# Patient Record
Sex: MALE | Race: WHITE | Employment: OTHER | URBAN - METROPOLITAN AREA
[De-identification: names, ages, dates, MRNs, and addresses within clinical notes are randomized per-mention and may not be internally consistent; named-entity substitution may affect disease eponyms.]

---

## 2017-01-04 ENCOUNTER — OFFICE VISIT (OUTPATIENT)
Dept: INTERNAL MEDICINE CLINIC | Age: 62
End: 2017-01-04

## 2017-01-04 VITALS
DIASTOLIC BLOOD PRESSURE: 84 MMHG | BODY MASS INDEX: 33.32 KG/M2 | WEIGHT: 246 LBS | SYSTOLIC BLOOD PRESSURE: 132 MMHG | HEIGHT: 72 IN | HEART RATE: 102 BPM | OXYGEN SATURATION: 98 %

## 2017-01-04 DIAGNOSIS — D64.9 ANEMIA, UNSPECIFIED TYPE: Primary | ICD-10-CM

## 2017-01-04 DIAGNOSIS — I10 ESSENTIAL HYPERTENSION: ICD-10-CM

## 2017-01-04 DIAGNOSIS — H53.9 VISION CHANGES: ICD-10-CM

## 2017-01-04 DIAGNOSIS — I25.119 CORONARY ARTERY DISEASE INVOLVING NATIVE HEART WITH ANGINA PECTORIS, UNSPECIFIED VESSEL OR LESION TYPE (HCC): ICD-10-CM

## 2017-01-04 DIAGNOSIS — J40 BRONCHITIS: ICD-10-CM

## 2017-01-04 LAB
BASOPHILS ABSOLUTE: 0.1 K/UL (ref 0–0.2)
BASOPHILS RELATIVE PERCENT: 0.9 %
EOSINOPHILS ABSOLUTE: 0.2 K/UL (ref 0–0.6)
EOSINOPHILS RELATIVE PERCENT: 3.6 %
HCT VFR BLD CALC: 30.4 % (ref 40.5–52.5)
HEMOGLOBIN: 9.7 G/DL (ref 13.5–17.5)
LYMPHOCYTES ABSOLUTE: 1.8 K/UL (ref 1–5.1)
LYMPHOCYTES RELATIVE PERCENT: 28.3 %
MCH RBC QN AUTO: 26.8 PG (ref 26–34)
MCHC RBC AUTO-ENTMCNC: 31.9 G/DL (ref 31–36)
MCV RBC AUTO: 84 FL (ref 80–100)
MONOCYTES ABSOLUTE: 0.4 K/UL (ref 0–1.3)
MONOCYTES RELATIVE PERCENT: 5.6 %
NEUTROPHILS ABSOLUTE: 4 K/UL (ref 1.7–7.7)
NEUTROPHILS RELATIVE PERCENT: 61.6 %
PDW BLD-RTO: 15.9 % (ref 12.4–15.4)
PLATELET # BLD: 176 K/UL (ref 135–450)
PMV BLD AUTO: 9.8 FL (ref 5–10.5)
RBC # BLD: 3.62 M/UL (ref 4.2–5.9)
WBC # BLD: 6.5 K/UL (ref 4–11)

## 2017-01-04 PROCEDURE — 99214 OFFICE O/P EST MOD 30 MIN: CPT | Performed by: INTERNAL MEDICINE

## 2017-01-04 RX ORDER — CEFUROXIME AXETIL 250 MG/1
250 TABLET ORAL 2 TIMES DAILY
Qty: 14 TABLET | Refills: 0 | Status: SHIPPED | OUTPATIENT
Start: 2017-01-04 | End: 2017-01-11

## 2017-01-04 ASSESSMENT — ENCOUNTER SYMPTOMS
RESPIRATORY NEGATIVE: 1
EYES NEGATIVE: 1

## 2017-01-09 ENCOUNTER — PAT TELEPHONE (OUTPATIENT)
Dept: PREADMISSION TESTING | Age: 62
End: 2017-01-09

## 2017-01-09 VITALS — BODY MASS INDEX: 33.32 KG/M2 | WEIGHT: 246 LBS | HEIGHT: 72 IN

## 2017-01-09 ASSESSMENT — PAIN - FUNCTIONAL ASSESSMENT: PAIN_FUNCTIONAL_ASSESSMENT: 0-10

## 2017-01-09 ASSESSMENT — PAIN SCALES - GENERAL: PAINLEVEL_OUTOF10: 5

## 2017-01-09 ASSESSMENT — PAIN DESCRIPTION - LOCATION: LOCATION: ABDOMEN

## 2017-01-10 ENCOUNTER — HOSPITAL ENCOUNTER (OUTPATIENT)
Dept: ENDOSCOPY | Age: 62
Discharge: HOME | End: 2017-01-10
Attending: INTERNAL MEDICINE | Admitting: INTERNAL MEDICINE

## 2017-01-10 VITALS
TEMPERATURE: 96.9 F | OXYGEN SATURATION: 97 % | WEIGHT: 242.51 LBS | BODY MASS INDEX: 32.85 KG/M2 | RESPIRATION RATE: 18 BRPM | HEIGHT: 72 IN | HEART RATE: 76 BPM | SYSTOLIC BLOOD PRESSURE: 164 MMHG | DIASTOLIC BLOOD PRESSURE: 86 MMHG

## 2017-01-10 LAB
ANION GAP SERPL CALCULATED.3IONS-SCNC: 14 MMOL/L (ref 3–16)
BUN BLDV-MCNC: 7 MG/DL (ref 7–20)
CALCIUM SERPL-MCNC: 8.4 MG/DL (ref 8.3–10.6)
CHLORIDE BLD-SCNC: 100 MMOL/L (ref 99–110)
CO2: 26 MMOL/L (ref 21–32)
CREAT SERPL-MCNC: 0.8 MG/DL (ref 0.8–1.3)
GFR AFRICAN AMERICAN: >60
GFR NON-AFRICAN AMERICAN: >60
GLUCOSE BLD-MCNC: 145 MG/DL (ref 70–99)
GLUCOSE BLD-MCNC: 187 MG/DL (ref 70–99)
GLUCOSE BLD-MCNC: 205 MG/DL (ref 70–99)
PERFORMED ON: ABNORMAL
PERFORMED ON: ABNORMAL
POTASSIUM SERPL-SCNC: 3.7 MMOL/L (ref 3.5–5.1)
SODIUM BLD-SCNC: 140 MMOL/L (ref 136–145)

## 2017-01-10 RX ORDER — SODIUM CHLORIDE 0.9 % (FLUSH) 0.9 %
10 SYRINGE (ML) INJECTION PRN
Status: DISCONTINUED | OUTPATIENT
Start: 2017-01-10 | End: 2017-01-11 | Stop reason: HOSPADM

## 2017-01-10 RX ORDER — SODIUM CHLORIDE 0.9 % (FLUSH) 0.9 %
10 SYRINGE (ML) INJECTION EVERY 12 HOURS SCHEDULED
Status: DISCONTINUED | OUTPATIENT
Start: 2017-01-10 | End: 2017-01-11 | Stop reason: HOSPADM

## 2017-01-10 RX ORDER — SODIUM CHLORIDE 9 MG/ML
INJECTION, SOLUTION INTRAVENOUS CONTINUOUS
Status: DISCONTINUED | OUTPATIENT
Start: 2017-01-10 | End: 2017-01-11 | Stop reason: HOSPADM

## 2017-01-10 RX ORDER — ONDANSETRON 2 MG/ML
4 INJECTION INTRAMUSCULAR; INTRAVENOUS
Status: ACTIVE | OUTPATIENT
Start: 2017-01-10 | End: 2017-01-10

## 2017-01-10 RX ADMIN — SODIUM CHLORIDE: 9 INJECTION, SOLUTION INTRAVENOUS at 07:28

## 2017-01-10 ASSESSMENT — PAIN SCALES - GENERAL
PAINLEVEL_OUTOF10: 0

## 2017-01-10 ASSESSMENT — ENCOUNTER SYMPTOMS: SHORTNESS OF BREATH: 1

## 2017-01-10 ASSESSMENT — PAIN - FUNCTIONAL ASSESSMENT: PAIN_FUNCTIONAL_ASSESSMENT: 0-10

## 2017-02-01 ENCOUNTER — CARE COORDINATION (OUTPATIENT)
Dept: INTERNAL MEDICINE CLINIC | Age: 62
End: 2017-02-01

## 2017-02-02 ENCOUNTER — PAT TELEPHONE (OUTPATIENT)
Dept: PREADMISSION TESTING | Age: 62
End: 2017-02-02

## 2017-02-02 VITALS — WEIGHT: 248 LBS | HEIGHT: 72 IN | BODY MASS INDEX: 33.59 KG/M2

## 2017-02-03 RX ORDER — SODIUM CHLORIDE 0.9 % (FLUSH) 0.9 %
10 SYRINGE (ML) INJECTION PRN
Status: CANCELLED | OUTPATIENT
Start: 2017-02-03

## 2017-02-03 RX ORDER — SODIUM CHLORIDE 0.9 % (FLUSH) 0.9 %
10 SYRINGE (ML) INJECTION EVERY 12 HOURS SCHEDULED
Status: CANCELLED | OUTPATIENT
Start: 2017-02-03

## 2017-02-03 RX ORDER — SODIUM CHLORIDE 9 MG/ML
INJECTION, SOLUTION INTRAVENOUS CONTINUOUS
Status: CANCELLED | OUTPATIENT
Start: 2017-02-03

## 2017-02-06 ENCOUNTER — TELEPHONE (OUTPATIENT)
Dept: INTERNAL MEDICINE CLINIC | Age: 62
End: 2017-02-06

## 2017-02-07 ENCOUNTER — TELEPHONE (OUTPATIENT)
Dept: INTERNAL MEDICINE CLINIC | Age: 62
End: 2017-02-07

## 2017-02-07 ENCOUNTER — TELEPHONE (OUTPATIENT)
Dept: CARDIOLOGY CLINIC | Age: 62
End: 2017-02-07

## 2017-02-07 ENCOUNTER — HOSPITAL ENCOUNTER (OUTPATIENT)
Dept: ENDOSCOPY | Age: 62
Discharge: HOME | End: 2017-02-07
Attending: INTERNAL MEDICINE | Admitting: INTERNAL MEDICINE

## 2017-02-08 ENCOUNTER — TELEPHONE (OUTPATIENT)
Dept: INTERNAL MEDICINE CLINIC | Age: 62
End: 2017-02-08

## 2017-02-09 ENCOUNTER — CARE COORDINATION (OUTPATIENT)
Dept: CASE MANAGEMENT | Age: 62
End: 2017-02-09

## 2017-02-09 ENCOUNTER — TELEPHONE (OUTPATIENT)
Dept: CARDIOLOGY CLINIC | Age: 62
End: 2017-02-09

## 2017-02-09 RX ORDER — RANOLAZINE 500 MG/1
500 TABLET, EXTENDED RELEASE ORAL 2 TIMES DAILY
Qty: 56 TABLET | Refills: 0 | COMMUNITY
Start: 2017-02-09 | End: 2017-03-02 | Stop reason: SDUPTHER

## 2017-02-09 RX ORDER — FLUTICASONE FUROATE AND VILANTEROL 200; 25 UG/1; UG/1
1 POWDER RESPIRATORY (INHALATION) DAILY
Qty: 3 EACH | Refills: 3 | Status: SHIPPED | OUTPATIENT
Start: 2017-02-09 | End: 2017-03-12 | Stop reason: ALTCHOICE

## 2017-02-09 RX ORDER — FLUTICASONE FUROATE AND VILANTEROL 200; 25 UG/1; UG/1
1 POWDER RESPIRATORY (INHALATION) DAILY
COMMUNITY
End: 2017-02-09 | Stop reason: SDUPTHER

## 2017-02-13 ENCOUNTER — CARE COORDINATION (OUTPATIENT)
Dept: CASE MANAGEMENT | Age: 62
End: 2017-02-13

## 2017-02-15 ENCOUNTER — CARE COORDINATION (OUTPATIENT)
Dept: CASE MANAGEMENT | Age: 62
End: 2017-02-15

## 2017-02-16 ENCOUNTER — OFFICE VISIT (OUTPATIENT)
Dept: INTERNAL MEDICINE CLINIC | Age: 62
End: 2017-02-16

## 2017-02-16 VITALS
BODY MASS INDEX: 34.81 KG/M2 | WEIGHT: 257 LBS | DIASTOLIC BLOOD PRESSURE: 72 MMHG | SYSTOLIC BLOOD PRESSURE: 130 MMHG | HEART RATE: 86 BPM | HEIGHT: 72 IN | OXYGEN SATURATION: 98 %

## 2017-02-16 DIAGNOSIS — J44.9 CHRONIC OBSTRUCTIVE PULMONARY DISEASE, UNSPECIFIED COPD TYPE (HCC): ICD-10-CM

## 2017-02-16 DIAGNOSIS — D64.9 ANEMIA, UNSPECIFIED TYPE: ICD-10-CM

## 2017-02-16 DIAGNOSIS — E11.40 CONTROLLED TYPE 2 DIABETES MELLITUS WITH DIABETIC NEUROPATHY, WITHOUT LONG-TERM CURRENT USE OF INSULIN (HCC): ICD-10-CM

## 2017-02-16 DIAGNOSIS — F41.9 ANXIETY: ICD-10-CM

## 2017-02-16 DIAGNOSIS — I25.709 CORONARY ARTERY DISEASE INVOLVING CORONARY BYPASS GRAFT OF NATIVE HEART WITH ANGINA PECTORIS (HCC): Primary | ICD-10-CM

## 2017-02-16 PROCEDURE — 99214 OFFICE O/P EST MOD 30 MIN: CPT | Performed by: INTERNAL MEDICINE

## 2017-02-16 RX ORDER — ALPRAZOLAM 1 MG/1
1 TABLET ORAL 4 TIMES DAILY PRN
Qty: 120 TABLET | Refills: 0 | Status: SHIPPED | OUTPATIENT
Start: 2017-02-16 | End: 2017-03-27 | Stop reason: SDUPTHER

## 2017-02-16 RX ORDER — ALPRAZOLAM 1 MG/1
1 TABLET ORAL 4 TIMES DAILY PRN
COMMUNITY
End: 2017-02-16 | Stop reason: SDUPTHER

## 2017-02-16 ASSESSMENT — ENCOUNTER SYMPTOMS
RESPIRATORY NEGATIVE: 1
EYES NEGATIVE: 1
ALLERGIC/IMMUNOLOGIC NEGATIVE: 1

## 2017-02-20 ENCOUNTER — CARE COORDINATION (OUTPATIENT)
Dept: CASE MANAGEMENT | Age: 62
End: 2017-02-20

## 2017-02-22 ENCOUNTER — CARE COORDINATION (OUTPATIENT)
Dept: CASE MANAGEMENT | Age: 62
End: 2017-02-22

## 2017-02-24 ENCOUNTER — EPISODE CHANGES (OUTPATIENT)
Dept: CASE MANAGEMENT | Age: 62
End: 2017-02-24

## 2017-03-02 ENCOUNTER — TELEPHONE (OUTPATIENT)
Dept: CARDIOLOGY CLINIC | Age: 62
End: 2017-03-02

## 2017-03-02 RX ORDER — RANOLAZINE 500 MG/1
500 TABLET, EXTENDED RELEASE ORAL 2 TIMES DAILY
Qty: 56 TABLET | Refills: 0 | COMMUNITY
Start: 2017-03-02 | End: 2017-06-21

## 2017-03-03 ENCOUNTER — CARE COORDINATION (OUTPATIENT)
Dept: INTERNAL MEDICINE CLINIC | Age: 62
End: 2017-03-03

## 2017-03-16 ENCOUNTER — CARE COORDINATION (OUTPATIENT)
Dept: CASE MANAGEMENT | Age: 62
End: 2017-03-16

## 2017-03-18 PROBLEM — R07.89 ANTERIOR CHEST WALL PAIN: Status: ACTIVE | Noted: 2017-03-18

## 2017-03-24 ENCOUNTER — CARE COORDINATION (OUTPATIENT)
Dept: CASE MANAGEMENT | Age: 62
End: 2017-03-24

## 2017-03-25 ENCOUNTER — CARE COORDINATION (OUTPATIENT)
Dept: CASE MANAGEMENT | Age: 62
End: 2017-03-25

## 2017-03-27 ENCOUNTER — CARE COORDINATION (OUTPATIENT)
Dept: CASE MANAGEMENT | Age: 62
End: 2017-03-27

## 2017-03-27 DIAGNOSIS — F41.9 ANXIETY: ICD-10-CM

## 2017-03-27 RX ORDER — ALPRAZOLAM 1 MG/1
TABLET ORAL
Qty: 120 TABLET | Refills: 0 | Status: SHIPPED | OUTPATIENT
Start: 2017-03-27 | End: 2017-04-25 | Stop reason: SDUPTHER

## 2017-03-28 ENCOUNTER — TELEPHONE (OUTPATIENT)
Dept: INTERNAL MEDICINE CLINIC | Age: 62
End: 2017-03-28

## 2017-03-31 ENCOUNTER — OFFICE VISIT (OUTPATIENT)
Dept: INTERNAL MEDICINE CLINIC | Age: 62
End: 2017-03-31

## 2017-03-31 VITALS
DIASTOLIC BLOOD PRESSURE: 76 MMHG | SYSTOLIC BLOOD PRESSURE: 124 MMHG | HEART RATE: 71 BPM | HEIGHT: 72 IN | OXYGEN SATURATION: 98 % | WEIGHT: 256 LBS | BODY MASS INDEX: 34.67 KG/M2

## 2017-03-31 DIAGNOSIS — E11.40 CONTROLLED TYPE 2 DIABETES MELLITUS WITH DIABETIC NEUROPATHY, WITHOUT LONG-TERM CURRENT USE OF INSULIN (HCC): ICD-10-CM

## 2017-03-31 DIAGNOSIS — D50.0 IRON DEFICIENCY ANEMIA DUE TO CHRONIC BLOOD LOSS: ICD-10-CM

## 2017-03-31 DIAGNOSIS — J98.01 BRONCHOSPASM: ICD-10-CM

## 2017-03-31 DIAGNOSIS — I50.9 CONGESTIVE HEART FAILURE, UNSPECIFIED CONGESTIVE HEART FAILURE CHRONICITY, UNSPECIFIED CONGESTIVE HEART FAILURE TYPE: ICD-10-CM

## 2017-03-31 DIAGNOSIS — F41.9 ANXIETY: ICD-10-CM

## 2017-03-31 DIAGNOSIS — R07.9 CHEST PAIN, UNSPECIFIED TYPE: Primary | ICD-10-CM

## 2017-03-31 PROCEDURE — 99214 OFFICE O/P EST MOD 30 MIN: CPT | Performed by: INTERNAL MEDICINE

## 2017-03-31 RX ORDER — FLUTICASONE FUROATE AND VILANTEROL 200; 25 UG/1; UG/1
200 POWDER RESPIRATORY (INHALATION) 3 TIMES DAILY
Qty: 1 EACH | Refills: 0 | COMMUNITY
Start: 2017-03-31 | End: 2017-04-28 | Stop reason: SDUPTHER

## 2017-03-31 ASSESSMENT — ENCOUNTER SYMPTOMS
SHORTNESS OF BREATH: 0
EYES NEGATIVE: 1
WHEEZING: 1

## 2017-04-06 ENCOUNTER — SURG/PROC ORDERS (OUTPATIENT)
Dept: ANESTHESIOLOGY | Age: 62
End: 2017-04-06

## 2017-04-06 RX ORDER — SODIUM CHLORIDE 9 MG/ML
INJECTION, SOLUTION INTRAVENOUS CONTINUOUS
Status: CANCELLED | OUTPATIENT
Start: 2017-04-06

## 2017-04-06 RX ORDER — SODIUM CHLORIDE 0.9 % (FLUSH) 0.9 %
10 SYRINGE (ML) INJECTION PRN
Status: CANCELLED | OUTPATIENT
Start: 2017-04-06

## 2017-04-06 RX ORDER — SODIUM CHLORIDE 0.9 % (FLUSH) 0.9 %
10 SYRINGE (ML) INJECTION EVERY 12 HOURS SCHEDULED
Status: CANCELLED | OUTPATIENT
Start: 2017-04-06

## 2017-04-10 DIAGNOSIS — I25.709 CORONARY ARTERY DISEASE INVOLVING CORONARY BYPASS GRAFT OF NATIVE HEART WITH ANGINA PECTORIS (HCC): ICD-10-CM

## 2017-04-10 DIAGNOSIS — E11.40 CONTROLLED TYPE 2 DIABETES MELLITUS WITH DIABETIC NEUROPATHY, WITHOUT LONG-TERM CURRENT USE OF INSULIN (HCC): ICD-10-CM

## 2017-04-10 LAB
CHOLESTEROL, TOTAL: 148 MG/DL (ref 0–199)
HDLC SERPL-MCNC: 51 MG/DL (ref 40–60)
LDL CHOLESTEROL CALCULATED: 77 MG/DL
TRIGL SERPL-MCNC: 102 MG/DL (ref 0–150)
VLDLC SERPL CALC-MCNC: 20 MG/DL

## 2017-04-11 LAB
ESTIMATED AVERAGE GLUCOSE: 151.3 MG/DL
HBA1C MFR BLD: 6.9 %

## 2017-04-13 ENCOUNTER — HOSPITAL ENCOUNTER (OUTPATIENT)
Dept: OTHER | Age: 62
Discharge: HOME | End: 2017-04-13
Attending: INTERNAL MEDICINE | Admitting: INTERNAL MEDICINE

## 2017-04-13 ENCOUNTER — OFFICE VISIT (OUTPATIENT)
Dept: CARDIOLOGY CLINIC | Age: 62
End: 2017-04-13

## 2017-04-13 VITALS
DIASTOLIC BLOOD PRESSURE: 70 MMHG | HEIGHT: 72 IN | HEART RATE: 76 BPM | WEIGHT: 255 LBS | OXYGEN SATURATION: 94 % | SYSTOLIC BLOOD PRESSURE: 118 MMHG | BODY MASS INDEX: 34.54 KG/M2

## 2017-04-13 DIAGNOSIS — I25.110 CORONARY ARTERY DISEASE INVOLVING NATIVE CORONARY ARTERY OF NATIVE HEART WITH UNSTABLE ANGINA PECTORIS (HCC): ICD-10-CM

## 2017-04-13 DIAGNOSIS — R06.02 SOB (SHORTNESS OF BREATH): ICD-10-CM

## 2017-04-13 DIAGNOSIS — E78.2 MIXED HYPERLIPIDEMIA: Chronic | ICD-10-CM

## 2017-04-13 DIAGNOSIS — I25.5 ISCHEMIC CARDIOMYOPATHY: Chronic | ICD-10-CM

## 2017-04-13 DIAGNOSIS — I25.810 CORONARY ARTERY DISEASE INVOLVING CORONARY BYPASS GRAFT OF NATIVE HEART WITHOUT ANGINA PECTORIS: ICD-10-CM

## 2017-04-13 DIAGNOSIS — Z95.1 S/P CABG (CORONARY ARTERY BYPASS GRAFT): Primary | ICD-10-CM

## 2017-04-13 LAB
ANION GAP SERPL CALCULATED.3IONS-SCNC: 13 MMOL/L (ref 3–16)
BASOPHILS ABSOLUTE: 0.1 K/UL (ref 0–0.2)
BASOPHILS RELATIVE PERCENT: 1.5 %
BUN BLDV-MCNC: 14 MG/DL (ref 7–20)
CALCIUM SERPL-MCNC: 8.7 MG/DL (ref 8.3–10.6)
CHLORIDE BLD-SCNC: 103 MMOL/L (ref 99–110)
CO2: 27 MMOL/L (ref 21–32)
CREAT SERPL-MCNC: 1 MG/DL (ref 0.8–1.3)
EOSINOPHILS ABSOLUTE: 0.2 K/UL (ref 0–0.6)
EOSINOPHILS RELATIVE PERCENT: 2.8 %
GFR AFRICAN AMERICAN: >60
GFR NON-AFRICAN AMERICAN: >60
GLUCOSE BLD-MCNC: 130 MG/DL (ref 70–99)
HCT VFR BLD CALC: 31.5 % (ref 40.5–52.5)
HEMOGLOBIN: 9.9 G/DL (ref 13.5–17.5)
LYMPHOCYTES ABSOLUTE: 2.2 K/UL (ref 1–5.1)
LYMPHOCYTES RELATIVE PERCENT: 33.8 %
MCH RBC QN AUTO: 25.6 PG (ref 26–34)
MCHC RBC AUTO-ENTMCNC: 31.4 G/DL (ref 31–36)
MCV RBC AUTO: 81.3 FL (ref 80–100)
MONOCYTES ABSOLUTE: 0.6 K/UL (ref 0–1.3)
MONOCYTES RELATIVE PERCENT: 9.5 %
NEUTROPHILS ABSOLUTE: 3.5 K/UL (ref 1.7–7.7)
NEUTROPHILS RELATIVE PERCENT: 52.4 %
PDW BLD-RTO: 22.7 % (ref 12.4–15.4)
PLATELET # BLD: 280 K/UL (ref 135–450)
PMV BLD AUTO: 9 FL (ref 5–10.5)
POTASSIUM SERPL-SCNC: 3.9 MMOL/L (ref 3.5–5.1)
PRO-BNP: 661 PG/ML (ref 0–124)
RBC # BLD: 3.87 M/UL (ref 4.2–5.9)
SODIUM BLD-SCNC: 143 MMOL/L (ref 136–145)
WBC # BLD: 6.6 K/UL (ref 4–11)

## 2017-04-13 PROCEDURE — 93000 ELECTROCARDIOGRAM COMPLETE: CPT | Performed by: INTERNAL MEDICINE

## 2017-04-13 PROCEDURE — 99214 OFFICE O/P EST MOD 30 MIN: CPT | Performed by: INTERNAL MEDICINE

## 2017-04-26 ENCOUNTER — OFFICE VISIT (OUTPATIENT)
Dept: CARDIOLOGY CLINIC | Age: 62
End: 2017-04-26

## 2017-04-26 ENCOUNTER — PROCEDURE VISIT (OUTPATIENT)
Dept: CARDIOLOGY CLINIC | Age: 62
End: 2017-04-26

## 2017-04-26 VITALS
OXYGEN SATURATION: 93 % | HEART RATE: 78 BPM | WEIGHT: 240.8 LBS | BODY MASS INDEX: 32.61 KG/M2 | SYSTOLIC BLOOD PRESSURE: 122 MMHG | DIASTOLIC BLOOD PRESSURE: 84 MMHG | HEIGHT: 72 IN

## 2017-04-26 DIAGNOSIS — I25.10 CORONARY ARTERY DISEASE INVOLVING NATIVE CORONARY ARTERY OF NATIVE HEART WITHOUT ANGINA PECTORIS: ICD-10-CM

## 2017-04-26 DIAGNOSIS — I25.5 ISCHEMIC CARDIOMYOPATHY: Chronic | ICD-10-CM

## 2017-04-26 DIAGNOSIS — Z95.810 ICD (IMPLANTABLE CARDIOVERTER-DEFIBRILLATOR), DUAL, IN SITU: Primary | ICD-10-CM

## 2017-04-26 DIAGNOSIS — E78.2 MIXED HYPERLIPIDEMIA: ICD-10-CM

## 2017-04-26 DIAGNOSIS — I50.20 SYSTOLIC CONGESTIVE HEART FAILURE, UNSPECIFIED CONGESTIVE HEART FAILURE CHRONICITY: ICD-10-CM

## 2017-04-26 DIAGNOSIS — I42.9 CARDIOMYOPATHY (HCC): Primary | ICD-10-CM

## 2017-04-26 PROCEDURE — 93290 INTERROG DEV EVAL ICPMS IP: CPT | Performed by: INTERNAL MEDICINE

## 2017-04-26 PROCEDURE — 99214 OFFICE O/P EST MOD 30 MIN: CPT | Performed by: INTERNAL MEDICINE

## 2017-04-26 PROCEDURE — 93283 PRGRMG EVAL IMPLANTABLE DFB: CPT | Performed by: INTERNAL MEDICINE

## 2017-04-28 ENCOUNTER — OFFICE VISIT (OUTPATIENT)
Dept: INTERNAL MEDICINE CLINIC | Age: 62
End: 2017-04-28

## 2017-04-28 VITALS
DIASTOLIC BLOOD PRESSURE: 82 MMHG | HEART RATE: 94 BPM | OXYGEN SATURATION: 96 % | WEIGHT: 250 LBS | BODY MASS INDEX: 33.86 KG/M2 | HEIGHT: 72 IN | SYSTOLIC BLOOD PRESSURE: 138 MMHG

## 2017-04-28 DIAGNOSIS — E11.40 CONTROLLED TYPE 2 DIABETES MELLITUS WITH DIABETIC NEUROPATHY, WITHOUT LONG-TERM CURRENT USE OF INSULIN (HCC): ICD-10-CM

## 2017-04-28 DIAGNOSIS — J44.9 CHRONIC OBSTRUCTIVE PULMONARY DISEASE, UNSPECIFIED COPD TYPE (HCC): Primary | ICD-10-CM

## 2017-04-28 DIAGNOSIS — E78.2 MIXED HYPERLIPIDEMIA: Chronic | ICD-10-CM

## 2017-04-28 DIAGNOSIS — D64.9 ANEMIA, UNSPECIFIED TYPE: ICD-10-CM

## 2017-04-28 DIAGNOSIS — I25.110 CORONARY ARTERY DISEASE INVOLVING NATIVE CORONARY ARTERY OF NATIVE HEART WITH UNSTABLE ANGINA PECTORIS (HCC): ICD-10-CM

## 2017-04-28 LAB
BASOPHILS ABSOLUTE: 0.1 K/UL (ref 0–0.2)
BASOPHILS RELATIVE PERCENT: 0.7 %
EOSINOPHILS ABSOLUTE: 0.3 K/UL (ref 0–0.6)
EOSINOPHILS RELATIVE PERCENT: 3.3 %
HCT VFR BLD CALC: 32.7 % (ref 40.5–52.5)
HEMOGLOBIN: 10.2 G/DL (ref 13.5–17.5)
LYMPHOCYTES ABSOLUTE: 2.3 K/UL (ref 1–5.1)
LYMPHOCYTES RELATIVE PERCENT: 28 %
MCH RBC QN AUTO: 25.9 PG (ref 26–34)
MCHC RBC AUTO-ENTMCNC: 31.1 G/DL (ref 31–36)
MCV RBC AUTO: 83.5 FL (ref 80–100)
MONOCYTES ABSOLUTE: 0.6 K/UL (ref 0–1.3)
MONOCYTES RELATIVE PERCENT: 6.8 %
NEUTROPHILS ABSOLUTE: 5 K/UL (ref 1.7–7.7)
NEUTROPHILS RELATIVE PERCENT: 61.2 %
PDW BLD-RTO: 23.3 % (ref 12.4–15.4)
PLATELET # BLD: 184 K/UL (ref 135–450)
PMV BLD AUTO: 9.8 FL (ref 5–10.5)
RBC # BLD: 3.92 M/UL (ref 4.2–5.9)
WBC # BLD: 8.1 K/UL (ref 4–11)

## 2017-04-28 PROCEDURE — G8510 SCR DEP NEG, NO PLAN REQD: HCPCS | Performed by: INTERNAL MEDICINE

## 2017-04-28 PROCEDURE — 99214 OFFICE O/P EST MOD 30 MIN: CPT | Performed by: INTERNAL MEDICINE

## 2017-04-28 RX ORDER — ALBUTEROL SULFATE 90 UG/1
2 AEROSOL, METERED RESPIRATORY (INHALATION) EVERY 6 HOURS PRN
Qty: 1 INHALER | Refills: 3 | Status: SHIPPED | OUTPATIENT
Start: 2017-04-28 | End: 2017-07-05

## 2017-04-28 RX ORDER — FLUTICASONE FUROATE AND VILANTEROL 200; 25 UG/1; UG/1
200 POWDER RESPIRATORY (INHALATION) DAILY
Qty: 2 EACH | Refills: 0 | COMMUNITY
Start: 2017-04-28 | End: 2018-02-05

## 2017-04-28 ASSESSMENT — PATIENT HEALTH QUESTIONNAIRE - PHQ9
SUM OF ALL RESPONSES TO PHQ9 QUESTIONS 1 & 2: 2
1. LITTLE INTEREST OR PLEASURE IN DOING THINGS: 1
2. FEELING DOWN, DEPRESSED OR HOPELESS: 1
SUM OF ALL RESPONSES TO PHQ QUESTIONS 1-9: 2

## 2017-04-28 ASSESSMENT — ENCOUNTER SYMPTOMS
SHORTNESS OF BREATH: 1
EYES NEGATIVE: 1

## 2017-06-02 ENCOUNTER — TELEPHONE (OUTPATIENT)
Dept: INTERNAL MEDICINE CLINIC | Age: 62
End: 2017-06-02

## 2017-06-05 ENCOUNTER — CARE COORDINATION (OUTPATIENT)
Dept: CARE COORDINATION | Age: 62
End: 2017-06-05

## 2017-06-19 ENCOUNTER — CARE COORDINATION (OUTPATIENT)
Dept: INTERNAL MEDICINE CLINIC | Age: 62
End: 2017-06-19

## 2017-06-21 ENCOUNTER — OFFICE VISIT (OUTPATIENT)
Dept: INTERNAL MEDICINE CLINIC | Age: 62
End: 2017-06-21

## 2017-06-21 VITALS
WEIGHT: 238.4 LBS | SYSTOLIC BLOOD PRESSURE: 155 MMHG | BODY MASS INDEX: 32.29 KG/M2 | DIASTOLIC BLOOD PRESSURE: 90 MMHG | HEART RATE: 111 BPM | OXYGEN SATURATION: 95 % | HEIGHT: 72 IN

## 2017-06-21 DIAGNOSIS — M79.601 CHRONIC PAIN OF RIGHT UPPER EXTREMITY: Primary | ICD-10-CM

## 2017-06-21 DIAGNOSIS — I25.709 CORONARY ARTERY DISEASE INVOLVING CORONARY BYPASS GRAFT OF NATIVE HEART WITH ANGINA PECTORIS (HCC): ICD-10-CM

## 2017-06-21 DIAGNOSIS — E11.40 CONTROLLED TYPE 2 DIABETES MELLITUS WITH DIABETIC NEUROPATHY, WITHOUT LONG-TERM CURRENT USE OF INSULIN (HCC): ICD-10-CM

## 2017-06-21 DIAGNOSIS — G89.29 CHRONIC PAIN OF RIGHT UPPER EXTREMITY: Primary | ICD-10-CM

## 2017-06-21 DIAGNOSIS — F41.9 ANXIETY: ICD-10-CM

## 2017-06-21 PROCEDURE — 99214 OFFICE O/P EST MOD 30 MIN: CPT | Performed by: INTERNAL MEDICINE

## 2017-06-21 RX ORDER — ALPRAZOLAM 1 MG/1
TABLET ORAL
Qty: 120 TABLET | Refills: 1 | Status: SHIPPED | OUTPATIENT
Start: 2017-06-21 | End: 2017-07-05 | Stop reason: ALTCHOICE

## 2017-06-21 RX ORDER — METHYLPREDNISOLONE 4 MG/1
TABLET ORAL
Qty: 1 KIT | Refills: 0 | Status: SHIPPED | OUTPATIENT
Start: 2017-06-21 | End: 2017-07-05

## 2017-06-21 ASSESSMENT — ENCOUNTER SYMPTOMS
RESPIRATORY NEGATIVE: 1
EYES NEGATIVE: 1

## 2017-06-29 ENCOUNTER — CARE COORDINATION (OUTPATIENT)
Dept: INTERNAL MEDICINE CLINIC | Age: 62
End: 2017-06-29

## 2017-07-05 ENCOUNTER — TELEPHONE (OUTPATIENT)
Dept: INTERNAL MEDICINE CLINIC | Age: 62
End: 2017-07-05

## 2017-07-07 ENCOUNTER — OFFICE VISIT (OUTPATIENT)
Dept: INTERNAL MEDICINE CLINIC | Age: 62
End: 2017-07-07

## 2017-07-07 ENCOUNTER — CARE COORDINATION (OUTPATIENT)
Dept: CARE COORDINATION | Age: 62
End: 2017-07-07

## 2017-07-07 VITALS
SYSTOLIC BLOOD PRESSURE: 140 MMHG | HEIGHT: 72 IN | DIASTOLIC BLOOD PRESSURE: 100 MMHG | HEART RATE: 60 BPM | BODY MASS INDEX: 34.97 KG/M2 | WEIGHT: 258.2 LBS | OXYGEN SATURATION: 88 %

## 2017-07-07 DIAGNOSIS — R06.02 SHORTNESS OF BREATH: Primary | ICD-10-CM

## 2017-07-07 DIAGNOSIS — I25.709 CORONARY ARTERY DISEASE INVOLVING CORONARY BYPASS GRAFT OF NATIVE HEART WITH ANGINA PECTORIS (HCC): ICD-10-CM

## 2017-07-07 DIAGNOSIS — R41.0 CONFUSION: ICD-10-CM

## 2017-07-07 DIAGNOSIS — E11.65 TYPE 2 DIABETES MELLITUS WITH HYPERGLYCEMIA, WITHOUT LONG-TERM CURRENT USE OF INSULIN (HCC): ICD-10-CM

## 2017-07-07 DIAGNOSIS — I50.20 SYSTOLIC CONGESTIVE HEART FAILURE, UNSPECIFIED CONGESTIVE HEART FAILURE CHRONICITY: ICD-10-CM

## 2017-07-07 DIAGNOSIS — J44.9 CHRONIC OBSTRUCTIVE PULMONARY DISEASE, UNSPECIFIED COPD TYPE (HCC): ICD-10-CM

## 2017-07-07 PROCEDURE — 99214 OFFICE O/P EST MOD 30 MIN: CPT | Performed by: INTERNAL MEDICINE

## 2017-07-07 ASSESSMENT — ENCOUNTER SYMPTOMS
EYES NEGATIVE: 1
COUGH: 1

## 2017-07-08 PROBLEM — R41.0 CONFUSION: Status: ACTIVE | Noted: 2017-07-08

## 2017-07-08 PROBLEM — R07.9 RECURRENT CHEST PAIN: Chronic | Status: ACTIVE | Noted: 2017-07-08

## 2017-07-08 PROBLEM — J20.9 BRONCHITIS WITH BRONCHOSPASM: Status: ACTIVE | Noted: 2017-07-08

## 2017-07-09 ENCOUNTER — CARE COORDINATION (OUTPATIENT)
Dept: CASE MANAGEMENT | Age: 62
End: 2017-07-09

## 2017-07-10 ENCOUNTER — TELEPHONE (OUTPATIENT)
Dept: INTERNAL MEDICINE CLINIC | Age: 62
End: 2017-07-10

## 2017-07-12 ENCOUNTER — OFFICE VISIT (OUTPATIENT)
Dept: INTERNAL MEDICINE CLINIC | Age: 62
End: 2017-07-12

## 2017-07-12 ENCOUNTER — CARE COORDINATION (OUTPATIENT)
Dept: CASE MANAGEMENT | Age: 62
End: 2017-07-12

## 2017-07-12 VITALS
WEIGHT: 253.2 LBS | HEIGHT: 72 IN | BODY MASS INDEX: 34.29 KG/M2 | DIASTOLIC BLOOD PRESSURE: 100 MMHG | SYSTOLIC BLOOD PRESSURE: 170 MMHG

## 2017-07-12 DIAGNOSIS — F41.9 ANXIETY: ICD-10-CM

## 2017-07-12 DIAGNOSIS — I50.20 SYSTOLIC CONGESTIVE HEART FAILURE, UNSPECIFIED CONGESTIVE HEART FAILURE CHRONICITY: ICD-10-CM

## 2017-07-12 DIAGNOSIS — I10 ESSENTIAL HYPERTENSION: ICD-10-CM

## 2017-07-12 DIAGNOSIS — E11.40 CONTROLLED TYPE 2 DIABETES MELLITUS WITH DIABETIC NEUROPATHY, WITHOUT LONG-TERM CURRENT USE OF INSULIN (HCC): ICD-10-CM

## 2017-07-12 DIAGNOSIS — J44.9 CHRONIC OBSTRUCTIVE PULMONARY DISEASE, UNSPECIFIED COPD TYPE (HCC): Primary | ICD-10-CM

## 2017-07-12 PROBLEM — J20.9 BRONCHITIS WITH BRONCHOSPASM: Status: RESOLVED | Noted: 2017-07-08 | Resolved: 2017-07-12

## 2017-07-12 PROCEDURE — 99214 OFFICE O/P EST MOD 30 MIN: CPT | Performed by: INTERNAL MEDICINE

## 2017-07-12 ASSESSMENT — ENCOUNTER SYMPTOMS
RESPIRATORY NEGATIVE: 1
EYES NEGATIVE: 1

## 2017-07-13 ENCOUNTER — HOSPITAL ENCOUNTER (OUTPATIENT)
Dept: PULMONOLOGY | Age: 62
Discharge: HOME | End: 2017-07-13
Attending: INTERNAL MEDICINE | Admitting: INTERNAL MEDICINE

## 2017-07-13 DIAGNOSIS — J44.9 CHRONIC OBSTRUCTIVE PULMONARY DISEASE, UNSPECIFIED COPD TYPE (HCC): ICD-10-CM

## 2017-07-13 DIAGNOSIS — J44.9 CHRONIC OBSTRUCTIVE PULMONARY DISEASE (HCC): ICD-10-CM

## 2017-07-18 ENCOUNTER — CARE COORDINATION (OUTPATIENT)
Dept: CASE MANAGEMENT | Age: 62
End: 2017-07-18

## 2017-07-25 ENCOUNTER — CARE COORDINATION (OUTPATIENT)
Dept: CARE COORDINATION | Age: 62
End: 2017-07-25

## 2017-07-31 RX ORDER — AMLODIPINE BESYLATE 5 MG/1
TABLET ORAL
Qty: 90 TABLET | Refills: 0 | Status: SHIPPED | OUTPATIENT
Start: 2017-07-31 | End: 2017-11-07 | Stop reason: SDUPTHER

## 2017-07-31 RX ORDER — CLOPIDOGREL BISULFATE 75 MG/1
TABLET ORAL
Qty: 90 TABLET | Refills: 0 | Status: SHIPPED | OUTPATIENT
Start: 2017-07-31 | End: 2017-11-07 | Stop reason: SDUPTHER

## 2017-08-02 RX ORDER — LISINOPRIL 5 MG/1
TABLET ORAL
Qty: 90 TABLET | Refills: 0 | Status: SHIPPED | OUTPATIENT
Start: 2017-08-02 | End: 2017-11-07 | Stop reason: SDUPTHER

## 2017-09-01 ENCOUNTER — OFFICE VISIT (OUTPATIENT)
Dept: CARDIOLOGY CLINIC | Age: 62
End: 2017-09-01

## 2017-09-01 VITALS
SYSTOLIC BLOOD PRESSURE: 128 MMHG | DIASTOLIC BLOOD PRESSURE: 78 MMHG | BODY MASS INDEX: 35.35 KG/M2 | HEART RATE: 110 BPM | HEIGHT: 72 IN | OXYGEN SATURATION: 94 % | WEIGHT: 261 LBS

## 2017-09-01 DIAGNOSIS — D64.9 ANEMIA, UNSPECIFIED TYPE: ICD-10-CM

## 2017-09-01 DIAGNOSIS — I25.10 CORONARY ARTERY DISEASE INVOLVING NATIVE CORONARY ARTERY OF NATIVE HEART WITHOUT ANGINA PECTORIS: Primary | ICD-10-CM

## 2017-09-01 DIAGNOSIS — I25.5 ISCHEMIC CARDIOMYOPATHY: Chronic | ICD-10-CM

## 2017-09-01 DIAGNOSIS — G89.29 CHRONIC CHEST PAIN: ICD-10-CM

## 2017-09-01 DIAGNOSIS — E78.2 MIXED HYPERLIPIDEMIA: ICD-10-CM

## 2017-09-01 DIAGNOSIS — R07.9 CHRONIC CHEST PAIN: ICD-10-CM

## 2017-09-01 DIAGNOSIS — Z95.810 ICD (IMPLANTABLE CARDIOVERTER-DEFIBRILLATOR) IN PLACE: ICD-10-CM

## 2017-09-01 DIAGNOSIS — R07.9 RECURRENT CHEST PAIN: Chronic | ICD-10-CM

## 2017-09-01 PROCEDURE — 99214 OFFICE O/P EST MOD 30 MIN: CPT | Performed by: INTERNAL MEDICINE

## 2017-09-01 PROCEDURE — 93000 ELECTROCARDIOGRAM COMPLETE: CPT | Performed by: INTERNAL MEDICINE

## 2017-11-07 RX ORDER — LISINOPRIL 5 MG/1
TABLET ORAL
Qty: 90 TABLET | Refills: 0 | Status: ON HOLD | OUTPATIENT
Start: 2017-11-07 | End: 2017-12-27 | Stop reason: HOSPADM

## 2017-11-07 RX ORDER — AMLODIPINE BESYLATE 5 MG/1
TABLET ORAL
Qty: 90 TABLET | Refills: 0 | Status: SHIPPED | OUTPATIENT
Start: 2017-11-07 | End: 2018-07-12 | Stop reason: SDUPTHER

## 2017-11-07 RX ORDER — CLOPIDOGREL BISULFATE 75 MG/1
TABLET ORAL
Qty: 90 TABLET | Refills: 0 | Status: SHIPPED | OUTPATIENT
Start: 2017-11-07 | End: 2019-01-09 | Stop reason: SDUPTHER

## 2017-12-15 RX ORDER — LANCETS
EACH MISCELLANEOUS 4 TIMES DAILY
Qty: 100 EACH | Refills: 3 | Status: CANCELLED | OUTPATIENT
Start: 2017-12-15

## 2017-12-15 RX ORDER — BLOOD GLUCOSE CONTROL HIGH,LOW
EACH MISCELLANEOUS
Qty: 1 EACH | Refills: 0 | Status: CANCELLED | OUTPATIENT
Start: 2017-12-15

## 2017-12-23 ENCOUNTER — TELEPHONE (OUTPATIENT)
Dept: INTERNAL MEDICINE CLINIC | Age: 62
End: 2017-12-23

## 2017-12-23 PROBLEM — I50.43 CHF (CONGESTIVE HEART FAILURE), NYHA CLASS I, ACUTE ON CHRONIC, COMBINED (HCC): Status: ACTIVE | Noted: 2017-12-23

## 2017-12-28 ENCOUNTER — CARE COORDINATION (OUTPATIENT)
Dept: CASE MANAGEMENT | Age: 62
End: 2017-12-28

## 2018-01-04 ENCOUNTER — OFFICE VISIT (OUTPATIENT)
Dept: INTERNAL MEDICINE CLINIC | Age: 63
End: 2018-01-04

## 2018-01-04 VITALS
HEART RATE: 87 BPM | HEIGHT: 72 IN | SYSTOLIC BLOOD PRESSURE: 155 MMHG | OXYGEN SATURATION: 96 % | WEIGHT: 249.6 LBS | DIASTOLIC BLOOD PRESSURE: 98 MMHG | BODY MASS INDEX: 33.81 KG/M2

## 2018-01-04 DIAGNOSIS — F41.9 ANXIETY: ICD-10-CM

## 2018-01-04 DIAGNOSIS — J44.1 COPD EXACERBATION (HCC): Primary | ICD-10-CM

## 2018-01-04 DIAGNOSIS — E11.40 CONTROLLED TYPE 2 DIABETES MELLITUS WITH DIABETIC NEUROPATHY, WITHOUT LONG-TERM CURRENT USE OF INSULIN (HCC): ICD-10-CM

## 2018-01-04 DIAGNOSIS — I50.20 SYSTOLIC CONGESTIVE HEART FAILURE, UNSPECIFIED CONGESTIVE HEART FAILURE CHRONICITY: ICD-10-CM

## 2018-01-04 DIAGNOSIS — N28.9 RENAL INSUFFICIENCY: ICD-10-CM

## 2018-01-04 PROBLEM — R07.9 RECURRENT CHEST PAIN: Chronic | Status: RESOLVED | Noted: 2017-07-08 | Resolved: 2018-01-04

## 2018-01-04 PROBLEM — R41.0 CONFUSION: Status: RESOLVED | Noted: 2017-07-08 | Resolved: 2018-01-04

## 2018-01-04 PROBLEM — R07.89 ANTERIOR CHEST WALL PAIN: Status: RESOLVED | Noted: 2017-03-18 | Resolved: 2018-01-04

## 2018-01-04 LAB
ANION GAP SERPL CALCULATED.3IONS-SCNC: 17 MMOL/L (ref 3–16)
BUN BLDV-MCNC: 20 MG/DL (ref 7–20)
CALCIUM SERPL-MCNC: 9.1 MG/DL (ref 8.3–10.6)
CHLORIDE BLD-SCNC: 103 MMOL/L (ref 99–110)
CO2: 22 MMOL/L (ref 21–32)
CREAT SERPL-MCNC: 1.3 MG/DL (ref 0.8–1.3)
GFR AFRICAN AMERICAN: >60
GFR NON-AFRICAN AMERICAN: 56
GLUCOSE BLD-MCNC: 156 MG/DL (ref 70–99)
POTASSIUM SERPL-SCNC: 4.2 MMOL/L (ref 3.5–5.1)
SODIUM BLD-SCNC: 142 MMOL/L (ref 136–145)

## 2018-01-04 PROCEDURE — G8427 DOCREV CUR MEDS BY ELIG CLIN: HCPCS | Performed by: INTERNAL MEDICINE

## 2018-01-04 PROCEDURE — 4004F PT TOBACCO SCREEN RCVD TLK: CPT | Performed by: INTERNAL MEDICINE

## 2018-01-04 PROCEDURE — G8417 CALC BMI ABV UP PARAM F/U: HCPCS | Performed by: INTERNAL MEDICINE

## 2018-01-04 PROCEDURE — G8484 FLU IMMUNIZE NO ADMIN: HCPCS | Performed by: INTERNAL MEDICINE

## 2018-01-04 PROCEDURE — G8926 SPIRO NO PERF OR DOC: HCPCS | Performed by: INTERNAL MEDICINE

## 2018-01-04 PROCEDURE — 1111F DSCHRG MED/CURRENT MED MERGE: CPT | Performed by: INTERNAL MEDICINE

## 2018-01-04 PROCEDURE — 3017F COLORECTAL CA SCREEN DOC REV: CPT | Performed by: INTERNAL MEDICINE

## 2018-01-04 PROCEDURE — 3023F SPIROM DOC REV: CPT | Performed by: INTERNAL MEDICINE

## 2018-01-04 PROCEDURE — 3046F HEMOGLOBIN A1C LEVEL >9.0%: CPT | Performed by: INTERNAL MEDICINE

## 2018-01-04 PROCEDURE — G8598 ASA/ANTIPLAT THER USED: HCPCS | Performed by: INTERNAL MEDICINE

## 2018-01-04 PROCEDURE — 99214 OFFICE O/P EST MOD 30 MIN: CPT | Performed by: INTERNAL MEDICINE

## 2018-01-04 RX ORDER — ALPRAZOLAM 1 MG/1
TABLET ORAL
Qty: 120 TABLET | Refills: 0 | Status: SHIPPED | OUTPATIENT
Start: 2018-01-04 | End: 2018-02-01 | Stop reason: SDUPTHER

## 2018-01-04 ASSESSMENT — ENCOUNTER SYMPTOMS
EYES NEGATIVE: 1
RESPIRATORY NEGATIVE: 1

## 2018-01-05 ENCOUNTER — CARE COORDINATION (OUTPATIENT)
Dept: CASE MANAGEMENT | Age: 63
End: 2018-01-05

## 2018-01-05 NOTE — CARE COORDINATION
[facility] Transitions Follow Up Call    2018    Patient: Kirby Henley  Patient : 1955   MRN: [id number]  Reason for Admission: There are no discharge diagnoses documented for the most recent discharge. Discharge Date: 17 RARS: Risk Score: 26.75       Spoke with: Marlyn Bell (patient)    Care Transitions Subsequent and Final Call    Subsequent and Final Calls  Do you have any ongoing symptoms?:  No  Have your medications changed?:  No  Do you have any questions related to your medications?:  No  Do you currently have any active services?:  No  Do you have any needs or concerns that I can assist you with?:  No  Identified Barriers:  None  Care Transitions Interventions  Other Interventions: Follow Up: Spoke with Ruy. He states he is doing\"pretty good\". He saw his PCP yesterday. Ruy states kis kidney function is still abnormal based upon his lab work. He is waiting to hear from . Olman Hussein states his breathing is fine as long as he stays out of the cold. Olman Hussein states he is staying inside. He is agreeable to f/u per CTC.   Future Appointments  Date Time Provider Fred Gregory   1/10/2018 1:20 PM LYNN Lacey   2018 11:00 AM MD LIZZIE Valle RN

## 2018-01-08 DIAGNOSIS — I25.10 CORONARY ARTERY DISEASE INVOLVING NATIVE HEART WITHOUT ANGINA PECTORIS, UNSPECIFIED VESSEL OR LESION TYPE: ICD-10-CM

## 2018-01-08 RX ORDER — ATORVASTATIN CALCIUM 80 MG/1
TABLET, FILM COATED ORAL
Qty: 90 TABLET | Refills: 0 | Status: SHIPPED | OUTPATIENT
Start: 2018-01-08 | End: 2018-02-15 | Stop reason: SDUPTHER

## 2018-01-09 PROBLEM — R07.9 CHEST PAIN: Status: ACTIVE | Noted: 2018-01-09

## 2018-01-10 PROBLEM — Z72.0 TOBACCO ABUSE: Status: ACTIVE | Noted: 2018-01-10

## 2018-01-11 PROBLEM — I50.23 ACUTE ON CHRONIC SYSTOLIC CONGESTIVE HEART FAILURE (HCC): Status: ACTIVE | Noted: 2017-12-23

## 2018-01-11 PROBLEM — R07.2 PRECORDIAL PAIN: Status: ACTIVE | Noted: 2018-01-09

## 2018-01-12 ENCOUNTER — CARE COORDINATION (OUTPATIENT)
Dept: CASE MANAGEMENT | Age: 63
End: 2018-01-12

## 2018-01-13 NOTE — CARE COORDINATION
[facility] Transitions Initial Follow Up Call    Call within 2 business days of discharge: Yes    Patient: Sarah Hahn Patient : 1955   MRN: [id number]   Reason for Admission: chest pain  Discharge Date: 18 RARS: [facility] Risk Score: 26.75     Spoke with: [address]: [facility]    Non-face-to-face services provided:  Obtained and reviewed discharge summary and/or continuity of care documents  Education of patient/family/caregiver/guardian to support self-management-   Assessment and support for treatment adherence and medication management-        Care Transitions 24 Hour Call    Schedule Follow Up Appointment with PCP:  Declined  Do you have any ongoing symptoms?:  Yes  Patient-reported symptoms:  Chest Pain (Comment: \"mild\" chest pain )  Interventions for patient-reported symptoms:  Other (Comment: continue taking meds, schedule appt, s/s require med attn)  Do you have a copy of your discharge instructions?:  Yes  Do you have all of your prescriptions and are they filled?:  Yes  Have you been contacted by a [facility] Pharmacist?:  No  Have you scheduled your follow up appointment?:  No (Comment: Pt declined assistance and states he will call Monday and schedule )  Were you discharged with any Home Care or Post Acute Services:  No  Do you feel like you have everything you need to keep you well at home?:  Yes  Care Transitions Interventions  No Identified Needs       Summary  CTC spoke to the Pt who denies SOB but reports \"mild\" chest pain. Pt declined to review meds and reports he plans to take medications with him to appointment and will review with his doctor. Pt declined assistance with scheduling appt and has agreed to call Monday to schedule. Pt denies any needs or concerns at this time and is agreeable with additional calls.      Follow Up  Future Appointments  Date Time Provider Fred Gregory   1/15/2018 1:20 PM LYNN Fitzgerald [facility]   2018 11:00

## 2018-01-21 DIAGNOSIS — R07.9 CHEST PAIN AT REST: ICD-10-CM

## 2018-01-21 DIAGNOSIS — F41.9 ANXIETY: ICD-10-CM

## 2018-01-22 ENCOUNTER — OFFICE VISIT (OUTPATIENT)
Dept: INTERNAL MEDICINE CLINIC | Age: 63
End: 2018-01-22

## 2018-01-22 VITALS
WEIGHT: 264 LBS | HEART RATE: 81 BPM | SYSTOLIC BLOOD PRESSURE: 114 MMHG | HEIGHT: 72 IN | DIASTOLIC BLOOD PRESSURE: 78 MMHG | OXYGEN SATURATION: 94 % | BODY MASS INDEX: 35.76 KG/M2

## 2018-01-22 DIAGNOSIS — E78.5 HYPERLIPIDEMIA LDL GOAL <70: Chronic | ICD-10-CM

## 2018-01-22 DIAGNOSIS — M54.50 CHRONIC BILATERAL LOW BACK PAIN WITHOUT SCIATICA: ICD-10-CM

## 2018-01-22 DIAGNOSIS — G89.29 CHRONIC BILATERAL LOW BACK PAIN WITHOUT SCIATICA: ICD-10-CM

## 2018-01-22 DIAGNOSIS — E11.40 CONTROLLED TYPE 2 DIABETES MELLITUS WITH DIABETIC NEUROPATHY, WITHOUT LONG-TERM CURRENT USE OF INSULIN (HCC): Primary | ICD-10-CM

## 2018-01-22 DIAGNOSIS — G89.29 CHRONIC CHEST PAIN: ICD-10-CM

## 2018-01-22 DIAGNOSIS — I10 ESSENTIAL HYPERTENSION: ICD-10-CM

## 2018-01-22 DIAGNOSIS — R93.89 ABNORMAL CT SCAN: ICD-10-CM

## 2018-01-22 DIAGNOSIS — I25.5 ISCHEMIC CARDIOMYOPATHY: Chronic | ICD-10-CM

## 2018-01-22 DIAGNOSIS — I50.20 SYSTOLIC CONGESTIVE HEART FAILURE, UNSPECIFIED CONGESTIVE HEART FAILURE CHRONICITY: ICD-10-CM

## 2018-01-22 DIAGNOSIS — R07.9 CHRONIC CHEST PAIN: ICD-10-CM

## 2018-01-22 LAB
A/G RATIO: 1.5 (ref 1.1–2.2)
ALBUMIN SERPL-MCNC: 3.8 G/DL (ref 3.4–5)
ALP BLD-CCNC: 69 U/L (ref 40–129)
ALT SERPL-CCNC: 22 U/L (ref 10–40)
ANION GAP SERPL CALCULATED.3IONS-SCNC: 13 MMOL/L (ref 3–16)
AST SERPL-CCNC: 10 U/L (ref 15–37)
BILIRUB SERPL-MCNC: 0.3 MG/DL (ref 0–1)
BUN BLDV-MCNC: 8 MG/DL (ref 7–20)
CALCIUM SERPL-MCNC: 8.9 MG/DL (ref 8.3–10.6)
CHLORIDE BLD-SCNC: 103 MMOL/L (ref 99–110)
CO2: 28 MMOL/L (ref 21–32)
CREAT SERPL-MCNC: 1.2 MG/DL (ref 0.8–1.3)
GFR AFRICAN AMERICAN: >60
GFR NON-AFRICAN AMERICAN: >60
GLOBULIN: 2.6 G/DL
GLUCOSE BLD-MCNC: 133 MG/DL (ref 70–99)
GLUCOSE BLD-MCNC: 140 MG/DL
POTASSIUM SERPL-SCNC: 4.2 MMOL/L (ref 3.5–5.1)
SODIUM BLD-SCNC: 144 MMOL/L (ref 136–145)
TOTAL PROTEIN: 6.4 G/DL (ref 6.4–8.2)

## 2018-01-22 PROCEDURE — G8427 DOCREV CUR MEDS BY ELIG CLIN: HCPCS | Performed by: INTERNAL MEDICINE

## 2018-01-22 PROCEDURE — 4004F PT TOBACCO SCREEN RCVD TLK: CPT | Performed by: INTERNAL MEDICINE

## 2018-01-22 PROCEDURE — 99214 OFFICE O/P EST MOD 30 MIN: CPT | Performed by: INTERNAL MEDICINE

## 2018-01-22 PROCEDURE — 82962 GLUCOSE BLOOD TEST: CPT | Performed by: INTERNAL MEDICINE

## 2018-01-22 PROCEDURE — 1111F DSCHRG MED/CURRENT MED MERGE: CPT | Performed by: INTERNAL MEDICINE

## 2018-01-22 PROCEDURE — 3046F HEMOGLOBIN A1C LEVEL >9.0%: CPT | Performed by: INTERNAL MEDICINE

## 2018-01-22 PROCEDURE — G8417 CALC BMI ABV UP PARAM F/U: HCPCS | Performed by: INTERNAL MEDICINE

## 2018-01-22 PROCEDURE — G8484 FLU IMMUNIZE NO ADMIN: HCPCS | Performed by: INTERNAL MEDICINE

## 2018-01-22 PROCEDURE — G8598 ASA/ANTIPLAT THER USED: HCPCS | Performed by: INTERNAL MEDICINE

## 2018-01-22 PROCEDURE — 3017F COLORECTAL CA SCREEN DOC REV: CPT | Performed by: INTERNAL MEDICINE

## 2018-01-22 RX ORDER — FLUTICASONE FUROATE AND VILANTEROL 200; 25 UG/1; UG/1
POWDER RESPIRATORY (INHALATION)
Qty: 2 EACH | Refills: 0 | COMMUNITY
Start: 2018-01-22 | End: 2018-02-05

## 2018-01-22 RX ORDER — SERTRALINE HYDROCHLORIDE 100 MG/1
TABLET, FILM COATED ORAL
Qty: 90 TABLET | Refills: 3 | Status: SHIPPED | OUTPATIENT
Start: 2018-01-22 | End: 2018-11-26 | Stop reason: ALTCHOICE

## 2018-01-22 ASSESSMENT — ENCOUNTER SYMPTOMS
EYES NEGATIVE: 1
RESPIRATORY NEGATIVE: 1
GASTROINTESTINAL NEGATIVE: 1
ALLERGIC/IMMUNOLOGIC NEGATIVE: 1

## 2018-01-23 ENCOUNTER — TELEPHONE (OUTPATIENT)
Dept: INTERNAL MEDICINE CLINIC | Age: 63
End: 2018-01-23

## 2018-01-23 LAB
ESTIMATED AVERAGE GLUCOSE: 214.5 MG/DL
HBA1C MFR BLD: 9.1 %

## 2018-01-26 ENCOUNTER — CARE COORDINATION (OUTPATIENT)
Dept: CASE MANAGEMENT | Age: 63
End: 2018-01-26

## 2018-01-26 NOTE — CARE COORDINATION
[facility] Transitions Follow Up Call    2018    Patient: Cal Groves  Patient : 1955   MRN: [id number]  Reason for Admission: There are no discharge diagnoses documented for the most recent discharge. Discharge Date: 18 RARS: Risk Score: 26.75       Spoke with: no one    Care Transitions Subsequent and Final Call    Subsequent and Final Calls  Care Transitions Interventions  Other Interventions: Follow Up: Unable to reach patient. Left message. Informed Ruy this would be the final CTC call. Encouraged him to call his PCP with any future issues or concerns.   Future Appointments  Date Time Provider Fred Gregory   2018 11:00 AM Carlos Camacho MD [facility]   2/15/2018 1:20 PM [facility] [place] [facility] Moses Salazar RN

## 2018-02-05 ENCOUNTER — PROCEDURE VISIT (OUTPATIENT)
Dept: CARDIOLOGY CLINIC | Age: 63
End: 2018-02-05

## 2018-02-05 ENCOUNTER — OFFICE VISIT (OUTPATIENT)
Dept: CARDIOLOGY CLINIC | Age: 63
End: 2018-02-05

## 2018-02-05 VITALS
DIASTOLIC BLOOD PRESSURE: 68 MMHG | HEIGHT: 72 IN | BODY MASS INDEX: 36.03 KG/M2 | HEART RATE: 84 BPM | WEIGHT: 266 LBS | SYSTOLIC BLOOD PRESSURE: 124 MMHG | OXYGEN SATURATION: 96 %

## 2018-02-05 DIAGNOSIS — F17.200 SMOKING ADDICTION: ICD-10-CM

## 2018-02-05 DIAGNOSIS — I25.810 CORONARY ARTERY DISEASE INVOLVING CORONARY BYPASS GRAFT OF NATIVE HEART WITHOUT ANGINA PECTORIS: Primary | ICD-10-CM

## 2018-02-05 DIAGNOSIS — I25.5 ISCHEMIC CARDIOMYOPATHY: Chronic | ICD-10-CM

## 2018-02-05 DIAGNOSIS — R06.02 SOB (SHORTNESS OF BREATH): ICD-10-CM

## 2018-02-05 DIAGNOSIS — Z95.810 ICD (IMPLANTABLE CARDIOVERTER-DEFIBRILLATOR), DUAL, IN SITU: ICD-10-CM

## 2018-02-05 DIAGNOSIS — I50.20 SYSTOLIC CONGESTIVE HEART FAILURE, UNSPECIFIED CONGESTIVE HEART FAILURE CHRONICITY: ICD-10-CM

## 2018-02-05 DIAGNOSIS — E78.5 HYPERLIPIDEMIA LDL GOAL <70: Chronic | ICD-10-CM

## 2018-02-05 PROCEDURE — 99214 OFFICE O/P EST MOD 30 MIN: CPT | Performed by: INTERNAL MEDICINE

## 2018-02-05 PROCEDURE — 93283 PRGRMG EVAL IMPLANTABLE DFB: CPT | Performed by: INTERNAL MEDICINE

## 2018-02-05 PROCEDURE — G8427 DOCREV CUR MEDS BY ELIG CLIN: HCPCS | Performed by: INTERNAL MEDICINE

## 2018-02-05 PROCEDURE — 3017F COLORECTAL CA SCREEN DOC REV: CPT | Performed by: INTERNAL MEDICINE

## 2018-02-05 PROCEDURE — G8484 FLU IMMUNIZE NO ADMIN: HCPCS | Performed by: INTERNAL MEDICINE

## 2018-02-05 PROCEDURE — 4004F PT TOBACCO SCREEN RCVD TLK: CPT | Performed by: INTERNAL MEDICINE

## 2018-02-05 PROCEDURE — 1111F DSCHRG MED/CURRENT MED MERGE: CPT | Performed by: INTERNAL MEDICINE

## 2018-02-05 PROCEDURE — G8598 ASA/ANTIPLAT THER USED: HCPCS | Performed by: INTERNAL MEDICINE

## 2018-02-05 PROCEDURE — G8417 CALC BMI ABV UP PARAM F/U: HCPCS | Performed by: INTERNAL MEDICINE

## 2018-02-05 RX ORDER — FUROSEMIDE 40 MG/1
60 TABLET ORAL DAILY
Qty: 45 TABLET | Refills: 5 | Status: SHIPPED | OUTPATIENT
Start: 2018-02-05 | End: 2018-10-01 | Stop reason: SDUPTHER

## 2018-02-05 NOTE — PROGRESS NOTES
tablet TAKE 1 TABLET BY MOUTH FOUR TIMES DAILY AS NEEDED FOR SLEEP OR ANXIETY 120 tablet 0    gabapentin (NEURONTIN) 600 MG tablet TAKE 1 TABLET BY MOUTH FIVE TIMES DAILY 150 tablet 0    sertraline (ZOLOFT) 100 MG tablet TAKE 1 TABLET BY MOUTH DAILY 90 tablet 3    valsartan (DIOVAN) 160 MG tablet Take 1 tablet by mouth daily 30 tablet 3    metoprolol succinate (TOPROL XL) 100 MG extended release tablet Take 1 tablet by mouth daily 30 tablet 1    atorvastatin (LIPITOR) 80 MG tablet TAKE 1 TABLET BY MOUTH DAILY 90 tablet 0    isosorbide mononitrate (IMDUR) 60 MG extended release tablet Take 1 tablet by mouth daily 30 tablet 0    glipiZIDE (GLUCOTROL) 10 MG tablet Take 1 tablet by mouth 2 times daily (before meals) 60 tablet 0    metFORMIN (GLUCOPHAGE) 1000 MG tablet TAKE 1 TABLET BY MOUTH TWICE DAILY WITH MEALS 180 tablet 0    clopidogrel (PLAVIX) 75 MG tablet TAKE 1 TABLET BY MOUTH DAILY 90 tablet 0    amLODIPine (NORVASC) 5 MG tablet TAKE 1 TABLET BY MOUTH DAILY 90 tablet 0    albuterol sulfate HFA (PROAIR HFA) 108 (90 Base) MCG/ACT inhaler 1-2 puffs every 4 hours while awake for 7-10 days then PRN wheezing  Dispense with SPACER and Instruct on use. May sub Ventolin or Proventil as needed per [employer]. 1 Inhaler 3    acetaminophen (TYLENOL) 325 MG tablet Take 2 tablets by mouth every 6 hours as needed for Pain or Fever 120 tablet 3    ferrous sulfate 324 (65 FE) MG EC tablet Take 1 tablet by mouth 2 times daily (with meals) (Patient taking differently: Take 324 mg by mouth daily (with breakfast) ) 30 tablet 3    nitroGLYCERIN (NITROSTAT) 0.4 MG SL tablet Place 1 tablet under the tongue every 5 minutes as needed for Chest pain 25 tablet 1     No current facility-administered medications for this visit. Review of Systems:  Review of systems is as detailed above and otherwise negative.      Physical Exam:   /68   Pulse 84   Ht 6' (1.829 m)   Wt 266 lb (120.7 kg) Comment: did not wish to

## 2018-02-05 NOTE — PATIENT INSTRUCTIONS
pharmacist about nicotine replacement therapy, which replaces the nicotine in your body. You still get nicotine but you do not use tobacco. Nicotine replacement products help you slowly reduce the amount of nicotine you need. These products come in several forms, many of them available over-the-counter:  ¨ Nicotine patches  ¨ Nicotine gum and lozenges  ¨ Nicotine inhaler  · Ask your doctor about bupropion (Wellbutrin) or varenicline (Chantix), which are prescription medicines. They do not contain nicotine. They help you by reducing withdrawal symptoms, such as stress and anxiety. · Some people find hypnosis, acupuncture, and massage helpful for ending the smoking habit. · Eat a healthy diet and get regular exercise. Having healthy habits will help your body move past its craving for nicotine. · Be prepared to keep trying. Most people are not successful the first few times they try to quit. Do not get mad at yourself if you smoke again. Make a list of things you learned and think about when you want to try again, such as next week, next month, or next year. Where can you learn more? Go to https://[employer].LetMeGo. org and sign in to your [employer] account. Enter J537 in the [employer] Mail box to learn more about \"Stopping Smoking: Care Instructions. \"     If you do not have an account, please click on the \"Sign Up Now\" link. Current as of: March 20, 2017  Content Version: 11.5  © 1565-2670 Healthwise, [employer]. Care instructions adapted under license by [facility] ([facility]). If you have questions about a medical condition or this instruction, always ask your healthcare professional. [address] any warranty or liability for your use of this information.

## 2018-02-05 NOTE — LETTER
daily (with meals) (Patient taking differently: Take 324 mg by mouth daily (with breakfast) ) 30 tablet 3    nitroGLYCERIN (NITROSTAT) 0.4 MG SL tablet Place 1 tablet under the tongue every 5 minutes as needed for Chest pain 25 tablet 1     No current facility-administered medications for this visit. Review of Systems:  Review of systems is as detailed above and otherwise negative. Physical Exam:   /68   Pulse 84   Ht 6' (1.829 m)   Wt 266 lb (120.7 kg) Comment: did not wish to remove shoes  SpO2 96%   BMI 36.08 kg/m²    Wt Readings from Last 3 Encounters:   02/05/18 266 lb (120.7 kg)   01/22/18 264 lb (119.7 kg)   01/11/18 268 lb 1.3 oz (121.6 kg)     Constitutional: He is oriented to person, place, and time. He appears well-developed and well-nourished. In no acute distress. Head: Normocephalic and atraumatic. Pupils equal and round. Neck: Neck supple. No JVP or carotid bruit appreciated. No mass and no thyromegaly present. No lymphadenopathy present. Cardiovascular: Normal rate. Normal heart sounds. Exam reveals no gallop and no friction rub. No murmur heard. bilateral rhonchi heard. Pulmonary/Chest: Effort normal and breath sounds normal. No respiratory distress. He has no wheezes, rhonchi or rales. Abdominal: Soft, non-tender. Bowel sounds are normal. He exhibits no organomegaly, mass or bruit. Extremities: No edema, cyanosis, or clubbing. Pulses are 2+ radial/dorsalis pedis/posterior tibial/carotid bilaterally. Neurological: No gross cranial nerve deficit. Coordination normal.   Skin: Skin is warm and dry. There is no rash or diaphoresis. Psychiatric: He has a normal mood and affect.  His speech is normal and behavior is normal.     Lab Review:   FLP:    Lab Results   Component Value Date    TRIG 102 04/10/2017    HDL 51 04/10/2017    LDLCALC 77 04/10/2017    LDLDIRECT 105 09/17/2015    LABVLDL 20 04/10/2017     BUN/Creatinine:    Lab Results   Component Value Date

## 2018-02-15 DIAGNOSIS — I25.10 CORONARY ARTERY DISEASE INVOLVING NATIVE HEART WITHOUT ANGINA PECTORIS, UNSPECIFIED VESSEL OR LESION TYPE: ICD-10-CM

## 2018-02-15 RX ORDER — ATORVASTATIN CALCIUM 80 MG/1
TABLET, FILM COATED ORAL
Qty: 90 TABLET | Refills: 1 | Status: ON HOLD | OUTPATIENT
Start: 2018-02-15 | End: 2020-07-16 | Stop reason: SDUPTHER

## 2018-02-16 PROBLEM — R07.9 CHEST PAIN: Status: ACTIVE | Noted: 2018-02-16

## 2018-02-18 ENCOUNTER — CARE COORDINATION (OUTPATIENT)
Dept: CASE MANAGEMENT | Age: 63
End: 2018-02-18

## 2018-02-18 DIAGNOSIS — R07.9 CHEST PAIN, UNSPECIFIED TYPE: Primary | ICD-10-CM

## 2018-02-18 PROCEDURE — 1111F DSCHRG MED/CURRENT MED MERGE: CPT | Performed by: INTERNAL MEDICINE

## 2018-02-18 NOTE — CARE COORDINATION
[facility] Transitions Initial Follow Up Call    Call within 2 business days of discharge: Yes    Patient: Jen Hampton Patient : 1955   MRN: <[id number]>  Reason for Admission: There are no discharge diagnoses documented for the most recent discharge. Discharge Date: 18 RARS: [facility] Risk Score: 26.75     Spoke with: [address] [place] [address]: [place]    Non-face-to-face services provided:  Obtained and reviewed discharge summary and/or continuity of care documents    Care Transitions 24 Hour Call    Do you have any ongoing symptoms?:  Yes  Patient-reported symptoms:  Shortness of Breath, Chest Pain  Interventions for patient-reported symptoms:  Notified PCP/Physician, Other  Do you have a copy of your discharge instructions?:  Yes  Do you have all of your prescriptions and are they filled?:  No  Have you been contacted by a [address]?:  No  Have you scheduled your follow up appointment?:  Yes  How are you going to get to your appointment?:  Car - drive self  Were you discharged with any Home Care or Post Acute Services:  No  Do you feel like you have everything you need to keep you well at home?:  Yes  Care Transitions Interventions       Spoke with Ruy for initial transitions call from discharge on  for CP and COPD exacerbation. Pt stated he is still feeling \"a little rough\" and has slight sharp CP in center of chest that does not radiate and SOB julius. with exertion. Denies palpitations, fever, chills, dizziness, cough, sweating. Stated he used inhaler this a.m. Advised pt to go to ED if symptoms worsen. Pt verbalized understanding. Stated his wife is at home to assist as needed. Reviewed medications. Pt has not picked up new medications for Advair and prednisone but stated he received call from pharmacy that they are ready for pickup and will do so today. Pt asked if he really needed second inhaler. Advised him  medication from pharmacy and to use as directed.  Pt commented that

## 2018-02-20 ENCOUNTER — CARE COORDINATION (OUTPATIENT)
Dept: CASE MANAGEMENT | Age: 63
End: 2018-02-20

## 2018-02-21 PROBLEM — J44.1 COPD WITH ACUTE EXACERBATION (HCC): Status: ACTIVE | Noted: 2018-02-21

## 2018-02-22 ENCOUNTER — CARE COORDINATION (OUTPATIENT)
Dept: CASE MANAGEMENT | Age: 63
End: 2018-02-22

## 2018-02-26 RX ORDER — ISOSORBIDE MONONITRATE 60 MG/1
60 TABLET, EXTENDED RELEASE ORAL DAILY
Qty: 30 TABLET | Refills: 3 | Status: SHIPPED | OUTPATIENT
Start: 2018-02-26 | End: 2018-05-07 | Stop reason: SDUPTHER

## 2018-03-01 ENCOUNTER — CARE COORDINATION (OUTPATIENT)
Dept: CASE MANAGEMENT | Age: 63
End: 2018-03-01

## 2018-03-06 ENCOUNTER — HOSPITAL ENCOUNTER (OUTPATIENT)
Dept: CARDIAC REHAB | Age: 63
Discharge: HOME | End: 2018-03-31
Attending: INTERNAL MEDICINE | Admitting: INTERNAL MEDICINE

## 2018-03-06 PROBLEM — R06.02 SHORTNESS OF BREATH: Status: ACTIVE | Noted: 2018-03-06

## 2018-03-07 PROBLEM — J98.4 RESTRICTIVE LUNG DISEASE: Status: ACTIVE | Noted: 2018-03-07

## 2018-03-07 PROBLEM — J96.01 ACUTE RESPIRATORY FAILURE WITH HYPOXIA (HCC): Status: ACTIVE | Noted: 2018-03-07

## 2018-03-07 PROBLEM — J96.12 CHRONIC RESPIRATORY FAILURE WITH HYPERCAPNIA (HCC): Status: ACTIVE | Noted: 2018-03-07

## 2018-03-09 DIAGNOSIS — J44.9 CHRONIC OBSTRUCTIVE PULMONARY DISEASE, UNSPECIFIED COPD TYPE (HCC): Primary | ICD-10-CM

## 2018-03-10 ENCOUNTER — CARE COORDINATION (OUTPATIENT)
Dept: CASE MANAGEMENT | Age: 63
End: 2018-03-10

## 2018-03-10 DIAGNOSIS — J44.1 COPD EXACERBATION (HCC): Primary | ICD-10-CM

## 2018-03-10 PROCEDURE — 1111F DSCHRG MED/CURRENT MED MERGE: CPT | Performed by: INTERNAL MEDICINE

## 2018-03-12 ENCOUNTER — OFFICE VISIT (OUTPATIENT)
Dept: INTERNAL MEDICINE CLINIC | Age: 63
End: 2018-03-12

## 2018-03-12 VITALS
DIASTOLIC BLOOD PRESSURE: 78 MMHG | TEMPERATURE: 97.4 F | SYSTOLIC BLOOD PRESSURE: 124 MMHG | HEIGHT: 72 IN | WEIGHT: 272.8 LBS | BODY MASS INDEX: 36.95 KG/M2 | HEART RATE: 85 BPM | OXYGEN SATURATION: 96 %

## 2018-03-12 DIAGNOSIS — R07.9 CHRONIC CHEST PAIN: ICD-10-CM

## 2018-03-12 DIAGNOSIS — I10 ESSENTIAL HYPERTENSION: ICD-10-CM

## 2018-03-12 DIAGNOSIS — G89.29 CHRONIC CHEST PAIN: ICD-10-CM

## 2018-03-12 DIAGNOSIS — J44.1 COPD EXACERBATION (HCC): Primary | ICD-10-CM

## 2018-03-12 DIAGNOSIS — E11.40 CONTROLLED TYPE 2 DIABETES MELLITUS WITH DIABETIC NEUROPATHY, WITHOUT LONG-TERM CURRENT USE OF INSULIN (HCC): ICD-10-CM

## 2018-03-12 DIAGNOSIS — D64.9 ANEMIA, UNSPECIFIED TYPE: ICD-10-CM

## 2018-03-12 PROBLEM — J96.12 CHRONIC RESPIRATORY FAILURE WITH HYPERCAPNIA (HCC): Status: RESOLVED | Noted: 2018-03-07 | Resolved: 2018-03-12

## 2018-03-12 LAB — GLUCOSE BLD-MCNC: 184 MG/DL

## 2018-03-12 PROCEDURE — 3017F COLORECTAL CA SCREEN DOC REV: CPT | Performed by: INTERNAL MEDICINE

## 2018-03-12 PROCEDURE — G8926 SPIRO NO PERF OR DOC: HCPCS | Performed by: INTERNAL MEDICINE

## 2018-03-12 PROCEDURE — 82962 GLUCOSE BLOOD TEST: CPT | Performed by: INTERNAL MEDICINE

## 2018-03-12 PROCEDURE — G8427 DOCREV CUR MEDS BY ELIG CLIN: HCPCS | Performed by: INTERNAL MEDICINE

## 2018-03-12 PROCEDURE — 4004F PT TOBACCO SCREEN RCVD TLK: CPT | Performed by: INTERNAL MEDICINE

## 2018-03-12 PROCEDURE — G8598 ASA/ANTIPLAT THER USED: HCPCS | Performed by: INTERNAL MEDICINE

## 2018-03-12 PROCEDURE — 99214 OFFICE O/P EST MOD 30 MIN: CPT | Performed by: INTERNAL MEDICINE

## 2018-03-12 PROCEDURE — 3045F PR MOST RECENT HEMOGLOBIN A1C LEVEL 7.0-9.0%: CPT | Performed by: INTERNAL MEDICINE

## 2018-03-12 PROCEDURE — G8417 CALC BMI ABV UP PARAM F/U: HCPCS | Performed by: INTERNAL MEDICINE

## 2018-03-12 PROCEDURE — G8484 FLU IMMUNIZE NO ADMIN: HCPCS | Performed by: INTERNAL MEDICINE

## 2018-03-12 PROCEDURE — 1111F DSCHRG MED/CURRENT MED MERGE: CPT | Performed by: INTERNAL MEDICINE

## 2018-03-12 PROCEDURE — 3023F SPIROM DOC REV: CPT | Performed by: INTERNAL MEDICINE

## 2018-03-12 ASSESSMENT — ENCOUNTER SYMPTOMS
SHORTNESS OF BREATH: 1
EYES NEGATIVE: 1

## 2018-03-14 ENCOUNTER — CARE COORDINATION (OUTPATIENT)
Dept: CASE MANAGEMENT | Age: 63
End: 2018-03-14

## 2018-03-19 ENCOUNTER — CARE COORDINATION (OUTPATIENT)
Dept: CASE MANAGEMENT | Age: 63
End: 2018-03-19

## 2018-03-20 ENCOUNTER — TELEPHONE (OUTPATIENT)
Dept: SLEEP MEDICINE | Age: 63
End: 2018-03-20

## 2018-03-20 NOTE — CARE COORDINATION
[facility] Transitions Follow Up Call    3/20/2018    Patient: Yeny Mercer  Patient : 1955   MRN: [id number]  Reason for Admission: There are no discharge diagnoses documented for the most recent discharge. Discharge Date: 3/19/18 RARS: Risk Score: 26.75       Spoke with: no one    Care Transitions Subsequent and Final Call    Subsequent and Final Calls  Care Transitions Interventions  Other Interventions: Follow Up: 2nd CTC attempt. Unable to reach patient. Left message. Contact info provided. Requested return call to CTC.   Future Appointments  Date Time Provider Fred Gregory   3/21/2018 1:45 PM Louis Connelly MD [facility]   2018 1:40 PM Mattie Patterson [facility] [id number] Anna Plummer RN

## 2018-03-22 PROBLEM — I50.33 ACUTE ON CHRONIC DIASTOLIC CONGESTIVE HEART FAILURE (HCC): Status: ACTIVE | Noted: 2018-03-22

## 2018-03-24 ENCOUNTER — CARE COORDINATION (OUTPATIENT)
Dept: CASE MANAGEMENT | Age: 63
End: 2018-03-24

## 2018-03-29 ENCOUNTER — CARE COORDINATION (OUTPATIENT)
Dept: CASE MANAGEMENT | Age: 63
End: 2018-03-29

## 2018-04-01 ENCOUNTER — HOSPITAL ENCOUNTER (OUTPATIENT)
Dept: OTHER | Age: 63
Discharge: HOME | End: 2018-04-30
Attending: INTERNAL MEDICINE | Admitting: INTERNAL MEDICINE

## 2018-04-02 ENCOUNTER — CARE COORDINATION (OUTPATIENT)
Dept: CASE MANAGEMENT | Age: 63
End: 2018-04-02

## 2018-04-09 ENCOUNTER — CARE COORDINATION (OUTPATIENT)
Dept: CASE MANAGEMENT | Age: 63
End: 2018-04-09

## 2018-04-13 ENCOUNTER — OFFICE VISIT (OUTPATIENT)
Dept: INTERNAL MEDICINE CLINIC | Age: 63
End: 2018-04-13

## 2018-04-13 VITALS
SYSTOLIC BLOOD PRESSURE: 136 MMHG | OXYGEN SATURATION: 90 % | WEIGHT: 268 LBS | DIASTOLIC BLOOD PRESSURE: 80 MMHG | HEIGHT: 72 IN | BODY MASS INDEX: 36.3 KG/M2 | HEART RATE: 113 BPM

## 2018-04-13 DIAGNOSIS — J44.1 COPD EXACERBATION (HCC): Primary | ICD-10-CM

## 2018-04-13 DIAGNOSIS — E11.40 CONTROLLED TYPE 2 DIABETES MELLITUS WITH DIABETIC NEUROPATHY, WITHOUT LONG-TERM CURRENT USE OF INSULIN (HCC): ICD-10-CM

## 2018-04-13 DIAGNOSIS — I25.10 CAD IN NATIVE ARTERY: ICD-10-CM

## 2018-04-13 DIAGNOSIS — I50.33 ACUTE ON CHRONIC DIASTOLIC CONGESTIVE HEART FAILURE (HCC): ICD-10-CM

## 2018-04-13 DIAGNOSIS — I10 ESSENTIAL HYPERTENSION: ICD-10-CM

## 2018-04-13 PROBLEM — R07.9 CHEST PAIN: Status: RESOLVED | Noted: 2018-02-16 | Resolved: 2018-04-13

## 2018-04-13 LAB
ANION GAP SERPL CALCULATED.3IONS-SCNC: 15 MMOL/L (ref 3–16)
BUN BLDV-MCNC: 19 MG/DL (ref 7–20)
CALCIUM SERPL-MCNC: 8.8 MG/DL (ref 8.3–10.6)
CHLORIDE BLD-SCNC: 101 MMOL/L (ref 99–110)
CO2: 24 MMOL/L (ref 21–32)
CREAT SERPL-MCNC: 1.2 MG/DL (ref 0.8–1.3)
GFR AFRICAN AMERICAN: >60
GFR NON-AFRICAN AMERICAN: >60
GLUCOSE BLD-MCNC: 246 MG/DL (ref 70–99)
POTASSIUM SERPL-SCNC: 4 MMOL/L (ref 3.5–5.1)
SODIUM BLD-SCNC: 140 MMOL/L (ref 136–145)

## 2018-04-13 PROCEDURE — 3023F SPIROM DOC REV: CPT | Performed by: INTERNAL MEDICINE

## 2018-04-13 PROCEDURE — 2022F DILAT RTA XM EVC RTNOPTHY: CPT | Performed by: INTERNAL MEDICINE

## 2018-04-13 PROCEDURE — 99214 OFFICE O/P EST MOD 30 MIN: CPT | Performed by: INTERNAL MEDICINE

## 2018-04-13 PROCEDURE — 3017F COLORECTAL CA SCREEN DOC REV: CPT | Performed by: INTERNAL MEDICINE

## 2018-04-13 PROCEDURE — G8926 SPIRO NO PERF OR DOC: HCPCS | Performed by: INTERNAL MEDICINE

## 2018-04-13 PROCEDURE — 1111F DSCHRG MED/CURRENT MED MERGE: CPT | Performed by: INTERNAL MEDICINE

## 2018-04-13 PROCEDURE — G8598 ASA/ANTIPLAT THER USED: HCPCS | Performed by: INTERNAL MEDICINE

## 2018-04-13 PROCEDURE — G8417 CALC BMI ABV UP PARAM F/U: HCPCS | Performed by: INTERNAL MEDICINE

## 2018-04-13 PROCEDURE — 3045F PR MOST RECENT HEMOGLOBIN A1C LEVEL 7.0-9.0%: CPT | Performed by: INTERNAL MEDICINE

## 2018-04-13 PROCEDURE — 4004F PT TOBACCO SCREEN RCVD TLK: CPT | Performed by: INTERNAL MEDICINE

## 2018-04-13 PROCEDURE — G8427 DOCREV CUR MEDS BY ELIG CLIN: HCPCS | Performed by: INTERNAL MEDICINE

## 2018-04-13 RX ORDER — FLUTICASONE FUROATE AND VILANTEROL 200; 25 UG/1; UG/1
POWDER RESPIRATORY (INHALATION)
Qty: 2 EACH | Refills: 0 | COMMUNITY
Start: 2018-04-13 | End: 2018-08-28 | Stop reason: CLARIF

## 2018-04-13 RX ORDER — ALBUTEROL SULFATE 90 UG/1
2 AEROSOL, METERED RESPIRATORY (INHALATION) EVERY 6 HOURS PRN
COMMUNITY
End: 2018-04-30 | Stop reason: ALTCHOICE

## 2018-04-13 ASSESSMENT — ENCOUNTER SYMPTOMS
RESPIRATORY NEGATIVE: 1
GASTROINTESTINAL NEGATIVE: 1
EYES NEGATIVE: 1

## 2018-04-16 ENCOUNTER — TELEPHONE (OUTPATIENT)
Dept: INTERNAL MEDICINE CLINIC | Age: 63
End: 2018-04-16

## 2018-04-20 ENCOUNTER — TELEPHONE (OUTPATIENT)
Dept: INTERNAL MEDICINE CLINIC | Age: 63
End: 2018-04-20

## 2018-04-25 ENCOUNTER — TELEPHONE (OUTPATIENT)
Dept: PULMONOLOGY | Age: 63
End: 2018-04-25

## 2018-04-25 ENCOUNTER — TELEPHONE (OUTPATIENT)
Dept: INTERNAL MEDICINE CLINIC | Age: 63
End: 2018-04-25

## 2018-04-25 ENCOUNTER — CARE COORDINATION (OUTPATIENT)
Dept: CASE MANAGEMENT | Age: 63
End: 2018-04-25

## 2018-04-25 DIAGNOSIS — G62.9 NEUROPATHY: ICD-10-CM

## 2018-04-25 RX ORDER — GABAPENTIN 600 MG/1
TABLET ORAL
Qty: 150 TABLET | Refills: 0 | Status: SHIPPED | OUTPATIENT
Start: 2018-04-25 | End: 2018-05-24 | Stop reason: SDUPTHER

## 2018-04-26 ENCOUNTER — CARE COORDINATION (OUTPATIENT)
Dept: CASE MANAGEMENT | Age: 63
End: 2018-04-26

## 2018-04-27 RX ORDER — BLOOD-GLUCOSE CONTROL, NORMAL
EACH MISCELLANEOUS
Qty: 1 EACH | Refills: 1 | Status: SHIPPED | OUTPATIENT
Start: 2018-04-27 | End: 2018-05-04 | Stop reason: SDUPTHER

## 2018-04-27 RX ORDER — ISOPROPYL ALCOHOL 0.75 G/1
SWAB TOPICAL
Qty: 100 EACH | Refills: 5 | Status: SHIPPED | OUTPATIENT
Start: 2018-04-27 | End: 2018-04-30 | Stop reason: SDUPTHER

## 2018-04-30 ENCOUNTER — TELEPHONE (OUTPATIENT)
Dept: INTERNAL MEDICINE CLINIC | Age: 63
End: 2018-04-30

## 2018-04-30 ENCOUNTER — OFFICE VISIT (OUTPATIENT)
Dept: INTERNAL MEDICINE CLINIC | Age: 63
End: 2018-04-30

## 2018-04-30 ENCOUNTER — CARE COORDINATION (OUTPATIENT)
Dept: CARE COORDINATION | Age: 63
End: 2018-04-30

## 2018-04-30 VITALS
HEIGHT: 72 IN | WEIGHT: 276.2 LBS | BODY MASS INDEX: 37.41 KG/M2 | TEMPERATURE: 97.5 F | DIASTOLIC BLOOD PRESSURE: 96 MMHG | SYSTOLIC BLOOD PRESSURE: 140 MMHG | HEART RATE: 96 BPM | OXYGEN SATURATION: 96 % | RESPIRATION RATE: 16 BRPM

## 2018-04-30 DIAGNOSIS — I50.20 SYSTOLIC CONGESTIVE HEART FAILURE, UNSPECIFIED CONGESTIVE HEART FAILURE CHRONICITY: ICD-10-CM

## 2018-04-30 DIAGNOSIS — I25.10 CAD IN NATIVE ARTERY: ICD-10-CM

## 2018-04-30 DIAGNOSIS — I10 ESSENTIAL HYPERTENSION: ICD-10-CM

## 2018-04-30 DIAGNOSIS — J44.1 COPD EXACERBATION (HCC): ICD-10-CM

## 2018-04-30 DIAGNOSIS — R47.01 APHASIA: Primary | ICD-10-CM

## 2018-04-30 DIAGNOSIS — E11.40 CONTROLLED TYPE 2 DIABETES MELLITUS WITH DIABETIC NEUROPATHY, WITHOUT LONG-TERM CURRENT USE OF INSULIN (HCC): ICD-10-CM

## 2018-04-30 PROBLEM — I63.9 ISCHEMIC EMBOLIC STROKE (HCC): Status: ACTIVE | Noted: 2018-04-30

## 2018-04-30 PROCEDURE — G8926 SPIRO NO PERF OR DOC: HCPCS | Performed by: INTERNAL MEDICINE

## 2018-04-30 PROCEDURE — 99214 OFFICE O/P EST MOD 30 MIN: CPT | Performed by: INTERNAL MEDICINE

## 2018-04-30 PROCEDURE — 3046F HEMOGLOBIN A1C LEVEL >9.0%: CPT | Performed by: INTERNAL MEDICINE

## 2018-04-30 PROCEDURE — 3023F SPIROM DOC REV: CPT | Performed by: INTERNAL MEDICINE

## 2018-04-30 PROCEDURE — G8598 ASA/ANTIPLAT THER USED: HCPCS | Performed by: INTERNAL MEDICINE

## 2018-04-30 PROCEDURE — 2022F DILAT RTA XM EVC RTNOPTHY: CPT | Performed by: INTERNAL MEDICINE

## 2018-04-30 PROCEDURE — 1111F DSCHRG MED/CURRENT MED MERGE: CPT | Performed by: INTERNAL MEDICINE

## 2018-04-30 PROCEDURE — G8427 DOCREV CUR MEDS BY ELIG CLIN: HCPCS | Performed by: INTERNAL MEDICINE

## 2018-04-30 PROCEDURE — G8417 CALC BMI ABV UP PARAM F/U: HCPCS | Performed by: INTERNAL MEDICINE

## 2018-04-30 PROCEDURE — 4004F PT TOBACCO SCREEN RCVD TLK: CPT | Performed by: INTERNAL MEDICINE

## 2018-04-30 PROCEDURE — 3017F COLORECTAL CA SCREEN DOC REV: CPT | Performed by: INTERNAL MEDICINE

## 2018-04-30 RX ORDER — ISOPROPYL ALCOHOL 0.75 G/1
SWAB TOPICAL
Qty: 100 EACH | Refills: 5 | Status: SHIPPED | OUTPATIENT
Start: 2018-04-30 | End: 2018-08-28 | Stop reason: CLARIF

## 2018-04-30 ASSESSMENT — ENCOUNTER SYMPTOMS
GASTROINTESTINAL NEGATIVE: 1
EYES NEGATIVE: 1
ALLERGIC/IMMUNOLOGIC NEGATIVE: 1
RESPIRATORY NEGATIVE: 1

## 2018-04-30 ASSESSMENT — PATIENT HEALTH QUESTIONNAIRE - PHQ9
1. LITTLE INTEREST OR PLEASURE IN DOING THINGS: 1
SUM OF ALL RESPONSES TO PHQ QUESTIONS 1-9: 1
SUM OF ALL RESPONSES TO PHQ9 QUESTIONS 1 & 2: 1
2. FEELING DOWN, DEPRESSED OR HOPELESS: 0

## 2018-05-03 ENCOUNTER — TELEPHONE (OUTPATIENT)
Dept: INTERNAL MEDICINE CLINIC | Age: 63
End: 2018-05-03

## 2018-05-03 ENCOUNTER — CARE COORDINATION (OUTPATIENT)
Dept: INTERNAL MEDICINE CLINIC | Age: 63
End: 2018-05-03

## 2018-05-04 ENCOUNTER — CARE COORDINATION (OUTPATIENT)
Dept: CASE MANAGEMENT | Age: 63
End: 2018-05-04

## 2018-05-04 DIAGNOSIS — E11.40 CONTROLLED TYPE 2 DIABETES MELLITUS WITH DIABETIC NEUROPATHY, WITHOUT LONG-TERM CURRENT USE OF INSULIN (HCC): Primary | ICD-10-CM

## 2018-05-04 DIAGNOSIS — E11.40 CONTROLLED TYPE 2 DIABETES MELLITUS WITH DIABETIC NEUROPATHY, WITHOUT LONG-TERM CURRENT USE OF INSULIN (HCC): ICD-10-CM

## 2018-05-04 RX ORDER — BLOOD-GLUCOSE CONTROL, NORMAL
EACH MISCELLANEOUS
Qty: 1 EACH | Refills: 1 | Status: SHIPPED | OUTPATIENT
Start: 2018-05-04 | End: 2018-08-28 | Stop reason: CLARIF

## 2018-05-04 RX ORDER — LANCETS 30 GAUGE
EACH MISCELLANEOUS
Qty: 120 EACH | Refills: 5 | Status: SHIPPED | OUTPATIENT
Start: 2018-05-04 | End: 2020-02-10

## 2018-05-04 RX ORDER — BLOOD-GLUCOSE METER
KIT MISCELLANEOUS
Qty: 1 KIT | Refills: 0 | Status: SHIPPED | OUTPATIENT
Start: 2018-05-04 | End: 2018-08-28 | Stop reason: CLARIF

## 2018-05-07 ENCOUNTER — TELEPHONE (OUTPATIENT)
Dept: INTERNAL MEDICINE CLINIC | Age: 63
End: 2018-05-07

## 2018-05-07 RX ORDER — ISOSORBIDE MONONITRATE 60 MG/1
60 TABLET, EXTENDED RELEASE ORAL DAILY
Qty: 90 TABLET | Refills: 3 | Status: SHIPPED | OUTPATIENT
Start: 2018-05-07 | End: 2018-10-01 | Stop reason: SDUPTHER

## 2018-05-08 ENCOUNTER — CARE COORDINATION (OUTPATIENT)
Dept: CASE MANAGEMENT | Age: 63
End: 2018-05-08

## 2018-05-08 ENCOUNTER — TELEPHONE (OUTPATIENT)
Dept: CARDIOLOGY CLINIC | Age: 63
End: 2018-05-08

## 2018-05-08 ENCOUNTER — TELEPHONE (OUTPATIENT)
Dept: INTERNAL MEDICINE CLINIC | Age: 63
End: 2018-05-08

## 2018-05-08 DIAGNOSIS — I50.33 ACUTE ON CHRONIC DIASTOLIC CONGESTIVE HEART FAILURE (HCC): Primary | ICD-10-CM

## 2018-05-08 PROCEDURE — 1111F DSCHRG MED/CURRENT MED MERGE: CPT

## 2018-05-11 ENCOUNTER — OFFICE VISIT (OUTPATIENT)
Dept: INTERNAL MEDICINE CLINIC | Age: 63
End: 2018-05-11

## 2018-05-11 ENCOUNTER — CARE COORDINATION (OUTPATIENT)
Dept: INTERNAL MEDICINE CLINIC | Age: 63
End: 2018-05-11

## 2018-05-11 VITALS
BODY MASS INDEX: 36.24 KG/M2 | DIASTOLIC BLOOD PRESSURE: 70 MMHG | HEART RATE: 107 BPM | WEIGHT: 267.6 LBS | OXYGEN SATURATION: 96 % | HEIGHT: 72 IN | SYSTOLIC BLOOD PRESSURE: 105 MMHG

## 2018-05-11 DIAGNOSIS — I25.10 CORONARY ARTERY DISEASE INVOLVING NATIVE CORONARY ARTERY OF NATIVE HEART WITHOUT ANGINA PECTORIS: ICD-10-CM

## 2018-05-11 DIAGNOSIS — E11.40 CONTROLLED TYPE 2 DIABETES MELLITUS WITH DIABETIC NEUROPATHY, WITHOUT LONG-TERM CURRENT USE OF INSULIN (HCC): ICD-10-CM

## 2018-05-11 DIAGNOSIS — I63.9 CEREBROVASCULAR ACCIDENT (CVA), UNSPECIFIED MECHANISM (HCC): Primary | ICD-10-CM

## 2018-05-11 DIAGNOSIS — J44.1 COPD EXACERBATION (HCC): ICD-10-CM

## 2018-05-11 DIAGNOSIS — E78.5 HYPERLIPIDEMIA LDL GOAL <70: Chronic | ICD-10-CM

## 2018-05-11 DIAGNOSIS — I73.9 CLAUDICATION (HCC): ICD-10-CM

## 2018-05-11 DIAGNOSIS — I10 ESSENTIAL HYPERTENSION: ICD-10-CM

## 2018-05-11 DIAGNOSIS — I50.20 SYSTOLIC CONGESTIVE HEART FAILURE, UNSPECIFIED CONGESTIVE HEART FAILURE CHRONICITY: ICD-10-CM

## 2018-05-11 LAB
ANION GAP SERPL CALCULATED.3IONS-SCNC: 18 MMOL/L (ref 3–16)
BUN BLDV-MCNC: 11 MG/DL (ref 7–20)
CALCIUM SERPL-MCNC: 9.1 MG/DL (ref 8.3–10.6)
CHLORIDE BLD-SCNC: 101 MMOL/L (ref 99–110)
CO2: 22 MMOL/L (ref 21–32)
CREAT SERPL-MCNC: 1.3 MG/DL (ref 0.8–1.3)
GFR AFRICAN AMERICAN: >60
GFR NON-AFRICAN AMERICAN: 56
GLUCOSE BLD-MCNC: 180 MG/DL (ref 70–99)
POTASSIUM SERPL-SCNC: 4 MMOL/L (ref 3.5–5.1)
SODIUM BLD-SCNC: 141 MMOL/L (ref 136–145)

## 2018-05-11 PROCEDURE — G8417 CALC BMI ABV UP PARAM F/U: HCPCS | Performed by: INTERNAL MEDICINE

## 2018-05-11 PROCEDURE — G8427 DOCREV CUR MEDS BY ELIG CLIN: HCPCS | Performed by: INTERNAL MEDICINE

## 2018-05-11 PROCEDURE — 4004F PT TOBACCO SCREEN RCVD TLK: CPT | Performed by: INTERNAL MEDICINE

## 2018-05-11 PROCEDURE — 3023F SPIROM DOC REV: CPT | Performed by: INTERNAL MEDICINE

## 2018-05-11 PROCEDURE — 2022F DILAT RTA XM EVC RTNOPTHY: CPT | Performed by: INTERNAL MEDICINE

## 2018-05-11 PROCEDURE — 1111F DSCHRG MED/CURRENT MED MERGE: CPT | Performed by: INTERNAL MEDICINE

## 2018-05-11 PROCEDURE — G8926 SPIRO NO PERF OR DOC: HCPCS | Performed by: INTERNAL MEDICINE

## 2018-05-11 PROCEDURE — G8598 ASA/ANTIPLAT THER USED: HCPCS | Performed by: INTERNAL MEDICINE

## 2018-05-11 PROCEDURE — 3046F HEMOGLOBIN A1C LEVEL >9.0%: CPT | Performed by: INTERNAL MEDICINE

## 2018-05-11 PROCEDURE — 99214 OFFICE O/P EST MOD 30 MIN: CPT | Performed by: INTERNAL MEDICINE

## 2018-05-11 PROCEDURE — 3017F COLORECTAL CA SCREEN DOC REV: CPT | Performed by: INTERNAL MEDICINE

## 2018-05-11 ASSESSMENT — ENCOUNTER SYMPTOMS
GASTROINTESTINAL NEGATIVE: 1
ALLERGIC/IMMUNOLOGIC NEGATIVE: 1
RESPIRATORY NEGATIVE: 1
EYES NEGATIVE: 1

## 2018-05-17 ENCOUNTER — CARE COORDINATION (OUTPATIENT)
Dept: CASE MANAGEMENT | Age: 63
End: 2018-05-17

## 2018-05-21 PROBLEM — J96.00 ACUTE RESPIRATORY FAILURE (HCC): Status: ACTIVE | Noted: 2018-05-21

## 2018-05-23 ENCOUNTER — CARE COORDINATION (OUTPATIENT)
Dept: CASE MANAGEMENT | Age: 63
End: 2018-05-23

## 2018-05-24 DIAGNOSIS — G62.9 NEUROPATHY: ICD-10-CM

## 2018-05-24 RX ORDER — GABAPENTIN 600 MG/1
TABLET ORAL
Qty: 150 TABLET | Refills: 0 | Status: SHIPPED | OUTPATIENT
Start: 2018-05-24 | End: 2018-06-22 | Stop reason: SDUPTHER

## 2018-05-25 ENCOUNTER — CARE COORDINATION (OUTPATIENT)
Dept: CASE MANAGEMENT | Age: 63
End: 2018-05-25

## 2018-06-19 ENCOUNTER — NURSE ONLY (OUTPATIENT)
Dept: CARDIOLOGY CLINIC | Age: 63
End: 2018-06-19

## 2018-06-19 DIAGNOSIS — I25.5 ISCHEMIC CARDIOMYOPATHY: Chronic | ICD-10-CM

## 2018-06-19 DIAGNOSIS — I50.20 SYSTOLIC CONGESTIVE HEART FAILURE, UNSPECIFIED CONGESTIVE HEART FAILURE CHRONICITY: ICD-10-CM

## 2018-06-19 DIAGNOSIS — Z95.810 ICD (IMPLANTABLE CARDIOVERTER-DEFIBRILLATOR), DUAL, IN SITU: ICD-10-CM

## 2018-06-19 DIAGNOSIS — I50.23 ACUTE ON CHRONIC SYSTOLIC CONGESTIVE HEART FAILURE (HCC): ICD-10-CM

## 2018-06-19 PROCEDURE — 93297 REM INTERROG DEV EVAL ICPMS: CPT | Performed by: INTERNAL MEDICINE

## 2018-06-19 PROCEDURE — 93296 REM INTERROG EVL PM/IDS: CPT | Performed by: INTERNAL MEDICINE

## 2018-06-19 PROCEDURE — 93295 DEV INTERROG REMOTE 1/2/MLT: CPT | Performed by: INTERNAL MEDICINE

## 2018-06-22 DIAGNOSIS — G62.9 NEUROPATHY: ICD-10-CM

## 2018-06-22 RX ORDER — GABAPENTIN 600 MG/1
TABLET ORAL
Qty: 150 TABLET | Refills: 0 | Status: SHIPPED | OUTPATIENT
Start: 2018-06-22 | End: 2018-07-20 | Stop reason: SDUPTHER

## 2018-06-27 ENCOUNTER — TELEPHONE (OUTPATIENT)
Dept: INTERNAL MEDICINE CLINIC | Age: 63
End: 2018-06-27

## 2018-07-06 ENCOUNTER — CARE COORDINATION (OUTPATIENT)
Dept: INTERNAL MEDICINE CLINIC | Age: 63
End: 2018-07-06

## 2018-07-12 DIAGNOSIS — I25.10 CORONARY ARTERY DISEASE INVOLVING NATIVE HEART WITHOUT ANGINA PECTORIS, UNSPECIFIED VESSEL OR LESION TYPE: ICD-10-CM

## 2018-07-12 RX ORDER — AMLODIPINE BESYLATE 5 MG/1
TABLET ORAL
Qty: 90 TABLET | Refills: 3 | Status: ON HOLD | OUTPATIENT
Start: 2018-07-12 | End: 2019-06-25 | Stop reason: HOSPADM

## 2018-07-12 RX ORDER — ATORVASTATIN CALCIUM 80 MG/1
TABLET, FILM COATED ORAL
Qty: 90 TABLET | Refills: 3 | Status: ON HOLD | OUTPATIENT
Start: 2018-07-12 | End: 2018-08-20 | Stop reason: SDUPTHER

## 2018-07-25 ENCOUNTER — OFFICE VISIT (OUTPATIENT)
Dept: INTERNAL MEDICINE CLINIC | Age: 63
End: 2018-07-25

## 2018-07-25 ENCOUNTER — TELEPHONE (OUTPATIENT)
Dept: ORTHOPEDIC SURGERY | Age: 63
End: 2018-07-25

## 2018-07-25 VITALS
BODY MASS INDEX: 36.7 KG/M2 | RESPIRATION RATE: 12 BRPM | OXYGEN SATURATION: 96 % | HEART RATE: 113 BPM | HEIGHT: 72 IN | DIASTOLIC BLOOD PRESSURE: 100 MMHG | SYSTOLIC BLOOD PRESSURE: 152 MMHG | WEIGHT: 271 LBS

## 2018-07-25 DIAGNOSIS — Z79.4 TYPE 2 DIABETES MELLITUS WITHOUT COMPLICATION, WITH LONG-TERM CURRENT USE OF INSULIN (HCC): ICD-10-CM

## 2018-07-25 DIAGNOSIS — M79.671 FOOT PAIN, RIGHT: ICD-10-CM

## 2018-07-25 DIAGNOSIS — E11.9 TYPE 2 DIABETES MELLITUS WITHOUT COMPLICATION, WITH LONG-TERM CURRENT USE OF INSULIN (HCC): ICD-10-CM

## 2018-07-25 DIAGNOSIS — I50.20 SYSTOLIC CONGESTIVE HEART FAILURE, UNSPECIFIED CONGESTIVE HEART FAILURE CHRONICITY: ICD-10-CM

## 2018-07-25 DIAGNOSIS — R05.9 COUGH: Primary | ICD-10-CM

## 2018-07-25 PROBLEM — R06.02 SHORTNESS OF BREATH: Status: RESOLVED | Noted: 2018-03-06 | Resolved: 2018-07-25

## 2018-07-25 PROBLEM — I50.23 ACUTE ON CHRONIC SYSTOLIC CONGESTIVE HEART FAILURE (HCC): Status: RESOLVED | Noted: 2017-12-23 | Resolved: 2018-07-25

## 2018-07-25 PROBLEM — J96.00 ACUTE RESPIRATORY FAILURE (HCC): Status: RESOLVED | Noted: 2018-05-21 | Resolved: 2018-07-25

## 2018-07-25 PROBLEM — J96.01 ACUTE RESPIRATORY FAILURE WITH HYPOXIA (HCC): Status: RESOLVED | Noted: 2018-03-07 | Resolved: 2018-07-25

## 2018-07-25 PROCEDURE — 3017F COLORECTAL CA SCREEN DOC REV: CPT | Performed by: INTERNAL MEDICINE

## 2018-07-25 PROCEDURE — 3046F HEMOGLOBIN A1C LEVEL >9.0%: CPT | Performed by: INTERNAL MEDICINE

## 2018-07-25 PROCEDURE — 4004F PT TOBACCO SCREEN RCVD TLK: CPT | Performed by: INTERNAL MEDICINE

## 2018-07-25 PROCEDURE — 2022F DILAT RTA XM EVC RTNOPTHY: CPT | Performed by: INTERNAL MEDICINE

## 2018-07-25 PROCEDURE — G8417 CALC BMI ABV UP PARAM F/U: HCPCS | Performed by: INTERNAL MEDICINE

## 2018-07-25 PROCEDURE — G8427 DOCREV CUR MEDS BY ELIG CLIN: HCPCS | Performed by: INTERNAL MEDICINE

## 2018-07-25 PROCEDURE — G8598 ASA/ANTIPLAT THER USED: HCPCS | Performed by: INTERNAL MEDICINE

## 2018-07-25 PROCEDURE — 99214 OFFICE O/P EST MOD 30 MIN: CPT | Performed by: INTERNAL MEDICINE

## 2018-07-25 ASSESSMENT — ENCOUNTER SYMPTOMS
EYES NEGATIVE: 1
ABDOMINAL DISTENTION: 0
SHORTNESS OF BREATH: 0
GASTROINTESTINAL NEGATIVE: 1
VOMITING: 0
SORE THROAT: 0
COUGH: 0
EYE REDNESS: 0
SINUS PAIN: 0
CHEST TIGHTNESS: 0
DIARRHEA: 0
ABDOMINAL PAIN: 0
RESPIRATORY NEGATIVE: 1
BACK PAIN: 0
WHEEZING: 0

## 2018-07-25 NOTE — PROGRESS NOTES
Subjective:      Patient ID: Margarette San is a 58 y.o. male. HPI    Wife passed out yesterday    Trying to catch her  He hurt right shoulder    And she fell  On  Right foot   Also   Has a pain on right chest area    Also  Has a rattly cough   Sputum yellow     Seems a little Congested. Denies fever or chills. He says he is mildly short of breath but denies sputum. Lots of foot edema  . Wife in hospital for pneumonia. Patient says he does not want to go to the hospital as he needs to take care of wife. Review of Systems   Constitutional: Negative. Negative for chills, fatigue and fever. HENT: Negative. Negative for congestion, ear pain, sinus pain and sore throat. Eyes: Negative. Negative for redness and visual disturbance. Respiratory: Negative. Negative for cough, chest tightness, shortness of breath and wheezing. Cardiovascular: Negative. Negative for chest pain and palpitations. Gastrointestinal: Negative. Negative for abdominal distention, abdominal pain, diarrhea and vomiting. Genitourinary: Negative. Negative for dysuria, frequency and hematuria. Musculoskeletal: Negative. Negative for arthralgias, back pain and myalgias. Right foot pain   Skin: Negative for rash. Neurological: Negative. Negative for dizziness, syncope and numbness. Hematological: Does not bruise/bleed easily. Psychiatric/Behavioral: Negative. Negative for dysphoric mood and suicidal ideas. The patient is not nervous/anxious. Objective:   Physical Exam   Constitutional: He is oriented to person, place, and time. He appears well-developed and well-nourished. HENT:   Head: Normocephalic and atraumatic. Mouth/Throat: No oropharyngeal exudate. Eyes: Conjunctivae and EOM are normal. Pupils are equal, round, and reactive to light. No scleral icterus. Neck: Normal range of motion. Neck supple. No thyromegaly present.    Cardiovascular: Normal rate, regular rhythm and normal heart sounds. No murmur heard. Pulmonary/Chest: Effort normal. No respiratory distress. He has wheezes. He has rales. Abdominal: Soft. He exhibits no distension. Musculoskeletal: Normal range of motion. He exhibits edema. Tender over metatarsals of to 3 and 4 toes of right foot with second toe somewhat misshapen. There is right sided chest wall tenderness. Neurological: He is alert and oriented to person, place, and time. Skin: No rash noted. Psychiatric: He has a normal mood and affect. His behavior is normal. Judgment and thought content normal.   Vitals reviewed. Assessment:      Ruy was seen today for cough. Diagnoses and all orders for this visit:    Cough     Patient with a history of both COPD and CHF patient appears somewhat short of breath. We'll send to Hospital for further evaluation and treatment. Systolic congestive heart failure, unspecified congestive heart failure chronicity (HCC)    Appears volume overloaded with edema of both feet and ankles and crackles at bases  Foot pain, right    Wife fell on foot appears severely contused swollen and misshapen possibly fractured  Type 2 diabetes mellitus without complication, with long-term current use of insulin ([address]    Long term insulin    Chest wall pain    appears musculoskeletal  Plan:    spent over 25 minutes evaluating and discussing with patient, greater than 50% of the time was counseling and coordinating care also reviewed all family history, social history, medicines, allergies and past medical history before making decision.

## 2018-07-26 ENCOUNTER — TELEPHONE (OUTPATIENT)
Dept: INTERNAL MEDICINE CLINIC | Age: 63
End: 2018-07-26

## 2018-07-26 NOTE — TELEPHONE ENCOUNTER
Called and discussed the results of both foot x-ray and chest x-ray chest was likely a pulled muscle/contusion. Would get better with time and/or Tylenol. He has already been given a referral to podiatry in the emergency room for the metatarsal fracture. He is aware of all this.

## 2018-08-15 ENCOUNTER — CARE COORDINATION (OUTPATIENT)
Dept: INTERNAL MEDICINE CLINIC | Age: 63
End: 2018-08-15

## 2018-08-20 PROBLEM — I63.9 ACUTE CVA (CEREBROVASCULAR ACCIDENT) (HCC): Status: ACTIVE | Noted: 2018-08-20

## 2018-08-21 PROBLEM — R47.01 EXPRESSIVE APHASIA: Status: RESOLVED | Noted: 2018-08-20 | Resolved: 2018-08-21

## 2018-08-21 PROBLEM — R47.01 EXPRESSIVE APHASIA: Status: ACTIVE | Noted: 2018-08-20

## 2018-08-22 ENCOUNTER — TELEPHONE (OUTPATIENT)
Dept: INTERNAL MEDICINE CLINIC | Age: 63
End: 2018-08-22

## 2018-08-22 ENCOUNTER — CARE COORDINATION (OUTPATIENT)
Dept: CASE MANAGEMENT | Age: 63
End: 2018-08-22

## 2018-08-22 NOTE — TELEPHONE ENCOUNTER
Patient would like to talk directly to Dr. Nahum Johnson regarding his medication list  Patient says he takes so much medication and changes were made in the hospital he is confused as to what he should be taking    I made a follow up for patient on 8/28    Patient would like to discuss his medications before he comes in for follow up    Please Advise

## 2018-08-28 ENCOUNTER — CARE COORDINATION (OUTPATIENT)
Dept: INTERNAL MEDICINE CLINIC | Age: 63
End: 2018-08-28

## 2018-08-28 ENCOUNTER — OFFICE VISIT (OUTPATIENT)
Dept: INTERNAL MEDICINE CLINIC | Age: 63
End: 2018-08-28

## 2018-08-28 VITALS
DIASTOLIC BLOOD PRESSURE: 80 MMHG | WEIGHT: 268.8 LBS | RESPIRATION RATE: 12 BRPM | SYSTOLIC BLOOD PRESSURE: 134 MMHG | HEIGHT: 72 IN | OXYGEN SATURATION: 93 % | BODY MASS INDEX: 36.41 KG/M2 | HEART RATE: 91 BPM

## 2018-08-28 DIAGNOSIS — J44.1 COPD EXACERBATION (HCC): ICD-10-CM

## 2018-08-28 DIAGNOSIS — Z79.4 TYPE 2 DIABETES MELLITUS WITHOUT COMPLICATION, WITH LONG-TERM CURRENT USE OF INSULIN (HCC): ICD-10-CM

## 2018-08-28 DIAGNOSIS — E11.9 TYPE 2 DIABETES MELLITUS WITHOUT COMPLICATION, WITH LONG-TERM CURRENT USE OF INSULIN (HCC): ICD-10-CM

## 2018-08-28 DIAGNOSIS — I50.9 CONGESTIVE HEART FAILURE, UNSPECIFIED HF CHRONICITY, UNSPECIFIED HEART FAILURE TYPE (HCC): ICD-10-CM

## 2018-08-28 DIAGNOSIS — F41.9 ANXIETY: ICD-10-CM

## 2018-08-28 DIAGNOSIS — J18.9 PNEUMONIA OF LEFT LUNG DUE TO INFECTIOUS ORGANISM, UNSPECIFIED PART OF LUNG: Primary | ICD-10-CM

## 2018-08-28 PROCEDURE — 3046F HEMOGLOBIN A1C LEVEL >9.0%: CPT | Performed by: INTERNAL MEDICINE

## 2018-08-28 PROCEDURE — 1111F DSCHRG MED/CURRENT MED MERGE: CPT | Performed by: INTERNAL MEDICINE

## 2018-08-28 PROCEDURE — G8417 CALC BMI ABV UP PARAM F/U: HCPCS | Performed by: INTERNAL MEDICINE

## 2018-08-28 PROCEDURE — 99214 OFFICE O/P EST MOD 30 MIN: CPT | Performed by: INTERNAL MEDICINE

## 2018-08-28 PROCEDURE — G8926 SPIRO NO PERF OR DOC: HCPCS | Performed by: INTERNAL MEDICINE

## 2018-08-28 PROCEDURE — G8598 ASA/ANTIPLAT THER USED: HCPCS | Performed by: INTERNAL MEDICINE

## 2018-08-28 PROCEDURE — G8427 DOCREV CUR MEDS BY ELIG CLIN: HCPCS | Performed by: INTERNAL MEDICINE

## 2018-08-28 PROCEDURE — 3017F COLORECTAL CA SCREEN DOC REV: CPT | Performed by: INTERNAL MEDICINE

## 2018-08-28 PROCEDURE — 4004F PT TOBACCO SCREEN RCVD TLK: CPT | Performed by: INTERNAL MEDICINE

## 2018-08-28 PROCEDURE — 3023F SPIROM DOC REV: CPT | Performed by: INTERNAL MEDICINE

## 2018-08-28 PROCEDURE — 2022F DILAT RTA XM EVC RTNOPTHY: CPT | Performed by: INTERNAL MEDICINE

## 2018-08-28 RX ORDER — BUDESONIDE AND FORMOTEROL FUMARATE DIHYDRATE 160; 4.5 UG/1; UG/1
2 AEROSOL RESPIRATORY (INHALATION) 2 TIMES DAILY
COMMUNITY
End: 2018-09-27 | Stop reason: CLARIF

## 2018-08-28 RX ORDER — ALBUTEROL SULFATE 90 UG/1
2 AEROSOL, METERED RESPIRATORY (INHALATION) EVERY 6 HOURS PRN
COMMUNITY
End: 2019-06-21

## 2018-08-28 RX ORDER — ASPIRIN 325 MG
325 TABLET ORAL DAILY
COMMUNITY
End: 2020-06-26 | Stop reason: ALTCHOICE

## 2018-08-28 RX ORDER — ALPRAZOLAM 1 MG/1
1 TABLET ORAL 3 TIMES DAILY PRN
COMMUNITY
End: 2018-09-27 | Stop reason: CLARIF

## 2018-08-28 ASSESSMENT — ENCOUNTER SYMPTOMS
NAUSEA: 0
VOMITING: 0
SHORTNESS OF BREATH: 0
HEARTBURN: 0
CONSTIPATION: 0
BLOOD IN STOOL: 0
ABDOMINAL PAIN: 0
WHEEZING: 1
BACK PAIN: 0
DIARRHEA: 0
COUGH: 1
BLURRED VISION: 0
SORE THROAT: 0
EYE PAIN: 0

## 2018-08-28 NOTE — PROGRESS NOTES
budesonide-formoterol (SYMBICORT) 160-4.5 MCG/ACT AERO Inhale 2 puffs into the lungs 2 times daily      insulin glargine (LANTUS) 100 UNIT/ML injection pen Inject 28 Units into the skin nightly 5 pen 2    insulin lispro (HUMALOG KWIKPEN) 100 UNIT/ML pen Inject 5 Units into the skin 3 times daily (before meals) 5 pen 3    gabapentin (NEURONTIN) 600 MG tablet TAKE 1 TABLET BY MOUTH FIVE TIMES DAILY 150 tablet 1    nitroGLYCERIN (NITROSTAT) 0.4 MG SL tablet Place 0.4 mg under the tongue daily      amLODIPine (NORVASC) 5 MG tablet TAKE 1 TABLET BY MOUTH DAILY 90 tablet 3    isosorbide mononitrate (IMDUR) 60 MG extended release tablet Take 1 tablet by mouth daily 90 tablet 3    glucose monitoring kit (FREESTYLE) monitoring kit Accu-chek rebecca smartview meter  Dx : E11.9  As needed 1 kit 0    glucose blood VI test strips (FREESTYLE LITE) strip Accu-check smartview test strips   Dx: e11.9  As needed 100 each 0    Lancets MISC accu check fastclick lancets  Dx: J92.3  As needed 120 each 5    Blood Glucose Calibration (ACCU-CHEK SMARTVIEW CONTROL) LIQD As directed 1 each 1    ALCOHOL SWABSTICK PADS 1 packet by Does not apply route 3 times daily 100 each 3    Alcohol Swabs (B-D SINGLE USE SWABS REGULAR) PADS Use tid 100 each 5    Insulin Pen Needle (CAREFINE PEN NEEDLES) 32G X 4 MM MISC 1 each by Does not apply route daily 100 Package 0    FREESTYLE LANCETS MISC 1 each by Does not apply route daily 100 each 0    Blood Glucose Monitoring Suppl (FREESTYLE FREEDOM LITE) w/Device KIT 1 kit by Does not apply route daily 1 kit 0    folic acid (FOLVITE) 1 MG tablet Take 1 mg by mouth daily      atorvastatin (LIPITOR) 80 MG tablet TAKE 1 TABLET BY MOUTH DAILY 90 tablet 1    furosemide (LASIX) 40 MG tablet Take 1.5 tablets by mouth daily 45 tablet 5    sertraline (ZOLOFT) 100 MG tablet TAKE 1 TABLET BY MOUTH DAILY 90 tablet 3    valsartan (DIOVAN) 160 MG tablet Take 1 tablet by mouth daily 30 tablet 3    metoprolol without complication, with long-term current use of insulin (HCC)   discusssed   - insulin glargine (LANTUS) 100 UNIT/ML injection pen; Inject 28 Units into the skin nightly  Dispense: 5 pen; Refill: 2  - insulin lispro (HUMALOG KWIKPEN) 100 UNIT/ML pen; Inject 5 Units into the skin 3 times daily (before meals)  Dispense: 5 pen; Refill: 3    Refilled medicines. Discussed dosages spent over 30 minutes reviewing issues and medicines. 3. Congestive heart failure, unspecified HF chronicity, unspecified heart failure type (HCC)  Stable no sign of CHF    4. COPD exacerbation ([facility] [address]  Discussed/counseled about smoking- told of multiple health consequences if continues and discussed  Options for stopping including  Programs, nicotine replacement ,zyban and chantix    Improving    5. Anxiety  Will compromise   1 mg  Tid , Xanax. Says he cannot do without it. Discussed long-term effects. He agrees to 3 times a day    Spent over 35 minutes with patient, greater than 50% counseling. There was a great deal of confusion from the hospital about his medicines. However, been considerable time sorting this out and believe it is accurate and correct.     Farzad Gonzalez MD

## 2018-08-29 ENCOUNTER — CARE COORDINATION (OUTPATIENT)
Dept: CASE MANAGEMENT | Age: 63
End: 2018-08-29

## 2018-08-29 NOTE — CARE COORDINATION
Ambulatory Care Coordination Note  8/28/18  CM Risk Score: 15  [place] Mortality Risk Score:      ACC: Loi Mukherjee RN    Summary Note: Met with pt after OV. Pt sgates his BS have been running higher but did not go into details. States Dr Kendall Felder increased Lantus to 28U QD. Asked pt if I can call him next week to f/u on BS's and pt states he is in agreement. Pt has not engaged with CC in the past.  Does not return my calls. Will attempt to work with him but if he remains disengaged--will close case. Care Coordination Interventions    Referral from Primary Care Provider:  No  Suggested Interventions and Community Resources  Occupational Therapy: In Process (Comment: [facility])  Physical Therapy: In Process (Comment: [address])  Other Therapy Services: In Process (Comment: Speech through [address])         Goals Addressed     None          Prior to Admission medications    Medication Sig Start Date End Date Taking? Authorizing Provider   albuterol sulfate  (90 Base) MCG/ACT inhaler Inhale 2 puffs into the lungs every 6 hours as needed for Wheezing    Historical Provider, MD   aspirin 81 MG tablet Take 81 mg by mouth daily    Historical Provider, MD   ALPRAZolam (XANAX) 1 MG tablet Take 1 mg by mouth 3 times daily as needed for Sleep. Ke Counter     Historical Provider, MD   budesonide-formoterol (SYMBICORT) 160-4.5 MCG/ACT AERO Inhale 2 puffs into the lungs 2 times daily    Historical Provider, MD   insulin glargine (LANTUS) 100 UNIT/ML injection pen Inject 28 Units into the skin nightly 8/28/18   Majo Toth MD   insulin lispro (HUMALOG KWIKPEN) 100 UNIT/ML pen Inject 5 Units into the skin 3 times daily (before meals) 8/28/18   Majo Toth MD   gabapentin (NEURONTIN) 600 MG tablet TAKE 1 TABLET BY MOUTH FIVE TIMES DAILY 8/20/18 9/19/18  Majo Toth MD   nitroGLYCERIN (NITROSTAT) 0.4 MG SL tablet Place 0.4 mg under the tongue daily    Historical Provider, MD   amLODIPine (NORVASC) 5 MG tablet

## 2018-09-10 ENCOUNTER — CARE COORDINATION (OUTPATIENT)
Dept: INTERNAL MEDICINE CLINIC | Age: 63
End: 2018-09-10

## 2018-09-11 NOTE — CARE COORDINATION
(IMDUR) 60 MG extended release tablet Take 1 tablet by mouth daily 5/7/18   Shawanda Roberson MD   Lancets MISC accu check fastclick lancets  Dx: D01.0  As needed 5/4/18   Shawanda Roberson MD   Insulin Pen Needle (CAREFINE PEN NEEDLES) 32G X 4 MM MISC 1 each by Does not apply route daily 4/25/18   Shawanda Roberson MD   FREESTYLE LANCETS MISC 1 each by Does not apply route daily 4/24/18   Chantelle Suazo MD   folic acid (FOLVITE) 1 MG tablet Take 1 mg by mouth daily    Historical Provider, MD   atorvastatin (LIPITOR) 80 MG tablet TAKE 1 TABLET BY MOUTH DAILY 2/15/18   Shawanda Roberson MD   furosemide (LASIX) 40 MG tablet Take 1.5 tablets by mouth daily 2/5/18   Rhiannon Martinez MD   sertraline (ZOLOFT) 100 MG tablet TAKE 1 TABLET BY MOUTH DAILY 1/22/18   Shawanda Roberson MD   valsartan (DIOVAN) 160 MG tablet Take 1 tablet by mouth daily 1/12/18   TAYLA Pringle CNP   metoprolol succinate (TOPROL XL) 100 MG extended release tablet Take 1 tablet by mouth daily 1/12/18   TAYLA Pringle CNP   clopidogrel (PLAVIX) 75 MG tablet TAKE 1 TABLET BY MOUTH DAILY 11/7/17   Bishnu Crowell MD       Future Appointments  Date Time Provider Fred Gregory   10/2/2018 8:00 PM SCHEDULE, [facility] PHONE TRANSMISSION [facility]   10/9/2018 1:20 PM Shawanda Roberson MD [facility]   10/29/2018 9:30 AM Rhiannon Martinez MD [facility]

## 2018-09-13 DIAGNOSIS — F41.9 ANXIETY: ICD-10-CM

## 2018-09-14 RX ORDER — ALPRAZOLAM 1 MG/1
TABLET ORAL
Qty: 120 TABLET | Refills: 0 | Status: SHIPPED | OUTPATIENT
Start: 2018-09-14 | End: 2018-09-17 | Stop reason: CLARIF

## 2018-09-17 RX ORDER — LOSARTAN POTASSIUM 50 MG/1
50 TABLET ORAL DAILY
Qty: 30 TABLET | Refills: 3 | Status: ON HOLD | OUTPATIENT
Start: 2018-09-17 | End: 2019-12-15

## 2018-09-19 ENCOUNTER — TELEPHONE (OUTPATIENT)
Dept: OTHER | Facility: CLINIC | Age: 63
End: 2018-09-19

## 2018-09-19 ENCOUNTER — TELEPHONE (OUTPATIENT)
Dept: INTERNAL MEDICINE CLINIC | Age: 63
End: 2018-09-19

## 2018-09-19 DIAGNOSIS — G62.9 NEUROPATHY: ICD-10-CM

## 2018-09-19 RX ORDER — GABAPENTIN 600 MG/1
TABLET ORAL
Qty: 150 TABLET | Refills: 0 | Status: SHIPPED | OUTPATIENT
Start: 2018-09-19 | End: 2018-10-01 | Stop reason: SDUPTHER

## 2018-09-20 PROBLEM — J18.9 PNEUMONIA: Status: ACTIVE | Noted: 2018-09-20

## 2018-09-20 NOTE — TELEPHONE ENCOUNTER
Writer contacted ED provider to inform of 30 day readmission risk. ED provider informed writer of likely readmission.

## 2018-09-21 ENCOUNTER — OFFICE VISIT (OUTPATIENT)
Dept: INTERNAL MEDICINE CLINIC | Age: 63
End: 2018-09-21

## 2018-09-21 ENCOUNTER — CARE COORDINATION (OUTPATIENT)
Dept: CASE MANAGEMENT | Age: 63
End: 2018-09-21

## 2018-09-21 ENCOUNTER — TELEPHONE (OUTPATIENT)
Dept: PULMONOLOGY | Age: 63
End: 2018-09-21

## 2018-09-21 VITALS
WEIGHT: 268.4 LBS | SYSTOLIC BLOOD PRESSURE: 142 MMHG | BODY MASS INDEX: 36.35 KG/M2 | DIASTOLIC BLOOD PRESSURE: 90 MMHG | HEART RATE: 112 BPM | RESPIRATION RATE: 12 BRPM | OXYGEN SATURATION: 94 % | HEIGHT: 72 IN

## 2018-09-21 DIAGNOSIS — F41.9 ANXIETY: ICD-10-CM

## 2018-09-21 DIAGNOSIS — Z79.4 TYPE 2 DIABETES MELLITUS WITHOUT COMPLICATION, WITH LONG-TERM CURRENT USE OF INSULIN (HCC): ICD-10-CM

## 2018-09-21 DIAGNOSIS — I50.9 CONGESTIVE HEART FAILURE, UNSPECIFIED HF CHRONICITY, UNSPECIFIED HEART FAILURE TYPE (HCC): ICD-10-CM

## 2018-09-21 DIAGNOSIS — E11.9 TYPE 2 DIABETES MELLITUS WITHOUT COMPLICATION, WITH LONG-TERM CURRENT USE OF INSULIN (HCC): ICD-10-CM

## 2018-09-21 DIAGNOSIS — J18.9 PNEUMONIA DUE TO INFECTIOUS ORGANISM, UNSPECIFIED LATERALITY, UNSPECIFIED PART OF LUNG: Primary | ICD-10-CM

## 2018-09-21 PROBLEM — J96.01 ACUTE RESPIRATORY FAILURE WITH HYPOXIA (HCC): Status: RESOLVED | Noted: 2018-03-07 | Resolved: 2018-09-21

## 2018-09-21 PROCEDURE — 1111F DSCHRG MED/CURRENT MED MERGE: CPT | Performed by: INTERNAL MEDICINE

## 2018-09-21 PROCEDURE — G8598 ASA/ANTIPLAT THER USED: HCPCS | Performed by: INTERNAL MEDICINE

## 2018-09-21 PROCEDURE — 3046F HEMOGLOBIN A1C LEVEL >9.0%: CPT | Performed by: INTERNAL MEDICINE

## 2018-09-21 PROCEDURE — 99214 OFFICE O/P EST MOD 30 MIN: CPT | Performed by: INTERNAL MEDICINE

## 2018-09-21 PROCEDURE — 3017F COLORECTAL CA SCREEN DOC REV: CPT | Performed by: INTERNAL MEDICINE

## 2018-09-21 PROCEDURE — 4004F PT TOBACCO SCREEN RCVD TLK: CPT | Performed by: INTERNAL MEDICINE

## 2018-09-21 PROCEDURE — 2022F DILAT RTA XM EVC RTNOPTHY: CPT | Performed by: INTERNAL MEDICINE

## 2018-09-21 PROCEDURE — G8427 DOCREV CUR MEDS BY ELIG CLIN: HCPCS | Performed by: INTERNAL MEDICINE

## 2018-09-21 PROCEDURE — G8417 CALC BMI ABV UP PARAM F/U: HCPCS | Performed by: INTERNAL MEDICINE

## 2018-09-21 RX ORDER — BUDESONIDE AND FORMOTEROL FUMARATE DIHYDRATE 160; 4.5 UG/1; UG/1
2 AEROSOL RESPIRATORY (INHALATION) 2 TIMES DAILY
Qty: 2 INHALER | Refills: 0 | COMMUNITY
Start: 2018-09-21 | End: 2018-11-26 | Stop reason: ALTCHOICE

## 2018-09-21 RX ORDER — DOXYCYCLINE HYCLATE 100 MG
100 TABLET ORAL 2 TIMES DAILY
Qty: 14 TABLET | Refills: 0 | Status: SHIPPED | OUTPATIENT
Start: 2018-09-21 | End: 2018-09-28

## 2018-09-21 RX ORDER — PREDNISONE 20 MG/1
TABLET ORAL
Qty: 18 TABLET | Refills: 0 | Status: SHIPPED | OUTPATIENT
Start: 2018-09-21 | End: 2018-09-27 | Stop reason: CLARIF

## 2018-09-21 RX ORDER — LEVOFLOXACIN 500 MG/1
500 TABLET, FILM COATED ORAL DAILY
Qty: 7 TABLET | Refills: 0 | Status: SHIPPED | OUTPATIENT
Start: 2018-09-21 | End: 2018-09-28

## 2018-09-21 ASSESSMENT — ENCOUNTER SYMPTOMS
SORE THROAT: 0
CONSTIPATION: 0
ABDOMINAL PAIN: 0
BACK PAIN: 0
NAUSEA: 0
COUGH: 1
SHORTNESS OF BREATH: 1
EYE PAIN: 0
HEARTBURN: 0
WHEEZING: 0
BLOOD IN STOOL: 0
DIARRHEA: 0
VOMITING: 0
BLURRED VISION: 0

## 2018-09-21 NOTE — TELEPHONE ENCOUNTER
Star from Dr. Ang [place] office calling in - Dr. Juno De La O would like to speak with Dr. Daniel Alan.

## 2018-09-21 NOTE — PROGRESS NOTES
tablet 0    gabapentin (NEURONTIN) 600 MG tablet TAKE 1 TABLET BY MOUTH FIVE TIMES DAILY 150 tablet 0    albuterol sulfate  (90 Base) MCG/ACT inhaler Inhale 2 puffs into the lungs every 6 hours as needed for Wheezing      aspirin 81 MG tablet Take 81 mg by mouth daily      ALPRAZolam (XANAX) 1 MG tablet Take 1 mg by mouth 3 times daily as needed for Sleep or Anxiety. [place] budesonide-formoterol (SYMBICORT) 160-4.5 MCG/ACT AERO Inhale 2 puffs into the lungs 2 times daily      insulin glargine (LANTUS) 100 UNIT/ML injection pen Inject 28 Units into the skin nightly 5 pen 2    insulin lispro (HUMALOG KWIKPEN) 100 UNIT/ML pen Inject 5 Units into the skin 3 times daily (before meals) 5 pen 3    amLODIPine (NORVASC) 5 MG tablet TAKE 1 TABLET BY MOUTH DAILY 90 tablet 3    isosorbide mononitrate (IMDUR) 60 MG extended release tablet Take 1 tablet by mouth daily 90 tablet 3    Lancets MISC accu check fastclick lancets  Dx: R75.2  As needed 120 each 5    Insulin Pen Needle (CAREFINE PEN NEEDLES) 32G X 4 MM MISC 1 each by Does not apply route daily 100 Package 0    FREESTYLE LANCETS MISC 1 each by Does not apply route daily 100 each 0    atorvastatin (LIPITOR) 80 MG tablet TAKE 1 TABLET BY MOUTH DAILY 90 tablet 1    furosemide (LASIX) 40 MG tablet Take 1.5 tablets by mouth daily 45 tablet 5    sertraline (ZOLOFT) 100 MG tablet TAKE 1 TABLET BY MOUTH DAILY 90 tablet 3    metoprolol succinate (TOPROL XL) 100 MG extended release tablet Take 1 tablet by mouth daily 30 tablet 1    clopidogrel (PLAVIX) 75 MG tablet TAKE 1 TABLET BY MOUTH DAILY 90 tablet 0    losartan (COZAAR) 50 MG tablet Take 1 tablet by mouth daily 30 tablet 3    nitroGLYCERIN (NITROSTAT) 0.4 MG SL tablet PLACE 1 TABLET UNDER THE TONGUE EVERY 5 MINUTES AS NEEDED FOR CHEST PAIN 25 tablet 1     No current facility-administered medications for this visit.         Past Medical History:   Diagnosis Date    Anxiety     Arthritis     Asthma  CAD (coronary artery disease)     Calcium kidney stone     [facility]     Cerebral artery occlusion with cerebral infarction ([facility]     CHF (congestive heart failure) [facility]     COPD (chronic obstructive pulmonary disease) (HCC)     mild    Depression     DM2 (diabetes mellitus, type 2) (HCC)     Fibromyalgia     GERD (gastroesophageal reflux disease)     Hyperlipidemia     Hypertension     Liver disease     Pacemaker 2012    Medtronic model # [id number]    Pneumonia     Seizures ([facility]     TIA (transient ischemic attack) 2007    [facility]        Past Surgical History:   Procedure Laterality Date    CARDIAC SURGERY      CHOLECYSTECTOMY      COLONOSCOPY  01/10/2017    COLONOSCOPY  01/10/2017    CORONARY ANGIOPLASTY WITH STENT PLACEMENT  2012    CORONARY ARTERY BYPASS GRAFT  2010, 11/2015    ENDOSCOPY, COLON, DIAGNOSTIC      PACEMAKER PLACEMENT      UPPER GASTROINTESTINAL ENDOSCOPY  02/07/2017       Social History     Social History    Marital status:      Spouse name: N/A    Number of children: 1    Years of education: N/A     Occupational History    disabled      Social History Main Topics    Smoking status: Current Every Day Smoker     Packs/day: 1.00     Years: 44.00     Types: Cigarettes    Smokeless tobacco: Never Used      Comment: discussed    Alcohol use No    Drug use: No    Sexual activity: Yes     Partners: Female     Other Topics Concern    Not on file     Social History Narrative    No narrative on file       Review of Systems   Constitutional: Negative for chills, fever, malaise/fatigue and weight loss. HENT: Negative for hearing loss and sore throat. Eyes: Negative for blurred vision and pain. Respiratory: Positive for cough and shortness of breath. Negative for wheezing. Cardiovascular: Negative for chest pain, palpitations and leg swelling.    Gastrointestinal: Negative for abdominal pain, blood in stool, mellitus without complication, with long-term current use of insulin ([address]  Will need to switch to  relion      We'll watch blood sugar with prednisone and we will convert to Novolin 70/30 next week. 3. Congestive heart failure, unspecified HF chronicity, unspecified heart failure type ([address]  Same  no signs or symptoms and breathing is good. 4. Anxiety  On xanax . He will run out is apparently someone else in family took his medicine. Review at office visit next week.         Josee Mora MD

## 2018-09-24 ENCOUNTER — TELEPHONE (OUTPATIENT)
Dept: PULMONOLOGY | Age: 63
End: 2018-09-24

## 2018-09-24 NOTE — TELEPHONE ENCOUNTER
----- Message from Svetlana Loomis MD sent at 9/21/2018  7:17 PM EDT -----  Please give patient an appointment to see me 10/4 at 2pm for HFU.

## 2018-09-27 ENCOUNTER — CARE COORDINATION (OUTPATIENT)
Dept: CASE MANAGEMENT | Age: 63
End: 2018-09-27

## 2018-10-02 ENCOUNTER — HOSPITAL ENCOUNTER (EMERGENCY)
Age: 63
Discharge: HOME | End: 2018-10-02
Attending: EMERGENCY MEDICINE
Payer: MEDICARE

## 2018-10-02 ENCOUNTER — NURSE ONLY (OUTPATIENT)
Dept: CARDIOLOGY CLINIC | Age: 63
End: 2018-10-02
Payer: MEDICARE

## 2018-10-02 VITALS
RESPIRATION RATE: 18 BRPM | SYSTOLIC BLOOD PRESSURE: 160 MMHG | TEMPERATURE: 98.1 F | OXYGEN SATURATION: 99 % | HEART RATE: 84 BPM | DIASTOLIC BLOOD PRESSURE: 91 MMHG

## 2018-10-02 DIAGNOSIS — I25.5 ISCHEMIC CARDIOMYOPATHY: Chronic | ICD-10-CM

## 2018-10-02 DIAGNOSIS — M54.31 SCIATICA OF RIGHT SIDE: Primary | ICD-10-CM

## 2018-10-02 DIAGNOSIS — Z95.810 ICD (IMPLANTABLE CARDIOVERTER-DEFIBRILLATOR), DUAL, IN SITU: ICD-10-CM

## 2018-10-02 PROCEDURE — 96372 THER/PROPH/DIAG INJ SC/IM: CPT

## 2018-10-02 PROCEDURE — 93296 REM INTERROG EVL PM/IDS: CPT | Performed by: INTERNAL MEDICINE

## 2018-10-02 PROCEDURE — 93295 DEV INTERROG REMOTE 1/2/MLT: CPT | Performed by: INTERNAL MEDICINE

## 2018-10-02 PROCEDURE — 6370000000 HC RX 637 (ALT 250 FOR IP): Performed by: EMERGENCY MEDICINE

## 2018-10-02 PROCEDURE — 99283 EMERGENCY DEPT VISIT LOW MDM: CPT

## 2018-10-02 PROCEDURE — 6360000002 HC RX W HCPCS: Performed by: EMERGENCY MEDICINE

## 2018-10-02 RX ORDER — CYCLOBENZAPRINE HCL 10 MG
10 TABLET ORAL 3 TIMES DAILY PRN
Qty: 15 TABLET | Refills: 0 | Status: SHIPPED | OUTPATIENT
Start: 2018-10-02 | End: 2018-10-12

## 2018-10-02 RX ORDER — OXYCODONE HYDROCHLORIDE AND ACETAMINOPHEN 5; 325 MG/1; MG/1
1 TABLET ORAL EVERY 6 HOURS PRN
Qty: 9 TABLET | Refills: 0 | Status: SHIPPED | OUTPATIENT
Start: 2018-10-02 | End: 2018-10-07

## 2018-10-02 RX ORDER — OXYCODONE HYDROCHLORIDE AND ACETAMINOPHEN 5; 325 MG/1; MG/1
1 TABLET ORAL ONCE
Status: COMPLETED | OUTPATIENT
Start: 2018-10-02 | End: 2018-10-02

## 2018-10-02 RX ORDER — NAPROXEN 500 MG/1
500 TABLET ORAL 2 TIMES DAILY
Qty: 20 TABLET | Refills: 0 | Status: SHIPPED | OUTPATIENT
Start: 2018-10-02 | End: 2018-11-26 | Stop reason: ALTCHOICE

## 2018-10-02 RX ORDER — KETOROLAC TROMETHAMINE 30 MG/ML
60 INJECTION, SOLUTION INTRAMUSCULAR; INTRAVENOUS ONCE
Status: COMPLETED | OUTPATIENT
Start: 2018-10-02 | End: 2018-10-02

## 2018-10-02 RX ADMIN — KETOROLAC TROMETHAMINE 60 MG: 30 INJECTION, SOLUTION INTRAMUSCULAR at 04:33

## 2018-10-02 RX ADMIN — OXYCODONE HYDROCHLORIDE AND ACETAMINOPHEN 1 TABLET: 5; 325 TABLET ORAL at 05:05

## 2018-10-02 ASSESSMENT — PAIN DESCRIPTION - LOCATION
LOCATION: LEG
LOCATION: LEG

## 2018-10-02 ASSESSMENT — PAIN DESCRIPTION - PAIN TYPE
TYPE: ACUTE PAIN
TYPE: ACUTE PAIN

## 2018-10-02 ASSESSMENT — PAIN SCALES - GENERAL
PAINLEVEL_OUTOF10: 10
PAINLEVEL_OUTOF10: 8
PAINLEVEL_OUTOF10: 10
PAINLEVEL_OUTOF10: 10

## 2018-10-02 ASSESSMENT — PAIN DESCRIPTION - ORIENTATION
ORIENTATION: RIGHT;UPPER
ORIENTATION: RIGHT

## 2018-10-02 NOTE — ED PROVIDER NOTES
Take 1 tablet by mouth 3 times daily as needed for Muscle spasms    NAPROXEN (NAPROSYN) 500 MG TABLET    Take 1 tablet by mouth 2 times daily for 20 doses       DISCONTINUED MEDICATIONS:  Discontinued Medications    No medications on file              (Please note that portions of this note were completed with a voice recognition program.  Efforts were made to edit the dictations but occasionally words are mis-transcribed.)    Simran Burns MD (electronically signed)      Simran Burns MD  10/02/18 [address], [place]  10/02/18 5231

## 2018-10-04 ENCOUNTER — APPOINTMENT (OUTPATIENT)
Dept: GENERAL RADIOLOGY | Age: 63
End: 2018-10-04
Payer: MEDICARE

## 2018-10-04 ENCOUNTER — TELEPHONE (OUTPATIENT)
Dept: OTHER | Facility: CLINIC | Age: 63
End: 2018-10-04

## 2018-10-04 ENCOUNTER — HOSPITAL ENCOUNTER (EMERGENCY)
Age: 63
Discharge: HOME | End: 2018-10-04
Attending: EMERGENCY MEDICINE
Payer: MEDICARE

## 2018-10-04 VITALS
DIASTOLIC BLOOD PRESSURE: 80 MMHG | SYSTOLIC BLOOD PRESSURE: 148 MMHG | RESPIRATION RATE: 17 BRPM | HEART RATE: 88 BPM | OXYGEN SATURATION: 96 % | TEMPERATURE: 98 F

## 2018-10-04 DIAGNOSIS — M79.604 RIGHT LEG PAIN: Primary | ICD-10-CM

## 2018-10-04 LAB
ANION GAP SERPL CALCULATED.3IONS-SCNC: 11 MMOL/L (ref 3–16)
BUN BLDV-MCNC: 18 MG/DL (ref 7–20)
CALCIUM SERPL-MCNC: 8.4 MG/DL (ref 8.3–10.6)
CHLORIDE BLD-SCNC: 106 MMOL/L (ref 99–110)
CO2: 25 MMOL/L (ref 21–32)
CREAT SERPL-MCNC: 1.1 MG/DL (ref 0.8–1.3)
D DIMER: <200 NG/ML DDU (ref 0–229)
GFR AFRICAN AMERICAN: >60
GFR NON-AFRICAN AMERICAN: >60
GLUCOSE BLD-MCNC: 260 MG/DL (ref 70–99)
POTASSIUM REFLEX MAGNESIUM: 4.4 MMOL/L (ref 3.5–5.1)
REASON FOR REJECTION: NORMAL
REJECTED TEST: NORMAL
SODIUM BLD-SCNC: 142 MMOL/L (ref 136–145)

## 2018-10-04 PROCEDURE — 6370000000 HC RX 637 (ALT 250 FOR IP): Performed by: PHYSICIAN ASSISTANT

## 2018-10-04 PROCEDURE — 6360000002 HC RX W HCPCS: Performed by: PHYSICIAN ASSISTANT

## 2018-10-04 PROCEDURE — 80048 BASIC METABOLIC PNL TOTAL CA: CPT

## 2018-10-04 PROCEDURE — 85379 FIBRIN DEGRADATION QUANT: CPT

## 2018-10-04 PROCEDURE — 73502 X-RAY EXAM HIP UNI 2-3 VIEWS: CPT

## 2018-10-04 PROCEDURE — 99283 EMERGENCY DEPT VISIT LOW MDM: CPT

## 2018-10-04 PROCEDURE — 96372 THER/PROPH/DIAG INJ SC/IM: CPT

## 2018-10-04 PROCEDURE — 36415 COLL VENOUS BLD VENIPUNCTURE: CPT

## 2018-10-04 RX ORDER — OXYCODONE HYDROCHLORIDE AND ACETAMINOPHEN 5; 325 MG/1; MG/1
1 TABLET ORAL ONCE
Status: COMPLETED | OUTPATIENT
Start: 2018-10-04 | End: 2018-10-04

## 2018-10-04 RX ORDER — KETOROLAC TROMETHAMINE 30 MG/ML
60 INJECTION, SOLUTION INTRAMUSCULAR; INTRAVENOUS ONCE
Status: COMPLETED | OUTPATIENT
Start: 2018-10-04 | End: 2018-10-04

## 2018-10-04 RX ORDER — KETOROLAC TROMETHAMINE 30 MG/ML
60 INJECTION, SOLUTION INTRAMUSCULAR; INTRAVENOUS ONCE
Status: DISCONTINUED | OUTPATIENT
Start: 2018-10-04 | End: 2018-10-04

## 2018-10-04 RX ORDER — METHOCARBAMOL 750 MG/1
1500 TABLET, FILM COATED ORAL ONCE
Status: COMPLETED | OUTPATIENT
Start: 2018-10-04 | End: 2018-10-04

## 2018-10-04 RX ORDER — PREDNISONE 20 MG/1
40 TABLET ORAL DAILY
Qty: 30 TABLET | Refills: 0 | Status: SHIPPED | OUTPATIENT
Start: 2018-10-04 | End: 2018-10-09

## 2018-10-04 RX ORDER — ORPHENADRINE CITRATE 30 MG/ML
60 INJECTION INTRAMUSCULAR; INTRAVENOUS ONCE
Status: DISCONTINUED | OUTPATIENT
Start: 2018-10-04 | End: 2018-10-05 | Stop reason: HOSPADM

## 2018-10-04 RX ORDER — PREDNISONE 20 MG/1
60 TABLET ORAL ONCE
Status: COMPLETED | OUTPATIENT
Start: 2018-10-04 | End: 2018-10-04

## 2018-10-04 RX ADMIN — METHOCARBAMOL 1500 MG: 750 TABLET, FILM COATED ORAL at 21:54

## 2018-10-04 RX ADMIN — OXYCODONE HYDROCHLORIDE AND ACETAMINOPHEN 1 TABLET: 5; 325 TABLET ORAL at 23:07

## 2018-10-04 RX ADMIN — PREDNISONE 60 MG: 20 TABLET ORAL at 21:07

## 2018-10-04 RX ADMIN — KETOROLAC TROMETHAMINE 60 MG: 30 INJECTION, SOLUTION INTRAMUSCULAR at 22:50

## 2018-10-04 RX ADMIN — OXYCODONE HYDROCHLORIDE AND ACETAMINOPHEN 1 TABLET: 5; 325 TABLET ORAL at 21:06

## 2018-10-04 ASSESSMENT — PAIN DESCRIPTION - LOCATION: LOCATION: LEG

## 2018-10-04 ASSESSMENT — PAIN DESCRIPTION - ORIENTATION: ORIENTATION: RIGHT

## 2018-10-04 ASSESSMENT — PAIN DESCRIPTION - PAIN TYPE: TYPE: ACUTE PAIN

## 2018-10-04 ASSESSMENT — PAIN SCALES - GENERAL
PAINLEVEL_OUTOF10: 10
PAINLEVEL_OUTOF10: 10
PAINLEVEL_OUTOF10: 2
PAINLEVEL_OUTOF10: 10

## 2018-10-05 ASSESSMENT — ENCOUNTER SYMPTOMS
ABDOMINAL PAIN: 0
NAUSEA: 0
SHORTNESS OF BREATH: 0
CHEST TIGHTNESS: 0
VOMITING: 0
COLOR CHANGE: 0

## 2018-10-05 NOTE — ED TRIAGE NOTES
Pt states that his leg started hurting on Saturday. Pt states that he was just watching TV when the pain started in his back and radiated to his right leg. Pt states that the pain is now only in his right leg. Pt states that he was seen on Tuesday night but feels worse tonight so he came in for an evaluation. Pt is A and O x 4 and answering questions approprietly. pt family is at bedside and attentive to pt needs.

## 2018-10-05 NOTE — ED PROVIDER NOTES
I independently performed a history and physical on [address] All diagnostic, treatment, and disposition decisions were made by myself in conjunction with the advanced practice provider. Briefly, this is a 58 y.o. male here for right leg pain. The patient states that he has had problems with this 2 days ago, but states that feels the pain getting worse. Patient states she cannot bear weight on the right leg. .    On exam, patient has minor pain to the right leg, and a workup was done which shows no signs of any acute abnormalities. The patient is now requesting pain medication, but I don't think a medication from the emergency department have advised him to contact his primary care doctor for follow-up    For further details of [address] emergency department encounter, please see documentation by advanced practice provider  [facility].         Chris Lacey MD  10/05/18 4263

## 2018-10-05 NOTE — ED PROVIDER NOTES
[facility]  eMERGENCY dEPARTMENT eNCOUnter        Pt Name: Chuyita Boland  MRN: [id number]  [place] 1955  Date of evaluation: 10/4/2018  Provider: Viridiana Stiles PA-C  PCP: Dena William MD  ED Attending: Dr. Jenn Palmer       Chief Complaint   Patient presents with    Leg Pain     right leg - states seen in ED for same 2 days ago, but now worse       HISTORY OF PRESENT ILLNESS   (Location/Symptom, Timing/Onset, Context/Setting, Quality, Duration, Modifying Factors, Severity)  Note limiting factors. Chuyita Boland is a 58 y.o. male this emergency Department by private vehicle complaining of right leg pain. Patient states he's had pain to the anterior and medial aspect of his right upper leg into his knee and down the back of his leg to his ankle. Patient states pain worsens with movement. He was seen and evaluated 2 days ago for pain and diagnosed with sciatica. Patient was discharged home with Percocet, muscle relaxers and anti-inflammatories. Patient has been taking medication as prescribed with no significant improving. Patient states pain has worsened which prompted evaluation in the ED this evening. Patient denies any fevers, chills, nausea, vomiting. Patient denies having pain like this previously. Denies any traumatic injury to back or back pain currently. Pain did initially begin in his back. Denies any leg numbness/tingling, urinary/bowel incontinence, urinary retention, saddle anesthesia. Is having trouble walking secondary to pain and leg. Has been using walker to ambulate. Patient states he can walk if he keeps his leg straight. Pain and walking problematic when he bends his knee. Pain rated as a 10/10. Nursing Notes were all reviewed and agreed with or any disagreements were addressed  in the HPI.     REVIEW OF SYSTEMS    (2-9 systems for level 4, 10 or more for level 5)     Review of Systems tablet by mouth 3 times daily as needed for Anxiety for up to 14 days. ., Disp-42 tablet, R-0Normal      naproxen (NAPROSYN) 500 MG tablet Take 1 tablet by mouth 2 times daily for 20 doses, Disp-20 tablet, R-0Print      cyclobenzaprine (FLEXERIL) 10 MG tablet Take 1 tablet by mouth 3 times daily as needed for Muscle spasms, Disp-15 tablet, R-0Print      oxyCODONE-acetaminophen (PERCOCET) 5-325 MG per tablet Take 1 tablet by mouth every 6 hours as needed for Pain for up to 5 days. Intended supply: 5 days. Take lowest dose possible to manage pain., Disp-9 tablet, R-0Print      furosemide (LASIX) 40 MG tablet Take 1.5 tablets by mouth daily, Disp-45 tablet, R-3Normal      gabapentin (NEURONTIN) 600 MG tablet TAKE 1 TABLET BY MOUTH FIVE TIMES DAILY. , Disp-150 tablet, R-1Normal      isosorbide mononitrate (IMDUR) 60 MG extended release tablet Take 1 tablet by mouth daily, Disp-90 tablet, R-1Normal      budesonide-formoterol (SYMBICORT) 160-4.5 MCG/ACT AERO Inhale 2 puffs into the lungs 2 times daily Lot# [id number]  Qty:2, Disp-2 Inhaler, R-0Sample      losartan (COZAAR) 50 MG tablet Take 1 tablet by mouth daily, Disp-30 tablet, R-3Normal      nitroGLYCERIN (NITROSTAT) 0.4 MG SL tablet PLACE 1 TABLET UNDER THE TONGUE EVERY 5 MINUTES AS NEEDED FOR CHEST PAIN, Disp-25 tablet, R-1Normal      albuterol sulfate  (90 Base) MCG/ACT inhaler Inhale 2 puffs into the lungs every 6 hours as needed for WheezingHistorical Med      aspirin 81 MG tablet Take 81 mg by mouth dailyHistorical Med      insulin glargine (LANTUS) 100 UNIT/ML injection pen Inject 28 Units into the skin nightly, Disp-5 pen, R-2Normal      insulin lispro (HUMALOG KWIKPEN) 100 UNIT/ML pen Inject 5 Units into the skin 3 times daily (before meals), Disp-5 pen, R-3Normal      amLODIPine (NORVASC) 5 MG tablet TAKE 1 TABLET BY MOUTH DAILY, Disp-90 tablet, R-3Normal      !!  Lancets MISC Disp-120 each, R-5, Printaccu check fastclick lancets Dx: J48.7 As needed such as CT, Ultrasound and MRI are read by the radiologist. Plain radiographic images are visualized and preliminarily interpreted by the  ED Provider with the below findings:        Interpretation per the Radiologist below, if available at the time of this note:    XR HIP RIGHT (2-3 VIEWS)   Final Result   Mild right hip joint space narrowing. No other finding. No evidence of an   acute injury. VL Extremity Venous Right    (Results Pending)     Xr Hip Right (2-3 Views)    Result Date: 10/4/2018  EXAMINATION: 2 XRAY VIEWS OF THE RIGHT HIP 10/4/2018 8:30 pm COMPARISON: None. HISTORY: ORDERING SYSTEM PROVIDED HISTORY: pain TECHNOLOGIST PROVIDED HISTORY: Reason for exam:->pain Ordering Physician Provided Reason for Exam: Leg Pain (right leg radiates from groin down to right ankle - states seen in ED for same 2 days ago, but now worse) Acuity: Acute Type of Exam: Initial FINDINGS: There is mild bilateral hip joint space narrowing. No other bone, joint or soft tissue abnormality. No evidence of an acute fracture or dislocation. There are 3 surgical clips in the medial upper right thigh. Mild right hip joint space narrowing. No other finding. No evidence of an acute injury.          PROCEDURES   Unless otherwise noted below, none     Procedures    CRITICAL CARE TIME   N/A    CONSULTS:  None      EMERGENCY DEPARTMENT COURSE and DIFFERENTIAL DIAGNOSIS/MDM:   Vitals:    Vitals:    10/04/18 1904 10/04/18 2100 10/04/18 2330   BP: (!) 142/81 (!) 149/85 (!) 148/80   Pulse: 119 96 88   Resp: 14 16 17   Temp: 97.2 °F (36.2 °C) 97.8 °F (36.6 °C) 98 °F (36.7 °C)   TempSrc: Temporal Oral Oral   SpO2: 94%  96%       Patient was given the following medications:  Medications   orphenadrine (NORFLEX) injection 60 mg (60 mg Intramuscular Not Given 10/5/18 0000)   oxyCODONE-acetaminophen (PERCOCET) 5-325 MG per tablet 1 tablet (1 tablet Oral Given 10/4/18 2106)   predniSONE (DELTASONE) tablet 60 mg (60 mg Oral Given

## 2018-10-06 DIAGNOSIS — F41.9 ANXIETY: ICD-10-CM

## 2018-10-08 RX ORDER — ALPRAZOLAM 1 MG/1
TABLET ORAL
Qty: 120 TABLET | Refills: 0 | OUTPATIENT
Start: 2018-10-08 | End: 2018-11-07

## 2018-10-11 ENCOUNTER — TELEPHONE (OUTPATIENT)
Dept: CARDIOLOGY CLINIC | Age: 63
End: 2018-10-11

## 2018-10-15 ENCOUNTER — CARE COORDINATION (OUTPATIENT)
Dept: CASE MANAGEMENT | Age: 63
End: 2018-10-15

## 2018-11-26 ENCOUNTER — APPOINTMENT (OUTPATIENT)
Dept: CT IMAGING | Age: 63
DRG: 392 | End: 2018-11-26
Payer: MEDICARE

## 2018-11-26 ENCOUNTER — HOSPITAL ENCOUNTER (INPATIENT)
Age: 63
LOS: 2 days | Discharge: NURSING, REHAB OR LONG-TERM CARE | DRG: 392 | End: 2018-11-28
Attending: EMERGENCY MEDICINE | Admitting: INTERNAL MEDICINE
Payer: MEDICARE

## 2018-11-26 ENCOUNTER — APPOINTMENT (OUTPATIENT)
Dept: GENERAL RADIOLOGY | Age: 63
DRG: 392 | End: 2018-11-26
Payer: MEDICARE

## 2018-11-26 DIAGNOSIS — R94.31 ABNORMAL FINDING ON EKG: ICD-10-CM

## 2018-11-26 DIAGNOSIS — R77.8 ELEVATED TROPONIN: Primary | ICD-10-CM

## 2018-11-26 DIAGNOSIS — M54.50 CHRONIC BILATERAL LOW BACK PAIN WITHOUT SCIATICA: ICD-10-CM

## 2018-11-26 DIAGNOSIS — R11.2 NAUSEA VOMITING AND DIARRHEA: ICD-10-CM

## 2018-11-26 DIAGNOSIS — R10.33 PERIUMBILICAL ABDOMINAL PAIN: ICD-10-CM

## 2018-11-26 DIAGNOSIS — G89.29 CHRONIC BILATERAL LOW BACK PAIN WITHOUT SCIATICA: ICD-10-CM

## 2018-11-26 DIAGNOSIS — R19.7 NAUSEA VOMITING AND DIARRHEA: ICD-10-CM

## 2018-11-26 LAB
A/G RATIO: 0.9 (ref 1.1–2.2)
ALBUMIN SERPL-MCNC: 3.5 G/DL (ref 3.4–5)
ALP BLD-CCNC: 129 U/L (ref 40–129)
ALT SERPL-CCNC: 9 U/L (ref 10–40)
ANION GAP SERPL CALCULATED.3IONS-SCNC: 18 MMOL/L (ref 3–16)
AST SERPL-CCNC: 11 U/L (ref 15–37)
BASOPHILS ABSOLUTE: 0 K/UL (ref 0–0.2)
BASOPHILS RELATIVE PERCENT: 0.4 %
BILIRUB SERPL-MCNC: 0.5 MG/DL (ref 0–1)
BILIRUBIN URINE: ABNORMAL
BLOOD, URINE: NEGATIVE
BUN BLDV-MCNC: 10 MG/DL (ref 7–20)
CALCIUM SERPL-MCNC: 9.1 MG/DL (ref 8.3–10.6)
CHLORIDE BLD-SCNC: 103 MMOL/L (ref 99–110)
CLARITY: CLEAR
CO2: 18 MMOL/L (ref 21–32)
COLOR: YELLOW
CREAT SERPL-MCNC: 0.8 MG/DL (ref 0.8–1.3)
EOSINOPHILS ABSOLUTE: 0 K/UL (ref 0–0.6)
EOSINOPHILS RELATIVE PERCENT: 0.5 %
EPITHELIAL CELLS, UA: 1 /HPF (ref 0–5)
GFR AFRICAN AMERICAN: >60
GFR NON-AFRICAN AMERICAN: >60
GLOBULIN: 3.7 G/DL
GLUCOSE BLD-MCNC: 184 MG/DL (ref 70–99)
GLUCOSE URINE: 100 MG/DL
HCT VFR BLD CALC: 40.4 % (ref 40.5–52.5)
HEMOGLOBIN: 13.6 G/DL (ref 13.5–17.5)
HYALINE CASTS: 7 /LPF (ref 0–8)
KETONES, URINE: 15 MG/DL
LEUKOCYTE ESTERASE, URINE: NEGATIVE
LIPASE: 11 U/L (ref 13–60)
LYMPHOCYTES ABSOLUTE: 1.5 K/UL (ref 1–5.1)
LYMPHOCYTES RELATIVE PERCENT: 16.3 %
MCH RBC QN AUTO: 31 PG (ref 26–34)
MCHC RBC AUTO-ENTMCNC: 33.6 G/DL (ref 31–36)
MCV RBC AUTO: 92.1 FL (ref 80–100)
MICROSCOPIC EXAMINATION: YES
MONOCYTES ABSOLUTE: 0.6 K/UL (ref 0–1.3)
MONOCYTES RELATIVE PERCENT: 6.3 %
NEUTROPHILS ABSOLUTE: 6.9 K/UL (ref 1.7–7.7)
NEUTROPHILS RELATIVE PERCENT: 76.5 %
NITRITE, URINE: NEGATIVE
PDW BLD-RTO: 15.1 % (ref 12.4–15.4)
PH UA: 6
PLATELET # BLD: 257 K/UL (ref 135–450)
PMV BLD AUTO: 10 FL (ref 5–10.5)
POTASSIUM SERPL-SCNC: 3.6 MMOL/L (ref 3.5–5.1)
PROTEIN UA: 30 MG/DL
RBC # BLD: 4.39 M/UL (ref 4.2–5.9)
RBC UA: 1 /HPF (ref 0–4)
SODIUM BLD-SCNC: 139 MMOL/L (ref 136–145)
SPECIFIC GRAVITY UA: >1.03
TOTAL PROTEIN: 7.2 G/DL (ref 6.4–8.2)
TROPONIN: 0.02 NG/ML
URINE REFLEX TO CULTURE: ABNORMAL
URINE TYPE: ABNORMAL
UROBILINOGEN, URINE: 1 E.U./DL
WBC # BLD: 9 K/UL (ref 4–11)
WBC UA: 1 /HPF (ref 0–5)

## 2018-11-26 PROCEDURE — 6360000004 HC RX CONTRAST MEDICATION: Performed by: PHYSICIAN ASSISTANT

## 2018-11-26 PROCEDURE — 96361 HYDRATE IV INFUSION ADD-ON: CPT

## 2018-11-26 PROCEDURE — 99285 EMERGENCY DEPT VISIT HI MDM: CPT

## 2018-11-26 PROCEDURE — 83690 ASSAY OF LIPASE: CPT

## 2018-11-26 PROCEDURE — 93005 ELECTROCARDIOGRAM TRACING: CPT | Performed by: EMERGENCY MEDICINE

## 2018-11-26 PROCEDURE — 81001 URINALYSIS AUTO W/SCOPE: CPT

## 2018-11-26 PROCEDURE — 85025 COMPLETE CBC W/AUTO DIFF WBC: CPT

## 2018-11-26 PROCEDURE — 71046 X-RAY EXAM CHEST 2 VIEWS: CPT

## 2018-11-26 PROCEDURE — 84484 ASSAY OF TROPONIN QUANT: CPT

## 2018-11-26 PROCEDURE — 80053 COMPREHEN METABOLIC PANEL: CPT

## 2018-11-26 PROCEDURE — 2580000003 HC RX 258: Performed by: EMERGENCY MEDICINE

## 2018-11-26 PROCEDURE — 1200000000 HC SEMI PRIVATE

## 2018-11-26 PROCEDURE — 6370000000 HC RX 637 (ALT 250 FOR IP): Performed by: PHYSICIAN ASSISTANT

## 2018-11-26 PROCEDURE — 96375 TX/PRO/DX INJ NEW DRUG ADDON: CPT

## 2018-11-26 PROCEDURE — 74177 CT ABD & PELVIS W/CONTRAST: CPT

## 2018-11-26 PROCEDURE — 6360000002 HC RX W HCPCS: Performed by: PHYSICIAN ASSISTANT

## 2018-11-26 PROCEDURE — 96374 THER/PROPH/DIAG INJ IV PUSH: CPT

## 2018-11-26 RX ORDER — 0.9 % SODIUM CHLORIDE 0.9 %
1000 INTRAVENOUS SOLUTION INTRAVENOUS ONCE
Status: COMPLETED | OUTPATIENT
Start: 2018-11-26 | End: 2018-11-27

## 2018-11-26 RX ORDER — ALPRAZOLAM 1 MG/1
1 TABLET ORAL 4 TIMES DAILY PRN
COMMUNITY
End: 2018-12-26 | Stop reason: CLARIF

## 2018-11-26 RX ORDER — AMLODIPINE BESYLATE 5 MG/1
5 TABLET ORAL DAILY
Status: DISCONTINUED | OUTPATIENT
Start: 2018-11-26 | End: 2018-11-28

## 2018-11-26 RX ORDER — LOSARTAN POTASSIUM 25 MG/1
50 TABLET ORAL DAILY
Status: DISCONTINUED | OUTPATIENT
Start: 2018-11-26 | End: 2018-11-28 | Stop reason: HOSPADM

## 2018-11-26 RX ORDER — OXYCODONE HYDROCHLORIDE AND ACETAMINOPHEN 5; 325 MG/1; MG/1
2 TABLET ORAL ONCE
Status: COMPLETED | OUTPATIENT
Start: 2018-11-26 | End: 2018-11-26

## 2018-11-26 RX ORDER — ASPIRIN 81 MG/1
324 TABLET, CHEWABLE ORAL ONCE
Status: COMPLETED | OUTPATIENT
Start: 2018-11-26 | End: 2018-11-26

## 2018-11-26 RX ORDER — ONDANSETRON 2 MG/ML
4 INJECTION INTRAMUSCULAR; INTRAVENOUS ONCE
Status: COMPLETED | OUTPATIENT
Start: 2018-11-26 | End: 2018-11-26

## 2018-11-26 RX ORDER — MORPHINE SULFATE 2 MG/ML
4 INJECTION, SOLUTION INTRAMUSCULAR; INTRAVENOUS ONCE
Status: COMPLETED | OUTPATIENT
Start: 2018-11-26 | End: 2018-11-26

## 2018-11-26 RX ADMIN — ASPIRIN 81 MG 324 MG: 81 TABLET ORAL at 21:28

## 2018-11-26 RX ADMIN — MORPHINE SULFATE 4 MG: 2 INJECTION, SOLUTION INTRAMUSCULAR; INTRAVENOUS at 21:24

## 2018-11-26 RX ADMIN — IOPAMIDOL 75 ML: 755 INJECTION, SOLUTION INTRAVENOUS at 20:11

## 2018-11-26 RX ADMIN — SODIUM CHLORIDE 1000 ML: 9 INJECTION, SOLUTION INTRAVENOUS at 20:25

## 2018-11-26 RX ADMIN — OXYCODONE HYDROCHLORIDE AND ACETAMINOPHEN 2 TABLET: 5; 325 TABLET ORAL at 18:50

## 2018-11-26 RX ADMIN — LOSARTAN POTASSIUM 50 MG: 25 TABLET ORAL at 19:01

## 2018-11-26 RX ADMIN — ONDANSETRON 4 MG: 2 INJECTION INTRAMUSCULAR; INTRAVENOUS at 18:50

## 2018-11-26 RX ADMIN — AMLODIPINE BESYLATE 5 MG: 5 TABLET ORAL at 19:01

## 2018-11-26 ASSESSMENT — PAIN DESCRIPTION - LOCATION
LOCATION: ABDOMEN;BACK
LOCATION: BACK
LOCATION: ABDOMEN;BACK

## 2018-11-26 ASSESSMENT — ENCOUNTER SYMPTOMS
COUGH: 1
VOMITING: 1
CONSTIPATION: 0
CHEST TIGHTNESS: 0
NAUSEA: 1
ABDOMINAL PAIN: 1
COLOR CHANGE: 0
DIARRHEA: 0
SHORTNESS OF BREATH: 0

## 2018-11-26 ASSESSMENT — PAIN SCALES - GENERAL
PAINLEVEL_OUTOF10: 0
PAINLEVEL_OUTOF10: 8
PAINLEVEL_OUTOF10: 8
PAINLEVEL_OUTOF10: 7
PAINLEVEL_OUTOF10: 8

## 2018-11-26 ASSESSMENT — PAIN DESCRIPTION - DESCRIPTORS: DESCRIPTORS: SHARP

## 2018-11-26 ASSESSMENT — PAIN DESCRIPTION - PAIN TYPE: TYPE: CHRONIC PAIN

## 2018-11-26 ASSESSMENT — HEART SCORE: ECG: 1

## 2018-11-27 PROBLEM — R53.1 GENERALIZED WEAKNESS: Status: ACTIVE | Noted: 2018-11-27

## 2018-11-27 PROBLEM — J44.9 COPD (CHRONIC OBSTRUCTIVE PULMONARY DISEASE) (HCC): Status: ACTIVE | Noted: 2018-11-27

## 2018-11-27 PROBLEM — R19.7 DIARRHEA: Status: ACTIVE | Noted: 2018-11-27

## 2018-11-27 PROBLEM — E11.9 DMII (DIABETES MELLITUS, TYPE 2) (HCC): Status: ACTIVE | Noted: 2018-11-27

## 2018-11-27 LAB
ANION GAP SERPL CALCULATED.3IONS-SCNC: 14 MMOL/L (ref 3–16)
BASOPHILS ABSOLUTE: 0.1 K/UL (ref 0–0.2)
BASOPHILS RELATIVE PERCENT: 1.3 %
BUN BLDV-MCNC: 10 MG/DL (ref 7–20)
CALCIUM SERPL-MCNC: 8.6 MG/DL (ref 8.3–10.6)
CHLORIDE BLD-SCNC: 108 MMOL/L (ref 99–110)
CO2: 22 MMOL/L (ref 21–32)
CREAT SERPL-MCNC: 0.8 MG/DL (ref 0.8–1.3)
EOSINOPHILS ABSOLUTE: 0.1 K/UL (ref 0–0.6)
EOSINOPHILS RELATIVE PERCENT: 1.8 %
GFR AFRICAN AMERICAN: >60
GFR NON-AFRICAN AMERICAN: >60
GLUCOSE BLD-MCNC: 131 MG/DL (ref 70–99)
GLUCOSE BLD-MCNC: 153 MG/DL (ref 70–99)
GLUCOSE BLD-MCNC: 156 MG/DL (ref 70–99)
GLUCOSE BLD-MCNC: 158 MG/DL (ref 70–99)
GLUCOSE BLD-MCNC: 159 MG/DL (ref 70–99)
GLUCOSE BLD-MCNC: 170 MG/DL (ref 70–99)
HCT VFR BLD CALC: 37.6 % (ref 40.5–52.5)
HEMOGLOBIN: 12.6 G/DL (ref 13.5–17.5)
LYMPHOCYTES ABSOLUTE: 1.7 K/UL (ref 1–5.1)
LYMPHOCYTES RELATIVE PERCENT: 22 %
MAGNESIUM: 2 MG/DL (ref 1.8–2.4)
MCH RBC QN AUTO: 31.2 PG (ref 26–34)
MCHC RBC AUTO-ENTMCNC: 33.5 G/DL (ref 31–36)
MCV RBC AUTO: 93.2 FL (ref 80–100)
MONOCYTES ABSOLUTE: 0.6 K/UL (ref 0–1.3)
MONOCYTES RELATIVE PERCENT: 7.2 %
NEUTROPHILS ABSOLUTE: 5.3 K/UL (ref 1.7–7.7)
NEUTROPHILS RELATIVE PERCENT: 67.7 %
PDW BLD-RTO: 15 % (ref 12.4–15.4)
PERFORMED ON: ABNORMAL
PLATELET # BLD: 234 K/UL (ref 135–450)
PMV BLD AUTO: 9.5 FL (ref 5–10.5)
POTASSIUM REFLEX MAGNESIUM: 3.4 MMOL/L (ref 3.5–5.1)
RBC # BLD: 4.03 M/UL (ref 4.2–5.9)
SODIUM BLD-SCNC: 144 MMOL/L (ref 136–145)
TROPONIN: 0.02 NG/ML
TROPONIN: 0.03 NG/ML
WBC # BLD: 7.8 K/UL (ref 4–11)

## 2018-11-27 PROCEDURE — 80048 BASIC METABOLIC PNL TOTAL CA: CPT

## 2018-11-27 PROCEDURE — 97166 OT EVAL MOD COMPLEX 45 MIN: CPT

## 2018-11-27 PROCEDURE — 97530 THERAPEUTIC ACTIVITIES: CPT

## 2018-11-27 PROCEDURE — 85025 COMPLETE CBC W/AUTO DIFF WBC: CPT

## 2018-11-27 PROCEDURE — 93010 ELECTROCARDIOGRAM REPORT: CPT | Performed by: INTERNAL MEDICINE

## 2018-11-27 PROCEDURE — 97162 PT EVAL MOD COMPLEX 30 MIN: CPT

## 2018-11-27 PROCEDURE — 6370000000 HC RX 637 (ALT 250 FOR IP): Performed by: INTERNAL MEDICINE

## 2018-11-27 PROCEDURE — G8988 SELF CARE GOAL STATUS: HCPCS

## 2018-11-27 PROCEDURE — 96372 THER/PROPH/DIAG INJ SC/IM: CPT

## 2018-11-27 PROCEDURE — 2580000003 HC RX 258: Performed by: INTERNAL MEDICINE

## 2018-11-27 PROCEDURE — 83735 ASSAY OF MAGNESIUM: CPT

## 2018-11-27 PROCEDURE — 36415 COLL VENOUS BLD VENIPUNCTURE: CPT

## 2018-11-27 PROCEDURE — G8987 SELF CARE CURRENT STATUS: HCPCS

## 2018-11-27 PROCEDURE — 84484 ASSAY OF TROPONIN QUANT: CPT

## 2018-11-27 PROCEDURE — 96365 THER/PROPH/DIAG IV INF INIT: CPT

## 2018-11-27 PROCEDURE — 6370000000 HC RX 637 (ALT 250 FOR IP): Performed by: PHYSICIAN ASSISTANT

## 2018-11-27 PROCEDURE — G8979 MOBILITY GOAL STATUS: HCPCS

## 2018-11-27 PROCEDURE — 96376 TX/PRO/DX INJ SAME DRUG ADON: CPT

## 2018-11-27 PROCEDURE — 2060000000 HC ICU INTERMEDIATE R&B

## 2018-11-27 PROCEDURE — 97110 THERAPEUTIC EXERCISES: CPT

## 2018-11-27 PROCEDURE — G8978 MOBILITY CURRENT STATUS: HCPCS

## 2018-11-27 PROCEDURE — 6360000002 HC RX W HCPCS: Performed by: INTERNAL MEDICINE

## 2018-11-27 RX ORDER — OXYCODONE AND ACETAMINOPHEN 7.5; 325 MG/1; MG/1
1 TABLET ORAL 2 TIMES DAILY PRN
Status: DISCONTINUED | OUTPATIENT
Start: 2018-11-27 | End: 2018-11-27

## 2018-11-27 RX ORDER — SODIUM CHLORIDE 0.9 % (FLUSH) 0.9 %
10 SYRINGE (ML) INJECTION EVERY 12 HOURS SCHEDULED
Status: DISCONTINUED | OUTPATIENT
Start: 2018-11-27 | End: 2018-11-28 | Stop reason: HOSPADM

## 2018-11-27 RX ORDER — NITROGLYCERIN 0.4 MG/1
0.4 TABLET SUBLINGUAL EVERY 5 MIN PRN
Status: DISCONTINUED | OUTPATIENT
Start: 2018-11-27 | End: 2018-11-28 | Stop reason: HOSPADM

## 2018-11-27 RX ORDER — SODIUM CHLORIDE 9 MG/ML
INJECTION, SOLUTION INTRAVENOUS CONTINUOUS
Status: DISCONTINUED | OUTPATIENT
Start: 2018-11-27 | End: 2018-11-28

## 2018-11-27 RX ORDER — FAMOTIDINE 20 MG/1
20 TABLET, FILM COATED ORAL 2 TIMES DAILY
Status: DISCONTINUED | OUTPATIENT
Start: 2018-11-27 | End: 2018-11-28 | Stop reason: HOSPADM

## 2018-11-27 RX ORDER — CLOPIDOGREL BISULFATE 75 MG/1
75 TABLET ORAL DAILY
Status: DISCONTINUED | OUTPATIENT
Start: 2018-11-27 | End: 2018-11-28 | Stop reason: HOSPADM

## 2018-11-27 RX ORDER — NICOTINE POLACRILEX 4 MG
15 LOZENGE BUCCAL PRN
Status: DISCONTINUED | OUTPATIENT
Start: 2018-11-27 | End: 2018-11-28 | Stop reason: HOSPADM

## 2018-11-27 RX ORDER — BISACODYL 10 MG
10 SUPPOSITORY, RECTAL RECTAL DAILY PRN
Status: DISCONTINUED | OUTPATIENT
Start: 2018-11-27 | End: 2018-11-28 | Stop reason: HOSPADM

## 2018-11-27 RX ORDER — ISOSORBIDE MONONITRATE 60 MG/1
60 TABLET, EXTENDED RELEASE ORAL DAILY
Status: DISCONTINUED | OUTPATIENT
Start: 2018-11-27 | End: 2018-11-28 | Stop reason: HOSPADM

## 2018-11-27 RX ORDER — ONDANSETRON 2 MG/ML
4 INJECTION INTRAMUSCULAR; INTRAVENOUS EVERY 4 HOURS PRN
Status: DISCONTINUED | OUTPATIENT
Start: 2018-11-27 | End: 2018-11-28 | Stop reason: HOSPADM

## 2018-11-27 RX ORDER — METOPROLOL SUCCINATE 50 MG/1
100 TABLET, EXTENDED RELEASE ORAL DAILY
Status: DISCONTINUED | OUTPATIENT
Start: 2018-11-27 | End: 2018-11-28 | Stop reason: HOSPADM

## 2018-11-27 RX ORDER — IPRATROPIUM BROMIDE AND ALBUTEROL SULFATE 2.5; .5 MG/3ML; MG/3ML
1 SOLUTION RESPIRATORY (INHALATION) EVERY 4 HOURS PRN
Status: DISCONTINUED | OUTPATIENT
Start: 2018-11-27 | End: 2018-11-28 | Stop reason: HOSPADM

## 2018-11-27 RX ORDER — DOCUSATE SODIUM 100 MG/1
100 CAPSULE, LIQUID FILLED ORAL 2 TIMES DAILY PRN
Status: DISCONTINUED | OUTPATIENT
Start: 2018-11-27 | End: 2018-11-28 | Stop reason: HOSPADM

## 2018-11-27 RX ORDER — DEXTROSE MONOHYDRATE 25 G/50ML
12.5 INJECTION, SOLUTION INTRAVENOUS PRN
Status: DISCONTINUED | OUTPATIENT
Start: 2018-11-27 | End: 2018-11-28 | Stop reason: HOSPADM

## 2018-11-27 RX ORDER — OXYCODONE AND ACETAMINOPHEN 7.5; 325 MG/1; MG/1
1 TABLET ORAL EVERY 8 HOURS PRN
Status: DISCONTINUED | OUTPATIENT
Start: 2018-11-27 | End: 2018-11-28

## 2018-11-27 RX ORDER — DEXTROSE MONOHYDRATE 50 MG/ML
100 INJECTION, SOLUTION INTRAVENOUS PRN
Status: DISCONTINUED | OUTPATIENT
Start: 2018-11-27 | End: 2018-11-28 | Stop reason: HOSPADM

## 2018-11-27 RX ORDER — SODIUM CHLORIDE 0.9 % (FLUSH) 0.9 %
10 SYRINGE (ML) INJECTION PRN
Status: DISCONTINUED | OUTPATIENT
Start: 2018-11-27 | End: 2018-11-28 | Stop reason: HOSPADM

## 2018-11-27 RX ADMIN — FAMOTIDINE 20 MG: 20 TABLET ORAL at 07:54

## 2018-11-27 RX ADMIN — FAMOTIDINE 20 MG: 20 TABLET ORAL at 21:35

## 2018-11-27 RX ADMIN — ISOSORBIDE MONONITRATE 60 MG: 60 TABLET, EXTENDED RELEASE ORAL at 08:19

## 2018-11-27 RX ADMIN — PROMETHAZINE HYDROCHLORIDE 25 MG: 25 INJECTION INTRAMUSCULAR; INTRAVENOUS at 23:35

## 2018-11-27 RX ADMIN — SODIUM CHLORIDE: 9 INJECTION, SOLUTION INTRAVENOUS at 01:53

## 2018-11-27 RX ADMIN — ONDANSETRON 4 MG: 2 INJECTION, SOLUTION INTRAMUSCULAR; INTRAVENOUS at 21:59

## 2018-11-27 RX ADMIN — ENOXAPARIN SODIUM 40 MG: 40 INJECTION SUBCUTANEOUS at 07:58

## 2018-11-27 RX ADMIN — FAMOTIDINE 20 MG: 20 TABLET ORAL at 01:53

## 2018-11-27 RX ADMIN — OXYCODONE HYDROCHLORIDE AND ACETAMINOPHEN 1 TABLET: 7.5; 325 TABLET ORAL at 16:40

## 2018-11-27 RX ADMIN — OXYCODONE HYDROCHLORIDE AND ACETAMINOPHEN 1 TABLET: 7.5; 325 TABLET ORAL at 08:20

## 2018-11-27 RX ADMIN — INSULIN LISPRO 1 UNITS: 100 INJECTION, SOLUTION INTRAVENOUS; SUBCUTANEOUS at 02:03

## 2018-11-27 RX ADMIN — AMLODIPINE BESYLATE 5 MG: 5 TABLET ORAL at 07:54

## 2018-11-27 RX ADMIN — INSULIN LISPRO 2 UNITS: 100 INJECTION, SOLUTION INTRAVENOUS; SUBCUTANEOUS at 08:22

## 2018-11-27 RX ADMIN — SODIUM CHLORIDE: 9 INJECTION, SOLUTION INTRAVENOUS at 15:18

## 2018-11-27 RX ADMIN — LOSARTAN POTASSIUM 50 MG: 25 TABLET ORAL at 07:54

## 2018-11-27 RX ADMIN — Medication 10 ML: at 21:36

## 2018-11-27 RX ADMIN — INSULIN LISPRO 2 UNITS: 100 INJECTION, SOLUTION INTRAVENOUS; SUBCUTANEOUS at 17:27

## 2018-11-27 RX ADMIN — CLOPIDOGREL BISULFATE 75 MG: 75 TABLET ORAL at 08:20

## 2018-11-27 RX ADMIN — METOPROLOL SUCCINATE 100 MG: 50 TABLET, EXTENDED RELEASE ORAL at 08:20

## 2018-11-27 ASSESSMENT — PAIN DESCRIPTION - ORIENTATION
ORIENTATION: LOWER
ORIENTATION: LOWER

## 2018-11-27 ASSESSMENT — PAIN DESCRIPTION - LOCATION
LOCATION: ABDOMEN;BACK
LOCATION: BACK;ABDOMEN
LOCATION: ABDOMEN;BACK
LOCATION: BACK
LOCATION: BACK

## 2018-11-27 ASSESSMENT — PAIN DESCRIPTION - DESCRIPTORS
DESCRIPTORS: SHARP
DESCRIPTORS: ACHING
DESCRIPTORS: SHARP
DESCRIPTORS: SHARP

## 2018-11-27 ASSESSMENT — PAIN SCALES - GENERAL
PAINLEVEL_OUTOF10: 8
PAINLEVEL_OUTOF10: 8
PAINLEVEL_OUTOF10: 10
PAINLEVEL_OUTOF10: 8
PAINLEVEL_OUTOF10: 6
PAINLEVEL_OUTOF10: 8

## 2018-11-27 ASSESSMENT — PAIN DESCRIPTION - PAIN TYPE
TYPE: ACUTE PAIN;CHRONIC PAIN
TYPE: CHRONIC PAIN

## 2018-11-27 ASSESSMENT — PAIN DESCRIPTION - ONSET
ONSET: ON-GOING
ONSET: ON-GOING

## 2018-11-27 ASSESSMENT — PAIN DESCRIPTION - FREQUENCY
FREQUENCY: CONTINUOUS
FREQUENCY: CONTINUOUS

## 2018-11-27 ASSESSMENT — PAIN DESCRIPTION - PROGRESSION: CLINICAL_PROGRESSION: NOT CHANGED

## 2018-11-27 NOTE — ED NOTES
Pt updated on plan of care, aware waiting for room to be cleaned before transfer. Will continue to monitor.       Phyllis Herrera RN  11/26/18 3242

## 2018-11-27 NOTE — ED NOTES
Report called to Torres Sommer RN. No further questions at this time. Patient being transported to floor.       Jose Angel Mccord RN  11/27/18 0286

## 2018-11-27 NOTE — PROGRESS NOTES
Full range of motion without deformity. Skin: Skin color, texture, turgor normal.  No rashes or lesions. Neurologic:  Neurovascularly intact without any focal sensory/motor deficits. Cranial nerves: II-XII intact, grossly non-focal.  Psychiatric: Alert and oriented, thought content appropriate, normal insight  Capillary Refill: Brisk,< 3 seconds   Peripheral Pulses: +2 palpable, equal bilaterally       Labs:   Recent Labs      11/26/18   1831  11/27/18   0700   WBC  9.0  7.8   HGB  13.6  12.6*   HCT  40.4*  37.6*   PLT  257  234     Recent Labs      11/26/18   1831  11/27/18   0700   NA  139  144   K  3.6  3.4*   CL  103  108   CO2  18*  22   BUN  10  10   CREATININE  0.8  0.8   CALCIUM  9.1  8.6     Recent Labs      11/26/18   1831   AST  11*   ALT  9*   BILITOT  0.5   ALKPHOS  129     No results for input(s): INR in the last 72 hours. Recent Labs      11/26/18   1831 11/27/18   0152   TROPONINI  0.02*  0.03*       Urinalysis:    Lab Results   Component Value Date    NITRU Negative 11/26/2018    WBCUA 1 11/26/2018    RBCUA 1 11/26/2018    BLOODU Negative 11/26/2018    SPECGRAV >1.030 11/26/2018    GLUCOSEU 100 11/26/2018       Radiology:  CT ABDOMEN PELVIS W IV CONTRAST Additional Contrast? None   Final Result   Mild compression fracture of the L2 vertebral body of indeterminate age but   new since January 2016. Correlation is recommended. No acute intra-abdominopelvic abnormality. XR CHEST STANDARD (2 VW)   Final Result   No evidence of acute process.                  Assessment/Plan:    Active Hospital Problems    Diagnosis Date Noted    Coronary artery disease involving native coronary artery of native heart without angina pectoris [I25.10]      Priority: Medium    Ischemic cardiomyopathy [I25.5] 11/02/2014     Priority: Medium    Essential hypertension [I10] 06/04/2013     Priority: Medium    S/P CABG (coronary artery bypass graft) [Z95.1] 03/05/2013     Priority: Medium    Hyperlipidemia LDL goal <70 [E78.5] 11/02/2014     Priority: Low    Diarrhea [R19.7] 11/27/2018    Generalized weakness [R53.1] 11/27/2018    DMII (diabetes mellitus, type 2) ([facility] [E11.9] 11/27/2018    COPD (chronic obstructive pulmonary disease) ([facility] [J44.9] 11/27/2018    Nausea and vomiting [R11.2] 11/26/2018    Elevated troponin [R74.8]     Coronary artery disease involving coronary bypass graft of native heart with angina pectoris ([facility] [I25.709]     Morbid obesity due to excess calories ([facility] [E66.01]     Abdominal pain [R10.9] 05/23/2014     Non-Intractable vomiting with nausea - Possibly gastroenteritis? Will provide symptomatic treatment with Zofran as needed, maintenance of fluids and electrolytes. Consult GI. Start oral diet      Diarrhea - check for C diff. IV fluids and monitor and replace electrolytes as indicated     Abdominal pain - periumbilical; no UTI; CT abdomen and pelvis without acute findings. Monitor for now.      Elevated troponin - Pt denies chest pain, and there are not acute ischemic changes on the initial EKG. Will monitor on Telemetry and follow serial cardiac enzymes.      CAD (coronary artery disease) with h/o CABG (coronary artery bypass graft) and ischemic cardiomyopathy - Pt denies chest pain. As above     Generalized weakness - Will ask PT/OT to evaluate and treat patient, and if necessary to provide recommendations for post hospital therapy     COPD (chronic obstructive pulmonary disease) - without acute exacerbation. Will provide Nebulizer treatments as needed and continue home medications. Patient will be monitored closely, and deep breathing and coughing will be encouraged while awake.      Diabetes mellitus II - SSI and CCD     Essential (primary) hypertension - continue home meds and monitor blood pressure     Hyperlipidemia - No current evidence of Rhabdomyolysis or other adverse effects.  Continue statin therapy while in the hospital     Morbid obesity due to excess calories

## 2018-11-27 NOTE — H&P
Hospital Medicine  History and Physical    PCP: Karolyn Ramirez MD  Patient Name: Lynsey Le    Date of Service: Pt seen/examined on 11/26/2018 and Admitted to Inpatient with expected LOS greater than two midnights due to medical therapy    CHIEF COMPLAINT:  Pt c/o Nausea, Vomiting, Diarrhea and Abdominal Pain  HISTORY OF PRESENT ILLNESS: Patient is a 68-year-old man with history of hypertension, hyperlipidemia, diabetes mellitus, coronary artery disease, COPD and morbid obesity. He presents to the emergency room for evaluation following a three day history of worsening symptoms which include nausea, vomiting, diarrhea and abdominal pain. He states his symptoms are severe, and he has been unable to take any of his oral medications because of the nausea and vomiting. He has had intermittent moderately severe burning pain in the center of his abdomen which does not radiate. He has no sick contacts. In the emergency room a CT of his abdomen and pelvis had no findings which would explain his symptoms. He is being admitted for further evaluation and treatment. Associated signs and symptoms do not include fever or chills, melena, hematochezia, hematemesis, sweats, dark urine or jaundice.         Past Medical History:        Diagnosis Date    Anxiety     Arthritis     Asthma     CAD (coronary artery disease)     Calcium kidney stone     Cardiomyopathy ([address]     Cerebral artery occlusion with cerebral infarction ([address]     CHF (congestive heart failure) ([address]     COPD (chronic obstructive pulmonary disease) (HCC)     mild    Depression     DM2 (diabetes mellitus, type 2) (HCC)     Fibromyalgia     GERD (gastroesophageal reflux disease)     Hyperlipidemia     Hypertension     Liver disease     Pacemaker 2012    Medtronic model # [id number]    Pneumonia     Seizures ([address]     TIA (transient ischemic attack) 2007    Ulcerative colitis ([address]        Past Surgical History:        Procedure mucus membranes and septum. Pharynx: Dental Hygiene adequate. Normal buccal mucosa. Normal pharynx. Neck: no adenopathy, no carotid bruit, no JVD, supple, symmetrical, trachea midline and thyroid not enlarged, symmetric, no tenderness/mass/nodules  Lungs: clear to auscultation bilaterally and no use of accessory muscles. Heart: regular rate and rhythm, S1, S2 normal, no murmur, click, rub or gallop and normal apical impulse  Abdomen: soft, + mild periumbilical tenderness without rebound or guarding; bowel sounds normal; no masses,  no organomegaly  Extremities: extremities atraumatic, no cyanosis or edema and Homans sign is negative, no sign of DVT. Capillary Refill: Acceptable < 3 seconds  Peripheral Pulses: +3 easily felt, not easily obliterated with pressures  Skin: Skin color, texture, turgor normal. No rashes or lesions on exposed skin  Neurologic: Neurovascularly intact without any focal sensory/motor deficits. Cranial nerves: II-XII intact, grossly non-focal. Gait was not tested. Psychiatric: Alert and oriented, thought content appropriate, normal insight    CBC:   Recent Labs      11/26/18   1831   WBC  9.0   HGB  13.6   PLT  257     BMP:    Recent Labs      11/26/18   1831   NA  139   K  3.6   CL  103   CO2  18*   BUN  10   CREATININE  0.8   GLUCOSE  184*     Troponin:   Recent Labs      11/26/18   1831   TROPONINI  0.02*     PT/INR:  No results found for: PTINR  U/A:    Lab Results   Component Value Date    LEUKOCYTESUR Negative 11/26/2018    NITRITE neg 05/23/2014    RBCUA 1 11/26/2018    SPECGRAV >1.030 11/26/2018    UROBILINOGEN 1.0 11/26/2018    BILIRUBINUR SMALL 11/26/2018    BILIRUBINUR neg 05/23/2014    BLOODU Negative 11/26/2018    GLUCOSEU 100 11/26/2018    PROTEINU 30 11/26/2018         RAD:   I have independently reviewed and interpreted the imaging studies below and based my recommendations to the patient on those findings.     Xr Chest Standard (2 Vw)    Result Date:

## 2018-11-27 NOTE — ED PROVIDER NOTES
[facility]  eMERGENCY dEPARTMENT eNCOUnter        Pt Name: Tom Muniz  MRN: [id number]  [place] 1955  Date of evaluation: 11/26/2018  Provider: Ray Lopez PA-C  PCP: Taiwo Gardner MD    This patient was seen and evaluated by the attending physician Anabel Arias MD.            [address] [place] [address]       Chief Complaint   Patient presents with    Nausea     N/V FOR 2 DAYS PT WAS AT HOUSE CHECKED BP. BP /104. PT C/O HA     Emesis    Headache    Hypertension       HISTORY OF PRESENT ILLNESS   (Location/Symptom, Timing/Onset, Context/Setting, Quality, Duration, Modifying Factors, Severity)  Note limiting factors. Tom Muniz is a 58 y.o. male presents emergency department by EMS complaining of nausea, vomiting and abdominal pain. Patient states symptoms have been present for the past 3 days and gradually worsening severity. Patient states he has chills but no recorded fever at home. Has a mild cough but no rhinorrhea or nasal congestion. Denies any chest pain, shortness of breath, urinary symptoms. Patient has history of back pain, chronic back pain at baseline, no acute change today. States he has generalized fatigue since onset of symptoms and generalized ill feeling. Blood pressure has been elevated for the past couple days, patient unable to tolerate any of his oral medications given nausea and vomiting. Pain rated 8/10 in the back, again chronic and no acute change today. Patient's past medical history of CVA, COPD, ulcerative colitis, CHF, CAD, hypertension, hyperlipidemia, type 2 diabetes, seizures, GERD. Patient is status post cholecystectomy. Patient is a current every day smoker. Nursing Notes were all reviewed and agreed with or any disagreements were addressed  in the HPI.     REVIEW OF SYSTEMS    (2-9 systems for level 4, 10 or more for level 5)     Review of Systems   Constitutional: Negative for chills every 6 hours as needed for Wheezing    ALPRAZOLAM (XANAX) 1 MG TABLET    Take 1 mg by mouth 4 times daily as needed for Anxiety. .    AMLODIPINE (NORVASC) 5 MG TABLET    TAKE 1 TABLET BY MOUTH DAILY    ASPIRIN 81 MG TABLET    Take 81 mg by mouth daily    ATORVASTATIN (LIPITOR) 80 MG TABLET    TAKE 1 TABLET BY MOUTH DAILY    CLOPIDOGREL (PLAVIX) 75 MG TABLET    TAKE 1 TABLET BY MOUTH DAILY    FREESTYLE LANCETS MISC    1 each by Does not apply route daily    FUROSEMIDE (LASIX) 40 MG TABLET    Take 1 tablet by mouth daily    GABAPENTIN (NEURONTIN) 600 MG TABLET    TAKE 1 TABLET BY MOUTH FIVE TIMES DAILY. INSULIN GLARGINE (LANTUS) 100 UNIT/ML INJECTION PEN    Inject 28 Units into the skin nightly    INSULIN LISPRO (HUMALOG KWIKPEN) 100 UNIT/ML PEN    Inject 5 Units into the skin 3 times daily (before meals)    INSULIN PEN NEEDLE (CAREFINE PEN NEEDLES) 32G X 4 MM MISC    1 each by Does not apply route daily    INSULIN PEN NEEDLE 31G X 5 MM MISC    1 each by Does not apply route daily    ISOSORBIDE MONONITRATE (IMDUR) 60 MG EXTENDED RELEASE TABLET    Take 1 tablet by mouth daily    LANCETS MISC    accu check fastclick lancets  Dx: T42.3  As needed    LOSARTAN (COZAAR) 50 MG TABLET    Take 1 tablet by mouth daily    METOPROLOL SUCCINATE (TOPROL XL) 100 MG EXTENDED RELEASE TABLET    Take 1 tablet by mouth daily    NITROGLYCERIN (NITROSTAT) 0.4 MG SL TABLET    PLACE 1 TABLET UNDER THE TONGUE EVERY 5 MINUTES AS NEEDED FOR CHEST PAIN    OXYCODONE-ACETAMINOPHEN (PERCOCET) 7.5-325 MG PER TABLET    Take 1 tablet by mouth 2 times daily as needed for Pain for up to 15 days. .         ALLERGIES     Dopamine hcl and Tramadol    FAMILYHISTORY       Family History   Problem Relation Age of Onset    Diabetes Father     Coronary Art Dis Father           SOCIAL HISTORY       Social History     Social History    Marital status:      Spouse name: N/A    Number of children: 1    Years of education: N/A     Occupational History Weight:       Height:    6' (1.829 m)       Patient was given thefollowing medications:  Medications   amLODIPine (NORVASC) tablet 5 mg (5 mg Oral Given 11/26/18 1901)   losartan (COZAAR) tablet 50 mg (50 mg Oral Given 11/26/18 1901)   ondansetron (ZOFRAN) injection 4 mg (4 mg Intravenous Given 11/26/18 1850)   oxyCODONE-acetaminophen (PERCOCET) 5-325 MG per tablet 2 tablet (2 tablets Oral Given 11/26/18 1850)   iopamidol (ISOVUE-370) 76 % injection 75 mL (75 mLs Intravenous Given 11/26/18 2011)   0.9 % sodium chloride bolus (0 mLs Intravenous Stopped 11/26/18 2129)   morphine (PF) injection 4 mg (4 mg Intravenous Given 11/26/18 2124)   aspirin chewable tablet 324 mg (324 mg Oral Given 11/26/18 2128)       She presents ED with HPI noted above. Upon arrival blood pressure elevated patient slightly tachycardic. Patient afebrile and nontoxic-appearing. Guarding abdominal pain, basic labs obtained. CT of abdomen and pelvis obtained as well given physical exam.  CT abdomen and pelvis showed no acute findings. Urinalysis showed no evidence of acute infection, CBC showed no leukocytosis or anemia. CMP showed no renal or hepatic impairment. Lipase 11. Patient was given IV fluid and IV Reglan for symptoms. No active emesis saturations stay. Unsure of etiology of abdominal pain, will continue to monitor. Chest x-ray showed no acute process. Given fatigue, nausea, vomiting and upper abdominal pain EKG and troponin obtained. Troponin elevated 0.02, EKG showed Q waves over inferior leads. Nurse. By that there was no lead reversal.  Patient given full dose chewable aspirin. Patient was given oral Percocet for back pain. Patient has chronic back pain, denies any acute worsening or change pain today. CT of adipose tissue L2 compression fracture, consistent with history and previous images.     Given elevated troponin symptoms consultation made hospitalists regarding further inpatient treatment, workup and

## 2018-11-27 NOTE — PROGRESS NOTES
obesity. He presents to the emergency room for evaluation following a three day history of worsening symptoms which include nausea, vomiting, diarrhea and abdominal pain. He states his symptoms are severe, and he has been unable to take any of his oral medications because of the nausea and vomiting. He has had intermittent moderately severe burning pain in the center of his abdomen which does not radiate. He has no sick contacts. In the emergency room a CT of his abdomen and pelvis had no findings which would explain his symptoms. \"  PMH: anxiety, arthritis, asthma, CAD, cerebral infarction, CHF, COPD, depression, DM type II, fibromyalgia, GERD, pacemaker, seizures, ulcerative colitis, back surgery, CABG, coronary angioplasty with stent placement  Response To Previous Treatment: Not applicable  Family / Caregiver Present: No  Referring Practitioner: Rogelio Andrade MD  Referral Date : 11/27/18  Diagnosis: Non-Intractable vomiting with nausea   Follows Commands: Within Functional Limits  Subjective  Subjective: Pt supine in bed upon arrival, c/o nausea and abdominal and back pain. Agreeable to therapy eval/treat with encouragement.   Pain Screening  Patient Currently in Pain: Yes  Pain Assessment  Pain Assessment: 0-10  Pain Level: 8  Pain Location: Abdomen;Back  Pain Orientation: Lower  Pain Descriptors: Aching  Pain Frequency: Continuous  Pain Onset: On-going  Vital Signs  Patient Currently in Pain: Yes       Orientation  Orientation  Overall Orientation Status: Within Functional Limits     Social/Functional History  Social/Functional History  Lives With: Spouse  Type of Home: Apartment  Home Layout: One level  Home Access: Stairs to enter with rails  Entrance Stairs - Number of Steps: 14 (one rail but not stable)  Bathroom Shower/Tub: Tub/Shower unit  Bathroom Toilet: Standard  Bathroom Accessibility: Accessible  Home Equipment: Rolling walker  Receives Help From: Home health (home PT/OT)  ADL Assistance: Needs assistance (needs assist with bathing and dressing)  Homemaking Responsibilities: No (wife does)  Ambulation Assistance: Independent (with RW since back surgery 6 weeks ago)  Transfer Assistance: Independent  Active : No (has not driven since back surgery)  Leisure & Hobbies: sleeps in a regular bed  Additional Comments: pt only been home for 5 days- was at SNF since back surgery 6 weeks ago; pt reports 4 falls in the past 5 days and 3 falls at the nursing home (states he knees buckle suddenly without warning)       Objective          AROM RLE (degrees)  RLE AROM: WFL  AROM LLE (degrees)  LLE AROM : WFL  Strength RLE  Comment: ankle DF 4+/5, knee ext 3+/5  Strength LLE  Comment: ankle DF 4+/5, knee ext 3+/5  Tone RLE  RLE Tone: Normotonic  Tone LLE  LLE Tone: Normotonic  Motor Control  Gross Motor?: WFL  Sensation  Overall Sensation Status: Impaired (pt reports numbness BLEs from the knee down but intact to light touch; reports numbness in fingers torrie hands but intact to light touch)     Bed mobility  Supine to Sit: Stand by assistance (HOB elevated partially, used rail)  Sit to Supine: Stand by assistance (HOB flat, used rail)  Scooting: Stand by assistance     Transfers  Sit to Stand: Contact guard assistance  Stand to sit: Contact guard assistance     Ambulation  Ambulation?: Yes  More Ambulation?: No  Ambulation 1  Surface: level tile  Device: Rolling Walker  Other Apparatus:  (therapist managed IV)  Assistance: Contact guard assistance  Quality of Gait: pt stating throughout \"I'm gonna fall, I\"m gonna fall. \"  no signs of knee buckling or LOB but moderate pressure through walker with BUEs  Distance: 2 steps forward and 2 steps back  Comments: pt appears to be self-limiting  Stairs/Curb  Stairs?: No     Balance  Posture: Fair  Sitting - Static: Good  Sitting - Dynamic: Good  Standing - Static: Fair;+  Standing - Dynamic: Fair        Assessment   Body structures, Functions, Activity limitations: Decreased functional diabetes mellitus, coronary artery disease, COPD and morbid obesity. He presents to the emergency room for evaluation following a three day history of worsening symptoms which include nausea, vomiting, diarrhea and abdominal pain. He states his symptoms are severe, and he has been unable to take any of his oral medications because of the nausea and vomiting. He has had intermittent moderately severe burning pain in the center of his abdomen which does not radiate. He has no sick contacts. In the emergency room a CT of his abdomen and pelvis had no findings which would explain his symptoms. \" PMH: anxiety, arthritis, asthma, CAD, cerebral infarction, CHF, COPD, depression, DM type II, fibromyalgia, GERD, pacemaker, seizures, ulcerative colitis, back surgery, CABG, coronary angioplasty with stent placement  Exam: functional mobility, ROM, strength  Clinical Presentation: evolving  Patient Education: role of acute care PT, POC, d/c rec  Barriers to Learning: anxiety, fear of falling  REQUIRES PT FOLLOW UP: Yes  Activity Tolerance  Activity Tolerance: Patient limited by pain; Patient limited by fatigue;Patient limited by endurance; Other  Activity Tolerance: limited by nausea; limited by fear of falling and anxiety         Plan   Plan  Times per week: 3-5x/wk  Specific instructions for Next Treatment: increase gait  Current Treatment Recommendations: Strengthening, ROM, Balance Training, Functional Mobility Training, Transfer Training, Gait Training, Safety Education & Training, Patient/Caregiver Education & Training  Safety Devices  Type of devices:  All fall risk precautions in place, Bed alarm in place, Call light within reach, Patient at risk for falls, Left in bed, Nurse notified (RN [facility] notified)  Restraints  Initially in place: No    G-Code  PT G-Codes  Functional Assessment Tool Used: AMPAC  Score: 15  Functional Limitation: Mobility: Walking and moving around  Mobility: Walking and Moving Around Current Status

## 2018-11-27 NOTE — ED NOTES
Patient back from CT. C/o abdominal and back pain 8/10, will let MD know.       Kike Osoiro RN  11/26/18 2040

## 2018-11-28 VITALS
TEMPERATURE: 98.3 F | HEIGHT: 72 IN | SYSTOLIC BLOOD PRESSURE: 168 MMHG | HEART RATE: 76 BPM | DIASTOLIC BLOOD PRESSURE: 82 MMHG | WEIGHT: 243.61 LBS | BODY MASS INDEX: 33 KG/M2 | RESPIRATION RATE: 16 BRPM | OXYGEN SATURATION: 94 %

## 2018-11-28 LAB
ANION GAP SERPL CALCULATED.3IONS-SCNC: 13 MMOL/L (ref 3–16)
BASOPHILS ABSOLUTE: 0.1 K/UL (ref 0–0.2)
BASOPHILS RELATIVE PERCENT: 0.8 %
BUN BLDV-MCNC: 8 MG/DL (ref 7–20)
CALCIUM SERPL-MCNC: 8.6 MG/DL (ref 8.3–10.6)
CHLORIDE BLD-SCNC: 110 MMOL/L (ref 99–110)
CO2: 21 MMOL/L (ref 21–32)
CREAT SERPL-MCNC: 0.8 MG/DL (ref 0.8–1.3)
EKG ATRIAL RATE: 106 BPM
EKG DIAGNOSIS: NORMAL
EKG P-R INTERVAL: 130 MS
EKG Q-T INTERVAL: 320 MS
EKG QRS DURATION: 140 MS
EKG QTC CALCULATION (BAZETT): 425 MS
EKG R AXIS: 181 DEGREES
EKG T AXIS: 35 DEGREES
EKG VENTRICULAR RATE: 106 BPM
EOSINOPHILS ABSOLUTE: 0.1 K/UL (ref 0–0.6)
EOSINOPHILS RELATIVE PERCENT: 1.8 %
GFR AFRICAN AMERICAN: >60
GFR NON-AFRICAN AMERICAN: >60
GLUCOSE BLD-MCNC: 121 MG/DL (ref 70–99)
GLUCOSE BLD-MCNC: 127 MG/DL (ref 70–99)
GLUCOSE BLD-MCNC: 134 MG/DL (ref 70–99)
GLUCOSE BLD-MCNC: 147 MG/DL (ref 70–99)
GLUCOSE BLD-MCNC: 150 MG/DL (ref 70–99)
HCT VFR BLD CALC: 35 % (ref 40.5–52.5)
HEMOGLOBIN: 11.7 G/DL (ref 13.5–17.5)
LYMPHOCYTES ABSOLUTE: 2.1 K/UL (ref 1–5.1)
LYMPHOCYTES RELATIVE PERCENT: 25.7 %
MAGNESIUM: 1.7 MG/DL (ref 1.8–2.4)
MCH RBC QN AUTO: 31 PG (ref 26–34)
MCHC RBC AUTO-ENTMCNC: 33.5 G/DL (ref 31–36)
MCV RBC AUTO: 92.4 FL (ref 80–100)
MONOCYTES ABSOLUTE: 0.6 K/UL (ref 0–1.3)
MONOCYTES RELATIVE PERCENT: 8.1 %
NEUTROPHILS ABSOLUTE: 5.1 K/UL (ref 1.7–7.7)
NEUTROPHILS RELATIVE PERCENT: 63.6 %
PDW BLD-RTO: 14.7 % (ref 12.4–15.4)
PERFORMED ON: ABNORMAL
PLATELET # BLD: 204 K/UL (ref 135–450)
PMV BLD AUTO: 9.4 FL (ref 5–10.5)
POTASSIUM REFLEX MAGNESIUM: 3.3 MMOL/L (ref 3.5–5.1)
RBC # BLD: 3.78 M/UL (ref 4.2–5.9)
SODIUM BLD-SCNC: 144 MMOL/L (ref 136–145)
TROPONIN: 0.03 NG/ML
WBC # BLD: 8 K/UL (ref 4–11)

## 2018-11-28 PROCEDURE — 97116 GAIT TRAINING THERAPY: CPT

## 2018-11-28 PROCEDURE — 80048 BASIC METABOLIC PNL TOTAL CA: CPT

## 2018-11-28 PROCEDURE — 36415 COLL VENOUS BLD VENIPUNCTURE: CPT

## 2018-11-28 PROCEDURE — 6360000002 HC RX W HCPCS: Performed by: INTERNAL MEDICINE

## 2018-11-28 PROCEDURE — G8987 SELF CARE CURRENT STATUS: HCPCS

## 2018-11-28 PROCEDURE — 6370000000 HC RX 637 (ALT 250 FOR IP): Performed by: INTERNAL MEDICINE

## 2018-11-28 PROCEDURE — 83735 ASSAY OF MAGNESIUM: CPT

## 2018-11-28 PROCEDURE — 2580000003 HC RX 258: Performed by: INTERNAL MEDICINE

## 2018-11-28 PROCEDURE — G8988 SELF CARE GOAL STATUS: HCPCS

## 2018-11-28 PROCEDURE — 96366 THER/PROPH/DIAG IV INF ADDON: CPT

## 2018-11-28 PROCEDURE — 97110 THERAPEUTIC EXERCISES: CPT

## 2018-11-28 PROCEDURE — 96376 TX/PRO/DX INJ SAME DRUG ADON: CPT

## 2018-11-28 PROCEDURE — 85025 COMPLETE CBC W/AUTO DIFF WBC: CPT

## 2018-11-28 PROCEDURE — 97535 SELF CARE MNGMENT TRAINING: CPT

## 2018-11-28 PROCEDURE — 96367 TX/PROPH/DG ADDL SEQ IV INF: CPT

## 2018-11-28 PROCEDURE — 6370000000 HC RX 637 (ALT 250 FOR IP): Performed by: PHYSICIAN ASSISTANT

## 2018-11-28 PROCEDURE — 97530 THERAPEUTIC ACTIVITIES: CPT

## 2018-11-28 RX ORDER — OXYCODONE AND ACETAMINOPHEN 7.5; 325 MG/1; MG/1
1 TABLET ORAL EVERY 6 HOURS PRN
Qty: 30 TABLET | Refills: 0 | Status: SHIPPED | OUTPATIENT
Start: 2018-11-28 | End: 2018-12-05

## 2018-11-28 RX ORDER — POTASSIUM CHLORIDE 1.5 G/1.77G
40 POWDER, FOR SOLUTION ORAL PRN
Status: DISCONTINUED | OUTPATIENT
Start: 2018-11-28 | End: 2018-11-28 | Stop reason: HOSPADM

## 2018-11-28 RX ORDER — POTASSIUM CHLORIDE 7.45 MG/ML
10 INJECTION INTRAVENOUS PRN
Status: DISCONTINUED | OUTPATIENT
Start: 2018-11-28 | End: 2018-11-28 | Stop reason: HOSPADM

## 2018-11-28 RX ORDER — POTASSIUM CHLORIDE 20 MEQ/1
40 TABLET, EXTENDED RELEASE ORAL PRN
Status: DISCONTINUED | OUTPATIENT
Start: 2018-11-28 | End: 2018-11-28 | Stop reason: HOSPADM

## 2018-11-28 RX ORDER — MAGNESIUM SULFATE 1 G/100ML
1 INJECTION INTRAVENOUS ONCE
Status: COMPLETED | OUTPATIENT
Start: 2018-11-28 | End: 2018-11-28

## 2018-11-28 RX ORDER — AMLODIPINE BESYLATE 10 MG/1
10 TABLET ORAL DAILY
Status: DISCONTINUED | OUTPATIENT
Start: 2018-11-28 | End: 2018-11-28 | Stop reason: HOSPADM

## 2018-11-28 RX ORDER — OXYCODONE AND ACETAMINOPHEN 7.5; 325 MG/1; MG/1
1 TABLET ORAL EVERY 6 HOURS PRN
Status: DISCONTINUED | OUTPATIENT
Start: 2018-11-28 | End: 2018-11-28 | Stop reason: HOSPADM

## 2018-11-28 RX ORDER — MAGNESIUM SULFATE 1 G/100ML
1 INJECTION INTRAVENOUS PRN
Status: DISCONTINUED | OUTPATIENT
Start: 2018-11-28 | End: 2018-11-28 | Stop reason: HOSPADM

## 2018-11-28 RX ADMIN — OXYCODONE HYDROCHLORIDE AND ACETAMINOPHEN 1 TABLET: 7.5; 325 TABLET ORAL at 14:34

## 2018-11-28 RX ADMIN — MAGNESIUM SULFATE HEPTAHYDRATE 1 G: 1 INJECTION, SOLUTION INTRAVENOUS at 12:47

## 2018-11-28 RX ADMIN — FAMOTIDINE 20 MG: 20 TABLET ORAL at 19:30

## 2018-11-28 RX ADMIN — ENOXAPARIN SODIUM 40 MG: 40 INJECTION SUBCUTANEOUS at 08:33

## 2018-11-28 RX ADMIN — ISOSORBIDE MONONITRATE 60 MG: 60 TABLET, EXTENDED RELEASE ORAL at 08:32

## 2018-11-28 RX ADMIN — SODIUM CHLORIDE: 9 INJECTION, SOLUTION INTRAVENOUS at 06:07

## 2018-11-28 RX ADMIN — METOPROLOL SUCCINATE 100 MG: 50 TABLET, EXTENDED RELEASE ORAL at 08:33

## 2018-11-28 RX ADMIN — CLOPIDOGREL BISULFATE 75 MG: 75 TABLET ORAL at 08:33

## 2018-11-28 RX ADMIN — PROMETHAZINE HYDROCHLORIDE 25 MG: 25 INJECTION INTRAMUSCULAR; INTRAVENOUS at 09:55

## 2018-11-28 RX ADMIN — LOSARTAN POTASSIUM 50 MG: 25 TABLET ORAL at 08:36

## 2018-11-28 RX ADMIN — OXYCODONE HYDROCHLORIDE AND ACETAMINOPHEN 1 TABLET: 7.5; 325 TABLET ORAL at 00:51

## 2018-11-28 RX ADMIN — POTASSIUM CHLORIDE 40 MEQ: 20 TABLET, EXTENDED RELEASE ORAL at 08:32

## 2018-11-28 RX ADMIN — FAMOTIDINE 20 MG: 20 TABLET ORAL at 08:33

## 2018-11-28 RX ADMIN — Medication 10 ML: at 08:36

## 2018-11-28 RX ADMIN — OXYCODONE HYDROCHLORIDE AND ACETAMINOPHEN 1 TABLET: 7.5; 325 TABLET ORAL at 20:42

## 2018-11-28 RX ADMIN — OXYCODONE HYDROCHLORIDE AND ACETAMINOPHEN 1 TABLET: 7.5; 325 TABLET ORAL at 08:33

## 2018-11-28 RX ADMIN — AMLODIPINE BESYLATE 10 MG: 10 TABLET ORAL at 08:33

## 2018-11-28 ASSESSMENT — PAIN DESCRIPTION - FREQUENCY: FREQUENCY: CONTINUOUS

## 2018-11-28 ASSESSMENT — PAIN DESCRIPTION - PAIN TYPE
TYPE: ACUTE PAIN;CHRONIC PAIN
TYPE: ACUTE PAIN

## 2018-11-28 ASSESSMENT — PAIN SCALES - GENERAL
PAINLEVEL_OUTOF10: 8
PAINLEVEL_OUTOF10: 7
PAINLEVEL_OUTOF10: 6
PAINLEVEL_OUTOF10: 7
PAINLEVEL_OUTOF10: 8
PAINLEVEL_OUTOF10: 10
PAINLEVEL_OUTOF10: 6

## 2018-11-28 ASSESSMENT — PAIN DESCRIPTION - DESCRIPTORS
DESCRIPTORS: SHARP

## 2018-11-28 ASSESSMENT — PAIN DESCRIPTION - ORIENTATION
ORIENTATION: LOWER

## 2018-11-28 ASSESSMENT — PAIN DESCRIPTION - LOCATION
LOCATION: BACK

## 2018-11-28 NOTE — DISCHARGE SUMMARY
Hospital Medicine Discharge Summary    Patient ID: Rad Talley      Patient's PCP: Gino Arreola MD    Admit Date: 11/26/2018     Discharge Date:   11/28/17    Admitting Physician: Milton Evans MD     Discharge Physician: Jericho Saldana MD     Discharge Diagnoses: Active Hospital Problems    Diagnosis Date Noted    Coronary artery disease involving native coronary artery of native heart without angina pectoris [I25.10]      Priority: Medium    Ischemic cardiomyopathy [I25.5] 11/02/2014     Priority: Medium    Essential hypertension [I10] 06/04/2013     Priority: Medium    S/P CABG (coronary artery bypass graft) [Z95.1] 03/05/2013     Priority: Medium    Hyperlipidemia LDL goal <70 [E78.5] 11/02/2014     Priority: Low    Diarrhea [R19.7] 11/27/2018    Generalized weakness [R53.1] 11/27/2018    DMII (diabetes mellitus, type 2) ([address] [E11.9] 11/27/2018    COPD (chronic obstructive pulmonary disease) ([address] [J44.9] 11/27/2018    Nausea and vomiting [R11.2] 11/26/2018    Elevated troponin [R74.8]     Coronary artery disease involving coronary bypass graft of native heart with angina pectoris ([address] [I25.709]     Morbid obesity due to excess calories ([address] [E66.01]     Abdominal pain [R10.9] 05/23/2014       The patient was seen and examined on day of discharge and this discharge summary is in conjunction with any daily progress note from day of discharge. Hospital Course:     Non-Intractable vomiting with nausea - Possibly gastroenteritis? Will provide symptomatic treatment with Zofran as needed, maintenance of fluids and electrolytes. Consult GI. Start oral diet      Diarrhea - check for C diff. IV fluids and monitor and replace electrolytes as indicated     Abdominal pain - periumbilical; no UTI; CT abdomen and pelvis without acute findings. Monitor for now.      Elevated troponin - Pt denies chest pain, and there are not acute ischemic changes on the initial EKG.  Will monitor on Telemetry and follow serial cardiac enzymes.      CAD (coronary artery disease) with h/o CABG (coronary artery bypass graft) and ischemic cardiomyopathy - Pt denies chest pain. As above     Generalized weakness - Will ask PT/OT to evaluate and treat patient, and if necessary to provide recommendations for post hospital therapy     COPD (chronic obstructive pulmonary disease) - without acute exacerbation. Will provide Nebulizer treatments as needed and continue home medications. Patient will be monitored closely, and deep breathing and coughing will be encouraged while awake.      Diabetes mellitus II - SSI and CCD     Essential (primary) hypertension - continue home meds and monitor blood pressure     Hyperlipidemia - No current evidence of Rhabdomyolysis or other adverse effects. Continue statin therapy while in the hospital     Morbid obesity due to excess calories (Body mass index is 33.58 kg/m². ) - Complicating assessment and treatment. Placing patient at risk for multiple co-morbidities as well as early death and contributing to the patient's presentation.  on weight loss when appropriate.         Physical Exam Performed:     BP (!) 162/76   Pulse 66   Temp 98.5 °F (36.9 °C) (Oral)   Resp 16   Ht 6' (1.829 m)   Wt 243 lb 9.7 oz (110.5 kg) Comment: PATIENT REFUSED STANDING SCALE  SpO2 97%   BMI 33.04 kg/m²       General appearance:  No apparent distress, appears stated age and cooperative. HEENT:  Normal cephalic, atraumatic without obvious deformity. Pupils equal, round, and reactive to light. Extra ocular muscles intact. Conjunctivae/corneas clear. Neck: Supple, with full range of motion. No jugular venous distention. Trachea midline. Respiratory:  Normal respiratory effort. Clear to auscultation, bilaterally without Rales/Wheezes/Rhonchi. Cardiovascular:  Regular rate and rhythm with normal S1/S2 without murmurs, rubs or gallops.   Abdomen: Soft, non-tender, non-distended with normal

## 2018-11-28 NOTE — PROGRESS NOTES
Pt given a warm blanket for abdominal discomfort, attempted repositioning for back pain but pt stated made it feel worse. Pt states now feeling nauseous due to pain.   Message placed to pharmacy for phenergan IV

## 2018-11-28 NOTE — CARE COORDINATION
LSW rec'd call from Madina from pt's insurance who states their medical director has approved for SNF stay at [facility] beginning today. LSW informed ROSEANNA Mark of above.   Jeniffer Carranza [place]     Case Management   [phone]    11/28/2018  3:33 PM

## 2018-11-28 NOTE — DISCHARGE INSTR - COC
Continuity of Care Form    Patient Name: Bria Johns   :  1955  MRN:  [id number]    Admit date:  2018  Discharge date:      Code Status Order: Full Code   Advance Directives:   Advance Care Flowsheet Documentation     Date/Time Healthcare Directive Type of Healthcare Directive Copy in [address] Agent's Name Healthcare Agent's Phone Number    [phone]  --  --  --   copy of Living Will and HCPOA in EPIC  --  --  --    18 [phone]  Yes, patient has an advance directive for healthcare treatment  Durable power of  for health care;Living will  Other (Comment)   pt daughter has copies of advanced directives  but lives in [address] [place] [address]  [phone]          Admitting Physician:  Mukesh Cuenca MD  PCP: Sejal Sanders MD    Discharging Nurse: [facility] Unit/Room#: W1D-8119/3020-50  Discharging Unit Phone Number: [phone]    Emergency Contact:   Extended Emergency Contact Information  Primary Emergency Contact: Ina Vale  Address: [address] [place] [address] Phone: [phone]  Mobile Phone: [phone]  Relation: Spouse  Secondary Emergency Contact: [address] Phone: [phone]  Work Phone: [phone]  Relation: Niece/Nephew    Past Surgical History:  Past Surgical History:   Procedure Laterality Date    [address] COLONOSCOPY  01/10/2017    COLONOSCOPY  01/10/2017    CORONARY ANGIOPLASTY WITH STENT PLACEMENT  2012    CORONARY ARTERY BYPASS GRAFT  , 2015    ENDOSCOPY, COLON, DIAGNOSTIC      PACEMAKER PLACEMENT      UPPER GASTROINTESTINAL ENDOSCOPY  2017       Immunization History:   Immunization History   Administered Date(s) Administered    Influenza Vaccine, unspecified formulation 10/24/2011, 2013, 2015, 2017    Influenza Virus Vaccine 11/05/2013, 11/02/2014, 09/18/2015    Influenza, Evita Litten, 3 yrs and older, IM, PF (Fluzone 3 yrs and older or Afluria 5 yrs and older) 10/17/2016    Pneumococcal Polysaccharide ([id number]) 10/24/2011, 09/18/2015    Tdap (Boostrix, Adacel) 05/04/2015       Active Problems:  Patient Active Problem List   Diagnosis Code    Chronic chest pain R07.9, G89.29    S/P CABG (coronary artery bypass graft) Z95.1    Essential hypertension I10    ICD (implantable cardioverter-defibrillator), dual, in situ Z95.810    Abdominal pain R10.9    Ischemic cardiomyopathy I25.5    Hyperlipidemia LDL goal <70 E78.5    CHF (congestive heart failure) ([facility]) I50.9    SOB (shortness of breath) R06.02    Anxiety F41.9    Chronic back pain M54.9, G89.29    Claudication ([facility]) I73.9    Type 2 diabetes mellitus without complication, with long-term current use of insulin ([facility]) E11.9, Z79.4    Coronary artery disease involving native coronary artery of native heart without angina pectoris I25.10    COPD exacerbation ([facility]) J44.1    Anemia,normocytic normochromic ,mixed folic aci deficiency and iron deficiency D64.9    Guaiac + stool R19.5    Gastrointestinal hemorrhage K92.2    Morbid obesity due to excess calories ([facility]) E66.01    Anxiety F41.9    Coronary artery disease involving coronary bypass graft of native heart with angina pectoris ([facility]) I25.709    Elevated troponin R74.8    Iron deficiency anemia due to chronic blood loss D50.0    LALITA (acute kidney injury) ([facility] N17.9    Renal insufficiency N28.9    Precordial pain R07.2    Tobacco abuse Z72.0    COPD with acute exacerbation ([facility]) J44.1    Restrictive lung disease J98.4    Acute on chronic diastolic congestive heart failure ([facility]) I50.33    Ischemic stroke, left frontal lobe (4/30/18) ([facility]) I63.9    PFO (patent foramen ovale) Q21.1    Pneumonia J18.9    Mucus plugging of bronchi J98.09    Nausea and vomiting R11.2    Diarrhea R19.7  Generalized weakness R53.1    DMII (diabetes mellitus, type 2) ([facility]) E11.9    COPD (chronic obstructive pulmonary disease) ([facility]) J44.9       Isolation/Infection:   Isolation          No Isolation            Nurse Assessment:  Last Vital Signs: BP (!) 162/76   Pulse 66   Temp 98.5 °F (36.9 °C) (Oral)   Resp 16   Ht 6' (1.829 m)   Wt 243 lb 9.7 oz (110.5 kg) Comment: PATIENT REFUSED STANDING SCALE  SpO2 97%   BMI 33.04 kg/m²     Last documented pain score (0-10 scale): Pain Level: 6  Last Weight:   Wt Readings from Last 1 Encounters:   11/28/18 243 lb 9.7 oz (110.5 kg)     Mental Status:  oriented    IV Access:  - None    Nursing Mobility/ADLs:  Walking   Assisted  Transfer  Assisted  Bathing  Assisted  Dressing  Assisted  Toileting  Assisted  Feeding  Independent  Med Admin  Assisted  Med Delivery   none    Wound Care Documentation and Therapy:        Elimination:  Continence:   · Bowel: Yes  · Bladder: Yes  Urinary Catheter: None   Colostomy/Ileostomy/Ileal Conduit: No       Date of Last BM: 11/28/2018    Intake/Output Summary (Last 24 hours) at 11/28/18 1207  Last data filed at 11/28/18 1018   Gross per 24 hour   Intake          1663.75 ml   Output             2250 ml   Net          -586.25 ml     I/O last 3 completed shifts: In: 1423.8 [P.O.:540; I.V.:833.8; IV Piggyback:50]  Out: 1950 [[id number]]    Safety Concerns: At Risk for Falls    Impairments/Disabilities:      None    Nutrition Therapy:  Current Nutrition Therapy:   Carb control and cardiac diet    Routes of Feeding: Oral  Liquids: Thin Liquids  Daily Fluid Restriction: no  Last Modified Barium Swallow with Video (Video Swallowing Test): not done    Treatments at the Time of Hospital Discharge:   Respiratory Treatments: see mar  Oxygen Therapy:  is not on home oxygen therapy.   Ventilator:    - No ventilator support    Rehab Therapies: Physical Therapy and Occupational Therapy  Weight Bearing Status/Restrictions: No weight bearing

## 2018-11-28 NOTE — PROGRESS NOTES
body clothing?: A Little  How much help for taking care of personal grooming?: A Little  How much help for eating meals?: None  AM-PAC Inpatient Daily Activity Raw Score: 16  AM-PAC Inpatient ADL T-Scale Score : 35.96  ADL Inpatient CMS 0-100% Score: 53.32  ADL Inpatient CMS G-Code Modifier : CK                 AM-PAC Score    Pollo Bloom scored a 16/24 on the AM-PAC ADL Inpatient form. Current research shows that an AM-PAC score of 17 or less is typically not associated with a discharge to the patient's home setting. Based on the patients AM-PAC score and their current ADL deficits, it is recommended that the patient have 3-5 sessions per week of Occupational Therapy at d/c to increase the patients independence. AM-PAC Inpatient Daily Activity Raw Score: 16  AM-PAC Inpatient ADL T-Scale Score : 35.96  ADL Inpatient CMS 0-100% Score: 53.32  ADL Inpatient CMS G-Code Modifier : CK    Goals  Short term goals  Time Frame for Short term goals: Prior to d/c: status ongoing   Short term goal 1: Pt will functional mobility and transfers with AD with SBA  Short term goal 2: Pt will complete bathing and dressing with min A  Short term goal 3: Pt will complete toileting with CGA  Short term goal 4: Pt will complete grooming with SBA  Short term goal 5: Pt will tolerate 5+ minutes of standing activity and UE exercises to increase strength and activity tolerance for self-care and mobility. MET continue   Patient Goals   Patient goals : \"to go back to the same place I was at, I wasnt ready to go when they sent me home\"       Therapy Time   Individual Concurrent Group Co-treatment   Time In 1305         Time Out 1345         Minutes 40         Timed Code Treatment Minutes: 40 Minutes     If pt is discharged prior to next OT session, this note will serve as the discharge summary.     Dena Villagomez OTR/LAZ #[id number]

## 2018-11-29 ENCOUNTER — CARE COORDINATION (OUTPATIENT)
Dept: CASE MANAGEMENT | Age: 63
End: 2018-11-29

## 2018-12-11 ENCOUNTER — CARE COORDINATION (OUTPATIENT)
Dept: CASE MANAGEMENT | Age: 63
End: 2018-12-11

## 2018-12-11 DIAGNOSIS — J44.9 CHRONIC OBSTRUCTIVE PULMONARY DISEASE, UNSPECIFIED COPD TYPE (HCC): Primary | ICD-10-CM

## 2018-12-11 PROCEDURE — 1111F DSCHRG MED/CURRENT MED MERGE: CPT | Performed by: INTERNAL MEDICINE

## 2018-12-11 NOTE — CARE COORDINATION
Transition Coordinator  [phone]      Follow Up  Future Appointments  Date Time Provider Fred Gregory   12/14/2018 1:20 PM Saurav Connelly MD [facility]   1/15/2019 4:45 PM SCHEDULE, [facility] PHONE TRANSMISSION [facility]   1/31/2019 11:30 AM Neeta Aguilar MD [facility]       Mata Mckeon RN

## 2018-12-21 ENCOUNTER — CARE COORDINATION (OUTPATIENT)
Dept: CARE COORDINATION | Age: 63
End: 2018-12-21

## 2018-12-21 NOTE — CARE COORDINATION
Recent     Self Monitoring (pt-stated)   On track (12/21/2018)             Self-Monitored Blood Glucose - I will notify my provider of any trends of increasing or decreasing blood sugars over a 1 month period. I will notify my provider if I have any blood sugar readings less than 70 more than 2 times a month. Barriers: lack of motivation  Plan for overcoming my barriers: Continued CC support  Confidence: 3/10  Anticipated Goal Completion Date: 11/30/18  - Per pt's wife pt is testing, she was unsure what his numbers have been 12/21/2018              Prior to Admission medications    Medication Sig Start Date End Date Taking? Authorizing Provider   ALPRAZolam Emelia Burnette) 1 MG tablet Take 1 mg by mouth 4 times daily as needed for Anxiety. Cj Loco Historical Provider, MD   furosemide (LASIX) 40 MG tablet Take 1 tablet by mouth daily 10/11/18  Yes Zulema Calles MD   isosorbide mononitrate (IMDUR) 60 MG extended release tablet Take 1 tablet by mouth daily 10/11/18  Yes Zulema Calles MD   losartan (COZAAR) 50 MG tablet Take 1 tablet by mouth daily 9/17/18  Yes Zulema Calles MD   albuterol sulfate  (90 Base) MCG/ACT inhaler Inhale 2 puffs into the lungs every 6 hours as needed for Wheezing   Yes Historical Provider, MD   aspirin 81 MG tablet Take 81 mg by mouth daily   Yes Historical Provider, MD   insulin glargine (LANTUS) 100 UNIT/ML injection pen Inject 28 Units into the skin nightly  Patient taking differently: Inject 32 Units into the skin nightly  8/28/18  Yes Zulema Calles MD   insulin lispro (HUMALOG KWIKPEN) 100 UNIT/ML pen Inject 5 Units into the skin 3 times daily (before meals)  Patient taking differently: Inject 8 Units into the skin 3 times daily (before meals)  8/28/18  Yes Zulema Calles MD   amLODIPine (NORVASC) 5 MG tablet TAKE 1 TABLET BY MOUTH DAILY 7/12/18  Yes Zulema Calles MD   Lancets [facility] accu check fastclick lancets  Dx: E85.4  As needed 5/4/18

## 2018-12-26 PROBLEM — J44.1 COPD WITH ACUTE EXACERBATION (HCC): Status: RESOLVED | Noted: 2018-02-21 | Resolved: 2018-12-26

## 2018-12-26 PROBLEM — R11.2 NAUSEA AND VOMITING: Status: RESOLVED | Noted: 2018-11-26 | Resolved: 2018-12-26

## 2018-12-26 PROBLEM — J18.9 PNEUMONIA: Status: RESOLVED | Noted: 2018-09-20 | Resolved: 2018-12-26

## 2018-12-26 PROBLEM — R07.2 PRECORDIAL PAIN: Status: RESOLVED | Noted: 2018-01-09 | Resolved: 2018-12-26

## 2018-12-26 PROBLEM — R53.1 GENERALIZED WEAKNESS: Status: RESOLVED | Noted: 2018-11-27 | Resolved: 2018-12-26

## 2018-12-26 PROBLEM — R19.7 DIARRHEA: Status: RESOLVED | Noted: 2018-11-27 | Resolved: 2018-12-26

## 2018-12-26 PROBLEM — N28.9 RENAL INSUFFICIENCY: Status: ACTIVE | Noted: 2018-12-26

## 2019-01-09 RX ORDER — CLOPIDOGREL BISULFATE 75 MG/1
TABLET ORAL
Qty: 90 TABLET | Refills: 0 | Status: SHIPPED | OUTPATIENT
Start: 2019-01-09 | End: 2019-04-07 | Stop reason: SDUPTHER

## 2019-01-15 ENCOUNTER — NURSE ONLY (OUTPATIENT)
Dept: CARDIOLOGY CLINIC | Age: 64
End: 2019-01-15
Payer: MEDICARE

## 2019-01-15 DIAGNOSIS — Z95.810 ICD (IMPLANTABLE CARDIOVERTER-DEFIBRILLATOR), DUAL, IN SITU: ICD-10-CM

## 2019-01-15 DIAGNOSIS — I50.20 SYSTOLIC CONGESTIVE HEART FAILURE (HCC): ICD-10-CM

## 2019-01-15 DIAGNOSIS — I50.22 CHRONIC SYSTOLIC CONGESTIVE HEART FAILURE (HCC): ICD-10-CM

## 2019-01-15 DIAGNOSIS — I25.5 ISCHEMIC CARDIOMYOPATHY: Chronic | ICD-10-CM

## 2019-01-15 PROCEDURE — 93296 REM INTERROG EVL PM/IDS: CPT | Performed by: INTERNAL MEDICINE

## 2019-01-15 PROCEDURE — 93295 DEV INTERROG REMOTE 1/2/MLT: CPT | Performed by: INTERNAL MEDICINE

## 2019-01-15 PROCEDURE — 93297 REM INTERROG DEV EVAL ICPMS: CPT | Performed by: INTERNAL MEDICINE

## 2019-02-13 DIAGNOSIS — G62.9 NEUROPATHY: ICD-10-CM

## 2019-02-14 RX ORDER — GABAPENTIN 600 MG/1
TABLET ORAL
Qty: 150 TABLET | Refills: 0 | Status: SHIPPED | OUTPATIENT
Start: 2019-02-14 | End: 2019-03-12 | Stop reason: SDUPTHER

## 2019-04-09 DIAGNOSIS — G62.9 NEUROPATHY: ICD-10-CM

## 2019-04-09 RX ORDER — GABAPENTIN 600 MG/1
TABLET ORAL
Qty: 150 TABLET | Refills: 0 | Status: SHIPPED | OUTPATIENT
Start: 2019-04-09 | End: 2019-05-05 | Stop reason: SDUPTHER

## 2019-04-23 ENCOUNTER — NURSE ONLY (OUTPATIENT)
Dept: CARDIOLOGY CLINIC | Age: 64
End: 2019-04-23
Payer: MEDICARE

## 2019-04-23 DIAGNOSIS — I50.22 CHRONIC SYSTOLIC CONGESTIVE HEART FAILURE (HCC): ICD-10-CM

## 2019-04-23 DIAGNOSIS — I25.5 ISCHEMIC CARDIOMYOPATHY: Chronic | ICD-10-CM

## 2019-04-23 DIAGNOSIS — Z95.810 ICD (IMPLANTABLE CARDIOVERTER-DEFIBRILLATOR), DUAL, IN SITU: Primary | ICD-10-CM

## 2019-04-23 PROCEDURE — 93296 REM INTERROG EVL PM/IDS: CPT | Performed by: INTERNAL MEDICINE

## 2019-04-23 PROCEDURE — 93295 DEV INTERROG REMOTE 1/2/MLT: CPT | Performed by: INTERNAL MEDICINE

## 2019-04-23 PROCEDURE — 93297 REM INTERROG DEV EVAL ICPMS: CPT | Performed by: INTERNAL MEDICINE

## 2019-04-23 NOTE — LETTER
[address] [facility] [phone]  [place] [facility] [phone]    Pacemaker/Defibrillator Clinic          04/26/19        Patel Perdue  [address] [place] [id number]        Dear Patel Perdue    This letter is to inform you that we received the transmission from your monitor at home that checks your pacemaker and/or defibrillator, or implanted heart monitor. The next date your monitor will automatically transmit will be 7/30/19. Your device and monitor are wireless and most transmit cellularly, but please periodically check your monitor is still plugged in to the electrical outlet. If you still use the telephone land line to send please ensure the connection to the phone clifford is secure. This will help to ensure successful automatic transmissions in the future. Also, the monitor needs to be close to you while sleeping at night. Please be aware that the remote device transmission sites are periodically monitored only during regular business hours during which simultaneous in-office device clinics are being run. If your transmission requires attention, we will contact you as soon as possible. Thank you.             Deejay Martinez

## 2019-04-24 ENCOUNTER — HOSPITAL ENCOUNTER (EMERGENCY)
Age: 64
Discharge: HOME | DRG: 287 | End: 2019-04-24
Attending: EMERGENCY MEDICINE
Payer: MEDICARE

## 2019-04-24 ENCOUNTER — HOSPITAL ENCOUNTER (OUTPATIENT)
Age: 64
Setting detail: OBSERVATION
Discharge: HOME | DRG: 287 | End: 2019-04-26
Attending: INTERNAL MEDICINE | Admitting: INTERNAL MEDICINE
Payer: MEDICARE

## 2019-04-24 ENCOUNTER — APPOINTMENT (OUTPATIENT)
Dept: GENERAL RADIOLOGY | Age: 64
DRG: 287 | End: 2019-04-24
Payer: MEDICARE

## 2019-04-24 VITALS
WEIGHT: 232.59 LBS | DIASTOLIC BLOOD PRESSURE: 81 MMHG | RESPIRATION RATE: 16 BRPM | SYSTOLIC BLOOD PRESSURE: 140 MMHG | HEIGHT: 72 IN | TEMPERATURE: 98.5 F | HEART RATE: 73 BPM | OXYGEN SATURATION: 94 % | BODY MASS INDEX: 31.5 KG/M2

## 2019-04-24 DIAGNOSIS — R07.89 ATYPICAL CHEST PAIN: Primary | ICD-10-CM

## 2019-04-24 DIAGNOSIS — R07.9 CHEST PAIN, UNSPECIFIED TYPE: Primary | ICD-10-CM

## 2019-04-24 LAB
A/G RATIO: 1.2 (ref 1.1–2.2)
A/G RATIO: 1.2 (ref 1.1–2.2)
ALBUMIN SERPL-MCNC: 3.5 G/DL (ref 3.4–5)
ALBUMIN SERPL-MCNC: 3.6 G/DL (ref 3.4–5)
ALP BLD-CCNC: 74 U/L (ref 40–129)
ALP BLD-CCNC: 74 U/L (ref 40–129)
ALT SERPL-CCNC: 11 U/L (ref 10–40)
ALT SERPL-CCNC: 11 U/L (ref 10–40)
ANION GAP SERPL CALCULATED.3IONS-SCNC: 13 MMOL/L (ref 3–16)
ANION GAP SERPL CALCULATED.3IONS-SCNC: 14 MMOL/L (ref 3–16)
APTT: 25.3 SEC (ref 26–36)
AST SERPL-CCNC: 9 U/L (ref 15–37)
AST SERPL-CCNC: 9 U/L (ref 15–37)
BASOPHILS ABSOLUTE: 0.2 K/UL (ref 0–0.2)
BASOPHILS ABSOLUTE: 0.3 K/UL (ref 0–0.2)
BASOPHILS RELATIVE PERCENT: 1.6 %
BASOPHILS RELATIVE PERCENT: 3.7 %
BILIRUB SERPL-MCNC: 0.4 MG/DL (ref 0–1)
BILIRUB SERPL-MCNC: <0.2 MG/DL (ref 0–1)
BUN BLDV-MCNC: 16 MG/DL (ref 7–20)
BUN BLDV-MCNC: 16 MG/DL (ref 7–20)
CALCIUM SERPL-MCNC: 8.5 MG/DL (ref 8.3–10.6)
CALCIUM SERPL-MCNC: 8.9 MG/DL (ref 8.3–10.6)
CHLORIDE BLD-SCNC: 97 MMOL/L (ref 99–110)
CHLORIDE BLD-SCNC: 99 MMOL/L (ref 99–110)
CO2: 22 MMOL/L (ref 21–32)
CO2: 23 MMOL/L (ref 21–32)
CREAT SERPL-MCNC: 1 MG/DL (ref 0.8–1.3)
CREAT SERPL-MCNC: 1.1 MG/DL (ref 0.8–1.3)
D DIMER: 287 NG/ML DDU (ref 0–229)
EKG ATRIAL RATE: 96 BPM
EKG DIAGNOSIS: NORMAL
EKG P AXIS: 60 DEGREES
EKG P-R INTERVAL: 124 MS
EKG Q-T INTERVAL: 438 MS
EKG QRS DURATION: 126 MS
EKG QTC CALCULATION (BAZETT): 553 MS
EKG R AXIS: 76 DEGREES
EKG T AXIS: 80 DEGREES
EKG VENTRICULAR RATE: 96 BPM
EOSINOPHILS ABSOLUTE: 0.2 K/UL (ref 0–0.6)
EOSINOPHILS ABSOLUTE: 0.2 K/UL (ref 0–0.6)
EOSINOPHILS RELATIVE PERCENT: 1.9 %
EOSINOPHILS RELATIVE PERCENT: 2.4 %
GFR AFRICAN AMERICAN: >60
GFR AFRICAN AMERICAN: >60
GFR NON-AFRICAN AMERICAN: >60
GFR NON-AFRICAN AMERICAN: >60
GLOBULIN: 3 G/DL
GLOBULIN: 3 G/DL
GLUCOSE BLD-MCNC: 296 MG/DL (ref 70–99)
GLUCOSE BLD-MCNC: 332 MG/DL (ref 70–99)
HCT VFR BLD CALC: 42.3 % (ref 40.5–52.5)
HCT VFR BLD CALC: 43.9 % (ref 40.5–52.5)
HEMOGLOBIN: 14.1 G/DL (ref 13.5–17.5)
HEMOGLOBIN: 14.7 G/DL (ref 13.5–17.5)
INR BLD: 0.87 (ref 0.86–1.14)
INR BLD: 0.91 (ref 0.86–1.14)
LYMPHOCYTES ABSOLUTE: 2.3 K/UL (ref 1–5.1)
LYMPHOCYTES ABSOLUTE: 3.1 K/UL (ref 1–5.1)
LYMPHOCYTES RELATIVE PERCENT: 21.7 %
LYMPHOCYTES RELATIVE PERCENT: 32.9 %
MCH RBC QN AUTO: 31.9 PG (ref 26–34)
MCH RBC QN AUTO: 32.4 PG (ref 26–34)
MCHC RBC AUTO-ENTMCNC: 33.4 G/DL (ref 31–36)
MCHC RBC AUTO-ENTMCNC: 33.5 G/DL (ref 31–36)
MCV RBC AUTO: 95.4 FL (ref 80–100)
MCV RBC AUTO: 96.9 FL (ref 80–100)
MONOCYTES ABSOLUTE: 0.4 K/UL (ref 0–1.3)
MONOCYTES ABSOLUTE: 0.5 K/UL (ref 0–1.3)
MONOCYTES RELATIVE PERCENT: 4 %
MONOCYTES RELATIVE PERCENT: 5.2 %
NEUTROPHILS ABSOLUTE: 5.3 K/UL (ref 1.7–7.7)
NEUTROPHILS ABSOLUTE: 7.4 K/UL (ref 1.7–7.7)
NEUTROPHILS RELATIVE PERCENT: 55.8 %
NEUTROPHILS RELATIVE PERCENT: 70.8 %
PDW BLD-RTO: 15.3 % (ref 12.4–15.4)
PDW BLD-RTO: 15.6 % (ref 12.4–15.4)
PLATELET # BLD: 205 K/UL (ref 135–450)
PLATELET # BLD: 219 K/UL (ref 135–450)
PMV BLD AUTO: 10.3 FL (ref 5–10.5)
PMV BLD AUTO: 9.6 FL (ref 5–10.5)
POTASSIUM REFLEX MAGNESIUM: 3.8 MMOL/L (ref 3.5–5.1)
POTASSIUM SERPL-SCNC: 4.1 MMOL/L (ref 3.5–5.1)
PRO-BNP: 2153 PG/ML (ref 0–124)
PROTHROMBIN TIME: 10.4 SEC (ref 9.8–13)
PROTHROMBIN TIME: 9.9 SEC (ref 9.8–13)
RBC # BLD: 4.36 M/UL (ref 4.2–5.9)
RBC # BLD: 4.61 M/UL (ref 4.2–5.9)
SODIUM BLD-SCNC: 133 MMOL/L (ref 136–145)
SODIUM BLD-SCNC: 135 MMOL/L (ref 136–145)
TOTAL PROTEIN: 6.5 G/DL (ref 6.4–8.2)
TOTAL PROTEIN: 6.6 G/DL (ref 6.4–8.2)
TROPONIN: 0.01 NG/ML
TROPONIN: 0.02 NG/ML
TROPONIN: <0.01 NG/ML
WBC # BLD: 10.4 K/UL (ref 4–11)
WBC # BLD: 9.5 K/UL (ref 4–11)

## 2019-04-24 PROCEDURE — 84484 ASSAY OF TROPONIN QUANT: CPT

## 2019-04-24 PROCEDURE — 96374 THER/PROPH/DIAG INJ IV PUSH: CPT

## 2019-04-24 PROCEDURE — 85610 PROTHROMBIN TIME: CPT

## 2019-04-24 PROCEDURE — 99285 EMERGENCY DEPT VISIT HI MDM: CPT

## 2019-04-24 PROCEDURE — 96376 TX/PRO/DX INJ SAME DRUG ADON: CPT

## 2019-04-24 PROCEDURE — 85730 THROMBOPLASTIN TIME PARTIAL: CPT

## 2019-04-24 PROCEDURE — 85025 COMPLETE CBC W/AUTO DIFF WBC: CPT

## 2019-04-24 PROCEDURE — 71045 X-RAY EXAM CHEST 1 VIEW: CPT

## 2019-04-24 PROCEDURE — 83880 ASSAY OF NATRIURETIC PEPTIDE: CPT

## 2019-04-24 PROCEDURE — 36415 COLL VENOUS BLD VENIPUNCTURE: CPT

## 2019-04-24 PROCEDURE — 80053 COMPREHEN METABOLIC PANEL: CPT

## 2019-04-24 PROCEDURE — 93010 ELECTROCARDIOGRAM REPORT: CPT | Performed by: INTERNAL MEDICINE

## 2019-04-24 PROCEDURE — 6360000002 HC RX W HCPCS: Performed by: NURSE PRACTITIONER

## 2019-04-24 PROCEDURE — 93005 ELECTROCARDIOGRAM TRACING: CPT | Performed by: EMERGENCY MEDICINE

## 2019-04-24 PROCEDURE — 6360000002 HC RX W HCPCS: Performed by: EMERGENCY MEDICINE

## 2019-04-24 PROCEDURE — 85379 FIBRIN DEGRADATION QUANT: CPT

## 2019-04-24 RX ORDER — IPRATROPIUM BROMIDE AND ALBUTEROL SULFATE 2.5; .5 MG/3ML; MG/3ML
1 SOLUTION RESPIRATORY (INHALATION) ONCE
Status: COMPLETED | OUTPATIENT
Start: 2019-04-24 | End: 2019-04-25

## 2019-04-24 RX ORDER — MORPHINE SULFATE 2 MG/ML
2 INJECTION, SOLUTION INTRAMUSCULAR; INTRAVENOUS ONCE
Status: COMPLETED | OUTPATIENT
Start: 2019-04-24 | End: 2019-04-24

## 2019-04-24 RX ORDER — MORPHINE SULFATE 2 MG/ML
4 INJECTION, SOLUTION INTRAMUSCULAR; INTRAVENOUS ONCE
Status: COMPLETED | OUTPATIENT
Start: 2019-04-24 | End: 2019-04-24

## 2019-04-24 RX ADMIN — MORPHINE SULFATE 2 MG: 2 INJECTION, SOLUTION INTRAMUSCULAR; INTRAVENOUS at 03:51

## 2019-04-24 RX ADMIN — MORPHINE SULFATE 4 MG: 2 INJECTION, SOLUTION INTRAMUSCULAR; INTRAVENOUS at 23:55

## 2019-04-24 RX ADMIN — MORPHINE SULFATE 2 MG: 2 INJECTION, SOLUTION INTRAMUSCULAR; INTRAVENOUS at 05:28

## 2019-04-24 ASSESSMENT — PAIN - FUNCTIONAL ASSESSMENT
PAIN_FUNCTIONAL_ASSESSMENT: PREVENTS OR INTERFERES SOME ACTIVE ACTIVITIES AND ADLS
PAIN_FUNCTIONAL_ASSESSMENT: ACTIVITIES ARE NOT PREVENTED
PAIN_FUNCTIONAL_ASSESSMENT: PREVENTS OR INTERFERES SOME ACTIVE ACTIVITIES AND ADLS

## 2019-04-24 ASSESSMENT — PAIN DESCRIPTION - PROGRESSION
CLINICAL_PROGRESSION: GRADUALLY WORSENING
CLINICAL_PROGRESSION: NOT CHANGED
CLINICAL_PROGRESSION: NOT CHANGED

## 2019-04-24 ASSESSMENT — HEART SCORE: ECG: 0

## 2019-04-24 ASSESSMENT — PAIN DESCRIPTION - PAIN TYPE
TYPE: ACUTE PAIN

## 2019-04-24 ASSESSMENT — PAIN SCALES - GENERAL
PAINLEVEL_OUTOF10: 8
PAINLEVEL_OUTOF10: 8
PAINLEVEL_OUTOF10: 9
PAINLEVEL_OUTOF10: 8
PAINLEVEL_OUTOF10: 8
PAINLEVEL_OUTOF10: 4
PAINLEVEL_OUTOF10: 6

## 2019-04-24 ASSESSMENT — ENCOUNTER SYMPTOMS
SHORTNESS OF BREATH: 1
ABDOMINAL DISTENTION: 0
NAUSEA: 0
VOMITING: 0
COUGH: 1
CONSTIPATION: 0
ABDOMINAL PAIN: 0
DIARRHEA: 0

## 2019-04-24 ASSESSMENT — PAIN DESCRIPTION - DESCRIPTORS
DESCRIPTORS: ACHING
DESCRIPTORS: PRESSURE
DESCRIPTORS: PRESSURE

## 2019-04-24 ASSESSMENT — PAIN DESCRIPTION - FREQUENCY
FREQUENCY: CONTINUOUS

## 2019-04-24 ASSESSMENT — PAIN DESCRIPTION - LOCATION
LOCATION: CHEST

## 2019-04-24 ASSESSMENT — PAIN DESCRIPTION - ONSET
ONSET: ON-GOING
ONSET: SUDDEN
ONSET: ON-GOING

## 2019-04-24 ASSESSMENT — PAIN DESCRIPTION - ORIENTATION: ORIENTATION: LEFT

## 2019-04-24 NOTE — ED NOTES
Pt to ED with c/o chest pain that started approx 11pm last night. States he has had some sob and nausea also. Pt states he took 0.4mg SL Nitro x4 without relief prior to ED.        Mirta Boyle RN  04/24/19 Javier Sloan RN  04/24/19 1753

## 2019-04-24 NOTE — ED PROVIDER NOTES
Seizures ([facility]     TIA (transient ischemic attack) 2007    Ulcerative colitis ([facility]          SURGICAL HISTORY       Past Surgical History:   Procedure Laterality Date    BACK SURGERY      CARDIAC SURGERY      CHOLECYSTECTOMY      COLONOSCOPY  01/10/2017    COLONOSCOPY  01/10/2017    CORONARY ANGIOPLASTY WITH STENT PLACEMENT  2012    CORONARY ARTERY BYPASS GRAFT  2010, 11/2015    ENDOSCOPY, COLON, DIAGNOSTIC      PACEMAKER PLACEMENT      UPPER GASTROINTESTINAL ENDOSCOPY  02/07/2017         CURRENT MEDICATIONS       Previous Medications    ALBUTEROL SULFATE  (90 BASE) MCG/ACT INHALER    Inhale 2 puffs into the lungs every 6 hours as needed for Wheezing    AMLODIPINE (NORVASC) 5 MG TABLET    TAKE 1 TABLET BY MOUTH DAILY    ASPIRIN 81 MG TABLET    Take 81 mg by mouth daily    ATORVASTATIN (LIPITOR) 80 MG TABLET    TAKE 1 TABLET BY MOUTH DAILY    CLOPIDOGREL (PLAVIX) 75 MG TABLET    TAKE 1 TABLET BY MOUTH DAILY    FREESTYLE LANCETS MISC    1 each by Does not apply route daily    FUROSEMIDE (LASIX) 40 MG TABLET    Take 1 tablet by mouth daily    GABAPENTIN (NEURONTIN) 600 MG TABLET    TAKE 1 TABLET BY MOUTH FIVE TIMES DAILY    INSULIN GLARGINE (LANTUS) 100 UNIT/ML INJECTION PEN    Inject 28 Units into the skin nightly    INSULIN LISPRO (HUMALOG KWIKPEN) 100 UNIT/ML PEN    Inject 5 Units into the skin 3 times daily (before meals)    INSULIN PEN NEEDLE (CAREFINE PEN NEEDLES) 32G X 4 MM MISC    1 each by Does not apply route daily    INSULIN PEN NEEDLE 31G X 5 MM MISC    1 each by Does not apply route daily    ISOSORBIDE MONONITRATE (IMDUR) 60 MG EXTENDED RELEASE TABLET    Take 1 tablet by mouth daily    LANCETS MISC    accu check fastclick lancets  Dx: H14.6  As needed    LOSARTAN (COZAAR) 50 MG TABLET    Take 1 tablet by mouth daily    METHYLPREDNISOLONE (MEDROL, KAY,) 4 MG TABLET    Take by mouth.     METOPROLOL SUCCINATE (TOPROL XL) 100 MG EXTENDED RELEASE TABLET    Take 1 tablet by mouth daily @FLOW([id number])@      PHYSICAL EXAM    (up to 7 for level 4, 8 or more for level 5)     ED Triage Vitals   BP Temp Temp src Pulse Resp SpO2 Height Weight   -- -- -- -- -- -- -- --       Physical Exam      General Appearance:  Alert, cooperative, no distress, appears stated age. Head:  Normocephalic, without obviousabnormality, atraumatic. Eyes:  conjunctiva/corneas clear, EOM's intact. Sclera anicteric. ENT: Mucous membranes moist.   Neck: Supple, symmetrical, trachea midline, no adenopathy. No jugular venous distention. Lungs:   Clear to auscultation bilaterally, respirationsunlabored. No rales, rhonchi or wheezes. Chest Wall:  No tenderness. Heart:  Regular rate and rhythm, S1 and S2 normal, no murmur, rub or gallop. Abdomen:   Soft, non-tender, bowel sounds active,   no masses, no organomegaly. Extremities: No edema, cords or calf tenderness. Full range of motion. Pulses: 2+ and symmetric   Skin: Turgor is normal, no rashes or lesions. Neurologic: Alert and oriented X 3. No focal findings.   Motor grossly normal.  Speech clear, no drift, CN III-XII grossly intact,        DIAGNOSTIC RESULTS   LABS:    Labs Reviewed   CBC WITH AUTO DIFFERENTIAL - Abnormal; Notable for the following components:       Result Value    RDW 15.6 (*)     Basophils # 0.3 (*)     All other components within normal limits    Narrative:     Performed at:  [address] [facility] [address]   Phone [phone]   COMPREHENSIVE METABOLIC PANEL W/ REFLEX TO MG FOR LOW K - Abnormal; Notable for the following components:    Sodium 133 (*)     Chloride 97 (*)     Glucose 332 (*)     AST 9 (*)     All other components within normal limits    Narrative:     Performed at:  [address] [facility] [address]   Phone [phone]   BRAIN NATRIURETIC PEPTIDE - Abnormal; Notable for the following components:    Pro-BNP 2,153 (*)     All other components within normal limits    Narrative:     Performed at:  [facility]  [address] [place], [place] [address]   Phone [phone]   APTT - Abnormal; Notable for the following components:    aPTT 25.3 (*)     All other components within normal limits    Narrative:     Performed at:  [facility]  [address] [facility] [place], [place] [address]   Phone [phone]   TROPONIN    Narrative:     Performed at:  [facility]  [address] [facility] [place] [facility] [place] [address]   Phone [phone]   PROTIME-INR    Narrative:     Performed at:  [facility]  [address] [facility] [place] [facility] [place] [address]   Phone [phone]   TROPONIN    Narrative:     Performed at:  [facility]  [address] [facility] [place], [place] [address]   Phone [phone]       All other labs were within normal range or not returned as of this dictation. EKG: All EKG's are interpreted by the Emergency Department Physician who eithersigns or Co-signs this chart in the absence of a cardiologist.    The Ekg interpreted by me in the absence of a cardiologist shows. Normal Sinus rhythm   Rate of   96  Axis is   Right axis deviation  QTc is  normal  Intervals and Durations are unremarkable. Nonspecific ST-T wave changes appreciated. No evidence of acute ischemia. RADIOLOGY:   Non-plain film images such as CT, Ultrasound and MRI are read by the radiologist. Plain radiographic images are visualized by myself. *    Interpretation per the Radiologist below, if available at the time of this note:    XR CHEST PORTABLE   Final Result   No acute disease.                PROCEDURES   Unless otherwise noted below, none     Procedures    *    CRITICAL CARE TIME   N/A      EMERGENCY DEPARTMENT COURSE and DIFFERENTIALDIAGNOSIS/MDM:   Vitals:    Vitals:    04/24/19 2712 04/24/19 0447 04/24/19 0503 04/24/19 0517   BP: (!) 153/87 (!) 145/77 (!) 151/82 (!) 161/89   Pulse: 83 78 73 72   Resp: 21 18 19 15   Temp:       TempSrc:       SpO2: 94% 93% 94% 97%   Weight:       Height:           Patient was given thefollowing medications:  Medications   morphine (PF) injection 2 mg (2 mg Intravenous Given 4/24/19 0351)   morphine (PF) injection 2 mg (2 mg Intravenous Given 4/24/19 0528)           The patient tolerated their visit well. The patient and / or the familywere informed of the results of any tests, a time was given to answer questions. FINAL IMPRESSION      1.  Atypical chest pain          DISPOSITION/PLAN   DISPOSITION Decision To Discharge 04/24/2019 05:49:09 AM      PATIENT REFERRED TO:  Maritza Romero, [address] [place] [address]  [phone]      As needed      DISCHARGE MEDICATIONS:  New Prescriptions    No medications on file       DISCONTINUED MEDICATIONS:  Discontinued Medications    No medications on file              (Please note that portions of this note were completed with a voice recognition program.  Efforts were made to edit the dictations but occasionally words are mis-transcribed.)    Chito Kumar MD (electronically signed)      Chito Kumar MD  04/24/19 0775

## 2019-04-25 ENCOUNTER — APPOINTMENT (OUTPATIENT)
Dept: CT IMAGING | Age: 64
DRG: 287 | End: 2019-04-25
Payer: MEDICARE

## 2019-04-25 PROBLEM — R07.9 CHEST PAIN: Status: ACTIVE | Noted: 2019-04-25

## 2019-04-25 LAB
GLUCOSE BLD-MCNC: 165 MG/DL (ref 70–99)
GLUCOSE BLD-MCNC: 223 MG/DL (ref 70–99)
GLUCOSE BLD-MCNC: 234 MG/DL (ref 70–99)
GLUCOSE BLD-MCNC: 263 MG/DL (ref 70–99)
LV EF: 19 %
LVEF MODALITY: NORMAL
PERFORMED ON: ABNORMAL
PRO-BNP: 1352 PG/ML (ref 0–124)
TROPONIN: 0.02 NG/ML
TROPONIN: <0.01 NG/ML
TSH SERPL DL<=0.05 MIU/L-ACNC: 5.3 UIU/ML (ref 0.27–4.2)

## 2019-04-25 PROCEDURE — 3430000000 HC RX DIAGNOSTIC RADIOPHARMACEUTICAL: Performed by: INTERNAL MEDICINE

## 2019-04-25 PROCEDURE — G0378 HOSPITAL OBSERVATION PER HR: HCPCS

## 2019-04-25 PROCEDURE — 93010 ELECTROCARDIOGRAM REPORT: CPT | Performed by: INTERNAL MEDICINE

## 2019-04-25 PROCEDURE — 93017 CV STRESS TEST TRACING ONLY: CPT

## 2019-04-25 PROCEDURE — 99223 1ST HOSP IP/OBS HIGH 75: CPT | Performed by: INTERNAL MEDICINE

## 2019-04-25 PROCEDURE — 84443 ASSAY THYROID STIM HORMONE: CPT

## 2019-04-25 PROCEDURE — 94664 DEMO&/EVAL PT USE INHALER: CPT

## 2019-04-25 PROCEDURE — 36415 COLL VENOUS BLD VENIPUNCTURE: CPT

## 2019-04-25 PROCEDURE — 78452 HT MUSCLE IMAGE SPECT MULT: CPT

## 2019-04-25 PROCEDURE — 6360000002 HC RX W HCPCS: Performed by: INTERNAL MEDICINE

## 2019-04-25 PROCEDURE — 96376 TX/PRO/DX INJ SAME DRUG ADON: CPT

## 2019-04-25 PROCEDURE — 6370000000 HC RX 637 (ALT 250 FOR IP): Performed by: NURSE PRACTITIONER

## 2019-04-25 PROCEDURE — 2580000003 HC RX 258: Performed by: INTERNAL MEDICINE

## 2019-04-25 PROCEDURE — 6370000000 HC RX 637 (ALT 250 FOR IP): Performed by: HOSPITALIST

## 2019-04-25 PROCEDURE — 6360000002 HC RX W HCPCS: Performed by: NURSE PRACTITIONER

## 2019-04-25 PROCEDURE — A9502 TC99M TETROFOSMIN: HCPCS | Performed by: INTERNAL MEDICINE

## 2019-04-25 PROCEDURE — 96372 THER/PROPH/DIAG INJ SC/IM: CPT

## 2019-04-25 PROCEDURE — 6370000000 HC RX 637 (ALT 250 FOR IP): Performed by: INTERNAL MEDICINE

## 2019-04-25 PROCEDURE — 71260 CT THORAX DX C+: CPT

## 2019-04-25 PROCEDURE — 94640 AIRWAY INHALATION TREATMENT: CPT

## 2019-04-25 PROCEDURE — 84484 ASSAY OF TROPONIN QUANT: CPT

## 2019-04-25 PROCEDURE — 6360000004 HC RX CONTRAST MEDICATION: Performed by: NURSE PRACTITIONER

## 2019-04-25 RX ORDER — MORPHINE SULFATE 2 MG/ML
2 INJECTION, SOLUTION INTRAMUSCULAR; INTRAVENOUS
Status: DISCONTINUED | OUTPATIENT
Start: 2019-04-25 | End: 2019-04-26 | Stop reason: HOSPADM

## 2019-04-25 RX ORDER — ATORVASTATIN CALCIUM 80 MG/1
80 TABLET, FILM COATED ORAL DAILY
Status: DISCONTINUED | OUTPATIENT
Start: 2019-04-25 | End: 2019-04-26 | Stop reason: HOSPADM

## 2019-04-25 RX ORDER — ONDANSETRON 2 MG/ML
4 INJECTION INTRAMUSCULAR; INTRAVENOUS EVERY 6 HOURS PRN
Status: DISCONTINUED | OUTPATIENT
Start: 2019-04-25 | End: 2019-04-26 | Stop reason: HOSPADM

## 2019-04-25 RX ORDER — ATORVASTATIN CALCIUM 40 MG/1
40 TABLET, FILM COATED ORAL NIGHTLY
Status: DISCONTINUED | OUTPATIENT
Start: 2019-04-25 | End: 2019-04-25 | Stop reason: ALTCHOICE

## 2019-04-25 RX ORDER — NITROGLYCERIN 0.4 MG/1
0.4 TABLET SUBLINGUAL EVERY 5 MIN PRN
Status: DISCONTINUED | OUTPATIENT
Start: 2019-04-25 | End: 2019-04-26 | Stop reason: HOSPADM

## 2019-04-25 RX ORDER — MORPHINE SULFATE 2 MG/ML
2 INJECTION, SOLUTION INTRAMUSCULAR; INTRAVENOUS
Status: DISCONTINUED | OUTPATIENT
Start: 2019-04-25 | End: 2019-04-25

## 2019-04-25 RX ORDER — NICOTINE POLACRILEX 4 MG
15 LOZENGE BUCCAL PRN
Status: DISCONTINUED | OUTPATIENT
Start: 2019-04-25 | End: 2019-04-26 | Stop reason: HOSPADM

## 2019-04-25 RX ORDER — MORPHINE SULFATE 2 MG/ML
1 INJECTION, SOLUTION INTRAMUSCULAR; INTRAVENOUS
Status: DISCONTINUED | OUTPATIENT
Start: 2019-04-25 | End: 2019-04-25

## 2019-04-25 RX ORDER — DEXTROSE MONOHYDRATE 25 G/50ML
12.5 INJECTION, SOLUTION INTRAVENOUS PRN
Status: DISCONTINUED | OUTPATIENT
Start: 2019-04-25 | End: 2019-04-26 | Stop reason: HOSPADM

## 2019-04-25 RX ORDER — ALBUTEROL SULFATE 90 UG/1
2 AEROSOL, METERED RESPIRATORY (INHALATION) EVERY 6 HOURS PRN
Status: DISCONTINUED | OUTPATIENT
Start: 2019-04-25 | End: 2019-04-26 | Stop reason: HOSPADM

## 2019-04-25 RX ORDER — AMLODIPINE BESYLATE 5 MG/1
5 TABLET ORAL DAILY
Status: DISCONTINUED | OUTPATIENT
Start: 2019-04-25 | End: 2019-04-26 | Stop reason: HOSPADM

## 2019-04-25 RX ORDER — ASPIRIN 81 MG/1
81 TABLET, CHEWABLE ORAL DAILY
Status: DISCONTINUED | OUTPATIENT
Start: 2019-04-26 | End: 2019-04-26 | Stop reason: HOSPADM

## 2019-04-25 RX ORDER — GABAPENTIN 300 MG/1
600 CAPSULE ORAL
Status: DISCONTINUED | OUTPATIENT
Start: 2019-04-25 | End: 2019-04-26 | Stop reason: HOSPADM

## 2019-04-25 RX ORDER — SODIUM CHLORIDE 0.9 % (FLUSH) 0.9 %
10 SYRINGE (ML) INJECTION EVERY 12 HOURS SCHEDULED
Status: DISCONTINUED | OUTPATIENT
Start: 2019-04-25 | End: 2019-04-26 | Stop reason: HOSPADM

## 2019-04-25 RX ORDER — ACETAMINOPHEN 325 MG/1
650 TABLET ORAL EVERY 4 HOURS PRN
Status: DISCONTINUED | OUTPATIENT
Start: 2019-04-25 | End: 2019-04-26 | Stop reason: HOSPADM

## 2019-04-25 RX ORDER — FUROSEMIDE 40 MG/1
40 TABLET ORAL DAILY
Status: DISCONTINUED | OUTPATIENT
Start: 2019-04-25 | End: 2019-04-26 | Stop reason: HOSPADM

## 2019-04-25 RX ORDER — MORPHINE SULFATE 2 MG/ML
1 INJECTION, SOLUTION INTRAMUSCULAR; INTRAVENOUS
Status: DISCONTINUED | OUTPATIENT
Start: 2019-04-25 | End: 2019-04-26 | Stop reason: HOSPADM

## 2019-04-25 RX ORDER — CLOPIDOGREL BISULFATE 75 MG/1
75 TABLET ORAL DAILY
Status: DISCONTINUED | OUTPATIENT
Start: 2019-04-25 | End: 2019-04-26 | Stop reason: HOSPADM

## 2019-04-25 RX ORDER — MORPHINE SULFATE 2 MG/ML
4 INJECTION, SOLUTION INTRAMUSCULAR; INTRAVENOUS ONCE
Status: COMPLETED | OUTPATIENT
Start: 2019-04-25 | End: 2019-04-25

## 2019-04-25 RX ORDER — SODIUM CHLORIDE 0.9 % (FLUSH) 0.9 %
10 SYRINGE (ML) INJECTION PRN
Status: DISCONTINUED | OUTPATIENT
Start: 2019-04-25 | End: 2019-04-26 | Stop reason: HOSPADM

## 2019-04-25 RX ORDER — INSULIN GLARGINE 100 [IU]/ML
28 INJECTION, SOLUTION SUBCUTANEOUS NIGHTLY
Status: DISCONTINUED | OUTPATIENT
Start: 2019-04-25 | End: 2019-04-26 | Stop reason: HOSPADM

## 2019-04-25 RX ORDER — DEXTROSE MONOHYDRATE 50 MG/ML
100 INJECTION, SOLUTION INTRAVENOUS PRN
Status: DISCONTINUED | OUTPATIENT
Start: 2019-04-25 | End: 2019-04-26 | Stop reason: HOSPADM

## 2019-04-25 RX ADMIN — ENOXAPARIN SODIUM 40 MG: 40 INJECTION SUBCUTANEOUS at 08:58

## 2019-04-25 RX ADMIN — MORPHINE SULFATE 2 MG: 2 INJECTION, SOLUTION INTRAMUSCULAR; INTRAVENOUS at 15:39

## 2019-04-25 RX ADMIN — TETROFOSMIN 30 MILLICURIE: 0.23 INJECTION, POWDER, LYOPHILIZED, FOR SOLUTION INTRAVENOUS at 14:06

## 2019-04-25 RX ADMIN — IPRATROPIUM BROMIDE AND ALBUTEROL SULFATE 1 AMPULE: .5; 3 SOLUTION RESPIRATORY (INHALATION) at 00:07

## 2019-04-25 RX ADMIN — Medication 10 ML: at 09:50

## 2019-04-25 RX ADMIN — MORPHINE SULFATE 2 MG: 2 INJECTION, SOLUTION INTRAMUSCULAR; INTRAVENOUS at 05:25

## 2019-04-25 RX ADMIN — FUROSEMIDE 40 MG: 40 TABLET ORAL at 09:49

## 2019-04-25 RX ADMIN — INSULIN LISPRO 2 UNITS: 100 INJECTION, SOLUTION INTRAVENOUS; SUBCUTANEOUS at 11:51

## 2019-04-25 RX ADMIN — INSULIN LISPRO 1 UNITS: 100 INJECTION, SOLUTION INTRAVENOUS; SUBCUTANEOUS at 21:19

## 2019-04-25 RX ADMIN — GABAPENTIN 600 MG: 300 CAPSULE ORAL at 09:00

## 2019-04-25 RX ADMIN — GABAPENTIN 600 MG: 300 CAPSULE ORAL at 19:30

## 2019-04-25 RX ADMIN — ATORVASTATIN CALCIUM 80 MG: 80 TABLET, FILM COATED ORAL at 09:49

## 2019-04-25 RX ADMIN — INSULIN LISPRO 3 UNITS: 100 INJECTION, SOLUTION INTRAVENOUS; SUBCUTANEOUS at 17:14

## 2019-04-25 RX ADMIN — GABAPENTIN 600 MG: 300 CAPSULE ORAL at 11:52

## 2019-04-25 RX ADMIN — INSULIN GLARGINE 28 UNITS: 100 INJECTION, SOLUTION SUBCUTANEOUS at 21:20

## 2019-04-25 RX ADMIN — REGADENOSON 0.4 MG: 0.08 INJECTION, SOLUTION INTRAVENOUS at 13:58

## 2019-04-25 RX ADMIN — AMLODIPINE BESYLATE 5 MG: 5 TABLET ORAL at 09:49

## 2019-04-25 RX ADMIN — IOPAMIDOL 75 ML: 755 INJECTION, SOLUTION INTRAVENOUS at 00:16

## 2019-04-25 RX ADMIN — MORPHINE SULFATE 4 MG: 2 INJECTION, SOLUTION INTRAMUSCULAR; INTRAVENOUS at 02:37

## 2019-04-25 RX ADMIN — GABAPENTIN 600 MG: 300 CAPSULE ORAL at 15:39

## 2019-04-25 RX ADMIN — CLOPIDOGREL BISULFATE 75 MG: 75 TABLET ORAL at 09:49

## 2019-04-25 RX ADMIN — MORPHINE SULFATE 2 MG: 2 INJECTION, SOLUTION INTRAMUSCULAR; INTRAVENOUS at 21:19

## 2019-04-25 RX ADMIN — MORPHINE SULFATE 2 MG: 2 INJECTION, SOLUTION INTRAMUSCULAR; INTRAVENOUS at 09:56

## 2019-04-25 ASSESSMENT — PAIN DESCRIPTION - ONSET
ONSET: ON-GOING

## 2019-04-25 ASSESSMENT — PAIN DESCRIPTION - DESCRIPTORS
DESCRIPTORS: ACHING

## 2019-04-25 ASSESSMENT — PAIN DESCRIPTION - FREQUENCY
FREQUENCY: CONTINUOUS
FREQUENCY: INTERMITTENT
FREQUENCY: CONTINUOUS

## 2019-04-25 ASSESSMENT — PAIN DESCRIPTION - LOCATION
LOCATION: CHEST

## 2019-04-25 ASSESSMENT — PAIN - FUNCTIONAL ASSESSMENT

## 2019-04-25 ASSESSMENT — PAIN SCALES - GENERAL
PAINLEVEL_OUTOF10: 7
PAINLEVEL_OUTOF10: 6
PAINLEVEL_OUTOF10: 8
PAINLEVEL_OUTOF10: 8
PAINLEVEL_OUTOF10: 6
PAINLEVEL_OUTOF10: 8
PAINLEVEL_OUTOF10: 2
PAINLEVEL_OUTOF10: 9
PAINLEVEL_OUTOF10: 3
PAINLEVEL_OUTOF10: 6
PAINLEVEL_OUTOF10: 8

## 2019-04-25 ASSESSMENT — PAIN DESCRIPTION - PROGRESSION
CLINICAL_PROGRESSION: GRADUALLY IMPROVING
CLINICAL_PROGRESSION: GRADUALLY WORSENING
CLINICAL_PROGRESSION: GRADUALLY IMPROVING
CLINICAL_PROGRESSION: NOT CHANGED

## 2019-04-25 ASSESSMENT — PAIN DESCRIPTION - ORIENTATION
ORIENTATION: LEFT;MID
ORIENTATION: LEFT
ORIENTATION: LEFT;MID
ORIENTATION: LEFT;MID
ORIENTATION: LEFT
ORIENTATION: LEFT

## 2019-04-25 ASSESSMENT — PAIN DESCRIPTION - PAIN TYPE
TYPE: ACUTE PAIN

## 2019-04-25 NOTE — PROGRESS NOTES
Nutrition Assessment (Low Risk)    Type and Reason for Visit: Positive Nutrition Screen(MST3)    Nutrition Recommendations:   Monitor for diet progression, currently NPO. Declined diet education. Nutrition Assessment:  Pt appears nutritionally adequate. Pt at risk for nutrition compromise d/t wt loss (intentional, changed eating habits), CHF and DM. Pt reports he has been trying to lose weight and has cut out pop and changed his eating habits which caused -6.5% wt loss x 5 months. Pt reports he follows a low Na diet at home but did eat lean cuisine last night. Tried to educate pt on low na diet but stated \"I don't use salt so I think i'm good\". Pt declined any education or handouts.      Malnutrition Assessment:  · Malnutrition Status: No malnutrition    Nutrition Risk Level   Risk Level: Low    Nutrition Diagnosis:   · Problem: No nutrition diagnosis at this time    Nutrition Intervention:  Food and/or Delivery: Continue current diet  Nutrition Education/Counseling/Coordination of Care:  Continued Inpatient Monitoring, Education declined      Electronically signed by Bhavin Cuevas RD, BETTE on 4/25/19 at 11:13 AM    Contact Number: [phone]

## 2019-04-25 NOTE — CONSULTS
[facility]  Cardiology Consult Note        CC:     Chest pain          HPI:   This is a 61 y.o. male with history of coronary artery bypass surgery twice, left main stenting twice, who comes in for evaluation of chest pain. He claims that he was sitting watching TV the night before when he started having pain in his chest.  When I asked him if the pain got worse with a deep breath. He said yes. The pain continued until he came to the ER where painkillers help relieve the pain. The patient states that he still has the pain but it is improved with painkillers.       Past Medical History:   Diagnosis Date    Anxiety     Arthritis     Asthma     CAD (coronary artery disease)     Calcium kidney stone     Cardiomyopathy ([address]     Cerebral artery occlusion with cerebral infarction ([address]     CHF (congestive heart failure) ([address]     COPD [facility]chronic obstructive pulmonary [facility]) ([facility]     mild    Depression     DM2 (diabetes mellitus, type 2) ([facility])     Fibromyalgia     GERD (gastroesophageal reflux disease)     Hyperlipidemia     Hypertension     Liver disease     Pacemaker 2012    Medtronic model # [id number]    Pneumonia     Seizures ([address]     TIA (transient ischemic attack) 2007    Ulcerative colitis ([address]       Past Surgical History:   Procedure Laterality Date    BACK SURGERY      CARDIAC SURGERY      CHOLECYSTECTOMY      COLONOSCOPY  01/10/2017    COLONOSCOPY  01/10/2017    CORONARY ANGIOPLASTY WITH STENT PLACEMENT  2012    CORONARY ARTERY BYPASS GRAFT  2010, 11/2015    ENDOSCOPY, COLON, DIAGNOSTIC      PACEMAKER PLACEMENT      UPPER GASTROINTESTINAL ENDOSCOPY  02/07/2017      Family History   Problem Relation Age of Onset    Diabetes Father     Coronary Art Dis Father       Social History     Tobacco Use    Smoking status: Current Every Day Smoker     Packs/day: 1.00     Years: 44.00     Pack years: 44.00     Types: Cigarettes    Smokeless tobacco: Never Used  Tobacco comment: does not wish to stop !!   Substance Use Topics    Alcohol use: No     Alcohol/week: 0.0 oz    Drug use: No     Allergies   Allergen Reactions    Dopamine Hcl Other (See Comments)     Compulsive gambling    Tramadol Other (See Comments)     Dizziness       amLODIPine  5 mg Oral Daily    atorvastatin  80 mg Oral Daily    clopidogrel  75 mg Oral Daily    furosemide  40 mg Oral Daily    gabapentin  600 mg Oral 5x Daily    insulin glargine  28 Units Subcutaneous Nightly    sodium chloride flush  10 mL Intravenous 2 times per day    [START ON 4/26/2019] aspirin  81 mg Oral Daily    enoxaparin  40 mg Subcutaneous Daily    insulin lispro  0-6 Units Subcutaneous TID WC    insulin lispro  0-3 Units Subcutaneous Nightly       Review of Systems -   Constitutional: Negative for weight gain/loss; malaise, fever  Respiratory: Negative for Asthma;  cough and hemoptysis  Cardiovascular: Negative for palpitations,dizziness   Gastrointestinal: Negative for abd.pain; constipation/diarrhea;    Genitourinary: Negative for stones; hematuria; frequency hesitancy  Integumentt: Negative for rash or pruritis  Hematologic/lymphatic: Negative for blood dyscrasia; leukemia/lymphoma  Musculoskeletal: Negative for Connective tissue disease  Neurological:  Negative for Seizure   Behavioral/Psych:Negative for Bipolar disorder, Schizophrenia; Dementia  Endocrine: negative for thyroid, parathyroid disease    No intake or output data in the 24 hours ending 04/25/19 0919    Physical Examination:    BP (!) 154/80   Pulse 80   Temp 97.9 °F (36.6 °C) (Oral)   Resp 14   Ht 6' (1.829 m)   Wt 227 lb 1.2 oz (103 kg)   SpO2 92%   BMI 30.80 kg/m²    HEENT:  Face: Atraumatic, Conjunctiva: Pink; non icteric,  Mucous Memb:  Moist, No thyromegaly or Lymphadenopathy  Respiratory:  Resp Assessment: normal, Resp Auscultation: clear   Cardiovascular: Auscultation: nl S1 & S2, Palpation:  Nl PMI;  No heaves or thrills, JVP: normal  Abdomen: Soft, non-tender, Normal bowel sounds,  No organomegaly  Extremities: No Cyanosis or Clubbing; Edema none  Neurological: Oriented to time, place, and person, Non-anxious  Psychiatric: Normal mood and affect  Skin: Warm and dry,  No rash seen      Current Facility-Administered Medications: albuterol sulfate  (90 Base) MCG/ACT inhaler 2 puff, 2 puff, Inhalation, Q6H PRN  amLODIPine (NORVASC) tablet 5 mg, 5 mg, Oral, Daily  atorvastatin (LIPITOR) tablet 80 mg, 80 mg, Oral, Daily  clopidogrel (PLAVIX) tablet 75 mg, 75 mg, Oral, Daily  furosemide (LASIX) tablet 40 mg, 40 mg, Oral, Daily  gabapentin (NEURONTIN) capsule 600 mg, 600 mg, Oral, 5x Daily  insulin glargine (LANTUS) injection vial 28 Units, 28 Units, Subcutaneous, Nightly  sodium chloride flush 0.9 % injection 10 mL, 10 mL, Intravenous, 2 times per day  sodium chloride flush 0.9 % injection 10 mL, 10 mL, Intravenous, PRN  magnesium hydroxide (MILK OF MAGNESIA) 400 MG/5ML suspension 30 mL, 30 mL, Oral, Daily PRN  ondansetron (ZOFRAN) injection 4 mg, 4 mg, Intravenous, Q6H PRN  [START ON 4/26/2019] aspirin chewable tablet 81 mg, 81 mg, Oral, Daily  nitroGLYCERIN (NITROSTAT) SL tablet 0.4 mg, 0.4 mg, Sublingual, Q5 Min PRN  acetaminophen (TYLENOL) tablet 650 mg, 650 mg, Oral, Q4H PRN  enoxaparin (LOVENOX) injection 40 mg, 40 mg, Subcutaneous, Daily  morphine (PF) injection 1 mg, 1 mg, Intravenous, Q2H PRN **OR** morphine (PF) injection 2 mg, 2 mg, Intravenous, Q2H PRN  glucose (GLUTOSE) 40 % oral gel 15 g, 15 g, Oral, PRN  dextrose 50 % solution 12.5 g, 12.5 g, Intravenous, PRN  glucagon (rDNA) injection 1 mg, 1 mg, Intramuscular, PRN  dextrose 5 % solution, 100 mL/hr, Intravenous, PRN  insulin lispro (HUMALOG) injection vial 0-6 Units, 0-6 Units, Subcutaneous, TID WC  insulin lispro (HUMALOG) injection vial 0-3 Units, 0-3 Units, Subcutaneous, Nightly      Labs:   Recent Labs     04/24/19  0328 04/24/19  2256   WBC 9.5 10.4   HGB 14.1 14.7 HCT 42.3 43.9    219     Recent Labs     04/24/19  0328 04/24/19  2256   * 135*   K 3.8 4.1   CO2 23 22   BUN 16 16   CREATININE 1.1 1.0   GLUCOSE 332* 296*     No results for input(s): BNP in the last 72 hours. Recent Labs     04/24/19  0328 04/24/19  2256   PROTIME 9.9 10.4   INR 0.87 0.91     Recent Labs     04/24/19  0328   APTT 25.3*     Recent Labs     04/24/19  0508 04/24/19  2256 04/25/19  0746   TROPONINI <0.01 0.02* <0.01     Lab Results   Component Value Date    HDL 39 12/26/2018    LDLDIRECT 105 09/17/2015    LDLCALC 74 12/26/2018    TRIG 146 12/26/2018     Recent Labs     04/24/19  0328 04/24/19  2256   AST 9* 9*   ALT 11 11   LABALBU 3.5 3.6         EKG:   Normal sinus rhythm with right bundle branch block and no ischemic ST changes    CT chest  No PE    ECHO:   Left ventricular cavity size is dilated. There is moderate concentric left ventricular hypertrophy. Ejection fraction is visually estimated to be  45%. Mild mitral regurgitation. Pacer / ICD wire is visualized in the right ventricle. Stress Nuclear:   1. Technically a satisfactory study. 2. Normal pharmacological stress portion of the study. 3. No evidence of Ischemia by Myocardial Perfusion Imaging. 4. Gated Study shows normal LV size and Systolic function; EF is 86%. Corornary angiogram:  The left main coronary comes from the left coronary, cusp, giving rise to left anterior descending artery, left circumflex artery. The mid portion of the left main has a stent present which has severe 90% in-stent restenosis present. The left anterior descending artery is occluded. There is a large diagonal coming off the LAD, which is the first diagonal.  The circumflex has severe disease in the proximal portion. We intervened on left main coronary artery, placed one drug-coated Xience Alpine stent 3.5 mm x 12 mm which was post-dilated to 3.8 mm.       ASSESSMENT AND PLAN:      Chest pain occurring at rest in a patient with known coronary disease. No ischemic changes. Troponin is just borderline elevated 0.02. We will do a stress nuclear scan to rule out coronary ischemia.         Mya Stoner M.D  4/25/2019

## 2019-04-25 NOTE — PROGRESS NOTES
Patient was resting with his eyes closed when this nurse came. This nurse woke the patient up. Full body assessment completed. Patietn tolerated due meds with a sip of water. Patient c/o chest pain 8/10. Nitro offered. Patient refused stated \"I take it at home, it does not help. \" Fall prec in place.  Electronically signed by Chantel Brown RN on 4/25/2019 at 9:59 AM

## 2019-04-25 NOTE — ED PROVIDER NOTES
**EVALUATED BY ADVANCED PRACTICE PROVIDER**        [facility]  eMERGENCY dEPARTMENT eNCOUnter      Pt Name: Meme Cohen  LNE:[id number]  [place] 1955  Date of evaluation: 4/24/2019  Provider: TAYLA Harvey CNP      Chief Complaint:    Chief Complaint   Patient presents with    Chest Pain     Patient seen here last night for chest pain. Patient took 9 mg of nitro and baby asa at home. no relief w/ nitro.  + sob. dry cough. Nursing Notes, Past Medical Hx, Past Surgical Hx, Social Hx, Allergies, and Family Hx were all reviewed and agreed with or any disagreements were addressed in the HPI.    HPI:  (Location, Duration, Timing, Severity,Quality, Assoc Sx, Context, Modifying factors)  This is a  61 y.o. male with PMH significant for CHF, CAD, cardiomyopathy, COPD, DM, GERD, HLD, and HTN who presents to the emergency department today via EMS from home complaining of a 24-hour history of left anterior chest pain. Patient states he was seen in the emergency department early this morning and admission was recommended that he refused and wanted to go home. He had a negative cardiac workup at that time. He rates the pain 8/10 and states it is worsened since he left. He denies worsening pain with exertion. He denies nausea or diaphoresis. He states he took nitro and aspirin at home without relief. He has chronic shortness of breath and dry cough due to history of COPD.     PastMedical/Surgical History:      Diagnosis Date    Anxiety     Arthritis     Asthma     CAD (coronary artery disease)     Calcium kidney stone     Cardiomyopathy ([address]     Cerebral artery occlusion with cerebral infarction ([address]     CHF (congestive heart failure) ([address]     COPD (chronic obstructive pulmonary disease) ([facility])     mild    Depression     DM2 (diabetes mellitus, type 2) ([facility])     Fibromyalgia     GERD (gastroesophageal reflux disease)     EXTENDED RELEASE TABLET    Take 1 tablet by mouth daily    METOPROLOL SUCCINATE (TOPROL XL) 100 MG EXTENDED RELEASE TABLET    TAKE 1 TABLET BY MOUTH DAILY    NITROGLYCERIN (NITROSTAT) 0.4 MG SL TABLET    PLACE 1 TABLET UNDER THE TONGUE EVERY 5 MINUTES AS NEEDED FOR CHEST PAIN    OXYCODONE-ACETAMINOPHEN (PERCOCET) 5-325 MG PER TABLET    Take 1 tablet by mouth 2 times daily as needed for Pain for up to 30 days. Review of Systems:  Review of Systems   Constitutional: Negative for chills, diaphoresis, fatigue and fever. HENT: Negative. Eyes: Negative for visual disturbance. Respiratory: Positive for cough and shortness of breath. Cardiovascular: Positive for chest pain. Negative for leg swelling. Gastrointestinal: Negative for abdominal distention, abdominal pain, constipation, diarrhea, nausea and vomiting. Skin: Negative for pallor and rash. Allergic/Immunologic: Negative for immunocompromised state. Neurological: Negative for dizziness, syncope, weakness, light-headedness, numbness and headaches. Hematological: Negative for adenopathy. Psychiatric/Behavioral: Negative for confusion. All other systems reviewed and are negative. Positives and Pertinent negatives as per HPI. Except as noted above in the ROS, problem specific ROS was completed and is negative. Physical Exam:  Physical Exam   Constitutional: He is oriented to person, place, and time. Vital signs are normal. He appears well-developed and well-nourished. Non-toxic appearance. No distress. HENT:   Head: Normocephalic and atraumatic. Eyes: Pupils are equal, round, and reactive to light. Conjunctivae and EOM are normal. No scleral icterus. Neck: Normal range of motion. Neck supple. No JVD present. Cardiovascular: Normal rate and regular rhythm. Exam reveals no gallop and no friction rub. No murmur heard. Pulmonary/Chest: Effort normal. No respiratory distress. He has rhonchi. Abdominal: Soft.  Normal appearance. He exhibits no distension. There is no tenderness. There is no rigidity. Musculoskeletal: Normal range of motion. Neurological: He is alert and oriented to person, place, and time. No cranial nerve deficit. Skin: Skin is warm, dry and intact. Capillary refill takes less than 2 seconds. No rash noted. Psychiatric: He has a normal mood and affect. Nursing note and vitals reviewed.       MEDICAL DECISION MAKING    Vitals:    Vitals:    04/24/19 2247 04/25/19 0008   BP: (!) 152/89    Pulse: 98    Resp: 16 19   Temp: 97 °F (36.1 °C)    TempSrc: Oral    SpO2: 98% 94%   Weight: 228 lb 2.8 oz (103.5 kg)        LABS:  Labs Reviewed   COMPREHENSIVE METABOLIC PANEL - Abnormal; Notable for the following components:       Result Value    Sodium 135 (*)     Glucose 296 (*)     AST 9 (*)     All other components within normal limits    Narrative:     Performed at:  [address] [place] [address]   Phone [phone]   TROPONIN - Abnormal; Notable for the following components:    Troponin 0.02 (*)     All other components within normal limits    Narrative:     Performed at:  [address] [place] [address]   Phone [phone]   D-DIMER, QUANTITATIVE - Abnormal; Notable for the following components:    D-Dimer, Quant 287 (*)     All other components within normal limits    Narrative:     Performed at:  [address] [place] [address]   Phone [phone]   BRAIN NATRIURETIC PEPTIDE - Abnormal; Notable for the following components:    Pro-BNP 1,352 (*)     All other components within normal limits    Narrative:     Performed at:  [address] [place] [address]   Phone [phone]   CBC WITH AUTO DIFFERENTIAL    Narrative:     Performed at:  [facility]  [address] [facility] [address]   Phone [phone]   PROTIME-INR    Narrative:     Performed at:  [facility]  [address] [facility] [address]   Phone [phone] of labs reviewed and werenegative at this time or not returned at the time of this note. RADIOLOGY:   Non-plain film images such as CT, Ultrasound and MRI are read by the radiologist. TAYLA Cazares CNP have directly visualized the radiologic plain film image(s) with the below findings:        Interpretation per the Radiologist below, if available at the time of thisnote:    CT CHEST PULMONARY EMBOLISM W CONTRAST   Final Result   No scan evidence for pulmonary embolus or other acute abnormality. XR CHEST PORTABLE   Final Result   No acute abnormality              Xr Chest Portable    Result Date: 4/24/2019  EXAMINATION: SINGLE XRAY VIEW OF THE CHEST 4/24/2019 3:24 am COMPARISON: 11/26/2018 HISTORY: ORDERING SYSTEM PROVIDED HISTORY: cp TECHNOLOGIST PROVIDED HISTORY: Reason for exam:->cp Ordering Physician Provided Reason for Exam: chest pain Coronary artery disease FINDINGS: The lungs are clear. The heart size is mildly enlarged. Sternal wires and a left chest AICD are again noted. The costophrenic angles are sharp. There is no discernible pneumothorax. No acute disease.         MEDICAL DECISION MAKING / ED COURSE:      PROCEDURES:   Procedures    None    Patient was given:     Medications   morphine (PF) injection 4 mg (4 mg Intravenous Given 4/24/19 3258)   ipratropium-albuterol (DUONEB) nebulizer solution 1 ampule (1 ampule Inhalation Given 4/25/19 0007)   iopamidol (ISOVUE-370) 76 % injection 75 mL (75 mLs Intravenous Given 4/25/19 0016)   morphine (PF) injection 4 mg (4 mg Intravenous Given 4/25/19 0237)       Differential Diagnosis: Acute Coronary Syndrome, Congestive Heart Failure, Thoracic Dissection, Pericarditis, Pericardial TAYLA Jackson - LYNN  04/25/19 0813

## 2019-04-25 NOTE — ED NOTES
Placed call to dr Eliana Mandel in regards to pt pain coming back. Pt states pain now 8/10 will await order.      Ashley Martinez RN  04/25/19 0697

## 2019-04-25 NOTE — ED NOTES
Supervisor called to switch room to bed on 4th floor will call report when room ready.        Amarilys Silveira RN  04/25/19 4962

## 2019-04-25 NOTE — H&P
Hospital Medicine History & Physical      PCP: Maritza Romero MD    Date of Admission: 4/24/2019    Date of Service: Pt seen/examined on 4/25/2019 and  Placed in Observation. Chief Complaint:  Chest pain      History Of Present Illness:       61 y.o. male with h/o CAD presents with chest pain onset at rest yesterday evening. He has ahd pain radiating down left arm, some sob, diaphoresis, no n/v. Worse with deep inspiration, cough. Denies increased KC, hemoptysis, chest pain on exertion. Denies fevers, chills, heartburn, n/v, but notes a 40 lb weight loss since his back surgery 6 months ago attributed to better diet. Past Medical History:          Diagnosis Date    Anxiety     Arthritis     Asthma     CAD (coronary artery disease)     Calcium kidney stone     Cardiomyopathy ([address]     Cerebral artery occlusion with cerebral infarction ([address]     CHF (congestive heart failure) ([address]     [facility] ([facility]     mild    Depression     DM2 (diabetes mellitus, type 2) ([facility])     Fibromyalgia     GERD (gastroesophageal reflux disease)     Hyperlipidemia     Hypertension     Liver disease     Pacemaker 2012    Medtronic model # O024SST    Pneumonia     Seizures ([address]     TIA (transient ischemic attack) 2007    Ulcerative colitis ([address]        Past Surgical History:          Procedure Laterality Date    BACK SURGERY      CARDIAC SURGERY      CHOLECYSTECTOMY      COLONOSCOPY  01/10/2017    COLONOSCOPY  01/10/2017    CORONARY ANGIOPLASTY WITH STENT PLACEMENT  2012    CORONARY ARTERY BYPASS GRAFT  2010, 11/2015    ENDOSCOPY, COLON, DIAGNOSTIC      PACEMAKER PLACEMENT      UPPER GASTROINTESTINAL ENDOSCOPY  02/07/2017       Medications Prior to Admission:      Prior to Admission medications    Medication Sig Start Date End Date Taking?  Authorizing Provider   gabapentin (NEURONTIN) 600 MG tablet TAKE 1 TABLET BY MOUTH FIVE TIMES DAILY 4/9/19 5/9/19 Yes Roberto Meyers MD   clopidogrel (PLAVIX) 75 MG tablet TAKE 1 TABLET BY MOUTH DAILY 4/7/19  Yes Roberto Meyers MD   oxyCODONE-acetaminophen (PERCOCET) 5-325 MG per tablet Take 1 tablet by mouth 2 times daily as needed for Pain for up to 30 days. 4/3/19 5/3/19 Yes Roberto Meyers MD   furosemide (LASIX) 40 MG tablet Take 1 tablet by mouth daily 10/11/18  Yes Roberto Meyers MD   isosorbide mononitrate (IMDUR) 60 MG extended release tablet Take 1 tablet by mouth daily 10/11/18  Yes Roberto Meyers MD   metoprolol succinate (TOPROL XL) 100 MG extended release tablet Take 1 tablet by mouth daily 1/12/18  Yes TAYLA Desai - CNP   methylPREDNISolone (MEDROL, KAY,) 4 MG tablet Take by mouth.  4/19/19   Roberto Meyers MD   Insulin Pen Needle 31G X 5 MM MISC 1 each by Does not apply route daily 10/5/18   Roberto Meyers MD   losartan (COZAAR) 50 MG tablet Take 1 tablet by mouth daily 9/17/18   Roberto Meyers MD   nitroGLYCERIN (NITROSTAT) 0.4 MG SL tablet PLACE 1 TABLET UNDER THE TONGUE EVERY 5 MINUTES AS NEEDED FOR CHEST PAIN 9/2/18   Roberto Meyers MD   albuterol sulfate  (90 Base) MCG/ACT inhaler Inhale 2 puffs into the lungs every 6 hours as needed for Wheezing    Historical Provider, MD   aspirin 81 MG tablet Take 81 mg by mouth daily    Historical Provider, MD   insulin glargine (LANTUS) 100 UNIT/ML injection pen Inject 28 Units into the skin nightly  Patient taking differently: Inject 32 Units into the skin nightly  8/28/18   Roberto Meyers MD   insulin lispro (HUMALOG KWIKPEN) 100 UNIT/ML pen Inject 5 Units into the skin 3 times daily (before meals)  Patient taking differently: Inject 8 Units into the skin 3 times daily (before meals)  8/28/18   Roberto Meyers MD   amLODIPine (NORVASC) 5 MG tablet TAKE 1 TABLET BY MOUTH DAILY 7/12/18   Roberto Meyers MD   Lancets [facility] accu check fastclick lancets  Dx: N98.4  As needed 5/4/18 Maritza Romero MD   Insulin Pen Needle (CAREFINE PEN NEEDLES) 32G X 4 MM MISC 1 each by Does not apply route daily 4/25/18   Maritza Romero MD   FREESTYLE LANCETS MISC 1 each by Does not apply route daily 4/24/18   Aren Patiño MD   atorvastatin (LIPITOR) 80 MG tablet TAKE 1 TABLET BY MOUTH DAILY 2/15/18   Maritza Romero MD       Allergies:  Dopamine hcl and Tramadol    Social History:           TOBACCO:   reports that he has been smoking cigarettes. He has a 44.00 pack-year smoking history. He has never used smokeless tobacco.  ETOH:   reports that he does not drink alcohol. Family History:               Problem Relation Age of Onset    Diabetes Father     Coronary Art Dis Father        REVIEW OF SYSTEMS:   Pertinent positives as noted in the HPI. All other systems reviewed and negative. PHYSICAL EXAM PERFORMED:    BP (!) 158/97   Pulse 83   Temp 97.6 °F (36.4 °C)   Resp 16   Ht 6' (1.829 m)   Wt 227 lb 1.2 oz (103 kg)   SpO2 94%   BMI 30.80 kg/m²     General appearance:  No apparent distress, appears stated age and cooperative. HEENT:  Normal cephalic, atraumatic without obvious deformity. Pupils equal, round, and reactive to light. Extra ocular muscles intact. Conjunctivae/corneas clear. Neck: Supple, with full range of motion. No jugular venous distention. Trachea midline. Respiratory:  Normal respiratory effort. Clear to auscultation, bilaterally without Rales/Wheezes/Rhonchi. Cardiovascular:  Regular rate and rhythm with normal S1/S2 without murmurs, rubs or gallops. Abdomen: Soft, non-tender, non-distended with normal bowel sounds. Musculoskeletal:  No clubbing, cyanosis or edema bilaterally. Full range of motion without deformity. Skin: Skin color, texture, turgor normal.  No rashes or lesions. Neurologic:  Neurovascularly intact without any focal sensory/motor deficits.  Cranial nerves: II-XII intact, grossly non-focal.  Psychiatric:  Alert and oriented, thought content appropriate, normal insight  Capillary Refill: Brisk,< 3 seconds   Peripheral Pulses: +2 palpable, equal bilaterally       Labs:     Recent Labs     04/24/19 0328 04/24/19 2256   WBC 9.5 10.4   HGB 14.1 14.7   HCT 42.3 43.9    219     Recent Labs     04/24/19 0328 04/24/19 2256   * 135*   K 3.8 4.1   CL 97* 99   CO2 23 22   BUN 16 16   CREATININE 1.1 1.0   CALCIUM 8.9 8.5     Recent Labs     04/24/19 0328 04/24/19  2256   AST 9* 9*   ALT 11 11   BILITOT <0.2 0.4   ALKPHOS 74 74     Recent Labs     04/24/19 0328 04/24/19 2256   INR 0.87 0.91     Recent Labs     04/24/19 2256 04/25/19  0746 04/25/19  1115   TROPONINI 0.02* <0.01 0.02*       Urinalysis:      Lab Results   Component Value Date    NITRU Negative 11/26/2018    WBCUA 1 11/26/2018    RBCUA 1 11/26/2018    BLOODU Negative 11/26/2018    SPECGRAV >1.030 11/26/2018    GLUCOSEU 100 11/26/2018       Radiology:          CT CHEST PULMONARY EMBOLISM W CONTRAST   Final Result   No scan evidence for pulmonary embolus or other acute abnormality. XR CHEST PORTABLE   Final Result   No acute abnormality         NM MYOCARDIAL SPECT REST EXERCISE OR RX    (Results Pending)       ASSESSMENT:    Active Hospital Problems    Diagnosis Date Noted    Chest pain [R07.9] 04/25/2019         PLAN:    Chest pain  - stress test  Nicotine abuse  - advised cessation. Patient declines nicotine patch  DM  - corrective ISS    DVT Prophylaxis: lovnox  Diet: DIET LOW SODIUM 2 GM; Carb Control: 4 carb choices (60 gms)/meal  Code Status: Full Code         Yulissa Anne MD    Thank you Robert Brown MD for the opportunity to be involved in this patient's care. If you have any questions or concerns please feel free to contact me at [phone].

## 2019-04-25 NOTE — PROGRESS NOTES
Pt is in room 4129 . Alert and oriented. Follows commands. Resting in bed. Assessment questions completed. Fall precautions in place. Pt is x1 assist . He stated he uses walker at home but doesn't have one here. Pt stated he had surgery (back ) six months ago. Since then his legs have been weak . He states he will be fine getting up if he can get a walker. Oriented to call light. Will continue to monitor.

## 2019-04-25 NOTE — ED NOTES
Called report to 3rd floor pt admitted to room 3130. Pain level 8/10 dr Dena Pearce placing orders for iv morphine.        Onur Rolle RN  04/25/19 0987

## 2019-04-25 NOTE — PROGRESS NOTES
4 Eyes Skin Assessment     The patient is being assess for  Admission    I agree that 2 RN's have performed a thorough Head to Toe Skin Assessment on the patient. ALL assessment sites listed below have been assessed. Areas assessed by both nurses:   [x]   Head, Face, and Ears   [x]   Shoulders, Back, and Chest  [x]   Arms, Elbows, and Hands   [x]   Coccyx, Sacrum, and IschIum  [x]   Legs, Feet, and Heels        Does the Patient have Skin Breakdown?   No         Jhonathan Prevention initiated:  NA   Wound Care Orders initiated:  NA      [facility] nurse consulted for Pressure Injury (Stage 3,4, Unstageable, DTI, NWPT, and Complex wounds), New and Established Ostomies:  NA      Nurse 1 eSignature: Electronically signed by Tamar Del Real RN on 4/25/19 at 6:40 AM    **SHARE this note so that the co-signing nurse is able to place an eSignature**    Nurse 2 eSignature: Electronically signed by Alina Bradley RN on 4/25/19 at 7:01 PM

## 2019-04-25 NOTE — CARE COORDINATION
INITIAL CASE MANAGEMENT ASSESSMENT    Reviewed chart, met with patient to assess possible discharge needs. Explained Case Management role/services. Living Situation: Patient lives with his wife in a 2 family house with 16 steps to enter. ADLs: Mostly independent. Wife assists with some things. DME: walker    PT/OT Recs: not ordered     Active Services: none     Transportation: Doesn't drive/Wife will transport     Medications: [employer]/affordable    PCP: Dr. Ingrid Fay      HD/PD: n/a    PLAN/COMMENTS: Patient plans to return to home with his wife. SW/CM provided contact information for patient or family to call with any questions. SW/CM will follow and assist as needed. Electronically signed by Andriy Harvey RN on 4/25/2019 at 4:12 PM

## 2019-04-26 VITALS
OXYGEN SATURATION: 95 % | TEMPERATURE: 98.3 F | DIASTOLIC BLOOD PRESSURE: 95 MMHG | RESPIRATION RATE: 17 BRPM | BODY MASS INDEX: 30.76 KG/M2 | WEIGHT: 227.07 LBS | SYSTOLIC BLOOD PRESSURE: 157 MMHG | HEART RATE: 96 BPM | HEIGHT: 72 IN

## 2019-04-26 LAB
EKG ATRIAL RATE: 100 BPM
EKG DIAGNOSIS: NORMAL
EKG P AXIS: 66 DEGREES
EKG P-R INTERVAL: 124 MS
EKG Q-T INTERVAL: 400 MS
EKG QRS DURATION: 130 MS
EKG QTC CALCULATION (BAZETT): 516 MS
EKG R AXIS: 84 DEGREES
EKG T AXIS: 46 DEGREES
EKG VENTRICULAR RATE: 100 BPM
GLUCOSE BLD-MCNC: 155 MG/DL (ref 70–99)
HCT VFR BLD CALC: 42.3 % (ref 40.5–52.5)
HEMOGLOBIN: 14.3 G/DL (ref 13.5–17.5)
MCH RBC QN AUTO: 32.7 PG (ref 26–34)
MCHC RBC AUTO-ENTMCNC: 33.8 G/DL (ref 31–36)
MCV RBC AUTO: 96.9 FL (ref 80–100)
PDW BLD-RTO: 15.4 % (ref 12.4–15.4)
PERFORMED ON: ABNORMAL
PLATELET # BLD: 186 K/UL (ref 135–450)
PMV BLD AUTO: 9.8 FL (ref 5–10.5)
RBC # BLD: 4.36 M/UL (ref 4.2–5.9)
WBC # BLD: 7.8 K/UL (ref 4–11)

## 2019-04-26 PROCEDURE — A9502 TC99M TETROFOSMIN: HCPCS | Performed by: INTERNAL MEDICINE

## 2019-04-26 PROCEDURE — 96376 TX/PRO/DX INJ SAME DRUG ADON: CPT

## 2019-04-26 PROCEDURE — 36415 COLL VENOUS BLD VENIPUNCTURE: CPT

## 2019-04-26 PROCEDURE — 96372 THER/PROPH/DIAG INJ SC/IM: CPT

## 2019-04-26 PROCEDURE — 6370000000 HC RX 637 (ALT 250 FOR IP): Performed by: HOSPITALIST

## 2019-04-26 PROCEDURE — 99233 SBSQ HOSP IP/OBS HIGH 50: CPT | Performed by: INTERNAL MEDICINE

## 2019-04-26 PROCEDURE — 2580000003 HC RX 258: Performed by: INTERNAL MEDICINE

## 2019-04-26 PROCEDURE — 6360000002 HC RX W HCPCS: Performed by: INTERNAL MEDICINE

## 2019-04-26 PROCEDURE — 3430000000 HC RX DIAGNOSTIC RADIOPHARMACEUTICAL: Performed by: INTERNAL MEDICINE

## 2019-04-26 PROCEDURE — 85027 COMPLETE CBC AUTOMATED: CPT

## 2019-04-26 PROCEDURE — G0378 HOSPITAL OBSERVATION PER HR: HCPCS

## 2019-04-26 PROCEDURE — 6370000000 HC RX 637 (ALT 250 FOR IP): Performed by: INTERNAL MEDICINE

## 2019-04-26 RX ORDER — PANTOPRAZOLE SODIUM 40 MG/1
40 TABLET, DELAYED RELEASE ORAL
Status: DISCONTINUED | OUTPATIENT
Start: 2019-04-26 | End: 2019-04-26 | Stop reason: HOSPADM

## 2019-04-26 RX ORDER — PANTOPRAZOLE SODIUM 40 MG/1
40 TABLET, DELAYED RELEASE ORAL
Qty: 30 TABLET | Refills: 3 | Status: SHIPPED | OUTPATIENT
Start: 2019-04-26 | End: 2019-10-21

## 2019-04-26 RX ADMIN — MORPHINE SULFATE 2 MG: 2 INJECTION, SOLUTION INTRAMUSCULAR; INTRAVENOUS at 00:28

## 2019-04-26 RX ADMIN — Medication 10 ML: at 09:04

## 2019-04-26 RX ADMIN — MORPHINE SULFATE 2 MG: 2 INJECTION, SOLUTION INTRAMUSCULAR; INTRAVENOUS at 04:34

## 2019-04-26 RX ADMIN — TETROFOSMIN 30 MILLICURIE: 0.23 INJECTION, POWDER, LYOPHILIZED, FOR SOLUTION INTRAVENOUS at 06:51

## 2019-04-26 RX ADMIN — FUROSEMIDE 40 MG: 40 TABLET ORAL at 09:03

## 2019-04-26 RX ADMIN — ATORVASTATIN CALCIUM 80 MG: 80 TABLET, FILM COATED ORAL at 09:02

## 2019-04-26 RX ADMIN — ASPIRIN 81 MG 81 MG: 81 TABLET ORAL at 09:03

## 2019-04-26 RX ADMIN — MORPHINE SULFATE 2 MG: 2 INJECTION, SOLUTION INTRAMUSCULAR; INTRAVENOUS at 11:09

## 2019-04-26 RX ADMIN — INSULIN LISPRO 1 UNITS: 100 INJECTION, SOLUTION INTRAVENOUS; SUBCUTANEOUS at 09:06

## 2019-04-26 RX ADMIN — Medication 10 ML: at 00:24

## 2019-04-26 RX ADMIN — GABAPENTIN 600 MG: 300 CAPSULE ORAL at 11:08

## 2019-04-26 RX ADMIN — CLOPIDOGREL BISULFATE 75 MG: 75 TABLET ORAL at 09:03

## 2019-04-26 RX ADMIN — GABAPENTIN 600 MG: 300 CAPSULE ORAL at 00:23

## 2019-04-26 RX ADMIN — AMLODIPINE BESYLATE 5 MG: 5 TABLET ORAL at 09:02

## 2019-04-26 RX ADMIN — ENOXAPARIN SODIUM 40 MG: 40 INJECTION SUBCUTANEOUS at 09:03

## 2019-04-26 ASSESSMENT — PAIN DESCRIPTION - FREQUENCY
FREQUENCY: INTERMITTENT

## 2019-04-26 ASSESSMENT — PAIN DESCRIPTION - DESCRIPTORS
DESCRIPTORS: ACHING

## 2019-04-26 ASSESSMENT — PAIN DESCRIPTION - LOCATION
LOCATION: CHEST

## 2019-04-26 ASSESSMENT — PAIN - FUNCTIONAL ASSESSMENT
PAIN_FUNCTIONAL_ASSESSMENT: PREVENTS OR INTERFERES SOME ACTIVE ACTIVITIES AND ADLS
PAIN_FUNCTIONAL_ASSESSMENT: PREVENTS OR INTERFERES SOME ACTIVE ACTIVITIES AND ADLS
PAIN_FUNCTIONAL_ASSESSMENT: ACTIVITIES ARE NOT PREVENTED

## 2019-04-26 ASSESSMENT — PAIN DESCRIPTION - ONSET
ONSET: ON-GOING

## 2019-04-26 ASSESSMENT — PAIN DESCRIPTION - ORIENTATION
ORIENTATION: MID
ORIENTATION: LEFT;MID
ORIENTATION: MID

## 2019-04-26 ASSESSMENT — PAIN DESCRIPTION - PAIN TYPE
TYPE: ACUTE PAIN

## 2019-04-26 ASSESSMENT — PAIN DESCRIPTION - PROGRESSION
CLINICAL_PROGRESSION: GRADUALLY IMPROVING
CLINICAL_PROGRESSION: GRADUALLY IMPROVING
CLINICAL_PROGRESSION: NOT CHANGED

## 2019-04-26 ASSESSMENT — PAIN SCALES - GENERAL
PAINLEVEL_OUTOF10: 9
PAINLEVEL_OUTOF10: 9
PAINLEVEL_OUTOF10: 7
PAINLEVEL_OUTOF10: 4
PAINLEVEL_OUTOF10: 3

## 2019-04-26 NOTE — PROGRESS NOTES
[facility]  Inpatient Progress Note    CC:     Chest pain            HPI:   This is a 61 y.o. male with history of coronary artery bypass surgery twice, left main stenting twice, who comes in for evaluation of chest pain. He claims that he was sitting watching TV the night before when he started having pain in his chest.  When I asked him if the pain got worse with a deep breath. He said yes. The pain continued until he came to the ER where painkillers help relieve the pain. The patient states that he still has the pain but it is improved with painkillers. Interval history  Appears quite comfortable, relaxing, but still claims to have mild degree of discomfort        Review of Systems -   Constitutional: Negative for weight gain/loss; malaise, fever  Respiratory: Negative for Asthma;  cough and hemoptysis  Cardiovascular: Negative for palpitations,dizziness   Gastrointestinal: Negative for abd.pain; constipation/diarrhea;    Genitourinary: Negative for stones; hematuria; frequency hesitancy  Integumentt: Negative for rash or pruritis  Hematologic/lymphatic: Negative for blood dyscrasia; leukemia/lymphoma  Musculoskeletal: Negative for Connective tissue disease  Neurological:  Negative for Seizure   Behavioral/Psych:Negative for Bipolar disorder, Schizophrenia; Dementia  Endocrine: negative for thyroid, parathyroid disease      Intake/Output Summary (Last 24 hours) at 4/26/2019 1227  Last data filed at 4/26/2019 0904  Gross per 24 hour   Intake 730 ml   Output --   Net 730 ml         Physical Examination:    BP (!) 157/95   Pulse 96   Temp 98.3 °F (36.8 °C)   Resp 17   Ht 6' (1.829 m)   Wt 227 lb 1.2 oz (103 kg)   SpO2 95%   BMI 30.80 kg/m²   HEENT:  Face: Atraumatic, Conjunctiva: Pink; non icteric,  Mucous Memb:  Moist, No thyromegaly or Lymphadenopathy  Respiratory:  Resp Assessment: normal, Resp Auscultation: clear   Cardiovascular: Auscultation: nl S1 & S2, Palpation:  Nl PMI;  No heaves or thrills, JVP:  normal  Abdomen: Soft, non-tender, Normal bowel sounds,  No organomegaly  Extremities: No Cyanosis or Clubbing; Edema none  Neurological: Oriented to time, place, and person, Non-anxious  Psychiatric: Normal mood and affect  Skin: Warm and dry,  No rash seen    Current Facility-Administered Medications: pantoprazole (PROTONIX) tablet 40 mg, 40 mg, Oral, QAM AC  albuterol sulfate  (90 Base) MCG/ACT inhaler 2 puff, 2 puff, Inhalation, Q6H PRN  amLODIPine (NORVASC) tablet 5 mg, 5 mg, Oral, Daily  atorvastatin (LIPITOR) tablet 80 mg, 80 mg, Oral, Daily  clopidogrel (PLAVIX) tablet 75 mg, 75 mg, Oral, Daily  furosemide (LASIX) tablet 40 mg, 40 mg, Oral, Daily  gabapentin (NEURONTIN) capsule 600 mg, 600 mg, Oral, 5x Daily  insulin glargine (LANTUS) injection vial 28 Units, 28 Units, Subcutaneous, Nightly  sodium chloride flush 0.9 % injection 10 mL, 10 mL, Intravenous, 2 times per day  sodium chloride flush 0.9 % injection 10 mL, 10 mL, Intravenous, PRN  magnesium hydroxide (MILK OF MAGNESIA) 400 MG/5ML suspension 30 mL, 30 mL, Oral, Daily PRN  ondansetron (ZOFRAN) injection 4 mg, 4 mg, Intravenous, Q6H PRN  aspirin chewable tablet 81 mg, 81 mg, Oral, Daily  nitroGLYCERIN (NITROSTAT) SL tablet 0.4 mg, 0.4 mg, Sublingual, Q5 Min PRN  acetaminophen (TYLENOL) tablet 650 mg, 650 mg, Oral, Q4H PRN  enoxaparin (LOVENOX) injection 40 mg, 40 mg, Subcutaneous, Daily  morphine (PF) injection 1 mg, 1 mg, Intravenous, Q2H PRN **OR** morphine (PF) injection 2 mg, 2 mg, Intravenous, Q2H PRN  glucose (GLUTOSE) 40 % oral gel 15 g, 15 g, Oral, PRN  dextrose 50 % solution 12.5 g, 12.5 g, Intravenous, PRN  glucagon (rDNA) injection 1 mg, 1 mg, Intramuscular, PRN  dextrose 5 % solution, 100 mL/hr, Intravenous, PRN  insulin lispro (HUMALOG) injection vial 0-6 Units, 0-6 Units, Subcutaneous, TID WC  insulin lispro (HUMALOG) injection vial 0-3 Units, 0-3 Units, Subcutaneous, Nightly         Labs:   Recent Labs 04/24/19 2256 04/26/19  0738   WBC 10.4 7.8   HGB 14.7 14.3   HCT 43.9 42.3    186     Recent Labs     04/24/19 0328 04/24/19 2256   * 135*   K 3.8 4.1   CO2 23 22   BUN 16 16   CREATININE 1.1 1.0   LABGLOM >60 >60     No results for input(s): BNP in the last 72 hours. Recent Labs     04/24/19 0328 04/24/19 2256   PROTIME 9.9 10.4   INR 0.87 0.91     Recent Labs     04/24/19 2256 04/25/19  0746 04/25/19  1115   TROPONINI 0.02* <0.01 0.02*           EKG:   Normal sinus rhythm with right bundle branch block and no ischemic ST changes     CT chest  No PE     ECHO:   Left ventricular cavity size is dilated.  There is moderate concentric left ventricular hypertrophy.   Ejection fraction is visually estimated to be  45%.   Mild mitral regurgitation.   Pacer / ICD wire is visualized in the right ventricle.      Stress Nuclear:   1. Technically a satisfactory study.    2. Normal pharmacological stress portion of the study.    3. No evidence of Ischemia by Myocardial Perfusion Imaging.    4. Gated Study shows normal LV size and Systolic function; EF is 85%.      Corornary angiogram:  The left main coronary comes from the left coronary, cusp, giving rise to left anterior descending artery, left circumflex artery.    The mid portion of the left main has a stent present which has severe 90% in-stent restenosis present.  The left anterior descending artery is occluded. Maxx Cerise is a large diagonal coming off the LAD, which is the first diagonal.  The circumflex has severe disease in the proximal portion.     We intervened on left main coronary artery, placed one drug-coated Xience Alpine stent 3.5 mm x 12 mm which was post-dilated to 3.8 mm.        ASSESSMENT AND PLAN:        Stress nuclear scan shows no evidence of ischemia  Chest pain is noncardiac  May be discharged home                   Betty Mayen M.D  4/26/2019

## 2019-04-26 NOTE — CARE COORDINATION
4/26 Discharge Plan: Home with wife. No needs are identified.  Electronically signed by Estella Gillis RN on 4/26/2019 at 12:23 PM

## 2019-04-26 NOTE — PROGRESS NOTES
Patient is being d/c home per MD. Instructions provided. Patient denies questions or concerns; agreed to f/u with MD as outpatient. IV removed with no complications. Spouse is here to .  Electronically signed by Rishi Meyers RN on 4/26/2019 at 12:02 PM

## 2019-04-26 NOTE — PROGRESS NOTES
Patient is being transferred to to get a stress-test done at this time.  Electronically signed by Parminder Pal RN on 4/26/2019 at 8:25 AM

## 2019-04-26 NOTE — PROGRESS NOTES
Pt complains of severe chest pain 9/10. He was tearful \"I am such a baby; I don't know what is hurting this bad I hope they find out what is wrong with me\". Pt sat up on the side of bed and was crying asking the nurse not to leave the room. RN administered prescribed pain medication per doc order. Pt stated pain is going away. Will continue to monitor call light within reach.

## 2019-04-26 NOTE — DISCHARGE SUMMARY
apply route daily     * Insulin Pen Needle 31G X 5 MM Misc  1 each by Does not apply route daily     isosorbide mononitrate 60 MG extended release tablet  Commonly known as:  IMDUR  Take 1 tablet by mouth daily     losartan 50 MG tablet  Commonly known as:  COZAAR  Take 1 tablet by mouth daily     methylPREDNISolone 4 MG tablet  Commonly known as:  MEDROL (KAY)  Take by mouth.     metoprolol succinate 100 MG extended release tablet  Commonly known as:  TOPROL XL  Take 1 tablet by mouth daily     nitroGLYCERIN 0.4 MG SL tablet  Commonly known as:  NITROSTAT  PLACE 1 TABLET UNDER THE TONGUE EVERY 5 MINUTES AS NEEDED FOR CHEST PAIN     oxyCODONE-acetaminophen 5-325 MG per tablet  Commonly known as:  PERCOCET  Take 1 tablet by mouth 2 times daily as needed for Pain for up to 30 days. * This list has 4 medication(s) that are the same as other medications prescribed for you. Read the directions carefully, and ask your doctor or other care provider to review them with you.                Where to Get Your Medications      These medications were sent to [address] [place] [address] [facility] [phone]  [address] [place] [id number]    Phone:  [phone]   · pantoprazole 40 MG tablet           Physical Exam:    Vitals:  Vitals:    04/26/19 0127 04/26/19 0447 04/26/19 0856 04/26/19 1014   BP: (!) 152/92 (!) 149/95 (!) 150/90 (!) 157/95   Pulse: 94 84 91 96   Resp: 16 16 19 17   Temp: 98.3 °F (36.8 °C) 98.2 °F (36.8 °C) 98.1 °F (36.7 °C) 98.3 °F (36.8 °C)   TempSrc: Oral Oral Oral    SpO2: 95% 94% 93% 95%   Weight:  227 lb 1.2 oz (103 kg)     Height:         Weight: Weight: 227 lb 1.2 oz (103 kg)     24 hour intake/output:    Intake/Output Summary (Last 24 hours) at 4/26/2019 1129  Last data filed at 4/26/2019 6141  Gross per 24 hour   Intake 730 ml   Output --   Net 730 ml       General appearance - alert, well appearing, and in no distress  Chest - clear to auscultation, no wheezes, rales or rhonchi, symmetric air entry  Heart - normal rate, regular rhythm, normal S1, S2, no murmurs, rubs, clicks or gallops  Abdomen - soft, nontender, nondistended, no masses or organomegaly  Obese: Yes; Protuberant: Yes   Neurological - alert, oriented, normal speech, no focal findings or movement disorder noted  Extremities - peripheral pulses normal, no pedal edema, no clubbing or cyanosis  Skin - normal coloration and turgor, no rashes     Radiology reports as per the Radiologist  Radiology: Xr Chest Portable    Result Date: 4/24/2019  EXAMINATION: SINGLE XRAY VIEW OF THE CHEST 4/24/2019 11:01 pm COMPARISON: 20 hours prior HISTORY: ORDERING SYSTEM PROVIDED HISTORY: chest pain TECHNOLOGIST PROVIDED HISTORY: Reason for exam:->chest pain Ordering Physician Provided Reason for Exam: chest pain, seen here last night for same FINDINGS: Sternotomy wires and pacer are noted. Heart size is stable. There is no new area of consolidation. There is no pneumothorax     No acute abnormality     Xr Chest Portable    Result Date: 4/24/2019  EXAMINATION: SINGLE XRAY VIEW OF THE CHEST 4/24/2019 3:24 am COMPARISON: 11/26/2018 HISTORY: ORDERING SYSTEM PROVIDED HISTORY: cp TECHNOLOGIST PROVIDED HISTORY: Reason for exam:->cp Ordering Physician Provided Reason for Exam: chest pain Coronary artery disease FINDINGS: The lungs are clear. The heart size is mildly enlarged. Sternal wires and a left chest AICD are again noted. The costophrenic angles are sharp. There is no discernible pneumothorax. No acute disease. Ct Chest Pulmonary Embolism W Contrast    Result Date: 4/25/2019  EXAMINATION: CTA OF THE CHEST 4/25/2019 12:04 am TECHNIQUE: CTA of the chest was performed after the administration of intravenous contrast.  Multiplanar reformatted images are provided for review. MIP images are provided for review.  Dose modulation, iterative reconstruction, and/or weight based adjustment of the mA/kV was utilized to reduce the radiation dose to as low as reasonably achievable. COMPARISON: 09/20/2018 and 02/01/2017 HISTORY: ORDERING SYSTEM PROVIDED HISTORY: Left lung pain. Elevated dimer TECHNOLOGIST PROVIDED HISTORY: Ordering Physician Provided Reason for Exam: left lung pain, elevated d dimer, copd, chf Acuity: Acute Type of Exam: Initial FINDINGS: Pulmonary Arteries: Pulmonary arteries are adequately opacified for evaluation. No evidence of intraluminal filling defect to suggest pulmonary embolism. Main pulmonary artery is normal in caliber. Mediastinum: No evidence of mediastinal lymphadenopathy. The heart and pericardium demonstrate no acute abnormality. 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 There is no acute abnormality of the thoracic aorta. Lungs/pleura: There is no pneumothorax or pleural effusion. The left lower lobe pulmonary nodule is again seen. It is grossly unchanged in size though exact measurement is impossible due to respiratory motion artifact. There is no acute infiltrate. Upper Abdomen: Left adrenal gland nodularity is again seen. Left renal cyst is partially imaged. Soft Tissues/Bones: No acute bone or soft tissue abnormality. No scan evidence for pulmonary embolus or other acute abnormality.      Nm Myocardial Spect Rest Exercise Or Rx    Result Date: 4/26/2019  Cardiac Perfusion Imaging  Demographics   Patient Name       Wilmar Encinas   Date of Study      04/25/2019         Gender               Male   Patient Number     [id number]         Date of Birth        1955   Visit Number       [id number]          Age                  61 year(s)   Accession Number   [id number]          Room Number          6445   Corporate ID       [id number]           NM Technician        Lourdes Rand, RT   Nurse              Samuel Gordillo, ADEEL  Interpreting         [facility]. Physician            Robin Stoddard MD   Ordering Physician Cody Ariza MD   The procedure was explained in detail to the patient. Risks,  complications and alternative treatments were reviewed. Written consent  was obtained. Procedure Procedure Type:   Nuclear Stress Test:NM MYOCARDIAL SPECT REST EXERCISE OR RX   Study location: [facility] - Nuclear Medicine   Indications: Chest pain. Hospital Status: Outpatient. Height: 72 inches Weight: 227 pounds  Risk Factors   The patient risk factors include:prior PCI;prior CABG;obesity, physical  activity, Current/Recent(w/in 1 year) tobacco use, treated  hypercholesterolemia, treated hypertension, insulin treated diabetes  mellitus, dyslipidemia, prior heart failure and (pack years: 40). Conclusions   Summary  No reversible ischemia seen  fixed abnormal myocardial perfusion defect involving the inferior wall  Severely depressed LV Systolic function  inferior wall is hypokinetic  Overall findings represent a high risk study. Stress Protocols   Resting ECG  Normal sinus rhythm. Incomplete RBBB. Resting HR:90 bpm  Resting BP:123/90 mmHg   Pre-stress physical exam: Complaint of  chest pressure 6/10. Heart sounds  unremarkable, faint wheezes heard in  lungs posteriorly right >left. O2  saturation 96% on room air. Peak Troponin  T 0.02. Limited mobility; to proceed with  Lexiscan Myoview stress test.  Stress Protocol:Pharmacologic - Lexiscan's  Peak HR:120 bpm                          HR/BP product:[id number]  Peak BP:151/97 mmHg  Predicted HR: 157 bpm  % of predicted HR: 76  Test duration: 1 min and 40 sec  Reason for termination:Completed   ECG Findings  Sinus tachycardia. Incomplete RBBB. No ischemic EKG changes. Arrhythmias  Premature ventricular contractions, premature atrial contractions.    Symptoms  Lexiscan 0.4 mg IV given followed by shortness of breath and brief  increase in chest pressure to 100 BPM    Atrial Rate 100 BPM    P-R Interval 124 ms    QRS Duration 130 ms    Q-T Interval 400 ms    QTc Calculation (Bazett) 516 ms    P Axis 66 degrees    R Axis 84 degrees    T Axis 46 degrees    Diagnosis       Sinus rhythm with Premature atrial complexesPossible Left atrial enlargementRight bundle branch blockConfirmed by SHAHIDA MADDOX, Jerry Negrete ([id number]) on 4/25/2019 2:23:55 PM     *Note: Due to a large number of results and/or encounters for the requested time period, some results have not been displayed. A complete set of results can be found in Results Review.        Diet:  DIET LOW SODIUM 2 GM; Carb Control: 4 carb choices (60 gms)/meal    Activity:  Activity as tolerated (Patient may move about with assist as indicated or with supervision.)    Follow-up:  in the next few days with Sarai Campuzano MD,       Disposition: home    Condition: Stable         Electronically signed by Jerilyn Cheung MD on 4/26/2019 at 11:29 AM    Discharging Hospitalist

## 2019-04-26 NOTE — PLAN OF CARE
Problem: Infection:  Goal: Will remain free from infection  Description  Will remain free from infection  4/26/2019 0157 by Aurelio Arias RN  Outcome: Ongoing    Problem: Safety:  Goal: Free from accidental physical injury  Description  Free from accidental physical injury  4/26/2019 0157 by Aurelio Arias RN  Outcome: Ongoing    Problem: Safety:  Goal: Free from intentional harm  Description  Free from intentional harm  4/26/2019 0157 by Aurelio Arias RN  Outcome: Ongoing     Problem: Pain:  Goal: Patient's pain/discomfort is manageable  Description  Patient's pain/discomfort is manageable    Problem: Pain:  Goal: Control of acute pain  Description  Control of acute pain  Outcome: Ongoing     Problem: Pain:  Goal: Control of chronic pain  Description  Control of chronic pain  Outcome: Ongoing

## 2019-04-26 NOTE — PROGRESS NOTES
Patient is bed, appears to be comfortable. Denies pain at this time. No s/s of distress. Fall prec in place.  Electronically signed by Key Josue RN on 4/26/2019 at 7:54 AM

## 2019-04-26 NOTE — PLAN OF CARE
Problem: Infection:  Goal: Will remain free from infection  Description  Will remain free from infection  4/26/2019 0752 by Cary Nair RN  Outcome: Ongoing  4/26/2019 0157 by Anabella Slelers RN  Outcome: Ongoing     Problem: Safety:  Goal: Free from accidental physical injury  Description  Free from accidental physical injury  4/26/2019 0752 by Cary Nair RN  Outcome: Ongoing  4/26/2019 0157 by Anabella Sellers RN  Outcome: Ongoing  Goal: Free from intentional harm  Description  Free from intentional harm  4/26/2019 0752 by Cary Nair RN  Outcome: Ongoing  4/26/2019 0157 by Anabella Sellers RN  Outcome: Ongoing     Problem: Daily Care:  Goal: Daily care needs are met  Description  Daily care needs are met  4/26/2019 0752 by Cary Nair RN  Outcome: Ongoing  4/26/2019 0157 by Anabella Sellers RN  Outcome: Ongoing     Problem: Pain:  Goal: Patient's pain/discomfort is manageable  Description  Patient's pain/discomfort is manageable  Outcome: Ongoing  Goal: Pain level will decrease  Description  Pain level will decrease  4/26/2019 0752 by Cary Nair RN  Outcome: Ongoing  4/26/2019 0157 by Anabella Sellers RN  Outcome: Ongoing  Goal: Control of acute pain  Description  Control of acute pain  4/26/2019 0752 by Cary Nair RN  Outcome: Ongoing  4/26/2019 0157 by Anabella Sellers RN  Outcome: Ongoing  Goal: Control of chronic pain  Description  Control of chronic pain  4/26/2019 0752 by Cary Nair RN  Outcome: Ongoing  4/26/2019 0157 by Anabella Sellers RN  Outcome: Ongoing     Problem: Skin Integrity:  Goal: Skin integrity will stabilize  Description  Skin integrity will stabilize  Outcome: Ongoing     Problem: Discharge Planning:  Goal: Patients continuum of care needs are met  Description  Patients continuum of care needs are met  Outcome: Ongoing

## 2019-04-26 NOTE — PROGRESS NOTES
Remote/Carelink interrogation shows normal device function. Known atrial lead dislodgement      - programmed VVI  Thoracic impedance indicates a recent increase in fluids. Multiple VT Monitoring episodes recorded. - all appear to be ST       - rates from 136-143 bpm      - on Toprol XL 100mg  NSVT recorded. - longest:  3 seconds. SVT Wavelet episodes show device recognition as SVT and not VT/VF. - no therapies given      - ST with high V rate of 150 bpm    Dr. Liza Bloch to review interrogation. See interrogation/P[employer] report for further details.

## 2019-04-27 ENCOUNTER — APPOINTMENT (OUTPATIENT)
Dept: GENERAL RADIOLOGY | Age: 64
DRG: 287 | End: 2019-04-27
Payer: MEDICARE

## 2019-04-27 ENCOUNTER — TELEPHONE (OUTPATIENT)
Dept: OTHER | Facility: CLINIC | Age: 64
End: 2019-04-27

## 2019-04-27 ENCOUNTER — HOSPITAL ENCOUNTER (INPATIENT)
Age: 64
LOS: 1 days | Discharge: HOME | DRG: 287 | End: 2019-04-30
Attending: EMERGENCY MEDICINE | Admitting: INTERNAL MEDICINE
Payer: MEDICARE

## 2019-04-27 DIAGNOSIS — R06.00 DYSPNEA, UNSPECIFIED TYPE: ICD-10-CM

## 2019-04-27 DIAGNOSIS — R07.9 CHEST PAIN, UNSPECIFIED TYPE: Primary | ICD-10-CM

## 2019-04-27 PROBLEM — I25.10 CAD (CORONARY ARTERY DISEASE): Status: ACTIVE | Noted: 2019-04-27

## 2019-04-27 PROBLEM — I50.22 CHRONIC SYSTOLIC (CONGESTIVE) HEART FAILURE (HCC): Status: ACTIVE | Noted: 2019-04-27

## 2019-04-27 LAB
ANION GAP SERPL CALCULATED.3IONS-SCNC: 17 MMOL/L (ref 3–16)
BASOPHILS ABSOLUTE: 0 K/UL (ref 0–0.2)
BASOPHILS RELATIVE PERCENT: 0.2 %
BUN BLDV-MCNC: 14 MG/DL (ref 7–20)
CALCIUM SERPL-MCNC: 8.6 MG/DL (ref 8.3–10.6)
CHLORIDE BLD-SCNC: 102 MMOL/L (ref 99–110)
CO2: 18 MMOL/L (ref 21–32)
CREAT SERPL-MCNC: 0.7 MG/DL (ref 0.8–1.3)
EOSINOPHILS ABSOLUTE: 0.1 K/UL (ref 0–0.6)
EOSINOPHILS RELATIVE PERCENT: 1.7 %
GFR AFRICAN AMERICAN: >60
GFR NON-AFRICAN AMERICAN: >60
GLUCOSE BLD-MCNC: 246 MG/DL (ref 70–99)
GLUCOSE BLD-MCNC: 267 MG/DL (ref 70–99)
HCT VFR BLD CALC: 43.9 % (ref 40.5–52.5)
HEMOGLOBIN: 15.1 G/DL (ref 13.5–17.5)
LYMPHOCYTES ABSOLUTE: 1.8 K/UL (ref 1–5.1)
LYMPHOCYTES RELATIVE PERCENT: 22.1 %
MCH RBC QN AUTO: 33 PG (ref 26–34)
MCHC RBC AUTO-ENTMCNC: 34.5 G/DL (ref 31–36)
MCV RBC AUTO: 95.7 FL (ref 80–100)
MONOCYTES ABSOLUTE: 0.4 K/UL (ref 0–1.3)
MONOCYTES RELATIVE PERCENT: 5.1 %
NEUTROPHILS ABSOLUTE: 5.8 K/UL (ref 1.7–7.7)
NEUTROPHILS RELATIVE PERCENT: 70.9 %
PDW BLD-RTO: 15.6 % (ref 12.4–15.4)
PERFORMED ON: ABNORMAL
PLATELET # BLD: 210 K/UL (ref 135–450)
PMV BLD AUTO: 9.8 FL (ref 5–10.5)
POTASSIUM REFLEX MAGNESIUM: 4.5 MMOL/L (ref 3.5–5.1)
PRO-BNP: 1383 PG/ML (ref 0–124)
RBC # BLD: 4.58 M/UL (ref 4.2–5.9)
SODIUM BLD-SCNC: 137 MMOL/L (ref 136–145)
TROPONIN: 0.01 NG/ML
WBC # BLD: 8.2 K/UL (ref 4–11)

## 2019-04-27 PROCEDURE — 83880 ASSAY OF NATRIURETIC PEPTIDE: CPT

## 2019-04-27 PROCEDURE — G0378 HOSPITAL OBSERVATION PER HR: HCPCS

## 2019-04-27 PROCEDURE — 94761 N-INVAS EAR/PLS OXIMETRY MLT: CPT

## 2019-04-27 PROCEDURE — 80048 BASIC METABOLIC PNL TOTAL CA: CPT

## 2019-04-27 PROCEDURE — 2700000000 HC OXYGEN THERAPY PER DAY

## 2019-04-27 PROCEDURE — 6360000002 HC RX W HCPCS: Performed by: PHYSICIAN ASSISTANT

## 2019-04-27 PROCEDURE — 84484 ASSAY OF TROPONIN QUANT: CPT

## 2019-04-27 PROCEDURE — 96376 TX/PRO/DX INJ SAME DRUG ADON: CPT

## 2019-04-27 PROCEDURE — 6360000002 HC RX W HCPCS: Performed by: NURSE PRACTITIONER

## 2019-04-27 PROCEDURE — 96375 TX/PRO/DX INJ NEW DRUG ADDON: CPT

## 2019-04-27 PROCEDURE — 99285 EMERGENCY DEPT VISIT HI MDM: CPT

## 2019-04-27 PROCEDURE — 6370000000 HC RX 637 (ALT 250 FOR IP): Performed by: PHYSICIAN ASSISTANT

## 2019-04-27 PROCEDURE — 94640 AIRWAY INHALATION TREATMENT: CPT

## 2019-04-27 PROCEDURE — 6370000000 HC RX 637 (ALT 250 FOR IP): Performed by: NURSE PRACTITIONER

## 2019-04-27 PROCEDURE — 85025 COMPLETE CBC W/AUTO DIFF WBC: CPT

## 2019-04-27 PROCEDURE — 96374 THER/PROPH/DIAG INJ IV PUSH: CPT

## 2019-04-27 PROCEDURE — 6370000000 HC RX 637 (ALT 250 FOR IP): Performed by: INTERNAL MEDICINE

## 2019-04-27 PROCEDURE — 6360000002 HC RX W HCPCS: Performed by: INTERNAL MEDICINE

## 2019-04-27 PROCEDURE — 2580000003 HC RX 258: Performed by: INTERNAL MEDICINE

## 2019-04-27 PROCEDURE — 6360000002 HC RX W HCPCS: Performed by: EMERGENCY MEDICINE

## 2019-04-27 PROCEDURE — 93005 ELECTROCARDIOGRAM TRACING: CPT | Performed by: PHYSICIAN ASSISTANT

## 2019-04-27 PROCEDURE — 71045 X-RAY EXAM CHEST 1 VIEW: CPT

## 2019-04-27 RX ORDER — MORPHINE SULFATE 2 MG/ML
4 INJECTION, SOLUTION INTRAMUSCULAR; INTRAVENOUS ONCE
Status: COMPLETED | OUTPATIENT
Start: 2019-04-27 | End: 2019-04-27

## 2019-04-27 RX ORDER — DEXTROSE MONOHYDRATE 25 G/50ML
12.5 INJECTION, SOLUTION INTRAVENOUS PRN
Status: DISCONTINUED | OUTPATIENT
Start: 2019-04-27 | End: 2019-04-30 | Stop reason: HOSPADM

## 2019-04-27 RX ORDER — ISOSORBIDE MONONITRATE 60 MG/1
60 TABLET, EXTENDED RELEASE ORAL DAILY
Status: DISCONTINUED | OUTPATIENT
Start: 2019-04-28 | End: 2019-04-30

## 2019-04-27 RX ORDER — ALBUTEROL SULFATE 90 UG/1
2 AEROSOL, METERED RESPIRATORY (INHALATION) EVERY 6 HOURS PRN
Status: DISCONTINUED | OUTPATIENT
Start: 2019-04-27 | End: 2019-04-30 | Stop reason: HOSPADM

## 2019-04-27 RX ORDER — IPRATROPIUM BROMIDE AND ALBUTEROL SULFATE 2.5; .5 MG/3ML; MG/3ML
2 SOLUTION RESPIRATORY (INHALATION) ONCE
Status: DISCONTINUED | OUTPATIENT
Start: 2019-04-27 | End: 2019-04-27

## 2019-04-27 RX ORDER — NICOTINE POLACRILEX 4 MG
15 LOZENGE BUCCAL PRN
Status: DISCONTINUED | OUTPATIENT
Start: 2019-04-27 | End: 2019-04-30 | Stop reason: HOSPADM

## 2019-04-27 RX ORDER — GABAPENTIN 300 MG/1
600 CAPSULE ORAL 3 TIMES DAILY
Status: DISCONTINUED | OUTPATIENT
Start: 2019-04-27 | End: 2019-04-29

## 2019-04-27 RX ORDER — NICOTINE 21 MG/24HR
1 PATCH, TRANSDERMAL 24 HOURS TRANSDERMAL DAILY
Status: DISCONTINUED | OUTPATIENT
Start: 2019-04-28 | End: 2019-04-30 | Stop reason: HOSPADM

## 2019-04-27 RX ORDER — AMLODIPINE BESYLATE 5 MG/1
5 TABLET ORAL DAILY
Status: DISCONTINUED | OUTPATIENT
Start: 2019-04-28 | End: 2019-04-30 | Stop reason: HOSPADM

## 2019-04-27 RX ORDER — SODIUM CHLORIDE 0.9 % (FLUSH) 0.9 %
10 SYRINGE (ML) INJECTION PRN
Status: DISCONTINUED | OUTPATIENT
Start: 2019-04-27 | End: 2019-04-30 | Stop reason: HOSPADM

## 2019-04-27 RX ORDER — IPRATROPIUM BROMIDE AND ALBUTEROL SULFATE 2.5; .5 MG/3ML; MG/3ML
1 SOLUTION RESPIRATORY (INHALATION) EVERY 4 HOURS PRN
Status: DISCONTINUED | OUTPATIENT
Start: 2019-04-27 | End: 2019-04-30 | Stop reason: HOSPADM

## 2019-04-27 RX ORDER — ONDANSETRON 2 MG/ML
4 INJECTION INTRAMUSCULAR; INTRAVENOUS ONCE
Status: COMPLETED | OUTPATIENT
Start: 2019-04-27 | End: 2019-04-27

## 2019-04-27 RX ORDER — NICOTINE 21 MG/24HR
1 PATCH, TRANSDERMAL 24 HOURS TRANSDERMAL DAILY
Status: DISCONTINUED | OUTPATIENT
Start: 2019-04-28 | End: 2019-04-27

## 2019-04-27 RX ORDER — IPRATROPIUM BROMIDE AND ALBUTEROL SULFATE 2.5; .5 MG/3ML; MG/3ML
1 SOLUTION RESPIRATORY (INHALATION) ONCE
Status: COMPLETED | OUTPATIENT
Start: 2019-04-27 | End: 2019-04-27

## 2019-04-27 RX ORDER — PANTOPRAZOLE SODIUM 40 MG/1
40 TABLET, DELAYED RELEASE ORAL
Status: DISCONTINUED | OUTPATIENT
Start: 2019-04-28 | End: 2019-04-30 | Stop reason: HOSPADM

## 2019-04-27 RX ORDER — SODIUM CHLORIDE 0.9 % (FLUSH) 0.9 %
10 SYRINGE (ML) INJECTION EVERY 12 HOURS SCHEDULED
Status: DISCONTINUED | OUTPATIENT
Start: 2019-04-27 | End: 2019-04-30 | Stop reason: HOSPADM

## 2019-04-27 RX ORDER — MORPHINE SULFATE 2 MG/ML
2 INJECTION, SOLUTION INTRAMUSCULAR; INTRAVENOUS
Status: DISCONTINUED | OUTPATIENT
Start: 2019-04-27 | End: 2019-04-30

## 2019-04-27 RX ORDER — CLOPIDOGREL BISULFATE 75 MG/1
75 TABLET ORAL DAILY
Status: DISCONTINUED | OUTPATIENT
Start: 2019-04-28 | End: 2019-04-30 | Stop reason: HOSPADM

## 2019-04-27 RX ORDER — ASPIRIN 81 MG/1
162 TABLET, CHEWABLE ORAL DAILY
Status: DISCONTINUED | OUTPATIENT
Start: 2019-04-28 | End: 2019-04-27

## 2019-04-27 RX ORDER — METOPROLOL SUCCINATE 50 MG/1
100 TABLET, EXTENDED RELEASE ORAL DAILY
Status: DISCONTINUED | OUTPATIENT
Start: 2019-04-28 | End: 2019-04-30 | Stop reason: HOSPADM

## 2019-04-27 RX ORDER — FUROSEMIDE 40 MG/1
40 TABLET ORAL DAILY
Status: DISCONTINUED | OUTPATIENT
Start: 2019-04-28 | End: 2019-04-30 | Stop reason: HOSPADM

## 2019-04-27 RX ORDER — INSULIN GLARGINE 100 [IU]/ML
32 INJECTION, SOLUTION SUBCUTANEOUS NIGHTLY
Status: DISCONTINUED | OUTPATIENT
Start: 2019-04-27 | End: 2019-04-28

## 2019-04-27 RX ORDER — ONDANSETRON 2 MG/ML
4 INJECTION INTRAMUSCULAR; INTRAVENOUS EVERY 4 HOURS PRN
Status: DISCONTINUED | OUTPATIENT
Start: 2019-04-27 | End: 2019-04-30 | Stop reason: HOSPADM

## 2019-04-27 RX ORDER — ATORVASTATIN CALCIUM 80 MG/1
80 TABLET, FILM COATED ORAL DAILY
Status: DISCONTINUED | OUTPATIENT
Start: 2019-04-28 | End: 2019-04-30 | Stop reason: HOSPADM

## 2019-04-27 RX ORDER — ASPIRIN 81 MG/1
81 TABLET, CHEWABLE ORAL DAILY
Status: DISCONTINUED | OUTPATIENT
Start: 2019-04-28 | End: 2019-04-30 | Stop reason: HOSPADM

## 2019-04-27 RX ORDER — ASPIRIN 81 MG/1
162 TABLET, CHEWABLE ORAL ONCE
Status: COMPLETED | OUTPATIENT
Start: 2019-04-27 | End: 2019-04-27

## 2019-04-27 RX ORDER — METHYLPREDNISOLONE SODIUM SUCCINATE 125 MG/2ML
125 INJECTION, POWDER, LYOPHILIZED, FOR SOLUTION INTRAMUSCULAR; INTRAVENOUS ONCE
Status: COMPLETED | OUTPATIENT
Start: 2019-04-27 | End: 2019-04-27

## 2019-04-27 RX ORDER — DIPHENHYDRAMINE HCL 25 MG
25 TABLET ORAL NIGHTLY PRN
Status: DISCONTINUED | OUTPATIENT
Start: 2019-04-27 | End: 2019-04-30 | Stop reason: HOSPADM

## 2019-04-27 RX ORDER — DEXTROSE MONOHYDRATE 50 MG/ML
100 INJECTION, SOLUTION INTRAVENOUS PRN
Status: DISCONTINUED | OUTPATIENT
Start: 2019-04-27 | End: 2019-04-30 | Stop reason: HOSPADM

## 2019-04-27 RX ORDER — OXYCODONE HYDROCHLORIDE AND ACETAMINOPHEN 5; 325 MG/1; MG/1
1 TABLET ORAL ONCE
Status: COMPLETED | OUTPATIENT
Start: 2019-04-27 | End: 2019-04-27

## 2019-04-27 RX ORDER — LANOLIN ALCOHOL/MO/W.PET/CERES
3 CREAM (GRAM) TOPICAL NIGHTLY PRN
Status: DISCONTINUED | OUTPATIENT
Start: 2019-04-27 | End: 2019-04-27

## 2019-04-27 RX ORDER — LOSARTAN POTASSIUM 50 MG/1
50 TABLET ORAL DAILY
Status: DISCONTINUED | OUTPATIENT
Start: 2019-04-28 | End: 2019-04-30 | Stop reason: HOSPADM

## 2019-04-27 RX ADMIN — MORPHINE SULFATE 4 MG: 2 INJECTION, SOLUTION INTRAMUSCULAR; INTRAVENOUS at 15:01

## 2019-04-27 RX ADMIN — OXYCODONE HYDROCHLORIDE AND ACETAMINOPHEN 1 TABLET: 5; 325 TABLET ORAL at 17:04

## 2019-04-27 RX ADMIN — ONDANSETRON 4 MG: 2 INJECTION INTRAMUSCULAR; INTRAVENOUS at 13:35

## 2019-04-27 RX ADMIN — Medication 10 ML: at 21:28

## 2019-04-27 RX ADMIN — INSULIN GLARGINE 32 UNITS: 100 INJECTION, SOLUTION SUBCUTANEOUS at 21:26

## 2019-04-27 RX ADMIN — METHYLPREDNISOLONE SODIUM SUCCINATE 125 MG: 125 INJECTION, POWDER, FOR SOLUTION INTRAMUSCULAR; INTRAVENOUS at 15:01

## 2019-04-27 RX ADMIN — INSULIN LISPRO 3 UNITS: 100 INJECTION, SOLUTION INTRAVENOUS; SUBCUTANEOUS at 21:26

## 2019-04-27 RX ADMIN — MORPHINE SULFATE 4 MG: 2 INJECTION, SOLUTION INTRAMUSCULAR; INTRAVENOUS at 13:16

## 2019-04-27 RX ADMIN — ASPIRIN 81 MG 162 MG: 81 TABLET ORAL at 15:01

## 2019-04-27 RX ADMIN — ONDANSETRON 4 MG: 2 INJECTION INTRAMUSCULAR; INTRAVENOUS at 15:01

## 2019-04-27 RX ADMIN — MORPHINE SULFATE 2 MG: 2 INJECTION, SOLUTION INTRAMUSCULAR; INTRAVENOUS at 20:24

## 2019-04-27 RX ADMIN — GABAPENTIN 600 MG: 300 CAPSULE ORAL at 21:26

## 2019-04-27 RX ADMIN — MORPHINE SULFATE 2 MG: 2 INJECTION, SOLUTION INTRAMUSCULAR; INTRAVENOUS at 23:35

## 2019-04-27 RX ADMIN — IPRATROPIUM BROMIDE AND ALBUTEROL SULFATE 1 AMPULE: .5; 3 SOLUTION RESPIRATORY (INHALATION) at 14:40

## 2019-04-27 ASSESSMENT — PAIN DESCRIPTION - DESCRIPTORS
DESCRIPTORS: PRESSURE

## 2019-04-27 ASSESSMENT — PAIN SCALES - GENERAL
PAINLEVEL_OUTOF10: 8
PAINLEVEL_OUTOF10: 8
PAINLEVEL_OUTOF10: 5
PAINLEVEL_OUTOF10: 9
PAINLEVEL_OUTOF10: 9
PAINLEVEL_OUTOF10: 7
PAINLEVEL_OUTOF10: 9
PAINLEVEL_OUTOF10: 7
PAINLEVEL_OUTOF10: 8
PAINLEVEL_OUTOF10: 8

## 2019-04-27 ASSESSMENT — PAIN DESCRIPTION - ORIENTATION
ORIENTATION: LEFT
ORIENTATION: MID;LEFT
ORIENTATION: LEFT

## 2019-04-27 ASSESSMENT — ENCOUNTER SYMPTOMS
DIARRHEA: 0
VOMITING: 0
SHORTNESS OF BREATH: 1
ABDOMINAL PAIN: 0
COUGH: 0
GASTROINTESTINAL NEGATIVE: 1
NAUSEA: 0
CHEST TIGHTNESS: 0

## 2019-04-27 ASSESSMENT — PAIN DESCRIPTION - FREQUENCY
FREQUENCY: CONTINUOUS

## 2019-04-27 ASSESSMENT — PAIN - FUNCTIONAL ASSESSMENT
PAIN_FUNCTIONAL_ASSESSMENT: ACTIVITIES ARE NOT PREVENTED
PAIN_FUNCTIONAL_ASSESSMENT: PREVENTS OR INTERFERES SOME ACTIVE ACTIVITIES AND ADLS

## 2019-04-27 ASSESSMENT — PAIN DESCRIPTION - ONSET
ONSET: ON-GOING

## 2019-04-27 ASSESSMENT — PAIN DESCRIPTION - LOCATION
LOCATION: CHEST

## 2019-04-27 ASSESSMENT — PAIN DESCRIPTION - PROGRESSION
CLINICAL_PROGRESSION: NOT CHANGED
CLINICAL_PROGRESSION: GRADUALLY IMPROVING
CLINICAL_PROGRESSION: GRADUALLY WORSENING
CLINICAL_PROGRESSION: NOT CHANGED
CLINICAL_PROGRESSION: NOT CHANGED

## 2019-04-27 ASSESSMENT — PAIN DESCRIPTION - PAIN TYPE
TYPE: ACUTE PAIN;CHRONIC PAIN
TYPE: CHRONIC PAIN
TYPE: ACUTE PAIN;CHRONIC PAIN

## 2019-04-27 ASSESSMENT — PAIN DESCRIPTION - DIRECTION
RADIATING_TOWARDS: DOWN LEFT ARM
RADIATING_TOWARDS: DOWN LEFT ARM

## 2019-04-27 ASSESSMENT — HEART SCORE: ECG: 0

## 2019-04-27 NOTE — PROGRESS NOTES
Patient refused breathing treatments at this time. Stated that he does not feel short of breath at this time.

## 2019-04-27 NOTE — H&P
Hospital Medicine  History and Physical    PCP: Roberto Meyers MD  Patient Name: Robert Fontana    Date of Service: Pt seen/examined on 04/27/2019 and Placed in Observation. CHIEF COMPLAINT:  Pt c/o chest pain  HISTORY OF PRESENT ILLNESS: Patient is a 77-year-old man with a history of hypertension, hyperlipidemia, diabetes mellitus, coronary artery disease status post CABG and ICD placement who was just discharged from this facility on 4/26/2019 after being evaluated for chest pain with no evidence of ischemia by myocardial perfusion imaging. He was discharged to home and presents to the emergency room today complaining of continued chest pain. The chest pain is not made worse by activity. He reports pressure like pain in the center of his chest which is moderately severe. Cardiology was consulted and recommended that he be admitted for a cardiac cath. Associated signs and symptoms do not include typical chest pain, diaphoresis, edema, orthopnea, paroxysmal nocturnal dyspnea, fever or chills.       Past Medical History:        Diagnosis Date    Anxiety     Arthritis     Asthma     CAD (coronary artery disease)     Calcium kidney stone     Cardiomyopathy ([address]     Cerebral artery occlusion with cerebral infarction ([address]     CHF (congestive heart failure) ([address]     COPD (chronic obstructive pulmonary disease) (HCC)     mild    Depression     DM2 (diabetes mellitus, type 2) (HCC)     Fibromyalgia     GERD (gastroesophageal reflux disease)     Hyperlipidemia     Hypertension     Liver disease     Pacemaker 2012    Medtronic model # [id number]    Pneumonia     Seizures ([address]     TIA (transient ischemic attack) 2007    Ulcerative colitis ([address]        Past Surgical History:        Procedure Laterality Date    BACK SURGERY      CARDIAC SURGERY      CHOLECYSTECTOMY      COLONOSCOPY  01/10/2017    COLONOSCOPY  01/10/2017    CORONARY ANGIOPLASTY WITH STENT PLACEMENT  2012    CORONARY ARTERY BYPASS GRAFT  2010, 11/2015    ENDOSCOPY, COLON, DIAGNOSTIC      PACEMAKER PLACEMENT      UPPER GASTROINTESTINAL ENDOSCOPY  02/07/2017       Allergies:  Dopamine hcl and Tramadol    Medications Prior to Admission:    Prior to Admission medications    Medication Sig Start Date End Date Taking? Authorizing Provider   pantoprazole (PROTONIX) 40 MG tablet Take 1 tablet by mouth every morning (before breakfast) 4/26/19   Pedro Steinberg MD   methylPREDNISolone (MEDROL, KAY,) 4 MG tablet Take by mouth. 4/19/19   Juan M Greenfield MD   gabapentin (NEURONTIN) 600 MG tablet TAKE 1 TABLET BY MOUTH FIVE TIMES DAILY 4/9/19 5/9/19  Juan M Greenfield MD   clopidogrel (PLAVIX) 75 MG tablet TAKE 1 TABLET BY MOUTH DAILY 4/7/19   Juan M Greenfield MD   oxyCODONE-acetaminophen (PERCOCET) 5-325 MG per tablet Take 1 tablet by mouth 2 times daily as needed for Pain for up to 30 days.  4/3/19 5/3/19  Juan M Greenfield MD   furosemide (LASIX) 40 MG tablet Take 1 tablet by mouth daily 10/11/18   Juan M Greenfield MD   isosorbide mononitrate (IMDUR) 60 MG extended release tablet Take 1 tablet by mouth daily 10/11/18   Juan M Greenfield MD   Insulin Pen Needle 31G X 5 MM MISC 1 each by Does not apply route daily 10/5/18   Juan M Greenfield MD   losartan (COZAAR) 50 MG tablet Take 1 tablet by mouth daily 9/17/18   Juan M Greenfield MD   nitroGLYCERIN (NITROSTAT) 0.4 MG SL tablet PLACE 1 TABLET UNDER THE TONGUE EVERY 5 MINUTES AS NEEDED FOR CHEST PAIN 9/2/18   Juan M Greenfield MD   albuterol sulfate  (90 Base) MCG/ACT inhaler Inhale 2 puffs into the lungs every 6 hours as needed for Wheezing    Historical Provider, MD   aspirin 81 MG tablet Take 81 mg by mouth daily    Historical Provider, MD   insulin glargine (LANTUS) 100 UNIT/ML injection pen Inject 28 Units into the skin nightly  Patient taking differently: Inject 32 Units into the skin nightly  8/28/18   Juan M Greenfield MD   insulin lispro (HUMALOG KWIKPEN) 100 UNIT/ML pen Inject 5 Units into the skin 3 times daily (before meals)  Patient taking differently: Inject 8 Units into the skin 3 times daily (before meals)  8/28/18   Eulogio Gary MD   amLODIPine (NORVASC) 5 MG tablet TAKE 1 TABLET BY MOUTH DAILY 7/12/18   Eulogio Gary MD   Lancets MISC accu check fastclick lancets  Dx: F44.7  As needed 5/4/18   Eulogio Gary MD   Insulin Pen Needle (CAREFINE PEN NEEDLES) 32G X 4 MM MISC 1 each by Does not apply route daily 4/25/18   Eulogio Gary MD   FREESTYLE LANCETS MISC 1 each by Does not apply route daily 4/24/18   Shady Pedersen MD   atorvastatin (LIPITOR) 80 MG tablet TAKE 1 TABLET BY MOUTH DAILY 2/15/18   Eulogio Gary MD   metoprolol succinate (TOPROL XL) 100 MG extended release tablet Take 1 tablet by mouth daily 1/12/18   TAYLA Conteh - CNP       Family History:       Problem Relation Age of Onset    Diabetes Father     Coronary Art Dis Father      Social History:   TOBACCO:   reports that he has been smoking cigarettes. He has a 44.00 pack-year smoking history. He has never used smokeless tobacco.  ETOH:   reports that he does not drink alcohol. OCCUPATION:      REVIEW OF SYSTEMS:  A full review of systems was performed and is negative except for that which appears in the HPI    Physical Exam:    Vitals: BP (!) 173/99   Pulse 88   Temp 98 °F (36.7 °C) (Oral)   Resp 18   Ht 6' (1.829 m)   Wt 225 lb 15.5 oz (102.5 kg)   SpO2 96%   BMI 30.65 kg/m²   General appearance: WD/WN 61y.o. year-old AA male who is alert, appears stated age and is cooperative  HEENT: Head: Normocephalic, no lesions, without obvious abnormality. Eye: Normal external eye, conjunctiva, lids cornea, TYE. Ears: Normal external ears. Non-tender. Nose: Normal external nose, mucus membranes and septum. Pharynx: Dental Hygiene adequate. Normal buccal mucosa. Normal pharynx.   Neck: no adenopathy, no carotid bruit, no JVD, supple, symmetrical, trachea midline and thyroid not enlarged, symmetric, no tenderness/mass/nodules  Lungs: clear to auscultation bilaterally and no use of accessory muscles. Heart: regular rate and rhythm, S1, S2 normal, no murmur, click, rub or gallop and normal apical impulse  Abdomen: soft, non-tender; bowel sounds normal; no masses,  no organomegaly  Extremities: extremities atraumatic, no cyanosis or edema and Homans sign is negative, no sign of DVT. Capillary Refill: Acceptable < 3 seconds  Peripheral Pulses: +3 easily felt, not easily obliterated with pressures  Skin: Skin color, texture, turgor normal. No rashes or lesions on exposed skin  Neurologic: Neurovascularly intact without any focal sensory/motor deficits. Cranial nerves: II-XII intact, grossly non-focal. Gait was not tested. Psychiatric: Alert and oriented, thought content appropriate, normal insight    CBC:   Recent Labs     04/24/19  2256 04/26/19  0738 04/27/19  1256   WBC 10.4 7.8 8.2   HGB 14.7 14.3 15.1    186 210     BMP:    Recent Labs     04/24/19  2256 04/27/19  1256   * 137   K 4.1 4.5   CL 99 102   CO2 22 18*   BUN 16 14   CREATININE 1.0 0.7*   GLUCOSE 296* 246*     Troponin:   Recent Labs     04/25/19  0746 04/25/19  1115 04/27/19  1256   TROPONINI <0.01 0.02* 0.01     PT/INR:  No results found for: PTINR  U/A:    Lab Results   Component Value Date    LEUKOCYTESUR Negative 11/26/2018    NITRITE neg 05/23/2014    RBCUA 1 11/26/2018    SPECGRAV >1.030 11/26/2018    UROBILINOGEN 1.0 11/26/2018    BILIRUBINUR SMALL 11/26/2018    BILIRUBINUR neg 05/23/2014    BLOODU Negative 11/26/2018    GLUCOSEU 100 11/26/2018    PROTEINU 30 11/26/2018         RAD:   I have independently reviewed and interpreted the imaging studies below and based my recommendations to the patient on those findings.     Xr Chest Portable    Result Date: 4/27/2019  EXAMINATION: SINGLE XRAY VIEW OF THE CHEST 4/27/2019 12:39 pm COMPARISON: radiation dose to as low as reasonably achievable. COMPARISON: 09/20/2018 and 02/01/2017 HISTORY: ORDERING SYSTEM PROVIDED HISTORY: Left lung pain. Elevated dimer TECHNOLOGIST PROVIDED HISTORY: Ordering Physician Provided Reason for Exam: left lung pain, elevated d dimer, copd, chf Acuity: Acute Type of Exam: Initial FINDINGS: Pulmonary Arteries: Pulmonary arteries are adequately opacified for evaluation. No evidence of intraluminal filling defect to suggest pulmonary embolism. Main pulmonary artery is normal in caliber. Mediastinum: No evidence of mediastinal lymphadenopathy. The heart and pericardium demonstrate no acute abnormality. 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 There is no acute abnormality of the thoracic aorta. Lungs/pleura: There is no pneumothorax or pleural effusion. The left lower lobe pulmonary nodule is again seen. It is grossly unchanged in size though exact measurement is impossible due to respiratory motion artifact. There is no acute infiltrate. Upper Abdomen: Left adrenal gland nodularity is again seen. Left renal cyst is partially imaged. Soft Tissues/Bones: No acute bone or soft tissue abnormality. No scan evidence for pulmonary embolus or other acute abnormality.      Nm Myocardial Spect Rest Exercise Or Rx    Result Date: 4/26/2019  Cardiac Perfusion Imaging  Demographics   Patient Name       Darwin Nash   Date of Study      04/25/2019         Gender               Male   Patient Number     [id number]         Date of Birth        1955   Visit Number       [id number]          Age                  61 year(s)   Accession Number   [id number]          Room Number          6311   Corporate ID       [id number]           NM Technician        Sherif Trevino, RT   Nurse              Linda Deal RN  Interpreting         [facility]. Physician            Galina Guerrero MD   Ordering Physician Sury Espana MD   The procedure was explained in detail to the patient. Risks,  complications and alternative treatments were reviewed. Written consent  was obtained. Procedure Procedure Type:   Nuclear Stress Test:NM MYOCARDIAL SPECT REST EXERCISE OR RX   Study location: [facility] - Nuclear Medicine   Indications: Chest pain. Hospital Status: Outpatient. Height: 72 inches Weight: 227 pounds  Risk Factors   The patient risk factors include:prior PCI;prior CABG;obesity, physical  activity, Current/Recent(w/in 1 year) tobacco use, treated  hypercholesterolemia, treated hypertension, insulin treated diabetes  mellitus, dyslipidemia, prior heart failure and (pack years: 40). Conclusions   Summary  No reversible ischemia seen  fixed abnormal myocardial perfusion defect involving the inferior wall  Severely depressed LV Systolic function  inferior wall is hypokinetic  Overall findings represent a high risk study. Stress Protocols   Resting ECG  Normal sinus rhythm. Incomplete RBBB. Resting HR:90 bpm  Resting BP:123/90 mmHg   Pre-stress physical exam: Complaint of  chest pressure 6/10. Heart sounds  unremarkable, faint wheezes heard in  lungs posteriorly right >left. O2  saturation 96% on room air. Peak Troponin  T 0.02. Limited mobility; to proceed with  Lexiscan Myoview stress test.  Stress Protocol:Pharmacologic - Lexiscan's  Peak HR:120 bpm                          HR/BP product:[id number]  Peak BP:151/97 mmHg  Predicted HR: 157 bpm  % of predicted HR: 76  Test duration: 1 min and 40 sec  Reason for termination:Completed   ECG Findings  Sinus tachycardia. Incomplete RBBB. No ischemic EKG changes. Arrhythmias  Premature ventricular contractions, premature atrial contractions. Symptoms  Lexiscan 0.4 mg IV given followed by shortness of breath and brief  increase in chest pressure to 8/10.  O2 saturation 98% on room air. Chest pressure subsided to 5/10 without treatment. Complications  Procedure complication was none. Stress Interpretation  2-day nuclear protocol  Procedure Medications   - Lexiscan I.V. 0.4 mg.10 sec  Imaging Protocols   - Two Day   - One Day   Rest                   Stress   Isotope:Myoview        Isotope: Myoview  Isotope dose:28.1 mCi  Isotope dose:32.2 mCi  Date:04/26/2019 00:00  Date:04/25/2019 00:00                          Technique:   Gated  Imaging Results    Summed scores     - Summed stress score: 12     - Summed rest score: 12     - Summed difference score:    4     Stress ejection    Ejection fraction:19 %    EDV :193 ml    ESV :157 ml    Stroke volume :36 ml  Medical History   Additional Medical History   Ischemic cardiomyopathy, stroke, anemia, pacemaker / ICD. Signatures   ------------------------------------------------------------------  Electronically signed by Franc Vital MD (Interpreting  physician) on 04/26/2019 at 09:52  ------------------------------------------------------------------        EKG:   Read by ER in the absence of a Cardiologist shows sinus tachycardia, rate of 108, left atrial enlargement, right bundle branch block. Nonspecific ST-T changes. Rhythm strip shows sinus tachycardia with a rate of 108, W[place] interval 130 ms,  ms with unifocal PVC, no other ectopy is interpreted by me. This is compared to an EKG dated 4/24/19, no significant change other than the sinus tachycardia and PVC. Assessment:   Principal Problem:    Chest pain  Active Problems:    S/P CABG (coronary artery bypass graft)    Essential hypertension    ICD (implantable cardioverter-defibrillator), dual, in situ    Hyperlipidemia LDL goal <70    DMII (diabetes mellitus, type 2) ([facility])    COPD (chronic obstructive pulmonary disease) ([facility])    Chronic systolic (congestive) heart failure ([facility])    CAD (coronary artery disease)  Resolved Problems:    * No resolved hospital problems. *      Plan:       Chest pain S/P CABG (coronary artery bypass graft) - no evidence of ischemia by myocardial perfusion imaging on 04/25/2019. Cardiology consulted, plans a Cardiac Cath    CAD (coronary artery disease) - as above. Continue Beta Blocker, Statin, Plavix and Aspirin. Monitor on Telemetry. ICD (implantable cardioverter-defibrillator), dual, in situ    CHF - chronic systolic dysfunction. A 2D Echocardiogram on 08/21/2018 shows an EF of 45%. Continue home Lasix. Monitor strict I&Os and daily weights. COPD (chronic obstructive pulmonary disease) - without acute exacerbation. Will provide Nebulizer treatments as needed and continue home medications. Patient will be monitored closely, and deep breathing and coughing will be encouraged     Diabetes mellitus II - Lantus, SSI and CCD    Essential (primary) hypertension - continue home meds and monitor blood pressure    Hyperlipidemia - No current evidence of Rhabdomyolysis or other adverse effects. Continue statin therapy while in the hospital        DVT Prophylaxis: Lovenox  Diet: Carb control  Code Status: Full  (Advanced care planning has been discussed with patient and/or responsible family member and is reflected in the code status. Further orders associated with this have been entered if appropriate)    Disposition: Anticipate that patient will remain in the hospital for 1 to 2 days depending on further evaluation and clinical course.      Kimmy Blount MD

## 2019-04-27 NOTE — ED PROVIDER NOTES
[facility]  eMERGENCY dEPARTMENT eNCOUnter        Pt Name: Jen Milian  MRN: [id number]  [place] 1955  Date of evaluation: 4/27/2019  Provider: TAYLA Nieves - CNP  PCP: Philippe Cornelius MD    This patient was seen and evaluated by the attending physician . CHIEF COMPLAINT       Chief Complaint   Patient presents with    Shortness of Breath     started last night    Chest Pain     x 5 days, left sided pressure pain  9/10,  pt was inpatient in hospital until yesterday pt having pain while inpatient states released to see md whitt       HISTORY OF PRESENT ILLNESS   (Location/Symptom, Timing/Onset, Context/Setting, Quality, Duration, Modifying Factors, Severity)  Note limiting factors. Jen Milian is a 61 y.o. male that presents the ED today with complaints of chest pain and associated shortness of breath for one week. Patient states that he has been seen by his cardiology for the exact same symptoms this past week. He states that I have a \"weak in my heart somewhere. \"  He states that he typically sees Dr. Alvarado Hernandez with cardiology and has a follow-up appointment with him in 3 weeks. Patient denies any associated abdominal pain, nausea or vomiting currently. Denies any diarrhea. Patient denies any change of the chest pain or shortness of breath with activity. States that he prior to arrival took 281 mg aspirin as well as 3 sublingual nitroglycerin prior to arrival without any relief. Denies any fevers or chills. States that he came to the ED for further evaluation and treatment. Nursing Notes were all reviewed and agreed with or any disagreements were addressed  in the HPI. REVIEW OF SYSTEMS    (2-9 systems for level 4, 10 or more for level 5)     Review of Systems   Constitutional: Negative for chills, fatigue and fever. HENT: Negative. Respiratory: Positive for shortness of breath.  Negative for cough and chest tightness. Cardiovascular: Positive for chest pain. Negative for palpitations and leg swelling. Gastrointestinal: Negative. Negative for abdominal pain, diarrhea, nausea and vomiting. Genitourinary: Negative. Musculoskeletal: Negative. Skin: Negative. Neurological: Negative. Psychiatric/Behavioral: Negative. All other systems reviewed and are negative. Positives and Pertinent negatives as per HPI. Except as noted abovein the ROS, all other systems were reviewed and negative.        PAST MEDICAL HISTORY     Past Medical History:   Diagnosis Date    Anxiety     Arthritis     Asthma     CAD (coronary artery disease)     Calcium kidney stone     Cardiomyopathy ([address]     Cerebral artery occlusion with cerebral infarction ([address]     CHF (congestive heart failure) ([address]     COPD [facility] obstructive pulmonary disease[facility])     mild    Depression     DM2 (diabetes mellitus, type 2) (HCC)     Fibromyalgia     GERD (gastroesophageal reflux disease)     Hyperlipidemia     Hypertension     Liver disease     Pacemaker 2012    Medtronic model # [id number]    Pneumonia     Seizures ([address]     TIA (transient ischemic attack) 2007    Ulcerative colitis ([address]          SURGICAL HISTORY     Past Surgical History:   Procedure Laterality Date    BACK SURGERY      CARDIAC SURGERY      CHOLECYSTECTOMY      COLONOSCOPY  01/10/2017    COLONOSCOPY  01/10/2017    CORONARY ANGIOPLASTY WITH STENT PLACEMENT  2012    CORONARY ARTERY BYPASS GRAFT  2010, 11/2015    ENDOSCOPY, COLON, DIAGNOSTIC      PACEMAKER PLACEMENT      UPPER GASTROINTESTINAL ENDOSCOPY  02/07/2017         CURRENTMEDICATIONS       Previous Medications    ALBUTEROL SULFATE  (90 BASE) MCG/ACT INHALER    Inhale 2 puffs into the lungs every 6 hours as needed for Wheezing    AMLODIPINE (NORVASC) 5 MG TABLET    TAKE 1 TABLET BY MOUTH DAILY    ASPIRIN 81 MG TABLET    Take 81 mg by mouth daily    ATORVASTATIN (LIPITOR) 80 MG TABLET    TAKE 1 TABLET BY MOUTH DAILY    CLOPIDOGREL (PLAVIX) 75 MG TABLET    TAKE 1 TABLET BY MOUTH DAILY    FREESTYLE LANCETS MISC    1 each by Does not apply route daily    FUROSEMIDE (LASIX) 40 MG TABLET    Take 1 tablet by mouth daily    GABAPENTIN (NEURONTIN) 600 MG TABLET    TAKE 1 TABLET BY MOUTH FIVE TIMES DAILY    INSULIN GLARGINE (LANTUS) 100 UNIT/ML INJECTION PEN    Inject 28 Units into the skin nightly    INSULIN LISPRO (HUMALOG KWIKPEN) 100 UNIT/ML PEN    Inject 5 Units into the skin 3 times daily (before meals)    INSULIN PEN NEEDLE (CAREFINE PEN NEEDLES) 32G X 4 MM MISC    1 each by Does not apply route daily    INSULIN PEN NEEDLE 31G X 5 MM MISC    1 each by Does not apply route daily    ISOSORBIDE MONONITRATE (IMDUR) 60 MG EXTENDED RELEASE TABLET    Take 1 tablet by mouth daily    LANCETS MISC    accu check fastclick lancets  Dx: G86.1  As needed    LOSARTAN (COZAAR) 50 MG TABLET    Take 1 tablet by mouth daily    METHYLPREDNISOLONE (MEDROL, KAY,) 4 MG TABLET    Take by mouth. METOPROLOL SUCCINATE (TOPROL XL) 100 MG EXTENDED RELEASE TABLET    Take 1 tablet by mouth daily    NITROGLYCERIN (NITROSTAT) 0.4 MG SL TABLET    PLACE 1 TABLET UNDER THE TONGUE EVERY 5 MINUTES AS NEEDED FOR CHEST PAIN    OXYCODONE-ACETAMINOPHEN (PERCOCET) 5-325 MG PER TABLET    Take 1 tablet by mouth 2 times daily as needed for Pain for up to 30 days.     PANTOPRAZOLE (PROTONIX) 40 MG TABLET    Take 1 tablet by mouth every morning (before breakfast)         ALLERGIES     Dopamine hcl and Tramadol    FAMILYHISTORY       Family History   Problem Relation Age of Onset    Diabetes Father     Coronary Art Dis Father           SOCIAL HISTORY       Social History     Socioeconomic History    Marital status:      Spouse name: None    Number of children: 1    Years of education: None    Highest education level: None   Occupational History    Occupation: disabled   Social Needs    Financial resource appear ill. No distress. He is not intubated. Despite taking 3 reported sublingual nitroglycerin tablets prior to arrival his blood pressure remains 164/103. He is not tachycardic, afebrile and satting 99% on room air. No clinical signs of respiratory distressed. HENT:   Head: Normocephalic and atraumatic. Eyes: Conjunctivae and lids are normal. Right eye exhibits no discharge. Left eye exhibits no discharge. Neck: Normal range of motion. Neck supple. No JVD present. No tracheal deviation present. Cardiovascular: Normal rate, regular rhythm, normal heart sounds, intact distal pulses and normal pulses. PMI is not displaced. Exam reveals no gallop, no friction rub and no decreased pulses. No murmur heard. Pulmonary/Chest: Effort normal. No accessory muscle usage or stridor. No apnea, no tachypnea and no bradypnea. He is not intubated. No respiratory distress. He has no decreased breath sounds. He has wheezes. He has no rhonchi. He has no rales. He exhibits no mass, no tenderness, no bony tenderness, no laceration, no crepitus, no edema, no deformity, no swelling and no retraction. Patient does have expiratory wheezes throughout all lung fields. Abdominal: Soft. Normal appearance and bowel sounds are normal. He exhibits no distension, no abdominal bruit, no ascites, no pulsatile midline mass and no mass. There is no tenderness. There is no rigidity, no rebound, no guarding, no CVA tenderness, no tenderness at McBurney's point and negative Clifton's sign. No hernia. Musculoskeletal: Normal range of motion. He exhibits no edema, tenderness or deformity. Neurological: He is alert and oriented to person, place, and time. He is not disoriented. No cranial nerve deficit or sensory deficit. GCS eye subscore is 4. GCS verbal subscore is 5. GCS motor subscore is 6. Skin: Skin is warm, dry and intact. Capillary refill takes 2 to 3 seconds. No rash noted. He is not diaphoretic. No erythema. No pallor. Psychiatric: He has a normal mood and affect. His speech is normal and behavior is normal. Judgment and thought content normal. Cognition and memory are normal.   Nursing note and vitals reviewed. DIAGNOSTIC RESULTS   LABS:    Labs Reviewed   CBC WITH AUTO DIFFERENTIAL - Abnormal; Notable for the following components:       Result Value    RDW 15.6 (*)     All other components within normal limits    Narrative:     Performed at:  [address] [place]   Phone [phone]   BASIC METABOLIC PANEL W/ REFLEX TO MG FOR LOW K - Abnormal; Notable for the following components:    CO2 18 (*)     Anion Gap 17 (*)     Glucose 246 (*)     CREATININE 0.7 (*)     All other components within normal limits    Narrative:     Performed at:  [address] [employer]   Phone [phone]   BRAIN NATRIURETIC PEPTIDE - Abnormal; Notable for the following components:    Pro-BNP 1,383 (*)     All other components within normal limits    Narrative:     Performed at:  [address]   Phone [phone]   TROPONIN    Narrative:     Performed at:  [address]   Phone [phone]       All other labs were within normal range or not returned as of this dictation. EKG: All EKG's are interpreted by the Emergency Department Physician who either signs orCo-signs this chart in the absence of a cardiologist.  Please see their note for interpretation of EKG.       RADIOLOGY:   Non-plain film images such as CT, Ultrasound and MRI are read by the radiologist. Plain radiographic images are visualized andpreliminarily interpreted by the  ED Provider with the below findings:        Interpretation [facility] Radiologist below, if available at the time of this note:    XR CHEST PORTABLE   Final Result   No acute process. Xr Chest Portable    Result Date: 4/27/2019  EXAMINATION: SINGLE XRAY VIEW OF THE CHEST 4/27/2019 12:39 pm COMPARISON: 04/24/2019 HISTORY: ORDERING SYSTEM PROVIDED HISTORY: Chest pain, sob TECHNOLOGIST PROVIDED HISTORY: Reason for exam:->Chest pain, sob Ordering Physician Provided Reason for Exam: chest pain and SOB Acuity: Acute Type of Exam: Initial FINDINGS: Status post median sternotomy. Transvenous pacer remains in place. The lungs are without acute focal process. There is no effusion or pneumothorax. The cardiomediastinal silhouette is stable. The osseous structures are stable. No acute process.          PROCEDURES   Unless otherwise noted below, none     Procedures    CRITICAL CARE TIME   N/A    CONSULTS:  IP CONSULT TO CARDIOLOGY  IP CONSULT TO CARDIOLOGY      EMERGENCY DEPARTMENT COURSE and DIFFERENTIALDIAGNOSIS/MDM:   Vitals:    Vitals:    04/27/19 1346 04/27/19 1402 04/27/19 1416 04/27/19 1442   BP: (!) 142/83 (!) 150/87 (!) 156/84    Pulse: 90 86 83    Resp:       Temp:       TempSrc:       SpO2: 99% 98% 98% 100%   Weight:       Height:           Patient was given thefollowing medications:  Medications   ipratropium-albuterol (DUONEB) nebulizer solution 2 ampule (0 ampules Inhalation Held 4/27/19 1502)   oxyCODONE-acetaminophen (PERCOCET) 5-325 MG per tablet 1 tablet (has no administration in time range)   morphine (PF) injection 4 mg (4 mg Intravenous Given 4/27/19 1316)   ondansetron (ZOFRAN) injection 4 mg (4 mg Intravenous Given 4/27/19 1335)   ipratropium-albuterol (DUONEB) nebulizer solution 1 ampule (1 ampule Inhalation Given 4/27/19 1440)   morphine (PF) injection 4 mg (4 mg Intravenous Given 4/27/19 1501)   ondansetron (ZOFRAN) injection 4 mg (4 mg Intravenous Given 4/27/19 1501)   methylPREDNISolone sodium (SOLU-MEDROL) injection 125 mg (125 mg Intravenous Given 4/27/19 1501)   aspirin chewable tablet 162 mg (162 mg Oral Given 4/27/19 4103)       MDM: Patient was seen and evaluated per myself conjunction with ED attending Dr. Jan Pina. See HPI and above for full presentation and physical exam.  Patient is a 80-year-old male that presents the ED today with complaints of chest pain and shortness of breath that has not resolved since his discharge a few days ago from the hospital.  On physical exam he is resting comfortably in a stretcher, nontoxic man, hemodynamically stable in no acute distress. Physical exam is otherwise as above. Given physical exam and HPI I will order blood work, chest x-ray and EKG. Differential diagnoses include ACS, angina, unspecified chest pain, likely derangement, pneumonia, anemia. Blood work shows no leukocytosis or left shift. No anemia. No electively derangement, no chino. Troponin is actually negative at less than 0.01. EKG shows no ST changes. BNP is roughly the same value as it was when he was discharged couple days ago at 1383. And chest x-ray is negative. Patient's heart score is 4. Due to patient's history as well as this ongoing chest pain even after being discharged from the hospital with a significant heart score and cardiac history, but recent negative nuclear stress test I did consult cardiology. I heard back from Ian Sue who believes at this point the next step would be to bring the patient into the hospital for cardiac catheterization; cardiology will follow the patient as consult. I therefore consult the hospitalist who agreed to admit patient and write orders for admission. FINAL IMPRESSION      1. Chest pain, unspecified type    2.  Dyspnea, unspecified type          DISPOSITION/PLAN   DISPOSITION        PATIENT REFERREDTO:  Izabella Keita 3  [phone]            DISCHARGE MEDICATIONS:  New Prescriptions    No medications on file       DISCONTINUED MEDICATIONS:  Discontinued Medications    No medications

## 2019-04-27 NOTE — ED NOTES
Bed: B-10  Expected date:   Expected time:   Means of arrival:   Comments:  [address] [facility] Marcial, RN  04/27/19 0720

## 2019-04-27 NOTE — ED TRIAGE NOTES
Pt to bed 10 from pivot pt states sob and chest pain. Pt was dc from hospital yesterday and to follow up with md whitt pt states had a stress test while in hospital that was normal and was to follow up pt states had chest pain when dc from hospital 7/10 pt states going home and chest pain increased to 10/10 Left chest and radiates up arm on occasion.

## 2019-04-27 NOTE — TELEPHONE ENCOUNTER
Writer contacted TAYLA Shaw CNP, ED provider to inform of 30 day readmission risk. ED provider informed writer admit or discharge has not been determined. Continue to follow-up.

## 2019-04-28 LAB
ANION GAP SERPL CALCULATED.3IONS-SCNC: 14 MMOL/L (ref 3–16)
BASOPHILS ABSOLUTE: 0.1 K/UL (ref 0–0.2)
BASOPHILS RELATIVE PERCENT: 1.2 %
BUN BLDV-MCNC: 15 MG/DL (ref 7–20)
CALCIUM SERPL-MCNC: 8.8 MG/DL (ref 8.3–10.6)
CHLORIDE BLD-SCNC: 101 MMOL/L (ref 99–110)
CO2: 21 MMOL/L (ref 21–32)
CREAT SERPL-MCNC: 0.8 MG/DL (ref 0.8–1.3)
EKG ATRIAL RATE: 108 BPM
EKG DIAGNOSIS: NORMAL
EKG P AXIS: 74 DEGREES
EKG P-R INTERVAL: 130 MS
EKG Q-T INTERVAL: 386 MS
EKG QRS DURATION: 132 MS
EKG QTC CALCULATION (BAZETT): 517 MS
EKG R AXIS: 81 DEGREES
EKG T AXIS: 85 DEGREES
EKG VENTRICULAR RATE: 108 BPM
EOSINOPHILS ABSOLUTE: 0 K/UL (ref 0–0.6)
EOSINOPHILS RELATIVE PERCENT: 0 %
GFR AFRICAN AMERICAN: >60
GFR NON-AFRICAN AMERICAN: >60
GLUCOSE BLD-MCNC: 157 MG/DL (ref 70–99)
GLUCOSE BLD-MCNC: 164 MG/DL (ref 70–99)
GLUCOSE BLD-MCNC: 184 MG/DL (ref 70–99)
GLUCOSE BLD-MCNC: 214 MG/DL (ref 70–99)
GLUCOSE BLD-MCNC: 228 MG/DL (ref 70–99)
HCT VFR BLD CALC: 43.4 % (ref 40.5–52.5)
HEMOGLOBIN: 14.4 G/DL (ref 13.5–17.5)
LYMPHOCYTES ABSOLUTE: 1.2 K/UL (ref 1–5.1)
LYMPHOCYTES RELATIVE PERCENT: 11.6 %
MCH RBC QN AUTO: 31.9 PG (ref 26–34)
MCHC RBC AUTO-ENTMCNC: 33.2 G/DL (ref 31–36)
MCV RBC AUTO: 96.2 FL (ref 80–100)
MONOCYTES ABSOLUTE: 0.1 K/UL (ref 0–1.3)
MONOCYTES RELATIVE PERCENT: 1 %
NEUTROPHILS ABSOLUTE: 9.1 K/UL (ref 1.7–7.7)
NEUTROPHILS RELATIVE PERCENT: 86.2 %
PDW BLD-RTO: 15.6 % (ref 12.4–15.4)
PERFORMED ON: ABNORMAL
PLATELET # BLD: 213 K/UL (ref 135–450)
PLATELET SLIDE REVIEW: ADEQUATE
PMV BLD AUTO: 11 FL (ref 5–10.5)
POTASSIUM REFLEX MAGNESIUM: 4.7 MMOL/L (ref 3.5–5.1)
RBC # BLD: 4.51 M/UL (ref 4.2–5.9)
SODIUM BLD-SCNC: 136 MMOL/L (ref 136–145)
TROPONIN: <0.01 NG/ML
WBC # BLD: 10.6 K/UL (ref 4–11)

## 2019-04-28 PROCEDURE — 6360000002 HC RX W HCPCS: Performed by: INTERNAL MEDICINE

## 2019-04-28 PROCEDURE — 93010 ELECTROCARDIOGRAM REPORT: CPT | Performed by: INTERNAL MEDICINE

## 2019-04-28 PROCEDURE — 99222 1ST HOSP IP/OBS MODERATE 55: CPT | Performed by: INTERNAL MEDICINE

## 2019-04-28 PROCEDURE — 6370000000 HC RX 637 (ALT 250 FOR IP): Performed by: INTERNAL MEDICINE

## 2019-04-28 PROCEDURE — 36415 COLL VENOUS BLD VENIPUNCTURE: CPT

## 2019-04-28 PROCEDURE — 6370000000 HC RX 637 (ALT 250 FOR IP): Performed by: PHYSICIAN ASSISTANT

## 2019-04-28 PROCEDURE — 96376 TX/PRO/DX INJ SAME DRUG ADON: CPT

## 2019-04-28 PROCEDURE — 2580000003 HC RX 258: Performed by: INTERNAL MEDICINE

## 2019-04-28 PROCEDURE — 94760 N-INVAS EAR/PLS OXIMETRY 1: CPT

## 2019-04-28 PROCEDURE — 80048 BASIC METABOLIC PNL TOTAL CA: CPT

## 2019-04-28 PROCEDURE — G0378 HOSPITAL OBSERVATION PER HR: HCPCS

## 2019-04-28 PROCEDURE — 96372 THER/PROPH/DIAG INJ SC/IM: CPT

## 2019-04-28 PROCEDURE — 84484 ASSAY OF TROPONIN QUANT: CPT

## 2019-04-28 PROCEDURE — 85025 COMPLETE CBC W/AUTO DIFF WBC: CPT

## 2019-04-28 RX ORDER — HYDRALAZINE HYDROCHLORIDE 20 MG/ML
10 INJECTION INTRAMUSCULAR; INTRAVENOUS EVERY 6 HOURS PRN
Status: DISCONTINUED | OUTPATIENT
Start: 2019-04-28 | End: 2019-04-30 | Stop reason: HOSPADM

## 2019-04-28 RX ADMIN — MORPHINE SULFATE 2 MG: 2 INJECTION, SOLUTION INTRAMUSCULAR; INTRAVENOUS at 09:36

## 2019-04-28 RX ADMIN — ASPIRIN 81 MG 81 MG: 81 TABLET ORAL at 08:02

## 2019-04-28 RX ADMIN — MORPHINE SULFATE 2 MG: 2 INJECTION, SOLUTION INTRAMUSCULAR; INTRAVENOUS at 18:58

## 2019-04-28 RX ADMIN — MORPHINE SULFATE 2 MG: 2 INJECTION, SOLUTION INTRAMUSCULAR; INTRAVENOUS at 22:11

## 2019-04-28 RX ADMIN — FUROSEMIDE 40 MG: 40 TABLET ORAL at 08:02

## 2019-04-28 RX ADMIN — CLOPIDOGREL BISULFATE 75 MG: 75 TABLET ORAL at 08:02

## 2019-04-28 RX ADMIN — PANTOPRAZOLE SODIUM 40 MG: 40 TABLET, DELAYED RELEASE ORAL at 06:20

## 2019-04-28 RX ADMIN — INSULIN LISPRO 2 UNITS: 100 INJECTION, SOLUTION INTRAVENOUS; SUBCUTANEOUS at 16:07

## 2019-04-28 RX ADMIN — Medication 10 ML: at 22:12

## 2019-04-28 RX ADMIN — INSULIN LISPRO 2 UNITS: 100 INJECTION, SOLUTION INTRAVENOUS; SUBCUTANEOUS at 08:03

## 2019-04-28 RX ADMIN — MORPHINE SULFATE 2 MG: 2 INJECTION, SOLUTION INTRAMUSCULAR; INTRAVENOUS at 16:07

## 2019-04-28 RX ADMIN — AMLODIPINE BESYLATE 5 MG: 5 TABLET ORAL at 08:02

## 2019-04-28 RX ADMIN — ISOSORBIDE MONONITRATE 60 MG: 60 TABLET, EXTENDED RELEASE ORAL at 08:02

## 2019-04-28 RX ADMIN — MORPHINE SULFATE 2 MG: 2 INJECTION, SOLUTION INTRAMUSCULAR; INTRAVENOUS at 06:20

## 2019-04-28 RX ADMIN — GABAPENTIN 600 MG: 300 CAPSULE ORAL at 21:21

## 2019-04-28 RX ADMIN — METOPROLOL SUCCINATE 100 MG: 50 TABLET, EXTENDED RELEASE ORAL at 08:02

## 2019-04-28 RX ADMIN — INSULIN LISPRO 1 UNITS: 100 INJECTION, SOLUTION INTRAVENOUS; SUBCUTANEOUS at 21:21

## 2019-04-28 RX ADMIN — GABAPENTIN 600 MG: 300 CAPSULE ORAL at 13:28

## 2019-04-28 RX ADMIN — GABAPENTIN 600 MG: 300 CAPSULE ORAL at 08:02

## 2019-04-28 RX ADMIN — ATORVASTATIN CALCIUM 80 MG: 80 TABLET, FILM COATED ORAL at 08:02

## 2019-04-28 RX ADMIN — MORPHINE SULFATE 2 MG: 2 INJECTION, SOLUTION INTRAMUSCULAR; INTRAVENOUS at 12:36

## 2019-04-28 RX ADMIN — LOSARTAN POTASSIUM 50 MG: 50 TABLET ORAL at 08:02

## 2019-04-28 RX ADMIN — ENOXAPARIN SODIUM 40 MG: 40 INJECTION SUBCUTANEOUS at 08:02

## 2019-04-28 RX ADMIN — Medication 10 ML: at 08:06

## 2019-04-28 RX ADMIN — MORPHINE SULFATE 2 MG: 2 INJECTION, SOLUTION INTRAMUSCULAR; INTRAVENOUS at 03:10

## 2019-04-28 RX ADMIN — INSULIN LISPRO 4 UNITS: 100 INJECTION, SOLUTION INTRAVENOUS; SUBCUTANEOUS at 12:30

## 2019-04-28 ASSESSMENT — PAIN DESCRIPTION - FREQUENCY
FREQUENCY: CONTINUOUS

## 2019-04-28 ASSESSMENT — PAIN DESCRIPTION - PAIN TYPE
TYPE: ACUTE PAIN

## 2019-04-28 ASSESSMENT — PAIN SCALES - GENERAL
PAINLEVEL_OUTOF10: 5
PAINLEVEL_OUTOF10: 0
PAINLEVEL_OUTOF10: 0
PAINLEVEL_OUTOF10: 7
PAINLEVEL_OUTOF10: 7
PAINLEVEL_OUTOF10: 0
PAINLEVEL_OUTOF10: 6
PAINLEVEL_OUTOF10: 7
PAINLEVEL_OUTOF10: 8
PAINLEVEL_OUTOF10: 7
PAINLEVEL_OUTOF10: 6
PAINLEVEL_OUTOF10: 0
PAINLEVEL_OUTOF10: 5
PAINLEVEL_OUTOF10: 6
PAINLEVEL_OUTOF10: 7
PAINLEVEL_OUTOF10: 8
PAINLEVEL_OUTOF10: 7
PAINLEVEL_OUTOF10: 7
PAINLEVEL_OUTOF10: 5

## 2019-04-28 ASSESSMENT — PAIN DESCRIPTION - ONSET
ONSET: ON-GOING

## 2019-04-28 ASSESSMENT — PAIN DESCRIPTION - ORIENTATION
ORIENTATION: LEFT

## 2019-04-28 ASSESSMENT — PAIN - FUNCTIONAL ASSESSMENT
PAIN_FUNCTIONAL_ASSESSMENT: ACTIVITIES ARE NOT PREVENTED

## 2019-04-28 ASSESSMENT — PAIN DESCRIPTION - PROGRESSION
CLINICAL_PROGRESSION: NOT CHANGED
CLINICAL_PROGRESSION: GRADUALLY IMPROVING
CLINICAL_PROGRESSION: NOT CHANGED

## 2019-04-28 ASSESSMENT — PAIN DESCRIPTION - DIRECTION
RADIATING_TOWARDS: DOWN LEFT ARM
RADIATING_TOWARDS: DOWN LEFT ARM
RADIATING_TOWARDS: DOWN L ARM
RADIATING_TOWARDS: DOWN LEFT ARM
RADIATING_TOWARDS: DOWN L ARM
RADIATING_TOWARDS: DOWN LEFT ARM

## 2019-04-28 ASSESSMENT — PAIN DESCRIPTION - DESCRIPTORS
DESCRIPTORS: PRESSURE
DESCRIPTORS: CONSTANT;PRESSURE
DESCRIPTORS: PRESSURE
DESCRIPTORS: PRESSURE;CONSTANT
DESCRIPTORS: PRESSURE
DESCRIPTORS: CONSTANT;PRESSURE
DESCRIPTORS: PRESSURE;CONSTANT
DESCRIPTORS: PRESSURE;CONSTANT
DESCRIPTORS: PRESSURE
DESCRIPTORS: PRESSURE
DESCRIPTORS: PRESSURE;CONSTANT
DESCRIPTORS: PRESSURE
DESCRIPTORS: PRESSURE
DESCRIPTORS: PRESSURE;CONSTANT

## 2019-04-28 ASSESSMENT — PAIN DESCRIPTION - LOCATION
LOCATION: CHEST

## 2019-04-28 ASSESSMENT — PAIN SCALES - WONG BAKER: WONGBAKER_NUMERICALRESPONSE: 0

## 2019-04-28 NOTE — PROGRESS NOTES
Pt A&O. Pt await cardiology to see him but only wants Dr Deondre Martinez to do any procedure that may be recommended. Pt BP high this AM, AM meds completed. Pt continues with chest pain/pressure radiating to L arm. Morphine q3h keeps pain tolerable. Pt NSR on tele. Will monitor.   Electronically signed by Cherie Pittman RN on 4/28/2019 at 8:17 AM

## 2019-04-28 NOTE — PROGRESS NOTES
Grossly intact. No focal deficits noted. LABS:    Lab Results   Component Value Date    WBC 10.6 04/28/2019    HGB 14.4 04/28/2019    HCT 43.4 04/28/2019    MCV 96.2 04/28/2019     04/28/2019    LYMPHOPCT 11.6 04/28/2019    RBC 4.51 04/28/2019    MCH 31.9 04/28/2019    MCHC 33.2 04/28/2019    RDW 15.6 (H) 04/28/2019       Lab Results   Component Value Date    CREATININE 0.8 04/28/2019    BUN 15 04/28/2019     04/28/2019    K 4.7 04/28/2019     04/28/2019    CO2 21 04/28/2019       Lab Results   Component Value Date    MG 2.00 12/26/2018       Lab Results   Component Value Date    ALT 11 04/24/2019    AST 9 (L) 04/24/2019    ALKPHOS 74 04/24/2019    BILITOT 0.4 04/24/2019        No flowsheet data found. Imaging:  XR CHEST PORTABLE   Final Result   No acute process. Assessment & Plan:        Chest Pain s/p CABG  No evidence of ischemia on 4/25/19 NM stress testing. Trop <0.01 x 3  ASA, Statin, BB, plavix, losartan  Cardiology consulted. Appreciate recs. CAD  Continue medical management and risk factor modification    Dual chamber AICD in situ    Chronic systolic HF, not in exacerbation  Echo 8/21/18 with EF of 45%  Continue current medical therapies  Monit intake and output   Monitor daily weights    COPD, not currently in exacerbation  Continue current medical regimen  Nebulizer treatments  Supportive therapies  Supplemental oxygen as needed (titrate to SpO2 88-92%)   Monitor for s/s of exacerbation    DM II  Humalog SSI  Monitor FSBG  Titrate insulin PRN    HTN  Continue current medical regimen  Monitor BP  Titrate medications as needed. HLD  Statin  Monitor for S/S of Rhabdomyolysis     Body mass index is 30.2 kg/m². The patient and / or the family were informed of the results of any tests, a time was given to answer questions, a plan was proposed and they agreed with plan.     DVT prophylaxis: [x] Lovenox  [] SQ Heparin  [] SCDs because of  [] warfarin/oral direct thrombin inhibitor [] Encourage ambulation    GI prophylaxis: [x] PPI/[id number]  [] not indicated    Probiotic if on abx: [] Yes [] No [x] Not Indicated    Diet: DIET CARB CONTROL;    Consults:  IP CONSULT TO CARDIOLOGY  IP CONSULT TO CARDIOLOGY    Disposition:  [x] Home  [] Home with home health [] Rehab [] Psych [] SNF  [] LTAC  [] Long term nursing home or group home [] Transfer to ICU  [] Transfer to PCU [] Other:    Code Status: Full Code    ELOS: 1 - 2 days, pending clinical course    TAYLA Duaret - NP  04/28/19

## 2019-04-28 NOTE — PROGRESS NOTES
Pt's chest pain is unchanged. He is requesting PRN Morphine about q 3 hrs. No c/o SOB. VS remain stable. Denies needs at this time. Call light in reach. Will monitor.

## 2019-04-28 NOTE — CONSULTS
[address]                                  CONSULTATION    PATIENT NAME: Patric Weir                    :        1955  MED REC NO:   [id number]                          ROOM:       3104  ACCOUNT NO:   [de-identified]                           ADMIT DATE: 2019  PROVIDER:     Edmund Jones MD    CONSULT DATE:  2019    REFERRING PHYSICIAN:  Dr. Rigoberto Taylor PROVIDER:  Dr. Pan Gamma:  Chest pain. HISTORY OF PRESENT ILLNESS:  The patient is a 30-year-old  male  with extensive history of coronary artery disease status post two bypass  surgeries, status post intracoronary stent placement in the left main,  the last one being in 2017. The patient was recently discharged  after being admitted for chest pain and after a negative stress test.   However, he continues to complain of a chest pressure and left arm pain  off and on, almost continuously for the last week. He says it is worse  when he walks. EKG shows sinus tachycardia with PAC and PVCs, right  bundle branch block, nonspecific T-wave changes. H and H is 14.4 and  43.4. Troponin is mildly elevated at 0.02. BUN 15, creatinine 0.8. Blood sugars are high at 214. The patient also complains of shortness  of breath with exertion. He states the pain is similar to his previous  chest pains. ALLERGIES:  DOPAMINE and TRAMADOL. MEDICATIONS:  He is on hydralazine p.r.n., Toprol  mg, Cozaar 50  mg, Imdur 60 mg, Lasix 40, Plavix 75 mg, Lipitor 80 mg, aspirin 81 mg,  Norvasc 5 mg, insulin, gabapentin, Flomax.     PAST MEDICAL HISTORY:  Includes extensive history of coronary artery  disease status post CABG on two occasions, last stent in the left main  trunk for in-stent restenosis in , hypertension status post AICD,  hyperlipidemia, diabetes, COPD, chronic systolic heart failure, anemia,  ischemic cardiomyopathy, PFO. FAMILY HISTORY:  Further had coronary artery disease and diabetes. SOCIAL HISTORY:  Continues to smoke. Does not drink alcohol. REVIEW OF SYSTEMS:  Uses a walker to walk. GI:  No melena or hematochezia. GENITOURINARY:  No frequency or dysuria. RESPIRATORY SYSTEM:  Has shortness of breath with exertion. NEUROLOGIC:  No dizziness or syncope. CARDIOVASCULAR:  Chest pain. All other systems were reviewed and were negative. PHYSICAL EXAMINATION:  GENERAL:  He is alert, oriented. VITAL SIGNS:  Blood pressure is 136/73. At admission, it was 164/103. Afebrile. Pulse is 86 per minute. Sats are 93%. HEENT:  Pupils are equal in size, reactive to light. Extraocular  muscles intact. NECK:  Supple. There is no jugular venous distention. No carotid  bruits. LUNGS:  He has bilateral rhonchi. CARDIOVASCULAR:  S1, S2 normal.  No gallop or rub. ABDOMEN:  Soft, nontender. No organomegaly. EXTREMITIES:  No pedal edema. Pulses are equal bilaterally. NEUROLOGIC:  Cranial nerves intact. No focal deficits. IMPRESSION:  1. Chest pain, possibly anginal.  2.  Coronary artery disease status post coronary artery bypass grafting  status post intracoronary stent placement in the left main trunk in  2017. 3.  Uncontrolled diabetes. 4.  Uncontrolled hypertension. 5.  Status post AICD. RECOMMENDATIONS:  1. We will review the films from 2017.  2.  Add Ranexa 500 mg p.o. b.i.d.  3.  We will discuss with Dr. Paris Koch regarding cardiac catheterization. We will keep him n.p.o. from midnight.         Elizabeth Moran MD    D: 04/28/2019 14:49:16       T: 04/28/2019 18:45:01     [id number]  Job#: [id number]     Doc#: [id number]    CC:  MD Bhargav Reis

## 2019-04-28 NOTE — PROGRESS NOTES
Pharmacy Medication Reconciliation Note     List of medications patient is currently taking is complete. Allergies:  Dopamine hcl; Tramadol; and Melatonin    Source of information:   1. EMR  2. patient    Notes regarding home medications:   1. Reports he has not been using any insulin at home for months - has been controlling his glucose with diet. Wants to d/w his PCP about stopping insulin and restarting metformin.      Denies taking any other OTC or herbal medications    Christopher Jon PharmD, BCPS  4/28/2019  2:59 PM

## 2019-04-28 NOTE — PLAN OF CARE
Problem: Pain:  Goal: Pain level will decrease  Description  Pain level will decrease  4/28/2019 1001 by Lali Younger RN  Outcome: Ongoing  Note:   Pt assessed for pain. Pt in pain and assessed with 0-10 pain rating scale. Pt given prescribed analgesic for pain. (See eMar) Pt satisfied with pain relief thus far. Will reassess and continue to monitor. Problem: Pain:  Goal: Control of acute pain  Description  Control of acute pain  4/28/2019 1001 by Lali Younger RN  Outcome: Ongoing  Note:   Pt assessed for pain. Pt in pain and assessed with 0-10 pain rating scale. Pt given prescribed analgesic for pain. (See eMar) Pt satisfied with pain relief thus far. Will reassess and continue to monitor. Problem: Falls - Risk of:  Goal: Will remain free from falls  Description  Will remain free from falls  4/28/2019 1001 by Lali Younger RN  Outcome: Ongoing  Note:   Fall risk assessment completed. Fall precautions in place. Call light within reach. Pt educated on calling for assistance before getting up. Walkway free of clutter. Will continue to monitor. Problem: Cardiac:  Goal: Ability to maintain vital signs within normal range will improve  Description  Ability to maintain vital signs within normal range will improve  Outcome: Ongoing  Note:   Vitals stable. Medications administered as directed. Vitals monitored per floor protocol. Telemetry remains in place. Problem: Cardiac:  Goal: Cardiovascular alteration will improve  Description  Cardiovascular alteration will improve  Outcome: Ongoing  Note:   Cardiology consulted. Vitals stable. Medications administered as directed. Vitals monitored per floor protocol.

## 2019-04-28 NOTE — CONSULTS
Inpatient consult to Cardiology  Consult performed by: Colbert Bloch, MD  Consult ordered by: TAYLA Pennington - LYNN      Dictated.

## 2019-04-28 NOTE — PROGRESS NOTES
4 Eyes Admission Assessment     I agree as the admission nurse that 2 RN's have performed a thorough Head to Toe Skin Assessment on the patient. ALL assessment sites listed below have been assessed on admission. Areas assessed by both nurses:   [x]   Head, Face, and Ears   [x]   Shoulders, Back, and Chest  [x]   Arms, Elbows, and Hands   [x]   Coccyx, Sacrum, and Ischum  [x]   Legs, Feet, and Heels        Does the Patient have Skin Breakdown?   No         Jhonathan Prevention initiated:  NA   Wound Care Orders initiated:  NA      WO nurse consulted for Pressure Injury (Stage 3,4, Unstageable, DTI, NWPT, and Complex wounds):  NA      Nurse 1 eSignature: Electronically signed by Jonna Urbina RN on 4/27/19 at 10:00 PM    **SHARE this note so that the co-signing nurse is able to place an eSignature**    Nurse 2 eSignature: Electronically signed by Callie Dubois RN on 4/27/19 at 10:01 PM

## 2019-04-28 NOTE — PROGRESS NOTES
Pt doing well. Remains NSR on tele. Morphine administered q3h for chest pain, pt says pain tolerable and pain well controlled with morphine. Denies SOB at rest, but does have some with exertion. Pt remains on room air. Will monitor.  Electronically signed by Wellington Bustillos RN on 4/28/2019 at 3:38 PM

## 2019-04-28 NOTE — PROGRESS NOTES
Pt admitted from ER to room 3104. Oriented to room and call light. Pt is a high fall risk. Bed alarm in place and all safety precautions initiated. Pt is very upset about his care in the ER. He stated they made him wait 4 hours for pain meds and was told we would give it to him. Pt is c/o chest pain in the left side radiating down his left arm 9/10 on pain scale. Will give PRN Morphine. VSS. Pt has dinner with him. Denies further needs. Call light in reach. Will monitor.

## 2019-04-29 ENCOUNTER — APPOINTMENT (OUTPATIENT)
Dept: CARDIAC CATH/INVASIVE PROCEDURES | Age: 64
DRG: 287 | End: 2019-04-29
Payer: MEDICARE

## 2019-04-29 LAB
ANION GAP SERPL CALCULATED.3IONS-SCNC: 12 MMOL/L (ref 3–16)
BASOPHILS ABSOLUTE: 0.1 K/UL (ref 0–0.2)
BASOPHILS RELATIVE PERCENT: 0.9 %
BUN BLDV-MCNC: 23 MG/DL (ref 7–20)
CALCIUM SERPL-MCNC: 8.7 MG/DL (ref 8.3–10.6)
CHLORIDE BLD-SCNC: 105 MMOL/L (ref 99–110)
CO2: 24 MMOL/L (ref 21–32)
CREAT SERPL-MCNC: 1.3 MG/DL (ref 0.8–1.3)
EKG ATRIAL RATE: 65 BPM
EKG DIAGNOSIS: NORMAL
EKG P AXIS: 38 DEGREES
EKG P-R INTERVAL: 140 MS
EKG Q-T INTERVAL: 442 MS
EKG QRS DURATION: 144 MS
EKG QTC CALCULATION (BAZETT): 459 MS
EKG R AXIS: 49 DEGREES
EKG T AXIS: 132 DEGREES
EKG VENTRICULAR RATE: 65 BPM
EOSINOPHILS ABSOLUTE: 0.1 K/UL (ref 0–0.6)
EOSINOPHILS RELATIVE PERCENT: 1.5 %
GFR AFRICAN AMERICAN: >60
GFR NON-AFRICAN AMERICAN: 56
GLUCOSE BLD-MCNC: 145 MG/DL (ref 70–99)
GLUCOSE BLD-MCNC: 148 MG/DL (ref 70–99)
GLUCOSE BLD-MCNC: 159 MG/DL (ref 70–99)
GLUCOSE BLD-MCNC: 172 MG/DL (ref 70–99)
HCT VFR BLD CALC: 41.2 % (ref 40.5–52.5)
HEMOGLOBIN: 13.6 G/DL (ref 13.5–17.5)
LEFT VENTRICULAR EJECTION FRACTION HIGH VALUE: 30 %
LEFT VENTRICULAR EJECTION FRACTION MODE: NORMAL
LYMPHOCYTES ABSOLUTE: 4 K/UL (ref 1–5.1)
LYMPHOCYTES RELATIVE PERCENT: 43.2 %
MCH RBC QN AUTO: 31.9 PG (ref 26–34)
MCHC RBC AUTO-ENTMCNC: 33 G/DL (ref 31–36)
MCV RBC AUTO: 96.7 FL (ref 80–100)
MONOCYTES ABSOLUTE: 0.7 K/UL (ref 0–1.3)
MONOCYTES RELATIVE PERCENT: 7.3 %
NEUTROPHILS ABSOLUTE: 4.4 K/UL (ref 1.7–7.7)
NEUTROPHILS RELATIVE PERCENT: 47.1 %
PDW BLD-RTO: 15.6 % (ref 12.4–15.4)
PERFORMED ON: ABNORMAL
PLATELET # BLD: 206 K/UL (ref 135–450)
PMV BLD AUTO: 10.4 FL (ref 5–10.5)
POTASSIUM REFLEX MAGNESIUM: 4.6 MMOL/L (ref 3.5–5.1)
RBC # BLD: 4.26 M/UL (ref 4.2–5.9)
SODIUM BLD-SCNC: 141 MMOL/L (ref 136–145)
WBC # BLD: 9.3 K/UL (ref 4–11)

## 2019-04-29 PROCEDURE — 6360000002 HC RX W HCPCS: Performed by: INTERNAL MEDICINE

## 2019-04-29 PROCEDURE — 6370000000 HC RX 637 (ALT 250 FOR IP): Performed by: INTERNAL MEDICINE

## 2019-04-29 PROCEDURE — 6370000000 HC RX 637 (ALT 250 FOR IP): Performed by: PHYSICIAN ASSISTANT

## 2019-04-29 PROCEDURE — 99223 1ST HOSP IP/OBS HIGH 75: CPT | Performed by: INTERNAL MEDICINE

## 2019-04-29 PROCEDURE — 2580000003 HC RX 258

## 2019-04-29 PROCEDURE — 2500000003 HC RX 250 WO HCPCS

## 2019-04-29 PROCEDURE — 4A023N7 MEASUREMENT OF CARDIAC SAMPLING AND PRESSURE, LEFT HEART, PERCUTANEOUS APPROACH: ICD-10-PCS | Performed by: INTERNAL MEDICINE

## 2019-04-29 PROCEDURE — 93459 L HRT ART/GRFT ANGIO: CPT

## 2019-04-29 PROCEDURE — 93005 ELECTROCARDIOGRAM TRACING: CPT | Performed by: INTERNAL MEDICINE

## 2019-04-29 PROCEDURE — 6360000004 HC RX CONTRAST MEDICATION: Performed by: INTERNAL MEDICINE

## 2019-04-29 PROCEDURE — 2580000003 HC RX 258: Performed by: NURSE PRACTITIONER

## 2019-04-29 PROCEDURE — 6360000002 HC RX W HCPCS

## 2019-04-29 PROCEDURE — B2111ZZ FLUOROSCOPY OF MULTIPLE CORONARY ARTERIES USING LOW OSMOLAR CONTRAST: ICD-10-PCS | Performed by: INTERNAL MEDICINE

## 2019-04-29 PROCEDURE — C1769 GUIDE WIRE: HCPCS

## 2019-04-29 PROCEDURE — 1200000000 HC SEMI PRIVATE

## 2019-04-29 PROCEDURE — 94760 N-INVAS EAR/PLS OXIMETRY 1: CPT

## 2019-04-29 PROCEDURE — 80048 BASIC METABOLIC PNL TOTAL CA: CPT

## 2019-04-29 PROCEDURE — 99152 MOD SED SAME PHYS/QHP 5/>YRS: CPT

## 2019-04-29 PROCEDURE — 6370000000 HC RX 637 (ALT 250 FOR IP): Performed by: NURSE PRACTITIONER

## 2019-04-29 PROCEDURE — C1725 CATH, TRANSLUMIN NON-LASER: HCPCS

## 2019-04-29 PROCEDURE — 93010 ELECTROCARDIOGRAM REPORT: CPT | Performed by: INTERNAL MEDICINE

## 2019-04-29 PROCEDURE — 2580000003 HC RX 258: Performed by: INTERNAL MEDICINE

## 2019-04-29 PROCEDURE — 2709999900 HC NON-CHARGEABLE SUPPLY

## 2019-04-29 PROCEDURE — 99153 MOD SED SAME PHYS/QHP EA: CPT

## 2019-04-29 PROCEDURE — G0378 HOSPITAL OBSERVATION PER HR: HCPCS

## 2019-04-29 PROCEDURE — 93459 L HRT ART/GRFT ANGIO: CPT | Performed by: INTERNAL MEDICINE

## 2019-04-29 PROCEDURE — 36415 COLL VENOUS BLD VENIPUNCTURE: CPT

## 2019-04-29 PROCEDURE — C1894 INTRO/SHEATH, NON-LASER: HCPCS

## 2019-04-29 PROCEDURE — B2151ZZ FLUOROSCOPY OF LEFT HEART USING LOW OSMOLAR CONTRAST: ICD-10-PCS | Performed by: INTERNAL MEDICINE

## 2019-04-29 PROCEDURE — 85025 COMPLETE CBC W/AUTO DIFF WBC: CPT

## 2019-04-29 RX ORDER — SODIUM CHLORIDE 0.9 % (FLUSH) 0.9 %
10 SYRINGE (ML) INJECTION PRN
Status: DISCONTINUED | OUTPATIENT
Start: 2019-04-29 | End: 2019-04-29

## 2019-04-29 RX ORDER — METHOCARBAMOL 500 MG/1
500 TABLET, FILM COATED ORAL 3 TIMES DAILY
Status: DISCONTINUED | OUTPATIENT
Start: 2019-04-29 | End: 2019-04-30 | Stop reason: HOSPADM

## 2019-04-29 RX ORDER — SODIUM CHLORIDE 0.9 % (FLUSH) 0.9 %
10 SYRINGE (ML) INJECTION EVERY 12 HOURS SCHEDULED
Status: DISCONTINUED | OUTPATIENT
Start: 2019-04-29 | End: 2019-04-29

## 2019-04-29 RX ORDER — GABAPENTIN 300 MG/1
600 CAPSULE ORAL 3 TIMES DAILY
Status: DISCONTINUED | OUTPATIENT
Start: 2019-04-29 | End: 2019-04-30 | Stop reason: HOSPADM

## 2019-04-29 RX ORDER — ACETAMINOPHEN 500 MG
1000 TABLET ORAL EVERY 8 HOURS SCHEDULED
Status: DISCONTINUED | OUTPATIENT
Start: 2019-04-29 | End: 2019-04-30 | Stop reason: HOSPADM

## 2019-04-29 RX ORDER — SODIUM CHLORIDE 0.9 % (FLUSH) 0.9 %
10 SYRINGE (ML) INJECTION PRN
Status: DISCONTINUED | OUTPATIENT
Start: 2019-04-29 | End: 2019-04-29 | Stop reason: SDUPTHER

## 2019-04-29 RX ORDER — ACETAMINOPHEN 325 MG/1
650 TABLET ORAL EVERY 4 HOURS PRN
Status: DISCONTINUED | OUTPATIENT
Start: 2019-04-29 | End: 2019-04-29

## 2019-04-29 RX ORDER — GABAPENTIN 300 MG/1
600 CAPSULE ORAL
Status: DISCONTINUED | OUTPATIENT
Start: 2019-04-29 | End: 2019-04-29

## 2019-04-29 RX ORDER — RANOLAZINE 500 MG/1
500 TABLET, EXTENDED RELEASE ORAL 2 TIMES DAILY
Status: DISCONTINUED | OUTPATIENT
Start: 2019-04-29 | End: 2019-04-30 | Stop reason: HOSPADM

## 2019-04-29 RX ORDER — SODIUM CHLORIDE 0.9 % (FLUSH) 0.9 %
10 SYRINGE (ML) INJECTION EVERY 12 HOURS SCHEDULED
Status: DISCONTINUED | OUTPATIENT
Start: 2019-04-29 | End: 2019-04-29 | Stop reason: SDUPTHER

## 2019-04-29 RX ORDER — SODIUM CHLORIDE 9 MG/ML
INJECTION, SOLUTION INTRAVENOUS CONTINUOUS
Status: DISCONTINUED | OUTPATIENT
Start: 2019-04-29 | End: 2019-04-29

## 2019-04-29 RX ORDER — ONDANSETRON 2 MG/ML
4 INJECTION INTRAMUSCULAR; INTRAVENOUS EVERY 6 HOURS PRN
Status: DISCONTINUED | OUTPATIENT
Start: 2019-04-29 | End: 2019-04-29 | Stop reason: SDUPTHER

## 2019-04-29 RX ORDER — SODIUM CHLORIDE 9 MG/ML
INJECTION, SOLUTION INTRAVENOUS CONTINUOUS
Status: DISCONTINUED | OUTPATIENT
Start: 2019-04-29 | End: 2019-04-30

## 2019-04-29 RX ADMIN — INSULIN LISPRO 2 UNITS: 100 INJECTION, SOLUTION INTRAVENOUS; SUBCUTANEOUS at 15:49

## 2019-04-29 RX ADMIN — Medication 10 ML: at 08:41

## 2019-04-29 RX ADMIN — IOPAMIDOL 123 ML: 755 INJECTION, SOLUTION INTRAVENOUS at 11:20

## 2019-04-29 RX ADMIN — METHOCARBAMOL TABLETS 500 MG: 500 TABLET, COATED ORAL at 16:46

## 2019-04-29 RX ADMIN — MORPHINE SULFATE 2 MG: 2 INJECTION, SOLUTION INTRAMUSCULAR; INTRAVENOUS at 21:15

## 2019-04-29 RX ADMIN — ACETAMINOPHEN 1000 MG: 500 TABLET ORAL at 16:46

## 2019-04-29 RX ADMIN — MORPHINE SULFATE 2 MG: 2 INJECTION, SOLUTION INTRAMUSCULAR; INTRAVENOUS at 01:38

## 2019-04-29 RX ADMIN — Medication 10 ML: at 21:17

## 2019-04-29 RX ADMIN — ISOSORBIDE MONONITRATE 60 MG: 60 TABLET, EXTENDED RELEASE ORAL at 08:45

## 2019-04-29 RX ADMIN — FUROSEMIDE 40 MG: 40 TABLET ORAL at 08:45

## 2019-04-29 RX ADMIN — GABAPENTIN 600 MG: 300 CAPSULE ORAL at 14:47

## 2019-04-29 RX ADMIN — AMLODIPINE BESYLATE 5 MG: 5 TABLET ORAL at 08:46

## 2019-04-29 RX ADMIN — MORPHINE SULFATE 2 MG: 2 INJECTION, SOLUTION INTRAMUSCULAR; INTRAVENOUS at 04:59

## 2019-04-29 RX ADMIN — INSULIN LISPRO 5 UNITS: 100 INJECTION, SOLUTION INTRAVENOUS; SUBCUTANEOUS at 16:46

## 2019-04-29 RX ADMIN — LOSARTAN POTASSIUM 50 MG: 50 TABLET ORAL at 08:46

## 2019-04-29 RX ADMIN — MORPHINE SULFATE 2 MG: 2 INJECTION, SOLUTION INTRAMUSCULAR; INTRAVENOUS at 08:40

## 2019-04-29 RX ADMIN — RANOLAZINE 500 MG: 500 TABLET, FILM COATED, EXTENDED RELEASE ORAL at 21:15

## 2019-04-29 RX ADMIN — ATORVASTATIN CALCIUM 80 MG: 80 TABLET, FILM COATED ORAL at 08:45

## 2019-04-29 RX ADMIN — ASPIRIN 81 MG 81 MG: 81 TABLET ORAL at 08:46

## 2019-04-29 RX ADMIN — MORPHINE SULFATE 2 MG: 2 INJECTION, SOLUTION INTRAMUSCULAR; INTRAVENOUS at 14:47

## 2019-04-29 RX ADMIN — METOPROLOL SUCCINATE 100 MG: 50 TABLET, EXTENDED RELEASE ORAL at 08:46

## 2019-04-29 RX ADMIN — PANTOPRAZOLE SODIUM 40 MG: 40 TABLET, DELAYED RELEASE ORAL at 05:03

## 2019-04-29 RX ADMIN — GABAPENTIN 600 MG: 300 CAPSULE ORAL at 21:15

## 2019-04-29 RX ADMIN — MORPHINE SULFATE 2 MG: 2 INJECTION, SOLUTION INTRAMUSCULAR; INTRAVENOUS at 18:14

## 2019-04-29 RX ADMIN — METHOCARBAMOL TABLETS 500 MG: 500 TABLET, COATED ORAL at 21:15

## 2019-04-29 RX ADMIN — SODIUM CHLORIDE: 9 INJECTION, SOLUTION INTRAVENOUS at 16:46

## 2019-04-29 RX ADMIN — GABAPENTIN 600 MG: 300 CAPSULE ORAL at 05:48

## 2019-04-29 RX ADMIN — CLOPIDOGREL BISULFATE 75 MG: 75 TABLET ORAL at 08:46

## 2019-04-29 RX ADMIN — ACETAMINOPHEN 1000 MG: 500 TABLET ORAL at 22:32

## 2019-04-29 ASSESSMENT — PAIN DESCRIPTION - DIRECTION
RADIATING_TOWARDS: LEFT ARM
RADIATING_TOWARDS: DOWN LEFT ARM
RADIATING_TOWARDS: DOWN LEFT ARM
RADIATING_TOWARDS: DOWN LEFT LEG
RADIATING_TOWARDS: DOWN LEFT ARM

## 2019-04-29 ASSESSMENT — PAIN DESCRIPTION - ONSET
ONSET: ON-GOING

## 2019-04-29 ASSESSMENT — PAIN DESCRIPTION - DESCRIPTORS
DESCRIPTORS: PRESSURE
DESCRIPTORS: CONSTANT;PRESSURE
DESCRIPTORS: PRESSURE
DESCRIPTORS: PRESSURE
DESCRIPTORS: CONSTANT;PRESSURE
DESCRIPTORS: PRESSURE
DESCRIPTORS: PRESSURE
DESCRIPTORS: PRESSURE;CONSTANT
DESCRIPTORS: PRESSURE
DESCRIPTORS: PRESSURE

## 2019-04-29 ASSESSMENT — PAIN - FUNCTIONAL ASSESSMENT
PAIN_FUNCTIONAL_ASSESSMENT: ACTIVITIES ARE NOT PREVENTED
PAIN_FUNCTIONAL_ASSESSMENT: PREVENTS OR INTERFERES SOME ACTIVE ACTIVITIES AND ADLS
PAIN_FUNCTIONAL_ASSESSMENT: ACTIVITIES ARE NOT PREVENTED

## 2019-04-29 ASSESSMENT — PAIN SCALES - GENERAL
PAINLEVEL_OUTOF10: 3
PAINLEVEL_OUTOF10: 7
PAINLEVEL_OUTOF10: 3
PAINLEVEL_OUTOF10: 7
PAINLEVEL_OUTOF10: 9
PAINLEVEL_OUTOF10: 7
PAINLEVEL_OUTOF10: 9
PAINLEVEL_OUTOF10: 9
PAINLEVEL_OUTOF10: 7
PAINLEVEL_OUTOF10: 0
PAINLEVEL_OUTOF10: 7
PAINLEVEL_OUTOF10: 7
PAINLEVEL_OUTOF10: 6
PAINLEVEL_OUTOF10: 0
PAINLEVEL_OUTOF10: 7
PAINLEVEL_OUTOF10: 7
PAINLEVEL_OUTOF10: 10
PAINLEVEL_OUTOF10: 7
PAINLEVEL_OUTOF10: 7

## 2019-04-29 ASSESSMENT — PAIN DESCRIPTION - PROGRESSION
CLINICAL_PROGRESSION: NOT CHANGED

## 2019-04-29 ASSESSMENT — PAIN DESCRIPTION - FREQUENCY
FREQUENCY: CONTINUOUS

## 2019-04-29 ASSESSMENT — PAIN DESCRIPTION - ORIENTATION
ORIENTATION: LEFT
ORIENTATION: ANTERIOR
ORIENTATION: LEFT
ORIENTATION: LEFT

## 2019-04-29 ASSESSMENT — PAIN DESCRIPTION - LOCATION
LOCATION: CHEST
LOCATION: CHEST;ARM
LOCATION: CHEST

## 2019-04-29 ASSESSMENT — PAIN DESCRIPTION - PAIN TYPE
TYPE: ACUTE PAIN

## 2019-04-29 ASSESSMENT — PAIN SCALES - WONG BAKER: WONGBAKER_NUMERICALRESPONSE: 10

## 2019-04-29 NOTE — BRIEF OP NOTE
Brief Postoperative Note    Patel Perdue  YOB: 1955  [id number]    Pre-operative Diagnosis: [place]    Post-operative Diagnosis: Same    Procedure: LHC,LV Aortogram, Bypass angio    Anesthesia: Moderate Sedation    Surgeons/Assistants: Manolo Martinez    Estimated Blood Loss: less than 50     Complications: None    Specimens: Was Not Obtained    Findings: Please see dictated report. Will Ct medical therapy for now.     Electronically signed by Machelle Grover MD on 4/29/2019 at 11:40 AM

## 2019-04-29 NOTE — PROGRESS NOTES
[facility]   Daily Progress Note      Admit Date:  4/27/2019    CC: \" Chest pain\"  The patient is a 28-year-old  male  with extensive history of coronary artery disease status post two bypass  surgeries, status post intracoronary stent placement in the left main,  the last one being in 03/2017. The patient was recently discharged  after being admitted for chest pain and after a negative stress test.   However, he continues to complain of a chest pressure and left arm pain  off and on, almost continuously for the last week. He says it is worse  when he walks. EKG shows sinus tachycardia with PAC and PVCs, right  bundle branch block, nonspecific T-wave changes. H and H is 14.4 and  43.4. Troponin is mildly elevated at 0.02. BUN 15, creatinine 0.8. Blood sugars are high at 214. The patient also complains of shortness  of breath with exertion. He states the pain is similar to his previous  chest pains. Subjective:  Pt with no acute overnight events. Continues to c/o  chest pain,, and dyspnea. Objective:   BP (!) 163/90   Pulse 71   Temp 97.6 °F (36.4 °C) (Oral)   Resp 16   Ht 6' (1.829 m)   Wt 225 lb 5 oz (102.2 kg)   SpO2 97%   BMI 30.56 kg/m²       Intake/Output Summary (Last 24 hours) at 4/29/2019 0943  Last data filed at 4/29/2019 0513  Gross per 24 hour   Intake 930 ml   Output 3500 ml   Net -2570 ml     Wt Readings from Last 3 Encounters:   04/29/19 225 lb 5 oz (102.2 kg)   04/26/19 227 lb 1.2 oz (103 kg)   04/24/19 232 lb 9.4 oz (105.5 kg)     Telemetry:NSRT    Physical Exam:  General:  NAD, Awake, alert and oriented X4  Skin:  Warm and dry  Neck:  Supple, no JVP appreciated, no bruit  Chest:  Clear to auscultation, no wheezes/rhonchi/rales  Cardiovascular:  Regular rate.  S1S2  Abdomen:  Soft, nontender, +bowel sounds  Extremities:  No LE edema    Cardiac Diagnosis:  diabetes, hypertension, hyperlipidemia, coronary artery disease, history of prior MI, CHF, peripheral vascular disease, status post CABG and status post coronary artery stenting    Medications:    gabapentin  600 mg Oral 5x Daily    pantoprazole  40 mg Oral QAM AC    metoprolol succinate  100 mg Oral Daily    losartan  50 mg Oral Daily    isosorbide mononitrate  60 mg Oral Daily    furosemide  40 mg Oral Daily    clopidogrel  75 mg Oral Daily    atorvastatin  80 mg Oral Daily    aspirin  81 mg Oral Daily    amLODIPine  5 mg Oral Daily    sodium chloride flush  10 mL Intravenous 2 times per day    enoxaparin  40 mg Subcutaneous Daily    insulin lispro  0-12 Units Subcutaneous TID WC    insulin lispro  0-6 Units Subcutaneous Nightly    nicotine  1 patch Transdermal Daily      dextrose       hydrALAZINE, albuterol sulfate HFA, sodium chloride flush, magnesium hydroxide, ondansetron, glucose, dextrose, glucagon (rDNA), dextrose, morphine, ipratropium-albuterol, diphenhydrAMINE    Lab Data:  CBC:   Recent Labs     04/27/19  1256 04/28/19  0505 04/29/19  0455   WBC 8.2 10.6 9.3   HGB 15.1 14.4 13.6    213 206     BMP:    Recent Labs     04/27/19  1256 04/28/19  0504 04/29/19  0455    136 141   K 4.5 4.7 4.6   CO2 18* 21 24   BUN 14 15 23*   CREATININE 0.7* 0.8 1.3     LIVR: No results for input(s): AST, ALT in the last 72 hours. INR:  No results for input(s): INR in the last 72 hours. APTT: No results for input(s): APTT in the last 72 hours. BNP:  No results for input(s): BNP in the last 72 hours.     Imaging:  No reversible ischemia seen    fixed abnormal myocardial perfusion defect involving the inferior wall    Severely depressed LV Systolic function    inferior wall is hypokinetic    Overall findings represent a high risk study.             Assessment/Plan:  1)Recurrent chest pain    - Pt is back in the hospital with recurrent chest pain  - Troponins are negative  -Recent nuclear stress test showed no ischemia but had LV dysfunction  - will proceed with cardiac cath as he has extensive cardiac history(CABG, LMT PCI)    2) Cardiomyopathy  - No obvious CHF    I discussed the risks and benefits of cardiac catheterization with the patient. I also discussed the possible therapies and alternatives including medical management, angioplasty and stenting or coronary bypass surgery. The patient is amenable to undergoing the procedure and voices understanding of the risks. We will have the procedure scheduled.                Electronically signed by Marixa Hinds MD on 4/29/2019 at 9:43 AM

## 2019-04-29 NOTE — PLAN OF CARE
Problem: Pain:  Goal: Pain level will decrease  Description  Pain level will decrease  4/28/2019 2347 by Angeles Wilkes RN  Outcome: Ongoing     Problem: Pain:  Goal: Control of acute pain  Description  Control of acute pain  4/28/2019 2347 by Angeles Wilkes RN  Outcome: Ongoing   Pain/discomfort being managed with PRN analgesics per MD orders. Pt able to express presence and absence of pain and rate pain appropriately using numerical scale. Problem: Falls - Risk of:  Goal: Will remain free from falls  Description  Will remain free from falls  4/28/2019 2347 by Angeles Wilkes RN  Outcome: Ongoing     Problem: Falls - Risk of:  Goal: Absence of physical injury  Description  Absence of physical injury  4/28/2019 2347 by Angeles Wilkes RN  Outcome: Ongoing   Fall risk assessment completed every shift. All precautions in place. Pt has call light within reach at all times. Room clear of clutter. Pt aware to call for assistance when getting up. Problem: Cardiac:  Goal: Ability to maintain vital signs within normal range will improve  Description  Ability to maintain vital signs within normal range will improve  4/28/2019 2347 by Angeles Wilkes RN  Outcome: Ongoing   VS stable. Will continue to monitor. SR on tele.   Problem: Cardiac:  Goal: Cardiovascular alteration will improve  Description  Cardiovascular alteration will improve  4/28/2019 2347 by Angeles Wilkes RN  Outcome: Ongoing

## 2019-04-29 NOTE — PRE SEDATION
Brief Pre-Op Note/Sedation Assessment      Meme Cohen  1955  [id number]  [id number]  9:39 AM    Planned Procedure: Cardiac Catheterization Procedure    Post Procedure Plan: Return to same level of care    Consent: I have discussed with the patient and/or the patient representative the indication, alternatives, and the possible risks and/or complications of the planned procedure and the anesthesia methods. The patient and/or patient representative appear to understand and agree to proceed. Chief Complaint: Chest Pain/Pressure      Indications for Cath Procedure: Worsening Angina  Anginal Classification within 2 weeks:  CCS III - Symptoms with everyday living activities, i.e., moderate limitation  NYHA Heart Failure Class within 2 weeks: No symptoms    Anti- Anginal Meds within 2 weeks:   Yes: Beta Blockers, Ca Channel Blockers, Long Acting Nitrates (Any), Ranolazine, Aspirin and Statin (Any)    Stress or Imaging Studies Performed:  Stress Test with SPECT Result: Positive:  inferior Risk/Extent of Ischemia:  High    Vital Signs:  BP (!) 163/90   Pulse 71   Temp 97.6 °F (36.4 °C) (Oral)   Resp 16   Ht 6' (1.829 m)   Wt 225 lb 5 oz (102.2 kg)   SpO2 97%   BMI 30.56 kg/m²     Allergies:   Allergies   Allergen Reactions    Dopamine Hcl Other (See Comments)     Compulsive gambling    Tramadol Other (See Comments)     Dizziness     Melatonin Other (See Comments)     Restless leg syndrome         Past Medical History:  Past Medical History:   Diagnosis Date    Anxiety     Arthritis     Asthma     CAD (coronary artery disease)     Calcium kidney stone     Cardiomyopathy ([address]     Cerebral artery occlusion with cerebral infarction ([address]     CHF (congestive heart failure) ([address]     COPD [facility]chronic obstructive [facility] disease[facility]     mild    Depression     DM2 (diabetes mellitus, type 2) ([facility])     Fibromyalgia     GERD (gastroesophageal reflux disease)     Hyperlipidemia     Hypertension     Liver disease     Pacemaker 2012    Medtronic model # [id number]    Pneumonia     Seizures [facility]     TIA (transient ischemic attack) 2007    Ulcerative colitis ([facility]          Surgical History:  Past Surgical History:   Procedure Laterality Date    BACK SURGERY      CARDIAC SURGERY      CHOLECYSTECTOMY      COLONOSCOPY  01/10/2017    COLONOSCOPY  01/10/2017    CORONARY ANGIOPLASTY WITH STENT PLACEMENT  2012    CORONARY ARTERY BYPASS GRAFT  2010, 11/2015    ENDOSCOPY, COLON, DIAGNOSTIC      PACEMAKER PLACEMENT      UPPER GASTROINTESTINAL ENDOSCOPY  02/07/2017         Medications:  Current Facility-Administered Medications   Medication Dose Route Frequency Provider Last Rate Last Dose    gabapentin (NEURONTIN) capsule 600 mg  600 mg Oral 5x Daily Keila Sabillon MD   600 mg at 04/29/19 0548    hydrALAZINE (APRESOLINE) injection 10 mg  10 mg Intravenous Q6H PRN Anne TAYLA Ley NP        pantoprazole (PROTONIX) tablet 40 mg  40 mg Oral QAM AC Shirley Stack MD   40 mg at 04/29/19 0503    metoprolol succinate (TOPROL XL) extended release tablet 100 mg  100 mg Oral Daily Shirley Stack MD   100 mg at 04/29/19 0846    losartan (COZAAR) tablet 50 mg  50 mg Oral Daily Shirley Stack MD   50 mg at 04/29/19 0846    isosorbide mononitrate (IMDUR) extended release tablet 60 mg  60 mg Oral Daily Shirley Stack MD   60 mg at 04/29/19 0845    furosemide (LASIX) tablet 40 mg  40 mg Oral Daily Shirley Stack MD   40 mg at 04/29/19 0845    clopidogrel (PLAVIX) tablet 75 mg  75 mg Oral Daily Shirley Stack MD   75 mg at 04/29/19 0846    atorvastatin (LIPITOR) tablet 80 mg  80 mg Oral Daily Shirley Stack MD   80 mg at 04/29/19 0845    aspirin chewable tablet 81 mg  81 mg Oral Daily Shirley Stack MD   81 mg at 04/29/19 0846    amLODIPine (NORVASC) tablet 5 mg  5 mg Oral Daily Shirley Stack MD   5 mg at 04/29/19 0846    albuterol sulfate  (90 Base) MCG/ACT inhaler 2 puff  2 puff Inhalation Q6H PRN Kelsey Leblanc MD        sodium chloride flush 0.9 % injection 10 mL  10 mL Intravenous 2 times per day Kelsey Leblanc MD   10 mL at 04/29/19 0841    sodium chloride flush 0.9 % injection 10 mL  10 mL Intravenous PRN Kelsey Leblanc MD        magnesium hydroxide (MILK OF MAGNESIA) 400 MG/5ML suspension 30 mL  30 mL Oral Daily PRN Kelsey Leblanc MD        ondansetron [facility]) injection 4 mg  4 mg Intravenous Q4H PRN Kelsey Leblanc MD        enoxaparin (LOVENOX) injection 40 mg  40 mg Subcutaneous Daily Kelsey Leblanc MD   40 mg at 04/28/19 0802    insulin lispro (HUMALOG) injection vial 0-12 Units  0-12 Units Subcutaneous TID WC Kelsey Leblanc MD   2 Units at 04/28/19 1607    insulin lispro (HUMALOG) injection vial 0-6 Units  0-6 Units Subcutaneous Nightly Kelsey Leblanc MD   1 Units at 04/28/19 2121    glucose (GLUTOSE) 40 % oral gel 15 g  15 g Oral PRN Kelsey Leblanc MD        dextrose 50 % solution 12.5 g  12.5 g Intravenous PRN Kelsey Leblanc MD        glucagon (rDNA) injection 1 mg  1 mg Intramuscular PRN Kelsey Leblanc MD        dextrose 5 % solution  100 mL/hr Intravenous PRN Kelsey Leblanc MD        morphine (PF) injection 2 mg  2 mg Intravenous Q3H PRN Kelsey Leblanc MD   2 mg at 04/29/19 0840    ipratropium-albuterol (DUONEB) nebulizer solution 1 ampule  1 ampule Inhalation Q4H PRN Kelsey Leblanc MD        diphenhydrAMINE (BENADRYL) tablet 25 mg  25 mg Oral Nightly PRN Jyothi De Guzman PA-C        nicotine (NICODERM CQ) 21 MG/24HR 1 patch  1 patch Transdermal Daily Jyothi De Guzman PA-C   1 patch at 04/29/19 0859           Pre-Sedation:    Pre-Sedation Documentation and Exam:  I have personally completed a history, physical exam & review of systems for this patient (see notes).     Prior History of Anesthesia Complications:   none    Modified Mallampati:  I (soft palate, uvula, fauces, tonsillar pillars visible)    ASA Classification:  Class 3 - A patient with severe systemic disease that limits activity but is not incapacitating and Class 2 -- A normal healthy patient with mild systemic disease    Eris Scale: Activity:  2 - Able to move 4 extremities voluntarily on command  Respiration:  2 - Able to breathe deeply and cough freely  Circulation:  2 - BP+/- 20mmHg of normal  Consciousness:  2 - Fully awake  Oxygen Saturation (color):  2 - Able to maintain oxygen saturation >92% on room air    Sedation/Anesthesia Plan:  Guard the patient's safety and welfare. Minimize physical discomfort and pain. Minimize negative psychological responses to treatment by providing sedation and analgesia and maximize the potential amnesia. Patient to meet pre-procedure discharge plan.     Medication Planned:  midazolam intravenously and fentanyl intravenously    Patient is an appropriate candidate for plan of sedation: yes      Electronically signed by Nikole Rizzo MD on 4/29/2019 at 9:39 AM

## 2019-04-29 NOTE — PROCEDURES
[address] [place], [place] [address]                            CARDIAC CATHETERIZATION    PATIENT NAME: Corie Douglas                    :        1955  MED REC NO:   [id number]                          ROOM:       3104  ACCOUNT NO:   [de-identified]                           ADMIT DATE: 2019  PROVIDER:     Olesya Bangura MD    DATE OF PROCEDURE:  2019    PROCEDURE NOTE:  The patient was explained benefits and risks of the  procedure. Informed consent was obtained. Right groin was prepared in  usual sterile fashion. A 6-Burkinan sheath was placed in the right  femoral artery. Coronary angiography was carried out using 5-Burkinan JL4  and 5-Burkinan JR4 diagnostic catheters. Multiple views of left and right  coronary arteries were obtained in the Lithuanian/COFFEY projection with cranial  and caudal angulation. The saphenous vein angiography was performed  with the help of 5-Burkinan JR4 catheters except the saphenous vein graft  to the LAD was cannulated only with the help of AR2 catheter. Multiple  views of these venous grafts were obtained in Lithuanian/COFFEY projection with  cranial and caudal angulations. Angled pigtail catheter was used for a  left ventriculogram.  Pressures in the left ventricular cavity was  obtained before and after the left ventriculogram.  Aortogram was also  obtained to accurately assess the bypass graft. Femoral angiography was  subsequently performed but the patient was not found suitable for  Angio-Seal and all the catheters were removed and hemostasis will be  achieved. HEMODYNAMIC DATA:  Please see the computerized printout. There was no  gradient across the aortic valve. The LVEDP was within normal limits. CORONARY ANGIOGRAPHY:  1. Left main trunk: It arises from the left sinus of Valsalva. It is  a moderate to large-sized artery.   It divides into left anterior  descending artery and left circumflex artery. Left main trunk has  evidence of stent which is widely patent with no evidence of in-stent  restenosis. 2.  Left anterior descending artery: It is a moderate-sized artery. It  is 100% occluded in the mid segment after a moderate to large-sized  diagonal branch. There is a 40% stenosis of the ostium of the diagonal  branch but it does not appear hemodynamically significant. Left  anterior descending artery is occluded with evidence of a competitive  flow in the mid segment. 3.  Left circumflex artery: It arises from the left main trunk. It  gives rise to a moderate-sized obtuse marginal branch. There is a  diffuse 70% to 80% stenosis in the proximal portion of the circumflex  artery. The obtuse marginal branch has evidence of a competitive flow. The circumflex artery in the distal segment appears to be relatively  small. 4.  Right coronary artery: It arises from the right sinus or Valsalva. It is a moderate to large-sized artery. There is about an 80% stenosis  in the mid segment of the right coronary artery. It appears to be  giving rise to large marginal branches. Right coronary artery appears  to be occluded after the origin of those acute marginal branches. 5.  Saphenous vein graft to circumflex as well as to a part of the  obtuse marginal branches is widely patent and there appeared to be  another jump graft from the previous grafts supplying the circumflex  artery which is also widely patent. 6.  Saphenous vein graft to the LAD is widely patent with evidence of  disease in the distal LAD beyond the anastomosis. 7.  Saphenous vein graft to the right coronary artery: It is supplying  the PDA branch. It is patent with evidence of 60% stenosis in the  proximal portion of the saphenous vein graft. The anastomosis appears  widely patent. LEFT VENTRICULOGRAM:  Reveals a left ventricular systolic dysfunction. The inferior wall appears to be severely hypokinetic. Overall ejection  fraction appears to be about 30% to 35%. AORTOGRAM:  Reveals a normal-caliber aortic root with a visualization of  the saphenous vein graft both to the circumflex as well as to the LAD. OVERALL IMPRESSION:  1. Patent stented left main trunk. 2.  A 100% occlusion of the mid LAD after the large diagonal branch. A  40% ostial stenosis of the diagonal branch. 3.  A 70% to 80% stenosis of the proximal circumflex artery with  occluded obtuse marginal branches with a competitive flow. 4.  Patent saphenous vein graft with a jump graft to obtuse marginal  branches as well as possible diagonal branch. 5.  Saphenous vein graft to the distal LAD is widely patent with no  evidence of faint anastomotic stenosis. Mild disease of the LAD beyond  the anastomosis. 6.  Saphenous vein graft to right coronary artery with 60% proximal  stenosis in the saphenous vein graft. 7.  A 100% mid-RCA stenosis after the marginal branches with evidence of  70% to 80% stenosis in the proximal portion of the RCA. 8.  Left ventricular systolic dysfunction with estimated EF of about 30%  to 35% with normal left heart hemodynamics. 9.  Normal aortic root with visualization of the saphenous vein graft to  the LAD as well as to the circumflex artery. In view of the above findings, we will first treat the patient  medically. We will review angiogram and decide whether the patient can  benefit from any further interventions or continue with medical therapy  or not. MEDICATION LOG:  The patient received 100 mcg of fentanyl and 4 mg of  Versed during the procedure.         Xochitl Haywood MD    D: 04/29/2019 11:46:21       T: 04/29/2019 13:05:32     [email or web address]  Job#: [id number]     Doc#: [id number]    CC:  MD Dr. Melanie Saldaña

## 2019-04-29 NOTE — PROGRESS NOTES
Patient's right groin site remains without bleeding/swelling/pain. The area is soft to touch. The call light remains in patient's reach.

## 2019-04-29 NOTE — PROGRESS NOTES
Patient put his call light asking to have his glucose level checked again. This is patient's third food tray since being back from the cath lab.

## 2019-04-29 NOTE — PROGRESS NOTES
Patient said his pain level is never below a 7/10. He is lying in bed,watching t.v. The call light is in his reach.

## 2019-04-29 NOTE — PROGRESS NOTES
Hospital Medicine Progress Note      Admit Date: 4/27/2019         Overnight Events: cont chest pain    CC: F/U for chest pain    HPI:   This is a 60 y/o male w/ an extensive cardiac history including CAD to native and grafted vessels who presented to the ED w/ c/o chest pain. He was recently admitted on the 26th and after having a negative stress test was discharged. He presented to the ED again with chest pain. This time cardiology was consulted and cardiac catheterization was performed. Patient has multivessel disease however after discussion with Dr. Amy Argueta he states that he thinks his chest pain is not likely related to coronary disease. Interval History/Subjective:   Continues to complain of 10 out of 10 chest pain, denies fevers, chills, shortness of breath, wheezing, palpitations, nausea, vomiting or abdominal pain. No dysuria. No other symptoms noted at this time. Review of Systems:     Comprehensive ROS negative except as mentioned above.       Past Medical History:        Diagnosis Date    Anxiety     Arthritis     Asthma     CAD (coronary artery disease)     Calcium kidney stone     Cardiomyopathy ([address]     Cerebral artery occlusion with cerebral infarction ([address]     CHF (congestive heart failure) ([address]     COPD (chronic obstructive pulmonary disease) (HCC)     mild    Depression     DM2 (diabetes mellitus, type 2) (HCC)     Fibromyalgia     GERD (gastroesophageal reflux disease)     Hyperlipidemia     Hypertension     Liver disease     Pacemaker 2012    Medtronic model # [id number]    Pneumonia     Seizures ([address]     TIA (transient ischemic attack) 2007    Ulcerative colitis ([address]        Past Surgical History:        Procedure Laterality Date    BACK SURGERY      CARDIAC SURGERY      CHOLECYSTECTOMY      COLONOSCOPY  01/10/2017    COLONOSCOPY  01/10/2017    CORONARY ANGIOPLASTY WITH STENT PLACEMENT  2012    CORONARY ARTERY BYPASS GRAFT  2010, 11/2015    ENDOSCOPY, COLON, DIAGNOSTIC      PACEMAKER PLACEMENT      UPPER GASTROINTESTINAL ENDOSCOPY  02/07/2017       Allergies:  Dopamine hcl; Tramadol; and Melatonin    Past medical and surgical history reviewed. Any changes have been noted. Scheduled and prn Medications:    Scheduled Meds:   gabapentin  600 mg Oral 5x Daily    ranolazine  500 mg Oral BID    pantoprazole  40 mg Oral QAM AC    metoprolol succinate  100 mg Oral Daily    losartan  50 mg Oral Daily    isosorbide mononitrate  60 mg Oral Daily    furosemide  40 mg Oral Daily    clopidogrel  75 mg Oral Daily    atorvastatin  80 mg Oral Daily    aspirin  81 mg Oral Daily    amLODIPine  5 mg Oral Daily    sodium chloride flush  10 mL Intravenous 2 times per day    enoxaparin  40 mg Subcutaneous Daily    insulin lispro  0-12 Units Subcutaneous TID WC    insulin lispro  0-6 Units Subcutaneous Nightly    nicotine  1 patch Transdermal Daily     Continuous Infusions:   dextrose       PRN Meds:.acetaminophen, hydrALAZINE, albuterol sulfate HFA, sodium chloride flush, magnesium hydroxide, ondansetron, glucose, dextrose, glucagon (rDNA), dextrose, morphine, ipratropium-albuterol, diphenhydrAMINE    PHYSICAL EXAM:  /79   Pulse 82   Temp 97.2 °F (36.2 °C) (Oral)   Resp 18   Ht 6' (1.829 m)   Wt 225 lb 5 oz (102.2 kg)   SpO2 94%   BMI 30.56 kg/m²       Intake/Output Summary (Last 24 hours) at 4/29/2019 1548  Last data filed at 4/29/2019 1502  Gross per 24 hour   Intake 630 ml   Output 3550 ml   Net -2920 ml       General: Alert and oriented. Resting in bed in no apparent distress. obese  HEENT: Normocephalic. Atraumatic. Pupils equal and reactive. EOM intact. Oral mucosa pink/moist/intact. Neck: Supple. Symmetrical. Trachea midline. Lungs: Clear to auscultation bilaterally. Respirations even and unlabored. Chest: Exam unremarkable. Cardiac: S1/S2 noted. Regular Rhythm and rate. Abdomen/GI: Soft. Non-tender. Non-distended. BS+.  Extremities: PP+. Atraumatic. No redness/cyanosis/edema noted. Brisk cap refill. Right groin site wnl  Skin: Dry and intact. No lesions noted. Neuro: Grossly intact. No focal deficits noted. LABS:    Lab Results   Component Value Date    WBC 9.3 04/29/2019    HGB 13.6 04/29/2019    HCT 41.2 04/29/2019    MCV 96.7 04/29/2019     04/29/2019    LYMPHOPCT 43.2 04/29/2019    RBC 4.26 04/29/2019    MCH 31.9 04/29/2019    MCHC 33.0 04/29/2019    RDW 15.6 (H) 04/29/2019       Lab Results   Component Value Date    CREATININE 1.3 04/29/2019    BUN 23 (H) 04/29/2019     04/29/2019    K 4.6 04/29/2019     04/29/2019    CO2 24 04/29/2019       Lab Results   Component Value Date    MG 2.00 12/26/2018       Lab Results   Component Value Date    ALT 11 04/24/2019    AST 9 (L) 04/24/2019    ALKPHOS 74 04/24/2019    BILITOT 0.4 04/24/2019        No flowsheet data found. Imaging:  XR CHEST PORTABLE   Final Result   No acute process.              Assessment & Plan:      Chest Pain s/p CABG with on going CAD  - EKG nonacute  - cath today showing multi vessel disease with patent stents- fluid given [id number] d/t mild elevation in Cr and would like to avoid DAVID  - d/w Dr. See Martinez, not likely the cause of chest pain  - will monitor overnight and start on muscle relaxer's to see if this helps  - treating lesions medically at this time, Ranexa and imdur   - has ICD  - plavix, asa, statin     Chronic systolic HF, not in exacerbation  - Echo 8/21/18 with EF of 45%  - strict I/o's, daily weights     COPD, not in exacerbation  - cont home regimen, prn albuterol inhaler     DM II with Hyperglycemia with neurological manifestations  - bs overall stable here  - got lantus the first night he was here but was not using this at home  - lantus stopped, meal time coverage added with 5 units with each meal  - monitor accu checks closely  - pt upset about starting meal time coverage, this was dc and carb control diet was placed     Essential HTN  - BP stable  - cont losartan, norvasc     HLD, unspecified  - cont statin    Obese: Body mass index is 30.56 kg/m². The patient and / or the family were informed of the results of any tests, a time was given to answer questions, a plan was proposed and they agreed with plan.     DVT prophylaxis: [x] Lovenox  [] SQ Heparin  [] SCDs because of  [] warfarin/oral direct thrombin inhibitor [] Encourage ambulation    GI prophylaxis: [] [id number]  [x] not indicated    Probiotic if on abx: [] Yes [] No [x] Not Indicated    Diet: DIET CARDIAC;    Consults:  IP CONSULT TO CARDIOLOGY  IP CONSULT TO CARDIOLOGY  IP CONSULT TO CARDIAC REHAB    Disposition:  [] Home  [] Home with home health [] Rehab [] Psych [] SNF  [] LTAC  [] Long term nursing home or group home [] Transfer to ICU  [] Transfer to PCU [] Other:    Code Status: Full Code    ELOS: tbd      TAYLA Arevalo - LYNN  04/29/19

## 2019-04-29 NOTE — PROGRESS NOTES
In to assess pt. A&Ox4. VSS, BP elevated. Rates pain in left chest down left arm 9/10, treated with prn pain med, see emar. C/O SOB, states he still smokes a 1/2 pack/day, has dry \"smokers cough\". Lung fields clear, diminished throughout. Defibrillator to left chest, NSR on monitor. IV site WDL. Call light within reach, will continue to monitor.   Electronically signed by Teodoro Huitron RN on 4/29/2019 at 11:52 AM

## 2019-04-29 NOTE — PROGRESS NOTES
Report called to [address] [place] [address] & [employer] on 4N. Pt to be transferred to room 4119 from [facility] lab.   Electronically signed by Teodoro Huitron RN on 4/29/2019 at 12:25 PM

## 2019-04-30 VITALS
BODY MASS INDEX: 30.55 KG/M2 | DIASTOLIC BLOOD PRESSURE: 64 MMHG | RESPIRATION RATE: 18 BRPM | OXYGEN SATURATION: 93 % | SYSTOLIC BLOOD PRESSURE: 167 MMHG | HEART RATE: 75 BPM | WEIGHT: 225.53 LBS | TEMPERATURE: 98.4 F | HEIGHT: 72 IN

## 2019-04-30 LAB
ALBUMIN SERPL-MCNC: 3.4 G/DL (ref 3.4–5)
ANION GAP SERPL CALCULATED.3IONS-SCNC: 12 MMOL/L (ref 3–16)
BUN BLDV-MCNC: 20 MG/DL (ref 7–20)
CALCIUM SERPL-MCNC: 8.1 MG/DL (ref 8.3–10.6)
CHLORIDE BLD-SCNC: 107 MMOL/L (ref 99–110)
CO2: 22 MMOL/L (ref 21–32)
CREAT SERPL-MCNC: 1 MG/DL (ref 0.8–1.3)
GFR AFRICAN AMERICAN: >60
GFR NON-AFRICAN AMERICAN: >60
GLUCOSE BLD-MCNC: 150 MG/DL (ref 70–99)
GLUCOSE BLD-MCNC: 155 MG/DL (ref 70–99)
GLUCOSE BLD-MCNC: 179 MG/DL (ref 70–99)
PERFORMED ON: ABNORMAL
PERFORMED ON: ABNORMAL
PHOSPHORUS: 4.8 MG/DL (ref 2.5–4.9)
POTASSIUM SERPL-SCNC: 4.2 MMOL/L (ref 3.5–5.1)
SODIUM BLD-SCNC: 141 MMOL/L (ref 136–145)
TROPONIN: <0.01 NG/ML

## 2019-04-30 PROCEDURE — 99233 SBSQ HOSP IP/OBS HIGH 50: CPT | Performed by: INTERNAL MEDICINE

## 2019-04-30 PROCEDURE — 84484 ASSAY OF TROPONIN QUANT: CPT

## 2019-04-30 PROCEDURE — 6360000002 HC RX W HCPCS: Performed by: INTERNAL MEDICINE

## 2019-04-30 PROCEDURE — 80069 RENAL FUNCTION PANEL: CPT

## 2019-04-30 PROCEDURE — 2580000003 HC RX 258: Performed by: INTERNAL MEDICINE

## 2019-04-30 PROCEDURE — 6370000000 HC RX 637 (ALT 250 FOR IP): Performed by: INTERNAL MEDICINE

## 2019-04-30 PROCEDURE — 36415 COLL VENOUS BLD VENIPUNCTURE: CPT

## 2019-04-30 PROCEDURE — 6370000000 HC RX 637 (ALT 250 FOR IP): Performed by: NURSE PRACTITIONER

## 2019-04-30 RX ORDER — OXYCODONE HYDROCHLORIDE AND ACETAMINOPHEN 5; 325 MG/1; MG/1
1 TABLET ORAL ONCE
Status: COMPLETED | OUTPATIENT
Start: 2019-04-30 | End: 2019-04-30

## 2019-04-30 RX ORDER — RANOLAZINE 500 MG/1
500 TABLET, EXTENDED RELEASE ORAL 2 TIMES DAILY
Qty: 60 TABLET | Refills: 3 | Status: ON HOLD | OUTPATIENT
Start: 2019-04-30 | End: 2019-12-15

## 2019-04-30 RX ORDER — ISOSORBIDE MONONITRATE 30 MG/1
90 TABLET, EXTENDED RELEASE ORAL 2 TIMES DAILY
Qty: 180 TABLET | Refills: 0 | Status: SHIPPED | OUTPATIENT
Start: 2019-04-30 | End: 2019-10-21

## 2019-04-30 RX ORDER — ISOSORBIDE MONONITRATE 30 MG/1
90 TABLET, EXTENDED RELEASE ORAL 2 TIMES DAILY
Qty: 30 TABLET | Refills: 3 | OUTPATIENT
Start: 2019-04-30 | End: 2019-04-30

## 2019-04-30 RX ORDER — METHOCARBAMOL 500 MG/1
500 TABLET, FILM COATED ORAL 3 TIMES DAILY
Qty: 30 TABLET | Refills: 0 | Status: SHIPPED | OUTPATIENT
Start: 2019-04-30 | End: 2019-05-10

## 2019-04-30 RX ORDER — NITROGLYCERIN 0.4 MG/1
0.4 TABLET SUBLINGUAL ONCE
Status: COMPLETED | OUTPATIENT
Start: 2019-04-30 | End: 2019-04-30

## 2019-04-30 RX ADMIN — ISOSORBIDE MONONITRATE 60 MG: 60 TABLET, EXTENDED RELEASE ORAL at 08:12

## 2019-04-30 RX ADMIN — ACETAMINOPHEN 1000 MG: 500 TABLET ORAL at 06:17

## 2019-04-30 RX ADMIN — OXYCODONE HYDROCHLORIDE AND ACETAMINOPHEN 1 TABLET: 5; 325 TABLET ORAL at 11:32

## 2019-04-30 RX ADMIN — METHOCARBAMOL TABLETS 500 MG: 500 TABLET, COATED ORAL at 08:11

## 2019-04-30 RX ADMIN — INSULIN LISPRO 2 UNITS: 100 INJECTION, SOLUTION INTRAVENOUS; SUBCUTANEOUS at 11:45

## 2019-04-30 RX ADMIN — METOPROLOL SUCCINATE 100 MG: 50 TABLET, EXTENDED RELEASE ORAL at 08:12

## 2019-04-30 RX ADMIN — ATORVASTATIN CALCIUM 80 MG: 80 TABLET, FILM COATED ORAL at 08:12

## 2019-04-30 RX ADMIN — PANTOPRAZOLE SODIUM 40 MG: 40 TABLET, DELAYED RELEASE ORAL at 06:17

## 2019-04-30 RX ADMIN — ASPIRIN 81 MG 81 MG: 81 TABLET ORAL at 08:12

## 2019-04-30 RX ADMIN — CLOPIDOGREL BISULFATE 75 MG: 75 TABLET ORAL at 08:11

## 2019-04-30 RX ADMIN — AMLODIPINE BESYLATE 5 MG: 5 TABLET ORAL at 08:12

## 2019-04-30 RX ADMIN — MORPHINE SULFATE 2 MG: 2 INJECTION, SOLUTION INTRAMUSCULAR; INTRAVENOUS at 03:13

## 2019-04-30 RX ADMIN — Medication 10 ML: at 08:12

## 2019-04-30 RX ADMIN — MORPHINE SULFATE 2 MG: 2 INJECTION, SOLUTION INTRAMUSCULAR; INTRAVENOUS at 00:13

## 2019-04-30 RX ADMIN — RANOLAZINE 500 MG: 500 TABLET, FILM COATED, EXTENDED RELEASE ORAL at 08:12

## 2019-04-30 RX ADMIN — LOSARTAN POTASSIUM 50 MG: 50 TABLET ORAL at 08:35

## 2019-04-30 RX ADMIN — NITROGLYCERIN 0.4 MG: 0.4 TABLET, ORALLY DISINTEGRATING SUBLINGUAL at 11:33

## 2019-04-30 RX ADMIN — GABAPENTIN 600 MG: 300 CAPSULE ORAL at 08:11

## 2019-04-30 RX ADMIN — MORPHINE SULFATE 2 MG: 2 INJECTION, SOLUTION INTRAMUSCULAR; INTRAVENOUS at 06:17

## 2019-04-30 RX ADMIN — FUROSEMIDE 40 MG: 40 TABLET ORAL at 08:11

## 2019-04-30 ASSESSMENT — PAIN DESCRIPTION - ONSET
ONSET: ON-GOING

## 2019-04-30 ASSESSMENT — PAIN DESCRIPTION - LOCATION
LOCATION: CHEST

## 2019-04-30 ASSESSMENT — PAIN DESCRIPTION - DESCRIPTORS
DESCRIPTORS: PRESSURE

## 2019-04-30 ASSESSMENT — PAIN SCALES - GENERAL
PAINLEVEL_OUTOF10: 7
PAINLEVEL_OUTOF10: 10
PAINLEVEL_OUTOF10: 6

## 2019-04-30 ASSESSMENT — PAIN DESCRIPTION - ORIENTATION
ORIENTATION: LEFT

## 2019-04-30 ASSESSMENT — PAIN DESCRIPTION - PROGRESSION
CLINICAL_PROGRESSION: NOT CHANGED

## 2019-04-30 ASSESSMENT — PAIN DESCRIPTION - FREQUENCY
FREQUENCY: CONTINUOUS

## 2019-04-30 ASSESSMENT — PAIN - FUNCTIONAL ASSESSMENT
PAIN_FUNCTIONAL_ASSESSMENT: PREVENTS OR INTERFERES SOME ACTIVE ACTIVITIES AND ADLS

## 2019-04-30 ASSESSMENT — PAIN SCALES - WONG BAKER: WONGBAKER_NUMERICALRESPONSE: 10

## 2019-04-30 ASSESSMENT — PAIN DESCRIPTION - PAIN TYPE
TYPE: ACUTE PAIN

## 2019-04-30 NOTE — PROGRESS NOTES
Pt A&O x4. VSS. Pt been having on going left side chest pain, describes pain as pressure-pt get prn morphine and its not due yet. Pt okay with that. Pt upset with not being able to have a general diet. RN found that pt to have a possible procedure today, on call cardiology called and reviewed orders, pt suppose to be NPO until dr. Vivek Willis come see him. Plan of care reviewed with the pt. Pt verbalized understandings.  Electronically signed by Pilar Oliva RN on 4/30/2019 at 10:44 AM

## 2019-04-30 NOTE — PROGRESS NOTES
All verbal and written discharge instructions given to the pt and family at discharge regarding new meds and follow up appointments. Pt verbalized understandings. Wife present at discharge.  Electronically signed by Mikayla Meyers RN on 4/30/2019 at 1:31 PM

## 2019-04-30 NOTE — DISCHARGE SUMMARY
Refills: 0              Details   isosorbide mononitrate (IMDUR) 30 MG extended release tablet Take 3 tablets by mouth 2 times daily  Qty: 180 tablet, Refills: 0              Details   pantoprazole (PROTONIX) 40 MG tablet Take 1 tablet by mouth every morning (before breakfast)  Qty: 30 tablet, Refills: 3      gabapentin (NEURONTIN) 600 MG tablet TAKE 1 TABLET BY MOUTH FIVE TIMES DAILY  Qty: 150 tablet, Refills: 0    Associated Diagnoses: Neuropathy      clopidogrel (PLAVIX) 75 MG tablet TAKE 1 TABLET BY MOUTH DAILY  Qty: 90 tablet, Refills: 3      oxyCODONE-acetaminophen (PERCOCET) 5-325 MG per tablet Take 1 tablet by mouth 2 times daily as needed for Pain for up to 30 days. Qty: 60 tablet, Refills: 0    Comments: Needs an office visit before  More refills !   Associated Diagnoses: Chronic right-sided low back pain with right-sided sciatica      furosemide (LASIX) 40 MG tablet Take 1 tablet by mouth daily  Qty: 90 tablet, Refills: 1      Insulin Pen Needle 31G X 5 MM MISC 1 each by Does not apply route daily  Qty: 100 each, Refills: 3    Associated Diagnoses: Type 2 diabetes mellitus without complication, with long-term current use of insulin ([facility])      losartan (COZAAR) 50 MG tablet Take 1 tablet by mouth daily  Qty: 30 tablet, Refills: 3      nitroGLYCERIN (NITROSTAT) 0.4 MG SL tablet PLACE 1 TABLET UNDER THE TONGUE EVERY 5 MINUTES AS NEEDED FOR CHEST PAIN  Qty: 25 tablet, Refills: 1      albuterol sulfate  (90 Base) MCG/ACT inhaler Inhale 2 puffs into the lungs every 6 hours as needed for Wheezing      aspirin 81 MG tablet Take 81 mg by mouth daily      insulin glargine (LANTUS) 100 UNIT/ML injection pen Inject 28 Units into the skin nightly  Qty: 5 pen, Refills: 2    Associated Diagnoses: Type 2 diabetes mellitus without complication, with long-term current use of insulin ([facility])      insulin lispro (HUMALOG KWIKPEN) 100 UNIT/ML pen Inject 5 Units into the skin 3 times daily (before meals)  Qty: 5 pen, recommendations. He can also follow up with GI as an outpatient to rule out other sources. He was started on robaxin and ranexa at discharge. I personally called patient relations to have them discuss further. Exam:     BP (!) 167/64   Pulse 75   Temp 98.4 °F (36.9 °C)   Resp 16   Ht 6' (1.829 m)   Wt 225 lb 8.5 oz (102.3 kg)   SpO2 93%   BMI 30.59 kg/m²     General: Resting in bed in no apparent distress. Angry demeanor  HEENT: Normocephalic. Atraumatic. Pupils equal and reactive. EOM intact. Oral mucosa pink/moist/intact. Neck: Supple. Symmetrical. Trachea midline. Lungs: Respirations even and unlabored. Chest: Exam unremarkable. Abdomen/GI: Soft. Non-tender. Non-distended. BS+. Extremities: Atraumatic. No redness/cyanosis/edema noted. Skin: Dry and intact. No lesions noted. Neuro: Grossly intact. No focal deficits noted. Refused to let me examine him with my stethoscope. Consults:     IP CONSULT TO CARDIOLOGY  IP CONSULT TO CARDIOLOGY  IP CONSULT TO CARDIAC REHAB    Significant Diagnostic Studies:   Angiogram      Labs: For convenience and continuity at follow-up the following most recent labs are provided:    CBC:    Lab Results   Component Value Date    WBC 9.3 04/29/2019    HGB 13.6 04/29/2019    HCT 41.2 04/29/2019     04/29/2019       Renal:    Lab Results   Component Value Date     04/30/2019    K 4.2 04/30/2019    K 4.6 04/29/2019     04/30/2019    CO2 22 04/30/2019    BUN 20 04/30/2019    CREATININE 1.0 04/30/2019    CALCIUM 8.1 04/30/2019    PHOS 4.8 04/30/2019       Future Appointments   Date Time Provider [facility]   6/4/2019  2:00 PM Kelsea Stokes MD [facility]   7/30/2019  9:30 AM SCHEDULE, [facility] PHONE TRANSMISSION [facility]       Time Spent on discharge is more than 20 minutes in the examination, evaluation, counseling and review of medications and discharge plan.      RX monitoring reviewed    Signed:    TAYLA Eckert CNP   4/30/2019      Thank you Isabelle Drummond MD for the opportunity to be involved in this patient's care. If you have any questions or concerns please feel free to contact me at [phone].

## 2019-04-30 NOTE — PLAN OF CARE
Problem: Pain:  Goal: Pain level will decrease  Description  Pain level will decrease  Outcome: Ongoing  Goal: Control of acute pain  Description  Control of acute pain  Outcome: Ongoing     Problem: Falls - Risk of:  Goal: Will remain free from falls  Description  Will remain free from falls  Outcome: Ongoing  Goal: Absence of physical injury  Description  Absence of physical injury  Outcome: Ongoing     Problem: Cardiac:  Goal: Ability to maintain vital signs within normal range will improve  Description  Ability to maintain vital signs within normal range will improve  Outcome: Ongoing  Goal: Cardiovascular alteration will improve  Description  Cardiovascular alteration will improve  Outcome: Ongoing

## 2019-04-30 NOTE — PLAN OF CARE
Problem: Pain:  Goal: Pain level will decrease  Description  Pain level will decrease  4/30/2019 1058 by Stephanie Benjamin RN  Outcome: Ongoing  4/30/2019 0639 by Scarlett Mendez RN  Outcome: Ongoing  Goal: Control of acute pain  Description  Control of acute pain  4/30/2019 1058 by Stephanie Benjamin RN  Outcome: Ongoing  Note:   Pt able to express presence and absence of pain using numerical pain scale. Pt ongoing chest pain scheduled  and prn analgesics as ordered by MD. Pain reassess after each interventions. Will continue to monitor as needed. 4/30/2019 7704 by Scarlett Mendez RN  Outcome: Ongoing     Problem: Falls - Risk of:  Goal: Will remain free from falls  Description  Will remain free from falls  4/30/2019 1058 by Stephanie Benjamin RN  Outcome: Ongoing  Note:   Pt free from falls this shift. Fall precautions in place at all times. Pt refuses bed alarm. Uses Call light always within reach. Pt able and agreeable to contact for safety appropriately. 4/30/2019 7758 by Scarlett Mendez RN  Outcome: Ongoing  Goal: Absence of physical injury  Description  Absence of physical injury  4/30/2019 0639 by Scarlett Mendez RN  Outcome: Ongoing     Problem: Cardiac:  Goal: Ability to maintain vital signs within normal range will improve  Description  Ability to maintain vital signs within normal range will improve  4/30/2019 1058 by Stephanie Benjamin RN  Outcome: Ongoing  Note:   Pt VSS. Monitoring labs closely.    4/30/2019 1270 by Scarlett Mendez RN  Outcome: Ongoing  Goal: Cardiovascular alteration will improve  Description  Cardiovascular alteration will improve  4/30/2019 1058 by Stephanie Benjamin RN  Outcome: Ongoing  4/30/2019 0639 by Scarlett Mendez RN  Outcome: Ongoing

## 2019-04-30 NOTE — PROGRESS NOTES
Dr. Nicole Clarke in the room talking with the pt. Pt informed that pt has a discharge order .  Electronically signed by Ирина Rodriguez RN on 4/30/2019 at 11:09 AM

## 2019-04-30 NOTE — PROGRESS NOTES
LE edema    Cardiac Diagnosis:  diabetes, hypertension, hyperlipidemia, coronary artery disease, history of prior MI, CHF, peripheral vascular disease, status post CABG and status post coronary artery stenting    Medications:    ranolazine  500 mg Oral BID    methocarbamol  500 mg Oral TID    gabapentin  600 mg Oral TID    acetaminophen  1,000 mg Oral 3 times per day    pantoprazole  40 mg Oral QAM AC    metoprolol succinate  100 mg Oral Daily    losartan  50 mg Oral Daily    isosorbide mononitrate  60 mg Oral Daily    furosemide  40 mg Oral Daily    clopidogrel  75 mg Oral Daily    atorvastatin  80 mg Oral Daily    aspirin  81 mg Oral Daily    amLODIPine  5 mg Oral Daily    sodium chloride flush  10 mL Intravenous 2 times per day    enoxaparin  40 mg Subcutaneous Daily    insulin lispro  0-12 Units Subcutaneous TID WC    insulin lispro  0-6 Units Subcutaneous Nightly    nicotine  1 patch Transdermal Daily      dextrose       hydrALAZINE, albuterol sulfate HFA, sodium chloride flush, magnesium hydroxide, ondansetron, glucose, dextrose, glucagon (rDNA), dextrose, ipratropium-albuterol, diphenhydrAMINE    Lab Data:  CBC:   Recent Labs     04/28/19  0505 04/29/19  0455   WBC 10.6 9.3   HGB 14.4 13.6    206     BMP:    Recent Labs     04/28/19  0504 04/29/19  0455 04/30/19  0724    141 141   K 4.7 4.6 4.2   CO2 21 24 22   BUN 15 23* 20   CREATININE 0.8 1.3 1.0     LIVR: No results for input(s): AST, ALT in the last 72 hours. INR:  No results for input(s): INR in the last 72 hours. APTT: No results for input(s): APTT in the last 72 hours. BNP:  No results for input(s): BNP in the last 72 hours.     Imaging:  No reversible ischemia seen    fixed abnormal myocardial perfusion defect involving the inferior wall    Severely depressed LV Systolic function    inferior wall is hypokinetic    Overall findings represent a high risk study.             Assessment/Plan:  1)Recurrent chest pain    - Pt is back in the hospital with recurrent chest pain  - Troponins are negative  -Recent nuclear stress test showed no ischemia but had LV dysfunction  -Cardiac catheterization from 4/29/2019 showed patent left main stent, patent saphenous vein bike as bypass graft to circumflex right coronary artery and LAD. He does have moderate to severe disease in his native RCA and circumflex artery. Decision has been made to treat him medically since his chest pain appears out of proportion to his coronary artery disease. Ifeoma Sargent He was started on Ranexa and I will increase Imdur to 90 mg by mouth twice a day  I will repeat his troponin since he is complaining of significant chest pressure this morning.   If his troponin comes back negative, I will J with medical therapy and it will be okay to send patient home from cardiac standpoint    2) Cardiomyopathy  - No obvious CHF    HTN  - BP is stable              Electronically signed by Marixa Hinds MD on 4/30/2019 at 1:04 PM

## 2019-04-30 NOTE — CARE COORDINATION
INITIAL CASE MANAGEMENT ASSESSMENT    Reviewed chart, met with patient to assess possible discharge needs. Explained Case Management role/services. Living Situation: Patient lives in a house with his wife with no steps to enter and 15 steps to upstairs. ADLs: Independent     DME: Walker    PT/OT Recs: not ordered     Active Services: None     Transportation: Drives occas/wife will transport to home     Medications: [employer]/affordable    PCP: Dr. Ruben Escobar      HD/PD: N/A    PLAN/COMMENTS: Patient plans to return to home with his wife. Denies needs. SW/CM provided contact information for patient or family to call with any questions. SW/CM will follow and assist as needed. Electronically signed by Allison Arcos RN on 4/30/2019 at 11:45 AM

## 2019-05-01 ENCOUNTER — CARE COORDINATION (OUTPATIENT)
Dept: CASE MANAGEMENT | Age: 64
End: 2019-05-01

## 2019-05-02 ENCOUNTER — CARE COORDINATION (OUTPATIENT)
Dept: CASE MANAGEMENT | Age: 64
End: 2019-05-02

## 2019-05-02 NOTE — CARE COORDINATION
[facility] Transitions Follow Up Call    2019    Patient: Robert Fontana  Patient : 1955   MRN: [id number]  Reason for Admission: chest pain  Discharge Date: 19 RARS: Readmission Risk Score: 32         Spoke with: wife, Bri Hearing with wife, Keila Serrato, briefly. She stated patient was sleeping. She stated patient, Jena Evan'  chest pain and shortness of breath is about the same. She stated did not have list of his medications for review. Left my name and number for patient to return my call. Told wife I would call tomorrow, if patient does not return call. Patient's wife was agreeable to that.     Follow Up  Future Appointments   Date Time Provider Fred Gregory   2019  1:00 PM Roberto Meyers MD [facility]   2019  2:00 PM Luke Doll MD [facility]   2019  9:30 AM SCHEDULE, [facility] PHONE TRANSMISSION [facility]       Ronda Ozuna RN

## 2019-05-03 ENCOUNTER — CARE COORDINATION (OUTPATIENT)
Dept: CASE MANAGEMENT | Age: 64
End: 2019-05-03

## 2019-05-05 DIAGNOSIS — G62.9 NEUROPATHY: ICD-10-CM

## 2019-05-06 RX ORDER — GABAPENTIN 600 MG/1
TABLET ORAL
Qty: 150 TABLET | Refills: 0 | Status: SHIPPED | OUTPATIENT
Start: 2019-05-06 | End: 2019-05-31 | Stop reason: SDUPTHER

## 2019-05-20 ENCOUNTER — TELEPHONE (OUTPATIENT)
Dept: OTHER | Facility: CLINIC | Age: 64
End: 2019-05-20

## 2019-05-20 ENCOUNTER — HOSPITAL ENCOUNTER (EMERGENCY)
Age: 64
Discharge: HOME | End: 2019-05-20
Attending: EMERGENCY MEDICINE
Payer: MEDICARE

## 2019-05-20 ENCOUNTER — APPOINTMENT (OUTPATIENT)
Dept: CT IMAGING | Age: 64
End: 2019-05-20
Payer: MEDICARE

## 2019-05-20 VITALS
TEMPERATURE: 97.9 F | OXYGEN SATURATION: 97 % | HEART RATE: 98 BPM | WEIGHT: 225.31 LBS | SYSTOLIC BLOOD PRESSURE: 178 MMHG | DIASTOLIC BLOOD PRESSURE: 99 MMHG | RESPIRATION RATE: 17 BRPM | HEIGHT: 72 IN | BODY MASS INDEX: 30.52 KG/M2

## 2019-05-20 DIAGNOSIS — M54.41 CHRONIC RIGHT-SIDED LOW BACK PAIN WITH RIGHT-SIDED SCIATICA: ICD-10-CM

## 2019-05-20 DIAGNOSIS — M54.42 ACUTE BILATERAL LOW BACK PAIN WITH LEFT-SIDED SCIATICA: ICD-10-CM

## 2019-05-20 DIAGNOSIS — G89.29 CHRONIC RIGHT-SIDED LOW BACK PAIN WITH RIGHT-SIDED SCIATICA: ICD-10-CM

## 2019-05-20 DIAGNOSIS — M54.50 ACUTE EXACERBATION OF CHRONIC LOW BACK PAIN: Primary | ICD-10-CM

## 2019-05-20 DIAGNOSIS — G89.29 ACUTE EXACERBATION OF CHRONIC LOW BACK PAIN: Primary | ICD-10-CM

## 2019-05-20 PROCEDURE — 6370000000 HC RX 637 (ALT 250 FOR IP): Performed by: EMERGENCY MEDICINE

## 2019-05-20 PROCEDURE — 72131 CT LUMBAR SPINE W/O DYE: CPT

## 2019-05-20 PROCEDURE — 6360000002 HC RX W HCPCS: Performed by: EMERGENCY MEDICINE

## 2019-05-20 PROCEDURE — 96372 THER/PROPH/DIAG INJ SC/IM: CPT

## 2019-05-20 PROCEDURE — 99284 EMERGENCY DEPT VISIT MOD MDM: CPT

## 2019-05-20 RX ORDER — OXYCODONE HYDROCHLORIDE AND ACETAMINOPHEN 5; 325 MG/1; MG/1
2 TABLET ORAL ONCE
Status: COMPLETED | OUTPATIENT
Start: 2019-05-20 | End: 2019-05-20

## 2019-05-20 RX ORDER — OXYCODONE HYDROCHLORIDE AND ACETAMINOPHEN 5; 325 MG/1; MG/1
1 TABLET ORAL EVERY 12 HOURS PRN
Qty: 15 TABLET | Refills: 0 | Status: ON HOLD | OUTPATIENT
Start: 2019-05-20 | End: 2019-05-24 | Stop reason: HOSPADM

## 2019-05-20 RX ORDER — KETOROLAC TROMETHAMINE 30 MG/ML
30 INJECTION, SOLUTION INTRAMUSCULAR; INTRAVENOUS ONCE
Status: COMPLETED | OUTPATIENT
Start: 2019-05-20 | End: 2019-05-20

## 2019-05-20 RX ORDER — METHOCARBAMOL 500 MG/1
500 TABLET, FILM COATED ORAL 4 TIMES DAILY
Qty: 20 TABLET | Refills: 0 | Status: SHIPPED | OUTPATIENT
Start: 2019-05-20 | End: 2019-05-25

## 2019-05-20 RX ORDER — ORPHENADRINE CITRATE 30 MG/ML
60 INJECTION INTRAMUSCULAR; INTRAVENOUS ONCE
Status: COMPLETED | OUTPATIENT
Start: 2019-05-20 | End: 2019-05-20

## 2019-05-20 RX ADMIN — KETOROLAC TROMETHAMINE 30 MG: 30 INJECTION, SOLUTION INTRAMUSCULAR at 15:45

## 2019-05-20 RX ADMIN — OXYCODONE HYDROCHLORIDE AND ACETAMINOPHEN 2 TABLET: 5; 325 TABLET ORAL at 15:45

## 2019-05-20 RX ADMIN — ORPHENADRINE CITRATE 60 MG: 30 INJECTION INTRAMUSCULAR; INTRAVENOUS at 15:45

## 2019-05-20 ASSESSMENT — PAIN SCALES - GENERAL
PAINLEVEL_OUTOF10: 3
PAINLEVEL_OUTOF10: 9
PAINLEVEL_OUTOF10: 4
PAINLEVEL_OUTOF10: 9

## 2019-05-20 ASSESSMENT — PAIN - FUNCTIONAL ASSESSMENT: PAIN_FUNCTIONAL_ASSESSMENT: 0-10

## 2019-05-20 ASSESSMENT — PAIN DESCRIPTION - DESCRIPTORS: DESCRIPTORS: SHOOTING;BURNING

## 2019-05-20 ASSESSMENT — PAIN DESCRIPTION - LOCATION: LOCATION: BACK

## 2019-05-20 ASSESSMENT — PAIN DESCRIPTION - PAIN TYPE: TYPE: ACUTE PAIN

## 2019-05-20 NOTE — TELEPHONE ENCOUNTER
Writer contacted Dr. Jesse Horn, ED provider to inform of 30 day readmission risk. ED provider informed writer of intention to discharge and follow up recommended. Scheduled an ED f/u appointment with PCP Dr. Juan M Greenfield for Wed. 5/22 at [facility]. Information added to AVS for discharge.

## 2019-05-20 NOTE — ED PROVIDER NOTES
CHIEF COMPLAINT  Back Pain      HISTORY OF PRESENT ILLNESS  Aric Mina is a 61 y.o. male with history of chronic back pain status post laminectomy who presents to the ED complaining of low back pain worse on the left than the right. Patient states back pain has been gradually getting worse over the last week. Patient states he has been having difficulty standing for short periods of time secondary to back pain. States he feels much better laying down. Denies any falls or trauma. Patient states the pain is an aching sensation that gets sharp occasionally. States the pain radiates down the back of his left leg and into the buttock on his right side. Denies any recent instrumentation. No fevers or chills. No nausea or vomiting. Denies any bowel or bladder incontinence. Patient does take Percocet daily for pain control but states this has not relieved his pain over the past few days. No other complaints, modifying factors or associated symptoms. Nursing notes reviewed.    Past Medical History:   Diagnosis Date    Anxiety     Arthritis     Asthma     CAD (coronary artery disease)     Calcium kidney stone     Cardiomyopathy ([address]     Cerebral artery occlusion with cerebral infarction ([address]     CHF (congestive heart failure) ([address]     COPD (chronic obstructive pulmonary disease) (HCC)     mild    Depression     DM2 (diabetes mellitus, type 2) (HCC)     Fibromyalgia     GERD (gastroesophageal reflux disease)     Hyperlipidemia     Hypertension     Liver disease     Pacemaker 2012    Medtronic model # [id number]    Pneumonia     Seizures ([address]     TIA (transient ischemic attack) 2007    Ulcerative colitis ([address]      Past Surgical History:   Procedure Laterality Date    BACK SURGERY      CARDIAC SURGERY      CHOLECYSTECTOMY      COLONOSCOPY  01/10/2017    COLONOSCOPY  01/10/2017    CORONARY ANGIOPLASTY WITH STENT PLACEMENT  2012    CORONARY ARTERY BYPASS GRAFT 2010, 11/2015    ENDOSCOPY, COLON, DIAGNOSTIC      PACEMAKER PLACEMENT      UPPER GASTROINTESTINAL ENDOSCOPY  02/07/2017     Family History   Problem Relation Age of Onset    Diabetes Father     Coronary Art Dis Father     Cancer Mother         Lung with mets     Social History     Socioeconomic History    Marital status:      Spouse name: Not on file    Number of children: 1    Years of education: Not on file    Highest education level: Not on file   Occupational History    Occupation: disabled   Social Needs    Financial resource strain: Not on file    Food insecurity:     Worry: Not on file     Inability: Not on file   Remote needs:     Medical: Not on file     Non-medical: Not on file   Tobacco Use    Smoking status: Current Every Day Smoker     Packs/day: 1.00     Years: 44.00     Pack years: 44.00     Types: Cigarettes    Smokeless tobacco: Never Used   Substance and Sexual Activity    Alcohol use: No     Alcohol/week: 0.0 oz    Drug use: No    Sexual activity: Yes     Partners: Female   Lifestyle    Physical activity:     Days per week: Not on file     Minutes per session: Not on file    Stress: Not on file   Relationships    Social connections:     Talks on phone: Not on file     Gets together: Not on file     Attends Mosque service: Not on file     Active member of club or organization: Not on file     Attends meetings of clubs or organizations: Not on file     Relationship status: Not on file    Intimate partner violence:     Fear of current or ex partner: Not on file     Emotionally abused: Not on file     Physically abused: Not on file     Forced sexual activity: Not on file   Other Topics Concern    Not on file   Social History Narrative    Not on file     No current facility-administered medications for this encounter.       Current Outpatient Medications   Medication Sig Dispense Refill    oxyCODONE-acetaminophen (PERCOCET) 5-325 MG per tablet Take 1 tablet by mouth every 12 hours as needed for Pain (for break through pain) for up to 5 days. Intended supply: 3 days. Take lowest dose possible to manage pain 15 tablet 0    gabapentin (NEURONTIN) 600 MG tablet TAKE 1 TABLET BY MOUTH FIVE TIMES DAILY 150 tablet 0    ranolazine (RANEXA) 500 MG extended release tablet Take 1 tablet by mouth 2 times daily 60 tablet 3    isosorbide mononitrate (IMDUR) 30 MG extended release tablet Take 3 tablets by mouth 2 times daily 180 tablet 0    oxyCODONE-acetaminophen (PERCOCET) 5-325 MG per tablet Take 1 tablet by mouth 2 times daily as needed for Pain for up to 30 days.  60 tablet 0    pantoprazole (PROTONIX) 40 MG tablet Take 1 tablet by mouth every morning (before breakfast) 30 tablet 3    clopidogrel (PLAVIX) 75 MG tablet TAKE 1 TABLET BY MOUTH DAILY 90 tablet 3    furosemide (LASIX) 40 MG tablet Take 1 tablet by mouth daily 90 tablet 1    Insulin Pen Needle 31G X 5 MM MISC 1 each by Does not apply route daily 100 each 3    losartan (COZAAR) 50 MG tablet Take 1 tablet by mouth daily 30 tablet 3    nitroGLYCERIN (NITROSTAT) 0.4 MG SL tablet PLACE 1 TABLET UNDER THE TONGUE EVERY 5 MINUTES AS NEEDED FOR CHEST PAIN 25 tablet 1    albuterol sulfate  (90 Base) MCG/ACT inhaler Inhale 2 puffs into the lungs every 6 hours as needed for Wheezing      aspirin 81 MG tablet Take 81 mg by mouth daily      insulin glargine (LANTUS) 100 UNIT/ML injection pen Inject 28 Units into the skin nightly (Patient taking differently: Inject 32 Units into the skin nightly ) 5 pen 2    insulin lispro (HUMALOG KWIKPEN) 100 UNIT/ML pen Inject 5 Units into the skin 3 times daily (before meals) (Patient taking differently: Inject 8 Units into the skin 3 times daily (before meals) ) 5 pen 3    amLODIPine (NORVASC) 5 MG tablet TAKE 1 TABLET BY MOUTH DAILY 90 tablet 3    Lancets MISC accu check fastclick lancets  Dx: Q28.5  As needed 120 each 5    FREESTYLE LANCETS MISC 1 each by Does not apply route daily 100 each 0    atorvastatin (LIPITOR) 80 MG tablet TAKE 1 TABLET BY MOUTH DAILY 90 tablet 1    metoprolol succinate (TOPROL XL) 100 MG extended release tablet Take 1 tablet by mouth daily 30 tablet 1     Allergies   Allergen Reactions    Dopamine Hcl Other (See Comments)     Compulsive gambling    Tramadol Other (See Comments)     Dizziness     Melatonin Other (See Comments)     Restless leg syndrome           REVIEW OF SYSTEMS  10 systems reviewed, pertinent positives per HPI otherwise noted to be negative    PHYSICAL EXAM  BP (!) 190/104   Pulse 99   Temp 97.9 °F (36.6 °C) (Oral)   Resp 20   Ht 6' (1.829 m)   Wt 225 lb 5 oz (102.2 kg)   SpO2 98%   BMI 30.56 kg/m²      CONSTITUTIONAL: AOx4, NAD, cooperative with exam, afebrile   HEAD: normocephalic, atraumatic   EYES: PERRL, EOMI, anicteric sclera   ENT: Moist mucous membranes, uvula midline   NECK: Supple, symmetric, trachea midline   BACK: Symmetric, no deformity, well-healed midline scar in the lumbar region, tenderness over the SI joint bilaterally worse on the left than the right, no overlying skin changes,    LUNGS: Bilateral breath sounds, CTAB, no rales/ronchi/wheezes   CARDIOVASCULAR: RRR, normal S1/S2, no m/r/g, 2+ pulses throughout   ABDOMEN: Soft, non-tender, non-distended, +BS   NEUROLOGIC:  MAEx4, Intact sensation lower leg, including nl sensation to light touch inner thigh, distal legs, foot, perineal/perianal sensation  Cremasteric reflex normal  5/5 strength adduction thigh, flex/extension knee, foot dorsiflexion/extension, toe extension/curling toes. 2+ patellar reflexes torrie,   MUSCULOSKELETAL: No deformity or edema of lower extremities bilaterally, straight leg raise on the left causes pain in her left lower back, no pain on the right on right straight leg raise.      SKIN: No rash, pallor or wounds     I estimate there is LOW risk for ABDOMINAL AORTIC ANEURYSM, KIDNEY STONE, PYELONEPHRITIS, CAUDA EQUINA SYNDROME, EPIDURAL MASS LESION, OR CORD COMPRESSION, thus I consider the discharge disposition reasonable. We discussed returning to the Emergency Department immediately if new or worsening symptoms occur. RADIOLOGY  X-RAYS:  I have reviewed radiologic plain film image(s). ALL OTHER NON-PLAIN FILM IMAGES SUCH AS CT, ULTRASOUND AND MRI HAVE BEEN READ BY THE RADIOLOGIST. CT LUMBAR SPINE WO CONTRAST   Final Result   Mild compression fracture L2 vertebral body superior endplate. This may be   recent but age not definitive. Comparison to more recent studies is   recommended. Prior right laminectomy, presumably from recent surgery given the posterior   soft tissue infiltration and air. Because of the foci of air, the   possibility of infection along the surgical tract and laminectomy bed however   is certainly in the differential.  This needs correlation to the presumed   recent surgery and prior more recent studies if available. In addition at this level there broad-based disc protrusion or herniation   with overall moderate canal compromise suspected, posterior margins of the   thecal sac poorly defined. Recommend contrast-enhanced MRI to evaluate both   of the aforementioned findings. RECOMMENDATIONS:   Contrast enhanced lumbar MRI. Prior studies for comparison if available. EKG INTERPRETATION      PROCEDURES    ED COURSE/MDM  Vertebral fracture, epidural abscess, cauda equina, herniated disc muscle spasm, muscle strain    77-year-old male with history of chronic back pain presents with worsening of his chronic back pain. Patient neurologically intact on exam.  No signs of cauda equina such as bowel or bladder incontinence, perineal numbness, saddle anesthesia, no red flags such as IV drug use, immunocompromised state, alcohol abuse, renal failure, fever, spinal procedure, urinary retention, or ongoing infection.     With change in patient's pain, we will obtain imaging of his lumbar spine as he has not had any recently. Also we'll treat with oral pain medication, Norflex and Toradol. Patient signs and symptoms suggestive of sciatica as opposed to herniated disc or cauda equina. 4:51 PM  Reassessed. States he feels much better after the pain medication. Patient's CT lumbar spine does show hardware from previous surgery. There is an L2 compression fracture which on review of patient's previous CT scans is old. There is recommendation of MRI of the lumbar spine per radiology although it does not appear that the radiologist compared his CT today to any of his prior studies that are on patient's chart. Update patient on results. We'll ambulate patient and reassess. 5:54 PM  Patient ambulated with minimal support. Normally walks with a walker. Plan for discharge with outpatient follow-up. Patient provided short course of muscle relaxer and Percocet for breakthrough pain as he was only taking it twice a day currently. Care coordination attempting to schedule patient for a follow-up appointment with his PCP. QUESTIONS answered prior to discharge. I estimate there is LOW risk for ABDOMINAL AORTIC ANEURYSM, KIDNEY STONE, PYELONEPHRITIS, CAUDA EQUINA SYNDROME, EPIDURAL MASS LESION, OR CORD COMPRESSION, thus I consider the discharge disposition reasonable. We discussed returning to the Emergency Department immediately if new or worsening symptoms occur. Discussed the symptoms which are most concerning (e.g., saddle anesthesia, urinary or bowel incontinence or retention, changing or worsening pain) that necessitate immediate return. mmediately if new or worsening symptoms occur. Patient was given scripts for the following medications. I counseled patient how to take these medications. New Prescriptions    OXYCODONE-ACETAMINOPHEN (PERCOCET) 5-325 MG PER TABLET    Take 1 tablet by mouth every 12 hours as needed for Pain (for break through pain) for up to 5 days. Intended supply: 3 days. Take lowest dose possible to manage pain       CLINICAL IMPRESSION  1. Acute exacerbation of chronic low back pain    2. Acute bilateral low back pain with left-sided sciatica    3. Chronic right-sided low back pain with right-sided sciatica      Blood pressure (!) 190/104, pulse 99, temperature 97.9 °F (36.6 °C), temperature source Oral, resp. rate 20, height 6' (1.829 m), weight 225 lb 5 oz (102.2 kg), SpO2 98 %. DISPOSITION  Patient was discharged to home in good condition. Yony Caro MD  [place] [id number] [place] [address]  [phone]    Call today  For a follow up appointment. Disclaimer: All medical record entries made by [address] [place] [address].       (Please note that this note was completed with a voice recognition program. Every attempt was made to edit the dictations, but inevitably there remain words that are mis-transcribed.)            Freddie Greer MD  05/20/19 [address], [place]  05/20/19 7861

## 2019-05-20 NOTE — ED NOTES
Bed: [facility]  Expected date:   Expected time:   Means of arrival: [facility]  Comments:  53M  Back pain       Ester Merlos RN  05/20/19 3309

## 2019-05-22 ENCOUNTER — APPOINTMENT (OUTPATIENT)
Dept: CT IMAGING | Age: 64
DRG: 543 | End: 2019-05-22
Attending: INTERNAL MEDICINE
Payer: MEDICARE

## 2019-05-22 ENCOUNTER — HOSPITAL ENCOUNTER (EMERGENCY)
Age: 64
Discharge: ANOTHER HOSPITAL | End: 2019-05-22
Payer: MEDICARE

## 2019-05-22 ENCOUNTER — TELEPHONE (OUTPATIENT)
Dept: OTHER | Facility: CLINIC | Age: 64
End: 2019-05-22

## 2019-05-22 ENCOUNTER — HOSPITAL ENCOUNTER (INPATIENT)
Age: 64
LOS: 2 days | Discharge: NURSING, REHAB OR LONG-TERM CARE | DRG: 543 | End: 2019-05-24
Attending: INTERNAL MEDICINE | Admitting: INTERNAL MEDICINE
Payer: MEDICARE

## 2019-05-22 VITALS
BODY MASS INDEX: 29.93 KG/M2 | RESPIRATION RATE: 20 BRPM | HEIGHT: 72 IN | OXYGEN SATURATION: 98 % | SYSTOLIC BLOOD PRESSURE: 182 MMHG | HEART RATE: 86 BPM | DIASTOLIC BLOOD PRESSURE: 105 MMHG | WEIGHT: 221 LBS | TEMPERATURE: 97.5 F

## 2019-05-22 DIAGNOSIS — M54.10 BACK PAIN WITH RADICULOPATHY: ICD-10-CM

## 2019-05-22 DIAGNOSIS — M54.59 INTRACTABLE LOW BACK PAIN: Primary | ICD-10-CM

## 2019-05-22 DIAGNOSIS — S32.020S COMPRESSION FRACTURE OF L2, SEQUELA: Primary | ICD-10-CM

## 2019-05-22 LAB
A/G RATIO: 1.1 (ref 1.1–2.2)
ALBUMIN SERPL-MCNC: 3.9 G/DL (ref 3.4–5)
ALP BLD-CCNC: 72 U/L (ref 40–129)
ALT SERPL-CCNC: 11 U/L (ref 10–40)
ANION GAP SERPL CALCULATED.3IONS-SCNC: 15 MMOL/L (ref 3–16)
AST SERPL-CCNC: 9 U/L (ref 15–37)
BASOPHILS ABSOLUTE: 0.1 K/UL (ref 0–0.2)
BASOPHILS RELATIVE PERCENT: 0.9 %
BILIRUB SERPL-MCNC: 0.5 MG/DL (ref 0–1)
BUN BLDV-MCNC: 10 MG/DL (ref 7–20)
C-REACTIVE PROTEIN: 13.2 MG/L (ref 0–5.1)
CALCIUM SERPL-MCNC: 9.5 MG/DL (ref 8.3–10.6)
CHLORIDE BLD-SCNC: 102 MMOL/L (ref 99–110)
CO2: 21 MMOL/L (ref 21–32)
CREAT SERPL-MCNC: 0.9 MG/DL (ref 0.8–1.3)
EOSINOPHILS ABSOLUTE: 0.1 K/UL (ref 0–0.6)
EOSINOPHILS RELATIVE PERCENT: 1.1 %
GFR AFRICAN AMERICAN: >60
GFR NON-AFRICAN AMERICAN: >60
GLOBULIN: 3.4 G/DL
GLUCOSE BLD-MCNC: 218 MG/DL (ref 70–99)
GLUCOSE BLD-MCNC: 253 MG/DL (ref 70–99)
HCT VFR BLD CALC: 44.3 % (ref 40.5–52.5)
HEMOGLOBIN: 15.1 G/DL (ref 13.5–17.5)
LYMPHOCYTES ABSOLUTE: 1.6 K/UL (ref 1–5.1)
LYMPHOCYTES RELATIVE PERCENT: 20.6 %
MCH RBC QN AUTO: 33.1 PG (ref 26–34)
MCHC RBC AUTO-ENTMCNC: 34.1 G/DL (ref 31–36)
MCV RBC AUTO: 97 FL (ref 80–100)
MONOCYTES ABSOLUTE: 0.6 K/UL (ref 0–1.3)
MONOCYTES RELATIVE PERCENT: 7.1 %
NEUTROPHILS ABSOLUTE: 5.6 K/UL (ref 1.7–7.7)
NEUTROPHILS RELATIVE PERCENT: 70.3 %
PDW BLD-RTO: 14.3 % (ref 12.4–15.4)
PERFORMED ON: ABNORMAL
PLATELET # BLD: 226 K/UL (ref 135–450)
PMV BLD AUTO: 9.9 FL (ref 5–10.5)
POTASSIUM SERPL-SCNC: 3.6 MMOL/L (ref 3.5–5.1)
RBC # BLD: 4.57 M/UL (ref 4.2–5.9)
SEDIMENTATION RATE, ERYTHROCYTE: 25 MM/HR (ref 0–20)
SODIUM BLD-SCNC: 138 MMOL/L (ref 136–145)
TOTAL PROTEIN: 7.3 G/DL (ref 6.4–8.2)
WBC # BLD: 8 K/UL (ref 4–11)

## 2019-05-22 PROCEDURE — 94760 N-INVAS EAR/PLS OXIMETRY 1: CPT

## 2019-05-22 PROCEDURE — 85652 RBC SED RATE AUTOMATED: CPT

## 2019-05-22 PROCEDURE — 96372 THER/PROPH/DIAG INJ SC/IM: CPT

## 2019-05-22 PROCEDURE — 6370000000 HC RX 637 (ALT 250 FOR IP): Performed by: PHYSICIAN ASSISTANT

## 2019-05-22 PROCEDURE — 99283 EMERGENCY DEPT VISIT LOW MDM: CPT

## 2019-05-22 PROCEDURE — 94799 UNLISTED PULMONARY SVC/PX: CPT

## 2019-05-22 PROCEDURE — 72132 CT LUMBAR SPINE W/DYE: CPT

## 2019-05-22 PROCEDURE — 94664 DEMO&/EVAL PT USE INHALER: CPT

## 2019-05-22 PROCEDURE — 6370000000 HC RX 637 (ALT 250 FOR IP): Performed by: INTERNAL MEDICINE

## 2019-05-22 PROCEDURE — 86140 C-REACTIVE PROTEIN: CPT

## 2019-05-22 PROCEDURE — 85025 COMPLETE CBC W/AUTO DIFF WBC: CPT

## 2019-05-22 PROCEDURE — 94150 VITAL CAPACITY TEST: CPT

## 2019-05-22 PROCEDURE — 6370000000 HC RX 637 (ALT 250 FOR IP): Performed by: NURSE PRACTITIONER

## 2019-05-22 PROCEDURE — 6360000002 HC RX W HCPCS: Performed by: INTERNAL MEDICINE

## 2019-05-22 PROCEDURE — 6360000002 HC RX W HCPCS: Performed by: PHYSICIAN ASSISTANT

## 2019-05-22 PROCEDURE — 80053 COMPREHEN METABOLIC PANEL: CPT

## 2019-05-22 PROCEDURE — 96374 THER/PROPH/DIAG INJ IV PUSH: CPT

## 2019-05-22 PROCEDURE — 6360000004 HC RX CONTRAST MEDICATION: Performed by: INTERNAL MEDICINE

## 2019-05-22 PROCEDURE — 1200000000 HC SEMI PRIVATE

## 2019-05-22 RX ORDER — GABAPENTIN 600 MG/1
600 TABLET ORAL
Status: DISCONTINUED | OUTPATIENT
Start: 2019-05-22 | End: 2019-05-24 | Stop reason: HOSPADM

## 2019-05-22 RX ORDER — DIAZEPAM 5 MG/1
5 TABLET ORAL EVERY 6 HOURS PRN
Status: DISCONTINUED | OUTPATIENT
Start: 2019-05-22 | End: 2019-05-24 | Stop reason: HOSPADM

## 2019-05-22 RX ORDER — NICOTINE POLACRILEX 4 MG
15 LOZENGE BUCCAL PRN
Status: DISCONTINUED | OUTPATIENT
Start: 2019-05-22 | End: 2019-05-24 | Stop reason: HOSPADM

## 2019-05-22 RX ORDER — PANTOPRAZOLE SODIUM 40 MG/1
40 TABLET, DELAYED RELEASE ORAL
Status: DISCONTINUED | OUTPATIENT
Start: 2019-05-23 | End: 2019-05-24 | Stop reason: HOSPADM

## 2019-05-22 RX ORDER — CLOPIDOGREL BISULFATE 75 MG/1
75 TABLET ORAL DAILY
Status: DISCONTINUED | OUTPATIENT
Start: 2019-05-23 | End: 2019-05-22

## 2019-05-22 RX ORDER — ATORVASTATIN CALCIUM 80 MG/1
80 TABLET, FILM COATED ORAL DAILY
Status: DISCONTINUED | OUTPATIENT
Start: 2019-05-23 | End: 2019-05-24 | Stop reason: HOSPADM

## 2019-05-22 RX ORDER — ONDANSETRON 2 MG/ML
4 INJECTION INTRAMUSCULAR; INTRAVENOUS EVERY 6 HOURS PRN
Status: DISCONTINUED | OUTPATIENT
Start: 2019-05-22 | End: 2019-05-24 | Stop reason: HOSPADM

## 2019-05-22 RX ORDER — DEXTROSE MONOHYDRATE 50 MG/ML
100 INJECTION, SOLUTION INTRAVENOUS PRN
Status: DISCONTINUED | OUTPATIENT
Start: 2019-05-22 | End: 2019-05-24 | Stop reason: HOSPADM

## 2019-05-22 RX ORDER — GABAPENTIN 300 MG/1
600 CAPSULE ORAL ONCE
Status: COMPLETED | OUTPATIENT
Start: 2019-05-22 | End: 2019-05-22

## 2019-05-22 RX ORDER — RANOLAZINE 500 MG/1
500 TABLET, EXTENDED RELEASE ORAL 2 TIMES DAILY
Status: DISCONTINUED | OUTPATIENT
Start: 2019-05-22 | End: 2019-05-24 | Stop reason: HOSPADM

## 2019-05-22 RX ORDER — SODIUM CHLORIDE 0.9 % (FLUSH) 0.9 %
10 SYRINGE (ML) INJECTION PRN
Status: DISCONTINUED | OUTPATIENT
Start: 2019-05-22 | End: 2019-05-24 | Stop reason: HOSPADM

## 2019-05-22 RX ORDER — ACETAMINOPHEN 325 MG/1
650 TABLET ORAL EVERY 4 HOURS PRN
Status: DISCONTINUED | OUTPATIENT
Start: 2019-05-22 | End: 2019-05-24 | Stop reason: HOSPADM

## 2019-05-22 RX ORDER — NITROGLYCERIN 0.4 MG/1
0.4 TABLET SUBLINGUAL EVERY 5 MIN PRN
Status: DISCONTINUED | OUTPATIENT
Start: 2019-05-22 | End: 2019-05-24 | Stop reason: HOSPADM

## 2019-05-22 RX ORDER — NICOTINE 21 MG/24HR
1 PATCH, TRANSDERMAL 24 HOURS TRANSDERMAL DAILY
Status: DISCONTINUED | OUTPATIENT
Start: 2019-05-22 | End: 2019-05-24 | Stop reason: HOSPADM

## 2019-05-22 RX ORDER — OXYCODONE AND ACETAMINOPHEN 10; 325 MG/1; MG/1
1 TABLET ORAL EVERY 6 HOURS PRN
Status: DISCONTINUED | OUTPATIENT
Start: 2019-05-22 | End: 2019-05-24 | Stop reason: HOSPADM

## 2019-05-22 RX ORDER — METOPROLOL SUCCINATE 100 MG/1
100 TABLET, EXTENDED RELEASE ORAL DAILY
Status: DISCONTINUED | OUTPATIENT
Start: 2019-05-23 | End: 2019-05-24 | Stop reason: HOSPADM

## 2019-05-22 RX ORDER — AMLODIPINE BESYLATE 5 MG/1
5 TABLET ORAL DAILY
Status: DISCONTINUED | OUTPATIENT
Start: 2019-05-23 | End: 2019-05-24 | Stop reason: HOSPADM

## 2019-05-22 RX ORDER — ALBUTEROL SULFATE 2.5 MG/3ML
2.5 SOLUTION RESPIRATORY (INHALATION) EVERY 4 HOURS PRN
Status: DISCONTINUED | OUTPATIENT
Start: 2019-05-22 | End: 2019-05-24 | Stop reason: HOSPADM

## 2019-05-22 RX ORDER — ORPHENADRINE CITRATE 30 MG/ML
60 INJECTION INTRAMUSCULAR; INTRAVENOUS ONCE
Status: COMPLETED | OUTPATIENT
Start: 2019-05-22 | End: 2019-05-22

## 2019-05-22 RX ORDER — FUROSEMIDE 40 MG/1
40 TABLET ORAL DAILY
Status: DISCONTINUED | OUTPATIENT
Start: 2019-05-23 | End: 2019-05-24 | Stop reason: HOSPADM

## 2019-05-22 RX ORDER — KETOROLAC TROMETHAMINE 30 MG/ML
30 INJECTION, SOLUTION INTRAMUSCULAR; INTRAVENOUS ONCE
Status: COMPLETED | OUTPATIENT
Start: 2019-05-22 | End: 2019-05-22

## 2019-05-22 RX ORDER — ALBUTEROL SULFATE 2.5 MG/3ML
2.5 SOLUTION RESPIRATORY (INHALATION) EVERY 6 HOURS PRN
Status: DISCONTINUED | OUTPATIENT
Start: 2019-05-22 | End: 2019-05-22

## 2019-05-22 RX ORDER — ASPIRIN 81 MG/1
81 TABLET, CHEWABLE ORAL DAILY
Status: DISCONTINUED | OUTPATIENT
Start: 2019-05-23 | End: 2019-05-22

## 2019-05-22 RX ORDER — METHOCARBAMOL 750 MG/1
750 TABLET, FILM COATED ORAL 4 TIMES DAILY
Status: DISCONTINUED | OUTPATIENT
Start: 2019-05-22 | End: 2019-05-24 | Stop reason: HOSPADM

## 2019-05-22 RX ORDER — LOSARTAN POTASSIUM 25 MG/1
50 TABLET ORAL DAILY
Status: DISCONTINUED | OUTPATIENT
Start: 2019-05-23 | End: 2019-05-24 | Stop reason: HOSPADM

## 2019-05-22 RX ORDER — OXYCODONE HYDROCHLORIDE AND ACETAMINOPHEN 5; 325 MG/1; MG/1
1 TABLET ORAL ONCE
Status: COMPLETED | OUTPATIENT
Start: 2019-05-22 | End: 2019-05-22

## 2019-05-22 RX ORDER — DEXTROSE MONOHYDRATE 25 G/50ML
12.5 INJECTION, SOLUTION INTRAVENOUS PRN
Status: DISCONTINUED | OUTPATIENT
Start: 2019-05-22 | End: 2019-05-24 | Stop reason: HOSPADM

## 2019-05-22 RX ORDER — SODIUM CHLORIDE 0.9 % (FLUSH) 0.9 %
10 SYRINGE (ML) INJECTION EVERY 12 HOURS SCHEDULED
Status: DISCONTINUED | OUTPATIENT
Start: 2019-05-22 | End: 2019-05-24 | Stop reason: HOSPADM

## 2019-05-22 RX ORDER — METHOCARBAMOL 500 MG/1
500 TABLET, FILM COATED ORAL 4 TIMES DAILY
Status: DISCONTINUED | OUTPATIENT
Start: 2019-05-22 | End: 2019-05-22

## 2019-05-22 RX ADMIN — HYDROMORPHONE HYDROCHLORIDE 0.5 MG: 1 INJECTION, SOLUTION INTRAMUSCULAR; INTRAVENOUS; SUBCUTANEOUS at 16:23

## 2019-05-22 RX ADMIN — ISOSORBIDE MONONITRATE 90 MG: 60 TABLET, EXTENDED RELEASE ORAL at 21:05

## 2019-05-22 RX ADMIN — OXYCODONE HYDROCHLORIDE AND ACETAMINOPHEN 1 TABLET: 5; 325 TABLET ORAL at 11:10

## 2019-05-22 RX ADMIN — RANOLAZINE 500 MG: 500 TABLET, FILM COATED, EXTENDED RELEASE ORAL at 21:05

## 2019-05-22 RX ADMIN — GABAPENTIN 600 MG: 300 CAPSULE ORAL at 14:15

## 2019-05-22 RX ADMIN — GABAPENTIN 600 MG: 600 TABLET, FILM COATED ORAL at 18:54

## 2019-05-22 RX ADMIN — OXYCODONE AND ACETAMINOPHEN 1 TABLET: 10; 325 TABLET ORAL at 18:54

## 2019-05-22 RX ADMIN — ORPHENADRINE CITRATE 60 MG: 30 INJECTION INTRAMUSCULAR; INTRAVENOUS at 11:10

## 2019-05-22 RX ADMIN — INSULIN LISPRO 1 UNITS: 100 INJECTION, SOLUTION INTRAVENOUS; SUBCUTANEOUS at 21:07

## 2019-05-22 RX ADMIN — IOPAMIDOL 80 ML: 755 INJECTION, SOLUTION INTRAVENOUS at 22:29

## 2019-05-22 RX ADMIN — KETOROLAC TROMETHAMINE 30 MG: 30 INJECTION, SOLUTION INTRAMUSCULAR at 11:10

## 2019-05-22 RX ADMIN — ENOXAPARIN SODIUM 40 MG: 40 INJECTION SUBCUTANEOUS at 18:54

## 2019-05-22 RX ADMIN — HYDROMORPHONE HYDROCHLORIDE 1 MG: 1 INJECTION, SOLUTION INTRAMUSCULAR; INTRAVENOUS; SUBCUTANEOUS at 14:15

## 2019-05-22 RX ADMIN — METHOCARBAMOL TABLETS 750 MG: 750 TABLET, COATED ORAL at 21:05

## 2019-05-22 RX ADMIN — GABAPENTIN 600 MG: 600 TABLET, FILM COATED ORAL at 22:57

## 2019-05-22 ASSESSMENT — PAIN SCALES - GENERAL
PAINLEVEL_OUTOF10: 7
PAINLEVEL_OUTOF10: 9
PAINLEVEL_OUTOF10: 3
PAINLEVEL_OUTOF10: 9
PAINLEVEL_OUTOF10: 3
PAINLEVEL_OUTOF10: 4
PAINLEVEL_OUTOF10: 9
PAINLEVEL_OUTOF10: 3
PAINLEVEL_OUTOF10: 5

## 2019-05-22 ASSESSMENT — PAIN DESCRIPTION - PAIN TYPE
TYPE: CHRONIC PAIN

## 2019-05-22 ASSESSMENT — PAIN DESCRIPTION - ORIENTATION
ORIENTATION: LOWER
ORIENTATION: MID

## 2019-05-22 ASSESSMENT — ENCOUNTER SYMPTOMS
EYE REDNESS: 0
APNEA: 0
EYE DISCHARGE: 0
FACIAL SWELLING: 0
ABDOMINAL PAIN: 0
NAUSEA: 0
CHOKING: 0
SHORTNESS OF BREATH: 0
VOMITING: 0
BACK PAIN: 1

## 2019-05-22 ASSESSMENT — PAIN DESCRIPTION - LOCATION
LOCATION: BACK

## 2019-05-22 ASSESSMENT — PAIN DESCRIPTION - PROGRESSION
CLINICAL_PROGRESSION: GRADUALLY IMPROVING
CLINICAL_PROGRESSION: NOT CHANGED
CLINICAL_PROGRESSION: NOT CHANGED

## 2019-05-22 ASSESSMENT — PAIN DESCRIPTION - DESCRIPTORS
DESCRIPTORS: SHARP
DESCRIPTORS: SHOOTING
DESCRIPTORS: SHOOTING;ACHING;BURNING
DESCRIPTORS: SHOOTING

## 2019-05-22 ASSESSMENT — PAIN DESCRIPTION - ONSET
ONSET: ON-GOING

## 2019-05-22 ASSESSMENT — PAIN DESCRIPTION - FREQUENCY
FREQUENCY: CONTINUOUS

## 2019-05-22 ASSESSMENT — PAIN - FUNCTIONAL ASSESSMENT
PAIN_FUNCTIONAL_ASSESSMENT: PREVENTS OR INTERFERES SOME ACTIVE ACTIVITIES AND ADLS
PAIN_FUNCTIONAL_ASSESSMENT: PREVENTS OR INTERFERES WITH MANY ACTIVE NOT PASSIVE ACTIVITIES

## 2019-05-22 NOTE — CONSULTS
Diagnosis Date    Anxiety     Arthritis     Asthma     CAD (coronary artery disease)     Calcium kidney stone     [facility]     Cerebral artery occlusion with cerebral infarction ([facility]     CHF (congestive heart failure) ([facility]     COPD [facility]ch[facility]     mild    Depression     DM2 (diabetes mellitus, type 2) (HCC)     Fibromyalgia     GERD (gastroesophageal reflux disease)     Hyperlipidemia     Hypertension     Liver disease     Pacemaker 2012    Medtronic model # [id number]    Pneumonia     Seizures ([facility]     TIA (transient ischemic attack) 2007    Ulcerative colitis ([facility]         Past Surgical History:   Procedure Laterality Date    BACK SURGERY      CARDIAC SURGERY      CHOLECYSTECTOMY      COLONOSCOPY  01/10/2017    COLONOSCOPY  01/10/2017    CORONARY ANGIOPLASTY WITH STENT PLACEMENT  2012    CORONARY ARTERY BYPASS GRAFT  2010, 11/2015    ENDOSCOPY, COLON, DIAGNOSTIC      PACEMAKER PLACEMENT      UPPER GASTROINTESTINAL ENDOSCOPY  02/07/2017       Social History     Occupational History    Occupation: disabled   Tobacco Use    Smoking status: Current Every Day Smoker     Packs/day: 1.00     Years: 44.00     Pack years: 44.00     Types: Cigarettes    Smokeless tobacco: Never Used    Tobacco comment: urged to stop   Substance and Sexual Activity    Alcohol use: No     Alcohol/week: 0.0 oz    Drug use: No    Sexual activity: Yes     Partners: Female        Family History   Problem Relation Age of Onset    Diabetes Father     Coronary Art Dis Father     Cancer Mother         Lung with mets        No outpatient medications have been marked as taking for the 5/22/19 encounter [facility] Encounter).         Current Facility-Administered Medications   Medication Dose Route Frequency Provider Last Rate Last Dose    albuterol (PROVENTIL) nebulizer solution 2.5 mg  2.5 mg Nebulization Q6H PRN MD Felicia Constantino ON MG/24HR 1 patch  1 patch Transdermal Daily Sai Monae MD        acetaminophen (TYLENOL) tablet 650 mg  650 mg Oral Q4H PRN Sai Monae MD        glucose (GLUTOSE) 40 % oral gel 15 g  15 g Oral PRN Sai Monae MD        dextrose 50 % solution 12.5 g  12.5 g Intravenous PRN Sai Monae MD        glucagon (rDNA) injection 1 mg  1 mg Intramuscular PRN Sai Monae MD        dextrose 5 % solution  100 mL/hr Intravenous PRN Sai Monae MD        insulin lispro (HUMALOG) injection pen 0-6 Units  0-6 Units Subcutaneous TID WC Sai Monae MD        insulin lispro (HUMALOG) injection pen 0-3 Units  0-3 Units Subcutaneous Nightly Sai Monae MD            Objective:  BP (!) 159/93   Pulse 94   Temp 97.9 °F (36.6 °C) (Oral)   Resp 18   Ht 6' (1.829 m)   Wt 221 lb (100.2 kg)   SpO2 94%   BMI 29.97 kg/m²     Physical Exam:   Patient seen and examined  GCS:  4 - Opens eyes on own  5 - Alert and oriented  6 - Follows simple motor commands  General: Well developed. Alert and cooperative in no acute distress. HENT: atraumatic, neck supple  Eyes: Optic discs: Not tested  Pulmonary: unlabored respiratory effort  Cardiovascular:  Warm well perfused. No peripheral edema  Gastrointestinal: abdomen soft, NT, ND    Neurological:  Mental Status: Awake, alert, oriented x 4, speech clear and appropriate  Attention: Intact  Language: No aphasia or dysarthria noted  Sensation: Intact to all extremities to light touch  Coordination: Intact  DTRs:    Right  Left    brachioradialis  2 2   Patella  2 2   ankle clonus  Neg Neg   toes (babinski)  Down Down     Musculoskeletal:   Gait: Not tested   Assist devices: None   Tone: Normal  Motor strength:    Right  Left    Right  Left    Deltoid  5 5  Hip Flex  5 4+   Biceps  5 5  Knee Extensors  5 4+   Triceps  5 5  Knee Flexors  5 4+   Wrist Ext  5 5  Ankle Dorsiflex. 5 4+   Wrist Flex  5 5  Ankle Plantarflex.   5 4+   Handgrip  5 5  Ext Tito Longus Hyperlipidemia LDL goal <70 11/02/2014     Priority: Low    ICD (implantable cardioverter-defibrillator), dual, in situ 10/12/2013     Priority: Low    Compression fracture of L2, sequela 05/22/2019    Chronic systolic (congestive) heart failure ([facility] 04/27/2019    CAD (coronary artery disease) 04/27/2019    Chest pain 04/25/2019    Renal insufficiency 12/26/2018    DMII (diabetes mellitus, type 2) ([address] 11/27/2018    COPD (chronic obstructive pulmonary disease) ([address] 11/27/2018    PFO (patent foramen ovale)     Ischemic stroke, left frontal lobe (4/30/18) ([address] 04/30/2018    Acute on chronic diastolic congestive heart failure ([facility] 03/22/2018    Restrictive lung disease 03/07/2018    Tobacco abuse 01/10/2018    Renal insufficiency 01/04/2018    Iron deficiency anemia due to chronic blood loss 03/31/2017    Coronary artery disease involving coronary bypass graft of native heart with angina pectoris ([address]     Anxiety 02/01/2016    Morbid obesity due to excess calories (HCC)     Gastrointestinal hemorrhage     Chronic back pain 06/23/2015    Anxiety 03/19/2015    Abdominal pain 05/23/2014    CHF (congestive heart failure) ([facility] 12/01/2013       Assessment:  Patient is a 61 y.o. male w/acute on chronic lumbar back pain who underwent laminectomy/discectomy in 10/2018 by Dr. Fleming Los:  1. No emergent neurosurgical intervention indicated  2. Neurologic exams frequency:  - Floor: Q4H  3. For change in exam MUST contact neurosurgery team along with critical care or primary team  4. Lumbar Back Pain:  - CT Lumbar revealed mild L2 compression fracture, possible infection in previous surgical bed, and herniated disc L3/4  - Cannot get MRI 2/2 Pacemaker  - CT Lumbar w contrast to better evaluate  possibility of infection along the surgical tract and laminectomy bed  - Possible CT Myelogram, but will be delayed 2/2 ASA & Plavix use  5.  Muscle spasms: Increased Robaxin to 750 4x's/day and PRN Valium  6. Pain control: Managed by medical team  7. PT/OT consulted, appreciate recs  8. Advance diet / activity per primary team  9. Thank you for consult. Will follow inpatient. Please call with any questions or decline in neurological status    DISPO: Remain inpatient from neurosurgery standpoint. Dispo timing to be determined by primary team once patient is medically stable for discharge. Patient was discussed with Dr. Friddie Leventhal who agrees with above assessment and plan.      Electronically signed by: ANDREW Redmond, 5/22/2019 6:38 PM  [phone]

## 2019-05-22 NOTE — PROGRESS NOTES
RESPIRATORY THERAPY ASSESSMENT    Name:  Gema Garsia Dr Record Number:  [id number]  Age: 61 y.o. Gender: male  : 1955  Today's Date:  2019  Room:  55/5525-01    Assessment     Is the patient being admitted for a COPD or Asthma exacerbation? No   (If yes the patient will be seen every 4 hours for the first 24 hours and then reassessed)    Patient Admission Diagnosis      Allergies  Allergies   Allergen Reactions    Dopamine Hcl Other (See Comments)     Compulsive gambling    Tramadol Other (See Comments)     Dizziness     Melatonin Other (See Comments)     Restless leg syndrome         Minimum Predicted Vital Capacity:     1210          Actual Vital Capacity:      1500              Pulmonary History:COPD  Home Oxygen Therapy:  room air  Home Respiratory Therapy:Albuterol   Current Respiratory Therapy:  Albuterol PRN Q6          Respiratory Severity Index(RSI)   Patients with orders for inhalation medications, oxygen, or any therapeutic treatment modality will be placed on Respiratory Protocol. They will be assessed with the first treatment and at least every 72 hours thereafter. The following severity scale will be used to determine frequency of treatment intervention.     Smoking History: Pulmonary Disease or Smoking History, Greater than 15 pack year = 2    Social History  Social History     Tobacco Use    Smoking status: Current Every Day Smoker     Packs/day: 1.00     Years: 44.00     Pack years: 44.00     Types: Cigarettes    Smokeless tobacco: Never Used    Tobacco comment: urged to stop   Substance Use Topics    Alcohol use: No     Alcohol/week: 0.0 oz    Drug use: No       Recent Surgical History: None = 0  Past Surgical History  Past Surgical History:   Procedure Laterality Date    BACK SURGERY      CARDIAC SURGERY      CHOLECYSTECTOMY      COLONOSCOPY  01/10/2017    COLONOSCOPY  01/10/2017    CORONARY ANGIOPLASTY WITH STENT PLACEMENT      CORONARY ARTERY BYPASS GRAFT  2010, 11/2015    ENDOSCOPY, COLON, DIAGNOSTIC      PACEMAKER PLACEMENT      UPPER GASTROINTESTINAL ENDOSCOPY  02/07/2017       Level of Consciousness: Alert, Oriented, and Cooperative = 0    Level of Activity: Walking unassisted = 0    Respiratory Pattern: Regular Pattern; RR 8-20 = 0    Breath Sounds: Clear = 0    Sputum   ,  ,    Cough: Strong, spontaneous, non-productive = 0    Vital Signs   BP (!) 159/93   Pulse 94   Temp 97.9 °F (36.6 °C) (Oral)   Resp 18   Ht 6' (1.829 m)   Wt 221 lb (100.2 kg)   SpO2 94%   BMI 29.97 kg/m²   SPO2 (COPD values may differ): Greater than or equal to 92% on room air = 0    Peak Flow (asthma only): not applicable = 0    RSI: 5-6 = Q4hr PRN (every four hours as needed) for dyspnea        Plan       Goals: medication delivery    Patient/caregiver was educated on the proper method of use for Respiratory Care Devices:  Yes      Level of patient/caregiver understanding able to:   ? Verbalize understanding   ? Demonstrate understanding       ? Teach back        ? Needs reinforcement       ? No available caregiver               ? Other:     Response to education:  Very Good     Is patient being placed on Home Treatment Regimen? Yes     Does the patient have everything they need prior to discharge? No     Comments:     Plan of Care: HHN Albuterol Q4 PRN    Electronically signed by Esteban Hollingsworth RCP on 5/22/2019 at 6:16 PM    Respiratory Protocol Guidelines     1. Assessment and treatment by Respiratory Therapy will be initiated for medication and therapeutic interventions upon initiation of aerosolized medication. 2. Physician will be contacted for respiratory rate (RR) greater than 35 breaths per minute. Therapy will be held for heart rate (HR) greater than 140 beats per minute, pending direction from physician. 3. Bronchodilators will be administered via Metered Dose Inhaler (MDI) with spacer when the following criteria are met:  a.  Alert and cooperative history for at least 24 hours, contact physician for possible discontinuation.

## 2019-05-22 NOTE — ED PROVIDER NOTES
**EVALUATED BY ADVANCED PRACTICE PROVIDER**        [address]      Pt Name: Meme Cohen  EWL:[id number]  [place] 1955  Date of evaluation: 5/22/2019  Provider: Baudilio Stephens PA-C      Chief Complaint:    Chief Complaint   Patient presents with    Back Pain     mid back region that radiates down left leg. seen in ED for same 2 days ago - pain continues       Nursing Notes, Past Medical Hx, Past Surgical Hx, Social Hx, Allergies, and Family Hx were all reviewed and agreed with or any disagreements were addressed in the HPI.    HPI:  (Location, Duration, Timing, Severity,Quality, Assoc Sx, Context, Modifying factors)  This is a  61 y.o. male who was told to come here for regarding his back pain by his primary care physician who called ahead of time. Patient was recently seen here few days ago for the same. Had a CT at that time. Cannot get an MRI due to his rise in his back. Denies any recent falls or injury. Pain that now radiate down his left leg. No loss of bowel or urinary control. Denies abdominal pain, no chest pain. No extremity weakness. This unable to bear weight or take the pain. Percocet at home is not working.       PastMedical/Surgical History:      Diagnosis Date    Anxiety     Arthritis     Asthma     CAD (coronary artery disease)     Calcium kidney stone     Cardiomyopathy ([address]     Cerebral artery occlusion with cerebral infarction ([address]     CHF (congestive heart failure) ([address]     COPD (chronic obstructive pulmonary disease) (HCC)     mild    Depression     DM2 (diabetes mellitus, type 2) (HCC)     Fibromyalgia     GERD (gastroesophageal reflux disease)     Hyperlipidemia     Hypertension     Liver disease     Pacemaker 2012    Medtronic model # [id number]    Pneumonia     Seizures ([address]     TIA (transient ischemic attack) 2007    Ulcerative colitis ([address]          Procedure Laterality R-3Normal      nitroGLYCERIN (NITROSTAT) 0.4 MG SL tablet PLACE 1 TABLET UNDER THE TONGUE EVERY 5 MINUTES AS NEEDED FOR CHEST PAIN, Disp-25 tablet, R-1Normal      albuterol sulfate  (90 Base) MCG/ACT inhaler Inhale 2 puffs into the lungs every 6 hours as needed for WheezingHistorical Med      insulin glargine (LANTUS) 100 UNIT/ML injection pen Inject 28 Units into the skin nightly, Disp-5 pen, R-2Normal      insulin lispro (HUMALOG KWIKPEN) 100 UNIT/ML pen Inject 5 Units into the skin 3 times daily (before meals), Disp-5 pen, R-3Normal      !! Lancets MISC Disp-120 each, R-5, Printaccu check fastclick lancets Dx: U54.6 As needed      !! FREESTYLE LANCETS MISC DAILY Starting Tue 4/24/2018, Disp-100 each, R-0, Print      metoprolol succinate (TOPROL XL) 100 MG extended release tablet Take 1 tablet by mouth daily, Disp-30 tablet, R-1Print       !! - Potential duplicate medications found. Please discuss with provider. Review of Systems:  Review of Systems   Constitutional: Negative for chills and fever. HENT: Negative for congestion, facial swelling and sneezing. Eyes: Negative for discharge and redness. Respiratory: Negative for apnea, choking and shortness of breath. Cardiovascular: Negative for chest pain. Gastrointestinal: Negative for abdominal pain, nausea and vomiting. Genitourinary: Negative for dysuria. Musculoskeletal: Positive for back pain. Negative for neck pain and neck stiffness. Neurological: Negative for dizziness, tremors, seizures and headaches. All other systems reviewed and are negative. Positives and Pertinent negatives as per HPI. Except as noted above in the ROS, problem specific ROS was completed and is negative. Physical Exam:  Physical Exam   Constitutional: He is oriented to person, place, and time. He appears well-developed and well-nourished. HENT:   Head: Normocephalic and atraumatic.    Nose: Nose normal.   Eyes: Right eye exhibits no discharge. Left eye exhibits no discharge. Neck: Normal range of motion. Neck supple. Cardiovascular: Normal rate, regular rhythm and normal heart sounds. Exam reveals no gallop and no friction rub. No murmur heard. Pulmonary/Chest: Effort normal and breath sounds normal. No respiratory distress. He has no wheezes. He has no rales. He exhibits no tenderness. Abdominal: Soft. Bowel sounds are normal. He exhibits no distension and no mass. There is no tenderness. There is no guarding. Musculoskeletal:        Cervical back: He exhibits normal range of motion, no tenderness and no bony tenderness. Thoracic back: He exhibits normal range of motion, no tenderness and no bony tenderness. Lumbar back: He exhibits decreased range of motion, tenderness and bony tenderness. Back:    Neurological: He is alert and oriented to person, place, and time. Skin: Skin is warm and dry. He is not diaphoretic. Psychiatric: He has a normal mood and affect. His behavior is normal.   Nursing note and vitals reviewed.       MEDICAL DECISION MAKING    Vitals:    Vitals:    05/22/19 1330 05/22/19 1445 05/22/19 1502 05/22/19 1517   BP:  (!) 197/113 (!) 183/106 (!) 182/105   Pulse: 98 98 89 86   Resp: 17 16 14 20   Temp:    97.5 °F (36.4 °C)   TempSrc:    Oral   SpO2:    98%   Weight:       Height:           LABS:  Labs Reviewed   COMPREHENSIVE METABOLIC PANEL - Abnormal; Notable for the following components:       Result Value    Glucose 253 (*)     AST 9 (*)     All other components within normal limits    Narrative:     Performed at:  [facility]  [address] [place] [address]   Phone [phone]   SEDIMENTATION RATE - Abnormal; Notable for the following components:    Sed Rate 25 (*)     All other components within normal limits    Narrative:     Performed at:  [facility]  [address] [place] [address]   Phone [phone] surrounding fat infiltration or hematoma. No significant retropulsion is seen. This is new from the prior study however this is 4 years earlier. No more recent studies for comparison. The remainder the vertebral bodies are intact. No bone destruction. No malalignment. Prior partial laminectomy is seen to the right, L3-4. At this level, there is soft tissue infiltration obscuring the posterior adjacent musculature as well as the dural sac. At least 3 separate foci of air are noted within the laminectomy space which appears to be extradural.  The differentiation of the soft tissue infiltration from the thecal sac is poorly defined. There is moderate canal compromise with central broad-based disc herniation. The bony margins are fairly well defined, no obvious bone destruction. There is some fluid posterior deep subcutaneous fat midline and to the right. DEGENERATIVE CHANGES: There is mild degenerative change primarily involving facet joints L3-4 L4-5 and L5-S1. SOFT TISSUES: No additional area of soft tissue infiltration is seen. Moderate-size left renal cyst partially included, posterior to the left kidney grossly unchanged. Mild compression fracture L2 vertebral body superior endplate. This may be recent but age not definitive. Comparison to more recent studies is recommended. Prior right laminectomy, presumably from recent surgery given the posterior soft tissue infiltration and air. Because of the foci of air, the possibility of infection along the surgical tract and laminectomy bed however is certainly in the differential.  This needs correlation to the presumed recent surgery and prior more recent studies if available. In addition at this level there broad-based disc protrusion or herniation with overall moderate canal compromise suspected, posterior margins of the thecal sac poorly defined. Recommend contrast-enhanced MRI to evaluate both of the aforementioned findings.  RECOMMENDATIONS: [facility] intake. In she was okay with this patient being accepted there and admitted. Transportation was arranged by intake for this patient. No MRI was performed here due to patient recent surgery. He had to wait to get his MRI. He was treated medically here for his pain. Given several doses of Dilaudid. This did help for several hours at a time. Patient was okay with the transfer plan. He'll be transferred in stable condition. The patient tolerated their visit well. I evaluated the patient. The physician was available for consultation as needed. The patient and / or the family were informed of the results of anytests, a time was given to answer questions, a plan was proposed and they agreed with plan. CLINICAL IMPRESSION:  1. Intractable low back pain        DISPOSITION Decision To Transfer 05/22/2019 02:59:51 PM      PATIENT REFERRED TO:  No follow-up provider specified.     DISCHARGE MEDICATIONS:  Discharge Medication List as of 5/22/2019  4:48 PM          DISCONTINUED MEDICATIONS:  Discharge Medication List as of 5/22/2019  4:48 PM                 (Please note the MDM and HPI sections of this note were completed with a voice recognition program.  Efforts weremade to edit the dictations but occasionally words are mis-transcribed.)    Electronically signed, Adolph Valdez PA-C,          Adolph Valdez PA-C  05/30/19 020

## 2019-05-22 NOTE — PROGRESS NOTES
Patient arrived into room 5525 via transport from [facility]. VSS on arrival. Pt A/Ox4. Admission completed. Patient oriented to room and call light. Fall precautions in place. Will continue to monitor.

## 2019-05-22 NOTE — TELEPHONE ENCOUNTER
Writer contacted Dr. Angelina Dodd (PA),  ED provider to inform of 30 day readmission risk. ED provider informed writer of readmission. (0) independent

## 2019-05-23 PROBLEM — M54.10 BACK PAIN WITH RADICULOPATHY: Status: ACTIVE | Noted: 2019-05-23

## 2019-05-23 LAB
ANION GAP SERPL CALCULATED.3IONS-SCNC: 13 MMOL/L (ref 3–16)
BUN BLDV-MCNC: 19 MG/DL (ref 7–20)
CALCIUM SERPL-MCNC: 9 MG/DL (ref 8.3–10.6)
CHLORIDE BLD-SCNC: 100 MMOL/L (ref 99–110)
CO2: 22 MMOL/L (ref 21–32)
COLLAGEN ADENOSINE-5'-DIPHOSPHATE (ADP) TIME: 60 SEC (ref 56–110)
COLLAGEN EPINEPHRINE TIME: 158 SEC (ref 86–194)
CREAT SERPL-MCNC: 1.2 MG/DL (ref 0.8–1.3)
GFR AFRICAN AMERICAN: >60
GFR NON-AFRICAN AMERICAN: >60
GLUCOSE BLD-MCNC: 166 MG/DL (ref 70–99)
GLUCOSE BLD-MCNC: 168 MG/DL (ref 70–99)
GLUCOSE BLD-MCNC: 218 MG/DL (ref 70–99)
GLUCOSE BLD-MCNC: 228 MG/DL (ref 70–99)
GLUCOSE BLD-MCNC: 240 MG/DL (ref 70–99)
HCT VFR BLD CALC: 40 % (ref 40.5–52.5)
HEMOGLOBIN: 13.3 G/DL (ref 13.5–17.5)
MCH RBC QN AUTO: 32.3 PG (ref 26–34)
MCHC RBC AUTO-ENTMCNC: 33.4 G/DL (ref 31–36)
MCV RBC AUTO: 96.9 FL (ref 80–100)
PDW BLD-RTO: 14.4 % (ref 12.4–15.4)
PERFORMED ON: ABNORMAL
PLATELET # BLD: 211 K/UL (ref 135–450)
PLATELET FUNCTION INTERPRETATION: NORMAL
PMV BLD AUTO: 9.6 FL (ref 5–10.5)
POTASSIUM REFLEX MAGNESIUM: 3.7 MMOL/L (ref 3.5–5.1)
RBC # BLD: 4.13 M/UL (ref 4.2–5.9)
SODIUM BLD-SCNC: 135 MMOL/L (ref 136–145)
WBC # BLD: 6.4 K/UL (ref 4–11)

## 2019-05-23 PROCEDURE — 2580000003 HC RX 258: Performed by: INTERNAL MEDICINE

## 2019-05-23 PROCEDURE — 85027 COMPLETE CBC AUTOMATED: CPT

## 2019-05-23 PROCEDURE — 97530 THERAPEUTIC ACTIVITIES: CPT

## 2019-05-23 PROCEDURE — 36415 COLL VENOUS BLD VENIPUNCTURE: CPT

## 2019-05-23 PROCEDURE — 1200000000 HC SEMI PRIVATE

## 2019-05-23 PROCEDURE — 85576 BLOOD PLATELET AGGREGATION: CPT

## 2019-05-23 PROCEDURE — 6370000000 HC RX 637 (ALT 250 FOR IP): Performed by: NURSE PRACTITIONER

## 2019-05-23 PROCEDURE — 6360000002 HC RX W HCPCS: Performed by: INTERNAL MEDICINE

## 2019-05-23 PROCEDURE — 97162 PT EVAL MOD COMPLEX 30 MIN: CPT

## 2019-05-23 PROCEDURE — 6370000000 HC RX 637 (ALT 250 FOR IP): Performed by: INTERNAL MEDICINE

## 2019-05-23 PROCEDURE — 97166 OT EVAL MOD COMPLEX 45 MIN: CPT

## 2019-05-23 PROCEDURE — 80048 BASIC METABOLIC PNL TOTAL CA: CPT

## 2019-05-23 PROCEDURE — 97116 GAIT TRAINING THERAPY: CPT

## 2019-05-23 RX ADMIN — Medication 10 ML: at 08:26

## 2019-05-23 RX ADMIN — OXYCODONE AND ACETAMINOPHEN 1 TABLET: 10; 325 TABLET ORAL at 06:56

## 2019-05-23 RX ADMIN — ATORVASTATIN CALCIUM 80 MG: 80 TABLET, FILM COATED ORAL at 08:18

## 2019-05-23 RX ADMIN — METHOCARBAMOL TABLETS 750 MG: 750 TABLET, COATED ORAL at 08:18

## 2019-05-23 RX ADMIN — OXYCODONE AND ACETAMINOPHEN 1 TABLET: 10; 325 TABLET ORAL at 01:11

## 2019-05-23 RX ADMIN — GABAPENTIN 600 MG: 600 TABLET, FILM COATED ORAL at 06:56

## 2019-05-23 RX ADMIN — METHOCARBAMOL TABLETS 750 MG: 750 TABLET, COATED ORAL at 13:15

## 2019-05-23 RX ADMIN — FUROSEMIDE 40 MG: 40 TABLET ORAL at 08:16

## 2019-05-23 RX ADMIN — ISOSORBIDE MONONITRATE 90 MG: 60 TABLET, EXTENDED RELEASE ORAL at 08:17

## 2019-05-23 RX ADMIN — INSULIN LISPRO 3 UNITS: 100 INJECTION, SOLUTION INTRAVENOUS; SUBCUTANEOUS at 17:24

## 2019-05-23 RX ADMIN — METHOCARBAMOL TABLETS 750 MG: 750 TABLET, COATED ORAL at 17:25

## 2019-05-23 RX ADMIN — INSULIN LISPRO 2 UNITS: 100 INJECTION, SOLUTION INTRAVENOUS; SUBCUTANEOUS at 08:12

## 2019-05-23 RX ADMIN — RANOLAZINE 500 MG: 500 TABLET, FILM COATED, EXTENDED RELEASE ORAL at 08:26

## 2019-05-23 RX ADMIN — PANTOPRAZOLE SODIUM 40 MG: 40 TABLET, DELAYED RELEASE ORAL at 06:56

## 2019-05-23 RX ADMIN — LOSARTAN POTASSIUM 50 MG: 25 TABLET ORAL at 08:18

## 2019-05-23 RX ADMIN — OXYCODONE AND ACETAMINOPHEN 1 TABLET: 10; 325 TABLET ORAL at 19:23

## 2019-05-23 RX ADMIN — INSULIN LISPRO 3 UNITS: 100 INJECTION, SOLUTION INTRAVENOUS; SUBCUTANEOUS at 11:11

## 2019-05-23 RX ADMIN — ENOXAPARIN SODIUM 40 MG: 40 INJECTION SUBCUTANEOUS at 08:23

## 2019-05-23 RX ADMIN — METOPROLOL SUCCINATE 100 MG: 100 TABLET, EXTENDED RELEASE ORAL at 08:18

## 2019-05-23 RX ADMIN — GABAPENTIN 600 MG: 600 TABLET, FILM COATED ORAL at 15:54

## 2019-05-23 RX ADMIN — GABAPENTIN 600 MG: 600 TABLET, FILM COATED ORAL at 19:23

## 2019-05-23 RX ADMIN — AMLODIPINE BESYLATE 5 MG: 5 TABLET ORAL at 08:17

## 2019-05-23 RX ADMIN — GABAPENTIN 600 MG: 600 TABLET, FILM COATED ORAL at 11:07

## 2019-05-23 RX ADMIN — METHOCARBAMOL TABLETS 750 MG: 750 TABLET, COATED ORAL at 21:02

## 2019-05-23 RX ADMIN — OXYCODONE AND ACETAMINOPHEN 1 TABLET: 10; 325 TABLET ORAL at 13:15

## 2019-05-23 RX ADMIN — ISOSORBIDE MONONITRATE 90 MG: 60 TABLET, EXTENDED RELEASE ORAL at 21:02

## 2019-05-23 ASSESSMENT — PAIN DESCRIPTION - FREQUENCY
FREQUENCY: CONTINUOUS

## 2019-05-23 ASSESSMENT — PAIN SCALES - GENERAL
PAINLEVEL_OUTOF10: 8
PAINLEVEL_OUTOF10: 8
PAINLEVEL_OUTOF10: 9
PAINLEVEL_OUTOF10: 9
PAINLEVEL_OUTOF10: 4
PAINLEVEL_OUTOF10: 0
PAINLEVEL_OUTOF10: 0
PAINLEVEL_OUTOF10: 6

## 2019-05-23 ASSESSMENT — PAIN DESCRIPTION - DESCRIPTORS
DESCRIPTORS: DISCOMFORT;SPASM
DESCRIPTORS: SPASM;TINGLING
DESCRIPTORS: SPASM

## 2019-05-23 ASSESSMENT — PAIN DESCRIPTION - LOCATION
LOCATION: BACK

## 2019-05-23 ASSESSMENT — PAIN DESCRIPTION - PAIN TYPE
TYPE: ACUTE PAIN

## 2019-05-23 ASSESSMENT — PAIN - FUNCTIONAL ASSESSMENT
PAIN_FUNCTIONAL_ASSESSMENT: PREVENTS OR INTERFERES SOME ACTIVE ACTIVITIES AND ADLS

## 2019-05-23 ASSESSMENT — PAIN DESCRIPTION - ONSET
ONSET: ON-GOING

## 2019-05-23 ASSESSMENT — PAIN DESCRIPTION - ORIENTATION
ORIENTATION: LOWER

## 2019-05-23 ASSESSMENT — PAIN DESCRIPTION - PROGRESSION
CLINICAL_PROGRESSION: NOT CHANGED
CLINICAL_PROGRESSION: GRADUALLY WORSENING
CLINICAL_PROGRESSION: NOT CHANGED

## 2019-05-23 ASSESSMENT — PAIN DESCRIPTION - DIRECTION: RADIATING_TOWARDS: LEFT LEG

## 2019-05-23 NOTE — PROGRESS NOTES
Physical Therapy    Facility/Department: [facility] 5T ORTHO/NEURO  Initial Assessment/Treatment    NAME: Otilia Silva  : 1955  MRN: [id number]    Date of Service: 2019    Discharge Recommendations:    Otilia Silva scored a 17/24 on the AM-PAC short mobility form. Current research shows that an AM-PAC score of 17 or less is typically not associated with a discharge to the patient's home setting. Based on the patients AM-PAC score and their current functional mobility deficits, it is recommended that the patient have 3-5 sessions per week of Physical Therapy at d/c to increase the patients independence. PT Equipment Recommendations  Equipment Needed: No  Other: defer to next level of care     Assessment   Body structures, Functions, Activity limitations: Decreased functional mobility ; Decreased endurance;Decreased ROM; Decreased balance;Decreased strength; Increased Pain  Assessment: 61 y.o. male w/ PMH of Laminectomy/Discectomy in 10/2018 by Dr. Friddie Leventhal, CHF, CAD, HTN, HLD, Pacemaker, DM II, COPD who presented on 2019 to [facility] ED c/o back pain and weakness in LLE. Pt currently requiring CGA for all functional mobility and use of RW for amb. Pt is highly limited by pain with poor stance and ambulation tolerance requiring sitting rest breaks often. Pt reports having 18 stairs at home to get to 2nd floor apt in 2 family home. Due to pt's poor stance/ambulation tolerance 2/2 to pain, pt would be unable to negotiate that number of stairs safely at this time. Pt is well below his baseline of independent without use of AD and will require further skilled PT to return to OF. Anticipate continued IP therapy at d/c. Will continue to follow.    Treatment Diagnosis: Decreased functional mobility and increased pain   Prognosis: Good;Fair  Decision Making: Medium Complexity  Patient Education: role of PT, use of call light, d/c planning; pt verb understanding  Barriers to Learning: none  REQUIRES PT FOLLOW UP: Yes  Activity Tolerance  Activity Tolerance: Patient limited by pain  Activity Tolerance: pt reporting 8/10 with stance and ambulation        Patient Diagnosis(es): There were no encounter diagnoses. has a past medical history of Anxiety, Arthritis, Asthma, CAD (coronary artery disease), Calcium kidney stone, Cardiomyopathy ([address] Cerebral artery occlusion with cerebral infarction ([address] CHF (congestive heart failure) ([address] COPD (chronic obstructive pulmonary disease) ([address] Depression, DM2 (diabetes mellitus, type 2) ([address] Fibromyalgia, GERD (gastroesophageal reflux disease), Hyperlipidemia, Hypertension, Liver disease, Pacemaker, Pneumonia, Seizures ([address] TIA (transient ischemic attack), and Ulcerative colitis ([address] has a past surgical history that includes Cholecystectomy; Coronary artery bypass graft (2010, 11/2015); Coronary angioplasty with stent (2012); Cardiac surgery; Endoscopy, colon, diagnostic; pacemaker placement; Colonoscopy (01/10/2017); Colonoscopy (01/10/2017); Upper gastrointestinal endoscopy (02/07/2017); and back surgery. Restrictions  Position Activity Restriction  Other position/activity restrictions: up as tolerated  Vision/Hearing  Vision: Within Functional Limits  Hearing: Within functional limits     Subjective  General  Chart Reviewed: Yes  Additional Pertinent Hx: 61 y.o. male w/ PMH of Laminectomy/Discectomy in 10/2018 by Dr. Adan Powell, CHF, CAD, HTN, HLD, Pacemaker, DM II, COPD who presented on 5/22/2019 to [facility] ED c/o back pain and weakness in LLE. Family / Caregiver Present: No  Referring Practitioner: Rohit White MD  Diagnosis: L2 Fracture  Follows Commands: Within Functional Limits  Subjective  Subjective: Pt found supine in bed upon arrival, reporting 6/10, agreeable to therapy.    Pain Screening  Patient Currently in Pain: Yes          Orientation  Orientation  Overall Orientation Status: Within Functional Limits  Social/Functional History  Social/Functional History  Lives With: Spouse  Type of Home: House(2 family home- live upstairs)  Home Layout: Two level, Bed/Bath upstairs, Performs ADL's on one level, Laundry in basement(17 GEO with rail on R (steps split senior care))  Home Access: Level entry  Bathroom Shower/Tub: Tub/Shower unit  Bathroom Toilet: Standard(Sink for leverage)  Home Equipment: Rolling walker  ADL Assistance: (shower 1x/wk with A from wife, sponge bathes other days independently; gets dressed independently and goes to bathroom independently)  Homemaking Assistance: Independent(Shared with wife; was helping up until 2 weeks ago)  Homemaking Responsibilities: Yes(Shares with wife)  Ambulation Assistance: (was walking without walker up until 2 weeks ago)  Transfer Assistance: Independent  Active : Yes  Occupation: On disability  Leisure & Hobbies: Read   Cognition        Objective          AROM RLE (degrees)  RLE AROM: WFL  AROM LLE (degrees)  LLE AROM : WFL  Strength RLE  Comment: Globally 4+/5  Strength LLE  Comment: Globally 4/5 with reported leg pain      Sensation  Overall Sensation Status: Impaired(L LE decreased sensation from past surgery, WFL R LE and B UE)  Bed mobility  Supine to Sit: Stand by assistance(via log roll, HOB raised, use of rail, increased effort )  Scooting: Stand by assistance(to EOB )  Transfers  Sit to Stand: Contact guard assistance(from EOB, from toilet, from recliner )  Stand to sit: Contact guard assistance(to toilet, to recliner x2 trials)  Ambulation  Ambulation?: Yes  Ambulation 1  Surface: level tile  Device: Rolling Walker  Assistance: Minimal assistance;Contact guard assistance  Quality of Gait: slow effortful and at times painful gait with several pauses to allow pain to pass.    Distance: 10'+30'+50'  Comments: Amb distance limited by pain   Stairs/Curb  Stairs?: No     Balance  Posture: Fair  Sitting - Dynamic: Good  Standing - Static: +;Fair  Standing - Dynamic: Fair;+(with RW and CGA/Min A)      PT evaluation and treatment initiated. Treatment included gait and transfer training as well as patient education. Plan   Plan  Times per week: 2-5  Times per day: Daily  Current Treatment Recommendations: ROM, Strengthening, Transfer Training, Balance Training, Stair training, Endurance Training, Gait Training, Functional Mobility Training, Safety Education & Training  Safety Devices  Type of devices: Nurse notified, Call light within reach, Left in chair, Chair alarm in place, Gait belt      AM-PAC Score  AM-PAC Inpatient Mobility Raw Score : 17  AM-PAC Inpatient T-Scale Score : 42.13  Mobility Inpatient CMS 0-100% Score: 50.57  Mobility Inpatient CMS G-Code Modifier : CK          Goals  Short term goals  Time Frame for Short term goals: d/c  Short term goal 1: sup<>sit supervision via log roll  Short term goal 2: sit<>stand supervision with RW  Short term goal 3: amb 100' with RW and SBA  Short term goal 4: ascend/descend 18 stairs with HR and CGA (IF d/c home)  Patient Goals   Patient goals : none stated       Therapy Time   Individual Concurrent Group Co-treatment   Time In 0845         Time Out 0930         Minutes 45           Timed Code Treatment Minutes:  30    Total Treatment Minutes:  45    If the patient is discharged before the next treatment session, this note will serve as the discharge summary.      Jesse Guevara, PT, DPT [id number]

## 2019-05-23 NOTE — H&P
ANGIOPLASTY WITH STENT PLACEMENT  2012    CORONARY ARTERY BYPASS GRAFT  2010, 11/2015    ENDOSCOPY, COLON, DIAGNOSTIC      PACEMAKER PLACEMENT      UPPER GASTROINTESTINAL ENDOSCOPY  02/07/2017       Medications Prior to Admission:      Prior to Admission medications    Medication Sig Start Date End Date Taking? Authorizing Provider   oxyCODONE-acetaminophen (PERCOCET) 5-325 MG per tablet Take 1 tablet by mouth every 12 hours as needed for Pain (for break through pain) for up to 5 days. Intended supply: 3 days.  Take lowest dose possible to manage pain 5/20/19 5/25/19  Jason Kaye MD   methocarbamol (ROBAXIN) 500 MG tablet Take 1 tablet by mouth 4 times daily for 5 days 5/20/19 5/25/19  Jason Kaye MD   gabapentin (NEURONTIN) 600 MG tablet TAKE 1 TABLET BY MOUTH FIVE TIMES DAILY 5/6/19 6/5/19  Reji Palumbo MD   ranolazine (RANEXA) 500 MG extended release tablet Take 1 tablet by mouth 2 times daily 4/30/19   TAYLA Sarmiento CNP   isosorbide mononitrate (IMDUR) 30 MG extended release tablet Take 3 tablets by mouth 2 times daily 4/30/19 5/30/19  TAYLA Sarmiento CNP   pantoprazole (PROTONIX) 40 MG tablet Take 1 tablet by mouth every morning (before breakfast) 4/26/19   Yasir Mcdowell MD   clopidogrel (PLAVIX) 75 MG tablet TAKE 1 TABLET BY MOUTH DAILY 4/7/19   Reji Palumbo MD   furosemide (LASIX) 40 MG tablet Take 1 tablet by mouth daily 10/11/18   Reji Palumbo MD   Insulin Pen Needle 31G X 5 MM MISC 1 each by Does not apply route daily 10/5/18   Reji Palumbo MD   losartan (COZAAR) 50 MG tablet Take 1 tablet by mouth daily 9/17/18   Reji Palumbo MD   nitroGLYCERIN (NITROSTAT) 0.4 MG SL tablet PLACE 1 TABLET UNDER THE TONGUE EVERY 5 MINUTES AS NEEDED FOR CHEST PAIN 9/2/18   Reji Palumbo MD   albuterol sulfate  (90 Base) MCG/ACT inhaler Inhale 2 puffs into the lungs every 6 hours as needed for Wheezing    Historical Provider, MD   aspirin 81 MG tablet Take 81 mg by mouth daily    Historical Provider, MD   insulin glargine (LANTUS) 100 UNIT/ML injection pen Inject 28 Units into the skin nightly  Patient taking differently: Inject 20 Units into the skin nightly  8/28/18   Lee Schilling MD   insulin lispro (HUMALOG KWIKPEN) 100 UNIT/ML pen Inject 5 Units into the skin 3 times daily (before meals)  Patient taking differently: Inject 8 Units into the skin 3 times daily (before meals)  8/28/18   Lee Schilling MD   amLODIPine (NORVASC) 5 MG tablet TAKE 1 TABLET BY MOUTH DAILY 7/12/18   Lee Schilling MD   Lancets MISC accu check fastclick lancets  Dx: O48.5  As needed 5/4/18   Lee Schilling MD   FREESTYLE LANCETS MISC 1 each by Does not apply route daily 4/24/18   Dior Baum MD   atorvastatin (LIPITOR) 80 MG tablet TAKE 1 TABLET BY MOUTH DAILY 2/15/18   Lee Schilling MD   metoprolol succinate (TOPROL XL) 100 MG extended release tablet Take 1 tablet by mouth daily 1/12/18   TAYLA Aggarwal CNP       Allergies:  Dopamine hcl; Tramadol; and Melatonin    Social History:      The patient currently lives at home    TOBACCO:   reports that he has been smoking cigarettes. He has a 44.00 pack-year smoking history. He has never used smokeless tobacco.  ETOH:   reports that he does not drink alcohol. Family History:       Positive as follows:        Problem Relation Age of Onset    Diabetes Father     Coronary Art Dis Father     Cancer Mother         Lung with mets       REVIEW OF SYSTEMS:   Pertinent positives as noted in the HPI. All other systems reviewed and negative. PHYSICAL EXAM PERFORMED:    BP (!) 147/9   Pulse 75   Temp 97.9 °F (36.6 °C) (Oral)   Resp 16   Ht 6' (1.829 m)   Wt 221 lb (100.2 kg)   SpO2 95%   BMI 29.97 kg/m²     General appearance:  No apparent distress, appears stated age and cooperative. HEENT:  Normal cephalic, atraumatic without obvious deformity.  Pupils equal, round, and reactive to light. Extra ocular muscles intact. Conjunctivae/corneas clear. Neck: Supple, with full range of motion. No jugular venous distention. Trachea midline. Respiratory:  Normal respiratory effort. Clear to auscultation, bilaterally without Rales/Wheezes/Rhonchi. Cardiovascular:  Regular rate and rhythm with normal S1/S2 without murmurs, rubs or gallops. Abdomen: Soft, non-tender, non-distended with normal bowel sounds. Musculoskeletal:  Lower lumbar incision site and tenderness in lower back noted  Skin: Skin color, texture, turgor normal.  No rashes or lesions. Neurologic: Cranial nerves: II-XII intact, grossly non-focal. Left leg slightly weaker than right  Psychiatric:  Alert and oriented, thought content appropriate, normal insight  Capillary Refill: Brisk,< 3 seconds   Peripheral Pulses: +2 palpable, equal bilaterally       Labs:     Recent Labs     05/22/19  1053   WBC 8.0   HGB 15.1   HCT 44.3        Recent Labs     05/22/19  1053      K 3.6      CO2 21   BUN 10   CREATININE 0.9   CALCIUM 9.5     Recent Labs     05/22/19  1053   AST 9*   ALT 11   BILITOT 0.5   ALKPHOS 72     No results for input(s): INR in the last 72 hours. No results for input(s): Anirudh Gal in the last 72 hours.     Urinalysis:      Lab Results   Component Value Date    NITRU Negative 11/26/2018    WBCUA 1 11/26/2018    RBCUA 1 11/26/2018    BLOODU Negative 11/26/2018    SPECGRAV >1.030 11/26/2018    GLUCOSEU 100 11/26/2018       Radiology:   Reviewed    CT LUMBAR SPINE W CONTRAST    (Results Pending)       ASSESSMENT:    Active Hospital Problems    Diagnosis Date Noted    Compression fracture of L2, sequela [S32.020S] 05/22/2019         PLAN:  Low back pain with radiculopathy:  Possible sec to L2 compression fracture  NSGY consulted  Recommending CT spine with contrast or possible CT myelogram  Percocet prn, robaxin and gabapentin    CAD/Chronic systolic heart failure:  ASA and plavix held per NSGY for possible CT myelogram  Cont ranexa, losartan, lasix and imdur     HTN:  Cont amlodipine    HLD:  Cont lipitor    DM II:  Not on any meds  Monitor BG  LDSSI      DVT Prophylaxis: Lovenox  Diet: DIET CARB CONTROL; Carb Control: 4 carb choices (60 gms)/meal  Code Status: Full Code    PT/OT Eval Status: Ordered    Dispo - Anticipate dc in 2 days       Damon Irby MD    Thank you Wilton MD Sandra for the opportunity to be involved in this patient's care. If you have any questions or concerns please feel free to contact me at [phone].

## 2019-05-23 NOTE — PLAN OF CARE
Problem: Falls - Risk of:  Goal: Will remain free from falls  Outcome: Ongoing  Pt remains free from injury during this shift. Pt is up with assist x 1 person assist with a walker. Encourage pt to call for all assistance. Call light is in reach, bed alarm is activated for safety, bed locked and in lowest position. Will continue to monitor and follow POC. Problem: Pain:  Goal: Pain level will decrease  Outcome: Ongoing  Medicated pt per orders, please see e-Mar. Encourage pt to call if pain increases. Will continue to monitor.

## 2019-05-23 NOTE — PLAN OF CARE
Problem: Falls - Risk of:  Goal: Will remain free from falls  Description  Will remain free from falls  Pt is free from falls. Fall precautions are in place: bed is locked and in lowest position, side rails are up x 2, bed alarm is on, nonskid socks are on, bedside table and call light are within reach. Will continue to monitor. 5/23/2019 1238 by Sami Gilliland RN  Outcome: Ongoing    Problem: Pain:  Goal: Pain level will decrease  Description  Pain level will decrease  Pt c/o back discomfort, medicating this shift with Percocet  and PRN Robaxin given per orders and pt request.  On reassessment pt satisfied with pain control. Will continue to monitor.   5/23/2019 1238 by Sami Gilliland RN  Outcome: Ongoing

## 2019-05-23 NOTE — PROGRESS NOTES
Occupational Therapy   Occupational Therapy Initial Assessment and Treatment  Date: 2019   Patient Name: Bertrand Treadwell  MRN: [id number]     : 1955    Date of Service: 2019    Discharge Recommendations:  Bertrand Treadwell scored a 20/24 on the AM-PAC ADL Inpatient form. Current research shows that an AM-PAC score of 18 or greater is typically associated with a discharge to the patient's home setting. Based on the patients AM-PAC score and their current ADL deficits, it is recommended that the patient have 2-3 sessions per week of Occupational Therapy at d/ to increase the patients independence. See assessment       OT Equipment Recommendations  Equipment Needed: No  Other: defer to d/c facility    Assessment   Performance deficits / Impairments: Decreased functional mobility ; Decreased ADL status; Decreased sensation;Decreased balance;Decreased strength  Assessment: Pt admitted with back pain and L LE weakness. Pt has had a functional decline within past 2 weeks. Prior to functional decline pt lived with wife and was independent without use of RW and was independent in all functional mobility, transfers, and ADLs. Since functional decline 2 weeks ago pt has been using RW with limited mobility, requiring increased A with ADLs, and having significant difficulty with stairs at home. Pt has 17 GEO to enter 2 family home. Currently pt is functioning significantly below baseline requiring A for mobility and transfers with use of RW. Pt has significantly decreased activity tolerance only tolerating ambulation for short distance d/t 9/10 pain in back and leg with movement. Safety concerns for return home with 17 GEO d/t decreased activity tolerance and increased pain. Pt has been to a SNF in the past but was unable to recall which one; pt receptive to SNF at d/. Pt would benefit from continued inpt OT at d/ to improve functional status. Continue per POC.     Treatment Diagnosis: impaired functional mobility and ADLs  Prognosis: Fair  Decision Making: Medium Complexity  Patient Education: Role of OT, importance of activity during admission, d/c planning. Therapist demonstrated use of sock aid and reacher to pt. Pt verbalized understanding. REQUIRES OT FOLLOW UP: Yes  Activity Tolerance  Activity Tolerance: Patient limited by pain  Activity Tolerance: Pt rated pain 9/10 with mobility and required rest break in [place]. Pt visibly in pain with all movements and throughout session. Safety Devices  Safety Devices in place: Yes  Type of devices: Call light within reach; Left in chair;Chair alarm in place;Nurse notified           Patient Diagnosis(es): There were no encounter diagnoses. has a past medical history of Anxiety, Arthritis, Asthma, CAD (coronary artery disease), Calcium kidney stone, Cardiomyopathy ([address] Cerebral artery occlusion with cerebral infarction ([address] CHF (congestive heart failure) ([address] COPD (chronic obstructive pulmonary disease) ([address] Depression, DM2 (diabetes mellitus, type 2) ([address] Fibromyalgia, GERD (gastroesophageal reflux disease), Hyperlipidemia, Hypertension, Liver disease, Pacemaker, Pneumonia, Seizures ([address] TIA (transient ischemic attack), and Ulcerative colitis ([address] has a past surgical history that includes Cholecystectomy; Coronary artery bypass graft (2010, 11/2015); Coronary angioplasty with stent (2012); Cardiac surgery; Endoscopy, colon, diagnostic; pacemaker placement; Colonoscopy (01/10/2017); Colonoscopy (01/10/2017); Upper gastrointestinal endoscopy (02/07/2017); and back surgery. Treatment Diagnosis: impaired functional mobility and ADLs      Restrictions  Position Activity Restriction  Other position/activity restrictions: up as tolerated    Subjective   General  Chart Reviewed:  Yes  Additional Pertinent Hx: 61 y.o M with back pain and weakness in legs for 2 weeks    Hospital Course: CT Lumbar spine wo contrast= Mild compression fracture L2 vertebral body superior endplate, possible infection in previous surgical bed, and herniated disc L3/4; CT lumbar spine w contrast ordered; neuro consulted= no emergent neurosurgical intervention indicated   PMH: CAD, HTN, HLD, DM II, asthma, arthritis, CHF, lumbar laminectomy 10/2018, pacemaker, depression, Cerebral artery occlusion with cerebral infarction, TIA, seizures  Family / Caregiver Present: No  Referring Practitioner: Dr. Stephanie Schultz  Diagnosis: Compression fracture of L2, sequela  Subjective  Subjective: Pt supine in bed upon entry and agreeable to evaluation. \"I was doing so much better then 2 weeks ago it got bad again. \" \"I don't want to rely on pain meds for the rest of my life. \"  Pain Assessment  Pain Assessment: (6 in back while in bed, 9 in back and L LE with movement; RN aware)     Social/Functional History  Social/Functional History  Lives With: Spouse  Type of Home: House(2 family home- live upstairs)  Home Layout: Two level, Bed/Bath upstairs, Performs ADL's on one level, Laundry in basement(17 GEO with rail on R (steps split correction))  Home Access: Level entry  Bathroom Shower/Tub: Tub/Shower unit  Bathroom Toilet: Standard(Sink for leverage)  Home Equipment: Rolling walker  ADL Assistance: (shower 1x/wk with A from wife, sponge bathes other days independently; gets dressed independently and goes to bathroom independently)  Homemaking Assistance: Independent(Shared with wife; was helping up until 2 weeks ago)  Homemaking Responsibilities: Yes(Shares with wife)  Ambulation Assistance: (was walking without walker up until 2 weeks ago)  Transfer Assistance: Independent  Active : Yes  Occupation: On disability  Leisure & Hobbies: Read        Objective   Vision: Within Functional Limits  Hearing: Within functional limits    Orientation  Overall Orientation Status: Within Functional Limits     Balance  Sitting Balance: Stand by assistance  Standing Balance: Contact guard assistance  Standing Balance  Time: 2 min, 3 min  Activity: functional mobility to/from bathroom and in [place]  Functional Mobility  Functional - Mobility Device: Rolling Walker(with gait belt)  Activity: To/from bathroom; Other(in [place])  Assist Level: (CGA in room and bathroom progressing to min A in [place] with occasional shudders d/t pain)  Functional Mobility Comments: Pt followed by chair in hallway and requested to sit after ~5 steps out of room. Pt rested and then requested to walk back to room. Pt ambulating with CGA with occasional slight LOB d/t shuddering from pain in leg and back requiring min A to recover. Toilet Transfers  Toilet - Technique: Ambulating(with RW)  Equipment Used: Standard toilet(use of grab bar)  Toilet Transfer: Contact guard assistance  Toilet Transfers Comments: Increased time and effort with slight LOB but pt requested no help from therapist. Pt holding on to grab bar and back of toilet to restabilize and then completed transfer with CGA. ADL  LE Dressing: (Able to reach both feet for sock management, seated EOB; increased pain, time, and effort with bringing foot to knee for sock management; demonstrated use of sock aid for pt; per pt's report he was able to don hospital pants independently)  Additional Comments: Introduced and demonstrated use of sock aid and reacher for functional tasks and LB dressing to potentially decrease pain and effort with task.       Tone RUE  RUE Tone: Normotonic  Tone LUE  LUE Tone: Normotonic  Coordination  Movements Are Fluid And Coordinated: Yes     Bed mobility  Rolling to Right: Stand by assistance  Supine to Sit: Stand by assistance(via log roll, HOB raised, use of rail, increased effort )  Scooting: Stand by assistance(To EOB)     Transfers  Sit to stand: Contact guard assistance(from bed, from toilet, from chair)  Stand to sit: Contact guard assistance(to toilet, to chair X2)     Cognition  Overall Cognitive Status: [facility]  Cognition Comment: Pt stated he has memory deficits from past surgery and difficulties with speech. No deficits were noted. Pt able to recall past surgeries, living situation, and past SNF placement. Speech was easy to understand. Sensation  Overall Sensation Status: Impaired(L LE with decreased sensation (knee and below) from past surgery; sensation WFL for R LE; impaired sensation in fingertips 2* DM per pt report)        LUE AROM (degrees)  LUE AROM : WFL  Left Hand AROM (degrees)  Left Hand AROM: WFL  RUE AROM (degrees)  RUE AROM : WFL  Right Hand AROM (degrees)  Right Hand AROM: WFL        Hand Dominance  Hand Dominance: Right             Plan   Plan  Times per week: 2-5x  Times per day: Daily  Current Treatment Recommendations: Strengthening, Safety Education & Training, Patient/Caregiver Education & Training, Balance Training, Self-Care / ADL, Functional Mobility Training    AM-PAC Score        AM-[facility] Inpatient Daily Activity Raw Score: 20  AM-PAC Inpatient ADL T-Scale Score : 42.03  ADL Inpatient CMS 0-100% Score: 38.32  ADL Inpatient CMS G-Code Modifier : CJ    Goals  Short term goals  Time Frame for Short term goals: by d/c  Short term goal 1: functional mobility for item retrieval with SBA and LRAD  Short term goal 2: increase standing tolerance to 5 minutes to complete ADL task  Short term goal 3: toilet transfer with SBA  Short term goal 4: LB dressing with or without use of AE with SBA  Patient Goals   Patient goals : To have less pain without pain meds       Therapy Time   Individual Concurrent Group Co-treatment   Time In 0845         Time Out 0930         Minutes 45           Time coded treatment minutes: 30    Total Treatment time: [address] [facility]    If pt is discharged prior to next treatment, this note to serve as discharge summary. Merritt Curtis OTR/L #[id number]  Therapist was present, directed the patient's care, made skilled judgement, and was responsible for assessment and treatment of the patient.

## 2019-05-23 NOTE — PROGRESS NOTES
[address] [facility]   [id number]   1955   5/23/2019    Interval History:  Hospital Day #1        Subjective: Patient is sitting up in chair. C/o of back pain and radicular pain to buttocks on right and radicular pain to left middle calf. Objective:  BP (!) 150/64   Pulse 90   Temp 97.5 °F (36.4 °C) (Oral)   Resp 16   Ht 6' (1.829 m)   Wt 222 lb 12.8 oz (101.1 kg)   SpO2 96%   BMI 30.22 kg/m²     Labs:  Recent Labs     05/22/19  1053 05/23/19  0603    135*    100   CO2 21 22   BUN 10 19   CREATININE 0.9 1.2   GLUCOSE 253* 240*     Recent Labs     05/22/19  1053 05/23/19  0603   WBC 8.0 6.4   RBC 4.57 4.13*       Neurologic Exam:  Mental Status: Awake, alert, oriented x 4, speech clear and appropriate  Language: No aphasia or dysarthria noted  Sensation: Intact to all extremities to light touch  Coordination: Intact    Musculoskeletal:   Gait: Not tested   Tone: normal  Motor strength:    Right  Left      Right  Left    Deltoid  5 5   Hip Flex  5 4+   Biceps  5 5   Knee Extensors  5 4+   Triceps  5 5   Knee Flexors  5 4+   Wrist Ext  5 5   Ankle Dorsiflex. 5 4+   Wrist Flex  5 5   Ankle Plantarflex. 5 4+   Handgrip  5 5   Ext Tito Longus  5 4+   Thumb Ext  5 5            Radiological Findings:  Ct Lumbar Spine Wo Contrast  Result Date: 5/20/2019  1. Mild compression fracture L2 vertebral body superior endplate. This may be recent but age not definitive. Comparison to more recent studies is recommended. 2. Prior right laminectomy, presumably from recent surgery given the posterior soft tissue infiltration and air. Because of the foci of air, the possibility of infection along the surgical tract and laminectomy bed however is certainly in the differential.  This needs correlation to the presumed recent surgery and prior more recent studies if available. 3.  In addition at this level there broad-based disc protrusion or herniation with overall moderate canal compromise suspected, posterior margins of the thecal sac poorly defined. Recommend contrast-enhanced MRI to evaluate both of the aforementioned findings. Assessment:  Patient is a 61 y.o. male w/acute on chronic lumbar back pain who underwent laminectomy/discectomy in 10/2018 by Dr. Codie Whittingtoners:  1. No emergent neurosurgical intervention indicated  2. Neurologic exams frequency: Floor: Q4H  3. Lumbar Back Pain:  - CT Lumbar revealed mild L2 compression fracture, possible infection in previous surgical bed, and herniated disc L3/4  - Cannot get MRI 2/2 Pacemaker  - CT Lumbar w contrast to better evaluate  possibility of infection along the surgical tract and laminectomy bed  -will need CT Myelogram, will be done on Thursday at 0900 at [facility]  5. Muscle spasms: Increased Robaxin to 750 4x's/day and PRN Valium  6. Pain control: Managed by medical team  7. PT/OT consulted, appreciate recs  8. If pain is controlled on oral pain meds patient can be discharged and come back      DISPO Dispo timing to be determined by primary team once patient is medically stable for discharge.      TAYLA Duncan-CNP  Neurosurgery Nurse Practitioner  [phone]    Patient was seen and examined with Dr. Michelle Deleon who agrees with above assessment and plan. Electronically signed by:  Kaylie Dickinson, 5/23/2019 8:04 AM

## 2019-05-23 NOTE — PROGRESS NOTES
Hospitalist Progress Note      PCP: Eulogio Gary MD    Date of Admission: 5/22/2019    Chief Complaint: Low back and left leg pain    Hospital Course: Admitted for acute low back pain and left side radiculopathy. CT spine showing fluid collection, disc herniation and moderate canal stenosis, left greater than right. NSGY following    Subjective: Pt states that pain is tolerable with pain meds. Still has left side leg weakness. Medications:  Reviewed    Infusion Medications    dextrose       Scheduled Medications    insulin lispro  0-18 Units Subcutaneous TID WC    insulin lispro  0-9 Units Subcutaneous Nightly    amLODIPine  5 mg Oral Daily    atorvastatin  80 mg Oral Daily    furosemide  40 mg Oral Daily    gabapentin  600 mg Oral 5x Daily    isosorbide mononitrate  90 mg Oral BID    losartan  50 mg Oral Daily    metoprolol succinate  100 mg Oral Daily    pantoprazole  40 mg Oral QAM AC    ranolazine  500 mg Oral BID    sodium chloride flush  10 mL Intravenous 2 times per day    enoxaparin  40 mg Subcutaneous Daily    nicotine  1 patch Transdermal Daily    methocarbamol  750 mg Oral 4x Daily     PRN Meds: nitroGLYCERIN, oxyCODONE-acetaminophen, sodium chloride flush, magnesium hydroxide, ondansetron, acetaminophen, glucose, dextrose, glucagon (rDNA), dextrose, diazepam, albuterol      Intake/Output Summary (Last 24 hours) at 5/23/2019 1356  Last data filed at 5/22/2019 2200  Gross per 24 hour   Intake 480 ml   Output --   Net 480 ml       Physical Exam Performed:    /71   Pulse 81   Temp 97.7 °F (36.5 °C) (Oral)   Resp 18   Ht 6' (1.829 m)   Wt 222 lb 12.8 oz (101.1 kg)   SpO2 92%   BMI 30.22 kg/m²     General appearance:  No apparent distress, appears stated age and cooperative. HEENT:  Normal cephalic, atraumatic without obvious deformity. Pupils equal, round, and reactive to light. Extra ocular muscles intact. Conjunctivae/corneas clear.   Neck: Supple, with full range of motion. No jugular venous distention. Trachea midline. Respiratory:  Normal respiratory effort. Clear to auscultation, bilaterally without Rales/Wheezes/Rhonchi. Cardiovascular:  Regular rate and rhythm with normal S1/S2 without murmurs, rubs or gallops. Abdomen: Soft, non-tender, non-distended with normal bowel sounds. Musculoskeletal:  Lower lumbar incision site and tenderness in lower back noted  Skin: Skin color, texture, turgor normal.  No rashes or lesions. Neurologic: Cranial nerves: II-XII intact, grossly non-focal. Left leg slightly weaker than right  Psychiatric:  Alert and oriented, thought content appropriate, normal insight  Capillary Refill: Brisk,< 3 seconds   Peripheral Pulses: +2 palpable, equal bilaterally       Labs:   Recent Labs     05/22/19  1053 05/23/19  0603   WBC 8.0 6.4   HGB 15.1 13.3*   HCT 44.3 40.0*    211     Recent Labs     05/22/19  1053 05/23/19  0603    135*   K 3.6 3.7    100   CO2 21 22   BUN 10 19   CREATININE 0.9 1.2   CALCIUM 9.5 9.0     Recent Labs     05/22/19  1053   AST 9*   ALT 11   BILITOT 0.5   ALKPHOS 72     No results for input(s): INR in the last 72 hours. No results for input(s): Chelita Irwin in the last 72 hours. Urinalysis:      Lab Results   Component Value Date    NITRU Negative 11/26/2018    WBCUA 1 11/26/2018    RBCUA 1 11/26/2018    BLOODU Negative 11/26/2018    SPECGRAV >1.030 11/26/2018    GLUCOSEU 100 11/26/2018       Radiology:  CT LUMBAR SPINE W CONTRAST   Final Result   1. Posterior neural arch fluid collection with gas at the L3-4 level with peripheral rim enhancement, postoperative fluid collection or abscess. 2. L3-L4 broad disc herniation with superior and inferior migration with moderate central canal stenosis and right subarticular impingement. 3.  L4-L5 central broad asymmetric bulging disc left greater than right with left subarticular impingement              Assessment/Plan:  Acute low back pain with radiculopathy:  CT w/ contrast showed L3-L4 level fluid collection and disc herniation with moderate central canal stenosis left greater than right  NSGY following, planning for CT myelogram  Percocet prn, robaxin and gabapentin     CAD/Chronic systolic heart failure:  ASA and plavix held per NSGY for possible CT myelogram  Cont ranexa, losartan, lasix and imdur      HTN:  Cont amlodipine     HLD:  Cont lipitor     DM II:  Not on any meds  Monitor BG  SSI increased        DVT Prophylaxis: Lovenox  Diet: DIET CARB CONTROL; Carb Control: 4 carb choices (60 gms)/meal  Code Status: Full Code     PT/OT Eval Status: Ordered     Dispo - Anticipate dc in 1-2 days    Bee Goodrich MD

## 2019-05-23 NOTE — CARE COORDINATION
2019  [facility] ([facility])  Clinical Case Management Department    Consult received to assist with discharge plans. Pt expects that he will need a SNF upon discharge. He would like to go to [facility] upon discharge. Referral sent through [facility] and will follow up with them tomorrow. Labs sent to check on INR level and hopefully patient can go tomorrow for his myelogram. Will need precert for SNF.      Patient: Francisca Valencia  MRN: [id number] / : 1955  ACCT: [de-identified]     Emergency Contacts  Healthcare Agent Appointed: Healthcare power of   [facility] [address] Agent's Phone Number: [phone]    Admission Documentation  Attending Provider: Jeannette Escobar MD  Admit date/time: 2019  5:27 PM  Status: Inpatient [101]  Diagnosis: Compression fracture of L2, sequela     Readmission within last 30 days:  no     Living Situation  Discharge Planning  Living Arrangements: Spouse/Significant Other  Support Systems: Spouse/Significant Other, Children, Family Members  Potential Assistance Needed: N/A  Type of Home Care Services: None  Patient expects to be discharged to[de-identified] Home    Service Assessment       Values / Beliefs  Do you have any ethnic, cultural, sacramental, or spiritual Denominational needs you would like us to be aware of while you are in the hospital?: No    Advance Directives (For Healthcare)  Pre-existing DNR Comfort Care/DNR Arrest/DNI Order: No  Healthcare Directive: Yes, patient has an advance directive for healthcare treatment  Type of Healthcare Directive: Durable power of  for health care  Copy in Chart: Yes, copy in chart  Chart Copy Status [de-identified] Active  Information on Healthcare Directives Requested: No  Patient Requests Assistance: No  Advance Directives: Pt. not interested at this time  [facility] Agent's Name: [address] [place] [address] Agent's Phone Number: [phone]  If you are unable to speak for yourself, does your Healthcare Agent or Legal Spokesperson know your healthcare wishes?: Yes                        Destination  skilled nursing facility    [address] [place] [address]   [facility]    Home Health/Skilled Nursing  Services at Discharge: SNF Physical Therapy, Occupational Therapy and Nursing daily  Home care at home?  No Provider: [facility] Provider Phone: Karma     Therapy Consults  PT evaluation needed?: Yes (Comment)  OT Evalulation Needed?: Yes (Comment)  SLP evaluation needed?: No    Home Medical Care  NA  Pharmacy:[facility]  Potential Assistance Purchasing Medications:  No  Does patient want to participate in local refill/meds to beds program?: Yes    Goals of Care  Patient expects to be discharged to[de-identified] Home  Patient plans for SNF: [facility]         Mode of transport from hospital: medical transport    Factors facilitating achievement of predicted outcomes: Family support and [place]    Barriers to discharge: will need raciel Thompson RN  [employer]  Case Management Department  Ph: [phone] Fax: [phone]

## 2019-05-24 ENCOUNTER — APPOINTMENT (OUTPATIENT)
Dept: GENERAL RADIOLOGY | Age: 64
DRG: 543 | End: 2019-05-24
Attending: INTERNAL MEDICINE
Payer: MEDICARE

## 2019-05-24 ENCOUNTER — APPOINTMENT (OUTPATIENT)
Dept: CT IMAGING | Age: 64
DRG: 543 | End: 2019-05-24
Attending: INTERNAL MEDICINE
Payer: MEDICARE

## 2019-05-24 VITALS
WEIGHT: 222.8 LBS | OXYGEN SATURATION: 93 % | HEART RATE: 76 BPM | TEMPERATURE: 97.6 F | BODY MASS INDEX: 30.18 KG/M2 | SYSTOLIC BLOOD PRESSURE: 122 MMHG | DIASTOLIC BLOOD PRESSURE: 54 MMHG | RESPIRATION RATE: 16 BRPM | HEIGHT: 72 IN

## 2019-05-24 LAB
ANION GAP SERPL CALCULATED.3IONS-SCNC: 13 MMOL/L (ref 3–16)
BUN BLDV-MCNC: 17 MG/DL (ref 7–20)
CALCIUM SERPL-MCNC: 8.9 MG/DL (ref 8.3–10.6)
CHLORIDE BLD-SCNC: 102 MMOL/L (ref 99–110)
CO2: 23 MMOL/L (ref 21–32)
CREAT SERPL-MCNC: 1.2 MG/DL (ref 0.8–1.3)
GFR AFRICAN AMERICAN: >60
GFR NON-AFRICAN AMERICAN: >60
GLUCOSE BLD-MCNC: 154 MG/DL (ref 70–99)
GLUCOSE BLD-MCNC: 164 MG/DL (ref 70–99)
GLUCOSE BLD-MCNC: 168 MG/DL (ref 70–99)
GLUCOSE BLD-MCNC: 177 MG/DL (ref 70–99)
HCT VFR BLD CALC: 40.4 % (ref 40.5–52.5)
HEMOGLOBIN: 13.5 G/DL (ref 13.5–17.5)
INR BLD: 0.9 (ref 0.86–1.14)
MCH RBC QN AUTO: 32.2 PG (ref 26–34)
MCHC RBC AUTO-ENTMCNC: 33.5 G/DL (ref 31–36)
MCV RBC AUTO: 96.2 FL (ref 80–100)
PDW BLD-RTO: 14.5 % (ref 12.4–15.4)
PERFORMED ON: ABNORMAL
PLATELET # BLD: 191 K/UL (ref 135–450)
PMV BLD AUTO: 9.8 FL (ref 5–10.5)
POTASSIUM SERPL-SCNC: 4.3 MMOL/L (ref 3.5–5.1)
PROTHROMBIN TIME: 10.3 SEC (ref 9.8–13)
RBC # BLD: 4.19 M/UL (ref 4.2–5.9)
SODIUM BLD-SCNC: 138 MMOL/L (ref 136–145)
WBC # BLD: 7.1 K/UL (ref 4–11)

## 2019-05-24 PROCEDURE — 6370000000 HC RX 637 (ALT 250 FOR IP): Performed by: INTERNAL MEDICINE

## 2019-05-24 PROCEDURE — 2500000003 HC RX 250 WO HCPCS: Performed by: RADIOLOGY

## 2019-05-24 PROCEDURE — 6370000000 HC RX 637 (ALT 250 FOR IP): Performed by: NURSE PRACTITIONER

## 2019-05-24 PROCEDURE — 85610 PROTHROMBIN TIME: CPT

## 2019-05-24 PROCEDURE — 36415 COLL VENOUS BLD VENIPUNCTURE: CPT

## 2019-05-24 PROCEDURE — 80048 BASIC METABOLIC PNL TOTAL CA: CPT

## 2019-05-24 PROCEDURE — 72132 CT LUMBAR SPINE W/DYE: CPT

## 2019-05-24 PROCEDURE — 2580000003 HC RX 258: Performed by: INTERNAL MEDICINE

## 2019-05-24 PROCEDURE — 6360000004 HC RX CONTRAST MEDICATION: Performed by: RADIOLOGY

## 2019-05-24 PROCEDURE — 72265 MYELOGRAPHY L-S SPINE: CPT

## 2019-05-24 PROCEDURE — 6360000002 HC RX W HCPCS: Performed by: RADIOLOGY

## 2019-05-24 PROCEDURE — 85027 COMPLETE CBC AUTOMATED: CPT

## 2019-05-24 RX ORDER — OXYCODONE AND ACETAMINOPHEN 10; 325 MG/1; MG/1
1 TABLET ORAL EVERY 6 HOURS PRN
Qty: 12 TABLET | Refills: 0 | Status: SHIPPED | OUTPATIENT
Start: 2019-05-24 | End: 2019-05-30 | Stop reason: SDUPTHER

## 2019-05-24 RX ORDER — LIDOCAINE HYDROCHLORIDE 20 MG/ML
INJECTION, SOLUTION EPIDURAL; INFILTRATION; INTRACAUDAL; PERINEURAL
Status: COMPLETED | OUTPATIENT
Start: 2019-05-24 | End: 2019-05-24

## 2019-05-24 RX ORDER — MIDAZOLAM HYDROCHLORIDE 1 MG/ML
INJECTION INTRAMUSCULAR; INTRAVENOUS
Status: COMPLETED | OUTPATIENT
Start: 2019-05-24 | End: 2019-05-24

## 2019-05-24 RX ORDER — FENTANYL CITRATE 50 UG/ML
INJECTION, SOLUTION INTRAMUSCULAR; INTRAVENOUS
Status: COMPLETED | OUTPATIENT
Start: 2019-05-24 | End: 2019-05-24

## 2019-05-24 RX ADMIN — Medication 10 ML: at 07:27

## 2019-05-24 RX ADMIN — LIDOCAINE HYDROCHLORIDE 5 ML: 20 INJECTION, SOLUTION EPIDURAL; INFILTRATION; INTRACAUDAL; PERINEURAL at 12:53

## 2019-05-24 RX ADMIN — GABAPENTIN 600 MG: 600 TABLET, FILM COATED ORAL at 16:58

## 2019-05-24 RX ADMIN — PANTOPRAZOLE SODIUM 40 MG: 40 TABLET, DELAYED RELEASE ORAL at 07:27

## 2019-05-24 RX ADMIN — METHOCARBAMOL TABLETS 750 MG: 750 TABLET, COATED ORAL at 07:27

## 2019-05-24 RX ADMIN — METHOCARBAMOL TABLETS 750 MG: 750 TABLET, COATED ORAL at 13:50

## 2019-05-24 RX ADMIN — OXYCODONE AND ACETAMINOPHEN 1 TABLET: 10; 325 TABLET ORAL at 19:22

## 2019-05-24 RX ADMIN — METHOCARBAMOL TABLETS 750 MG: 750 TABLET, COATED ORAL at 16:58

## 2019-05-24 RX ADMIN — FENTANYL CITRATE 25 MCG: 50 INJECTION INTRAMUSCULAR; INTRAVENOUS at 12:59

## 2019-05-24 RX ADMIN — IOHEXOL 12 ML: 240 INJECTION, SOLUTION INTRATHECAL; INTRAVASCULAR; INTRAVENOUS; ORAL at 13:08

## 2019-05-24 RX ADMIN — ISOSORBIDE MONONITRATE 90 MG: 60 TABLET, EXTENDED RELEASE ORAL at 07:26

## 2019-05-24 RX ADMIN — RANOLAZINE 500 MG: 500 TABLET, FILM COATED, EXTENDED RELEASE ORAL at 07:27

## 2019-05-24 RX ADMIN — LIDOCAINE HYDROCHLORIDE 5 ML: 20 INJECTION, SOLUTION EPIDURAL; INFILTRATION; INTRACAUDAL; PERINEURAL at 13:07

## 2019-05-24 RX ADMIN — GABAPENTIN 600 MG: 600 TABLET, FILM COATED ORAL at 01:11

## 2019-05-24 RX ADMIN — ATORVASTATIN CALCIUM 80 MG: 80 TABLET, FILM COATED ORAL at 07:27

## 2019-05-24 RX ADMIN — GABAPENTIN 600 MG: 600 TABLET, FILM COATED ORAL at 13:50

## 2019-05-24 RX ADMIN — GABAPENTIN 600 MG: 600 TABLET, FILM COATED ORAL at 07:26

## 2019-05-24 RX ADMIN — AMLODIPINE BESYLATE 5 MG: 5 TABLET ORAL at 07:26

## 2019-05-24 RX ADMIN — OXYCODONE AND ACETAMINOPHEN 1 TABLET: 10; 325 TABLET ORAL at 07:26

## 2019-05-24 RX ADMIN — METOPROLOL SUCCINATE 100 MG: 100 TABLET, EXTENDED RELEASE ORAL at 07:26

## 2019-05-24 RX ADMIN — OXYCODONE AND ACETAMINOPHEN 1 TABLET: 10; 325 TABLET ORAL at 01:11

## 2019-05-24 RX ADMIN — FUROSEMIDE 40 MG: 40 TABLET ORAL at 07:26

## 2019-05-24 RX ADMIN — GABAPENTIN 600 MG: 600 TABLET, FILM COATED ORAL at 19:22

## 2019-05-24 RX ADMIN — MIDAZOLAM HYDROCHLORIDE 0.5 MG: 2 INJECTION, SOLUTION INTRAMUSCULAR; INTRAVENOUS at 12:59

## 2019-05-24 RX ADMIN — INSULIN LISPRO 3 UNITS: 100 INJECTION, SOLUTION INTRAVENOUS; SUBCUTANEOUS at 16:57

## 2019-05-24 RX ADMIN — LOSARTAN POTASSIUM 50 MG: 25 TABLET ORAL at 07:26

## 2019-05-24 RX ADMIN — OXYCODONE AND ACETAMINOPHEN 1 TABLET: 10; 325 TABLET ORAL at 13:50

## 2019-05-24 ASSESSMENT — PAIN SCALES - GENERAL
PAINLEVEL_OUTOF10: 6
PAINLEVEL_OUTOF10: 8
PAINLEVEL_OUTOF10: 7
PAINLEVEL_OUTOF10: 5
PAINLEVEL_OUTOF10: 8

## 2019-05-24 ASSESSMENT — PAIN DESCRIPTION - DIRECTION
RADIATING_TOWARDS: LEFT LEG
RADIATING_TOWARDS: L LEG

## 2019-05-24 ASSESSMENT — PAIN DESCRIPTION - ORIENTATION
ORIENTATION: LOWER
ORIENTATION: LOWER

## 2019-05-24 ASSESSMENT — PAIN DESCRIPTION - PROGRESSION
CLINICAL_PROGRESSION: NOT CHANGED
CLINICAL_PROGRESSION: NOT CHANGED

## 2019-05-24 ASSESSMENT — PAIN DESCRIPTION - LOCATION
LOCATION: BACK
LOCATION: BACK

## 2019-05-24 ASSESSMENT — PAIN - FUNCTIONAL ASSESSMENT
PAIN_FUNCTIONAL_ASSESSMENT: PREVENTS OR INTERFERES SOME ACTIVE ACTIVITIES AND ADLS
PAIN_FUNCTIONAL_ASSESSMENT: PREVENTS OR INTERFERES SOME ACTIVE ACTIVITIES AND ADLS

## 2019-05-24 ASSESSMENT — PAIN DESCRIPTION - ONSET
ONSET: ON-GOING
ONSET: ON-GOING

## 2019-05-24 ASSESSMENT — PAIN DESCRIPTION - FREQUENCY
FREQUENCY: CONTINUOUS
FREQUENCY: CONTINUOUS

## 2019-05-24 ASSESSMENT — PAIN DESCRIPTION - PAIN TYPE
TYPE: ACUTE PAIN
TYPE: ACUTE PAIN

## 2019-05-24 ASSESSMENT — PAIN DESCRIPTION - DESCRIPTORS
DESCRIPTORS: ACHING
DESCRIPTORS: ACHING

## 2019-05-24 NOTE — PROGRESS NOTES
Patient to CT for CT myelogram of the lumbar region. Procedure explained by neurosx and consent signed.

## 2019-05-24 NOTE — CONSULTS
MG/24HR 1 patch  1 patch Transdermal Daily Daylin Mata MD   1 patch at 05/23/19 0903    acetaminophen (TYLENOL) tablet 650 mg  650 mg Oral Q4H PRN Daylin Mata MD        glucose (GLUTOSE) 40 % oral gel 15 g  15 g Oral PRN Daylin Mata MD        dextrose 50 % solution 12.5 g  12.5 g Intravenous PRN Daylin Mata MD        glucagon (rDNA) injection 1 mg  1 mg Intramuscular PRN Daylin Mata MD        dextrose 5 % solution  100 mL/hr Intravenous PRN Daylin Mata MD        diazepam (VALIUM) tablet 5 mg  5 mg Oral Q6H PRN TAYLA Levine CNP        methocarbamol (ROBAXIN) tablet 750 mg  750 mg Oral 4x Daily TAYLA Levine CNP   750 mg at 05/23/19 2102    albuterol (PROVENTIL) nebulizer solution 2.5 mg  2.5 mg Nebulization Q4H PRN Daylin Mata MD            Objective:  BP (!) 144/79   Pulse 71   Temp 97.7 °F (36.5 °C) (Oral)   Resp 16   Ht 6' (1.829 m)   Wt 222 lb 12.8 oz (101.1 kg)   SpO2 93%   BMI 30.22 kg/m²     Physical Exam:   Patient seen and examined  GCS:  4 - Opens eyes on own  5 - Alert and oriented  6 - Follows simple motor commands  General: Well developed. Alert and cooperative in no acute distress. HENT: atraumatic, neck supple  Eyes: Optic discs: Not tested  Pulmonary: unlabored respiratory effort  Cardiovascular:  Warm well perfused.  No peripheral edema  Gastrointestinal: abdomen soft, NT, ND    Neurological:  Mental Status: Awake, alert, oriented x 4, speech clear and appropriate  Attention: Intact  Language: No aphasia or dysarthria noted  Sensation: Intact to all extremities to light touch  Coordination: Intact  DTRs:    Right  Left    brachioradialis  2 2   Patella  2 2   ankle clonus  Neg Neg   toes (babinski)  Down Down     Musculoskeletal:   Gait: Not tested   Assist devices: None   Tone: Normal  Motor strength:    Right  Left    Right  Left    Deltoid  5 5  Hip Flex  5 4+   Biceps  5 5  Knee Extensors  5 4+   Triceps  5 5  Knee Flexors  5 4+   Wrist Ext  5 5  Ankle Dorsiflex. 5 4+   Wrist Flex  5 5  Ankle Plantarflex. 5 4+   Handgrip  5 5  Ext Tito Longus  5 4+   Thumb Ext  5 5         Radiological Findings:    I personally reviewed the patient's imaging is consistent of a CT of the lumbar spine dated 5/20/2019. This demonstrates a mild superior endplate fracture of L2 that has developed since previous imaging. In addition, the previous L3-4 right-sided laminectomy defect is seen with an associated fluid collection and a single spot of air. At this level, there is remaining or recurrent broad-based disc protrusion with bilateral subarticular stenosis. Labs:  Recent Labs     05/23/19  0603   WBC 6.4   HGB 13.3*   HCT 40.0*          Recent Labs     05/23/19  0603   *   K 3.7      CO2 22   BUN 19   CREATININE 1.2   GLUCOSE 240*   CALCIUM 9.0       No results for input(s): PROTIME, INR, APTT in the last 72 hours.     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 01/23/2016     Priority: High    Guaiac + stool 01/23/2016     Priority: High    Chronic chest pain 03/05/2013     Priority: High    Coronary artery disease involving native coronary artery of native heart without angina pectoris      Priority: Medium    Type 2 diabetes mellitus without complication, with long-term current use of insulin ([facility] [address] 09/16/2015     Priority: Medium    Ischemic cardiomyopathy 11/02/2014     Priority: Medium    Essential hypertension 06/04/2013     Priority: Medium    S/P CABG (coronary artery bypass graft) 03/05/2013     Priority: Medium    COPD exacerbation ([address]      Priority: Low    Hyperlipidemia LDL goal <70 11/02/2014     Priority: Low    ICD (implantable cardioverter-defibrillator), dual, in situ 10/12/2013     Priority: Low    Back pain with radiculopathy 05/23/2019    Compression fracture of L2, sequela 05/22/2019    Chronic systolic (congestive) heart failure ([facility] [address] 04/27/2019    CAD (coronary artery disease) 04/27/2019    Chest pain 04/25/2019    Renal insufficiency 12/26/2018    DMII (diabetes mellitus, type 2) ([facility] [address] 11/27/2018    COPD (chronic obstructive pulmonary disease) ([facility] [address] 11/27/2018    PFO (patent foramen ovale)     Ischemic stroke, left frontal lobe (4/30/18) ([facility] [address] 04/30/2018    Acute on chronic diastolic congestive heart failure ([facility] [address] 03/22/2018    Restrictive lung disease 03/07/2018    Tobacco abuse 01/10/2018    Renal insufficiency 01/04/2018    Iron deficiency anemia due to chronic blood loss 03/31/2017    Coronary artery disease involving coronary bypass graft of native heart with angina pectoris ([facility] [address]     Anxiety 02/01/2016    Morbid obesity due to excess calories (HCC)     Gastrointestinal hemorrhage     Chronic back pain 06/23/2015    Anxiety 03/19/2015    Abdominal pain 05/23/2014    CHF (congestive heart failure) ([facility] [address] 12/01/2013       Assessment:   61 y.o. male w/acute on chronic lumbar back pain who underwent laminectomy/discectomy in 10/2018 at L3-4 on the right and now returns with left-sided radicular symptoms    Plan:  1. No emergent neurosurgical intervention indicated  2. Neurologic exams frequency:  - Floor: Q4H  3. Lumbar Back Pain:  - Cannot get MRI 2/2 Pacemaker  - CT Myelogram scheduled as outpatient for Thursday next week given ASA & Plavix use. However, patient states that he has been off these medications for 2 weeks. We will follow up with radiology regarding the timing of the test.   4. Muscle spasms: Increased Robaxin to 750 4x's/day and PRN Valium  5. Pain control: Well controlled on Percocet 10/325 x 4 daily  6. PT/OT consulted, appreciate recs  7. Recommend social work consultation to assist with transportation as an outpatient    Thank you for the consultation.     Briseida Weir MD, PhD  [address], [address], [address] [phone] (c), [phone] (o)

## 2019-05-24 NOTE — PROGRESS NOTES
Pt expressed concern for not being able to get around at home and not being able to afford medications. Social work consult is placed.

## 2019-05-24 NOTE — DISCHARGE SUMMARY
Patient discharged to [facility]. IV removed. All belongings collected and sent with patient. Patient sent with paperwork and prescriptions. Taken out via stretcher.

## 2019-05-24 NOTE — PLAN OF CARE
Problem: Falls - Risk of:  Goal: Will remain free from falls  5/24/2019 1515 by Babita Durham RN  Outcome: Met This Shift  Note:   Pt has remained free of falls throughout shift. Pt has all fall precautions in place: bed in lowest position with alarm on, nonskid footwear on pt, fall signs posted, and call light within reach. Pt instructed to call out if in need of assistance. Will continue to monitor. Problem: Pain:  Goal: Pain level will decrease  5/24/2019 1515 by Babita Durham RN  Outcome: Ongoing  Note:   Patients pain level remains unchanged. Controlled with percocet prn. Will continue to monitor comfort levels.

## 2019-05-24 NOTE — PROGRESS NOTES
Patient arrived back to room via transport from procedural area. Patient A/Ox4 and stable. Medication with prn percocet per patient request. Will continue to monitor.

## 2019-05-24 NOTE — DISCHARGE SUMMARY
clear.  Neck: Supple, with full range of motion. No jugular venous distention. Trachea midline. Respiratory:  Normal respiratory effort. Clear to auscultation, bilaterally without Rales/Wheezes/Rhonchi. Cardiovascular:  Regular rate and rhythm with normal S1/S2 without murmurs, rubs or gallops. Abdomen: Soft, non-tender, non-distended with normal bowel sounds. Musculoskeletal:  Lower lumbar incision site noted  Skin: Skin color, texture, turgor normal.  No rashes or lesions. Neurologic: Cranial nerves: II-XII intact, grossly non-focal. Left leg slightly weaker than right  Psychiatric:  Alert and oriented, thought content appropriate, normal insight  Capillary Refill: Brisk,< 3 seconds   Peripheral Pulses: +2 palpable, equal bilaterally       Labs: For convenience and continuity at follow-up the following most recent labs are provided:      CBC:    Lab Results   Component Value Date    WBC 7.1 05/24/2019    HGB 13.5 05/24/2019    HCT 40.4 05/24/2019     05/24/2019       Renal:    Lab Results   Component Value Date     05/24/2019    K 4.3 05/24/2019    K 3.7 05/23/2019     05/24/2019    CO2 23 05/24/2019    BUN 17 05/24/2019    CREATININE 1.2 05/24/2019    CALCIUM 8.9 05/24/2019    PHOS 4.8 04/30/2019         Significant Diagnostic Studies    Radiology:   CT LUMBAR SPINE W CONTRAST   Final Result      Satisfactory lumbar puncture      CT lumbar myelogram.      Dose modulation, iterative reconstruction and/or weight based adjustment of the mA/kV were utilized to reduce radiation dose to as low as reasonably achievable. .      Examination performed after the lumbar myelogram.      Axial sagittal coronal images. COMPARISON: CT lumbar spine May 22, 2018. There is mild to moderate compression fracture superior endplate L2 present previously. The cord terminates at its usual position T12-L1. T12-L1 Central canal measures 13 mm without significant central or foraminal spinal stenosis. Moderate facet hypertrophy is noted. L1-2 Central canal measures 12 mm without significant central spinal stenosis. Broad-based central disc protrusion is noted predominantly soft disc which measures 2 mm with moderate impression on the ventral thecal sac. Exiting nerve roots are intact    without significant foraminal spinal stenosis. Mild facet hypertrophy is noted. L2-3 Central canal measures 13 mm without significant central spinal stenosis. No focal disc herniation or protrusion is identified. No significant foraminal spinal stenosis. Moderate facet hypertrophy is noted. L3-4 Central canal measures 7 mm. Right hemilaminectomy is noted at this level. There are multiple low-attenuation bubbles at the laminectomy site of air attenuation present on previous CT examination. Associated soft tissue abnormality is noted within    the soft tissues adjacent to the spinous processes and paravertebral musculature in total measures approximately 17 x 20 mm. No significant mass effect on the thecal sac is noted. Clinical correlation suggested. There is no CSF leak. No contrast    extravasation at this site. There is a large disc extrusion at this level measuring 8 mm in AP dimension, 30 mm in craniocaudal dimension and 21 mm in its transverse dimension extending cephalad posterior to the L3 vertebral body with marked impression    on the thecal sac. . The exiting nerve roots are intact without significant foraminal spinal stenosis. Moderate facet hypertrophy is noted. L4-5 Central canal measures 10 mm without significant central spinal stenosis. Moderately large broad-based central disc protrusion is noted measuring 5 mm in AP dimension with marked impression on the ventral thecal sac. The exiting nerve roots are    intact. Moderate facet hypertrophy is noted. L5-S1 Central canal measures 11 mm without significant central spinal stenosis. No significant foraminal spinal stenosis.  Moderately severe facet hypertrophy is noted. Large cyst is noted projecting from the midportion of the left kidney present on previous CT examination November 26, 2018 partially imaged measures 7 x 6 cm. There is thickening and nodularity involving the left adrenal gland and right adrenal gland    present previously with fatty attenuation within the left adrenal nodule. Findings likely relates to lipid rich adenomas and/or myelolipoma functioning or nonfunctioning unchanged. Streaky nodular opacity left lower lobe partially imaged may relate to atelectasis. The 15 mm nodule noted left lower lobe on previous CT examination April 25, 2019 is not included on the exam.      IMPRESSION:      Streaky nodular opacity left lower lung indeterminate. Follow-up recommended. Right hemilaminectomy L3-4 with small volume of air within the surgical bed just posterior to the thecal sac with associated soft tissue abnormality. Findings present on recent CT. Findings are indeterminate and may relate to sterile or nonsterile fluid    collection. Clinical correlation suggested. Aspiration may be useful if indicated. No significant mass effect on the thecal sac at this level. Large disc extrusion L3-4 with suspected sequestered free fragment posterior to the L3 vertebral body with marked mass effect on the thecal sac at this level. Moderately large central disc protrusion L4-5 with marked mass effect on the ventral thecal sac with mild to moderate central spinal stenosis. FL MYELOGRAM LUMBOSACRAL S&I   Final Result      Satisfactory lumbar puncture      CT lumbar myelogram.      Dose modulation, iterative reconstruction and/or weight based adjustment of the mA/kV were utilized to reduce radiation dose to as low as reasonably achievable. .      Examination performed after the lumbar myelogram.      Axial sagittal coronal images. COMPARISON: CT lumbar spine May 22, 2018.       There is mild to moderate compression fracture superior endplate L2 present previously. The cord terminates at its usual position T12-L1. T12-L1 Central canal measures 13 mm without significant central or foraminal spinal stenosis. Moderate facet hypertrophy is noted. L1-2 Central canal measures 12 mm without significant central spinal stenosis. Broad-based central disc protrusion is noted predominantly soft disc which measures 2 mm with moderate impression on the ventral thecal sac. Exiting nerve roots are intact    without significant foraminal spinal stenosis. Mild facet hypertrophy is noted. L2-3 Central canal measures 13 mm without significant central spinal stenosis. No focal disc herniation or protrusion is identified. No significant foraminal spinal stenosis. Moderate facet hypertrophy is noted. L3-4 Central canal measures 7 mm. Right hemilaminectomy is noted at this level. There are multiple low-attenuation bubbles at the laminectomy site of air attenuation present on previous CT examination. Associated soft tissue abnormality is noted within    the soft tissues adjacent to the spinous processes and paravertebral musculature in total measures approximately 17 x 20 mm. No significant mass effect on the thecal sac is noted. Clinical correlation suggested. There is no CSF leak. No contrast    extravasation at this site. There is a large disc extrusion at this level measuring 8 mm in AP dimension, 30 mm in craniocaudal dimension and 21 mm in its transverse dimension extending cephalad posterior to the L3 vertebral body with marked impression    on the thecal sac. . The exiting nerve roots are intact without significant foraminal spinal stenosis. Moderate facet hypertrophy is noted. L4-5 Central canal measures 10 mm without significant central spinal stenosis. Moderately large broad-based central disc protrusion is noted measuring 5 mm in AP dimension with marked impression on the ventral thecal sac.  The exiting nerve roots are    intact. Moderate facet hypertrophy is noted. L5-S1 Central canal measures 11 mm without significant central spinal stenosis. No significant foraminal spinal stenosis. Moderately severe facet hypertrophy is noted. Large cyst is noted projecting from the midportion of the left kidney present on previous CT examination November 26, 2018 partially imaged measures 7 x 6 cm. There is thickening and nodularity involving the left adrenal gland and right adrenal gland    present previously with fatty attenuation within the left adrenal nodule. Findings likely relates to lipid rich adenomas and/or myelolipoma functioning or nonfunctioning unchanged. Streaky nodular opacity left lower lobe partially imaged may relate to atelectasis. The 15 mm nodule noted left lower lobe on previous CT examination April 25, 2019 is not included on the exam.      IMPRESSION:      Streaky nodular opacity left lower lung indeterminate. Follow-up recommended. Right hemilaminectomy L3-4 with small volume of air within the surgical bed just posterior to the thecal sac with associated soft tissue abnormality. Findings present on recent CT. Findings are indeterminate and may relate to sterile or nonsterile fluid    collection. Clinical correlation suggested. Aspiration may be useful if indicated. No significant mass effect on the thecal sac at this level. Large disc extrusion L3-4 with suspected sequestered free fragment posterior to the L3 vertebral body with marked mass effect on the thecal sac at this level. Moderately large central disc protrusion L4-5 with marked mass effect on the ventral thecal sac with mild to moderate central spinal stenosis. CT LUMBAR SPINE W CONTRAST   Final Result   1. Posterior neural arch fluid collection with gas at the L3-4 level with peripheral rim enhancement, postoperative fluid collection or abscess.    2. L3-L4 broad disc herniation with superior and inferior migration with moderate central canal stenosis and right subarticular impingement. 3. L4-L5 central broad asymmetric bulging disc left greater than right with left subarticular impingement             Consults:     IP CONSULT TO NEUROSURGERY  IP CONSULT TO SOCIAL WORK    Disposition:  SNF    Condition at Discharge: Stable    Discharge Instructions/Follow-up:  F/u with neurosurgery and PCP    Code Status:  Full Code     Activity: activity as tolerated    Diet: diabetic diet      Discharge Medications:     Current Discharge Medication List           Details   oxyCODONE-acetaminophen (PERCOCET)  MG per tablet Take 1 tablet by mouth every 6 hours as needed for Pain for up to 3 days.   Qty: 12 tablet, Refills: 0    Comments: Reduce doses taken as pain becomes manageable  Associated Diagnoses: Compression fracture of L2, sequela; Back pain with radiculopathy              Details   methocarbamol (ROBAXIN) 500 MG tablet Take 1 tablet by mouth 4 times daily for 5 days  Qty: 20 tablet, Refills: 0      gabapentin (NEURONTIN) 600 MG tablet TAKE 1 TABLET BY MOUTH FIVE TIMES DAILY  Qty: 150 tablet, Refills: 0    Associated Diagnoses: Neuropathy      ranolazine (RANEXA) 500 MG extended release tablet Take 1 tablet by mouth 2 times daily  Qty: 60 tablet, Refills: 3      isosorbide mononitrate (IMDUR) 30 MG extended release tablet Take 3 tablets by mouth 2 times daily  Qty: 180 tablet, Refills: 0      pantoprazole (PROTONIX) 40 MG tablet Take 1 tablet by mouth every morning (before breakfast)  Qty: 30 tablet, Refills: 3      clopidogrel (PLAVIX) 75 MG tablet TAKE 1 TABLET BY MOUTH DAILY  Qty: 90 tablet, Refills: 3      furosemide (LASIX) 40 MG tablet Take 1 tablet by mouth daily  Qty: 90 tablet, Refills: 1      Insulin Pen Needle 31G X 5 MM MISC 1 each by Does not apply route daily  Qty: 100 each, Refills: 3    Associated Diagnoses: Type 2 diabetes mellitus without complication, with long-term current use of insulin (HCC) losartan (COZAAR) 50 MG tablet Take 1 tablet by mouth daily  Qty: 30 tablet, Refills: 3      nitroGLYCERIN (NITROSTAT) 0.4 MG SL tablet PLACE 1 TABLET UNDER THE TONGUE EVERY 5 MINUTES AS NEEDED FOR CHEST PAIN  Qty: 25 tablet, Refills: 1      albuterol sulfate  (90 Base) MCG/ACT inhaler Inhale 2 puffs into the lungs every 6 hours as needed for Wheezing      aspirin 81 MG tablet Take 81 mg by mouth daily      insulin glargine (LANTUS) 100 UNIT/ML injection pen Inject 28 Units into the skin nightly  Qty: 5 pen, Refills: 2    Associated Diagnoses: Type 2 diabetes mellitus without complication, with long-term current use of insulin ([facility])      insulin lispro (HUMALOG KWIKPEN) 100 UNIT/ML pen Inject 5 Units into the skin 3 times daily (before meals)  Qty: 5 pen, Refills: 3    Associated Diagnoses: Type 2 diabetes mellitus without complication, with long-term current use of insulin ([facility])      amLODIPine (NORVASC) 5 MG tablet TAKE 1 TABLET BY MOUTH DAILY  Qty: 90 tablet, Refills: 3      !! Lancets MISC accu check fastclick lancets  Dx: O79.2  As needed  Qty: 120 each, Refills: 5    Associated Diagnoses: Controlled type 2 diabetes mellitus with diabetic neuropathy, without long-term current use of insulin ([facility] [address]      ! ! FREESTYLE LANCETS MISC 1 each by Does not apply route daily  Qty: 100 each, Refills: 0      atorvastatin (LIPITOR) 80 MG tablet TAKE 1 TABLET BY MOUTH DAILY  Qty: 90 tablet, Refills: 1    Associated Diagnoses: Coronary artery disease involving native heart without angina pectoris, unspecified vessel or lesion type      metoprolol succinate (TOPROL XL) 100 MG extended release tablet Take 1 tablet by mouth daily  Qty: 30 tablet, Refills: 1       !! - Potential duplicate medications found. Please discuss with provider. Time Spent on discharge is more than 45 minutes in the examination, evaluation, counseling and review of medications and discharge plan.       Signed:    Barb Sanchez MD   5/24/2019      Thank you Mitchell Perez MD for the opportunity to be involved in this patient's care. If you have any questions or concerns please feel free to contact me at [phone].

## 2019-05-24 NOTE — CARE COORDINATION
I sent a referral to [facility] because they are able to accept this patient today and pt was not sure about this but after discussing it more with him and with Dr. Jocelyne Vilchis the patient was agreeable to go. I did follow up with [facility] and they would not have been able to get precert which means the patient would have had to stay here until Tuesday. As stated before pt agreed to go to [facility]. Arranged for transport via  [facility] for 9247. Sherita RN, patient and wife are all aware.      Case Management Discharge Assessment    2019  [facility] Case Management Department    Patient: Jen Milian  MRN: [id number] / : 1955  ACCT: [de-identified]     Emergency Contacts  Healthcare Agent Appointed: Healthcare power of   Healthcare Agent's Name: Silvana scruggs  Healthcare Agent's Phone Number: [phone]    Admission Documentation  Attending Provider: Parker Abraham MD  Admit date/time: 2019  5:27 PM  Status: Inpatient [101]  Diagnosis: Compression fracture of L2, sequela     Readmission within last 30 days:  no     Living Situation  Discharge Planning  Living Arrangements: Spouse/Significant Other  Support Systems: Spouse/Significant Other, Children, Family Members  Potential Assistance Needed: N/A  Type of Home Care Services: None  Patient expects to be discharged to[de-identified] Home    Service Assessment:       Values / Beliefs  Do you have any ethnic, cultural, sacramental, or spiritual Hoahaoism needs you would like us to be aware of while you are in the hospital?: No    Advance Directives (For Healthcare)  Pre-existing DNR Comfort Care/DNR Arrest/DNI Order: No  Healthcare Directive: Yes, patient has an advance directive for healthcare treatment  Type of Healthcare Directive: Durable power of  for health care  Copy in Chart: Yes, copy in chart  Chart Copy Status [de-identified] Active  Information on Healthcare Directives Requested: No  Patient Requests Assistance: No  Advance Directives: Pt. not interested at this time  [facility] Agent's Name: [facility] Agent's Phone Number: [phone]  If you are unable to speak for yourself, does your Healthcare Agent or Legal Spokesperson know your healthcare wishes?: Yes                        Pharmacy: [address] Medications:  No  Does patient want to participate in local refill/meds to beds program?: Yes    Notification completed in HENS/PAS? Yes     IMM  No       Discharge disposition: [address] Phone: [phone] Fax: [phone]  Discharge Event  Discharged with Documented Belongings: Yes  Departure Mode: On stretcher (restraints)  Mobility at Departure: Stretcher  Discharged to: [address]  Time of Discharge: 1900  LOC SNF  Name of  [facility]  Phone of Facility [phone]  Fax of Facility [phone]    Mode of Transport medical transport  Name of Transport [facility]  Number of Transport [phone]  Time of Transport Geovanny Iyer and his family were provided with choice of provider; he and his family are in agreement with the discharge plan.       Care Transitions patient: No    John Paul Cordero RN  [facility]  Case Management Department  Ph: [phone] OQE:[phone]

## 2019-05-24 NOTE — DISCHARGE INSTR - COC
Continuity of Care Form    Patient Name: Yony Ramirez   :  1955  MRN:  [id number]    Admit date:  2019  Discharge date:  2019    Code Status Order: Full Code   Advance Directives:   [facility] Documentation     Date/Time Healthcare Directive Type of Healthcare Directive Copy in [address] Agent's Name Healthcare Agent's Phone Number    19 2527  Yes, patient has an advance directive for healthcare treatment  Durable power of  for health care  Yes, copy in chart  Healthcare power of   Kelsea scruggs  [phone]          Admitting Physician:  Bee Goodrich MD  PCP: Taj Boyd MD    Discharging Nurse: [address] [place] [address] Unit/Room#: 0195/5254-76  Discharging Unit Phone Number: ***    Emergency Contact:   Extended Emergency Contact Information  Primary Emergency Contact: Faith Alex  Address: [address] [place] [address] Phone: [phone]  Relation: Spouse  Secondary Emergency Contact: [address] Phone: [phone]  Work Phone: [phone]  Relation: Niece/Nephew    Past Surgical History:  Past Surgical History:   Procedure Laterality Date    BACK SURGERY      CARDIAC SURGERY      CHOLECYSTECTOMY      COLONOSCOPY  01/10/2017    COLONOSCOPY  01/10/2017    CORONARY ANGIOPLASTY WITH STENT PLACEMENT  2012    CORONARY ARTERY BYPASS GRAFT  , 2015    ENDOSCOPY, COLON, DIAGNOSTIC      PACEMAKER PLACEMENT      UPPER GASTROINTESTINAL ENDOSCOPY  2017       Immunization History:   Immunization History   Administered Date(s) Administered    Influenza Vaccine, unspecified formulation 10/24/2011, 2013, 2015, 2017    Influenza Virus Vaccine 2013, 2014, 2015    Influenza, Quadv, 3 Years and older, IM (Fluzone 3 yrs and older or Afluria 5 yrs and older) 10/17/2018    Influenza, Erinn Gomes, 3 yrs and older, IM, PF (Fluzone 3 yrs and older or Afluria 5 yrs and older) 10/17/2016    Pneumococcal Polysaccharide ([id number]) 10/24/2011, 09/18/2015    Tdap (Boostrix, Adacel) 05/04/2015       Active Problems:  Patient Active Problem List   Diagnosis Code    Chronic chest pain R07.9, G89.29    S/P CABG (coronary artery bypass graft) Z95.1    Essential hypertension I10    ICD (implantable cardioverter-defibrillator), dual, in situ Z95.810    Abdominal pain R10.9    Ischemic cardiomyopathy I25.5    Hyperlipidemia LDL goal <70 E78.5    CHF (congestive heart failure) ([facility]) I50.9    Anxiety F41.9    Chronic back pain M54.9, G89.29    Type 2 diabetes mellitus without complication, with long-term current use of insulin ([facility]) E11.9, Z79.4    Coronary artery disease involving native coronary artery of native heart without angina pectoris I25.10    COPD exacerbation ([facility]) J44.1    Anemia,normocytic normochromic ,mixed folic aci deficiency and iron deficiency D64.9    Guaiac + stool R19.5    Gastrointestinal hemorrhage K92.2    Morbid obesity due to excess calories ([facility]) E66.01    Anxiety F41.9    Coronary artery disease involving coronary bypass graft of native heart with angina pectoris ([facility]) I25.709    Iron deficiency anemia due to chronic blood loss D50.0    Renal insufficiency N28.9    Tobacco abuse Z72.0    Restrictive lung disease J98.4    Acute on chronic diastolic congestive heart failure ([facility]) I50.33    Ischemic stroke, left frontal lobe (4/30/18) ([facility]) I63.9    PFO (patent foramen ovale) Q21.1    DMII (diabetes mellitus, type 2) ([facility]) E11.9    COPD (chronic obstructive pulmonary disease) ([facility]) J44.9    Renal insufficiency N28.9    Chest pain R07.9    Chronic systolic (congestive) heart failure ([facility]) I50.22    CAD (coronary artery disease) I25.10    Compression fracture of L2, sequela S32.020S    Back pain with radiculopathy M54.10 Electronically signed by Julia Jeff RN on 5/24/19 at 3:21 PM    CASE MANAGEMENT/SOCIAL WORK SECTION    Inpatient Status Date: 05/22/2019    Readmission Risk Assessment Score:  Readmission Risk              Risk of Unplanned Readmission:        30           Discharging to Facility/ Agency   · Name: [facility]  · Address:  · Phone:report- [phone]  · OBC:[phone]    ·     / signature: Electronically signed by Lizabeth Washington RN on 5/24/19 at 3:51 PM    PHYSICIAN SECTION    Prognosis: Fair    Condition at Discharge: Stable    Rehab Potential (if transferring to Rehab): Fair    Recommended Labs or Other Treatments After Discharge: F/u with NSGY and PCP    Physician Certification: I certify the above information and transfer of Dixon Herr  is necessary for the continuing treatment of the diagnosis listed and that he requires [place] for less 30 days.      Update Admission H&P: No change in H&P    PHYSICIAN SIGNATURE:  Electronically signed by Rylee Chapman MD on 5/24/19 at 3:28 PM

## 2019-05-24 NOTE — PROGRESS NOTES
[facility]   [id number]   1955   5/24/2019    Interval History:  Hospital Day #2        Subjective: Patient has no new c/o  Objective:  /71   Pulse 73   Temp 98.5 °F (36.9 °C) (Oral)   Resp 16   Ht 6' (1.829 m)   Wt 222 lb 12.8 oz (101.1 kg)   SpO2 90%   BMI 30.22 kg/m²     Labs:  Recent Labs     05/22/19  1053 05/23/19  0603 05/24/19  0548    135* 138    100 102   CO2 21 22 23   BUN 10 19 17   CREATININE 0.9 1.2 1.2   GLUCOSE 253* 240* 164*     Recent Labs     05/22/19  1053 05/23/19  0603 05/24/19  0548   WBC 8.0 6.4 7.1   RBC 4.57 4.13* 4.19*   INR  --   --  0.90       Neurologic Exam:  Mental Status: Awake, alert, oriented x 4, speech clear and appropriate  Language: No aphasia or dysarthria noted  Sensation: Intact to all extremities to light touch  Coordination: Intact      Musculoskeletal:   Gait: Not tested   Tone: normal  Motor strength:    Right  Left    Right  Left    Deltoid  5 5  Hip Flex  5 4+   Biceps  5 5  Knee Extensors  5 4+   Triceps  5 5  Knee Flexors  5 4+   Wrist Ext  5 5  Ankle Dorsiflex. 5 4+   Wrist Flex  5 5  Ankle Plantarflex. 5 4+   Handgrip  5 5  Ext Tito Longus  5 4+   Thumb Ext  5 5           Assessment    61 y.o. male w/acute on chronic lumbar back pain who underwent laminectomy/discectomy in 10/2018 at L3-4 on the right and now returns with left-sided radicular symptoms     Plan:  1. No emergent neurosurgical intervention indicated  2. Neurologic exams frequency: Q4H  3. Lumbar Back Pain:  - Cannot get MRI 2/2 Pacemaker  4. - CT Myelogram today  5. Muscle spasms: Robaxin to 750 4x's/day and PRN Valium  6. Pain control: Well controlled on Percocet 10/325 x 4 daily  7. PT/OT consulted, appreciate recs  8. Patient can be discharged after ct myelogram  9. Follow up with Betty in 2 weeks  10.  Will sign off at this time      DISPO- Dispo timing to be determined by primary team once patient is medically stable for discharge.      Ossineke Albert

## 2019-05-24 NOTE — PROGRESS NOTES
Physical Therapy and Occupation Therapy  Hold  Attempted to work with pt this PM however per RN pt needs to remain supine in bed post CT myelogram of the lumbar region until 1700. Will attempt to work with pt on later date as schedule permits. Discussed with RN.        Amirah Ibarra, PT, DPT [id number]   Nehemias CATALAN

## 2019-05-25 NOTE — PROGRESS NOTES
Neurosurgery Progress Note    I saw and examined Autumn Severino on 05/24/19. No acute events overnight. Neurologically stable with radicular pain well controlled on PO meds. CT myelo performed and demonstrates disc herniations at L3-4 and L4-5 with moderate central stenosis and left foraminal stenosis. A/P: 61 y.o. male w/acute on chronic lumbar back pain who underwent laminectomy/discectomy in 10/2018 at L3-4 on the right and now returns with left-sided radicular symptoms    -Neuro stable  -Pain control per primary team  -Muscle relaxants prn  -Frequent neuro checks  -Mobilize, OOB  -PT/OT  -Discussed management options with patient including 1) continued conservative care or 2) surgery. Given his surgery earlier this year, the fact that he would require a fusion, and the fact that he has not yet failed conservative care options, Mr. Konrad Clemons elected to proceed with plans for SNF placement and outpatient KYLIE to L3-4 and L4-5 on the left. He will follow up with me in 2-4 weeks to see if the injections were effective at addressing his radiculopathy. Otherwise, he may be a candidate for surgery.       Dina Gorman MD, PhD  [address], [address] [place], [place], [address] [phone] (c), [phone] (o)

## 2019-06-04 ENCOUNTER — CARE COORDINATION (OUTPATIENT)
Dept: CASE MANAGEMENT | Age: 64
End: 2019-06-04

## 2019-06-04 DIAGNOSIS — S32.020S COMPRESSION FRACTURE OF L2, SEQUELA: Primary | ICD-10-CM

## 2019-06-04 PROCEDURE — 1111F DSCHRG MED/CURRENT MED MERGE: CPT | Performed by: INTERNAL MEDICINE

## 2019-06-04 NOTE — CARE COORDINATION
[facility] Transitions Follow Up Call    2019    Patient: Bertrand Treadwell  Patient : 1955   MRN: [id number]    Discharge Date: 19 RARS: Readmission Risk Score: 30         Spoke with: Johnny Trotter 5 Transitions Subsequent and Final Call    Subsequent and Final Calls  Are you currently active with any services?:   [facility] Transitions Interventions  Other Interventions:        Received a return call from [facility]. Patient discharged home with [facility]. Cristian Simmons stated patient said his wife had multiple office visits yesterday and requested start of care today. Will refer to [address] [place] [address] Lead for further coordination. Post acute care coordinator signing off.  MEÑO Owusu RN  Care Transition Coordinator  [phone]      Follow Up  Future Appointments   Date Time Provider Fred Gregory   2019  9:30 AM SCHEDULE, [facility] REMOTE TRANSMISSION [facility]       Renita Vizcaino RN

## 2019-06-04 NOTE — CARE COORDINATION
[facility] Transitions Initial Follow Up Call    Call within 2 business days of discharge: Yes    Patient: Lisa Clemons Patient : 1955   MRN: [id number]    Discharge Date: 19 RARS: Readmission Risk Score: 30      Last Discharge [facility]       Complaint Diagnosis Description Type Department Provider    19  Compression fracture of L2, sequela . .. Admission (Discharged) Bg Narvaez MD    19 Back Pain Intractable low back pain ED (TRANSFER) [facility] ED            Spoke with: Bernard Sheehan 1080: [place] Taurus    Non-face-to-face services provided:  Obtained and reviewed discharge summary and/or continuity of care documents    Care Transitions 24 Hour Call    Do you have any ongoing symptoms?:  Yes  Patient-reported symptoms:  Other  Do you have a copy of your discharge instructions?:  Yes  Do you have all of your prescriptions and are they filled?:  Yes  Have you been contacted by a [address]?:  No  Have you scheduled your follow up appointment?:  No  Were you discharged with any Home Care or Post Acute Services:  Yes  Post Acute Services:  Home Health (Comment: agency name unknown)  Do you have support at home?:  Partner/Spouse/SO  Do you feel like you have everything you need to keep you well at home?:  Yes  Are you an active caregiver in your home?:  No  Care Transitions Interventions     Spoke with spouse and caregiver Yessy Santiago. Patient in the background contributing information. Oksana state patient has no complaints of sob, cp, cough, congestion, swelling. Patient states from the background BS are in the higher BS. Marry Wallace states patient is compliant with diet as she cooks his meals. Patient does have back pain. The Percocet does not completely rid patient of pain but provides some relief. Marry Wallace and patient unaware of the [address] agency by name. Marry Wallace declined assistance to schedule PCP f/u appointment. Medications reviewed and updated in Epic.  This

## 2019-06-14 ENCOUNTER — CARE COORDINATION (OUTPATIENT)
Dept: CARE COORDINATION | Age: 64
End: 2019-06-14

## 2019-06-14 NOTE — CARE COORDINATION
Dr Elba Salvador received following message:  Cady Schwartz from [address] is calling to let Dr. Elba Salvador know the patient has been cancelling his PT, OT, and skilled nursing visits all week says he doesn't feel good but will not let them come in to check him out      Please advise   # [phone]    And he responded:  Please let patient know if he feels bad he should be evaluated in the office or the emergency room. He also asked me to call Jeanna--call placed to her. Informed her about MD recommendation. Also informed her that pt typically refuses [place] [address] services and he is noncompliant with care.   [place] [address] agency will continue to call me weekly with pt report and [address] refusal.     Mad[place] DENIS

## 2019-06-19 ENCOUNTER — CARE COORDINATION (OUTPATIENT)
Dept: CARE COORDINATION | Age: 64
End: 2019-06-19

## 2019-06-21 ENCOUNTER — HOSPITAL ENCOUNTER (INPATIENT)
Age: 64
LOS: 3 days | Discharge: NURSING, REHAB OR LONG-TERM CARE | DRG: 552 | End: 2019-06-25
Attending: EMERGENCY MEDICINE | Admitting: FAMILY MEDICINE
Payer: MEDICARE

## 2019-06-21 ENCOUNTER — APPOINTMENT (OUTPATIENT)
Dept: CT IMAGING | Age: 64
DRG: 552 | End: 2019-06-21
Payer: MEDICARE

## 2019-06-21 DIAGNOSIS — S32.020S COMPRESSION FRACTURE OF L2, SEQUELA: ICD-10-CM

## 2019-06-21 DIAGNOSIS — M54.50 CHRONIC MIDLINE LOW BACK PAIN WITHOUT SCIATICA: Primary | ICD-10-CM

## 2019-06-21 DIAGNOSIS — G62.9 NEUROPATHY: ICD-10-CM

## 2019-06-21 DIAGNOSIS — M54.9 INTRACTABLE BACK PAIN: ICD-10-CM

## 2019-06-21 DIAGNOSIS — M54.10 BACK PAIN WITH RADICULOPATHY: ICD-10-CM

## 2019-06-21 DIAGNOSIS — G89.29 CHRONIC MIDLINE LOW BACK PAIN WITHOUT SCIATICA: Primary | ICD-10-CM

## 2019-06-21 LAB
ANION GAP SERPL CALCULATED.3IONS-SCNC: 15 MMOL/L (ref 3–16)
BASOPHILS ABSOLUTE: 0.1 K/UL (ref 0–0.2)
BASOPHILS RELATIVE PERCENT: 1.2 %
BUN BLDV-MCNC: 10 MG/DL (ref 7–20)
CALCIUM SERPL-MCNC: 9.5 MG/DL (ref 8.3–10.6)
CHLORIDE BLD-SCNC: 95 MMOL/L (ref 99–110)
CO2: 20 MMOL/L (ref 21–32)
CREAT SERPL-MCNC: 1 MG/DL (ref 0.8–1.3)
EOSINOPHILS ABSOLUTE: 0.1 K/UL (ref 0–0.6)
EOSINOPHILS RELATIVE PERCENT: 1.2 %
GFR AFRICAN AMERICAN: >60
GFR NON-AFRICAN AMERICAN: >60
GLUCOSE BLD-MCNC: 253 MG/DL (ref 70–99)
HCT VFR BLD CALC: 44.5 % (ref 40.5–52.5)
HEMOGLOBIN: 15.3 G/DL (ref 13.5–17.5)
LYMPHOCYTES ABSOLUTE: 2 K/UL (ref 1–5.1)
LYMPHOCYTES RELATIVE PERCENT: 21.1 %
MCH RBC QN AUTO: 32.8 PG (ref 26–34)
MCHC RBC AUTO-ENTMCNC: 34.5 G/DL (ref 31–36)
MCV RBC AUTO: 95.1 FL (ref 80–100)
MONOCYTES ABSOLUTE: 0.6 K/UL (ref 0–1.3)
MONOCYTES RELATIVE PERCENT: 6.7 %
NEUTROPHILS ABSOLUTE: 6.5 K/UL (ref 1.7–7.7)
NEUTROPHILS RELATIVE PERCENT: 69.8 %
PDW BLD-RTO: 13.1 % (ref 12.4–15.4)
PLATELET # BLD: 223 K/UL (ref 135–450)
PMV BLD AUTO: 9.3 FL (ref 5–10.5)
POTASSIUM SERPL-SCNC: 3.3 MMOL/L (ref 3.5–5.1)
RBC # BLD: 4.68 M/UL (ref 4.2–5.9)
SODIUM BLD-SCNC: 130 MMOL/L (ref 136–145)
WBC # BLD: 9.3 K/UL (ref 4–11)

## 2019-06-21 PROCEDURE — 99285 EMERGENCY DEPT VISIT HI MDM: CPT

## 2019-06-21 PROCEDURE — 96374 THER/PROPH/DIAG INJ IV PUSH: CPT

## 2019-06-21 PROCEDURE — 72131 CT LUMBAR SPINE W/O DYE: CPT

## 2019-06-21 PROCEDURE — 6360000002 HC RX W HCPCS: Performed by: EMERGENCY MEDICINE

## 2019-06-21 PROCEDURE — 80048 BASIC METABOLIC PNL TOTAL CA: CPT

## 2019-06-21 PROCEDURE — 96361 HYDRATE IV INFUSION ADD-ON: CPT

## 2019-06-21 PROCEDURE — 6370000000 HC RX 637 (ALT 250 FOR IP): Performed by: EMERGENCY MEDICINE

## 2019-06-21 PROCEDURE — 85025 COMPLETE CBC W/AUTO DIFF WBC: CPT

## 2019-06-21 PROCEDURE — 2580000003 HC RX 258: Performed by: EMERGENCY MEDICINE

## 2019-06-21 PROCEDURE — G0378 HOSPITAL OBSERVATION PER HR: HCPCS

## 2019-06-21 RX ORDER — OXYMETAZOLINE HYDROCHLORIDE 0.05 G/100ML
2 SPRAY NASAL 3 TIMES DAILY
Status: ON HOLD | COMMUNITY
End: 2019-12-15

## 2019-06-21 RX ORDER — OXYCODONE HYDROCHLORIDE AND ACETAMINOPHEN 5; 325 MG/1; MG/1
2 TABLET ORAL EVERY 4 HOURS PRN
Status: DISCONTINUED | OUTPATIENT
Start: 2019-06-21 | End: 2019-06-25 | Stop reason: HOSPADM

## 2019-06-21 RX ORDER — OXYCODONE HYDROCHLORIDE AND ACETAMINOPHEN 5; 325 MG/1; MG/1
1 TABLET ORAL EVERY 4 HOURS PRN
Status: DISCONTINUED | OUTPATIENT
Start: 2019-06-21 | End: 2019-06-25 | Stop reason: HOSPADM

## 2019-06-21 RX ORDER — ASPIRIN 81 MG/1
81 TABLET, CHEWABLE ORAL DAILY
Status: DISCONTINUED | OUTPATIENT
Start: 2019-06-22 | End: 2019-06-25 | Stop reason: HOSPADM

## 2019-06-21 RX ORDER — GABAPENTIN 300 MG/1
600 CAPSULE ORAL 3 TIMES DAILY
Status: DISCONTINUED | OUTPATIENT
Start: 2019-06-22 | End: 2019-06-23 | Stop reason: DRUGHIGH

## 2019-06-21 RX ORDER — SODIUM CHLORIDE 0.9 % (FLUSH) 0.9 %
10 SYRINGE (ML) INJECTION PRN
Status: DISCONTINUED | OUTPATIENT
Start: 2019-06-21 | End: 2019-06-25 | Stop reason: HOSPADM

## 2019-06-21 RX ORDER — INSULIN GLARGINE 100 [IU]/ML
20 INJECTION, SOLUTION SUBCUTANEOUS NIGHTLY
Status: DISCONTINUED | OUTPATIENT
Start: 2019-06-22 | End: 2019-06-25 | Stop reason: HOSPADM

## 2019-06-21 RX ORDER — ZOLPIDEM TARTRATE 5 MG/1
5 TABLET ORAL NIGHTLY PRN
Status: DISCONTINUED | OUTPATIENT
Start: 2019-06-21 | End: 2019-06-25 | Stop reason: HOSPADM

## 2019-06-21 RX ORDER — DEXTROSE MONOHYDRATE 25 G/50ML
12.5 INJECTION, SOLUTION INTRAVENOUS PRN
Status: DISCONTINUED | OUTPATIENT
Start: 2019-06-21 | End: 2019-06-25 | Stop reason: HOSPADM

## 2019-06-21 RX ORDER — OXYCODONE HYDROCHLORIDE AND ACETAMINOPHEN 5; 325 MG/1; MG/1
1 TABLET ORAL ONCE
Status: COMPLETED | OUTPATIENT
Start: 2019-06-21 | End: 2019-06-21

## 2019-06-21 RX ORDER — METOPROLOL SUCCINATE 50 MG/1
100 TABLET, EXTENDED RELEASE ORAL DAILY
Status: DISCONTINUED | OUTPATIENT
Start: 2019-06-22 | End: 2019-06-25 | Stop reason: HOSPADM

## 2019-06-21 RX ORDER — TRAZODONE HYDROCHLORIDE 50 MG/1
50 TABLET ORAL NIGHTLY
Status: DISCONTINUED | OUTPATIENT
Start: 2019-06-22 | End: 2019-06-25 | Stop reason: HOSPADM

## 2019-06-21 RX ORDER — SODIUM CHLORIDE 0.9 % (FLUSH) 0.9 %
10 SYRINGE (ML) INJECTION EVERY 12 HOURS SCHEDULED
Status: DISCONTINUED | OUTPATIENT
Start: 2019-06-22 | End: 2019-06-25 | Stop reason: HOSPADM

## 2019-06-21 RX ORDER — ATORVASTATIN CALCIUM 80 MG/1
80 TABLET, FILM COATED ORAL DAILY
Status: DISCONTINUED | OUTPATIENT
Start: 2019-06-22 | End: 2019-06-25 | Stop reason: HOSPADM

## 2019-06-21 RX ORDER — ONDANSETRON 2 MG/ML
4 INJECTION INTRAMUSCULAR; INTRAVENOUS EVERY 6 HOURS PRN
Status: DISCONTINUED | OUTPATIENT
Start: 2019-06-21 | End: 2019-06-25 | Stop reason: HOSPADM

## 2019-06-21 RX ORDER — LOSARTAN POTASSIUM 50 MG/1
50 TABLET ORAL DAILY
Status: DISCONTINUED | OUTPATIENT
Start: 2019-06-22 | End: 2019-06-25 | Stop reason: HOSPADM

## 2019-06-21 RX ORDER — CLOPIDOGREL BISULFATE 75 MG/1
75 TABLET ORAL DAILY
Status: DISCONTINUED | OUTPATIENT
Start: 2019-06-22 | End: 2019-06-25 | Stop reason: HOSPADM

## 2019-06-21 RX ORDER — OXYCODONE HYDROCHLORIDE AND ACETAMINOPHEN 5; 325 MG/1; MG/1
1 TABLET ORAL ONCE
Status: DISCONTINUED | OUTPATIENT
Start: 2019-06-22 | End: 2019-06-21

## 2019-06-21 RX ORDER — NICOTINE 21 MG/24HR
1 PATCH, TRANSDERMAL 24 HOURS TRANSDERMAL DAILY
Status: DISCONTINUED | OUTPATIENT
Start: 2019-06-22 | End: 2019-06-25 | Stop reason: HOSPADM

## 2019-06-21 RX ORDER — POLYETHYLENE GLYCOL 3350 17 G/17G
17 POWDER, FOR SOLUTION ORAL DAILY PRN
Status: DISCONTINUED | OUTPATIENT
Start: 2019-06-21 | End: 2019-06-25 | Stop reason: HOSPADM

## 2019-06-21 RX ORDER — NICOTINE POLACRILEX 4 MG
15 LOZENGE BUCCAL PRN
Status: DISCONTINUED | OUTPATIENT
Start: 2019-06-21 | End: 2019-06-25 | Stop reason: HOSPADM

## 2019-06-21 RX ORDER — MORPHINE SULFATE 2 MG/ML
2 INJECTION, SOLUTION INTRAMUSCULAR; INTRAVENOUS ONCE
Status: COMPLETED | OUTPATIENT
Start: 2019-06-21 | End: 2019-06-21

## 2019-06-21 RX ORDER — OXYMETAZOLINE HYDROCHLORIDE 0.05 G/100ML
2 SPRAY NASAL 2 TIMES DAILY PRN
Status: ACTIVE | OUTPATIENT
Start: 2019-06-21 | End: 2019-06-24

## 2019-06-21 RX ORDER — 0.9 % SODIUM CHLORIDE 0.9 %
500 INTRAVENOUS SOLUTION INTRAVENOUS ONCE
Status: COMPLETED | OUTPATIENT
Start: 2019-06-21 | End: 2019-06-21

## 2019-06-21 RX ORDER — 0.9 % SODIUM CHLORIDE 0.9 %
500 INTRAVENOUS SOLUTION INTRAVENOUS ONCE
Status: DISCONTINUED | OUTPATIENT
Start: 2019-06-22 | End: 2019-06-21

## 2019-06-21 RX ORDER — ACETAMINOPHEN 325 MG/1
650 TABLET ORAL EVERY 4 HOURS PRN
Status: DISCONTINUED | OUTPATIENT
Start: 2019-06-21 | End: 2019-06-25 | Stop reason: HOSPADM

## 2019-06-21 RX ORDER — PANTOPRAZOLE SODIUM 40 MG/1
40 TABLET, DELAYED RELEASE ORAL
Status: DISCONTINUED | OUTPATIENT
Start: 2019-06-22 | End: 2019-06-25 | Stop reason: HOSPADM

## 2019-06-21 RX ORDER — RANOLAZINE 500 MG/1
500 TABLET, EXTENDED RELEASE ORAL 2 TIMES DAILY
Status: DISCONTINUED | OUTPATIENT
Start: 2019-06-22 | End: 2019-06-25 | Stop reason: HOSPADM

## 2019-06-21 RX ORDER — MORPHINE SULFATE 2 MG/ML
2 INJECTION, SOLUTION INTRAMUSCULAR; INTRAVENOUS ONCE
Status: DISCONTINUED | OUTPATIENT
Start: 2019-06-22 | End: 2019-06-21

## 2019-06-21 RX ORDER — FUROSEMIDE 40 MG/1
40 TABLET ORAL DAILY
Status: DISCONTINUED | OUTPATIENT
Start: 2019-06-22 | End: 2019-06-25 | Stop reason: HOSPADM

## 2019-06-21 RX ORDER — DEXTROSE MONOHYDRATE 50 MG/ML
100 INJECTION, SOLUTION INTRAVENOUS PRN
Status: DISCONTINUED | OUTPATIENT
Start: 2019-06-21 | End: 2019-06-25 | Stop reason: HOSPADM

## 2019-06-21 RX ORDER — ACETAMINOPHEN 325 MG/1
650 TABLET ORAL EVERY 6 HOURS PRN
Status: ON HOLD | COMMUNITY
End: 2022-04-26 | Stop reason: HOSPADM

## 2019-06-21 RX ORDER — LORAZEPAM 2 MG/ML
1 INJECTION INTRAMUSCULAR NIGHTLY PRN
Status: DISCONTINUED | OUTPATIENT
Start: 2019-06-21 | End: 2019-06-25 | Stop reason: HOSPADM

## 2019-06-21 RX ADMIN — OXYCODONE HYDROCHLORIDE AND ACETAMINOPHEN 1 TABLET: 5; 325 TABLET ORAL at 19:29

## 2019-06-21 RX ADMIN — SODIUM CHLORIDE 500 ML: 9 INJECTION, SOLUTION INTRAVENOUS at 20:33

## 2019-06-21 RX ADMIN — MORPHINE SULFATE 2 MG: 2 INJECTION, SOLUTION INTRAMUSCULAR; INTRAVENOUS at 20:33

## 2019-06-21 ASSESSMENT — PAIN DESCRIPTION - LOCATION
LOCATION: BACK

## 2019-06-21 ASSESSMENT — PAIN DESCRIPTION - PAIN TYPE
TYPE: CHRONIC PAIN;ACUTE PAIN
TYPE: ACUTE PAIN
TYPE: ACUTE PAIN;CHRONIC PAIN
TYPE: ACUTE PAIN

## 2019-06-21 ASSESSMENT — PAIN SCALES - GENERAL
PAINLEVEL_OUTOF10: 10
PAINLEVEL_OUTOF10: 10
PAINLEVEL_OUTOF10: 9
PAINLEVEL_OUTOF10: 6
PAINLEVEL_OUTOF10: 8
PAINLEVEL_OUTOF10: 9

## 2019-06-21 ASSESSMENT — PAIN DESCRIPTION - ONSET
ONSET: PROGRESSIVE
ONSET: ON-GOING
ONSET: ON-GOING
ONSET: PROGRESSIVE

## 2019-06-21 ASSESSMENT — PAIN DESCRIPTION - PROGRESSION
CLINICAL_PROGRESSION: GRADUALLY WORSENING
CLINICAL_PROGRESSION: GRADUALLY WORSENING
CLINICAL_PROGRESSION: GRADUALLY IMPROVING
CLINICAL_PROGRESSION: GRADUALLY WORSENING

## 2019-06-21 ASSESSMENT — PAIN - FUNCTIONAL ASSESSMENT
PAIN_FUNCTIONAL_ASSESSMENT: PREVENTS OR INTERFERES SOME ACTIVE ACTIVITIES AND ADLS

## 2019-06-21 ASSESSMENT — PAIN DESCRIPTION - FREQUENCY
FREQUENCY: CONTINUOUS

## 2019-06-21 ASSESSMENT — PAIN DESCRIPTION - DESCRIPTORS
DESCRIPTORS: BURNING;PATIENT UNABLE TO DESCRIBE
DESCRIPTORS: BURNING;PATIENT UNABLE TO DESCRIBE
DESCRIPTORS: PATIENT UNABLE TO DESCRIBE
DESCRIPTORS: PATIENT UNABLE TO DESCRIBE;BURNING

## 2019-06-21 ASSESSMENT — PAIN DESCRIPTION - ORIENTATION
ORIENTATION: LOWER

## 2019-06-21 ASSESSMENT — PAIN DESCRIPTION - DIRECTION: RADIATING_TOWARDS: RADIATING DOWN LEFT LEG

## 2019-06-21 NOTE — ED PROVIDER NOTES
02/07/2017     Family History   Problem Relation Age of Onset    Diabetes Father     Coronary Art Dis Father     Cancer Mother         Lung with mets     Social History     Socioeconomic History    Marital status:      Spouse name: Not on file    Number of children: 1    Years of education: Not on file    Highest education level: Not on file   Occupational History    Occupation: disabled   Social Needs    Financial resource strain: Not on file    Food insecurity:     Worry: Not on file     Inability: Not on file   [email or web address] needs:     Medical: Not on file     Non-medical: Not on file   Tobacco Use    Smoking status: Current Every Day Smoker     Packs/day: 1.00     Years: 44.00     Pack years: 44.00     Types: Cigarettes    Smokeless tobacco: Never Used    Tobacco comment: urged to stop   Substance and Sexual Activity    Alcohol use: No     Alcohol/week: 0.0 oz    Drug use: No    Sexual activity: Yes     Partners: Female   Lifestyle    Physical activity:     Days per week: Not on file     Minutes per session: Not on file    Stress: Not on file   Relationships    Social connections:     Talks on phone: Not on file     Gets together: Not on file     Attends Denominational service: Not on file     Active member of club or organization: Not on file     Attends meetings of clubs or organizations: Not on file     Relationship status: Not on file    Intimate partner violence:     Fear of current or ex partner: Not on file     Emotionally abused: Not on file     Physically abused: Not on file     Forced sexual activity: Not on file   Other Topics Concern    Not on file   Social History Narrative    Not on file     Current Facility-Administered Medications   Medication Dose Route Frequency Provider Last Rate Last Dose    oxyCODONE-acetaminophen (PERCOCET) 5-325 MG per tablet 1 tablet  1 tablet Oral Once Mili Ruvalcaba MD         Current Outpatient Medications   Medication Sig Dispense Refill  traZODone (DESYREL) 50 MG tablet TAKE 1 TABLET BY MOUTH EVERY NIGHT 90 tablet 0    gabapentin (NEURONTIN) 600 MG tablet TAKE 1 TABLET BY MOUTH FIVE TIMES DAILY 150 tablet 1    oxyCODONE-acetaminophen (PERCOCET)  MG per tablet Take 1 tablet by mouth 2 times daily as needed for Pain for up to 30 days.  60 tablet 0    ranolazine (RANEXA) 500 MG extended release tablet Take 1 tablet by mouth 2 times daily 60 tablet 3    isosorbide mononitrate (IMDUR) 30 MG extended release tablet Take 3 tablets by mouth 2 times daily 180 tablet 0    pantoprazole (PROTONIX) 40 MG tablet Take 1 tablet by mouth every morning (before breakfast) 30 tablet 3    clopidogrel (PLAVIX) 75 MG tablet TAKE 1 TABLET BY MOUTH DAILY 90 tablet 3    furosemide (LASIX) 40 MG tablet Take 1 tablet by mouth daily 90 tablet 1    Insulin Pen Needle 31G X 5 MM MISC 1 each by Does not apply route daily 100 each 3    losartan (COZAAR) 50 MG tablet Take 1 tablet by mouth daily 30 tablet 3    nitroGLYCERIN (NITROSTAT) 0.4 MG SL tablet PLACE 1 TABLET UNDER THE TONGUE EVERY 5 MINUTES AS NEEDED FOR CHEST PAIN 25 tablet 1    albuterol sulfate  (90 Base) MCG/ACT inhaler Inhale 2 puffs into the lungs every 6 hours as needed for Wheezing      aspirin 81 MG tablet Take 81 mg by mouth daily      insulin glargine (LANTUS) 100 UNIT/ML injection pen Inject 28 Units into the skin nightly (Patient taking differently: Inject 20 Units into the skin nightly ) 5 pen 2    insulin lispro (HUMALOG KWIKPEN) 100 UNIT/ML pen Inject 5 Units into the skin 3 times daily (before meals) (Patient taking differently: Inject 8 Units into the skin 3 times daily (before meals) ) 5 pen 3    amLODIPine (NORVASC) 5 MG tablet TAKE 1 TABLET BY MOUTH DAILY 90 tablet 3    Lancets MISC accu check fastclick lancets  Dx: R70.2  As needed 120 each 5    FREESTYLE LANCETS MISC 1 each by Does not apply route daily 100 each 0    atorvastatin (LIPITOR) 80 MG tablet TAKE 1 TABLET BY MOUTH DAILY 90 tablet 1    metoprolol succinate (TOPROL XL) 100 MG extended release tablet Take 1 tablet by mouth daily 30 tablet 1     Allergies   Allergen Reactions    Dopamine Hcl Other (See Comments)     Compulsive gambling    Tramadol Other (See Comments)     Dizziness     Melatonin Other (See Comments)     Restless leg syndrome         [unfilled]    Nursing Notes Reviewed    Physical Exam:  There were no vitals filed for this visit. GENERAL APPEARANCE: Awake and alert. Cooperative. No acute distress. HEAD: Normocephalic. Atraumatic. EYES: EOM's grossly intact. Sclera anicteric. ENT: Mucous membranes are moist. Tolerates saliva. No trismus. NECK: Supple. No meningismus. Trachea midline. HEART: RRR. Radial pulses 2+. LUNGS: Respirations unlabored. CTAB  ABDOMEN: Soft. Non-tender. No guarding or rebound. EXTREMITIES: No acute deformities. SKIN: Warm and dry. Well-healed surgical site to lumbar spine  NEUROLOGICAL: No gross facial drooping. Moves all 4 extremities spontaneously. PSYCHIATRIC: Normal mood. I have reviewed and interpreted all of the currently available lab results from this visit (if applicable):  No results found for this visit on 06/21/19. Radiographs (if obtained):  [] The following radiograph was interpreted by myself in the absence of a radiologist:  [x] Radiologist's Report Reviewed:     CT LUMBAR SPINE [address] (Final result)   Result time 06/21/19 19:27:41   Final result by Todd Almanzar MD (06/21/19 19:27:41)                Impression:    1. No acute findings in the lumbar spine.   2. Persistent ill-defined lesion in the right erector spinae musculature  adjacent to a right L3 hemilaminectomy defect, although previously seen gas  within the lesion is now resolved.  This could represent a postoperative  hematoma, seroma, or abscess.  A postoperative meningocele is considered less  likely given no opacification on the prior myelogram.  Consider further  evaluation MRI or a contrast-enhanced CT. 3. Mild lumbar spine degenerative changes. 4. Areas of mild to moderate spinal canal stenosis and neural foraminal  narrowing, most severe at L3/L4. 5. Bony demineralization. Narrative:    EXAMINATION:  CT OF THE LUMBAR SPINE WITHOUT CONTRAST    6/21/2019 6:57 pm    TECHNIQUE:  CT of the lumbar spine was performed without the administration of  intravenous contrast. Multiplanar reformatted images are provided for review. Dose modulation, iterative reconstruction, and/or weight based adjustment of  the mA/kV was utilized to reduce the radiation dose to as low as reasonably  achievable. COMPARISON:  Lumbar spine CT myelogram 05/24/2019    HISTORY:  ORDERING SYSTEM PROVIDED HISTORY: chronic back pain  TECHNOLOGIST PROVIDED HISTORY:  Reason for exam:->chronic back pain  Ordering Physician Provided Reason for Exam: chronic back pain;  Acuity: Chronic  Relevant Medical/Surgical History: hx hypertension, back surgery    FINDINGS:  BONES/ALIGNMENT:  Changes of right hemilaminectomy at L3.  Diffuse osseous  demineralization. No acute fracture.  Unchanged chronic compression deformity  involving the L2 superior endplate with mild height loss.  Straightening of  lumbar lordosis.  No spondylolisthesis. DEGENERATIVE CHANGES:  Minimal to mild degenerative disc disease.  Mild to  moderate facet arthropathy.  Diffuse disc bulges in conjunction with  ligamentous and facet hypertrophy, resulting in moderate spinal canal  stenosis at L3/L4 with mild involvement at L2/L3 and L4/L5 and minimal  involvement at other levels.  Moderate bilateral neural foraminal narrowing  at L3/L4 with mild involvement at other levels.     SOFT TISSUES:  Persistence of an ill-defined lesion in the right erector  spinae musculature adjacent to the right L3 hemilaminectomy defect,  demonstrating a density greater than that of simple fluid but without  previously seen gas.  Surgically absent gallbladder.  No intrahepatic nor  extrahepatic biliary dilation.  Mild to moderate pancreatic atrophy. Bilateral adrenal adenomas, measuring up to 2.5 cm x 1.5 cm.  Simple  appearing exophytic cyst arising from the posterior left kidney, measuring up  to at least 7.7 cm (likely Bosniak category 1). EKG (if obtained): (All EKG's are interpreted by myself in the absence of a cardiologist)  Initial EKG on my interpretation shows n/a      MDM:  Differential diagnosis: Chronic back pain, fracture, dislocation, ligament injury, tendon injury, cauda equina    Labs show elevated glucose and elevated CO2 so IV fluids started. The patient was given oral medication which did not control his pain so he was advanced to IV morphine. He will be admitted for pain control as he is failing outpatient management with oral agent. At this point I do not believe there is an acute neurosurgical emergency that requires transfer and he was admitted for the same pathology to this hospital in late May. Admit to Dr. Yelitza Genao records reviewed. Labs and imaging reviewed and results discussed with patient. .        Patient was given scripts for the following medications. I counseled patient how to take these medications. New Prescriptions    No medications on file         CRITICAL CARE TIME   Total Critical Care time was 0 minutes, excluding separately reportable procedures. There was a high probability of clinically significant/life threatening deterioration in the patient's condition which required my urgent intervention.       Clinical Impression  Acute exacerbation of chronic back pain  (Please note that portions of this note may have been completed with a voice recognition program. Efforts were made to edit the dictations but occasionally words are mis-transcribed.)    MD Mayo Barr MD  06/21/19 [id number]

## 2019-06-22 PROBLEM — M54.9 INTRACTABLE BACK PAIN: Status: ACTIVE | Noted: 2019-06-22

## 2019-06-22 LAB
ANION GAP SERPL CALCULATED.3IONS-SCNC: 12 MMOL/L (ref 3–16)
BUN BLDV-MCNC: 12 MG/DL (ref 7–20)
CALCIUM SERPL-MCNC: 8.6 MG/DL (ref 8.3–10.6)
CHLORIDE BLD-SCNC: 101 MMOL/L (ref 99–110)
CO2: 21 MMOL/L (ref 21–32)
CREAT SERPL-MCNC: 1.4 MG/DL (ref 0.8–1.3)
GFR AFRICAN AMERICAN: >60
GFR NON-AFRICAN AMERICAN: 51
GLUCOSE BLD-MCNC: 120 MG/DL (ref 70–99)
GLUCOSE BLD-MCNC: 152 MG/DL (ref 70–99)
GLUCOSE BLD-MCNC: 153 MG/DL (ref 70–99)
GLUCOSE BLD-MCNC: 165 MG/DL (ref 70–99)
GLUCOSE BLD-MCNC: 89 MG/DL (ref 70–99)
GLUCOSE BLD-MCNC: 96 MG/DL (ref 70–99)
HCT VFR BLD CALC: 41.2 % (ref 40.5–52.5)
HEMOGLOBIN: 13.8 G/DL (ref 13.5–17.5)
MAGNESIUM: 2.3 MG/DL (ref 1.8–2.4)
MCH RBC QN AUTO: 32.1 PG (ref 26–34)
MCHC RBC AUTO-ENTMCNC: 33.6 G/DL (ref 31–36)
MCV RBC AUTO: 95.7 FL (ref 80–100)
PDW BLD-RTO: 13.2 % (ref 12.4–15.4)
PERFORMED ON: ABNORMAL
PERFORMED ON: NORMAL
PERFORMED ON: NORMAL
PLATELET # BLD: 206 K/UL (ref 135–450)
PMV BLD AUTO: 9.6 FL (ref 5–10.5)
POTASSIUM REFLEX MAGNESIUM: 3.3 MMOL/L (ref 3.5–5.1)
RBC # BLD: 4.3 M/UL (ref 4.2–5.9)
SODIUM BLD-SCNC: 134 MMOL/L (ref 136–145)
WBC # BLD: 8.5 K/UL (ref 4–11)

## 2019-06-22 PROCEDURE — 6370000000 HC RX 637 (ALT 250 FOR IP): Performed by: INTERNAL MEDICINE

## 2019-06-22 PROCEDURE — 85027 COMPLETE CBC AUTOMATED: CPT

## 2019-06-22 PROCEDURE — 2580000003 HC RX 258: Performed by: INTERNAL MEDICINE

## 2019-06-22 PROCEDURE — 6360000002 HC RX W HCPCS: Performed by: INTERNAL MEDICINE

## 2019-06-22 PROCEDURE — G0378 HOSPITAL OBSERVATION PER HR: HCPCS

## 2019-06-22 PROCEDURE — 80048 BASIC METABOLIC PNL TOTAL CA: CPT

## 2019-06-22 PROCEDURE — 94760 N-INVAS EAR/PLS OXIMETRY 1: CPT

## 2019-06-22 PROCEDURE — 83735 ASSAY OF MAGNESIUM: CPT

## 2019-06-22 PROCEDURE — 1200000000 HC SEMI PRIVATE

## 2019-06-22 PROCEDURE — 36415 COLL VENOUS BLD VENIPUNCTURE: CPT

## 2019-06-22 RX ORDER — SODIUM CHLORIDE 9 MG/ML
INJECTION, SOLUTION INTRAVENOUS CONTINUOUS
Status: DISCONTINUED | OUTPATIENT
Start: 2019-06-22 | End: 2019-06-24

## 2019-06-22 RX ADMIN — HYDROMORPHONE HYDROCHLORIDE 1 MG: 1 INJECTION, SOLUTION INTRAMUSCULAR; INTRAVENOUS; SUBCUTANEOUS at 17:42

## 2019-06-22 RX ADMIN — ENOXAPARIN SODIUM 40 MG: 40 INJECTION SUBCUTANEOUS at 08:30

## 2019-06-22 RX ADMIN — INSULIN LISPRO 2 UNITS: 100 INJECTION, SOLUTION INTRAVENOUS; SUBCUTANEOUS at 08:29

## 2019-06-22 RX ADMIN — OXYCODONE HYDROCHLORIDE AND ACETAMINOPHEN 2 TABLET: 5; 325 TABLET ORAL at 02:47

## 2019-06-22 RX ADMIN — HYDROMORPHONE HYDROCHLORIDE 1 MG: 1 INJECTION, SOLUTION INTRAMUSCULAR; INTRAVENOUS; SUBCUTANEOUS at 12:24

## 2019-06-22 RX ADMIN — HYDROMORPHONE HYDROCHLORIDE 1 MG: 1 INJECTION, SOLUTION INTRAMUSCULAR; INTRAVENOUS; SUBCUTANEOUS at 00:04

## 2019-06-22 RX ADMIN — PANTOPRAZOLE SODIUM 40 MG: 40 TABLET, DELAYED RELEASE ORAL at 10:06

## 2019-06-22 RX ADMIN — RANOLAZINE 500 MG: 500 TABLET, FILM COATED, EXTENDED RELEASE ORAL at 08:31

## 2019-06-22 RX ADMIN — FUROSEMIDE 40 MG: 40 TABLET ORAL at 09:34

## 2019-06-22 RX ADMIN — SODIUM CHLORIDE, PRESERVATIVE FREE 10 ML: 5 INJECTION INTRAVENOUS at 21:57

## 2019-06-22 RX ADMIN — INSULIN GLARGINE 20 UNITS: 100 INJECTION, SOLUTION SUBCUTANEOUS at 00:54

## 2019-06-22 RX ADMIN — CLOPIDOGREL BISULFATE 75 MG: 75 TABLET ORAL at 08:31

## 2019-06-22 RX ADMIN — ISOSORBIDE MONONITRATE 90 MG: 60 TABLET, EXTENDED RELEASE ORAL at 21:10

## 2019-06-22 RX ADMIN — METOPROLOL SUCCINATE 100 MG: 50 TABLET, FILM COATED, EXTENDED RELEASE ORAL at 08:31

## 2019-06-22 RX ADMIN — TRAZODONE HYDROCHLORIDE 50 MG: 50 TABLET ORAL at 21:10

## 2019-06-22 RX ADMIN — TRAZODONE HYDROCHLORIDE 50 MG: 50 TABLET ORAL at 00:35

## 2019-06-22 RX ADMIN — GABAPENTIN 600 MG: 300 CAPSULE ORAL at 21:09

## 2019-06-22 RX ADMIN — HYDROMORPHONE HYDROCHLORIDE 1 MG: 1 INJECTION, SOLUTION INTRAMUSCULAR; INTRAVENOUS; SUBCUTANEOUS at 21:56

## 2019-06-22 RX ADMIN — LOSARTAN POTASSIUM 50 MG: 50 TABLET ORAL at 08:31

## 2019-06-22 RX ADMIN — POTASSIUM BICARBONATE 40 MEQ: 782 TABLET, EFFERVESCENT ORAL at 10:06

## 2019-06-22 RX ADMIN — GABAPENTIN 600 MG: 300 CAPSULE ORAL at 16:00

## 2019-06-22 RX ADMIN — HYDROMORPHONE HYDROCHLORIDE 1 MG: 1 INJECTION, SOLUTION INTRAMUSCULAR; INTRAVENOUS; SUBCUTANEOUS at 04:29

## 2019-06-22 RX ADMIN — RANOLAZINE 500 MG: 500 TABLET, FILM COATED, EXTENDED RELEASE ORAL at 00:35

## 2019-06-22 RX ADMIN — ASPIRIN 81 MG 81 MG: 81 TABLET ORAL at 08:31

## 2019-06-22 RX ADMIN — SODIUM CHLORIDE: 9 INJECTION, SOLUTION INTRAVENOUS at 10:06

## 2019-06-22 RX ADMIN — SODIUM CHLORIDE: 9 INJECTION, SOLUTION INTRAVENOUS at 19:55

## 2019-06-22 RX ADMIN — INSULIN LISPRO 1 UNITS: 100 INJECTION, SOLUTION INTRAVENOUS; SUBCUTANEOUS at 00:35

## 2019-06-22 RX ADMIN — INSULIN LISPRO 5 UNITS: 100 INJECTION, SOLUTION INTRAVENOUS; SUBCUTANEOUS at 12:24

## 2019-06-22 RX ADMIN — ISOSORBIDE MONONITRATE 90 MG: 60 TABLET, EXTENDED RELEASE ORAL at 08:31

## 2019-06-22 RX ADMIN — ATORVASTATIN CALCIUM 80 MG: 80 TABLET, FILM COATED ORAL at 08:30

## 2019-06-22 RX ADMIN — INSULIN GLARGINE 20 UNITS: 100 INJECTION, SOLUTION SUBCUTANEOUS at 21:59

## 2019-06-22 RX ADMIN — ZOLPIDEM TARTRATE 5 MG: 5 TABLET ORAL at 00:04

## 2019-06-22 RX ADMIN — HYDROMORPHONE HYDROCHLORIDE 1 MG: 1 INJECTION, SOLUTION INTRAMUSCULAR; INTRAVENOUS; SUBCUTANEOUS at 08:25

## 2019-06-22 RX ADMIN — ISOSORBIDE MONONITRATE 90 MG: 60 TABLET, EXTENDED RELEASE ORAL at 00:35

## 2019-06-22 RX ADMIN — INSULIN LISPRO 5 UNITS: 100 INJECTION, SOLUTION INTRAVENOUS; SUBCUTANEOUS at 17:34

## 2019-06-22 RX ADMIN — RANOLAZINE 500 MG: 500 TABLET, FILM COATED, EXTENDED RELEASE ORAL at 21:09

## 2019-06-22 RX ADMIN — LORAZEPAM 1 MG: 2 INJECTION INTRAMUSCULAR; INTRAVENOUS at 23:22

## 2019-06-22 RX ADMIN — GABAPENTIN 600 MG: 300 CAPSULE ORAL at 08:31

## 2019-06-22 ASSESSMENT — PAIN DESCRIPTION - DESCRIPTORS
DESCRIPTORS: ACHING;BURNING

## 2019-06-22 ASSESSMENT — PAIN DESCRIPTION - LOCATION
LOCATION: BACK

## 2019-06-22 ASSESSMENT — PAIN DESCRIPTION - FREQUENCY
FREQUENCY: CONTINUOUS

## 2019-06-22 ASSESSMENT — PAIN - FUNCTIONAL ASSESSMENT
PAIN_FUNCTIONAL_ASSESSMENT: PREVENTS OR INTERFERES SOME ACTIVE ACTIVITIES AND ADLS
PAIN_FUNCTIONAL_ASSESSMENT: PREVENTS OR INTERFERES WITH MANY ACTIVE NOT PASSIVE ACTIVITIES
PAIN_FUNCTIONAL_ASSESSMENT: PREVENTS OR INTERFERES SOME ACTIVE ACTIVITIES AND ADLS
PAIN_FUNCTIONAL_ASSESSMENT: ACTIVITIES ARE NOT PREVENTED
PAIN_FUNCTIONAL_ASSESSMENT: PREVENTS OR INTERFERES SOME ACTIVE ACTIVITIES AND ADLS

## 2019-06-22 ASSESSMENT — PAIN DESCRIPTION - ONSET
ONSET: ON-GOING

## 2019-06-22 ASSESSMENT — PAIN DESCRIPTION - PAIN TYPE
TYPE: ACUTE PAIN;CHRONIC PAIN
TYPE: CHRONIC PAIN
TYPE: ACUTE PAIN;CHRONIC PAIN
TYPE: CHRONIC PAIN
TYPE: ACUTE PAIN;CHRONIC PAIN
TYPE: CHRONIC PAIN
TYPE: CHRONIC PAIN
TYPE: ACUTE PAIN;CHRONIC PAIN
TYPE: ACUTE PAIN;CHRONIC PAIN
TYPE: CHRONIC PAIN
TYPE: ACUTE PAIN;CHRONIC PAIN
TYPE: CHRONIC PAIN

## 2019-06-22 ASSESSMENT — PAIN SCALES - GENERAL
PAINLEVEL_OUTOF10: 9
PAINLEVEL_OUTOF10: 0
PAINLEVEL_OUTOF10: 8
PAINLEVEL_OUTOF10: 3
PAINLEVEL_OUTOF10: 6
PAINLEVEL_OUTOF10: 8
PAINLEVEL_OUTOF10: 8
PAINLEVEL_OUTOF10: 7
PAINLEVEL_OUTOF10: 6
PAINLEVEL_OUTOF10: 7
PAINLEVEL_OUTOF10: 0
PAINLEVEL_OUTOF10: 7
PAINLEVEL_OUTOF10: 0
PAINLEVEL_OUTOF10: 6

## 2019-06-22 ASSESSMENT — PAIN DESCRIPTION - PROGRESSION

## 2019-06-22 ASSESSMENT — PAIN DESCRIPTION - DIRECTION
RADIATING_TOWARDS: DOWN LEFT LEG

## 2019-06-22 ASSESSMENT — PAIN DESCRIPTION - ORIENTATION
ORIENTATION: LOWER
ORIENTATION: LOWER
ORIENTATION: LEFT;LOWER;MID
ORIENTATION: LOWER
ORIENTATION: LOWER
ORIENTATION: LEFT;LOWER
ORIENTATION: LOWER
ORIENTATION: LEFT;LOWER;MID
ORIENTATION: LEFT;LOWER
ORIENTATION: LEFT;LOWER;MID
ORIENTATION: LEFT;LOWER
ORIENTATION: LOWER

## 2019-06-22 ASSESSMENT — PAIN SCALES - WONG BAKER: WONGBAKER_NUMERICALRESPONSE: 0

## 2019-06-22 NOTE — PROGRESS NOTES
Pt awake, resting quietly in bed, watching tv. Able to make needs known. PRN pain medication given for pain. Bed locked and in the lowest position. Call light within reach. Will continue to monitor.   Electronically signed by Carlita Uirarte RN on 6/22/2019 at 6:23 AM

## 2019-06-22 NOTE — PROGRESS NOTES
Pharmacy Medication Reconciliation Note     List of medications patient is currently taking is complete. Allergies:  Dopamine hcl; Tramadol; and Melatonin    Source of information:   1. Spoke with patient at bedside  2. Outside medication fill hx    Notes regarding home medications:   1. Patient verified all medications on profile  2. Stated he just started the trazodone, wasn't sure what it was for or if it was helping.      Denies taking any other OTC or herbal medications    Laury Mosquera, Pharmacy Intern  6/21/2019  8:33 PM

## 2019-06-22 NOTE — PROGRESS NOTES
Occupational Therapy    Bertrand Treadwell  [phone]  6/22/2019  1110    OT orders received and chart reviewed. Noted order for neurosurgery. Will defer OT eval until seen by neuro.     Electronically signed by MAYI Vera/L 21  on 6/22/2019 at 11:10 AM

## 2019-06-22 NOTE — ED NOTES
Report given to BRIDGET BEE [facility] on 3W. No further questions at this time.       Sandy Manuel RN  06/21/19 9148

## 2019-06-22 NOTE — PROGRESS NOTES
Hospitalist Progress Note      PCP: Roberto Meyers MD    Date of Admission: 6/21/2019        Subjective: still having low back pa[in with some improvement with iv pain medications, no fever, chills, LE numbness or weakness. Medications:  Reviewed    Infusion Medications    sodium chloride      dextrose       Scheduled Medications    potassium bicarb-citric acid  40 mEq Oral Once    sodium chloride flush  10 mL Intravenous 2 times per day    enoxaparin  40 mg Subcutaneous Daily    nicotine  1 patch Transdermal Daily    pantoprazole  40 mg Oral QAM AC    clopidogrel  75 mg Oral Daily    furosemide  40 mg Oral Daily    metoprolol succinate  100 mg Oral Daily    atorvastatin  80 mg Oral Daily    losartan  50 mg Oral Daily    insulin glargine  20 Units Subcutaneous Nightly    insulin lispro  5 Units Subcutaneous TID AC    aspirin  81 mg Oral Daily    isosorbide mononitrate  90 mg Oral BID    ranolazine  500 mg Oral BID    gabapentin  600 mg Oral TID    traZODone  50 mg Oral Nightly    insulin lispro  0-12 Units Subcutaneous TID WC    insulin lispro  0-6 Units Subcutaneous Nightly     PRN Meds: sodium chloride flush, ondansetron, acetaminophen, HYDROmorphone, oxyCODONE-acetaminophen **OR** oxyCODONE-acetaminophen, polyethylene glycol, zolpidem, LORazepam, oxymetazoline, glucose, dextrose, glucagon (rDNA), dextrose      Intake/Output Summary (Last 24 hours) at 6/22/2019 0924  Last data filed at 6/21/2019 2357  Gross per 24 hour   Intake 240 ml   Output --   Net 240 ml       Physical Exam Performed:    BP (!) 141/81   Pulse 79   Temp 97.5 °F (36.4 °C) (Oral)   Resp 14   Ht 6' (1.829 m)   Wt 219 lb 9.3 oz (99.6 kg)   SpO2 96%   BMI 29.78 kg/m²     General appearance: No apparent distress  Neck: Supple  Respiratory:  Normal respiratory effort. Clear to auscultation, bilaterally without Rales/Wheezes/Rhonchi.   Cardiovascular: Regular rate and rhythm with normal S1/S2 without murmurs, rubs or gallops. Abdomen: Soft, non-tender  Musculoskeletal: No clubbing, cyanosis. Low back tenderness   Skin: Skin color, texture, turgor normal.  No rashes or lesions. Neurologic:  No focal weakness   Psychiatric: Alert and oriented  Capillary Refill: Brisk,< 3 seconds   Peripheral Pulses: +2 palpable, equal bilaterally       Labs:   Recent Labs     06/21/19  1930 06/22/19  0609   WBC 9.3 8.5   HGB 15.3 13.8   HCT 44.5 41.2    206     Recent Labs     06/21/19  1929 06/22/19  0609   * 134*   K 3.3* 3.3*   CL 95* 101   CO2 20* 21   BUN 10 12   CREATININE 1.0 1.4*   CALCIUM 9.5 8.6     No results for input(s): AST, ALT, BILIDIR, BILITOT, ALKPHOS in the last 72 hours. No results for input(s): INR in the last 72 hours. No results for input(s): Dareen Serge in the last 72 hours. Urinalysis:      Lab Results   Component Value Date    NITRU Negative 11/26/2018    WBCUA 1 11/26/2018    RBCUA 1 11/26/2018    BLOODU Negative 11/26/2018    SPECGRAV >1.030 11/26/2018    GLUCOSEU 100 11/26/2018       Radiology:  CT LUMBAR SPINE WO CONTRAST   Final Result   1. No acute findings in the lumbar spine. 2. Persistent ill-defined lesion in the right erector spinae musculature   adjacent to a right L3 hemilaminectomy defect, although previously seen gas   within the lesion is now resolved. This could represent a postoperative   hematoma, seroma, or abscess. A postoperative meningocele is considered less   likely given no opacification on the prior myelogram.  Consider further   evaluation MRI or a contrast-enhanced CT. 3. Mild lumbar spine degenerative changes. 4. Areas of mild to moderate spinal canal stenosis and neural foraminal   narrowing, most severe at L3/L4. 5. Bony demineralization.                  Assessment/Plan:    Active Hospital Problems    Diagnosis    Ischemic cardiomyopathy [I25.5]     Priority: Medium    Essential hypertension [I10]     Priority: Medium    ICD (implantable cardioverter-defibrillator), dual, in situ [Z95.810]     Priority: Low    Intractable back pain [M54.9]    Low back pain [M54.5]    CAD (coronary artery disease) [I25.10]    COPD (chronic obstructive pulmonary disease) ([facility] [J44.9]    DMII (diabetes mellitus, type 2) (HCC) [E11.9]    Tobacco abuse [Z72.0]    CHF (congestive heart failure) ([facility] [I50.9]     1. intractable back pain being treated with po and iv pain medications, will consult NS.    2. DM II, continue lantus, humalog and add SSI. Titrate insulin as needed   3. LALITA, iv fluids, repeat BMP in am, if no improvement will consult nephrology.    4. HTN, controlled, continue home BP meds  5. CAD, continue ASA, plavix, imdur and statin   5. Tobacco use disorder, counseled for cessation.        Diet: DIET CARB CONTROL;  Code Status: Full Code      Sindi Salinas MD

## 2019-06-22 NOTE — H&P
PLACEMENT  2012    CORONARY ARTERY BYPASS GRAFT  2010, 11/2015    ENDOSCOPY, COLON, DIAGNOSTIC      PACEMAKER PLACEMENT      UPPER GASTROINTESTINAL ENDOSCOPY  02/07/2017       Medications Prior to Admission:      Prior to Admission medications    Medication Sig Start Date End Date Taking?  Authorizing Provider   acetaminophen (TYLENOL) 325 MG tablet Take 650 mg by mouth every 6 hours as needed for Pain   Yes Historical Provider, MD   oxymetazoline (AFRIN) 0.05 % nasal spray 2 sprays by Nasal route 2 times daily   Yes Historical Provider, MD   traZODone (DESYREL) 50 MG tablet TAKE 1 TABLET BY MOUTH EVERY NIGHT 6/6/19  Yes Kimberlee Bronson MD   gabapentin (NEURONTIN) 600 MG tablet TAKE 1 TABLET BY MOUTH FIVE TIMES DAILY 5/31/19 6/30/19 Yes Kimberlee Bronson MD   ranolazine (RANEXA) 500 MG extended release tablet Take 1 tablet by mouth 2 times daily 4/30/19  Yes TAYLA Smith CNP   isosorbide mononitrate (IMDUR) 30 MG extended release tablet Take 3 tablets by mouth 2 times daily 4/30/19 6/21/19 Yes TAYLA Smith CNP   pantoprazole (PROTONIX) 40 MG tablet Take 1 tablet by mouth every morning (before breakfast) 4/26/19  Yes Ayala Ho MD   clopidogrel (PLAVIX) 75 MG tablet TAKE 1 TABLET BY MOUTH DAILY 4/7/19  Yes Kimberlee Bronson MD   furosemide (LASIX) 40 MG tablet Take 1 tablet by mouth daily 10/11/18  Yes Kimberlee Bronson MD   losartan (COZAAR) 50 MG tablet Take 1 tablet by mouth daily 9/17/18  Yes Kimberlee Bronson MD   nitroGLYCERIN (NITROSTAT) 0.4 MG SL tablet PLACE 1 TABLET UNDER THE TONGUE EVERY 5 MINUTES AS NEEDED FOR CHEST PAIN 9/2/18  Yes Kimberlee Bronson MD   aspirin 81 MG tablet Take 81 mg by mouth daily   Yes Historical Provider, MD   insulin glargine (LANTUS) 100 UNIT/ML injection pen Inject 28 Units into the skin nightly  Patient taking differently: Inject 20 Units into the skin nightly  8/28/18  Yes Kimberlee Bronson MD   insulin lispro ([facility]) 100 UNIT/ML pen Inject 5 Units into the skin 3 times daily (before meals)  Patient taking differently: Inject 8 Units into the skin 3 times daily (before meals)  8/28/18  Yes Flynn Matamoros MD   amLODIPine (NORVASC) 5 MG tablet TAKE 1 TABLET BY MOUTH DAILY 7/12/18  Yes Flynn Matamoros MD   atorvastatin (LIPITOR) 80 MG tablet TAKE 1 TABLET BY MOUTH DAILY 2/15/18  Yes Flynn Matamoros MD   metoprolol succinate (TOPROL XL) 100 MG extended release tablet Take 1 tablet by mouth daily 1/12/18  Yes Dayanara Juares, APRN - CNP   oxyCODONE-acetaminophen (PERCOCET)  MG per tablet Take 1 tablet by mouth 2 times daily as needed for Pain for up to 30 days. 5/30/19 6/29/19  Flynn Matamoros MD   Insulin Pen Needle 31G X 5 MM MISC 1 each by Does not apply route daily 10/5/18   Flynn Matamoros MD   Lancets MISC accu check fastclick lancets  Dx: V85.4  As needed 5/4/18   Flynn Matamoros MD   FREESTYLE LANCETS MISC 1 each by Does not apply route daily 4/24/18   Promise Darnell MD       Allergies:  Dopamine hcl; Tramadol; and Melatonin    Social History:      The patient currently lives at home     TOBACCO:   reports that he has been smoking cigarettes. He has a 44.00 pack-year smoking history. He has never used smokeless tobacco.  ETOH:   reports that he does not drink alcohol. Family History:      Reviewed in detail and negative for DM, CAD, Cancer, CVA. Positive as follows:        Problem Relation Age of Onset    Diabetes Father     Coronary Art Dis Father     Cancer Mother         Lung with mets       REVIEW OF SYSTEMS:   Pertinent positives as noted in the HPI. All other systems reviewed and negative. PHYSICAL EXAM PERFORMED:    BP (!) 151/92   Pulse 66   Temp 99.1 °F (37.3 °C) (Oral)   Resp 16   Wt 224 lb 10.4 oz (101.9 kg)   SpO2 97%   BMI 30.47 kg/m²     General appearance:  No apparent distress, appears stated age and cooperative.   HEENT:  Normal cephalic, atraumatic without obvious deformity. Pupils equal, round, and reactive to light. Extra ocular muscles intact. Conjunctivae/corneas clear. Neck: Supple, with full range of motion. No jugular venous distention. Trachea midline. Respiratory:  Normal respiratory effort. Clear to auscultation, bilaterally without Rales/Wheezes/Rhonchi. Cardiovascular:  Regular rate and rhythm with normal S1/S2 without murmurs, rubs or gallops. Abdomen: Soft, non-tender, non-distended with normal bowel sounds. Musculoskeletal:  No clubbing, cyanosis or edema bilaterally. Full range of motion without deformity. Skin: Skin color, texture, turgor normal.  No rashes or lesions. Neurologic:  Neurovascularly intact without any focal sensory/motor deficits. Cranial nerves: II-XII intact, grossly non-focal.  Psychiatric:  Alert and oriented, thought content appropriate, normal insight  Capillary Refill: Brisk,< 3 seconds   Peripheral Pulses: +2 palpable, equal bilaterally       Labs:     Recent Labs     06/21/19  1930   WBC 9.3   HGB 15.3   HCT 44.5        Recent Labs     06/21/19 1929   *   K 3.3*   CL 95*   CO2 20*   BUN 10   CREATININE 1.0   CALCIUM 9.5     No results for input(s): AST, ALT, BILIDIR, BILITOT, ALKPHOS in the last 72 hours. No results for input(s): INR in the last 72 hours. No results for input(s): Ellender Johnson in the last 72 hours. Urinalysis:      Lab Results   Component Value Date    NITRU Negative 11/26/2018    WBCUA 1 11/26/2018    RBCUA 1 11/26/2018    BLOODU Negative 11/26/2018    SPECGRAV >1.030 11/26/2018    GLUCOSEU 100 11/26/2018       Radiology:     CT LUMBAR SPINE WO CONTRAST   Final Result   1. No acute findings in the lumbar spine. 2. Persistent ill-defined lesion in the right erector spinae musculature   adjacent to a right L3 hemilaminectomy defect, although previously seen gas   within the lesion is now resolved. This could represent a postoperative   hematoma, seroma, or abscess.   A postoperative meningocele is considered less   likely given no opacification on the prior myelogram.  Consider further   evaluation MRI or a contrast-enhanced CT. 3. Mild lumbar spine degenerative changes. 4. Areas of mild to moderate spinal canal stenosis and neural foraminal   narrowing, most severe at L3/L4. 5. Bony demineralization. ASSESSMENT:    Active Hospital Problems    Diagnosis Date Noted    Low back pain [M54.5] 06/21/2019    CAD (coronary artery disease) [I25.10] 04/27/2019    COPD (chronic obstructive pulmonary disease) ([facility] [J44.9] 11/27/2018    DMII (diabetes mellitus, type 2) ([facility] [E11.9] 11/27/2018    Tobacco abuse [Z72.0] 01/10/2018    Ischemic cardiomyopathy [I25.5] 11/02/2014    CHF (congestive heart failure) ([facility] [I50.9] 12/01/2013    ICD (implantable cardioverter-defibrillator), dual, in situ [Z95.810] 10/12/2013    Essential hypertension [I10] 06/04/2013         PLAN:    1. Acute on chronic low back pain with radiculopathy - Admit to med-surg. Pain control with PO percocet and IV dilaudid. PT/OT ordered. Follow up with neurosurgery as outpatient     2. DM II - continue lantus, humalog and add SSI. Titrate insulin as needed     3. HTN, controlled - continue home BP meds    4. CAD - continue ASA, plavix, imdur and statin     5. Tobacco use disorder - advised to quite. On nicotine patch     DVT Prophylaxis: lovenox  Diet: DIET GENERAL;  Code Status: Full Code    PT/OT Eval Status: ordered PT/OT    Dispo - Anticipate discharge home in 1-2 days        Joey Jon MD    Thank you Kimberlee Bronson MD for the opportunity to be involved in this patient's care. If you have any questions or concerns please feel free to contact me at [phone].

## 2019-06-22 NOTE — PROGRESS NOTES
Pt arrived to floor from ER at 2340 via stretcher. Pt oriented to room, call light, policies and procedures, the menu and ordering. Call light within reach. Bed in lowest position, bed alarm on, and wheels locked. Pt verbalized understanding. No complaints, questions or concerns at this time.   Electronically signed by Crystal Gutierrez RN on 6/21/2019 at 11:54 PM

## 2019-06-22 NOTE — PLAN OF CARE
Problem: Falls - Risk of:  Goal: Will remain free from falls  Description  Will remain free from falls  Outcome: Ongoing  Note:   Patient educated on fall prevention. Call light is within reach, bed locked in lowest position, personal items within reach, and bed alarm is on. Will round on patient per unit guidelines. Problem: Falls - Risk of:  Goal: Absence of physical injury  Description  Absence of physical injury  Outcome: Ongoing  Note:   Pt is free of injury. No injury noted. Fall precautions in place. Call light within reach. Will monitor.

## 2019-06-22 NOTE — PROGRESS NOTES
4 Eyes Admission Assessment     I agree as the admission nurse that 2 RN's have performed a thorough Head to Toe Skin Assessment on the patient. ALL assessment sites listed below have been assessed on admission. Areas assessed by both nurses: TraJw. Antonio   [x]   Head, Face, and Ears   [x]   Shoulders, Back, and Chest  [x]   Arms, Elbows, and Hands   [x]   Coccyx, Sacrum, and Ischum  [x]   Legs, Feet, and Heels        Does the Patient have Skin Breakdown?   No         Jhonathan Prevention initiated:  Yes   Wound Care Orders initiated:  NA      [facility] nurse consulted for Pressure Injury (Stage 3,4, Unstageable, DTI, NWPT, and Complex wounds):  NA      Nurse 1 eSignature: Electronically signed by Griselda Tony RN on 6/22/19 at 11:00 AM    **SHARE this note so that the co-signing nurse is able to place an eSignature**    Nurse 2 eSignature: {Esignature:[id number]}

## 2019-06-23 ENCOUNTER — APPOINTMENT (OUTPATIENT)
Dept: GENERAL RADIOLOGY | Age: 64
DRG: 552 | End: 2019-06-23
Payer: MEDICARE

## 2019-06-23 LAB
A/G RATIO: 1.3 (ref 1.1–2.2)
ALBUMIN SERPL-MCNC: 3.4 G/DL (ref 3.4–5)
ALP BLD-CCNC: 62 U/L (ref 40–129)
ALT SERPL-CCNC: 12 U/L (ref 10–40)
ANION GAP SERPL CALCULATED.3IONS-SCNC: 13 MMOL/L (ref 3–16)
AST SERPL-CCNC: 9 U/L (ref 15–37)
BILIRUB SERPL-MCNC: 0.3 MG/DL (ref 0–1)
BUN BLDV-MCNC: 16 MG/DL (ref 7–20)
CALCIUM SERPL-MCNC: 8.4 MG/DL (ref 8.3–10.6)
CHLORIDE BLD-SCNC: 105 MMOL/L (ref 99–110)
CO2: 20 MMOL/L (ref 21–32)
CREAT SERPL-MCNC: 1.3 MG/DL (ref 0.8–1.3)
GFR AFRICAN AMERICAN: >60
GFR NON-AFRICAN AMERICAN: 56
GLOBULIN: 2.6 G/DL
GLUCOSE BLD-MCNC: 100 MG/DL (ref 70–99)
GLUCOSE BLD-MCNC: 101 MG/DL (ref 70–99)
GLUCOSE BLD-MCNC: 126 MG/DL (ref 70–99)
GLUCOSE BLD-MCNC: 129 MG/DL (ref 70–99)
GLUCOSE BLD-MCNC: 98 MG/DL (ref 70–99)
PERFORMED ON: ABNORMAL
PERFORMED ON: NORMAL
POTASSIUM SERPL-SCNC: 3.8 MMOL/L (ref 3.5–5.1)
SODIUM BLD-SCNC: 138 MMOL/L (ref 136–145)
TOTAL PROTEIN: 6 G/DL (ref 6.4–8.2)

## 2019-06-23 PROCEDURE — 6360000002 HC RX W HCPCS: Performed by: INTERNAL MEDICINE

## 2019-06-23 PROCEDURE — 97530 THERAPEUTIC ACTIVITIES: CPT

## 2019-06-23 PROCEDURE — 97162 PT EVAL MOD COMPLEX 30 MIN: CPT

## 2019-06-23 PROCEDURE — 2580000003 HC RX 258: Performed by: INTERNAL MEDICINE

## 2019-06-23 PROCEDURE — 94760 N-INVAS EAR/PLS OXIMETRY 1: CPT

## 2019-06-23 PROCEDURE — 6370000000 HC RX 637 (ALT 250 FOR IP): Performed by: INTERNAL MEDICINE

## 2019-06-23 PROCEDURE — 72100 X-RAY EXAM L-S SPINE 2/3 VWS: CPT

## 2019-06-23 PROCEDURE — 36415 COLL VENOUS BLD VENIPUNCTURE: CPT

## 2019-06-23 PROCEDURE — 97116 GAIT TRAINING THERAPY: CPT

## 2019-06-23 PROCEDURE — 97110 THERAPEUTIC EXERCISES: CPT

## 2019-06-23 PROCEDURE — 1200000000 HC SEMI PRIVATE

## 2019-06-23 PROCEDURE — 80053 COMPREHEN METABOLIC PANEL: CPT

## 2019-06-23 RX ORDER — GABAPENTIN 300 MG/1
600 CAPSULE ORAL 3 TIMES DAILY
Status: DISCONTINUED | OUTPATIENT
Start: 2019-06-23 | End: 2019-06-25 | Stop reason: HOSPADM

## 2019-06-23 RX ORDER — GABAPENTIN 300 MG/1
600 CAPSULE ORAL NIGHTLY
Status: DISCONTINUED | OUTPATIENT
Start: 2019-06-23 | End: 2019-06-25 | Stop reason: HOSPADM

## 2019-06-23 RX ADMIN — INSULIN LISPRO 5 UNITS: 100 INJECTION, SOLUTION INTRAVENOUS; SUBCUTANEOUS at 07:58

## 2019-06-23 RX ADMIN — INSULIN LISPRO 5 UNITS: 100 INJECTION, SOLUTION INTRAVENOUS; SUBCUTANEOUS at 17:45

## 2019-06-23 RX ADMIN — GABAPENTIN 600 MG: 300 CAPSULE ORAL at 17:45

## 2019-06-23 RX ADMIN — ENOXAPARIN SODIUM 40 MG: 40 INJECTION SUBCUTANEOUS at 07:57

## 2019-06-23 RX ADMIN — GABAPENTIN 600 MG: 300 CAPSULE ORAL at 21:34

## 2019-06-23 RX ADMIN — ISOSORBIDE MONONITRATE 90 MG: 60 TABLET, EXTENDED RELEASE ORAL at 07:56

## 2019-06-23 RX ADMIN — HYDROMORPHONE HYDROCHLORIDE 1 MG: 1 INJECTION, SOLUTION INTRAMUSCULAR; INTRAVENOUS; SUBCUTANEOUS at 19:22

## 2019-06-23 RX ADMIN — OXYCODONE HYDROCHLORIDE AND ACETAMINOPHEN 2 TABLET: 5; 325 TABLET ORAL at 07:56

## 2019-06-23 RX ADMIN — ZOLPIDEM TARTRATE 5 MG: 5 TABLET ORAL at 23:26

## 2019-06-23 RX ADMIN — INSULIN LISPRO 5 UNITS: 100 INJECTION, SOLUTION INTRAVENOUS; SUBCUTANEOUS at 12:55

## 2019-06-23 RX ADMIN — HYDROMORPHONE HYDROCHLORIDE 1 MG: 1 INJECTION, SOLUTION INTRAMUSCULAR; INTRAVENOUS; SUBCUTANEOUS at 09:39

## 2019-06-23 RX ADMIN — RANOLAZINE 500 MG: 500 TABLET, FILM COATED, EXTENDED RELEASE ORAL at 07:56

## 2019-06-23 RX ADMIN — PANTOPRAZOLE SODIUM 40 MG: 40 TABLET, DELAYED RELEASE ORAL at 05:02

## 2019-06-23 RX ADMIN — GABAPENTIN 600 MG: 300 CAPSULE ORAL at 12:55

## 2019-06-23 RX ADMIN — GABAPENTIN 600 MG: 300 CAPSULE ORAL at 07:55

## 2019-06-23 RX ADMIN — ISOSORBIDE MONONITRATE 90 MG: 60 TABLET, EXTENDED RELEASE ORAL at 20:23

## 2019-06-23 RX ADMIN — HYDROMORPHONE HYDROCHLORIDE 1 MG: 1 INJECTION, SOLUTION INTRAMUSCULAR; INTRAVENOUS; SUBCUTANEOUS at 15:15

## 2019-06-23 RX ADMIN — METOPROLOL SUCCINATE 100 MG: 50 TABLET, FILM COATED, EXTENDED RELEASE ORAL at 07:56

## 2019-06-23 RX ADMIN — SODIUM CHLORIDE: 9 INJECTION, SOLUTION INTRAVENOUS at 23:32

## 2019-06-23 RX ADMIN — CLOPIDOGREL BISULFATE 75 MG: 75 TABLET ORAL at 07:56

## 2019-06-23 RX ADMIN — OXYCODONE HYDROCHLORIDE AND ACETAMINOPHEN 2 TABLET: 5; 325 TABLET ORAL at 20:23

## 2019-06-23 RX ADMIN — INSULIN GLARGINE 20 UNITS: 100 INJECTION, SOLUTION SUBCUTANEOUS at 21:34

## 2019-06-23 RX ADMIN — SODIUM CHLORIDE: 9 INJECTION, SOLUTION INTRAVENOUS at 05:01

## 2019-06-23 RX ADMIN — HYDROMORPHONE HYDROCHLORIDE 1 MG: 1 INJECTION, SOLUTION INTRAMUSCULAR; INTRAVENOUS; SUBCUTANEOUS at 05:01

## 2019-06-23 RX ADMIN — FUROSEMIDE 40 MG: 40 TABLET ORAL at 07:56

## 2019-06-23 RX ADMIN — OXYCODONE HYDROCHLORIDE AND ACETAMINOPHEN 2 TABLET: 5; 325 TABLET ORAL at 15:21

## 2019-06-23 RX ADMIN — HYDROMORPHONE HYDROCHLORIDE 1 MG: 1 INJECTION, SOLUTION INTRAMUSCULAR; INTRAVENOUS; SUBCUTANEOUS at 23:27

## 2019-06-23 RX ADMIN — ASPIRIN 81 MG 81 MG: 81 TABLET ORAL at 07:56

## 2019-06-23 RX ADMIN — RANOLAZINE 500 MG: 500 TABLET, FILM COATED, EXTENDED RELEASE ORAL at 20:23

## 2019-06-23 RX ADMIN — ATORVASTATIN CALCIUM 80 MG: 80 TABLET, FILM COATED ORAL at 07:57

## 2019-06-23 RX ADMIN — LOSARTAN POTASSIUM 50 MG: 50 TABLET ORAL at 07:57

## 2019-06-23 RX ADMIN — TRAZODONE HYDROCHLORIDE 50 MG: 50 TABLET ORAL at 20:23

## 2019-06-23 ASSESSMENT — PAIN DESCRIPTION - LOCATION
LOCATION: BACK

## 2019-06-23 ASSESSMENT — PAIN DESCRIPTION - PAIN TYPE
TYPE: ACUTE PAIN;CHRONIC PAIN

## 2019-06-23 ASSESSMENT — PAIN DESCRIPTION - ONSET
ONSET: ON-GOING

## 2019-06-23 ASSESSMENT — PAIN SCALES - GENERAL
PAINLEVEL_OUTOF10: 7
PAINLEVEL_OUTOF10: 7
PAINLEVEL_OUTOF10: 2
PAINLEVEL_OUTOF10: 6
PAINLEVEL_OUTOF10: 7
PAINLEVEL_OUTOF10: 7
PAINLEVEL_OUTOF10: 9
PAINLEVEL_OUTOF10: 7
PAINLEVEL_OUTOF10: 7
PAINLEVEL_OUTOF10: 10
PAINLEVEL_OUTOF10: 10
PAINLEVEL_OUTOF10: 8
PAINLEVEL_OUTOF10: 9
PAINLEVEL_OUTOF10: 8

## 2019-06-23 ASSESSMENT — PAIN DESCRIPTION - PROGRESSION

## 2019-06-23 ASSESSMENT — PAIN - FUNCTIONAL ASSESSMENT

## 2019-06-23 ASSESSMENT — PAIN DESCRIPTION - DESCRIPTORS
DESCRIPTORS: STABBING
DESCRIPTORS: SHARP;SHOOTING
DESCRIPTORS: SHARP;SHOOTING;STABBING
DESCRIPTORS: ACHING;BURNING
DESCRIPTORS: SHARP;STABBING
DESCRIPTORS: SHARP;SHOOTING
DESCRIPTORS: SHARP;SHOOTING
DESCRIPTORS: STABBING
DESCRIPTORS: RADIATING;SHARP;STABBING
DESCRIPTORS: ACHING;BURNING
DESCRIPTORS: STABBING
DESCRIPTORS: SHARP;SHOOTING
DESCRIPTORS: STABBING

## 2019-06-23 ASSESSMENT — PAIN DESCRIPTION - DIRECTION
RADIATING_TOWARDS: LEFT LEG

## 2019-06-23 ASSESSMENT — PAIN DESCRIPTION - FREQUENCY
FREQUENCY: CONTINUOUS

## 2019-06-23 ASSESSMENT — PAIN DESCRIPTION - ORIENTATION
ORIENTATION: LEFT;LOWER;MID
ORIENTATION: LOWER;MID;LEFT
ORIENTATION: LEFT;LOWER;MID
ORIENTATION: LOWER;MID;LEFT
ORIENTATION: LOWER;MID;LEFT
ORIENTATION: LEFT;LOWER;MID

## 2019-06-23 ASSESSMENT — PAIN SCALES - WONG BAKER: WONGBAKER_NUMERICALRESPONSE: 0

## 2019-06-23 NOTE — PROGRESS NOTES
Hospitalist Progress Note      PCP: Flynn Matamoros MD    Date of Admission: 6/21/2019        Subjective: still having back pain with some improvement, no weakness or numbness of LE. No fever or chills. Medications:  Reviewed    Infusion Medications    sodium chloride 100 mL/hr at 06/23/19 0501    dextrose       Scheduled Medications    sodium chloride flush  10 mL Intravenous 2 times per day    enoxaparin  40 mg Subcutaneous Daily    nicotine  1 patch Transdermal Daily    pantoprazole  40 mg Oral QAM AC    clopidogrel  75 mg Oral Daily    furosemide  40 mg Oral Daily    metoprolol succinate  100 mg Oral Daily    atorvastatin  80 mg Oral Daily    losartan  50 mg Oral Daily    insulin glargine  20 Units Subcutaneous Nightly    insulin lispro  5 Units Subcutaneous TID AC    aspirin  81 mg Oral Daily    isosorbide mononitrate  90 mg Oral BID    ranolazine  500 mg Oral BID    gabapentin  600 mg Oral TID    traZODone  50 mg Oral Nightly    insulin lispro  0-12 Units Subcutaneous TID WC    insulin lispro  0-6 Units Subcutaneous Nightly     PRN Meds: sodium chloride flush, ondansetron, acetaminophen, HYDROmorphone, oxyCODONE-acetaminophen **OR** oxyCODONE-acetaminophen, polyethylene glycol, zolpidem, LORazepam, oxymetazoline, glucose, dextrose, glucagon (rDNA), dextrose      Intake/Output Summary (Last 24 hours) at 6/23/2019 0847  Last data filed at 6/23/2019 0831  Gross per 24 hour   Intake 3109.53 ml   Output 1000 ml   Net 2109.53 ml       Physical Exam Performed:    BP (!) 151/88   Pulse 73   Temp 98 °F (36.7 °C) (Oral)   Resp 12   Ht 6' (1.829 m)   Wt 231 lb 7.7 oz (105 kg)   SpO2 97%   BMI 31.39 kg/m²     General appearance: No apparent distress  Neck: Supple  Respiratory:  Normal respiratory effort. Clear to auscultation, bilaterally without Rales/Wheezes/Rhonchi. Cardiovascular: Regular rate and rhythm with normal S1/S2 without murmurs, rubs or gallops.   Abdomen: Soft, non-tender  Musculoskeletal: No clubbing, cyanosis   Skin: Skin color, texture, turgor normal.  No rashes or lesions. Neurologic:  Neurovascularly intact without any focal sensory/motor deficits. Cranial nerves: II-XII intact, grossly non-focal.  Psychiatric: Alert and oriented  Capillary Refill: Brisk,< 3 seconds   Peripheral Pulses: +2 palpable, equal bilaterally       Labs:   Recent Labs     06/21/19  1930 06/22/19  0609   WBC 9.3 8.5   HGB 15.3 13.8   HCT 44.5 41.2    206     Recent Labs     06/21/19  1929 06/22/19  0609 06/23/19  0539   * 134* 138   K 3.3* 3.3* 3.8   CL 95* 101 105   CO2 20* 21 20*   BUN 10 12 16   CREATININE 1.0 1.4* 1.3   CALCIUM 9.5 8.6 8.4     Recent Labs     06/23/19  0539   AST 9*   ALT 12   BILITOT 0.3   ALKPHOS 62     No results for input(s): INR in the last 72 hours. No results for input(s): Madbury Specking in the last 72 hours. Urinalysis:      Lab Results   Component Value Date    NITRU Negative 11/26/2018    WBCUA 1 11/26/2018    RBCUA 1 11/26/2018    BLOODU Negative 11/26/2018    SPECGRAV >1.030 11/26/2018    GLUCOSEU 100 11/26/2018       Radiology:  CT LUMBAR SPINE WO CONTRAST   Final Result   1. No acute findings in the lumbar spine. 2. Persistent ill-defined lesion in the right erector spinae musculature   adjacent to a right L3 hemilaminectomy defect, although previously seen gas   within the lesion is now resolved. This could represent a postoperative   hematoma, seroma, or abscess. A postoperative meningocele is considered less   likely given no opacification on the prior myelogram.  Consider further   evaluation MRI or a contrast-enhanced CT. 3. Mild lumbar spine degenerative changes. 4. Areas of mild to moderate spinal canal stenosis and neural foraminal   narrowing, most severe at L3/L4. 5. Bony demineralization.                  Assessment/Plan:    Active Hospital Problems    Diagnosis    Ischemic cardiomyopathy [I25.5]     Priority: Medium  Essential hypertension [I10]     Priority: Medium    ICD (implantable cardioverter-defibrillator), dual, in situ [Z95.810]     Priority: Low    Intractable back pain [M54.9]    Low back pain [M54.5]    CAD (coronary artery disease) [I25.10]    COPD (chronic obstructive pulmonary disease) [facility] [J44.9]    DMII (diabetes mellitus, type 2) ([facility]) [E11.9]    Tobacco abuse [Z72.0]    CHF (congestive heart failure) ([facility]) [I50.9]     1. Intractable back pain being treated with po and iv pain medications, consulted NS.    2. DM II, continue lantus, humalog and add SSI. Titrate insulin as needed   3. LALITA, iv fluids, improving for now. 4. HTN, controlled, continue home BP meds  5. CAD, continue ASA, plavix, imdur and statin   5. Tobacco use disorder, counseled for cessation.          DVT Prophylaxis: lovenox  Diet: DIET CARB CONTROL;  Code Status: Full Code          Skip Marie MD

## 2019-06-23 NOTE — PROGRESS NOTES
Laminectomy defect, although previously seen gas within lesion is now resolved. Areas of Mild to Mod Spinal Canal Stenosis and Neural Foraminal narrowing most severe at L3/L4. PMH as noted including CAD, Pacemaker, CABG, TIA, L3/L4 Laminectomy/Discectomy 10/2018. Exam: See above. Clinical Presentation: See above. Patient Education: Role of PT, POC, Need to call for assist.   Barriers to Learning: Pain. REQUIRES PT FOLLOW UP: Yes  Activity Tolerance  Activity Tolerance: Patient limited by pain; Patient limited by fatigue;Patient limited by endurance       Patient Diagnosis(es): The encounter diagnosis was Chronic midline low back pain without sciatica. has a past medical history of Anxiety, Arthritis, Asthma, CAD (coronary artery disease), Calcium kidney stone, Cardiomyopathy ([address] Cerebral artery occlusion with cerebral infarction ([address] CHF (congestive heart failure) ([address] COPD (chronic obstructive pulmonary disease) ([address] Depression, DM2 (diabetes mellitus, type 2) ([address] Fibromyalgia, GERD (gastroesophageal reflux disease), Hyperlipidemia, Hypertension, Liver disease, Pacemaker, Pneumonia, Seizures ([address] TIA (transient ischemic attack), and Ulcerative colitis ([address] has a past surgical history that includes Cholecystectomy; Coronary artery bypass graft (2010, 11/2015); Coronary angioplasty with stent (2012); Cardiac surgery; Endoscopy, colon, diagnostic; pacemaker placement; Colonoscopy (01/10/2017); Colonoscopy (01/10/2017); Upper gastrointestinal endoscopy (02/07/2017); and back surgery. Restrictions  Restrictions/Precautions  Restrictions/Precautions: Fall Risk   Neurosurg has been consulted - PT reviewed chart and spoke with pt's nurse Sheyla Gentile)  who reports no activity restrictions in chart and pt has been OOB with nursing to/from bathroom. Advised ok to initiate PT Eval today.     Vision/Hearing  Vision: Within Functional Limits  Hearing: Within functional limits 5/22-5/24/19 with d/c to [facility] setting for 10 days then d/c home. Pt reports home approx 20 days prior this admit. Cognition        Objective          AROM RLE (degrees)  RLE AROM: WFL  AROM LLE (degrees)  LLE AROM : WFL  AROM RUE (degrees)  RUE AROM : WFL  AROM LUE (degrees)  LUE AROM : WFL  Strength RLE  Comment: Hip 3+ 4-/5; Knee Ext 4/5, Knee Flex 4-/5; Ankle DF 4/5. Strength LLE  Comment: Hip 3+/5; Knee Ext 4-/5, Knee Flex 3+ 4-/5; Ankle DF 4-/5. Strength RUE  Strength RUE: WFL  Strength LUE  Strength LUE: WFL     Sensation  Overall Sensation Status: Impaired(Pt reports numb/tingling R Hand (fingers 4,5) and L Distal LE. )  Bed mobility  Rolling to Left: Independent  Rolling to Right: Independent  Supine to Sit: Minimal assistance  Transfers  Sit to Stand: Contact guard assistance(With Walker. )  Stand to sit: Contact guard assistance(With Walker. )  Ambulation  Ambulation?: Yes  Ambulation 1  Surface: level tile  Device: Rolling Walker  Assistance: Minimal assistance  Distance: Pt amb 10' bed to chair with Rolling Walker Close CGA. Flexed posture, diminished step length/clearance; antalgic during wgt bear L LE. Pt unable to selvin any further distances due to pain. Plan   Plan  Times per week: 3-5x week while in acute care setting.    Current Treatment Recommendations: Strengthening, Functional Mobility Training, Transfer Training, Gait Training, Stair training, Safety Education & Training, Patient/Caregiver Education & Training  Safety Devices  Type of devices: Call light within reach, Chair alarm in place, Left in chair, Nurse notified    G-Code       OutComes Score                                                  AM-PAC Score  AM-PAC Inpatient Mobility Raw Score : 17 (06/23/19 1125)  AM-PAC Inpatient T-Scale Score : 42.13 (06/23/19 1125)  Mobility Inpatient CMS 0-100% Score: 50.57 (06/23/19 1125)  Mobility Inpatient CMS G-Code Modifier : CK (06/23/19 1125) Goals  Short term goals  Time Frame for Short term goals: Upon d/c acute care setting. Short term goal 1: Bed Mob SBA. Short term goal 2: Transfers with/without assist device SBA. Short term goal 3: Amb with/without assist device 150' SBA. Short term goal 4: Stair Negotiation as selvin/as approp. Patient Goals   Patient goals : Feel better, less back pain and be able to go home.         Therapy Time   Individual Concurrent Group Co-treatment   Time In 0930         Time Out 1020         Minutes [address]

## 2019-06-23 NOTE — CONSULTS
Neurosurgery Consult:    Patient Name: Dixon Herr YOB: 1955   Sex: Male Age: 61 yrs     Medical Record Number: [id number] Acct Number: [de-identified]   Room Number: [id number]/5928-89 Hospital Day: Hospital Day: 3     Requesting physician: April Brito MD    Reason for consultation: back pain    History of present illness: 61 y.o. male w/ PMH of L3-4 laminectomy/discectomy in 10/2018, CHF, CAD, HTN, HLD, pacemaker, DM II, COPD who presented on to [facility] ED with c/o intractable back pain and some LLE pain. He was admitted last month at [facility] with similar complaints. He reports his pain is in the low back across the belt line and extends down the back of the leg to the knee. When it is severe, the pain extends down to the calf but does not involve the foot. The pain is worse with standing and walking and is relieved by sitting and lying down. It also worsens with extension and improves with flexion. He is already scheduled for an KYLIE on 7/11/19 at [facility]. He denies fevers, chills, bowel or bladder dysfunction. Since admission, his pain has been better controlled with opiates.        ROS:   No fevers, chills, nightsweats  No bowel or bladder dysfunction  Denies CP  Denies SOB      VITAL SIGNS   BP (!) 151/88   Pulse 73   Temp 98 °F (36.7 °C) (Oral)   Resp 12   Ht 6' (1.829 m)   Wt 231 lb 7.7 oz (105 kg)   SpO2 97%   BMI 31.39 kg/m²    Height Height: 6' (182.9 cm)   Weight Weight: 231 lb 7.7 oz (105 kg)        Allergies Allergies   Allergen Reactions    Dopamine Hcl Other (See Comments)     Compulsive gambling    Tramadol Other (See Comments)     Dizziness     Melatonin Other (See Comments)     Restless leg syndrome        NPO Status DIET CARB CONTROL;   Isolation No active isolations     MEDICAL HISTORY   Past Medical History       Diagnosis Date    Anxiety     Arthritis     Asthma     CAD (coronary artery disease)     Calcium kidney stone     Cardiomyopathy ([facility] MEDICATIONS   Inpatient Medications     gabapentin, 600 mg, Oral, TID    gabapentin, 600 mg, Oral, Nightly    sodium chloride flush, 10 mL, Intravenous, 2 times per day    enoxaparin, 40 mg, Subcutaneous, Daily    nicotine, 1 patch, Transdermal, Daily    pantoprazole, 40 mg, Oral, QAM AC    clopidogrel, 75 mg, Oral, Daily    furosemide, 40 mg, Oral, Daily    metoprolol succinate, 100 mg, Oral, Daily    atorvastatin, 80 mg, Oral, Daily    losartan, 50 mg, Oral, Daily    insulin glargine, 20 Units, Subcutaneous, Nightly    insulin lispro, 5 Units, Subcutaneous, TID AC    aspirin, 81 mg, Oral, Daily    isosorbide mononitrate, 90 mg, Oral, BID    ranolazine, 500 mg, Oral, BID    traZODone, 50 mg, Oral, Nightly    insulin lispro, 0-12 Units, Subcutaneous, TID WC    insulin lispro, 0-6 Units, Subcutaneous, Nightly   Infusions    sodium chloride 100 mL/hr at 06/23/19 0501    dextrose        PRN     sodium chloride flush 10 mL Intravenous PRN   ondansetron 4 mg Intravenous Q6H PRN   acetaminophen 650 mg Oral Q4H PRN   HYDROmorphone 1 mg Intravenous Q4H PRN   oxyCODONE-acetaminophen 1 tablet Oral Q4H PRN   Or      oxyCODONE-acetaminophen 2 tablet Oral Q4H PRN   polyethylene glycol 17 g Oral Daily PRN   zolpidem 5 mg Oral Nightly PRN   LORazepam 1 mg Intravenous Nightly PRN   oxymetazoline 2 spray Nasal BID PRN   glucose 15 g Oral PRN   dextrose 12.5 g Intravenous PRN   glucagon (rDNA) 1 mg Intramuscular PRN   dextrose 100 mL/hr Intravenous PRN      Antibiotics   Recent Abx Admin      No antibiotic orders with administrations found.                    PHYSICAL EXAMINATION     GENERAL: NAD, alert, appears stated age, and cooperative  HEENT: Normocephalic, PERRL, EOMI, neck supple, trachea midline, lips without lesions  RESP: Easy WOB, symmetric chest rise  EXTREMITIES: Extremities WWP  SKIN: Warm and dry  NEURO: A&Ox4, FC - appendicular   CN II-XII grossly intact: no facial droop, EOMI, tongue midline, voice wnl,  hearing intact   Tone normal throughout   Sensation intact to light touch throughout   No pathologic reflexes   Strength    D B T  HF KE KF DF PF   R 5 5 5 5 5 5 5 5 5  L 5 5 5 5 4+ 4+ 4+ 4+ 4+      IMAGING     I personally reviewed the patient's imaging which consists of a CT dated 6/21/19. This reveals the previous L3 laminectomy defect with overlying seroma. There is diffuse multilevel spondylosis worse at the L3-4 level. ASSESSMENT & PLAN     62 yo man with persistent severe back pain and some left radicular pain    -No acute neurosurgical intervention indicated  -Will work to push up date for [facility] though patient is on ASA/Plavix and this would need to be held for 7 days prior to the procedure  -Pain control with PO meds/muscle relaxants  -PT/OT  -Lumbar flexion - extension X-rays  -Will evaluate in office for elective intervention. CT myelogram performed last month suggestive of multilevel disc disease and possibly some microinstability secondary to facet involvement at L3-4 on the left. Patient to call [phone] to confirm appointment.  -Will follow peripherally while in house. Please call with questions.     Dyann Ganser, MD, PhD  [address], [address] [place], [place], [address] [phone] (c), [phone] (o)

## 2019-06-23 NOTE — PLAN OF CARE
Problem: Falls - Risk of:  Goal: Will remain free from falls  Description  Will remain free from falls  Outcome: Ongoing  Note:   Fall risk assessment completed . Fall precautions in place, bed/ chair alarm on, side rails 2/4 up, call light in reach, educated pt on calling for assistance when needed, room clear of clutter. Pt verbalized understanding. Goal: Absence of physical injury  Description  Absence of physical injury  Outcome: Ongoing  Note:   Pt is free of injury. No injury noted. Fall precautions in place. Call light within reach. Will monitor. Problem: Pain:  Goal: Pain level will decrease  Description  Pain level will decrease  Outcome: Ongoing  Note:   Pain/discomfort being managed with PRN analgesics per MD orders. Pt able to express presence and absence of pain and rate pain appropriately using numerical scale. Goal: Control of acute pain  Description  Control of acute pain  Outcome: Ongoing  Note:   Patient educated on acute pain. Taught patient to use call light to ask for pain medication. PRN pain medication given for acute pain. Will continue to monitor pain per unit protocol. Goal: Control of chronic pain  Description  Control of chronic pain  Outcome: Ongoing  Note:   Patient educated on chronic pain. Taught patient to use call light to ask for pain medication. PRN pain medication given for chronic pain. Will continue to monitor pain per unit protocol.

## 2019-06-24 LAB
GLUCOSE BLD-MCNC: 104 MG/DL (ref 70–99)
GLUCOSE BLD-MCNC: 109 MG/DL (ref 70–99)
GLUCOSE BLD-MCNC: 129 MG/DL (ref 70–99)
GLUCOSE BLD-MCNC: 99 MG/DL (ref 70–99)
PERFORMED ON: ABNORMAL
PERFORMED ON: NORMAL

## 2019-06-24 PROCEDURE — 93005 ELECTROCARDIOGRAM TRACING: CPT | Performed by: NURSE PRACTITIONER

## 2019-06-24 PROCEDURE — 2580000003 HC RX 258: Performed by: INTERNAL MEDICINE

## 2019-06-24 PROCEDURE — 1200000000 HC SEMI PRIVATE

## 2019-06-24 PROCEDURE — 97116 GAIT TRAINING THERAPY: CPT

## 2019-06-24 PROCEDURE — 97110 THERAPEUTIC EXERCISES: CPT

## 2019-06-24 PROCEDURE — 36415 COLL VENOUS BLD VENIPUNCTURE: CPT

## 2019-06-24 PROCEDURE — 97535 SELF CARE MNGMENT TRAINING: CPT

## 2019-06-24 PROCEDURE — 97530 THERAPEUTIC ACTIVITIES: CPT

## 2019-06-24 PROCEDURE — 97166 OT EVAL MOD COMPLEX 45 MIN: CPT

## 2019-06-24 PROCEDURE — 84484 ASSAY OF TROPONIN QUANT: CPT

## 2019-06-24 PROCEDURE — 6370000000 HC RX 637 (ALT 250 FOR IP): Performed by: INTERNAL MEDICINE

## 2019-06-24 PROCEDURE — 6360000002 HC RX W HCPCS: Performed by: FAMILY MEDICINE

## 2019-06-24 PROCEDURE — 6360000002 HC RX W HCPCS: Performed by: INTERNAL MEDICINE

## 2019-06-24 RX ADMIN — INSULIN LISPRO 5 UNITS: 100 INJECTION, SOLUTION INTRAVENOUS; SUBCUTANEOUS at 12:44

## 2019-06-24 RX ADMIN — SODIUM CHLORIDE, PRESERVATIVE FREE 10 ML: 5 INJECTION INTRAVENOUS at 21:12

## 2019-06-24 RX ADMIN — INSULIN LISPRO 5 UNITS: 100 INJECTION, SOLUTION INTRAVENOUS; SUBCUTANEOUS at 17:15

## 2019-06-24 RX ADMIN — INSULIN LISPRO 5 UNITS: 100 INJECTION, SOLUTION INTRAVENOUS; SUBCUTANEOUS at 08:10

## 2019-06-24 RX ADMIN — ENOXAPARIN SODIUM 40 MG: 40 INJECTION SUBCUTANEOUS at 08:09

## 2019-06-24 RX ADMIN — HYDROMORPHONE HYDROCHLORIDE 0.5 MG: 1 INJECTION, SOLUTION INTRAMUSCULAR; INTRAVENOUS; SUBCUTANEOUS at 20:19

## 2019-06-24 RX ADMIN — RANOLAZINE 500 MG: 500 TABLET, FILM COATED, EXTENDED RELEASE ORAL at 21:11

## 2019-06-24 RX ADMIN — OXYCODONE HYDROCHLORIDE AND ACETAMINOPHEN 2 TABLET: 5; 325 TABLET ORAL at 12:45

## 2019-06-24 RX ADMIN — GABAPENTIN 600 MG: 300 CAPSULE ORAL at 12:44

## 2019-06-24 RX ADMIN — HYDROMORPHONE HYDROCHLORIDE 1 MG: 1 INJECTION, SOLUTION INTRAMUSCULAR; INTRAVENOUS; SUBCUTANEOUS at 11:32

## 2019-06-24 RX ADMIN — OXYCODONE HYDROCHLORIDE AND ACETAMINOPHEN 2 TABLET: 5; 325 TABLET ORAL at 02:05

## 2019-06-24 RX ADMIN — GABAPENTIN 600 MG: 300 CAPSULE ORAL at 21:11

## 2019-06-24 RX ADMIN — GABAPENTIN 600 MG: 300 CAPSULE ORAL at 08:07

## 2019-06-24 RX ADMIN — SODIUM CHLORIDE, PRESERVATIVE FREE 10 ML: 5 INJECTION INTRAVENOUS at 08:16

## 2019-06-24 RX ADMIN — HYDROMORPHONE HYDROCHLORIDE 0.5 MG: 1 INJECTION, SOLUTION INTRAMUSCULAR; INTRAVENOUS; SUBCUTANEOUS at 15:18

## 2019-06-24 RX ADMIN — LOSARTAN POTASSIUM 50 MG: 50 TABLET ORAL at 08:08

## 2019-06-24 RX ADMIN — INSULIN GLARGINE 20 UNITS: 100 INJECTION, SOLUTION SUBCUTANEOUS at 21:00

## 2019-06-24 RX ADMIN — LORAZEPAM 1 MG: 2 INJECTION INTRAMUSCULAR; INTRAVENOUS at 21:11

## 2019-06-24 RX ADMIN — OXYCODONE HYDROCHLORIDE AND ACETAMINOPHEN 2 TABLET: 5; 325 TABLET ORAL at 08:08

## 2019-06-24 RX ADMIN — TRAZODONE HYDROCHLORIDE 50 MG: 50 TABLET ORAL at 21:11

## 2019-06-24 RX ADMIN — CLOPIDOGREL BISULFATE 75 MG: 75 TABLET ORAL at 08:08

## 2019-06-24 RX ADMIN — RANOLAZINE 500 MG: 500 TABLET, FILM COATED, EXTENDED RELEASE ORAL at 08:07

## 2019-06-24 RX ADMIN — ASPIRIN 81 MG 81 MG: 81 TABLET ORAL at 08:08

## 2019-06-24 RX ADMIN — ISOSORBIDE MONONITRATE 90 MG: 60 TABLET, EXTENDED RELEASE ORAL at 08:08

## 2019-06-24 RX ADMIN — HYDROMORPHONE HYDROCHLORIDE 1 MG: 1 INJECTION, SOLUTION INTRAMUSCULAR; INTRAVENOUS; SUBCUTANEOUS at 06:16

## 2019-06-24 RX ADMIN — FUROSEMIDE 40 MG: 40 TABLET ORAL at 08:08

## 2019-06-24 RX ADMIN — METOPROLOL SUCCINATE 100 MG: 50 TABLET, FILM COATED, EXTENDED RELEASE ORAL at 08:08

## 2019-06-24 RX ADMIN — GABAPENTIN 600 MG: 300 CAPSULE ORAL at 18:12

## 2019-06-24 RX ADMIN — OXYCODONE HYDROCHLORIDE AND ACETAMINOPHEN 2 TABLET: 5; 325 TABLET ORAL at 21:14

## 2019-06-24 RX ADMIN — ISOSORBIDE MONONITRATE 90 MG: 60 TABLET, EXTENDED RELEASE ORAL at 21:10

## 2019-06-24 RX ADMIN — PANTOPRAZOLE SODIUM 40 MG: 40 TABLET, DELAYED RELEASE ORAL at 06:16

## 2019-06-24 RX ADMIN — OXYCODONE HYDROCHLORIDE AND ACETAMINOPHEN 2 TABLET: 5; 325 TABLET ORAL at 17:14

## 2019-06-24 RX ADMIN — SODIUM CHLORIDE: 9 INJECTION, SOLUTION INTRAVENOUS at 10:06

## 2019-06-24 RX ADMIN — ATORVASTATIN CALCIUM 80 MG: 80 TABLET, FILM COATED ORAL at 08:08

## 2019-06-24 ASSESSMENT — PAIN DESCRIPTION - ONSET
ONSET: ON-GOING
ONSET: GRADUAL
ONSET: ON-GOING

## 2019-06-24 ASSESSMENT — PAIN SCALES - GENERAL
PAINLEVEL_OUTOF10: 8
PAINLEVEL_OUTOF10: 4
PAINLEVEL_OUTOF10: 7
PAINLEVEL_OUTOF10: 7
PAINLEVEL_OUTOF10: 8
PAINLEVEL_OUTOF10: 7
PAINLEVEL_OUTOF10: 4
PAINLEVEL_OUTOF10: 8
PAINLEVEL_OUTOF10: 6
PAINLEVEL_OUTOF10: 7
PAINLEVEL_OUTOF10: 7
PAINLEVEL_OUTOF10: 8
PAINLEVEL_OUTOF10: 5
PAINLEVEL_OUTOF10: 6
PAINLEVEL_OUTOF10: 4
PAINLEVEL_OUTOF10: 6
PAINLEVEL_OUTOF10: 7
PAINLEVEL_OUTOF10: 10

## 2019-06-24 ASSESSMENT — PAIN DESCRIPTION - FREQUENCY
FREQUENCY: CONTINUOUS
FREQUENCY: INTERMITTENT
FREQUENCY: CONTINUOUS

## 2019-06-24 ASSESSMENT — PAIN SCALES - WONG BAKER
WONGBAKER_NUMERICALRESPONSE: 0

## 2019-06-24 ASSESSMENT — PAIN DESCRIPTION - LOCATION
LOCATION: BACK
LOCATION: HEAD
LOCATION: CHEST
LOCATION: BACK
LOCATION: HEAD
LOCATION: BACK

## 2019-06-24 ASSESSMENT — PAIN - FUNCTIONAL ASSESSMENT
PAIN_FUNCTIONAL_ASSESSMENT: PREVENTS OR INTERFERES SOME ACTIVE ACTIVITIES AND ADLS
PAIN_FUNCTIONAL_ASSESSMENT: PREVENTS OR INTERFERES SOME ACTIVE ACTIVITIES AND ADLS
PAIN_FUNCTIONAL_ASSESSMENT: PREVENTS OR INTERFERES WITH MANY ACTIVE NOT PASSIVE ACTIVITIES
PAIN_FUNCTIONAL_ASSESSMENT: PREVENTS OR INTERFERES SOME ACTIVE ACTIVITIES AND ADLS
PAIN_FUNCTIONAL_ASSESSMENT: ACTIVITIES ARE NOT PREVENTED
PAIN_FUNCTIONAL_ASSESSMENT: PREVENTS OR INTERFERES SOME ACTIVE ACTIVITIES AND ADLS

## 2019-06-24 ASSESSMENT — PAIN DESCRIPTION - ORIENTATION
ORIENTATION: LOWER
ORIENTATION: LOWER;MID;LEFT
ORIENTATION: ANTERIOR
ORIENTATION: LOWER
ORIENTATION: LOWER;MID;LEFT
ORIENTATION: ANTERIOR
ORIENTATION: LOWER
ORIENTATION: LOWER
ORIENTATION: LOWER;MID;LEFT
ORIENTATION: LOWER
ORIENTATION: MID;LOWER
ORIENTATION: LOWER;MID;LEFT

## 2019-06-24 ASSESSMENT — PAIN DESCRIPTION - DESCRIPTORS
DESCRIPTORS: ACHING;CONSTANT
DESCRIPTORS: ACHING;CONSTANT
DESCRIPTORS: SHARP;SHOOTING
DESCRIPTORS: ACHING;CONSTANT
DESCRIPTORS: ACHING
DESCRIPTORS: ACHING;CONSTANT
DESCRIPTORS: PRESSURE;ACHING
DESCRIPTORS: SHARP;SHOOTING
DESCRIPTORS: ACHING;CONSTANT
DESCRIPTORS: SHARP;SHOOTING
DESCRIPTORS: SHARP;SHOOTING
DESCRIPTORS: ACHING
DESCRIPTORS: ACHING;CONSTANT

## 2019-06-24 ASSESSMENT — PAIN DESCRIPTION - DIRECTION
RADIATING_TOWARDS: LEFT LEG

## 2019-06-24 ASSESSMENT — PAIN DESCRIPTION - PAIN TYPE
TYPE: ACUTE PAIN;CHRONIC PAIN
TYPE: ACUTE PAIN;CHRONIC PAIN
TYPE: ACUTE PAIN
TYPE: ACUTE PAIN
TYPE: ACUTE PAIN;CHRONIC PAIN
TYPE: ACUTE PAIN
TYPE: ACUTE PAIN;CHRONIC PAIN
TYPE: ACUTE PAIN;CHRONIC PAIN

## 2019-06-24 NOTE — PROGRESS NOTES
to Learning: Pain. REQUIRES PT FOLLOW UP: Yes  Activity Tolerance  Activity Tolerance: Patient limited by pain       Patient Diagnosis(es): The encounter diagnosis was Chronic midline low back pain without sciatica. has a past medical history of Anxiety, Arthritis, Asthma, CAD (coronary artery disease), Calcium kidney stone, Cardiomyopathy ([address] Cerebral artery occlusion with cerebral infarction ([address] CHF (congestive heart failure) ([address] COPD (chronic obstructive pulmonary disease) ([address] Depression, DM2 (diabetes mellitus, type 2) ([address] Fibromyalgia, GERD (gastroesophageal reflux disease), Hyperlipidemia, Hypertension, Liver disease, Pacemaker, Pneumonia, Seizures ([address] TIA (transient ischemic attack), and Ulcerative colitis ([address] has a past surgical history that includes Cholecystectomy; Coronary artery bypass graft (2010, 11/2015); Coronary angioplasty with stent (2012); Cardiac surgery; Endoscopy, colon, diagnostic; pacemaker placement; Colonoscopy (01/10/2017); Colonoscopy (01/10/2017); Upper gastrointestinal endoscopy (02/07/2017); and back surgery. Restrictions  Restrictions/Precautions  Restrictions/Precautions: Fall Risk  Position Activity Restriction  Spinal Precautions: No Bending, No Lifting, No Twisting        Subjective  General  Chart Reviewed: Yes  Patient assessed for rehabilitation services?: Yes  Additional Pertinent Hx: 62 y/o male admit 6/22/19 with Acute on Chronic Back Pain, Difficulty Amb. CT Lumbar Spine - Persistent ill defined Lesion R Erector Spinae Musc adjacent to R L3 Vinh Laminectomy defect, although previously seen gas within lesion is now resolved. Areas of Mild to Mod Spinal Canal Stenosis and Neural Foraminal narrowing most severe at L3/L4. PMH as noted including CAD, Pacemaker, CABG, TIA, L3/L4 Laminectomy/Discectomy 10/2018. Response To Previous Treatment: Patient with no complaints from previous session.   Family / Caregiver Present: No  Referring Stand by assistance  Ambulation  Ambulation?: Yes  Ambulation 1  Surface: level tile  Device: Rolling Walker  Assistance: Stand by assistance  Quality of Gait: Step through pattern, with MIn antalgia at L stance phase of gait. Functionally stable L LE with use of wh walker, although antalgia more notable as gait progresses. Distance: 80'  Comments: Patient reports desire to continue to ambulate. Therapist limits due to plan to trial stairs, and patient presentation of increased antalgia at L LE stance phase of gait. Stairs/Curb  Stairs?: Yes  Stairs  # Steps : 4  Stairs Height: 6\"  Rails: Right ascending(B hand s to R rail)  Assistance: Stand by assistance;Contact guard assistance(SBA ascending, CGA descending, with decreased stance stability at L LE on stairs.)  Balance  Sitting - Static: Good  Sitting - Dynamic: Good  Standing - Static: Good  Standing - Dynamic: Good  Comments: supported stance and gait  Exercises  Quad Sets: 10 B. Good technique. Gluteal Sets: 10  Comments: Adductor sets x 10. Plan   Plan  Times per week: 3-5x week while in acute care setting. Current Treatment Recommendations: Strengthening, Functional Mobility Training, Transfer Training, Gait Training, Stair training, Safety Education & Training, Patient/Caregiver Education & Training  Safety Devices  Type of devices: Call light within reach, Chair alarm in place, Left in chair, Nurse notified    AM-PAC Score  AM-PAC Inpatient Mobility Raw Score : 20 (06/24/19 1308)  AM-PAC Inpatient T-Scale Score : 47.67 (06/24/19 1308)  Mobility Inpatient CMS 0-100% Score: 35.83 (06/24/19 1308)  Mobility Inpatient CMS G-Code Modifier : [address] (06/24/19 1308)       Goals  Short term goals  Time Frame for Short term goals: Upon d/c acute care setting. Short term goal 1: Bed Mob SBA. Short term goal 2: Transfers with/without assist device SBA. 6-24-19 goal met. Short term goal 3: Amb with/without assist device 150' SBA.     Short term goal 4: Stair

## 2019-06-24 NOTE — DISCHARGE INSTR - COC
Continuity of Care Form    Patient Name: Telma Steele   :  1955  MRN:  [id number]    Admit date:  2019  Discharge date:  19      Code Status Order: Full Code   Advance Directives:   [facility] Documentation     Date/Time Healthcare Directive Type of Healthcare Directive Copy in [address] Agent's Name Healthcare Agent's Phone Number    19 [phone]  Yes, patient has an advance directive for healthcare treatment  Durable power of  for health care  Yes, copy in chart  Healthcare power of   Radha Sethi Daughter-in-Law  [phone]          Admitting Physician:  Joey Jon MD  PCP: Kimberlee Bronson MD    Discharging Nurse: Erik Vinson RN  [address] Unit/Room#: Z3V-6507/6050-25  Discharging Unit Phone Number: [phone]    Emergency Contact:   Extended Emergency Contact Information  Primary Emergency Contact: Sivantheo Inmanwalter  Address: [address] [place] [address] Phone: [phone]  Relation: Spouse  Secondary Emergency Contact: [address] Phone: [phone]  Work Phone: [phone]  Relation: Niece/Nephew    Past Surgical History:  Past Surgical History:   Procedure Laterality Date    BACK SURGERY      CARDIAC SURGERY      CHOLECYSTECTOMY      COLONOSCOPY  01/10/2017    COLONOSCOPY  01/10/2017    CORONARY ANGIOPLASTY WITH STENT PLACEMENT      CORONARY ARTERY BYPASS GRAFT  , 2015    ENDOSCOPY, COLON, DIAGNOSTIC      PACEMAKER PLACEMENT      UPPER GASTROINTESTINAL ENDOSCOPY  2017       Immunization History:   Immunization History   Administered Date(s) Administered    Influenza 2013    Influenza Vaccine, unspecified formulation 10/24/2011, 2013, 2015, 2017    Influenza Virus Vaccine 2014, 2015    Influenza, Quadv, 3 Years and older, IM (Fluzone 3 yrs and

## 2019-06-24 NOTE — PROGRESS NOTES
Hospitalist Progress Note      PCP: Vee Ramesh MD    Date of Admission: 6/21/2019        Subjective: Pt S/E. No new complaints. Still has back pain and weakness in bl le. No incontinence or constipation. Medications:  Reviewed    Infusion Medications    sodium chloride 100 mL/hr at 06/23/19 2332    dextrose       Scheduled Medications    gabapentin  600 mg Oral TID    gabapentin  600 mg Oral Nightly    sodium chloride flush  10 mL Intravenous 2 times per day    enoxaparin  40 mg Subcutaneous Daily    nicotine  1 patch Transdermal Daily    pantoprazole  40 mg Oral QAM AC    clopidogrel  75 mg Oral Daily    furosemide  40 mg Oral Daily    metoprolol succinate  100 mg Oral Daily    atorvastatin  80 mg Oral Daily    losartan  50 mg Oral Daily    insulin glargine  20 Units Subcutaneous Nightly    insulin lispro  5 Units Subcutaneous TID AC    aspirin  81 mg Oral Daily    isosorbide mononitrate  90 mg Oral BID    ranolazine  500 mg Oral BID    traZODone  50 mg Oral Nightly    insulin lispro  0-12 Units Subcutaneous TID WC    insulin lispro  0-6 Units Subcutaneous Nightly     PRN Meds: sodium chloride flush, ondansetron, acetaminophen, HYDROmorphone, oxyCODONE-acetaminophen **OR** oxyCODONE-acetaminophen, polyethylene glycol, zolpidem, LORazepam, oxymetazoline, glucose, dextrose, glucagon (rDNA), dextrose      Intake/Output Summary (Last 24 hours) at 6/24/2019 0839  Last data filed at 6/24/2019 0600  Gross per 24 hour   Intake 3720 ml   Output --   Net 3720 ml       Physical Exam Performed:    /74   Pulse 82   Temp 97.7 °F (36.5 °C) (Oral)   Resp 16   Ht 6' (1.829 m)   Wt 218 lb 7.6 oz (99.1 kg)   SpO2 95%   BMI 29.63 kg/m²     General appearance: No apparent distress, appears comfortable  Neck: Supple  Respiratory:  Normal respiratory effort. Clear to auscultation, bilaterally without Rales/Wheezes/Rhonchi.   Cardiovascular: Regular rate and rhythm with normal S1/S2 without murmurs, rubs or gallops. Abdomen: Soft, non-tender  Musculoskeletal: No clubbing, cyanosis   Skin: Skin color, texture, turgor normal.  No rashes or lesions. Neurologic:  Neurovascularly intact without any focal sensory/motor deficits. Cranial nerves: II-XII intact, grossly non-focal. Slight decreased strength in bl le, Lt > Rt  Psychiatric: Alert and oriented x4  Capillary Refill: Brisk,< 3 seconds   Peripheral Pulses: +2 palpable, equal bilaterally       Labs:   Recent Labs     06/21/19  1930 06/22/19  0609   WBC 9.3 8.5   HGB 15.3 13.8   HCT 44.5 41.2    206     Recent Labs     06/21/19  1929 06/22/19  0609 06/23/19  0539   * 134* 138   K 3.3* 3.3* 3.8   CL 95* 101 105   CO2 20* 21 20*   BUN 10 12 16   CREATININE 1.0 1.4* 1.3   CALCIUM 9.5 8.6 8.4     Recent Labs     06/23/19  0539   AST 9*   ALT 12   BILITOT 0.3   ALKPHOS 62     No results for input(s): INR in the last 72 hours. No results for input(s): Victorine [place] in the last 72 hours. Urinalysis:      Lab Results   Component Value Date    NITRU Negative 11/26/2018    WBCUA 1 11/26/2018    RBCUA 1 11/26/2018    BLOODU Negative 11/26/2018    SPECGRAV >1.030 11/26/2018    GLUCOSEU 100 11/26/2018       Radiology:  XR LUMBAR SPINE (2-3 VIEWS)   Final Result   1. Multilevel degenerative disc disease and facet joint arthropathy   2. No acute lumbar spine abnormality   3. Stable superior endplate compression deformity L2 3 stable, anatomic   alignment in flexion extension         CT LUMBAR SPINE WO CONTRAST   Final Result   1. No acute findings in the lumbar spine. 2. Persistent ill-defined lesion in the right erector spinae musculature   adjacent to a right L3 hemilaminectomy defect, although previously seen gas   within the lesion is now resolved. This could represent a postoperative   hematoma, seroma, or abscess.   A postoperative meningocele is considered less   likely given no opacification on the prior myelogram.  Consider further   evaluation MRI or a contrast-enhanced CT. 3. Mild lumbar spine degenerative changes. 4. Areas of mild to moderate spinal canal stenosis and neural foraminal   narrowing, most severe at L3/L4. 5. Bony demineralization. Assessment/Plan:    Active Hospital Problems    Diagnosis    Ischemic cardiomyopathy [I25.5]     Priority: Medium    Essential hypertension [I10]     Priority: Medium    ICD (implantable cardioverter-defibrillator), dual, in situ [Z95.810]     Priority: Low    Intractable back pain [M54.9]    Low back pain [M54.5]    CAD (coronary artery disease) [I25.10]    COPD (chronic obstructive pulmonary disease) ([facility] [J44.9]    DMII (diabetes mellitus, type 2) ([facility] [E11.9]    Tobacco abuse [Z72.0]    CHF (congestive heart failure) ([facility] [I50.9]     1. Intractable back pain being treated with po and iv pain medications, consulted NS. Has an appt 7/11/19 for spinal injections. No acute surgical intervention needed. He will need to hold plavix for 7 days prior to procedure. Will wean dilaudid to .5 mg today and stop tomorrow. Cont po percocet prn   2. DM II, continue lantus, humalog and add SSI. Titrate insulin as needed   3. LALITA, iv fluids, improving for now. 4. HTN, controlled, continue home BP meds  5. CAD, continue ASA, plavix, imdur and statin   5. Tobacco use disorder, counseled for cessation.      DVT Prophylaxis: lovenox  Diet: DIET CARB CONTROL;  Code Status: Full Code  Dispo: will dc tomorrow when off dilaudid  Madina Lopez, DO

## 2019-06-24 NOTE — PLAN OF CARE
Problem: Falls - Risk of:  Goal: Will remain free from falls  Description  Will remain free from falls  6/23/2019 2349 by Yury Hernandez RN  Outcome: Ongoing  Note:   Fall risk assessment completed . Fall precautions in place, bed/ chair alarm on, side rails 2/4 up, call light in reach, educated pt on calling for assistance when needed, room clear of clutter. Pt verbalized understanding. 6/23/2019 0958 by Lucille Garcia RN  Outcome: Ongoing  Goal: Absence of physical injury  Description  Absence of physical injury  6/23/2019 2349 by Yury Hernandez RN  Outcome: Ongoing  Note:   Pt is free of injury. No injury noted. Fall precautions in place. Call light within reach. Will monitor. 6/23/2019 0958 by Lucille Garcia RN  Outcome: Ongoing     Problem: Pain:  Goal: Pain level will decrease  Description  Pain level will decrease  6/23/2019 2349 by Yury Hernandez RN  Outcome: Ongoing  Note:   Pain/discomfort being managed with PRN analgesics per MD orders. Pt able to express presence and absence of pain and rate pain appropriately using numerical scale. 6/23/2019 0958 by Luiclle Garcia RN  Outcome: Ongoing  Goal: Control of acute pain  Description  Control of acute pain  6/23/2019 2349 by Yury Hernandez RN  Outcome: Ongoing  Note:   Patient educated on acute pain. Taught patient to use call light to ask for pain medication. PRN pain medication given for acute pain. Will continue to monitor pain per unit protocol. 6/23/2019 0958 by Lucille Garcia RN  Outcome: Ongoing  Goal: Control of chronic pain  Description  Control of chronic pain  6/23/2019 2349 by Yury Hernandez RN  Outcome: Ongoing  Note:   Patient educated on chronic pain. Taught patient to use call light to ask for pain medication. PRN pain medication given for chronic pain. Will continue to monitor pain per unit protocol.     6/23/2019 0958 by Lucille Garcia RN  Outcome: Ongoing     Problem: Anxiety:  Goal: Level of anxiety will decrease  Description  Level of anxiety will decrease  6/23/2019 2349 by Milton Vila, RN  Outcome: Ongoing  Note:   Patient educated on no-npharmacological ways to control anxiety. Patient educated on anxiety triggers, and ways to cope with triggers. Patient remains anxiety free at present time, will monitor throughout reminder of shift. 6/23/2019 0958 by Salma Soto RN  Outcome: Ongoing  Note:   Emotional support given to pt. To help him deal with pain.

## 2019-06-24 NOTE — PROGRESS NOTES
Pt sitting up in bed this morning, eating breakfast. Pt rates back pain 7/10, given prescribed analgesic (see eMAR). Pt hoping to work with therapy today and get washed up later. Pt has no questions or concerns at this time, calls appropriately. Will monitor.  Electronically signed by Unique Grey RN on 6/24/2019 at 9:06 AM

## 2019-06-24 NOTE — PLAN OF CARE
Problem: Falls - Risk of:  Goal: Will remain free from falls  Description  Will remain free from falls  6/24/2019 0742 by Adair Gaines RN  Outcome: Ongoing  Note:   Fall risk assessment completed. Fall precautions in place. Call light within reach. Pt educated on calling for assistance before getting up. Walkway free of clutter. Will continue to monitor. Electronically signed by Adair Gaines RN on 6/24/2019 at 7:42 AM       Problem: Falls - Risk of:  Goal: Absence of physical injury  Description  Absence of physical injury  6/24/2019 0742 by Adair Gaines RN  Outcome: Ongoing  Note:   Pt is free of injury. No injury noted. Fall precautions in place. Call light within reach. Will monitor. Electronically signed by Adair Gaines RN on 6/24/2019 at 7:42 AM       Problem: Pain:  Goal: Pain level will decrease  Description  Pain level will decrease  6/24/2019 0742 by Adair Gaines RN  Outcome: Ongoing  Note:   Pt assessed for pain. Pt in pain and assessed with 0-10 pain rating scale. Pt given prescribed analgesic for pain. (See eMar) Pt satisfied with pain relief thus far. Will reassess and continue to monitor. Electronically signed by Adair Gaines RN on 6/24/2019 at 7:43 AM       Problem: Anxiety:  Goal: Level of anxiety will decrease  Description  Level of anxiety will decrease  6/24/2019 0742 by Adair Gaines RN  Outcome: Ongoing  Note:   Pt assessed for anxiety. Pt showing no sign of anxious behavior at this time. Will continue to monitor and assess.  Electronically signed by Adair Gaines RN on 6/24/2019 at 7:43 AM

## 2019-06-24 NOTE — PROGRESS NOTES
Occupational Therapy   Occupational Therapy Initial Assessment  Date: 2019   Patient Name: Ethel Stahl  MRN: [id number]     : 1955    Date of Service: 2019    Assessment: Pt is 61 y.o. M who presents with intractable low back pain. Pt recently hospitalized for similar symptoms - reports pain is not well managed at home following recent d/c from SNF. PTA pt recently living at home with wife in second floor apt of 2 story home with 17 GEO. Pt reports independence in self-care tasks \"on a good day\" and functional mobility with RW. Pt reports wife takes care of homemaking responsibilities unless he feels well enough to assist. Currently, pt presents with ROM/strength in [facility] for self-care and transfers. Pt completed sit <> stand transfers with CGA and functional mobility with RW with min A. Pt completed bathing/dressing/toileting tasks with SBA/SPV at sink. Pt reports that he is having a good day in terms of pain and if he was in more pain he would require increased level of assistance. Continue to assess for appropriate discharge disposition - pt has several steps to enter which is barrier to return home. However pt completed ADL tasks with minimal help which he reports wife is home . Also reports frequent falls from knees buckling with no warning. Pt remains high fall risk. Discharge Recommendations:  Continue to assess pending progress, Patient would benefit from continued therapy after discharge       Assessment   Performance deficits / Impairments: Decreased functional mobility ; Decreased ADL status; Decreased balance;Decreased strength;Decreased endurance  Assessment: Pt is 61 y.o. M who presents with intractable low back pain. Pt recently hospitalized for similar symptoms - reports pain is not well managed at home following recent d/c from SNF. PTA pt recently living at home with wife in second floor apt of 2 story home with 17 GEO.  Pt reports independence in self-care tasks \"on a good day\" and functional mobility with RW. Pt reports wife takes care of homemaking responsibilities unless he feels well enough to assist. Currently, pt presents with ROM/strength in [facility] for self-care and transfers. Pt completed sit <> stand transfers with CGA and functional mobility with RW with min A. Pt completed bathing/dressing/toileting tasks with SBA/SPV at sink. Pt reports that he is having a good day in terms of pain and if he was in more pain he would require increased level of assistance. Continue to assess for appropriate discharge disposition - pt has several steps to enter which is barrier to return home. However pt completed ADL tasks with minimal help which he reports wife is home 24/7. Also reports frequent falls from knees buckling with no warning. Pt remains high fall risk. Treatment Diagnosis: impaired func mob, transfers, and ADL status   Prognosis: Fair;Good  Decision Making: Medium Complexity  History: PMH: CAD, CVA, COPD, Fibromyalgia, DM, anxiety, pacemaker  Exam: ADLs, transfers, func mob   Assistance / Modification: min A for mobility, SBA for ADLs  Patient Education: Role of OT, POC, safety   REQUIRES OT FOLLOW UP: Yes  Activity Tolerance  Activity Tolerance: Patient limited by pain  Safety Devices  Safety Devices in place: Yes(ADEEL Solomon) notified)  Type of devices: Left in chair;Gait belt;Call light within reach; Chair alarm in place;Nurse notified           Patient Diagnosis(es): The encounter diagnosis was Chronic midline low back pain without sciatica.      has a past medical history of Anxiety, Arthritis, Asthma, CAD (coronary artery disease), Calcium kidney stone, Cardiomyopathy ([address] Cerebral artery occlusion with cerebral infarction ([address] CHF (congestive heart failure) ([address] COPD (chronic obstructive pulmonary disease) ([address] Depression, DM2 (diabetes mellitus, type 2) ([address] Fibromyalgia, GERD (gastroesophageal reflux disease), Hyperlipidemia, Hypertension, Liver disease, Pacemaker, Pneumonia, Seizures ([facility] TIA (transient ischemic attack), and Ulcerative colitis ([facility] has a past surgical history that includes Cholecystectomy; Coronary artery bypass graft (2010, 11/2015); Coronary angioplasty with stent (2012); Cardiac surgery; Endoscopy, colon, diagnostic; pacemaker placement; Colonoscopy (01/10/2017); Colonoscopy (01/10/2017); Upper gastrointestinal endoscopy (02/07/2017); and back surgery. Treatment Diagnosis: impaired func mob, transfers, and ADL status       Restrictions  Restrictions/Precautions  Restrictions/Precautions: Fall Risk    Subjective   General  Chart Reviewed: Yes  Patient assessed for rehabilitation services?: Yes  Additional Pertinent Hx: Per Ernesto Negron MD 6/21/19: Pt is \"61 y.o. male who presented with complaint of low back pain. Symptom onset was acute on chronic for a time period of 1 day. The severity is described as severe. The course of his symptoms over time is worsening. The symptoms improved with pain med and worsened with none. The patient's symptom is associated with radiculopathy to the left leg. He was recently admitted to this hospital in May 2019 and evaluated by neurosurgery. At the time he had CT myelogram done, but MRI could not be done due to existing pacemaker. There is no neurological deficit. He is able to move both legs and no urinary incontinence \"  Family / Caregiver Present: No  Referring Practitioner: Ernesto Negron MD  Diagnosis: Low back pain   Subjective  Subjective: Pt met bedside, seated in chair at sink in bathroom with RW nearby. Agreeable to therapy evaluation.    Patient Currently in Pain: (Rates pain 5/10 - just had medicine and reports he is [place] a good day\" and \"it is a good time for therapy\")  Pain Assessment  Pain Assessment: 0-10  Pain Level: 5  Patient's Stated Pain Goal: No pain  Pain Type: Acute pain;Chronic pain  Pain Location: Back  Pain Orientation: Lower  Pain Descriptors: Aching;Constant  Pain Frequency: Continuous  Pain Onset: On-going  Clinical Progression: Not changed  Functional Pain Assessment: Prevents or interferes with many active not passive activities  Non-Pharmaceutical Pain Intervention(s): Rest;Repositioned  Response to Pain Intervention: Patient Satisfied  Multiple Pain Sites: No  Vital Signs  Patient Currently in Pain: (Rates pain 5/10 - just had medicine and reports he is [place] a good day\" and \"it is a good time for therapy\")     Social/Functional History  Social/Functional History  Lives With: Spouse  Type of Home: House(Pt/wife reside on 2nd floor of 2 family home.  )  Home Layout: One level, Laundry in basement(Wife takes care of laundry. )  Home Access: Stairs to enter with rails, Level entry(Full flight of steps to access 2nd floor. )  Entrance Stairs - Number of Steps: 17 GEO with rail on R (steps split FCI)  Bathroom Shower/Tub: Tub/Shower unit  Bathroom Toilet: Handicap height  Bathroom Equipment: (Pt reports no DME in bathroom. )  Bathroom Accessibility: Accessible  Home Equipment: Rolling walker, Reacher, Sock aid  Receives Help From: (OT/PT continue to come to the home )  ADL Assistance: Independent(Assist recently due to increase low back pain. )  Homemaking Assistance: (Wife takes care of homemaking needs. )  Ambulation Assistance: Independent(With Rolling Walker.  )  Transfer Assistance: Independent  Active : No  Occupation: On disability  Additional Comments: Recent hospital admit 5/22-5/24/19 with d/c to [facility] SNF setting for 10 days then d/c home. Pt reports home approx 20 days prior this admit.         Objective   Vision: Within Functional Limits  Hearing: Within functional limits      Orientation  Overall Orientation Status: Within Functional Limits     Balance  Sitting Balance: Stand by assistance  Standing Balance: Contact guard assistance  Standing Balance  Time: ~5 minutes - nonconsecutive   Activity: Fun mob, transfers, and ADL tasks Comment: Pt required seated rest breaks throughout ADL activities. Functional Mobility  Functional - Mobility Device: Rolling Walker  Activity: To/from bathroom  Assist Level: Minimal assistance  Functional Mobility Comments: Pt completed functional mobility with RW with CGA/min A. Pt left RW to the side in the bathroom moving from sink to toilet - no LOB. With RW pt required cues for safe RW management - demonstrating some impulsivity and picking up RW at times. Unsteady but no overt LOB. Toilet Transfers  Toilet Transfers Comments: Stood at commode to urinate with SBA/SPV    ADL  Grooming: Supervision(Pt washed face/hair while seated at sink - defered oral care and shaving d/t fatigue. Assist to trim hair of mustache with scissors to prevent injury)  UE Bathing: Supervision(Pt completed UB bathing while seated at sink with bath wipes - preferred over use of wash cloth with soap)  LE Bathing: Stand by assistance;Supervision(Pt stood to complete pericare at sink, SBA/SPV provided )  UE Dressing: (Assist to don gown around IV line )  LE Dressing: Stand by assistance;Supervision(Pt managed hospital pants down and doffed. Threaded clean pair and managed over hips with SBA/SPV )  Toileting: Stand by assistance;Supervision(Pt stood to urinate in commode, managed own clothing with SBA/SPV )  Additional Comments: PTA pt reprots requiring recent assistance from wife d/t pain - vague in the level of assistance provided. Pt completed bathing/dressing/toileting tasks this date with no more than SBA/SPV.  Pt reports \"this is a good day, if I was having a bad day you would have needed to help me more\"      Tone RUE  RUE Tone: Normotonic  Tone LUE  LUE Tone: Normotonic  Coordination  Movements Are Fluid And Coordinated: Yes        Transfers  Sit to stand: Contact guard assistance  Stand to sit: Contact guard assistance  Transfer Comments: CGA for sit <> stand from chair with armrests at sink and sit to recliner chair in stance at sink with SPV   Patient Goals   Patient goals : \"well I dont think I can manage at home on my bad days but I will do whatever I need to do to get better\"       Therapy Time   Individual Concurrent Group Co-treatment   Time In 0930         Time Out 1020         Minutes 50         Timed Code Treatment Minutes: 35 Minutes(15 min eval )     If pt is discharged prior to next OT session, this note will serve as the discharge summary.     Coretta Gustafson

## 2019-06-24 NOTE — CARE COORDINATION
SW met with pt at bedside. Pt reports he does not feel able to dc home today and would like to go to SNF at [place]. Pt reports he has been to [address] [place] [address] in the past and would like to go there again. Referral to [facility] at [place].   Electronically signed by [id number] ROSEANNA Trejo on 6/24/2019 at 1:18 PM

## 2019-06-25 VITALS
SYSTOLIC BLOOD PRESSURE: 123 MMHG | OXYGEN SATURATION: 95 % | HEART RATE: 76 BPM | RESPIRATION RATE: 20 BRPM | DIASTOLIC BLOOD PRESSURE: 69 MMHG | BODY MASS INDEX: 32.2 KG/M2 | WEIGHT: 237.7 LBS | TEMPERATURE: 98.2 F | HEIGHT: 72 IN

## 2019-06-25 LAB
EKG ATRIAL RATE: 73 BPM
EKG DIAGNOSIS: NORMAL
EKG P AXIS: 46 DEGREES
EKG P-R INTERVAL: 128 MS
EKG Q-T INTERVAL: 436 MS
EKG QRS DURATION: 146 MS
EKG QTC CALCULATION (BAZETT): 480 MS
EKG R AXIS: 49 DEGREES
EKG T AXIS: 77 DEGREES
EKG VENTRICULAR RATE: 73 BPM
GLUCOSE BLD-MCNC: 191 MG/DL (ref 70–99)
GLUCOSE BLD-MCNC: 91 MG/DL (ref 70–99)
PERFORMED ON: ABNORMAL
PERFORMED ON: NORMAL
TROPONIN: <0.01 NG/ML

## 2019-06-25 PROCEDURE — 6370000000 HC RX 637 (ALT 250 FOR IP): Performed by: INTERNAL MEDICINE

## 2019-06-25 PROCEDURE — 2580000003 HC RX 258: Performed by: INTERNAL MEDICINE

## 2019-06-25 PROCEDURE — 94760 N-INVAS EAR/PLS OXIMETRY 1: CPT

## 2019-06-25 PROCEDURE — 93010 ELECTROCARDIOGRAM REPORT: CPT | Performed by: INTERNAL MEDICINE

## 2019-06-25 PROCEDURE — 6360000002 HC RX W HCPCS: Performed by: INTERNAL MEDICINE

## 2019-06-25 PROCEDURE — 6360000002 HC RX W HCPCS: Performed by: FAMILY MEDICINE

## 2019-06-25 RX ORDER — INSULIN GLARGINE 100 [IU]/ML
20 INJECTION, SOLUTION SUBCUTANEOUS NIGHTLY
Qty: 1 VIAL | Refills: 3 | Status: ON HOLD | OUTPATIENT
Start: 2019-06-25 | End: 2020-02-06 | Stop reason: SDUPTHER

## 2019-06-25 RX ORDER — OXYCODONE HYDROCHLORIDE AND ACETAMINOPHEN 5; 325 MG/1; MG/1
1 TABLET ORAL EVERY 6 HOURS PRN
Qty: 12 TABLET | Refills: 0 | Status: SHIPPED | OUTPATIENT
Start: 2019-06-25 | End: 2019-10-31 | Stop reason: SDUPTHER

## 2019-06-25 RX ORDER — GABAPENTIN 600 MG/1
600 TABLET ORAL 3 TIMES DAILY
Qty: 150 TABLET | Refills: 1 | Status: SHIPPED | OUTPATIENT
Start: 2019-06-25 | End: 2019-07-15 | Stop reason: SDUPTHER

## 2019-06-25 RX ADMIN — INSULIN LISPRO 5 UNITS: 100 INJECTION, SOLUTION INTRAVENOUS; SUBCUTANEOUS at 12:21

## 2019-06-25 RX ADMIN — CLOPIDOGREL BISULFATE 75 MG: 75 TABLET ORAL at 08:17

## 2019-06-25 RX ADMIN — INSULIN LISPRO 2 UNITS: 100 INJECTION, SOLUTION INTRAVENOUS; SUBCUTANEOUS at 08:17

## 2019-06-25 RX ADMIN — ASPIRIN 81 MG 81 MG: 81 TABLET ORAL at 08:17

## 2019-06-25 RX ADMIN — OXYCODONE HYDROCHLORIDE AND ACETAMINOPHEN 2 TABLET: 5; 325 TABLET ORAL at 12:20

## 2019-06-25 RX ADMIN — OXYCODONE HYDROCHLORIDE AND ACETAMINOPHEN 2 TABLET: 5; 325 TABLET ORAL at 04:11

## 2019-06-25 RX ADMIN — HYDROMORPHONE HYDROCHLORIDE 0.5 MG: 1 INJECTION, SOLUTION INTRAMUSCULAR; INTRAVENOUS; SUBCUTANEOUS at 00:22

## 2019-06-25 RX ADMIN — ZOLPIDEM TARTRATE 5 MG: 5 TABLET ORAL at 01:05

## 2019-06-25 RX ADMIN — ATORVASTATIN CALCIUM 80 MG: 80 TABLET, FILM COATED ORAL at 08:19

## 2019-06-25 RX ADMIN — FUROSEMIDE 40 MG: 40 TABLET ORAL at 08:18

## 2019-06-25 RX ADMIN — GABAPENTIN 600 MG: 300 CAPSULE ORAL at 12:21

## 2019-06-25 RX ADMIN — ISOSORBIDE MONONITRATE 90 MG: 60 TABLET, EXTENDED RELEASE ORAL at 08:17

## 2019-06-25 RX ADMIN — INSULIN LISPRO 5 UNITS: 100 INJECTION, SOLUTION INTRAVENOUS; SUBCUTANEOUS at 08:18

## 2019-06-25 RX ADMIN — OXYCODONE HYDROCHLORIDE AND ACETAMINOPHEN 2 TABLET: 5; 325 TABLET ORAL at 08:18

## 2019-06-25 RX ADMIN — SODIUM CHLORIDE, PRESERVATIVE FREE 10 ML: 5 INJECTION INTRAVENOUS at 08:24

## 2019-06-25 RX ADMIN — METOPROLOL SUCCINATE 100 MG: 50 TABLET, FILM COATED, EXTENDED RELEASE ORAL at 08:19

## 2019-06-25 RX ADMIN — HYDROMORPHONE HYDROCHLORIDE 0.5 MG: 1 INJECTION, SOLUTION INTRAMUSCULAR; INTRAVENOUS; SUBCUTANEOUS at 06:30

## 2019-06-25 RX ADMIN — RANOLAZINE 500 MG: 500 TABLET, FILM COATED, EXTENDED RELEASE ORAL at 08:19

## 2019-06-25 RX ADMIN — ENOXAPARIN SODIUM 40 MG: 40 INJECTION SUBCUTANEOUS at 08:19

## 2019-06-25 RX ADMIN — LOSARTAN POTASSIUM 50 MG: 50 TABLET ORAL at 08:17

## 2019-06-25 RX ADMIN — GABAPENTIN 600 MG: 300 CAPSULE ORAL at 08:19

## 2019-06-25 RX ADMIN — PANTOPRAZOLE SODIUM 40 MG: 40 TABLET, DELAYED RELEASE ORAL at 06:30

## 2019-06-25 ASSESSMENT — PAIN - FUNCTIONAL ASSESSMENT
PAIN_FUNCTIONAL_ASSESSMENT: ACTIVITIES ARE NOT PREVENTED
PAIN_FUNCTIONAL_ASSESSMENT: PREVENTS OR INTERFERES SOME ACTIVE ACTIVITIES AND ADLS
PAIN_FUNCTIONAL_ASSESSMENT: PREVENTS OR INTERFERES SOME ACTIVE ACTIVITIES AND ADLS
PAIN_FUNCTIONAL_ASSESSMENT: ACTIVITIES ARE NOT PREVENTED
PAIN_FUNCTIONAL_ASSESSMENT: ACTIVITIES ARE NOT PREVENTED
PAIN_FUNCTIONAL_ASSESSMENT: PREVENTS OR INTERFERES SOME ACTIVE ACTIVITIES AND ADLS

## 2019-06-25 ASSESSMENT — PAIN SCALES - GENERAL
PAINLEVEL_OUTOF10: 5
PAINLEVEL_OUTOF10: 0
PAINLEVEL_OUTOF10: 7
PAINLEVEL_OUTOF10: 0
PAINLEVEL_OUTOF10: 8
PAINLEVEL_OUTOF10: 5
PAINLEVEL_OUTOF10: 7
PAINLEVEL_OUTOF10: 7
PAINLEVEL_OUTOF10: 0
PAINLEVEL_OUTOF10: 7

## 2019-06-25 ASSESSMENT — PAIN DESCRIPTION - ORIENTATION
ORIENTATION: MID;LOWER
ORIENTATION: MID;LOWER
ORIENTATION: LOWER;MID
ORIENTATION: LOWER;MID
ORIENTATION: MID;LOWER

## 2019-06-25 ASSESSMENT — PAIN DESCRIPTION - ONSET
ONSET: PROGRESSIVE
ONSET: ON-GOING
ONSET: GRADUAL
ONSET: ON-GOING
ONSET: GRADUAL
ONSET: ON-GOING

## 2019-06-25 ASSESSMENT — PAIN DESCRIPTION - PAIN TYPE
TYPE: ACUTE PAIN

## 2019-06-25 ASSESSMENT — PAIN DESCRIPTION - FREQUENCY
FREQUENCY: INTERMITTENT
FREQUENCY: CONTINUOUS
FREQUENCY: CONTINUOUS
FREQUENCY: INTERMITTENT
FREQUENCY: CONTINUOUS
FREQUENCY: CONTINUOUS

## 2019-06-25 ASSESSMENT — PAIN DESCRIPTION - DESCRIPTORS
DESCRIPTORS: ACHING;CONSTANT
DESCRIPTORS: ACHING
DESCRIPTORS: ACHING;CONSTANT
DESCRIPTORS: ACHING
DESCRIPTORS: ACHING;CONSTANT
DESCRIPTORS: ACHING;CONSTANT

## 2019-06-25 ASSESSMENT — PAIN SCALES - WONG BAKER
WONGBAKER_NUMERICALRESPONSE: 0

## 2019-06-25 ASSESSMENT — PAIN DESCRIPTION - PROGRESSION
CLINICAL_PROGRESSION: NOT CHANGED

## 2019-06-25 ASSESSMENT — PAIN DESCRIPTION - LOCATION
LOCATION: HEAD
LOCATION: BACK
LOCATION: BACK
LOCATION: CHEST
LOCATION: BACK
LOCATION: BACK

## 2019-06-25 NOTE — PLAN OF CARE
Problem: Falls - Risk of:  Goal: Will remain free from falls  Description  Will remain free from falls  6/25/2019 0733 by Jonel Abarca RN  Outcome: Ongoing  6/24/2019 2342 by Malini Wilburn RN  Outcome: Met This Shift  Goal: Absence of physical injury  Description  Absence of physical injury  6/25/2019 0733 by Jonel Abarca RN  Outcome: Ongoing  6/24/2019 2342 by Malini Wilburn RN  Outcome: Met This Shift     Problem: Pain:  Goal: Pain level will decrease  Description  Pain level will decrease  6/25/2019 0733 by Jonel Abarca RN  Outcome: Ongoing  6/24/2019 2342 by Malini Wilburn RN  Outcome: Not Met This Shift  Goal: Control of acute pain  Description  Control of acute pain  6/25/2019 0733 by Jonel Abarca RN  Outcome: Ongoing  6/24/2019 2342 by Malini Wilburn RN  Outcome: Not Met This Shift  Goal: Control of chronic pain  Description  Control of chronic pain  6/25/2019 0733 by Jonel Abarca RN  Outcome: Ongoing  6/24/2019 2342 by Malini Wilburn RN  Outcome: Not Met This Shift     Problem: Anxiety:  Goal: Level of anxiety will decrease  Description  Level of anxiety will decrease  6/25/2019 0733 by Jonel Abarca RN  Outcome: Ongoing  6/24/2019 2342 by Malini Wilburn RN  Outcome: Not Met This Shift

## 2019-06-25 NOTE — PROGRESS NOTES
Pt discharged to [facility]. Transportation here with wheelchair. Accompanied by spouse. Transported in personal vehicle. Discharge instructions, Rx, and personal belongings given to pt. Explanation of discharge medications and instructions understood by verbal statement. No questions, comments or concerns at this time.    Electronically signed by Cathleen Keene RN on 6/25/2019 at 12:55 PM

## 2019-06-25 NOTE — PLAN OF CARE
No falls or injury- bed low, nonskid socks, call light in reach  Pain control - pt denies improved pain control. He is requesting increased doses of dilaudid. Pt getting prn pain meds per order. Attempted distraction and relaxation techniques with pt.

## 2019-06-25 NOTE — PROGRESS NOTES
Pt up to chair this morning, c/o pain in lower back, given prescribed analgesic (see eMAR). Pt angry that IV analgesic is discontinued but pt rates pain 5/10 after PO pain medication. Pt is to d/c to [facility] at [address] [place] [address] decides he will transport in personal vehicle with wife, transport cancelled. Pt has no other questions or concerns. Will monitor.  Electronically signed by Rodney Yu RN on 6/25/2019 at 12:41 PM

## 2019-06-25 NOTE — CARE COORDINATION
BONNY spoke with admissions at [facility]- [facility] precert. BONNY called Shayna with [address] [facility], [phone]   Approved- [phone] 3 days initial due on 6/27      Discharge Plan: SNF   Patient discharging to: [facility]   [address] [place], AVS and prescriptions should be faxed to:  [phone]  RN can call report to: [phone]  [facility] - 3pm. Patient aware of potential charges, in agreement. RN aware of time. Thank You.    Electronically signed by BEATA Garza, DEBW on 6/25/2019 at 11:43 AM   Phone: [phone]

## 2019-06-25 NOTE — PROGRESS NOTES
Report called to Barrie Aguilar at [facility]. All questions answered. Callback number provided.  Electronically signed by Brandy Kahn RN on 6/25/2019 at 1:30 PM

## 2019-06-25 NOTE — PROGRESS NOTES
At start of shift, upon entering room, pt stated he needed pain meds. This RN delegated to another RN to get pain meds, as another pt was in urgent need of assistance. The other nurse promptly got his pain meds. This RN returned shortly after to assess the pt, upon entering the room the pt cussed at this RN and another nurse aid. This RN told the pt that he didn't need to cuss. The pt stated he had a headache and he was upset because a dayshift aid had cussed at him. This RN apologized for what happened on dayshift and continued with assessment of the pt. This RN rechecked the pt VS and got prn pain meds for his headache, ativan for anxiety. The pt appeared angry and upset. This RN tried to go through relaxation techniques with the pt. The pt was satisfied with the prn meds. Upon returning to reassess the pt, the pt stated his IV was hurting. This RN went to start a new IV. While sitting next to the pt he appeared very calm, relaxed, in no acute distress. The pt proceeded to calmly say \"I can feel my troponins elevating\". This RN asked the pt if he was having chest pain. The pt then stated that he was. This RN asked if he was having any other symptoms, dizziness, nausea, abdominal pain, dizziness, lightheadedness, chest pain on inspiration. The pt reported he was having every symptom that I asked him about. This RN notified the charge RN who came to the room and paged the hospitalist, we requested stat troponins and EKG, which were ordered and completed. This RN also had the nurse aid get VS at this time, which were stable. The pt then stated he wanted an increased dose of dilaudid and that the doctor should come to his bedside now so that the pt could \"plead the case to increase his dose of dilaudid\". The pt stated, \"If my dose of dilaudid isn't increased, then I won't leave and go to rehab and I will stay here until they increase my dilaudid\".   This RN explained to the pt that it doesn't work this way, but the hospitalist would be notified of his concerns. Pt appears to be comfortable, relaxing, sitting up in the chair. After having EKG completed, this RN notified the charge RN and the hospitalist that the EKG was complete.

## 2019-06-25 NOTE — DISCHARGE SUMMARY
Hospital Medicine Discharge Summary    Patient: Ethel Stahl     Gender: male  : 1955   Age: 61 y.o. MRN: [id number]    Code Status: Full Code     Primary Care Provider: Sunni Marx MD    Admit Date: 2019   Discharge Date:   2019    Admitting Physician: Дмитрий Mariano MD  Discharge Physician: Patricio Rodney,      Discharge Diagnoses: Active Hospital Problems    Diagnosis Date Noted    Ischemic cardiomyopathy [I25.5] 2014     Priority: Medium    Essential hypertension [I10] 2013     Priority: Medium    ICD (implantable cardioverter-defibrillator), dual, in situ [Z95.810] 10/12/2013     Priority: Low    Intractable back pain [M54.9] 2019    Low back pain [M54.5] 2019    CAD (coronary artery disease) [I25.10] 2019    COPD (chronic obstructive pulmonary disease) ([facility] [J44.9] 2018    DMII (diabetes mellitus, type 2) ([facility] [E11.9] 2018    Tobacco abuse [Z72.0] 01/10/2018    CHF (congestive heart failure) ([facility] [I50.9] 2013       Hospital Course: A 62 yo male with a h/o  L3-4 laminectomy/discectomy in 10/2018, admitted with intractable low back pain. He was admitted last month at [facility] with similar complaints. He is already scheduled for an KYLIE on 19 at [address] [place] [address]. Intractable back pain being treated with po and iv pain medications  - consulted NS. Has an appt 19 for spinal injections. No acute surgical intervention needed. He will need to hold plavix for 7 days prior to procedure. weaned dilaudid to .5 mg yesterday and stopped today. Cont po percocet prn. Will dc with this, oarrs reveiwed   2. DM II, continue lantus, humalog and add SSI. Lowered lantus dose to 20 units qhs  3. LALITA, iv fluids, resolved  4. HTN, controlled, continue home BP meds  5.  CAD, continue ASA, plavix, imdur and statin   5. Tobacco use disorder, counseled for cessation.     Disposition:  Skilled Facility    Exam:     BP (!) 169/81   Pulse 82   Temp 98 °F (36.7 °C) (Oral)   Resp 18   Ht 6' (1.829 m)   Wt 237 lb 11.2 oz (107.8 kg)   SpO2 97%   BMI 32.24 kg/m²     General appearance:  No apparent distress, appears stated age and cooperative. HEENT:  Normal cephalic, atraumatic without obvious deformity. Pupils equal, round, and reactive to light. Extra ocular muscles intact. Conjunctivae/corneas clear. Neck: Supple, with full range of motion. No jugular venous distention. Trachea midline. Respiratory:  Normal respiratory effort. Clear to auscultation, bilaterally without Rales/Wheezes/Rhonchi. Cardiovascular:  Regular rate and rhythm with normal S1/S2 without murmurs, rubs or gallops. Abdomen: Soft, non-tender, non-distended with normal bowel sounds. Musculoskeletal:  No clubbing, cyanosis or edema bilaterally. decreased arom at the hips due to pain. Skin: Skin color, texture, turgor normal.  No rashes or lesions. Neurologic:  Neurovascularly intact without any focal sensory/motor deficits. Cranial nerves: II-XII intact, grossly non-focal.  Psychiatric:  Alert and oriented, thought content appropriate, normal insight    Consults:     IP CONSULT TO HOSPITALIST  IP CONSULT TO NEUROSURGERY  IP CONSULT TO SOCIAL WORK    Labs:  For convenience and continuity at follow-up the following most recent labs are provided:    Lab Results   Component Value Date    WBC 8.5 06/22/2019    HGB 13.8 06/22/2019    HCT 41.2 06/22/2019    MCV 95.7 06/22/2019     06/22/2019     06/23/2019    K 3.8 06/23/2019    K 3.3 06/22/2019     06/23/2019    CO2 20 06/23/2019    BUN 16 06/23/2019    CREATININE 1.3 06/23/2019    CALCIUM 8.4 06/23/2019    PHOS 4.8 04/30/2019    BNP <5.0 04/27/2013    ALKPHOS 62 06/23/2019    ALT 12 06/23/2019    AST 9 06/23/2019    BILITOT 0.3 06/23/2019    LABALBU 3.4 06/23/2019    LDLCALC 74 12/26/2018    TRIG 146 12/26/2018     Lab Results   Component Value Date    INR 0.90 05/24/2019    INR 0.91 04/24/2019    INR 0.87 04/24/2019       Radiology:  XR LUMBAR SPINE (2-3 VIEWS)   Final Result   1. Multilevel degenerative disc disease and facet joint arthropathy   2. No acute lumbar spine abnormality   3. Stable superior endplate compression deformity L2 3 stable, anatomic   alignment in flexion extension         CT LUMBAR SPINE WO CONTRAST   Final Result   1. No acute findings in the lumbar spine. 2. Persistent ill-defined lesion in the right erector spinae musculature   adjacent to a right L3 hemilaminectomy defect, although previously seen gas   within the lesion is now resolved. This could represent a postoperative   hematoma, seroma, or abscess. A postoperative meningocele is considered less   likely given no opacification on the prior myelogram.  Consider further   evaluation MRI or a contrast-enhanced CT. 3. Mild lumbar spine degenerative changes. 4. Areas of mild to moderate spinal canal stenosis and neural foraminal   narrowing, most severe at L3/L4. 5. Bony demineralization. Discharge Medications:   Current Discharge Medication List      START taking these medications    Details   oxyCODONE-acetaminophen (PERCOCET) 5-325 MG per tablet Take 1 tablet by mouth every 6 hours as needed for Pain for up to 3 days. Qty: 12 tablet, Refills: 0    Comments: Reduce doses taken as pain becomes manageable  Associated Diagnoses: Compression fracture of L2, sequela; Back pain with radiculopathy; Intractable back pain           Current Discharge Medication List      CONTINUE these medications which have CHANGED    Details   gabapentin (NEURONTIN) 600 MG tablet Take 1 tablet by mouth 3 times daily for 30 days.   Qty: 150 tablet, Refills: 1    Associated Diagnoses: Neuropathy      insulin glargine (LANTUS) 100 UNIT/ML injection vial Inject 20 Units into the skin nightly  Qty: 1 vial, Refills: 3           Current Discharge Medication List      CONTINUE these medications which have NOT CHANGED Details   acetaminophen (TYLENOL) 325 MG tablet Take 650 mg by mouth every 6 hours as needed for Pain      oxymetazoline (AFRIN) 0.05 % nasal spray 2 sprays by Nasal route 2 times daily      traZODone (DESYREL) 50 MG tablet TAKE 1 TABLET BY MOUTH EVERY NIGHT  Qty: 90 tablet, Refills: 0    Comments: **Patient requests 90 days supply**  Associated Diagnoses: Primary insomnia      ranolazine (RANEXA) 500 MG extended release tablet Take 1 tablet by mouth 2 times daily  Qty: 60 tablet, Refills: 3      isosorbide mononitrate (IMDUR) 30 MG extended release tablet Take 3 tablets by mouth 2 times daily  Qty: 180 tablet, Refills: 0      pantoprazole (PROTONIX) 40 MG tablet Take 1 tablet by mouth every morning (before breakfast)  Qty: 30 tablet, Refills: 3      clopidogrel (PLAVIX) 75 MG tablet TAKE 1 TABLET BY MOUTH DAILY  Qty: 90 tablet, Refills: 3      furosemide (LASIX) 40 MG tablet Take 1 tablet by mouth daily  Qty: 90 tablet, Refills: 1      losartan (COZAAR) 50 MG tablet Take 1 tablet by mouth daily  Qty: 30 tablet, Refills: 3      nitroGLYCERIN (NITROSTAT) 0.4 MG SL tablet PLACE 1 TABLET UNDER THE TONGUE EVERY 5 MINUTES AS NEEDED FOR CHEST PAIN  Qty: 25 tablet, Refills: 1      aspirin 81 MG tablet Take 81 mg by mouth daily      insulin lispro (HUMALOG KWIKPEN) 100 UNIT/ML pen Inject 5 Units into the skin 3 times daily (before meals)  Qty: 5 pen, Refills: 3    Associated Diagnoses: Type 2 diabetes mellitus without complication, with long-term current use of insulin (HCC)      atorvastatin (LIPITOR) 80 MG tablet TAKE 1 TABLET BY MOUTH DAILY  Qty: 90 tablet, Refills: 1    Associated Diagnoses: Coronary artery disease involving native heart without angina pectoris, unspecified vessel or lesion type      metoprolol succinate (TOPROL XL) 100 MG extended release tablet Take 1 tablet by mouth daily  Qty: 30 tablet, Refills: 1      Insulin Pen Needle 31G X 5 MM MISC 1 each by Does not apply route daily  Qty: 100 each, Refills: 3    Associated Diagnoses: Type 2 diabetes mellitus without complication, with long-term current use of insulin ([address]      ! ! Lancets MISC accu check fastclick lancets  Dx: R03.4  As needed  Qty: 120 each, Refills: 5    Associated Diagnoses: Controlled type 2 diabetes mellitus with diabetic neuropathy, without long-term current use of insulin ([address]      ! ! FREESTYLE LANCETS MISC 1 each by Does not apply route daily  Qty: 100 each, Refills: 0       !! - Potential duplicate medications found. Please discuss with provider. Current Discharge Medication List      STOP taking these medications       oxyCODONE-acetaminophen (PERCOCET)  MG per tablet Comments:   Reason for Stopping:         albuterol sulfate  (90 Base) MCG/ACT inhaler Comments:   Reason for Stopping:         amLODIPine (NORVASC) 5 MG tablet Comments:   Reason for Stopping: Follow-up appointments:  one week    Provider Follow-up:    pcp    Condition at Discharge:  Stable    The patient was seen and examined on day of discharge and this discharge summary is in conjunction with any daily progress note from day of discharge. Time Spent on discharge is 45 minutes  in the examination, evaluation, counseling and review of medications and discharge plan. Signed:    Maxine Mitchell DO   6/25/2019      Thank you Robert Brown MD for the opportunity to be involved in this patient's care. If you have any questions or concerns please feel free to contact me at [phone].

## 2019-06-25 NOTE — CARE COORDINATION
Pt requesting that transport be cancelled. Reports his wife will transport. SW called [facility].

## 2019-06-28 ENCOUNTER — CARE COORDINATION (OUTPATIENT)
Dept: CASE MANAGEMENT | Age: 64
End: 2019-06-28

## 2019-06-28 NOTE — CARE COORDINATION
[facility] Transitions Initial Follow Up Call    Call within 2 business days of discharge: Yes    Patient: Marta Mendez Patient : 1955   MRN: [id number]  Reason for Admission: Back Pain  Discharge Date: 19 RARS: Readmission Risk Score: 31      Last Discharge [facility]       Complaint Diagnosis Description Type Department Provider    19 Back Pain Chronic midline low back pain without sciatica . .. ED to Hosp-Admission (Discharged) (ADMITTED) [facility] 3W Madina Lopez DO; [employer] [facility] Facility: [facility], AN AFFILIATE OF [facility]    1st attempt to make contact for a follow up call, LVM introducing self, role, and nature of the call. Contact information provided. Will follow up.  GWEN TroncosoN RN  Care Transition Coordinator  [phone]               Follow Up  Future Appointments   Date Time Provider Fred Gregory   7/15/2019  9:00 AM Ajay Barrientos MD [facility]   2019  9:30 AM SCHEDULE, [facility] REMOTE TRANSMISSION [facility]       Jake Villanueva, RN

## 2019-07-01 ENCOUNTER — CARE COORDINATION (OUTPATIENT)
Dept: CASE MANAGEMENT | Age: 64
End: 2019-07-01

## 2019-07-01 DIAGNOSIS — G89.29 CHRONIC LOW BACK PAIN WITH SCIATICA, SCIATICA LATERALITY UNSPECIFIED, UNSPECIFIED BACK PAIN LATERALITY: Primary | ICD-10-CM

## 2019-07-01 DIAGNOSIS — M54.40 CHRONIC LOW BACK PAIN WITH SCIATICA, SCIATICA LATERALITY UNSPECIFIED, UNSPECIFIED BACK PAIN LATERALITY: Primary | ICD-10-CM

## 2019-07-01 PROCEDURE — 1111F DSCHRG MED/CURRENT MED MERGE: CPT | Performed by: INTERNAL MEDICINE

## 2019-07-20 ENCOUNTER — APPOINTMENT (OUTPATIENT)
Dept: GENERAL RADIOLOGY | Age: 64
End: 2019-07-20
Payer: MEDICARE

## 2019-07-20 ENCOUNTER — HOSPITAL ENCOUNTER (OUTPATIENT)
Age: 64
Setting detail: OBSERVATION
Discharge: HOME | End: 2019-07-21
Attending: EMERGENCY MEDICINE | Admitting: INTERNAL MEDICINE
Payer: MEDICARE

## 2019-07-20 ENCOUNTER — PROCEDURE VISIT (OUTPATIENT)
Dept: CARDIOLOGY CLINIC | Age: 64
End: 2019-07-20

## 2019-07-20 ENCOUNTER — APPOINTMENT (OUTPATIENT)
Dept: CT IMAGING | Age: 64
End: 2019-07-20
Payer: MEDICARE

## 2019-07-20 DIAGNOSIS — I25.5 ISCHEMIC CARDIOMYOPATHY: Chronic | ICD-10-CM

## 2019-07-20 DIAGNOSIS — I25.110 CORONARY ARTERY DISEASE INVOLVING NATIVE CORONARY ARTERY OF NATIVE HEART WITH UNSTABLE ANGINA PECTORIS (HCC): ICD-10-CM

## 2019-07-20 DIAGNOSIS — Z95.810 ICD (IMPLANTABLE CARDIOVERTER-DEFIBRILLATOR), DUAL, IN SITU: ICD-10-CM

## 2019-07-20 DIAGNOSIS — I20.8 ANGINA AT REST (HCC): ICD-10-CM

## 2019-07-20 DIAGNOSIS — R07.9 CHEST PAIN, UNSPECIFIED TYPE: Primary | ICD-10-CM

## 2019-07-20 LAB
A/G RATIO: 1.4 (ref 1.1–2.2)
ALBUMIN SERPL-MCNC: 4.1 G/DL (ref 3.4–5)
ALBUMIN SERPL-MCNC: 4.1 G/DL (ref 3.4–5)
ALP BLD-CCNC: 66 U/L (ref 40–129)
ALT SERPL-CCNC: 7 U/L (ref 10–40)
ANION GAP SERPL CALCULATED.3IONS-SCNC: 14 MMOL/L (ref 3–16)
ANION GAP SERPL CALCULATED.3IONS-SCNC: 18 MMOL/L (ref 3–16)
AST SERPL-CCNC: 13 U/L (ref 15–37)
BASOPHILS ABSOLUTE: 0.1 K/UL (ref 0–0.2)
BASOPHILS RELATIVE PERCENT: 1.2 %
BETA-HYDROXYBUTYRATE: 0.83 MMOL/L (ref 0–0.27)
BILIRUB SERPL-MCNC: 0.6 MG/DL (ref 0–1)
BUN BLDV-MCNC: 7 MG/DL (ref 7–20)
BUN BLDV-MCNC: 8 MG/DL (ref 7–20)
CALCIUM SERPL-MCNC: 9 MG/DL (ref 8.3–10.6)
CALCIUM SERPL-MCNC: 9.1 MG/DL (ref 8.3–10.6)
CHLORIDE BLD-SCNC: 97 MMOL/L (ref 99–110)
CHLORIDE BLD-SCNC: 99 MMOL/L (ref 99–110)
CO2: 20 MMOL/L (ref 21–32)
CO2: 21 MMOL/L (ref 21–32)
CREAT SERPL-MCNC: 0.9 MG/DL (ref 0.8–1.3)
CREAT SERPL-MCNC: 0.9 MG/DL (ref 0.8–1.3)
EKG ATRIAL RATE: 93 BPM
EKG ATRIAL RATE: 99 BPM
EKG DIAGNOSIS: NORMAL
EKG DIAGNOSIS: NORMAL
EKG P AXIS: 61 DEGREES
EKG P AXIS: 66 DEGREES
EKG P-R INTERVAL: 132 MS
EKG P-R INTERVAL: 134 MS
EKG Q-T INTERVAL: 418 MS
EKG Q-T INTERVAL: 434 MS
EKG QRS DURATION: 132 MS
EKG QRS DURATION: 142 MS
EKG QTC CALCULATION (BAZETT): 536 MS
EKG QTC CALCULATION (BAZETT): 539 MS
EKG R AXIS: 47 DEGREES
EKG R AXIS: 77 DEGREES
EKG T AXIS: 52 DEGREES
EKG T AXIS: 56 DEGREES
EKG VENTRICULAR RATE: 93 BPM
EKG VENTRICULAR RATE: 99 BPM
EOSINOPHILS ABSOLUTE: 0.1 K/UL (ref 0–0.6)
EOSINOPHILS RELATIVE PERCENT: 1.2 %
ESTIMATED AVERAGE GLUCOSE: 182.9 MG/DL
GFR AFRICAN AMERICAN: >60
GFR AFRICAN AMERICAN: >60
GFR NON-AFRICAN AMERICAN: >60
GFR NON-AFRICAN AMERICAN: >60
GLOBULIN: 3 G/DL
GLUCOSE BLD-MCNC: 126 MG/DL (ref 70–99)
GLUCOSE BLD-MCNC: 141 MG/DL (ref 70–99)
GLUCOSE BLD-MCNC: 145 MG/DL (ref 70–99)
GLUCOSE BLD-MCNC: 168 MG/DL (ref 70–99)
GLUCOSE BLD-MCNC: 170 MG/DL (ref 70–99)
GLUCOSE BLD-MCNC: 175 MG/DL (ref 70–99)
HBA1C MFR BLD: 8 %
HCT VFR BLD CALC: 43.4 % (ref 40.5–52.5)
HEMOGLOBIN: 14.6 G/DL (ref 13.5–17.5)
LACTIC ACID: 1.1 MMOL/L (ref 0.4–2)
LIPASE: 24 U/L (ref 13–60)
LYMPHOCYTES ABSOLUTE: 2.8 K/UL (ref 1–5.1)
LYMPHOCYTES RELATIVE PERCENT: 24.3 %
MCH RBC QN AUTO: 32.3 PG (ref 26–34)
MCHC RBC AUTO-ENTMCNC: 33.6 G/DL (ref 31–36)
MCV RBC AUTO: 96.2 FL (ref 80–100)
MONOCYTES ABSOLUTE: 0.8 K/UL (ref 0–1.3)
MONOCYTES RELATIVE PERCENT: 6.9 %
NEUTROPHILS ABSOLUTE: 7.6 K/UL (ref 1.7–7.7)
NEUTROPHILS RELATIVE PERCENT: 66.4 %
PDW BLD-RTO: 13.3 % (ref 12.4–15.4)
PERFORMED ON: ABNORMAL
PHOSPHORUS: 3.8 MG/DL (ref 2.5–4.9)
PLATELET # BLD: 218 K/UL (ref 135–450)
PMV BLD AUTO: 9.4 FL (ref 5–10.5)
POTASSIUM REFLEX MAGNESIUM: 3.9 MMOL/L (ref 3.5–5.1)
POTASSIUM SERPL-SCNC: 3.3 MMOL/L (ref 3.5–5.1)
RBC # BLD: 4.51 M/UL (ref 4.2–5.9)
SODIUM BLD-SCNC: 134 MMOL/L (ref 136–145)
SODIUM BLD-SCNC: 135 MMOL/L (ref 136–145)
TOTAL PROTEIN: 7.1 G/DL (ref 6.4–8.2)
TROPONIN: <0.01 NG/ML
WBC # BLD: 11.4 K/UL (ref 4–11)

## 2019-07-20 PROCEDURE — 96374 THER/PROPH/DIAG INJ IV PUSH: CPT

## 2019-07-20 PROCEDURE — 85025 COMPLETE CBC W/AUTO DIFF WBC: CPT

## 2019-07-20 PROCEDURE — 71045 X-RAY EXAM CHEST 1 VIEW: CPT

## 2019-07-20 PROCEDURE — 82010 KETONE BODYS QUAN: CPT

## 2019-07-20 PROCEDURE — G0378 HOSPITAL OBSERVATION PER HR: HCPCS

## 2019-07-20 PROCEDURE — 93005 ELECTROCARDIOGRAM TRACING: CPT | Performed by: EMERGENCY MEDICINE

## 2019-07-20 PROCEDURE — 96376 TX/PRO/DX INJ SAME DRUG ADON: CPT

## 2019-07-20 PROCEDURE — 74174 CTA ABD&PLVS W/CONTRAST: CPT

## 2019-07-20 PROCEDURE — 6360000002 HC RX W HCPCS: Performed by: HOSPITALIST

## 2019-07-20 PROCEDURE — 6370000000 HC RX 637 (ALT 250 FOR IP): Performed by: HOSPITALIST

## 2019-07-20 PROCEDURE — 80053 COMPREHEN METABOLIC PANEL: CPT

## 2019-07-20 PROCEDURE — 96375 TX/PRO/DX INJ NEW DRUG ADDON: CPT

## 2019-07-20 PROCEDURE — 93010 ELECTROCARDIOGRAM REPORT: CPT | Performed by: INTERNAL MEDICINE

## 2019-07-20 PROCEDURE — 96372 THER/PROPH/DIAG INJ SC/IM: CPT

## 2019-07-20 PROCEDURE — 99285 EMERGENCY DEPT VISIT HI MDM: CPT

## 2019-07-20 PROCEDURE — 6370000000 HC RX 637 (ALT 250 FOR IP): Performed by: INTERNAL MEDICINE

## 2019-07-20 PROCEDURE — 83036 HEMOGLOBIN GLYCOSYLATED A1C: CPT

## 2019-07-20 PROCEDURE — 6360000002 HC RX W HCPCS: Performed by: EMERGENCY MEDICINE

## 2019-07-20 PROCEDURE — 83605 ASSAY OF LACTIC ACID: CPT

## 2019-07-20 PROCEDURE — 36415 COLL VENOUS BLD VENIPUNCTURE: CPT

## 2019-07-20 PROCEDURE — 2580000003 HC RX 258: Performed by: INTERNAL MEDICINE

## 2019-07-20 PROCEDURE — 83690 ASSAY OF LIPASE: CPT

## 2019-07-20 PROCEDURE — 6360000002 HC RX W HCPCS: Performed by: INTERNAL MEDICINE

## 2019-07-20 PROCEDURE — 84484 ASSAY OF TROPONIN QUANT: CPT

## 2019-07-20 PROCEDURE — 93005 ELECTROCARDIOGRAM TRACING: CPT | Performed by: HOSPITALIST

## 2019-07-20 PROCEDURE — 6370000000 HC RX 637 (ALT 250 FOR IP): Performed by: EMERGENCY MEDICINE

## 2019-07-20 PROCEDURE — 6360000004 HC RX CONTRAST MEDICATION: Performed by: EMERGENCY MEDICINE

## 2019-07-20 RX ORDER — RANOLAZINE 500 MG/1
500 TABLET, EXTENDED RELEASE ORAL 2 TIMES DAILY
Status: DISCONTINUED | OUTPATIENT
Start: 2019-07-20 | End: 2019-07-21 | Stop reason: HOSPADM

## 2019-07-20 RX ORDER — SODIUM CHLORIDE 0.9 % (FLUSH) 0.9 %
10 SYRINGE (ML) INJECTION EVERY 12 HOURS SCHEDULED
Status: DISCONTINUED | OUTPATIENT
Start: 2019-07-20 | End: 2019-07-21 | Stop reason: HOSPADM

## 2019-07-20 RX ORDER — FUROSEMIDE 40 MG/1
40 TABLET ORAL DAILY
Status: DISCONTINUED | OUTPATIENT
Start: 2019-07-20 | End: 2019-07-21 | Stop reason: HOSPADM

## 2019-07-20 RX ORDER — CLOPIDOGREL BISULFATE 75 MG/1
75 TABLET ORAL DAILY
Status: DISCONTINUED | OUTPATIENT
Start: 2019-07-20 | End: 2019-07-21 | Stop reason: HOSPADM

## 2019-07-20 RX ORDER — PANTOPRAZOLE SODIUM 40 MG/1
40 TABLET, DELAYED RELEASE ORAL
Status: DISCONTINUED | OUTPATIENT
Start: 2019-07-20 | End: 2019-07-21 | Stop reason: HOSPADM

## 2019-07-20 RX ORDER — NICOTINE POLACRILEX 4 MG
15 LOZENGE BUCCAL PRN
Status: DISCONTINUED | OUTPATIENT
Start: 2019-07-20 | End: 2019-07-20 | Stop reason: SDUPTHER

## 2019-07-20 RX ORDER — GABAPENTIN 300 MG/1
600 CAPSULE ORAL
Status: DISCONTINUED | OUTPATIENT
Start: 2019-07-20 | End: 2019-07-21 | Stop reason: HOSPADM

## 2019-07-20 RX ORDER — LOSARTAN POTASSIUM 50 MG/1
50 TABLET ORAL DAILY
Status: DISCONTINUED | OUTPATIENT
Start: 2019-07-20 | End: 2019-07-21 | Stop reason: HOSPADM

## 2019-07-20 RX ORDER — ATORVASTATIN CALCIUM 40 MG/1
40 TABLET, FILM COATED ORAL NIGHTLY
Status: DISCONTINUED | OUTPATIENT
Start: 2019-07-20 | End: 2019-07-20 | Stop reason: SDUPTHER

## 2019-07-20 RX ORDER — ASPIRIN 81 MG/1
81 TABLET, CHEWABLE ORAL DAILY
Status: DISCONTINUED | OUTPATIENT
Start: 2019-07-20 | End: 2019-07-21 | Stop reason: HOSPADM

## 2019-07-20 RX ORDER — SODIUM CHLORIDE 0.9 % (FLUSH) 0.9 %
10 SYRINGE (ML) INJECTION PRN
Status: DISCONTINUED | OUTPATIENT
Start: 2019-07-20 | End: 2019-07-21 | Stop reason: HOSPADM

## 2019-07-20 RX ORDER — FENTANYL CITRATE 50 UG/ML
50 INJECTION, SOLUTION INTRAMUSCULAR; INTRAVENOUS
Status: COMPLETED | OUTPATIENT
Start: 2019-07-20 | End: 2019-07-20

## 2019-07-20 RX ORDER — ASPIRIN 81 MG/1
81 TABLET, CHEWABLE ORAL DAILY
Status: DISCONTINUED | OUTPATIENT
Start: 2019-07-21 | End: 2019-07-20 | Stop reason: SDUPTHER

## 2019-07-20 RX ORDER — INSULIN GLARGINE 100 [IU]/ML
20 INJECTION, SOLUTION SUBCUTANEOUS NIGHTLY
Status: DISCONTINUED | OUTPATIENT
Start: 2019-07-20 | End: 2019-07-21 | Stop reason: HOSPADM

## 2019-07-20 RX ORDER — LANCETS 28 GAUGE
1 EACH MISCELLANEOUS DAILY
Status: DISCONTINUED | OUTPATIENT
Start: 2019-07-20 | End: 2019-07-20 | Stop reason: CLARIF

## 2019-07-20 RX ORDER — NITROGLYCERIN 0.4 MG/1
0.4 TABLET SUBLINGUAL EVERY 5 MIN PRN
Status: DISCONTINUED | OUTPATIENT
Start: 2019-07-20 | End: 2019-07-21 | Stop reason: HOSPADM

## 2019-07-20 RX ORDER — DEXTROSE MONOHYDRATE 25 G/50ML
12.5 INJECTION, SOLUTION INTRAVENOUS PRN
Status: DISCONTINUED | OUTPATIENT
Start: 2019-07-20 | End: 2019-07-21 | Stop reason: HOSPADM

## 2019-07-20 RX ORDER — DEXTROSE MONOHYDRATE 50 MG/ML
100 INJECTION, SOLUTION INTRAVENOUS PRN
Status: DISCONTINUED | OUTPATIENT
Start: 2019-07-20 | End: 2019-07-20 | Stop reason: SDUPTHER

## 2019-07-20 RX ORDER — ONDANSETRON 2 MG/ML
4 INJECTION INTRAMUSCULAR; INTRAVENOUS EVERY 6 HOURS PRN
Status: DISCONTINUED | OUTPATIENT
Start: 2019-07-20 | End: 2019-07-21 | Stop reason: HOSPADM

## 2019-07-20 RX ORDER — DEXTROSE MONOHYDRATE 25 G/50ML
12.5 INJECTION, SOLUTION INTRAVENOUS PRN
Status: DISCONTINUED | OUTPATIENT
Start: 2019-07-20 | End: 2019-07-20 | Stop reason: SDUPTHER

## 2019-07-20 RX ORDER — ACETAMINOPHEN 325 MG/1
650 TABLET ORAL EVERY 6 HOURS PRN
Status: DISCONTINUED | OUTPATIENT
Start: 2019-07-20 | End: 2019-07-20 | Stop reason: SDUPTHER

## 2019-07-20 RX ORDER — DEXTROSE MONOHYDRATE 50 MG/ML
100 INJECTION, SOLUTION INTRAVENOUS PRN
Status: DISCONTINUED | OUTPATIENT
Start: 2019-07-20 | End: 2019-07-21 | Stop reason: HOSPADM

## 2019-07-20 RX ORDER — METOPROLOL SUCCINATE 50 MG/1
100 TABLET, EXTENDED RELEASE ORAL DAILY
Status: DISCONTINUED | OUTPATIENT
Start: 2019-07-20 | End: 2019-07-21 | Stop reason: HOSPADM

## 2019-07-20 RX ORDER — ACETAMINOPHEN 325 MG/1
650 TABLET ORAL EVERY 4 HOURS PRN
Status: DISCONTINUED | OUTPATIENT
Start: 2019-07-20 | End: 2019-07-21 | Stop reason: HOSPADM

## 2019-07-20 RX ORDER — ATORVASTATIN CALCIUM 80 MG/1
80 TABLET, FILM COATED ORAL DAILY
Status: DISCONTINUED | OUTPATIENT
Start: 2019-07-20 | End: 2019-07-21 | Stop reason: HOSPADM

## 2019-07-20 RX ORDER — NITROGLYCERIN 0.4 MG/1
0.4 TABLET SUBLINGUAL EVERY 5 MIN PRN
Status: DISCONTINUED | OUTPATIENT
Start: 2019-07-20 | End: 2019-07-20 | Stop reason: SDUPTHER

## 2019-07-20 RX ORDER — NICOTINE 21 MG/24HR
1 PATCH, TRANSDERMAL 24 HOURS TRANSDERMAL DAILY
Status: DISCONTINUED | OUTPATIENT
Start: 2019-07-20 | End: 2019-07-21 | Stop reason: HOSPADM

## 2019-07-20 RX ORDER — ASPIRIN 81 MG/1
324 TABLET, CHEWABLE ORAL ONCE
Status: DISCONTINUED | OUTPATIENT
Start: 2019-07-20 | End: 2019-07-21 | Stop reason: HOSPADM

## 2019-07-20 RX ORDER — NICOTINE POLACRILEX 4 MG
15 LOZENGE BUCCAL PRN
Status: DISCONTINUED | OUTPATIENT
Start: 2019-07-20 | End: 2019-07-21 | Stop reason: HOSPADM

## 2019-07-20 RX ADMIN — RANOLAZINE 500 MG: 500 TABLET, FILM COATED, EXTENDED RELEASE ORAL at 20:16

## 2019-07-20 RX ADMIN — HYDROMORPHONE HYDROCHLORIDE 0.25 MG: 1 INJECTION, SOLUTION INTRAMUSCULAR; INTRAVENOUS; SUBCUTANEOUS at 20:17

## 2019-07-20 RX ADMIN — FUROSEMIDE 40 MG: 40 TABLET ORAL at 09:54

## 2019-07-20 RX ADMIN — RANOLAZINE 500 MG: 500 TABLET, FILM COATED, EXTENDED RELEASE ORAL at 09:49

## 2019-07-20 RX ADMIN — ENOXAPARIN SODIUM 40 MG: 40 INJECTION SUBCUTANEOUS at 09:55

## 2019-07-20 RX ADMIN — HYDROMORPHONE HYDROCHLORIDE 0.25 MG: 1 INJECTION, SOLUTION INTRAMUSCULAR; INTRAVENOUS; SUBCUTANEOUS at 15:34

## 2019-07-20 RX ADMIN — INSULIN LISPRO 1 UNITS: 100 INJECTION, SOLUTION INTRAVENOUS; SUBCUTANEOUS at 21:52

## 2019-07-20 RX ADMIN — ISOSORBIDE MONONITRATE 90 MG: 60 TABLET, EXTENDED RELEASE ORAL at 20:16

## 2019-07-20 RX ADMIN — LOSARTAN POTASSIUM 50 MG: 50 TABLET ORAL at 09:49

## 2019-07-20 RX ADMIN — GABAPENTIN 600 MG: 300 CAPSULE ORAL at 23:51

## 2019-07-20 RX ADMIN — INSULIN LISPRO 1 UNITS: 100 INJECTION, SOLUTION INTRAVENOUS; SUBCUTANEOUS at 12:50

## 2019-07-20 RX ADMIN — SODIUM CHLORIDE, PRESERVATIVE FREE 10 ML: 5 INJECTION INTRAVENOUS at 21:05

## 2019-07-20 RX ADMIN — METOPROLOL SUCCINATE 100 MG: 50 TABLET, EXTENDED RELEASE ORAL at 09:49

## 2019-07-20 RX ADMIN — FENTANYL CITRATE 50 MCG: 0.05 INJECTION, SOLUTION INTRAMUSCULAR; INTRAVENOUS at 06:00

## 2019-07-20 RX ADMIN — IOPAMIDOL 75 ML: 755 INJECTION, SOLUTION INTRAVENOUS at 04:17

## 2019-07-20 RX ADMIN — POTASSIUM BICARBONATE 20 MEQ: 782 TABLET, EFFERVESCENT ORAL at 15:28

## 2019-07-20 RX ADMIN — GABAPENTIN 600 MG: 300 CAPSULE ORAL at 09:49

## 2019-07-20 RX ADMIN — ISOSORBIDE MONONITRATE 90 MG: 60 TABLET, EXTENDED RELEASE ORAL at 09:50

## 2019-07-20 RX ADMIN — PANTOPRAZOLE SODIUM 40 MG: 40 TABLET, DELAYED RELEASE ORAL at 09:50

## 2019-07-20 RX ADMIN — SODIUM CHLORIDE, PRESERVATIVE FREE 10 ML: 5 INJECTION INTRAVENOUS at 09:50

## 2019-07-20 RX ADMIN — CLOPIDOGREL BISULFATE 75 MG: 75 TABLET ORAL at 09:50

## 2019-07-20 RX ADMIN — GABAPENTIN 600 MG: 300 CAPSULE ORAL at 12:51

## 2019-07-20 RX ADMIN — GABAPENTIN 600 MG: 300 CAPSULE ORAL at 15:28

## 2019-07-20 RX ADMIN — ATORVASTATIN CALCIUM 80 MG: 80 TABLET, FILM COATED ORAL at 20:17

## 2019-07-20 RX ADMIN — GABAPENTIN 600 MG: 300 CAPSULE ORAL at 20:17

## 2019-07-20 RX ADMIN — NITROGLYCERIN 0.4 MG: 0.4 TABLET, ORALLY DISINTEGRATING SUBLINGUAL at 02:29

## 2019-07-20 RX ADMIN — INSULIN GLARGINE 20 UNITS: 100 INJECTION, SOLUTION SUBCUTANEOUS at 21:52

## 2019-07-20 RX ADMIN — FENTANYL CITRATE 50 MCG: 0.05 INJECTION, SOLUTION INTRAMUSCULAR; INTRAVENOUS at 03:36

## 2019-07-20 RX ADMIN — ASPIRIN 81 MG 81 MG: 81 TABLET ORAL at 09:49

## 2019-07-20 RX ADMIN — HYDROMORPHONE HYDROCHLORIDE 0.25 MG: 1 INJECTION, SOLUTION INTRAMUSCULAR; INTRAVENOUS; SUBCUTANEOUS at 11:12

## 2019-07-20 ASSESSMENT — PAIN DESCRIPTION - PAIN TYPE
TYPE: ACUTE PAIN

## 2019-07-20 ASSESSMENT — PAIN SCALES - GENERAL
PAINLEVEL_OUTOF10: 9
PAINLEVEL_OUTOF10: 6
PAINLEVEL_OUTOF10: 9
PAINLEVEL_OUTOF10: 6
PAINLEVEL_OUTOF10: 8
PAINLEVEL_OUTOF10: 9
PAINLEVEL_OUTOF10: 8
PAINLEVEL_OUTOF10: 4
PAINLEVEL_OUTOF10: 7
PAINLEVEL_OUTOF10: 7
PAINLEVEL_OUTOF10: 8

## 2019-07-20 ASSESSMENT — PAIN SCALES - WONG BAKER
WONGBAKER_NUMERICALRESPONSE: 0

## 2019-07-20 ASSESSMENT — PAIN DESCRIPTION - ORIENTATION
ORIENTATION: LEFT

## 2019-07-20 ASSESSMENT — PAIN DESCRIPTION - LOCATION
LOCATION: CHEST;ARM
LOCATION: CHEST

## 2019-07-20 ASSESSMENT — HEART SCORE: ECG: 0

## 2019-07-20 NOTE — ED PROVIDER NOTES
daily for 30 days. 7/15/19 8/14/19  Felix Chaudhry MD   oxyCODONE-acetaminophen (PERCOCET)  MG per tablet Take 1 tablet by mouth 2 times daily as needed for Pain for up to 30 days.  7/1/19 7/31/19  Felix Chaudhry MD   insulin glargine (LANTUS) 100 UNIT/ML injection vial Inject 20 Units into the skin nightly 6/25/19   Madina Lopez DO   acetaminophen (TYLENOL) 325 MG tablet Take 650 mg by mouth every 6 hours as needed for Pain    Historical Provider, MD   oxymetazoline (AFRIN) 0.05 % nasal spray 2 sprays by Nasal route 2 times daily    Historical Provider, MD   traZODone (DESYREL) 50 MG tablet TAKE 1 TABLET BY MOUTH EVERY NIGHT 6/6/19   Felix Chaudhry MD   ranolazine (RANEXA) 500 MG extended release tablet Take 1 tablet by mouth 2 times daily 4/30/19   TAYLA King CNP   isosorbide mononitrate (IMDUR) 30 MG extended release tablet Take 3 tablets by mouth 2 times daily 4/30/19 6/21/19  TAYLA King CNP   pantoprazole (PROTONIX) 40 MG tablet Take 1 tablet by mouth every morning (before breakfast) 4/26/19   Chaka Leon MD   clopidogrel (PLAVIX) 75 MG tablet TAKE 1 TABLET BY MOUTH DAILY 4/7/19   Felix Chaudhry MD   furosemide (LASIX) 40 MG tablet Take 1 tablet by mouth daily 10/11/18   Felix Chaudhry MD   Insulin Pen Needle 31G X 5 MM MISC 1 each by Does not apply route daily 10/5/18   Felix Chaudhry MD   losartan (COZAAR) 50 MG tablet Take 1 tablet by mouth daily 9/17/18   Felix Chaudhry MD   nitroGLYCERIN (NITROSTAT) 0.4 MG SL tablet PLACE 1 TABLET UNDER THE TONGUE EVERY 5 MINUTES AS NEEDED FOR CHEST PAIN 9/2/18   Felix Chaudhry MD   aspirin 81 MG tablet Take 81 mg by mouth daily    Historical Provider, MD   insulin lispro (HUMALOG KWIKPEN) 100 UNIT/ML pen Inject 5 Units into the skin 3 times daily (before meals)  Patient taking differently: Inject 8 Units into the skin 3 times daily (before meals)  8/28/18   Felix Chaudhry MD   Lancets Marital status:      Spouse name: Not on file    Number of children: 1    Years of education: Not on file    Highest education level: Not on file   Occupational History    Occupation: disabled   Social Needs    Financial resource strain: Not on file    Food insecurity:     Worry: Not on file     Inability: Not on file   [employer] needs:     Medical: Not on file     Non-medical: Not on file   Tobacco Use    Smoking status: Current Every Day Smoker     Packs/day: 1.00     Years: 44.00     Pack years: 44.00     Types: Cigarettes    Smokeless tobacco: Never Used    Tobacco comment: urged to stop   Substance and Sexual Activity    Alcohol use: No     Alcohol/week: 0.0 standard drinks    Drug use: No    Sexual activity: Yes     Partners: Female   Lifestyle    Physical activity:     Days per week: Not on file     Minutes per session: Not on file    Stress: Not on file   Relationships    Social connections:     Talks on phone: Not on file     Gets together: Not on file     Attends Rastafari service: Not on file     Active member of club or organization: Not on file     Attends meetings of clubs or organizations: Not on file     Relationship status: Not on file    Intimate partner violence:     Fear of current or ex partner: Not on file     Emotionally abused: Not on file     Physically abused: Not on file     Forced sexual activity: Not on file   Other Topics Concern    Not on file   Social History Narrative    Not on file       Nursing notes reviewed. ED Triage Vitals [07/20/19 0148]   Enc Vitals Group      BP (!) 162/115      Pulse 104      Resp 20      Temp 98.3 °F (36.8 °C)      Temp Source Oral      SpO2 95 %      Weight 221 lb 12.5 oz (100.6 kg)      Height       Head Circumference       Peak Flow       Pain Score       Pain Loc       Pain Edu? Excl. in [address]? GENERAL:  Awake, alert. Well developed, well nourished with no apparent distress.    HENT:  Normocephalic, JL4  and 5-Moroccan JR4 diagnostic catheters. Multiple views of left and right  coronary arteries were obtained in the Polish/COFFEY projection with cranial  and caudal angulation. The saphenous vein angiography was performed  with the help of 5-Moroccan JR4 catheters except the saphenous vein graft  to the LAD was cannulated only with the help of AR2 catheter. Multiple  views of these venous grafts were obtained in Polish/COFFEY projection with  cranial and caudal angulations. Angled pigtail catheter was used for a  left ventriculogram.  Pressures in the left ventricular cavity was  obtained before and after the left ventriculogram.  Aortogram was also  obtained to accurately assess the bypass graft. Femoral angiography was  subsequently performed but the patient was not found suitable for  Angio-Seal and all the catheters were removed and hemostasis will be  achieved.     HEMODYNAMIC DATA:  Please see the computerized printout. There was no  gradient across the aortic valve. The LVEDP was within normal limits.     CORONARY ANGIOGRAPHY:  1. Left main trunk: It arises from the left sinus of Valsalva. It is  a moderate to large-sized artery. It divides into left anterior  descending artery and left circumflex artery. Left main trunk has  evidence of stent which is widely patent with no evidence of in-stent  restenosis. 2.  Left anterior descending artery: It is a moderate-sized artery. It  is 100% occluded in the mid segment after a moderate to large-sized  diagonal branch. There is a 40% stenosis of the ostium of the diagonal  branch but it does not appear hemodynamically significant. Left  anterior descending artery is occluded with evidence of a competitive  flow in the mid segment. 3.  Left circumflex artery: It arises from the left main trunk. It  gives rise to a moderate-sized obtuse marginal branch. There is a  diffuse 70% to 80% stenosis in the proximal portion of the circumflex  artery.   The obtuse

## 2019-07-20 NOTE — ED NOTES
Pt reports no improvement in chest pain since nitro has been given, states pain is radiating down left arm and is increasing in chest     Gabe Ahumada, RN  07/20/19 6750

## 2019-07-20 NOTE — H&P
Does not apply route daily 10/5/18   Rudy Gutierrez MD   nitroGLYCERIN (NITROSTAT) 0.4 MG SL tablet PLACE 1 TABLET UNDER THE TONGUE EVERY 5 MINUTES AS NEEDED FOR CHEST PAIN 9/2/18   Rudy Gutierrez MD   insulin lispro (HUMALOG KWIKPEN) 100 UNIT/ML pen Inject 5 Units into the skin 3 times daily (before meals)  Patient taking differently: Inject 8 Units into the skin 3 times daily (before meals)  8/28/18   Rudy Gutierrez MD   Lancets MISC accu check fastclick lancets  Dx: Y72.0  As needed 5/4/18   Rudy Gutierrez MD   FREESTYLE LANCETS MISC 1 each by Does not apply route daily 4/24/18   Bri Carpio MD       Allergies:  Dopamine hcl; Tramadol; and Melatonin    Social History:           TOBACCO:   reports that he has been smoking cigarettes. He has a 44.00 pack-year smoking history. He has never used smokeless tobacco.  ETOH:   reports that he does not drink alcohol. Family History:               Problem Relation Age of Onset    Diabetes Father     Coronary Art Dis Father     Cancer Mother         Lung with mets       REVIEW OF SYSTEMS:   Pertinent positives as noted in the HPI. All other systems reviewed and negative. PHYSICAL EXAM PERFORMED:    BP (!) 143/74   Pulse 83   Temp 98 °F (36.7 °C) (Oral)   Resp 18   Wt 221 lb 12.5 oz (100.6 kg)   SpO2 94%   BMI 30.08 kg/m²     General appearance:  No apparent distress, appears stated age and cooperative. HEENT:  Normal cephalic, atraumatic without obvious deformity. Pupils equal, round, and reactive to light. Extra ocular muscles intact. Conjunctivae/corneas clear. Neck: Supple, with full range of motion. No jugular venous distention. Trachea midline. Respiratory:  Normal respiratory effort. Clear to auscultation, bilaterally without Rales/Wheezes/Rhonchi. Cardiovascular:  Regular rate and rhythm with normal S1/S2 without murmurs, rubs or gallops.   Abdomen: Soft, non-tender, non-distended with normal bowel sounds. Musculoskeletal:  No clubbing, cyanosis or edema bilaterally. Full range of motion without deformity. Skin: Skin color, texture, turgor normal.  No rashes or lesions. Neurologic:  Neurovascularly intact without any focal sensory/motor deficits. Cranial nerves: II-XII intact, grossly non-focal.  Psychiatric:  Alert and oriented, thought content appropriate, normal insight  Capillary Refill: Brisk,< 3 seconds   Peripheral Pulses: +2 palpable, equal bilaterally       Labs:     Recent Labs     07/20/19  0159   WBC 11.4*   HGB 14.6   HCT 43.4        Recent Labs     07/20/19  0159   *   K 3.9   CL 97*   CO2 20*   BUN 7   CREATININE 0.9   CALCIUM 9.0     Recent Labs     07/20/19  0159   AST 13*   ALT 7*   BILITOT 0.6   ALKPHOS 66     No results for input(s): INR in the last 72 hours. Recent Labs     07/20/19  0159 07/20/19  0458 07/20/19  0810   TROPONINI <0.01 <0.01 <0.01       Urinalysis:      Lab Results   Component Value Date    NITRU Negative 11/26/2018    WBCUA 1 11/26/2018    RBCUA 1 11/26/2018    BLOODU Negative 11/26/2018    SPECGRAV >1.030 11/26/2018    GLUCOSEU 100 11/26/2018       Radiology:          CTA CHEST ABDOMEN PELVIS W CONTRAST   Final Result      CTA PELVIS:      Pelvic branches of aorta are patent. IMPRESSION:   No aortic aneurysm or dissection. 2 cm nodule in the left lower lobe. Recommend PET-CT. XR CHEST PORTABLE   Final Result   Chronic mild cardiomegaly. No acute findings in the chest.             ASSESSMENT:    Active Hospital Problems    Diagnosis Date Noted    Chest pain [R07.9] 04/25/2019         PLAN:    1) Chest pain  - EKG unchanged, troponin negative, known CAD, recent angiogram 4.2019.  Cardiology consult  - prn dilaudid 0.25mg IV ordered due to ineffectiveness of other narcotic per patient    2) N/V  - check lipase    3) Elev AG  - check ketones, lactic acid  - repeat renal  - no prior history of DKA in the past, though diagnosed with DM only 4-5 years ago    4) DM  - continue lantus, corrective ISS    5) HTN  - continue home meds    6) Nicotine abuse  - nicotine patch    7) Lung Nodule  - LLL increased in size, CT-PET scan recommended    DVT Prophylaxis: lovenox  Diet: DIET LOW SODIUM 2 GM; No Caffeine  Code Status: Full Code    PT/OT Eval Status:     Opal Tyson MD    Thank you Eduard Bhatia MD for the opportunity to be involved in this patient's care. If you have any questions or concerns please feel free to contact me at [phone].

## 2019-07-21 VITALS
SYSTOLIC BLOOD PRESSURE: 127 MMHG | OXYGEN SATURATION: 94 % | HEART RATE: 81 BPM | WEIGHT: 221.12 LBS | TEMPERATURE: 97.9 F | HEIGHT: 72 IN | RESPIRATION RATE: 12 BRPM | DIASTOLIC BLOOD PRESSURE: 70 MMHG | BODY MASS INDEX: 29.95 KG/M2

## 2019-07-21 LAB
ALBUMIN SERPL-MCNC: 3.8 G/DL (ref 3.4–5)
ANION GAP SERPL CALCULATED.3IONS-SCNC: 13 MMOL/L (ref 3–16)
BASOPHILS ABSOLUTE: 0.1 K/UL (ref 0–0.2)
BASOPHILS RELATIVE PERCENT: 0.7 %
BUN BLDV-MCNC: 15 MG/DL (ref 7–20)
CALCIUM SERPL-MCNC: 8.6 MG/DL (ref 8.3–10.6)
CHLORIDE BLD-SCNC: 100 MMOL/L (ref 99–110)
CO2: 25 MMOL/L (ref 21–32)
CREAT SERPL-MCNC: 1.2 MG/DL (ref 0.8–1.3)
EOSINOPHILS ABSOLUTE: 0.2 K/UL (ref 0–0.6)
EOSINOPHILS RELATIVE PERCENT: 2.3 %
GFR AFRICAN AMERICAN: >60
GFR NON-AFRICAN AMERICAN: >60
GLUCOSE BLD-MCNC: 145 MG/DL (ref 70–99)
GLUCOSE BLD-MCNC: 149 MG/DL (ref 70–99)
GLUCOSE BLD-MCNC: 154 MG/DL (ref 70–99)
HCT VFR BLD CALC: 38.7 % (ref 40.5–52.5)
HEMOGLOBIN: 13.3 G/DL (ref 13.5–17.5)
LYMPHOCYTES ABSOLUTE: 2.5 K/UL (ref 1–5.1)
LYMPHOCYTES RELATIVE PERCENT: 25.2 %
MCH RBC QN AUTO: 33.6 PG (ref 26–34)
MCHC RBC AUTO-ENTMCNC: 34.5 G/DL (ref 31–36)
MCV RBC AUTO: 97.4 FL (ref 80–100)
MONOCYTES ABSOLUTE: 0.7 K/UL (ref 0–1.3)
MONOCYTES RELATIVE PERCENT: 7.2 %
NEUTROPHILS ABSOLUTE: 6.3 K/UL (ref 1.7–7.7)
NEUTROPHILS RELATIVE PERCENT: 64.6 %
PDW BLD-RTO: 14 % (ref 12.4–15.4)
PERFORMED ON: ABNORMAL
PERFORMED ON: ABNORMAL
PHOSPHORUS: 5.2 MG/DL (ref 2.5–4.9)
PLATELET # BLD: 172 K/UL (ref 135–450)
PMV BLD AUTO: 9.1 FL (ref 5–10.5)
POTASSIUM SERPL-SCNC: 3.8 MMOL/L (ref 3.5–5.1)
RBC # BLD: 3.98 M/UL (ref 4.2–5.9)
SODIUM BLD-SCNC: 138 MMOL/L (ref 136–145)
WBC # BLD: 9.8 K/UL (ref 4–11)

## 2019-07-21 PROCEDURE — 2580000003 HC RX 258: Performed by: INTERNAL MEDICINE

## 2019-07-21 PROCEDURE — 80069 RENAL FUNCTION PANEL: CPT

## 2019-07-21 PROCEDURE — 6370000000 HC RX 637 (ALT 250 FOR IP): Performed by: HOSPITALIST

## 2019-07-21 PROCEDURE — 36415 COLL VENOUS BLD VENIPUNCTURE: CPT

## 2019-07-21 PROCEDURE — 6370000000 HC RX 637 (ALT 250 FOR IP): Performed by: INTERNAL MEDICINE

## 2019-07-21 PROCEDURE — 6360000002 HC RX W HCPCS: Performed by: HOSPITALIST

## 2019-07-21 PROCEDURE — 96372 THER/PROPH/DIAG INJ SC/IM: CPT

## 2019-07-21 PROCEDURE — G0378 HOSPITAL OBSERVATION PER HR: HCPCS

## 2019-07-21 PROCEDURE — 6360000002 HC RX W HCPCS: Performed by: INTERNAL MEDICINE

## 2019-07-21 PROCEDURE — 96376 TX/PRO/DX INJ SAME DRUG ADON: CPT

## 2019-07-21 PROCEDURE — 99215 OFFICE O/P EST HI 40 MIN: CPT | Performed by: INTERNAL MEDICINE

## 2019-07-21 PROCEDURE — 85025 COMPLETE CBC W/AUTO DIFF WBC: CPT

## 2019-07-21 RX ADMIN — INSULIN LISPRO 1 UNITS: 100 INJECTION, SOLUTION INTRAVENOUS; SUBCUTANEOUS at 08:36

## 2019-07-21 RX ADMIN — PANTOPRAZOLE SODIUM 40 MG: 40 TABLET, DELAYED RELEASE ORAL at 06:44

## 2019-07-21 RX ADMIN — ENOXAPARIN SODIUM 40 MG: 40 INJECTION SUBCUTANEOUS at 08:35

## 2019-07-21 RX ADMIN — ISOSORBIDE MONONITRATE 90 MG: 60 TABLET, EXTENDED RELEASE ORAL at 08:35

## 2019-07-21 RX ADMIN — ASPIRIN 81 MG 81 MG: 81 TABLET ORAL at 08:36

## 2019-07-21 RX ADMIN — HYDROMORPHONE HYDROCHLORIDE 0.25 MG: 1 INJECTION, SOLUTION INTRAMUSCULAR; INTRAVENOUS; SUBCUTANEOUS at 09:27

## 2019-07-21 RX ADMIN — HYDROMORPHONE HYDROCHLORIDE 0.25 MG: 1 INJECTION, SOLUTION INTRAMUSCULAR; INTRAVENOUS; SUBCUTANEOUS at 05:03

## 2019-07-21 RX ADMIN — LOSARTAN POTASSIUM 50 MG: 50 TABLET ORAL at 08:35

## 2019-07-21 RX ADMIN — GABAPENTIN 600 MG: 300 CAPSULE ORAL at 11:20

## 2019-07-21 RX ADMIN — CLOPIDOGREL BISULFATE 75 MG: 75 TABLET ORAL at 08:35

## 2019-07-21 RX ADMIN — GABAPENTIN 600 MG: 300 CAPSULE ORAL at 06:44

## 2019-07-21 RX ADMIN — HYDROMORPHONE HYDROCHLORIDE 0.25 MG: 1 INJECTION, SOLUTION INTRAMUSCULAR; INTRAVENOUS; SUBCUTANEOUS at 00:28

## 2019-07-21 RX ADMIN — RANOLAZINE 500 MG: 500 TABLET, FILM COATED, EXTENDED RELEASE ORAL at 08:35

## 2019-07-21 RX ADMIN — METOPROLOL SUCCINATE 100 MG: 50 TABLET, EXTENDED RELEASE ORAL at 08:35

## 2019-07-21 RX ADMIN — FUROSEMIDE 40 MG: 40 TABLET ORAL at 08:35

## 2019-07-21 RX ADMIN — SODIUM CHLORIDE, PRESERVATIVE FREE 10 ML: 5 INJECTION INTRAVENOUS at 08:42

## 2019-07-21 ASSESSMENT — PAIN SCALES - GENERAL
PAINLEVEL_OUTOF10: 7
PAINLEVEL_OUTOF10: 4
PAINLEVEL_OUTOF10: 6
PAINLEVEL_OUTOF10: 6

## 2019-07-21 ASSESSMENT — PAIN DESCRIPTION - ORIENTATION
ORIENTATION: LEFT
ORIENTATION: LEFT

## 2019-07-21 ASSESSMENT — PAIN DESCRIPTION - FREQUENCY
FREQUENCY: CONTINUOUS
FREQUENCY: CONTINUOUS

## 2019-07-21 ASSESSMENT — PAIN DESCRIPTION - DESCRIPTORS
DESCRIPTORS: ACHING;CONSTANT
DESCRIPTORS: ACHING;CONSTANT

## 2019-07-21 ASSESSMENT — PAIN DESCRIPTION - PROGRESSION
CLINICAL_PROGRESSION: NOT CHANGED
CLINICAL_PROGRESSION: NOT CHANGED

## 2019-07-21 ASSESSMENT — PAIN DESCRIPTION - ONSET
ONSET: ON-GOING
ONSET: ON-GOING

## 2019-07-21 ASSESSMENT — PAIN DESCRIPTION - PAIN TYPE
TYPE: ACUTE PAIN
TYPE: ACUTE PAIN

## 2019-07-21 ASSESSMENT — PAIN DESCRIPTION - LOCATION
LOCATION: CHEST
LOCATION: CHEST

## 2019-07-21 NOTE — CONSULTS
questions/comments, please do not hesitate to contact us.       Morenita Hart MD, MS, [facility], [facility]  Cardiac Electrophysiology  [address]  [address] [place], [facility]  Jason Bentley [facility] [address]  [phone]

## 2019-07-22 ENCOUNTER — CARE COORDINATION (OUTPATIENT)
Dept: CASE MANAGEMENT | Age: 64
End: 2019-07-22

## 2019-07-22 NOTE — DISCHARGE SUMMARY
Hospital Medicine Discharge Summary    Patient ID: Quinn Dueñas      Patient's PCP: Emre Madison MD    Admit Date: 7/20/2019     Discharge Date: 7/21/2019      Admitting Physician: Andrez Rodriguez MD     Discharge Physician: Becca Feliciano MD     Discharge Diagnoses: Active Hospital Problems    Diagnosis    Chest pain [R07.9]       The patient was seen and examined on day of discharge and this discharge summary is in conjunction with any daily progress note from day of discharge. Hospital Course:     61 y.o. male  With known CAD presents with chest pain onset yesterday around 200, risght sided with L arm pain which persisted throughout the day, worsening despite remaining inactive. Denies sob, lightheadedness, dizziness, palpitations, until around midnight when he felt as if his AICD fired, prompting ED consultation. Patient also had a bout of worsening cough, non productive with nausea and emesis consisting of a clear white fluid. Denies unintentional weight loss, fevers, chills, abdominal pain.        1) Chest pain  - EKG unchanged, troponin negative, known CAD, recent angiogram 4/2019. Cardiology consulted  - no further tests per cardio, rec outpt f/u with Dr Antonette Roberson     2) N/V  - resolved     3) DM - controlled  - continue lantus, corrective ISS     5) HTN - controlled  - continue home meds     6) Nicotine abuse  - nicotine patch     7) Lung Nodule  - LLL increased in size, CT-PET scan recommended  - pt has dark skin patches on scalp, has family h/o metastatic melanoma. Will have PCP f/u ASAP       Physical Exam Performed:     /70   Pulse 81   Temp 97.9 °F (36.6 °C) (Oral)   Resp 12   Ht 6' (1.829 m)   Wt 221 lb 1.9 oz (100.3 kg)   SpO2 94%   BMI 29.99 kg/m²     General appearance:  No apparent distress, appears stated age and cooperative. HEENT:  Normal cephalic, atraumatic without obvious deformity. Pupils equal, round, and reactive to light.   Extra ocular muscles release tablet Take 1 tablet by mouth daily, Disp-30 tablet, R-1Print      Insulin Pen Needle 31G X 5 MM MISC DAILY Starting Fri 10/5/2018, Disp-100 each, R-3, Normal      insulin lispro (HUMALOG KWIKPEN) 100 UNIT/ML pen Inject 5 Units into the skin 3 times daily (before meals), Disp-5 pen, R-3Normal      !! Lancets MISC Disp-120 each, R-5, Printaccu check fastclick lancets Dx: Q89.8 As needed      !! FREESTYLE LANCETS MISC DAILY Starting Tue 4/24/2018, Disp-100 each, R-0, Print       !! - Potential duplicate medications found. Please discuss with provider. Time Spent on discharge is more than 30 minutes in the examination, evaluation, counseling and review of medications and discharge plan. Signed:    Shahnaz Almonte MD   7/21/2019      Thank you Deandra Salazar MD for the opportunity to be involved in this patient's care. If you have any questions or concerns please feel free to contact me at [phone].

## 2019-07-23 ENCOUNTER — CARE COORDINATION (OUTPATIENT)
Dept: CASE MANAGEMENT | Age: 64
End: 2019-07-23

## 2019-07-24 ENCOUNTER — CARE COORDINATION (OUTPATIENT)
Dept: CASE MANAGEMENT | Age: 64
End: 2019-07-24

## 2019-07-24 NOTE — CARE COORDINATION
[facility] Transitions Initial Follow Up Call    Call within 2 business days of discharge: Yes    Patient: Delisa Tierney Patient : 1955   MRN: [id number]  Reason for Admission: Chest Pain  Discharge Date: 19 RARS: Readmission Risk Score: 31      Last Discharge [facility]       Complaint Diagnosis Description Type Department Provider    19 Chest Pain Chest pain, unspecified type . .. ED to Hosp-Admission (Discharged) (ADMITTED) Lamont Wiggins MD; Kiki Major... Spoke with: no one    Facility: [facility]    Follow Up: 3rd & final attempt at [facility] discharge phone call. Unable to reach patient. Informed Ruy this would be the final CTN call. Encourage him to call his PCP with any future issues or concerns.   Future Appointments   Date Time Provider Fred Gregory   2019  9:30 AM SCHEDULE, [facility] REMOTE TRANSMISSION [facility]   2019  1:20 PM Jacob Foster MD [facility]       Julieta Skiff, RN

## 2019-08-30 ENCOUNTER — TELEPHONE (OUTPATIENT)
Dept: CARDIOLOGY CLINIC | Age: 64
End: 2019-08-30

## 2019-09-03 ENCOUNTER — NURSE ONLY (OUTPATIENT)
Dept: CARDIOLOGY CLINIC | Age: 64
End: 2019-09-03

## 2019-09-11 ENCOUNTER — HOSPITAL ENCOUNTER (EMERGENCY)
Age: 64
Discharge: HOME | DRG: 372 | End: 2019-09-11
Attending: EMERGENCY MEDICINE
Payer: MEDICARE

## 2019-09-11 ENCOUNTER — APPOINTMENT (OUTPATIENT)
Dept: CT IMAGING | Age: 64
DRG: 372 | End: 2019-09-11
Payer: MEDICARE

## 2019-09-11 VITALS
DIASTOLIC BLOOD PRESSURE: 82 MMHG | WEIGHT: 220.9 LBS | HEART RATE: 71 BPM | SYSTOLIC BLOOD PRESSURE: 178 MMHG | RESPIRATION RATE: 16 BRPM | OXYGEN SATURATION: 98 % | BODY MASS INDEX: 29.92 KG/M2 | HEIGHT: 72 IN | TEMPERATURE: 97.4 F

## 2019-09-11 LAB
A/G RATIO: 1.3 (ref 1.1–2.2)
ABO/RH: NORMAL
ALBUMIN SERPL-MCNC: 4.3 G/DL (ref 3.4–5)
ALP BLD-CCNC: 69 U/L (ref 40–129)
ALT SERPL-CCNC: 7 U/L (ref 10–40)
ANION GAP SERPL CALCULATED.3IONS-SCNC: 15 MMOL/L (ref 3–16)
ANTIBODY SCREEN: NORMAL
AST SERPL-CCNC: 8 U/L (ref 15–37)
BASOPHILS ABSOLUTE: 0.1 K/UL (ref 0–0.2)
BASOPHILS RELATIVE PERCENT: 1.2 %
BILIRUB SERPL-MCNC: 0.4 MG/DL (ref 0–1)
BUN BLDV-MCNC: 16 MG/DL (ref 7–20)
CALCIUM SERPL-MCNC: 9.2 MG/DL (ref 8.3–10.6)
CHLORIDE BLD-SCNC: 101 MMOL/L (ref 99–110)
CO2: 20 MMOL/L (ref 21–32)
CREAT SERPL-MCNC: 1.2 MG/DL (ref 0.8–1.3)
EOSINOPHILS ABSOLUTE: 0.2 K/UL (ref 0–0.6)
EOSINOPHILS RELATIVE PERCENT: 2.2 %
GFR AFRICAN AMERICAN: >60
GFR NON-AFRICAN AMERICAN: >60
GLOBULIN: 3.2 G/DL
GLUCOSE BLD-MCNC: 249 MG/DL (ref 70–99)
HCT VFR BLD CALC: 42.7 % (ref 40.5–52.5)
HEMOGLOBIN: 14.6 G/DL (ref 13.5–17.5)
INR BLD: 1 (ref 0.86–1.14)
LYMPHOCYTES ABSOLUTE: 1.9 K/UL (ref 1–5.1)
LYMPHOCYTES RELATIVE PERCENT: 22 %
MCH RBC QN AUTO: 32.5 PG (ref 26–34)
MCHC RBC AUTO-ENTMCNC: 34.1 G/DL (ref 31–36)
MCV RBC AUTO: 95.2 FL (ref 80–100)
MONOCYTES ABSOLUTE: 0.6 K/UL (ref 0–1.3)
MONOCYTES RELATIVE PERCENT: 6.6 %
NEUTROPHILS ABSOLUTE: 6 K/UL (ref 1.7–7.7)
NEUTROPHILS RELATIVE PERCENT: 68 %
OCCULT BLOOD DIAGNOSTIC: NORMAL
PDW BLD-RTO: 14 % (ref 12.4–15.4)
PLATELET # BLD: 226 K/UL (ref 135–450)
PMV BLD AUTO: 9.5 FL (ref 5–10.5)
POTASSIUM SERPL-SCNC: 3.9 MMOL/L (ref 3.5–5.1)
PRO-BNP: 737 PG/ML (ref 0–124)
PROTHROMBIN TIME: 11.4 SEC (ref 9.8–13)
RBC # BLD: 4.48 M/UL (ref 4.2–5.9)
SODIUM BLD-SCNC: 136 MMOL/L (ref 136–145)
TOTAL PROTEIN: 7.5 G/DL (ref 6.4–8.2)
WBC # BLD: 8.8 K/UL (ref 4–11)

## 2019-09-11 PROCEDURE — 93005 ELECTROCARDIOGRAM TRACING: CPT | Performed by: EMERGENCY MEDICINE

## 2019-09-11 PROCEDURE — 6370000000 HC RX 637 (ALT 250 FOR IP): Performed by: EMERGENCY MEDICINE

## 2019-09-11 PROCEDURE — 86900 BLOOD TYPING SEROLOGIC ABO: CPT

## 2019-09-11 PROCEDURE — G0328 FECAL BLOOD SCRN IMMUNOASSAY: HCPCS

## 2019-09-11 PROCEDURE — 83880 ASSAY OF NATRIURETIC PEPTIDE: CPT

## 2019-09-11 PROCEDURE — 96361 HYDRATE IV INFUSION ADD-ON: CPT

## 2019-09-11 PROCEDURE — 85610 PROTHROMBIN TIME: CPT

## 2019-09-11 PROCEDURE — 86901 BLOOD TYPING SEROLOGIC RH(D): CPT

## 2019-09-11 PROCEDURE — C9113 INJ PANTOPRAZOLE SODIUM, VIA: HCPCS | Performed by: EMERGENCY MEDICINE

## 2019-09-11 PROCEDURE — 85025 COMPLETE CBC W/AUTO DIFF WBC: CPT

## 2019-09-11 PROCEDURE — 99284 EMERGENCY DEPT VISIT MOD MDM: CPT

## 2019-09-11 PROCEDURE — 86850 RBC ANTIBODY SCREEN: CPT

## 2019-09-11 PROCEDURE — 80053 COMPREHEN METABOLIC PANEL: CPT

## 2019-09-11 PROCEDURE — 6360000002 HC RX W HCPCS: Performed by: EMERGENCY MEDICINE

## 2019-09-11 PROCEDURE — 74177 CT ABD & PELVIS W/CONTRAST: CPT

## 2019-09-11 PROCEDURE — 96375 TX/PRO/DX INJ NEW DRUG ADDON: CPT

## 2019-09-11 PROCEDURE — 2580000003 HC RX 258: Performed by: EMERGENCY MEDICINE

## 2019-09-11 PROCEDURE — 6360000004 HC RX CONTRAST MEDICATION: Performed by: EMERGENCY MEDICINE

## 2019-09-11 PROCEDURE — 96374 THER/PROPH/DIAG INJ IV PUSH: CPT

## 2019-09-11 RX ORDER — HYDROCODONE BITARTRATE AND ACETAMINOPHEN 5; 325 MG/1; MG/1
1 TABLET ORAL ONCE
Status: COMPLETED | OUTPATIENT
Start: 2019-09-11 | End: 2019-09-11

## 2019-09-11 RX ORDER — CIPROFLOXACIN 500 MG/1
500 TABLET, FILM COATED ORAL 2 TIMES DAILY
Qty: 20 TABLET | Refills: 0 | Status: ON HOLD | OUTPATIENT
Start: 2019-09-11 | End: 2019-09-18 | Stop reason: SDUPTHER

## 2019-09-11 RX ORDER — CIPROFLOXACIN 500 MG/1
500 TABLET, FILM COATED ORAL ONCE
Status: COMPLETED | OUTPATIENT
Start: 2019-09-11 | End: 2019-09-11

## 2019-09-11 RX ORDER — METRONIDAZOLE 500 MG/1
500 TABLET ORAL 3 TIMES DAILY
Qty: 30 TABLET | Refills: 0 | Status: ON HOLD | OUTPATIENT
Start: 2019-09-11 | End: 2019-09-18 | Stop reason: SDUPTHER

## 2019-09-11 RX ORDER — METRONIDAZOLE 500 MG/1
500 TABLET ORAL ONCE
Status: COMPLETED | OUTPATIENT
Start: 2019-09-11 | End: 2019-09-11

## 2019-09-11 RX ORDER — MORPHINE SULFATE 4 MG/ML
4 INJECTION, SOLUTION INTRAMUSCULAR; INTRAVENOUS ONCE
Status: COMPLETED | OUTPATIENT
Start: 2019-09-11 | End: 2019-09-11

## 2019-09-11 RX ORDER — 0.9 % SODIUM CHLORIDE 0.9 %
1000 INTRAVENOUS SOLUTION INTRAVENOUS ONCE
Status: COMPLETED | OUTPATIENT
Start: 2019-09-11 | End: 2019-09-11

## 2019-09-11 RX ORDER — KETOROLAC TROMETHAMINE 30 MG/ML
30 INJECTION, SOLUTION INTRAMUSCULAR; INTRAVENOUS ONCE
Status: COMPLETED | OUTPATIENT
Start: 2019-09-11 | End: 2019-09-11

## 2019-09-11 RX ORDER — PANTOPRAZOLE SODIUM 40 MG/10ML
40 INJECTION, POWDER, LYOPHILIZED, FOR SOLUTION INTRAVENOUS ONCE
Status: COMPLETED | OUTPATIENT
Start: 2019-09-11 | End: 2019-09-11

## 2019-09-11 RX ORDER — HYDROCODONE BITARTRATE AND ACETAMINOPHEN 5; 325 MG/1; MG/1
1 TABLET ORAL EVERY 6 HOURS PRN
Qty: 10 TABLET | Refills: 0 | Status: ON HOLD | OUTPATIENT
Start: 2019-09-11 | End: 2019-09-18 | Stop reason: SDUPTHER

## 2019-09-11 RX ORDER — DICYCLOMINE HYDROCHLORIDE 10 MG/1
10 CAPSULE ORAL 2 TIMES DAILY PRN
Qty: 10 CAPSULE | Refills: 0 | Status: SHIPPED | OUTPATIENT
Start: 2019-09-11 | End: 2019-10-21

## 2019-09-11 RX ADMIN — CIPROFLOXACIN HYDROCHLORIDE 500 MG: 500 TABLET, FILM COATED ORAL at 20:31

## 2019-09-11 RX ADMIN — HYDROCODONE BITARTRATE AND ACETAMINOPHEN 1 TABLET: 5; 325 TABLET ORAL at 20:31

## 2019-09-11 RX ADMIN — PANTOPRAZOLE SODIUM 40 MG: 40 INJECTION, POWDER, FOR SOLUTION INTRAVENOUS at 19:40

## 2019-09-11 RX ADMIN — KETOROLAC TROMETHAMINE 30 MG: 30 INJECTION, SOLUTION INTRAMUSCULAR at 19:40

## 2019-09-11 RX ADMIN — MORPHINE SULFATE 4 MG: 4 INJECTION, SOLUTION INTRAMUSCULAR; INTRAVENOUS at 18:14

## 2019-09-11 RX ADMIN — IOPAMIDOL 75 ML: 755 INJECTION, SOLUTION INTRAVENOUS at 19:47

## 2019-09-11 RX ADMIN — METRONIDAZOLE 500 MG: 500 TABLET ORAL at 20:31

## 2019-09-11 RX ADMIN — SODIUM CHLORIDE 1000 ML: 9 INJECTION, SOLUTION INTRAVENOUS at 18:14

## 2019-09-11 ASSESSMENT — PAIN DESCRIPTION - PAIN TYPE
TYPE: ACUTE PAIN
TYPE: ACUTE PAIN

## 2019-09-11 ASSESSMENT — ENCOUNTER SYMPTOMS
COUGH: 0
APNEA: 0
NAUSEA: 0
CHOKING: 0
CHEST TIGHTNESS: 0
WHEEZING: 0
SHORTNESS OF BREATH: 0
CONSTIPATION: 1
PHOTOPHOBIA: 0
DIARRHEA: 0
COLOR CHANGE: 0
VOMITING: 0
ABDOMINAL PAIN: 1
ABDOMINAL DISTENTION: 0
EYE ITCHING: 0
EYE PAIN: 0
BACK PAIN: 0
EYE REDNESS: 0
STRIDOR: 0
EYE DISCHARGE: 0
BLOOD IN STOOL: 1

## 2019-09-11 ASSESSMENT — PAIN SCALES - GENERAL
PAINLEVEL_OUTOF10: 7
PAINLEVEL_OUTOF10: 6
PAINLEVEL_OUTOF10: 6

## 2019-09-11 ASSESSMENT — PAIN DESCRIPTION - ORIENTATION: ORIENTATION: MID

## 2019-09-11 ASSESSMENT — PAIN - FUNCTIONAL ASSESSMENT: PAIN_FUNCTIONAL_ASSESSMENT: ACTIVITIES ARE NOT PREVENTED

## 2019-09-11 ASSESSMENT — PAIN SCALES - WONG BAKER: WONGBAKER_NUMERICALRESPONSE: 6

## 2019-09-11 ASSESSMENT — PAIN DESCRIPTION - LOCATION
LOCATION: RECTUM
LOCATION: RECTUM

## 2019-09-11 ASSESSMENT — PAIN DESCRIPTION - FREQUENCY
FREQUENCY: CONTINUOUS
FREQUENCY: CONTINUOUS

## 2019-09-11 ASSESSMENT — PAIN DESCRIPTION - DESCRIPTORS
DESCRIPTORS: BURNING
DESCRIPTORS: BURNING;CONSTANT

## 2019-09-11 ASSESSMENT — PAIN DESCRIPTION - PROGRESSION: CLINICAL_PROGRESSION: GRADUALLY IMPROVING

## 2019-09-11 NOTE — ED PROVIDER NOTES
normal muscle tone. Coordination normal.   Skin: Skin is warm. No rash noted. He is not diaphoretic. No erythema. No pallor. DIAGNOSTIC RESULTS     EKG: All EKG's are interpreted by the Emergency Department Physician who either signs or Co-signs this chart in the absence of acardiologist.    EKG shows NSR short OK PVCs RBBB EKG similar to old EKGs      RADIOLOGY:   Non-plain film images such as CT, Ultrasoundand MRI are read by the radiologist. Plain radiographic images are visualized and preliminarily interpreted by the emergency physician with the below findings:    CT shows    Impression   Colitis, infectious or inflammatory.      ED BEDSIDE ULTRASOUND:   Performed by ED Physician - none    LABS:  Labs Reviewed   COMPREHENSIVE METABOLIC PANEL - Abnormal; Notable for the following components:       Result Value    CO2 20 (*)     Glucose 249 (*)     ALT 7 (*)     AST 8 (*)     All other components within normal limits    Narrative:     Performed at:  [facility]  [address] [address]   Phone [phone]   BRAIN NATRIURETIC PEPTIDE - Abnormal; Notable for the following components:    Pro- (*)     All other components within normal limits    Narrative:     Performed at:  [facility]  [address] [place], [place] [address]   Phone [phone]   CBC WITH AUTO DIFFERENTIAL    Narrative:     Performed at:  [facility]  [address] [place], [place] [address]   Phone [phone]   PROTIME-INR    Narrative:     Performed at:  [facility]  [address] [place], [place] [address]   Phone [phone]   BLOOD OCCULT STOOL DIAGNOSTIC    Narrative:     ORDER#: [id number]                          ORDERED BY: Mari Artis  SOURCE: Stool Semi-formed                  COLLECTED:  09/11/19 19:38  ANTIBIOTICS AT KAMALA.:                      RECEIVED : 09/11/19 19:47  Performed at:  [facility]  [address] [place] [address]   Phone [phone]   TYPE AND SCREEN    Narrative:     Performed at:  [facility]  [address] [place] [address]   Phone [phone]       All other labs were withinnormal range or not returned as of this dictation. EMERGENCY DEPARTMENT COURSE and DIFFERENTIAL DIAGNOSIS/MDM:     Pain well controlled    Tolerating po    Cipro and flagyl for colitis    GI follow up      I discussed with patient the results of evaluation in the ED, diagnosis, care, and prognosis. The plan is to discharge to home. Patient is in agreement with plan and questions have been answered.      I also discussed with patient the reasons which may require a return visit and the importance of follow-up care. The patient is well-appearing, nontoxic, and improved at the time of discharge. Patient agrees to call to arrange follow-up care as directed. Patient understands to return immediately for worsening/change in symptoms. CRITICAL CARE TIME   Total Critical Caretime was 22 minutes, excluding separately reportable procedures. There was a high probability of clinically significant/life threatening deterioration in the patient's condition which required my urgent intervention. PROCEDURES:  Unlessotherwise noted below, none    FINAL IMPRESSION      1.  Colitis          DISPOSITION/PLAN   DISPOSITION      PATIENT REFERRED TO:  MD LISA Loera [address] [place] [id number] [phone]            DISCHARGE MEDICATIONS:  New Prescriptions    CIPROFLOXACIN (CIPRO) 500 MG TABLET    Take 1 tablet by mouth 2 times daily for 10 days    DICYCLOMINE (BENTYL) 10 MG CAPSULE    Take 1 capsule by mouth 2 times daily as needed (cramps)    METRONIDAZOLE (FLAGYL) 500 MG TABLET    Take 1 tablet by mouth 3 times daily for 10 days          (Please note that

## 2019-09-12 ENCOUNTER — APPOINTMENT (OUTPATIENT)
Dept: CT IMAGING | Age: 64
DRG: 372 | End: 2019-09-12
Payer: MEDICARE

## 2019-09-12 ENCOUNTER — HOSPITAL ENCOUNTER (EMERGENCY)
Age: 64
Discharge: HOME | DRG: 372 | End: 2019-09-12
Attending: EMERGENCY MEDICINE
Payer: MEDICARE

## 2019-09-12 VITALS
WEIGHT: 216.49 LBS | HEIGHT: 72 IN | TEMPERATURE: 97.2 F | RESPIRATION RATE: 16 BRPM | DIASTOLIC BLOOD PRESSURE: 82 MMHG | HEART RATE: 79 BPM | OXYGEN SATURATION: 98 % | BODY MASS INDEX: 29.32 KG/M2 | SYSTOLIC BLOOD PRESSURE: 136 MMHG

## 2019-09-12 LAB
A/G RATIO: 1.4 (ref 1.1–2.2)
ALBUMIN SERPL-MCNC: 4.2 G/DL (ref 3.4–5)
ALP BLD-CCNC: 67 U/L (ref 40–129)
ALT SERPL-CCNC: 7 U/L (ref 10–40)
ANION GAP SERPL CALCULATED.3IONS-SCNC: 14 MMOL/L (ref 3–16)
AST SERPL-CCNC: 9 U/L (ref 15–37)
BASOPHILS ABSOLUTE: 0.1 K/UL (ref 0–0.2)
BASOPHILS RELATIVE PERCENT: 0.8 %
BILIRUB SERPL-MCNC: 0.3 MG/DL (ref 0–1)
BILIRUBIN URINE: ABNORMAL
BLOOD, URINE: NEGATIVE
BUN BLDV-MCNC: 16 MG/DL (ref 7–20)
CALCIUM SERPL-MCNC: 8.8 MG/DL (ref 8.3–10.6)
CHLORIDE BLD-SCNC: 104 MMOL/L (ref 99–110)
CLARITY: CLEAR
CO2: 22 MMOL/L (ref 21–32)
COLOR: ABNORMAL
CREAT SERPL-MCNC: 1.2 MG/DL (ref 0.8–1.3)
EKG ATRIAL RATE: 94 BPM
EKG DIAGNOSIS: NORMAL
EKG P AXIS: 48 DEGREES
EKG P-R INTERVAL: 110 MS
EKG Q-T INTERVAL: 406 MS
EKG QRS DURATION: 130 MS
EKG QTC CALCULATION (BAZETT): 507 MS
EKG R AXIS: 59 DEGREES
EKG T AXIS: 55 DEGREES
EKG VENTRICULAR RATE: 94 BPM
EOSINOPHILS ABSOLUTE: 0.2 K/UL (ref 0–0.6)
EOSINOPHILS RELATIVE PERCENT: 2.8 %
EPITHELIAL CELLS, UA: 4 /HPF (ref 0–5)
GFR AFRICAN AMERICAN: >60
GFR NON-AFRICAN AMERICAN: >60
GLOBULIN: 2.9 G/DL
GLUCOSE BLD-MCNC: 188 MG/DL (ref 70–99)
GLUCOSE URINE: 100 MG/DL
HCT VFR BLD CALC: 43.2 % (ref 40.5–52.5)
HEMOGLOBIN: 14.7 G/DL (ref 13.5–17.5)
HYALINE CASTS: 9 /LPF (ref 0–8)
KETONES, URINE: NEGATIVE MG/DL
LEUKOCYTE ESTERASE, URINE: ABNORMAL
LIPASE: 22 U/L (ref 13–60)
LYMPHOCYTES ABSOLUTE: 1.8 K/UL (ref 1–5.1)
LYMPHOCYTES RELATIVE PERCENT: 22.4 %
MAGNESIUM: 2 MG/DL (ref 1.8–2.4)
MCH RBC QN AUTO: 32.6 PG (ref 26–34)
MCHC RBC AUTO-ENTMCNC: 34 G/DL (ref 31–36)
MCV RBC AUTO: 95.7 FL (ref 80–100)
MICROSCOPIC EXAMINATION: YES
MONOCYTES ABSOLUTE: 0.4 K/UL (ref 0–1.3)
MONOCYTES RELATIVE PERCENT: 5.5 %
NEUTROPHILS ABSOLUTE: 5.4 K/UL (ref 1.7–7.7)
NEUTROPHILS RELATIVE PERCENT: 68.5 %
NITRITE, URINE: NEGATIVE
PDW BLD-RTO: 14.2 % (ref 12.4–15.4)
PH UA: 5.5 (ref 5–8)
PLATELET # BLD: 217 K/UL (ref 135–450)
PMV BLD AUTO: 9.6 FL (ref 5–10.5)
POTASSIUM REFLEX MAGNESIUM: 3.5 MMOL/L (ref 3.5–5.1)
PROTEIN UA: 30 MG/DL
RBC # BLD: 4.51 M/UL (ref 4.2–5.9)
RBC UA: 1 /HPF (ref 0–4)
SODIUM BLD-SCNC: 140 MMOL/L (ref 136–145)
SPECIFIC GRAVITY UA: >1.03 (ref 1–1.03)
TOTAL PROTEIN: 7.1 G/DL (ref 6.4–8.2)
URINE REFLEX TO CULTURE: YES
URINE TYPE: ABNORMAL
UROBILINOGEN, URINE: 0.2 E.U./DL
WBC # BLD: 7.9 K/UL (ref 4–11)
WBC UA: 2 /HPF (ref 0–5)

## 2019-09-12 PROCEDURE — 85025 COMPLETE CBC W/AUTO DIFF WBC: CPT

## 2019-09-12 PROCEDURE — 83735 ASSAY OF MAGNESIUM: CPT

## 2019-09-12 PROCEDURE — 6370000000 HC RX 637 (ALT 250 FOR IP): Performed by: EMERGENCY MEDICINE

## 2019-09-12 PROCEDURE — 96361 HYDRATE IV INFUSION ADD-ON: CPT

## 2019-09-12 PROCEDURE — 99284 EMERGENCY DEPT VISIT MOD MDM: CPT

## 2019-09-12 PROCEDURE — 87086 URINE CULTURE/COLONY COUNT: CPT

## 2019-09-12 PROCEDURE — 96375 TX/PRO/DX INJ NEW DRUG ADDON: CPT

## 2019-09-12 PROCEDURE — 2580000003 HC RX 258: Performed by: EMERGENCY MEDICINE

## 2019-09-12 PROCEDURE — 80053 COMPREHEN METABOLIC PANEL: CPT

## 2019-09-12 PROCEDURE — 83690 ASSAY OF LIPASE: CPT

## 2019-09-12 PROCEDURE — 74176 CT ABD & PELVIS W/O CONTRAST: CPT

## 2019-09-12 PROCEDURE — 6360000002 HC RX W HCPCS: Performed by: EMERGENCY MEDICINE

## 2019-09-12 PROCEDURE — 96374 THER/PROPH/DIAG INJ IV PUSH: CPT

## 2019-09-12 PROCEDURE — 81001 URINALYSIS AUTO W/SCOPE: CPT

## 2019-09-12 PROCEDURE — 93010 ELECTROCARDIOGRAM REPORT: CPT | Performed by: INTERNAL MEDICINE

## 2019-09-12 RX ORDER — METHYLPREDNISOLONE 4 MG/1
4 TABLET ORAL SEE ADMIN INSTRUCTIONS
Qty: 1 KIT | Refills: 0 | Status: ON HOLD | OUTPATIENT
Start: 2019-09-13 | End: 2019-09-14

## 2019-09-12 RX ORDER — PREDNISONE 20 MG/1
60 TABLET ORAL ONCE
Status: COMPLETED | OUTPATIENT
Start: 2019-09-12 | End: 2019-09-12

## 2019-09-12 RX ORDER — 0.9 % SODIUM CHLORIDE 0.9 %
1000 INTRAVENOUS SOLUTION INTRAVENOUS ONCE
Status: COMPLETED | OUTPATIENT
Start: 2019-09-12 | End: 2019-09-12

## 2019-09-12 RX ORDER — ONDANSETRON 2 MG/ML
4 INJECTION INTRAMUSCULAR; INTRAVENOUS ONCE
Status: COMPLETED | OUTPATIENT
Start: 2019-09-12 | End: 2019-09-12

## 2019-09-12 RX ORDER — HYDROCODONE BITARTRATE AND ACETAMINOPHEN 5; 325 MG/1; MG/1
1 TABLET ORAL ONCE
Status: COMPLETED | OUTPATIENT
Start: 2019-09-12 | End: 2019-09-12

## 2019-09-12 RX ADMIN — HYDROCODONE BITARTRATE AND ACETAMINOPHEN 1 TABLET: 5; 325 TABLET ORAL at 19:32

## 2019-09-12 RX ADMIN — SODIUM CHLORIDE 1000 ML: 9 INJECTION, SOLUTION INTRAVENOUS at 16:56

## 2019-09-12 RX ADMIN — PREDNISONE 60 MG: 20 TABLET ORAL at 20:05

## 2019-09-12 RX ADMIN — ONDANSETRON 4 MG: 2 INJECTION INTRAMUSCULAR; INTRAVENOUS at 16:56

## 2019-09-12 RX ADMIN — HYDROMORPHONE HYDROCHLORIDE 1 MG: 1 INJECTION, SOLUTION INTRAMUSCULAR; INTRAVENOUS; SUBCUTANEOUS at 16:56

## 2019-09-12 ASSESSMENT — PAIN SCALES - GENERAL
PAINLEVEL_OUTOF10: 3
PAINLEVEL_OUTOF10: 5
PAINLEVEL_OUTOF10: 9
PAINLEVEL_OUTOF10: 8

## 2019-09-12 ASSESSMENT — PAIN DESCRIPTION - DESCRIPTORS
DESCRIPTORS: SHARP
DESCRIPTORS: CRAMPING

## 2019-09-12 ASSESSMENT — PAIN DESCRIPTION - FREQUENCY
FREQUENCY: CONTINUOUS
FREQUENCY: CONTINUOUS

## 2019-09-12 ASSESSMENT — PAIN DESCRIPTION - PROGRESSION
CLINICAL_PROGRESSION: GRADUALLY IMPROVING
CLINICAL_PROGRESSION: GRADUALLY WORSENING

## 2019-09-12 ASSESSMENT — PAIN DESCRIPTION - ONSET: ONSET: PROGRESSIVE

## 2019-09-12 NOTE — ED PROVIDER NOTES
[facility]  [address] [facility] [place] [address]   Phone [phone]   URINE CULTURE    Narrative:     ORDER#: [id number]                          ORDERED BY: Arturo River  SOURCE: Urine Clean Catch                  COLLECTED:  09/12/19 19:33  ANTIBIOTICS AT KAMALA.:                      RECEIVED :  09/12/19 19:33  Performed at:  [facility]  [address] [place], [place] [address]   Phone [phone]   CBC WITH AUTO DIFFERENTIAL    Narrative:     Performed at:  [facility]  [address] [facility] [place] [address]   Phone [phone]   LIPASE    Narrative:     Performed at:  [facility]  [address] [facility] [place] [address]   Phone [phone]   MAGNESIUM    Narrative:     Performed at:  [facility]  [address] [facility] [place] [address]   Phone [phone]       ? RADIOLOGY/PROCEDURES  I personally reviewed the images for this case. CT ABDOMEN PELVIS WO CONTRAST   Final Result   Interval improvement in previously noted wall thickening of the colon. No   explanation for increasing pain. COURSE & MEDICAL DECISION MAKING  Pertinent Labs & Imaging studies reviewed.  (See chart for details)    Vitals:    09/12/19 1559 09/12/19 1952   BP: (!) 142/89 136/82   Pulse: 91 79   Resp: 16 16   Temp: 97.2 °F (36.2 °C)    TempSrc: Oral    SpO2: 94% 98%   Weight: 216 lb 7.9 oz (98.2 kg)    Height: 6' (1.829 m)        Medications   HYDROmorphone (DILAUDID) injection 1 mg (1 mg Intravenous Given 9/12/19 1656)   ondansetron (ZOFRAN) injection 4 mg (4 mg Intravenous Given 9/12/19 1656)   0.9 % sodium chloride bolus (0 mLs Intravenous Stopped 9/12/19 1900)   HYDROcodone-acetaminophen (NORCO) 5-325 MG per tablet 1 tablet (1 tablet Oral Given 9/12/19 1932)   predniSONE (DELTASONE) tablet 60 mg (60 mg Oral Given 9/12/19 2005) Discharge Medication List as of 9/12/2019  8:02 PM      START taking these medications    Details   methylPREDNISolone (MEDROL, KAY,) 4 MG tablet Take 1 tablet by mouth See Admin Instructions for 6 days By mouth as directed on the package. , Disp-1 kit, R-0Print           Patient remained stable in the ED. His laboratory work and CT scan showed improvement. There was no evidence of colitis on his CT scan. Therefore, patient was instructed to continue his present care. He was placed on steroids for a few days and instructed to follow with his doctor in 3 to 5 days and return if any problems. Patient was not satisfied with his care because he wanted to be admitted. He requested narcotic pain medications on multiple occasions. He ambulate [place] part without difficulty. The patient's blood pressure was found to be elevated according to CMS/Medicare and the Affordable Care Act/[facility] criteria. Elevated blood pressure could occur because of pain or anxiety or other reasons and does not mean that they need to have their blood pressure treated or medications otherwise adjusted. However, this could also be a sign that they will need to have their blood pressure treated or medications changed. The patient was instructed to follow up closely with their personal physician to have their blood pressure rechecked. The patient was instructed to take a list of recent blood pressure readings to their next visit with their personal physician. See discharge instructions for specific medications, discharge information, and treatments. They were verbally instructed to return to emergency if any problems. I reviewed old records     (This chart has been completed using [address]. Although attempts have been made to ensure accuracy, words and/or phrases may not be transcribed as intended.)    Patient requested pain medicines at the time of his exam.    IMPRESSION(S):  1.  Acute colitis ?  Recheck Times: [address]     Stephie 25, DO  09/15/19 6079

## 2019-09-13 ENCOUNTER — HOSPITAL ENCOUNTER (INPATIENT)
Age: 64
LOS: 5 days | Discharge: HOME | DRG: 372 | End: 2019-09-18
Attending: EMERGENCY MEDICINE | Admitting: INTERNAL MEDICINE
Payer: MEDICARE

## 2019-09-13 PROBLEM — K52.9 ACUTE COLITIS: Status: ACTIVE | Noted: 2019-09-13

## 2019-09-13 LAB
A/G RATIO: 1.4 (ref 1.1–2.2)
ALBUMIN SERPL-MCNC: 4.3 G/DL (ref 3.4–5)
ALP BLD-CCNC: 69 U/L (ref 40–129)
ALT SERPL-CCNC: 6 U/L (ref 10–40)
ANION GAP SERPL CALCULATED.3IONS-SCNC: 15 MMOL/L (ref 3–16)
AST SERPL-CCNC: 9 U/L (ref 15–37)
BASOPHILS ABSOLUTE: 0 K/UL (ref 0–0.2)
BASOPHILS RELATIVE PERCENT: 0.3 %
BILIRUB SERPL-MCNC: 0.3 MG/DL (ref 0–1)
BUN BLDV-MCNC: 13 MG/DL (ref 7–20)
CALCIUM SERPL-MCNC: 9.2 MG/DL (ref 8.3–10.6)
CHLORIDE BLD-SCNC: 101 MMOL/L (ref 99–110)
CO2: 21 MMOL/L (ref 21–32)
CREAT SERPL-MCNC: 1 MG/DL (ref 0.8–1.3)
EOSINOPHILS ABSOLUTE: 0.1 K/UL (ref 0–0.6)
EOSINOPHILS RELATIVE PERCENT: 1 %
GFR AFRICAN AMERICAN: >60
GFR NON-AFRICAN AMERICAN: >60
GLOBULIN: 3 G/DL
GLUCOSE BLD-MCNC: 160 MG/DL (ref 70–99)
GLUCOSE BLD-MCNC: 160 MG/DL (ref 70–99)
HCT VFR BLD CALC: 44 % (ref 40.5–52.5)
HEMOGLOBIN: 15 G/DL (ref 13.5–17.5)
LACTIC ACID: 1.5 MMOL/L (ref 0.4–2)
LIPASE: 23 U/L (ref 13–60)
LYMPHOCYTES ABSOLUTE: 2.2 K/UL (ref 1–5.1)
LYMPHOCYTES RELATIVE PERCENT: 21.9 %
MAGNESIUM: 2.1 MG/DL (ref 1.8–2.4)
MCH RBC QN AUTO: 32.6 PG (ref 26–34)
MCHC RBC AUTO-ENTMCNC: 34.2 G/DL (ref 31–36)
MCV RBC AUTO: 95.3 FL (ref 80–100)
MONOCYTES ABSOLUTE: 0.6 K/UL (ref 0–1.3)
MONOCYTES RELATIVE PERCENT: 6.2 %
NEUTROPHILS ABSOLUTE: 7.3 K/UL (ref 1.7–7.7)
NEUTROPHILS RELATIVE PERCENT: 70.6 %
PDW BLD-RTO: 14.2 % (ref 12.4–15.4)
PERFORMED ON: ABNORMAL
PLATELET # BLD: 236 K/UL (ref 135–450)
PMV BLD AUTO: 9.2 FL (ref 5–10.5)
POTASSIUM REFLEX MAGNESIUM: 3.5 MMOL/L (ref 3.5–5.1)
RBC # BLD: 4.61 M/UL (ref 4.2–5.9)
SODIUM BLD-SCNC: 137 MMOL/L (ref 136–145)
TOTAL PROTEIN: 7.3 G/DL (ref 6.4–8.2)
WBC # BLD: 10.3 K/UL (ref 4–11)

## 2019-09-13 PROCEDURE — 94760 N-INVAS EAR/PLS OXIMETRY 1: CPT

## 2019-09-13 PROCEDURE — 2500000003 HC RX 250 WO HCPCS: Performed by: EMERGENCY MEDICINE

## 2019-09-13 PROCEDURE — 83735 ASSAY OF MAGNESIUM: CPT

## 2019-09-13 PROCEDURE — 96365 THER/PROPH/DIAG IV INF INIT: CPT

## 2019-09-13 PROCEDURE — 6360000002 HC RX W HCPCS: Performed by: INTERNAL MEDICINE

## 2019-09-13 PROCEDURE — 2580000003 HC RX 258: Performed by: INTERNAL MEDICINE

## 2019-09-13 PROCEDURE — 6370000000 HC RX 637 (ALT 250 FOR IP): Performed by: INTERNAL MEDICINE

## 2019-09-13 PROCEDURE — 80053 COMPREHEN METABOLIC PANEL: CPT

## 2019-09-13 PROCEDURE — 85025 COMPLETE CBC W/AUTO DIFF WBC: CPT

## 2019-09-13 PROCEDURE — 83605 ASSAY OF LACTIC ACID: CPT

## 2019-09-13 PROCEDURE — 99284 EMERGENCY DEPT VISIT MOD MDM: CPT

## 2019-09-13 PROCEDURE — 96375 TX/PRO/DX INJ NEW DRUG ADDON: CPT

## 2019-09-13 PROCEDURE — 96367 TX/PROPH/DG ADDL SEQ IV INF: CPT

## 2019-09-13 PROCEDURE — 1200000000 HC SEMI PRIVATE

## 2019-09-13 PROCEDURE — 6360000002 HC RX W HCPCS: Performed by: EMERGENCY MEDICINE

## 2019-09-13 PROCEDURE — 83690 ASSAY OF LIPASE: CPT

## 2019-09-13 RX ORDER — ASPIRIN 81 MG/1
81 TABLET, CHEWABLE ORAL DAILY
Status: DISCONTINUED | OUTPATIENT
Start: 2019-09-14 | End: 2019-09-18 | Stop reason: HOSPADM

## 2019-09-13 RX ORDER — ONDANSETRON 2 MG/ML
4 INJECTION INTRAMUSCULAR; INTRAVENOUS EVERY 6 HOURS PRN
Status: DISCONTINUED | OUTPATIENT
Start: 2019-09-13 | End: 2019-09-18 | Stop reason: HOSPADM

## 2019-09-13 RX ORDER — NICOTINE 21 MG/24HR
1 PATCH, TRANSDERMAL 24 HOURS TRANSDERMAL DAILY
Status: DISCONTINUED | OUTPATIENT
Start: 2019-09-13 | End: 2019-09-18 | Stop reason: HOSPADM

## 2019-09-13 RX ORDER — ACETAMINOPHEN 325 MG/1
650 TABLET ORAL EVERY 4 HOURS PRN
Status: DISCONTINUED | OUTPATIENT
Start: 2019-09-13 | End: 2019-09-18 | Stop reason: HOSPADM

## 2019-09-13 RX ORDER — SODIUM CHLORIDE 9 MG/ML
INJECTION, SOLUTION INTRAVENOUS CONTINUOUS
Status: DISCONTINUED | OUTPATIENT
Start: 2019-09-13 | End: 2019-09-17

## 2019-09-13 RX ORDER — MAGNESIUM SULFATE 1 G/100ML
1 INJECTION INTRAVENOUS PRN
Status: DISCONTINUED | OUTPATIENT
Start: 2019-09-13 | End: 2019-09-18 | Stop reason: HOSPADM

## 2019-09-13 RX ORDER — OXYCODONE AND ACETAMINOPHEN 10; 325 MG/1; MG/1
1 TABLET ORAL EVERY 8 HOURS PRN
Status: DISCONTINUED | OUTPATIENT
Start: 2019-09-13 | End: 2019-09-14

## 2019-09-13 RX ORDER — DEXTROSE MONOHYDRATE 50 MG/ML
100 INJECTION, SOLUTION INTRAVENOUS PRN
Status: DISCONTINUED | OUTPATIENT
Start: 2019-09-13 | End: 2019-09-18 | Stop reason: HOSPADM

## 2019-09-13 RX ORDER — INSULIN GLARGINE 100 [IU]/ML
20 INJECTION, SOLUTION SUBCUTANEOUS NIGHTLY
Status: DISCONTINUED | OUTPATIENT
Start: 2019-09-13 | End: 2019-09-16

## 2019-09-13 RX ORDER — ONDANSETRON 2 MG/ML
4 INJECTION INTRAMUSCULAR; INTRAVENOUS ONCE
Status: COMPLETED | OUTPATIENT
Start: 2019-09-13 | End: 2019-09-13

## 2019-09-13 RX ORDER — MORPHINE SULFATE 4 MG/ML
4 INJECTION, SOLUTION INTRAMUSCULAR; INTRAVENOUS ONCE
Status: COMPLETED | OUTPATIENT
Start: 2019-09-13 | End: 2019-09-13

## 2019-09-13 RX ORDER — RANOLAZINE 500 MG/1
500 TABLET, EXTENDED RELEASE ORAL 2 TIMES DAILY
Status: DISCONTINUED | OUTPATIENT
Start: 2019-09-13 | End: 2019-09-18 | Stop reason: HOSPADM

## 2019-09-13 RX ORDER — LOSARTAN POTASSIUM 50 MG/1
50 TABLET ORAL DAILY
Status: DISCONTINUED | OUTPATIENT
Start: 2019-09-13 | End: 2019-09-18 | Stop reason: HOSPADM

## 2019-09-13 RX ORDER — FUROSEMIDE 40 MG/1
40 TABLET ORAL DAILY
Status: DISCONTINUED | OUTPATIENT
Start: 2019-09-15 | End: 2019-09-18 | Stop reason: HOSPADM

## 2019-09-13 RX ORDER — DEXTROSE MONOHYDRATE 25 G/50ML
12.5 INJECTION, SOLUTION INTRAVENOUS PRN
Status: DISCONTINUED | OUTPATIENT
Start: 2019-09-13 | End: 2019-09-18 | Stop reason: HOSPADM

## 2019-09-13 RX ORDER — ATORVASTATIN CALCIUM 80 MG/1
80 TABLET, FILM COATED ORAL NIGHTLY
Status: DISCONTINUED | OUTPATIENT
Start: 2019-09-13 | End: 2019-09-18 | Stop reason: HOSPADM

## 2019-09-13 RX ORDER — SODIUM CHLORIDE 0.9 % (FLUSH) 0.9 %
10 SYRINGE (ML) INJECTION PRN
Status: DISCONTINUED | OUTPATIENT
Start: 2019-09-13 | End: 2019-09-18 | Stop reason: HOSPADM

## 2019-09-13 RX ORDER — METOPROLOL SUCCINATE 50 MG/1
100 TABLET, EXTENDED RELEASE ORAL DAILY
Status: DISCONTINUED | OUTPATIENT
Start: 2019-09-13 | End: 2019-09-18 | Stop reason: HOSPADM

## 2019-09-13 RX ORDER — SODIUM CHLORIDE 0.9 % (FLUSH) 0.9 %
10 SYRINGE (ML) INJECTION EVERY 12 HOURS SCHEDULED
Status: DISCONTINUED | OUTPATIENT
Start: 2019-09-13 | End: 2019-09-18 | Stop reason: HOSPADM

## 2019-09-13 RX ORDER — GABAPENTIN 300 MG/1
600 CAPSULE ORAL 3 TIMES DAILY
Status: DISCONTINUED | OUTPATIENT
Start: 2019-09-13 | End: 2019-09-18 | Stop reason: HOSPADM

## 2019-09-13 RX ORDER — NITROGLYCERIN 0.4 MG/1
0.4 TABLET SUBLINGUAL EVERY 5 MIN PRN
Status: DISCONTINUED | OUTPATIENT
Start: 2019-09-13 | End: 2019-09-18 | Stop reason: HOSPADM

## 2019-09-13 RX ORDER — NICOTINE POLACRILEX 4 MG
15 LOZENGE BUCCAL PRN
Status: DISCONTINUED | OUTPATIENT
Start: 2019-09-13 | End: 2019-09-18 | Stop reason: HOSPADM

## 2019-09-13 RX ORDER — POTASSIUM CHLORIDE 20 MEQ/1
40 TABLET, EXTENDED RELEASE ORAL PRN
Status: DISCONTINUED | OUTPATIENT
Start: 2019-09-13 | End: 2019-09-18 | Stop reason: HOSPADM

## 2019-09-13 RX ORDER — POTASSIUM CHLORIDE 7.45 MG/ML
10 INJECTION INTRAVENOUS PRN
Status: DISCONTINUED | OUTPATIENT
Start: 2019-09-13 | End: 2019-09-18 | Stop reason: HOSPADM

## 2019-09-13 RX ORDER — PANTOPRAZOLE SODIUM 40 MG/1
40 TABLET, DELAYED RELEASE ORAL
Status: DISCONTINUED | OUTPATIENT
Start: 2019-09-14 | End: 2019-09-18 | Stop reason: HOSPADM

## 2019-09-13 RX ORDER — CIPROFLOXACIN 2 MG/ML
400 INJECTION, SOLUTION INTRAVENOUS ONCE
Status: COMPLETED | OUTPATIENT
Start: 2019-09-13 | End: 2019-09-13

## 2019-09-13 RX ORDER — CLOPIDOGREL BISULFATE 75 MG/1
75 TABLET ORAL DAILY
Status: DISCONTINUED | OUTPATIENT
Start: 2019-09-14 | End: 2019-09-18 | Stop reason: HOSPADM

## 2019-09-13 RX ORDER — POLYETHYLENE GLYCOL 3350 17 G/17G
17 POWDER, FOR SOLUTION ORAL DAILY PRN
Status: DISCONTINUED | OUTPATIENT
Start: 2019-09-13 | End: 2019-09-18 | Stop reason: HOSPADM

## 2019-09-13 RX ORDER — TRAZODONE HYDROCHLORIDE 50 MG/1
50 TABLET ORAL NIGHTLY
Status: DISCONTINUED | OUTPATIENT
Start: 2019-09-13 | End: 2019-09-18 | Stop reason: HOSPADM

## 2019-09-13 RX ORDER — CIPROFLOXACIN 2 MG/ML
400 INJECTION, SOLUTION INTRAVENOUS EVERY 12 HOURS
Status: DISCONTINUED | OUTPATIENT
Start: 2019-09-14 | End: 2019-09-16

## 2019-09-13 RX ADMIN — INSULIN GLARGINE 20 UNITS: 100 INJECTION, SOLUTION SUBCUTANEOUS at 21:34

## 2019-09-13 RX ADMIN — GABAPENTIN 600 MG: 300 CAPSULE ORAL at 21:34

## 2019-09-13 RX ADMIN — OXYCODONE AND ACETAMINOPHEN 1 TABLET: 10; 325 TABLET ORAL at 18:15

## 2019-09-13 RX ADMIN — METOPROLOL SUCCINATE 100 MG: 50 TABLET, EXTENDED RELEASE ORAL at 22:34

## 2019-09-13 RX ADMIN — ONDANSETRON 4 MG: 2 INJECTION INTRAMUSCULAR; INTRAVENOUS at 16:07

## 2019-09-13 RX ADMIN — LOSARTAN POTASSIUM 50 MG: 50 TABLET, FILM COATED ORAL at 22:34

## 2019-09-13 RX ADMIN — TRAZODONE HYDROCHLORIDE 50 MG: 50 TABLET ORAL at 21:34

## 2019-09-13 RX ADMIN — Medication 10 ML: at 21:35

## 2019-09-13 RX ADMIN — ATORVASTATIN CALCIUM 80 MG: 80 TABLET, FILM COATED ORAL at 21:34

## 2019-09-13 RX ADMIN — SODIUM CHLORIDE: 9 INJECTION, SOLUTION INTRAVENOUS at 21:33

## 2019-09-13 RX ADMIN — RANOLAZINE 500 MG: 500 TABLET, FILM COATED, EXTENDED RELEASE ORAL at 21:34

## 2019-09-13 RX ADMIN — MORPHINE SULFATE 4 MG: 4 INJECTION, SOLUTION INTRAMUSCULAR; INTRAVENOUS at 16:07

## 2019-09-13 RX ADMIN — METRONIDAZOLE 500 MG: 500 INJECTION, SOLUTION INTRAVENOUS at 18:15

## 2019-09-13 RX ADMIN — CIPROFLOXACIN 400 MG: 2 INJECTION, SOLUTION INTRAVENOUS at 16:55

## 2019-09-13 RX ADMIN — ENOXAPARIN SODIUM 40 MG: 40 INJECTION SUBCUTANEOUS at 21:34

## 2019-09-13 ASSESSMENT — PAIN DESCRIPTION - LOCATION: LOCATION: ABDOMEN

## 2019-09-13 ASSESSMENT — PAIN DESCRIPTION - PAIN TYPE: TYPE: ACUTE PAIN

## 2019-09-13 ASSESSMENT — PAIN DESCRIPTION - DESCRIPTORS: DESCRIPTORS: ACHING;THROBBING

## 2019-09-13 ASSESSMENT — PAIN SCALES - GENERAL
PAINLEVEL_OUTOF10: 10
PAINLEVEL_OUTOF10: 8
PAINLEVEL_OUTOF10: 9

## 2019-09-13 ASSESSMENT — PAIN DESCRIPTION - ORIENTATION: ORIENTATION: LOWER;MID

## 2019-09-13 ASSESSMENT — PAIN DESCRIPTION - ONSET: ONSET: ON-GOING

## 2019-09-13 ASSESSMENT — PAIN - FUNCTIONAL ASSESSMENT: PAIN_FUNCTIONAL_ASSESSMENT: PREVENTS OR INTERFERES SOME ACTIVE ACTIVITIES AND ADLS

## 2019-09-13 ASSESSMENT — PAIN DESCRIPTION - PROGRESSION: CLINICAL_PROGRESSION: NOT CHANGED

## 2019-09-13 ASSESSMENT — PAIN DESCRIPTION - FREQUENCY: FREQUENCY: CONTINUOUS

## 2019-09-13 NOTE — ED PROVIDER NOTES
Procedure Laterality Date    BACK SURGERY      CARDIAC SURGERY      CHOLECYSTECTOMY      COLONOSCOPY  01/10/2017    COLONOSCOPY  01/10/2017    CORONARY ANGIOPLASTY WITH STENT PLACEMENT  2012    CORONARY ARTERY BYPASS GRAFT  2010, 11/2015    ENDOSCOPY, COLON, DIAGNOSTIC      PACEMAKER PLACEMENT      UPPER GASTROINTESTINAL ENDOSCOPY  02/07/2017     Family History   Problem Relation Age of Onset    Diabetes Father     Coronary Art Dis Father     Cancer Mother         Lung with mets     Social History     Socioeconomic History    Marital status:      Spouse name: Not on file    Number of children: 1    Years of education: Not on file    Highest education level: Not on file   Occupational History    Occupation: disabled   Social Needs    Financial resource strain: Not on file    Food insecurity:     Worry: Not on file     Inability: Not on file   [employer] needs:     Medical: Not on file     Non-medical: Not on file   Tobacco Use    Smoking status: Current Every Day Smoker     Packs/day: 1.00     Years: 44.00     Pack years: 44.00     Types: Cigarettes    Smokeless tobacco: Never Used    Tobacco comment: urged to stop   Substance and Sexual Activity    Alcohol use: No     Alcohol/week: 0.0 standard drinks    Drug use: No    Sexual activity: Yes     Partners: Female   Lifestyle    Physical activity:     Days per week: Not on file     Minutes per session: Not on file    Stress: Not on file   Relationships    Social connections:     Talks on phone: Not on file     Gets together: Not on file     Attends Hinduism service: Not on file     Active member of club or organization: Not on file     Attends meetings of clubs or organizations: Not on file     Relationship status: Not on file    Intimate partner violence:     Fear of current or ex partner: Not on file     Emotionally abused: Not on file     Physically abused: Not on file     Forced sexual activity: Not on file   Other Topics Concern    Not on file   Social History Narrative    Not on file     Current Facility-Administered Medications   Medication Dose Route Frequency Provider Last Rate Last Dose    ciprofloxacin (CIPRO) IVPB 400 mg  400 mg Intravenous Once Corinna Ogden  mL/hr at 09/13/19 1655 400 mg at 09/13/19 1655    metronidazole (FLAGYL) 500 mg in NaCl 100 mL IVPB premix  500 mg Intravenous Once Corinna Ogden MD         Current Outpatient Medications   Medication Sig Dispense Refill    methylPREDNISolone (MEDROL, KAY,) 4 MG tablet Take 1 tablet by mouth See Admin Instructions for 6 days By mouth as directed on the package. 1 kit 0    ciprofloxacin (CIPRO) 500 MG tablet Take 1 tablet by mouth 2 times daily for 10 days 20 tablet 0    metroNIDAZOLE (FLAGYL) 500 MG tablet Take 1 tablet by mouth 3 times daily for 10 days 30 tablet 0    dicyclomine (BENTYL) 10 MG capsule Take 1 capsule by mouth 2 times daily as needed (cramps) 10 capsule 0    HYDROcodone-acetaminophen (NORCO) 5-325 MG per tablet Take 1 tablet by mouth every 6 hours as needed for Pain for up to 7 days. Sedation precautions please 10 tablet 0    oxyCODONE-acetaminophen (PERCOCET)  MG per tablet Take 1 tablet by mouth 2 times daily as needed for Pain for up to 30 days.  60 tablet 0    gabapentin (NEURONTIN) 600 MG tablet TAKE 1 TABLET BY MOUTH FIVE TIMES DAILY 150 tablet 0    insulin glargine (LANTUS) 100 UNIT/ML injection vial Inject 20 Units into the skin nightly 1 vial 3    acetaminophen (TYLENOL) 325 MG tablet Take 650 mg by mouth every 6 hours as needed for Pain      oxymetazoline (AFRIN) 0.05 % nasal spray 2 sprays by Nasal route 2 times daily      traZODone (DESYREL) 50 MG tablet TAKE 1 TABLET BY MOUTH EVERY NIGHT 90 tablet 0    ranolazine (RANEXA) 500 MG extended release tablet Take 1 tablet by mouth 2 times daily 60 tablet 3    isosorbide mononitrate (IMDUR) 30 MG extended release tablet Take 3 tablets by mouth 2 times daily 180 tablet 0    pantoprazole (PROTONIX) 40 MG tablet Take 1 tablet by mouth every morning (before breakfast) 30 tablet 3    clopidogrel (PLAVIX) 75 MG tablet TAKE 1 TABLET BY MOUTH DAILY 90 tablet 3    furosemide (LASIX) 40 MG tablet Take 1 tablet by mouth daily 90 tablet 1    Insulin Pen Needle 31G X 5 MM MISC 1 each by Does not apply route daily 100 each 3    losartan (COZAAR) 50 MG tablet Take 1 tablet by mouth daily 30 tablet 3    nitroGLYCERIN (NITROSTAT) 0.4 MG SL tablet PLACE 1 TABLET UNDER THE TONGUE EVERY 5 MINUTES AS NEEDED FOR CHEST PAIN 25 tablet 1    aspirin 81 MG tablet Take 81 mg by mouth daily      insulin lispro (HUMALOG KWIKPEN) 100 UNIT/ML pen Inject 5 Units into the skin 3 times daily (before meals) (Patient taking differently: Inject 8 Units into the skin 3 times daily (before meals) ) 5 pen 3    Lancets MISC accu check fastclick lancets  Dx: K74.6  As needed 120 each 5    FREESTYLE LANCETS MISC 1 each by Does not apply route daily 100 each 0    atorvastatin (LIPITOR) 80 MG tablet TAKE 1 TABLET BY MOUTH DAILY 90 tablet 1    metoprolol succinate (TOPROL XL) 100 MG extended release tablet Take 1 tablet by mouth daily 30 tablet 1     Allergies   Allergen Reactions    Dopamine Hcl Other (See Comments)     Compulsive gambling    Melatonin Other (See Comments)     Restless leg syndrome           REVIEW OF SYSTEMS  10 systems reviewed, pertinent positives per HPI otherwise noted to be negative    PHYSICAL EXAM  BP (!) 196/96   Pulse 95   Temp 98.5 °F (36.9 °C) (Oral)   Resp 16   Ht 6' (1.829 m)   Wt 217 lb 9.5 oz (98.7 kg)   SpO2 99%   BMI 29.51 kg/m²      CONSTITUTIONAL: AOx4, anxious, appears upset, cooperative with exam, afebrile   HEAD: normocephalic, atraumatic   EYES: PERRL, EOMI, anicteric sclera   ENT: Moist mucous membranes, uvula midline   NECK: Supple, symmetric, trachea midline   LUNGS: Bilateral breath sounds, CTAB, no rales/ronchi/wheezes   CARDIOVASCULAR: RRR, normal Failure of outpatient treatment    3. Colitis, acute        Blood pressure (!) 196/96, pulse 95, temperature 98.5 °F (36.9 °C), temperature source Oral, resp. rate 16, height 6' (1.829 m), weight 217 lb 9.5 oz (98.7 kg), SpO2 99 %. DISPOSITION  Admitted        Disclaimer: All medical record entries made by Ibe[employer] dictation.       (Please note that this note was completed with a voice recognition program. Every attempt was made to edit the dictations, but inevitably there remain words that are mis-transcribed.)            Herberth Galvez MD  09/13/19 6092

## 2019-09-13 NOTE — PROGRESS NOTES
Pharmacy Medication Reconciliation Note     List of medications patient is currently taking is in progress - recommend following up with patient to review    Allergies:  Dopamine hcl and Melatonin    Source of information:   1. Patient unable to give info d/t current state  2. EMR    Notes regarding home medications:   1. Reviewed EMR for the most part it is up to date, patient is non-compliant with BP meds it appears based on refill history.    2. Called multiple pharmacies to confirm he has not been receiving his BP meds    Denies taking any other OTC or herbal medications    Mariya Killian, Pharmacy student  9/13/2019  4:07 PM

## 2019-09-13 NOTE — H&P
Allergies:  Dopamine hcl and Melatonin    Social History:      The patient currently lives at home    TOBACCO:   reports that he has been smoking cigarettes. He has a 44.00 pack-year smoking history. He has never used smokeless tobacco.  ETOH:   reports that he does not drink alcohol. Family History:      Reviewed in detail and negative for DM, CAD, Cancer, CVA. Positive as follows:        Problem Relation Age of Onset    Diabetes Father     Coronary Art Dis Father     Cancer Mother         Lung with mets       REVIEW OF SYSTEMS:   Pertinent positives as noted in the HPI. Abdominal pain. Nausea, vomiting. No blood in vomit. All other systems reviewed and negative. PHYSICAL EXAM PERFORMED:    BP (!) 196/96   Pulse 95   Temp 98.5 °F (36.9 °C) (Oral)   Resp 16   Ht 6' (1.829 m)   Wt 217 lb 9.5 oz (98.7 kg)   SpO2 99%   BMI 29.51 kg/m²     General appearance:  No apparent distress, appears stated age and cooperative. HEENT:  Normal cephalic, atraumatic without obvious deformity. Pupils equal, round, and reactive to light. Extra ocular muscles intact. Conjunctivae/corneas clear. Neck: Supple, with full range of motion. No jugular venous distention. Trachea midline. Respiratory:  Normal respiratory effort. Clear to auscultation, bilaterally without Rales/Wheezes/Rhonchi. Cardiovascular:  Regular rate and rhythm with normal S1/S2 without murmurs, rubs or gallops. Abdomen: Soft, non-tender, non-distended with normal bowel sounds. Musculoskeletal:  No clubbing, cyanosis or edema bilaterally. Full range of motion without deformity. Skin: Skin color, texture, turgor normal.  No rashes or lesions. Neurologic:  Neurovascularly intact without any focal sensory/motor deficits.  Cranial nerves: II-XII intact, grossly non-focal.  Psychiatric:  Alert and oriented, thought content appropriate, normal insight  Capillary Refill: Brisk,< 3 seconds   Peripheral Pulses: +2 palpable, equal bilaterally

## 2019-09-14 LAB
A/G RATIO: 1.5 (ref 1.1–2.2)
ALBUMIN SERPL-MCNC: 3.7 G/DL (ref 3.4–5)
ALP BLD-CCNC: 55 U/L (ref 40–129)
ALT SERPL-CCNC: 6 U/L (ref 10–40)
ANION GAP SERPL CALCULATED.3IONS-SCNC: 15 MMOL/L (ref 3–16)
AST SERPL-CCNC: 8 U/L (ref 15–37)
BASOPHILS ABSOLUTE: 0.1 K/UL (ref 0–0.2)
BASOPHILS RELATIVE PERCENT: 1.5 %
BILIRUB SERPL-MCNC: 0.4 MG/DL (ref 0–1)
BUN BLDV-MCNC: 9 MG/DL (ref 7–20)
CALCIUM SERPL-MCNC: 8.5 MG/DL (ref 8.3–10.6)
CHLORIDE BLD-SCNC: 105 MMOL/L (ref 99–110)
CO2: 20 MMOL/L (ref 21–32)
CREAT SERPL-MCNC: 0.9 MG/DL (ref 0.8–1.3)
EOSINOPHILS ABSOLUTE: 0.2 K/UL (ref 0–0.6)
EOSINOPHILS RELATIVE PERCENT: 3.3 %
ESTIMATED AVERAGE GLUCOSE: 168.6 MG/DL
GFR AFRICAN AMERICAN: >60
GFR NON-AFRICAN AMERICAN: >60
GLOBULIN: 2.4 G/DL
GLUCOSE BLD-MCNC: 121 MG/DL (ref 70–99)
GLUCOSE BLD-MCNC: 129 MG/DL (ref 70–99)
GLUCOSE BLD-MCNC: 133 MG/DL (ref 70–99)
GLUCOSE BLD-MCNC: 148 MG/DL (ref 70–99)
GLUCOSE BLD-MCNC: 296 MG/DL (ref 70–99)
HBA1C MFR BLD: 7.5 %
HCT VFR BLD CALC: 39.7 % (ref 40.5–52.5)
HEMOGLOBIN: 13.6 G/DL (ref 13.5–17.5)
LYMPHOCYTES ABSOLUTE: 2.5 K/UL (ref 1–5.1)
LYMPHOCYTES RELATIVE PERCENT: 38.8 %
MAGNESIUM: 2 MG/DL (ref 1.8–2.4)
MCH RBC QN AUTO: 32.6 PG (ref 26–34)
MCHC RBC AUTO-ENTMCNC: 34.2 G/DL (ref 31–36)
MCV RBC AUTO: 95.3 FL (ref 80–100)
MONOCYTES ABSOLUTE: 0.5 K/UL (ref 0–1.3)
MONOCYTES RELATIVE PERCENT: 7.8 %
NEUTROPHILS ABSOLUTE: 3.2 K/UL (ref 1.7–7.7)
NEUTROPHILS RELATIVE PERCENT: 48.6 %
PDW BLD-RTO: 14 % (ref 12.4–15.4)
PERFORMED ON: ABNORMAL
PLATELET # BLD: 187 K/UL (ref 135–450)
PMV BLD AUTO: 9.4 FL (ref 5–10.5)
POTASSIUM REFLEX MAGNESIUM: 3 MMOL/L (ref 3.5–5.1)
RBC # BLD: 4.17 M/UL (ref 4.2–5.9)
SODIUM BLD-SCNC: 140 MMOL/L (ref 136–145)
TOTAL PROTEIN: 6.1 G/DL (ref 6.4–8.2)
URINE CULTURE, ROUTINE: NORMAL
WBC # BLD: 6.5 K/UL (ref 4–11)

## 2019-09-14 PROCEDURE — 97165 OT EVAL LOW COMPLEX 30 MIN: CPT

## 2019-09-14 PROCEDURE — 83036 HEMOGLOBIN GLYCOSYLATED A1C: CPT

## 2019-09-14 PROCEDURE — 6370000000 HC RX 637 (ALT 250 FOR IP): Performed by: NURSE PRACTITIONER

## 2019-09-14 PROCEDURE — 97116 GAIT TRAINING THERAPY: CPT

## 2019-09-14 PROCEDURE — 97530 THERAPEUTIC ACTIVITIES: CPT

## 2019-09-14 PROCEDURE — 36415 COLL VENOUS BLD VENIPUNCTURE: CPT

## 2019-09-14 PROCEDURE — 2580000003 HC RX 258: Performed by: INTERNAL MEDICINE

## 2019-09-14 PROCEDURE — 83735 ASSAY OF MAGNESIUM: CPT

## 2019-09-14 PROCEDURE — 85025 COMPLETE CBC W/AUTO DIFF WBC: CPT

## 2019-09-14 PROCEDURE — 6360000002 HC RX W HCPCS: Performed by: INTERNAL MEDICINE

## 2019-09-14 PROCEDURE — 2500000003 HC RX 250 WO HCPCS: Performed by: INTERNAL MEDICINE

## 2019-09-14 PROCEDURE — 1200000000 HC SEMI PRIVATE

## 2019-09-14 PROCEDURE — 80053 COMPREHEN METABOLIC PANEL: CPT

## 2019-09-14 PROCEDURE — 97161 PT EVAL LOW COMPLEX 20 MIN: CPT

## 2019-09-14 PROCEDURE — 6370000000 HC RX 637 (ALT 250 FOR IP): Performed by: INTERNAL MEDICINE

## 2019-09-14 RX ORDER — POTASSIUM CHLORIDE 20 MEQ/1
20 TABLET, EXTENDED RELEASE ORAL 2 TIMES DAILY WITH MEALS
Status: DISCONTINUED | OUTPATIENT
Start: 2019-09-14 | End: 2019-09-18 | Stop reason: HOSPADM

## 2019-09-14 RX ORDER — DICYCLOMINE HYDROCHLORIDE 10 MG/1
20 CAPSULE ORAL 4 TIMES DAILY PRN
Status: DISCONTINUED | OUTPATIENT
Start: 2019-09-14 | End: 2019-09-18 | Stop reason: HOSPADM

## 2019-09-14 RX ORDER — LACTOBACILLUS RHAMNOSUS GG 10B CELL
1 CAPSULE ORAL 2 TIMES DAILY WITH MEALS
Status: DISCONTINUED | OUTPATIENT
Start: 2019-09-14 | End: 2019-09-18 | Stop reason: HOSPADM

## 2019-09-14 RX ORDER — OXYMETAZOLINE HYDROCHLORIDE 0.05 G/100ML
2 SPRAY NASAL 2 TIMES DAILY
Status: DISPENSED | OUTPATIENT
Start: 2019-09-14 | End: 2019-09-17

## 2019-09-14 RX ORDER — OXYCODONE AND ACETAMINOPHEN 10; 325 MG/1; MG/1
1 TABLET ORAL EVERY 6 HOURS PRN
Status: DISCONTINUED | OUTPATIENT
Start: 2019-09-14 | End: 2019-09-18 | Stop reason: HOSPADM

## 2019-09-14 RX ORDER — POTASSIUM CHLORIDE 7.45 MG/ML
10 INJECTION INTRAVENOUS
Status: DISCONTINUED | OUTPATIENT
Start: 2019-09-14 | End: 2019-09-14

## 2019-09-14 RX ADMIN — CIPROFLOXACIN 400 MG: 2 INJECTION, SOLUTION INTRAVENOUS at 17:43

## 2019-09-14 RX ADMIN — METOPROLOL SUCCINATE 100 MG: 50 TABLET, EXTENDED RELEASE ORAL at 08:28

## 2019-09-14 RX ADMIN — ASPIRIN 81 MG 81 MG: 81 TABLET ORAL at 08:28

## 2019-09-14 RX ADMIN — RANOLAZINE 500 MG: 500 TABLET, FILM COATED, EXTENDED RELEASE ORAL at 08:28

## 2019-09-14 RX ADMIN — CIPROFLOXACIN 400 MG: 2 INJECTION, SOLUTION INTRAVENOUS at 04:29

## 2019-09-14 RX ADMIN — INSULIN LISPRO 3 UNITS: 100 INJECTION, SOLUTION INTRAVENOUS; SUBCUTANEOUS at 20:33

## 2019-09-14 RX ADMIN — OXYCODONE HYDROCHLORIDE AND ACETAMINOPHEN 1 TABLET: 10; 325 TABLET ORAL at 14:23

## 2019-09-14 RX ADMIN — GABAPENTIN 600 MG: 300 CAPSULE ORAL at 20:03

## 2019-09-14 RX ADMIN — ATORVASTATIN CALCIUM 80 MG: 80 TABLET, FILM COATED ORAL at 20:03

## 2019-09-14 RX ADMIN — LOSARTAN POTASSIUM 50 MG: 50 TABLET, FILM COATED ORAL at 08:28

## 2019-09-14 RX ADMIN — SODIUM CHLORIDE: 9 INJECTION, SOLUTION INTRAVENOUS at 23:03

## 2019-09-14 RX ADMIN — Medication 1 CAPSULE: at 11:16

## 2019-09-14 RX ADMIN — OXYCODONE HYDROCHLORIDE AND ACETAMINOPHEN 1 TABLET: 10; 325 TABLET ORAL at 20:32

## 2019-09-14 RX ADMIN — POTASSIUM CHLORIDE 20 MEQ: 20 TABLET, EXTENDED RELEASE ORAL at 11:17

## 2019-09-14 RX ADMIN — METRONIDAZOLE 500 MG: 500 INJECTION, SOLUTION INTRAVENOUS at 20:03

## 2019-09-14 RX ADMIN — CLOPIDOGREL BISULFATE 75 MG: 75 TABLET ORAL at 08:28

## 2019-09-14 RX ADMIN — INSULIN LISPRO 2 UNITS: 100 INJECTION, SOLUTION INTRAVENOUS; SUBCUTANEOUS at 13:52

## 2019-09-14 RX ADMIN — METRONIDAZOLE 500 MG: 500 INJECTION, SOLUTION INTRAVENOUS at 11:17

## 2019-09-14 RX ADMIN — ONDANSETRON 4 MG: 2 INJECTION INTRAMUSCULAR; INTRAVENOUS at 08:28

## 2019-09-14 RX ADMIN — INSULIN GLARGINE 20 UNITS: 100 INJECTION, SOLUTION SUBCUTANEOUS at 20:33

## 2019-09-14 RX ADMIN — OXYCODONE HYDROCHLORIDE AND ACETAMINOPHEN 1 TABLET: 10; 325 TABLET ORAL at 01:00

## 2019-09-14 RX ADMIN — POTASSIUM CHLORIDE 20 MEQ: 20 TABLET, EXTENDED RELEASE ORAL at 17:43

## 2019-09-14 RX ADMIN — OXYMETAZOLINE HCL 2 SPRAY: 0.05 SPRAY NASAL at 13:52

## 2019-09-14 RX ADMIN — GABAPENTIN 600 MG: 300 CAPSULE ORAL at 08:28

## 2019-09-14 RX ADMIN — SODIUM CHLORIDE: 9 INJECTION, SOLUTION INTRAVENOUS at 14:24

## 2019-09-14 RX ADMIN — OXYMETAZOLINE HCL 2 SPRAY: 0.05 SPRAY NASAL at 20:32

## 2019-09-14 RX ADMIN — OXYCODONE HYDROCHLORIDE AND ACETAMINOPHEN 1 TABLET: 10; 325 TABLET ORAL at 08:28

## 2019-09-14 RX ADMIN — METRONIDAZOLE 500 MG: 500 INJECTION, SOLUTION INTRAVENOUS at 02:00

## 2019-09-14 RX ADMIN — Medication 1 CAPSULE: at 17:43

## 2019-09-14 RX ADMIN — ENOXAPARIN SODIUM 40 MG: 40 INJECTION SUBCUTANEOUS at 20:03

## 2019-09-14 RX ADMIN — TRAZODONE HYDROCHLORIDE 50 MG: 50 TABLET ORAL at 20:04

## 2019-09-14 RX ADMIN — SODIUM CHLORIDE: 9 INJECTION, SOLUTION INTRAVENOUS at 04:29

## 2019-09-14 RX ADMIN — GABAPENTIN 600 MG: 300 CAPSULE ORAL at 14:24

## 2019-09-14 RX ADMIN — PANTOPRAZOLE SODIUM 40 MG: 40 TABLET, DELAYED RELEASE ORAL at 06:19

## 2019-09-14 RX ADMIN — RANOLAZINE 500 MG: 500 TABLET, FILM COATED, EXTENDED RELEASE ORAL at 20:04

## 2019-09-14 ASSESSMENT — PAIN DESCRIPTION - ORIENTATION
ORIENTATION: RIGHT

## 2019-09-14 ASSESSMENT — PAIN DESCRIPTION - ONSET
ONSET: ON-GOING

## 2019-09-14 ASSESSMENT — PAIN SCALES - GENERAL
PAINLEVEL_OUTOF10: 4
PAINLEVEL_OUTOF10: 4
PAINLEVEL_OUTOF10: 6
PAINLEVEL_OUTOF10: 7
PAINLEVEL_OUTOF10: 7
PAINLEVEL_OUTOF10: 10
PAINLEVEL_OUTOF10: 8
PAINLEVEL_OUTOF10: 6
PAINLEVEL_OUTOF10: 4

## 2019-09-14 ASSESSMENT — PAIN - FUNCTIONAL ASSESSMENT
PAIN_FUNCTIONAL_ASSESSMENT: ACTIVITIES ARE NOT PREVENTED

## 2019-09-14 ASSESSMENT — PAIN DESCRIPTION - LOCATION
LOCATION: ABDOMEN

## 2019-09-14 ASSESSMENT — PAIN DESCRIPTION - DESCRIPTORS
DESCRIPTORS: STABBING
DESCRIPTORS: SHARP

## 2019-09-14 ASSESSMENT — PAIN DESCRIPTION - PAIN TYPE
TYPE: ACUTE PAIN

## 2019-09-14 ASSESSMENT — PAIN DESCRIPTION - FREQUENCY
FREQUENCY: CONTINUOUS

## 2019-09-14 ASSESSMENT — PAIN DESCRIPTION - PROGRESSION
CLINICAL_PROGRESSION: NOT CHANGED
CLINICAL_PROGRESSION: RAPIDLY IMPROVING
CLINICAL_PROGRESSION: NOT CHANGED

## 2019-09-14 NOTE — CONSULTS
small transverse colon polyps, measuring 2-5mm in size. These were completely removed with biopsy polypectomy. There was no residual polyp or significant bleeding at the polypectomy sites. 2) Normal terminal ileum. COLONOSCOPY 12/2013:  IMPRESSION:  Colitis limited to the splenic flexure (approximately 10 cm length). Random biopsies taken. The rest of the colon and terminal ileum appeared unremarkable. FINAL DIAGNOSIS:    A. Terminal ileum, biopsy:    - Fragments of benign terminal ileal mucosa without significant    pathologic alteration. B. Colon, right, random, biopsies:    - Fragments of benign colonic mucosa, negative for chronic colitis,    dysplasia, or carcinoma.    - The collagen table is within normal limits and intraepithelial    lymphocytes are not increased, negative for features of collagenous    colitis or lymphocytic colitis (microscopic colitis). C. Colon, left, random, biopsies:    - Fragments of benign colonic mucosa, negative for chronic colitis,    dysplasia, or carcinoma.    - The collagen table is within normal limits and intraepithelial    lymphocytes are not increased, negative for features of collagenous    colitis or lymphocytic colitis (microscopic colitis). D. Colon, sigmoid, biopsy:    - Fragments of inflamed and ulcerated colonic mucosa with features of    chronic colitis (see comment).     COMMENT:     The sigmoid biopsy sample (part D) shows minimally active  chronic colitis with surface ulceration and no features of dysplasia or  malignancy.  These findings are not specific and may indicate focal  inflammatory bowel disease (particularly Crohn's), though distinct  features such as granulomas are not identified.  Alternatively, the  findings may represent a local effect such as diverticular disease, or  other process.  Correlation with clinical findings and history is  recommended.    SHEMI/SHEMI 12/23/13    PAST MEDICAL, SURGICAL, FAMILY, and SOCIAL HISTORY Past Medical History:   Diagnosis Date    Anxiety     Arthritis     Asthma     CAD (coronary artery disease)     Calcium kidney stone     [facility]     Cerebral artery occlusion with cerebral infarction ([facility] [facility]     CHF (congestive heart failure) ([facility] [facility]     COPD (chronic obstructive pulmonary disease) (HCC)     mild    Depression     DM2 (diabetes mellitus, type 2) (HCC)     Fibromyalgia     GERD (gastroesophageal reflux disease)     Hyperlipidemia     Hypertension     Liver disease     Pacemaker 2012    Medtronic model # [id number]    Pneumonia     Seizures ([facility] [facility]     TIA (transient ischemic attack) 2007    Ulcerative colitis ([facility] [facility]      Past Surgical History:   Procedure Laterality Date    BACK SURGERY      CARDIAC SURGERY      CHOLECYSTECTOMY      COLONOSCOPY  01/10/2017    COLONOSCOPY  01/10/2017    CORONARY ANGIOPLASTY WITH STENT PLACEMENT  2012    CORONARY ARTERY BYPASS GRAFT  2010, 11/2015    ENDOSCOPY, COLON, DIAGNOSTIC      PACEMAKER PLACEMENT      UPPER GASTROINTESTINAL ENDOSCOPY  02/07/2017     Family History   Problem Relation Age of Onset    Diabetes Father     Coronary Art Dis Father     Cancer Mother         Lung with mets     Social History     Socioeconomic History    Marital status:      Spouse name: None    Number of children: 1    Years of education: None    Highest education level: None   Occupational History    Occupation: disabled   Social Needs    Financial resource strain: None    Food insecurity:     Worry: None     Inability: None    Transportation needs:     Medical: None     Non-medical: None   Tobacco Use    Smoking status: Current Every Day Smoker     Packs/day: 1.00     Years: 44.00     Pack years: 44.00     Types: Cigarettes    Smokeless tobacco: Never Used    Tobacco comment: urged to stop   Substance and Sexual Activity    Alcohol use: No     Alcohol/week: 0.0 standard drinks    Drug use: No    Sexual gambling    Melatonin Other (See Comments)     Restless leg syndrome          REVIEW OF SYSTEMS   A full 12 pt ROS is negative other than noted in HPI    PHYSICAL EXAM     Vitals:    09/13/19 2036 09/14/19 0357 09/14/19 0505 09/14/19 0815   BP: (!) 190/88 (!) 155/80  (!) 199/91   Pulse: 74 63  74   Resp: 18 18  20   Temp: 98.1 °F (36.7 °C) 97.8 °F (36.6 °C)  98.1 °F (36.7 °C)   TempSrc: Oral Oral  Oral   SpO2: 100% 95%  97%   Weight:   218 lb 0.6 oz (98.9 kg)    Height:            Physical Exam:  Gen: Resting in chair, NAD   CV: RRR no MRG   Pul: CTAB.  No wheezing  Abd: Good bowel sounds throughout, soft, NT/ND, no masses, no HSM   Ext: No edema, 2+ LE pulses   Neuro: No gross deficits, moves all 4 extremities   Skin: No jaundice, spider angiomas, moseley erythema    LABS AND IMAGING     Recent Results (from the past 24 hour(s))   POCT Glucose    Collection Time: 09/13/19  8:39 PM   Result Value Ref Range    POC Glucose 160 (H) 70 - 99 mg/dl    Performed on ACCU-CHEK    Comprehensive Metabolic Panel w/ Reflex to MG    Collection Time: 09/14/19  6:41 AM   Result Value Ref Range    Sodium 140 136 - 145 mmol/L    Potassium reflex Magnesium 3.0 (L) 3.5 - 5.1 mmol/L    Chloride 105 99 - 110 mmol/L    CO2 20 (L) 21 - 32 mmol/L    Anion Gap 15 3 - 16    Glucose 129 (H) 70 - 99 mg/dL    BUN 9 7 - 20 mg/dL    CREATININE 0.9 0.8 - 1.3 mg/dL    GFR Non-African American >60 >60    GFR African American >60 >60    Calcium 8.5 8.3 - 10.6 mg/dL    Total Protein 6.1 (L) 6.4 - 8.2 g/dL    Alb 3.7 3.4 - 5.0 g/dL    Albumin/Globulin Ratio 1.5 1.1 - 2.2    Total Bilirubin 0.4 0.0 - 1.0 mg/dL    Alkaline Phosphatase 55 40 - 129 U/L    ALT 6 (L) 10 - 40 U/L    AST 8 (L) 15 - 37 U/L    Globulin 2.4 g/dL   CBC auto differential    Collection Time: 09/14/19  6:41 AM   Result Value Ref Range    WBC 6.5 4.0 - 11.0 K/uL    RBC 4.17 (L) 4.20 - 5.90 M/uL    Hemoglobin 13.6 13.5 - 17.5 g/dL    Hematocrit 39.7 (L) 40.5 - 52.5 %    MCV 95.3 80.0 - 100.0 fL    MCH 32.6 26.0 - 34.0 pg    MCHC 34.2 31.0 - 36.0 g/dL    RDW 14.0 12.4 - 15.4 %    Platelets 448 407 - 596 K/uL    MPV 9.4 5.0 - 10.5 fL    Neutrophils % 48.6 %    Lymphocytes % 38.8 %    Monocytes % 7.8 %    Eosinophils % 3.3 %    Basophils % 1.5 %    Neutrophils Absolute 3.2 1.7 - 7.7 K/uL    Lymphocytes Absolute 2.5 1.0 - 5.1 K/uL    Monocytes Absolute 0.5 0.0 - 1.3 K/uL    Eosinophils Absolute 0.2 0.0 - 0.6 K/uL    Basophils Absolute 0.1 0.0 - 0.2 K/uL   Hemoglobin A1C    Collection Time: 09/14/19  6:41 AM   Result Value Ref Range    Hemoglobin A1C 7.5 See comment %    eAG 168.6 mg/dL   Magnesium    Collection Time: 09/14/19  6:41 AM   Result Value Ref Range    Magnesium 2.00 1.80 - 2.40 mg/dL   POCT Glucose    Collection Time: 09/14/19  7:52 AM   Result Value Ref Range    POC Glucose 133 (H) 70 - 99 mg/dl    Performed on ACCU-CHEK    POCT Glucose    Collection Time: 09/14/19 11:43 AM   Result Value Ref Range    POC Glucose 148 (H) 70 - 99 mg/dl    Performed on ACCU-CHEK    POCT Glucose    Collection Time: 09/14/19  4:27 PM   Result Value Ref Range    POC Glucose 121 (H) 70 - 99 mg/dl    Performed on ACCU-CHEK      Other Labs      Imaging      ASSESSMENT AND RECOMMENDATIONS   Kristine Wise is a 61 y.o. male who has a past medical history of Anxiety, Arthritis, Asthma, CAD (coronary artery disease), Calcium kidney stone, Cardiomyopathy ([facility] [address] Cerebral artery occlusion with cerebral infarction ([facility] [address] CHF (congestive heart failure) ([address] COPD (chronic obstructive pulmonary disease) ([facility] [address] Depression, DM2 (diabetes mellitus, type 2) ([facility] [address] Fibromyalgia, GERD (gastroesophageal reflux disease), Hyperlipidemia, Hypertension, Liver disease, Pacemaker, Pneumonia, Seizures ([address] TIA (transient ischemic attack), and [facility]cerative colitis ([facility] . We have been consulted regarding    IMPRESSION:    1. Abdominal pain with resolving nausea/vomiting/diarrhea: Suspect related to viral gastroenteritis.  Symptoms

## 2019-09-14 NOTE — PROGRESS NOTES
[facility] Medicine Progress Note      Admit Date: 9/13/2019         Overnight Events: No    CC: F/U for Colitis    HPI: The patient is a 61year old male, with a past medical history significant for anxiety/depression, CAD, cardiomyopathy, CVA, CHF, COPD, DM II, Fibromyalgia, GERD, HLD, HTN, liver disease, seizures and ulcerative colitis, who presented to [facility] with persistent mid to lower, left sided abdominal pain. Symptoms began 3 days prior to admission. Pain was noted to be cramping in nature, with radiation to his back. Initially, the patient was diagnosed with colitis in the ER, was prescribed oral abx and was discharged to home. His pain persisted. The patient returned to the ER and a CT scan of the abdomen showed slight improvement of colitis at that time. He was again discharged home. On the date of admission, the patient began to experience nausea and vomiting, making it impossible to keep down his PO abx. The patient again returned to the ER and was admitted for further treatment of colitis. IV cipro and flagyl were initiated. Interval History/Subjective: No new complaints. ABD pain persists, though is a little bit improved since the time of admission. He states that he ONLY wants PO pain meds and does not want the dose or frequency increased. After discussing, he would like to try a full liquid diet.      Review of Systems:     _____________________________________________________________________  General ROS:  [x] N/A [] Other:  _____________________________________________________________________  HEENT ROS:  [x] N/A [] Other:  _____________________________________________________________________  Respiratory ROS:  [x] N/A [] Other:  _____________________________________________________________________  Cardiovascular ROS:  [x] N/A [] Other:  _____________________________________________________________________  Gastrointestinal ROS:  [] N/A [x] Other: ABD pain to suprapubic

## 2019-09-14 NOTE — PROGRESS NOTES
Physical Therapy    Facility/Department: [address] ORTHOPEDICS  Initial Assessment/Discharge Summary    NAME: Anabel Dumas  : 1955  MRN: [id number]    Date of Service: 2019    Discharge Recommendations:  Home with assist PRN   PT Equipment Recommendations  Equipment Needed: No  Other: Pt has Rolling Walker. Anabel Dumas scored a 23/24 on the AM-PAC short mobility form. At this time, no further PT is recommended upon discharge due to level of functional mobility and adequate assist/support upon d/c. Recommend patient returns to prior setting with prior services. Assessment   Assessment: 62 y/o male admit 19 with Acute Colitis, Abd Pain, N&V.  PMH as noted including CAD, CABG, Cardiomopathy, CHF, COPD, Pacemaker, CVA/TIA, Back Surg, Seizures. PTA pt living with wife 2nd floor of 2 family home with steps to access. PTA pt independent with daily care and functional mobility with Rolling Walker. Pt reports adequate assist/support upon d/c home; no further PT indicated at this time. Prognosis: Good  Decision Making: Low Complexity  History: 62 y/o male admit 19 with Acute Colitis, Abd Pain, N&V.  PMH as noted including CAD, CABG, Cardiomopathy, CHF, COPD, Pacemaker, CVA/TIA, Back Surg, Seizures. Exam: See above. Clinical Presentation: See above. Patient Education: Role of PT, POC, Need to call for assist.   Barriers to Learning: None. REQUIRES PT FOLLOW UP: No  Activity Tolerance  Activity Tolerance: Patient Tolerated treatment well       Patient Diagnosis(es): The primary encounter diagnosis was Non-intractable vomiting with nausea, unspecified vomiting type. Diagnoses of Failure of outpatient treatment and Colitis, acute were also pertinent to this visit.      has a past medical history of Anxiety, Arthritis, Asthma, CAD (coronary artery disease), Calcium kidney stone, Cardiomyopathy ([facility] [address] Cerebral artery occlusion with cerebral infarction [facility]), CHF (congestive

## 2019-09-15 LAB
ANION GAP SERPL CALCULATED.3IONS-SCNC: 13 MMOL/L (ref 3–16)
BASOPHILS ABSOLUTE: 0.1 K/UL (ref 0–0.2)
BASOPHILS RELATIVE PERCENT: 1.3 %
BUN BLDV-MCNC: 6 MG/DL (ref 7–20)
CALCIUM SERPL-MCNC: 8.4 MG/DL (ref 8.3–10.6)
CHLORIDE BLD-SCNC: 106 MMOL/L (ref 99–110)
CO2: 19 MMOL/L (ref 21–32)
CREAT SERPL-MCNC: 0.9 MG/DL (ref 0.8–1.3)
EOSINOPHILS ABSOLUTE: 0.2 K/UL (ref 0–0.6)
EOSINOPHILS RELATIVE PERCENT: 2.7 %
GFR AFRICAN AMERICAN: >60
GFR NON-AFRICAN AMERICAN: >60
GLUCOSE BLD-MCNC: 139 MG/DL (ref 70–99)
GLUCOSE BLD-MCNC: 147 MG/DL (ref 70–99)
GLUCOSE BLD-MCNC: 148 MG/DL (ref 70–99)
GLUCOSE BLD-MCNC: 166 MG/DL (ref 70–99)
GLUCOSE BLD-MCNC: 205 MG/DL (ref 70–99)
HCT VFR BLD CALC: 41.9 % (ref 40.5–52.5)
HEMOGLOBIN: 14.2 G/DL (ref 13.5–17.5)
LYMPHOCYTES ABSOLUTE: 3.1 K/UL (ref 1–5.1)
LYMPHOCYTES RELATIVE PERCENT: 38.6 %
MCH RBC QN AUTO: 32.7 PG (ref 26–34)
MCHC RBC AUTO-ENTMCNC: 33.9 G/DL (ref 31–36)
MCV RBC AUTO: 96.6 FL (ref 80–100)
MONOCYTES ABSOLUTE: 0.6 K/UL (ref 0–1.3)
MONOCYTES RELATIVE PERCENT: 7.3 %
NEUTROPHILS ABSOLUTE: 4 K/UL (ref 1.7–7.7)
NEUTROPHILS RELATIVE PERCENT: 50.1 %
PDW BLD-RTO: 14.3 % (ref 12.4–15.4)
PERFORMED ON: ABNORMAL
PLATELET # BLD: 214 K/UL (ref 135–450)
PMV BLD AUTO: 9.9 FL (ref 5–10.5)
POTASSIUM REFLEX MAGNESIUM: 3.9 MMOL/L (ref 3.5–5.1)
RBC # BLD: 4.33 M/UL (ref 4.2–5.9)
SODIUM BLD-SCNC: 138 MMOL/L (ref 136–145)
WBC # BLD: 8 K/UL (ref 4–11)

## 2019-09-15 PROCEDURE — 6360000002 HC RX W HCPCS: Performed by: INTERNAL MEDICINE

## 2019-09-15 PROCEDURE — 94760 N-INVAS EAR/PLS OXIMETRY 1: CPT

## 2019-09-15 PROCEDURE — 6370000000 HC RX 637 (ALT 250 FOR IP): Performed by: NURSE PRACTITIONER

## 2019-09-15 PROCEDURE — 6370000000 HC RX 637 (ALT 250 FOR IP): Performed by: INTERNAL MEDICINE

## 2019-09-15 PROCEDURE — 85025 COMPLETE CBC W/AUTO DIFF WBC: CPT

## 2019-09-15 PROCEDURE — 80048 BASIC METABOLIC PNL TOTAL CA: CPT

## 2019-09-15 PROCEDURE — 2500000003 HC RX 250 WO HCPCS: Performed by: INTERNAL MEDICINE

## 2019-09-15 PROCEDURE — 1200000000 HC SEMI PRIVATE

## 2019-09-15 PROCEDURE — 36415 COLL VENOUS BLD VENIPUNCTURE: CPT

## 2019-09-15 RX ADMIN — INSULIN LISPRO 4 UNITS: 100 INJECTION, SOLUTION INTRAVENOUS; SUBCUTANEOUS at 17:39

## 2019-09-15 RX ADMIN — FUROSEMIDE 40 MG: 40 TABLET ORAL at 09:30

## 2019-09-15 RX ADMIN — METRONIDAZOLE 500 MG: 500 INJECTION, SOLUTION INTRAVENOUS at 02:00

## 2019-09-15 RX ADMIN — CIPROFLOXACIN 400 MG: 2 INJECTION, SOLUTION INTRAVENOUS at 17:45

## 2019-09-15 RX ADMIN — TRAZODONE HYDROCHLORIDE 50 MG: 50 TABLET ORAL at 20:48

## 2019-09-15 RX ADMIN — CIPROFLOXACIN 400 MG: 2 INJECTION, SOLUTION INTRAVENOUS at 05:00

## 2019-09-15 RX ADMIN — PANTOPRAZOLE SODIUM 40 MG: 40 TABLET, DELAYED RELEASE ORAL at 07:00

## 2019-09-15 RX ADMIN — OXYMETAZOLINE HCL 2 SPRAY: 0.05 SPRAY NASAL at 07:00

## 2019-09-15 RX ADMIN — GABAPENTIN 600 MG: 300 CAPSULE ORAL at 09:31

## 2019-09-15 RX ADMIN — INSULIN LISPRO 1 UNITS: 100 INJECTION, SOLUTION INTRAVENOUS; SUBCUTANEOUS at 20:53

## 2019-09-15 RX ADMIN — RANOLAZINE 500 MG: 500 TABLET, FILM COATED, EXTENDED RELEASE ORAL at 14:33

## 2019-09-15 RX ADMIN — METOPROLOL SUCCINATE 100 MG: 50 TABLET, EXTENDED RELEASE ORAL at 08:00

## 2019-09-15 RX ADMIN — INSULIN GLARGINE 20 UNITS: 100 INJECTION, SOLUTION SUBCUTANEOUS at 20:53

## 2019-09-15 RX ADMIN — ENOXAPARIN SODIUM 40 MG: 40 INJECTION SUBCUTANEOUS at 20:49

## 2019-09-15 RX ADMIN — Medication 1 CAPSULE: at 18:18

## 2019-09-15 RX ADMIN — OXYMETAZOLINE HCL 2 SPRAY: 0.05 SPRAY NASAL at 21:42

## 2019-09-15 RX ADMIN — ASPIRIN 81 MG 81 MG: 81 TABLET ORAL at 08:00

## 2019-09-15 RX ADMIN — OXYCODONE HYDROCHLORIDE AND ACETAMINOPHEN 1 TABLET: 10; 325 TABLET ORAL at 02:35

## 2019-09-15 RX ADMIN — RANOLAZINE 500 MG: 500 TABLET, FILM COATED, EXTENDED RELEASE ORAL at 20:49

## 2019-09-15 RX ADMIN — ATORVASTATIN CALCIUM 80 MG: 80 TABLET, FILM COATED ORAL at 20:48

## 2019-09-15 RX ADMIN — METRONIDAZOLE 500 MG: 500 INJECTION, SOLUTION INTRAVENOUS at 18:59

## 2019-09-15 RX ADMIN — CLOPIDOGREL BISULFATE 75 MG: 75 TABLET ORAL at 08:00

## 2019-09-15 RX ADMIN — GABAPENTIN 600 MG: 300 CAPSULE ORAL at 20:49

## 2019-09-15 RX ADMIN — LOSARTAN POTASSIUM 50 MG: 50 TABLET, FILM COATED ORAL at 09:00

## 2019-09-15 RX ADMIN — OXYCODONE HYDROCHLORIDE AND ACETAMINOPHEN 1 TABLET: 10; 325 TABLET ORAL at 14:52

## 2019-09-15 RX ADMIN — OXYCODONE HYDROCHLORIDE AND ACETAMINOPHEN 1 TABLET: 10; 325 TABLET ORAL at 20:48

## 2019-09-15 RX ADMIN — POTASSIUM CHLORIDE 20 MEQ: 20 TABLET, EXTENDED RELEASE ORAL at 18:18

## 2019-09-15 RX ADMIN — GABAPENTIN 600 MG: 300 CAPSULE ORAL at 14:52

## 2019-09-15 ASSESSMENT — PAIN DESCRIPTION - ORIENTATION
ORIENTATION: RIGHT

## 2019-09-15 ASSESSMENT — PAIN - FUNCTIONAL ASSESSMENT
PAIN_FUNCTIONAL_ASSESSMENT: ACTIVITIES ARE NOT PREVENTED

## 2019-09-15 ASSESSMENT — PAIN DESCRIPTION - LOCATION
LOCATION: ABDOMEN

## 2019-09-15 ASSESSMENT — PAIN DESCRIPTION - ONSET
ONSET: ON-GOING

## 2019-09-15 ASSESSMENT — PAIN DESCRIPTION - PROGRESSION
CLINICAL_PROGRESSION: NOT CHANGED
CLINICAL_PROGRESSION: GRADUALLY IMPROVING

## 2019-09-15 ASSESSMENT — PAIN DESCRIPTION - DESCRIPTORS
DESCRIPTORS: ACHING;CONSTANT
DESCRIPTORS: SHARP
DESCRIPTORS: ACHING
DESCRIPTORS: ACHING

## 2019-09-15 ASSESSMENT — PAIN DESCRIPTION - FREQUENCY
FREQUENCY: INTERMITTENT
FREQUENCY: CONTINUOUS

## 2019-09-15 ASSESSMENT — PAIN SCALES - GENERAL
PAINLEVEL_OUTOF10: 6
PAINLEVEL_OUTOF10: 8
PAINLEVEL_OUTOF10: 6
PAINLEVEL_OUTOF10: 4
PAINLEVEL_OUTOF10: 3

## 2019-09-15 ASSESSMENT — PAIN DESCRIPTION - PAIN TYPE
TYPE: ACUTE PAIN

## 2019-09-15 NOTE — PROGRESS NOTES
GASTROENTEROLOGY INPATIENT CONSULTATION      IDENTIFYING DATA/REASON FOR CONSULTATION   PATIENT:  Anabel Dumas  MRN:  [id number]  ADMIT DATE: 9/13/2019  TIME OF EVALUATION: 9/15/2019 10:29 AM  HOSPITAL STAY:   LOS: 2 days     REASON FOR CONSULTATION:  Abdominal pain and diarrhea    HISTORY OF PRESENT ILLNESS   Anabel Dumas is a 61 y.o. male who has a past history notable for CAD, HTN, HLD, DM-II, depression, anxiety, fibromyalgia, COPD, and TIA who presented to the hospital 9/13/2019 with abdominal pain. Patient seen and examined. One non-bloody, non-melenic liquid stool this am. Continues to have mild cramping pain, 5/10, significantly improved. He is hungry. Afebrile. ENDOSCOPIC HISTORY:    COLONOSCOPY 1/20/17:  1) Five small transverse colon polyps, measuring 2-5mm in size. These were completely removed with biopsy polypectomy. There was no residual polyp or significant bleeding at the polypectomy sites. 2) Normal terminal ileum. COLONOSCOPY 12/2013:  IMPRESSION:  Colitis limited to the splenic flexure (approximately 10 cm length). Random biopsies taken. The rest of the colon and terminal ileum appeared unremarkable. FINAL DIAGNOSIS:    A. Terminal ileum, biopsy:    - Fragments of benign terminal ileal mucosa without significant    pathologic alteration. B. Colon, right, random, biopsies:    - Fragments of benign colonic mucosa, negative for chronic colitis,    dysplasia, or carcinoma.    - The collagen table is within normal limits and intraepithelial    lymphocytes are not increased, negative for features of collagenous    colitis or lymphocytic colitis (microscopic colitis).     C. Colon, left, random, biopsies:    - Fragments of benign colonic mucosa, negative for chronic colitis,    dysplasia, or carcinoma.    - The collagen table is within normal limits and intraepithelial    lymphocytes are not increased, negative for features of collagenous    colitis or lymphocytic colitis (microscopic colitis). D. Colon, sigmoid, biopsy:    - Fragments of inflamed and ulcerated colonic mucosa with features of    chronic colitis (see comment).     COMMENT:     The sigmoid biopsy sample (part D) shows minimally active  chronic colitis with surface ulceration and no features of dysplasia or  malignancy.  These findings are not specific and may indicate focal  inflammatory bowel disease (particularly Crohn's), though distinct  features such as granulomas are not identified.  Alternatively, the  findings may represent a local effect such as diverticular disease, or  other process.  Correlation with clinical findings and history is  recommended.    SHEMI/SHEMI 12/23/13    PAST MEDICAL, SURGICAL, FAMILY, and SOCIAL HISTORY     Past Medical History:   Diagnosis Date    Anxiety     Arthritis     Asthma     CAD (coronary artery disease)     Calcium kidney stone     Cardiomyopathy ([address]     Cerebral artery occlusion with cerebral infarction ([address]     CHF (congestive heart failure) ([address]     COPD (chronic obstructive pulmonary disease) ([address]     mild    Depression     DM2 (diabetes mellitus, type 2) ([address]     Fibromyalgia     GERD (gastroesophageal reflux disease)     Hyperlipidemia     Hypertension     Liver disease     Pacemaker 2012    Medtronic model # [id number]    Pneumonia     Seizures ([address]     TIA (transient ischemic attack) 2007    Ulcerative colitis ([address]      Past Surgical History:   Procedure Laterality Date    BACK SURGERY      CARDIAC SURGERY      CHOLECYSTECTOMY      COLONOSCOPY  01/10/2017    COLONOSCOPY  01/10/2017    CORONARY ANGIOPLASTY WITH STENT PLACEMENT  2012    CORONARY ARTERY BYPASS GRAFT  2010, 11/2015    ENDOSCOPY, COLON, DIAGNOSTIC      PACEMAKER PLACEMENT      UPPER GASTROINTESTINAL ENDOSCOPY  02/07/2017     Family History   Problem Relation Age of Onset    Diabetes Father     Coronary Art Dis Father     Cancer Mother         Lung with 99 mg/dl    Performed on ACCU-CHEK    CBC Auto Differential    Collection Time: 09/15/19  6:42 AM   Result Value Ref Range    WBC 8.0 4.0 - 11.0 K/uL    RBC 4.33 4.20 - 5.90 M/uL    Hemoglobin 14.2 13.5 - 17.5 g/dL    Hematocrit 41.9 40.5 - 52.5 %    MCV 96.6 80.0 - 100.0 fL    MCH 32.7 26.0 - 34.0 pg    MCHC 33.9 31.0 - 36.0 g/dL    RDW 14.3 12.4 - 15.4 %    Platelets 839 610 - 948 K/uL    MPV 9.9 5.0 - 10.5 fL    Neutrophils % 50.1 %    Lymphocytes % 38.6 %    Monocytes % 7.3 %    Eosinophils % 2.7 %    Basophils % 1.3 %    Neutrophils Absolute 4.0 1.7 - 7.7 K/uL    Lymphocytes Absolute 3.1 1.0 - 5.1 K/uL    Monocytes Absolute 0.6 0.0 - 1.3 K/uL    Eosinophils Absolute 0.2 0.0 - 0.6 K/uL    Basophils Absolute 0.1 0.0 - 0.2 K/uL   Basic Metabolic Panel w/ Reflex to MG    Collection Time: 09/15/19  6:42 AM   Result Value Ref Range    Sodium 138 136 - 145 mmol/L    Potassium reflex Magnesium 3.9 3.5 - 5.1 mmol/L    Chloride 106 99 - 110 mmol/L    CO2 19 (L) 21 - 32 mmol/L    Anion Gap 13 3 - 16    Glucose 148 (H) 70 - 99 mg/dL    BUN 6 (L) 7 - 20 mg/dL    CREATININE 0.9 0.8 - 1.3 mg/dL    GFR Non-African American >60 >60    GFR African American >60 >60    Calcium 8.4 8.3 - 10.6 mg/dL   POCT Glucose    Collection Time: 09/15/19  7:31 AM   Result Value Ref Range    POC Glucose 139 (H) 70 - 99 mg/dl    Performed on ACCU-CHEK      Other Labs      Imaging      ASSESSMENT AND RECOMMENDATIONS   Zane Morris is a 61 y.o. male who has a past medical history of Anxiety, Arthritis, Asthma, CAD (coronary artery disease), Calcium kidney stone, Cardiomyopathy ([facility] [address] Cerebral artery occlusion with cerebral infarction ([address] CHF (congestive heart failure) ([facility] COPD (chronic obstructive pulmonary disease) ([facility] Depression, DM2 (diabetes mellitus, type 2) ([facility] Fibromyalgia, GERD (gastroesophageal reflux disease), Hyperlipidemia, Hypertension, Liver disease, Pacemaker, Pneumonia, [facility] ([facility] TIA (transient ischemic attack),

## 2019-09-15 NOTE — PLAN OF CARE
Problem: Pain:  Goal: Pain level will decrease  Description  Pain level will decrease  Outcome: Ongoing  Note:   Pain/discomfort being managed with PRN analgesics per MD orders. Pt able to express presence and absence of pain and rate pain appropriately using numerical scale. Goal: Control of acute pain  Description  Control of acute pain  Outcome: Ongoing  Note:   Patient educated on acute pain. Taught patient to use call light to ask for pain medication. PRN pain medication given for acute pain. Will continue to monitor pain per unit protocol. Problem: Falls - Risk of:  Goal: Will remain free from falls  Description  Will remain free from falls  Outcome: Ongoing  Note:   Fall risk assessment completed . Fall precautions in place, side rails 2/4 up, call light in reach, educated pt on calling for assistance when needed, room clear of clutter. Pt verbalized understanding. Goal: Absence of physical injury  Description  Absence of physical injury  Outcome: Ongoing  Note:   Pt is free of injury. No injury noted. Fall precautions in place. Call light within reach. Will monitor. Problem: Activity:  Goal: Risk for activity intolerance will decrease  Description  Risk for activity intolerance will decrease  Outcome: Ongoing  Note:   Ambulating well from bed to chair and to bathroom. Problem: Fluid Volume:  Goal: Maintenance of adequate hydration will improve  Description  Maintenance of adequate hydration will improve  Outcome: Ongoing  Note:   Drinking well. Iv fluids infusing as ordered.      Problem: Physical Regulation:  Goal: Complications related to the disease process, condition or treatment will be avoided or minimized  Description  Complications related to the disease process, condition or treatment will be avoided or minimized  Outcome: Ongoing  Note:   No complications noted at this time  Goal: Ability to maintain clinical measurements within normal limits will

## 2019-09-16 LAB
ANION GAP SERPL CALCULATED.3IONS-SCNC: 13 MMOL/L (ref 3–16)
BASOPHILS ABSOLUTE: 0.1 K/UL (ref 0–0.2)
BASOPHILS RELATIVE PERCENT: 1.1 %
BUN BLDV-MCNC: 6 MG/DL (ref 7–20)
CALCIUM SERPL-MCNC: 8.2 MG/DL (ref 8.3–10.6)
CHLORIDE BLD-SCNC: 109 MMOL/L (ref 99–110)
CO2: 19 MMOL/L (ref 21–32)
CREAT SERPL-MCNC: 1 MG/DL (ref 0.8–1.3)
EOSINOPHILS ABSOLUTE: 0.2 K/UL (ref 0–0.6)
EOSINOPHILS RELATIVE PERCENT: 2.8 %
GFR AFRICAN AMERICAN: >60
GFR NON-AFRICAN AMERICAN: >60
GI BACTERIAL PATHOGENS BY PCR: NORMAL
GLUCOSE BLD-MCNC: 117 MG/DL (ref 70–99)
GLUCOSE BLD-MCNC: 199 MG/DL (ref 70–99)
GLUCOSE BLD-MCNC: 206 MG/DL (ref 70–99)
GLUCOSE BLD-MCNC: 210 MG/DL (ref 70–99)
GLUCOSE BLD-MCNC: 225 MG/DL (ref 70–99)
HCT VFR BLD CALC: 38.1 % (ref 40.5–52.5)
HEMOGLOBIN: 12.9 G/DL (ref 13.5–17.5)
LYMPHOCYTES ABSOLUTE: 2.3 K/UL (ref 1–5.1)
LYMPHOCYTES RELATIVE PERCENT: 36 %
MCH RBC QN AUTO: 32.5 PG (ref 26–34)
MCHC RBC AUTO-ENTMCNC: 34 G/DL (ref 31–36)
MCV RBC AUTO: 95.6 FL (ref 80–100)
MONOCYTES ABSOLUTE: 0.5 K/UL (ref 0–1.3)
MONOCYTES RELATIVE PERCENT: 7.7 %
NEUTROPHILS ABSOLUTE: 3.4 K/UL (ref 1.7–7.7)
NEUTROPHILS RELATIVE PERCENT: 52.4 %
PDW BLD-RTO: 14.4 % (ref 12.4–15.4)
PERFORMED ON: ABNORMAL
PLATELET # BLD: 174 K/UL (ref 135–450)
PMV BLD AUTO: 9.6 FL (ref 5–10.5)
POTASSIUM REFLEX MAGNESIUM: 3.7 MMOL/L (ref 3.5–5.1)
RBC # BLD: 3.98 M/UL (ref 4.2–5.9)
SODIUM BLD-SCNC: 141 MMOL/L (ref 136–145)
WBC # BLD: 6.4 K/UL (ref 4–11)

## 2019-09-16 PROCEDURE — 36415 COLL VENOUS BLD VENIPUNCTURE: CPT

## 2019-09-16 PROCEDURE — 1200000000 HC SEMI PRIVATE

## 2019-09-16 PROCEDURE — 85025 COMPLETE CBC W/AUTO DIFF WBC: CPT

## 2019-09-16 PROCEDURE — 94760 N-INVAS EAR/PLS OXIMETRY 1: CPT

## 2019-09-16 PROCEDURE — 2580000003 HC RX 258: Performed by: INTERNAL MEDICINE

## 2019-09-16 PROCEDURE — 2500000003 HC RX 250 WO HCPCS: Performed by: INTERNAL MEDICINE

## 2019-09-16 PROCEDURE — 6370000000 HC RX 637 (ALT 250 FOR IP): Performed by: NURSE PRACTITIONER

## 2019-09-16 PROCEDURE — 6370000000 HC RX 637 (ALT 250 FOR IP): Performed by: INTERNAL MEDICINE

## 2019-09-16 PROCEDURE — 87505 NFCT AGENT DETECTION GI: CPT

## 2019-09-16 PROCEDURE — 6360000002 HC RX W HCPCS: Performed by: INTERNAL MEDICINE

## 2019-09-16 PROCEDURE — 80048 BASIC METABOLIC PNL TOTAL CA: CPT

## 2019-09-16 PROCEDURE — 87046 STOOL CULTR AEROBIC BACT EA: CPT

## 2019-09-16 RX ORDER — METRONIDAZOLE 500 MG/1
500 TABLET ORAL EVERY 8 HOURS SCHEDULED
Status: DISCONTINUED | OUTPATIENT
Start: 2019-09-16 | End: 2019-09-18 | Stop reason: HOSPADM

## 2019-09-16 RX ORDER — CIPROFLOXACIN 500 MG/1
500 TABLET, FILM COATED ORAL 2 TIMES DAILY
Status: DISCONTINUED | OUTPATIENT
Start: 2019-09-16 | End: 2019-09-18 | Stop reason: HOSPADM

## 2019-09-16 RX ORDER — INSULIN GLARGINE 100 [IU]/ML
22 INJECTION, SOLUTION SUBCUTANEOUS NIGHTLY
Status: DISCONTINUED | OUTPATIENT
Start: 2019-09-16 | End: 2019-09-18 | Stop reason: HOSPADM

## 2019-09-16 RX ADMIN — FUROSEMIDE 40 MG: 40 TABLET ORAL at 08:11

## 2019-09-16 RX ADMIN — Medication 1 CAPSULE: at 16:19

## 2019-09-16 RX ADMIN — TRAZODONE HYDROCHLORIDE 50 MG: 50 TABLET ORAL at 20:53

## 2019-09-16 RX ADMIN — SODIUM CHLORIDE: 9 INJECTION, SOLUTION INTRAVENOUS at 00:42

## 2019-09-16 RX ADMIN — SODIUM CHLORIDE: 9 INJECTION, SOLUTION INTRAVENOUS at 15:11

## 2019-09-16 RX ADMIN — INSULIN GLARGINE 22 UNITS: 100 INJECTION, SOLUTION SUBCUTANEOUS at 22:04

## 2019-09-16 RX ADMIN — ATORVASTATIN CALCIUM 80 MG: 80 TABLET, FILM COATED ORAL at 20:53

## 2019-09-16 RX ADMIN — RANOLAZINE 500 MG: 500 TABLET, FILM COATED, EXTENDED RELEASE ORAL at 08:11

## 2019-09-16 RX ADMIN — OXYCODONE HYDROCHLORIDE AND ACETAMINOPHEN 1 TABLET: 10; 325 TABLET ORAL at 02:42

## 2019-09-16 RX ADMIN — METRONIDAZOLE 500 MG: 500 INJECTION, SOLUTION INTRAVENOUS at 01:18

## 2019-09-16 RX ADMIN — CLOPIDOGREL BISULFATE 75 MG: 75 TABLET ORAL at 08:11

## 2019-09-16 RX ADMIN — CIPROFLOXACIN 400 MG: 2 INJECTION, SOLUTION INTRAVENOUS at 05:22

## 2019-09-16 RX ADMIN — OXYCODONE HYDROCHLORIDE AND ACETAMINOPHEN 1 TABLET: 10; 325 TABLET ORAL at 20:53

## 2019-09-16 RX ADMIN — INSULIN LISPRO 4 UNITS: 100 INJECTION, SOLUTION INTRAVENOUS; SUBCUTANEOUS at 16:32

## 2019-09-16 RX ADMIN — POTASSIUM CHLORIDE 20 MEQ: 20 TABLET, EXTENDED RELEASE ORAL at 08:10

## 2019-09-16 RX ADMIN — METOPROLOL SUCCINATE 100 MG: 50 TABLET, EXTENDED RELEASE ORAL at 08:09

## 2019-09-16 RX ADMIN — INSULIN LISPRO 1 UNITS: 100 INJECTION, SOLUTION INTRAVENOUS; SUBCUTANEOUS at 22:04

## 2019-09-16 RX ADMIN — ENOXAPARIN SODIUM 40 MG: 40 INJECTION SUBCUTANEOUS at 20:53

## 2019-09-16 RX ADMIN — OXYMETAZOLINE HCL 2 SPRAY: 0.05 SPRAY NASAL at 20:57

## 2019-09-16 RX ADMIN — METRONIDAZOLE 500 MG: 500 TABLET ORAL at 22:09

## 2019-09-16 RX ADMIN — INSULIN LISPRO 4 UNITS: 100 INJECTION, SOLUTION INTRAVENOUS; SUBCUTANEOUS at 08:12

## 2019-09-16 RX ADMIN — METRONIDAZOLE 500 MG: 500 INJECTION, SOLUTION INTRAVENOUS at 08:09

## 2019-09-16 RX ADMIN — GABAPENTIN 600 MG: 300 CAPSULE ORAL at 14:28

## 2019-09-16 RX ADMIN — OXYCODONE HYDROCHLORIDE AND ACETAMINOPHEN 1 TABLET: 10; 325 TABLET ORAL at 15:10

## 2019-09-16 RX ADMIN — ASPIRIN 81 MG 81 MG: 81 TABLET ORAL at 08:11

## 2019-09-16 RX ADMIN — CIPROFLOXACIN 500 MG: 500 TABLET, FILM COATED ORAL at 16:33

## 2019-09-16 RX ADMIN — METRONIDAZOLE 500 MG: 500 TABLET ORAL at 14:27

## 2019-09-16 RX ADMIN — Medication 1 CAPSULE: at 08:11

## 2019-09-16 RX ADMIN — POTASSIUM CHLORIDE 20 MEQ: 20 TABLET, EXTENDED RELEASE ORAL at 16:19

## 2019-09-16 RX ADMIN — OXYCODONE HYDROCHLORIDE AND ACETAMINOPHEN 1 TABLET: 10; 325 TABLET ORAL at 09:08

## 2019-09-16 RX ADMIN — GABAPENTIN 600 MG: 300 CAPSULE ORAL at 08:10

## 2019-09-16 RX ADMIN — RANOLAZINE 500 MG: 500 TABLET, FILM COATED, EXTENDED RELEASE ORAL at 20:53

## 2019-09-16 RX ADMIN — POLYETHYLENE GLYCOL 3350 17 G: 17 POWDER, FOR SOLUTION ORAL at 22:02

## 2019-09-16 RX ADMIN — GABAPENTIN 600 MG: 300 CAPSULE ORAL at 20:53

## 2019-09-16 RX ADMIN — LOSARTAN POTASSIUM 50 MG: 50 TABLET, FILM COATED ORAL at 08:10

## 2019-09-16 RX ADMIN — PANTOPRAZOLE SODIUM 40 MG: 40 TABLET, DELAYED RELEASE ORAL at 05:22

## 2019-09-16 ASSESSMENT — PAIN DESCRIPTION - FREQUENCY
FREQUENCY: INTERMITTENT
FREQUENCY: CONTINUOUS
FREQUENCY: INTERMITTENT
FREQUENCY: INTERMITTENT

## 2019-09-16 ASSESSMENT — PAIN DESCRIPTION - PAIN TYPE
TYPE: ACUTE PAIN

## 2019-09-16 ASSESSMENT — PAIN - FUNCTIONAL ASSESSMENT
PAIN_FUNCTIONAL_ASSESSMENT: ACTIVITIES ARE NOT PREVENTED

## 2019-09-16 ASSESSMENT — PAIN SCALES - GENERAL
PAINLEVEL_OUTOF10: 2
PAINLEVEL_OUTOF10: 7
PAINLEVEL_OUTOF10: 8
PAINLEVEL_OUTOF10: 5
PAINLEVEL_OUTOF10: 7
PAINLEVEL_OUTOF10: 4
PAINLEVEL_OUTOF10: 6
PAINLEVEL_OUTOF10: 0

## 2019-09-16 ASSESSMENT — PAIN DESCRIPTION - LOCATION
LOCATION: ABDOMEN

## 2019-09-16 ASSESSMENT — PAIN DESCRIPTION - ONSET
ONSET: ON-GOING

## 2019-09-16 ASSESSMENT — PAIN DESCRIPTION - ORIENTATION
ORIENTATION: RIGHT

## 2019-09-16 ASSESSMENT — PAIN DESCRIPTION - PROGRESSION
CLINICAL_PROGRESSION: GRADUALLY IMPROVING
CLINICAL_PROGRESSION: NOT CHANGED
CLINICAL_PROGRESSION: GRADUALLY WORSENING
CLINICAL_PROGRESSION: GRADUALLY WORSENING
CLINICAL_PROGRESSION: NOT CHANGED

## 2019-09-16 ASSESSMENT — PAIN DESCRIPTION - DESCRIPTORS
DESCRIPTORS: ACHING
DESCRIPTORS: ACHING
DESCRIPTORS: ACHING;CONSTANT
DESCRIPTORS: ACHING
DESCRIPTORS: ACHING;CONSTANT
DESCRIPTORS: ACHING
DESCRIPTORS: ACHING

## 2019-09-16 ASSESSMENT — PAIN SCALES - WONG BAKER: WONGBAKER_NUMERICALRESPONSE: 0

## 2019-09-16 NOTE — PLAN OF CARE
Problem: Pain:  Goal: Pain level will decrease  Description  Pain level will decrease  9/16/2019 0716 by Sinai Diallo RN  Outcome: Ongoing  Note:   Pain /discomfort being managed with PRN analgesics per MD orders. Patient able to express presence and absence of pain and rate pain appropriately using numerical scale. 9/16/2019 0027 by Flynn Cleaning RN  Outcome: Ongoing  Note:   Pain/discomfort being managed with PRN analgesics per MD orders. Pt able to express presence and absence of pain and rate pain appropriately using numerical scale. Goal: Control of acute pain  Description  Control of acute pain  Outcome: Ongoing     Problem: Falls - Risk of:  Goal: Will remain free from falls  Description  Will remain free from falls  9/16/2019 0716 by Sinai Diallo RN  Outcome: Ongoing  Note:   Fall risk assessment completed . Fall precautions in place, bed alarm on, side rails 2/4 up, call light in reach, educated pt on calling for assistance when needed, room clear of clutter. Pt verbalized understanding. 9/16/2019 0027 by Flynn Cleaning RN  Outcome: Ongoing  Note:   Fall risk assessment completed every shift. All precautions in place. Pt has call light within reach at all times. Room clear of clutter. Pt aware to call for assistance when getting up. Goal: Absence of physical injury  Description  Absence of physical injury  9/16/2019 0716 by Sinai Diallo RN  Outcome: Ongoing  9/16/2019 0027 by Flynn Cleaning RN  Outcome: Ongoing  Note:   Bed in lowest position, wheels locked, bed/chair exit alarm in place, call light within reach, and non skid footwear on. Walkway free of clutter. Pt alert and oriented and able to make needs known. Pt educated to use call light when needing to get up, and pt utilizes call light to make needs known. Will continue to monitor. Problem:  Activity:  Goal: Risk for activity intolerance will decrease  Description  Risk for activity intolerance will decrease  9/16/2019 9656 by Klarissa Loepz RN  Outcome: Ongoing  9/16/2019 0027 by Jerilyn Emerson RN  Outcome: Ongoing     Problem: Fluid Volume:  Goal: Maintenance of adequate hydration will improve  Description  Maintenance of adequate hydration will improve  9/16/2019 0716 by Klarissa Lopez RN  Outcome: Ongoing  Note:   Educated to drink fluids within fluid restriction guidelines and to adequately hydrate, medications administered as ordered, abnormal lab values assessed and reported to physician if critical or pertinent to patient status, skin turgor, mucus membranes, and respiratory status assess this shift and exceptions are noted. Patient and Family was included in plan of care. Will continue to monitor and reassess. 9/16/2019 0027 by Jerilyn Emerson RN  Outcome: Ongoing     Problem: Physical Regulation:  Goal: Complications related to the disease process, condition or treatment will be avoided or minimized  Description  Complications related to the disease process, condition or treatment will be avoided or minimized  Outcome: Ongoing  Goal: Ability to maintain clinical measurements within normal limits will improve  Description  Ability to maintain clinical measurements within normal limits will improve  Outcome: Ongoing     Problem: Sensory:  Goal: Pain level will decrease  Description  Pain level will decrease  9/16/2019 0716 by Klarissa Lopez RN  Outcome: Ongoing  Note:   Pain /discomfort being managed with PRN analgesics per MD orders. Patient able to express presence and absence of pain and rate pain appropriately using numerical scale. 9/16/2019 0027 by Jerilyn Emerson RN  Outcome: Ongoing  Note:   Pain/discomfort being managed with PRN analgesics per MD orders. Pt able to express presence and absence of pain and rate pain appropriately using numerical scale.     Goal: Ability to identify factors that increase the pain will improve  Description  Ability to identify factors that increase the pain will improve  Outcome:

## 2019-09-16 NOTE — DISCHARGE INSTR - COC
Continuity of Care Form    Patient Name: Sohan Galindo   :  1955  MRN:  [id number]    Admit date:  2019  Discharge date:  ***    Code Status Order: Full Code   Advance Directives:   Advance Care Flowsheet Documentation     Date/Time Healthcare Directive Type of Healthcare Directive Copy in [address] Agent's Name Healthcare Agent's Phone Number    19 [phone]  No, patient does not have an advance directive for healthcare treatment -- -- -- -- --          Admitting Physician:  Mendoza Camejo MD  PCP: Diandra Stauffer MD    Discharging Nurse: [facility] Unit/Room#: B6I-2764/3112-01  Discharging Unit Phone Number: ***    Emergency Contact:   Extended Emergency Contact Information  Primary Emergency Contact: Antonio Greer  Address: [address] [place] [address] Phone: [phone]  Relation: Spouse  [facility] Phone: [phone]  Work Phone: [phone]  Relation: Child    Past Surgical History:  Past Surgical History:   Procedure Laterality Date    BACK SURGERY      CARDIAC SURGERY      CHOLECYSTECTOMY      COLONOSCOPY  01/10/2017    COLONOSCOPY  01/10/2017    CORONARY ANGIOPLASTY WITH STENT PLACEMENT  2012    CORONARY ARTERY BYPASS GRAFT  , 2015    ENDOSCOPY, COLON, DIAGNOSTIC      PACEMAKER PLACEMENT      UPPER GASTROINTESTINAL ENDOSCOPY  2017       Immunization History:   Immunization History   Administered Date(s) Administered    Influenza 2013    Influenza Vaccine, unspecified formulation 10/24/2011, 2013, 2015, 2017    Influenza Virus Vaccine 2014, 2015    Influenza, Quadv, IM, (6 mo and older Fluzone, Flulaval, Fluarix and 3 yrs and older Afluria) 10/17/2018    Influenza, Quadv, IM, PF (6 mo and older Fluzone, Flulaval, Fluarix, and 3 yrs and older Afluria) 10/17/2016    Pneumococcal Therapies: {THERAPEUTIC INTERVENTION:2249699512}  Weight Bearing Status/Restrictions: {[facility] Weight Bearin}  Other Medical Equipment (for information only, NOT a DME order):  {EQUIPMENT:298267718}  Other Treatments: ***    Patient's personal belongings (please select all that are sent with patient):  {[facility] DME Belongings:640569285}    RN SIGNATURE:  {Esignature:243679755}    CASE MANAGEMENT/SOCIAL WORK SECTION    Inpatient Status Date: ***    Readmission Risk Assessment Score:  Readmission Risk              Risk of Unplanned Readmission:        50           Discharging to Facility/ Agency   · Name:   · Address:  · Phone:  · Fax:    Dialysis Facility (if applicable)   · Name:  · Address:  · Dialysis Schedule:  · Phone:  · Fax:    / signature: {Esignature:049533359}    PHYSICIAN SECTION    Prognosis: {Prognosis:1202769123}    Condition at Discharge: [address] [place] [address] Patient Condition:801824531}    Rehab Potential (if transferring to Rehab): {Prognosis:7601908510}    Recommended Labs or Other Treatments After Discharge: ***    Physician Certification: I certify the above information and transfer of Jorge Kimanisangeeta  is necessary for the continuing treatment of the diagnosis listed and that he requires {Admit to Appropriate Level of Care:11082} for {GREATER/LESS:641032172} 30 days.      Update Admission H&P: {P DME Changes in ACQOR:372088827}    PHYSICIAN SIGNATURE:  {Esignature:177345801}

## 2019-09-16 NOTE — ED PROVIDER NOTES
[address]   Phone [phone]   POCT GLUCOSE - Abnormal; Notable for the following components:    POC Glucose 160 (*)     All other components within normal limits    Narrative:     Performed at:  [address] [address]   Phone [phone]   POCT GLUCOSE - Abnormal; Notable for the following components:    POC Glucose 133 (*)     All other components within normal limits    Narrative:     Performed at:  [facility]  [address] [facility] [address] [address]   Phone [phone]   POCT GLUCOSE - Abnormal; Notable for the following components:    POC Glucose 148 (*)     All other components within normal limits    Narrative:     Performed at:  [facility]  [address] [facility] [address] [address]   Phone [phone]   POCT GLUCOSE - Abnormal; Notable for the following components:    POC Glucose 121 (*)     All other components within normal limits    Narrative:     Performed at:  [facility]  [address] [facility] [address] [address]   Phone [phone]   POCT GLUCOSE - Abnormal; Notable for the following components:    POC Glucose 296 (*)     All other components within normal limits    Narrative:     Performed at:  [facility]  [address] [facility] [address] [address]   Phone [phone]   POCT GLUCOSE - Abnormal; Notable for the following components:    POC Glucose 139 (*)     All other components within normal limits    Narrative:     Performed at:  [address] [address]   Phone [phone]   POCT GLUCOSE - Abnormal; Notable for the following components:    POC Glucose 147 (*)     All other components within normal limits    Narrative:     Performed at:  [facility]  [address] [place] [address] GLUCOSE   POCT GLUCOSE       ? RADIOLOGY/PROCEDURES  I personally reviewed the images for this case. No orders to display     [address] [facility]  Pertinent Labs, EKG, & Imaging studies reviewed.  (See chart for details)    Vitals:    09/15/19 0830 09/15/19 0925 09/15/19 1633 09/15/19 2041   BP: (!) 177/89  (!) 183/78 (!) 160/80   Pulse: 60  67 71   Resp: 20 18 16 16   Temp: 98.1 °F (36.7 °C)  98 °F (36.7 °C) 98.2 °F (36.8 °C)   TempSrc: Oral  Oral Oral   SpO2: 97% 91% 96% 97%   Weight:       Height:           Medications   aspirin chewable tablet 81 mg (81 mg Oral Given 9/15/19 0800)   atorvastatin (LIPITOR) tablet 80 mg (80 mg Oral Given 9/15/19 2048)   clopidogrel (PLAVIX) tablet 75 mg (75 mg Oral Given 9/15/19 0800)   furosemide (LASIX) tablet 40 mg (40 mg Oral Given 9/15/19 0930)   gabapentin (NEURONTIN) capsule 600 mg (600 mg Oral Given 9/15/19 2049)   insulin glargine (LANTUS) injection vial 20 Units (20 Units Subcutaneous Given 9/15/19 2053)   losartan (COZAAR) tablet 50 mg (50 mg Oral Given 9/15/19 0900)   metoprolol succinate (TOPROL XL) extended release tablet 100 mg (100 mg Oral Given 9/15/19 0800)   nitroGLYCERIN (NITROSTAT) SL tablet 0.4 mg (has no administration in time range)   pantoprazole (PROTONIX) tablet 40 mg (40 mg Oral Given 9/15/19 0700)   ranolazine (RANEXA) extended release tablet 500 mg (500 mg Oral Given 9/15/19 2049)   traZODone (DESYREL) tablet 50 mg (50 mg Oral Given 9/15/19 2048)   sodium chloride flush 0.9 % injection 10 mL (10 mLs Intravenous Not Given 9/15/19 2042)   sodium chloride flush 0.9 % injection 10 mL (has no administration in time range)   ondansetron (ZOFRAN) injection 4 mg (4 mg Intravenous Given 9/14/19 0828)   enoxaparin (LOVENOX) injection 40 mg (40 mg Subcutaneous Given 9/15/19 2049)   glucose (GLUTOSE) 40 % oral gel 15 g (has no administration in time range)   dextrose 50 % IV solution (has no administration in time range)   glucagon (rDNA) injection

## 2019-09-17 LAB
GLUCOSE BLD-MCNC: 146 MG/DL (ref 70–99)
GLUCOSE BLD-MCNC: 176 MG/DL (ref 70–99)
GLUCOSE BLD-MCNC: 243 MG/DL (ref 70–99)
GLUCOSE BLD-MCNC: 246 MG/DL (ref 70–99)
PERFORMED ON: ABNORMAL

## 2019-09-17 PROCEDURE — 6360000002 HC RX W HCPCS: Performed by: INTERNAL MEDICINE

## 2019-09-17 PROCEDURE — 1200000000 HC SEMI PRIVATE

## 2019-09-17 PROCEDURE — 6370000000 HC RX 637 (ALT 250 FOR IP): Performed by: NURSE PRACTITIONER

## 2019-09-17 PROCEDURE — 94760 N-INVAS EAR/PLS OXIMETRY 1: CPT

## 2019-09-17 PROCEDURE — 2580000003 HC RX 258: Performed by: INTERNAL MEDICINE

## 2019-09-17 PROCEDURE — 6370000000 HC RX 637 (ALT 250 FOR IP): Performed by: INTERNAL MEDICINE

## 2019-09-17 RX ORDER — KETOROLAC TROMETHAMINE 15 MG/ML
15 INJECTION, SOLUTION INTRAMUSCULAR; INTRAVENOUS ONCE
Status: COMPLETED | OUTPATIENT
Start: 2019-09-17 | End: 2019-09-17

## 2019-09-17 RX ORDER — HYDRALAZINE HYDROCHLORIDE 25 MG/1
25 TABLET, FILM COATED ORAL EVERY 8 HOURS SCHEDULED
Status: DISCONTINUED | OUTPATIENT
Start: 2019-09-17 | End: 2019-09-18 | Stop reason: HOSPADM

## 2019-09-17 RX ADMIN — PANTOPRAZOLE SODIUM 40 MG: 40 TABLET, DELAYED RELEASE ORAL at 05:05

## 2019-09-17 RX ADMIN — Medication 1 CAPSULE: at 08:17

## 2019-09-17 RX ADMIN — ATORVASTATIN CALCIUM 80 MG: 80 TABLET, FILM COATED ORAL at 21:44

## 2019-09-17 RX ADMIN — OXYCODONE HYDROCHLORIDE AND ACETAMINOPHEN 1 TABLET: 10; 325 TABLET ORAL at 16:26

## 2019-09-17 RX ADMIN — Medication 10 ML: at 08:27

## 2019-09-17 RX ADMIN — ASPIRIN 81 MG 81 MG: 81 TABLET ORAL at 08:18

## 2019-09-17 RX ADMIN — INSULIN LISPRO 2 UNITS: 100 INJECTION, SOLUTION INTRAVENOUS; SUBCUTANEOUS at 21:44

## 2019-09-17 RX ADMIN — TRAZODONE HYDROCHLORIDE 50 MG: 50 TABLET ORAL at 21:44

## 2019-09-17 RX ADMIN — CLOPIDOGREL BISULFATE 75 MG: 75 TABLET ORAL at 08:18

## 2019-09-17 RX ADMIN — HYDRALAZINE HYDROCHLORIDE 25 MG: 25 TABLET, FILM COATED ORAL at 21:44

## 2019-09-17 RX ADMIN — ENOXAPARIN SODIUM 40 MG: 40 INJECTION SUBCUTANEOUS at 21:42

## 2019-09-17 RX ADMIN — GABAPENTIN 600 MG: 300 CAPSULE ORAL at 08:18

## 2019-09-17 RX ADMIN — RANOLAZINE 500 MG: 500 TABLET, FILM COATED, EXTENDED RELEASE ORAL at 21:44

## 2019-09-17 RX ADMIN — POTASSIUM CHLORIDE 20 MEQ: 20 TABLET, EXTENDED RELEASE ORAL at 16:26

## 2019-09-17 RX ADMIN — METOPROLOL SUCCINATE 100 MG: 50 TABLET, EXTENDED RELEASE ORAL at 08:18

## 2019-09-17 RX ADMIN — HYDRALAZINE HYDROCHLORIDE 25 MG: 25 TABLET, FILM COATED ORAL at 08:18

## 2019-09-17 RX ADMIN — RANOLAZINE 500 MG: 500 TABLET, FILM COATED, EXTENDED RELEASE ORAL at 08:17

## 2019-09-17 RX ADMIN — OXYCODONE HYDROCHLORIDE AND ACETAMINOPHEN 1 TABLET: 10; 325 TABLET ORAL at 04:09

## 2019-09-17 RX ADMIN — GABAPENTIN 600 MG: 300 CAPSULE ORAL at 14:36

## 2019-09-17 RX ADMIN — INSULIN GLARGINE 22 UNITS: 100 INJECTION, SOLUTION SUBCUTANEOUS at 21:44

## 2019-09-17 RX ADMIN — INSULIN LISPRO 2 UNITS: 100 INJECTION, SOLUTION INTRAVENOUS; SUBCUTANEOUS at 08:17

## 2019-09-17 RX ADMIN — Medication 10 ML: at 21:45

## 2019-09-17 RX ADMIN — METRONIDAZOLE 500 MG: 500 TABLET ORAL at 14:36

## 2019-09-17 RX ADMIN — HYDRALAZINE HYDROCHLORIDE 25 MG: 25 TABLET, FILM COATED ORAL at 14:36

## 2019-09-17 RX ADMIN — METRONIDAZOLE 500 MG: 500 TABLET ORAL at 21:44

## 2019-09-17 RX ADMIN — DICYCLOMINE HYDROCHLORIDE 20 MG: 10 CAPSULE ORAL at 15:19

## 2019-09-17 RX ADMIN — INSULIN LISPRO 4 UNITS: 100 INJECTION, SOLUTION INTRAVENOUS; SUBCUTANEOUS at 16:25

## 2019-09-17 RX ADMIN — POTASSIUM CHLORIDE 20 MEQ: 20 TABLET, EXTENDED RELEASE ORAL at 08:17

## 2019-09-17 RX ADMIN — METRONIDAZOLE 500 MG: 500 TABLET ORAL at 05:05

## 2019-09-17 RX ADMIN — DICYCLOMINE HYDROCHLORIDE 20 MG: 10 CAPSULE ORAL at 07:49

## 2019-09-17 RX ADMIN — GABAPENTIN 600 MG: 300 CAPSULE ORAL at 21:44

## 2019-09-17 RX ADMIN — INSULIN LISPRO 2 UNITS: 100 INJECTION, SOLUTION INTRAVENOUS; SUBCUTANEOUS at 11:39

## 2019-09-17 RX ADMIN — LOSARTAN POTASSIUM 50 MG: 50 TABLET, FILM COATED ORAL at 08:17

## 2019-09-17 RX ADMIN — CIPROFLOXACIN 500 MG: 500 TABLET, FILM COATED ORAL at 21:44

## 2019-09-17 RX ADMIN — OXYCODONE HYDROCHLORIDE AND ACETAMINOPHEN 1 TABLET: 10; 325 TABLET ORAL at 10:12

## 2019-09-17 RX ADMIN — KETOROLAC TROMETHAMINE 15 MG: 15 INJECTION, SOLUTION INTRAMUSCULAR; INTRAVENOUS at 12:00

## 2019-09-17 RX ADMIN — OXYCODONE HYDROCHLORIDE AND ACETAMINOPHEN 1 TABLET: 10; 325 TABLET ORAL at 22:45

## 2019-09-17 RX ADMIN — FUROSEMIDE 40 MG: 40 TABLET ORAL at 08:17

## 2019-09-17 RX ADMIN — Medication 1 CAPSULE: at 16:26

## 2019-09-17 RX ADMIN — CIPROFLOXACIN 500 MG: 500 TABLET, FILM COATED ORAL at 08:18

## 2019-09-17 ASSESSMENT — PAIN SCALES - GENERAL
PAINLEVEL_OUTOF10: 0
PAINLEVEL_OUTOF10: 7
PAINLEVEL_OUTOF10: 7
PAINLEVEL_OUTOF10: 6
PAINLEVEL_OUTOF10: 4
PAINLEVEL_OUTOF10: 4
PAINLEVEL_OUTOF10: 0
PAINLEVEL_OUTOF10: 8
PAINLEVEL_OUTOF10: 9
PAINLEVEL_OUTOF10: 5
PAINLEVEL_OUTOF10: 3
PAINLEVEL_OUTOF10: 6

## 2019-09-17 ASSESSMENT — PAIN DESCRIPTION - ORIENTATION
ORIENTATION: RIGHT;MID;LOWER
ORIENTATION: RIGHT;MID;LOWER
ORIENTATION: RIGHT
ORIENTATION: RIGHT;MID;LOWER
ORIENTATION: RIGHT
ORIENTATION: RIGHT;MID;LOWER

## 2019-09-17 ASSESSMENT — PAIN DESCRIPTION - PROGRESSION
CLINICAL_PROGRESSION: GRADUALLY IMPROVING
CLINICAL_PROGRESSION: GRADUALLY WORSENING
CLINICAL_PROGRESSION: GRADUALLY IMPROVING
CLINICAL_PROGRESSION: GRADUALLY WORSENING
CLINICAL_PROGRESSION: GRADUALLY WORSENING

## 2019-09-17 ASSESSMENT — PAIN DESCRIPTION - LOCATION
LOCATION: ABDOMEN

## 2019-09-17 ASSESSMENT — PAIN DESCRIPTION - FREQUENCY
FREQUENCY: CONTINUOUS

## 2019-09-17 ASSESSMENT — PAIN DESCRIPTION - PAIN TYPE
TYPE: ACUTE PAIN

## 2019-09-17 ASSESSMENT — PAIN DESCRIPTION - DESCRIPTORS
DESCRIPTORS: ACHING

## 2019-09-17 ASSESSMENT — PAIN DESCRIPTION - ONSET
ONSET: ON-GOING

## 2019-09-17 ASSESSMENT — PAIN SCALES - WONG BAKER
WONGBAKER_NUMERICALRESPONSE: 0
WONGBAKER_NUMERICALRESPONSE: 0

## 2019-09-17 NOTE — PLAN OF CARE
Problem: Pain:  Goal: Pain level will decrease  Description  Pain level will decrease  9/17/2019 1005 by Blake Garg RN  Outcome: Ongoing  Note:   Patient pain managed with pharmacologic and non-pharmacologic interventions during this shift. Will continue to monitor and assess pain. Problem: Pain:  Goal: Control of acute pain  Description  Control of acute pain  Outcome: Ongoing  Note:   Patient pain managed with pharmacologic and non-pharmacologic interventions during this shift. Will continue to monitor and assess pain. Problem: Falls - Risk of:  Goal: Will remain free from falls  Description  Will remain free from falls  9/17/2019 1005 by Blake Garg RN  Outcome: Ongoing  Note:   Patient remains free from falls during this shift. Fall precautions remain in place. Problem: Falls - Risk of:  Goal: Absence of physical injury  Description  Absence of physical injury  Outcome: Ongoing  Note:   Patient remains free from physical injury during this shift. Will continue to monitor and assess. Problem: Activity:  Goal: Risk for activity intolerance will decrease  Description  Risk for activity intolerance will decrease  9/17/2019 1005 by Blake Garg RN  Outcome: Ongoing  Note:   Patient tolerating activity without complication during this shift. Patient is UAL and moves about the room freely with a steady gate. Problem: Fluid Volume:  Goal: Maintenance of adequate hydration will improve  Description  Maintenance of adequate hydration will improve  9/17/2019 1005 by Blake Garg RN  Outcome: Ongoing  Note:   Patient eating and drinking this morning. Will continue to monitor intake and output.       Problem: Physical Regulation:  Goal: Complications related to the disease process, condition or treatment will be avoided or minimized  Description  Complications related to the disease process, condition or treatment will be avoided or minimized  9/17/2019 1005 by Blake Garg RN  Outcome:

## 2019-09-17 NOTE — PLAN OF CARE
Problem: Pain:  Goal: Pain level will decrease  Description  Pain level will decrease  Outcome: Ongoing  Note:   Pain/discomfort being managed with PRN analgesics per MD orders. Pt able to express presence and absence of pain and rate pain appropriately using numerical scale. Problem: Falls - Risk of:  Goal: Will remain free from falls  Description  Will remain free from falls  Outcome: Ongoing  Note:   Fall risk assessment completed every shift. All precautions in place. Pt has call light within reach at all times. Room clear of clutter. Pt aware to call for assistance when getting up. Problem: Activity:  Goal: Risk for activity intolerance will decrease  Description  Risk for activity intolerance will decrease  Outcome: Ongoing     Problem: Fluid Volume:  Goal: Maintenance of adequate hydration will improve  Description  Maintenance of adequate hydration will improve  Outcome: Ongoing     Problem: Physical Regulation:  Goal: Complications related to the disease process, condition or treatment will be avoided or minimized  Description  Complications related to the disease process, condition or treatment will be avoided or minimized  Outcome: Ongoing     Problem: Physical Regulation:  Goal: Ability to maintain clinical measurements within normal limits will improve  Description  Ability to maintain clinical measurements within normal limits will improve  Outcome: Ongoing     Problem: Sensory:  Goal: Pain level will decrease  Description  Pain level will decrease  Outcome: Ongoing  Note:   Pain/discomfort being managed with PRN analgesics per MD orders. Pt able to express presence and absence of pain and rate pain appropriately using numerical scale.

## 2019-09-17 NOTE — PROGRESS NOTES
Pt alert and oriented, VSS and assessment complete. Night medications given with no issues. PRN pain medications given for pain. See eMAR. He has no further needs at this time. Bed locked and in lowest position. Call light within reach. Will continue to monitor and reassess.

## 2019-09-17 NOTE — PROGRESS NOTES
mg Oral 3 times per day    insulin glargine  22 Units Subcutaneous Nightly    ciprofloxacin  500 mg Oral BID    metroNIDAZOLE  500 mg Oral 3 times per day    influenza virus vaccine  0.5 mL Intramuscular Once    lactobacillus  1 capsule Oral BID WC    potassium chloride  20 mEq Oral BID WC    aspirin  81 mg Oral Daily    atorvastatin  80 mg Oral Nightly    clopidogrel  75 mg Oral Daily    furosemide  40 mg Oral Daily    gabapentin  600 mg Oral TID    losartan  50 mg Oral Daily    metoprolol succinate  100 mg Oral Daily    pantoprazole  40 mg Oral QAM AC    ranolazine  500 mg Oral BID    traZODone  50 mg Oral Nightly    sodium chloride flush  10 mL Intravenous 2 times per day    enoxaparin  40 mg Subcutaneous Nightly    nicotine  1 patch Transdermal Daily    insulin lispro  0-12 Units Subcutaneous TID WC    insulin lispro  0-6 Units Subcutaneous Nightly     Continuous Infusions:   dextrose       PRN Meds:.oxyCODONE-acetaminophen, dicyclomine, nitroGLYCERIN, sodium chloride flush, ondansetron, glucose, dextrose, glucagon (rDNA), dextrose, potassium chloride **OR** potassium alternative oral replacement **OR** potassium chloride, magnesium sulfate, polyethylene glycol, acetaminophen    PHYSICAL EXAM:  BP (!) 175/88   Pulse 69   Temp 97.8 °F (36.6 °C) (Oral)   Resp 16   Ht 6' (1.829 m)   Wt 215 lb 9.8 oz (97.8 kg)   SpO2 97%   BMI 29.24 kg/m²       Intake/Output Summary (Last 24 hours) at 9/17/2019 1549  Last data filed at 9/17/2019 1417  Gross per 24 hour   Intake 840 ml   Output 1400 ml   Net -560 ml       General: Alert and oriented. Sitting up at bedside in NAD. Pleasant and cooperative. HEENT: Normocephalic. Atraumatic. Pupils equal and reactive. EOM intact. Oral mucosa pink/moist/intact. Neck: Supple. Symmetrical. Trachea midline. Lungs: Clear to auscultation bilaterally. Respirations even and unlabored. Chest: Exam unremarkable. Cardiac: S1/S2 noted.  Regular Rhythm and

## 2019-09-17 NOTE — PROGRESS NOTES
Patient resting in bed at this time. Patient denies physical and/or emotional needs. Call light, telephone, and bed side table remain within reach. Fall precautions in place. Will continue to monitor and assess for further needs and/or change in patient condition.

## 2019-09-18 VITALS
WEIGHT: 227.07 LBS | TEMPERATURE: 97.8 F | OXYGEN SATURATION: 98 % | RESPIRATION RATE: 16 BRPM | HEIGHT: 72 IN | DIASTOLIC BLOOD PRESSURE: 84 MMHG | SYSTOLIC BLOOD PRESSURE: 175 MMHG | BODY MASS INDEX: 30.76 KG/M2 | HEART RATE: 72 BPM

## 2019-09-18 LAB
GLUCOSE BLD-MCNC: 154 MG/DL (ref 70–99)
GLUCOSE BLD-MCNC: 172 MG/DL (ref 70–99)
PERFORMED ON: ABNORMAL
PERFORMED ON: ABNORMAL

## 2019-09-18 PROCEDURE — 6370000000 HC RX 637 (ALT 250 FOR IP): Performed by: NURSE PRACTITIONER

## 2019-09-18 PROCEDURE — 6370000000 HC RX 637 (ALT 250 FOR IP): Performed by: INTERNAL MEDICINE

## 2019-09-18 PROCEDURE — 2580000003 HC RX 258: Performed by: INTERNAL MEDICINE

## 2019-09-18 PROCEDURE — 94760 N-INVAS EAR/PLS OXIMETRY 1: CPT

## 2019-09-18 RX ORDER — LACTOBACILLUS RHAMNOSUS GG 10B CELL
1 CAPSULE ORAL 2 TIMES DAILY WITH MEALS
Qty: 4 CAPSULE | Refills: 0 | Status: SHIPPED | OUTPATIENT
Start: 2019-09-18 | End: 2019-09-20

## 2019-09-18 RX ORDER — HYDROCODONE BITARTRATE AND ACETAMINOPHEN 5; 325 MG/1; MG/1
1 TABLET ORAL EVERY 6 HOURS PRN
Qty: 12 TABLET | Refills: 0 | Status: SHIPPED | OUTPATIENT
Start: 2019-09-18 | End: 2019-09-21

## 2019-09-18 RX ORDER — CIPROFLOXACIN 500 MG/1
500 TABLET, FILM COATED ORAL 2 TIMES DAILY
Qty: 4 TABLET | Refills: 0 | Status: SHIPPED | OUTPATIENT
Start: 2019-09-18 | End: 2019-09-20

## 2019-09-18 RX ORDER — METRONIDAZOLE 500 MG/1
500 TABLET ORAL 3 TIMES DAILY
Qty: 6 TABLET | Refills: 0 | Status: SHIPPED | OUTPATIENT
Start: 2019-09-18 | End: 2019-09-20

## 2019-09-18 RX ORDER — NICOTINE 21 MG/24HR
1 PATCH, TRANSDERMAL 24 HOURS TRANSDERMAL DAILY
Qty: 30 PATCH | Refills: 3 | Status: SHIPPED | OUTPATIENT
Start: 2019-09-19 | End: 2019-10-21

## 2019-09-18 RX ORDER — HYDRALAZINE HYDROCHLORIDE 25 MG/1
25 TABLET, FILM COATED ORAL EVERY 8 HOURS SCHEDULED
Qty: 90 TABLET | Refills: 3 | Status: ON HOLD | OUTPATIENT
Start: 2019-09-18 | End: 2020-07-16 | Stop reason: SDUPTHER

## 2019-09-18 RX ADMIN — HYDRALAZINE HYDROCHLORIDE 25 MG: 25 TABLET, FILM COATED ORAL at 05:18

## 2019-09-18 RX ADMIN — RANOLAZINE 500 MG: 500 TABLET, FILM COATED, EXTENDED RELEASE ORAL at 08:42

## 2019-09-18 RX ADMIN — ASPIRIN 81 MG 81 MG: 81 TABLET ORAL at 08:42

## 2019-09-18 RX ADMIN — METRONIDAZOLE 500 MG: 500 TABLET ORAL at 13:48

## 2019-09-18 RX ADMIN — GABAPENTIN 600 MG: 300 CAPSULE ORAL at 08:42

## 2019-09-18 RX ADMIN — GABAPENTIN 600 MG: 300 CAPSULE ORAL at 13:48

## 2019-09-18 RX ADMIN — FUROSEMIDE 40 MG: 40 TABLET ORAL at 08:42

## 2019-09-18 RX ADMIN — POTASSIUM CHLORIDE 20 MEQ: 20 TABLET, EXTENDED RELEASE ORAL at 08:42

## 2019-09-18 RX ADMIN — HYDRALAZINE HYDROCHLORIDE 25 MG: 25 TABLET, FILM COATED ORAL at 13:48

## 2019-09-18 RX ADMIN — INSULIN LISPRO 2 UNITS: 100 INJECTION, SOLUTION INTRAVENOUS; SUBCUTANEOUS at 08:41

## 2019-09-18 RX ADMIN — Medication 10 ML: at 08:48

## 2019-09-18 RX ADMIN — METRONIDAZOLE 500 MG: 500 TABLET ORAL at 05:18

## 2019-09-18 RX ADMIN — METOPROLOL SUCCINATE 100 MG: 50 TABLET, EXTENDED RELEASE ORAL at 08:42

## 2019-09-18 RX ADMIN — CIPROFLOXACIN 500 MG: 500 TABLET, FILM COATED ORAL at 08:42

## 2019-09-18 RX ADMIN — CLOPIDOGREL BISULFATE 75 MG: 75 TABLET ORAL at 08:42

## 2019-09-18 RX ADMIN — OXYCODONE HYDROCHLORIDE AND ACETAMINOPHEN 1 TABLET: 10; 325 TABLET ORAL at 05:17

## 2019-09-18 RX ADMIN — PANTOPRAZOLE SODIUM 40 MG: 40 TABLET, DELAYED RELEASE ORAL at 05:17

## 2019-09-18 RX ADMIN — LOSARTAN POTASSIUM 50 MG: 50 TABLET, FILM COATED ORAL at 08:42

## 2019-09-18 RX ADMIN — INSULIN LISPRO 2 UNITS: 100 INJECTION, SOLUTION INTRAVENOUS; SUBCUTANEOUS at 12:27

## 2019-09-18 RX ADMIN — OXYCODONE HYDROCHLORIDE AND ACETAMINOPHEN 1 TABLET: 10; 325 TABLET ORAL at 11:58

## 2019-09-18 RX ADMIN — Medication 1 CAPSULE: at 08:43

## 2019-09-18 ASSESSMENT — PAIN SCALES - WONG BAKER: WONGBAKER_NUMERICALRESPONSE: 0

## 2019-09-18 ASSESSMENT — PAIN SCALES - GENERAL
PAINLEVEL_OUTOF10: 3
PAINLEVEL_OUTOF10: 0
PAINLEVEL_OUTOF10: 0
PAINLEVEL_OUTOF10: 6
PAINLEVEL_OUTOF10: 6

## 2019-09-18 ASSESSMENT — PAIN DESCRIPTION - DESCRIPTORS
DESCRIPTORS: ACHING

## 2019-09-18 ASSESSMENT — PAIN DESCRIPTION - PAIN TYPE
TYPE: ACUTE PAIN

## 2019-09-18 ASSESSMENT — PAIN DESCRIPTION - PROGRESSION
CLINICAL_PROGRESSION: GRADUALLY WORSENING
CLINICAL_PROGRESSION: GRADUALLY IMPROVING
CLINICAL_PROGRESSION: GRADUALLY WORSENING

## 2019-09-18 ASSESSMENT — PAIN DESCRIPTION - ONSET
ONSET: ON-GOING

## 2019-09-18 ASSESSMENT — PAIN DESCRIPTION - LOCATION
LOCATION: ABDOMEN

## 2019-09-18 ASSESSMENT — PAIN DESCRIPTION - ORIENTATION
ORIENTATION: RIGHT

## 2019-09-18 ASSESSMENT — PAIN DESCRIPTION - FREQUENCY
FREQUENCY: CONTINUOUS

## 2019-09-18 NOTE — PROGRESS NOTES
Data- discharge order received, pt verbalized agreement to discharge, disposition to previous residence, no needs for HHC/DME. Action- discharge instructions prepared and given to patient, pt verbalized understanding. Medication information packet given r/t NEW and/or CHANGED prescriptions emphasizing name/purpose/side effects, pt verbalized understanding. Discharge instruction summary: Diet- low fiber, Activity- no restrictions, Primary Care Physician as follows: Tish Wiggins [phone] f/u appointment to be scheduled by patient, immunizations reviewed, prescription medications filled at [facility] and with 1 paper prescription provided. CHF Education reviewed. Pt/ Family has had a total of 60 minutes CHF education this admission encounter. Response- Pt belongings gathered, IV removed. Disposition is home (no HHC/DME needs), transported with transport team, taken to [facility] via w/c w/ family waiting in [facility], no complications.

## 2019-09-18 NOTE — PLAN OF CARE
Problem: Pain:  Goal: Pain level will decrease  Description  Pain level will decrease  9/18/2019 1252 by Karie Ureña RN  Outcome: Ongoing  Note:   Pain managed with pharmacologic and non-pharmacologic interventions during this shift. Will continue to monitor and assess for needs and change in patient condition. Problem: Pain:  Goal: Control of acute pain  Description  Control of acute pain  9/18/2019 1252 by Karie Ureña RN  Outcome: Ongoing  Note:   Pain managed with pharmacologic and non-pharmacologic interventions during this shift. Will continue to monitor and assess for needs and change in patient condition. Problem: Falls - Risk of:  Goal: Will remain free from falls  Description  Will remain free from falls  9/18/2019 1252 by Karie Ureña RN  Outcome: Ongoing  Note:   Patient remains free from falls during this shift. Fall precautions remain in place Will continue to monitor and assess. Problem: Falls - Risk of:  Goal: Absence of physical injury  Description  Absence of physical injury  9/18/2019 1252 by Karie Ureña RN  Outcome: Ongoing  Note:   Patient remains free from physical injury during this shift. Will continue to monitor and assess. Problem: Activity:  Goal: Risk for activity intolerance will decrease  Description  Risk for activity intolerance will decrease  9/18/2019 1252 by Karie Ureña RN  Outcome: Ongoing  Note:   Patient able to ambulate without complication during this shift. Patient is steady on his feet and is UAL at this time. Problem: Fluid Volume:  Goal: Maintenance of adequate hydration will improve  Description  Maintenance of adequate hydration will improve  9/18/2019 1252 by Karie Ureña RN  Outcome: Ongoing  Note:   Patient eating and drinking without complication during this shift. Patient appears to have adequate hydration as evidenced by good skin turgor and cap refill.       Problem: Physical Regulation:  Goal: Complications related to the

## 2019-09-18 NOTE — PROGRESS NOTES
Patient resting in bed at this time. Patient denies any physical and/or emotional needs. Call light, telephone, and bed side table are within reach. Patient reporting mild abdominal pain (patient did not provide numerical rating) but declines pharmacologic intervention at this time. Patient states that he will alert RN if pain progresses or the need for intervention arises. Will continue to monitor and assess.

## 2019-09-18 NOTE — DISCHARGE SUMMARY
Refills: 0      metroNIDAZOLE (FLAGYL) 500 MG tablet Take 1 tablet by mouth 3 times daily for 2 days  Qty: 6 tablet, Refills: 0              Details   gabapentin (NEURONTIN) 600 MG tablet TAKE 1 TABLET BY MOUTH FIVE TIMES DAILY  Qty: 150 tablet, Refills: 0    Associated Diagnoses: Neuropathy      dicyclomine (BENTYL) 10 MG capsule Take 1 capsule by mouth 2 times daily as needed (cramps)  Qty: 10 capsule, Refills: 0      insulin glargine (LANTUS) 100 UNIT/ML injection vial Inject 20 Units into the skin nightly  Qty: 1 vial, Refills: 3      acetaminophen (TYLENOL) 325 MG tablet Take 650 mg by mouth every 6 hours as needed for Pain      oxymetazoline (AFRIN) 0.05 % nasal spray 2 sprays by Nasal route 2 times daily      traZODone (DESYREL) 50 MG tablet TAKE 1 TABLET BY MOUTH EVERY NIGHT  Qty: 90 tablet, Refills: 0    Comments: **Patient requests 90 days supply**  Associated Diagnoses: Primary insomnia      ranolazine (RANEXA) 500 MG extended release tablet Take 1 tablet by mouth 2 times daily  Qty: 60 tablet, Refills: 3      isosorbide mononitrate (IMDUR) 30 MG extended release tablet Take 3 tablets by mouth 2 times daily  Qty: 180 tablet, Refills: 0      pantoprazole (PROTONIX) 40 MG tablet Take 1 tablet by mouth every morning (before breakfast)  Qty: 30 tablet, Refills: 3      clopidogrel (PLAVIX) 75 MG tablet TAKE 1 TABLET BY MOUTH DAILY  Qty: 90 tablet, Refills: 3      furosemide (LASIX) 40 MG tablet Take 1 tablet by mouth daily  Qty: 90 tablet, Refills: 1      Insulin Pen Needle 31G X 5 MM MISC 1 each by Does not apply route daily  Qty: 100 each, Refills: 3    Associated Diagnoses: Type 2 diabetes mellitus without complication, with long-term current use of insulin (HCC)      losartan (COZAAR) 50 MG tablet Take 1 tablet by mouth daily  Qty: 30 tablet, Refills: 3      nitroGLYCERIN (NITROSTAT) 0.4 MG SL tablet PLACE 1 TABLET UNDER THE TONGUE EVERY 5 MINUTES AS NEEDED FOR CHEST PAIN  Qty: 25 tablet, Refills: 1 aspirin 81 MG tablet Take 81 mg by mouth daily      insulin lispro (HUMALOG KWIKPEN) 100 UNIT/ML pen Inject 5 Units into the skin 3 times daily (before meals)  Qty: 5 pen, Refills: 3    Associated Diagnoses: Type 2 diabetes mellitus without complication, with long-term current use of insulin ([address]      ! ! Lancets MISC accu check fastclick lancets  Dx: T55.6  As needed  Qty: 120 each, Refills: 5    Associated Diagnoses: Controlled type 2 diabetes mellitus with diabetic neuropathy, without long-term current use of insulin ([address]      ! ! FREESTYLE LANCETS MISC 1 each by Does not apply route daily  Qty: 100 each, Refills: 0      atorvastatin (LIPITOR) 80 MG tablet TAKE 1 TABLET BY MOUTH DAILY  Qty: 90 tablet, Refills: 1    Associated Diagnoses: Coronary artery disease involving native heart without angina pectoris, unspecified vessel or lesion type      metoprolol succinate (TOPROL XL) 100 MG extended release tablet Take 1 tablet by mouth daily  Qty: 30 tablet, Refills: 1       !! - Potential duplicate medications found. Please discuss with provider. Time Spent on discharge is more than 30 minutes in the examination, evaluation, counseling and review of medications and discharge plan. Signed:    Roderick Monae MD   9/18/2019      Thank you Debbie Pope MD for the opportunity to be involved in this patient's care. If you have any questions or concerns please feel free to contact me at [phone].

## 2019-09-18 NOTE — PROGRESS NOTES
Pt alert and oriented, VSS and assessment complete. Night medications given with no issues. Prn pain medications given for pain, see eMAR. He has no further needs at this time. Bed locked and in lowest position. Call light within reach. Will continue to monitor and reassess.

## 2019-09-19 ENCOUNTER — CARE COORDINATION (OUTPATIENT)
Dept: CASE MANAGEMENT | Age: 64
End: 2019-09-19

## 2019-09-24 ENCOUNTER — CARE COORDINATION (OUTPATIENT)
Dept: CASE MANAGEMENT | Age: 64
End: 2019-09-24

## 2019-09-25 ENCOUNTER — CARE COORDINATION (OUTPATIENT)
Dept: CASE MANAGEMENT | Age: 64
End: 2019-09-25

## 2019-09-26 ENCOUNTER — CARE COORDINATION (OUTPATIENT)
Dept: CASE MANAGEMENT | Age: 64
End: 2019-09-26

## 2019-09-30 NOTE — ED PROVIDER NOTES
Appointment scheduled with Dr Presley on 10/7/19 at 11:00am   I performed a medical screening history and physical exam on this patient. HISTORY OF PRESENT ILLNESS  Flor Gordillo is a 61 y.o. male who presents to the emergency department today with complaints of chest pain, shortness of breath. Symptoms have been present for over a week. He does not think they are made worse by activity, he is not sure because he states he does not get around much. He was seen and admitted for same, but the symptoms are worse so he presents back. He took 2 baby aspirin around 5:00 this morning, and he took 3 doses of nitroglycerin about an hour prior to arrival.  This did not help to improve his symptoms at all. He does have a history of 2 bypass surgeries, and has a few stents in place. He follows with Dr. Aisha Briseno. He has no further complaints at this time. Gladys Howe PHYSICAL EXAM  ED Triage Vitals [04/27/19 1231]   BP Temp Temp Source Pulse Resp SpO2 Height Weight   (!) 164/103 97.6 °F (36.4 °C) Temporal 99 16 99 % 6' (1.829 m) 225 lb 15.5 oz (102.5 kg)       Labs, imaging, and EKG ordered to expedite care. See attending provider's note for final diagnosis and disposition for this patient.          Lenore Castillo  04/27/19 3745

## 2019-10-11 ENCOUNTER — TELEPHONE (OUTPATIENT)
Dept: CARDIOLOGY CLINIC | Age: 64
End: 2019-10-11

## 2019-10-21 ENCOUNTER — HOSPITAL ENCOUNTER (OUTPATIENT)
Age: 64
Setting detail: OBSERVATION
Discharge: NURSING, REHAB OR LONG-TERM CARE | End: 2019-10-23
Attending: EMERGENCY MEDICINE | Admitting: INTERNAL MEDICINE
Payer: MEDICARE

## 2019-10-21 DIAGNOSIS — G89.29 CHRONIC BACK PAIN, UNSPECIFIED BACK LOCATION, UNSPECIFIED BACK PAIN LATERALITY: ICD-10-CM

## 2019-10-21 DIAGNOSIS — M54.50 ACUTE EXACERBATION OF CHRONIC LOW BACK PAIN: Primary | ICD-10-CM

## 2019-10-21 DIAGNOSIS — M54.9 CHRONIC BACK PAIN, UNSPECIFIED BACK LOCATION, UNSPECIFIED BACK PAIN LATERALITY: ICD-10-CM

## 2019-10-21 DIAGNOSIS — G89.29 ACUTE EXACERBATION OF CHRONIC LOW BACK PAIN: Primary | ICD-10-CM

## 2019-10-21 LAB
ANION GAP SERPL CALCULATED.3IONS-SCNC: 16 MMOL/L (ref 3–16)
BASOPHILS ABSOLUTE: 0 K/UL (ref 0–0.2)
BASOPHILS RELATIVE PERCENT: 0.3 %
BUN BLDV-MCNC: 12 MG/DL (ref 7–20)
CALCIUM SERPL-MCNC: 9.4 MG/DL (ref 8.3–10.6)
CHLORIDE BLD-SCNC: 100 MMOL/L (ref 99–110)
CO2: 21 MMOL/L (ref 21–32)
CREAT SERPL-MCNC: 0.9 MG/DL (ref 0.8–1.3)
EOSINOPHILS ABSOLUTE: 0.1 K/UL (ref 0–0.6)
EOSINOPHILS RELATIVE PERCENT: 1.6 %
GFR AFRICAN AMERICAN: >60
GFR NON-AFRICAN AMERICAN: >60
GLUCOSE BLD-MCNC: 149 MG/DL (ref 70–99)
HCT VFR BLD CALC: 42.4 % (ref 40.5–52.5)
HEMOGLOBIN: 14.9 G/DL (ref 13.5–17.5)
LYMPHOCYTES ABSOLUTE: 2.4 K/UL (ref 1–5.1)
LYMPHOCYTES RELATIVE PERCENT: 28.9 %
MCH RBC QN AUTO: 33 PG (ref 26–34)
MCHC RBC AUTO-ENTMCNC: 35.1 G/DL (ref 31–36)
MCV RBC AUTO: 94.2 FL (ref 80–100)
MONOCYTES ABSOLUTE: 0.6 K/UL (ref 0–1.3)
MONOCYTES RELATIVE PERCENT: 7.2 %
NEUTROPHILS ABSOLUTE: 5.1 K/UL (ref 1.7–7.7)
NEUTROPHILS RELATIVE PERCENT: 62 %
PDW BLD-RTO: 13.8 % (ref 12.4–15.4)
PLATELET # BLD: 186 K/UL (ref 135–450)
PMV BLD AUTO: 9.4 FL (ref 5–10.5)
POTASSIUM REFLEX MAGNESIUM: 4 MMOL/L (ref 3.5–5.1)
RBC # BLD: 4.5 M/UL (ref 4.2–5.9)
SODIUM BLD-SCNC: 137 MMOL/L (ref 136–145)
WBC # BLD: 8.3 K/UL (ref 4–11)

## 2019-10-21 PROCEDURE — 2580000003 HC RX 258: Performed by: EMERGENCY MEDICINE

## 2019-10-21 PROCEDURE — 80048 BASIC METABOLIC PNL TOTAL CA: CPT

## 2019-10-21 PROCEDURE — 99284 EMERGENCY DEPT VISIT MOD MDM: CPT

## 2019-10-21 PROCEDURE — 85025 COMPLETE CBC W/AUTO DIFF WBC: CPT

## 2019-10-21 PROCEDURE — 96361 HYDRATE IV INFUSION ADD-ON: CPT

## 2019-10-21 PROCEDURE — 6360000002 HC RX W HCPCS: Performed by: EMERGENCY MEDICINE

## 2019-10-21 PROCEDURE — 6370000000 HC RX 637 (ALT 250 FOR IP): Performed by: EMERGENCY MEDICINE

## 2019-10-21 PROCEDURE — G0378 HOSPITAL OBSERVATION PER HR: HCPCS

## 2019-10-21 PROCEDURE — 96372 THER/PROPH/DIAG INJ SC/IM: CPT

## 2019-10-21 PROCEDURE — 96374 THER/PROPH/DIAG INJ IV PUSH: CPT

## 2019-10-21 PROCEDURE — 6370000000 HC RX 637 (ALT 250 FOR IP): Performed by: INTERNAL MEDICINE

## 2019-10-21 RX ORDER — LOSARTAN POTASSIUM 50 MG/1
50 TABLET ORAL DAILY
Status: DISCONTINUED | OUTPATIENT
Start: 2019-10-21 | End: 2019-10-23 | Stop reason: HOSPADM

## 2019-10-21 RX ORDER — OXYMETAZOLINE HYDROCHLORIDE 0.05 G/100ML
2 SPRAY NASAL 2 TIMES DAILY PRN
Status: DISPENSED | OUTPATIENT
Start: 2019-10-21 | End: 2019-10-22

## 2019-10-21 RX ORDER — TRAZODONE HYDROCHLORIDE 50 MG/1
50 TABLET ORAL NIGHTLY
Status: DISCONTINUED | OUTPATIENT
Start: 2019-10-22 | End: 2019-10-23 | Stop reason: HOSPADM

## 2019-10-21 RX ORDER — RANOLAZINE 500 MG/1
500 TABLET, EXTENDED RELEASE ORAL 2 TIMES DAILY
Status: DISCONTINUED | OUTPATIENT
Start: 2019-10-22 | End: 2019-10-23 | Stop reason: HOSPADM

## 2019-10-21 RX ORDER — MORPHINE SULFATE 2 MG/ML
2 INJECTION, SOLUTION INTRAMUSCULAR; INTRAVENOUS EVERY 4 HOURS PRN
Status: DISCONTINUED | OUTPATIENT
Start: 2019-10-21 | End: 2019-10-22

## 2019-10-21 RX ORDER — INSULIN GLARGINE 100 [IU]/ML
20 INJECTION, SOLUTION SUBCUTANEOUS NIGHTLY
Status: DISCONTINUED | OUTPATIENT
Start: 2019-10-22 | End: 2019-10-23 | Stop reason: HOSPADM

## 2019-10-21 RX ORDER — POTASSIUM CHLORIDE 20 MEQ/1
40 TABLET, EXTENDED RELEASE ORAL PRN
Status: DISCONTINUED | OUTPATIENT
Start: 2019-10-21 | End: 2019-10-23 | Stop reason: HOSPADM

## 2019-10-21 RX ORDER — ACETAMINOPHEN 325 MG/1
650 TABLET ORAL EVERY 6 HOURS PRN
Status: DISCONTINUED | OUTPATIENT
Start: 2019-10-21 | End: 2019-10-23 | Stop reason: HOSPADM

## 2019-10-21 RX ORDER — ATORVASTATIN CALCIUM 80 MG/1
80 TABLET, FILM COATED ORAL DAILY
Status: DISCONTINUED | OUTPATIENT
Start: 2019-10-22 | End: 2019-10-23 | Stop reason: HOSPADM

## 2019-10-21 RX ORDER — OXYCODONE HYDROCHLORIDE 5 MG/1
5 TABLET ORAL ONCE
Status: COMPLETED | OUTPATIENT
Start: 2019-10-21 | End: 2019-10-21

## 2019-10-21 RX ORDER — GABAPENTIN 300 MG/1
600 CAPSULE ORAL 3 TIMES DAILY
Status: DISCONTINUED | OUTPATIENT
Start: 2019-10-22 | End: 2019-10-23 | Stop reason: HOSPADM

## 2019-10-21 RX ORDER — ISOSORBIDE MONONITRATE 30 MG/1
60 TABLET, EXTENDED RELEASE ORAL DAILY
Status: ON HOLD | COMMUNITY
End: 2020-07-16 | Stop reason: HOSPADM

## 2019-10-21 RX ORDER — LIDOCAINE 4 G/G
1 PATCH TOPICAL ONCE
Status: COMPLETED | OUTPATIENT
Start: 2019-10-21 | End: 2019-10-22

## 2019-10-21 RX ORDER — SODIUM CHLORIDE 0.9 % (FLUSH) 0.9 %
10 SYRINGE (ML) INJECTION PRN
Status: DISCONTINUED | OUTPATIENT
Start: 2019-10-21 | End: 2019-10-23 | Stop reason: HOSPADM

## 2019-10-21 RX ORDER — KETOROLAC TROMETHAMINE 30 MG/ML
30 INJECTION, SOLUTION INTRAMUSCULAR; INTRAVENOUS ONCE
Status: COMPLETED | OUTPATIENT
Start: 2019-10-21 | End: 2019-10-21

## 2019-10-21 RX ORDER — ASPIRIN 81 MG/1
81 TABLET, CHEWABLE ORAL DAILY
Status: DISCONTINUED | OUTPATIENT
Start: 2019-10-22 | End: 2019-10-23 | Stop reason: HOSPADM

## 2019-10-21 RX ORDER — FUROSEMIDE 40 MG/1
40 TABLET ORAL DAILY
Status: DISCONTINUED | OUTPATIENT
Start: 2019-10-22 | End: 2019-10-23 | Stop reason: HOSPADM

## 2019-10-21 RX ORDER — METOPROLOL SUCCINATE 50 MG/1
100 TABLET, EXTENDED RELEASE ORAL DAILY
Status: DISCONTINUED | OUTPATIENT
Start: 2019-10-22 | End: 2019-10-23 | Stop reason: HOSPADM

## 2019-10-21 RX ORDER — OXYCODONE AND ACETAMINOPHEN 10; 325 MG/1; MG/1
1 TABLET ORAL 2 TIMES DAILY PRN
Status: DISCONTINUED | OUTPATIENT
Start: 2019-10-21 | End: 2019-10-22

## 2019-10-21 RX ORDER — NITROGLYCERIN 0.4 MG/1
0.4 TABLET SUBLINGUAL EVERY 5 MIN PRN
Status: DISCONTINUED | OUTPATIENT
Start: 2019-10-21 | End: 2019-10-23 | Stop reason: HOSPADM

## 2019-10-21 RX ORDER — HYDRALAZINE HYDROCHLORIDE 25 MG/1
25 TABLET, FILM COATED ORAL EVERY 8 HOURS SCHEDULED
Status: DISCONTINUED | OUTPATIENT
Start: 2019-10-22 | End: 2019-10-23 | Stop reason: HOSPADM

## 2019-10-21 RX ORDER — CLOPIDOGREL BISULFATE 75 MG/1
75 TABLET ORAL DAILY
Status: DISCONTINUED | OUTPATIENT
Start: 2019-10-22 | End: 2019-10-23 | Stop reason: HOSPADM

## 2019-10-21 RX ORDER — POTASSIUM CHLORIDE 7.45 MG/ML
10 INJECTION INTRAVENOUS PRN
Status: DISCONTINUED | OUTPATIENT
Start: 2019-10-21 | End: 2019-10-23 | Stop reason: HOSPADM

## 2019-10-21 RX ORDER — ONDANSETRON 2 MG/ML
4 INJECTION INTRAMUSCULAR; INTRAVENOUS EVERY 6 HOURS PRN
Status: DISCONTINUED | OUTPATIENT
Start: 2019-10-21 | End: 2019-10-23 | Stop reason: HOSPADM

## 2019-10-21 RX ORDER — METHOCARBAMOL 500 MG/1
500 TABLET, FILM COATED ORAL ONCE
Status: COMPLETED | OUTPATIENT
Start: 2019-10-21 | End: 2019-10-21

## 2019-10-21 RX ORDER — SODIUM CHLORIDE 0.9 % (FLUSH) 0.9 %
10 SYRINGE (ML) INJECTION EVERY 12 HOURS SCHEDULED
Status: DISCONTINUED | OUTPATIENT
Start: 2019-10-22 | End: 2019-10-23 | Stop reason: HOSPADM

## 2019-10-21 RX ORDER — MAGNESIUM SULFATE 1 G/100ML
1 INJECTION INTRAVENOUS PRN
Status: DISCONTINUED | OUTPATIENT
Start: 2019-10-21 | End: 2019-10-23 | Stop reason: HOSPADM

## 2019-10-21 RX ORDER — 0.9 % SODIUM CHLORIDE 0.9 %
1000 INTRAVENOUS SOLUTION INTRAVENOUS ONCE
Status: COMPLETED | OUTPATIENT
Start: 2019-10-21 | End: 2019-10-21

## 2019-10-21 RX ORDER — MORPHINE SULFATE 4 MG/ML
4 INJECTION, SOLUTION INTRAMUSCULAR; INTRAVENOUS ONCE
Status: COMPLETED | OUTPATIENT
Start: 2019-10-21 | End: 2019-10-21

## 2019-10-21 RX ADMIN — GABAPENTIN 600 MG: 300 CAPSULE ORAL at 23:59

## 2019-10-21 RX ADMIN — OXYCODONE HYDROCHLORIDE AND ACETAMINOPHEN 1 TABLET: 10; 325 TABLET ORAL at 23:59

## 2019-10-21 RX ADMIN — OXYCODONE HYDROCHLORIDE 5 MG: 5 TABLET ORAL at 19:07

## 2019-10-21 RX ADMIN — MORPHINE SULFATE 4 MG: 4 INJECTION, SOLUTION INTRAMUSCULAR; INTRAVENOUS at 21:28

## 2019-10-21 RX ADMIN — KETOROLAC TROMETHAMINE 30 MG: 30 INJECTION, SOLUTION INTRAMUSCULAR at 19:09

## 2019-10-21 RX ADMIN — ISOSORBIDE MONONITRATE 90 MG: 60 TABLET, EXTENDED RELEASE ORAL at 23:59

## 2019-10-21 RX ADMIN — SODIUM CHLORIDE 1000 ML: 9 INJECTION, SOLUTION INTRAVENOUS at 20:07

## 2019-10-21 RX ADMIN — TRAZODONE HYDROCHLORIDE 50 MG: 50 TABLET ORAL at 23:59

## 2019-10-21 RX ADMIN — LOSARTAN POTASSIUM 50 MG: 50 TABLET, FILM COATED ORAL at 23:59

## 2019-10-21 RX ADMIN — METHOCARBAMOL TABLETS 500 MG: 500 TABLET, COATED ORAL at 19:08

## 2019-10-21 RX ADMIN — RANOLAZINE 500 MG: 500 TABLET, FILM COATED, EXTENDED RELEASE ORAL at 23:59

## 2019-10-21 ASSESSMENT — ENCOUNTER SYMPTOMS
GASTROINTESTINAL NEGATIVE: 1
SHORTNESS OF BREATH: 0
EYES NEGATIVE: 1
RESPIRATORY NEGATIVE: 1

## 2019-10-21 ASSESSMENT — PAIN SCALES - GENERAL
PAINLEVEL_OUTOF10: 8
PAINLEVEL_OUTOF10: 8
PAINLEVEL_OUTOF10: 6
PAINLEVEL_OUTOF10: 8
PAINLEVEL_OUTOF10: 6
PAINLEVEL_OUTOF10: 6
PAINLEVEL_OUTOF10: 8
PAINLEVEL_OUTOF10: 8

## 2019-10-21 ASSESSMENT — PAIN DESCRIPTION - PAIN TYPE
TYPE: CHRONIC PAIN
TYPE: ACUTE PAIN
TYPE: CHRONIC PAIN

## 2019-10-21 ASSESSMENT — PAIN DESCRIPTION - LOCATION
LOCATION: BACK

## 2019-10-21 ASSESSMENT — PAIN DESCRIPTION - FREQUENCY
FREQUENCY: CONTINUOUS

## 2019-10-21 ASSESSMENT — PAIN DESCRIPTION - DESCRIPTORS
DESCRIPTORS: ACHING

## 2019-10-21 ASSESSMENT — PAIN DESCRIPTION - ORIENTATION
ORIENTATION: MID
ORIENTATION: MID
ORIENTATION: LOWER

## 2019-10-21 ASSESSMENT — PAIN DESCRIPTION - ONSET
ONSET: ON-GOING
ONSET: ON-GOING

## 2019-10-22 LAB
ANION GAP SERPL CALCULATED.3IONS-SCNC: 15 MMOL/L (ref 3–16)
BUN BLDV-MCNC: 15 MG/DL (ref 7–20)
CALCIUM SERPL-MCNC: 8.3 MG/DL (ref 8.3–10.6)
CHLORIDE BLD-SCNC: 103 MMOL/L (ref 99–110)
CO2: 20 MMOL/L (ref 21–32)
CREAT SERPL-MCNC: 1.2 MG/DL (ref 0.8–1.3)
GFR AFRICAN AMERICAN: >60
GFR NON-AFRICAN AMERICAN: >60
GLUCOSE BLD-MCNC: 111 MG/DL (ref 70–99)
GLUCOSE BLD-MCNC: 116 MG/DL (ref 70–99)
GLUCOSE BLD-MCNC: 138 MG/DL (ref 70–99)
GLUCOSE BLD-MCNC: 169 MG/DL (ref 70–99)
GLUCOSE BLD-MCNC: 169 MG/DL (ref 70–99)
GLUCOSE BLD-MCNC: 228 MG/DL (ref 70–99)
HCT VFR BLD CALC: 37.4 % (ref 40.5–52.5)
HEMOGLOBIN: 12.8 G/DL (ref 13.5–17.5)
MAGNESIUM: 1.9 MG/DL (ref 1.8–2.4)
MCH RBC QN AUTO: 32.5 PG (ref 26–34)
MCHC RBC AUTO-ENTMCNC: 34.2 G/DL (ref 31–36)
MCV RBC AUTO: 94.9 FL (ref 80–100)
PDW BLD-RTO: 13.7 % (ref 12.4–15.4)
PERFORMED ON: ABNORMAL
PLATELET # BLD: 178 K/UL (ref 135–450)
PMV BLD AUTO: 9.2 FL (ref 5–10.5)
POTASSIUM REFLEX MAGNESIUM: 3.2 MMOL/L (ref 3.5–5.1)
RBC # BLD: 3.94 M/UL (ref 4.2–5.9)
SODIUM BLD-SCNC: 138 MMOL/L (ref 136–145)
WBC # BLD: 7.3 K/UL (ref 4–11)

## 2019-10-22 PROCEDURE — 6360000002 HC RX W HCPCS: Performed by: INTERNAL MEDICINE

## 2019-10-22 PROCEDURE — 96376 TX/PRO/DX INJ SAME DRUG ADON: CPT

## 2019-10-22 PROCEDURE — 85027 COMPLETE CBC AUTOMATED: CPT

## 2019-10-22 PROCEDURE — 6360000002 HC RX W HCPCS: Performed by: NURSE PRACTITIONER

## 2019-10-22 PROCEDURE — 36415 COLL VENOUS BLD VENIPUNCTURE: CPT

## 2019-10-22 PROCEDURE — 97530 THERAPEUTIC ACTIVITIES: CPT | Performed by: PHYSICAL THERAPIST

## 2019-10-22 PROCEDURE — 2580000003 HC RX 258: Performed by: INTERNAL MEDICINE

## 2019-10-22 PROCEDURE — 96372 THER/PROPH/DIAG INJ SC/IM: CPT

## 2019-10-22 PROCEDURE — 80048 BASIC METABOLIC PNL TOTAL CA: CPT

## 2019-10-22 PROCEDURE — 83735 ASSAY OF MAGNESIUM: CPT

## 2019-10-22 PROCEDURE — 97165 OT EVAL LOW COMPLEX 30 MIN: CPT

## 2019-10-22 PROCEDURE — G0378 HOSPITAL OBSERVATION PER HR: HCPCS

## 2019-10-22 PROCEDURE — 97530 THERAPEUTIC ACTIVITIES: CPT

## 2019-10-22 PROCEDURE — 96375 TX/PRO/DX INJ NEW DRUG ADDON: CPT

## 2019-10-22 PROCEDURE — 97163 PT EVAL HIGH COMPLEX 45 MIN: CPT | Performed by: PHYSICAL THERAPIST

## 2019-10-22 PROCEDURE — 6370000000 HC RX 637 (ALT 250 FOR IP): Performed by: INTERNAL MEDICINE

## 2019-10-22 PROCEDURE — 6370000000 HC RX 637 (ALT 250 FOR IP): Performed by: NURSE PRACTITIONER

## 2019-10-22 PROCEDURE — 94760 N-INVAS EAR/PLS OXIMETRY 1: CPT

## 2019-10-22 RX ORDER — OXYCODONE AND ACETAMINOPHEN 10; 325 MG/1; MG/1
1 TABLET ORAL EVERY 4 HOURS PRN
Qty: 10 TABLET | Refills: 0
Start: 2019-10-22 | End: 2019-10-23

## 2019-10-22 RX ORDER — METHOCARBAMOL 500 MG/1
500 TABLET, FILM COATED ORAL 3 TIMES DAILY
Status: DISCONTINUED | OUTPATIENT
Start: 2019-10-22 | End: 2019-10-23 | Stop reason: HOSPADM

## 2019-10-22 RX ORDER — LIDOCAINE 4 G/G
1 PATCH TOPICAL ONCE
Status: COMPLETED | OUTPATIENT
Start: 2019-10-22 | End: 2019-10-23

## 2019-10-22 RX ORDER — KETOROLAC TROMETHAMINE 15 MG/ML
15 INJECTION, SOLUTION INTRAMUSCULAR; INTRAVENOUS EVERY 6 HOURS
Status: DISCONTINUED | OUTPATIENT
Start: 2019-10-22 | End: 2019-10-23 | Stop reason: HOSPADM

## 2019-10-22 RX ORDER — DEXTROSE MONOHYDRATE 50 MG/ML
100 INJECTION, SOLUTION INTRAVENOUS PRN
Status: DISCONTINUED | OUTPATIENT
Start: 2019-10-22 | End: 2019-10-23 | Stop reason: HOSPADM

## 2019-10-22 RX ORDER — NICOTINE POLACRILEX 4 MG
15 LOZENGE BUCCAL PRN
Status: DISCONTINUED | OUTPATIENT
Start: 2019-10-22 | End: 2019-10-23 | Stop reason: HOSPADM

## 2019-10-22 RX ORDER — METHOCARBAMOL 500 MG/1
500 TABLET, FILM COATED ORAL 3 TIMES DAILY
Qty: 30 TABLET | Refills: 0
Start: 2019-10-22 | End: 2019-11-01

## 2019-10-22 RX ORDER — NALOXONE HYDROCHLORIDE 4 MG/.1ML
1 SPRAY NASAL PRN
Qty: 1 EACH | Refills: 5
Start: 2019-10-22 | End: 2020-02-05

## 2019-10-22 RX ORDER — DEXTROSE MONOHYDRATE 25 G/50ML
12.5 INJECTION, SOLUTION INTRAVENOUS PRN
Status: DISCONTINUED | OUTPATIENT
Start: 2019-10-22 | End: 2019-10-23 | Stop reason: HOSPADM

## 2019-10-22 RX ORDER — MORPHINE SULFATE 2 MG/ML
2 INJECTION, SOLUTION INTRAMUSCULAR; INTRAVENOUS EVERY 6 HOURS PRN
Status: DISCONTINUED | OUTPATIENT
Start: 2019-10-22 | End: 2019-10-22

## 2019-10-22 RX ORDER — LIDOCAINE 4 G/G
1 PATCH TOPICAL DAILY
Qty: 30 PATCH | Refills: 0
Start: 2019-10-22 | End: 2019-11-21

## 2019-10-22 RX ORDER — OXYCODONE AND ACETAMINOPHEN 10; 325 MG/1; MG/1
1 TABLET ORAL EVERY 4 HOURS PRN
Status: DISCONTINUED | OUTPATIENT
Start: 2019-10-22 | End: 2019-10-23 | Stop reason: HOSPADM

## 2019-10-22 RX ADMIN — GABAPENTIN 600 MG: 300 CAPSULE ORAL at 20:52

## 2019-10-22 RX ADMIN — GABAPENTIN 600 MG: 300 CAPSULE ORAL at 13:06

## 2019-10-22 RX ADMIN — INSULIN GLARGINE 20 UNITS: 100 INJECTION, SOLUTION SUBCUTANEOUS at 22:15

## 2019-10-22 RX ADMIN — ISOSORBIDE MONONITRATE 90 MG: 60 TABLET, EXTENDED RELEASE ORAL at 09:10

## 2019-10-22 RX ADMIN — CLOPIDOGREL BISULFATE 75 MG: 75 TABLET ORAL at 09:10

## 2019-10-22 RX ADMIN — MORPHINE SULFATE 2 MG: 2 INJECTION, SOLUTION INTRAMUSCULAR; INTRAVENOUS at 05:25

## 2019-10-22 RX ADMIN — ATORVASTATIN CALCIUM 80 MG: 80 TABLET, FILM COATED ORAL at 09:10

## 2019-10-22 RX ADMIN — KETOROLAC TROMETHAMINE 15 MG: 15 INJECTION, SOLUTION INTRAMUSCULAR; INTRAVENOUS at 18:16

## 2019-10-22 RX ADMIN — LOSARTAN POTASSIUM 50 MG: 50 TABLET, FILM COATED ORAL at 09:10

## 2019-10-22 RX ADMIN — INSULIN LISPRO 2 UNITS: 100 INJECTION, SOLUTION INTRAVENOUS; SUBCUTANEOUS at 13:06

## 2019-10-22 RX ADMIN — OXYCODONE HYDROCHLORIDE AND ACETAMINOPHEN 1 TABLET: 10; 325 TABLET ORAL at 16:38

## 2019-10-22 RX ADMIN — HYDRALAZINE HYDROCHLORIDE 25 MG: 25 TABLET, FILM COATED ORAL at 22:15

## 2019-10-22 RX ADMIN — METHOCARBAMOL TABLETS 500 MG: 500 TABLET, COATED ORAL at 09:10

## 2019-10-22 RX ADMIN — GABAPENTIN 600 MG: 300 CAPSULE ORAL at 09:10

## 2019-10-22 RX ADMIN — RANOLAZINE 500 MG: 500 TABLET, FILM COATED, EXTENDED RELEASE ORAL at 09:10

## 2019-10-22 RX ADMIN — ISOSORBIDE MONONITRATE 90 MG: 60 TABLET, EXTENDED RELEASE ORAL at 20:51

## 2019-10-22 RX ADMIN — TRAZODONE HYDROCHLORIDE 50 MG: 50 TABLET ORAL at 20:52

## 2019-10-22 RX ADMIN — FUROSEMIDE 40 MG: 40 TABLET ORAL at 09:10

## 2019-10-22 RX ADMIN — INSULIN LISPRO 1 UNITS: 100 INJECTION, SOLUTION INTRAVENOUS; SUBCUTANEOUS at 00:53

## 2019-10-22 RX ADMIN — RANOLAZINE 500 MG: 500 TABLET, FILM COATED, EXTENDED RELEASE ORAL at 20:52

## 2019-10-22 RX ADMIN — INSULIN GLARGINE 20 UNITS: 100 INJECTION, SOLUTION SUBCUTANEOUS at 00:00

## 2019-10-22 RX ADMIN — METHOCARBAMOL TABLETS 500 MG: 500 TABLET, COATED ORAL at 20:52

## 2019-10-22 RX ADMIN — OXYCODONE HYDROCHLORIDE AND ACETAMINOPHEN 1 TABLET: 10; 325 TABLET ORAL at 20:53

## 2019-10-22 RX ADMIN — METOPROLOL SUCCINATE 100 MG: 50 TABLET, EXTENDED RELEASE ORAL at 09:10

## 2019-10-22 RX ADMIN — KETOROLAC TROMETHAMINE 15 MG: 15 INJECTION, SOLUTION INTRAMUSCULAR; INTRAVENOUS at 13:06

## 2019-10-22 RX ADMIN — HYDRALAZINE HYDROCHLORIDE 25 MG: 25 TABLET, FILM COATED ORAL at 05:25

## 2019-10-22 RX ADMIN — HYDRALAZINE HYDROCHLORIDE 25 MG: 25 TABLET, FILM COATED ORAL at 00:00

## 2019-10-22 RX ADMIN — SODIUM CHLORIDE, PRESERVATIVE FREE 10 ML: 5 INJECTION INTRAVENOUS at 20:52

## 2019-10-22 RX ADMIN — INSULIN LISPRO 2 UNITS: 100 INJECTION, SOLUTION INTRAVENOUS; SUBCUTANEOUS at 22:16

## 2019-10-22 RX ADMIN — ENOXAPARIN SODIUM 40 MG: 40 INJECTION SUBCUTANEOUS at 09:10

## 2019-10-22 RX ADMIN — OXYMETAZOLINE HCL 2 SPRAY: 0.05 SPRAY NASAL at 18:16

## 2019-10-22 RX ADMIN — HYDRALAZINE HYDROCHLORIDE 25 MG: 25 TABLET, FILM COATED ORAL at 13:06

## 2019-10-22 RX ADMIN — OXYCODONE HYDROCHLORIDE AND ACETAMINOPHEN 1 TABLET: 10; 325 TABLET ORAL at 09:53

## 2019-10-22 RX ADMIN — POTASSIUM CHLORIDE 40 MEQ: 20 TABLET, EXTENDED RELEASE ORAL at 09:10

## 2019-10-22 RX ADMIN — METHOCARBAMOL TABLETS 500 MG: 500 TABLET, COATED ORAL at 13:09

## 2019-10-22 RX ADMIN — ASPIRIN 81 MG 81 MG: 81 TABLET ORAL at 09:10

## 2019-10-22 RX ADMIN — SODIUM CHLORIDE, PRESERVATIVE FREE 10 ML: 5 INJECTION INTRAVENOUS at 09:08

## 2019-10-22 ASSESSMENT — PAIN DESCRIPTION - ONSET
ONSET: ON-GOING
ONSET: ON-GOING
ONSET: AWAKENED FROM SLEEP
ONSET: ON-GOING

## 2019-10-22 ASSESSMENT — PAIN DESCRIPTION - LOCATION
LOCATION: BACK

## 2019-10-22 ASSESSMENT — PAIN DESCRIPTION - PROGRESSION
CLINICAL_PROGRESSION: NOT CHANGED

## 2019-10-22 ASSESSMENT — PAIN DESCRIPTION - PAIN TYPE
TYPE: CHRONIC PAIN

## 2019-10-22 ASSESSMENT — PAIN DESCRIPTION - FREQUENCY
FREQUENCY: CONTINUOUS

## 2019-10-22 ASSESSMENT — PAIN DESCRIPTION - ORIENTATION
ORIENTATION: MID;LOWER
ORIENTATION: MID
ORIENTATION: MID;LOWER
ORIENTATION: MID;LOWER
ORIENTATION: MID

## 2019-10-22 ASSESSMENT — PAIN DESCRIPTION - DESCRIPTORS
DESCRIPTORS: ACHING

## 2019-10-22 ASSESSMENT — PAIN - FUNCTIONAL ASSESSMENT
PAIN_FUNCTIONAL_ASSESSMENT: PREVENTS OR INTERFERES SOME ACTIVE ACTIVITIES AND ADLS

## 2019-10-22 ASSESSMENT — PAIN SCALES - GENERAL
PAINLEVEL_OUTOF10: 7
PAINLEVEL_OUTOF10: 6
PAINLEVEL_OUTOF10: 10
PAINLEVEL_OUTOF10: 8
PAINLEVEL_OUTOF10: 4
PAINLEVEL_OUTOF10: 0
PAINLEVEL_OUTOF10: 2
PAINLEVEL_OUTOF10: 7
PAINLEVEL_OUTOF10: 0
PAINLEVEL_OUTOF10: 7
PAINLEVEL_OUTOF10: 6
PAINLEVEL_OUTOF10: 7
PAINLEVEL_OUTOF10: 8

## 2019-10-23 VITALS
HEART RATE: 77 BPM | RESPIRATION RATE: 15 BRPM | SYSTOLIC BLOOD PRESSURE: 134 MMHG | TEMPERATURE: 97.4 F | BODY MASS INDEX: 29.89 KG/M2 | OXYGEN SATURATION: 93 % | HEIGHT: 72 IN | WEIGHT: 220.68 LBS | DIASTOLIC BLOOD PRESSURE: 70 MMHG

## 2019-10-23 LAB
ALBUMIN SERPL-MCNC: 3.5 G/DL (ref 3.4–5)
ANION GAP SERPL CALCULATED.3IONS-SCNC: 14 MMOL/L (ref 3–16)
BUN BLDV-MCNC: 26 MG/DL (ref 7–20)
CALCIUM SERPL-MCNC: 8.5 MG/DL (ref 8.3–10.6)
CHLORIDE BLD-SCNC: 104 MMOL/L (ref 99–110)
CO2: 21 MMOL/L (ref 21–32)
CREAT SERPL-MCNC: 1.5 MG/DL (ref 0.8–1.3)
GFR AFRICAN AMERICAN: 57
GFR NON-AFRICAN AMERICAN: 47
GLUCOSE BLD-MCNC: 140 MG/DL (ref 70–99)
GLUCOSE BLD-MCNC: 171 MG/DL (ref 70–99)
GLUCOSE BLD-MCNC: 201 MG/DL (ref 70–99)
PERFORMED ON: ABNORMAL
PERFORMED ON: ABNORMAL
PHOSPHORUS: 5.8 MG/DL (ref 2.5–4.9)
POTASSIUM SERPL-SCNC: 4 MMOL/L (ref 3.5–5.1)
SODIUM BLD-SCNC: 139 MMOL/L (ref 136–145)

## 2019-10-23 PROCEDURE — 96372 THER/PROPH/DIAG INJ SC/IM: CPT

## 2019-10-23 PROCEDURE — 94760 N-INVAS EAR/PLS OXIMETRY 1: CPT

## 2019-10-23 PROCEDURE — 6370000000 HC RX 637 (ALT 250 FOR IP): Performed by: INTERNAL MEDICINE

## 2019-10-23 PROCEDURE — 2580000003 HC RX 258: Performed by: INTERNAL MEDICINE

## 2019-10-23 PROCEDURE — 80069 RENAL FUNCTION PANEL: CPT

## 2019-10-23 PROCEDURE — 97116 GAIT TRAINING THERAPY: CPT | Performed by: PHYSICAL THERAPIST

## 2019-10-23 PROCEDURE — 6370000000 HC RX 637 (ALT 250 FOR IP): Performed by: NURSE PRACTITIONER

## 2019-10-23 PROCEDURE — 36415 COLL VENOUS BLD VENIPUNCTURE: CPT

## 2019-10-23 PROCEDURE — G0378 HOSPITAL OBSERVATION PER HR: HCPCS

## 2019-10-23 PROCEDURE — 97535 SELF CARE MNGMENT TRAINING: CPT

## 2019-10-23 PROCEDURE — 6360000002 HC RX W HCPCS: Performed by: NURSE PRACTITIONER

## 2019-10-23 PROCEDURE — 97530 THERAPEUTIC ACTIVITIES: CPT

## 2019-10-23 PROCEDURE — 96376 TX/PRO/DX INJ SAME DRUG ADON: CPT

## 2019-10-23 PROCEDURE — 97110 THERAPEUTIC EXERCISES: CPT | Performed by: PHYSICAL THERAPIST

## 2019-10-23 PROCEDURE — 6360000002 HC RX W HCPCS: Performed by: INTERNAL MEDICINE

## 2019-10-23 RX ORDER — OXYCODONE AND ACETAMINOPHEN 10; 325 MG/1; MG/1
1 TABLET ORAL EVERY 6 HOURS PRN
Qty: 20 TABLET | Refills: 0 | Status: SHIPPED | OUTPATIENT
Start: 2019-10-23 | End: 2019-11-01 | Stop reason: SDUPTHER

## 2019-10-23 RX ADMIN — ISOSORBIDE MONONITRATE 90 MG: 60 TABLET, EXTENDED RELEASE ORAL at 07:51

## 2019-10-23 RX ADMIN — FUROSEMIDE 40 MG: 40 TABLET ORAL at 07:51

## 2019-10-23 RX ADMIN — METHOCARBAMOL TABLETS 500 MG: 500 TABLET, COATED ORAL at 14:07

## 2019-10-23 RX ADMIN — KETOROLAC TROMETHAMINE 15 MG: 15 INJECTION, SOLUTION INTRAMUSCULAR; INTRAVENOUS at 00:02

## 2019-10-23 RX ADMIN — KETOROLAC TROMETHAMINE 15 MG: 15 INJECTION, SOLUTION INTRAMUSCULAR; INTRAVENOUS at 11:54

## 2019-10-23 RX ADMIN — OXYCODONE HYDROCHLORIDE AND ACETAMINOPHEN 1 TABLET: 10; 325 TABLET ORAL at 14:12

## 2019-10-23 RX ADMIN — INSULIN LISPRO 4 UNITS: 100 INJECTION, SOLUTION INTRAVENOUS; SUBCUTANEOUS at 07:55

## 2019-10-23 RX ADMIN — METOPROLOL SUCCINATE 100 MG: 50 TABLET, EXTENDED RELEASE ORAL at 07:50

## 2019-10-23 RX ADMIN — ATORVASTATIN CALCIUM 80 MG: 80 TABLET, FILM COATED ORAL at 07:51

## 2019-10-23 RX ADMIN — LOSARTAN POTASSIUM 50 MG: 50 TABLET, FILM COATED ORAL at 07:51

## 2019-10-23 RX ADMIN — GABAPENTIN 600 MG: 300 CAPSULE ORAL at 14:07

## 2019-10-23 RX ADMIN — OXYCODONE HYDROCHLORIDE AND ACETAMINOPHEN 1 TABLET: 10; 325 TABLET ORAL at 06:44

## 2019-10-23 RX ADMIN — GABAPENTIN 600 MG: 300 CAPSULE ORAL at 07:51

## 2019-10-23 RX ADMIN — HYDRALAZINE HYDROCHLORIDE 25 MG: 25 TABLET, FILM COATED ORAL at 14:07

## 2019-10-23 RX ADMIN — SODIUM CHLORIDE, PRESERVATIVE FREE 10 ML: 5 INJECTION INTRAVENOUS at 07:54

## 2019-10-23 RX ADMIN — KETOROLAC TROMETHAMINE 15 MG: 15 INJECTION, SOLUTION INTRAMUSCULAR; INTRAVENOUS at 05:29

## 2019-10-23 RX ADMIN — RANOLAZINE 500 MG: 500 TABLET, FILM COATED, EXTENDED RELEASE ORAL at 07:51

## 2019-10-23 RX ADMIN — INSULIN LISPRO 2 UNITS: 100 INJECTION, SOLUTION INTRAVENOUS; SUBCUTANEOUS at 11:54

## 2019-10-23 RX ADMIN — ASPIRIN 81 MG 81 MG: 81 TABLET ORAL at 07:50

## 2019-10-23 RX ADMIN — CLOPIDOGREL BISULFATE 75 MG: 75 TABLET ORAL at 07:51

## 2019-10-23 RX ADMIN — HYDRALAZINE HYDROCHLORIDE 25 MG: 25 TABLET, FILM COATED ORAL at 05:29

## 2019-10-23 RX ADMIN — ENOXAPARIN SODIUM 40 MG: 40 INJECTION SUBCUTANEOUS at 07:51

## 2019-10-23 RX ADMIN — METHOCARBAMOL TABLETS 500 MG: 500 TABLET, COATED ORAL at 07:51

## 2019-10-23 ASSESSMENT — PAIN SCALES - GENERAL
PAINLEVEL_OUTOF10: 4
PAINLEVEL_OUTOF10: 8
PAINLEVEL_OUTOF10: 3
PAINLEVEL_OUTOF10: 3
PAINLEVEL_OUTOF10: 0
PAINLEVEL_OUTOF10: 8
PAINLEVEL_OUTOF10: 3
PAINLEVEL_OUTOF10: 7
PAINLEVEL_OUTOF10: 0

## 2019-10-23 ASSESSMENT — PAIN DESCRIPTION - PAIN TYPE
TYPE: CHRONIC PAIN

## 2019-10-23 ASSESSMENT — PAIN DESCRIPTION - DESCRIPTORS
DESCRIPTORS: ACHING

## 2019-10-23 ASSESSMENT — PAIN DESCRIPTION - PROGRESSION
CLINICAL_PROGRESSION: NOT CHANGED

## 2019-10-23 ASSESSMENT — PAIN DESCRIPTION - ONSET
ONSET: ON-GOING

## 2019-10-23 ASSESSMENT — PAIN DESCRIPTION - LOCATION
LOCATION: BACK

## 2019-10-23 ASSESSMENT — PAIN DESCRIPTION - FREQUENCY
FREQUENCY: CONTINUOUS

## 2019-10-23 ASSESSMENT — PAIN DESCRIPTION - ORIENTATION: ORIENTATION: MID

## 2019-11-12 ENCOUNTER — NURSE ONLY (OUTPATIENT)
Dept: CARDIOLOGY CLINIC | Age: 64
End: 2019-11-12
Payer: MEDICARE

## 2019-11-12 DIAGNOSIS — I50.22 CHRONIC SYSTOLIC CONGESTIVE HEART FAILURE (HCC): ICD-10-CM

## 2019-11-12 DIAGNOSIS — Z95.810 ICD (IMPLANTABLE CARDIOVERTER-DEFIBRILLATOR), DUAL, IN SITU: ICD-10-CM

## 2019-11-12 DIAGNOSIS — I25.5 ISCHEMIC CARDIOMYOPATHY: Chronic | ICD-10-CM

## 2019-11-12 PROCEDURE — 93296 REM INTERROG EVL PM/IDS: CPT | Performed by: INTERNAL MEDICINE

## 2019-11-12 PROCEDURE — 93297 REM INTERROG DEV EVAL ICPMS: CPT | Performed by: INTERNAL MEDICINE

## 2019-11-12 PROCEDURE — 93295 DEV INTERROG REMOTE 1/2/MLT: CPT | Performed by: INTERNAL MEDICINE

## 2019-11-18 NOTE — ED PROVIDER NOTES
[facility]  eMERGENCY dEPARTMENT eNCOUnter   Physician Attestation    Pt Name: Hanna Siddiqui  MRN: [id number]  Tiera 1955  Date of evaluation: 11/26/18        Physician Note:    I havepersonally performed and/or participated in the history, exam and medical decision making and agree with all pertinent clinical information. I have also reviewed and agree with the past medical, family and social historyunless otherwise noted. I have personally performed a face to face diagnostic evaluation onthis patient. I have reviewed the mid-levels findings and agree. History: Pt started with Diarrhea 3 days ago then Nausea and Vomiting. Patient was having abdominal pain that would come and go. Location right in the middle. No CP or SOB. Patient was unable to take his BP pills. Also patient had Low Back surgery about 5 weeks ago and has been having persistent back pain. Physical Exam   Constitutional: He is oriented to person, place, and time. He appears well-developed and well-nourished. HENT:   Head: Normocephalic and atraumatic. Right Ear: External ear normal.   Left Ear: External ear normal.   Eyes: Conjunctivae are normal. Right eye exhibits no discharge. Left eye exhibits no discharge. No scleral icterus. Neck: Normal range of motion. No tracheal deviation present. Pulmonary/Chest: Effort normal. No stridor. No respiratory distress. Abdominal: There is generalized tenderness. There is tenderness at McBurney's point. There is no rigidity, no guarding and negative Clifton's sign. Musculoskeletal: Normal range of motion. Neurological: He is alert and oriented to person, place, and time. Coordination normal.   Skin: Skin is warm and dry. He is not diaphoretic. Psychiatric: He has a normal mood and affect. His behavior is normal.   Nursing note and vitals reviewed. EKG visualized interpreted by myself shows sinus tachycardia. There is a right bundle branch block. Initial (On Arrival)

## 2019-11-25 ENCOUNTER — PROCEDURE VISIT (OUTPATIENT)
Dept: CARDIOLOGY CLINIC | Age: 64
End: 2019-11-25

## 2019-11-25 DIAGNOSIS — I50.22 CHRONIC SYSTOLIC CONGESTIVE HEART FAILURE (HCC): ICD-10-CM

## 2019-11-25 DIAGNOSIS — I25.5 ISCHEMIC CARDIOMYOPATHY: Chronic | ICD-10-CM

## 2019-11-25 DIAGNOSIS — Z95.810 ICD (IMPLANTABLE CARDIOVERTER-DEFIBRILLATOR), DUAL, IN SITU: ICD-10-CM

## 2019-11-27 ENCOUNTER — TELEPHONE (OUTPATIENT)
Dept: CARDIOLOGY CLINIC | Age: 64
End: 2019-11-27

## 2019-11-29 ENCOUNTER — TELEPHONE (OUTPATIENT)
Dept: CARDIOLOGY CLINIC | Age: 64
End: 2019-11-29

## 2019-12-14 ENCOUNTER — APPOINTMENT (OUTPATIENT)
Dept: GENERAL RADIOLOGY | Age: 64
DRG: 065 | End: 2019-12-14
Payer: MEDICARE

## 2019-12-14 ENCOUNTER — APPOINTMENT (OUTPATIENT)
Dept: CT IMAGING | Age: 64
DRG: 065 | End: 2019-12-14
Payer: MEDICARE

## 2019-12-14 ENCOUNTER — HOSPITAL ENCOUNTER (INPATIENT)
Age: 64
LOS: 4 days | Discharge: NURSING, REHAB OR LONG-TERM CARE | DRG: 065 | End: 2019-12-20
Attending: EMERGENCY MEDICINE | Admitting: HOSPITALIST
Payer: MEDICARE

## 2019-12-14 DIAGNOSIS — G89.29 CHRONIC LOW BACK PAIN WITH SCIATICA, SCIATICA LATERALITY UNSPECIFIED, UNSPECIFIED BACK PAIN LATERALITY: ICD-10-CM

## 2019-12-14 DIAGNOSIS — I63.9 STROKE DETERMINED BY CLINICAL ASSESSMENT (HCC): ICD-10-CM

## 2019-12-14 DIAGNOSIS — R77.8 ELEVATED TROPONIN: ICD-10-CM

## 2019-12-14 DIAGNOSIS — I63.9 CEREBROVASCULAR ACCIDENT (CVA), UNSPECIFIED MECHANISM (HCC): Primary | ICD-10-CM

## 2019-12-14 DIAGNOSIS — M54.40 CHRONIC LOW BACK PAIN WITH SCIATICA, SCIATICA LATERALITY UNSPECIFIED, UNSPECIFIED BACK PAIN LATERALITY: ICD-10-CM

## 2019-12-14 LAB
A/G RATIO: 1.2 (ref 1.1–2.2)
ALBUMIN SERPL-MCNC: 3.8 G/DL (ref 3.4–5)
ALP BLD-CCNC: 61 U/L (ref 40–129)
ALT SERPL-CCNC: 9 U/L (ref 10–40)
ANION GAP SERPL CALCULATED.3IONS-SCNC: 16 MMOL/L (ref 3–16)
APTT: 29.6 SEC (ref 24.2–36.2)
AST SERPL-CCNC: 10 U/L (ref 15–37)
BASOPHILS ABSOLUTE: 0 K/UL (ref 0–0.2)
BASOPHILS RELATIVE PERCENT: 0.2 %
BILIRUB SERPL-MCNC: 0.3 MG/DL (ref 0–1)
BUN BLDV-MCNC: 11 MG/DL (ref 7–20)
CALCIUM SERPL-MCNC: 8.8 MG/DL (ref 8.3–10.6)
CHLORIDE BLD-SCNC: 103 MMOL/L (ref 99–110)
CHP ED QC CHECK: YES
CO2: 17 MMOL/L (ref 21–32)
CREAT SERPL-MCNC: 0.9 MG/DL (ref 0.8–1.3)
EOSINOPHILS ABSOLUTE: 0.2 K/UL (ref 0–0.6)
EOSINOPHILS RELATIVE PERCENT: 2.2 %
GFR AFRICAN AMERICAN: >60
GFR NON-AFRICAN AMERICAN: >60
GLOBULIN: 3.1 G/DL
GLUCOSE BLD-MCNC: 130 MG/DL (ref 70–99)
GLUCOSE BLD-MCNC: 182 MG/DL (ref 70–99)
GLUCOSE BLD-MCNC: 189 MG/DL
GLUCOSE BLD-MCNC: 189 MG/DL (ref 70–99)
GLUCOSE BLD-MCNC: 221 MG/DL (ref 70–99)
HCT VFR BLD CALC: 42 % (ref 40.5–52.5)
HEMOGLOBIN: 14.2 G/DL (ref 13.5–17.5)
INR BLD: 0.9 (ref 0.86–1.14)
LYMPHOCYTES ABSOLUTE: 2 K/UL (ref 1–5.1)
LYMPHOCYTES RELATIVE PERCENT: 29 %
MCH RBC QN AUTO: 33.4 PG (ref 26–34)
MCHC RBC AUTO-ENTMCNC: 33.9 G/DL (ref 31–36)
MCV RBC AUTO: 98.5 FL (ref 80–100)
MONOCYTES ABSOLUTE: 0.5 K/UL (ref 0–1.3)
MONOCYTES RELATIVE PERCENT: 7.1 %
NEUTROPHILS ABSOLUTE: 4.2 K/UL (ref 1.7–7.7)
NEUTROPHILS RELATIVE PERCENT: 61.5 %
PDW BLD-RTO: 15 % (ref 12.4–15.4)
PERFORMED ON: ABNORMAL
PLATELET # BLD: 165 K/UL (ref 135–450)
PMV BLD AUTO: 9.9 FL (ref 5–10.5)
POTASSIUM SERPL-SCNC: 4 MMOL/L (ref 3.5–5.1)
PROTHROMBIN TIME: 10.4 SEC (ref 10–13.2)
RBC # BLD: 4.27 M/UL (ref 4.2–5.9)
SODIUM BLD-SCNC: 136 MMOL/L (ref 136–145)
TOTAL PROTEIN: 6.9 G/DL (ref 6.4–8.2)
TROPONIN: 0.02 NG/ML
WBC # BLD: 6.9 K/UL (ref 4–11)

## 2019-12-14 PROCEDURE — 2580000003 HC RX 258: Performed by: HOSPITALIST

## 2019-12-14 PROCEDURE — 6370000000 HC RX 637 (ALT 250 FOR IP): Performed by: HOSPITALIST

## 2019-12-14 PROCEDURE — G0378 HOSPITAL OBSERVATION PER HR: HCPCS

## 2019-12-14 PROCEDURE — 6360000004 HC RX CONTRAST MEDICATION: Performed by: PHYSICIAN ASSISTANT

## 2019-12-14 PROCEDURE — 6370000000 HC RX 637 (ALT 250 FOR IP): Performed by: EMERGENCY MEDICINE

## 2019-12-14 PROCEDURE — 83036 HEMOGLOBIN GLYCOSYLATED A1C: CPT

## 2019-12-14 PROCEDURE — 6360000002 HC RX W HCPCS: Performed by: HOSPITALIST

## 2019-12-14 PROCEDURE — 96376 TX/PRO/DX INJ SAME DRUG ADON: CPT

## 2019-12-14 PROCEDURE — 80053 COMPREHEN METABOLIC PANEL: CPT

## 2019-12-14 PROCEDURE — 72100 X-RAY EXAM L-S SPINE 2/3 VWS: CPT

## 2019-12-14 PROCEDURE — 36415 COLL VENOUS BLD VENIPUNCTURE: CPT

## 2019-12-14 PROCEDURE — 72125 CT NECK SPINE W/O DYE: CPT

## 2019-12-14 PROCEDURE — 70450 CT HEAD/BRAIN W/O DYE: CPT

## 2019-12-14 PROCEDURE — 70496 CT ANGIOGRAPHY HEAD: CPT

## 2019-12-14 PROCEDURE — 85730 THROMBOPLASTIN TIME PARTIAL: CPT

## 2019-12-14 PROCEDURE — 85610 PROTHROMBIN TIME: CPT

## 2019-12-14 PROCEDURE — 84484 ASSAY OF TROPONIN QUANT: CPT

## 2019-12-14 PROCEDURE — 96372 THER/PROPH/DIAG INJ SC/IM: CPT

## 2019-12-14 PROCEDURE — 85025 COMPLETE CBC W/AUTO DIFF WBC: CPT

## 2019-12-14 PROCEDURE — 93005 ELECTROCARDIOGRAM TRACING: CPT | Performed by: PHYSICIAN ASSISTANT

## 2019-12-14 PROCEDURE — 99285 EMERGENCY DEPT VISIT HI MDM: CPT

## 2019-12-14 PROCEDURE — 96374 THER/PROPH/DIAG INJ IV PUSH: CPT

## 2019-12-14 PROCEDURE — 72110 X-RAY EXAM L-2 SPINE 4/>VWS: CPT

## 2019-12-14 PROCEDURE — 73521 X-RAY EXAM HIPS BI 2 VIEWS: CPT

## 2019-12-14 RX ORDER — ASPIRIN 81 MG/1
324 TABLET, CHEWABLE ORAL ONCE
Status: COMPLETED | OUTPATIENT
Start: 2019-12-14 | End: 2019-12-14

## 2019-12-14 RX ORDER — GABAPENTIN 300 MG/1
600 CAPSULE ORAL ONCE
Status: COMPLETED | OUTPATIENT
Start: 2019-12-14 | End: 2019-12-14

## 2019-12-14 RX ORDER — OXYCODONE HYDROCHLORIDE AND ACETAMINOPHEN 5; 325 MG/1; MG/1
1 TABLET ORAL ONCE
Status: COMPLETED | OUTPATIENT
Start: 2019-12-14 | End: 2019-12-14

## 2019-12-14 RX ORDER — NICOTINE POLACRILEX 4 MG
15 LOZENGE BUCCAL PRN
Status: DISCONTINUED | OUTPATIENT
Start: 2019-12-14 | End: 2019-12-20 | Stop reason: HOSPADM

## 2019-12-14 RX ORDER — MORPHINE SULFATE 2 MG/ML
2 INJECTION, SOLUTION INTRAMUSCULAR; INTRAVENOUS EVERY 4 HOURS PRN
Status: DISCONTINUED | OUTPATIENT
Start: 2019-12-14 | End: 2019-12-19

## 2019-12-14 RX ORDER — GABAPENTIN 300 MG/1
600 CAPSULE ORAL 3 TIMES DAILY
Status: DISCONTINUED | OUTPATIENT
Start: 2019-12-14 | End: 2019-12-20 | Stop reason: HOSPADM

## 2019-12-14 RX ORDER — LABETALOL HYDROCHLORIDE 5 MG/ML
10 INJECTION, SOLUTION INTRAVENOUS EVERY 4 HOURS PRN
Status: DISCONTINUED | OUTPATIENT
Start: 2019-12-14 | End: 2019-12-20 | Stop reason: HOSPADM

## 2019-12-14 RX ORDER — ONDANSETRON 2 MG/ML
4 INJECTION INTRAMUSCULAR; INTRAVENOUS EVERY 6 HOURS PRN
Status: DISCONTINUED | OUTPATIENT
Start: 2019-12-14 | End: 2019-12-20 | Stop reason: HOSPADM

## 2019-12-14 RX ORDER — METOPROLOL SUCCINATE 50 MG/1
150 TABLET, EXTENDED RELEASE ORAL DAILY
Refills: 1 | Status: ON HOLD | COMMUNITY
Start: 2019-12-09 | End: 2019-12-20 | Stop reason: HOSPADM

## 2019-12-14 RX ORDER — INSULIN GLARGINE 100 [IU]/ML
20 INJECTION, SOLUTION SUBCUTANEOUS NIGHTLY
Status: DISCONTINUED | OUTPATIENT
Start: 2019-12-14 | End: 2019-12-15

## 2019-12-14 RX ORDER — SODIUM CHLORIDE 0.9 % (FLUSH) 0.9 %
10 SYRINGE (ML) INJECTION EVERY 12 HOURS SCHEDULED
Status: DISCONTINUED | OUTPATIENT
Start: 2019-12-14 | End: 2019-12-20 | Stop reason: HOSPADM

## 2019-12-14 RX ORDER — SODIUM CHLORIDE 0.9 % (FLUSH) 0.9 %
10 SYRINGE (ML) INJECTION PRN
Status: DISCONTINUED | OUTPATIENT
Start: 2019-12-14 | End: 2019-12-20 | Stop reason: HOSPADM

## 2019-12-14 RX ORDER — ATORVASTATIN CALCIUM 80 MG/1
80 TABLET, FILM COATED ORAL DAILY
Status: DISCONTINUED | OUTPATIENT
Start: 2019-12-15 | End: 2019-12-20 | Stop reason: HOSPADM

## 2019-12-14 RX ORDER — ASPIRIN 81 MG/1
81 TABLET, CHEWABLE ORAL DAILY
Status: DISCONTINUED | OUTPATIENT
Start: 2019-12-15 | End: 2019-12-20 | Stop reason: HOSPADM

## 2019-12-14 RX ORDER — DEXTROSE MONOHYDRATE 50 MG/ML
100 INJECTION, SOLUTION INTRAVENOUS PRN
Status: DISCONTINUED | OUTPATIENT
Start: 2019-12-14 | End: 2019-12-20 | Stop reason: HOSPADM

## 2019-12-14 RX ORDER — DEXTROSE MONOHYDRATE 25 G/50ML
12.5 INJECTION, SOLUTION INTRAVENOUS PRN
Status: DISCONTINUED | OUTPATIENT
Start: 2019-12-14 | End: 2019-12-20 | Stop reason: HOSPADM

## 2019-12-14 RX ORDER — METOPROLOL SUCCINATE 25 MG/1
25 TABLET, EXTENDED RELEASE ORAL DAILY
Status: DISCONTINUED | OUTPATIENT
Start: 2019-12-15 | End: 2019-12-15

## 2019-12-14 RX ORDER — CLOPIDOGREL BISULFATE 75 MG/1
75 TABLET ORAL DAILY
Status: DISCONTINUED | OUTPATIENT
Start: 2019-12-15 | End: 2019-12-20 | Stop reason: HOSPADM

## 2019-12-14 RX ORDER — PANTOPRAZOLE SODIUM 40 MG/1
40 TABLET, DELAYED RELEASE ORAL
Status: DISCONTINUED | OUTPATIENT
Start: 2019-12-15 | End: 2019-12-20 | Stop reason: HOSPADM

## 2019-12-14 RX ORDER — TRAZODONE HYDROCHLORIDE 50 MG/1
50 TABLET ORAL NIGHTLY
Status: DISCONTINUED | OUTPATIENT
Start: 2019-12-14 | End: 2019-12-20 | Stop reason: HOSPADM

## 2019-12-14 RX ORDER — RANOLAZINE 500 MG/1
500 TABLET, EXTENDED RELEASE ORAL 2 TIMES DAILY
Status: DISCONTINUED | OUTPATIENT
Start: 2019-12-14 | End: 2019-12-15

## 2019-12-14 RX ORDER — NITROGLYCERIN 0.4 MG/1
0.4 TABLET SUBLINGUAL EVERY 5 MIN PRN
Status: DISCONTINUED | OUTPATIENT
Start: 2019-12-14 | End: 2019-12-20 | Stop reason: HOSPADM

## 2019-12-14 RX ORDER — OXYCODONE HYDROCHLORIDE AND ACETAMINOPHEN 5; 325 MG/1; MG/1
1 TABLET ORAL ONCE
Status: DISCONTINUED | OUTPATIENT
Start: 2019-12-14 | End: 2019-12-20 | Stop reason: HOSPADM

## 2019-12-14 RX ORDER — ACETAMINOPHEN 325 MG/1
650 TABLET ORAL EVERY 6 HOURS PRN
Status: DISCONTINUED | OUTPATIENT
Start: 2019-12-14 | End: 2019-12-20 | Stop reason: HOSPADM

## 2019-12-14 RX ADMIN — IOPAMIDOL 75 ML: 755 INJECTION, SOLUTION INTRAVENOUS at 10:06

## 2019-12-14 RX ADMIN — MORPHINE SULFATE 2 MG: 2 INJECTION, SOLUTION INTRAMUSCULAR; INTRAVENOUS at 15:10

## 2019-12-14 RX ADMIN — SODIUM CHLORIDE, PRESERVATIVE FREE 10 ML: 5 INJECTION INTRAVENOUS at 21:59

## 2019-12-14 RX ADMIN — OXYCODONE HYDROCHLORIDE AND ACETAMINOPHEN 1 TABLET: 5; 325 TABLET ORAL at 11:49

## 2019-12-14 RX ADMIN — GABAPENTIN 600 MG: 300 CAPSULE ORAL at 17:19

## 2019-12-14 RX ADMIN — RANOLAZINE 500 MG: 500 TABLET, FILM COATED, EXTENDED RELEASE ORAL at 21:58

## 2019-12-14 RX ADMIN — GABAPENTIN 600 MG: 300 CAPSULE ORAL at 21:58

## 2019-12-14 RX ADMIN — INSULIN GLARGINE 20 UNITS: 100 INJECTION, SOLUTION SUBCUTANEOUS at 22:00

## 2019-12-14 RX ADMIN — MORPHINE SULFATE 2 MG: 2 INJECTION, SOLUTION INTRAMUSCULAR; INTRAVENOUS at 17:14

## 2019-12-14 RX ADMIN — MORPHINE SULFATE 2 MG: 2 INJECTION, SOLUTION INTRAMUSCULAR; INTRAVENOUS at 21:59

## 2019-12-14 RX ADMIN — ISOSORBIDE MONONITRATE 90 MG: 60 TABLET, EXTENDED RELEASE ORAL at 21:58

## 2019-12-14 RX ADMIN — TRAZODONE HYDROCHLORIDE 50 MG: 50 TABLET ORAL at 21:58

## 2019-12-14 RX ADMIN — ENOXAPARIN SODIUM 40 MG: 40 INJECTION SUBCUTANEOUS at 21:58

## 2019-12-14 RX ADMIN — ASPIRIN 81 MG 324 MG: 81 TABLET ORAL at 11:49

## 2019-12-14 ASSESSMENT — PAIN DESCRIPTION - DIRECTION
RADIATING_TOWARDS: LEFT LEG
RADIATING_TOWARDS: LEFT LEG

## 2019-12-14 ASSESSMENT — PAIN DESCRIPTION - DESCRIPTORS
DESCRIPTORS: SHARP
DESCRIPTORS: ACHING
DESCRIPTORS: ACHING
DESCRIPTORS: SHARP
DESCRIPTORS: ACHING
DESCRIPTORS: SHARP

## 2019-12-14 ASSESSMENT — PAIN DESCRIPTION - ORIENTATION
ORIENTATION: LOWER

## 2019-12-14 ASSESSMENT — PAIN SCALES - GENERAL
PAINLEVEL_OUTOF10: 8
PAINLEVEL_OUTOF10: 8
PAINLEVEL_OUTOF10: 9
PAINLEVEL_OUTOF10: 8
PAINLEVEL_OUTOF10: 9
PAINLEVEL_OUTOF10: 6
PAINLEVEL_OUTOF10: 8
PAINLEVEL_OUTOF10: 4

## 2019-12-14 ASSESSMENT — PAIN - FUNCTIONAL ASSESSMENT
PAIN_FUNCTIONAL_ASSESSMENT: PREVENTS OR INTERFERES SOME ACTIVE ACTIVITIES AND ADLS
PAIN_FUNCTIONAL_ASSESSMENT: PREVENTS OR INTERFERES SOME ACTIVE ACTIVITIES AND ADLS
PAIN_FUNCTIONAL_ASSESSMENT: ACTIVITIES ARE NOT PREVENTED
PAIN_FUNCTIONAL_ASSESSMENT: PREVENTS OR INTERFERES SOME ACTIVE ACTIVITIES AND ADLS

## 2019-12-14 ASSESSMENT — PAIN DESCRIPTION - PROGRESSION
CLINICAL_PROGRESSION: NOT CHANGED

## 2019-12-14 ASSESSMENT — PAIN DESCRIPTION - FREQUENCY
FREQUENCY: CONTINUOUS

## 2019-12-14 ASSESSMENT — PAIN DESCRIPTION - PAIN TYPE
TYPE: ACUTE PAIN;CHRONIC PAIN
TYPE: ACUTE PAIN
TYPE: ACUTE PAIN
TYPE: ACUTE PAIN;CHRONIC PAIN
TYPE: ACUTE PAIN
TYPE: ACUTE PAIN;CHRONIC PAIN

## 2019-12-14 ASSESSMENT — PAIN DESCRIPTION - ONSET
ONSET: GRADUAL
ONSET: ON-GOING
ONSET: GRADUAL
ONSET: GRADUAL
ONSET: ON-GOING
ONSET: ON-GOING

## 2019-12-14 ASSESSMENT — PAIN DESCRIPTION - LOCATION
LOCATION: BACK

## 2019-12-15 LAB
CHOLESTEROL, TOTAL: 172 MG/DL (ref 0–199)
ESTIMATED AVERAGE GLUCOSE: 139.9 MG/DL
ESTIMATED AVERAGE GLUCOSE: 142.7 MG/DL
GLUCOSE BLD-MCNC: 127 MG/DL (ref 70–99)
GLUCOSE BLD-MCNC: 150 MG/DL (ref 70–99)
GLUCOSE BLD-MCNC: 152 MG/DL (ref 70–99)
GLUCOSE BLD-MCNC: 206 MG/DL (ref 70–99)
HBA1C MFR BLD: 6.5 %
HBA1C MFR BLD: 6.6 %
HCT VFR BLD CALC: 39.8 % (ref 40.5–52.5)
HDLC SERPL-MCNC: 42 MG/DL (ref 40–60)
HEMOGLOBIN: 13.3 G/DL (ref 13.5–17.5)
LDL CHOLESTEROL CALCULATED: 88 MG/DL
MCH RBC QN AUTO: 32.9 PG (ref 26–34)
MCHC RBC AUTO-ENTMCNC: 33.4 G/DL (ref 31–36)
MCV RBC AUTO: 98.5 FL (ref 80–100)
PDW BLD-RTO: 15.5 % (ref 12.4–15.4)
PERFORMED ON: ABNORMAL
PLATELET # BLD: 161 K/UL (ref 135–450)
PMV BLD AUTO: 9.8 FL (ref 5–10.5)
RBC # BLD: 4.04 M/UL (ref 4.2–5.9)
TRIGL SERPL-MCNC: 211 MG/DL (ref 0–150)
VLDLC SERPL CALC-MCNC: 42 MG/DL
WBC # BLD: 6.2 K/UL (ref 4–11)

## 2019-12-15 PROCEDURE — 96376 TX/PRO/DX INJ SAME DRUG ADON: CPT

## 2019-12-15 PROCEDURE — 2580000003 HC RX 258: Performed by: HOSPITALIST

## 2019-12-15 PROCEDURE — G0378 HOSPITAL OBSERVATION PER HR: HCPCS

## 2019-12-15 PROCEDURE — 36415 COLL VENOUS BLD VENIPUNCTURE: CPT

## 2019-12-15 PROCEDURE — 97530 THERAPEUTIC ACTIVITIES: CPT

## 2019-12-15 PROCEDURE — 6370000000 HC RX 637 (ALT 250 FOR IP): Performed by: NURSE PRACTITIONER

## 2019-12-15 PROCEDURE — 6360000002 HC RX W HCPCS: Performed by: HOSPITALIST

## 2019-12-15 PROCEDURE — 92523 SPEECH SOUND LANG COMPREHEN: CPT

## 2019-12-15 PROCEDURE — 6370000000 HC RX 637 (ALT 250 FOR IP): Performed by: INTERNAL MEDICINE

## 2019-12-15 PROCEDURE — 6370000000 HC RX 637 (ALT 250 FOR IP): Performed by: HOSPITALIST

## 2019-12-15 PROCEDURE — 99223 1ST HOSP IP/OBS HIGH 75: CPT | Performed by: INTERNAL MEDICINE

## 2019-12-15 PROCEDURE — 85027 COMPLETE CBC AUTOMATED: CPT

## 2019-12-15 PROCEDURE — 97162 PT EVAL MOD COMPLEX 30 MIN: CPT

## 2019-12-15 PROCEDURE — 97116 GAIT TRAINING THERAPY: CPT

## 2019-12-15 PROCEDURE — 80061 LIPID PANEL: CPT

## 2019-12-15 PROCEDURE — 96372 THER/PROPH/DIAG INJ SC/IM: CPT

## 2019-12-15 PROCEDURE — 92610 EVALUATE SWALLOWING FUNCTION: CPT

## 2019-12-15 PROCEDURE — 97110 THERAPEUTIC EXERCISES: CPT

## 2019-12-15 PROCEDURE — 94760 N-INVAS EAR/PLS OXIMETRY 1: CPT

## 2019-12-15 PROCEDURE — 83036 HEMOGLOBIN GLYCOSYLATED A1C: CPT

## 2019-12-15 RX ORDER — RANOLAZINE 500 MG/1
1000 TABLET, EXTENDED RELEASE ORAL 2 TIMES DAILY
Status: DISCONTINUED | OUTPATIENT
Start: 2019-12-15 | End: 2019-12-20 | Stop reason: HOSPADM

## 2019-12-15 RX ORDER — FUROSEMIDE 20 MG/1
20 TABLET ORAL DAILY
Status: ON HOLD | COMMUNITY
End: 2020-07-16 | Stop reason: HOSPADM

## 2019-12-15 RX ORDER — METOPROLOL SUCCINATE 50 MG/1
50 TABLET, EXTENDED RELEASE ORAL DAILY
Status: DISCONTINUED | OUTPATIENT
Start: 2019-12-16 | End: 2019-12-20 | Stop reason: HOSPADM

## 2019-12-15 RX ORDER — OXYCODONE AND ACETAMINOPHEN 10; 325 MG/1; MG/1
1 TABLET ORAL ONCE
Status: COMPLETED | OUTPATIENT
Start: 2019-12-15 | End: 2019-12-15

## 2019-12-15 RX ORDER — OXYCODONE HYDROCHLORIDE AND ACETAMINOPHEN 5; 325 MG/1; MG/1
2 TABLET ORAL 2 TIMES DAILY PRN
Status: DISCONTINUED | OUTPATIENT
Start: 2019-12-15 | End: 2019-12-19

## 2019-12-15 RX ORDER — INSULIN GLARGINE 100 [IU]/ML
10 INJECTION, SOLUTION SUBCUTANEOUS NIGHTLY
Status: DISCONTINUED | OUTPATIENT
Start: 2019-12-15 | End: 2019-12-20 | Stop reason: HOSPADM

## 2019-12-15 RX ORDER — RANOLAZINE 500 MG/1
1000 TABLET, EXTENDED RELEASE ORAL 2 TIMES DAILY
Status: ON HOLD | COMMUNITY
End: 2020-07-16 | Stop reason: SDUPTHER

## 2019-12-15 RX ORDER — ISOSORBIDE MONONITRATE 60 MG/1
60 TABLET, EXTENDED RELEASE ORAL DAILY
Status: DISCONTINUED | OUTPATIENT
Start: 2019-12-16 | End: 2019-12-20 | Stop reason: HOSPADM

## 2019-12-15 RX ADMIN — INSULIN LISPRO 2 UNITS: 100 INJECTION, SOLUTION INTRAVENOUS; SUBCUTANEOUS at 11:43

## 2019-12-15 RX ADMIN — GABAPENTIN 600 MG: 300 CAPSULE ORAL at 09:17

## 2019-12-15 RX ADMIN — SODIUM CHLORIDE, PRESERVATIVE FREE 10 ML: 5 INJECTION INTRAVENOUS at 09:17

## 2019-12-15 RX ADMIN — ENOXAPARIN SODIUM 40 MG: 40 INJECTION SUBCUTANEOUS at 22:01

## 2019-12-15 RX ADMIN — ATORVASTATIN CALCIUM 80 MG: 80 TABLET, FILM COATED ORAL at 09:17

## 2019-12-15 RX ADMIN — METOPROLOL SUCCINATE 25 MG: 25 TABLET, EXTENDED RELEASE ORAL at 09:16

## 2019-12-15 RX ADMIN — OXYCODONE HYDROCHLORIDE AND ACETAMINOPHEN 1 TABLET: 10; 325 TABLET ORAL at 00:35

## 2019-12-15 RX ADMIN — ASPIRIN 81 MG 81 MG: 81 TABLET ORAL at 09:17

## 2019-12-15 RX ADMIN — TRAZODONE HYDROCHLORIDE 50 MG: 50 TABLET ORAL at 22:00

## 2019-12-15 RX ADMIN — INSULIN LISPRO 1 UNITS: 100 INJECTION, SOLUTION INTRAVENOUS; SUBCUTANEOUS at 09:17

## 2019-12-15 RX ADMIN — ISOSORBIDE MONONITRATE 90 MG: 60 TABLET, EXTENDED RELEASE ORAL at 09:17

## 2019-12-15 RX ADMIN — MORPHINE SULFATE 2 MG: 2 INJECTION, SOLUTION INTRAMUSCULAR; INTRAVENOUS at 05:50

## 2019-12-15 RX ADMIN — CLOPIDOGREL BISULFATE 75 MG: 75 TABLET ORAL at 09:17

## 2019-12-15 RX ADMIN — SODIUM CHLORIDE, PRESERVATIVE FREE 10 ML: 5 INJECTION INTRAVENOUS at 22:01

## 2019-12-15 RX ADMIN — RANOLAZINE 1000 MG: 500 TABLET, FILM COATED, EXTENDED RELEASE ORAL at 22:00

## 2019-12-15 RX ADMIN — GABAPENTIN 600 MG: 300 CAPSULE ORAL at 14:25

## 2019-12-15 RX ADMIN — PANTOPRAZOLE SODIUM 40 MG: 40 TABLET, DELAYED RELEASE ORAL at 05:50

## 2019-12-15 RX ADMIN — MORPHINE SULFATE 2 MG: 2 INJECTION, SOLUTION INTRAMUSCULAR; INTRAVENOUS at 20:50

## 2019-12-15 RX ADMIN — RANOLAZINE 500 MG: 500 TABLET, FILM COATED, EXTENDED RELEASE ORAL at 09:17

## 2019-12-15 RX ADMIN — OXYCODONE HYDROCHLORIDE AND ACETAMINOPHEN 2 TABLET: 5; 325 TABLET ORAL at 18:40

## 2019-12-15 RX ADMIN — MORPHINE SULFATE 2 MG: 2 INJECTION, SOLUTION INTRAMUSCULAR; INTRAVENOUS at 16:25

## 2019-12-15 RX ADMIN — SODIUM CHLORIDE, PRESERVATIVE FREE 10 ML: 5 INJECTION INTRAVENOUS at 20:50

## 2019-12-15 RX ADMIN — GABAPENTIN 600 MG: 300 CAPSULE ORAL at 22:00

## 2019-12-15 RX ADMIN — INSULIN GLARGINE 10 UNITS: 100 INJECTION, SOLUTION SUBCUTANEOUS at 22:01

## 2019-12-15 RX ADMIN — MORPHINE SULFATE 2 MG: 2 INJECTION, SOLUTION INTRAMUSCULAR; INTRAVENOUS at 11:39

## 2019-12-15 ASSESSMENT — PAIN DESCRIPTION - FREQUENCY
FREQUENCY: CONTINUOUS

## 2019-12-15 ASSESSMENT — PAIN DESCRIPTION - ORIENTATION
ORIENTATION: LOWER

## 2019-12-15 ASSESSMENT — PAIN DESCRIPTION - PAIN TYPE
TYPE: ACUTE PAIN
TYPE: ACUTE PAIN
TYPE: ACUTE PAIN;CHRONIC PAIN
TYPE: ACUTE PAIN;CHRONIC PAIN
TYPE: ACUTE PAIN
TYPE: ACUTE PAIN

## 2019-12-15 ASSESSMENT — PAIN SCALES - GENERAL
PAINLEVEL_OUTOF10: 0
PAINLEVEL_OUTOF10: 7
PAINLEVEL_OUTOF10: 7
PAINLEVEL_OUTOF10: 6
PAINLEVEL_OUTOF10: 0
PAINLEVEL_OUTOF10: 6
PAINLEVEL_OUTOF10: 7
PAINLEVEL_OUTOF10: 10
PAINLEVEL_OUTOF10: 7
PAINLEVEL_OUTOF10: 6
PAINLEVEL_OUTOF10: 4
PAINLEVEL_OUTOF10: 10
PAINLEVEL_OUTOF10: 2

## 2019-12-15 ASSESSMENT — PAIN DESCRIPTION - LOCATION
LOCATION: BACK

## 2019-12-15 ASSESSMENT — PAIN DESCRIPTION - ONSET
ONSET: ON-GOING

## 2019-12-15 ASSESSMENT — PAIN DESCRIPTION - DESCRIPTORS
DESCRIPTORS: ACHING
DESCRIPTORS: SHARP
DESCRIPTORS: ACHING
DESCRIPTORS: SHARP

## 2019-12-15 ASSESSMENT — PAIN DESCRIPTION - DIRECTION: RADIATING_TOWARDS: LEFT LEG

## 2019-12-16 LAB
ALBUMIN SERPL-MCNC: 3.6 G/DL (ref 3.4–5)
ANION GAP SERPL CALCULATED.3IONS-SCNC: 15 MMOL/L (ref 3–16)
BUN BLDV-MCNC: 17 MG/DL (ref 7–20)
CALCIUM SERPL-MCNC: 8.8 MG/DL (ref 8.3–10.6)
CHLORIDE BLD-SCNC: 103 MMOL/L (ref 99–110)
CO2: 21 MMOL/L (ref 21–32)
CREAT SERPL-MCNC: 1.2 MG/DL (ref 0.8–1.3)
EKG ATRIAL RATE: 108 BPM
EKG DIAGNOSIS: NORMAL
EKG P AXIS: 70 DEGREES
EKG P-R INTERVAL: 126 MS
EKG Q-T INTERVAL: 382 MS
EKG QRS DURATION: 132 MS
EKG QTC CALCULATION (BAZETT): 511 MS
EKG R AXIS: 45 DEGREES
EKG T AXIS: 25 DEGREES
EKG VENTRICULAR RATE: 108 BPM
GFR AFRICAN AMERICAN: >60
GFR NON-AFRICAN AMERICAN: >60
GLUCOSE BLD-MCNC: 117 MG/DL (ref 70–99)
GLUCOSE BLD-MCNC: 118 MG/DL (ref 70–99)
GLUCOSE BLD-MCNC: 118 MG/DL (ref 70–99)
GLUCOSE BLD-MCNC: 145 MG/DL (ref 70–99)
GLUCOSE BLD-MCNC: 96 MG/DL (ref 70–99)
LV EF: 38 %
LVEF MODALITY: NORMAL
MAGNESIUM: 2.1 MG/DL (ref 1.8–2.4)
PERFORMED ON: ABNORMAL
PERFORMED ON: NORMAL
PHOSPHORUS: 4.6 MG/DL (ref 2.5–4.9)
POTASSIUM SERPL-SCNC: 4.4 MMOL/L (ref 3.5–5.1)
SODIUM BLD-SCNC: 139 MMOL/L (ref 136–145)

## 2019-12-16 PROCEDURE — 96376 TX/PRO/DX INJ SAME DRUG ADON: CPT

## 2019-12-16 PROCEDURE — 6370000000 HC RX 637 (ALT 250 FOR IP): Performed by: HOSPITALIST

## 2019-12-16 PROCEDURE — 83735 ASSAY OF MAGNESIUM: CPT

## 2019-12-16 PROCEDURE — 6370000000 HC RX 637 (ALT 250 FOR IP): Performed by: INTERNAL MEDICINE

## 2019-12-16 PROCEDURE — 36415 COLL VENOUS BLD VENIPUNCTURE: CPT

## 2019-12-16 PROCEDURE — 6360000002 HC RX W HCPCS: Performed by: HOSPITALIST

## 2019-12-16 PROCEDURE — 99232 SBSQ HOSP IP/OBS MODERATE 35: CPT | Performed by: NURSE PRACTITIONER

## 2019-12-16 PROCEDURE — 2580000003 HC RX 258: Performed by: NURSE PRACTITIONER

## 2019-12-16 PROCEDURE — 93010 ELECTROCARDIOGRAM REPORT: CPT | Performed by: INTERNAL MEDICINE

## 2019-12-16 PROCEDURE — 93306 TTE W/DOPPLER COMPLETE: CPT

## 2019-12-16 PROCEDURE — 97530 THERAPEUTIC ACTIVITIES: CPT

## 2019-12-16 PROCEDURE — 94760 N-INVAS EAR/PLS OXIMETRY 1: CPT

## 2019-12-16 PROCEDURE — 1200000000 HC SEMI PRIVATE

## 2019-12-16 PROCEDURE — 80069 RENAL FUNCTION PANEL: CPT

## 2019-12-16 PROCEDURE — 97166 OT EVAL MOD COMPLEX 45 MIN: CPT

## 2019-12-16 PROCEDURE — 2580000003 HC RX 258: Performed by: HOSPITALIST

## 2019-12-16 RX ORDER — SODIUM CHLORIDE 0.9 % (FLUSH) 0.9 %
10 SYRINGE (ML) INJECTION PRN
Status: DISCONTINUED | OUTPATIENT
Start: 2019-12-16 | End: 2019-12-20 | Stop reason: HOSPADM

## 2019-12-16 RX ORDER — SODIUM CHLORIDE 0.9 % (FLUSH) 0.9 %
10 SYRINGE (ML) INJECTION EVERY 12 HOURS SCHEDULED
Status: DISCONTINUED | OUTPATIENT
Start: 2019-12-16 | End: 2019-12-20 | Stop reason: HOSPADM

## 2019-12-16 RX ORDER — FUROSEMIDE 20 MG/1
20 TABLET ORAL DAILY
Status: DISCONTINUED | OUTPATIENT
Start: 2019-12-17 | End: 2019-12-20 | Stop reason: HOSPADM

## 2019-12-16 RX ORDER — SODIUM CHLORIDE 9 MG/ML
INJECTION, SOLUTION INTRAVENOUS CONTINUOUS
Status: DISCONTINUED | OUTPATIENT
Start: 2019-12-16 | End: 2019-12-19

## 2019-12-16 RX ADMIN — GABAPENTIN 600 MG: 300 CAPSULE ORAL at 08:54

## 2019-12-16 RX ADMIN — OXYCODONE HYDROCHLORIDE AND ACETAMINOPHEN 2 TABLET: 5; 325 TABLET ORAL at 06:06

## 2019-12-16 RX ADMIN — SODIUM CHLORIDE, PRESERVATIVE FREE 10 ML: 5 INJECTION INTRAVENOUS at 08:59

## 2019-12-16 RX ADMIN — METOPROLOL SUCCINATE 50 MG: 50 TABLET, EXTENDED RELEASE ORAL at 08:54

## 2019-12-16 RX ADMIN — RANOLAZINE 1000 MG: 500 TABLET, FILM COATED, EXTENDED RELEASE ORAL at 20:42

## 2019-12-16 RX ADMIN — TRAZODONE HYDROCHLORIDE 50 MG: 50 TABLET ORAL at 20:42

## 2019-12-16 RX ADMIN — PANTOPRAZOLE SODIUM 40 MG: 40 TABLET, DELAYED RELEASE ORAL at 06:06

## 2019-12-16 RX ADMIN — CLOPIDOGREL BISULFATE 75 MG: 75 TABLET ORAL at 08:54

## 2019-12-16 RX ADMIN — SODIUM CHLORIDE, PRESERVATIVE FREE 10 ML: 5 INJECTION INTRAVENOUS at 20:42

## 2019-12-16 RX ADMIN — SODIUM CHLORIDE: 9 INJECTION, SOLUTION INTRAVENOUS at 18:45

## 2019-12-16 RX ADMIN — SODIUM CHLORIDE, PRESERVATIVE FREE 10 ML: 5 INJECTION INTRAVENOUS at 20:41

## 2019-12-16 RX ADMIN — MORPHINE SULFATE 2 MG: 2 INJECTION, SOLUTION INTRAMUSCULAR; INTRAVENOUS at 08:55

## 2019-12-16 RX ADMIN — OXYCODONE HYDROCHLORIDE AND ACETAMINOPHEN 2 TABLET: 5; 325 TABLET ORAL at 18:45

## 2019-12-16 RX ADMIN — MORPHINE SULFATE 2 MG: 2 INJECTION, SOLUTION INTRAMUSCULAR; INTRAVENOUS at 20:41

## 2019-12-16 RX ADMIN — ENOXAPARIN SODIUM 40 MG: 40 INJECTION SUBCUTANEOUS at 20:41

## 2019-12-16 RX ADMIN — ASPIRIN 81 MG 81 MG: 81 TABLET ORAL at 08:54

## 2019-12-16 RX ADMIN — MORPHINE SULFATE 2 MG: 2 INJECTION, SOLUTION INTRAMUSCULAR; INTRAVENOUS at 01:54

## 2019-12-16 RX ADMIN — RANOLAZINE 1000 MG: 500 TABLET, FILM COATED, EXTENDED RELEASE ORAL at 08:54

## 2019-12-16 RX ADMIN — GABAPENTIN 600 MG: 300 CAPSULE ORAL at 14:01

## 2019-12-16 RX ADMIN — GABAPENTIN 600 MG: 300 CAPSULE ORAL at 20:42

## 2019-12-16 RX ADMIN — INSULIN GLARGINE 10 UNITS: 100 INJECTION, SOLUTION SUBCUTANEOUS at 20:38

## 2019-12-16 RX ADMIN — ATORVASTATIN CALCIUM 80 MG: 80 TABLET, FILM COATED ORAL at 08:54

## 2019-12-16 RX ADMIN — MORPHINE SULFATE 2 MG: 2 INJECTION, SOLUTION INTRAMUSCULAR; INTRAVENOUS at 15:20

## 2019-12-16 RX ADMIN — ISOSORBIDE MONONITRATE 60 MG: 60 TABLET, EXTENDED RELEASE ORAL at 08:54

## 2019-12-16 ASSESSMENT — PAIN SCALES - GENERAL
PAINLEVEL_OUTOF10: 8
PAINLEVEL_OUTOF10: 3
PAINLEVEL_OUTOF10: 6
PAINLEVEL_OUTOF10: 7
PAINLEVEL_OUTOF10: 6
PAINLEVEL_OUTOF10: 8
PAINLEVEL_OUTOF10: 6
PAINLEVEL_OUTOF10: 7
PAINLEVEL_OUTOF10: 6
PAINLEVEL_OUTOF10: 0
PAINLEVEL_OUTOF10: 6
PAINLEVEL_OUTOF10: 0
PAINLEVEL_OUTOF10: 8
PAINLEVEL_OUTOF10: 7

## 2019-12-16 ASSESSMENT — PAIN DESCRIPTION - DESCRIPTORS
DESCRIPTORS: SHARP
DESCRIPTORS: SHOOTING;SHARP

## 2019-12-16 ASSESSMENT — PAIN DESCRIPTION - ORIENTATION
ORIENTATION: LOWER
ORIENTATION: LOWER

## 2019-12-16 ASSESSMENT — PAIN DESCRIPTION - FREQUENCY
FREQUENCY: CONTINUOUS
FREQUENCY: CONTINUOUS

## 2019-12-16 ASSESSMENT — PAIN DESCRIPTION - LOCATION
LOCATION: BACK
LOCATION: BACK

## 2019-12-16 ASSESSMENT — PAIN DESCRIPTION - ONSET
ONSET: ON-GOING
ONSET: ON-GOING

## 2019-12-16 ASSESSMENT — PAIN DESCRIPTION - PAIN TYPE
TYPE: ACUTE PAIN
TYPE: ACUTE PAIN

## 2019-12-16 ASSESSMENT — PAIN DESCRIPTION - PROGRESSION
CLINICAL_PROGRESSION: NOT CHANGED
CLINICAL_PROGRESSION: NOT CHANGED

## 2019-12-16 ASSESSMENT — PAIN DESCRIPTION - DIRECTION: RADIATING_TOWARDS: LEFT LEG

## 2019-12-17 LAB
ALBUMIN SERPL-MCNC: 3.4 G/DL (ref 3.4–5)
ANION GAP SERPL CALCULATED.3IONS-SCNC: 12 MMOL/L (ref 3–16)
BUN BLDV-MCNC: 18 MG/DL (ref 7–20)
CALCIUM SERPL-MCNC: 8.4 MG/DL (ref 8.3–10.6)
CHLORIDE BLD-SCNC: 104 MMOL/L (ref 99–110)
CO2: 21 MMOL/L (ref 21–32)
CREAT SERPL-MCNC: 1.1 MG/DL (ref 0.8–1.3)
GFR AFRICAN AMERICAN: >60
GFR NON-AFRICAN AMERICAN: >60
GLUCOSE BLD-MCNC: 105 MG/DL (ref 70–99)
GLUCOSE BLD-MCNC: 105 MG/DL (ref 70–99)
GLUCOSE BLD-MCNC: 121 MG/DL (ref 70–99)
GLUCOSE BLD-MCNC: 126 MG/DL (ref 70–99)
GLUCOSE BLD-MCNC: 132 MG/DL (ref 70–99)
MAGNESIUM: 2 MG/DL (ref 1.8–2.4)
PERFORMED ON: ABNORMAL
PHOSPHORUS: 4.6 MG/DL (ref 2.5–4.9)
POTASSIUM SERPL-SCNC: 4 MMOL/L (ref 3.5–5.1)
SODIUM BLD-SCNC: 137 MMOL/L (ref 136–145)

## 2019-12-17 PROCEDURE — 6370000000 HC RX 637 (ALT 250 FOR IP): Performed by: INTERNAL MEDICINE

## 2019-12-17 PROCEDURE — 93320 DOPPLER ECHO COMPLETE: CPT

## 2019-12-17 PROCEDURE — 80069 RENAL FUNCTION PANEL: CPT

## 2019-12-17 PROCEDURE — 94760 N-INVAS EAR/PLS OXIMETRY 1: CPT

## 2019-12-17 PROCEDURE — 83735 ASSAY OF MAGNESIUM: CPT

## 2019-12-17 PROCEDURE — 6370000000 HC RX 637 (ALT 250 FOR IP): Performed by: NURSE PRACTITIONER

## 2019-12-17 PROCEDURE — 2580000003 HC RX 258: Performed by: HOSPITALIST

## 2019-12-17 PROCEDURE — 2580000003 HC RX 258: Performed by: NURSE PRACTITIONER

## 2019-12-17 PROCEDURE — 97116 GAIT TRAINING THERAPY: CPT | Performed by: PHYSICAL THERAPIST

## 2019-12-17 PROCEDURE — 93325 DOPPLER ECHO COLOR FLOW MAPG: CPT

## 2019-12-17 PROCEDURE — 6370000000 HC RX 637 (ALT 250 FOR IP): Performed by: HOSPITALIST

## 2019-12-17 PROCEDURE — 2580000003 HC RX 258

## 2019-12-17 PROCEDURE — 1200000000 HC SEMI PRIVATE

## 2019-12-17 PROCEDURE — 97530 THERAPEUTIC ACTIVITIES: CPT

## 2019-12-17 PROCEDURE — 97535 SELF CARE MNGMENT TRAINING: CPT

## 2019-12-17 PROCEDURE — 6360000002 HC RX W HCPCS

## 2019-12-17 PROCEDURE — 36415 COLL VENOUS BLD VENIPUNCTURE: CPT

## 2019-12-17 PROCEDURE — 93312 ECHO TRANSESOPHAGEAL: CPT

## 2019-12-17 PROCEDURE — 93970 EXTREMITY STUDY: CPT

## 2019-12-17 PROCEDURE — 99232 SBSQ HOSP IP/OBS MODERATE 35: CPT | Performed by: NURSE PRACTITIONER

## 2019-12-17 PROCEDURE — 6360000002 HC RX W HCPCS: Performed by: HOSPITALIST

## 2019-12-17 RX ORDER — LISINOPRIL 5 MG/1
5 TABLET ORAL DAILY
Status: DISCONTINUED | OUTPATIENT
Start: 2019-12-17 | End: 2019-12-19

## 2019-12-17 RX ADMIN — FUROSEMIDE 20 MG: 20 TABLET ORAL at 15:40

## 2019-12-17 RX ADMIN — SODIUM CHLORIDE, PRESERVATIVE FREE 10 ML: 5 INJECTION INTRAVENOUS at 09:00

## 2019-12-17 RX ADMIN — LISINOPRIL 5 MG: 5 TABLET ORAL at 15:40

## 2019-12-17 RX ADMIN — MORPHINE SULFATE 2 MG: 2 INJECTION, SOLUTION INTRAMUSCULAR; INTRAVENOUS at 00:49

## 2019-12-17 RX ADMIN — RANOLAZINE 1000 MG: 500 TABLET, FILM COATED, EXTENDED RELEASE ORAL at 19:59

## 2019-12-17 RX ADMIN — ISOSORBIDE MONONITRATE 60 MG: 60 TABLET, EXTENDED RELEASE ORAL at 15:40

## 2019-12-17 RX ADMIN — SODIUM CHLORIDE, PRESERVATIVE FREE 10 ML: 5 INJECTION INTRAVENOUS at 20:01

## 2019-12-17 RX ADMIN — ENOXAPARIN SODIUM 40 MG: 40 INJECTION SUBCUTANEOUS at 19:59

## 2019-12-17 RX ADMIN — SODIUM CHLORIDE, PRESERVATIVE FREE 10 ML: 5 INJECTION INTRAVENOUS at 20:00

## 2019-12-17 RX ADMIN — INSULIN GLARGINE 10 UNITS: 100 INJECTION, SOLUTION SUBCUTANEOUS at 19:45

## 2019-12-17 RX ADMIN — MORPHINE SULFATE 2 MG: 2 INJECTION, SOLUTION INTRAMUSCULAR; INTRAVENOUS at 15:41

## 2019-12-17 RX ADMIN — TRAZODONE HYDROCHLORIDE 50 MG: 50 TABLET ORAL at 19:59

## 2019-12-17 RX ADMIN — ATORVASTATIN CALCIUM 80 MG: 80 TABLET, FILM COATED ORAL at 15:41

## 2019-12-17 RX ADMIN — OXYCODONE HYDROCHLORIDE AND ACETAMINOPHEN 2 TABLET: 5; 325 TABLET ORAL at 22:17

## 2019-12-17 RX ADMIN — OXYCODONE HYDROCHLORIDE AND ACETAMINOPHEN 2 TABLET: 5; 325 TABLET ORAL at 08:08

## 2019-12-17 RX ADMIN — GABAPENTIN 600 MG: 300 CAPSULE ORAL at 15:39

## 2019-12-17 RX ADMIN — ASPIRIN 81 MG 81 MG: 81 TABLET ORAL at 15:41

## 2019-12-17 RX ADMIN — MORPHINE SULFATE 2 MG: 2 INJECTION, SOLUTION INTRAMUSCULAR; INTRAVENOUS at 11:38

## 2019-12-17 RX ADMIN — METOPROLOL SUCCINATE 50 MG: 50 TABLET, EXTENDED RELEASE ORAL at 15:40

## 2019-12-17 RX ADMIN — GABAPENTIN 600 MG: 300 CAPSULE ORAL at 19:59

## 2019-12-17 RX ADMIN — MORPHINE SULFATE 2 MG: 2 INJECTION, SOLUTION INTRAMUSCULAR; INTRAVENOUS at 05:34

## 2019-12-17 RX ADMIN — CLOPIDOGREL BISULFATE 75 MG: 75 TABLET ORAL at 15:40

## 2019-12-17 RX ADMIN — MORPHINE SULFATE 2 MG: 2 INJECTION, SOLUTION INTRAMUSCULAR; INTRAVENOUS at 19:59

## 2019-12-17 ASSESSMENT — PAIN DESCRIPTION - DESCRIPTORS
DESCRIPTORS: ACHING
DESCRIPTORS: ACHING
DESCRIPTORS: SHARP
DESCRIPTORS: ACHING
DESCRIPTORS: ACHING
DESCRIPTORS: SHOOTING;SHARP
DESCRIPTORS: SHOOTING;SHARP
DESCRIPTORS: ACHING;DISCOMFORT
DESCRIPTORS: ACHING
DESCRIPTORS: ACHING;DISCOMFORT

## 2019-12-17 ASSESSMENT — PAIN SCALES - GENERAL
PAINLEVEL_OUTOF10: 7
PAINLEVEL_OUTOF10: 7
PAINLEVEL_OUTOF10: 6
PAINLEVEL_OUTOF10: 7
PAINLEVEL_OUTOF10: 0
PAINLEVEL_OUTOF10: 7
PAINLEVEL_OUTOF10: 0
PAINLEVEL_OUTOF10: 7
PAINLEVEL_OUTOF10: 7
PAINLEVEL_OUTOF10: 6
PAINLEVEL_OUTOF10: 8
PAINLEVEL_OUTOF10: 7
PAINLEVEL_OUTOF10: 6
PAINLEVEL_OUTOF10: 9
PAINLEVEL_OUTOF10: 9
PAINLEVEL_OUTOF10: 6
PAINLEVEL_OUTOF10: 6

## 2019-12-17 ASSESSMENT — PAIN DESCRIPTION - FREQUENCY
FREQUENCY: CONTINUOUS

## 2019-12-17 ASSESSMENT — PAIN - FUNCTIONAL ASSESSMENT
PAIN_FUNCTIONAL_ASSESSMENT: PREVENTS OR INTERFERES SOME ACTIVE ACTIVITIES AND ADLS
PAIN_FUNCTIONAL_ASSESSMENT: ACTIVITIES ARE NOT PREVENTED
PAIN_FUNCTIONAL_ASSESSMENT: PREVENTS OR INTERFERES SOME ACTIVE ACTIVITIES AND ADLS
PAIN_FUNCTIONAL_ASSESSMENT: ACTIVITIES ARE NOT PREVENTED
PAIN_FUNCTIONAL_ASSESSMENT: PREVENTS OR INTERFERES SOME ACTIVE ACTIVITIES AND ADLS
PAIN_FUNCTIONAL_ASSESSMENT: ACTIVITIES ARE NOT PREVENTED
PAIN_FUNCTIONAL_ASSESSMENT: PREVENTS OR INTERFERES SOME ACTIVE ACTIVITIES AND ADLS
PAIN_FUNCTIONAL_ASSESSMENT: PREVENTS OR INTERFERES SOME ACTIVE ACTIVITIES AND ADLS

## 2019-12-17 ASSESSMENT — PAIN DESCRIPTION - PAIN TYPE
TYPE: ACUTE PAIN

## 2019-12-17 ASSESSMENT — PAIN DESCRIPTION - ONSET
ONSET: ON-GOING

## 2019-12-17 ASSESSMENT — PAIN DESCRIPTION - LOCATION
LOCATION: BACK

## 2019-12-17 ASSESSMENT — PAIN DESCRIPTION - ORIENTATION
ORIENTATION: LOWER

## 2019-12-17 ASSESSMENT — PAIN DESCRIPTION - PROGRESSION
CLINICAL_PROGRESSION: NOT CHANGED
CLINICAL_PROGRESSION: GRADUALLY IMPROVING
CLINICAL_PROGRESSION: NOT CHANGED
CLINICAL_PROGRESSION: GRADUALLY IMPROVING
CLINICAL_PROGRESSION: NOT CHANGED
CLINICAL_PROGRESSION: NOT CHANGED
CLINICAL_PROGRESSION: GRADUALLY WORSENING

## 2019-12-17 ASSESSMENT — PAIN DESCRIPTION - DIRECTION
RADIATING_TOWARDS: L LEG

## 2019-12-18 LAB
ALBUMIN SERPL-MCNC: 3.2 G/DL (ref 3.4–5)
ANION GAP SERPL CALCULATED.3IONS-SCNC: 11 MMOL/L (ref 3–16)
BUN BLDV-MCNC: 17 MG/DL (ref 7–20)
CALCIUM SERPL-MCNC: 8.5 MG/DL (ref 8.3–10.6)
CHLORIDE BLD-SCNC: 105 MMOL/L (ref 99–110)
CO2: 23 MMOL/L (ref 21–32)
CREAT SERPL-MCNC: 1.1 MG/DL (ref 0.8–1.3)
GFR AFRICAN AMERICAN: >60
GFR NON-AFRICAN AMERICAN: >60
GLUCOSE BLD-MCNC: 109 MG/DL (ref 70–99)
GLUCOSE BLD-MCNC: 110 MG/DL (ref 70–99)
GLUCOSE BLD-MCNC: 110 MG/DL (ref 70–99)
GLUCOSE BLD-MCNC: 163 MG/DL (ref 70–99)
GLUCOSE BLD-MCNC: 167 MG/DL (ref 70–99)
MAGNESIUM: 1.9 MG/DL (ref 1.8–2.4)
PERFORMED ON: ABNORMAL
PHOSPHORUS: 4.4 MG/DL (ref 2.5–4.9)
POTASSIUM SERPL-SCNC: 4.4 MMOL/L (ref 3.5–5.1)
SODIUM BLD-SCNC: 139 MMOL/L (ref 136–145)

## 2019-12-18 PROCEDURE — 6370000000 HC RX 637 (ALT 250 FOR IP): Performed by: HOSPITALIST

## 2019-12-18 PROCEDURE — 97110 THERAPEUTIC EXERCISES: CPT

## 2019-12-18 PROCEDURE — 6370000000 HC RX 637 (ALT 250 FOR IP): Performed by: INTERNAL MEDICINE

## 2019-12-18 PROCEDURE — 80069 RENAL FUNCTION PANEL: CPT

## 2019-12-18 PROCEDURE — 2580000003 HC RX 258: Performed by: NURSE PRACTITIONER

## 2019-12-18 PROCEDURE — 36415 COLL VENOUS BLD VENIPUNCTURE: CPT

## 2019-12-18 PROCEDURE — 6360000002 HC RX W HCPCS: Performed by: HOSPITALIST

## 2019-12-18 PROCEDURE — 83735 ASSAY OF MAGNESIUM: CPT

## 2019-12-18 PROCEDURE — 94760 N-INVAS EAR/PLS OXIMETRY 1: CPT

## 2019-12-18 PROCEDURE — 97116 GAIT TRAINING THERAPY: CPT

## 2019-12-18 PROCEDURE — 1200000000 HC SEMI PRIVATE

## 2019-12-18 PROCEDURE — 6370000000 HC RX 637 (ALT 250 FOR IP): Performed by: NURSE PRACTITIONER

## 2019-12-18 PROCEDURE — 2580000003 HC RX 258: Performed by: HOSPITALIST

## 2019-12-18 PROCEDURE — 97530 THERAPEUTIC ACTIVITIES: CPT

## 2019-12-18 RX ADMIN — GABAPENTIN 600 MG: 300 CAPSULE ORAL at 21:00

## 2019-12-18 RX ADMIN — MORPHINE SULFATE 2 MG: 2 INJECTION, SOLUTION INTRAMUSCULAR; INTRAVENOUS at 18:33

## 2019-12-18 RX ADMIN — OXYCODONE HYDROCHLORIDE AND ACETAMINOPHEN 2 TABLET: 5; 325 TABLET ORAL at 08:06

## 2019-12-18 RX ADMIN — FUROSEMIDE 20 MG: 20 TABLET ORAL at 08:04

## 2019-12-18 RX ADMIN — LISINOPRIL 5 MG: 5 TABLET ORAL at 08:04

## 2019-12-18 RX ADMIN — SODIUM CHLORIDE, PRESERVATIVE FREE 10 ML: 5 INJECTION INTRAVENOUS at 08:05

## 2019-12-18 RX ADMIN — ISOSORBIDE MONONITRATE 60 MG: 60 TABLET, EXTENDED RELEASE ORAL at 08:04

## 2019-12-18 RX ADMIN — RANOLAZINE 1000 MG: 500 TABLET, FILM COATED, EXTENDED RELEASE ORAL at 21:00

## 2019-12-18 RX ADMIN — MORPHINE SULFATE 2 MG: 2 INJECTION, SOLUTION INTRAMUSCULAR; INTRAVENOUS at 14:13

## 2019-12-18 RX ADMIN — MORPHINE SULFATE 2 MG: 2 INJECTION, SOLUTION INTRAMUSCULAR; INTRAVENOUS at 01:21

## 2019-12-18 RX ADMIN — MORPHINE SULFATE 2 MG: 2 INJECTION, SOLUTION INTRAMUSCULAR; INTRAVENOUS at 10:08

## 2019-12-18 RX ADMIN — MORPHINE SULFATE 2 MG: 2 INJECTION, SOLUTION INTRAMUSCULAR; INTRAVENOUS at 05:34

## 2019-12-18 RX ADMIN — ASPIRIN 81 MG 81 MG: 81 TABLET ORAL at 08:04

## 2019-12-18 RX ADMIN — INSULIN LISPRO 1 UNITS: 100 INJECTION, SOLUTION INTRAVENOUS; SUBCUTANEOUS at 12:54

## 2019-12-18 RX ADMIN — GABAPENTIN 600 MG: 300 CAPSULE ORAL at 08:04

## 2019-12-18 RX ADMIN — TRAZODONE HYDROCHLORIDE 50 MG: 50 TABLET ORAL at 21:00

## 2019-12-18 RX ADMIN — SODIUM CHLORIDE, PRESERVATIVE FREE 10 ML: 5 INJECTION INTRAVENOUS at 22:42

## 2019-12-18 RX ADMIN — OXYCODONE HYDROCHLORIDE AND ACETAMINOPHEN 2 TABLET: 5; 325 TABLET ORAL at 21:00

## 2019-12-18 RX ADMIN — GABAPENTIN 600 MG: 300 CAPSULE ORAL at 14:13

## 2019-12-18 RX ADMIN — METOPROLOL SUCCINATE 50 MG: 50 TABLET, EXTENDED RELEASE ORAL at 08:04

## 2019-12-18 RX ADMIN — CLOPIDOGREL BISULFATE 75 MG: 75 TABLET ORAL at 08:04

## 2019-12-18 RX ADMIN — ENOXAPARIN SODIUM 40 MG: 40 INJECTION SUBCUTANEOUS at 21:00

## 2019-12-18 RX ADMIN — ATORVASTATIN CALCIUM 80 MG: 80 TABLET, FILM COATED ORAL at 08:05

## 2019-12-18 RX ADMIN — RANOLAZINE 1000 MG: 500 TABLET, FILM COATED, EXTENDED RELEASE ORAL at 08:04

## 2019-12-18 RX ADMIN — PANTOPRAZOLE SODIUM 40 MG: 40 TABLET, DELAYED RELEASE ORAL at 05:34

## 2019-12-18 RX ADMIN — INSULIN GLARGINE 10 UNITS: 100 INJECTION, SOLUTION SUBCUTANEOUS at 21:03

## 2019-12-18 RX ADMIN — MORPHINE SULFATE 2 MG: 2 INJECTION, SOLUTION INTRAMUSCULAR; INTRAVENOUS at 23:32

## 2019-12-18 ASSESSMENT — PAIN DESCRIPTION - FREQUENCY
FREQUENCY: CONTINUOUS

## 2019-12-18 ASSESSMENT — PAIN - FUNCTIONAL ASSESSMENT
PAIN_FUNCTIONAL_ASSESSMENT: ACTIVITIES ARE NOT PREVENTED
PAIN_FUNCTIONAL_ASSESSMENT: PREVENTS OR INTERFERES SOME ACTIVE ACTIVITIES AND ADLS
PAIN_FUNCTIONAL_ASSESSMENT: ACTIVITIES ARE NOT PREVENTED
PAIN_FUNCTIONAL_ASSESSMENT: PREVENTS OR INTERFERES SOME ACTIVE ACTIVITIES AND ADLS
PAIN_FUNCTIONAL_ASSESSMENT: PREVENTS OR INTERFERES SOME ACTIVE ACTIVITIES AND ADLS
PAIN_FUNCTIONAL_ASSESSMENT: ACTIVITIES ARE NOT PREVENTED
PAIN_FUNCTIONAL_ASSESSMENT: ACTIVITIES ARE NOT PREVENTED

## 2019-12-18 ASSESSMENT — PAIN SCALES - GENERAL
PAINLEVEL_OUTOF10: 5
PAINLEVEL_OUTOF10: 3
PAINLEVEL_OUTOF10: 7
PAINLEVEL_OUTOF10: 8
PAINLEVEL_OUTOF10: 7
PAINLEVEL_OUTOF10: 4
PAINLEVEL_OUTOF10: 3
PAINLEVEL_OUTOF10: 0
PAINLEVEL_OUTOF10: 6
PAINLEVEL_OUTOF10: 5
PAINLEVEL_OUTOF10: 5
PAINLEVEL_OUTOF10: 8
PAINLEVEL_OUTOF10: 7

## 2019-12-18 ASSESSMENT — PAIN DESCRIPTION - PAIN TYPE
TYPE: CHRONIC PAIN
TYPE: ACUTE PAIN
TYPE: CHRONIC PAIN

## 2019-12-18 ASSESSMENT — PAIN DESCRIPTION - LOCATION
LOCATION: BACK

## 2019-12-18 ASSESSMENT — PAIN DESCRIPTION - ORIENTATION
ORIENTATION: MID
ORIENTATION: LOWER
ORIENTATION: MID
ORIENTATION: LOWER
ORIENTATION: MID

## 2019-12-18 ASSESSMENT — PAIN DESCRIPTION - PROGRESSION
CLINICAL_PROGRESSION: GRADUALLY WORSENING
CLINICAL_PROGRESSION: NOT CHANGED
CLINICAL_PROGRESSION: GRADUALLY WORSENING
CLINICAL_PROGRESSION: NOT CHANGED
CLINICAL_PROGRESSION: GRADUALLY IMPROVING
CLINICAL_PROGRESSION: NOT CHANGED
CLINICAL_PROGRESSION: GRADUALLY WORSENING
CLINICAL_PROGRESSION: GRADUALLY WORSENING
CLINICAL_PROGRESSION: NOT CHANGED
CLINICAL_PROGRESSION: NOT CHANGED
CLINICAL_PROGRESSION: GRADUALLY IMPROVING
CLINICAL_PROGRESSION: GRADUALLY IMPROVING
CLINICAL_PROGRESSION: GRADUALLY WORSENING

## 2019-12-18 ASSESSMENT — PAIN DESCRIPTION - ONSET
ONSET: ON-GOING

## 2019-12-18 ASSESSMENT — PAIN DESCRIPTION - DESCRIPTORS
DESCRIPTORS: ACHING;DISCOMFORT
DESCRIPTORS: ACHING
DESCRIPTORS: ACHING
DESCRIPTORS: ACHING;DISCOMFORT
DESCRIPTORS: ACHING;DISCOMFORT
DESCRIPTORS: ACHING
DESCRIPTORS: ACHING;DISCOMFORT

## 2019-12-18 ASSESSMENT — PAIN DESCRIPTION - DIRECTION
RADIATING_TOWARDS: L LEG
RADIATING_TOWARDS: L LEG
RADIATING_TOWARDS: L

## 2019-12-19 LAB
ALBUMIN SERPL-MCNC: 3.5 G/DL (ref 3.4–5)
ANION GAP SERPL CALCULATED.3IONS-SCNC: 11 MMOL/L (ref 3–16)
BUN BLDV-MCNC: 16 MG/DL (ref 7–20)
CALCIUM SERPL-MCNC: 8.5 MG/DL (ref 8.3–10.6)
CHLORIDE BLD-SCNC: 104 MMOL/L (ref 99–110)
CO2: 23 MMOL/L (ref 21–32)
CREAT SERPL-MCNC: 1.1 MG/DL (ref 0.8–1.3)
GFR AFRICAN AMERICAN: >60
GFR NON-AFRICAN AMERICAN: >60
GLUCOSE BLD-MCNC: 100 MG/DL (ref 70–99)
GLUCOSE BLD-MCNC: 122 MG/DL (ref 70–99)
GLUCOSE BLD-MCNC: 154 MG/DL (ref 70–99)
GLUCOSE BLD-MCNC: 202 MG/DL (ref 70–99)
GLUCOSE BLD-MCNC: 90 MG/DL (ref 70–99)
MAGNESIUM: 1.9 MG/DL (ref 1.8–2.4)
PERFORMED ON: ABNORMAL
PERFORMED ON: NORMAL
PHOSPHORUS: 4.1 MG/DL (ref 2.5–4.9)
POTASSIUM SERPL-SCNC: 4.1 MMOL/L (ref 3.5–5.1)
SODIUM BLD-SCNC: 138 MMOL/L (ref 136–145)

## 2019-12-19 PROCEDURE — 83735 ASSAY OF MAGNESIUM: CPT

## 2019-12-19 PROCEDURE — 97530 THERAPEUTIC ACTIVITIES: CPT

## 2019-12-19 PROCEDURE — 6370000000 HC RX 637 (ALT 250 FOR IP): Performed by: NURSE PRACTITIONER

## 2019-12-19 PROCEDURE — 94760 N-INVAS EAR/PLS OXIMETRY 1: CPT

## 2019-12-19 PROCEDURE — 36415 COLL VENOUS BLD VENIPUNCTURE: CPT

## 2019-12-19 PROCEDURE — 1200000000 HC SEMI PRIVATE

## 2019-12-19 PROCEDURE — 99232 SBSQ HOSP IP/OBS MODERATE 35: CPT | Performed by: NURSE PRACTITIONER

## 2019-12-19 PROCEDURE — 6370000000 HC RX 637 (ALT 250 FOR IP): Performed by: INTERNAL MEDICINE

## 2019-12-19 PROCEDURE — 97535 SELF CARE MNGMENT TRAINING: CPT

## 2019-12-19 PROCEDURE — 2580000003 HC RX 258: Performed by: HOSPITALIST

## 2019-12-19 PROCEDURE — 80069 RENAL FUNCTION PANEL: CPT

## 2019-12-19 PROCEDURE — 2580000003 HC RX 258: Performed by: NURSE PRACTITIONER

## 2019-12-19 PROCEDURE — 6370000000 HC RX 637 (ALT 250 FOR IP): Performed by: HOSPITALIST

## 2019-12-19 PROCEDURE — 6360000002 HC RX W HCPCS: Performed by: HOSPITALIST

## 2019-12-19 RX ORDER — LISINOPRIL 5 MG/1
5 TABLET ORAL ONCE
Status: COMPLETED | OUTPATIENT
Start: 2019-12-19 | End: 2019-12-19

## 2019-12-19 RX ORDER — LISINOPRIL 10 MG/1
10 TABLET ORAL DAILY
Status: DISCONTINUED | OUTPATIENT
Start: 2019-12-20 | End: 2019-12-20 | Stop reason: HOSPADM

## 2019-12-19 RX ORDER — LORAZEPAM 2 MG/ML
1 INJECTION INTRAMUSCULAR ONCE
Status: DISCONTINUED | OUTPATIENT
Start: 2019-12-19 | End: 2019-12-20 | Stop reason: HOSPADM

## 2019-12-19 RX ORDER — OXYCODONE HYDROCHLORIDE AND ACETAMINOPHEN 5; 325 MG/1; MG/1
2 TABLET ORAL EVERY 8 HOURS PRN
Status: DISCONTINUED | OUTPATIENT
Start: 2019-12-19 | End: 2019-12-20 | Stop reason: HOSPADM

## 2019-12-19 RX ADMIN — CLOPIDOGREL BISULFATE 75 MG: 75 TABLET ORAL at 09:19

## 2019-12-19 RX ADMIN — GABAPENTIN 600 MG: 300 CAPSULE ORAL at 21:30

## 2019-12-19 RX ADMIN — RANOLAZINE 1000 MG: 500 TABLET, FILM COATED, EXTENDED RELEASE ORAL at 10:37

## 2019-12-19 RX ADMIN — MAGNESIUM HYDROXIDE 30 ML: 400 SUSPENSION ORAL at 05:00

## 2019-12-19 RX ADMIN — SODIUM CHLORIDE, PRESERVATIVE FREE 10 ML: 5 INJECTION INTRAVENOUS at 21:54

## 2019-12-19 RX ADMIN — OXYCODONE HYDROCHLORIDE AND ACETAMINOPHEN 2 TABLET: 5; 325 TABLET ORAL at 10:46

## 2019-12-19 RX ADMIN — GABAPENTIN 600 MG: 300 CAPSULE ORAL at 14:39

## 2019-12-19 RX ADMIN — TRAZODONE HYDROCHLORIDE 50 MG: 50 TABLET ORAL at 21:30

## 2019-12-19 RX ADMIN — ATORVASTATIN CALCIUM 80 MG: 80 TABLET, FILM COATED ORAL at 09:19

## 2019-12-19 RX ADMIN — LISINOPRIL 5 MG: 5 TABLET ORAL at 09:19

## 2019-12-19 RX ADMIN — ENOXAPARIN SODIUM 40 MG: 40 INJECTION SUBCUTANEOUS at 21:29

## 2019-12-19 RX ADMIN — SODIUM CHLORIDE: 9 INJECTION, SOLUTION INTRAVENOUS at 03:51

## 2019-12-19 RX ADMIN — MORPHINE SULFATE 2 MG: 2 INJECTION, SOLUTION INTRAMUSCULAR; INTRAVENOUS at 03:47

## 2019-12-19 RX ADMIN — FUROSEMIDE 20 MG: 20 TABLET ORAL at 09:19

## 2019-12-19 RX ADMIN — PANTOPRAZOLE SODIUM 40 MG: 40 TABLET, DELAYED RELEASE ORAL at 05:17

## 2019-12-19 RX ADMIN — SODIUM CHLORIDE, PRESERVATIVE FREE 10 ML: 5 INJECTION INTRAVENOUS at 21:53

## 2019-12-19 RX ADMIN — INSULIN GLARGINE 10 UNITS: 100 INJECTION, SOLUTION SUBCUTANEOUS at 21:30

## 2019-12-19 RX ADMIN — INSULIN LISPRO 1 UNITS: 100 INJECTION, SOLUTION INTRAVENOUS; SUBCUTANEOUS at 12:29

## 2019-12-19 RX ADMIN — RANOLAZINE 1000 MG: 500 TABLET, FILM COATED, EXTENDED RELEASE ORAL at 21:30

## 2019-12-19 RX ADMIN — MORPHINE SULFATE 2 MG: 2 INJECTION, SOLUTION INTRAMUSCULAR; INTRAVENOUS at 09:18

## 2019-12-19 RX ADMIN — ASPIRIN 81 MG 81 MG: 81 TABLET ORAL at 09:19

## 2019-12-19 RX ADMIN — ISOSORBIDE MONONITRATE 60 MG: 60 TABLET, EXTENDED RELEASE ORAL at 09:20

## 2019-12-19 RX ADMIN — OXYCODONE HYDROCHLORIDE AND ACETAMINOPHEN 2 TABLET: 5; 325 TABLET ORAL at 18:56

## 2019-12-19 RX ADMIN — METOPROLOL SUCCINATE 50 MG: 50 TABLET, EXTENDED RELEASE ORAL at 09:19

## 2019-12-19 RX ADMIN — GABAPENTIN 600 MG: 300 CAPSULE ORAL at 09:19

## 2019-12-19 RX ADMIN — LISINOPRIL 5 MG: 5 TABLET ORAL at 12:29

## 2019-12-19 ASSESSMENT — PAIN SCALES - GENERAL
PAINLEVEL_OUTOF10: 6
PAINLEVEL_OUTOF10: 6
PAINLEVEL_OUTOF10: 7
PAINLEVEL_OUTOF10: 7
PAINLEVEL_OUTOF10: 0
PAINLEVEL_OUTOF10: 5
PAINLEVEL_OUTOF10: 7
PAINLEVEL_OUTOF10: 7
PAINLEVEL_OUTOF10: 6
PAINLEVEL_OUTOF10: 5
PAINLEVEL_OUTOF10: 8
PAINLEVEL_OUTOF10: 9

## 2019-12-19 ASSESSMENT — PAIN - FUNCTIONAL ASSESSMENT
PAIN_FUNCTIONAL_ASSESSMENT: PREVENTS OR INTERFERES SOME ACTIVE ACTIVITIES AND ADLS

## 2019-12-19 ASSESSMENT — PAIN DESCRIPTION - ONSET
ONSET: ON-GOING

## 2019-12-19 ASSESSMENT — PAIN DESCRIPTION - FREQUENCY
FREQUENCY: CONTINUOUS

## 2019-12-19 ASSESSMENT — PAIN DESCRIPTION - DESCRIPTORS
DESCRIPTORS: ACHING

## 2019-12-19 ASSESSMENT — PAIN DESCRIPTION - ORIENTATION
ORIENTATION: MID

## 2019-12-19 ASSESSMENT — PAIN DESCRIPTION - PROGRESSION
CLINICAL_PROGRESSION: GRADUALLY IMPROVING
CLINICAL_PROGRESSION: GRADUALLY WORSENING
CLINICAL_PROGRESSION: GRADUALLY WORSENING
CLINICAL_PROGRESSION: NOT CHANGED
CLINICAL_PROGRESSION: GRADUALLY IMPROVING
CLINICAL_PROGRESSION: GRADUALLY IMPROVING
CLINICAL_PROGRESSION: NOT CHANGED
CLINICAL_PROGRESSION: NOT CHANGED
CLINICAL_PROGRESSION: GRADUALLY IMPROVING
CLINICAL_PROGRESSION: NOT CHANGED

## 2019-12-19 ASSESSMENT — PAIN DESCRIPTION - LOCATION
LOCATION: BACK

## 2019-12-19 ASSESSMENT — PAIN DESCRIPTION - PAIN TYPE
TYPE: CHRONIC PAIN

## 2019-12-19 ASSESSMENT — PAIN DESCRIPTION - DIRECTION: RADIATING_TOWARDS: LEFT LEG

## 2019-12-20 VITALS
DIASTOLIC BLOOD PRESSURE: 81 MMHG | HEART RATE: 70 BPM | RESPIRATION RATE: 16 BRPM | SYSTOLIC BLOOD PRESSURE: 135 MMHG | BODY MASS INDEX: 29.65 KG/M2 | OXYGEN SATURATION: 94 % | WEIGHT: 218.92 LBS | TEMPERATURE: 98 F | HEIGHT: 72 IN

## 2019-12-20 LAB
ALBUMIN SERPL-MCNC: 3.4 G/DL (ref 3.4–5)
ANION GAP SERPL CALCULATED.3IONS-SCNC: 11 MMOL/L (ref 3–16)
BUN BLDV-MCNC: 18 MG/DL (ref 7–20)
CALCIUM SERPL-MCNC: 8.4 MG/DL (ref 8.3–10.6)
CHLORIDE BLD-SCNC: 105 MMOL/L (ref 99–110)
CO2: 24 MMOL/L (ref 21–32)
CREAT SERPL-MCNC: 1.3 MG/DL (ref 0.8–1.3)
GFR AFRICAN AMERICAN: >60
GFR NON-AFRICAN AMERICAN: 56
GLUCOSE BLD-MCNC: 107 MG/DL (ref 70–99)
GLUCOSE BLD-MCNC: 122 MG/DL (ref 70–99)
GLUCOSE BLD-MCNC: 161 MG/DL (ref 70–99)
MAGNESIUM: 2 MG/DL (ref 1.8–2.4)
PERFORMED ON: ABNORMAL
PERFORMED ON: ABNORMAL
PHOSPHORUS: 4.4 MG/DL (ref 2.5–4.9)
POTASSIUM SERPL-SCNC: 4 MMOL/L (ref 3.5–5.1)
SODIUM BLD-SCNC: 140 MMOL/L (ref 136–145)

## 2019-12-20 PROCEDURE — 97530 THERAPEUTIC ACTIVITIES: CPT

## 2019-12-20 PROCEDURE — 83735 ASSAY OF MAGNESIUM: CPT

## 2019-12-20 PROCEDURE — 97116 GAIT TRAINING THERAPY: CPT

## 2019-12-20 PROCEDURE — 6370000000 HC RX 637 (ALT 250 FOR IP): Performed by: INTERNAL MEDICINE

## 2019-12-20 PROCEDURE — 6370000000 HC RX 637 (ALT 250 FOR IP): Performed by: NURSE PRACTITIONER

## 2019-12-20 PROCEDURE — 80069 RENAL FUNCTION PANEL: CPT

## 2019-12-20 PROCEDURE — 94760 N-INVAS EAR/PLS OXIMETRY 1: CPT

## 2019-12-20 PROCEDURE — 2580000003 HC RX 258: Performed by: NURSE PRACTITIONER

## 2019-12-20 PROCEDURE — 2580000003 HC RX 258: Performed by: HOSPITALIST

## 2019-12-20 PROCEDURE — 6370000000 HC RX 637 (ALT 250 FOR IP): Performed by: HOSPITALIST

## 2019-12-20 PROCEDURE — 36415 COLL VENOUS BLD VENIPUNCTURE: CPT

## 2019-12-20 RX ORDER — OXYCODONE HYDROCHLORIDE AND ACETAMINOPHEN 5; 325 MG/1; MG/1
2 TABLET ORAL EVERY 8 HOURS PRN
Qty: 21 TABLET | Refills: 0 | Status: SHIPPED | OUTPATIENT
Start: 2019-12-20 | End: 2019-12-23

## 2019-12-20 RX ORDER — METOPROLOL SUCCINATE 50 MG/1
50 TABLET, EXTENDED RELEASE ORAL DAILY
Qty: 30 TABLET | Refills: 3 | Status: ON HOLD | OUTPATIENT
Start: 2019-12-21 | End: 2020-02-06 | Stop reason: HOSPADM

## 2019-12-20 RX ORDER — LISINOPRIL 10 MG/1
10 TABLET ORAL DAILY
Qty: 30 TABLET | Refills: 3 | Status: ON HOLD | OUTPATIENT
Start: 2019-12-21 | End: 2020-07-16 | Stop reason: HOSPADM

## 2019-12-20 RX ADMIN — OXYCODONE HYDROCHLORIDE AND ACETAMINOPHEN 2 TABLET: 5; 325 TABLET ORAL at 11:23

## 2019-12-20 RX ADMIN — RANOLAZINE 1000 MG: 500 TABLET, FILM COATED, EXTENDED RELEASE ORAL at 09:11

## 2019-12-20 RX ADMIN — SODIUM CHLORIDE, PRESERVATIVE FREE 10 ML: 5 INJECTION INTRAVENOUS at 09:32

## 2019-12-20 RX ADMIN — GABAPENTIN 600 MG: 300 CAPSULE ORAL at 13:29

## 2019-12-20 RX ADMIN — ISOSORBIDE MONONITRATE 60 MG: 60 TABLET, EXTENDED RELEASE ORAL at 09:10

## 2019-12-20 RX ADMIN — GABAPENTIN 600 MG: 300 CAPSULE ORAL at 09:10

## 2019-12-20 RX ADMIN — ASPIRIN 81 MG 81 MG: 81 TABLET ORAL at 09:11

## 2019-12-20 RX ADMIN — OXYCODONE HYDROCHLORIDE AND ACETAMINOPHEN 2 TABLET: 5; 325 TABLET ORAL at 05:29

## 2019-12-20 RX ADMIN — METOPROLOL SUCCINATE 50 MG: 50 TABLET, EXTENDED RELEASE ORAL at 09:11

## 2019-12-20 RX ADMIN — FUROSEMIDE 20 MG: 20 TABLET ORAL at 09:10

## 2019-12-20 RX ADMIN — SODIUM CHLORIDE, PRESERVATIVE FREE 10 ML: 5 INJECTION INTRAVENOUS at 09:11

## 2019-12-20 RX ADMIN — PANTOPRAZOLE SODIUM 40 MG: 40 TABLET, DELAYED RELEASE ORAL at 05:29

## 2019-12-20 RX ADMIN — ATORVASTATIN CALCIUM 80 MG: 80 TABLET, FILM COATED ORAL at 09:11

## 2019-12-20 RX ADMIN — LISINOPRIL 10 MG: 10 TABLET ORAL at 09:11

## 2019-12-20 RX ADMIN — INSULIN LISPRO 1 UNITS: 100 INJECTION, SOLUTION INTRAVENOUS; SUBCUTANEOUS at 13:29

## 2019-12-20 RX ADMIN — CLOPIDOGREL BISULFATE 75 MG: 75 TABLET ORAL at 09:10

## 2019-12-20 ASSESSMENT — PAIN DESCRIPTION - ONSET
ONSET: ON-GOING

## 2019-12-20 ASSESSMENT — PAIN DESCRIPTION - ORIENTATION
ORIENTATION: LOWER
ORIENTATION: MID

## 2019-12-20 ASSESSMENT — PAIN DESCRIPTION - LOCATION
LOCATION: BACK

## 2019-12-20 ASSESSMENT — PAIN SCALES - GENERAL
PAINLEVEL_OUTOF10: 0
PAINLEVEL_OUTOF10: 4
PAINLEVEL_OUTOF10: 6
PAINLEVEL_OUTOF10: 5
PAINLEVEL_OUTOF10: 7
PAINLEVEL_OUTOF10: 0
PAINLEVEL_OUTOF10: 0
PAINLEVEL_OUTOF10: 4

## 2019-12-20 ASSESSMENT — PAIN DESCRIPTION - PAIN TYPE
TYPE: CHRONIC PAIN

## 2019-12-20 ASSESSMENT — PAIN - FUNCTIONAL ASSESSMENT
PAIN_FUNCTIONAL_ASSESSMENT: PREVENTS OR INTERFERES SOME ACTIVE ACTIVITIES AND ADLS

## 2019-12-20 ASSESSMENT — PAIN DESCRIPTION - PROGRESSION
CLINICAL_PROGRESSION: GRADUALLY IMPROVING
CLINICAL_PROGRESSION: GRADUALLY WORSENING
CLINICAL_PROGRESSION: GRADUALLY IMPROVING
CLINICAL_PROGRESSION: GRADUALLY IMPROVING

## 2019-12-20 ASSESSMENT — PAIN DESCRIPTION - DESCRIPTORS
DESCRIPTORS: ACHING

## 2019-12-20 ASSESSMENT — PAIN DESCRIPTION - FREQUENCY
FREQUENCY: CONTINUOUS

## 2019-12-20 ASSESSMENT — PAIN DESCRIPTION - DIRECTION: RADIATING_TOWARDS: LEFT LEG

## 2019-12-21 ENCOUNTER — CARE COORDINATION (OUTPATIENT)
Dept: CASE MANAGEMENT | Age: 64
End: 2019-12-21

## 2020-01-17 ENCOUNTER — HOSPITAL ENCOUNTER (EMERGENCY)
Age: 65
Discharge: HOME | End: 2020-01-18
Payer: MEDICARE

## 2020-01-17 ENCOUNTER — APPOINTMENT (OUTPATIENT)
Dept: GENERAL RADIOLOGY | Age: 65
End: 2020-01-17
Payer: MEDICARE

## 2020-01-17 VITALS
RESPIRATION RATE: 17 BRPM | TEMPERATURE: 98.5 F | SYSTOLIC BLOOD PRESSURE: 135 MMHG | OXYGEN SATURATION: 94 % | HEART RATE: 83 BPM | DIASTOLIC BLOOD PRESSURE: 79 MMHG

## 2020-01-17 LAB
A/G RATIO: 1.4 (ref 1.1–2.2)
ALBUMIN SERPL-MCNC: 4.3 G/DL (ref 3.4–5)
ALP BLD-CCNC: 75 U/L (ref 40–129)
ALT SERPL-CCNC: 10 U/L (ref 10–40)
ANION GAP SERPL CALCULATED.3IONS-SCNC: 12 MMOL/L (ref 3–16)
AST SERPL-CCNC: 9 U/L (ref 15–37)
BASOPHILS ABSOLUTE: 0.1 K/UL (ref 0–0.2)
BASOPHILS RELATIVE PERCENT: 1.1 %
BILIRUB SERPL-MCNC: 0.3 MG/DL (ref 0–1)
BUN BLDV-MCNC: 13 MG/DL (ref 7–20)
CALCIUM SERPL-MCNC: 9.5 MG/DL (ref 8.3–10.6)
CHLORIDE BLD-SCNC: 102 MMOL/L (ref 99–110)
CO2: 24 MMOL/L (ref 21–32)
CREAT SERPL-MCNC: 1 MG/DL (ref 0.8–1.3)
EOSINOPHILS ABSOLUTE: 0.1 K/UL (ref 0–0.6)
EOSINOPHILS RELATIVE PERCENT: 1.9 %
GFR AFRICAN AMERICAN: >60
GFR NON-AFRICAN AMERICAN: >60
GLOBULIN: 3.1 G/DL
GLUCOSE BLD-MCNC: 171 MG/DL (ref 70–99)
HCT VFR BLD CALC: 44 % (ref 40.5–52.5)
HEMOGLOBIN: 15 G/DL (ref 13.5–17.5)
LYMPHOCYTES ABSOLUTE: 1.7 K/UL (ref 1–5.1)
LYMPHOCYTES RELATIVE PERCENT: 24.3 %
MCH RBC QN AUTO: 33.4 PG (ref 26–34)
MCHC RBC AUTO-ENTMCNC: 34 G/DL (ref 31–36)
MCV RBC AUTO: 98 FL (ref 80–100)
MONOCYTES ABSOLUTE: 0.4 K/UL (ref 0–1.3)
MONOCYTES RELATIVE PERCENT: 5.2 %
NEUTROPHILS ABSOLUTE: 4.7 K/UL (ref 1.7–7.7)
NEUTROPHILS RELATIVE PERCENT: 67.5 %
PDW BLD-RTO: 13.8 % (ref 12.4–15.4)
PLATELET # BLD: 177 K/UL (ref 135–450)
PMV BLD AUTO: 9.3 FL (ref 5–10.5)
POTASSIUM REFLEX MAGNESIUM: 4.1 MMOL/L (ref 3.5–5.1)
PRO-BNP: 879 PG/ML (ref 0–124)
RBC # BLD: 4.49 M/UL (ref 4.2–5.9)
SEDIMENTATION RATE, ERYTHROCYTE: 24 MM/HR (ref 0–20)
SODIUM BLD-SCNC: 138 MMOL/L (ref 136–145)
TOTAL PROTEIN: 7.4 G/DL (ref 6.4–8.2)
TROPONIN: 0.02 NG/ML
WBC # BLD: 7 K/UL (ref 4–11)

## 2020-01-17 PROCEDURE — 85652 RBC SED RATE AUTOMATED: CPT

## 2020-01-17 PROCEDURE — 84484 ASSAY OF TROPONIN QUANT: CPT

## 2020-01-17 PROCEDURE — 6370000000 HC RX 637 (ALT 250 FOR IP): Performed by: NURSE PRACTITIONER

## 2020-01-17 PROCEDURE — 85025 COMPLETE CBC W/AUTO DIFF WBC: CPT

## 2020-01-17 PROCEDURE — 96374 THER/PROPH/DIAG INJ IV PUSH: CPT

## 2020-01-17 PROCEDURE — 6360000002 HC RX W HCPCS: Performed by: NURSE PRACTITIONER

## 2020-01-17 PROCEDURE — 99285 EMERGENCY DEPT VISIT HI MDM: CPT

## 2020-01-17 PROCEDURE — 71046 X-RAY EXAM CHEST 2 VIEWS: CPT

## 2020-01-17 PROCEDURE — 80053 COMPREHEN METABOLIC PANEL: CPT

## 2020-01-17 PROCEDURE — 93005 ELECTROCARDIOGRAM TRACING: CPT | Performed by: EMERGENCY MEDICINE

## 2020-01-17 PROCEDURE — 83880 ASSAY OF NATRIURETIC PEPTIDE: CPT

## 2020-01-17 RX ORDER — OXYCODONE HYDROCHLORIDE AND ACETAMINOPHEN 5; 325 MG/1; MG/1
2 TABLET ORAL ONCE
Status: COMPLETED | OUTPATIENT
Start: 2020-01-17 | End: 2020-01-17

## 2020-01-17 RX ORDER — ONDANSETRON 2 MG/ML
4 INJECTION INTRAMUSCULAR; INTRAVENOUS ONCE
Status: COMPLETED | OUTPATIENT
Start: 2020-01-17 | End: 2020-01-17

## 2020-01-17 RX ADMIN — ONDANSETRON 4 MG: 2 INJECTION INTRAMUSCULAR; INTRAVENOUS at 19:17

## 2020-01-17 RX ADMIN — OXYCODONE HYDROCHLORIDE AND ACETAMINOPHEN 2 TABLET: 5; 325 TABLET ORAL at 19:17

## 2020-01-17 ASSESSMENT — PAIN SCALES - GENERAL
PAINLEVEL_OUTOF10: 8
PAINLEVEL_OUTOF10: 8

## 2020-01-17 ASSESSMENT — PAIN DESCRIPTION - LOCATION: LOCATION: CHEST

## 2020-01-17 ASSESSMENT — ENCOUNTER SYMPTOMS
COUGH: 1
NAUSEA: 1
SHORTNESS OF BREATH: 1
ABDOMINAL PAIN: 0

## 2020-01-17 ASSESSMENT — PAIN DESCRIPTION - PAIN TYPE: TYPE: ACUTE PAIN

## 2020-01-17 NOTE — ED PROVIDER NOTES
history of cardiac bypass with native artery, pacemaker/ICD, COPD, diabetes  Patent foramen ovale, ischemic cardiomyopathy, heart failure, recent CVA. Was admitted here at [facility] in December for CVA. Nursing Notes were reviewedand agreed with or any disagreements were addressed in the HPI. REVIEW OF SYSTEMS    (2-9 systems for level 4, 10 or more for level 5)     Review of Systems   Constitutional: Positive for activity change and appetite change. HENT: Negative. Respiratory: Positive for cough and shortness of breath. Cardiovascular: Positive for chest pain. Negative for palpitations and leg swelling. Gastrointestinal: Positive for nausea. Negative for abdominal pain. Genitourinary: Negative. Skin: Negative. Except as noted above the remainder of the review of systems was reviewed and negative.        PAST MEDICAL HISTORY         Diagnosis Date    Anxiety     Arthritis     Asthma     CAD (coronary artery disease)     Calcium kidney stone     Cardiomyopathy ([address]     Cerebral artery occlusion with cerebral infarction ([address]     CHF (congestive heart failure) ([address]     COPD [facility]chronic obstructive [facility] disease) ([facility]     mild    Depression     DM2 (diabetes mellitus, type 2) ([facility]     Fibromyalgia     GERD (gastroesophageal reflux disease)     Hyperlipidemia     Hypertension     Liver disease     Pacemaker 2012    Medtronic model # [id number]    Pneumonia     Seizures ([address]     TIA (transient ischemic attack) 2007    Ulcerative colitis ([address]        SURGICAL HISTORY           Procedure Laterality Date    BACK SURGERY      CARDIAC SURGERY      CHOLECYSTECTOMY      COLONOSCOPY  01/10/2017    COLONOSCOPY  01/10/2017    CORONARY ANGIOPLASTY WITH STENT PLACEMENT  2012    CORONARY ARTERY BYPASS GRAFT  2010, 11/2015    ENDOSCOPY, COLON, DIAGNOSTIC      PACEMAKER PLACEMENT      UPPER GASTROINTESTINAL ENDOSCOPY  02/07/2017       CURRENT MEDICATIONS [unfilled]    ALLERGIES     Dopamine hcl and Melatonin    FAMILY HISTORY           Problem Relation Age of Onset    Diabetes Father     Coronary Art Dis Father     Cancer Mother         Lung with mets     Family Status   Relation Name Status    Father  Alive        CAD    Mother   at age 62        Lung cancer        SOCIAL HISTORY      reports that he has been smoking cigarettes. He has a 44.00 pack-year smoking history. He has never used smokeless tobacco. He reports that he does not drink alcohol or use drugs. PHYSICAL EXAM    (up to 7 for level 4, 8 or more for level 5)     ED Triage Vitals [20 1800]   Enc Vitals Group      BP (!) 199/109      Pulse 99      Resp 18      Temp 97 °F (36.1 °C)      Temp Source Oral      SpO2 100 %      Weight       Height       Head Circumference       Peak Flow       Pain Score       Pain Loc       Pain Edu? Excl. in [address]? Physical Exam  Vitals signs and nursing note reviewed. Constitutional:       General: He is not in acute distress. Appearance: He is not toxic-appearing or diaphoretic. Comments: Chronically ill-appearing   HENT:      Head: Normocephalic and atraumatic. Nose: Nose normal.      Mouth/Throat:      Mouth: Mucous membranes are dry. Pharynx: Oropharynx is clear. Eyes:      Pupils: Pupils are equal, round, and reactive to light. Cardiovascular:      Rate and Rhythm: Normal rate and regular rhythm. Pulses: Normal pulses. Radial pulses are 2+ on the right side and 2+ on the left side. Dorsalis pedis pulses are 2+ on the right side and 2+ on the left side. Heart sounds: Normal heart sounds. No murmur. No friction rub. No gallop. Comments: Left subclavicular AICD/pacer  Sternotomy scar. Pulmonary:      Effort: Pulmonary effort is normal.      Breath sounds: Rhonchi and rales present. Comments: Fine crackles, coarse rhonchi. Greater on the left than the right.   Chest: Chest wall: No tenderness. Abdominal:      General: Bowel sounds are normal.      Palpations: Abdomen is soft. Musculoskeletal:      Right lower leg: No edema. Left lower leg: No edema. Comments: Negative for calf pain tenderness or swelling   Skin:     General: Skin is warm and dry. Capillary Refill: Capillary refill takes less than 2 seconds. Coloration: Skin is pale. Neurological:      General: No focal deficit present. Mental Status: He is alert and oriented to person, place, and time. Psychiatric:         Mood and Affect: Mood is anxious. Speech: Speech normal.         Behavior: Behavior is agitated. Thought Content: Thought content normal.         Cognition and Memory: Cognition and memory normal.           DIAGNOSTIC RESULTS     EKG: All EKG's are interpreted by the Emergency Department Physician who either signs or Co-signs this chart in the absence of a cardiologist.  EKG interpreted per Dr. Isaiah Carbone reviewed per myself sinus rhythm, right bundle branch block. Negative for ST elevation or depression, no evidence of STEMI. Compared to December 14, 2019 essentially unchanged. RADIOLOGY:   Non-plain film images such as CT, Ultrasound and MRI are read by the radiologist. Plain radiographic images are visualized and preliminarilyinterpreted by the emergency physician with the below findings:    Interpretation per the Radiologist below,if available at the time of this note:    XR CHEST STANDARD (2 VW)   Final Result   No acute abnormality detected.                LABS:  Labs Reviewed   TROPONIN - Abnormal; Notable for the following components:       Result Value    Troponin 0.02 (*)     All other components within normal limits    Narrative:     Performed at:  [facility]  [address] [facility] [address]   Phone [phone]   COMPREHENSIVE METABOLIC PANEL W/ REFLEX TO MG FOR LOW K - Abnormal; Notable for the following TAYLA Borges - CNP  01/18/20 0007

## 2020-01-19 LAB
EKG ATRIAL RATE: 95 BPM
EKG DIAGNOSIS: NORMAL
EKG P AXIS: 53 DEGREES
EKG P-R INTERVAL: 142 MS
EKG Q-T INTERVAL: 430 MS
EKG QRS DURATION: 134 MS
EKG QTC CALCULATION (BAZETT): 540 MS
EKG R AXIS: 64 DEGREES
EKG T AXIS: 53 DEGREES
EKG VENTRICULAR RATE: 95 BPM

## 2020-01-19 PROCEDURE — 93010 ELECTROCARDIOGRAM REPORT: CPT | Performed by: INTERNAL MEDICINE

## 2020-02-05 ENCOUNTER — APPOINTMENT (OUTPATIENT)
Dept: GENERAL RADIOLOGY | Age: 65
DRG: 309 | End: 2020-02-05
Payer: MEDICARE

## 2020-02-05 ENCOUNTER — HOSPITAL ENCOUNTER (INPATIENT)
Age: 65
LOS: 1 days | Discharge: HOME | DRG: 309 | End: 2020-02-06
Attending: EMERGENCY MEDICINE | Admitting: INTERNAL MEDICINE
Payer: MEDICARE

## 2020-02-05 ENCOUNTER — TELEPHONE (OUTPATIENT)
Dept: CARDIOLOGY CLINIC | Age: 65
End: 2020-02-05

## 2020-02-05 PROBLEM — I47.29 NSVT (NONSUSTAINED VENTRICULAR TACHYCARDIA) (HCC): Status: ACTIVE | Noted: 2020-02-05

## 2020-02-05 LAB
A/G RATIO: 1.2 (ref 1.1–2.2)
ALBUMIN SERPL-MCNC: 3.8 G/DL (ref 3.4–5)
ALP BLD-CCNC: 64 U/L (ref 40–129)
ALT SERPL-CCNC: 6 U/L (ref 10–40)
ANION GAP SERPL CALCULATED.3IONS-SCNC: 14 MMOL/L (ref 3–16)
AST SERPL-CCNC: 7 U/L (ref 15–37)
BASOPHILS ABSOLUTE: 0.1 K/UL (ref 0–0.2)
BASOPHILS RELATIVE PERCENT: 0.8 %
BILIRUB SERPL-MCNC: <0.2 MG/DL (ref 0–1)
BUN BLDV-MCNC: 14 MG/DL (ref 7–20)
CALCIUM SERPL-MCNC: 9.3 MG/DL (ref 8.3–10.6)
CHLORIDE BLD-SCNC: 102 MMOL/L (ref 99–110)
CO2: 23 MMOL/L (ref 21–32)
CREAT SERPL-MCNC: 0.9 MG/DL (ref 0.8–1.3)
EKG ATRIAL RATE: 110 BPM
EKG DIAGNOSIS: NORMAL
EKG P AXIS: 53 DEGREES
EKG P-R INTERVAL: 136 MS
EKG Q-T INTERVAL: 374 MS
EKG QRS DURATION: 138 MS
EKG QTC CALCULATION (BAZETT): 506 MS
EKG R AXIS: 51 DEGREES
EKG T AXIS: 40 DEGREES
EKG VENTRICULAR RATE: 110 BPM
EOSINOPHILS ABSOLUTE: 0.2 K/UL (ref 0–0.6)
EOSINOPHILS RELATIVE PERCENT: 2.3 %
GFR AFRICAN AMERICAN: >60
GFR NON-AFRICAN AMERICAN: >60
GLOBULIN: 3.1 G/DL
GLUCOSE BLD-MCNC: 191 MG/DL (ref 70–99)
GLUCOSE BLD-MCNC: 284 MG/DL (ref 70–99)
HCT VFR BLD CALC: 43.2 % (ref 40.5–52.5)
HEMOGLOBIN: 14.5 G/DL (ref 13.5–17.5)
INR BLD: 0.96 (ref 0.86–1.14)
LYMPHOCYTES ABSOLUTE: 1.6 K/UL (ref 1–5.1)
LYMPHOCYTES RELATIVE PERCENT: 21.4 %
MCH RBC QN AUTO: 33 PG (ref 26–34)
MCHC RBC AUTO-ENTMCNC: 33.7 G/DL (ref 31–36)
MCV RBC AUTO: 97.9 FL (ref 80–100)
MONOCYTES ABSOLUTE: 0.5 K/UL (ref 0–1.3)
MONOCYTES RELATIVE PERCENT: 7.2 %
NEUTROPHILS ABSOLUTE: 5.2 K/UL (ref 1.7–7.7)
NEUTROPHILS RELATIVE PERCENT: 68.3 %
PDW BLD-RTO: 14.4 % (ref 12.4–15.4)
PERFORMED ON: ABNORMAL
PLATELET # BLD: 149 K/UL (ref 135–450)
PMV BLD AUTO: 9.7 FL (ref 5–10.5)
POTASSIUM SERPL-SCNC: 3.6 MMOL/L (ref 3.5–5.1)
PRO-BNP: 570 PG/ML (ref 0–124)
PROTHROMBIN TIME: 11.1 SEC (ref 10–13.2)
RBC # BLD: 4.41 M/UL (ref 4.2–5.9)
SODIUM BLD-SCNC: 139 MMOL/L (ref 136–145)
TOTAL PROTEIN: 6.9 G/DL (ref 6.4–8.2)
TROPONIN: 0.02 NG/ML
TSH REFLEX FT4: 0.34 UIU/ML (ref 0.27–4.2)
WBC # BLD: 7.7 K/UL (ref 4–11)

## 2020-02-05 PROCEDURE — 2060000000 HC ICU INTERMEDIATE R&B

## 2020-02-05 PROCEDURE — 2580000003 HC RX 258: Performed by: EMERGENCY MEDICINE

## 2020-02-05 PROCEDURE — 93005 ELECTROCARDIOGRAM TRACING: CPT | Performed by: EMERGENCY MEDICINE

## 2020-02-05 PROCEDURE — 85025 COMPLETE CBC W/AUTO DIFF WBC: CPT

## 2020-02-05 PROCEDURE — 93010 ELECTROCARDIOGRAM REPORT: CPT | Performed by: INTERNAL MEDICINE

## 2020-02-05 PROCEDURE — 83880 ASSAY OF NATRIURETIC PEPTIDE: CPT

## 2020-02-05 PROCEDURE — 36415 COLL VENOUS BLD VENIPUNCTURE: CPT

## 2020-02-05 PROCEDURE — 6370000000 HC RX 637 (ALT 250 FOR IP): Performed by: INTERNAL MEDICINE

## 2020-02-05 PROCEDURE — 96366 THER/PROPH/DIAG IV INF ADDON: CPT

## 2020-02-05 PROCEDURE — 85610 PROTHROMBIN TIME: CPT

## 2020-02-05 PROCEDURE — 2580000003 HC RX 258: Performed by: INTERNAL MEDICINE

## 2020-02-05 PROCEDURE — 6360000002 HC RX W HCPCS: Performed by: EMERGENCY MEDICINE

## 2020-02-05 PROCEDURE — 99285 EMERGENCY DEPT VISIT HI MDM: CPT

## 2020-02-05 PROCEDURE — 84443 ASSAY THYROID STIM HORMONE: CPT

## 2020-02-05 PROCEDURE — 96367 TX/PROPH/DG ADDL SEQ IV INF: CPT

## 2020-02-05 PROCEDURE — 80053 COMPREHEN METABOLIC PANEL: CPT

## 2020-02-05 PROCEDURE — 96365 THER/PROPH/DIAG IV INF INIT: CPT

## 2020-02-05 PROCEDURE — 6360000002 HC RX W HCPCS: Performed by: INTERNAL MEDICINE

## 2020-02-05 PROCEDURE — 71046 X-RAY EXAM CHEST 2 VIEWS: CPT

## 2020-02-05 PROCEDURE — 84484 ASSAY OF TROPONIN QUANT: CPT

## 2020-02-05 PROCEDURE — 6370000000 HC RX 637 (ALT 250 FOR IP): Performed by: PHYSICIAN ASSISTANT

## 2020-02-05 RX ORDER — DEXTROSE MONOHYDRATE 50 MG/ML
100 INJECTION, SOLUTION INTRAVENOUS PRN
Status: DISCONTINUED | OUTPATIENT
Start: 2020-02-05 | End: 2020-02-06 | Stop reason: HOSPADM

## 2020-02-05 RX ORDER — ASPIRIN 325 MG
325 TABLET ORAL DAILY
Status: DISCONTINUED | OUTPATIENT
Start: 2020-02-06 | End: 2020-02-06 | Stop reason: HOSPADM

## 2020-02-05 RX ORDER — NICOTINE POLACRILEX 4 MG
15 LOZENGE BUCCAL PRN
Status: DISCONTINUED | OUTPATIENT
Start: 2020-02-05 | End: 2020-02-06 | Stop reason: HOSPADM

## 2020-02-05 RX ORDER — ALBUTEROL SULFATE 90 UG/1
2 AEROSOL, METERED RESPIRATORY (INHALATION) EVERY 6 HOURS PRN
Status: DISCONTINUED | OUTPATIENT
Start: 2020-02-05 | End: 2020-02-06 | Stop reason: HOSPADM

## 2020-02-05 RX ORDER — OXYCODONE HYDROCHLORIDE AND ACETAMINOPHEN 5; 325 MG/1; MG/1
1 TABLET ORAL ONCE
Status: COMPLETED | OUTPATIENT
Start: 2020-02-05 | End: 2020-02-05

## 2020-02-05 RX ORDER — ATORVASTATIN CALCIUM 80 MG/1
80 TABLET, FILM COATED ORAL DAILY
Status: DISCONTINUED | OUTPATIENT
Start: 2020-02-06 | End: 2020-02-06 | Stop reason: HOSPADM

## 2020-02-05 RX ORDER — MAGNESIUM SULFATE 1 G/100ML
1 INJECTION INTRAVENOUS ONCE
Status: COMPLETED | OUTPATIENT
Start: 2020-02-05 | End: 2020-02-05

## 2020-02-05 RX ORDER — ISOSORBIDE MONONITRATE 60 MG/1
60 TABLET, EXTENDED RELEASE ORAL DAILY
Status: DISCONTINUED | OUTPATIENT
Start: 2020-02-06 | End: 2020-02-06 | Stop reason: HOSPADM

## 2020-02-05 RX ORDER — RANOLAZINE 500 MG/1
1000 TABLET, EXTENDED RELEASE ORAL 2 TIMES DAILY
Status: DISCONTINUED | OUTPATIENT
Start: 2020-02-05 | End: 2020-02-06 | Stop reason: HOSPADM

## 2020-02-05 RX ORDER — SODIUM CHLORIDE 0.9 % (FLUSH) 0.9 %
10 SYRINGE (ML) INJECTION EVERY 12 HOURS SCHEDULED
Status: DISCONTINUED | OUTPATIENT
Start: 2020-02-05 | End: 2020-02-06 | Stop reason: HOSPADM

## 2020-02-05 RX ORDER — POTASSIUM CHLORIDE 750 MG/1
40 TABLET, FILM COATED, EXTENDED RELEASE ORAL ONCE
Status: COMPLETED | OUTPATIENT
Start: 2020-02-05 | End: 2020-02-05

## 2020-02-05 RX ORDER — HYDRALAZINE HYDROCHLORIDE 25 MG/1
25 TABLET, FILM COATED ORAL EVERY 8 HOURS SCHEDULED
Status: DISCONTINUED | OUTPATIENT
Start: 2020-02-05 | End: 2020-02-06 | Stop reason: HOSPADM

## 2020-02-05 RX ORDER — ONDANSETRON 4 MG/1
4 TABLET, ORALLY DISINTEGRATING ORAL ONCE
Status: COMPLETED | OUTPATIENT
Start: 2020-02-05 | End: 2020-02-05

## 2020-02-05 RX ORDER — METOPROLOL SUCCINATE 50 MG/1
50 TABLET, EXTENDED RELEASE ORAL DAILY
Status: DISCONTINUED | OUTPATIENT
Start: 2020-02-06 | End: 2020-02-06

## 2020-02-05 RX ORDER — GABAPENTIN 600 MG/1
600 TABLET ORAL EVERY 6 HOURS
Status: DISCONTINUED | OUTPATIENT
Start: 2020-02-05 | End: 2020-02-05

## 2020-02-05 RX ORDER — GABAPENTIN 300 MG/1
600 CAPSULE ORAL
Status: DISCONTINUED | OUTPATIENT
Start: 2020-02-05 | End: 2020-02-06 | Stop reason: HOSPADM

## 2020-02-05 RX ORDER — CLOPIDOGREL BISULFATE 75 MG/1
75 TABLET ORAL DAILY
Status: DISCONTINUED | OUTPATIENT
Start: 2020-02-06 | End: 2020-02-06 | Stop reason: HOSPADM

## 2020-02-05 RX ORDER — FUROSEMIDE 20 MG/1
20 TABLET ORAL DAILY
Status: DISCONTINUED | OUTPATIENT
Start: 2020-02-06 | End: 2020-02-06 | Stop reason: HOSPADM

## 2020-02-05 RX ORDER — ACETAMINOPHEN 325 MG/1
650 TABLET ORAL EVERY 4 HOURS PRN
Status: DISCONTINUED | OUTPATIENT
Start: 2020-02-05 | End: 2020-02-06 | Stop reason: HOSPADM

## 2020-02-05 RX ORDER — OXYCODONE AND ACETAMINOPHEN 10; 325 MG/1; MG/1
1 TABLET ORAL 2 TIMES DAILY PRN
Status: DISCONTINUED | OUTPATIENT
Start: 2020-02-05 | End: 2020-02-06 | Stop reason: HOSPADM

## 2020-02-05 RX ORDER — SODIUM CHLORIDE 0.9 % (FLUSH) 0.9 %
10 SYRINGE (ML) INJECTION PRN
Status: DISCONTINUED | OUTPATIENT
Start: 2020-02-05 | End: 2020-02-06 | Stop reason: HOSPADM

## 2020-02-05 RX ORDER — ONDANSETRON 2 MG/ML
4 INJECTION INTRAMUSCULAR; INTRAVENOUS EVERY 6 HOURS PRN
Status: DISCONTINUED | OUTPATIENT
Start: 2020-02-05 | End: 2020-02-06 | Stop reason: HOSPADM

## 2020-02-05 RX ORDER — TRAZODONE HYDROCHLORIDE 50 MG/1
50 TABLET ORAL NIGHTLY PRN
Status: DISCONTINUED | OUTPATIENT
Start: 2020-02-05 | End: 2020-02-06 | Stop reason: HOSPADM

## 2020-02-05 RX ORDER — DEXTROSE MONOHYDRATE 25 G/50ML
12.5 INJECTION, SOLUTION INTRAVENOUS PRN
Status: DISCONTINUED | OUTPATIENT
Start: 2020-02-05 | End: 2020-02-06 | Stop reason: HOSPADM

## 2020-02-05 RX ORDER — INSULIN GLARGINE 100 [IU]/ML
8 INJECTION, SOLUTION SUBCUTANEOUS NIGHTLY
Status: DISCONTINUED | OUTPATIENT
Start: 2020-02-05 | End: 2020-02-06 | Stop reason: HOSPADM

## 2020-02-05 RX ORDER — LISINOPRIL 10 MG/1
10 TABLET ORAL DAILY
Status: DISCONTINUED | OUTPATIENT
Start: 2020-02-06 | End: 2020-02-06 | Stop reason: HOSPADM

## 2020-02-05 RX ADMIN — INSULIN GLARGINE 8 UNITS: 100 INJECTION, SOLUTION SUBCUTANEOUS at 22:46

## 2020-02-05 RX ADMIN — OXYCODONE HYDROCHLORIDE AND ACETAMINOPHEN 1 TABLET: 5; 325 TABLET ORAL at 14:38

## 2020-02-05 RX ADMIN — HYDRALAZINE HYDROCHLORIDE 25 MG: 25 TABLET, FILM COATED ORAL at 20:45

## 2020-02-05 RX ADMIN — POTASSIUM CHLORIDE 40 MEQ: 750 TABLET, FILM COATED, EXTENDED RELEASE ORAL at 18:56

## 2020-02-05 RX ADMIN — AMIODARONE HYDROCHLORIDE 0.5 MG/MIN: 50 INJECTION, SOLUTION INTRAVENOUS at 21:59

## 2020-02-05 RX ADMIN — TRAZODONE HYDROCHLORIDE 50 MG: 50 TABLET ORAL at 20:44

## 2020-02-05 RX ADMIN — AMIODARONE HYDROCHLORIDE 1 MG/MIN: 50 INJECTION, SOLUTION INTRAVENOUS at 17:24

## 2020-02-05 RX ADMIN — OXYCODONE HYDROCHLORIDE AND ACETAMINOPHEN 1 TABLET: 10; 325 TABLET ORAL at 21:02

## 2020-02-05 RX ADMIN — GABAPENTIN 600 MG: 300 CAPSULE ORAL at 21:02

## 2020-02-05 RX ADMIN — ONDANSETRON 4 MG: 4 TABLET, ORALLY DISINTEGRATING ORAL at 14:35

## 2020-02-05 RX ADMIN — INSULIN LISPRO 1 UNITS: 100 INJECTION, SOLUTION INTRAVENOUS; SUBCUTANEOUS at 22:46

## 2020-02-05 RX ADMIN — MAGNESIUM SULFATE HEPTAHYDRATE 1 G: 1 INJECTION, SOLUTION INTRAVENOUS at 20:45

## 2020-02-05 RX ADMIN — RANOLAZINE 1000 MG: 500 TABLET, FILM COATED, EXTENDED RELEASE ORAL at 22:46

## 2020-02-05 RX ADMIN — AMIODARONE HYDROCHLORIDE 150 MG: 50 INJECTION, SOLUTION INTRAVENOUS at 17:03

## 2020-02-05 ASSESSMENT — PAIN DESCRIPTION - DESCRIPTORS
DESCRIPTORS: ACHING
DESCRIPTORS: ACHING

## 2020-02-05 ASSESSMENT — ENCOUNTER SYMPTOMS
VOMITING: 0
COLOR CHANGE: 0
SHORTNESS OF BREATH: 0
ABDOMINAL PAIN: 0
COUGH: 1
BACK PAIN: 0

## 2020-02-05 ASSESSMENT — PAIN DESCRIPTION - LOCATION
LOCATION: CHEST
LOCATION: CHEST

## 2020-02-05 ASSESSMENT — PAIN DESCRIPTION - FREQUENCY: FREQUENCY: CONTINUOUS

## 2020-02-05 ASSESSMENT — PAIN SCALES - GENERAL
PAINLEVEL_OUTOF10: 5
PAINLEVEL_OUTOF10: 4
PAINLEVEL_OUTOF10: 6
PAINLEVEL_OUTOF10: 7
PAINLEVEL_OUTOF10: 0
PAINLEVEL_OUTOF10: 0
PAINLEVEL_OUTOF10: 6

## 2020-02-05 ASSESSMENT — PAIN DESCRIPTION - PAIN TYPE
TYPE: ACUTE PAIN
TYPE: ACUTE PAIN

## 2020-02-05 ASSESSMENT — PAIN DESCRIPTION - PROGRESSION: CLINICAL_PROGRESSION: GRADUALLY IMPROVING

## 2020-02-05 ASSESSMENT — PAIN - FUNCTIONAL ASSESSMENT: PAIN_FUNCTIONAL_ASSESSMENT: ACTIVITIES ARE NOT PREVENTED

## 2020-02-05 NOTE — TELEPHONE ENCOUNTER
Tuan Delgado called in stating that his defibrillator went off around 20 minutes ago when he was in the shower. He would like to speak to someone about this. You can reach Tuan Delgado at [phone].

## 2020-02-05 NOTE — H&P
[facility]bral [facility]     CHF (congestive heart failure) [facility]     [facility]     mild    Depression     DM2 [facility]diabetes mellitus, type 2) [facility]     Fibromyalgia     GERD (gastroesophageal reflux disease)     Hyperlipidemia     Hypertension     Liver disease     Pacemaker 2012    Medtronic model # [id number]    Pneumonia     Se[facility]ures [facility]     TIA (transient ischemic attack) 2007    [facility]        Past Surgical History:      Procedure Laterality Date    BACK SURGERY      CARDIAC SURGERY      CHOLECYSTECTOMY      COLONOSCOPY  01/10/2017    COLONOSCOPY  01/10/2017    CORONARY ANGIOPLASTY WITH STENT PLACEMENT  2012    CORONARY ARTERY BYPASS GRAFT  2010, 11/2015    ENDOSCOPY, COLON, DIAGNOSTIC      PACEMAKER PLACEMENT      UPPER GASTROINTESTINAL ENDOSCOPY  02/07/2017       Medications (prior to admission):  Prior to Admission medications    Medication Sig Start Date End Date Taking? Authorizing Provider   gabapentin (NEURONTIN) 600 MG tablet TAKE 1 TABLET BY MOUTH FIVE TIMES DAILY 2/3/20 4/3/20 Yes Lynn Iqbal MD   oxyCODONE-acetaminophen (PERCOCET)  MG per tablet Take 1 tablet by mouth 2 times daily as needed for Pain for up to 30 days.  1/30/20 2/29/20 Yes Lynn Iqbal MD   lisinopril (PRINIVIL;ZESTRIL) 10 MG tablet Take 1 tablet by mouth daily 12/21/19  Yes Sukhdeep Chen MD   metoprolol succinate (TOPROL XL) 50 MG extended release tablet Take 1 tablet by mouth daily 12/21/19  Yes Sukhdeep Chen MD   ranolazine (RANEXA) 500 MG extended release tablet Take 1,000 mg by mouth 2 times daily   Yes Historical Provider, MD   furosemide (LASIX) 20 MG tablet Take 20 mg by mouth daily   Yes Historical Provider, MD   insulin aspart (NOVOLOG) 100 UNIT/ML injection vial Inject 2 Units into the skin 3 times daily (before meals) 11/26/19  Yes Historical Provider, MD   empagliflozin (JARDIANCE) 10 MG tablet changes. I reviewed EKG. Discussed with ER provider.       Thank you Sav Garcia MD for the opportunity to be involved in this patient's care.    -----------------------------  Mesha Riojas MD  [facility]

## 2020-02-05 NOTE — ED PROVIDER NOTES
[address]      Pt Name: Junior Evans  MRN: [id number]  [place] 1955  Date of evaluation: 2/5/2020  Provider: ABIGAIL Mcleod    This patient was seen and evaluated by the attending physician Dr. Bobo Thurston. CHIEF COMPLAINT       Chief Complaint   Patient presents with    Chest Pain     pt. was in shower [phone] [id number] and internal defibrillator went off, now with chest pain       CRITICAL CARE TIME   I performed a total Critical Care time of  31 minutes, excluding separately reportable procedures. There was a high probability of clinically significant/life threatening deterioration in the patient's condition which required my urgent intervention. Not limited to multiple reexaminations, discussions with attending physician and consultants. HISTORY OF PRESENT ILLNESS  (Location/Symptom, Timing/Onset, Context/Setting, Quality, Duration, Modifying Factors, Severity.)   Junior Evans is a 59 y.o. male who presents to the emergency department via EMS from home. He states around 12:45 PM he was in the shower when he felt his defibrillator discharge. He states he developed some sharp substernal left-sided chest pain and pain into his left arm that is dull after that. Is been constant. He states that he received 1 nitro and full aspirin with no real change in his symptoms. He has past cardiac history reviewed including stenting and grafts with a patent cath on November 2019. He denies calf tenderness or leg swelling. He denies abdominal pain shortness of breath or productive cough. He continues to smoke cigarettes has diabetes, hypertension hyperlipidemia. Nursing Notes were reviewed and I agree. REVIEW OF SYSTEMS    (2-9 systems for level 4, 10 or more for level 5)     Review of Systems   Constitutional: Negative for fever. Respiratory: Positive for cough. Negative for shortness of breath.     Cardiovascular: Positive for chest pain. Negative for leg swelling. Gastrointestinal: Negative for abdominal pain and vomiting. Musculoskeletal: Negative for back pain. Skin: Negative for color change, rash and wound. Neurological: Negative for weakness and numbness. Psychiatric/Behavioral: Negative for agitation, behavioral problems and confusion. Except as noted above the remainder of the review of systems was reviewed and negative.        PAST MEDICAL HISTORY         Diagnosis Date    Anxiety     Arthritis     Asthma     CAD (coronary artery disease)     Calcium kidney stone     [facility]     Cerebral artery occlusion with cerebral infarction ([facility]     CHF (congestive heart failure) ([address]     COPD (chronic obstructive pulmonary disease) [facility]     mild    Depression     DM2 (diabetes mellitus, type 2) ([facility])     Fibromyalgia     GERD (gastroesophageal reflux disease)     Hyperlipidemia     Hypertension     Liver disease     Pacemaker 2012    Medtronic model # [id number]    Pneumonia     Seizures ([facility]     TIA (transient ischemic attack) 2007    Ulcerative colitis ([facility]        SURGICAL HISTORY           Procedure Laterality Date    BACK SURGERY      CARDIAC SURGERY      CHOLECYSTECTOMY      COLONOSCOPY  01/10/2017    COLONOSCOPY  01/10/2017    CORONARY ANGIOPLASTY WITH STENT PLACEMENT  2012    CORONARY ARTERY BYPASS GRAFT  2010, 11/2015    ENDOSCOPY, COLON, DIAGNOSTIC      PACEMAKER PLACEMENT      UPPER GASTROINTESTINAL ENDOSCOPY  02/07/2017       CURRENT MEDICATIONS       Previous Medications    ACETAMINOPHEN (TYLENOL) 325 MG TABLET    Take 650 mg by mouth every 6 hours as needed for Pain    ALBUTEROL SULFATE HFA (PROAIR HFA) 108 (90 BASE) MCG/ACT INHALER    Inhale 2 puffs into the lungs every 6 hours as needed for Wheezing    ASPIRIN 325 MG TABLET    Take 325 mg by mouth daily     ATORVASTATIN (LIPITOR) 80 MG TABLET    TAKE 1 TABLET BY MOUTH DAILY    CLOPIDOGREL (PLAVIX) 75 MG TABLET    TAKE 1 TABLET BY MOUTH DAILY    EMPAGLIFLOZIN (JARDIANCE) 10 MG TABLET    Take 1 tablet by mouth daily    FREESTYLE LANCETS MISC    1 each by Does not apply route daily    FUROSEMIDE (LASIX) 20 MG TABLET    Take 20 mg by mouth daily    GABAPENTIN (NEURONTIN) 600 MG TABLET    TAKE 1 TABLET BY MOUTH FIVE TIMES DAILY    HYDRALAZINE (APRESOLINE) 25 MG TABLET    Take 1 tablet by mouth every 8 hours    INSULIN ASPART (NOVOLOG) 100 UNIT/ML INJECTION VIAL    Inject 2 Units into the skin 3 times daily (before meals)    INSULIN GLARGINE (LANTUS) 100 UNIT/ML INJECTION VIAL    Inject 20 Units into the skin nightly    INSULIN PEN NEEDLE 31G X 5 MM MISC    1 each by Does not apply route daily    ISOSORBIDE MONONITRATE (IMDUR) 30 MG EXTENDED RELEASE TABLET    Take 60 mg by mouth daily     LANCETS MISC    accu check fastclick lancets  Dx: T53.5  As needed    LISINOPRIL (PRINIVIL;ZESTRIL) 10 MG TABLET    Take 1 tablet by mouth daily    METOPROLOL SUCCINATE (TOPROL XL) 50 MG EXTENDED RELEASE TABLET    Take 1 tablet by mouth daily    NITROGLYCERIN (NITROSTAT) 0.4 MG SL TABLET    PLACE 1 TABLET UNDER THE TONGUE EVERY 5 MINUTES AS NEEDED FOR CHEST PAIN    OXYCODONE-ACETAMINOPHEN (PERCOCET)  MG PER TABLET    Take 1 tablet by mouth 2 times daily as needed for Pain for up to 30 days. RANOLAZINE (RANEXA) 500 MG EXTENDED RELEASE TABLET    Take 1,000 mg by mouth 2 times daily    TRAZODONE (DESYREL) 50 MG TABLET    TAKE 1 TABLET BY MOUTH EVERY NIGHT AT BEDTIME       ALLERGIES     Dopamine hcl and Melatonin    FAMILY HISTORY           Problem Relation Age of Onset    Diabetes Father     Coronary Art Dis Father     Cancer Mother         Lung with mets     Family Status   Relation Name Status    Father  Alive        CAD    Mother   at age 62        Lung cancer        SOCIAL HISTORY      reports that he has been smoking cigarettes. He has a 22.00 pack-year smoking history.  He has never used smokeless tobacco. He reports that he does not drink alcohol or use drugs. PHYSICAL EXAM    (up to 7 for level 4, 8 or more for level 5)     ED Triage Vitals   BP Temp Temp Source Pulse Resp SpO2 Height Weight   02/05/20 1354 02/05/20 1346 02/05/20 1346 02/05/20 1346 02/05/20 1346 02/05/20 1346 02/05/20 1346 02/05/20 1346   136/77 98.3 °F (36.8 °C) Oral 116 16 95 % 6' (1.829 m) 205 lb 14.6 oz (93.4 kg)     Physical Exam  Vitals signs and nursing note reviewed. Constitutional:       Appearance: Normal appearance. HENT:      Head: Normocephalic and atraumatic. Eyes:      Pupils: Pupils are equal, round, and reactive to light. Neck:      Musculoskeletal: Normal range of motion. Cardiovascular:      Rate and Rhythm: Normal rate. Pulses: Normal pulses. Pulmonary:      Effort: Pulmonary effort is normal. No respiratory distress. Abdominal:      Tenderness: There is no abdominal tenderness. Musculoskeletal: Normal range of motion. General: No swelling. Skin:     General: Skin is warm. Neurological:      General: No focal deficit present. Mental Status: He is alert and oriented to person, place, and time. Psychiatric:         Mood and Affect: Mood normal.         Behavior: Behavior normal.         DIAGNOSTIC RESULTS     EKG: All EKG's are interpreted by ABIGAIL Tsang in the absence of a cardiologist.    EKG interpreted by myself - please refer to attending physician's note for complete EKG interpretation:    Rhythm: sinus rhythm   Rate: Tachycardia  No evidence of acute ischemia or injury. RADIOLOGY:   Non-plain film images such as CT, Ultrasound and MRI are read by the radiologist. Plain radiographic images are visualized and preliminarily interpreted by ABIGAIL Tsang with the below findings:    Reviewed radiologist dictation. Interpretation per the Radiologist below, if available at the time of this note:    XR CHEST STANDARD (2 VW)   Final Result   1. No acute abnormality. LABS:  Labs Reviewed   COMPREHENSIVE METABOLIC PANEL - Abnormal; Notable for the following components:       Result Value    Glucose 284 (*)     ALT 6 (*)     AST 7 (*)     All other components within normal limits    Narrative:     Performed at:  [place] [address]   Phone [phone]   TROPONIN - Abnormal; Notable for the following components:    Troponin 0.02 (*)     All other components within normal limits    Narrative:     Performed at:  [place] [address]   Phone [phone]   BRAIN NATRIURETIC PEPTIDE - Abnormal; Notable for the following components:    Pro- (*)     All other components within normal limits    Narrative:     Performed at:  [place] [address]   Phone [phone]   CBC WITH AUTO DIFFERENTIAL    Narrative:     Performed at:  [facility]  [address] [place] [address]   Phone [phone]   PROTIME-INR    Narrative:     Performed at:  [facility]  [address] [place] [address]   Phone [phone]   TSH WITH REFLEX TO FT4       All other labs were within normal range or not returned as of this dictation. EMERGENCY DEPARTMENT COURSE and DIFFERENTIAL DIAGNOSIS/MDM:   Vitals:    Vitals:    02/05/20 1548 02/05/20 1615 02/05/20 1632 02/05/20 1709   BP: (!) 152/99 (!) 152/85 (!) 162/92 (!) 191/107   Pulse: 92  83 94   Resp: 19  16 14   Temp:       TempSrc:       SpO2:   98% 95%   Weight:       Height:         Patient had chest pain after his defibrillator fired around 1230 this afternoon.   It was interrogated and I spoke with the [employer] representative who advised that the patient had had prolonged episodes of VT just below his threshold all morning and then had a 15-minute episode about 1222 which resulted in discharge. The patient got nitro, aspirin and his home Percocet and states he is currently pain-free. Troponin is 0.02 and was 0.02 during his last 2 ER visits. He had a cardiac cath in November 2019. Attending physician spoke with cardiology who advised an amiodarone drip and this was ordered by the attending physician will consult hospitalist for admission the patient is agreeable. CONSULTS:  IP CONSULT TO CARDIOLOGY  IP CONSULT TO HOSPITALIST    PROCEDURES:  None    FINAL IMPRESSION      1. Defibrillator discharge    2. Chest pain, unspecified type    3. V-tach [facility])          DISPOSITION/PLAN   DISPOSITION        PATIENT REFERRED TO:  No follow-up provider specified.     DISCHARGE MEDICATIONS:  New Prescriptions    No medications on file       (Please note that portions of this note were completed with a voice recognition program.  Efforts were made to edit the dictations but occasionally words are mis-transcribed.)    Winnie Tracy, [address], [place]  02/05/20 9323

## 2020-02-05 NOTE — TELEPHONE ENCOUNTER
Called patient wife states he is currently in transport to [facility] ED.

## 2020-02-05 NOTE — TELEPHONE ENCOUNTER
Never received [facility] alert. I tried calling patient by my cell phone. I blocked my number as I dont have a [employer]. Patients usually wont answer my calls when I do this. I left a message to call the office back. If pt calls back, have him send a carelink transmission.

## 2020-02-05 NOTE — ED PROVIDER NOTES
Attending Supervising Physicians Attestation Statement  I was present with the Mid Level Provider during the history and exam. I discussed the findings and plans with the Mid Level Provider and agree as documented in her note     Eros MCKEON- Alice Mccord is a 59 y.o. male who presents to the emergency department with chest pain. Patient states he was feeling fine when his defibrillator went off today. + for chest pain after defibrillator went off. Pain has resolved now after Percocet. Denies SOB. No other associated symptoms.      Vitals:    02/05/20 1346 02/05/20 1354 02/05/20 1437   BP:  136/77 135/79   Pulse: 116 113 100   Resp: 16 20 17   Temp: 98.3 °F (36.8 °C)     TempSrc: Oral     SpO2: 95% 94% 93%   Weight: 205 lb 14.6 oz (93.4 kg)     Height: 6' (1.829 m)       PE  Non toxic Well appearing  RRR  CTAB  Soft non tender abdomen  No leg swelling  No focal deficits  No rash    EKG: All EKG's are interpreted by the Emergency Department Physician who either signs or Co-signs this chart in the absence of acardiologist.    EKG shows Sinus tachycardia   Possible Left atrial enlargement  Right bundle branch block  Nonspecific t wave abnromality  Similar to old EKGs    RADIOLOGY:   Non-plain film images such as CT, Ultrasoundand MRI are read by the radiologist. Plain radiographic images are visualized and preliminarily interpreted by the emergency physician with the below findings:    CXR shows no acute process     ED BEDSIDE ULTRASOUND:   Performed by ED Physician - none    LABS:  Labs Reviewed   COMPREHENSIVE METABOLIC PANEL - Abnormal; Notable for the following components:       Result Value    Glucose 284 (*)     ALT 6 (*)     AST 7 (*)     All other components within normal limits    Narrative:     Performed at:  [facility]  [address] [facility] [address]   Phone [phone]   TROPONIN - Abnormal; Notable for the following components:    Troponin 0.02 (*)     All other components within normal limits    Narrative:     Performed at:  [facility]  [address] [place] [address]   Phone [phone]   BRAIN NATRIURETIC PEPTIDE - Abnormal; Notable for the following components:    Pro- (*)     All other components within normal limits    Narrative:     Performed at:  [facility]  [address] [place] [address]   Phone [phone]   CBC WITH AUTO DIFFERENTIAL    Narrative:     Performed at:  [facility]  [address] [facility] [place] [address]   Phone [phone]   PROTIME-INR    Narrative:     Performed at:  [facility]  [address] [facility] [place] [address]   Phone [phone]       All other labs were withinnormal range or not returned as of this dictation. MDM  59year old male w/ CAD s/p CABG (catheterization in Nov 2019 with patent grafts), chronic combined CHF (LVEF 40-45% Nov 2019), DM2, who presents with chest pain after defibrillator went off which has now resolved after percocet. EKG similar to old and trop x 2 at baseline. [employer] evaluated AICD and found to be having wide complex runs all morning but they were 150-188bpm bpm and the AICD shocks are set for a higher rate so the defibrillator was not firing initially. 1 shock was given for [facility] that had been going on for ~15 minutes. Patient now in sinus. Takes Toprol XL and took this AM. Discussed with Dr Debo Arias with cardiology and recommended starting amiodarone bolus and drip and EP Dr Gavi Oconnell will be by to see. Will admit for further inpatient assessment. Critical Care time  Total Critical Caretime was 39 minutes, excluding separately reportable procedures. There was a high probability of clinically significant/life threatening deterioration in the patient's condition which required my urgent intervention. PROCEDURES:  Unlessotherwise noted below, none    Impression  1. Defibrillator discharge    2. Chest pain, unspecified type    3.  V-tach [facility])        DISPOSITION      Admission    Charlene Heredia MD(electronically signed)  Attending Emergency Physician        Charlene Heredia MD  02/05/20 4258

## 2020-02-06 VITALS
HEIGHT: 72 IN | SYSTOLIC BLOOD PRESSURE: 126 MMHG | HEART RATE: 76 BPM | BODY MASS INDEX: 27.78 KG/M2 | DIASTOLIC BLOOD PRESSURE: 82 MMHG | RESPIRATION RATE: 16 BRPM | OXYGEN SATURATION: 94 % | TEMPERATURE: 98 F | WEIGHT: 205.1 LBS

## 2020-02-06 PROBLEM — Z45.02 ICD (IMPLANTABLE CARDIOVERTER-DEFIBRILLATOR) DISCHARGE: Status: ACTIVE | Noted: 2020-02-06

## 2020-02-06 PROBLEM — R00.0 TACHYCARDIA: Status: ACTIVE | Noted: 2020-02-06

## 2020-02-06 LAB
A/G RATIO: 1.3 (ref 1.1–2.2)
ALBUMIN SERPL-MCNC: 3.5 G/DL (ref 3.4–5)
ALP BLD-CCNC: 62 U/L (ref 40–129)
ALT SERPL-CCNC: <5 U/L (ref 10–40)
ANION GAP SERPL CALCULATED.3IONS-SCNC: 12 MMOL/L (ref 3–16)
AST SERPL-CCNC: 9 U/L (ref 15–37)
BASOPHILS ABSOLUTE: 0.2 K/UL (ref 0–0.2)
BASOPHILS RELATIVE PERCENT: 3 %
BILIRUB SERPL-MCNC: <0.2 MG/DL (ref 0–1)
BUN BLDV-MCNC: 16 MG/DL (ref 7–20)
CALCIUM SERPL-MCNC: 8.9 MG/DL (ref 8.3–10.6)
CHLORIDE BLD-SCNC: 102 MMOL/L (ref 99–110)
CO2: 23 MMOL/L (ref 21–32)
CREAT SERPL-MCNC: 0.9 MG/DL (ref 0.8–1.3)
EKG ATRIAL RATE: 83 BPM
EKG DIAGNOSIS: NORMAL
EKG P AXIS: 60 DEGREES
EKG P-R INTERVAL: 124 MS
EKG Q-T INTERVAL: 432 MS
EKG QRS DURATION: 136 MS
EKG QTC CALCULATION (BAZETT): 507 MS
EKG R AXIS: 58 DEGREES
EKG T AXIS: 88 DEGREES
EKG VENTRICULAR RATE: 83 BPM
EOSINOPHILS ABSOLUTE: 0.2 K/UL (ref 0–0.6)
EOSINOPHILS RELATIVE PERCENT: 3.4 %
ESTIMATED AVERAGE GLUCOSE: 151.3 MG/DL
GFR AFRICAN AMERICAN: >60
GFR NON-AFRICAN AMERICAN: >60
GLOBULIN: 2.8 G/DL
GLUCOSE BLD-MCNC: 174 MG/DL (ref 70–99)
GLUCOSE BLD-MCNC: 198 MG/DL (ref 70–99)
GLUCOSE BLD-MCNC: 200 MG/DL (ref 70–99)
HBA1C MFR BLD: 6.9 %
HCT VFR BLD CALC: 40.8 % (ref 40.5–52.5)
HEMOGLOBIN: 13.9 G/DL (ref 13.5–17.5)
LYMPHOCYTES ABSOLUTE: 3.3 K/UL (ref 1–5.1)
LYMPHOCYTES RELATIVE PERCENT: 47.7 %
MAGNESIUM: 2.1 MG/DL (ref 1.8–2.4)
MCH RBC QN AUTO: 33.5 PG (ref 26–34)
MCHC RBC AUTO-ENTMCNC: 33.9 G/DL (ref 31–36)
MCV RBC AUTO: 98.7 FL (ref 80–100)
MONOCYTES ABSOLUTE: 0.4 K/UL (ref 0–1.3)
MONOCYTES RELATIVE PERCENT: 6.3 %
NEUTROPHILS ABSOLUTE: 2.8 K/UL (ref 1.7–7.7)
NEUTROPHILS RELATIVE PERCENT: 39.6 %
PDW BLD-RTO: 14.4 % (ref 12.4–15.4)
PERFORMED ON: ABNORMAL
PERFORMED ON: ABNORMAL
PHOSPHORUS: 4.2 MG/DL (ref 2.5–4.9)
PLATELET # BLD: 130 K/UL (ref 135–450)
PMV BLD AUTO: 9.6 FL (ref 5–10.5)
POTASSIUM SERPL-SCNC: 4 MMOL/L (ref 3.5–5.1)
RBC # BLD: 4.14 M/UL (ref 4.2–5.9)
SODIUM BLD-SCNC: 137 MMOL/L (ref 136–145)
TOTAL PROTEIN: 6.3 G/DL (ref 6.4–8.2)
TROPONIN: 0.03 NG/ML
TROPONIN: 0.04 NG/ML
WBC # BLD: 7 K/UL (ref 4–11)

## 2020-02-06 PROCEDURE — 83735 ASSAY OF MAGNESIUM: CPT

## 2020-02-06 PROCEDURE — 96366 THER/PROPH/DIAG IV INF ADDON: CPT

## 2020-02-06 PROCEDURE — 96372 THER/PROPH/DIAG INJ SC/IM: CPT

## 2020-02-06 PROCEDURE — 94760 N-INVAS EAR/PLS OXIMETRY 1: CPT

## 2020-02-06 PROCEDURE — 80053 COMPREHEN METABOLIC PANEL: CPT

## 2020-02-06 PROCEDURE — 2580000003 HC RX 258: Performed by: INTERNAL MEDICINE

## 2020-02-06 PROCEDURE — 6360000002 HC RX W HCPCS: Performed by: INTERNAL MEDICINE

## 2020-02-06 PROCEDURE — 6370000000 HC RX 637 (ALT 250 FOR IP): Performed by: INTERNAL MEDICINE

## 2020-02-06 PROCEDURE — 85025 COMPLETE CBC W/AUTO DIFF WBC: CPT

## 2020-02-06 PROCEDURE — 83036 HEMOGLOBIN GLYCOSYLATED A1C: CPT

## 2020-02-06 PROCEDURE — 84484 ASSAY OF TROPONIN QUANT: CPT

## 2020-02-06 PROCEDURE — 84100 ASSAY OF PHOSPHORUS: CPT

## 2020-02-06 PROCEDURE — 36415 COLL VENOUS BLD VENIPUNCTURE: CPT

## 2020-02-06 PROCEDURE — 99221 1ST HOSP IP/OBS SF/LOW 40: CPT | Performed by: INTERNAL MEDICINE

## 2020-02-06 RX ORDER — OXYMETAZOLINE HYDROCHLORIDE 0.05 G/100ML
2 SPRAY NASAL 2 TIMES DAILY PRN
Status: DISCONTINUED | OUTPATIENT
Start: 2020-02-06 | End: 2020-02-06 | Stop reason: HOSPADM

## 2020-02-06 RX ORDER — METOPROLOL SUCCINATE 50 MG/1
50 TABLET, EXTENDED RELEASE ORAL DAILY
Status: DISCONTINUED | OUTPATIENT
Start: 2020-02-06 | End: 2020-02-06

## 2020-02-06 RX ORDER — METOPROLOL SUCCINATE 50 MG/1
50 TABLET, EXTENDED RELEASE ORAL ONCE
Status: COMPLETED | OUTPATIENT
Start: 2020-02-06 | End: 2020-02-06

## 2020-02-06 RX ORDER — METOPROLOL SUCCINATE 50 MG/1
100 TABLET, EXTENDED RELEASE ORAL DAILY
Status: DISCONTINUED | OUTPATIENT
Start: 2020-02-07 | End: 2020-02-06 | Stop reason: HOSPADM

## 2020-02-06 RX ORDER — METOPROLOL SUCCINATE 100 MG/1
100 TABLET, EXTENDED RELEASE ORAL DAILY
Qty: 30 TABLET | Refills: 3 | Status: SHIPPED | OUTPATIENT
Start: 2020-02-07 | End: 2020-06-26 | Stop reason: CLARIF

## 2020-02-06 RX ORDER — INSULIN GLARGINE 100 [IU]/ML
8 INJECTION, SOLUTION SUBCUTANEOUS NIGHTLY
Status: ON HOLD | COMMUNITY
Start: 2020-02-06 | End: 2020-06-18

## 2020-02-06 RX ADMIN — ASPIRIN 325 MG ORAL TABLET 325 MG: 325 PILL ORAL at 08:35

## 2020-02-06 RX ADMIN — AMIODARONE HYDROCHLORIDE 0.5 MG/MIN: 50 INJECTION, SOLUTION INTRAVENOUS at 02:11

## 2020-02-06 RX ADMIN — Medication 10 ML: at 08:34

## 2020-02-06 RX ADMIN — GABAPENTIN 600 MG: 300 CAPSULE ORAL at 05:36

## 2020-02-06 RX ADMIN — ENOXAPARIN SODIUM 40 MG: 40 INJECTION SUBCUTANEOUS at 08:34

## 2020-02-06 RX ADMIN — RANOLAZINE 1000 MG: 500 TABLET, FILM COATED, EXTENDED RELEASE ORAL at 08:34

## 2020-02-06 RX ADMIN — GABAPENTIN 600 MG: 300 CAPSULE ORAL at 12:09

## 2020-02-06 RX ADMIN — HYDRALAZINE HYDROCHLORIDE 25 MG: 25 TABLET, FILM COATED ORAL at 05:35

## 2020-02-06 RX ADMIN — CLOPIDOGREL BISULFATE 75 MG: 75 TABLET ORAL at 08:34

## 2020-02-06 RX ADMIN — INSULIN LISPRO 1 UNITS: 100 INJECTION, SOLUTION INTRAVENOUS; SUBCUTANEOUS at 12:09

## 2020-02-06 RX ADMIN — FUROSEMIDE 20 MG: 20 TABLET ORAL at 08:34

## 2020-02-06 RX ADMIN — INSULIN LISPRO 1 UNITS: 100 INJECTION, SOLUTION INTRAVENOUS; SUBCUTANEOUS at 08:33

## 2020-02-06 RX ADMIN — METOPROLOL SUCCINATE 50 MG: 50 TABLET, EXTENDED RELEASE ORAL at 08:35

## 2020-02-06 RX ADMIN — METOPROLOL SUCCINATE 50 MG: 50 TABLET, EXTENDED RELEASE ORAL at 15:10

## 2020-02-06 RX ADMIN — LISINOPRIL 10 MG: 10 TABLET ORAL at 08:34

## 2020-02-06 RX ADMIN — ATORVASTATIN CALCIUM 80 MG: 80 TABLET, FILM COATED ORAL at 08:34

## 2020-02-06 RX ADMIN — GABAPENTIN 600 MG: 300 CAPSULE ORAL at 08:34

## 2020-02-06 RX ADMIN — HYDRALAZINE HYDROCHLORIDE 25 MG: 25 TABLET, FILM COATED ORAL at 13:48

## 2020-02-06 RX ADMIN — OXYCODONE HYDROCHLORIDE AND ACETAMINOPHEN 1 TABLET: 10; 325 TABLET ORAL at 09:05

## 2020-02-06 RX ADMIN — ISOSORBIDE MONONITRATE 60 MG: 60 TABLET, EXTENDED RELEASE ORAL at 08:34

## 2020-02-06 ASSESSMENT — PAIN SCALES - GENERAL
PAINLEVEL_OUTOF10: 4
PAINLEVEL_OUTOF10: 6

## 2020-02-06 ASSESSMENT — PAIN DESCRIPTION - FREQUENCY: FREQUENCY: CONTINUOUS

## 2020-02-06 ASSESSMENT — PAIN DESCRIPTION - LOCATION: LOCATION: CHEST

## 2020-02-06 ASSESSMENT — PAIN DESCRIPTION - PROGRESSION: CLINICAL_PROGRESSION: NOT CHANGED

## 2020-02-06 ASSESSMENT — PAIN - FUNCTIONAL ASSESSMENT: PAIN_FUNCTIONAL_ASSESSMENT: PREVENTS OR INTERFERES SOME ACTIVE ACTIVITIES AND ADLS

## 2020-02-06 ASSESSMENT — PAIN DESCRIPTION - ORIENTATION: ORIENTATION: MID

## 2020-02-06 ASSESSMENT — PAIN DESCRIPTION - ONSET: ONSET: ON-GOING

## 2020-02-06 ASSESSMENT — PAIN DESCRIPTION - DESCRIPTORS: DESCRIPTORS: ACHING

## 2020-02-06 ASSESSMENT — PAIN DESCRIPTION - PAIN TYPE: TYPE: ACUTE PAIN

## 2020-02-06 NOTE — CARE COORDINATION
INITIAL CASE MANAGEMENT ASSESSMENT    Reviewed chart, met with patient to assess possible discharge needs. Explained Case Management role/services. Living Situation: home with wife    ADLs: independent     DME: none    PT/OT Recs: not active     Active Services: none     Transportation: he drives, spouse will transport home     Medications: [facility] on [address]- able to afford    PCP: Dr Le Knows      HD/PD: none    PLAN/COMMENTS: Was at  in December then went home. Feels confident he can go home at dc. Doesn't want home care. No dc needs    SW/CM provided contact information for patient or family to call with any questions. SW/CM will follow and assist as needed.   Electronically signed by [id number] Garvin Gaucher, LSW on 2/6/2020 at 12:15 PM

## 2020-02-06 NOTE — CONSULTS
History and Physical  [place] [address]   Cardiology    Chief Complaint:   icd shock    HPI:     Patient is a 59 y.o. male came to er after icd shock which hurt. He was in the shower without any pre-shock sx. The single lead recordings indicate a rate around the rate cut off of 188 bpm for a 20 minute time before antitach pacing twice then shock. The coil to svc egm revealed p wave in front of qrs post ventricular pacing. He was shocked and rate slowed. Patient otherwise has been feeling great followed but Dr. Antonio Finely. He denies any chest pain sob etc.       Past Medical History:   Diagnosis Date    Anxiety     Arthritis     Asthma     CAD (coronary artery disease)     Calcium kidney stone     Cardiomyopathy ([address]     Cerebral artery occlusion with cerebral infarction ([address]     CHF (congestive heart failure) ([address]     COPD (chronic obstructive pulmonary disease) ([facility]     mild    Depression     DM2 (diabetes mellitus, type 2) ([facility])     Fibromyalgia     GERD (gastroesophageal reflux disease)     Hyperlipidemia     Hypertension     Liver disease     Pacemaker 2012    Medtronic model # [id number]    Pneumonia     Seizures ([address]     TIA (transient ischemic attack) 2007    Ulcerative colitis ([address]       Past Surgical History:   Procedure Laterality Date    BACK SURGERY      CARDIAC SURGERY      CHOLECYSTECTOMY      COLONOSCOPY  01/10/2017    COLONOSCOPY  01/10/2017    CORONARY ANGIOPLASTY WITH STENT PLACEMENT  2012    CORONARY ARTERY BYPASS GRAFT  2010, 11/2015    ENDOSCOPY, COLON, DIAGNOSTIC      PACEMAKER PLACEMENT      UPPER GASTROINTESTINAL ENDOSCOPY  02/07/2017        Medications Prior to Admission: gabapentin (NEURONTIN) 600 MG tablet, TAKE 1 TABLET BY MOUTH FIVE TIMES DAILY  oxyCODONE-acetaminophen (PERCOCET)  MG per tablet, Take 1 tablet by mouth 2 times daily as needed for Pain for up to 30 days.   lisinopril (PRINIVIL;ZESTRIL) 10 MG tablet, Take 1 tablet by mouth daily  metoprolol succinate (TOPROL XL) 50 MG extended release tablet, Take 1 tablet by mouth daily  ranolazine (RANEXA) 500 MG extended release tablet, Take 1,000 mg by mouth 2 times daily  furosemide (LASIX) 20 MG tablet, Take 20 mg by mouth daily  insulin aspart (NOVOLOG) 100 UNIT/ML injection vial, Inject 2 Units into the skin 3 times daily (before meals)  empagliflozin (JARDIANCE) 10 MG tablet, Take 1 tablet by mouth daily  isosorbide mononitrate (IMDUR) 30 MG extended release tablet, Take 60 mg by mouth daily   traZODone (DESYREL) 50 MG tablet, TAKE 1 TABLET BY MOUTH EVERY NIGHT AT BEDTIME  hydrALAZINE (APRESOLINE) 25 MG tablet, Take 1 tablet by mouth every 8 hours  insulin glargine (LANTUS) 100 UNIT/ML injection vial, Inject 20 Units into the skin nightly (Patient taking differently: Inject 8 Units into the skin nightly )  acetaminophen (TYLENOL) 325 MG tablet, Take 650 mg by mouth every 6 hours as needed for Pain  clopidogrel (PLAVIX) 75 MG tablet, TAKE 1 TABLET BY MOUTH DAILY  Insulin Pen Needle 31G X 5 MM MISC, 1 each by Does not apply route daily  aspirin 325 MG tablet, Take 325 mg by mouth daily   Lancets MISC, accu check fastclick lancets Dx: Z93.1 As needed  FREESTYLE LANCETS MISC, 1 each by Does not apply route daily  atorvastatin (LIPITOR) 80 MG tablet, TAKE 1 TABLET BY MOUTH DAILY  albuterol sulfate HFA (PROAIR HFA) 108 (90 Base) MCG/ACT inhaler, Inhale 2 puffs into the lungs every 6 hours as needed for Wheezing  nitroGLYCERIN (NITROSTAT) 0.4 MG SL tablet, PLACE 1 TABLET UNDER THE TONGUE EVERY 5 MINUTES AS NEEDED FOR CHEST PAIN    Allergies   Allergen Reactions    Dopamine Hcl Other (See Comments)     Compulsive gambling    Melatonin Other (See Comments)     Restless leg syndrome        Social History     Tobacco Use    Smoking status: Current Every Day Smoker     Packs/day: 0.50     Years: 44.00     Pack years: 22.00     Types: Cigarettes    Smokeless tobacco: Never Used    Tobacco comment: urged to stop   Substance Use Topics    Alcohol use: No     Alcohol/week: 0.0 standard drinks      Family History   Problem Relation Age of Onset    Diabetes Father     Coronary Art Dis Father     Cancer Mother         Lung with mets        Review of Systems:  · Constitutional: No Fever or Weight Loss  · Eyes: No decreased vision  · ENT: No Headaches, Hearing Loss or Vertigo  · Cardiovascular: No chest pain, dyspnea on exertion, palpitations or loss of consciousness  · Respiratory: No cough or wheezing    · Gastrointestinal: No abdominal pain, appetite loss, blood in stools, constipation, diarrhea or heartburn  · Genitourinary: No dysuria, trouble voiding, or hematuria  · Musculoskeletal:  No gait disturbance, weakness or joint complaints  · Integumentary: No rash or pruritis  · Neurological: No TIA or stroke symptoms  · Psychiatric: No anxiety or depression  · Endocrine: No malaise, fatigue or temperature intolerance  · Hematologic/Lymphatic: No bleeding problems, blood clots or swollen lymph nodes  · Allergic/Immunologic: No nasal congestion or hives      Objective Data:     /82   Pulse 76   Temp 98 °F (36.7 °C) (Oral)   Resp 16   Ht 6' (1.829 m)   Wt 205 lb 1.6 oz (93 kg)   SpO2 94%   BMI 27.82 kg/m²     General appearance: alert, appears stated age and cooperative  Alert, awake, oriented x 3  Eyes:  No erythema  Head: atraumatic  Neck:  no JVD  Lungs: clear to auscultation bilaterally  Heart: regular rate and rhythm, S1, S2 normal, no murmur, click, rub or gallop  Abdomen: soft, non-tender; bowel sounds normal; no masses,  no organomegaly  Extremities: extremities normal, atraumatic, no cyanosis or edema  Skin: Skin color, texture, turgor normal. No rashes or lesions  Hematologic: no remarkable bruising   Left pectoral device intact    ECG: sinus tachycardia, qahe=066, RBBB and lateral ST-T wave abnormality     Data Review    Cath 2011 [facility]:      LEFT VENTRICULOGRAPHY:  On single plane, 30 degree 6/24/2015   EF 45% DD1 Mild MR RVSP 27 [facility] care everywhere)       ECHO:      Summary   Overall left ventricular function is normal.   Mitral valve is structurally normal.   Pacemaker / ICD lead is visualized in the right atrium. A bubble study was performed and shows evidence of right to left shunting   consistent with a patent foramen ovale. A patent foramen ovale was visualized. Signature      ------------------------------------------------------------------   Electronically signed by Michelle Ivory MD (Interpreting   physician) on 12/18/2019 at 11:45 AM   ------------------------------------------------------------------    4/25/2019:  Conclusions        Summary    No reversible ischemia seen    fixed abnormal myocardial perfusion defect involving the inferior wall    Severely depressed LV Systolic function    inferior wall is hypokinetic    Overall findings represent a high risk study. Recent Labs     02/05/20  1418 02/06/20  0425    137   K 3.6 4.0    102   CO2 23 23   PHOS  --  4.2   BUN 14 16   CREATININE 0.9 0.9     Recent Labs     02/05/20  1418 02/06/20  0425   WBC 7.7 7.0   HGB 14.5 13.9   HCT 43.2 40.8   MCV 97.9 98.7    130*     Lab Results   Component Value Date    CKTOTAL 38 04/20/2016    CKMB 1.3 04/20/2016    TROPONINI 0.03 02/06/2020         Assessment:     Principal Problem:    ICD (implantable cardioverter-defibrillator) discharge  Active Problems:    Chronic chest pain    Type 2 diabetes mellitus without complication, with long-term current use of insulin ([facility])    Coronary artery disease involving native coronary artery of native heart without angina pectoris    ICD (implantable cardioverter-defibrillator), dual, in situ    Tachycardia  Resolved Problems:    * No resolved hospital problems. *      Plan:     1. The device shocked per set parameters. Appears to be atrial tach or sinus tach, more then ventricular tach.   2. Plan to increase toprol xl to 100

## 2020-02-06 NOTE — PLAN OF CARE
Problem: Falls - Risk of:  Goal: Will remain free from falls  Description  Will remain free from falls  2/6/2020 0921 by Spenser Kirby RN  Outcome: Ongoing     Problem: Falls - Risk of:  Goal: Absence of physical injury  Description  Absence of physical injury  2/6/2020 2544 by Spenser Kirby RN  Outcome: Ongoing     Problem: Cardiac:  Goal: Ability to maintain an adequate cardiac output will improve  Description  Ability to maintain an adequate cardiac output will improve  2/6/2020 0921 by Spenser Kirby RN  Outcome: Ongoing     Problem: Cardiac:  Goal: Hemodynamic stability will improve  Description  Hemodynamic stability will improve  2/6/2020 0921 by Spenser Kirby RN  Outcome: Ongoing     Problem: Fluid Volume:  Goal: Ability to achieve and maintain adequate urine output will improve  Description  Ability to achieve and maintain adequate urine output will improve  2/6/2020 0921 by Spenser Kirby RN  Outcome: Ongoing     Problem: Respiratory:  Goal: Respiratory status will improve  Description  Respiratory status will improve  2/6/2020 0921 by Spenser Kirby RN  Outcome: Ongoing     Problem: Pain:  Goal: Pain level will decrease  Description  Pain level will decrease  2/6/2020 0921 by Spenser Kirby RN  Outcome: Ongoing     Problem: Pain:  Goal: Control of acute pain  Description  Control of acute pain  2/6/2020 0921 by Spenser Kirby RN  Outcome: Ongoing     Problem: Pain:  Goal: Control of chronic pain  Description  Control of chronic pain  2/6/2020 0921 by Spenser Kirby RN  Outcome: Ongoing

## 2020-02-06 NOTE — PROGRESS NOTES
Pt arrived to floor via stretcher from ED and ambulated to bed. Telemetry activated. Patient oriented to room and use of call light. Call light and personal items within reach. Admission and assessment initiated. POC and education initiated and reviewed with pt. Telemetry- box 75. Denied further needs or questions at this time. Will continue to monitor.

## 2020-02-06 NOTE — DISCHARGE SUMMARY
Hospitalist Discharge Summary    Patient ID:  Faustino Wilson  [id number]  59 y.o.  1955    Admit date: 2/5/2020    Discharge date: 2/6/2020    Disposition: home    Admission Diagnoses:   Patient Active Problem List   Diagnosis    Chronic chest pain    S/P CABG (coronary artery bypass graft)    Essential hypertension    ICD (implantable cardioverter-defibrillator), dual, in situ    Abdominal pain    Ischemic cardiomyopathy    Hyperlipidemia LDL goal <70    CHF (congestive heart failure) ([facility])    Anxiety    Chronic back pain    Type 2 diabetes mellitus without complication, with long-term current use of insulin ([facility])    Coronary artery disease involving native coronary artery of native heart without angina pectoris    COPD exacerbation ([facility])    Anemia,normocytic normochromic ,mixed folic aci deficiency and iron deficiency    Guaiac + stool    Gastrointestinal hemorrhage    Morbid obesity due to excess calories ([address]    Anxiety    Coronary artery disease involving coronary bypass graft of native heart with angina pectoris ([facility])    Iron deficiency anemia due to chronic blood loss    Renal insufficiency    Tobacco abuse    Restrictive lung disease    Acute on chronic diastolic congestive heart failure ([facility])    Ischemic stroke, left frontal lobe (4/30/18) ([facility])    PFO (patent foramen ovale)    DMII (diabetes mellitus, type 2) ([facility])    COPD (chronic obstructive pulmonary disease) ([facility])    Renal insufficiency    Chest pain    Chronic systolic (congestive) heart failure ([facility])    CAD (coronary artery disease)    Compression fracture of L2, sequela    Back pain with radiculopathy    Low back pain    Intractable back pain    Acute colitis    Back pain    Stroke determined by clinical assessment ([address]    NSVT (nonsustained ventricular tachycardia) ([facility])    Tachycardia    ICD (implantable cardioverter-defibrillator) discharge       Discharge Diagnoses: Principal Problem:    ICD (implantable cardioverter-defibrillator) discharge  Active Problems:    Chronic chest pain    Type 2 diabetes mellitus without complication, with long-term current use of insulin ([facility])    Coronary artery disease involving native coronary artery of native heart without angina pectoris    ICD (implantable cardioverter-defibrillator), dual, in situ    Tachycardia  Resolved Problems:    * No resolved hospital problems. *      Code Status:  Full Code    Condition:  Stable    Discharge Diet: Diet:  DIET CARB CONTROL; Carb Control: 4 carb choices (60 gms)/meal    PCP to do list:  Follow on new increased dose of toprol XL    Hospital Course: 59 y.o. male with history of chronic systolic heart failure with most recent ejection fraction around 30%, history of CAD, COPD, diabetes type 2, fibromyalgia, GERD, essential hypertension, anxiety disorder, who presents to the emergency room with report of chest discomfort following AICD discharge around 12:45 PM today. Patient's case has been discussed with cardiologist and it has been recommended that patient be started on amiodarone infusion for nonsustained ventricular tachycardia. Cardiology did evaluate the patient the next day and felt that the amiodarone could be stopped and that the patient could be started on increased dose of toprol XL. He felt that this was more atrial tach or sinus tach than actual Vtach. Pt discharged in stable condition with increased toprol XL dose of 100mg daily.     Discharge Medications:   Current Discharge Medication List        Current Discharge Medication List      CONTINUE these medications which have CHANGED    Details   insulin glargine (LANTUS) 100 UNIT/ML injection vial Inject 8 Units into the skin nightly      metoprolol succinate (TOPROL XL) 100 MG extended release tablet Take 1 tablet by mouth daily  Qty: 30 tablet, Refills: 3           Current Discharge Medication List      CONTINUE these medications which mellitus with diabetic neuropathy, without long-term current use of insulin ([facility]      ! ! FREESTYLE LANCETS MISC 1 each by Does not apply route daily  Qty: 100 each, Refills: 0      atorvastatin (LIPITOR) 80 MG tablet TAKE 1 TABLET BY MOUTH DAILY  Qty: 90 tablet, Refills: 1    Associated Diagnoses: Coronary artery disease involving native heart without angina pectoris, unspecified vessel or lesion type      albuterol sulfate HFA (PROAIR HFA) 108 (90 Base) MCG/ACT inhaler Inhale 2 puffs into the lungs every 6 hours as needed for Wheezing      nitroGLYCERIN (NITROSTAT) 0.4 MG SL tablet PLACE 1 TABLET UNDER THE TONGUE EVERY 5 MINUTES AS NEEDED FOR CHEST PAIN  Qty: 25 tablet, Refills: 1       !! - Potential duplicate medications found. Please discuss with provider. Current Discharge Medication List      STOP taking these medications       naloxone 4 MG/0.1ML LIQD nasal spray Comments:   Reason for Stopping:                   Procedures: none    Assessment on Discharge: Stable, improved     Discharge ROS:  A complete review of systems was asked and negative except for some chest pain from where he was defibrillated, chronic nasal congestion    Discharge Exam:  /82   Pulse 76   Temp 98 °F (36.7 °C) (Oral)   Resp 16   Ht 6' (1.829 m)   Wt 205 lb 1.6 oz (93 kg)   SpO2 94%   BMI 27.82 kg/m²     Gen: NAD  HEENT: NC/AT, moist mucous membranes, no oropharyngeal erythema or exudate  Neck: supple, trachea midline, no anterior cervical or SC LAD  Heart:  Normal s1/s2, RRR, no murmurs, gallops, or rubs.  no leg edema  Lungs:  diminished bilaterally, no wheeze,no rales or rhonchi, no use of accessory muscles  Abd: bowel sounds present, soft, nontender, nondistended, no masses  Extrem:  No clubbing, cyanosis,  no edema  Skin: no lesion or masses  Psych:  A & O x3  Neuro: grossly intact, moves all four extremities    Pertinent Studies During Hospital Stay:  Radiology:  Xr Chest Standard (2 Vw)    Result Date: 2/5/2020  EXAMINATION: TWO XRAY VIEWS OF THE CHEST 2/5/2020 2:51 pm COMPARISON: 01/17/2020 HISTORY: ORDERING SYSTEM PROVIDED HISTORY: chest pain TECHNOLOGIST PROVIDED HISTORY: Reason for exam:->chest pain Reason for Exam: Chest Pain (pt. was in shower 1245 and internal defibrillator went off, now with chest pain) Acuity: Chronic Type of Exam: Ongoing FINDINGS: The lungs are clear. Cardiomegaly is stable status post median sternotomy, CABG and dual lead ICD. There is no pneumothorax or pleural effusion. 1.  No acute abnormality. Xr Chest Standard (2 Vw)    Result Date: 1/17/2020  EXAMINATION: TWO XRAY VIEWS OF THE CHEST 1/17/2020 3:57 pm COMPARISON: 07/20/2019 HISTORY: ORDERING SYSTEM PROVIDED HISTORY: cp, recent hospitalization TECHNOLOGIST PROVIDED HISTORY: Reason for exam:->cp, recent hospitalization Reason for Exam: cp, recent hospitalization Acuity: Acute Type of Exam: Initial FINDINGS: Cardial pericardial silhouette is stable. Bipolar pacemaker/ICD again noted. Lungs are clear. No pneumothorax is found. No free air. No acute bony abnormality. No acute abnormality detected. Xr Chest Standard (2 Vw)    Result Date: 1/17/2020  Site: [facility] #: [id number] #: [id number]: GSEDAccount #: [de-identified] #: [id number] #: [id number]: XR CHEST PA AND LATERALExam Date/Time: 01/17/2020 02:40 PMAdmitting Diagnosis: chest painReason for Exam: chest pain Dictated by: Coral Gallagher BETITO: 01/17/2020 02:57 PMT: This document is confidential medical information. Unauthorized disclosure or use of this information is prohibited by law. If you are not the intended recipient of this document, please advise us by calling immediately [phone].  Impression/Conclusion below HISTORY:   chest pain COMPARISON: January 12, 2020 NOTE:  If there are questions about the content of this report, please contact [address] [place] [address] radiology by calling [phone] FINDINGS: LINES/DEVICES: Left chest wall pacemaker LUNGS/PLEURA:  Unremarkable HEART:  Unremarkable MEDIASTINUM/CARLEEN:  Changes of CABG BONES:  Unremarkable OTHER:  None  IMPRESSION: No acute disease. SIGNED BY: Debora Paul MD on 1/17/2020  2:54 PM   [facility] [phone] -  2011 [facility]: [phone]       Xr Chest Standard (2 Vw)    Result Date: 1/12/2020  Site: [facility] #: [id number] #: [id number]: Arnol Bonner #: [de-identified] #: [id number] #: [id number]: XR CHEST PA AND LATERALExam Date/Time: 01/12/2020 09:00 PMAdmitting Diagnosis: chest painReason for Exam: chest pain Dictated by: Clau Rogers BETITO: 01/12/2020 09:10 PMT: This document is confidential medical information. Unauthorized disclosure or use of this information is prohibited by law. If you are not the intended recipient of this document, please advise us by calling immediately [phone]. Impression/Conclusion below HISTORY:   chest pain COMPARISON: 12/13/2019 NOTE:  If there are questions about the content of this report, please contact [address] [place] [address] radiology by calling [phone] FINDINGS: LINES/DEVICES: Left subclavian defibrillator leads are noted. LUNGS/PLEURA:  Unremarkable HEART:  Unremarkable MEDIASTINUM/CARLEEN:  Unremarkable BONES:  Mild chronic compression deformity in the midthoracic spine around T7 OTHER:  None  IMPRESSION: No significant abnormality SIGNED BY: Az Han MD on 1/12/2020  9:07 PM   [address] [place] [address] [phone] -  2011 [facility]: [phone]             Last Labs on Discharge:     Recent Results (from the past 24 hour(s))   TSH with Reflex to FT4    Collection Time: 02/05/20  6:23 PM   Result Value Ref Range    TSH Reflex FT4 0.34 0.27 - 4.20 uIU/mL   EKG 12 Lead    Collection Time: 02/05/20  7:53 PM   Result Value Ref Range    Ventricular Rate 83 BPM    Atrial Rate 83 BPM    P-R Interval 124 ms QRS Duration 136 ms    Q-T Interval 432 ms    QTc Calculation (Bazett) 507 ms    P Axis 60 degrees    R Axis 58 degrees    T Axis 88 degrees    Diagnosis       Sinus rhythm with Premature atrial complexesRight bundle branch blockNonspecific ST and T wave abnormalityAbnormal ECGWhen compared with ECG of 05-FEB-2020 13:56,Premature atrial complexes are now PresentConfirmed by Fern DE JESUS MD ([id number]) on 2/6/2020 9:08:29 AM   POCT Glucose    Collection Time: 02/05/20 10:05 PM   Result Value Ref Range    POC Glucose 191 (H) 70 - 99 mg/dl    Performed on ACCU-CHEK    Hemoglobin A1c    Collection Time: 02/06/20 12:23 AM   Result Value Ref Range    Hemoglobin A1C 6.9 See comment %    eAG 151.3 mg/dL   Troponin    Collection Time: 02/06/20 12:23 AM   Result Value Ref Range    Troponin 0.04 (H) <0.01 ng/mL   Comprehensive Metabolic Panel    Collection Time: 02/06/20  4:25 AM   Result Value Ref Range    Sodium 137 136 - 145 mmol/L    Potassium 4.0 3.5 - 5.1 mmol/L    Chloride 102 99 - 110 mmol/L    CO2 23 21 - 32 mmol/L    Anion Gap 12 3 - 16    Glucose 200 (H) 70 - 99 mg/dL    BUN 16 7 - 20 mg/dL    CREATININE 0.9 0.8 - 1.3 mg/dL    GFR Non-African American >60 >60    GFR African American >60 >60    Calcium 8.9 8.3 - 10.6 mg/dL    Total Protein 6.3 (L) 6.4 - 8.2 g/dL    Alb 3.5 3.4 - 5.0 g/dL    Albumin/Globulin Ratio 1.3 1.1 - 2.2    Total Bilirubin <0.2 0.0 - 1.0 mg/dL    Alkaline Phosphatase 62 40 - 129 U/L    ALT <5 (L) 10 - 40 U/L    AST 9 (L) 15 - 37 U/L    Globulin 2.8 g/dL   Magnesium    Collection Time: 02/06/20  4:25 AM   Result Value Ref Range    Magnesium 2.10 1.80 - 2.40 mg/dL   Phosphorus    Collection Time: 02/06/20  4:25 AM   Result Value Ref Range    Phosphorus 4.2 2.5 - 4.9 mg/dL   CBC Auto Differential    Collection Time: 02/06/20  4:25 AM   Result Value Ref Range    WBC 7.0 4.0 - 11.0 K/uL    RBC 4.14 (L) 4.20 - 5.90 M/uL    Hemoglobin 13.9 13.5 - 17.5 g/dL    Hematocrit 40.8 40.5 - 52.5 %    MCV 98.7

## 2020-02-07 ENCOUNTER — CARE COORDINATION (OUTPATIENT)
Dept: CASE MANAGEMENT | Age: 65
End: 2020-02-07

## 2020-02-08 ENCOUNTER — CARE COORDINATION (OUTPATIENT)
Dept: CASE MANAGEMENT | Age: 65
End: 2020-02-08

## 2020-02-08 NOTE — CARE COORDINATION
[facility] Transitions Initial Follow Up Call    Call within 2 business days of discharge: Yes    Patient: Faustino Wilson Patient : 1955   MRN: [id number]  Reason for Admission:   Discharge Date: 20 RARS: Readmission Risk Score: 34      Last Discharge [facility]       Complaint Diagnosis Description Type Department Provider    20 Chest Pain Defibrillator discharge . .. ED to Hosp-Admission (Discharged) (ADMITTED) Yanci Eldridge MD; Baron Goyal. .. Facility: [address] [place] [address] Transitions 24 Hour Call    Do you have all of your prescriptions and are they filled?:  Yes  Post Acute Services:  Home Health (Comment: agency name unknown)  Do you have support at home?:  Partner/Spouse/SO  Are you an active caregiver in your home?:  No  Care Transitions Interventions                                 Follow Up: Second attempt at 24 hour discharge call, no answer, CTN left  with contact information and request for return call. CTN will transition to Johnny Hernandez for continued outreach attempts. No future appointments.     Michael Sheth RN

## 2020-02-10 ENCOUNTER — APPOINTMENT (OUTPATIENT)
Dept: CT IMAGING | Age: 65
DRG: 372 | End: 2020-02-10
Payer: MEDICARE

## 2020-02-10 ENCOUNTER — APPOINTMENT (OUTPATIENT)
Dept: GENERAL RADIOLOGY | Age: 65
DRG: 372 | End: 2020-02-10
Payer: MEDICARE

## 2020-02-10 ENCOUNTER — CARE COORDINATION (OUTPATIENT)
Dept: CASE MANAGEMENT | Age: 65
End: 2020-02-10

## 2020-02-10 ENCOUNTER — HOSPITAL ENCOUNTER (INPATIENT)
Age: 65
LOS: 3 days | Discharge: HOME | DRG: 372 | End: 2020-02-15
Attending: EMERGENCY MEDICINE | Admitting: INTERNAL MEDICINE
Payer: MEDICARE

## 2020-02-10 LAB
ALBUMIN SERPL-MCNC: 4.3 G/DL (ref 3.4–5)
ALP BLD-CCNC: 73 U/L (ref 40–129)
ALT SERPL-CCNC: 7 U/L (ref 10–40)
ANION GAP SERPL CALCULATED.3IONS-SCNC: 20 MMOL/L (ref 3–16)
AST SERPL-CCNC: 9 U/L (ref 15–37)
BASOPHILS ABSOLUTE: 0.1 K/UL (ref 0–0.2)
BASOPHILS RELATIVE PERCENT: 1 %
BILIRUB SERPL-MCNC: 0.6 MG/DL (ref 0–1)
BILIRUBIN DIRECT: <0.2 MG/DL (ref 0–0.3)
BILIRUBIN, INDIRECT: ABNORMAL MG/DL (ref 0–1)
BUN BLDV-MCNC: 13 MG/DL (ref 7–20)
CALCIUM SERPL-MCNC: 9.5 MG/DL (ref 8.3–10.6)
CHLORIDE BLD-SCNC: 101 MMOL/L (ref 99–110)
CO2: 16 MMOL/L (ref 21–32)
CREAT SERPL-MCNC: 1.1 MG/DL (ref 0.8–1.3)
EOSINOPHILS ABSOLUTE: 0.1 K/UL (ref 0–0.6)
EOSINOPHILS RELATIVE PERCENT: 1.5 %
GFR AFRICAN AMERICAN: >60
GFR NON-AFRICAN AMERICAN: >60
GLUCOSE BLD-MCNC: 135 MG/DL (ref 70–99)
GLUCOSE BLD-MCNC: 185 MG/DL (ref 70–99)
HCT VFR BLD CALC: 47.8 % (ref 40.5–52.5)
HEMOGLOBIN: 16.4 G/DL (ref 13.5–17.5)
LIPASE: 31 U/L (ref 13–60)
LYMPHOCYTES ABSOLUTE: 2 K/UL (ref 1–5.1)
LYMPHOCYTES RELATIVE PERCENT: 23.6 %
MCH RBC QN AUTO: 33.6 PG (ref 26–34)
MCHC RBC AUTO-ENTMCNC: 34.3 G/DL (ref 31–36)
MCV RBC AUTO: 97.7 FL (ref 80–100)
MONOCYTES ABSOLUTE: 0.5 K/UL (ref 0–1.3)
MONOCYTES RELATIVE PERCENT: 5.7 %
NEUTROPHILS ABSOLUTE: 5.8 K/UL (ref 1.7–7.7)
NEUTROPHILS RELATIVE PERCENT: 68.2 %
PDW BLD-RTO: 13.7 % (ref 12.4–15.4)
PERFORMED ON: ABNORMAL
PLATELET # BLD: 190 K/UL (ref 135–450)
PMV BLD AUTO: 9.3 FL (ref 5–10.5)
POTASSIUM REFLEX MAGNESIUM: 4.3 MMOL/L (ref 3.5–5.1)
PRO-BNP: 4458 PG/ML (ref 0–124)
RBC # BLD: 4.89 M/UL (ref 4.2–5.9)
SODIUM BLD-SCNC: 137 MMOL/L (ref 136–145)
TOTAL PROTEIN: 7.7 G/DL (ref 6.4–8.2)
TROPONIN: 0.04 NG/ML
WBC # BLD: 8.5 K/UL (ref 4–11)

## 2020-02-10 PROCEDURE — 94760 N-INVAS EAR/PLS OXIMETRY 1: CPT

## 2020-02-10 PROCEDURE — 84484 ASSAY OF TROPONIN QUANT: CPT

## 2020-02-10 PROCEDURE — 6370000000 HC RX 637 (ALT 250 FOR IP): Performed by: INTERNAL MEDICINE

## 2020-02-10 PROCEDURE — 80048 BASIC METABOLIC PNL TOTAL CA: CPT

## 2020-02-10 PROCEDURE — 6360000004 HC RX CONTRAST MEDICATION: Performed by: EMERGENCY MEDICINE

## 2020-02-10 PROCEDURE — 99285 EMERGENCY DEPT VISIT HI MDM: CPT

## 2020-02-10 PROCEDURE — 71046 X-RAY EXAM CHEST 2 VIEWS: CPT

## 2020-02-10 PROCEDURE — 93005 ELECTROCARDIOGRAM TRACING: CPT | Performed by: EMERGENCY MEDICINE

## 2020-02-10 PROCEDURE — 6360000002 HC RX W HCPCS: Performed by: EMERGENCY MEDICINE

## 2020-02-10 PROCEDURE — 83690 ASSAY OF LIPASE: CPT

## 2020-02-10 PROCEDURE — 6370000000 HC RX 637 (ALT 250 FOR IP): Performed by: EMERGENCY MEDICINE

## 2020-02-10 PROCEDURE — 83880 ASSAY OF NATRIURETIC PEPTIDE: CPT

## 2020-02-10 PROCEDURE — 80076 HEPATIC FUNCTION PANEL: CPT

## 2020-02-10 PROCEDURE — 85025 COMPLETE CBC W/AUTO DIFF WBC: CPT

## 2020-02-10 PROCEDURE — 96374 THER/PROPH/DIAG INJ IV PUSH: CPT

## 2020-02-10 PROCEDURE — 36415 COLL VENOUS BLD VENIPUNCTURE: CPT

## 2020-02-10 PROCEDURE — G0378 HOSPITAL OBSERVATION PER HR: HCPCS

## 2020-02-10 PROCEDURE — 74174 CTA ABD&PLVS W/CONTRAST: CPT

## 2020-02-10 RX ORDER — OXYCODONE HYDROCHLORIDE AND ACETAMINOPHEN 5; 325 MG/1; MG/1
1 TABLET ORAL ONCE
Status: COMPLETED | OUTPATIENT
Start: 2020-02-10 | End: 2020-02-10

## 2020-02-10 RX ORDER — ATORVASTATIN CALCIUM 80 MG/1
80 TABLET, FILM COATED ORAL DAILY
Status: DISCONTINUED | OUTPATIENT
Start: 2020-02-11 | End: 2020-02-15 | Stop reason: HOSPADM

## 2020-02-10 RX ORDER — NITROGLYCERIN 0.4 MG/1
0.4 TABLET SUBLINGUAL EVERY 5 MIN PRN
Status: DISCONTINUED | OUTPATIENT
Start: 2020-02-10 | End: 2020-02-15 | Stop reason: HOSPADM

## 2020-02-10 RX ORDER — ONDANSETRON 2 MG/ML
4 INJECTION INTRAMUSCULAR; INTRAVENOUS EVERY 6 HOURS PRN
Status: DISCONTINUED | OUTPATIENT
Start: 2020-02-10 | End: 2020-02-15 | Stop reason: HOSPADM

## 2020-02-10 RX ORDER — SODIUM CHLORIDE 0.9 % (FLUSH) 0.9 %
10 SYRINGE (ML) INJECTION PRN
Status: DISCONTINUED | OUTPATIENT
Start: 2020-02-10 | End: 2020-02-15 | Stop reason: HOSPADM

## 2020-02-10 RX ORDER — ASPIRIN 81 MG/1
81 TABLET, CHEWABLE ORAL DAILY
Status: DISCONTINUED | OUTPATIENT
Start: 2020-02-11 | End: 2020-02-15 | Stop reason: HOSPADM

## 2020-02-10 RX ORDER — INSULIN GLARGINE 100 [IU]/ML
8 INJECTION, SOLUTION SUBCUTANEOUS NIGHTLY
Status: DISCONTINUED | OUTPATIENT
Start: 2020-02-10 | End: 2020-02-15 | Stop reason: HOSPADM

## 2020-02-10 RX ORDER — SODIUM CHLORIDE 0.9 % (FLUSH) 0.9 %
10 SYRINGE (ML) INJECTION EVERY 12 HOURS SCHEDULED
Status: DISCONTINUED | OUTPATIENT
Start: 2020-02-10 | End: 2020-02-15 | Stop reason: HOSPADM

## 2020-02-10 RX ORDER — FUROSEMIDE 20 MG/1
20 TABLET ORAL DAILY
Status: DISCONTINUED | OUTPATIENT
Start: 2020-02-11 | End: 2020-02-11

## 2020-02-10 RX ORDER — OXYCODONE AND ACETAMINOPHEN 10; 325 MG/1; MG/1
1 TABLET ORAL 2 TIMES DAILY PRN
Status: DISCONTINUED | OUTPATIENT
Start: 2020-02-10 | End: 2020-02-12

## 2020-02-10 RX ORDER — CLOPIDOGREL BISULFATE 75 MG/1
75 TABLET ORAL DAILY
Status: DISCONTINUED | OUTPATIENT
Start: 2020-02-11 | End: 2020-02-15 | Stop reason: HOSPADM

## 2020-02-10 RX ORDER — METOPROLOL SUCCINATE 50 MG/1
100 TABLET, EXTENDED RELEASE ORAL DAILY
Status: DISCONTINUED | OUTPATIENT
Start: 2020-02-11 | End: 2020-02-11

## 2020-02-10 RX ORDER — NITROGLYCERIN 0.4 MG/1
0.4 TABLET SUBLINGUAL ONCE
Status: COMPLETED | OUTPATIENT
Start: 2020-02-10 | End: 2020-02-10

## 2020-02-10 RX ORDER — HYDRALAZINE HYDROCHLORIDE 25 MG/1
25 TABLET, FILM COATED ORAL EVERY 8 HOURS SCHEDULED
Status: DISCONTINUED | OUTPATIENT
Start: 2020-02-10 | End: 2020-02-11

## 2020-02-10 RX ORDER — ASPIRIN 325 MG
325 TABLET ORAL DAILY
Status: DISCONTINUED | OUTPATIENT
Start: 2020-02-11 | End: 2020-02-11

## 2020-02-10 RX ORDER — RANOLAZINE 500 MG/1
1000 TABLET, EXTENDED RELEASE ORAL 2 TIMES DAILY
Status: DISCONTINUED | OUTPATIENT
Start: 2020-02-10 | End: 2020-02-11

## 2020-02-10 RX ORDER — ISOSORBIDE MONONITRATE 60 MG/1
60 TABLET, EXTENDED RELEASE ORAL DAILY
Status: DISCONTINUED | OUTPATIENT
Start: 2020-02-11 | End: 2020-02-11

## 2020-02-10 RX ORDER — 0.9 % SODIUM CHLORIDE 0.9 %
500 INTRAVENOUS SOLUTION INTRAVENOUS ONCE
Status: DISCONTINUED | OUTPATIENT
Start: 2020-02-10 | End: 2020-02-10

## 2020-02-10 RX ORDER — LISINOPRIL 10 MG/1
10 TABLET ORAL DAILY
Status: DISCONTINUED | OUTPATIENT
Start: 2020-02-11 | End: 2020-02-11

## 2020-02-10 RX ORDER — MORPHINE SULFATE 4 MG/ML
4 INJECTION, SOLUTION INTRAMUSCULAR; INTRAVENOUS ONCE
Status: COMPLETED | OUTPATIENT
Start: 2020-02-10 | End: 2020-02-10

## 2020-02-10 RX ADMIN — NITROGLYCERIN 0.4 MG: 0.4 TABLET, ORALLY DISINTEGRATING SUBLINGUAL at 17:44

## 2020-02-10 RX ADMIN — RANOLAZINE 1000 MG: 500 TABLET, FILM COATED, EXTENDED RELEASE ORAL at 21:36

## 2020-02-10 RX ADMIN — OXYCODONE HYDROCHLORIDE AND ACETAMINOPHEN 1 TABLET: 5; 325 TABLET ORAL at 16:41

## 2020-02-10 RX ADMIN — IOPAMIDOL 75 ML: 755 INJECTION, SOLUTION INTRAVENOUS at 17:18

## 2020-02-10 RX ADMIN — OXYCODONE HYDROCHLORIDE AND ACETAMINOPHEN 1 TABLET: 10; 325 TABLET ORAL at 21:35

## 2020-02-10 RX ADMIN — MORPHINE SULFATE 4 MG: 4 INJECTION, SOLUTION INTRAMUSCULAR; INTRAVENOUS at 17:54

## 2020-02-10 RX ADMIN — HYDRALAZINE HYDROCHLORIDE 25 MG: 25 TABLET, FILM COATED ORAL at 21:36

## 2020-02-10 RX ADMIN — INSULIN GLARGINE 8 UNITS: 100 INJECTION, SOLUTION SUBCUTANEOUS at 21:36

## 2020-02-10 ASSESSMENT — ENCOUNTER SYMPTOMS
COUGH: 0
CONSTIPATION: 0
EYE ITCHING: 0
RECTAL PAIN: 0
APNEA: 0
CHEST TIGHTNESS: 0
BACK PAIN: 0
VOMITING: 0
ABDOMINAL PAIN: 0
CHOKING: 0
PHOTOPHOBIA: 0
EYE REDNESS: 0
COLOR CHANGE: 0
SHORTNESS OF BREATH: 0
EYE PAIN: 0
BLOOD IN STOOL: 0
DIARRHEA: 0
ABDOMINAL DISTENTION: 0
ANAL BLEEDING: 0
NAUSEA: 0
EYE DISCHARGE: 0
STRIDOR: 0
WHEEZING: 0

## 2020-02-10 ASSESSMENT — PAIN DESCRIPTION - ONSET
ONSET: ON-GOING
ONSET: ON-GOING

## 2020-02-10 ASSESSMENT — PAIN DESCRIPTION - DESCRIPTORS
DESCRIPTORS: ACHING
DESCRIPTORS: ACHING

## 2020-02-10 ASSESSMENT — PAIN DESCRIPTION - PAIN TYPE
TYPE: ACUTE PAIN
TYPE: ACUTE PAIN

## 2020-02-10 ASSESSMENT — PAIN DESCRIPTION - ORIENTATION
ORIENTATION: MID
ORIENTATION: MID

## 2020-02-10 ASSESSMENT — PAIN SCALES - GENERAL
PAINLEVEL_OUTOF10: 8
PAINLEVEL_OUTOF10: 6
PAINLEVEL_OUTOF10: 7
PAINLEVEL_OUTOF10: 7
PAINLEVEL_OUTOF10: 3
PAINLEVEL_OUTOF10: 7

## 2020-02-10 ASSESSMENT — PAIN DESCRIPTION - FREQUENCY
FREQUENCY: CONTINUOUS
FREQUENCY: CONTINUOUS

## 2020-02-10 ASSESSMENT — PAIN DESCRIPTION - LOCATION
LOCATION: CHEST
LOCATION: CHEST

## 2020-02-10 ASSESSMENT — PAIN DESCRIPTION - PROGRESSION
CLINICAL_PROGRESSION: NOT CHANGED
CLINICAL_PROGRESSION: NOT CHANGED

## 2020-02-10 ASSESSMENT — PAIN - FUNCTIONAL ASSESSMENT: PAIN_FUNCTIONAL_ASSESSMENT: PREVENTS OR INTERFERES SOME ACTIVE ACTIVITIES AND ADLS

## 2020-02-10 NOTE — CARE COORDINATION
Aury 45 Transitions Initial Follow Up Call    Call within 2 business days of discharge: Yes    Patient: Chantale Mcleod Patient : 1955   MRN: [id number]  Reason for Admission: ICD  Discharge Date: 20 RARS: Readmission Risk Score: 34      Last Discharge [facility]       Complaint Diagnosis Description Type Department Provider    20 Chest Pain Defibrillator discharge . .. ED to Hosp-Admission (Discharged) (ADMITTED) Samuel Ulloa MD; London Phillips. .. Spoke with: David Bell (patient)    Facility: [facility]    3rd attempt at [facility] SYSTEM discharge phone call. Hattie Zamora states he \"is not doing well\". Ruy c/o chest pain and SOB which he states is worse than when he left the hospital. He states his B/P = 197/111. Writer asked if he has put a call into his PCP. Hattie Zamora states \"no\". Hattie Zamora states \"I would call the doctor - he would tell me to go to the ED - I would sit there for hours- then I would be discharged home\". Writer offered assistance in scheduling a PCP appointment for hospital f/u - Ruy declined. He also declined med review. Writer informed Ruy that I would need to contact his PCP. Ruy said BRITTANY Discussed role of CTN. He is agreeable to follow up calls. He has writer's contact info. Message sent via Epic to his PCP. Follow Up  No future appointments.     Daniel Mcdaniel RN

## 2020-02-10 NOTE — ED NOTES
Patient's /73. Checked with Dr. Tuyet Carvajal about Nitroglycerin. Okay to hold off until patient's BP comes up.      Linder Harada, RN  02/10/20 5002

## 2020-02-10 NOTE — ED PROVIDER NOTES
 Smoking status: Current Every Day Smoker     Packs/day: 0.50     Years: 44.00     Pack years: 22.00     Types: Cigarettes    Smokeless tobacco: Never Used    Tobacco comment: urged to stop   Substance and Sexual Activity    Alcohol use: No     Alcohol/week: 0.0 standard drinks    Drug use: No    Sexual activity: Yes     Partners: Female   Lifestyle    Physical activity:     Days per week: None     Minutes per session: None    Stress: None   Relationships    Social connections:     Talks on phone: None     Gets together: None     Attends Sikhism service: None     Active member of club or organization: None     Attends meetings of clubs or organizations: None     Relationship status: None    Intimate partner violence:     Fear of current or ex partner: None     Emotionally abused: None     Physically abused: None     Forced sexual activity: None   Other Topics Concern    None   Social History Narrative    None       PHYSICAL EXAM       ED Triage Vitals [02/10/20 1555]   BP Temp Temp Source Pulse Resp SpO2 Height Weight   137/88 97 °F (36.1 °C) Oral 89 17 95 % 6' (1.829 m) 204 lb 9.4 oz (92.8 kg)       Physical Exam  Constitutional:       General: He is not in acute distress. Appearance: He is well-developed. He is not diaphoretic. HENT:      Head: Normocephalic and atraumatic. Eyes:      General:         Right eye: No discharge. Left eye: No discharge. Pupils: Pupils are equal, round, and reactive to light. Neck:      Musculoskeletal: Normal range of motion. Thyroid: No thyromegaly. Trachea: No tracheal deviation. Cardiovascular:      Rate and Rhythm: Normal rate and regular rhythm. Heart sounds: No murmur. Pulmonary:      Breath sounds: No wheezing or rales. Chest:      Chest wall: No tenderness. Abdominal:      General: There is no distension. Palpations: Abdomen is soft. There is no mass. Tenderness: There is no abdominal tenderness.  There is no guarding or rebound. Musculoskeletal: Normal range of motion. General: No tenderness or deformity. Skin:     General: Skin is warm. Coloration: Skin is not pale. Findings: No erythema or rash. Neurological:      Mental Status: He is alert. Cranial Nerves: No cranial nerve deficit. Motor: No abnormal muscle tone. Coordination: Coordination normal.         DIAGNOSTIC RESULTS     EKG: All EKG's are interpreted by the Emergency Department Physician who either signs or Co-signs this chart in the absence of acardiologist.    EKG shows NSR with short TX RBBB nonspecific t wave changes similar to EKG 2/5/2020    RADIOLOGY:   Non-plain film images such as CT, Ultrasoundand MRI are read by the radiologist. Plain radiographic images are visualized and preliminarily interpreted by the emergency physician with the below findings:    CXR   Impression   No acute cardiopulmonary disease. ED BEDSIDE ULTRASOUND:   Performed by ED Physician - none    LABS:  Labs Reviewed   CBC WITH AUTO DIFFERENTIAL   BASIC METABOLIC PANEL W/ REFLEX TO MG FOR LOW K   TROPONIN   BRAIN NATRIURETIC PEPTIDE   HEPATIC FUNCTION PANEL   LIPASE       All other labs were withinnormal range or not returned as of this dictation. EMERGENCY DEPARTMENT COURSE and DIFFERENTIAL DIAGNOSIS/MDM:     59year old male w/ CAD s/p CABG (catheterization in Nov 2019 with patent grafts), chronic combined CHF (LVEF 40-45% Nov 2019), DM2, who presents with chest pain. EKG similar to old EKGs. Trop 0.04 which is around patients baseline. Pain improved with analgesics. ASA and Plavix taken today. Cardiology James Pritchard discussed case with. Admission for further ACS evaluation. CRITICAL CARE TIME   Total Critical Caretime was 0 minutes, excluding separately reportable procedures.   There was a high probability of clinically significant/life threatening deterioration in the patient's condition which required my urgent intervention. PROCEDURES:  Unlessotherwise noted below, none    FINAL IMPRESSION      1. Chest pain, unspecified type    2.  Elevated troponin          DISPOSITION/PLAN   DISPOSITION      Admission    (Please note that portions ofthis note were completed with a voice recognition program.  Efforts were made to edit the dictations but occasionally words are mis-transcribed.)    Leslie Del Angel MD(electronically signed)  Attending Emergency Physician         Leslie Del Angel MD  02/10/20 1970

## 2020-02-11 PROBLEM — E11.42 DIABETIC PERIPHERAL NEUROPATHY (HCC): Status: ACTIVE | Noted: 2020-02-11

## 2020-02-11 LAB
EKG ATRIAL RATE: 75 BPM
EKG ATRIAL RATE: 85 BPM
EKG DIAGNOSIS: NORMAL
EKG DIAGNOSIS: NORMAL
EKG P AXIS: 45 DEGREES
EKG P AXIS: 47 DEGREES
EKG P-R INTERVAL: 110 MS
EKG P-R INTERVAL: 118 MS
EKG Q-T INTERVAL: 428 MS
EKG Q-T INTERVAL: 452 MS
EKG QRS DURATION: 124 MS
EKG QRS DURATION: 130 MS
EKG QTC CALCULATION (BAZETT): 504 MS
EKG QTC CALCULATION (BAZETT): 509 MS
EKG R AXIS: 68 DEGREES
EKG R AXIS: 84 DEGREES
EKG T AXIS: 56 DEGREES
EKG T AXIS: 89 DEGREES
EKG VENTRICULAR RATE: 75 BPM
EKG VENTRICULAR RATE: 85 BPM
GLUCOSE BLD-MCNC: 129 MG/DL (ref 70–99)
GLUCOSE BLD-MCNC: 146 MG/DL (ref 70–99)
GLUCOSE BLD-MCNC: 200 MG/DL (ref 70–99)
PERFORMED ON: ABNORMAL
TROPONIN: 0.07 NG/ML

## 2020-02-11 PROCEDURE — 6370000000 HC RX 637 (ALT 250 FOR IP): Performed by: INTERNAL MEDICINE

## 2020-02-11 PROCEDURE — 99220 PR INITIAL OBSERVATION CARE/DAY 70 MINUTES: CPT | Performed by: INTERNAL MEDICINE

## 2020-02-11 PROCEDURE — 6360000002 HC RX W HCPCS: Performed by: INTERNAL MEDICINE

## 2020-02-11 PROCEDURE — G0378 HOSPITAL OBSERVATION PER HR: HCPCS

## 2020-02-11 PROCEDURE — 6360000002 HC RX W HCPCS: Performed by: NURSE PRACTITIONER

## 2020-02-11 PROCEDURE — 84484 ASSAY OF TROPONIN QUANT: CPT

## 2020-02-11 PROCEDURE — 2580000003 HC RX 258: Performed by: FAMILY MEDICINE

## 2020-02-11 PROCEDURE — 93010 ELECTROCARDIOGRAM REPORT: CPT | Performed by: INTERNAL MEDICINE

## 2020-02-11 PROCEDURE — 94760 N-INVAS EAR/PLS OXIMETRY 1: CPT

## 2020-02-11 PROCEDURE — 36415 COLL VENOUS BLD VENIPUNCTURE: CPT

## 2020-02-11 PROCEDURE — 6370000000 HC RX 637 (ALT 250 FOR IP): Performed by: FAMILY MEDICINE

## 2020-02-11 PROCEDURE — 2580000003 HC RX 258: Performed by: INTERNAL MEDICINE

## 2020-02-11 RX ORDER — SODIUM CHLORIDE 9 MG/ML
INJECTION, SOLUTION INTRAVENOUS CONTINUOUS
Status: DISCONTINUED | OUTPATIENT
Start: 2020-02-11 | End: 2020-02-11

## 2020-02-11 RX ORDER — FUROSEMIDE 10 MG/ML
40 INJECTION INTRAMUSCULAR; INTRAVENOUS DAILY
Status: DISCONTINUED | OUTPATIENT
Start: 2020-02-11 | End: 2020-02-12

## 2020-02-11 RX ORDER — DEXTROSE MONOHYDRATE 25 G/50ML
12.5 INJECTION, SOLUTION INTRAVENOUS PRN
Status: DISCONTINUED | OUTPATIENT
Start: 2020-02-11 | End: 2020-02-15 | Stop reason: HOSPADM

## 2020-02-11 RX ORDER — METOPROLOL SUCCINATE 50 MG/1
50 TABLET, EXTENDED RELEASE ORAL DAILY
Status: DISCONTINUED | OUTPATIENT
Start: 2020-02-12 | End: 2020-02-15 | Stop reason: HOSPADM

## 2020-02-11 RX ORDER — GABAPENTIN 300 MG/1
600 CAPSULE ORAL
Status: DISCONTINUED | OUTPATIENT
Start: 2020-02-11 | End: 2020-02-11

## 2020-02-11 RX ORDER — NICOTINE POLACRILEX 4 MG
15 LOZENGE BUCCAL PRN
Status: DISCONTINUED | OUTPATIENT
Start: 2020-02-11 | End: 2020-02-15 | Stop reason: HOSPADM

## 2020-02-11 RX ORDER — GABAPENTIN 300 MG/1
300 CAPSULE ORAL 3 TIMES DAILY
Status: DISCONTINUED | OUTPATIENT
Start: 2020-02-11 | End: 2020-02-12

## 2020-02-11 RX ORDER — DEXTROSE MONOHYDRATE 50 MG/ML
100 INJECTION, SOLUTION INTRAVENOUS PRN
Status: DISCONTINUED | OUTPATIENT
Start: 2020-02-11 | End: 2020-02-15 | Stop reason: HOSPADM

## 2020-02-11 RX ORDER — RANOLAZINE 500 MG/1
500 TABLET, EXTENDED RELEASE ORAL 2 TIMES DAILY
Status: DISCONTINUED | OUTPATIENT
Start: 2020-02-11 | End: 2020-02-11

## 2020-02-11 RX ORDER — KETOROLAC TROMETHAMINE 30 MG/ML
30 INJECTION, SOLUTION INTRAMUSCULAR; INTRAVENOUS ONCE
Status: COMPLETED | OUTPATIENT
Start: 2020-02-11 | End: 2020-02-11

## 2020-02-11 RX ORDER — ISOSORBIDE MONONITRATE 30 MG/1
30 TABLET, EXTENDED RELEASE ORAL DAILY
Status: DISCONTINUED | OUTPATIENT
Start: 2020-02-12 | End: 2020-02-11

## 2020-02-11 RX ADMIN — GABAPENTIN 600 MG: 300 CAPSULE ORAL at 10:44

## 2020-02-11 RX ADMIN — ATORVASTATIN CALCIUM 80 MG: 80 TABLET, FILM COATED ORAL at 08:54

## 2020-02-11 RX ADMIN — FUROSEMIDE 20 MG: 20 TABLET ORAL at 08:55

## 2020-02-11 RX ADMIN — SODIUM CHLORIDE: 9 INJECTION, SOLUTION INTRAVENOUS at 14:13

## 2020-02-11 RX ADMIN — HYDRALAZINE HYDROCHLORIDE 25 MG: 25 TABLET, FILM COATED ORAL at 06:22

## 2020-02-11 RX ADMIN — OXYCODONE HYDROCHLORIDE AND ACETAMINOPHEN 1 TABLET: 10; 325 TABLET ORAL at 16:55

## 2020-02-11 RX ADMIN — RANOLAZINE 1000 MG: 500 TABLET, FILM COATED, EXTENDED RELEASE ORAL at 08:55

## 2020-02-11 RX ADMIN — INSULIN GLARGINE 8 UNITS: 100 INJECTION, SOLUTION SUBCUTANEOUS at 20:58

## 2020-02-11 RX ADMIN — ASPIRIN 325 MG ORAL TABLET 325 MG: 325 PILL ORAL at 08:54

## 2020-02-11 RX ADMIN — GABAPENTIN 300 MG: 300 CAPSULE ORAL at 20:49

## 2020-02-11 RX ADMIN — OXYCODONE HYDROCHLORIDE AND ACETAMINOPHEN 1 TABLET: 10; 325 TABLET ORAL at 08:53

## 2020-02-11 RX ADMIN — INSULIN LISPRO 2 UNITS: 100 INJECTION, SOLUTION INTRAVENOUS; SUBCUTANEOUS at 16:55

## 2020-02-11 RX ADMIN — ASPIRIN 81 MG 81 MG: 81 TABLET ORAL at 08:55

## 2020-02-11 RX ADMIN — ENOXAPARIN SODIUM 40 MG: 40 INJECTION SUBCUTANEOUS at 08:57

## 2020-02-11 RX ADMIN — SODIUM CHLORIDE, PRESERVATIVE FREE 10 ML: 5 INJECTION INTRAVENOUS at 20:49

## 2020-02-11 RX ADMIN — ISOSORBIDE MONONITRATE 60 MG: 60 TABLET, EXTENDED RELEASE ORAL at 08:55

## 2020-02-11 RX ADMIN — INSULIN LISPRO 2 UNITS: 100 INJECTION, SOLUTION INTRAVENOUS; SUBCUTANEOUS at 08:57

## 2020-02-11 RX ADMIN — SODIUM CHLORIDE, PRESERVATIVE FREE 10 ML: 5 INJECTION INTRAVENOUS at 08:55

## 2020-02-11 RX ADMIN — CLOPIDOGREL BISULFATE 75 MG: 75 TABLET ORAL at 08:54

## 2020-02-11 RX ADMIN — INSULIN LISPRO 2 UNITS: 100 INJECTION, SOLUTION INTRAVENOUS; SUBCUTANEOUS at 20:58

## 2020-02-11 RX ADMIN — KETOROLAC TROMETHAMINE 30 MG: 30 INJECTION, SOLUTION INTRAMUSCULAR at 21:00

## 2020-02-11 RX ADMIN — LISINOPRIL 10 MG: 10 TABLET ORAL at 08:55

## 2020-02-11 RX ADMIN — METOPROLOL SUCCINATE 100 MG: 50 TABLET, EXTENDED RELEASE ORAL at 08:55

## 2020-02-11 ASSESSMENT — PAIN DESCRIPTION - LOCATION
LOCATION: CHEST

## 2020-02-11 ASSESSMENT — PAIN DESCRIPTION - PROGRESSION
CLINICAL_PROGRESSION: NOT CHANGED

## 2020-02-11 ASSESSMENT — PAIN DESCRIPTION - PAIN TYPE
TYPE: ACUTE PAIN

## 2020-02-11 ASSESSMENT — PAIN DESCRIPTION - ORIENTATION
ORIENTATION: MID

## 2020-02-11 ASSESSMENT — PAIN SCALES - GENERAL
PAINLEVEL_OUTOF10: 7
PAINLEVEL_OUTOF10: 6
PAINLEVEL_OUTOF10: 7
PAINLEVEL_OUTOF10: 7

## 2020-02-11 ASSESSMENT — PAIN - FUNCTIONAL ASSESSMENT
PAIN_FUNCTIONAL_ASSESSMENT: PREVENTS OR INTERFERES SOME ACTIVE ACTIVITIES AND ADLS

## 2020-02-11 ASSESSMENT — PAIN DESCRIPTION - FREQUENCY
FREQUENCY: CONTINUOUS

## 2020-02-11 ASSESSMENT — PAIN DESCRIPTION - DESCRIPTORS
DESCRIPTORS: ACHING

## 2020-02-11 ASSESSMENT — PAIN DESCRIPTION - ONSET
ONSET: ON-GOING

## 2020-02-11 NOTE — CONSULTS
urged to stop   Substance Use Topics    Alcohol use: No     Alcohol/week: 0.0 standard drinks    Drug use: No     Allergies   Allergen Reactions    Dopamine Hcl Other (See Comments)     Compulsive gambling    Melatonin Other (See Comments)     Restless leg syndrome        gabapentin  600 mg Oral 5x Daily    aspirin  325 mg Oral Daily    atorvastatin  80 mg Oral Daily    clopidogrel  75 mg Oral Daily    furosemide  20 mg Oral Daily    hydrALAZINE  25 mg Oral 3 times per day    insulin glargine  8 Units Subcutaneous Nightly    isosorbide mononitrate  60 mg Oral Daily    lisinopril  10 mg Oral Daily    metoprolol succinate  100 mg Oral Daily    ranolazine  1,000 mg Oral BID    sodium chloride flush  10 mL Intravenous 2 times per day    aspirin  81 mg Oral Daily    enoxaparin  40 mg Subcutaneous Daily    insulin lispro  0-12 Units Subcutaneous TID WC    insulin lispro  0-6 Units Subcutaneous Nightly       Review of Systems -   Constitutional: Negative for weight gain/loss; malaise, fever  Respiratory: Negative for Asthma;  cough and hemoptysis  Cardiovascular: Negative for palpitations,dizziness   Gastrointestinal: Negative for abd.pain; constipation/diarrhea;    Genitourinary: Negative for stones; hematuria; frequency hesitancy  Integumentt: Negative for rash or pruritis  Hematologic/lymphatic: Negative for blood dyscrasia; leukemia/lymphoma  Musculoskeletal: Negative for Connective tissue disease  Neurological:  Negative for Seizure   Behavioral/Psych:Negative for Bipolar disorder, Schizophrenia; Dementia  Endocrine: negative for thyroid, parathyroid disease    No intake or output data in the 24 hours ending 02/11/20 1341    Physical Examination:    BP (!) 100/54   Pulse 85   Temp 97.5 °F (36.4 °C) (Oral)   Resp 16   Ht 6' (1.829 m)   Wt 197 lb 1.5 oz (89.4 kg)   SpO2 93%   BMI 26.73 kg/m²    HEENT:  Face: Atraumatic, Conjunctiva: Pink; non icteric,  Mucous Memb:  Moist, No thyromegaly or Lymphadenopathy  Respiratory:  Resp Assessment: normal, Resp Auscultation: clear   Cardiovascular: Auscultation: nl S1 & S2, Palpation:  Nl PMI;  No heaves or thrills, JVP:  normal  Abdomen: Soft, non-tender, Normal bowel sounds,  No organomegaly  Extremities: No Cyanosis or Clubbing; Edema none  Neurological: Oriented to time, place, and person, Non-anxious  Psychiatric: Normal mood and affect  Skin: Warm and dry,  No rash seen      Current Facility-Administered Medications: gabapentin (NEURONTIN) capsule 600 mg, 600 mg, Oral, 5x Daily  glucose (GLUTOSE) 40 % oral gel 15 g, 15 g, Oral, PRN  dextrose 50 % IV solution, 12.5 g, Intravenous, PRN  glucagon (rDNA) injection 1 mg, 1 mg, Intramuscular, PRN  dextrose 5 % solution, 100 mL/hr, Intravenous, PRN  aspirin tablet 325 mg, 325 mg, Oral, Daily  atorvastatin (LIPITOR) tablet 80 mg, 80 mg, Oral, Daily  clopidogrel (PLAVIX) tablet 75 mg, 75 mg, Oral, Daily  furosemide (LASIX) tablet 20 mg, 20 mg, Oral, Daily  hydrALAZINE (APRESOLINE) tablet 25 mg, 25 mg, Oral, 3 times per day  insulin glargine (LANTUS) injection vial 8 Units, 8 Units, Subcutaneous, Nightly  isosorbide mononitrate (IMDUR) extended release tablet 60 mg, 60 mg, Oral, Daily  lisinopril (PRINIVIL;ZESTRIL) tablet 10 mg, 10 mg, Oral, Daily  metoprolol succinate (TOPROL XL) extended release tablet 100 mg, 100 mg, Oral, Daily  oxyCODONE-acetaminophen (PERCOCET)  MG per tablet 1 tablet, 1 tablet, Oral, BID PRN  ranolazine (RANEXA) extended release tablet 1,000 mg, 1,000 mg, Oral, BID  sodium chloride flush 0.9 % injection 10 mL, 10 mL, Intravenous, 2 times per day  sodium chloride flush 0.9 % injection 10 mL, 10 mL, Intravenous, PRN  magnesium hydroxide (MILK OF MAGNESIA) 400 MG/5ML suspension 30 mL, 30 mL, Oral, Daily PRN  ondansetron (ZOFRAN) injection 4 mg, 4 mg, Intravenous, Q6H PRN  aspirin chewable tablet 81 mg, 81 mg, Oral, Daily  nitroGLYCERIN (NITROSTAT) SL tablet 0.4 mg, 0.4 mg, Sublingual, Q5 Min PRN  enoxaparin (LOVENOX) injection 40 mg, 40 mg, Subcutaneous, Daily  insulin lispro (HUMALOG) injection vial 0-12 Units, 0-12 Units, Subcutaneous, TID WC  insulin lispro (HUMALOG) injection vial 0-6 Units, 0-6 Units, Subcutaneous, Nightly      Labs:   Recent Labs     02/10/20  1634   WBC 8.5   HGB 16.4   HCT 47.8        Recent Labs     02/10/20  1634      K 4.3   CO2 16*   BUN 13   CREATININE 1.1   GLUCOSE 185*     No results for input(s): BNP in the last 72 hours. No results for input(s): PROTIME, INR in the last 72 hours. No results for input(s): APTT in the last 72 hours. Recent Labs     02/10/20  1806 02/10/20  2125 02/11/20  0013   TROPONINI 0.04* 0.04* 0.07*     Lab Results   Component Value Date    HDL 42 12/15/2019    LDLDIRECT 105 09/17/2015    LDLCALC 88 12/15/2019    TRIG 211 12/15/2019     Recent Labs     02/10/20  1634   AST 9*   ALT 7*   LABALBU 4.3         EKG:   Sinus rhythm with right bundle branch block and lateral ST segment depressions    ECHO:  There is diffuse hypokinesis with EF ~  35-40%. Indeterminate diastolic function. Moderate mitral regurgitation with calcification   A bubble study was performed and shows evidence of right to left shunting  consistent with a patent foramen ovale or atrial septal defect    ARYA    Overall left ventricular function is normal.   Mitral valve is structurally normal.   Pacemaker / ICD lead is visualized in the right atrium. A bubble study was performed and shows evidence of right to left shunting   consistent with a patent foramen ovale. A patent foramen ovale was visualized. Stress Nuclear:   1. Technically a satisfactory study.    2. Normal pharmacological stress portion of the study.    3.  No evidence of Ischemia by Myocardial Perfusion Imaging.    4. Gated Study shows normal LV size and Systolic function; EF is 78%.      Corornary angiogram:  The left main coronary comes from the left coronary, cusp, giving rise to left anterior descending artery, left circumflex artery. The mid portion of the left main has a stent present which has severe 90% in-stent restenosis present.  The left anterior descending artery is occluded. Marky Lafleur is a large diagonal coming off the LAD, which is the first diagonal.  The circumflex has severe disease in the proximal portion.     We intervened on left main coronary artery, placed one drug-coated Xience Alpine stent 3.5 mm x 12 mm which was post-dilated to 3.8 mm      ASSESSMENT AND PLAN:      Patient has chronic chest pain.    Was requesting increase in his pain medications  Claims to be addicted to gabapentin as well    His chest pain is atypical for angina and that is constant and not worse with exercise or walking  He walks his dogs for a mile every day without chest pain  Troponin elevation are chronic  Would not recommend any further work-up from a cardiac standpoint    CHF   Has EF of 45%  fatigue and dizziness could be from low blood pressure as well as medications and heart failure  His proBNP is elevated at 5000  He is on a massive dose of gabapentin which will cause fluid retention  I have reduce the dose to 300 mg 3 times daily  Also will cut back on the dose of Toprol-XL to 50 mg discontinue isosorbide and Ranexa  We will treat him with IV Lasix      Hypotension  Have discontinued isosorbide, reduce the dose of Toprol  Hopefully this would help improve his blood pressure  Would not give him fluids since his proBNP so markedly elevated suggestive of subclinical CHF          Cony Mckeon M.D  2/11/2020

## 2020-02-11 NOTE — PROGRESS NOTES
Hospital Medicine Progress Note     Date:  2/11/2020    PCP: Maynor Rogers MD (Tel: [phone])    Date of Admission: 2/10/2020    Subjective  Patient complains of constant chest pain, is able to walk his dog close to a mile every day and does not get any chest pains. Objective  Physical exam:  Vitals: BP (!) 100/54   Pulse 85   Temp 97.5 °F (36.4 °C) (Oral)   Resp 16   Ht 6' (1.829 m)   Wt 197 lb 1.5 oz (89.4 kg)   SpO2 93%   BMI 26.73 kg/m²   Gen: Not in distress. Alert. Head: Normocephalic. Atraumatic. Eyes: EOMI. Good acuity. ENT: Oral mucosa moist  Neck: No JVD. No obvious thyromegaly. CVS: Nml S1S2, no MRG, RRR  Pulmomary: Clear bilaterally. No crackles. No wheezes. Gastrointestinal: Soft, NT/ND. Positive bowel sounds. Musculoskeletal: No edema. Warm  Neuro: No focal deficit. Moves extremity spontaneously. Psychiatry: Appropriate affect. Not agitated. Skin: Warm, dry with normal turgor. No rash      24HR INTAKE/OUTPUT:  No intake or output data in the 24 hours ending 02/11/20 1625  No intake/output data recorded. No intake/output data recorded.       Meds:    [START ON 2/12/2020] metoprolol succinate  50 mg Oral Daily    gabapentin  300 mg Oral TID    furosemide  40 mg Intravenous Daily    atorvastatin  80 mg Oral Daily    clopidogrel  75 mg Oral Daily    insulin glargine  8 Units Subcutaneous Nightly    sodium chloride flush  10 mL Intravenous 2 times per day    aspirin  81 mg Oral Daily    enoxaparin  40 mg Subcutaneous Daily    insulin lispro  0-12 Units Subcutaneous TID     insulin lispro  0-6 Units Subcutaneous Nightly       Infusions:    dextrose           PRN Meds: glucose, dextrose, glucagon (rDNA), dextrose, oxyCODONE-acetaminophen, sodium chloride flush, magnesium hydroxide, ondansetron, nitroGLYCERIN    Labs/imaging:  CBC:   Recent Labs     02/10/20  1634   WBC 8.5   HGB 16.4            BMP:    Recent Labs     02/10/20  1634      K 4.3

## 2020-02-11 NOTE — H&P
lesions. Neurologic: Alert and oriented X 3, neurovascularly intact with sensory/motor intact upper extremities/lower extremities, bilaterally. Cranial nerves: II-XII intact, grossly non-focal.  Mental status: Alert, oriented, thought content appropriate. Capillary Refill: Acceptable  < 3 seconds  Peripheral Pulses: +3 Easily felt, not easily obliterated with pressure      CXR:  I have reviewed the CXR   EKG:  I have reviewed the EKG     CBC   Recent Labs     02/10/20  1634   WBC 8.5   HGB 16.4   HCT 47.8         RENAL  Recent Labs     02/10/20  1634      K 4.3      CO2 16*   BUN 13   CREATININE 1.1     LFT'S  Recent Labs     02/10/20  1634   AST 9*   ALT 7*   BILIDIR <0.2   BILITOT 0.6   ALKPHOS 73     COAG  No results for input(s): INR in the last 72 hours. CARDIAC ENZYMES  Recent Labs     02/10/20  1634 02/10/20  1806 02/10/20  2125   TROPONINI 0.04* 0.04* 0.04*       U/A:    Lab Results   Component Value Date    NITRITE neg 05/23/2014    COLORU ORANGE 09/12/2019    WBCUA 2 09/12/2019    RBCUA 1 09/12/2019    CLARITYU Clear 09/12/2019    SPECGRAV >1.030 09/12/2019    LEUKOCYTESUR TRACE 09/12/2019    BLOODU Negative 09/12/2019    GLUCOSEU 100 09/12/2019       ABG    Lab Results   Component Value Date    WKS0AYP 25.5 05/21/2018    BEART -0.2 05/21/2018    S1SDYATW 98.4 05/21/2018    PHART 7.345 05/21/2018    MYT0HFC 48.0 05/21/2018    PO2ART 114.0 05/21/2018    TWZ9WOU 27.0 05/21/2018           Active Hospital Problems    Diagnosis Date Noted    Chest pain [R07.9] 04/25/2019         PHYSICIANS CERTIFICATION:    I certify that Chong Morris is expected to be hospitalized for less  than 2 midnights based on the following assessment and plan:      ASSESSMENT/PLAN:    htn  Cad  Dm    Tele  Serial trop  Asa Plavix statin bb  Resume home meds    DVT Prophylaxis: lovenox  Diet: DIET CARB CONTROL; No Caffeine  Code Status: Full Code      Dispo - obs.        Eliza Claudio MD    Thank you Pau Elias Best Donohue MD for the opportunity to be involved in this patient's care. If you have any questions or concerns please feel free to contact me at [phone].

## 2020-02-11 NOTE — ED NOTES
Patient resting comfortably, respirations easy, unlabored. Patient in no acute distress. Denies needs at this time. Call light within reach, bed in lowest position, side rails up x 2.         Arcelia Jewell RN  02/10/20 2042

## 2020-02-12 ENCOUNTER — APPOINTMENT (OUTPATIENT)
Dept: GENERAL RADIOLOGY | Age: 65
DRG: 372 | End: 2020-02-12
Payer: MEDICARE

## 2020-02-12 PROBLEM — A04.72 C. DIFFICILE DIARRHEA: Status: ACTIVE | Noted: 2020-02-12

## 2020-02-12 PROBLEM — I95.9 HYPOTENSION: Status: ACTIVE | Noted: 2020-02-12

## 2020-02-12 LAB
ANION GAP SERPL CALCULATED.3IONS-SCNC: 20 MMOL/L (ref 3–16)
BILIRUBIN URINE: ABNORMAL
BLOOD, URINE: NEGATIVE
BUN BLDV-MCNC: 40 MG/DL (ref 7–20)
C DIFF TOXIN/ANTIGEN: ABNORMAL
CALCIUM SERPL-MCNC: 8.9 MG/DL (ref 8.3–10.6)
CHLORIDE BLD-SCNC: 95 MMOL/L (ref 99–110)
CLARITY: CLEAR
CO2: 19 MMOL/L (ref 21–32)
COLOR: ABNORMAL
CREAT SERPL-MCNC: 4.4 MG/DL (ref 0.8–1.3)
CREATININE URINE: 172.5 MG/DL (ref 39–259)
EPITHELIAL CELLS, UA: 2 /HPF (ref 0–5)
GFR AFRICAN AMERICAN: 16
GFR NON-AFRICAN AMERICAN: 14
GLUCOSE BLD-MCNC: 117 MG/DL (ref 70–99)
GLUCOSE BLD-MCNC: 121 MG/DL (ref 70–99)
GLUCOSE BLD-MCNC: 122 MG/DL (ref 70–99)
GLUCOSE BLD-MCNC: 131 MG/DL (ref 70–99)
GLUCOSE BLD-MCNC: 155 MG/DL (ref 70–99)
GLUCOSE URINE: >=1000 MG/DL
HCT VFR BLD CALC: 44.8 % (ref 40.5–52.5)
HEMOGLOBIN: 15.2 G/DL (ref 13.5–17.5)
HYALINE CASTS: 11 /LPF (ref 0–8)
KETONES, URINE: NEGATIVE MG/DL
LEUKOCYTE ESTERASE, URINE: NEGATIVE
MCH RBC QN AUTO: 33 PG (ref 26–34)
MCHC RBC AUTO-ENTMCNC: 33.8 G/DL (ref 31–36)
MCV RBC AUTO: 97.5 FL (ref 80–100)
MICROSCOPIC EXAMINATION: YES
NITRITE, URINE: NEGATIVE
PDW BLD-RTO: 13.6 % (ref 12.4–15.4)
PERFORMED ON: ABNORMAL
PH UA: 5 (ref 5–8)
PLATELET # BLD: 204 K/UL (ref 135–450)
PMV BLD AUTO: 9.3 FL (ref 5–10.5)
POTASSIUM REFLEX MAGNESIUM: 4.1 MMOL/L (ref 3.5–5.1)
PROTEIN UA: 30 MG/DL
RBC # BLD: 4.6 M/UL (ref 4.2–5.9)
RBC UA: 2 /HPF (ref 0–4)
SODIUM BLD-SCNC: 134 MMOL/L (ref 136–145)
SPECIFIC GRAVITY UA: >1.03 (ref 1–1.03)
UREA NITROGEN, UR: 248.1 MG/DL (ref 800–1666)
URINE REFLEX TO CULTURE: ABNORMAL
URINE TYPE: ABNORMAL
UROBILINOGEN, URINE: 0.2 E.U./DL
WBC # BLD: 12.1 K/UL (ref 4–11)
WBC UA: 5 /HPF (ref 0–5)

## 2020-02-12 PROCEDURE — 2580000003 HC RX 258: Performed by: INTERNAL MEDICINE

## 2020-02-12 PROCEDURE — 85027 COMPLETE CBC AUTOMATED: CPT

## 2020-02-12 PROCEDURE — 74018 RADEX ABDOMEN 1 VIEW: CPT

## 2020-02-12 PROCEDURE — 81001 URINALYSIS AUTO W/SCOPE: CPT

## 2020-02-12 PROCEDURE — 87449 NOS EACH ORGANISM AG IA: CPT

## 2020-02-12 PROCEDURE — G0378 HOSPITAL OBSERVATION PER HR: HCPCS

## 2020-02-12 PROCEDURE — 6360000002 HC RX W HCPCS: Performed by: INTERNAL MEDICINE

## 2020-02-12 PROCEDURE — 82570 ASSAY OF URINE CREATININE: CPT

## 2020-02-12 PROCEDURE — 1200000000 HC SEMI PRIVATE

## 2020-02-12 PROCEDURE — 6370000000 HC RX 637 (ALT 250 FOR IP): Performed by: INTERNAL MEDICINE

## 2020-02-12 PROCEDURE — 99232 SBSQ HOSP IP/OBS MODERATE 35: CPT | Performed by: NURSE PRACTITIONER

## 2020-02-12 PROCEDURE — 2580000003 HC RX 258: Performed by: HOSPITALIST

## 2020-02-12 PROCEDURE — 84540 ASSAY OF URINE/UREA-N: CPT

## 2020-02-12 PROCEDURE — 80048 BASIC METABOLIC PNL TOTAL CA: CPT

## 2020-02-12 PROCEDURE — 87040 BLOOD CULTURE FOR BACTERIA: CPT

## 2020-02-12 PROCEDURE — 6370000000 HC RX 637 (ALT 250 FOR IP): Performed by: FAMILY MEDICINE

## 2020-02-12 PROCEDURE — 36415 COLL VENOUS BLD VENIPUNCTURE: CPT

## 2020-02-12 PROCEDURE — 87324 CLOSTRIDIUM AG IA: CPT

## 2020-02-12 PROCEDURE — 2580000003 HC RX 258: Performed by: FAMILY MEDICINE

## 2020-02-12 RX ORDER — SODIUM CHLORIDE 9 MG/ML
INJECTION, SOLUTION INTRAVENOUS CONTINUOUS
Status: DISCONTINUED | OUTPATIENT
Start: 2020-02-12 | End: 2020-02-12

## 2020-02-12 RX ORDER — VANCOMYCIN HYDROCHLORIDE 125 MG/1
125 CAPSULE ORAL 4 TIMES DAILY
Status: DISCONTINUED | OUTPATIENT
Start: 2020-02-12 | End: 2020-02-15 | Stop reason: HOSPADM

## 2020-02-12 RX ORDER — OXYCODONE AND ACETAMINOPHEN 10; 325 MG/1; MG/1
1 TABLET ORAL EVERY 6 HOURS PRN
Status: DISCONTINUED | OUTPATIENT
Start: 2020-02-12 | End: 2020-02-15 | Stop reason: HOSPADM

## 2020-02-12 RX ORDER — ACETAMINOPHEN 325 MG/1
650 TABLET ORAL EVERY 4 HOURS PRN
Status: DISCONTINUED | OUTPATIENT
Start: 2020-02-12 | End: 2020-02-15 | Stop reason: HOSPADM

## 2020-02-12 RX ORDER — SODIUM CHLORIDE, SODIUM LACTATE, POTASSIUM CHLORIDE, CALCIUM CHLORIDE 600; 310; 30; 20 MG/100ML; MG/100ML; MG/100ML; MG/100ML
INJECTION, SOLUTION INTRAVENOUS CONTINUOUS
Status: DISCONTINUED | OUTPATIENT
Start: 2020-02-12 | End: 2020-02-13

## 2020-02-12 RX ORDER — GABAPENTIN 300 MG/1
600 CAPSULE ORAL
Status: DISCONTINUED | OUTPATIENT
Start: 2020-02-12 | End: 2020-02-13

## 2020-02-12 RX ADMIN — OXYCODONE HYDROCHLORIDE AND ACETAMINOPHEN 1 TABLET: 10; 325 TABLET ORAL at 14:05

## 2020-02-12 RX ADMIN — VANCOMYCIN HYDROCHLORIDE 125 MG: 125 CAPSULE ORAL at 21:50

## 2020-02-12 RX ADMIN — INSULIN GLARGINE 8 UNITS: 100 INJECTION, SOLUTION SUBCUTANEOUS at 21:50

## 2020-02-12 RX ADMIN — SODIUM CHLORIDE, POTASSIUM CHLORIDE, SODIUM LACTATE AND CALCIUM CHLORIDE: 600; 310; 30; 20 INJECTION, SOLUTION INTRAVENOUS at 11:02

## 2020-02-12 RX ADMIN — SODIUM CHLORIDE, PRESERVATIVE FREE 10 ML: 5 INJECTION INTRAVENOUS at 08:31

## 2020-02-12 RX ADMIN — CLOPIDOGREL BISULFATE 75 MG: 75 TABLET ORAL at 08:29

## 2020-02-12 RX ADMIN — ENOXAPARIN SODIUM 40 MG: 40 INJECTION SUBCUTANEOUS at 08:29

## 2020-02-12 RX ADMIN — OXYCODONE HYDROCHLORIDE AND ACETAMINOPHEN 1 TABLET: 10; 325 TABLET ORAL at 21:51

## 2020-02-12 RX ADMIN — SODIUM CHLORIDE, POTASSIUM CHLORIDE, SODIUM LACTATE AND CALCIUM CHLORIDE: 600; 310; 30; 20 INJECTION, SOLUTION INTRAVENOUS at 19:40

## 2020-02-12 RX ADMIN — SODIUM CHLORIDE: 9 INJECTION, SOLUTION INTRAVENOUS at 09:52

## 2020-02-12 RX ADMIN — INSULIN LISPRO 2 UNITS: 100 INJECTION, SOLUTION INTRAVENOUS; SUBCUTANEOUS at 11:53

## 2020-02-12 RX ADMIN — GABAPENTIN 300 MG: 300 CAPSULE ORAL at 08:29

## 2020-02-12 RX ADMIN — OXYCODONE HYDROCHLORIDE AND ACETAMINOPHEN 1 TABLET: 10; 325 TABLET ORAL at 02:15

## 2020-02-12 RX ADMIN — GABAPENTIN 600 MG: 300 CAPSULE ORAL at 18:37

## 2020-02-12 RX ADMIN — GABAPENTIN 600 MG: 300 CAPSULE ORAL at 15:17

## 2020-02-12 RX ADMIN — FUROSEMIDE 40 MG: 10 INJECTION, SOLUTION INTRAMUSCULAR; INTRAVENOUS at 08:29

## 2020-02-12 RX ADMIN — ONDANSETRON 4 MG: 2 INJECTION INTRAMUSCULAR; INTRAVENOUS at 18:37

## 2020-02-12 RX ADMIN — ATORVASTATIN CALCIUM 80 MG: 80 TABLET, FILM COATED ORAL at 08:29

## 2020-02-12 RX ADMIN — ASPIRIN 81 MG 81 MG: 81 TABLET ORAL at 08:29

## 2020-02-12 ASSESSMENT — PAIN SCALES - GENERAL
PAINLEVEL_OUTOF10: 6
PAINLEVEL_OUTOF10: 3
PAINLEVEL_OUTOF10: 7
PAINLEVEL_OUTOF10: 5
PAINLEVEL_OUTOF10: 7

## 2020-02-12 ASSESSMENT — PAIN DESCRIPTION - ONSET
ONSET: ON-GOING

## 2020-02-12 ASSESSMENT — PAIN DESCRIPTION - FREQUENCY
FREQUENCY: CONTINUOUS

## 2020-02-12 ASSESSMENT — PAIN DESCRIPTION - LOCATION
LOCATION: CHEST

## 2020-02-12 ASSESSMENT — PAIN DESCRIPTION - ORIENTATION
ORIENTATION: MID

## 2020-02-12 ASSESSMENT — PAIN DESCRIPTION - DESCRIPTORS
DESCRIPTORS: ACHING

## 2020-02-12 ASSESSMENT — PAIN DESCRIPTION - PROGRESSION
CLINICAL_PROGRESSION: NOT CHANGED

## 2020-02-12 ASSESSMENT — PAIN DESCRIPTION - PAIN TYPE
TYPE: ACUTE PAIN

## 2020-02-12 NOTE — CONSULTS
Temp 97.3 °F (36.3 °C) (Oral)   Resp 18   Ht 6' (1.829 m)   Wt 197 lb 12 oz (89.7 kg)   SpO2 98%   BMI 26.82 kg/m²    Wt Readings from Last 3 Encounters:   02/12/20 197 lb 12 oz (89.7 kg)   02/06/20 205 lb 1.6 oz (93 kg)   01/02/20 213 lb 12.8 oz (97 kg)      24HR INTAKE/OUTPUT:      Intake/Output Summary (Last 24 hours) at 2/12/2020 1002  Last data filed at 2/12/2020 2934  Gross per 24 hour   Intake 720 ml   Output --   Net 720 ml     Constitutional:  awake, NAD  HEENT:  MMM, No icterus  Neck: no bruits, No JVD  Cardiovascular:  S1, S2 reg  Respiratory: few rhonchi, no crackles  Abdomen:  +BS, soft, NT, ND  Ext: no lower extremity edema  Psychiatric: mood and affect anxious  Skin: dry/intact  CNS: alert, no agitation    IMAGING:  CTA CHEST ABDOMEN PELVIS W CONTRAST   Final Result   1. No aortic aneurysm or dissection   2. No acute abnormalities seen in the chest abdomen or pelvis   3. Status post cholecystectomy         XR CHEST STANDARD (2 VW)   Final Result   No acute cardiopulmonary disease. Assessment :     1. LALITA  -Non-Oliguric  -Baseline creat: 0.9-1.3 Now 1.1->4.4  -UA pending  -Volume: Hypovol  -Electrolytes/Acid-Base: met acidosis, met alk  -LALITA multifactorial but mostly due to hemodynamic changes related to low blood pressure. Concomitant use NSAID, iv contrast.    Recent Labs     02/10/20  1634 02/12/20  0652   BUN 13 40*   CREATININE 1.1 4.4*     Recent Labs     02/10/20  1634 02/12/20  0652    134*   K 4.3 4.1   CO2 16* 19*       2. HTN  -Hypotensive     Weight: 197 lb 12 oz (89.7 kg), BP: (!) 73/46    3. CAD  -previous history of coronary artery bypass grafting, PCI  -status post ICD. Recent hospitalization for ICD discharge  -ARYA (dec 2019): LV function normal  -last cardiac cath in Nov 2019: patent stents    4. DM  -On insulin    5. Diarrhea  -?infectious, ? opioid withdrawal    Plan:       -crystalloids for volume repletion  -hemodynamic support to maintain MAP>65  -Hold

## 2020-02-12 NOTE — PROGRESS NOTES
[facility]   Daily Progress Note      Admit Date:  2/10/2020    CC: chest pain    HPI:   Mitchell Monson is a 59 y.o. male with PMH CAD S/P CABG and multiple stents 2012 & 2015 ([facility] that indicated patent stents), ICM, SHF EF 45% > 30% 4/2019, S/P PPM gen change 11/1029 [facility], CVA 12/2019> ECHO w/ new PFO, DM, HTN, anxiety. + smoker. Admitted to [facility] w/ dizziness, fatigue and chest pain. He has been seen several times over last 1-2 months for chest pain that has had neg ischemic work up. Initially treated for pleuritic chest pain and possible pericarditis at [facility]- given colchecine. He was back in ED a few weeks ago again with atypical chest pain after AICD fired. Per cardiology at that time, it appeared to fire for either SVT or A tach, not VT. Patient was seen by Dr. Anita Barnes yesterday who felt chest pain was not ischemia related. However, he did appear to be in heart failure with BNP 4458 and he started lasix - he appears to have received 20mg IV X1. He apparently was taking a lot of gabapentin and that has been decreased with concern for fluid retention. He was hypotensive SBP 70s and meds have were titrated. Eric Hernandez stopped IVF with concern for HF/hypervolemia. Today his BUN/Creat is 40/4.4. Neph has been consulted. He was started back on IVF and all diuresis is stopped. His SBP was 73 prior to IVF. Now improved to 110/70. The patient does not feel well. He continues with chest pain, constant, that worsens with a deep breath. + lighheadedness upon standing. It is not associated with activity. He has had diarrhea and nausea and poor appetite. No vomiting. Denies SOB. He has a moist non prod cough. Review of Systems:   General: Denies fever, chills, +fatigue, + generalized weakness  Skin: Denies skin changes, rash, itching, lesions.   HEENT: Denies headache,  vision changes, nosebleeds, sore throat, nasal drainage  RESP: Denies  sputum, dyspnea, wheeze, snoring  CARD: Denies depressions     ECHO:   12/14/2019  Summary   Overall left ventricular function is normal.   Mitral valve is structurally normal.   Pacemaker / ICD lead is visualized in the right atrium.   A bubble study was performed and shows evidence of right to left shunting   consistent with a patent foramen ovale.   A patent foramen ovale was visualized     12/16/2019  Summary   There is diffuse hypokinesis with ejection fraction is estimated to be   35-40%.   Indeterminate diastolic function.   Moderate mitral regurgitation with calcification   A bubble study was performed and shows evidence of right to left shunting   consistent with a patent foramen ovale or atrial septal defect     ARYA 12/17/2019    Overall left ventricular function is normal.   Mitral valve is structurally normal.   Pacemaker / ICD lead is visualized in the right atrium.   A bubble study was performed and shows evidence of right to left shunting   consistent with a patent foramen ovale.   A patent foramen ovale was visualized.   8/21/2018  Summary   Left ventricular cavity size is dilated.   There is moderate concentric left ventricular hypertrophy.   Ejection fraction is visually estimated to be   45%.   Mild mitral regurgitation.   Pacer / ICD wire is visualized in the right ventricle.     5/2/2018   Summary   A bubble study was performed and shows evidence of right to left shunting   consistent with a patent foramen ovale or atrial septal defect. [address] with EF   55%. No regional wall motion abnormalities are noted.   Mild mitral regurgitation.   The left atrial size is normal.   Normal right ventricular size and function. Verba Octavio / ICD wire is visualized   in the right ventricle.   Trivial tricuspid regurgitation     Stress Nuclear:   1. Technically a satisfactory study.    2. Normal pharmacological stress portion of the study.    3.  No evidence of Ischemia by Myocardial Perfusion Imaging.    4. Gated Study shows normal LV size and Systolic function; EF is 55%.       Corornary angiogram:  The left main coronary comes from the left coronary, cusp, giving rise to left anterior descending artery, left circumflex artery. The mid portion of the left main has a stent present which has severe 90% in-stent restenosis present.  The left anterior descending artery is occluded. Tiffanie Bellis is a large diagonal coming off the LAD, which is the first diagonal.  The circumflex has severe disease in the proximal portion.     We intervened on left main coronary artery, placed one drug-coated Xience Alpine stent 3.5 mm x 12 mm which was post-dilated to 3.8 mm    CXR 2/10/20  Impression   No acute cardiopulmonary disease. Chest CT 2/10/20  Impression   1. No aortic aneurysm or dissection   2. No acute abnormalities seen in the chest abdomen or pelvis   3. Status post cholecystectomy         Assessment/Plan:  1. ) Chronic combined diastolic and systolic HF: EF 11-99%. Presented with elevated BNP- thought to be hypervolemic but now hypotensive with LALITA after lasix. He is not SOB and does not have evidence of pulm edema. Will continue to hold diuretics and BP meds for now. NYHA Class III  Diuretic:  hold diuresis  Beta Blocker: hold for low BP  ACEi/ARB/ARNI: hold for low BP   Aldosterone Antagonist: none  Cardiac Rehab: none  2gm Na diet, daily weight, 64 oz fluid restriction  Avoid NSAIDS  S/P PPM, no ICD [employer] Evera     2. ) CAD S/P CABG and stents: Stable  DAPT: aspirin, plavix  Beta Blocker: hold  ACEi/ARB: hold  Anti anginal: DC'd per Dr. Demetrice Giron for low BP  Lipid management: lipitor  Risk factor management: high blood pressure, high cholesterol, Diabetes, smoking, obesity, family hx  Lifestyle modification: Heart healthy diet, regular exercise, weight loss, smoking cessation, stress reduction    3.) Hx PFO: Per ECHO and ARYA. Did not follow up as outpatient. -Repeat ECHO when back on GDMT.     4. ) Hypotension: Improved with IVF.  OK to continue, monitor for S/S of HF.     5.) LALITA: Neph consulted  Electronically signed by AMY Mateo Mohs, APRN - CNP on 2/12/2020 at 10:17 AM

## 2020-02-12 NOTE — PLAN OF CARE
Problem: OXYGENATION/RESPIRATORY FUNCTION  Goal: Patient will maintain patent airway  2/12/2020 0010 by Oksana Gu RN  Outcome: Ongoing  2/11/2020 1908 by Mikey Jay RN  Outcome: Ongoing  Goal: Patient will achieve/maintain normal respiratory rate/effort  Description  Respiratory rate and effort will be within normal limits for the patient  2/12/2020 0010 by Oksana Gu RN  Outcome: Ongoing  2/11/2020 1908 by Mikey Jay RN  Outcome: Ongoing     Problem: HEMODYNAMIC STATUS  Goal: Patient has stable vital signs and fluid balance  2/12/2020 0010 by Oksana Gu RN  Outcome: Ongoing  2/11/2020 1908 by Mikey Jay RN  Outcome: Ongoing     Problem: FLUID AND ELECTROLYTE IMBALANCE  Goal: Fluid and electrolyte balance are achieved/maintained  2/12/2020 0010 by Oksana Gu RN  Outcome: Ongoing  2/11/2020 1908 by Mikey Jay RN  Outcome: Ongoing     Problem: ACTIVITY INTOLERANCE/IMPAIRED MOBILITY  Goal: Mobility/activity is maintained at optimum level for patient  2/12/2020 0010 by Oksana Gu RN  Outcome: Ongoing  2/11/2020 1908 by Mikey Jay RN  Outcome: Ongoing     Problem: Pain:  Goal: Pain level will decrease  Description  Pain level will decrease  Outcome: Ongoing  Goal: Control of acute pain  Description  Control of acute pain  Outcome: Ongoing  Note:   Pt able to express presence/absence of pain and rate pain appropriately using numerical scale. Pain/discomfort being managed with PRN analgesics per MD orders (see MAR). Pain assessed every shift and after interventions.     Goal: Control of chronic pain  Description  Control of chronic pain  Outcome: Ongoing     Problem: Falls - Risk of:  Goal: Will remain free from falls  Description  Will remain free from falls  Outcome: Ongoing  Goal: Absence of physical injury  Description  Absence of physical injury  Outcome: Ongoing     Problem: Discharge Planning:  Goal: Discharged to appropriate level of

## 2020-02-12 NOTE — PROGRESS NOTES
Pt's BP has been improving throughout the shift, but the 1830 vital check had a BP of 73/54. MD notified.

## 2020-02-12 NOTE — PLAN OF CARE
Patient's blood pressure has come up, which allowed for additional pain medication and now the patient is feeling much better. Pt has had some N/V and diarrhea. Samples for urine and C-Diff send down to lab. Pt is not eating very much or urinating much, but hopefully as he feels better he will regain an appetite.        Problem: OXYGENATION/RESPIRATORY FUNCTION  Goal: Patient will maintain patent airway  Outcome: Ongoing  Goal: Patient will achieve/maintain normal respiratory rate/effort  Description  Respiratory rate and effort will be within normal limits for the patient  Outcome: Ongoing     Problem: HEMODYNAMIC STATUS  Goal: Patient has stable vital signs and fluid balance  Outcome: Ongoing     Problem: FLUID AND ELECTROLYTE IMBALANCE  Goal: Fluid and electrolyte balance are achieved/maintained  Outcome: Ongoing     Problem: ACTIVITY INTOLERANCE/IMPAIRED MOBILITY  Goal: Mobility/activity is maintained at optimum level for patient  Outcome: Ongoing     Problem: Pain:  Goal: Pain level will decrease  Description  Pain level will decrease  Outcome: Ongoing  Goal: Control of acute pain  Description  Control of acute pain  Outcome: Ongoing  Goal: Control of chronic pain  Description  Control of chronic pain  Outcome: Ongoing     Problem: Falls - Risk of:  Goal: Will remain free from falls  Description  Will remain free from falls  Outcome: Ongoing  Goal: Absence of physical injury  Description  Absence of physical injury  Outcome: Ongoing     Problem: Discharge Planning:  Goal: Discharged to appropriate level of care  Description  Discharged to appropriate level of care  Outcome: Ongoing

## 2020-02-12 NOTE — PROGRESS NOTES
Hospital Medicine Progress Note     Date:  2/12/2020    PCP: Russel Izaguirre MD (Tel: [phone])    Date of Admission: 2/10/2020    Subjective  Patient complains that he still having diarrhea, still feels malaise and fatigue. He requests that we give him his home dose of gabapentin 600 mg 5 times a day daily as he has been on that for a long time. Objective  Physical exam:  Vitals: /70   Pulse 77   Temp 97.5 °F (36.4 °C) (Oral)   Resp 18   Ht 6' (1.829 m)   Wt 197 lb 12 oz (89.7 kg)   SpO2 95%   BMI 26.82 kg/m²   Gen: Not in distress. Alert. Head: Normocephalic. Atraumatic. Eyes: EOMI. Good acuity. ENT: Oral mucosa moist  Neck: No JVD. No obvious thyromegaly. CVS: Nml S1S2, no MRG, RRR  Pulmomary: Clear bilaterally. No crackles. No wheezes. Gastrointestinal: Soft, NT/ND. Positive bowel sounds. Musculoskeletal: No edema. Warm  Neuro: No focal deficit. Moves extremity spontaneously. Psychiatry: Appropriate affect. Not agitated. Skin: Warm, dry with normal turgor. No rash      24HR INTAKE/OUTPUT:      Intake/Output Summary (Last 24 hours) at 2/12/2020 1709  Last data filed at 2/12/2020 1531  Gross per 24 hour   Intake 840 ml   Output 200 ml   Net 640 ml     I/O last 3 completed shifts:   In: 600 [P.O.:600]  Out: 200 [Urine:200]  I/O this shift:  In: 240 [P.O.:240]  Out: -       Meds:    gabapentin  600 mg Oral 5x Daily    vancomycin  125 mg Oral 4x Daily    metoprolol succinate  50 mg Oral Daily    atorvastatin  80 mg Oral Daily    clopidogrel  75 mg Oral Daily    insulin glargine  8 Units Subcutaneous Nightly    sodium chloride flush  10 mL Intravenous 2 times per day    aspirin  81 mg Oral Daily    enoxaparin  40 mg Subcutaneous Daily    insulin lispro  0-12 Units Subcutaneous TID     insulin lispro  0-6 Units Subcutaneous Nightly       Infusions:    lactated ringers 100 mL/hr at 02/12/20 1112    dextrose           PRN Meds: acetaminophen, oxyCODONE-acetaminophen, glucose, dextrose, glucagon (rDNA), dextrose, sodium chloride flush, magnesium hydroxide, ondansetron, nitroGLYCERIN    Labs/imaging:  CBC:   Recent Labs     02/10/20  1634 02/12/20  0652   WBC 8.5 12.1*   HGB 16.4 15.2    204         BMP:    Recent Labs     02/10/20  1634 02/12/20  0652    134*   K 4.3 4.1    95*   CO2 16* 19*   BUN 13 40*   CREATININE 1.1 4.4*   GLUCOSE 185* 117*         Hepatic:   Recent Labs     02/10/20  1634   AST 9*   ALT 7*   BILITOT 0.6   ALKPHOS 73       Troponin:   Recent Labs     02/10/20  1806 02/10/20  2125 02/11/20  0013   TROPONINI 0.04* 0.04* 0.07*         BNP: No results for input(s): BNP in the last 72 hours. INR: No results for input(s): INR in the last 72 hours. Reviewed imaging and reports noted      Assessment:  Principal Problem:    Chronic systolic (congestive) heart failure (HCC)  Active Problems:    S/P CABG (coronary artery bypass graft)    Essential hypertension    Coronary artery disease involving coronary bypass graft of native heart with angina pectoris ([facility])    DMII (diabetes mellitus, type 2) ([facility])    COPD (chronic obstructive pulmonary disease) ([facility])    Chest pain    CAD (coronary artery disease)    Diabetic peripheral neuropathy ([facility])    Hypotension    C. difficile diarrhea  Resolved Problems:    * No resolved hospital problems.  *        Plan:  Acute kidney injury  -Appreciate nephrology recommendations, hold all diuresis, continue IV fluids  -No more NSAIDs, was given 1 dose of Toradol 30 mg IV Lasix night      C. difficile diarrhea  -Start p.o. vancomycin and supportive care      Chest pain  -Appreciate cardiology recommendations, started on IV Lasix per cardiology  -No need for further ischemic work-up per cardiology      Acute on chronic systolic congestive heart failure was suspected but likely patient just has chronic combined congestive heart failure  -DC IV Lasix, continue aspirin, statin, beta-blocker with blood pressure parameters  -Cardiology following      Essential hypertension  -Blood pressure low, continue IV fluids, continue to hold all antihypertensives and give Toprol-XL only with blood pressure parameter    Insulin-dependent diabetes mellitus  -Continue Lantus 8 units nightly, sliding scale insulin moderate, continue to monitor      Diabetic peripheral neuropathy  -Per patient request, will resume patient's home dose of gabapentin 600 mg 4 times daily      CAD status post CABG  -Continue aspirin, Plavix, statin, beta-blocker          Diet: DIET CARB CONTROL; Low Sodium (2 GM);  Daily Fluid Restriction: 1500 ml; No Caffeine    Activity: Up as tolerated  Prophylaxis: Lovenox    Code status: Full Code     ----------        Franck Day MD  -------------------------------  Ana hospitalist

## 2020-02-12 NOTE — PROGRESS NOTES
Pain Progress Note    Data:  Pt awake and alert, c/o chest pain rating 6/10. BP 74/55. Action:  Secure message sent to [facility] NP. Response:  Received order for Toradol x 1 dose. Call light and needs in reach. Will monitor.  Electronically signed by Kendell Hall RN on 2/12/2020 at 2:27 AM

## 2020-02-12 NOTE — CARE COORDINATION
INITIAL CASE MANAGEMENT ASSESSMENT    Reviewed chart, met with patient to assess possible discharge needs. Explained Case Management role/services. Living Situation: Patient lives with his wife in a house with 16 steps to enter. ADLs: Independent     DME: walker    PT/OT Recs: not active     Active Services: none     Transportation: Wife transports     Medications: [employer]/no barriers    PCP: Dr. Lisbeth Donnelly      HD/PD: n/a    PLAN/COMMENTS: Patient was d/c from [facility] on 2/6 to home. Was in CV in Dec. '19. Has also been in [facility], AN AFFILIATE OF [facility], [facility] and has use [facility] in the past.     SW/CM provided contact information for patient or family to call with any questions. SW/CM will follow and assist as needed. Electronically signed by Ginny Ballesteros RN on 2/12/2020 at 11:51 AM

## 2020-02-13 LAB
ANION GAP SERPL CALCULATED.3IONS-SCNC: 16 MMOL/L (ref 3–16)
BUN BLDV-MCNC: 44 MG/DL (ref 7–20)
CALCIUM SERPL-MCNC: 8 MG/DL (ref 8.3–10.6)
CHLORIDE BLD-SCNC: 94 MMOL/L (ref 99–110)
CO2: 19 MMOL/L (ref 21–32)
CREAT SERPL-MCNC: 4 MG/DL (ref 0.8–1.3)
GFR AFRICAN AMERICAN: 18
GFR NON-AFRICAN AMERICAN: 15
GLUCOSE BLD-MCNC: 113 MG/DL (ref 70–99)
GLUCOSE BLD-MCNC: 116 MG/DL (ref 70–99)
GLUCOSE BLD-MCNC: 123 MG/DL (ref 70–99)
GLUCOSE BLD-MCNC: 134 MG/DL (ref 70–99)
GLUCOSE BLD-MCNC: 148 MG/DL (ref 70–99)
HCT VFR BLD CALC: 38.8 % (ref 40.5–52.5)
HEMOGLOBIN: 13.6 G/DL (ref 13.5–17.5)
MAGNESIUM: 2.1 MG/DL (ref 1.8–2.4)
MCH RBC QN AUTO: 34 PG (ref 26–34)
MCHC RBC AUTO-ENTMCNC: 35 G/DL (ref 31–36)
MCV RBC AUTO: 97 FL (ref 80–100)
PDW BLD-RTO: 13.6 % (ref 12.4–15.4)
PERFORMED ON: ABNORMAL
PLATELET # BLD: 151 K/UL (ref 135–450)
PMV BLD AUTO: 9 FL (ref 5–10.5)
POTASSIUM SERPL-SCNC: 3.8 MMOL/L (ref 3.5–5.1)
PRO-BNP: 696 PG/ML (ref 0–124)
RBC # BLD: 4.01 M/UL (ref 4.2–5.9)
SODIUM BLD-SCNC: 129 MMOL/L (ref 136–145)
TROPONIN: 0.06 NG/ML
WBC # BLD: 9.8 K/UL (ref 4–11)

## 2020-02-13 PROCEDURE — 6370000000 HC RX 637 (ALT 250 FOR IP): Performed by: INTERNAL MEDICINE

## 2020-02-13 PROCEDURE — 2580000003 HC RX 258: Performed by: HOSPITALIST

## 2020-02-13 PROCEDURE — 1200000000 HC SEMI PRIVATE

## 2020-02-13 PROCEDURE — 83735 ASSAY OF MAGNESIUM: CPT

## 2020-02-13 PROCEDURE — 6370000000 HC RX 637 (ALT 250 FOR IP): Performed by: NURSE PRACTITIONER

## 2020-02-13 PROCEDURE — 36415 COLL VENOUS BLD VENIPUNCTURE: CPT

## 2020-02-13 PROCEDURE — 6370000000 HC RX 637 (ALT 250 FOR IP): Performed by: FAMILY MEDICINE

## 2020-02-13 PROCEDURE — 80048 BASIC METABOLIC PNL TOTAL CA: CPT

## 2020-02-13 PROCEDURE — 6370000000 HC RX 637 (ALT 250 FOR IP): Performed by: HOSPITALIST

## 2020-02-13 PROCEDURE — 85027 COMPLETE CBC AUTOMATED: CPT

## 2020-02-13 PROCEDURE — 99232 SBSQ HOSP IP/OBS MODERATE 35: CPT | Performed by: INTERNAL MEDICINE

## 2020-02-13 PROCEDURE — 94760 N-INVAS EAR/PLS OXIMETRY 1: CPT

## 2020-02-13 PROCEDURE — 6360000002 HC RX W HCPCS: Performed by: INTERNAL MEDICINE

## 2020-02-13 PROCEDURE — 83880 ASSAY OF NATRIURETIC PEPTIDE: CPT

## 2020-02-13 PROCEDURE — 84484 ASSAY OF TROPONIN QUANT: CPT

## 2020-02-13 RX ORDER — GABAPENTIN 300 MG/1
300 CAPSULE ORAL 3 TIMES DAILY
Status: DISCONTINUED | OUTPATIENT
Start: 2020-02-13 | End: 2020-02-15 | Stop reason: HOSPADM

## 2020-02-13 RX ORDER — SODIUM BICARBONATE 650 MG/1
650 TABLET ORAL 2 TIMES DAILY
Status: DISCONTINUED | OUTPATIENT
Start: 2020-02-13 | End: 2020-02-15 | Stop reason: HOSPADM

## 2020-02-13 RX ORDER — SODIUM CHLORIDE, SODIUM LACTATE, POTASSIUM CHLORIDE, CALCIUM CHLORIDE 600; 310; 30; 20 MG/100ML; MG/100ML; MG/100ML; MG/100ML
INJECTION, SOLUTION INTRAVENOUS CONTINUOUS
Status: DISCONTINUED | OUTPATIENT
Start: 2020-02-13 | End: 2020-02-15 | Stop reason: HOSPADM

## 2020-02-13 RX ADMIN — VANCOMYCIN HYDROCHLORIDE 125 MG: 125 CAPSULE ORAL at 16:44

## 2020-02-13 RX ADMIN — OXYCODONE HYDROCHLORIDE AND ACETAMINOPHEN 1 TABLET: 10; 325 TABLET ORAL at 10:24

## 2020-02-13 RX ADMIN — VANCOMYCIN HYDROCHLORIDE 125 MG: 125 CAPSULE ORAL at 13:37

## 2020-02-13 RX ADMIN — SODIUM CHLORIDE, POTASSIUM CHLORIDE, SODIUM LACTATE AND CALCIUM CHLORIDE: 600; 310; 30; 20 INJECTION, SOLUTION INTRAVENOUS at 04:05

## 2020-02-13 RX ADMIN — SODIUM BICARBONATE 650 MG: 650 TABLET ORAL at 09:22

## 2020-02-13 RX ADMIN — VANCOMYCIN HYDROCHLORIDE 125 MG: 125 CAPSULE ORAL at 08:21

## 2020-02-13 RX ADMIN — OXYCODONE HYDROCHLORIDE AND ACETAMINOPHEN 1 TABLET: 10; 325 TABLET ORAL at 16:44

## 2020-02-13 RX ADMIN — GABAPENTIN 600 MG: 300 CAPSULE ORAL at 06:08

## 2020-02-13 RX ADMIN — OXYCODONE HYDROCHLORIDE AND ACETAMINOPHEN 1 TABLET: 10; 325 TABLET ORAL at 04:05

## 2020-02-13 RX ADMIN — ASPIRIN 81 MG 81 MG: 81 TABLET ORAL at 08:22

## 2020-02-13 RX ADMIN — GABAPENTIN 600 MG: 300 CAPSULE ORAL at 10:24

## 2020-02-13 RX ADMIN — GABAPENTIN 300 MG: 300 CAPSULE ORAL at 20:38

## 2020-02-13 RX ADMIN — CLOPIDOGREL BISULFATE 75 MG: 75 TABLET ORAL at 08:22

## 2020-02-13 RX ADMIN — INSULIN LISPRO 1 UNITS: 100 INJECTION, SOLUTION INTRAVENOUS; SUBCUTANEOUS at 20:39

## 2020-02-13 RX ADMIN — ATORVASTATIN CALCIUM 80 MG: 80 TABLET, FILM COATED ORAL at 08:23

## 2020-02-13 RX ADMIN — VANCOMYCIN HYDROCHLORIDE 125 MG: 125 CAPSULE ORAL at 20:38

## 2020-02-13 RX ADMIN — INSULIN GLARGINE 8 UNITS: 100 INJECTION, SOLUTION SUBCUTANEOUS at 20:38

## 2020-02-13 RX ADMIN — SODIUM CHLORIDE, POTASSIUM CHLORIDE, SODIUM LACTATE AND CALCIUM CHLORIDE: 600; 310; 30; 20 INJECTION, SOLUTION INTRAVENOUS at 13:38

## 2020-02-13 RX ADMIN — ENOXAPARIN SODIUM 40 MG: 40 INJECTION SUBCUTANEOUS at 08:21

## 2020-02-13 RX ADMIN — SODIUM BICARBONATE 650 MG: 650 TABLET ORAL at 20:38

## 2020-02-13 RX ADMIN — OXYCODONE HYDROCHLORIDE AND ACETAMINOPHEN 1 TABLET: 10; 325 TABLET ORAL at 22:44

## 2020-02-13 RX ADMIN — GABAPENTIN 300 MG: 300 CAPSULE ORAL at 13:37

## 2020-02-13 ASSESSMENT — PAIN - FUNCTIONAL ASSESSMENT
PAIN_FUNCTIONAL_ASSESSMENT: ACTIVITIES ARE NOT PREVENTED

## 2020-02-13 ASSESSMENT — PAIN DESCRIPTION - FREQUENCY
FREQUENCY: CONTINUOUS

## 2020-02-13 ASSESSMENT — PAIN DESCRIPTION - PAIN TYPE
TYPE: ACUTE PAIN

## 2020-02-13 ASSESSMENT — PAIN DESCRIPTION - ONSET
ONSET: ON-GOING

## 2020-02-13 ASSESSMENT — PAIN DESCRIPTION - ORIENTATION
ORIENTATION: MID

## 2020-02-13 ASSESSMENT — PAIN DESCRIPTION - DESCRIPTORS
DESCRIPTORS: ACHING

## 2020-02-13 ASSESSMENT — PAIN DESCRIPTION - LOCATION
LOCATION: CHEST

## 2020-02-13 ASSESSMENT — PAIN DESCRIPTION - PROGRESSION
CLINICAL_PROGRESSION: NOT CHANGED

## 2020-02-13 ASSESSMENT — PAIN SCALES - GENERAL
PAINLEVEL_OUTOF10: 6
PAINLEVEL_OUTOF10: 5
PAINLEVEL_OUTOF10: 5
PAINLEVEL_OUTOF10: 7

## 2020-02-13 NOTE — PROGRESS NOTES
fluids, continue to hold all antihypertensives and give Toprol-XL only with blood pressure parameter    Insulin-dependent diabetes mellitus  -Continue Lantus 8 units nightly, sliding scale insulin moderate, continue to monitor      Diabetic peripheral neuropathy  -Per patient request, will resume patient's home dose of gabapentin 600 mg 4 times daily      CAD status post CABG  -Continue aspirin, Plavix, statin, beta-blocker          Diet: DIET CARB CONTROL; Low Sodium (2 GM);  Daily Fluid Restriction: 1500 ml; No Caffeine    Activity: Up as tolerated  Prophylaxis: Lovenox    Code status: Full Code     ----------        Charli Cornelius MD  -------------------------------  Ana hospitalist

## 2020-02-13 NOTE — PLAN OF CARE
Problem: OXYGENATION/RESPIRATORY FUNCTION  Goal: Patient will maintain patent airway  2/13/2020 0507 by Marcus Herring RN  Outcome: Ongoing  2/12/2020 1532 by Saida Ding RN  Outcome: Ongoing  Goal: Patient will achieve/maintain normal respiratory rate/effort  Description  Respiratory rate and effort will be within normal limits for the patient  2/13/2020 0507 by Marcus Herring RN  Outcome: Ongoing  2/12/2020 1532 by Saida Ding RN  Outcome: Ongoing     Problem: HEMODYNAMIC STATUS  Goal: Patient has stable vital signs and fluid balance  2/13/2020 0507 by Marcus Herring RN  Outcome: Ongoing  2/12/2020 1532 by Saida Ding RN  Outcome: Ongoing     Problem: FLUID AND ELECTROLYTE IMBALANCE  Goal: Fluid and electrolyte balance are achieved/maintained  2/13/2020 0507 by Marcus Herring RN  Outcome: Ongoing  2/12/2020 1532 by Saida Ding RN  Outcome: Ongoing     Problem: ACTIVITY INTOLERANCE/IMPAIRED MOBILITY  Goal: Mobility/activity is maintained at optimum level for patient  2/13/2020 0507 by Marcus Herrnig RN  Outcome: Ongoing  2/12/2020 1532 by Saida Ding RN  Outcome: Ongoing     Problem: Pain:  Goal: Pain level will decrease  Description  Pain level will decrease  2/13/2020 0507 by Marcus Herring RN  Outcome: Ongoing  Note:   Pt able to express presence/absence of pain and rate pain appropriately using numerical scale. Pain/discomfort being managed with PRN analgesics per MD orders (see MAR). Pain assessed every shift and after interventions.     2/12/2020 1532 by Saida Ding RN  Outcome: Ongoing  Goal: Control of acute pain  Description  Control of acute pain  2/13/2020 0507 by Marcus Herring RN  Outcome: Ongoing  2/12/2020 1532 by Saida Ding RN  Outcome: Ongoing  Goal: Control of chronic pain  Description  Control of chronic pain  2/13/2020 0507 by Marcus Herring RN  Outcome: Ongoing  2/12/2020 1532 by Vee Cruz

## 2020-02-13 NOTE — PROGRESS NOTES
[facility]   Daily Progress Note      Admit Date:  2/10/2020      Subjective:   Mr. Mely Kerr is a 58yo male with CAD s/p CABG, ischemic cardiomyopathy and prior permanent pacemkaer placement who presented to [facility] with dizziness, fatigue and chest pain. Today, he reports having occasional chest pain. He denies any shortness of breath. No events on telemetry overnight.        Objective:     BP (!) 133/106   Pulse 93   Temp 97.9 °F (36.6 °C) (Oral)   Resp 16   Ht 6' (1.829 m)   Wt 197 lb 12 oz (89.7 kg)   SpO2 95%   BMI 26.82 kg/m²      Intake/Output Summary (Last 24 hours) at 2/13/2020 1453  Last data filed at 2/13/2020 1335  Gross per 24 hour   Intake 2187.5 ml   Output 400 ml   Net 1787.5 ml       Physical Exam:  General:  Awake, alert, NAD  Skin:  Warm and dry  Neck:  JVD<8, no carotid bruits  Chest:  Clear to auscultation, no wheezes/rhonchi/rales  Cardiovascular:  RRR, normal S1/S2, no M/R/G  Abdomen:  Soft, nontender, +bowel sounds  Extremities:  No edema  Pulses: 2+ bilat carotid    2+ bilat radial    2+ bilat femoral        Medications:    sodium bicarbonate  650 mg Oral BID    gabapentin  300 mg Oral TID    vancomycin  125 mg Oral 4x Daily    metoprolol succinate  50 mg Oral Daily    atorvastatin  80 mg Oral Daily    clopidogrel  75 mg Oral Daily    insulin glargine  8 Units Subcutaneous Nightly    sodium chloride flush  10 mL Intravenous 2 times per day    aspirin  81 mg Oral Daily    enoxaparin  40 mg Subcutaneous Daily    insulin lispro  0-12 Units Subcutaneous TID WC    insulin lispro  0-6 Units Subcutaneous Nightly      lactated ringers 75 mL/hr at 02/13/20 1338    dextrose         Lab Data:  CBC:   Recent Labs     02/10/20  1634 02/12/20  0652 02/13/20  0646   WBC 8.5 12.1* 9.8   HGB 16.4 15.2 13.6    204 151     BMP:    Recent Labs     02/10/20  1634 02/12/20  0652 02/13/20  0646    134* 129*   K 4.3 4.1 3.8   CO2 16* 19* 19*   BUN 13 40* 44* CREATININE 1.1 4.4* 4.0*     LIVR:   Recent Labs     02/10/20  1634   AST 9*   ALT 7*     PT/INR:   Recent Labs     02/10/20  1634   PROT 7.7     Recent Labs     02/10/20  2125 02/11/20  0013 02/13/20  0646   TROPONINI 0.04* 0.07* 0.06*     FASTING LIPID PANEL:  Lab Results   Component Value Date    CHOL 172 12/15/2019    HDL 42 12/15/2019    TRIG 211 12/15/2019       Assessment:  1. Atypical Chest Pain  2. Chronic Combined Systolic and Diastolic CHF, stage 2  3. Acute on chronic kidney injury  4. CAD of native coronary arteries without angina  5. Essential Hypertension      Plan:   I don't think that Ruy' chest pain is anginal and I would not pursue it. He apears euvolemic. I would hold his diuretic therapy right now given his LALITA. His renal function is improving. I do not think that he really had much in the way of CHF at present given his metabolic response. I would consider stopping the LR infusion as well given his hyponatremia. I will defer this to Nephrology. We will follow along peripherally.            Signed:  Penny Brown MD

## 2020-02-14 LAB
ALBUMIN SERPL-MCNC: 3.3 G/DL (ref 3.4–5)
ANION GAP SERPL CALCULATED.3IONS-SCNC: 12 MMOL/L (ref 3–16)
BUN BLDV-MCNC: 22 MG/DL (ref 7–20)
CALCIUM SERPL-MCNC: 8.5 MG/DL (ref 8.3–10.6)
CHLORIDE BLD-SCNC: 104 MMOL/L (ref 99–110)
CO2: 23 MMOL/L (ref 21–32)
CREAT SERPL-MCNC: 1.4 MG/DL (ref 0.8–1.3)
GFR AFRICAN AMERICAN: >60
GFR NON-AFRICAN AMERICAN: 51
GLUCOSE BLD-MCNC: 110 MG/DL (ref 70–99)
GLUCOSE BLD-MCNC: 126 MG/DL (ref 70–99)
GLUCOSE BLD-MCNC: 139 MG/DL (ref 70–99)
GLUCOSE BLD-MCNC: 140 MG/DL (ref 70–99)
GLUCOSE BLD-MCNC: 161 MG/DL (ref 70–99)
PERFORMED ON: ABNORMAL
PHOSPHORUS: 2.7 MG/DL (ref 2.5–4.9)
POTASSIUM SERPL-SCNC: 4.1 MMOL/L (ref 3.5–5.1)
SODIUM BLD-SCNC: 139 MMOL/L (ref 136–145)

## 2020-02-14 PROCEDURE — 6370000000 HC RX 637 (ALT 250 FOR IP): Performed by: HOSPITALIST

## 2020-02-14 PROCEDURE — 6370000000 HC RX 637 (ALT 250 FOR IP): Performed by: INTERNAL MEDICINE

## 2020-02-14 PROCEDURE — 94760 N-INVAS EAR/PLS OXIMETRY 1: CPT

## 2020-02-14 PROCEDURE — 2580000003 HC RX 258: Performed by: INTERNAL MEDICINE

## 2020-02-14 PROCEDURE — 6360000002 HC RX W HCPCS: Performed by: INTERNAL MEDICINE

## 2020-02-14 PROCEDURE — 6370000000 HC RX 637 (ALT 250 FOR IP): Performed by: FAMILY MEDICINE

## 2020-02-14 PROCEDURE — 2580000003 HC RX 258: Performed by: HOSPITALIST

## 2020-02-14 PROCEDURE — 80069 RENAL FUNCTION PANEL: CPT

## 2020-02-14 PROCEDURE — 1200000000 HC SEMI PRIVATE

## 2020-02-14 PROCEDURE — 36415 COLL VENOUS BLD VENIPUNCTURE: CPT

## 2020-02-14 PROCEDURE — 6370000000 HC RX 637 (ALT 250 FOR IP): Performed by: NURSE PRACTITIONER

## 2020-02-14 RX ADMIN — ASPIRIN 81 MG 81 MG: 81 TABLET ORAL at 08:45

## 2020-02-14 RX ADMIN — SODIUM BICARBONATE 650 MG: 650 TABLET ORAL at 08:45

## 2020-02-14 RX ADMIN — OXYCODONE HYDROCHLORIDE AND ACETAMINOPHEN 1 TABLET: 10; 325 TABLET ORAL at 18:13

## 2020-02-14 RX ADMIN — CLOPIDOGREL BISULFATE 75 MG: 75 TABLET ORAL at 08:45

## 2020-02-14 RX ADMIN — VANCOMYCIN HYDROCHLORIDE 125 MG: 125 CAPSULE ORAL at 18:12

## 2020-02-14 RX ADMIN — GABAPENTIN 300 MG: 300 CAPSULE ORAL at 21:04

## 2020-02-14 RX ADMIN — VANCOMYCIN HYDROCHLORIDE 125 MG: 125 CAPSULE ORAL at 08:45

## 2020-02-14 RX ADMIN — GABAPENTIN 300 MG: 300 CAPSULE ORAL at 08:45

## 2020-02-14 RX ADMIN — INSULIN GLARGINE 8 UNITS: 100 INJECTION, SOLUTION SUBCUTANEOUS at 21:05

## 2020-02-14 RX ADMIN — ENOXAPARIN SODIUM 40 MG: 40 INJECTION SUBCUTANEOUS at 08:45

## 2020-02-14 RX ADMIN — GABAPENTIN 300 MG: 300 CAPSULE ORAL at 13:12

## 2020-02-14 RX ADMIN — ATORVASTATIN CALCIUM 80 MG: 80 TABLET, FILM COATED ORAL at 08:45

## 2020-02-14 RX ADMIN — ONDANSETRON 4 MG: 2 INJECTION INTRAMUSCULAR; INTRAVENOUS at 08:52

## 2020-02-14 RX ADMIN — VANCOMYCIN HYDROCHLORIDE 125 MG: 125 CAPSULE ORAL at 21:04

## 2020-02-14 RX ADMIN — SODIUM BICARBONATE 650 MG: 650 TABLET ORAL at 21:04

## 2020-02-14 RX ADMIN — OXYCODONE HYDROCHLORIDE AND ACETAMINOPHEN 1 TABLET: 10; 325 TABLET ORAL at 04:59

## 2020-02-14 RX ADMIN — SODIUM CHLORIDE, POTASSIUM CHLORIDE, SODIUM LACTATE AND CALCIUM CHLORIDE: 600; 310; 30; 20 INJECTION, SOLUTION INTRAVENOUS at 03:00

## 2020-02-14 RX ADMIN — SODIUM CHLORIDE, PRESERVATIVE FREE 10 ML: 5 INJECTION INTRAVENOUS at 08:52

## 2020-02-14 RX ADMIN — INSULIN LISPRO 1 UNITS: 100 INJECTION, SOLUTION INTRAVENOUS; SUBCUTANEOUS at 21:05

## 2020-02-14 RX ADMIN — VANCOMYCIN HYDROCHLORIDE 125 MG: 125 CAPSULE ORAL at 13:12

## 2020-02-14 RX ADMIN — METOPROLOL SUCCINATE 50 MG: 50 TABLET, EXTENDED RELEASE ORAL at 08:45

## 2020-02-14 RX ADMIN — SODIUM CHLORIDE, POTASSIUM CHLORIDE, SODIUM LACTATE AND CALCIUM CHLORIDE: 600; 310; 30; 20 INJECTION, SOLUTION INTRAVENOUS at 17:20

## 2020-02-14 RX ADMIN — OXYCODONE HYDROCHLORIDE AND ACETAMINOPHEN 1 TABLET: 10; 325 TABLET ORAL at 10:59

## 2020-02-14 ASSESSMENT — PAIN - FUNCTIONAL ASSESSMENT
PAIN_FUNCTIONAL_ASSESSMENT: ACTIVITIES ARE NOT PREVENTED

## 2020-02-14 ASSESSMENT — PAIN SCALES - GENERAL
PAINLEVEL_OUTOF10: 7
PAINLEVEL_OUTOF10: 6
PAINLEVEL_OUTOF10: 5
PAINLEVEL_OUTOF10: 3
PAINLEVEL_OUTOF10: 3
PAINLEVEL_OUTOF10: 6

## 2020-02-14 ASSESSMENT — PAIN DESCRIPTION - LOCATION
LOCATION: CHEST

## 2020-02-14 ASSESSMENT — PAIN DESCRIPTION - PAIN TYPE
TYPE: ACUTE PAIN

## 2020-02-14 ASSESSMENT — PAIN DESCRIPTION - PROGRESSION
CLINICAL_PROGRESSION: NOT CHANGED

## 2020-02-14 ASSESSMENT — PAIN DESCRIPTION - FREQUENCY
FREQUENCY: CONTINUOUS

## 2020-02-14 ASSESSMENT — PAIN DESCRIPTION - ONSET
ONSET: ON-GOING

## 2020-02-14 ASSESSMENT — PAIN DESCRIPTION - ORIENTATION
ORIENTATION: MID

## 2020-02-14 ASSESSMENT — PAIN DESCRIPTION - DESCRIPTORS
DESCRIPTORS: ACHING

## 2020-02-14 NOTE — PLAN OF CARE
Problem: OXYGENATION/RESPIRATORY FUNCTION  Goal: Patient will maintain patent airway  2/14/2020 0936 by Micheal Sanders RN  Outcome: Ongoing  2/14/2020 0543 by Ike Mortimer, RN  Outcome: Ongoing  Goal: Patient will achieve/maintain normal respiratory rate/effort  Description  Respiratory rate and effort will be within normal limits for the patient  2/14/2020 0936 by Micheal Sanders RN  Outcome: Ongoing  2/14/2020 0543 by Ike Mortimer, RN  Outcome: Ongoing     Problem: HEMODYNAMIC STATUS  Goal: Patient has stable vital signs and fluid balance  2/14/2020 0936 by Micheal Sanders RN  Outcome: Ongoing  2/14/2020 0543 by Ike Mortimer, RN  Outcome: Ongoing     Problem: FLUID AND ELECTROLYTE IMBALANCE  Goal: Fluid and electrolyte balance are achieved/maintained  2/14/2020 0936 by Micheal Sanders RN  Outcome: Ongoing  2/14/2020 0543 by Ike Mortimer, RN  Outcome: Ongoing     Problem: ACTIVITY INTOLERANCE/IMPAIRED MOBILITY  Goal: Mobility/activity is maintained at optimum level for patient  2/14/2020 0936 by Micheal Sanders RN  Outcome: Ongoing  2/14/2020 0543 by Ike Mortimer, RN  Outcome: Ongoing     Problem: Pain:  Goal: Pain level will decrease  Description  Pain level will decrease  2/14/2020 0936 by Micheal Sanders RN  Outcome: Ongoing  2/14/2020 0543 by Ike Mortimer, RN  Outcome: Ongoing  Note:   Pt able to express presence/absence of pain and rate pain appropriately using numerical scale. Pain/discomfort being managed with PRN analgesics per MD orders (see MAR). Pain assessed every shift and after interventions.     Goal: Control of acute pain  Description  Control of acute pain  2/14/2020 0936 by Micheal Sanders RN  Outcome: Ongoing  2/14/2020 0543 by Ike Mortimer, RN  Outcome: Ongoing  Goal: Control of chronic pain  Description  Control of chronic pain  2/14/2020 0936 by Micheal Sanders RN  Outcome: Ongoing  2/14/2020 0543 by Ike Mortimer, RN  Outcome: Ongoing     Problem: Falls - Risk of:  Goal: Will remain free from falls  Description  Will remain free from falls  2/14/2020 0936 by Leonardo Grant RN  Outcome: Ongoing  2/14/2020 0543 by Melvin Dunn RN  Outcome: Ongoing  Note:   Patient free from falls this shift. Fall precautions in place at all times. Bed in lowest position with two side rails up and wheels locked. Call light within reach. Patient able and agreeable to contact for safety appropriately.     Goal: Absence of physical injury  Description  Absence of physical injury  2/14/2020 0936 by Leonardo Grant RN  Outcome: Ongoing  2/14/2020 0543 by Melvin Dunn RN  Outcome: Ongoing     Problem: Discharge Planning:  Goal: Discharged to appropriate level of care  Description  Discharged to appropriate level of care  2/14/2020 0936 by Leonardo Grant RN  Outcome: Ongoing  2/14/2020 0543 by Melvin Dunn RN  Outcome: Ongoing     Problem: Physical Regulation:  Goal: Prevent transmision of infection  Description  Prevent transmision of infection  2/14/2020 0936 by Leonardo Grant RN  Outcome: Ongoing  2/14/2020 0543 by Melvin Dunn RN  Outcome: Ongoing  Goal: Ability to avoid or minimize complications of infection will improve  Description  Ability to avoid or minimize complications of infection will improve  2/14/2020 0936 by Leonardo Grant RN  Outcome: Ongoing  2/14/2020 0543 by Melvin Dunn RN  Outcome: Ongoing  Goal: Signs and symptoms of infection will decrease  Description  Signs and symptoms of infection will decrease  2/14/2020 0936 by Leonardo Grant RN  Outcome: Ongoing  2/14/2020 0543 by Melvin Dunn RN  Outcome: Ongoing

## 2020-02-14 NOTE — PLAN OF CARE
Problem: OXYGENATION/RESPIRATORY FUNCTION  Goal: Patient will maintain patent airway  2/14/2020 0543 by Nirali Bourgeois RN  Outcome: Ongoing  2/13/2020 1746 by Bassam Whitt RN  Outcome: Ongoing  Goal: Patient will achieve/maintain normal respiratory rate/effort  Description  Respiratory rate and effort will be within normal limits for the patient  2/14/2020 0543 by Nirali Bourgeois RN  Outcome: Ongoing  2/13/2020 1746 by Bassam Whitt RN  Outcome: Ongoing     Problem: HEMODYNAMIC STATUS  Goal: Patient has stable vital signs and fluid balance  2/14/2020 0543 by Nirali Bourgeois RN  Outcome: Ongoing  2/13/2020 1746 by Bassam Whitt RN  Outcome: Ongoing     Problem: FLUID AND ELECTROLYTE IMBALANCE  Goal: Fluid and electrolyte balance are achieved/maintained  2/14/2020 0543 by Nirali Bourgeois RN  Outcome: Ongoing  2/13/2020 1746 by Bassam Whitt RN  Outcome: Ongoing     Problem: ACTIVITY INTOLERANCE/IMPAIRED MOBILITY  Goal: Mobility/activity is maintained at optimum level for patient  2/14/2020 0543 by Nirali Bourgeois RN  Outcome: Ongoing  2/13/2020 1746 by Bassam Whitt RN  Outcome: Ongoing     Problem: Pain:  Goal: Pain level will decrease  Description  Pain level will decrease  2/14/2020 0543 by Nirali Bourgeois RN  Outcome: Ongoing  Note:   Pt able to express presence/absence of pain and rate pain appropriately using numerical scale. Pain/discomfort being managed with PRN analgesics per MD orders (see MAR). Pain assessed every shift and after interventions.     2/13/2020 1746 by Bassam Whitt RN  Outcome: Ongoing  Goal: Control of acute pain  Description  Control of acute pain  2/14/2020 0543 by Nirali Bourgeois RN  Outcome: Ongoing  2/13/2020 1746 by Bassam Whitt RN  Outcome: Ongoing  Goal: Control of chronic pain  Description  Control of chronic pain  2/14/2020 0543 by Nirali Bourgeois RN  Outcome: Ongoing  2/13/2020 1746 by Freya Gamez ADEEL Castañeda  Outcome: Ongoing     Problem: Falls - Risk of:  Goal: Will remain free from falls  Description  Will remain free from falls  2/14/2020 0543 by Sacha Stewart RN  Outcome: Ongoing  Note:   Patient free from falls this shift. Fall precautions in place at all times. Bed in lowest position with two side rails up and wheels locked. Call light within reach. Patient able and agreeable to contact for safety appropriately.     2/13/2020 1746 by Akua Robison RN  Outcome: Ongoing  Goal: Absence of physical injury  Description  Absence of physical injury  2/14/2020 0543 by Sacha Stewart RN  Outcome: Ongoing  2/13/2020 1746 by Akua Robison RN  Outcome: Ongoing     Problem: Discharge Planning:  Goal: Discharged to appropriate level of care  Description  Discharged to appropriate level of care  2/14/2020 0543 by Sacha Stewart RN  Outcome: Ongoing  2/13/2020 1746 by Akua Robison RN  Outcome: Ongoing     Problem: Physical Regulation:  Goal: Prevent transmision of infection  Description  Prevent transmision of infection  2/14/2020 0543 by Sacha Stewart RN  Outcome: Ongoing  2/13/2020 1746 by Akua Robison RN  Outcome: Ongoing  Goal: Ability to avoid or minimize complications of infection will improve  Description  Ability to avoid or minimize complications of infection will improve  2/14/2020 0543 by Sacha Stewart RN  Outcome: Ongoing  2/13/2020 1746 by Akua Robison RN  Outcome: Ongoing  Goal: Signs and symptoms of infection will decrease  Description  Signs and symptoms of infection will decrease  2/14/2020 0543 by Sacha Stewart RN  Outcome: Ongoing  2/13/2020 1746 by Akua Robison RN  Outcome: Ongoing

## 2020-02-14 NOTE — PROGRESS NOTES
Hospitalist Progress Note      PCP: Karley Christian MD    Date of Admission: 2/10/2020    Chief Complaint:   Chief Complaint   Patient presents with    Chest Pain     onset last night. n/v.  + diaphoresis. history of CABG per pts report         Hospital Course: 59 y.o. male with history of chronic systolic heart failure with most recent ejection fraction around 30%, history of CAD, COPD, diabetes type 2, fibromyalgia, GERD, essential hypertension, anxiety disorder, who presents to the emergency room with report of chest discomfort. Subjective: Feels well this morning, sitting up in bed eating lunch. Breathing is improved. Denies fevers, chills, chest pain, shortness of breath, abdominal pain.     Medications:  Reviewed    Infusion Medications    lactated ringers 75 mL/hr at 02/14/20 0300    dextrose       Scheduled Medications    sodium bicarbonate  650 mg Oral BID    gabapentin  300 mg Oral TID    vancomycin  125 mg Oral 4x Daily    metoprolol succinate  50 mg Oral Daily    atorvastatin  80 mg Oral Daily    clopidogrel  75 mg Oral Daily    insulin glargine  8 Units Subcutaneous Nightly    sodium chloride flush  10 mL Intravenous 2 times per day    aspirin  81 mg Oral Daily    enoxaparin  40 mg Subcutaneous Daily    insulin lispro  0-12 Units Subcutaneous TID WC    insulin lispro  0-6 Units Subcutaneous Nightly     PRN Meds: acetaminophen, oxyCODONE-acetaminophen, glucose, dextrose, glucagon (rDNA), dextrose, sodium chloride flush, magnesium hydroxide, ondansetron, nitroGLYCERIN      Intake/Output Summary (Last 24 hours) at 2/14/2020 0846  Last data filed at 2/14/2020 0515  Gross per 24 hour   Intake 2401.25 ml   Output 2025 ml   Net 376.25 ml       Physical Exam Performed:    /77   Pulse 74   Temp 98 °F (36.7 °C) (Oral)   Resp 16   Ht 6' (1.829 m)   Wt 205 lb 11 oz (93.3 kg)   SpO2 93%   BMI 27.90 kg/m²     General appearance: No apparent distress, appears stated age and Prophylaxis: Lovenox  Diet: DIET CARB CONTROL; Low Sodium (2 GM); Daily Fluid Restriction: 1500 ml; No Caffeine  Code Status: Full Code    PT/OT Eval Status: deferred    Dispo - pending, likely home AM    Calvin Palomino MD    This note was transcribed using [id number]. Please disregard any translational errors.

## 2020-02-15 VITALS
HEIGHT: 72 IN | SYSTOLIC BLOOD PRESSURE: 148 MMHG | HEART RATE: 55 BPM | WEIGHT: 203.93 LBS | TEMPERATURE: 97.6 F | DIASTOLIC BLOOD PRESSURE: 64 MMHG | OXYGEN SATURATION: 93 % | RESPIRATION RATE: 16 BRPM | BODY MASS INDEX: 27.62 KG/M2

## 2020-02-15 LAB
ALBUMIN SERPL-MCNC: 3.4 G/DL (ref 3.4–5)
ANION GAP SERPL CALCULATED.3IONS-SCNC: 11 MMOL/L (ref 3–16)
BUN BLDV-MCNC: 14 MG/DL (ref 7–20)
CALCIUM SERPL-MCNC: 8.5 MG/DL (ref 8.3–10.6)
CHLORIDE BLD-SCNC: 106 MMOL/L (ref 99–110)
CO2: 24 MMOL/L (ref 21–32)
CREAT SERPL-MCNC: 1 MG/DL (ref 0.8–1.3)
GFR AFRICAN AMERICAN: >60
GFR NON-AFRICAN AMERICAN: >60
GLUCOSE BLD-MCNC: 105 MG/DL (ref 70–99)
GLUCOSE BLD-MCNC: 98 MG/DL (ref 70–99)
HCT VFR BLD CALC: 36.4 % (ref 40.5–52.5)
HEMOGLOBIN: 12.2 G/DL (ref 13.5–17.5)
MAGNESIUM: 2.1 MG/DL (ref 1.8–2.4)
MCH RBC QN AUTO: 32.8 PG (ref 26–34)
MCHC RBC AUTO-ENTMCNC: 33.5 G/DL (ref 31–36)
MCV RBC AUTO: 98 FL (ref 80–100)
PDW BLD-RTO: 13.6 % (ref 12.4–15.4)
PERFORMED ON: ABNORMAL
PHOSPHORUS: 2.3 MG/DL (ref 2.5–4.9)
PLATELET # BLD: 160 K/UL (ref 135–450)
PMV BLD AUTO: 9.6 FL (ref 5–10.5)
POTASSIUM SERPL-SCNC: 4.2 MMOL/L (ref 3.5–5.1)
RBC # BLD: 3.71 M/UL (ref 4.2–5.9)
SODIUM BLD-SCNC: 141 MMOL/L (ref 136–145)
WBC # BLD: 5.5 K/UL (ref 4–11)

## 2020-02-15 PROCEDURE — 6370000000 HC RX 637 (ALT 250 FOR IP): Performed by: INTERNAL MEDICINE

## 2020-02-15 PROCEDURE — 80069 RENAL FUNCTION PANEL: CPT

## 2020-02-15 PROCEDURE — 6370000000 HC RX 637 (ALT 250 FOR IP): Performed by: NURSE PRACTITIONER

## 2020-02-15 PROCEDURE — 6360000002 HC RX W HCPCS: Performed by: INTERNAL MEDICINE

## 2020-02-15 PROCEDURE — 85027 COMPLETE CBC AUTOMATED: CPT

## 2020-02-15 PROCEDURE — 94760 N-INVAS EAR/PLS OXIMETRY 1: CPT

## 2020-02-15 PROCEDURE — 6370000000 HC RX 637 (ALT 250 FOR IP): Performed by: HOSPITALIST

## 2020-02-15 PROCEDURE — 83735 ASSAY OF MAGNESIUM: CPT

## 2020-02-15 PROCEDURE — 6370000000 HC RX 637 (ALT 250 FOR IP): Performed by: FAMILY MEDICINE

## 2020-02-15 PROCEDURE — 36415 COLL VENOUS BLD VENIPUNCTURE: CPT

## 2020-02-15 RX ORDER — VANCOMYCIN HYDROCHLORIDE 125 MG/1
125 CAPSULE ORAL 4 TIMES DAILY
Qty: 28 CAPSULE | Refills: 0 | Status: SHIPPED | OUTPATIENT
Start: 2020-02-15 | End: 2020-02-22

## 2020-02-15 RX ADMIN — GABAPENTIN 300 MG: 300 CAPSULE ORAL at 08:40

## 2020-02-15 RX ADMIN — METOPROLOL SUCCINATE 50 MG: 50 TABLET, EXTENDED RELEASE ORAL at 08:39

## 2020-02-15 RX ADMIN — ATORVASTATIN CALCIUM 80 MG: 80 TABLET, FILM COATED ORAL at 08:39

## 2020-02-15 RX ADMIN — SODIUM BICARBONATE 650 MG: 650 TABLET ORAL at 08:39

## 2020-02-15 RX ADMIN — OXYCODONE HYDROCHLORIDE AND ACETAMINOPHEN 1 TABLET: 10; 325 TABLET ORAL at 00:36

## 2020-02-15 RX ADMIN — VANCOMYCIN HYDROCHLORIDE 125 MG: 125 CAPSULE ORAL at 08:39

## 2020-02-15 RX ADMIN — ASPIRIN 81 MG 81 MG: 81 TABLET ORAL at 08:38

## 2020-02-15 RX ADMIN — ENOXAPARIN SODIUM 40 MG: 40 INJECTION SUBCUTANEOUS at 08:38

## 2020-02-15 RX ADMIN — CLOPIDOGREL BISULFATE 75 MG: 75 TABLET ORAL at 08:40

## 2020-02-15 RX ADMIN — OXYCODONE HYDROCHLORIDE AND ACETAMINOPHEN 1 TABLET: 10; 325 TABLET ORAL at 06:53

## 2020-02-15 ASSESSMENT — PAIN DESCRIPTION - LOCATION
LOCATION: CHEST

## 2020-02-15 ASSESSMENT — PAIN DESCRIPTION - DESCRIPTORS
DESCRIPTORS: ACHING
DESCRIPTORS: SHARP
DESCRIPTORS: ACHING

## 2020-02-15 ASSESSMENT — PAIN - FUNCTIONAL ASSESSMENT
PAIN_FUNCTIONAL_ASSESSMENT: ACTIVITIES ARE NOT PREVENTED

## 2020-02-15 ASSESSMENT — PAIN DESCRIPTION - PAIN TYPE
TYPE: ACUTE PAIN

## 2020-02-15 ASSESSMENT — PAIN DESCRIPTION - FREQUENCY
FREQUENCY: CONTINUOUS

## 2020-02-15 ASSESSMENT — PAIN DESCRIPTION - ONSET
ONSET: ON-GOING
ONSET: OTHER (COMMENT)
ONSET: ON-GOING

## 2020-02-15 ASSESSMENT — PAIN DESCRIPTION - ORIENTATION
ORIENTATION: MID

## 2020-02-15 ASSESSMENT — PAIN SCALES - GENERAL
PAINLEVEL_OUTOF10: 5
PAINLEVEL_OUTOF10: 6
PAINLEVEL_OUTOF10: 8

## 2020-02-15 NOTE — PLAN OF CARE
infection  2/15/2020 0918 by Demetrice Caballero RN  Outcome: Ongoing     Problem: Physical Regulation:  Goal: Ability to avoid or minimize complications of infection will improve  Description  Ability to avoid or minimize complications of infection will improve  2/15/2020 0918 by Demetrice Caballero RN  Outcome: Ongoing     Problem: Physical Regulation:  Goal: Signs and symptoms of infection will decrease  Description  Signs and symptoms of infection will decrease  2/15/2020 0918 by Demetrice Caballero RN  Outcome: Ongoing

## 2020-02-15 NOTE — PLAN OF CARE
complications of infection will improve  Description  Ability to avoid or minimize complications of infection will improve  Outcome: Ongoing  Goal: Signs and symptoms of infection will decrease  Description  Signs and symptoms of infection will decrease  Outcome: Ongoing

## 2020-02-15 NOTE — DISCHARGE SUMMARY
 Hypotension    C. difficile diarrhea       Discharge Diagnoses: Principal Problem:    Chronic systolic (congestive) heart failure ([facility])  Active Problems:    S/P CABG (coronary artery bypass graft)    Essential hypertension    Coronary artery disease involving coronary bypass graft of native heart with angina pectoris ([facility])    DMII (diabetes mellitus, type 2) ([facility])    COPD (chronic obstructive pulmonary disease) ([facility])    Chest pain    CAD (coronary artery disease)    Diabetic peripheral neuropathy ([facility])    Hypotension    C. difficile diarrhea  Resolved Problems:    * No resolved hospital problems. *      Code Status:  Full Code    Condition:  Stable    Discharge Diet: Diet:  DIET CARB CONTROL; Low Sodium (2 GM); Daily Fluid Restriction: 1500 ml    PCP to do list:  Routine follow up    Hospital Course:     Chest pain, coronary artery disease status post CABG  Atypical.  Chronic stable troponin elevation. Seen by cardiology and think less likely anginal. No further workup. Continue aspirin, statin, Plavix, beta-blocker. Acute kidney injury, improved  Creatinine peaked at 4.4 from baseline of around 1. Patient had a CT abdomen with IV contrast as well as NSAIDs and Lasix within 24 hours. Nephrology was consulted and followed during admission. Downtrending back to baseline prior to discharge. C. difficile diarrhea  Positive for c diff. Treating with PO vanc to complete course outpatient.        Chronic systolic congestive heart failure  Initial concern for acute on chronic, heart failure exacerbation given elevated BNP however became hypotensive with LALITA after Lasix. Continued aspirin, statin, beta-blocker. Cardiology followed during admission. Appears euvolemic without acute heart failure exacerbation.     Essential hypertension  BP meds held initially for lower blood pressure.   Able to restart as well pressure improved and discharged on home meds.     Insulin-dependent diabetes mellitus  Continue basal bolus insulin.        Diabetic peripheral neuropathy  Continue gabapentin.       Discharge Medications:   Current Discharge Medication List      START taking these medications    Details   vancomycin (VANCOCIN) 125 MG capsule Take 1 capsule by mouth 4 times daily for 7 days  Qty: 28 capsule, Refills: 0           Current Discharge Medication List        Current Discharge Medication List      CONTINUE these medications which have NOT CHANGED    Details   insulin glargine (LANTUS) 100 UNIT/ML injection vial Inject 8 Units into the skin nightly      metoprolol succinate (TOPROL XL) 100 MG extended release tablet Take 1 tablet by mouth daily  Qty: 30 tablet, Refills: 3      gabapentin (NEURONTIN) 600 MG tablet TAKE 1 TABLET BY MOUTH FIVE TIMES DAILY  Qty: 150 tablet, Refills: 1    Associated Diagnoses: Neuropathy      oxyCODONE-acetaminophen (PERCOCET)  MG per tablet Take 1 tablet by mouth 2 times daily as needed for Pain for up to 30 days.   Qty: 60 tablet, Refills: 0    Comments: Reduce doses taken as pain becomes manageable  Associated Diagnoses: Chronic back pain, unspecified back location, unspecified back pain laterality      lisinopril (PRINIVIL;ZESTRIL) 10 MG tablet Take 1 tablet by mouth daily  Qty: 30 tablet, Refills: 3      ranolazine (RANEXA) 500 MG extended release tablet Take 1,000 mg by mouth 2 times daily      furosemide (LASIX) 20 MG tablet Take 20 mg by mouth daily      insulin aspart (NOVOLOG) 100 UNIT/ML injection vial Inject 2 Units into the skin 3 times daily (before meals)      albuterol sulfate HFA (PROAIR HFA) 108 (90 Base) MCG/ACT inhaler Inhale 2 puffs into the lungs every 6 hours as needed for Wheezing      empagliflozin (JARDIANCE) 10 MG tablet Take 1 tablet by mouth daily  Qty: 28 tablet, Refills: 0      isosorbide mononitrate (IMDUR) 30 MG extended release tablet Take 60 mg by mouth daily       traZODone (DESYREL) 50 MG tablet TAKE 1 TABLET BY MOUTH EVERY NIGHT AT BEDTIME  Qty: 90 up to 11 cm. No cecal distention noted. The gallbladder has been resected. The osseous structures are intact. Nonspecific bowel gas pattern with focally dilated loops small bowel left upper quadrant. Cta Chest Abdomen Pelvis W Contrast    Result Date: 2/10/2020  EXAMINATION: CTA OF THE CHEST, ABDOMEN AND PELVIS WITH CONTRAST, 2/10/2020 5:09 pm TECHNIQUE: CTA of the chest, abdomen and pelvis was performed after the administration of intravenous contrast.  Multiplanar reformatted images are provided for review. MIP images are provided for review. Dose modulation, iterative reconstruction, and/or weight based adjustment of the mA/kV was utilized to reduce the radiation dose to as low as reasonably achievable. COMPARISON: 09/12/2019, 07/20/2019 HISTORY: ORDERING SYSTEM PROVIDED HISTORY: R/O Dissection TECHNOLOGIST PROVIDED HISTORY: Reason for exam:->R/O Dissection Reason for Exam: Chest Pain (onset last night. n/v. + diaphoresis. history of CABG per pts report) Acuity: Acute Type of Exam: Initial FINDINGS: CTA CHEST: The thoracic aorta enhances normally. No evidence for an aortic aneurysm or for an aortic dissection. Calcified plaque in the thoracic aorta. No mediastinal or hilar masses. No focal pulmonary abnormalities. CTA ABDOMEN: The abdominal aorta enhances normally demonstrating no evidence for an aortic aneurysm or dissection. Calcified atherosclerotic plaque noted. Contrast was not optimized for the abdominal organs. Status post cholecystectomy. Unchanged appearing adrenal glands. The bowel loops are not dilated. No focal bowel wall thickening. CTA PELVIS: The iliac arteries enhance normally. No evidence for an aneurysm or dissection. The bladder is intact. No bladder or ureteral stones. No pelvic masses. No free pelvic fluid. THORACIC/LUMBAR SPINE: No acute fracture. No lytic or blastic lesion. 1. No aortic aneurysm or dissection 2.  No acute abnormalities seen in the chest abdomen

## 2020-02-15 NOTE — DISCHARGE INSTR - COC
Continuity of Care Form    Patient Name: Lemmie Collet   :  1955  MRN:  [id number]    Admit date:  2/10/2020  Discharge date:  ***    Code Status Order: Full Code   Advance Directives:   Advance Care Flowsheet Documentation     Date/Time Healthcare Directive Type of Healthcare Directive Copy in [address] Agent's Name Healthcare Agent's Phone Number    02/10/20 2144  No, patient does not have an advance directive for healthcare treatment -- -- -- -- --          Admitting Physician:  Adriana Salazar MD  PCP: Lashawn Summers MD    Discharging Nurse: [facility] Unit/Room#: K4R-2214/8442-69  Discharging Unit Phone Number: ***    Emergency Contact:   Extended Emergency Contact Information  Primary Emergency Contact: Brendan Heller  Address: [address] [place] [address] Phone: [phone]  Relation: Spouse  Secondary Emergency Contact: [facility] Phone: [phone]  [facility] Phone: [phone]  Relation: Child    Past Surgical History:  Past Surgical History:   Procedure Laterality Date    BACK SURGERY      CARDIAC SURGERY      CHOLECYSTECTOMY      COLONOSCOPY  01/10/2017    COLONOSCOPY  01/10/2017    CORONARY ANGIOPLASTY WITH STENT PLACEMENT  2012    CORONARY ARTERY BYPASS GRAFT  , 2015    ENDOSCOPY, COLON, DIAGNOSTIC      PACEMAKER PLACEMENT      UPPER GASTROINTESTINAL ENDOSCOPY  2017       Immunization History:   Immunization History   Administered Date(s) Administered    Influenza 2013    Influenza Vaccine, unspecified formulation 10/24/2011, 2013, 2015, 2017    Influenza Virus Vaccine 2014, 2015, 2019    Influenza, Quadv, IM, (6 mo and older Fluzone, Flulaval, Fluarix and 3 yrs and older Afluria) 10/17/2018    Influenza, Quadv, IM, PF (6 mo and older Fluzone, Flulaval, Fluarix, and 3 yrs and older Afluria) 10/17/2016, 11/27/2019    Pneumococcal Polysaccharide ([id number]) 10/24/2011, 09/18/2015    Tdap (Boostrix, Adacel) 05/04/2015       Active Problems:  Patient Active Problem List   Diagnosis Code    Chronic chest pain R07.9, G89.29    S/P CABG (coronary artery bypass graft) Z95.1    Essential hypertension I10    ICD (implantable cardioverter-defibrillator), dual, in situ Z95.810    Abdominal pain R10.9    Ischemic cardiomyopathy I25.5    Hyperlipidemia LDL goal <70 E78.5    CHF (congestive heart failure) ([facility]) I50.9    Anxiety F41.9    Chronic back pain M54.9, G89.29    Type 2 diabetes mellitus without complication, with long-term current use of insulin ([facility]) E11.9, Z79.4    Coronary artery disease involving native coronary artery of native heart without angina pectoris I25.10    COPD exacerbation ([facility]) J44.1    Anemia,normocytic normochromic ,mixed folic aci deficiency and iron deficiency D64.9    Guaiac + stool R19.5    Gastrointestinal hemorrhage K92.2    Morbid obesity due to excess calories ([facility]) E66.01    Anxiety F41.9    Coronary artery disease involving coronary bypass graft of native heart with angina pectoris ([facility]) I25.709    Iron deficiency anemia due to chronic blood loss D50.0    Renal insufficiency N28.9    Tobacco abuse Z72.0    Restrictive lung disease J98.4    Acute on chronic diastolic congestive heart failure ([facility]) I50.33    Ischemic stroke, left frontal lobe (4/30/18) ([facility]) I63.9    PFO (patent foramen ovale) Q21.1    DMII (diabetes mellitus, type 2) ([facility]) E11.9    COPD (chronic obstructive pulmonary disease) ([facility]) J44.9    Renal insufficiency N28.9    Chest pain R07.9    Chronic systolic (congestive) heart failure ([facility]) I50.22    CAD (coronary artery disease) I25.10    Compression fracture of L2, sequela S32.020S    Back pain with radiculopathy M54.10    Low back pain M54.5    Intractable back pain M54.9    Acute colitis K52.9    Back pain M54.9    Stroke

## 2020-02-16 ENCOUNTER — APPOINTMENT (OUTPATIENT)
Dept: GENERAL RADIOLOGY | Age: 65
End: 2020-02-16
Payer: MEDICARE

## 2020-02-16 ENCOUNTER — HOSPITAL ENCOUNTER (OUTPATIENT)
Age: 65
Setting detail: OBSERVATION
Discharge: HOME | End: 2020-02-18
Attending: EMERGENCY MEDICINE | Admitting: INTERNAL MEDICINE
Payer: MEDICARE

## 2020-02-16 LAB
A/G RATIO: 1.2 (ref 1.1–2.2)
ALBUMIN SERPL-MCNC: 3.8 G/DL (ref 3.4–5)
ALP BLD-CCNC: 63 U/L (ref 40–129)
ALT SERPL-CCNC: 7 U/L (ref 10–40)
ANION GAP SERPL CALCULATED.3IONS-SCNC: 19 MMOL/L (ref 3–16)
APTT: 26.4 SEC (ref 24.2–36.2)
AST SERPL-CCNC: 10 U/L (ref 15–37)
BASOPHILS ABSOLUTE: 0.1 K/UL (ref 0–0.2)
BASOPHILS RELATIVE PERCENT: 1.2 %
BILIRUB SERPL-MCNC: 0.5 MG/DL (ref 0–1)
BLOOD CULTURE, ROUTINE: NORMAL
BUN BLDV-MCNC: 6 MG/DL (ref 7–20)
CALCIUM SERPL-MCNC: 9.5 MG/DL (ref 8.3–10.6)
CHLORIDE BLD-SCNC: 99 MMOL/L (ref 99–110)
CO2: 20 MMOL/L (ref 21–32)
CREAT SERPL-MCNC: 0.8 MG/DL (ref 0.8–1.3)
CULTURE, BLOOD 2: NORMAL
EKG ATRIAL RATE: 98 BPM
EKG DIAGNOSIS: NORMAL
EKG P AXIS: 73 DEGREES
EKG P-R INTERVAL: 134 MS
EKG Q-T INTERVAL: 416 MS
EKG QRS DURATION: 138 MS
EKG QTC CALCULATION (BAZETT): 531 MS
EKG R AXIS: 83 DEGREES
EKG T AXIS: 82 DEGREES
EKG VENTRICULAR RATE: 98 BPM
EOSINOPHILS ABSOLUTE: 0.1 K/UL (ref 0–0.6)
EOSINOPHILS RELATIVE PERCENT: 1.1 %
GFR AFRICAN AMERICAN: >60
GFR NON-AFRICAN AMERICAN: >60
GLOBULIN: 3.3 G/DL
GLUCOSE BLD-MCNC: 118 MG/DL (ref 70–99)
GLUCOSE BLD-MCNC: 131 MG/DL (ref 70–99)
GLUCOSE BLD-MCNC: 159 MG/DL (ref 70–99)
HCT VFR BLD CALC: 44.4 % (ref 40.5–52.5)
HEMOGLOBIN: 15.1 G/DL (ref 13.5–17.5)
INR BLD: 0.97 (ref 0.86–1.14)
LYMPHOCYTES ABSOLUTE: 1.9 K/UL (ref 1–5.1)
LYMPHOCYTES RELATIVE PERCENT: 19.8 %
MCH RBC QN AUTO: 33 PG (ref 26–34)
MCHC RBC AUTO-ENTMCNC: 34 G/DL (ref 31–36)
MCV RBC AUTO: 96.8 FL (ref 80–100)
MONOCYTES ABSOLUTE: 0.5 K/UL (ref 0–1.3)
MONOCYTES RELATIVE PERCENT: 5.5 %
NEUTROPHILS ABSOLUTE: 6.8 K/UL (ref 1.7–7.7)
NEUTROPHILS RELATIVE PERCENT: 72.4 %
PDW BLD-RTO: 13.6 % (ref 12.4–15.4)
PERFORMED ON: ABNORMAL
PERFORMED ON: ABNORMAL
PLATELET # BLD: 211 K/UL (ref 135–450)
PLATELET SLIDE REVIEW: ADEQUATE
PMV BLD AUTO: 9.6 FL (ref 5–10.5)
POTASSIUM SERPL-SCNC: 4.2 MMOL/L (ref 3.5–5.1)
PROTHROMBIN TIME: 11.2 SEC (ref 10–13.2)
RBC # BLD: 4.58 M/UL (ref 4.2–5.9)
SODIUM BLD-SCNC: 138 MMOL/L (ref 136–145)
TOTAL PROTEIN: 7.1 G/DL (ref 6.4–8.2)
TROPONIN: 0.02 NG/ML
TROPONIN: <0.01 NG/ML
WBC # BLD: 9.4 K/UL (ref 4–11)

## 2020-02-16 PROCEDURE — 85610 PROTHROMBIN TIME: CPT

## 2020-02-16 PROCEDURE — 6360000002 HC RX W HCPCS

## 2020-02-16 PROCEDURE — 94760 N-INVAS EAR/PLS OXIMETRY 1: CPT

## 2020-02-16 PROCEDURE — 2580000003 HC RX 258: Performed by: INTERNAL MEDICINE

## 2020-02-16 PROCEDURE — 6370000000 HC RX 637 (ALT 250 FOR IP): Performed by: INTERNAL MEDICINE

## 2020-02-16 PROCEDURE — 96375 TX/PRO/DX INJ NEW DRUG ADDON: CPT

## 2020-02-16 PROCEDURE — 36415 COLL VENOUS BLD VENIPUNCTURE: CPT

## 2020-02-16 PROCEDURE — 96376 TX/PRO/DX INJ SAME DRUG ADON: CPT

## 2020-02-16 PROCEDURE — 71046 X-RAY EXAM CHEST 2 VIEWS: CPT

## 2020-02-16 PROCEDURE — G0378 HOSPITAL OBSERVATION PER HR: HCPCS

## 2020-02-16 PROCEDURE — 85025 COMPLETE CBC W/AUTO DIFF WBC: CPT

## 2020-02-16 PROCEDURE — 85730 THROMBOPLASTIN TIME PARTIAL: CPT

## 2020-02-16 PROCEDURE — 93010 ELECTROCARDIOGRAM REPORT: CPT | Performed by: INTERNAL MEDICINE

## 2020-02-16 PROCEDURE — 80053 COMPREHEN METABOLIC PANEL: CPT

## 2020-02-16 PROCEDURE — 6360000002 HC RX W HCPCS: Performed by: INTERNAL MEDICINE

## 2020-02-16 PROCEDURE — 84484 ASSAY OF TROPONIN QUANT: CPT

## 2020-02-16 PROCEDURE — 99285 EMERGENCY DEPT VISIT HI MDM: CPT

## 2020-02-16 PROCEDURE — 6370000000 HC RX 637 (ALT 250 FOR IP): Performed by: EMERGENCY MEDICINE

## 2020-02-16 PROCEDURE — 96374 THER/PROPH/DIAG INJ IV PUSH: CPT

## 2020-02-16 PROCEDURE — 93005 ELECTROCARDIOGRAM TRACING: CPT | Performed by: EMERGENCY MEDICINE

## 2020-02-16 RX ORDER — METOPROLOL SUCCINATE 50 MG/1
100 TABLET, EXTENDED RELEASE ORAL DAILY
Status: DISCONTINUED | OUTPATIENT
Start: 2020-02-16 | End: 2020-02-18 | Stop reason: HOSPADM

## 2020-02-16 RX ORDER — ATORVASTATIN CALCIUM 80 MG/1
80 TABLET, FILM COATED ORAL NIGHTLY
Status: DISCONTINUED | OUTPATIENT
Start: 2020-02-16 | End: 2020-02-18 | Stop reason: HOSPADM

## 2020-02-16 RX ORDER — NITROGLYCERIN 0.4 MG/1
0.4 TABLET SUBLINGUAL EVERY 5 MIN PRN
Status: DISCONTINUED | OUTPATIENT
Start: 2020-02-16 | End: 2020-02-18 | Stop reason: HOSPADM

## 2020-02-16 RX ORDER — ASPIRIN 81 MG/1
81 TABLET, CHEWABLE ORAL DAILY
Status: DISCONTINUED | OUTPATIENT
Start: 2020-02-17 | End: 2020-02-18 | Stop reason: HOSPADM

## 2020-02-16 RX ORDER — SODIUM CHLORIDE 0.9 % (FLUSH) 0.9 %
10 SYRINGE (ML) INJECTION EVERY 12 HOURS SCHEDULED
Status: DISCONTINUED | OUTPATIENT
Start: 2020-02-16 | End: 2020-02-18 | Stop reason: HOSPADM

## 2020-02-16 RX ORDER — ACETAMINOPHEN 325 MG/1
650 TABLET ORAL EVERY 4 HOURS PRN
Status: DISCONTINUED | OUTPATIENT
Start: 2020-02-16 | End: 2020-02-17

## 2020-02-16 RX ORDER — ASPIRIN 81 MG/1
324 TABLET, CHEWABLE ORAL ONCE
Status: COMPLETED | OUTPATIENT
Start: 2020-02-16 | End: 2020-02-16

## 2020-02-16 RX ORDER — OXYCODONE HYDROCHLORIDE 5 MG/1
5 TABLET ORAL EVERY 6 HOURS PRN
Status: DISCONTINUED | OUTPATIENT
Start: 2020-02-16 | End: 2020-02-16

## 2020-02-16 RX ORDER — MORPHINE SULFATE 2 MG/ML
INJECTION, SOLUTION INTRAMUSCULAR; INTRAVENOUS
Status: COMPLETED
Start: 2020-02-16 | End: 2020-02-16

## 2020-02-16 RX ORDER — SODIUM CHLORIDE 0.9 % (FLUSH) 0.9 %
10 SYRINGE (ML) INJECTION PRN
Status: DISCONTINUED | OUTPATIENT
Start: 2020-02-16 | End: 2020-02-18 | Stop reason: HOSPADM

## 2020-02-16 RX ORDER — FUROSEMIDE 40 MG/1
20 TABLET ORAL DAILY
Status: DISCONTINUED | OUTPATIENT
Start: 2020-02-17 | End: 2020-02-18 | Stop reason: HOSPADM

## 2020-02-16 RX ORDER — ISOSORBIDE MONONITRATE 30 MG/1
60 TABLET, EXTENDED RELEASE ORAL DAILY
Status: DISCONTINUED | OUTPATIENT
Start: 2020-02-17 | End: 2020-02-18 | Stop reason: HOSPADM

## 2020-02-16 RX ORDER — MORPHINE SULFATE 2 MG/ML
2 INJECTION, SOLUTION INTRAMUSCULAR; INTRAVENOUS EVERY 4 HOURS PRN
Status: DISCONTINUED | OUTPATIENT
Start: 2020-02-16 | End: 2020-02-17

## 2020-02-16 RX ORDER — INSULIN GLARGINE 100 [IU]/ML
8 INJECTION, SOLUTION SUBCUTANEOUS NIGHTLY
Status: DISCONTINUED | OUTPATIENT
Start: 2020-02-16 | End: 2020-02-18 | Stop reason: HOSPADM

## 2020-02-16 RX ORDER — CLOPIDOGREL BISULFATE 75 MG/1
75 TABLET ORAL DAILY
Status: DISCONTINUED | OUTPATIENT
Start: 2020-02-16 | End: 2020-02-18 | Stop reason: HOSPADM

## 2020-02-16 RX ORDER — TRAZODONE HYDROCHLORIDE 50 MG/1
50 TABLET ORAL NIGHTLY
Status: DISCONTINUED | OUTPATIENT
Start: 2020-02-16 | End: 2020-02-18 | Stop reason: HOSPADM

## 2020-02-16 RX ORDER — GABAPENTIN 300 MG/1
600 CAPSULE ORAL
Status: DISCONTINUED | OUTPATIENT
Start: 2020-02-16 | End: 2020-02-18 | Stop reason: HOSPADM

## 2020-02-16 RX ORDER — VANCOMYCIN HYDROCHLORIDE 125 MG/1
125 CAPSULE ORAL 4 TIMES DAILY
Status: DISCONTINUED | OUTPATIENT
Start: 2020-02-16 | End: 2020-02-18 | Stop reason: HOSPADM

## 2020-02-16 RX ORDER — HYDRALAZINE HYDROCHLORIDE 25 MG/1
25 TABLET, FILM COATED ORAL EVERY 8 HOURS SCHEDULED
Status: DISCONTINUED | OUTPATIENT
Start: 2020-02-16 | End: 2020-02-18 | Stop reason: HOSPADM

## 2020-02-16 RX ORDER — ONDANSETRON 2 MG/ML
4 INJECTION INTRAMUSCULAR; INTRAVENOUS EVERY 6 HOURS PRN
Status: DISCONTINUED | OUTPATIENT
Start: 2020-02-16 | End: 2020-02-18 | Stop reason: HOSPADM

## 2020-02-16 RX ORDER — NICOTINE POLACRILEX 4 MG
15 LOZENGE BUCCAL PRN
Status: DISCONTINUED | OUTPATIENT
Start: 2020-02-16 | End: 2020-02-18 | Stop reason: HOSPADM

## 2020-02-16 RX ORDER — ALBUTEROL SULFATE 90 UG/1
2 AEROSOL, METERED RESPIRATORY (INHALATION) EVERY 6 HOURS PRN
Status: DISCONTINUED | OUTPATIENT
Start: 2020-02-16 | End: 2020-02-18 | Stop reason: HOSPADM

## 2020-02-16 RX ORDER — OXYCODONE AND ACETAMINOPHEN 10; 325 MG/1; MG/1
1 TABLET ORAL EVERY 6 HOURS PRN
Status: DISCONTINUED | OUTPATIENT
Start: 2020-02-16 | End: 2020-02-18 | Stop reason: HOSPADM

## 2020-02-16 RX ORDER — DEXTROSE MONOHYDRATE 25 G/50ML
12.5 INJECTION, SOLUTION INTRAVENOUS PRN
Status: DISCONTINUED | OUTPATIENT
Start: 2020-02-16 | End: 2020-02-18 | Stop reason: HOSPADM

## 2020-02-16 RX ORDER — DEXTROSE MONOHYDRATE 50 MG/ML
100 INJECTION, SOLUTION INTRAVENOUS PRN
Status: DISCONTINUED | OUTPATIENT
Start: 2020-02-16 | End: 2020-02-18 | Stop reason: HOSPADM

## 2020-02-16 RX ORDER — NITROGLYCERIN 0.4 MG/1
0.4 TABLET SUBLINGUAL ONCE
Status: COMPLETED | OUTPATIENT
Start: 2020-02-16 | End: 2020-02-16

## 2020-02-16 RX ORDER — LISINOPRIL 10 MG/1
10 TABLET ORAL DAILY
Status: DISCONTINUED | OUTPATIENT
Start: 2020-02-17 | End: 2020-02-18 | Stop reason: HOSPADM

## 2020-02-16 RX ORDER — RANOLAZINE 500 MG/1
1000 TABLET, EXTENDED RELEASE ORAL 2 TIMES DAILY
Status: DISCONTINUED | OUTPATIENT
Start: 2020-02-16 | End: 2020-02-18 | Stop reason: HOSPADM

## 2020-02-16 RX ADMIN — TRAZODONE HYDROCHLORIDE 50 MG: 50 TABLET ORAL at 21:00

## 2020-02-16 RX ADMIN — OXYCODONE HYDROCHLORIDE AND ACETAMINOPHEN 1 TABLET: 10; 325 TABLET ORAL at 15:46

## 2020-02-16 RX ADMIN — GABAPENTIN 600 MG: 300 CAPSULE ORAL at 18:13

## 2020-02-16 RX ADMIN — SODIUM CHLORIDE, PRESERVATIVE FREE 10 ML: 5 INJECTION INTRAVENOUS at 21:02

## 2020-02-16 RX ADMIN — ATORVASTATIN CALCIUM 80 MG: 80 TABLET, FILM COATED ORAL at 21:00

## 2020-02-16 RX ADMIN — NITROGLYCERIN 0.4 MG: 0.4 TABLET, ORALLY DISINTEGRATING SUBLINGUAL at 13:56

## 2020-02-16 RX ADMIN — OXYCODONE HYDROCHLORIDE AND ACETAMINOPHEN 1 TABLET: 10; 325 TABLET ORAL at 22:23

## 2020-02-16 RX ADMIN — ONDANSETRON 4 MG: 2 INJECTION INTRAMUSCULAR; INTRAVENOUS at 18:13

## 2020-02-16 RX ADMIN — METOPROLOL SUCCINATE 100 MG: 50 TABLET, EXTENDED RELEASE ORAL at 15:46

## 2020-02-16 RX ADMIN — MORPHINE SULFATE 2 MG: 2 INJECTION, SOLUTION INTRAMUSCULAR; INTRAVENOUS at 14:14

## 2020-02-16 RX ADMIN — RANOLAZINE 1000 MG: 500 TABLET, FILM COATED, EXTENDED RELEASE ORAL at 21:00

## 2020-02-16 RX ADMIN — MORPHINE SULFATE 2 MG: 2 INJECTION, SOLUTION INTRAMUSCULAR; INTRAVENOUS at 21:00

## 2020-02-16 RX ADMIN — GABAPENTIN 600 MG: 300 CAPSULE ORAL at 22:23

## 2020-02-16 RX ADMIN — ASPIRIN 81 MG 324 MG: 81 TABLET ORAL at 13:56

## 2020-02-16 RX ADMIN — HYDRALAZINE HYDROCHLORIDE 25 MG: 25 TABLET, FILM COATED ORAL at 21:00

## 2020-02-16 RX ADMIN — VANCOMYCIN HYDROCHLORIDE 125 MG: 125 CAPSULE ORAL at 21:00

## 2020-02-16 RX ADMIN — INSULIN GLARGINE 8 UNITS: 100 INJECTION, SOLUTION SUBCUTANEOUS at 21:00

## 2020-02-16 RX ADMIN — VANCOMYCIN HYDROCHLORIDE 125 MG: 125 CAPSULE ORAL at 18:14

## 2020-02-16 RX ADMIN — CLOPIDOGREL BISULFATE 75 MG: 75 TABLET ORAL at 15:46

## 2020-02-16 RX ADMIN — HYDRALAZINE HYDROCHLORIDE 25 MG: 25 TABLET, FILM COATED ORAL at 15:46

## 2020-02-16 RX ADMIN — GABAPENTIN 600 MG: 300 CAPSULE ORAL at 15:46

## 2020-02-16 ASSESSMENT — PAIN DESCRIPTION - ORIENTATION
ORIENTATION: LEFT;MID
ORIENTATION: LEFT

## 2020-02-16 ASSESSMENT — PAIN SCALES - GENERAL
PAINLEVEL_OUTOF10: 8
PAINLEVEL_OUTOF10: 8
PAINLEVEL_OUTOF10: 6
PAINLEVEL_OUTOF10: 8
PAINLEVEL_OUTOF10: 3
PAINLEVEL_OUTOF10: 8
PAINLEVEL_OUTOF10: 7
PAINLEVEL_OUTOF10: 7

## 2020-02-16 ASSESSMENT — PAIN DESCRIPTION - PROGRESSION
CLINICAL_PROGRESSION: NOT CHANGED
CLINICAL_PROGRESSION: GRADUALLY WORSENING
CLINICAL_PROGRESSION: NOT CHANGED
CLINICAL_PROGRESSION: GRADUALLY IMPROVING

## 2020-02-16 ASSESSMENT — PAIN DESCRIPTION - LOCATION
LOCATION: CHEST
LOCATION: CHEST;ABDOMEN
LOCATION: CHEST;ABDOMEN

## 2020-02-16 ASSESSMENT — PAIN DESCRIPTION - PAIN TYPE
TYPE: ACUTE PAIN
TYPE: ACUTE PAIN;CHRONIC PAIN

## 2020-02-16 ASSESSMENT — PAIN DESCRIPTION - ONSET
ONSET: ON-GOING
ONSET: ON-GOING

## 2020-02-16 ASSESSMENT — PAIN DESCRIPTION - FREQUENCY
FREQUENCY: CONTINUOUS

## 2020-02-16 ASSESSMENT — PAIN DESCRIPTION - DESCRIPTORS
DESCRIPTORS: ACHING
DESCRIPTORS: SHARP;ACHING
DESCRIPTORS: SHARP

## 2020-02-16 ASSESSMENT — PAIN - FUNCTIONAL ASSESSMENT
PAIN_FUNCTIONAL_ASSESSMENT: ACTIVITIES ARE NOT PREVENTED
PAIN_FUNCTIONAL_ASSESSMENT: ACTIVITIES ARE NOT PREVENTED

## 2020-02-16 NOTE — H&P
admissions this month alone. Upon evaluation, he reports that nitroglycerin did not improve his chest pain. He seems to be more interested in narcotics, repeatedly continue often asking for more pain medications during evaluation. Audiology was consulted from the emergency room for evaluation of abnormal EKG and it has been recommended that there is no urgent need for cardiac catheterization at this time. Per ER physician, cardiology recommended admission for further evaluation. Past Medical History:      Diagnosis Date    Anxiety     Arthritis     Asthma     CAD (coronary artery disease)     Calcium kidney stone     Cardiomyopathy ([address]     Cerebral artery occlusion with cerebral infarction ([address]     CHF (congestive heart failure) ([address]     Clostridium difficile diarrhea 02/12/2020    COPD [facility]chronic obstructive pulmonary disease) ([facility])     mild    Depression     DM2 (diabetes mellitus, type 2) ([facility])     Fibromyalgia     GERD (gastroesophageal reflux disease)     Hyperlipidemia     Hypertension     Liver disease     Pacemaker 2012    Medtronic model # [id number]    Pneumonia     Seizures ([address]     TIA (transient ischemic attack) 2007    [facility]        Past Surgical History:      Procedure Laterality Date    BACK SURGERY      CARDIAC SURGERY      CHOLECYSTECTOMY      COLONOSCOPY  01/10/2017    COLONOSCOPY  01/10/2017    CORONARY ANGIOPLASTY WITH STENT PLACEMENT  2012    CORONARY ARTERY BYPASS GRAFT  2010, 11/2015    ENDOSCOPY, COLON, DIAGNOSTIC      PACEMAKER PLACEMENT      UPPER GASTROINTESTINAL ENDOSCOPY  02/07/2017       Medications (prior to admission):  Prior to Admission medications    Medication Sig Start Date End Date Taking?  Authorizing Provider   vancomycin (VANCOCIN) 125 MG capsule Take 1 capsule by mouth 4 times daily for 7 days 2/15/20 2/22/20  Madisyn Kelly MD   insulin glargine (LANTUS) 100 UNIT/ML injection vial Inject 8 Units into the skin nightly 2/6/20   Brad Beltran MD   metoprolol succinate (TOPROL XL) 100 MG extended release tablet Take 1 tablet by mouth daily 2/7/20   Brad Beltran MD   gabapentin (NEURONTIN) 600 MG tablet TAKE 1 TABLET BY MOUTH FIVE TIMES DAILY 2/3/20 4/3/20  Kevin Chakraborty MD   oxyCODONE-acetaminophen (PERCOCET)  MG per tablet Take 1 tablet by mouth 2 times daily as needed for Pain for up to 30 days.  1/30/20 2/29/20  Kevin Chakraborty MD   lisinopril (PRINIVIL;ZESTRIL) 10 MG tablet Take 1 tablet by mouth daily 12/21/19   Tanja Stover MD   ranolazine (RANEXA) 500 MG extended release tablet Take 1,000 mg by mouth 2 times daily    Historical Provider, MD   furosemide (LASIX) 20 MG tablet Take 20 mg by mouth daily    Historical Provider, MD   insulin aspart (NOVOLOG) 100 UNIT/ML injection vial Inject 2 Units into the skin 3 times daily (before meals) 11/26/19   Historical Provider, MD   albuterol sulfate HFA (PROAIR HFA) 108 (90 Base) MCG/ACT inhaler Inhale 2 puffs into the lungs every 6 hours as needed for Wheezing    Historical Provider, MD   empagliflozin (JARDIANCE) 10 MG tablet Take 1 tablet by mouth daily 11/27/19   Kevin Chakraborty MD   isosorbide mononitrate (IMDUR) 30 MG extended release tablet Take 60 mg by mouth daily     Historical Provider, MD   traZODone (DESYREL) 50 MG tablet TAKE 1 TABLET BY MOUTH EVERY NIGHT AT BEDTIME 9/30/19   Kevin Chakraborty MD   hydrALAZINE (APRESOLINE) 25 MG tablet Take 1 tablet by mouth every 8 hours 9/18/19   Wilton Pepe MD   acetaminophen (TYLENOL) 325 MG tablet Take 650 mg by mouth every 6 hours as needed for Pain    Historical Provider, MD   clopidogrel (PLAVIX) 75 MG tablet TAKE 1 TABLET BY MOUTH DAILY 4/7/19   Kevin Chakraborty MD   nitroGLYCERIN (NITROSTAT) 0.4 MG SL tablet PLACE 1 TABLET UNDER THE TONGUE EVERY 5 MINUTES AS NEEDED FOR CHEST PAIN 9/2/18   Kevin Chakraborty MD   aspirin 325 MG tablet Take 325 mg by mouth daily     Historical Provider, MD   atorvastatin (LIPITOR) 80 MG tablet TAKE 1 TABLET BY MOUTH DAILY 2/15/18   Danielito Fang MD       Allergy(ies):  Dopamine hcl and Melatonin    Social History:  TOBACCO:  reports that he has been smoking cigarettes. He has a 22.00 pack-year smoking history. He has never used smokeless tobacco.  ETOH:  reports no history of alcohol use. Family History:      Problem Relation Age of Onset    Diabetes Father     Coronary Art Dis Father     Cancer Mother         Lung with mets       Review of Systems:  Pertinent positives are listed in HPI. At least 10-point ROS reviewed and were negative. Vitals and physical examination:  BP (!) 166/93   Pulse 92   Temp 97.1 °F (36.2 °C) (Oral)   Resp 19   Ht 6' (1.829 m)   Wt 200 lb 9.9 oz (91 kg)   SpO2 97%   BMI 27.21 kg/m²   Gen/overall appearance: Not in acute distress. Alert. Oriented x3. Head: Normocephalic, atraumatic  Eyes: EOMI, good acuity  ENT: Oral mucosa moist  Neck: No JVD, thyromegaly  CVS: Nml S1S2, no MRG. Slightly tachycardic. Chest: There is reproducible left chest wall tenderness on examination. Pulm: Clear bilaterally. No crackles/wheezes  Gastrointestinal: Soft, NT/ND, +BS  Musculoskeletal: No edema. Warm  Neuro: No focal deficit. Moves extremity spontaneously. Psychiatry: Appropriate affect. Not agitated. Skin: Warm, dry with normal turgor.  No rash  Capillary refill: Brisk,< 3 seconds   Peripheral Pulses: +2 palpable, equal bilaterally       Labs/imaging/EKG:  CBC:   Recent Labs     02/15/20  0600 02/16/20  1239   WBC 5.5 9.4   HGB 12.2* 15.1    211     BMP:    Recent Labs     02/14/20  1037 02/15/20  0600 02/16/20  1239    141 138   K 4.1 4.2 4.2    106 99   CO2 23 24 20*   BUN 22* 14 6*   CREATININE 1.4* 1.0 0.8   GLUCOSE 140* 98 159*     Hepatic:   Recent Labs     02/16/20  1239   AST 10*   ALT 7*   BILITOT 0.5   ALKPHOS 63     Xr Chest Standard (2 Vw)  Result Date: 2/16/2020  EXAMINATION: TWO XRAY VIEWS OF THE CHEST 2/16/2020 1:07 pm COMPARISON: 02/10/2020, 02/05/2020 HISTORY: ORDERING SYSTEM PROVIDED HISTORY: CP TECHNOLOGIST PROVIDED HISTORY: Reason for exam:->CP Reason for Exam: chest pain Acuity: Acute Type of Exam: Initial FINDINGS: Frontal and lateral views of the chest were performed. There is no acute skeletal abnormality. There is a stable left subclavian pacemaker/AICD in proper position. The heart size is stable and at the upper limits of normal. The patient is status post CABG. The mediastinal contours are within normal limits. The lungs are clear without evidence of acute airspace consolidation, pneumothorax, or pleural effusion. No acute cardiopulmonary disease. EKG: Sinus rhythm. Inverted T waves in V1 through V6 happens to be more pronounced. Nonspecific ST changes. I reviewed EKG. Discussed with ER provider.       Thank you Miguelangel Stahl MD for the opportunity to be involved in this patient's care.    -----------------------------  Isaías Rivera MD  [facility] hospitalist

## 2020-02-16 NOTE — ED NOTES
Pt placed on/continued on cardiac monitor and continuous pulse ox - see flowsheet     Neville Dale RN  02/16/20 1447

## 2020-02-16 NOTE — ED NOTES
Acknowledged pt by pt's name. Verified pt by name and date of birth. Checked arm band, allergies, reviewed past medical history. Introduced myself to patient  Duration of ED plan of care explained to patient  Explained planned tests and procedures  Thanked patient for coming to [facility].    Asked if there was anything else I could do for the patient before exiting room. CB in reach.      Yolande Black RN  02/16/20 6317

## 2020-02-16 NOTE — ED PROVIDER NOTES
Triage Chief Complaint:   Chest Pain (started last night. recent admission. +cardiac hx. )    [place]:  Natalia Casillas is a 59 y.o. male that presents for evaluation of chest pain which started last night. The patient was recently admitted and discharged yesterday after he was admitted and treated for C. difficile diarrhea, LALITA and chest pain. Denies current diarrhea. States his chest pain is new/different    ROS:  At least 12 systems reviewed and otherwise acutely negative except as in the [address]     Past Medical History:   Diagnosis Date    Anxiety     Arthritis     Asthma     CAD (coronary artery disease)     Calcium kidney stone     Cardiomyopathy ([address]     Cerebral artery occlusion with cerebral infarction ([address]     CHF (congestive heart failure) ([address]     Clostridium difficile diarrhea 02/12/2020    COPD [facility]ch[facility] obstructive [facility]) [facility])     mild    Depression     DM2 (diabetes mellitus, type 2) ([facility])     Fibromyalgia     GERD (gastroesophageal reflux disease)     Hyperlipidemia     Hypertension     Liver disease     Pacemaker 2012    Medtronic model # [id number]    Pneumonia     Seizures ([address]     TIA (transient ischemic attack) 2007    Ulcerative colitis ([address]      Past Surgical History:   Procedure Laterality Date    BACK SURGERY      CARDIAC SURGERY      CHOLECYSTECTOMY      COLONOSCOPY  01/10/2017    COLONOSCOPY  01/10/2017    CORONARY ANGIOPLASTY WITH STENT PLACEMENT  2012    CORONARY ARTERY BYPASS GRAFT  2010, 11/2015    ENDOSCOPY, COLON, DIAGNOSTIC      PACEMAKER PLACEMENT      UPPER GASTROINTESTINAL ENDOSCOPY  02/07/2017     Family History   Problem Relation Age of Onset    Diabetes Father     Coronary Art Dis Father     Cancer Mother         Lung with mets     Social History     Socioeconomic History    Marital status:      Spouse name: Not on file    Number of children: 1    Years of education: Not on file   [employer] education by mouth 2 times daily as needed for Pain for up to 30 days. 60 tablet 0    lisinopril (PRINIVIL;ZESTRIL) 10 MG tablet Take 1 tablet by mouth daily 30 tablet 3    ranolazine (RANEXA) 500 MG extended release tablet Take 1,000 mg by mouth 2 times daily      furosemide (LASIX) 20 MG tablet Take 20 mg by mouth daily      insulin aspart (NOVOLOG) 100 UNIT/ML injection vial Inject 2 Units into the skin 3 times daily (before meals)      albuterol sulfate HFA (PROAIR HFA) 108 (90 Base) MCG/ACT inhaler Inhale 2 puffs into the lungs every 6 hours as needed for Wheezing      empagliflozin (JARDIANCE) 10 MG tablet Take 1 tablet by mouth daily 28 tablet 0    isosorbide mononitrate (IMDUR) 30 MG extended release tablet Take 60 mg by mouth daily       traZODone (DESYREL) 50 MG tablet TAKE 1 TABLET BY MOUTH EVERY NIGHT AT BEDTIME 90 tablet 0    hydrALAZINE (APRESOLINE) 25 MG tablet Take 1 tablet by mouth every 8 hours 90 tablet 3    acetaminophen (TYLENOL) 325 MG tablet Take 650 mg by mouth every 6 hours as needed for Pain      clopidogrel (PLAVIX) 75 MG tablet TAKE 1 TABLET BY MOUTH DAILY 90 tablet 3    nitroGLYCERIN (NITROSTAT) 0.4 MG SL tablet PLACE 1 TABLET UNDER THE TONGUE EVERY 5 MINUTES AS NEEDED FOR CHEST PAIN 25 tablet 1    aspirin 325 MG tablet Take 325 mg by mouth daily       atorvastatin (LIPITOR) 80 MG tablet TAKE 1 TABLET BY MOUTH DAILY 90 tablet 1     Allergies   Allergen Reactions    Dopamine Hcl Other (See Comments)     Compulsive gambling    Melatonin Other (See Comments)     Restless leg syndrome         [unfilled]    Nursing Notes Reviewed    Physical Exam:  Vitals:    02/16/20 1230   BP: (!) 199/95   Pulse: 95   Resp: 19   Temp: 97.1 °F (36.2 °C)   SpO2: 99%       GENERAL APPEARANCE: Awake and alert. Cooperative. No acute distress. HEAD: Normocephalic. Atraumatic. EYES: EOM's grossly intact. Sclera anicteric. ENT: Mucous membranes are moist. Tolerates saliva. No trismus. NECK: Supple.

## 2020-02-17 LAB
GLUCOSE BLD-MCNC: 122 MG/DL (ref 70–99)
GLUCOSE BLD-MCNC: 125 MG/DL (ref 70–99)
GLUCOSE BLD-MCNC: 136 MG/DL (ref 70–99)
GLUCOSE BLD-MCNC: 155 MG/DL (ref 70–99)
PERFORMED ON: ABNORMAL

## 2020-02-17 PROCEDURE — 99223 1ST HOSP IP/OBS HIGH 75: CPT | Performed by: INTERNAL MEDICINE

## 2020-02-17 PROCEDURE — 6370000000 HC RX 637 (ALT 250 FOR IP): Performed by: INTERNAL MEDICINE

## 2020-02-17 PROCEDURE — 6360000002 HC RX W HCPCS: Performed by: INTERNAL MEDICINE

## 2020-02-17 PROCEDURE — G0378 HOSPITAL OBSERVATION PER HR: HCPCS

## 2020-02-17 PROCEDURE — 2580000003 HC RX 258: Performed by: INTERNAL MEDICINE

## 2020-02-17 PROCEDURE — 96372 THER/PROPH/DIAG INJ SC/IM: CPT

## 2020-02-17 RX ORDER — ACETAMINOPHEN 325 MG/1
650 TABLET ORAL EVERY 4 HOURS PRN
Status: DISCONTINUED | OUTPATIENT
Start: 2020-02-17 | End: 2020-02-18 | Stop reason: HOSPADM

## 2020-02-17 RX ADMIN — ATORVASTATIN CALCIUM 80 MG: 80 TABLET, FILM COATED ORAL at 20:49

## 2020-02-17 RX ADMIN — HYDRALAZINE HYDROCHLORIDE 25 MG: 25 TABLET, FILM COATED ORAL at 13:56

## 2020-02-17 RX ADMIN — GABAPENTIN 600 MG: 300 CAPSULE ORAL at 18:17

## 2020-02-17 RX ADMIN — GABAPENTIN 600 MG: 300 CAPSULE ORAL at 11:40

## 2020-02-17 RX ADMIN — ENOXAPARIN SODIUM 40 MG: 40 INJECTION SUBCUTANEOUS at 09:27

## 2020-02-17 RX ADMIN — VANCOMYCIN HYDROCHLORIDE 125 MG: 125 CAPSULE ORAL at 12:58

## 2020-02-17 RX ADMIN — OXYCODONE HYDROCHLORIDE AND ACETAMINOPHEN 1 TABLET: 10; 325 TABLET ORAL at 18:17

## 2020-02-17 RX ADMIN — VANCOMYCIN HYDROCHLORIDE 125 MG: 125 CAPSULE ORAL at 15:49

## 2020-02-17 RX ADMIN — INSULIN LISPRO 2 UNITS: 100 INJECTION, SOLUTION INTRAVENOUS; SUBCUTANEOUS at 18:17

## 2020-02-17 RX ADMIN — SODIUM CHLORIDE, PRESERVATIVE FREE 10 ML: 5 INJECTION INTRAVENOUS at 20:55

## 2020-02-17 RX ADMIN — TRAZODONE HYDROCHLORIDE 50 MG: 50 TABLET ORAL at 20:49

## 2020-02-17 RX ADMIN — SODIUM CHLORIDE, PRESERVATIVE FREE 10 ML: 5 INJECTION INTRAVENOUS at 09:35

## 2020-02-17 RX ADMIN — OXYCODONE HYDROCHLORIDE AND ACETAMINOPHEN 1 TABLET: 10; 325 TABLET ORAL at 06:01

## 2020-02-17 RX ADMIN — INSULIN LISPRO 2 UNITS: 100 INJECTION, SOLUTION INTRAVENOUS; SUBCUTANEOUS at 12:58

## 2020-02-17 RX ADMIN — OXYCODONE HYDROCHLORIDE AND ACETAMINOPHEN 1 TABLET: 10; 325 TABLET ORAL at 12:17

## 2020-02-17 RX ADMIN — GABAPENTIN 600 MG: 300 CAPSULE ORAL at 23:33

## 2020-02-17 RX ADMIN — VANCOMYCIN HYDROCHLORIDE 125 MG: 125 CAPSULE ORAL at 09:30

## 2020-02-17 RX ADMIN — FUROSEMIDE 20 MG: 40 TABLET ORAL at 09:31

## 2020-02-17 RX ADMIN — RANOLAZINE 1000 MG: 500 TABLET, FILM COATED, EXTENDED RELEASE ORAL at 09:30

## 2020-02-17 RX ADMIN — LISINOPRIL 10 MG: 10 TABLET ORAL at 09:31

## 2020-02-17 RX ADMIN — HYDRALAZINE HYDROCHLORIDE 25 MG: 25 TABLET, FILM COATED ORAL at 20:49

## 2020-02-17 RX ADMIN — ISOSORBIDE MONONITRATE 60 MG: 30 TABLET, EXTENDED RELEASE ORAL at 09:31

## 2020-02-17 RX ADMIN — INSULIN GLARGINE 8 UNITS: 100 INJECTION, SOLUTION SUBCUTANEOUS at 20:49

## 2020-02-17 RX ADMIN — ASPIRIN 81 MG 81 MG: 81 TABLET ORAL at 09:31

## 2020-02-17 RX ADMIN — GABAPENTIN 600 MG: 300 CAPSULE ORAL at 05:59

## 2020-02-17 RX ADMIN — GABAPENTIN 600 MG: 300 CAPSULE ORAL at 15:49

## 2020-02-17 RX ADMIN — CLOPIDOGREL BISULFATE 75 MG: 75 TABLET ORAL at 09:30

## 2020-02-17 RX ADMIN — INSULIN LISPRO 1 UNITS: 100 INJECTION, SOLUTION INTRAVENOUS; SUBCUTANEOUS at 18:16

## 2020-02-17 RX ADMIN — METOPROLOL SUCCINATE 100 MG: 50 TABLET, EXTENDED RELEASE ORAL at 09:31

## 2020-02-17 RX ADMIN — RANOLAZINE 1000 MG: 500 TABLET, FILM COATED, EXTENDED RELEASE ORAL at 20:53

## 2020-02-17 RX ADMIN — VANCOMYCIN HYDROCHLORIDE 125 MG: 125 CAPSULE ORAL at 20:49

## 2020-02-17 ASSESSMENT — PAIN SCALES - GENERAL
PAINLEVEL_OUTOF10: 0
PAINLEVEL_OUTOF10: 4
PAINLEVEL_OUTOF10: 4
PAINLEVEL_OUTOF10: 5
PAINLEVEL_OUTOF10: 7
PAINLEVEL_OUTOF10: 6
PAINLEVEL_OUTOF10: 3
PAINLEVEL_OUTOF10: 4
PAINLEVEL_OUTOF10: 0

## 2020-02-17 ASSESSMENT — PAIN DESCRIPTION - PAIN TYPE
TYPE: CHRONIC PAIN

## 2020-02-17 ASSESSMENT — PAIN DESCRIPTION - PROGRESSION
CLINICAL_PROGRESSION: GRADUALLY WORSENING

## 2020-02-17 ASSESSMENT — PAIN DESCRIPTION - LOCATION
LOCATION: GENERALIZED

## 2020-02-17 ASSESSMENT — PAIN DESCRIPTION - DESCRIPTORS
DESCRIPTORS: ACHING

## 2020-02-17 ASSESSMENT — PAIN SCALES - WONG BAKER: WONGBAKER_NUMERICALRESPONSE: 0

## 2020-02-17 ASSESSMENT — PAIN DESCRIPTION - ORIENTATION
ORIENTATION: OTHER (COMMENT)

## 2020-02-17 ASSESSMENT — PAIN DESCRIPTION - FREQUENCY
FREQUENCY: CONTINUOUS

## 2020-02-17 ASSESSMENT — PAIN DESCRIPTION - ONSET: ONSET: ON-GOING

## 2020-02-17 NOTE — CARE COORDINATION
DISCHARGE PLAN: Pt plans to return home with his spouse upon d/c. Despite multiple admissions to the hospital, pt is adamant that he is independent and has no needs. ___________________________________    Met w/pt to address barriers to dc. HOME: pt reported that he resides in a single family home with his spouse. There are 16 GEO. Pt stated that he is able to navigate the steps without difficulty. Disease Specific: Pt has had multiple readmissions to the hospital.  Pt has also had multiple SNF admissions, most recent SNF admission was at [facility] in Dec. Of 2019. Pt has had home care in the past through [facility]. Pt stated that he currently does not have any services in the home and reported that he is independent with all ADL's/IADL's. Pt is not currently meeting the home bound criteria under Medicare to qualify for home care or tele health despite this possibly being helpful to supporting pt with staying independent in the home. DME/O2: Pt reported that he has a walker in the home but does not need to use it frequently. No other DME noted. ACTIVE SERVICES: Pt denied having any services in the home PTA. Pt stated that he does not feel that there will be a need for any types of assistance upon d/c. TRANSPORTATION: pt reported that he is an active  and stated that his spouse will transport him home upon d/c. PHARMACY: denies difficulty obtaining/taking meds. Pt stated that he has his scripts filled [facility]. PCP: dr. Jaya Whatley: verified address/phone number as correct    INSURANCE:  [employer]    HD/PD: No      THERAPY RECS Not Ordered      Discharge planning team will remain available for needs. Please consult for any specifics not addressed in this note.     Joby Cartagena [place]  [phone]  Electronically signed by Michael Davila on 2/17/2020 at 5:07 PM

## 2020-02-17 NOTE — FLOWSHEET NOTE
found healthcare power of  completed in [facility]; reviewed information with patient and is it correct, naming his daughter-in-law  Him as primary POA.

## 2020-02-17 NOTE — CONSULTS
[address]  Cardiology Consult    Domo Smith  1955    February 17, 2020      CC: CP      Subjective:     History of Present Illness:    Domo Smith is a 59 y.o. patient with a PMH significant for CAD, COPD, CABG presented with complaints of CP       Patient complains of chest pain. Onset was 2 days ago, with unchanged course since that time. The patient describes the pain as intermittent, precordial in nature, does not radiate. Patient rates pain as a 5/10 in intensity. Associated symptoms are chest pain. Aggravating factors are none. Alleviating factors are: none. Patient's cardiac risk factors are advanced age (older than 54 for men, 72 for women) and dyslipidemia. Patient's risk factors for DVT/PE: none. Previous cardiac testing: [facility] [place]. Past Medical History:   has a past medical history of Anxiety, Arthritis, Asthma, CAD (coronary artery disease), Calcium kidney stone, Cardiomyopathy ([address] Cerebral artery occlusion with cerebral infarction ([address] CHF (congestive heart failure) ([address] Clostridium difficile diarrhea, COPD (chronic obstructive pulmonary disease) ([address] Depression, DM2 (diabetes mellitus, type 2) ([address] Fibromyalgia, GERD (gastroesophageal reflux disease), Hyperlipidemia, Hypertension, Liver disease, Pacemaker, Pneumonia, Seizures ([address] TIA (transient ischemic attack), and Ulcerative colitis ([address] Surgical History:   has a past surgical history that includes Cholecystectomy; Coronary artery bypass graft (2010, 11/2015); Coronary angioplasty with stent (2012); Cardiac surgery; Endoscopy, colon, diagnostic; pacemaker placement; Colonoscopy (01/10/2017); Colonoscopy (01/10/2017); Upper gastrointestinal endoscopy (02/07/2017); and back surgery. Social History:   reports that he has been smoking cigarettes. He has a 22.00 pack-year smoking history. He has never used smokeless tobacco. He reports that he does not drink alcohol or use drugs.      Family History:  family history includes Cancer in his mother; Coronary Art Dis in his father; Diabetes in his father. Home Medications:  Were reviewed and are listed in nursing record and/or below  Prior to Admission medications    Medication Sig Start Date End Date Taking? Authorizing Provider   vancomycin (VANCOCIN) 125 MG capsule Take 1 capsule by mouth 4 times daily for 7 days 2/15/20 2/22/20  Derik Iglesias MD   insulin glargine (LANTUS) 100 UNIT/ML injection vial Inject 8 Units into the skin nightly 2/6/20   Yosef Davila MD   metoprolol succinate (TOPROL XL) 100 MG extended release tablet Take 1 tablet by mouth daily 2/7/20   Yosef Davila MD   gabapentin (NEURONTIN) 600 MG tablet TAKE 1 TABLET BY MOUTH FIVE TIMES DAILY 2/3/20 4/3/20  Estuardo Olivo MD   oxyCODONE-acetaminophen (PERCOCET)  MG per tablet Take 1 tablet by mouth 2 times daily as needed for Pain for up to 30 days.  1/30/20 2/29/20  Estuardo Olivo MD   lisinopril (PRINIVIL;ZESTRIL) 10 MG tablet Take 1 tablet by mouth daily 12/21/19   Santosh Jaeger MD   ranolazine (RANEXA) 500 MG extended release tablet Take 1,000 mg by mouth 2 times daily    Historical Provider, MD   furosemide (LASIX) 20 MG tablet Take 20 mg by mouth daily    Historical Provider, MD   insulin aspart (NOVOLOG) 100 UNIT/ML injection vial Inject 2 Units into the skin 3 times daily (before meals) 11/26/19   Historical Provider, MD   albuterol sulfate HFA (PROAIR HFA) 108 (90 Base) MCG/ACT inhaler Inhale 2 puffs into the lungs every 6 hours as needed for Wheezing    Historical Provider, MD   empagliflozin (JARDIANCE) 10 MG tablet Take 1 tablet by mouth daily 11/27/19   Estuardo Olivo MD   isosorbide mononitrate (IMDUR) 30 MG extended release tablet Take 60 mg by mouth daily     Historical Provider, MD   traZODone (DESYREL) 50 MG tablet TAKE 1 TABLET BY MOUTH EVERY NIGHT AT BEDTIME 9/30/19   Estuardo Olivo MD   hydrALAZINE (APRESOLINE) 25 obvious abnormality, atraumatic. Eyes:  Pupils equal and round. No scleral icterus. Mouth: Moist mucosa, no pharyngeal erythema. Nose: Nares normal. No drainage or sinus tenderness. Neck: Supple, symmetrical, trachea midline. No adenopathy. No tenderness/mass/nodules. No carotid bruit or elevated JVD. Lungs:   Respiratory Effort: Normal   Auscultation: Clear to auscultation bilaterally, respirations unlabored. No wheeze, rales   Chest Wall:  No tenderness or deformity. Cardiovascular:    Pulses  Palpation: normal   Ascultation: Regular rate, S1/ S2 normal. No murmur, rub, or gallop. 2+ radial and pedal pulses, symmetric  Carotid  Femoral   Abdomen and Gastrointestinal:   Soft, non-tender, bowel sounds active. Liver and Spleen  Masses   Musculoskeletal: No muscle wasting  Back  Gait   Extremities: Extremities normal, atraumatic. No cyanosis or edema. No cyanosis clubbing       Skin: Inspection and palpation performed, no rashes or lesions. Pysch: Normal mood and affect.  Alert and oriented to time place person   Neurologic: Normal gross motor and sensory exam.       Labs     All labs have been reviewed    Lab Results   Component Value Date    WBC 9.4 02/16/2020    RBC 4.58 02/16/2020    HGB 15.1 02/16/2020    HCT 44.4 02/16/2020    MCV 96.8 02/16/2020    RDW 13.6 02/16/2020     02/16/2020     Lab Results   Component Value Date     02/16/2020    K 4.2 02/16/2020    K 4.1 02/12/2020    CL 99 02/16/2020    CO2 20 02/16/2020    BUN 6 02/16/2020    CREATININE 0.8 02/16/2020    GFRAA >60 02/16/2020    GFRAA >60 05/30/2013    AGRATIO 1.2 02/16/2020    LABGLOM >60 02/16/2020    LABGLOM 87.6 07/11/2011    GLUCOSE 159 02/16/2020    GLUCOSE 208 07/11/2011    PROT 7.1 02/16/2020    PROT 7.8 03/13/2013    CALCIUM 9.5 02/16/2020    BILITOT 0.5 02/16/2020    ALKPHOS 63 02/16/2020    AST 10 02/16/2020    ALT 7 02/16/2020     No results found for: PTINR  Lab Results   Component Value Date    LABA1C 6.9

## 2020-02-17 NOTE — PLAN OF CARE
Problem: Safety:  Goal: Free from accidental physical injury  Description  Free from accidental physical injury  Outcome: Ongoing  Note:   No signs of accidental injury noted     Problem: Daily Care:  Goal: Daily care needs are met  Description  Daily care needs are met  Outcome: Ongoing  Note:   Pt assisted with daily care needs     Problem: Pain:  Goal: Patient's pain/discomfort is manageable  Description  Patient's pain/discomfort is manageable  Outcome: Ongoing  Note:   Pt repositioned and routine and PRN meds administer to manage pain and alleviate pain. Problem: Pain:  Goal: Pain level will decrease  Description  Pain level will decrease  Outcome: Ongoing  Note:   Pt expressed relief with pain meds. Problem: Pain:  Goal: Control of acute pain  Description  Control of acute pain  Outcome: Ongoing  Note:   Pain appears chronic- not acute     Problem: Pain:  Goal: Control of chronic pain  Description  Control of chronic pain  Outcome: Ongoing  Note:   Receives routine pain med as ordered and as requested     Problem: Skin Integrity:  Goal: Skin integrity will stabilize  Description  Skin integrity will stabilize  Outcome: Ongoing  Note:   No skin issues noted     Problem: Discharge Planning:  Goal: Patients continuum of care needs are met  Description  Patients continuum of care needs are met  Outcome: Ongoing  Note:   Pt receiving stress test tomorrow- possible discharge after pending test results. Problem: Falls - Risk of:  Goal: Will remain free from falls  Description  Will remain free from falls  Outcome: Ongoing  Note:   Calling for assist appropriately. Problem: Falls - Risk of:  Goal: Absence of physical injury  Description  Absence of physical injury  Outcome: Ongoing  Note:   No physical injuries noted     Problem: OXYGENATION/RESPIRATORY FUNCTION  Goal: Patient will maintain patent airway  Outcome: Ongoing  Note:   No cough noted. Airway patent.  O2 sat WNL     Problem: OXYGENATION/RESPIRATORY FUNCTION  Goal: Patient will achieve/maintain normal respiratory rate/effort  Description  Respiratory rate and effort will be within normal limits for the patient  Outcome: Ongoing  Note:   Resp even and easy. No SOB noted. Problem: HEMODYNAMIC STATUS  Goal: Patient has stable vital signs and fluid balance  Outcome: Ongoing  Note:   BP variable. Vitals checked q 4h and PRN. Strict I&O     Problem: FLUID AND ELECTROLYTE IMBALANCE  Goal: Fluid and electrolyte balance are achieved/maintained  Outcome: Ongoing  Note:   Strict I&O, daily wts, cardiac diet with NA restriction     Problem: ACTIVITY INTOLERANCE/IMPAIRED MOBILITY  Goal: Mobility/activity is maintained at optimum level for patient  Outcome: Ongoing  Note:   Ambulates with CGA x 1. Pt requires set up for ADL's.       Problem: Infection:  Goal: Will remain free from infection  Description  Will remain free from infection  Outcome: Completed  Note:   Pt has no s/s infection     Problem: Safety:  Goal: Free from intentional harm  Description  Free from intentional harm  Outcome: Completed  Note:   No signs of intending to harm self noted

## 2020-02-17 NOTE — PROGRESS NOTES
aspirin  81 mg Oral Daily    insulin lispro  0-6 Units Subcutaneous TID WC    insulin lispro  0-3 Units Subcutaneous Nightly    insulin lispro  2 Units Subcutaneous TID        PRN Meds:  acetaminophen, albuterol sulfate HFA, oxyCODONE-acetaminophen, sodium chloride flush, magnesium hydroxide, ondansetron, nitroGLYCERIN, glucose, dextrose, glucagon (rDNA), dextrose    IV:   dextrose           Intake/Output Summary (Last 24 hours) at 2/17/2020 1054  Last data filed at 2/17/2020 0603  Gross per 24 hour   Intake 240 ml   Output 300 ml   Net -60 ml       Results:  CBC:   Recent Labs     02/15/20  0600 02/16/20  1239   WBC 5.5 9.4   HGB 12.2* 15.1   HCT 36.4* 44.4   MCV 98.0 96.8    211     BMP:   Recent Labs     02/15/20  0600 02/16/20  1239    138   K 4.2 4.2    99   CO2 24 20*   PHOS 2.3*  --    BUN 14 6*   CREATININE 1.0 0.8     Mag: No results for input(s): MAG in the last 72 hours. Phos:   Lab Results   Component Value Date    PHOS 2.3 (L) 02/15/2020     No components found for: GLU    LIVER PROFILE:   Recent Labs     02/16/20  1239   AST 10*   ALT 7*   BILITOT 0.5   ALKPHOS 63     PT/INR:   Recent Labs     02/16/20  1239   PROTIME 11.2   INR 0.97     APTT:   Recent Labs     02/16/20  1239   APTT 26.4     UA:No results for input(s): NITRITE, COLORU, PHUR, LABCAST, WBCUA, RBCUA, MUCUS, TRICHOMONAS, YEAST, BACTERIA, CLARITYU, SPECGRAV, LEUKOCYTESUR, UROBILINOGEN, BILIRUBINUR, BLOODU, GLUCOSEU, AMORPHOUS in the last 72 hours. Invalid input(s): Salvatore Maya input(s): ABG  Lab Results   Component Value Date    CALCIUM 9.5 02/16/2020    PHOS 2.3 (L) 02/15/2020       Assessment and Plan:    Recurrent chest pain:   Patient with third admission secondary to chest pain. He reports that his pain has been going on for a year, the pain is worse with moving around, and pressing on his chest.  He denies any exertional chest pain. His pain does not sound cardiac at all.   He was seen by

## 2020-02-17 NOTE — PLAN OF CARE
Problem: Pain:  Goal: Control of acute pain  Description  Continuing to monitor pain and discomfort. Monitoring pain level on scale of 0-10. Non- pharmacological measures encouraged to reduce discomfort/pain. PRN pain meds administeration continues as ordered by physician. Outcome: Ongoing     Problem: Falls - Risk of:  Goal: Will remain free from falls  Description  Falling star program remains in place. Call light and personal belongings within reach. Frequent visual monitoring continues. Toileting program inplace. Patient assisted in turning/repositioning at least once every 2 hours, and on a prn basis.    Outcome: Ongoing

## 2020-02-18 VITALS
HEIGHT: 72 IN | TEMPERATURE: 97.4 F | SYSTOLIC BLOOD PRESSURE: 123 MMHG | DIASTOLIC BLOOD PRESSURE: 72 MMHG | RESPIRATION RATE: 18 BRPM | WEIGHT: 202.6 LBS | OXYGEN SATURATION: 96 % | BODY MASS INDEX: 27.44 KG/M2 | HEART RATE: 69 BPM

## 2020-02-18 LAB
ANION GAP SERPL CALCULATED.3IONS-SCNC: 13 MMOL/L (ref 3–16)
BASOPHILS ABSOLUTE: 0 K/UL (ref 0–0.2)
BASOPHILS RELATIVE PERCENT: 0.6 %
BUN BLDV-MCNC: 17 MG/DL (ref 7–20)
CALCIUM SERPL-MCNC: 8.3 MG/DL (ref 8.3–10.6)
CHLORIDE BLD-SCNC: 99 MMOL/L (ref 99–110)
CO2: 23 MMOL/L (ref 21–32)
CREAT SERPL-MCNC: 1.2 MG/DL (ref 0.8–1.3)
EOSINOPHILS ABSOLUTE: 0.2 K/UL (ref 0–0.6)
EOSINOPHILS RELATIVE PERCENT: 2.6 %
GFR AFRICAN AMERICAN: >60
GFR NON-AFRICAN AMERICAN: >60
GLUCOSE BLD-MCNC: 124 MG/DL (ref 70–99)
GLUCOSE BLD-MCNC: 125 MG/DL (ref 70–99)
GLUCOSE BLD-MCNC: 130 MG/DL (ref 70–99)
GLUCOSE BLD-MCNC: 149 MG/DL (ref 70–99)
HCT VFR BLD CALC: 39.2 % (ref 40.5–52.5)
HEMOGLOBIN: 13.3 G/DL (ref 13.5–17.5)
LV EF: 19 %
LVEF MODALITY: NORMAL
LYMPHOCYTES ABSOLUTE: 3 K/UL (ref 1–5.1)
LYMPHOCYTES RELATIVE PERCENT: 41.5 %
MCH RBC QN AUTO: 32.9 PG (ref 26–34)
MCHC RBC AUTO-ENTMCNC: 34 G/DL (ref 31–36)
MCV RBC AUTO: 96.8 FL (ref 80–100)
MONOCYTES ABSOLUTE: 0.6 K/UL (ref 0–1.3)
MONOCYTES RELATIVE PERCENT: 7.9 %
NEUTROPHILS ABSOLUTE: 3.4 K/UL (ref 1.7–7.7)
NEUTROPHILS RELATIVE PERCENT: 47.4 %
PDW BLD-RTO: 13.7 % (ref 12.4–15.4)
PERFORMED ON: ABNORMAL
PLATELET # BLD: 200 K/UL (ref 135–450)
PMV BLD AUTO: 8.9 FL (ref 5–10.5)
POTASSIUM REFLEX MAGNESIUM: 3.8 MMOL/L (ref 3.5–5.1)
RBC # BLD: 4.04 M/UL (ref 4.2–5.9)
SODIUM BLD-SCNC: 135 MMOL/L (ref 136–145)
WBC # BLD: 7.3 K/UL (ref 4–11)

## 2020-02-18 PROCEDURE — G0378 HOSPITAL OBSERVATION PER HR: HCPCS

## 2020-02-18 PROCEDURE — 78452 HT MUSCLE IMAGE SPECT MULT: CPT

## 2020-02-18 PROCEDURE — 85025 COMPLETE CBC W/AUTO DIFF WBC: CPT

## 2020-02-18 PROCEDURE — 6370000000 HC RX 637 (ALT 250 FOR IP): Performed by: INTERNAL MEDICINE

## 2020-02-18 PROCEDURE — 6360000002 HC RX W HCPCS: Performed by: INTERNAL MEDICINE

## 2020-02-18 PROCEDURE — 96372 THER/PROPH/DIAG INJ SC/IM: CPT

## 2020-02-18 PROCEDURE — C8923 2D TTE W OR W/O FOL W/CON,CO: HCPCS

## 2020-02-18 PROCEDURE — 80048 BASIC METABOLIC PNL TOTAL CA: CPT

## 2020-02-18 PROCEDURE — 36415 COLL VENOUS BLD VENIPUNCTURE: CPT

## 2020-02-18 PROCEDURE — 3430000000 HC RX DIAGNOSTIC RADIOPHARMACEUTICAL: Performed by: INTERNAL MEDICINE

## 2020-02-18 PROCEDURE — 93017 CV STRESS TEST TRACING ONLY: CPT

## 2020-02-18 PROCEDURE — 99232 SBSQ HOSP IP/OBS MODERATE 35: CPT | Performed by: INTERNAL MEDICINE

## 2020-02-18 PROCEDURE — 2580000003 HC RX 258: Performed by: INTERNAL MEDICINE

## 2020-02-18 PROCEDURE — 6360000004 HC RX CONTRAST MEDICATION: Performed by: HOSPITALIST

## 2020-02-18 PROCEDURE — A9502 TC99M TETROFOSMIN: HCPCS | Performed by: INTERNAL MEDICINE

## 2020-02-18 RX ADMIN — GABAPENTIN 600 MG: 300 CAPSULE ORAL at 15:04

## 2020-02-18 RX ADMIN — PERFLUTREN 1.65 MG: 6.52 INJECTION, SUSPENSION INTRAVENOUS at 16:01

## 2020-02-18 RX ADMIN — VANCOMYCIN HYDROCHLORIDE 125 MG: 125 CAPSULE ORAL at 12:16

## 2020-02-18 RX ADMIN — ENOXAPARIN SODIUM 40 MG: 40 INJECTION SUBCUTANEOUS at 07:50

## 2020-02-18 RX ADMIN — ISOSORBIDE MONONITRATE 60 MG: 30 TABLET, EXTENDED RELEASE ORAL at 10:31

## 2020-02-18 RX ADMIN — ASPIRIN 81 MG 81 MG: 81 TABLET ORAL at 07:51

## 2020-02-18 RX ADMIN — TETROFOSMIN 30 MILLICURIE: 1.38 INJECTION, POWDER, LYOPHILIZED, FOR SOLUTION INTRAVENOUS at 08:56

## 2020-02-18 RX ADMIN — VANCOMYCIN HYDROCHLORIDE 125 MG: 125 CAPSULE ORAL at 07:50

## 2020-02-18 RX ADMIN — GABAPENTIN 600 MG: 300 CAPSULE ORAL at 10:31

## 2020-02-18 RX ADMIN — CLOPIDOGREL BISULFATE 75 MG: 75 TABLET ORAL at 07:50

## 2020-02-18 RX ADMIN — LISINOPRIL 10 MG: 10 TABLET ORAL at 10:32

## 2020-02-18 RX ADMIN — OXYCODONE HYDROCHLORIDE AND ACETAMINOPHEN 1 TABLET: 10; 325 TABLET ORAL at 06:31

## 2020-02-18 RX ADMIN — VANCOMYCIN HYDROCHLORIDE 125 MG: 125 CAPSULE ORAL at 17:57

## 2020-02-18 RX ADMIN — GABAPENTIN 600 MG: 300 CAPSULE ORAL at 17:58

## 2020-02-18 RX ADMIN — REGADENOSON 0.4 MG: 0.08 INJECTION, SOLUTION INTRAVENOUS at 08:54

## 2020-02-18 RX ADMIN — INSULIN LISPRO 1 UNITS: 100 INJECTION, SOLUTION INTRAVENOUS; SUBCUTANEOUS at 12:17

## 2020-02-18 RX ADMIN — FUROSEMIDE 20 MG: 40 TABLET ORAL at 07:51

## 2020-02-18 RX ADMIN — HYDRALAZINE HYDROCHLORIDE 25 MG: 25 TABLET, FILM COATED ORAL at 06:30

## 2020-02-18 RX ADMIN — TETROFOSMIN 10 MILLICURIE: 1.38 INJECTION, POWDER, LYOPHILIZED, FOR SOLUTION INTRAVENOUS at 07:05

## 2020-02-18 RX ADMIN — INSULIN LISPRO 2 UNITS: 100 INJECTION, SOLUTION INTRAVENOUS; SUBCUTANEOUS at 18:40

## 2020-02-18 RX ADMIN — RANOLAZINE 1000 MG: 500 TABLET, FILM COATED, EXTENDED RELEASE ORAL at 10:31

## 2020-02-18 RX ADMIN — OXYCODONE HYDROCHLORIDE AND ACETAMINOPHEN 1 TABLET: 10; 325 TABLET ORAL at 18:40

## 2020-02-18 RX ADMIN — OXYCODONE HYDROCHLORIDE AND ACETAMINOPHEN 1 TABLET: 10; 325 TABLET ORAL at 00:54

## 2020-02-18 RX ADMIN — OXYCODONE HYDROCHLORIDE AND ACETAMINOPHEN 1 TABLET: 10; 325 TABLET ORAL at 12:22

## 2020-02-18 RX ADMIN — SODIUM CHLORIDE, PRESERVATIVE FREE 10 ML: 5 INJECTION INTRAVENOUS at 07:55

## 2020-02-18 RX ADMIN — INSULIN LISPRO 2 UNITS: 100 INJECTION, SOLUTION INTRAVENOUS; SUBCUTANEOUS at 12:21

## 2020-02-18 RX ADMIN — GABAPENTIN 600 MG: 300 CAPSULE ORAL at 06:29

## 2020-02-18 ASSESSMENT — PAIN DESCRIPTION - PROGRESSION
CLINICAL_PROGRESSION: GRADUALLY IMPROVING
CLINICAL_PROGRESSION: GRADUALLY IMPROVING

## 2020-02-18 ASSESSMENT — PAIN DESCRIPTION - LOCATION
LOCATION: GENERALIZED
LOCATION: GENERALIZED

## 2020-02-18 ASSESSMENT — PAIN SCALES - GENERAL
PAINLEVEL_OUTOF10: 6
PAINLEVEL_OUTOF10: 3
PAINLEVEL_OUTOF10: 3
PAINLEVEL_OUTOF10: 5
PAINLEVEL_OUTOF10: 6
PAINLEVEL_OUTOF10: 3
PAINLEVEL_OUTOF10: 3
PAINLEVEL_OUTOF10: 6
PAINLEVEL_OUTOF10: 5
PAINLEVEL_OUTOF10: 3
PAINLEVEL_OUTOF10: 6
PAINLEVEL_OUTOF10: 4

## 2020-02-18 ASSESSMENT — PAIN DESCRIPTION - PAIN TYPE
TYPE: CHRONIC PAIN
TYPE: CHRONIC PAIN

## 2020-02-18 ASSESSMENT — PAIN DESCRIPTION - DESCRIPTORS
DESCRIPTORS: ACHING
DESCRIPTORS: ACHING

## 2020-02-18 ASSESSMENT — PAIN DESCRIPTION - FREQUENCY
FREQUENCY: CONTINUOUS
FREQUENCY: CONTINUOUS

## 2020-02-18 ASSESSMENT — PAIN SCALES - WONG BAKER: WONGBAKER_NUMERICALRESPONSE: 0

## 2020-02-18 ASSESSMENT — PAIN DESCRIPTION - ONSET
ONSET: ON-GOING
ONSET: ON-GOING

## 2020-02-18 NOTE — DISCHARGE SUMMARY
Hospitalist Discharge Summary    Patient ID:  Lemmie Collet  [id number]  59 y.o.  1955    Admit date: 2/16/2020    Discharge date: 2/18/2020    Disposition: home    Admission Diagnoses:   Patient Active Problem List   Diagnosis    Chronic chest pain    S/P CABG (coronary artery bypass graft)    Essential hypertension    ICD (implantable cardioverter-defibrillator), dual, in situ    Abdominal pain    Ischemic cardiomyopathy    Hyperlipidemia LDL goal <70    CHF (congestive heart failure) ([facility])    Anxiety    Chronic back pain    Type 2 diabetes mellitus without complication, with long-term current use of insulin ([facility])    Coronary artery disease involving native coronary artery of native heart without angina pectoris    COPD exacerbation ([facility])    Anemia,normocytic normochromic ,mixed folic aci deficiency and iron deficiency    Guaiac + stool    Gastrointestinal hemorrhage    Morbid obesity due to excess calories ([address]    Anxiety    Coronary artery disease involving coronary bypass graft of native heart with angina pectoris ([facility])    Iron deficiency anemia due to chronic blood loss    Renal insufficiency    Tobacco abuse    Restrictive lung disease    Acute on chronic diastolic congestive heart failure ([facility])    Ischemic stroke, left frontal lobe (4/30/18) ([facility])    PFO (patent foramen ovale)    DMII (diabetes mellitus, type 2) ([facility])    COPD (chronic obstructive pulmonary disease) ([facility])    Renal insufficiency    Chest pain    Chronic systolic (congestive) heart failure ([facility])    CAD (coronary artery disease)    Compression fracture of L2, sequela    Back pain with radiculopathy    Low back pain    Intractable back pain    Acute colitis    Back pain    Stroke determined by clinical assessment ([address]    NSVT (nonsustained ventricular tachycardia) ([facility])    Tachycardia    ICD (implantable cardioverter-defibrillator) discharge    Diabetic peripheral TAKE 1 TABLET BY MOUTH FIVE TIMES DAILY  Qty: 150 tablet, Refills: 1    Associated Diagnoses: Neuropathy      oxyCODONE-acetaminophen (PERCOCET)  MG per tablet Take 1 tablet by mouth 2 times daily as needed for Pain for up to 30 days.   Qty: 60 tablet, Refills: 0    Comments: Reduce doses taken as pain becomes manageable  Associated Diagnoses: Chronic back pain, unspecified back location, unspecified back pain laterality      lisinopril (PRINIVIL;ZESTRIL) 10 MG tablet Take 1 tablet by mouth daily  Qty: 30 tablet, Refills: 3      ranolazine (RANEXA) 500 MG extended release tablet Take 1,000 mg by mouth 2 times daily      furosemide (LASIX) 20 MG tablet Take 20 mg by mouth daily      insulin aspart (NOVOLOG) 100 UNIT/ML injection vial Inject 2 Units into the skin 3 times daily (before meals)      albuterol sulfate HFA (PROAIR HFA) 108 (90 Base) MCG/ACT inhaler Inhale 2 puffs into the lungs every 6 hours as needed for Wheezing      empagliflozin (JARDIANCE) 10 MG tablet Take 1 tablet by mouth daily  Qty: 28 tablet, Refills: 0      isosorbide mononitrate (IMDUR) 30 MG extended release tablet Take 60 mg by mouth daily       traZODone (DESYREL) 50 MG tablet TAKE 1 TABLET BY MOUTH EVERY NIGHT AT BEDTIME  Qty: 90 tablet, Refills: 0    Associated Diagnoses: Primary insomnia      hydrALAZINE (APRESOLINE) 25 MG tablet Take 1 tablet by mouth every 8 hours  Qty: 90 tablet, Refills: 3      acetaminophen (TYLENOL) 325 MG tablet Take 650 mg by mouth every 6 hours as needed for Pain      clopidogrel (PLAVIX) 75 MG tablet TAKE 1 TABLET BY MOUTH DAILY  Qty: 90 tablet, Refills: 3      nitroGLYCERIN (NITROSTAT) 0.4 MG SL tablet PLACE 1 TABLET UNDER THE TONGUE EVERY 5 MINUTES AS NEEDED FOR CHEST PAIN  Qty: 25 tablet, Refills: 1      aspirin 325 MG tablet Take 325 mg by mouth daily       atorvastatin (LIPITOR) 80 MG tablet TAKE 1 TABLET BY MOUTH DAILY  Qty: 90 tablet, Refills: 1    Associated Diagnoses: Coronary artery disease involving native heart without angina pectoris, unspecified vessel or lesion type           Current Discharge Medication List              Procedures:     Assessment on Discharge: Stable, improved     Discharge ROS:  A complete review of systems was asked and negative    Discharge Exam:  /72   Pulse 69   Temp 97.4 °F (36.3 °C) (Oral)   Resp 18   Ht 6' (1.829 m)   Wt 202 lb 9.6 oz (91.9 kg)   SpO2 96%   BMI 27.48 kg/m²     Gen: NAD  HEENT: NC/AT, moist mucous membranes, no oropharyngeal erythema or exudate  Neck: supple, trachea midline, no anterior cervical or SC LAD  Heart:  Normal s1/s2, RRR, no murmurs, gallops, or rubs. no leg edema  Lungs:  CTA bilaterally, no wheeze,no rales or rhonchi, no use of accessory muscles  Abd: bowel sounds present, soft, nontender, nondistended, no masses  Extrem:  No clubbing, cyanosis,  no edema  Skin: no lesion or masses  Psych:  A & O x3  Neuro: grossly intact, moves all four extremities    Pertinent Studies During Hospital Stay:  Radiology:  Xr Chest Standard (2 Vw)    Result Date: 2/18/2020  EXAMINATION: TWO XRAY VIEWS OF THE CHEST 2/16/2020 1:07 pm COMPARISON: 02/10/2020, 02/05/2020 HISTORY: ORDERING SYSTEM PROVIDED HISTORY: CP TECHNOLOGIST PROVIDED HISTORY: Reason for exam:->CP Reason for Exam: chest pain Acuity: Acute Type of Exam: Initial FINDINGS: Frontal and lateral views of the chest were performed. There is no acute skeletal abnormality. There is a stable left subclavian pacemaker/AICD in proper position. The heart size is stable and at the upper limits of normal. The patient is status post CABG. The mediastinal contours are within normal limits. The lungs are clear, without evidence of acute airspace consolidation, pneumothorax, or pleural effusion. No acute cardiopulmonary disease.      Xr Chest Standard (2 Vw)    Result Date: 2/10/2020  EXAMINATION: TWO XRAY VIEWS OF THE CHEST 2/10/2020 4:10 pm COMPARISON: 02/05/2020 HISTORY: ORDERING SYSTEM PROVIDED HISTORY: Chest Pain TECHNOLOGIST PROVIDED HISTORY: Reason for exam:->Chest Pain Reason for Exam: Chest Pain (onset last night. n/v. + diaphoresis. history of CABG per pts report) Acuity: Acute Type of Exam: Initial FINDINGS: Sternotomy wires are present. There is a left infraclavicular ICD with leads projecting over the right atrium and ventricle. The cardiac silhouette is normal in size. Hilar contours are normal.  There is no focal airspace disease or pleural effusion. No acute osseous abnormalities appreciated. No acute cardiopulmonary disease. Xr Chest Standard (2 Vw)    Result Date: 2/5/2020  EXAMINATION: TWO XRAY VIEWS OF THE CHEST 2/5/2020 2:51 pm COMPARISON: 01/17/2020 HISTORY: ORDERING SYSTEM PROVIDED HISTORY: chest pain TECHNOLOGIST PROVIDED HISTORY: Reason for exam:->chest pain Reason for Exam: Chest Pain (pt. was in shower 1245 and internal defibrillator went off, now with chest pain) Acuity: Chronic Type of Exam: Ongoing FINDINGS: The lungs are clear. Cardiomegaly is stable status post median sternotomy, CABG and dual lead ICD. There is no pneumothorax or pleural effusion. 1.  No acute abnormality. Xr Abdomen (kub) (single Ap View)    Result Date: 2/12/2020  EXAMINATION: ONE SUPINE XRAY VIEW(S) OF THE ABDOMEN 2/12/2020 3:03 pm COMPARISON: 02/10/2020 HISTORY: ORDERING SYSTEM PROVIDED HISTORY: Abd pain, n/v/d TECHNOLOGIST PROVIDED HISTORY: Reason for exam:->Abd pain, n/v/d Reason for Exam: Abd pain, n/v/d, since yesterday Acuity: Acute Type of Exam: Initial FINDINGS: Nonspecific bowel gas pattern. Focally dilated loops small bowel within the left upper quadrant measuring up to 4.4 cm. The transverse colon is distended measuring up to 11 cm. No cecal distention noted. The gallbladder has been resected. The osseous structures are intact. Nonspecific bowel gas pattern with focally dilated loops small bowel left upper quadrant.      Cta Chest Abdomen Pelvis W Male   Patient Number     [id number]         Date of Birth       1955   Visit Number       [id number]          Age                 59 year(s)   Accession Number   [id number]          Room Number         5123   Corporate ID       [id number]           NM Technician       Eddie Euceda, RT   Nurse              Kristi Lang RN, Interpreting        Snow Hart.                     BSN                Physician           Flo Padron MD   Ordering Physician Aníbal Duenas MD    Stress Interpreting Ira Alcaraz MD   The procedure was explained in detail to the patient. Risks,  complications and alternative treatments were reviewed. Written consent  was obtained. Procedure Procedure Type:   Nuclear Stress Test:NM MYOCARDIAL SPECT REST EXERCISE OR RX   Study location: [facility] - Nuclear Medicine   Indications: Chest pain. Hospital Status: Outpatient. Height: 72 inches Weight: 202 pounds  Risk Factors   The patient risk factors include:prior PCI;prior CABG;cerebrovascular  disease, physical activity, Current/Recent(w/in 1 year) tobacco use, treated  hypercholesterolemia, treated hypertension, family history of premature CAD  appeared at age 79, chronic lung disease, dyslipidemia, prior heart failure  and (pack years: 80). Conclusions   Summary  Normal myocardial perfusion study. Moderate global systolic dysfunction appreciated. Diaphragmatic attenuation present. Non-diagnostic EKG response due to failure to reach target heart rate. Overall findings represent a low risk study.   Stress Protocols   Resting ECG  Sinus rhythm with nonspecific ST changes, RBBB   Resting HR:56 bpm                  Resting BP:158/83 mmHg   Pre-stress physical exam: Rest Lexiscan Stress Test:  Findings on the brief pre-stress cardiopulmonary exam: unremarkable  heart and lungs. Prior to stress test, patient states he has chest discomfort = 2/10. Pre-Test SpO2 = 95% RA. Stress Protocol:Pharmacologic - Lexiscan's  Peak HR:103 bpm                          HR response: Normal  Peak BP:158/83 mmHg                      BP response: Normal  Predicted HR: 156 bpm                    HR/BP product:02479  % of predicted HR: 66  Test duration: 1 min and 40 sec  Reason for termination:Completed   Symptoms  Developed symptoms likely related to [address] [place] [address]. There was stress induced shortness of breath & dizziness. Symptoms resolved with rest.  Post-Test SpO2 = 96% RA. Complications  Procedure complication was none. Stress Interpretation  Non-diagnostic EKG response due to failure to reach target heart rate. Procedure Medications   - Lexiscan I.V. 0.4 mg.  Imaging Protocols   - One Day   Rest                       Stress   Isotope:Myoview            Isotope: Myoview  Isotope dose:14.2 mCi      Isotope dose:32.4 mCi  Administration Route:I.V.   Administration Route:I.V.  Date:02/18/2020 00:00      Date:02/18/2020 00:00                              Technique:     Gated  Imaging Results    Summed scores     - Summed stress score: 12     - Summed rest score: 12     - Summed difference score:    4     Stress ejection    Ejection fraction:19 %    EDV :193 ml    ESV :157 ml    Stroke volume :36 ml  Medical History  Signatures   ------------------------------------------------------------------  Electronically signed by Oren Singh MD  (Interpreting physician) on 02/18/2020 at 10:32  ------------------------------------------------------------------            Last Labs on Discharge:     Recent Results (from the past 24 hour(s))   POCT Glucose    Collection Time: 02/17/20  8:38 PM   Result Value Ref Range    POC Glucose 125 (H) 70 - 99 mg/dl    Performed on ACCU-CHEK    Basic Metabolic Panel w/ Reflex to MG    Collection Time: 02/18/20  5:07 AM   Result Value Ref Range    Sodium 135 (L) 136 - 145 mmol/L    Potassium reflex Magnesium 3.8 3.5 - 5.1 mmol/L    Chloride 99 99 - 110 mmol/L    CO2 23 21 - 32 mmol/L    Anion Gap 13 3 - 16    Glucose 124 (H) 70 - 99 mg/dL    BUN 17 7 - 20 mg/dL    CREATININE 1.2 0.8 - 1.3 mg/dL    GFR Non-African American >60 >60    GFR African American >60 >60    Calcium 8.3 8.3 - 10.6 mg/dL   CBC Auto Differential    Collection Time: 02/18/20  5:07 AM   Result Value Ref Range    WBC 7.3 4.0 - 11.0 K/uL    RBC 4.04 (L) 4.20 - 5.90 M/uL    Hemoglobin 13.3 (L) 13.5 - 17.5 g/dL    Hematocrit 39.2 (L) 40.5 - 52.5 %    MCV 96.8 80.0 - 100.0 fL    MCH 32.9 26.0 - 34.0 pg    MCHC 34.0 31.0 - 36.0 g/dL    RDW 13.7 12.4 - 15.4 %    Platelets 320 763 - 163 K/uL    MPV 8.9 5.0 - 10.5 fL    Neutrophils % 47.4 %    Lymphocytes % 41.5 %    Monocytes % 7.9 %    Eosinophils % 2.6 %    Basophils % 0.6 %    Neutrophils Absolute 3.4 1.7 - 7.7 K/uL    Lymphocytes Absolute 3.0 1.0 - 5.1 K/uL    Monocytes Absolute 0.6 0.0 - 1.3 K/uL    Eosinophils Absolute 0.2 0.0 - 0.6 K/uL    Basophils Absolute 0.0 0.0 - 0.2 K/uL   POCT Glucose    Collection Time: 02/18/20  7:47 AM   Result Value Ref Range    POC Glucose 125 (H) 70 - 99 mg/dl    Performed on ACCU-CHEK    POCT Glucose    Collection Time: 02/18/20 11:44 AM   Result Value Ref Range    POC Glucose 149 (H) 70 - 99 mg/dl    Performed on ACCU-CHEK    POCT Glucose    Collection Time: 02/18/20  4:39 PM   Result Value Ref Range    POC Glucose 130 (H) 70 - 99 mg/dl    Performed on ACCU-CHEK          Follow up: with Siena Garcia MD    Note that over 30 minutes was spent in preparing discharge papers, discussing discharge with patient, medication review, etc.    Thank you Siena Garcia MD for the opportunity to be involved in this patient's care. If you have any questions or concerns please feel free to contact me at [phone].     Electronically signed by Param Max MD on 2/18/2020 at 5:52 PM

## 2020-02-18 NOTE — CARE COORDINATION
Attempted to see patient for CTN interview. Shahram Carson was working with hospital staff - unable to complete interview. Noted that Shahram Carson has a discharge order.

## 2020-02-18 NOTE — PROGRESS NOTES
Stress test done this AM. Dr. Bains November reports that he spoke with Dr. Dilia Demarco re: results. Echo ordered. Pt aware.

## 2020-02-18 NOTE — PLAN OF CARE
Problem: Safety:  Goal: Free from accidental physical injury  Description  Free from accidental physical injury  Outcome: Ongoing  Note:   No signs of accidental injury     Problem: Daily Care:  Goal: Daily care needs are met  Description  Daily care needs are met  Outcome: Ongoing  Note:   Pt able to perform daily care needs with set up      Problem: Skin Integrity:  Goal: Skin integrity will stabilize  Description  Skin integrity will stabilize  Outcome: Ongoing  Note:   No skin issues noted. Pt is able to reposition self. Problem: Discharge Planning:  Goal: Patients continuum of care needs are met  Description  Patients continuum of care needs are met  Outcome: Ongoing  Note:   Social work available for discharge needs     Problem: Falls - Risk of:  Goal: Will remain free from falls  Description  Will remain free from falls  Outcome: Ongoing  Note:   Pt calls for assist.     Problem: Falls - Risk of:  Goal: Absence of physical injury  Description  Absence of physical injury  Outcome: Ongoing  Note:   No injuries noted     Problem: OXYGENATION/RESPIRATORY FUNCTION  Goal: Patient will maintain patent airway  Outcome: Ongoing  Note:   No cough noted. O2 sats WNL. Airway patent     Problem: OXYGENATION/RESPIRATORY FUNCTION  Goal: Patient will achieve/maintain normal respiratory rate/effort  Description  Respiratory rate and effort will be within normal limits for the patient  Outcome: Ongoing  Note:   No SOB noted. Resp easy and even. Lungs clear     Problem: OXYGENATION/RESPIRATORY FUNCTION  Goal: Patient will achieve/maintain normal respiratory rate/effort  Description  Respiratory rate and effort will be within normal limits for the patient  Outcome: Ongoing  Note:   No SOB noted. Resp easy and even. Lungs clear     Problem: HEMODYNAMIC STATUS  Goal: Patient has stable vital signs and fluid balance  Outcome: Ongoing  Note:   VSS, labs and I&O monitored. Fluid restriction in place.       Problem: FLUID AND

## 2020-02-18 NOTE — PROGRESS NOTES
[facility]  Cardiology Consult    Burgess Magallon  1955    February 18, 2020      CC: CP      Subjective:     History of Present Illness:    Burgess Magallon is a 59 y.o. patient with a PMH significant for CAD, COPD, CABG presented with complaints of CP       Patient complains of chest pain. Onset was 2 days ago, with unchanged course since that time. The patient describes the pain as intermittent, precordial in nature, does not radiate. Patient rates pain as a 5/10 in intensity. Associated symptoms are chest pain. Aggravating factors are none. Alleviating factors are: none. Patient's cardiac risk factors are advanced age (older than 54 for men, 72 for women) and dyslipidemia. Patient's risk factors for DVT/PE: none. Previous cardiac testing: [facility]. Past Medical History:   has a past medical history of Anxiety, Arthritis, Asthma, CAD (coronary artery disease), Calcium kidney stone, Cardiomyopathy ([address] Cerebral artery occlusion with cerebral infarction ([address] CHF (congestive heart failure) ([address] Clostridium difficile diarrhea, COPD (chronic obstructive pulmonary disease) ([address] Depression, DM2 (diabetes mellitus, type 2) ([address] Fibromyalgia, GERD (gastroesophageal reflux disease), Hyperlipidemia, Hypertension, Liver disease, Pacemaker, Pneumonia, Seizures ([address] TIA (transient ischemic attack), and Ulcerative colitis ([address] Surgical History:   has a past surgical history that includes Cholecystectomy; Coronary artery bypass graft (2010, 11/2015); Coronary angioplasty with stent (2012); Cardiac surgery; Endoscopy, colon, diagnostic; pacemaker placement; Colonoscopy (01/10/2017); Colonoscopy (01/10/2017); Upper gastrointestinal endoscopy (02/07/2017); and back surgery. Social History:   reports that he has been smoking cigarettes. He has a 22.00 pack-year smoking history. He has never used smokeless tobacco. He reports that he does not drink alcohol or use drugs.      Family age.   Head:  Normocephalic, without obvious abnormality, atraumatic. Eyes:  Pupils equal and round. No scleral icterus. Mouth: Moist mucosa, no pharyngeal erythema. Nose: Nares normal. No drainage or sinus tenderness. Neck: Supple, symmetrical, trachea midline. No adenopathy. No tenderness/mass/nodules. No carotid bruit or elevated JVD. Lungs:   Respiratory Effort: Normal   Auscultation: Clear to auscultation bilaterally, respirations unlabored. No wheeze, rales   Chest Wall:  No tenderness or deformity. Cardiovascular:    Pulses  Palpation: normal   Ascultation: Regular rate, S1/ S2 normal. No murmur, rub, or gallop. 2+ radial and pedal pulses, symmetric  Carotid  Femoral   Abdomen and Gastrointestinal:   Soft, non-tender, bowel sounds active. Liver and Spleen  Masses   Musculoskeletal: No muscle wasting  Back  Gait   Extremities: Extremities normal, atraumatic. No cyanosis or edema. No cyanosis clubbing       Skin: Inspection and palpation performed, no rashes or lesions. Pysch: Normal mood and affect.  Alert and oriented to time place person   Neurologic: Normal gross motor and sensory exam.       Labs     All labs have been reviewed    Lab Results   Component Value Date    WBC 7.3 02/18/2020    RBC 4.04 02/18/2020    HGB 13.3 02/18/2020    HCT 39.2 02/18/2020    MCV 96.8 02/18/2020    RDW 13.7 02/18/2020     02/18/2020     Lab Results   Component Value Date     02/18/2020    K 3.8 02/18/2020    CL 99 02/18/2020    CO2 23 02/18/2020    BUN 17 02/18/2020    CREATININE 1.2 02/18/2020    GFRAA >60 02/18/2020    GFRAA >60 05/30/2013    AGRATIO 1.2 02/16/2020    LABGLOM >60 02/18/2020    LABGLOM 87.6 07/11/2011    GLUCOSE 124 02/18/2020    GLUCOSE 208 07/11/2011    PROT 7.1 02/16/2020    PROT 7.8 03/13/2013    CALCIUM 8.3 02/18/2020    BILITOT 0.5 02/16/2020    ALKPHOS 63 02/16/2020    AST 10 02/16/2020    ALT 7 02/16/2020     No results found for: [facility] [facility]  Lab Results   Component Value Date

## 2020-02-19 ENCOUNTER — CARE COORDINATION (OUTPATIENT)
Dept: CASE MANAGEMENT | Age: 65
End: 2020-02-19

## 2020-02-19 NOTE — PROGRESS NOTES
Discharge instructions and meds reviewed with pt. Pt expressed understanding. F/U appts discussed with pt. Writer expressed to pt the importance of going to these appts.

## 2020-02-19 NOTE — CARE COORDINATION
Aury 45 Transitions Initial Follow Up Call    Call within 2 business days of discharge: Yes    Patient: Madhav Alvarenga Patient : 1955   MRN: [id number]  Reason for Admission: CP  Discharge Date: 20 RARS: Readmission Risk Score: 42      Last Discharge [facility]       Complaint Diagnosis Description Type Department Provider    20 Chest Pain Chest pain, unspecified type ED to Hosp-Admission (Discharged) (ADMITTED) [facility] 5W Iona Curiel MD; Tanesha Brink . .. Spoke with: patient, [address] [place] [address]    Non-face-to-face services provided:  Obtained and reviewed discharge summary and/or continuity of care documents    Care Transitions 24 Hour Call    Do you have any ongoing symptoms?:  No  Do you have a copy of your discharge instructions?:  Yes  Do you have all of your prescriptions and are they filled?:  Yes  Have you scheduled your follow up appointment?:  Yes  Were you discharged with any Home Care or Post Acute Services:  No  Post Acute Services:  Home Health (Comment: agency name unknown)  Do you have support at home?:  Partner/Spouse/SO  Are you an active caregiver in your home?:  No  Care Transitions Interventions                             Spoke with patient, Ruy. He stated received copy of discharge instructions. He denies CP and stated is only slightly SOB which is his baseline. He is aware of low sodium diet and fluid restrictions and that he is to have no caffeine. His weight today was 206#, hospital weight 202.9#.   and Issac Ha had conversation on when to call MD for weight gain and HF zones. Patient verbalized understanding and stated he felt he was in the green zone. Patient stated he smokes and is aware of the ramifications.  reviewed appointments, has an appointment with Dr. Grace Cueto tomorrow.  Ruy stated that he was going to cancel his appointment with Micaela Momin on  to check his pacemaker, that his MD was Dr. Olvin Naranjo

## 2020-02-26 ENCOUNTER — APPOINTMENT (OUTPATIENT)
Dept: CT IMAGING | Age: 65
End: 2020-02-26
Payer: MEDICARE

## 2020-02-26 ENCOUNTER — HOSPITAL ENCOUNTER (EMERGENCY)
Age: 65
Discharge: HOME | End: 2020-02-26
Attending: EMERGENCY MEDICINE
Payer: MEDICARE

## 2020-02-26 VITALS
RESPIRATION RATE: 18 BRPM | WEIGHT: 199.74 LBS | DIASTOLIC BLOOD PRESSURE: 74 MMHG | BODY MASS INDEX: 27.09 KG/M2 | HEART RATE: 90 BPM | SYSTOLIC BLOOD PRESSURE: 110 MMHG | TEMPERATURE: 98.2 F | OXYGEN SATURATION: 93 %

## 2020-02-26 LAB
ANION GAP SERPL CALCULATED.3IONS-SCNC: 14 MMOL/L (ref 3–16)
BUN BLDV-MCNC: 13 MG/DL (ref 7–20)
CALCIUM SERPL-MCNC: 8.9 MG/DL (ref 8.3–10.6)
CHLORIDE BLD-SCNC: 101 MMOL/L (ref 99–110)
CO2: 21 MMOL/L (ref 21–32)
CREAT SERPL-MCNC: 1 MG/DL (ref 0.8–1.3)
GFR AFRICAN AMERICAN: >60
GFR NON-AFRICAN AMERICAN: >60
GLUCOSE BLD-MCNC: 220 MG/DL (ref 70–99)
HCT VFR BLD CALC: 40.7 % (ref 40.5–52.5)
HEMOGLOBIN: 13.9 G/DL (ref 13.5–17.5)
LACTIC ACID, SEPSIS: 1.1 MMOL/L (ref 0.4–1.9)
MCH RBC QN AUTO: 33.3 PG (ref 26–34)
MCHC RBC AUTO-ENTMCNC: 34.3 G/DL (ref 31–36)
MCV RBC AUTO: 97 FL (ref 80–100)
PDW BLD-RTO: 13.3 % (ref 12.4–15.4)
PLATELET # BLD: 137 K/UL (ref 135–450)
PMV BLD AUTO: 9.8 FL (ref 5–10.5)
POTASSIUM SERPL-SCNC: 3.8 MMOL/L (ref 3.5–5.1)
RBC # BLD: 4.19 M/UL (ref 4.2–5.9)
SODIUM BLD-SCNC: 136 MMOL/L (ref 136–145)
WBC # BLD: 6.7 K/UL (ref 4–11)

## 2020-02-26 PROCEDURE — 36415 COLL VENOUS BLD VENIPUNCTURE: CPT

## 2020-02-26 PROCEDURE — 74177 CT ABD & PELVIS W/CONTRAST: CPT

## 2020-02-26 PROCEDURE — 96375 TX/PRO/DX INJ NEW DRUG ADDON: CPT

## 2020-02-26 PROCEDURE — 2580000003 HC RX 258: Performed by: EMERGENCY MEDICINE

## 2020-02-26 PROCEDURE — 96374 THER/PROPH/DIAG INJ IV PUSH: CPT

## 2020-02-26 PROCEDURE — 99284 EMERGENCY DEPT VISIT MOD MDM: CPT

## 2020-02-26 PROCEDURE — 83605 ASSAY OF LACTIC ACID: CPT

## 2020-02-26 PROCEDURE — 6360000002 HC RX W HCPCS: Performed by: EMERGENCY MEDICINE

## 2020-02-26 PROCEDURE — 80048 BASIC METABOLIC PNL TOTAL CA: CPT

## 2020-02-26 PROCEDURE — 85027 COMPLETE CBC AUTOMATED: CPT

## 2020-02-26 PROCEDURE — 6360000004 HC RX CONTRAST MEDICATION: Performed by: EMERGENCY MEDICINE

## 2020-02-26 RX ORDER — ONDANSETRON 2 MG/ML
4 INJECTION INTRAMUSCULAR; INTRAVENOUS ONCE
Status: COMPLETED | OUTPATIENT
Start: 2020-02-26 | End: 2020-02-26

## 2020-02-26 RX ORDER — ONDANSETRON 4 MG/1
4 TABLET, ORALLY DISINTEGRATING ORAL EVERY 8 HOURS PRN
Qty: 20 TABLET | Refills: 0 | Status: SHIPPED | OUTPATIENT
Start: 2020-02-26 | End: 2020-04-14

## 2020-02-26 RX ORDER — IBUPROFEN 400 MG/1
400 TABLET ORAL EVERY 6 HOURS PRN
Qty: 20 TABLET | Refills: 0 | Status: SHIPPED | OUTPATIENT
Start: 2020-02-26 | End: 2020-06-26 | Stop reason: CLARIF

## 2020-02-26 RX ORDER — 0.9 % SODIUM CHLORIDE 0.9 %
1000 INTRAVENOUS SOLUTION INTRAVENOUS ONCE
Status: COMPLETED | OUTPATIENT
Start: 2020-02-26 | End: 2020-02-26

## 2020-02-26 RX ORDER — ONDANSETRON 2 MG/ML
INJECTION INTRAMUSCULAR; INTRAVENOUS
Status: DISCONTINUED
Start: 2020-02-26 | End: 2020-02-26 | Stop reason: HOSPADM

## 2020-02-26 RX ORDER — MORPHINE SULFATE 4 MG/ML
INJECTION, SOLUTION INTRAMUSCULAR; INTRAVENOUS
Status: DISCONTINUED
Start: 2020-02-26 | End: 2020-02-26 | Stop reason: HOSPADM

## 2020-02-26 RX ORDER — MORPHINE SULFATE 4 MG/ML
4 INJECTION, SOLUTION INTRAMUSCULAR; INTRAVENOUS ONCE
Status: COMPLETED | OUTPATIENT
Start: 2020-02-26 | End: 2020-02-26

## 2020-02-26 RX ADMIN — IOPAMIDOL 75 ML: 755 INJECTION, SOLUTION INTRAVENOUS at 07:00

## 2020-02-26 RX ADMIN — SODIUM CHLORIDE 1000 ML: 9 INJECTION, SOLUTION INTRAVENOUS at 05:48

## 2020-02-26 RX ADMIN — MORPHINE SULFATE 4 MG: 4 INJECTION, SOLUTION INTRAMUSCULAR; INTRAVENOUS at 05:48

## 2020-02-26 RX ADMIN — ONDANSETRON 4 MG: 2 INJECTION INTRAMUSCULAR; INTRAVENOUS at 05:48

## 2020-02-26 ASSESSMENT — PAIN DESCRIPTION - PAIN TYPE: TYPE: ACUTE PAIN

## 2020-02-26 ASSESSMENT — PAIN SCALES - GENERAL
PAINLEVEL_OUTOF10: 8
PAINLEVEL_OUTOF10: 7

## 2020-02-26 ASSESSMENT — PAIN DESCRIPTION - DESCRIPTORS: DESCRIPTORS: SHARP

## 2020-02-26 ASSESSMENT — PAIN DESCRIPTION - ORIENTATION: ORIENTATION: UPPER

## 2020-02-26 ASSESSMENT — PAIN DESCRIPTION - LOCATION: LOCATION: ABDOMEN

## 2020-02-26 ASSESSMENT — PAIN DESCRIPTION - FREQUENCY: FREQUENCY: CONTINUOUS

## 2020-02-27 ASSESSMENT — ENCOUNTER SYMPTOMS
COLOR CHANGE: 0
SHORTNESS OF BREATH: 0
NAUSEA: 1
ANAL BLEEDING: 0
DIARRHEA: 1
BACK PAIN: 0
RHINORRHEA: 0
ABDOMINAL PAIN: 1
VOMITING: 1
WHEEZING: 0
PHOTOPHOBIA: 0
BLOOD IN STOOL: 0

## 2020-02-27 NOTE — ED PROVIDER NOTES
[facility]  [facility]      Pt Name: Aida Mitchell  MRN: [id number]  [place] 1955  Date ofevaluation: 2/26/2020  Provider: Hema Stiles MD    CHIEF COMPLAINT       Chief Complaint   Patient presents with    Abdominal Pain     sharp abdominal pain since Tuesday AM, recently diagnosed with Cdiff, on abx. complaints of NVD         HISTORY OF PRESENT ILLNESS   (Location/Symptom, Timing/Onset,Context/Setting, Quality, Duration, Modifying Factors, Severity)  Note limiting factors. Aida Mitchell is a 59 y.o. male who presents to the emergency department for evaluation of left lower quadrant abdominal pain and diarrhea. Patient states that he was recently diagnosed with C. difficile and just completed a course of oral vancomycin. He states that he had an unprovoked episode of left lower quadrant pain that he describes a sharp aching pain. He states his had approximately 6 bowel movements that are nonbloody. Denies melena. States he had associated nausea and vomiting. States that he has similar symptoms when he was diagnosed with C. difficile previously. Denies fevers. He has not taking medications for his pain he states. HPI    NursingNotes were reviewed. REVIEW OF SYSTEMS    (2-9 systems for level 4, 10 or more for level 5)     Review of Systems   Constitutional: Negative for activity change, chills and fever. HENT: Negative for congestion and rhinorrhea. Eyes: Negative for photophobia and visual disturbance. Respiratory: Negative for shortness of breath and wheezing. Cardiovascular: Negative for palpitations and leg swelling. Gastrointestinal: Positive for abdominal pain, diarrhea, nausea and vomiting. Negative for anal bleeding and blood in stool. Endocrine: Negative for polydipsia and polyuria. Genitourinary: Negative for difficulty urinating and frequency. Musculoskeletal: Negative for back pain and gait problem. Years of education: None    Highest education level: None   Occupational History    Occupation: disabled   Social Needs    Financial resource strain: None    Food insecurity:     Worry: None     Inability: None    Transportation needs:     Medical: None     Non-medical: None   Tobacco Use    Smoking status: Current Every Day Smoker     Packs/day: 0.50     Years: 44.00     Pack years: 22.00     Types: Cigarettes    Smokeless tobacco: Never Used    Tobacco comment: urged to stop   Substance and Sexual Activity    Alcohol use: No     Alcohol/week: 0.0 standard drinks    Drug use: No    Sexual activity: Yes     Partners: Female   Lifestyle    Physical activity:     Days per week: None     Minutes per session: None    Stress: None   Relationships    Social connections:     Talks on phone: None     Gets together: None     Attends [employer] service: None     Active member of club or organization: None     Attends meetings of clubs or organizations: None     Relationship status: None    Intimate partner violence:     Fear of current or ex partner: None     Emotionally abused: None     Physically abused: None     Forced sexual activity: None   Other Topics Concern    None   Social History Narrative    None       SCREENINGS      @FLOW([id number])@      PHYSICAL EXAM    (up to 7 for level 4, 8 or more for level 5)     ED Triage Vitals   BP Temp Temp Source Pulse Resp SpO2 Height Weight   02/26/20 0402 02/26/20 0402 02/26/20 0745 02/26/20 0402 02/26/20 0402 02/26/20 0402 -- 02/26/20 0402   (!) 145/88 98.4 °F (36.9 °C) Oral 113 16 96 %  199 lb 11.8 oz (90.6 kg)       Physical Exam  Vitals signs and nursing note reviewed. Constitutional:       General: He is not in acute distress. Appearance: He is well-developed. HENT:      Head: Normocephalic and atraumatic. Mouth/Throat:      Mouth: Mucous membranes are moist.      Pharynx: Oropharynx is clear. No oropharyngeal exudate.    Eyes: Conjunctiva/sclera: Conjunctivae normal.   Neck:      Musculoskeletal: Normal range of motion. Trachea: No tracheal deviation. Cardiovascular:      Rate and Rhythm: Normal rate and regular rhythm. Pulmonary:      Effort: Pulmonary effort is normal.      Breath sounds: Normal breath sounds. No wheezing or rales. Abdominal:      General: There is no distension. Palpations: Abdomen is soft. Tenderness: There is abdominal tenderness. There is no guarding or rebound. Musculoskeletal: Normal range of motion. General: No deformity. Skin:     General: Skin is warm and dry. Capillary Refill: Capillary refill takes less than 2 seconds. Neurological:      General: No focal deficit present. Mental Status: He is alert and oriented to person, place, and time. DIAGNOSTIC RESULTS     EKG: All EKG's are interpreted by the Emergency Department Physician who either signs or Co-signsthis chart in the absence of a cardiologist.      RADIOLOGY:   Britni Gauthier such as CT, Ultrasound and MRI are read by the radiologist. Job Sleek radiographic images are visualized and preliminarily interpreted by the emergency physician with the below findings:      Interpretation per the Radiologist below, if available at the time ofthis note:    CT ABDOMEN PELVIS W IV CONTRAST Additional Contrast? None   Final Result   Nonobstructing left nephrolithiasis.                ED BEDSIDE ULTRASOUND:   Performed by ED Physician - none    LABS:  Labs Reviewed   CBC - Abnormal; Notable for the following components:       Result Value    RBC 4.19 (*)     All other components within normal limits    Narrative:     Performed at:  [address] [address]   Phone [phone]   BASIC METABOLIC PANEL - Abnormal; Notable for the following components:    Glucose 220 (*)     All other components within normal limits    Narrative:     Performed at:  [facility]  [address] [place], [place] [address]   Phone [phone]   LACTATE, SEPSIS    Narrative:     Performed at:  [facility]  [address] [place], [place] [address]   Phone [phone]       All other labs were within normal range or not returned as of this dictation. EMERGENCY DEPARTMENT COURSE and DIFFERENTIAL DIAGNOSIS/MDM:   Vitals:    Vitals:    02/26/20 0715 02/26/20 0725 02/26/20 0735 02/26/20 0745   BP:   105/62 110/74   Pulse:    90   Resp:    18   Temp:    98.2 °F (36.8 °C)   TempSrc:    Oral   SpO2: 94% 94% 93% 93%   Weight:             MDM  Number of Diagnoses or Management Options  Left lower quadrant abdominal pain:   Diagnosis management comments: D3year-old male presents the ED for evaluation of left lower quadrant clifford pain. On presentation vital signs within normal limits. Patient is nontoxic-appearing. He does have mild left lower quadrant tenderness no rebound or guarding. Patient recently diagnosed and treated for C. Difficile. He states he is having similar symptoms to his initial presentation. Will obtain basic laboratory work-up and reevaluate the patient. Differential diagnosis includes reinfection, diverticulitis, toxic megacolon, colitis, urolithiasis, UTI and abdominal wall pain. REASSESSMENT      On reevaluation patient is resting comfortably. Patient was observed emergency department for approximately 4 hours and did not have any bowel movements. States that his pains have is resolved. Work-up did not show leukocytosis. CT obtained to not show any emergent normalities. As the patient has had resolution of his symptoms and is not having diarrhea believe he is safe for discharge home. Did instruct the patient to follow-up with his primary care physician who was prescribing the vancomycin for treatment of C. difficile for further management.   Results were discussed

## 2020-03-04 ENCOUNTER — CARE COORDINATION (OUTPATIENT)
Dept: CASE MANAGEMENT | Age: 65
End: 2020-03-04

## 2020-03-04 NOTE — CARE COORDINATION
Aury 45 Transitions Follow Up Call    3/4/2020    Patient: Faye Fowler  Patient : 1955   MRN: [id number]  Reason for Admission: CP  Discharge Date: 20 RARS: Readmission Risk Score: 43         Spoke with: patient, [facility] Transitions Subsequent and Final Call    Subsequent and Final Calls  Care Transitions Interventions                          Other Interventions:        Spoke with patient, Zion Morrow  He stated he was fine and his weight today was 197#, weight  was 206#. Denies any needs, aware this is final transition call. Ruy aware he can call MD for any needs.     Follow Up  Future Appointments   Date Time Provider Fred Gregory   3/16/2020  1:00 PM Sanchez Stock MD [facility] BRAYAN Darden RN

## 2020-03-07 ENCOUNTER — APPOINTMENT (OUTPATIENT)
Dept: CT IMAGING | Age: 65
End: 2020-03-07
Payer: MEDICARE

## 2020-03-07 ENCOUNTER — HOSPITAL ENCOUNTER (EMERGENCY)
Age: 65
Discharge: HOME | End: 2020-03-07
Payer: MEDICARE

## 2020-03-07 VITALS
TEMPERATURE: 97.6 F | BODY MASS INDEX: 27.09 KG/M2 | OXYGEN SATURATION: 98 % | DIASTOLIC BLOOD PRESSURE: 104 MMHG | SYSTOLIC BLOOD PRESSURE: 177 MMHG | HEART RATE: 96 BPM | HEIGHT: 72 IN | RESPIRATION RATE: 19 BRPM

## 2020-03-07 LAB
A/G RATIO: 1.1 (ref 1.1–2.2)
ALBUMIN SERPL-MCNC: 3.9 G/DL (ref 3.4–5)
ALP BLD-CCNC: 77 U/L (ref 40–129)
ALT SERPL-CCNC: 9 U/L (ref 10–40)
ANION GAP SERPL CALCULATED.3IONS-SCNC: 14 MMOL/L (ref 3–16)
AST SERPL-CCNC: 19 U/L (ref 15–37)
BASOPHILS ABSOLUTE: 0 K/UL (ref 0–0.2)
BASOPHILS RELATIVE PERCENT: 0.3 %
BILIRUB SERPL-MCNC: 0.3 MG/DL (ref 0–1)
BUN BLDV-MCNC: 10 MG/DL (ref 7–20)
CALCIUM SERPL-MCNC: 8.8 MG/DL (ref 8.3–10.6)
CHLORIDE BLD-SCNC: 104 MMOL/L (ref 99–110)
CO2: 19 MMOL/L (ref 21–32)
CREAT SERPL-MCNC: 0.9 MG/DL (ref 0.8–1.3)
EOSINOPHILS ABSOLUTE: 0.2 K/UL (ref 0–0.6)
EOSINOPHILS RELATIVE PERCENT: 2.7 %
GFR AFRICAN AMERICAN: >60
GFR NON-AFRICAN AMERICAN: >60
GLOBULIN: 3.4 G/DL
GLUCOSE BLD-MCNC: 219 MG/DL (ref 70–99)
HCT VFR BLD CALC: 45.7 % (ref 40.5–52.5)
HEMOGLOBIN: 15.5 G/DL (ref 13.5–17.5)
LIPASE: 37 U/L (ref 13–60)
LYMPHOCYTES ABSOLUTE: 2.4 K/UL (ref 1–5.1)
LYMPHOCYTES RELATIVE PERCENT: 30.8 %
MCH RBC QN AUTO: 33.2 PG (ref 26–34)
MCHC RBC AUTO-ENTMCNC: 34 G/DL (ref 31–36)
MCV RBC AUTO: 97.8 FL (ref 80–100)
MONOCYTES ABSOLUTE: 0.5 K/UL (ref 0–1.3)
MONOCYTES RELATIVE PERCENT: 5.7 %
NEUTROPHILS ABSOLUTE: 4.8 K/UL (ref 1.7–7.7)
NEUTROPHILS RELATIVE PERCENT: 60.5 %
PDW BLD-RTO: 14.5 % (ref 12.4–15.4)
PLATELET # BLD: 233 K/UL (ref 135–450)
PMV BLD AUTO: 9.6 FL (ref 5–10.5)
POTASSIUM REFLEX MAGNESIUM: 4.9 MMOL/L (ref 3.5–5.1)
RBC # BLD: 4.68 M/UL (ref 4.2–5.9)
SODIUM BLD-SCNC: 137 MMOL/L (ref 136–145)
TOTAL PROTEIN: 7.3 G/DL (ref 6.4–8.2)
WBC # BLD: 7.9 K/UL (ref 4–11)

## 2020-03-07 PROCEDURE — 99284 EMERGENCY DEPT VISIT MOD MDM: CPT

## 2020-03-07 PROCEDURE — 6370000000 HC RX 637 (ALT 250 FOR IP): Performed by: PHYSICIAN ASSISTANT

## 2020-03-07 PROCEDURE — 83690 ASSAY OF LIPASE: CPT

## 2020-03-07 PROCEDURE — 96375 TX/PRO/DX INJ NEW DRUG ADDON: CPT

## 2020-03-07 PROCEDURE — 96374 THER/PROPH/DIAG INJ IV PUSH: CPT

## 2020-03-07 PROCEDURE — 2580000003 HC RX 258: Performed by: PHYSICIAN ASSISTANT

## 2020-03-07 PROCEDURE — 85025 COMPLETE CBC W/AUTO DIFF WBC: CPT

## 2020-03-07 PROCEDURE — 74176 CT ABD & PELVIS W/O CONTRAST: CPT

## 2020-03-07 PROCEDURE — 6360000002 HC RX W HCPCS: Performed by: PHYSICIAN ASSISTANT

## 2020-03-07 PROCEDURE — 80053 COMPREHEN METABOLIC PANEL: CPT

## 2020-03-07 RX ORDER — DICYCLOMINE HYDROCHLORIDE 10 MG/1
20 CAPSULE ORAL ONCE
Status: COMPLETED | OUTPATIENT
Start: 2020-03-07 | End: 2020-03-07

## 2020-03-07 RX ORDER — 0.9 % SODIUM CHLORIDE 0.9 %
1000 INTRAVENOUS SOLUTION INTRAVENOUS ONCE
Status: COMPLETED | OUTPATIENT
Start: 2020-03-07 | End: 2020-03-07

## 2020-03-07 RX ORDER — LISINOPRIL 10 MG/1
10 TABLET ORAL ONCE
Status: COMPLETED | OUTPATIENT
Start: 2020-03-07 | End: 2020-03-07

## 2020-03-07 RX ORDER — MORPHINE SULFATE 2 MG/ML
2 INJECTION, SOLUTION INTRAMUSCULAR; INTRAVENOUS ONCE
Status: COMPLETED | OUTPATIENT
Start: 2020-03-07 | End: 2020-03-07

## 2020-03-07 RX ORDER — ONDANSETRON 2 MG/ML
4 INJECTION INTRAMUSCULAR; INTRAVENOUS ONCE
Status: COMPLETED | OUTPATIENT
Start: 2020-03-07 | End: 2020-03-07

## 2020-03-07 RX ADMIN — LISINOPRIL 10 MG: 10 TABLET ORAL at 21:58

## 2020-03-07 RX ADMIN — MORPHINE SULFATE 2 MG: 2 INJECTION, SOLUTION INTRAMUSCULAR; INTRAVENOUS at 19:54

## 2020-03-07 RX ADMIN — ONDANSETRON 4 MG: 2 INJECTION INTRAMUSCULAR; INTRAVENOUS at 19:54

## 2020-03-07 RX ADMIN — HYDROMORPHONE HYDROCHLORIDE 0.5 MG: 1 INJECTION, SOLUTION INTRAMUSCULAR; INTRAVENOUS; SUBCUTANEOUS at 22:09

## 2020-03-07 RX ADMIN — DICYCLOMINE HYDROCHLORIDE 20 MG: 10 CAPSULE ORAL at 21:17

## 2020-03-07 RX ADMIN — SODIUM CHLORIDE 1000 ML: 9 INJECTION, SOLUTION INTRAVENOUS at 19:55

## 2020-03-07 ASSESSMENT — ENCOUNTER SYMPTOMS
APNEA: 0
SHORTNESS OF BREATH: 0
EYE DISCHARGE: 0
EYE REDNESS: 0
VOMITING: 1
BACK PAIN: 0
CHOKING: 0
ABDOMINAL PAIN: 1
NAUSEA: 1
DIARRHEA: 1
FACIAL SWELLING: 0

## 2020-03-07 ASSESSMENT — PAIN DESCRIPTION - PROGRESSION
CLINICAL_PROGRESSION: NOT CHANGED
CLINICAL_PROGRESSION: GRADUALLY IMPROVING

## 2020-03-07 ASSESSMENT — PAIN SCALES - GENERAL
PAINLEVEL_OUTOF10: 8
PAINLEVEL_OUTOF10: 6
PAINLEVEL_OUTOF10: 8

## 2020-03-07 ASSESSMENT — PAIN DESCRIPTION - LOCATION
LOCATION: ABDOMEN

## 2020-03-07 ASSESSMENT — PAIN DESCRIPTION - DESCRIPTORS
DESCRIPTORS: CONSTANT
DESCRIPTORS: CONSTANT
DESCRIPTORS: SHARP

## 2020-03-07 ASSESSMENT — PAIN DESCRIPTION - FREQUENCY
FREQUENCY: CONTINUOUS

## 2020-03-07 ASSESSMENT — PAIN DESCRIPTION - PAIN TYPE
TYPE: ACUTE PAIN

## 2020-03-08 NOTE — ED NOTES
Pt was recently in the hospital for C-diff. He states that his diarrhea never fully went away. He tells me that over the last few days the diarrhea and stomach cramping has increasingly gotten worse.       Stewart Ramachandran RN  03/07/20 1943

## 2020-03-08 NOTE — DISCHARGE INSTR - COC
Continuity of Care Form    Patient Name: Jewell Chahal   :  1955  MRN:  [id number]    Admit date:  3/7/2020  Discharge date:  ***    Code Status Order: Prior   Advance Directives:     Admitting Physician:  No admitting provider for patient encounter.   PCP: Roland Rodriguez MD    Discharging Nurse: [facility] Unit/Room#: 045/S-45  Discharging Unit Phone Number: ***    Emergency Contact:   Extended Emergency Contact Information  Primary Emergency Contact: Kristina Tobin  Address: [address] [place] [address] Marjorie Cantrell, Jason Kline [address] Phone: [phone]  Relation: Spouse  [facility] Emergency [facility] Phone: [phone]  [facility] Phone: [phone]  Relation: Child    Past Surgical History:  Past Surgical History:   Procedure Laterality Date    BACK SURGERY      CARDIAC SURGERY      CHOLECYSTECTOMY      COLONOSCOPY  01/10/2017    COLONOSCOPY  01/10/2017    CORONARY ANGIOPLASTY WITH STENT PLACEMENT      CORONARY ARTERY BYPASS GRAFT  , 2015    ENDOSCOPY, COLON, DIAGNOSTIC      PACEMAKER PLACEMENT      UPPER GASTROINTESTINAL ENDOSCOPY  2017       Immunization History:   Immunization History   Administered Date(s) Administered    Influenza 2013    Influenza Vaccine, unspecified formulation 10/24/2011, 2013, 2015, 2017    Influenza Virus Vaccine 2014, 2015, 2019    Influenza, Quadv, IM, (6 mo and older Fluzone, Flulaval, Fluarix and 3 yrs and older Afluria) 10/17/2018    Influenza, Quadv, IM, PF (6 mo and older Fluzone, Flulaval, Fluarix, and 3 yrs and older Afluria) 10/17/2016, 2019    Pneumococcal Polysaccharide (Maetujmob45) 10/24/2011, 2015    Tdap (Boostrix, Adacel) 2015       Active Problems:  Patient Active Problem List   Diagnosis Code    Chronic chest pain R07.9, G89.29    S/P CABG (coronary artery bypass graft) Z95.1    Essential hypertension Isolation          No Isolation        Patient Infection Status     Infection Onset Added Last Indicated Last Indicated By Review Planned Expiration Resolved Resolved By    C-diff Rule Out 20 Clostridium Difficile Toxin/Antigen (Ordered)              Nurse Assessment:  Last Vital Signs: BP (!) 168/91   Pulse 79   Temp 97.6 °F (36.4 °C) (Oral)   Resp 15   Ht 6' (1.829 m)   SpO2 98%   BMI 27.09 kg/m²     Last documented pain score (0-10 scale): Pain Level: 8  Last Weight:   Wt Readings from Last 1 Encounters:   20 199 lb 11.8 oz (90.6 kg)     Mental Status:  {IP PT MENTAL STATUS:}    IV Access:  { SHIRLEY IV ACCESS:567212192}    Nursing Mobility/ADLs:  Walking   {CHP DME VSBZ:084988831}  Transfer  {CHP DME HTZV:708693419}  Bathing  {CHP DME IFF}  Dressing  {CHP DME HSSU:982441366}  Toileting  {CHP DME YFDD:574095301}  Feeding  {CHP DME WXU}  Med Admin  {P DME CCHW:401956540}  Med Delivery   { SHIRLEY MED Delivery:377518810}    Wound Care Documentation and Therapy:        Elimination:  Continence:   · Bowel: {YES / FT:76058}  · Bladder: {YES / EQ:55271}  Urinary Catheter: {Urinary Catheter:311614409}   Colostomy/Ileostomy/Ileal Conduit: {YES / EK:11421}       Date of Last BM: ***  No intake or output data in the 24 hours ending 20 2308  No intake/output data recorded.     Safety Concerns:     [employer] Safety Concerns:725719600}    Impairments/Disabilities:      [employer] Impairments/Disabilities:446004631}    Nutrition Therapy:  Current Nutrition Therapy:   [employer] Diet List:865368589}    Routes of Feeding: {CHP DME Other Feedings:859096665}  Liquids: {Slp liquid thickness:48579}  Daily Fluid Restriction: {CHP DME Yes amt example:527554326}  Last Modified Barium Swallow with Video (Video Swallowing Test): {Done Not Done ECAO:307088228}    Treatments at the Time of Hospital Discharge:   Respiratory Treatments: ***  Oxygen Therapy:  {Therapy; copd

## 2020-03-08 NOTE — ED NOTES
Concepcion Feliciano [place] informed about pt increasing blood pressure       Herminia Bower, RN  03/07/20 6907

## 2020-03-08 NOTE — ED PROVIDER NOTES
**EVALUATED BY ADVANCED PRACTICE PROVIDER**        [address]      Pt Name: Scar Medley  ODQ:[id number]  [place] 1955  Date of evaluation: 3/7/2020  Provider: Marquise Gamez PA-C      Chief Complaint:    Chief Complaint   Patient presents with    Abdominal Pain     +n/v/d. recent admission with c-diff       Nursing Notes, Past Medical Hx, Past Surgical Hx, Social Hx, Allergies, and Family Hx were all reviewed and agreed with or any disagreements were addressed in the HPI.    HPI:  (Location, Duration, Timing, Severity, Quality, Assoc Sx, Context, Modifying factors)  This is a  59 y.o. male complaint of abdominal pain with nausea vomiting diarrhea. He states in the last month he been diagnosed with C. difficile. And after treatment is still never went away. Says he has been having an watery diarrhea and noticed some blood in his stool. Has 7-8 bowel movements since yesterday. All watery. Denies chest pain, no weakness, denies fever but he has felt hot. Denies headache. No other complaints. He thinks he still have the C. difficile.       PastMedical/Surgical History:      Diagnosis Date    Anxiety     Arthritis     Asthma     CAD (coronary artery disease)     Calcium kidney stone     Cardiomyopathy ([address]     Cerebral artery occlusion with cerebral infarction ([address]     CHF (congestive heart failure) ([address]     Clostridium difficile diarrhea 02/12/2020    COPD (chronic obstructive pulmonary disease) (HCC)     mild    Depression     DM2 (diabetes mellitus, type 2) (HCC)     Fibromyalgia     GERD (gastroesophageal reflux disease)     Hyperlipidemia     Hypertension     Liver disease     Pacemaker 2012    [employer]onic model # [id number]    Pneumonia     Seizures ([address]     TIA (transient ischemic attack) 2007    Ulcerative colitis ([address]          Procedure Laterality Date    BACK SURGERY      CARDIAC EXTENDED RELEASE TABLET    Take 1,000 mg by mouth 2 times daily    TRAZODONE (DESYREL) 50 MG TABLET    TAKE 1 TABLET BY MOUTH EVERY NIGHT AT BEDTIME         Review of Systems:  Review of Systems   Constitutional: Negative for chills and fever. HENT: Negative for congestion, facial swelling and sneezing. Eyes: Negative for discharge and redness. Respiratory: Negative for apnea, choking and shortness of breath. Cardiovascular: Negative for chest pain. Gastrointestinal: Positive for abdominal pain, diarrhea, nausea and vomiting. Genitourinary: Negative for dysuria. Musculoskeletal: Negative for back pain, neck pain and neck stiffness. Neurological: Negative for dizziness, tremors, seizures and headaches. All other systems reviewed and are negative. Positives and Pertinent negatives as per HPI. Except as noted above in the ROS, problem specific ROS was completed and is negative. Physical Exam:  Physical Exam  Vitals signs and nursing note reviewed. Constitutional:       Appearance: He is well-developed. He is not diaphoretic. HENT:      Head: Normocephalic and atraumatic. Nose: Nose normal.      Mouth/Throat:      Mouth: Mucous membranes are moist.   Eyes:      General:         Right eye: No discharge. Left eye: No discharge. Extraocular Movements: Extraocular movements intact. Conjunctiva/sclera: Conjunctivae normal.      Pupils: Pupils are equal, round, and reactive to light. Neck:      Musculoskeletal: Normal range of motion and neck supple. Cardiovascular:      Rate and Rhythm: Normal rate and regular rhythm. Heart sounds: Normal heart sounds. No murmur. No friction rub. No gallop. Pulmonary:      Effort: Pulmonary effort is normal. No respiratory distress. Breath sounds: Normal breath sounds. No wheezing or rales. Chest:      Chest wall: No tenderness. Abdominal:      General: Abdomen is flat. Bowel sounds are normal. There is no distension. Palpations: Abdomen is soft. There is no mass. Tenderness: There is abdominal tenderness. There is no guarding or rebound. Musculoskeletal: Normal range of motion. Skin:     General: Skin is warm and dry. Neurological:      Mental Status: He is alert and oriented to person, place, and time. Psychiatric:         Behavior: Behavior normal.         MEDICAL DECISION MAKING    Vitals:    Vitals:    03/07/20 2102 03/07/20 2132 03/07/20 2148 03/07/20 2158   BP: (!) 188/110 (!) 184/106 (!) 168/91 (!) 168/91   Pulse: 88 103 79    Resp: 17 (!) 34 15    Temp:       TempSrc:       SpO2: 96% 98% 98%    Height:           LABS:  Labs Reviewed   COMPREHENSIVE METABOLIC PANEL W/ REFLEX TO MG FOR LOW K - Abnormal; Notable for the following components:       Result Value    CO2 19 (*)     Glucose 219 (*)     ALT 9 (*)     All other components within normal limits    Narrative:     Performed at:  [facility]  [address] [place], [place] [address]   Phone [phone]   C DIFF TOXIN/ANTIGEN   CBC WITH AUTO DIFFERENTIAL    Narrative:     Performed at:  [facility]  [address] [place], [place] [address]   Phone [phone]   LIPASE    Narrative:     Performed at:  [facility]  [address] [place], [place] [address]   Phone [phone]   URINE RT REFLEX TO CULTURE        Remainder of labs reviewed and werenegative at this time or not returned at the time of this note. RADIOLOGY:   Non-plain film images such as CT, Ultrasound and MRI are read by the radiologist. Manuel Brooks PA-C have directly visualized the radiologic plain film image(s) with the below findings:        Interpretation per the Radiologist below, if available at the time of this note:    [address]   Final Result   Nonobstructing nephrolithiasis.       No significant interval change in 1.7 cm left lower lobe Initial FINDINGS: Nonspecific bowel gas pattern. Focally dilated loops small bowel within the left upper quadrant measuring up to 4.4 cm. The transverse colon is distended measuring up to 11 cm. No cecal distention noted. The gallbladder has been resected. The osseous structures are intact. Nonspecific bowel gas pattern with focally dilated loops small bowel left upper quadrant. Ct Abdomen Pelvis W Iv Contrast Additional Contrast? None    Result Date: 2/26/2020  EXAMINATION: CT OF THE ABDOMEN AND PELVIS WITH CONTRAST 2/26/2020 6:54 am TECHNIQUE: CT of the abdomen and pelvis was performed with the administration of intravenous contrast. Multiplanar reformatted images are provided for review. Dose modulation, iterative reconstruction, and/or weight based adjustment of the mA/kV was utilized to reduce the radiation dose to as low as reasonably achievable. COMPARISON: 09/11/2019 HISTORY: ORDERING SYSTEM PROVIDED HISTORY: abd pain TECHNOLOGIST PROVIDED HISTORY: If patient is on cardiac monitor and/or pulse ox, they may be taken off cardiac monitor and pulse ox, left on O2 if currently on. All monitors reattached when patient returns to room. Additional Contrast?->None Reason for exam:->abd pain FINDINGS: Lower Chest: There is no consolidation or effusion. Noncalcified 18 mm left lower lobe pulmonary nodule is stable dating back to 2018 and thus benign. Organs: Postsurgical changes of cholecystectomy are present. The liver, pancreas, spleen, kidneys and adrenals are unremarkable aside from small stable bilateral adrenal adenoma, a nonobstructing 2 mm left intrarenal calculus and a stable 7.5 x 3 cm exophytic left renal simple cyst. GI/Bowel: There is no bowel dilatation, wall thickening or obstruction. The appendix is not visualized. Pelvis: The bladder and prostate are unremarkable. Peritoneum/Retroperitoneum: There is no free air, free fluid or intraperitoneal inflammatory change.   There is no adenopathy. Moderate atherosclerotic changes are noted within the aortoiliac system. Bones/Soft Tissues: There is no fracture or aggressive osseous lesion. Nonobstructing left nephrolithiasis. Cta Chest Abdomen Pelvis W Contrast    Result Date: 2/10/2020  EXAMINATION: CTA OF THE CHEST, ABDOMEN AND PELVIS WITH CONTRAST, 2/10/2020 5:09 pm TECHNIQUE: CTA of the chest, abdomen and pelvis was performed after the administration of intravenous contrast.  Multiplanar reformatted images are provided for review. MIP images are provided for review. Dose modulation, iterative reconstruction, and/or weight based adjustment of the mA/kV was utilized to reduce the radiation dose to as low as reasonably achievable. COMPARISON: 09/12/2019, 07/20/2019 HISTORY: ORDERING SYSTEM PROVIDED HISTORY: R/O Dissection TECHNOLOGIST PROVIDED HISTORY: Reason for exam:->R/O Dissection Reason for Exam: Chest Pain (onset last night. n/v. + diaphoresis. history of CABG per pts report) Acuity: Acute Type of Exam: Initial FINDINGS: CTA CHEST: The thoracic aorta enhances normally. No evidence for an aortic aneurysm or for an aortic dissection. Calcified plaque in the thoracic aorta. No mediastinal or hilar masses. No focal pulmonary abnormalities. CTA ABDOMEN: The abdominal aorta enhances normally demonstrating no evidence for an aortic aneurysm or dissection. Calcified atherosclerotic plaque noted. Contrast was not optimized for the abdominal organs. Status post cholecystectomy. Unchanged appearing adrenal glands. The bowel loops are not dilated. No focal bowel wall thickening. CTA PELVIS: The iliac arteries enhance normally. No evidence for an aneurysm or dissection. The bladder is intact. No bladder or ureteral stones. No pelvic masses. No free pelvic fluid. THORACIC/LUMBAR SPINE: No acute fracture. No lytic or blastic lesion. 1. No aortic aneurysm or dissection 2.  No acute abnormalities seen in the chest abdomen or given: Medications   0.9 % sodium chloride bolus (0 mLs Intravenous Stopped 3/7/20 2118)   morphine (PF) injection 2 mg (2 mg Intravenous Given 3/7/20 1954)   ondansetron (ZOFRAN) injection 4 mg (4 mg Intravenous Given 3/7/20 1954)   dicyclomine (BENTYL) capsule 20 mg (20 mg Oral Given 3/7/20 2117)   lisinopril (PRINIVIL;ZESTRIL) tablet 10 mg (10 mg Oral Given 3/7/20 2158)   HYDROmorphone (DILAUDID) injection 0.5 mg (0.5 mg Intravenous Given 3/7/20 2209)           Emergency room course: Patient on exam throat is clear. Neck is supple full range of motion without tenderness. Cardiovascular regular rhythm, lungs are clear no wheeze rales or rhonchi. Abdomen shows mild mid abdominal tenderness with palpation that extend from the epigastric to the lower abdomen. No palpable mass. Nondistended. Normal bowel sounds all 4 quadrant. No CVA or flank tenderness. Full range of motion of extremity. Patient neurologically has no motor or sensory deficit noted. He is alert oriented x4. Does not appear to be in acute distress. Lab from today shows CBC within normal limits with a white count of 7.9. CMP shows sodium 137, potassium 4.9, chloride 104 BUN 10 and creatinine 0.9. Normal transaminases. Lipase 37.0. Patient throughout his whole time here in the ED was not able to give stool sample. But he kept complaining of abdominal pain. He was initially given morphine 2 mg IV and Zofran 4 mg IV. And he Told a nurse he is hurting he is hurting repeatedly although I told him I want to wait and see what his CT scan shows and I want to keep giving him medication and he is got nothing shown. He still consistently asked the nurse for something for pain so I did given and gave him a half a milligram of Dilaudid. CT scan shows nonobstructing nephrolithiasis. No significant interval change in the 1.7 cm left lower lobe pulmonary nodule. It shows moderate stool within the colon.   Appendix is within normal

## 2020-03-10 ENCOUNTER — APPOINTMENT (OUTPATIENT)
Dept: GENERAL RADIOLOGY | Age: 65
End: 2020-03-10
Payer: MEDICARE

## 2020-03-10 ENCOUNTER — HOSPITAL ENCOUNTER (EMERGENCY)
Age: 65
Discharge: HOME | End: 2020-03-10
Attending: EMERGENCY MEDICINE
Payer: MEDICARE

## 2020-03-10 ENCOUNTER — APPOINTMENT (OUTPATIENT)
Dept: CT IMAGING | Age: 65
End: 2020-03-10
Payer: MEDICARE

## 2020-03-10 VITALS
HEIGHT: 72 IN | SYSTOLIC BLOOD PRESSURE: 160 MMHG | RESPIRATION RATE: 20 BRPM | TEMPERATURE: 98.1 F | WEIGHT: 181.88 LBS | HEART RATE: 96 BPM | DIASTOLIC BLOOD PRESSURE: 94 MMHG | OXYGEN SATURATION: 97 % | BODY MASS INDEX: 24.63 KG/M2

## 2020-03-10 LAB
A/G RATIO: 1.1 (ref 1.1–2.2)
ALBUMIN SERPL-MCNC: 4 G/DL (ref 3.4–5)
ALP BLD-CCNC: 78 U/L (ref 40–129)
ALT SERPL-CCNC: 6 U/L (ref 10–40)
ANION GAP SERPL CALCULATED.3IONS-SCNC: 19 MMOL/L (ref 3–16)
AST SERPL-CCNC: 10 U/L (ref 15–37)
BASOPHILS ABSOLUTE: 0.1 K/UL (ref 0–0.2)
BASOPHILS RELATIVE PERCENT: 1 %
BILIRUB SERPL-MCNC: 0.7 MG/DL (ref 0–1)
BILIRUBIN URINE: NEGATIVE
BLOOD, URINE: NEGATIVE
BUN BLDV-MCNC: 9 MG/DL (ref 7–20)
C DIFF TOXIN/ANTIGEN: NORMAL
CALCIUM SERPL-MCNC: 9.3 MG/DL (ref 8.3–10.6)
CHLORIDE BLD-SCNC: 98 MMOL/L (ref 99–110)
CLARITY: CLEAR
CO2: 15 MMOL/L (ref 21–32)
COLOR: YELLOW
CREAT SERPL-MCNC: 0.8 MG/DL (ref 0.8–1.3)
EKG ATRIAL RATE: 115 BPM
EKG DIAGNOSIS: NORMAL
EKG P AXIS: 70 DEGREES
EKG P-R INTERVAL: 124 MS
EKG Q-T INTERVAL: 380 MS
EKG QRS DURATION: 136 MS
EKG QTC CALCULATION (BAZETT): 525 MS
EKG R AXIS: 69 DEGREES
EKG T AXIS: 29 DEGREES
EKG VENTRICULAR RATE: 115 BPM
EOSINOPHILS ABSOLUTE: 0.2 K/UL (ref 0–0.6)
EOSINOPHILS RELATIVE PERCENT: 1.9 %
GFR AFRICAN AMERICAN: >60
GFR NON-AFRICAN AMERICAN: >60
GLOBULIN: 3.6 G/DL
GLUCOSE BLD-MCNC: 206 MG/DL (ref 70–99)
GLUCOSE URINE: NEGATIVE MG/DL
HCT VFR BLD CALC: 46.8 % (ref 40.5–52.5)
HEMOGLOBIN: 15.9 G/DL (ref 13.5–17.5)
KETONES, URINE: NEGATIVE MG/DL
LACTIC ACID: 2.4 MMOL/L (ref 0.4–2)
LEUKOCYTE ESTERASE, URINE: NEGATIVE
LIPASE: 34 U/L (ref 13–60)
LYMPHOCYTES ABSOLUTE: 2.2 K/UL (ref 1–5.1)
LYMPHOCYTES RELATIVE PERCENT: 27.6 %
MCH RBC QN AUTO: 33 PG (ref 26–34)
MCHC RBC AUTO-ENTMCNC: 34 G/DL (ref 31–36)
MCV RBC AUTO: 97.1 FL (ref 80–100)
MICROSCOPIC EXAMINATION: NORMAL
MONOCYTES ABSOLUTE: 0.5 K/UL (ref 0–1.3)
MONOCYTES RELATIVE PERCENT: 6.6 %
NEUTROPHILS ABSOLUTE: 5.1 K/UL (ref 1.7–7.7)
NEUTROPHILS RELATIVE PERCENT: 62.9 %
NITRITE, URINE: NEGATIVE
PDW BLD-RTO: 13.8 % (ref 12.4–15.4)
PH UA: 6 (ref 5–8)
PLATELET # BLD: 230 K/UL (ref 135–450)
PMV BLD AUTO: 8.9 FL (ref 5–10.5)
POTASSIUM REFLEX MAGNESIUM: 3.7 MMOL/L (ref 3.5–5.1)
PROTEIN UA: NEGATIVE MG/DL
RBC # BLD: 4.82 M/UL (ref 4.2–5.9)
SODIUM BLD-SCNC: 132 MMOL/L (ref 136–145)
SPECIFIC GRAVITY UA: 1.01 (ref 1–1.03)
TOTAL PROTEIN: 7.6 G/DL (ref 6.4–8.2)
TROPONIN: 0.02 NG/ML
URINE REFLEX TO CULTURE: NORMAL
URINE TYPE: NORMAL
UROBILINOGEN, URINE: 0.2 E.U./DL
WBC # BLD: 8.1 K/UL (ref 4–11)

## 2020-03-10 PROCEDURE — 96375 TX/PRO/DX INJ NEW DRUG ADDON: CPT

## 2020-03-10 PROCEDURE — 6360000002 HC RX W HCPCS: Performed by: PHYSICIAN ASSISTANT

## 2020-03-10 PROCEDURE — 84484 ASSAY OF TROPONIN QUANT: CPT

## 2020-03-10 PROCEDURE — 87324 CLOSTRIDIUM AG IA: CPT

## 2020-03-10 PROCEDURE — 74176 CT ABD & PELVIS W/O CONTRAST: CPT

## 2020-03-10 PROCEDURE — 96374 THER/PROPH/DIAG INJ IV PUSH: CPT

## 2020-03-10 PROCEDURE — 85025 COMPLETE CBC W/AUTO DIFF WBC: CPT

## 2020-03-10 PROCEDURE — 71045 X-RAY EXAM CHEST 1 VIEW: CPT

## 2020-03-10 PROCEDURE — 93010 ELECTROCARDIOGRAM REPORT: CPT | Performed by: INTERNAL MEDICINE

## 2020-03-10 PROCEDURE — 87449 NOS EACH ORGANISM AG IA: CPT

## 2020-03-10 PROCEDURE — 81003 URINALYSIS AUTO W/O SCOPE: CPT

## 2020-03-10 PROCEDURE — 2500000003 HC RX 250 WO HCPCS: Performed by: PHYSICIAN ASSISTANT

## 2020-03-10 PROCEDURE — 83690 ASSAY OF LIPASE: CPT

## 2020-03-10 PROCEDURE — 2580000003 HC RX 258: Performed by: PHYSICIAN ASSISTANT

## 2020-03-10 PROCEDURE — 99284 EMERGENCY DEPT VISIT MOD MDM: CPT

## 2020-03-10 PROCEDURE — 83605 ASSAY OF LACTIC ACID: CPT

## 2020-03-10 PROCEDURE — 93005 ELECTROCARDIOGRAM TRACING: CPT | Performed by: EMERGENCY MEDICINE

## 2020-03-10 PROCEDURE — 6370000000 HC RX 637 (ALT 250 FOR IP): Performed by: PHYSICIAN ASSISTANT

## 2020-03-10 PROCEDURE — 80053 COMPREHEN METABOLIC PANEL: CPT

## 2020-03-10 PROCEDURE — 6370000000 HC RX 637 (ALT 250 FOR IP): Performed by: EMERGENCY MEDICINE

## 2020-03-10 RX ORDER — FAMOTIDINE 20 MG/1
20 TABLET, FILM COATED ORAL 2 TIMES DAILY
Qty: 30 TABLET | Refills: 0 | Status: SHIPPED | OUTPATIENT
Start: 2020-03-10 | End: 2020-06-26 | Stop reason: CLARIF

## 2020-03-10 RX ORDER — OXYCODONE AND ACETAMINOPHEN 10; 325 MG/1; MG/1
1 TABLET ORAL ONCE
Status: COMPLETED | OUTPATIENT
Start: 2020-03-10 | End: 2020-03-10

## 2020-03-10 RX ORDER — ONDANSETRON 2 MG/ML
4 INJECTION INTRAMUSCULAR; INTRAVENOUS ONCE
Status: COMPLETED | OUTPATIENT
Start: 2020-03-10 | End: 2020-03-10

## 2020-03-10 RX ORDER — FENTANYL CITRATE 50 UG/ML
25 INJECTION, SOLUTION INTRAMUSCULAR; INTRAVENOUS ONCE
Status: COMPLETED | OUTPATIENT
Start: 2020-03-10 | End: 2020-03-10

## 2020-03-10 RX ORDER — 0.9 % SODIUM CHLORIDE 0.9 %
1000 INTRAVENOUS SOLUTION INTRAVENOUS ONCE
Status: COMPLETED | OUTPATIENT
Start: 2020-03-10 | End: 2020-03-10

## 2020-03-10 RX ADMIN — SODIUM CHLORIDE 1000 ML: 9 INJECTION, SOLUTION INTRAVENOUS at 12:56

## 2020-03-10 RX ADMIN — FENTANYL CITRATE 25 MCG: 50 INJECTION, SOLUTION INTRAMUSCULAR; INTRAVENOUS at 13:48

## 2020-03-10 RX ADMIN — OXYCODONE HYDROCHLORIDE AND ACETAMINOPHEN 1 TABLET: 10; 325 TABLET ORAL at 16:07

## 2020-03-10 RX ADMIN — FAMOTIDINE 20 MG: 10 INJECTION, SOLUTION INTRAVENOUS at 12:56

## 2020-03-10 RX ADMIN — LIDOCAINE HYDROCHLORIDE: 20 SOLUTION ORAL; TOPICAL at 12:59

## 2020-03-10 RX ADMIN — ONDANSETRON 4 MG: 2 INJECTION INTRAMUSCULAR; INTRAVENOUS at 12:56

## 2020-03-10 ASSESSMENT — PAIN DESCRIPTION - PAIN TYPE
TYPE: ACUTE PAIN
TYPE: ACUTE PAIN

## 2020-03-10 ASSESSMENT — PAIN SCALES - GENERAL
PAINLEVEL_OUTOF10: 9
PAINLEVEL_OUTOF10: 9
PAINLEVEL_OUTOF10: 8
PAINLEVEL_OUTOF10: 8

## 2020-03-10 ASSESSMENT — PAIN DESCRIPTION - PROGRESSION
CLINICAL_PROGRESSION: NOT CHANGED
CLINICAL_PROGRESSION: NOT CHANGED

## 2020-03-10 ASSESSMENT — PAIN DESCRIPTION - ONSET
ONSET: ON-GOING
ONSET: ON-GOING

## 2020-03-10 ASSESSMENT — PAIN DESCRIPTION - ORIENTATION
ORIENTATION: MID
ORIENTATION: MID

## 2020-03-10 ASSESSMENT — PAIN DESCRIPTION - FREQUENCY
FREQUENCY: CONTINUOUS
FREQUENCY: CONTINUOUS

## 2020-03-10 ASSESSMENT — PAIN DESCRIPTION - DESCRIPTORS
DESCRIPTORS: ACHING
DESCRIPTORS: ACHING

## 2020-03-10 ASSESSMENT — PAIN DESCRIPTION - LOCATION
LOCATION: ABDOMEN
LOCATION: ABDOMEN

## 2020-03-10 NOTE — ED TRIAGE NOTES
mid upper abd pain, pain increasing from prior admit on Saturday.  C/o nausea but denies any emesis, but having diarrhea approx every hour

## 2020-03-10 NOTE — ED NOTES
Discharge and education instructions reviewed. Patient verbalized understanding, teach-back successful. Patient denied questions at this time. No acute distress noted. Patient instructed to follow-up as noted - return to emergency department if symptoms worsen. Patient verbalized understanding. Discharged per EDMD with discharged instructions.        Lucas Ku RN  03/10/20 9775

## 2020-03-10 NOTE — ED PROVIDER NOTES
[address]       Pt Name: Diana Hall  MRN: [id number]  [place] 1955  Date of evaluation: 3/10/2020  Provider: ABIGAIL Balbuena    Shared Visit or Autonomous Visit:  I have seen and evaluated this patient with my supervising physician Paco Carter*. CHIEF COMPLAINT       Chief Complaint   Patient presents with    Abdominal Pain     mid abd pain onset \"days ago\". diarrhea.  pain increasing from prior admit on saturday       HISTORY OF PRESENT ILLNESS  (Location/Symptom, Timing/Onset, Context/Setting, Quality, Duration, Modifying Factors, Severity.)   Diana Hall is a 59 y.o. male who presents to the emergency department with a complaint of abdominal pain and diarrhea. Patient was seen in this ED 3 days ago for similar complaints, was discharged, and prior to that he was seen for abdominal pain on February 26. Patient says he recently has C. difficile infection and completed a course of antibiotics, but he still having diarrhea. He says he since his ED visit 3 days ago he has only gotten worse, with worsening abdominal pain, still persistent diarrhea, and being unable to eat. He says he is nauseous now and then but no vomiting. He says the pain is mostly in his upper abdomen but it seems to radiate throughout the whole belly. Reports past history of cholecystectomy. Denies chest pain or shortness of breath presently. Patient has had some recent hospital admissions for chest pain with a thorough cardiac work-up. Denies any history of gastric ulcer, says he does not see a gastroenterologist, and denies any history of EGD procedure that he can recall. Says he does not drink alcohol, and he has been taking some ibuprofen over the past few days but does not take it regularly. Denies seeing blood in the stool. Denies urinary complaints. No other complaints. Nursing Notes were reviewed and I agree.     REVIEW OF SYSTEMS NIGHT AT BEDTIME, Disp-90 tablet, R-0Normal      hydrALAZINE (APRESOLINE) 25 MG tablet Take 1 tablet by mouth every 8 hours, Disp-90 tablet, R-3Normal      acetaminophen (TYLENOL) 325 MG tablet Take 650 mg by mouth every 6 hours as needed for PainHistorical Med      clopidogrel (PLAVIX) 75 MG tablet TAKE 1 TABLET BY MOUTH DAILY, Disp-90 tablet, R-3Normal      nitroGLYCERIN (NITROSTAT) 0.4 MG SL tablet PLACE 1 TABLET UNDER THE TONGUE EVERY 5 MINUTES AS NEEDED FOR CHEST PAIN, Disp-25 tablet, R-1Normal      aspirin 325 MG tablet Take 325 mg by mouth daily Historical Med      atorvastatin (LIPITOR) 80 MG tablet TAKE 1 TABLET BY MOUTH DAILY, Disp-90 tablet, R-1Normal             ALLERGIES     Dopamine hcl and Melatonin    FAMILY HISTORY           Problem Relation Age of Onset    Diabetes Father     Coronary Art Dis Father     Cancer Mother         Lung with mets     Family Status   Relation Name Status    Father  Alive        CAD    Mother   at age 62        Lung cancer        SOCIAL HISTORY      reports that he has been smoking cigarettes. He has a 22.00 pack-year smoking history. He has never used smokeless tobacco. He reports that he does not drink alcohol or use drugs. PHYSICAL EXAM    (up to 7 for level 4, 8 or more for level 5)     ED Triage Vitals [03/10/20 1227]   BP Temp Temp Source Pulse Resp SpO2 Height Weight   (!) 175/115 98.1 °F (36.7 °C) Oral 62 17 99 % 6' (1.829 m) 181 lb 14.1 oz (82.5 kg)       Constitutional:  Appearing well-developed and well-nourished. No distress. HENT:  Normocephalic and atraumatic. Conjunctivae and EOM are normal. Pupils are equal, round, and reactive to light. Neck:  Normal range of motion. Neck supple. No tracheal deviation present. No thyromegaly present. No cervical adenopathy. Cardiovascular:  Normal rate, regular rhythm, normal heart sounds and intact distal pulses. Pulmonary/Chest:  Effort normal and breath sounds normal. No respiratory distress.  No wheezes or rales. Abdominal:  Soft. Bowel sounds are normal.  Significant epigastric tenderness to palpation. No distension, mass, rebound or guarding. Musculoskeletal:  Normal range of motion. No edema exhibited. Neurological:  Alert and oriented to person, place, and time. No cranial nerve deficit. Skin:  Skin is warm and dry. Not diaphoretic. Psychiatric:  Normal mood, affect, behavior, judgment and thought content. DIAGNOSTIC RESULTS     RADIOLOGY:   Interpretation per the Radiologist below, if available at the time of this note:    CT ABDOMEN PELVIS WO CONTRAST Additional Contrast? None   Final Result   No evidence of acute process in the abdomen or pelvis. No change over the   past 3 days. 1.7 cm left lower lobe pulmonary nodule again noted, unchanged over the past   3 days. XR CHEST PORTABLE   Final Result   1. No acute cardiopulmonary disease.              LABS:  Labs Reviewed   COMPREHENSIVE METABOLIC PANEL W/ REFLEX TO MG FOR LOW K - Abnormal; Notable for the following components:       Result Value    Sodium 132 (*)     Chloride 98 (*)     CO2 15 (*)     Anion Gap 19 (*)     Glucose 206 (*)     ALT 6 (*)     AST 10 (*)     All other components within normal limits    Narrative:     Performed at:  [address] [place]   Phone [phone]   TROPONIN - Abnormal; Notable for the following components:    Troponin 0.02 (*)     All other components within normal limits    Narrative:     Performed at:  [address] [place]   Phone [phone]   LACTIC ACID, PLASMA - Abnormal; Notable for the following components:    Lactic Acid 2.4 (*)     All other components within normal limits    Narrative:     Performed at:  [address] [place] [employer]   Phone [phone]   C DIFF TOXIN/ANTIGEN Narrative:     ORDER#: [id number]                          ORDERED BY: Kemi Ferreira  SOURCE: Stool                              COLLECTED:  03/10/20 13:54  ANTIBIOTICS AT [facility].:                      RECEIVED :  03/10/20 14:50  Collect White vial (sterile container)  Performed at:  [place] [address]   Phone [phone]   CBC WITH AUTO DIFFERENTIAL    Narrative:     Performed at:  [place] [address]   Phone [phone]   LIPASE    Narrative:     Performed at:  [facility]  [address] [place] [address]   Phone [phone]   URINE RT REFLEX TO CULTURE    Narrative:     Performed at:  [place] [address]   Phone [phone]       All other labs were within normal range or not returned as of this dictation. EMERGENCY DEPARTMENT COURSE and DIFFERENTIAL DIAGNOSIS/MDM:   Vitals:    Vitals:    03/10/20 1227 03/10/20 1256 03/10/20 1300 03/10/20 1332   BP: (!) 175/115 (!) 160/103 (!) 164/94 (!) 160/94   Pulse: 62 113 111 96   Resp: 17 25 25 20   Temp: 98.1 °F (36.7 °C)      TempSrc: Oral      SpO2: 99% 95% 95% 97%   Weight: 181 lb 14.1 oz (82.5 kg)      Height: 6' (1.829 m)          The patient's condition in the ED was good, the patient was afebrile and nontoxic in appearance, and the patient's physical exam was unremarkable other than for tenderness to the upper abdomen, as noted above. No complaint of chest pain here today. EKG was non-concerning. Lab work-up was notable for elevated anion gap at 19, and lactate of 2.4. Troponin was 0.02, apparently the patient's baseline. Patient was given a liter of IV fluids in the ED. He repeatedly was requesting pain medication, and was given some IV fentanyl in addition to GI medications.   No episodes of diarrhea in

## 2020-03-10 NOTE — ED NOTES
Introduce myself to the patient, identification band inplace, stretcher in the lowest position for safety, and the call light is in reach. Updated patient on Emergency Department plan of care, no additional needs at this time, will continue to monitor patient.         Cherie Wheeler RN  03/10/20 8175

## 2020-03-10 NOTE — ED PROVIDER NOTES
I independently evaluated and obtained a history and physical on [address] All diagnostic, treatment, and disposition assistants were made to myself in conjunction the advanced practice provider. For further details of this patient's emergency department encounter, please see the advanced practice provider's documentation. History: This patient presents emergency department complaining of epigastric pain that started several days ago. Progressively worsening. Was admitted recently for same. The patient states since being discharged his pain is just gradually worsened. Physician Exam: Chronically ill-appearing male in no acute distress. He has epigastric tenderness to palpation. No rebound or guarding.     The Ekg interpreted by me shows  sinus tachycardia, ttnt=486   Axis is   70  QTc is  525 msec  Right bundle branch block  ST Segments: nonspecific changes  When compared to an EKG performed on 16 February 2020 sinus rhythm has been replaced with sinus tachycardia     Finn Thacker MD  03/11/20 1097

## 2020-03-11 ENCOUNTER — HOSPITAL ENCOUNTER (EMERGENCY)
Age: 65
Discharge: HOME | End: 2020-03-11
Payer: MEDICARE

## 2020-03-11 VITALS
SYSTOLIC BLOOD PRESSURE: 158 MMHG | RESPIRATION RATE: 18 BRPM | HEIGHT: 72 IN | BODY MASS INDEX: 24.63 KG/M2 | WEIGHT: 181.88 LBS | DIASTOLIC BLOOD PRESSURE: 102 MMHG | TEMPERATURE: 97.9 F | OXYGEN SATURATION: 96 % | HEART RATE: 112 BPM

## 2020-03-11 ASSESSMENT — PAIN DESCRIPTION - DESCRIPTORS: DESCRIPTORS: DISCOMFORT

## 2020-03-11 ASSESSMENT — PAIN SCALES - GENERAL: PAINLEVEL_OUTOF10: 10

## 2020-03-11 ASSESSMENT — PAIN DESCRIPTION - LOCATION: LOCATION: ABDOMEN

## 2020-03-11 ASSESSMENT — PAIN DESCRIPTION - PAIN TYPE: TYPE: ACUTE PAIN

## 2020-03-11 NOTE — ED NOTES
This RN to bedside-the patient stated that he overheard people in the lobby saying that they were here for multiple hours. I told him that we would like to do protocol orders at this time to attempt to expedite his care. He refused, jumped out of his chair and walked out of the ED in no apparent distress and would not answer any other questions.      Carol Shook, ADEEL  03/11/20 9814

## 2020-03-25 PROBLEM — N28.9 RENAL INSUFFICIENCY: Status: RESOLVED | Noted: 2018-01-04 | Resolved: 2020-03-24

## 2020-04-13 PROBLEM — M79.2 NEURITIS: Status: ACTIVE | Noted: 2020-04-13

## 2020-04-14 ENCOUNTER — APPOINTMENT (OUTPATIENT)
Dept: GENERAL RADIOLOGY | Age: 65
DRG: 281 | End: 2020-04-14
Payer: MEDICARE

## 2020-04-14 ENCOUNTER — HOSPITAL ENCOUNTER (INPATIENT)
Age: 65
LOS: 2 days | Discharge: HOME | DRG: 281 | End: 2020-04-16
Attending: EMERGENCY MEDICINE | Admitting: HOSPITALIST
Payer: MEDICARE

## 2020-04-14 LAB
A/G RATIO: 1.5 (ref 1.1–2.2)
ALBUMIN SERPL-MCNC: 4.5 G/DL (ref 3.4–5)
ALP BLD-CCNC: 69 U/L (ref 40–129)
ALT SERPL-CCNC: 16 U/L (ref 10–40)
ANION GAP SERPL CALCULATED.3IONS-SCNC: 16 MMOL/L (ref 3–16)
APTT: 32 SEC (ref 24.2–36.2)
APTT: 83.1 SEC (ref 24.2–36.2)
AST SERPL-CCNC: 12 U/L (ref 15–37)
BASOPHILS ABSOLUTE: 0.1 K/UL (ref 0–0.2)
BASOPHILS RELATIVE PERCENT: 1.4 %
BILIRUB SERPL-MCNC: 0.5 MG/DL (ref 0–1)
BUN BLDV-MCNC: 11 MG/DL (ref 7–20)
CALCIUM SERPL-MCNC: 9.7 MG/DL (ref 8.3–10.6)
CHLORIDE BLD-SCNC: 100 MMOL/L (ref 99–110)
CO2: 22 MMOL/L (ref 21–32)
CREAT SERPL-MCNC: 0.8 MG/DL (ref 0.8–1.3)
EOSINOPHILS ABSOLUTE: 0.2 K/UL (ref 0–0.6)
EOSINOPHILS RELATIVE PERCENT: 2.3 %
GFR AFRICAN AMERICAN: >60
GFR NON-AFRICAN AMERICAN: >60
GLOBULIN: 3.1 G/DL
GLUCOSE BLD-MCNC: 147 MG/DL (ref 70–99)
GLUCOSE BLD-MCNC: 168 MG/DL (ref 70–99)
HCT VFR BLD CALC: 42 % (ref 40.5–52.5)
HCT VFR BLD CALC: 44.8 % (ref 40.5–52.5)
HEMOGLOBIN: 13.8 G/DL (ref 13.5–17.5)
HEMOGLOBIN: 15.4 G/DL (ref 13.5–17.5)
LYMPHOCYTES ABSOLUTE: 1.9 K/UL (ref 1–5.1)
LYMPHOCYTES RELATIVE PERCENT: 21.6 %
MCH RBC QN AUTO: 32.8 PG (ref 26–34)
MCH RBC QN AUTO: 33.8 PG (ref 26–34)
MCHC RBC AUTO-ENTMCNC: 33 G/DL (ref 31–36)
MCHC RBC AUTO-ENTMCNC: 34.2 G/DL (ref 31–36)
MCV RBC AUTO: 98.6 FL (ref 80–100)
MCV RBC AUTO: 99.3 FL (ref 80–100)
MONOCYTES ABSOLUTE: 0.3 K/UL (ref 0–1.3)
MONOCYTES RELATIVE PERCENT: 3.9 %
NEUTROPHILS ABSOLUTE: 6.2 K/UL (ref 1.7–7.7)
NEUTROPHILS RELATIVE PERCENT: 70.8 %
PDW BLD-RTO: 15.5 % (ref 12.4–15.4)
PDW BLD-RTO: 15.6 % (ref 12.4–15.4)
PERFORMED ON: ABNORMAL
PLATELET # BLD: 206 K/UL (ref 135–450)
PLATELET # BLD: 208 K/UL (ref 135–450)
PMV BLD AUTO: 8.5 FL (ref 5–10.5)
PMV BLD AUTO: 8.8 FL (ref 5–10.5)
POTASSIUM REFLEX MAGNESIUM: 4 MMOL/L (ref 3.5–5.1)
PRO-BNP: 2315 PG/ML (ref 0–124)
RBC # BLD: 4.22 M/UL (ref 4.2–5.9)
RBC # BLD: 4.55 M/UL (ref 4.2–5.9)
SODIUM BLD-SCNC: 138 MMOL/L (ref 136–145)
TOTAL PROTEIN: 7.6 G/DL (ref 6.4–8.2)
TROPONIN: 0.02 NG/ML
TROPONIN: 0.02 NG/ML
WBC # BLD: 8.2 K/UL (ref 4–11)
WBC # BLD: 8.8 K/UL (ref 4–11)

## 2020-04-14 PROCEDURE — 80053 COMPREHEN METABOLIC PANEL: CPT

## 2020-04-14 PROCEDURE — 6370000000 HC RX 637 (ALT 250 FOR IP): Performed by: EMERGENCY MEDICINE

## 2020-04-14 PROCEDURE — 96376 TX/PRO/DX INJ SAME DRUG ADON: CPT

## 2020-04-14 PROCEDURE — 84484 ASSAY OF TROPONIN QUANT: CPT

## 2020-04-14 PROCEDURE — 71046 X-RAY EXAM CHEST 2 VIEWS: CPT

## 2020-04-14 PROCEDURE — 93005 ELECTROCARDIOGRAM TRACING: CPT | Performed by: EMERGENCY MEDICINE

## 2020-04-14 PROCEDURE — 6370000000 HC RX 637 (ALT 250 FOR IP): Performed by: NURSE PRACTITIONER

## 2020-04-14 PROCEDURE — 36415 COLL VENOUS BLD VENIPUNCTURE: CPT

## 2020-04-14 PROCEDURE — 2060000000 HC ICU INTERMEDIATE R&B

## 2020-04-14 PROCEDURE — 96374 THER/PROPH/DIAG INJ IV PUSH: CPT

## 2020-04-14 PROCEDURE — 6360000002 HC RX W HCPCS: Performed by: EMERGENCY MEDICINE

## 2020-04-14 PROCEDURE — 83880 ASSAY OF NATRIURETIC PEPTIDE: CPT

## 2020-04-14 PROCEDURE — 85730 THROMBOPLASTIN TIME PARTIAL: CPT

## 2020-04-14 PROCEDURE — 85025 COMPLETE CBC W/AUTO DIFF WBC: CPT

## 2020-04-14 PROCEDURE — 99285 EMERGENCY DEPT VISIT HI MDM: CPT

## 2020-04-14 PROCEDURE — 85027 COMPLETE CBC AUTOMATED: CPT

## 2020-04-14 PROCEDURE — 6370000000 HC RX 637 (ALT 250 FOR IP): Performed by: HOSPITALIST

## 2020-04-14 RX ORDER — OXYCODONE AND ACETAMINOPHEN 10; 325 MG/1; MG/1
1 TABLET ORAL EVERY 4 HOURS PRN
Status: DISCONTINUED | OUTPATIENT
Start: 2020-04-14 | End: 2020-04-16 | Stop reason: HOSPADM

## 2020-04-14 RX ORDER — HEPARIN SODIUM 1000 [USP'U]/ML
4000 INJECTION, SOLUTION INTRAVENOUS; SUBCUTANEOUS ONCE
Status: COMPLETED | OUTPATIENT
Start: 2020-04-14 | End: 2020-04-14

## 2020-04-14 RX ORDER — GABAPENTIN 300 MG/1
600 CAPSULE ORAL
Status: DISCONTINUED | OUTPATIENT
Start: 2020-04-14 | End: 2020-04-16 | Stop reason: HOSPADM

## 2020-04-14 RX ORDER — ISOSORBIDE MONONITRATE 60 MG/1
60 TABLET, EXTENDED RELEASE ORAL DAILY
Status: DISCONTINUED | OUTPATIENT
Start: 2020-04-15 | End: 2020-04-16 | Stop reason: HOSPADM

## 2020-04-14 RX ORDER — HYDRALAZINE HYDROCHLORIDE 25 MG/1
25 TABLET, FILM COATED ORAL EVERY 8 HOURS SCHEDULED
Status: DISCONTINUED | OUTPATIENT
Start: 2020-04-14 | End: 2020-04-16 | Stop reason: HOSPADM

## 2020-04-14 RX ORDER — ATORVASTATIN CALCIUM 80 MG/1
80 TABLET, FILM COATED ORAL DAILY
Status: DISCONTINUED | OUTPATIENT
Start: 2020-04-15 | End: 2020-04-16 | Stop reason: HOSPADM

## 2020-04-14 RX ORDER — ALBUTEROL SULFATE 90 UG/1
2 AEROSOL, METERED RESPIRATORY (INHALATION) EVERY 6 HOURS PRN
Status: DISCONTINUED | OUTPATIENT
Start: 2020-04-14 | End: 2020-04-14

## 2020-04-14 RX ORDER — CLOPIDOGREL BISULFATE 75 MG/1
75 TABLET ORAL DAILY
Status: DISCONTINUED | OUTPATIENT
Start: 2020-04-15 | End: 2020-04-16 | Stop reason: HOSPADM

## 2020-04-14 RX ORDER — METOPROLOL SUCCINATE 50 MG/1
100 TABLET, EXTENDED RELEASE ORAL DAILY
Status: DISCONTINUED | OUTPATIENT
Start: 2020-04-15 | End: 2020-04-16 | Stop reason: HOSPADM

## 2020-04-14 RX ORDER — TIZANIDINE 4 MG/1
2 TABLET ORAL EVERY 8 HOURS PRN
Status: DISCONTINUED | OUTPATIENT
Start: 2020-04-14 | End: 2020-04-16 | Stop reason: HOSPADM

## 2020-04-14 RX ORDER — ASPIRIN 81 MG/1
81 TABLET, CHEWABLE ORAL DAILY
Status: DISCONTINUED | OUTPATIENT
Start: 2020-04-15 | End: 2020-04-14

## 2020-04-14 RX ORDER — MORPHINE SULFATE 4 MG/ML
4 INJECTION, SOLUTION INTRAMUSCULAR; INTRAVENOUS ONCE
Status: COMPLETED | OUTPATIENT
Start: 2020-04-14 | End: 2020-04-14

## 2020-04-14 RX ORDER — TRAZODONE HYDROCHLORIDE 50 MG/1
50 TABLET ORAL NIGHTLY
Status: DISCONTINUED | OUTPATIENT
Start: 2020-04-14 | End: 2020-04-16 | Stop reason: HOSPADM

## 2020-04-14 RX ORDER — ACETAMINOPHEN 325 MG/1
650 TABLET ORAL EVERY 6 HOURS PRN
Status: DISCONTINUED | OUTPATIENT
Start: 2020-04-14 | End: 2020-04-15 | Stop reason: SDUPTHER

## 2020-04-14 RX ORDER — INSULIN GLARGINE 100 [IU]/ML
8 INJECTION, SOLUTION SUBCUTANEOUS NIGHTLY
Status: DISCONTINUED | OUTPATIENT
Start: 2020-04-14 | End: 2020-04-16 | Stop reason: HOSPADM

## 2020-04-14 RX ORDER — PROMETHAZINE HYDROCHLORIDE 25 MG/1
12.5 TABLET ORAL EVERY 6 HOURS PRN
Status: DISCONTINUED | OUTPATIENT
Start: 2020-04-14 | End: 2020-04-16 | Stop reason: HOSPADM

## 2020-04-14 RX ORDER — HEPARIN SODIUM 1000 [USP'U]/ML
60 INJECTION, SOLUTION INTRAVENOUS; SUBCUTANEOUS ONCE
Status: DISCONTINUED | OUTPATIENT
Start: 2020-04-14 | End: 2020-04-14

## 2020-04-14 RX ORDER — HEPARIN SODIUM 1000 [USP'U]/ML
4000 INJECTION, SOLUTION INTRAVENOUS; SUBCUTANEOUS PRN
Status: DISCONTINUED | OUTPATIENT
Start: 2020-04-14 | End: 2020-04-14

## 2020-04-14 RX ORDER — NICOTINE POLACRILEX 4 MG
15 LOZENGE BUCCAL PRN
Status: DISCONTINUED | OUTPATIENT
Start: 2020-04-14 | End: 2020-04-16 | Stop reason: HOSPADM

## 2020-04-14 RX ORDER — DEXTROSE MONOHYDRATE 50 MG/ML
100 INJECTION, SOLUTION INTRAVENOUS PRN
Status: DISCONTINUED | OUTPATIENT
Start: 2020-04-14 | End: 2020-04-16 | Stop reason: HOSPADM

## 2020-04-14 RX ORDER — HEPARIN SODIUM 1000 [USP'U]/ML
30 INJECTION, SOLUTION INTRAVENOUS; SUBCUTANEOUS PRN
Status: DISCONTINUED | OUTPATIENT
Start: 2020-04-14 | End: 2020-04-14

## 2020-04-14 RX ORDER — HEPARIN SODIUM 1000 [USP'U]/ML
60 INJECTION, SOLUTION INTRAVENOUS; SUBCUTANEOUS PRN
Status: DISCONTINUED | OUTPATIENT
Start: 2020-04-14 | End: 2020-04-14

## 2020-04-14 RX ORDER — DEXTROSE MONOHYDRATE 25 G/50ML
12.5 INJECTION, SOLUTION INTRAVENOUS PRN
Status: DISCONTINUED | OUTPATIENT
Start: 2020-04-14 | End: 2020-04-16 | Stop reason: HOSPADM

## 2020-04-14 RX ORDER — ONDANSETRON 2 MG/ML
4 INJECTION INTRAMUSCULAR; INTRAVENOUS EVERY 6 HOURS PRN
Status: DISCONTINUED | OUTPATIENT
Start: 2020-04-14 | End: 2020-04-16 | Stop reason: HOSPADM

## 2020-04-14 RX ORDER — ACETAMINOPHEN 650 MG/1
650 SUPPOSITORY RECTAL EVERY 6 HOURS PRN
Status: DISCONTINUED | OUTPATIENT
Start: 2020-04-14 | End: 2020-04-16 | Stop reason: HOSPADM

## 2020-04-14 RX ORDER — NICOTINE 21 MG/24HR
1 PATCH, TRANSDERMAL 24 HOURS TRANSDERMAL DAILY
Status: DISCONTINUED | OUTPATIENT
Start: 2020-04-14 | End: 2020-04-16 | Stop reason: HOSPADM

## 2020-04-14 RX ORDER — NITROGLYCERIN 0.4 MG/1
0.4 TABLET SUBLINGUAL EVERY 5 MIN PRN
Status: DISCONTINUED | OUTPATIENT
Start: 2020-04-14 | End: 2020-04-16 | Stop reason: HOSPADM

## 2020-04-14 RX ORDER — LISINOPRIL 10 MG/1
10 TABLET ORAL DAILY
Status: DISCONTINUED | OUTPATIENT
Start: 2020-04-15 | End: 2020-04-16 | Stop reason: HOSPADM

## 2020-04-14 RX ORDER — GABAPENTIN 600 MG/1
600 TABLET ORAL EVERY 6 HOURS
Status: DISCONTINUED | OUTPATIENT
Start: 2020-04-14 | End: 2020-04-14

## 2020-04-14 RX ORDER — HEPARIN SODIUM 10000 [USP'U]/100ML
12 INJECTION, SOLUTION INTRAVENOUS CONTINUOUS
Status: DISCONTINUED | OUTPATIENT
Start: 2020-04-14 | End: 2020-04-14

## 2020-04-14 RX ORDER — SODIUM CHLORIDE 0.9 % (FLUSH) 0.9 %
10 SYRINGE (ML) INJECTION PRN
Status: DISCONTINUED | OUTPATIENT
Start: 2020-04-14 | End: 2020-04-16 | Stop reason: HOSPADM

## 2020-04-14 RX ORDER — ALBUTEROL SULFATE 2.5 MG/3ML
2.5 SOLUTION RESPIRATORY (INHALATION) EVERY 6 HOURS PRN
Status: DISCONTINUED | OUTPATIENT
Start: 2020-04-14 | End: 2020-04-16 | Stop reason: HOSPADM

## 2020-04-14 RX ORDER — ASPIRIN 325 MG
325 TABLET ORAL DAILY
Status: DISCONTINUED | OUTPATIENT
Start: 2020-04-15 | End: 2020-04-16 | Stop reason: HOSPADM

## 2020-04-14 RX ORDER — HEPARIN SODIUM 1000 [USP'U]/ML
2000 INJECTION, SOLUTION INTRAVENOUS; SUBCUTANEOUS PRN
Status: DISCONTINUED | OUTPATIENT
Start: 2020-04-14 | End: 2020-04-14

## 2020-04-14 RX ORDER — SODIUM CHLORIDE 0.9 % (FLUSH) 0.9 %
10 SYRINGE (ML) INJECTION EVERY 12 HOURS SCHEDULED
Status: DISCONTINUED | OUTPATIENT
Start: 2020-04-14 | End: 2020-04-16 | Stop reason: HOSPADM

## 2020-04-14 RX ORDER — ACETAMINOPHEN 325 MG/1
650 TABLET ORAL EVERY 6 HOURS PRN
Status: DISCONTINUED | OUTPATIENT
Start: 2020-04-14 | End: 2020-04-14 | Stop reason: SDUPTHER

## 2020-04-14 RX ORDER — POLYETHYLENE GLYCOL 3350 17 G/17G
17 POWDER, FOR SOLUTION ORAL DAILY PRN
Status: DISCONTINUED | OUTPATIENT
Start: 2020-04-14 | End: 2020-04-16 | Stop reason: HOSPADM

## 2020-04-14 RX ORDER — HEPARIN SODIUM 10000 [USP'U]/100ML
1000 INJECTION, SOLUTION INTRAVENOUS CONTINUOUS
Status: DISCONTINUED | OUTPATIENT
Start: 2020-04-14 | End: 2020-04-15

## 2020-04-14 RX ORDER — RANOLAZINE 500 MG/1
1000 TABLET, EXTENDED RELEASE ORAL 2 TIMES DAILY
Status: DISCONTINUED | OUTPATIENT
Start: 2020-04-14 | End: 2020-04-16 | Stop reason: HOSPADM

## 2020-04-14 RX ORDER — ATORVASTATIN CALCIUM 40 MG/1
40 TABLET, FILM COATED ORAL NIGHTLY
Status: DISCONTINUED | OUTPATIENT
Start: 2020-04-14 | End: 2020-04-14 | Stop reason: SDUPTHER

## 2020-04-14 RX ORDER — GABAPENTIN 300 MG/1
600 CAPSULE ORAL
Status: DISCONTINUED | OUTPATIENT
Start: 2020-04-14 | End: 2020-04-14

## 2020-04-14 RX ADMIN — HEPARIN SODIUM 4000 UNITS: 1000 INJECTION INTRAVENOUS; SUBCUTANEOUS at 18:40

## 2020-04-14 RX ADMIN — HEPARIN SODIUM 1000 UNITS/HR: 10000 INJECTION, SOLUTION INTRAVENOUS at 18:40

## 2020-04-14 RX ADMIN — GABAPENTIN 600 MG: 300 CAPSULE ORAL at 21:04

## 2020-04-14 RX ADMIN — MORPHINE SULFATE 4 MG: 4 INJECTION, SOLUTION INTRAMUSCULAR; INTRAVENOUS at 16:25

## 2020-04-14 RX ADMIN — MORPHINE SULFATE 4 MG: 4 INJECTION, SOLUTION INTRAMUSCULAR; INTRAVENOUS at 17:59

## 2020-04-14 RX ADMIN — NITROGLYCERIN 1 INCH: 20 OINTMENT TOPICAL at 16:25

## 2020-04-14 RX ADMIN — INSULIN GLARGINE 8 UNITS: 100 INJECTION, SOLUTION SUBCUTANEOUS at 21:04

## 2020-04-14 RX ADMIN — RANOLAZINE 1000 MG: 500 TABLET, FILM COATED, EXTENDED RELEASE ORAL at 21:03

## 2020-04-14 RX ADMIN — TRAZODONE HYDROCHLORIDE 50 MG: 50 TABLET ORAL at 21:04

## 2020-04-14 RX ADMIN — OXYCODONE HYDROCHLORIDE AND ACETAMINOPHEN 1 TABLET: 10; 325 TABLET ORAL at 21:04

## 2020-04-14 RX ADMIN — HYDRALAZINE HYDROCHLORIDE 25 MG: 25 TABLET, FILM COATED ORAL at 21:03

## 2020-04-14 ASSESSMENT — PAIN SCALES - GENERAL
PAINLEVEL_OUTOF10: 7
PAINLEVEL_OUTOF10: 6
PAINLEVEL_OUTOF10: 7
PAINLEVEL_OUTOF10: 8
PAINLEVEL_OUTOF10: 6
PAINLEVEL_OUTOF10: 5

## 2020-04-14 ASSESSMENT — PAIN DESCRIPTION - DIRECTION
RADIATING_TOWARDS: L ARM
RADIATING_TOWARDS: L ARM

## 2020-04-14 ASSESSMENT — PAIN DESCRIPTION - LOCATION
LOCATION: CHEST

## 2020-04-14 ASSESSMENT — PAIN DESCRIPTION - FREQUENCY
FREQUENCY: INTERMITTENT

## 2020-04-14 ASSESSMENT — PAIN DESCRIPTION - PAIN TYPE
TYPE: ACUTE PAIN

## 2020-04-14 ASSESSMENT — PAIN DESCRIPTION - DESCRIPTORS
DESCRIPTORS: SHARP;ACHING

## 2020-04-14 ASSESSMENT — PAIN DESCRIPTION - ONSET
ONSET: ON-GOING

## 2020-04-14 ASSESSMENT — PAIN - FUNCTIONAL ASSESSMENT
PAIN_FUNCTIONAL_ASSESSMENT: ACTIVITIES ARE NOT PREVENTED

## 2020-04-14 ASSESSMENT — PAIN DESCRIPTION - ORIENTATION
ORIENTATION: MID

## 2020-04-14 ASSESSMENT — PAIN SCALES - WONG BAKER: WONGBAKER_NUMERICALRESPONSE: 0

## 2020-04-14 NOTE — ED PROVIDER NOTES
Emergency Physician Note    Chief Complaint  Chest Pain (patient arrived via EMS from home with c/o chest pain x7-8 hours. )       History of Present Illness  Diaz Iglesias is a 59 y.o. male who presents to the ED for chest pain. Patient reports that around 10 AM chest pain woke him from sleep. Central nonradiating chest pains associated with nausea, diaphoresis and lightheadedness. He denies any palpitations or shortness of breath. He states this reminds him of his prior MI. He is status post CABG as well as stenting. He also has a pacemaker in place. Reports taking nitroglycerin at home with some relief but not resolution of the pain. He took a single baby aspirin at home and 3 more were given to him in route to the ER by EMS. He states that his last coronary intervention was 1 to 2 years ago and he follows with Dr. Margaret Velásquez of cardiology. 10 systems reviewed, pertinent positives per HPI otherwise noted to be negative    I have reviewed the following from the nursing documentation:      Prior to Admission medications    Medication Sig Start Date End Date Taking?  Authorizing Provider   tiZANidine (ZANAFLEX) 2 MG tablet Take 1 tablet by mouth every 8 hours as needed (muscle spasm) 4/13/20  Yes Barbara Gordon MD   traZODone (DESYREL) 50 MG tablet TAKE 1 TABLET BY MOUTH EVERY NIGHT AT BEDTIME 4/8/20  Yes Jerilyn Craig MD   gabapentin (NEURONTIN) 600 MG tablet TAKE 1 TABLET BY MOUTH FIVE TIMES DAILY 3/28/20 5/28/20 Yes Jerilyn Craig MD   famotidine (PEPCID) 20 MG tablet Take 1 tablet by mouth 2 times daily 3/10/20  Yes Lenore Perez   ibuprofen (ADVIL;MOTRIN) 400 MG tablet Take 1 tablet by mouth every 6 hours as needed for Pain 2/26/20  Yes Lamont Cornejo MD   insulin glargine (LANTUS) 100 UNIT/ML injection vial Inject 8 Units into the skin nightly 2/6/20  Yes Marlon Siemens, MD   metoprolol succinate (TOPROL XL) 100 MG extended release tablet Take 1 tablet by mouth daily 2/7/20  Yes  Arthritis     Asthma     CAD (coronary artery disease)     Calcium kidney stone     [facility]     Cerebral artery occlusion with cerebral infarction ([facility]     CHF (congestive heart failure) ([facility]     Clostridium difficile diarrhea 02/12/2020    COPD [facility]ch[facility] obstructive [facility] disease[facility]     mild    Depression     DM2 (diabetes mellitus, type 2) (HCC)     Fibromyalgia     GERD (gastroesophageal reflux disease)     Hyperlipidemia     Hypertension     Liver disease     Pacemaker 2012    Medtronic model # [id number]    Pneumonia     Seizures ([facility]     TIA (transient ischemic attack) 2007    Ulcerative colitis ([facility]         Surgical History:   Past Surgical History:   Procedure Laterality Date    BACK SURGERY      CARDIAC SURGERY      CHOLECYSTECTOMY      COLONOSCOPY  01/10/2017    COLONOSCOPY  01/10/2017    CORONARY ANGIOPLASTY WITH STENT PLACEMENT  2012    CORONARY ARTERY BYPASS GRAFT  2010, 11/2015    ENDOSCOPY, COLON, DIAGNOSTIC      PACEMAKER PLACEMENT      UPPER GASTROINTESTINAL ENDOSCOPY  02/07/2017        Family History:    Family History   Problem Relation Age of Onset    Diabetes Father     Coronary Art Dis Father     Cancer Mother         Lung with mets       Social History     Socioeconomic History    Marital status:      Spouse name: Not on file    Number of children: 1    Years of education: Not on file    Highest education level: Not on file   Occupational History    Occupation: disabled   Social Needs    Financial resource strain: Not on file    Food insecurity     Worry: Not on file     Inability: Not on file   [employer] needs     Medical: Not on file     Non-medical: Not on file   Tobacco Use    Smoking status: Current Every Day Smoker     Packs/day: 0.50     Years: 44.00     Pack years: 22.00     Types: Cigarettes    Smokeless tobacco: Never Used    Tobacco comment: urged to stop   Substance and Sexual Activity  Alcohol use: No     Alcohol/week: 0.0 standard drinks    Drug use: No    Sexual activity: Yes     Partners: Female   Lifestyle    Physical activity     Days per week: Not on file     Minutes per session: Not on file    Stress: Not on file   Relationships    Social connections     Talks on phone: Not on file     Gets together: Not on file     Attends Anabaptist service: Not on file     Active member of club or organization: Not on file     Attends meetings of clubs or organizations: Not on file     Relationship status: Not on file    Intimate partner violence     Fear of current or ex partner: Not on file     Emotionally abused: Not on file     Physically abused: Not on file     Forced sexual activity: Not on file   Other Topics Concern    Not on file   Social History Narrative    Not on file       Nursing notes reviewed. ED Triage Vitals [04/14/20 1537]   Enc Vitals Group      BP       Pulse 118      Resp 19      Temp 98.6 °F (37 °C)      Temp Source Oral      SpO2 99 %      Weight 200 lb (90.7 kg)      Height 6' (1.829 m)      Head Circumference       Peak Flow       Pain Score       Pain Loc       Pain Edu? Excl. in [address]? GENERAL:  Awake, alert. Well developed, well nourished with no apparent distress. HENT:  Normocephalic, Atraumatic, moist mucous membranes. EYES:  Pupils equal round and reactive to light, Conjunctiva normal, extraocular movements normal.  NECK:  No meningeal signs, Supple. CHEST:  Regular rate and rhythm, chest wall non-tender. LUNGS:  Clear to auscultation bilaterally. ABDOMEN:  Soft, non-tender, no rebound, rigidity or guarding, non-distended, normal bowel sounds. No costovertebral angle tenderness to palpation. BACK:  No tenderness. EXTREMITIES:  Normal range of motion, no edema, no bony tenderness, no deformity, distal pulses present. SKIN: Warm, dry and intact. NEUROLOGIC: Normal mental status. Moving all extremities to command.        LABS and any time documented by any other providers. I spoke with Dr. Jared Montano. We thoroughly discussed the history, physical exam, laboratory and imaging studies, as well as, emergency department course. Based upon that discussion, we've decided to admit Isauro May for further observation and evaluation of [address] chest pain. As I have deemed necessary from their history, physical, and studies, I have considered and evaluated Isauro May for the following diagnoses:  ACUTE CORONARY SYNDROME, PERICARDIAL TAMPONADE, PNEUMOTHORAX, PULMONARY EMBOLISM, and THORACIC DISSECTION. FINAL IMPRESSION  1. Chest pain, unspecified type    2. Coronary artery disease involving native heart with angina pectoris, unspecified vessel or lesion type (HCC)        Vitals:  Pulse 118, temperature 98.6 °F (37 °C), temperature source Oral, resp. rate 19, height 6' (1.829 m), weight 200 lb (90.7 kg), SpO2 99 %. Patient was given scripts for the following medications. I counseled patient how to take these medications. New Prescriptions    No medications on file       Disposition  Pt is in stable condition upon Admit to telemetry. This chart was generated using the [address] [place] [address] dictation system. I created this record but it may contain dictation errors.           Cristina Johnson MD  04/14/20 6147

## 2020-04-15 LAB
APTT: 29.4 SEC (ref 24.2–36.2)
APTT: 43 SEC (ref 24.2–36.2)
APTT: 47.8 SEC (ref 24.2–36.2)
APTT: 51.9 SEC (ref 24.2–36.2)
EKG ATRIAL RATE: 103 BPM
EKG ATRIAL RATE: 119 BPM
EKG DIAGNOSIS: NORMAL
EKG DIAGNOSIS: NORMAL
EKG P AXIS: 67 DEGREES
EKG P AXIS: 75 DEGREES
EKG P-R INTERVAL: 128 MS
EKG P-R INTERVAL: 128 MS
EKG Q-T INTERVAL: 372 MS
EKG Q-T INTERVAL: 402 MS
EKG QRS DURATION: 124 MS
EKG QRS DURATION: 124 MS
EKG QTC CALCULATION (BAZETT): 523 MS
EKG QTC CALCULATION (BAZETT): 526 MS
EKG R AXIS: 73 DEGREES
EKG R AXIS: 84 DEGREES
EKG T AXIS: 30 DEGREES
EKG T AXIS: 47 DEGREES
EKG VENTRICULAR RATE: 103 BPM
EKG VENTRICULAR RATE: 119 BPM
GLUCOSE BLD-MCNC: 105 MG/DL (ref 70–99)
GLUCOSE BLD-MCNC: 120 MG/DL (ref 70–99)
GLUCOSE BLD-MCNC: 162 MG/DL (ref 70–99)
GLUCOSE BLD-MCNC: 93 MG/DL (ref 70–99)
HCT VFR BLD CALC: 39.1 % (ref 40.5–52.5)
HEMOGLOBIN: 13.4 G/DL (ref 13.5–17.5)
MCH RBC QN AUTO: 33.7 PG (ref 26–34)
MCHC RBC AUTO-ENTMCNC: 34.2 G/DL (ref 31–36)
MCV RBC AUTO: 98.7 FL (ref 80–100)
PDW BLD-RTO: 15.7 % (ref 12.4–15.4)
PERFORMED ON: ABNORMAL
PERFORMED ON: NORMAL
PLATELET # BLD: 183 K/UL (ref 135–450)
PMV BLD AUTO: 8.8 FL (ref 5–10.5)
RBC # BLD: 3.97 M/UL (ref 4.2–5.9)
TROPONIN: 0.03 NG/ML
WBC # BLD: 6.4 K/UL (ref 4–11)

## 2020-04-15 PROCEDURE — B2111ZZ FLUOROSCOPY OF MULTIPLE CORONARY ARTERIES USING LOW OSMOLAR CONTRAST: ICD-10-PCS | Performed by: INTERNAL MEDICINE

## 2020-04-15 PROCEDURE — 6370000000 HC RX 637 (ALT 250 FOR IP): Performed by: NURSE PRACTITIONER

## 2020-04-15 PROCEDURE — 4A023N7 MEASUREMENT OF CARDIAC SAMPLING AND PRESSURE, LEFT HEART, PERCUTANEOUS APPROACH: ICD-10-PCS | Performed by: INTERNAL MEDICINE

## 2020-04-15 PROCEDURE — 2580000003 HC RX 258: Performed by: HOSPITALIST

## 2020-04-15 PROCEDURE — 36415 COLL VENOUS BLD VENIPUNCTURE: CPT

## 2020-04-15 PROCEDURE — 93458 L HRT ARTERY/VENTRICLE ANGIO: CPT | Performed by: INTERNAL MEDICINE

## 2020-04-15 PROCEDURE — 99153 MOD SED SAME PHYS/QHP EA: CPT

## 2020-04-15 PROCEDURE — 2580000003 HC RX 258

## 2020-04-15 PROCEDURE — C1894 INTRO/SHEATH, NON-LASER: HCPCS

## 2020-04-15 PROCEDURE — B2151ZZ FLUOROSCOPY OF LEFT HEART USING LOW OSMOLAR CONTRAST: ICD-10-PCS | Performed by: INTERNAL MEDICINE

## 2020-04-15 PROCEDURE — 85730 THROMBOPLASTIN TIME PARTIAL: CPT

## 2020-04-15 PROCEDURE — 93459 L HRT ART/GRFT ANGIO: CPT

## 2020-04-15 PROCEDURE — 2060000000 HC ICU INTERMEDIATE R&B

## 2020-04-15 PROCEDURE — 94761 N-INVAS EAR/PLS OXIMETRY MLT: CPT

## 2020-04-15 PROCEDURE — 2700000000 HC OXYGEN THERAPY PER DAY

## 2020-04-15 PROCEDURE — 99152 MOD SED SAME PHYS/QHP 5/>YRS: CPT

## 2020-04-15 PROCEDURE — 93010 ELECTROCARDIOGRAM REPORT: CPT | Performed by: INTERNAL MEDICINE

## 2020-04-15 PROCEDURE — C1769 GUIDE WIRE: HCPCS

## 2020-04-15 PROCEDURE — B2131ZZ FLUOROSCOPY OF MULTIPLE CORONARY ARTERY BYPASS GRAFTS USING LOW OSMOLAR CONTRAST: ICD-10-PCS | Performed by: INTERNAL MEDICINE

## 2020-04-15 PROCEDURE — 6360000002 HC RX W HCPCS

## 2020-04-15 PROCEDURE — 84484 ASSAY OF TROPONIN QUANT: CPT

## 2020-04-15 PROCEDURE — 6370000000 HC RX 637 (ALT 250 FOR IP): Performed by: HOSPITALIST

## 2020-04-15 PROCEDURE — 6360000004 HC RX CONTRAST MEDICATION: Performed by: INTERNAL MEDICINE

## 2020-04-15 PROCEDURE — 2500000003 HC RX 250 WO HCPCS

## 2020-04-15 PROCEDURE — 2709999900 HC NON-CHARGEABLE SUPPLY

## 2020-04-15 PROCEDURE — 85027 COMPLETE CBC AUTOMATED: CPT

## 2020-04-15 RX ORDER — ONDANSETRON 2 MG/ML
4 INJECTION INTRAMUSCULAR; INTRAVENOUS EVERY 6 HOURS PRN
Status: DISCONTINUED | OUTPATIENT
Start: 2020-04-15 | End: 2020-04-15 | Stop reason: SDUPTHER

## 2020-04-15 RX ORDER — ACETAMINOPHEN 325 MG/1
650 TABLET ORAL EVERY 4 HOURS PRN
Status: DISCONTINUED | OUTPATIENT
Start: 2020-04-15 | End: 2020-04-16 | Stop reason: HOSPADM

## 2020-04-15 RX ORDER — SODIUM CHLORIDE 0.9 % (FLUSH) 0.9 %
10 SYRINGE (ML) INJECTION PRN
Status: DISCONTINUED | OUTPATIENT
Start: 2020-04-15 | End: 2020-04-15 | Stop reason: SDUPTHER

## 2020-04-15 RX ORDER — SODIUM CHLORIDE 0.9 % (FLUSH) 0.9 %
10 SYRINGE (ML) INJECTION EVERY 12 HOURS SCHEDULED
Status: DISCONTINUED | OUTPATIENT
Start: 2020-04-15 | End: 2020-04-15 | Stop reason: SDUPTHER

## 2020-04-15 RX ORDER — HEPARIN SODIUM 10000 [USP'U]/100ML
11.8 INJECTION, SOLUTION INTRAVENOUS CONTINUOUS
Status: DISCONTINUED | OUTPATIENT
Start: 2020-04-15 | End: 2020-04-15

## 2020-04-15 RX ADMIN — HYDRALAZINE HYDROCHLORIDE 25 MG: 25 TABLET, FILM COATED ORAL at 20:10

## 2020-04-15 RX ADMIN — GABAPENTIN 600 MG: 300 CAPSULE ORAL at 19:06

## 2020-04-15 RX ADMIN — GABAPENTIN 600 MG: 300 CAPSULE ORAL at 22:25

## 2020-04-15 RX ADMIN — ISOSORBIDE MONONITRATE 60 MG: 60 TABLET, EXTENDED RELEASE ORAL at 10:17

## 2020-04-15 RX ADMIN — OXYCODONE HYDROCHLORIDE AND ACETAMINOPHEN 1 TABLET: 10; 325 TABLET ORAL at 20:11

## 2020-04-15 RX ADMIN — GABAPENTIN 600 MG: 300 CAPSULE ORAL at 06:00

## 2020-04-15 RX ADMIN — METOPROLOL SUCCINATE 100 MG: 50 TABLET, EXTENDED RELEASE ORAL at 10:17

## 2020-04-15 RX ADMIN — ASPIRIN 325 MG ORAL TABLET 325 MG: 325 PILL ORAL at 10:17

## 2020-04-15 RX ADMIN — OXYCODONE HYDROCHLORIDE AND ACETAMINOPHEN 1 TABLET: 10; 325 TABLET ORAL at 03:14

## 2020-04-15 RX ADMIN — CLOPIDOGREL BISULFATE 75 MG: 75 TABLET ORAL at 10:17

## 2020-04-15 RX ADMIN — HYDRALAZINE HYDROCHLORIDE 25 MG: 25 TABLET, FILM COATED ORAL at 06:00

## 2020-04-15 RX ADMIN — HYDRALAZINE HYDROCHLORIDE 25 MG: 25 TABLET, FILM COATED ORAL at 14:13

## 2020-04-15 RX ADMIN — OXYCODONE HYDROCHLORIDE AND ACETAMINOPHEN 1 TABLET: 10; 325 TABLET ORAL at 12:49

## 2020-04-15 RX ADMIN — SODIUM CHLORIDE, PRESERVATIVE FREE 10 ML: 5 INJECTION INTRAVENOUS at 20:17

## 2020-04-15 RX ADMIN — TRAZODONE HYDROCHLORIDE 50 MG: 50 TABLET ORAL at 20:11

## 2020-04-15 RX ADMIN — ATORVASTATIN CALCIUM 80 MG: 80 TABLET, FILM COATED ORAL at 10:17

## 2020-04-15 RX ADMIN — RANOLAZINE 1000 MG: 500 TABLET, FILM COATED, EXTENDED RELEASE ORAL at 10:17

## 2020-04-15 RX ADMIN — OXYCODONE HYDROCHLORIDE AND ACETAMINOPHEN 1 TABLET: 10; 325 TABLET ORAL at 07:38

## 2020-04-15 RX ADMIN — GABAPENTIN 600 MG: 300 CAPSULE ORAL at 10:17

## 2020-04-15 RX ADMIN — IOPAMIDOL 110 ML: 755 INJECTION, SOLUTION INTRAVENOUS at 11:48

## 2020-04-15 RX ADMIN — GABAPENTIN 600 MG: 300 CAPSULE ORAL at 14:13

## 2020-04-15 RX ADMIN — LISINOPRIL 10 MG: 10 TABLET ORAL at 10:17

## 2020-04-15 RX ADMIN — RANOLAZINE 1000 MG: 500 TABLET, FILM COATED, EXTENDED RELEASE ORAL at 20:11

## 2020-04-15 RX ADMIN — INSULIN GLARGINE 8 UNITS: 100 INJECTION, SOLUTION SUBCUTANEOUS at 22:27

## 2020-04-15 ASSESSMENT — PAIN SCALES - WONG BAKER
WONGBAKER_NUMERICALRESPONSE: 0

## 2020-04-15 ASSESSMENT — PAIN DESCRIPTION - FREQUENCY
FREQUENCY: INTERMITTENT

## 2020-04-15 ASSESSMENT — PAIN DESCRIPTION - LOCATION
LOCATION: CHEST

## 2020-04-15 ASSESSMENT — PAIN DESCRIPTION - ONSET
ONSET: ON-GOING

## 2020-04-15 ASSESSMENT — PAIN DESCRIPTION - PROGRESSION
CLINICAL_PROGRESSION: NOT CHANGED

## 2020-04-15 ASSESSMENT — PAIN DESCRIPTION - DESCRIPTORS
DESCRIPTORS: ACHING
DESCRIPTORS: ACHING
DESCRIPTORS: SHARP;ACHING
DESCRIPTORS: DULL;DISCOMFORT
DESCRIPTORS: ACHING

## 2020-04-15 ASSESSMENT — PAIN DESCRIPTION - PAIN TYPE
TYPE: ACUTE PAIN

## 2020-04-15 ASSESSMENT — PAIN SCALES - GENERAL
PAINLEVEL_OUTOF10: 8
PAINLEVEL_OUTOF10: 0
PAINLEVEL_OUTOF10: 6
PAINLEVEL_OUTOF10: 7
PAINLEVEL_OUTOF10: 6
PAINLEVEL_OUTOF10: 4

## 2020-04-15 ASSESSMENT — PAIN DESCRIPTION - ORIENTATION
ORIENTATION: MID
ORIENTATION: MID
ORIENTATION: MID;RIGHT;LEFT
ORIENTATION: MID
ORIENTATION: MID

## 2020-04-15 ASSESSMENT — PAIN - FUNCTIONAL ASSESSMENT
PAIN_FUNCTIONAL_ASSESSMENT: ACTIVITIES ARE NOT PREVENTED
PAIN_FUNCTIONAL_ASSESSMENT: PREVENTS OR INTERFERES SOME ACTIVE ACTIVITIES AND ADLS
PAIN_FUNCTIONAL_ASSESSMENT: PREVENTS OR INTERFERES SOME ACTIVE ACTIVITIES AND ADLS
PAIN_FUNCTIONAL_ASSESSMENT: ACTIVITIES ARE NOT PREVENTED
PAIN_FUNCTIONAL_ASSESSMENT: ACTIVITIES ARE NOT PREVENTED

## 2020-04-15 NOTE — H&P
Hospitalist  History and Physical    Patient:  Meghana Russell  MRN: [id number]  PCP: Pavel Del Cid MD    CHIEF COMPLAINT:  Chest Pain      HISTORY OF PRESENT ILLNESS:   The patient Meghana Russell is a 59 y.o.male with medical history significant for coronary artery disease, cardiomyopathy, CVA, COPD, diabetes mellitus and hypertension  Patient started having chest pain this morning around 10 AM.  Patient reports that chest pain woke him up from sleep. Patient reports chest pain as substernal 10/10 in intensity and mild radiation to the left arm  Patient reports pain similar to his previous cardiac pain. Patient also has history of coronary artery bypass grafting and as well as stenting afterwards. Patient has pacemaker in place. Patient took nitroglycerin with some relief of the pain. Patient's last coronary intervention was 1 year ago.   At the time of examination patient reported his pain as 6/10    Past Medical History:        Diagnosis Date    Anxiety     Arthritis     Asthma     CAD (coronary artery disease)     Calcium kidney stone     Cardiomyopathy ([address]     Cerebral artery occlusion with cerebral infarction ([address]     CHF (congestive heart failure) ([address]     Clostridium difficile diarrhea 02/12/2020    COPD (chronic obstructive [facility] disease) ([facility])     mild    Depression     DM2 (diabetes mellitus, type 2) ([facility])     Fibromyalgia     GERD (gastroesophageal reflux disease)     Hyperlipidemia     Hypertension     Liver disease     Pacemaker 2012    Medtronic model # [id number]    Pneumonia     Seizures ([address]     TIA (transient ischemic attack) 2007    Ulcerative colitis ([address]        Past Surgical History:        Procedure Laterality Date    BACK SURGERY      CARDIAC SURGERY      CHOLECYSTECTOMY      COLONOSCOPY  01/10/2017    COLONOSCOPY  01/10/2017    CORONARY ANGIOPLASTY WITH STENT PLACEMENT  2012    CORONARY ARTERY BYPASS GRAFT  2010, 11/2015    GASTROINTESTINAL:       Dysphagia, Poor appetite,  Nausea, Vomiting, diarrhea, heartburn, abdominal pain. Blood in the stools, hematemesis. Pain with swallowing, constipation    GENITOURINARY:       Urinary frequency, hesitancy,  urgency, Dysuria, hematuria,  Urinary Incontinence. Urinary Retention. GYNECOLOGICAL: vaginal bleeding , vaginal discharge, menopause    MUSCULOSKELETAL:       joint swelling or stiffness, joint pain, muscle pain, balance problems, low back pain. NEUROLOGICAL:      Gait problems. Tremor. Dizziness. Pain and paresthesias, weakness in extremities. Seizures, memory loss    PSYCHLOGICAL:        Anxiety, depression    SKIN :      Rashes ulcers, skin color changes, easy bruisability, lymphadenopathy      Physical Exam:      Vitals: BP (!) 151/84   Pulse 98   Temp 97.8 °F (36.6 °C) (Oral)   Resp 16   Ht 6' (1.829 m)   Wt 196 lb 3.4 oz (89 kg)   SpO2 95%   BMI 26.61 kg/m²     Gen:          Alert and oriented x 3  Eyes: PERRL. No sclera icterus. No conjunctival injection. ENT: No discharge. Pharynx clear. External appearance of ears and nose normal.  Neck: Trachea midline. No obvious mass. Resp: No accessory muscle use. No crackles. No wheezes. No rhonchi. CV: Regular rate. Regular rhythm. No murmur or rub. No edema. GI: Non-tender. Non-distended. No hernia. Skin: Warm, dry, normal texture and turgor. Lymph: No cervical LAD. No supraclavicular LAD. M/S: / Ext. No cyanosis. No clubbing. No joint deformity. Neuro: Moves all four extremities. CN 2-12 tested, no deficits noted. Peripheral pulses and capillary refill is intact.       CBC:   Recent Labs     04/14/20  1557 04/14/20  2031   WBC 8.8 8.2   HGB 15.4 13.8    206     BMP:    Recent Labs     04/14/20  1557      K 4.0      CO2 22   BUN 11   CREATININE 0.8   GLUCOSE 168*     Hepatic:   Recent Labs     04/14/20  1557   AST 12*   ALT 16   BILITOT 0.5   ALKPHOS 69     Troponin:   Recent Labs

## 2020-04-16 VITALS
HEIGHT: 72 IN | RESPIRATION RATE: 18 BRPM | TEMPERATURE: 97.5 F | HEART RATE: 69 BPM | SYSTOLIC BLOOD PRESSURE: 102 MMHG | BODY MASS INDEX: 23.29 KG/M2 | OXYGEN SATURATION: 93 % | WEIGHT: 171.96 LBS | DIASTOLIC BLOOD PRESSURE: 50 MMHG

## 2020-04-16 LAB
GLUCOSE BLD-MCNC: 134 MG/DL (ref 70–99)
GLUCOSE BLD-MCNC: 139 MG/DL (ref 70–99)
PERFORMED ON: ABNORMAL
PERFORMED ON: ABNORMAL

## 2020-04-16 PROCEDURE — 6370000000 HC RX 637 (ALT 250 FOR IP): Performed by: NURSE PRACTITIONER

## 2020-04-16 PROCEDURE — 2580000003 HC RX 258: Performed by: HOSPITALIST

## 2020-04-16 PROCEDURE — 94760 N-INVAS EAR/PLS OXIMETRY 1: CPT

## 2020-04-16 PROCEDURE — 6370000000 HC RX 637 (ALT 250 FOR IP): Performed by: HOSPITALIST

## 2020-04-16 RX ADMIN — ISOSORBIDE MONONITRATE 60 MG: 60 TABLET, EXTENDED RELEASE ORAL at 07:49

## 2020-04-16 RX ADMIN — GABAPENTIN 600 MG: 300 CAPSULE ORAL at 14:23

## 2020-04-16 RX ADMIN — METOPROLOL SUCCINATE 100 MG: 50 TABLET, EXTENDED RELEASE ORAL at 07:50

## 2020-04-16 RX ADMIN — OXYCODONE HYDROCHLORIDE AND ACETAMINOPHEN 1 TABLET: 10; 325 TABLET ORAL at 04:50

## 2020-04-16 RX ADMIN — SODIUM CHLORIDE, PRESERVATIVE FREE 10 ML: 5 INJECTION INTRAVENOUS at 08:32

## 2020-04-16 RX ADMIN — OXYCODONE HYDROCHLORIDE AND ACETAMINOPHEN 1 TABLET: 10; 325 TABLET ORAL at 09:16

## 2020-04-16 RX ADMIN — GABAPENTIN 600 MG: 300 CAPSULE ORAL at 06:24

## 2020-04-16 RX ADMIN — OXYCODONE HYDROCHLORIDE AND ACETAMINOPHEN 1 TABLET: 10; 325 TABLET ORAL at 00:19

## 2020-04-16 RX ADMIN — HYDRALAZINE HYDROCHLORIDE 25 MG: 25 TABLET, FILM COATED ORAL at 06:24

## 2020-04-16 RX ADMIN — ATORVASTATIN CALCIUM 80 MG: 80 TABLET, FILM COATED ORAL at 07:49

## 2020-04-16 RX ADMIN — OXYCODONE HYDROCHLORIDE AND ACETAMINOPHEN 1 TABLET: 10; 325 TABLET ORAL at 13:29

## 2020-04-16 RX ADMIN — GABAPENTIN 600 MG: 300 CAPSULE ORAL at 11:08

## 2020-04-16 RX ADMIN — RANOLAZINE 1000 MG: 500 TABLET, FILM COATED, EXTENDED RELEASE ORAL at 07:49

## 2020-04-16 RX ADMIN — ASPIRIN 325 MG ORAL TABLET 325 MG: 325 PILL ORAL at 07:49

## 2020-04-16 RX ADMIN — CLOPIDOGREL BISULFATE 75 MG: 75 TABLET ORAL at 07:49

## 2020-04-16 ASSESSMENT — PAIN DESCRIPTION - DESCRIPTORS
DESCRIPTORS: DULL;DISCOMFORT
DESCRIPTORS: DULL;DISCOMFORT;NAGGING
DESCRIPTORS: DULL;DISCOMFORT
DESCRIPTORS: DULL;DISCOMFORT

## 2020-04-16 ASSESSMENT — PAIN - FUNCTIONAL ASSESSMENT
PAIN_FUNCTIONAL_ASSESSMENT: ACTIVITIES ARE NOT PREVENTED

## 2020-04-16 ASSESSMENT — PAIN SCALES - WONG BAKER
WONGBAKER_NUMERICALRESPONSE: 0
WONGBAKER_NUMERICALRESPONSE: 0

## 2020-04-16 ASSESSMENT — PAIN DESCRIPTION - ORIENTATION
ORIENTATION: MID

## 2020-04-16 ASSESSMENT — PAIN DESCRIPTION - FREQUENCY
FREQUENCY: INTERMITTENT

## 2020-04-16 ASSESSMENT — PAIN SCALES - GENERAL
PAINLEVEL_OUTOF10: 5
PAINLEVEL_OUTOF10: 5
PAINLEVEL_OUTOF10: 6
PAINLEVEL_OUTOF10: 5
PAINLEVEL_OUTOF10: 6
PAINLEVEL_OUTOF10: 3
PAINLEVEL_OUTOF10: 4
PAINLEVEL_OUTOF10: 5

## 2020-04-16 ASSESSMENT — PAIN DESCRIPTION - PROGRESSION
CLINICAL_PROGRESSION: NOT CHANGED

## 2020-04-16 ASSESSMENT — PAIN DESCRIPTION - LOCATION
LOCATION: CHEST

## 2020-04-16 ASSESSMENT — PAIN DESCRIPTION - ONSET
ONSET: ON-GOING

## 2020-04-16 ASSESSMENT — PAIN DESCRIPTION - PAIN TYPE
TYPE: ACUTE PAIN

## 2020-04-16 NOTE — DISCHARGE SUMMARY
provided:      CBC:    Lab Results   Component Value Date    WBC 6.4 04/15/2020    HGB 13.4 04/15/2020    HCT 39.1 04/15/2020     04/15/2020       Renal:    Lab Results   Component Value Date     04/14/2020    K 4.0 04/14/2020     04/14/2020    CO2 22 04/14/2020    BUN 11 04/14/2020    CREATININE 0.8 04/14/2020    CALCIUM 9.7 04/14/2020    PHOS 2.3 02/15/2020       Discharge Medications:     Current Discharge Medication List           Details   tiZANidine (ZANAFLEX) 2 MG tablet Take 1 tablet by mouth every 8 hours as needed (muscle spasm)  Qty: 30 tablet, Refills: 0      traZODone (DESYREL) 50 MG tablet TAKE 1 TABLET BY MOUTH EVERY NIGHT AT BEDTIME  Qty: 30 tablet, Refills: 0    Comments: Needs appt with PCP before next refill  Associated Diagnoses: Primary insomnia      gabapentin (NEURONTIN) 600 MG tablet TAKE 1 TABLET BY MOUTH FIVE TIMES DAILY  Qty: 150 tablet, Refills: 1    Associated Diagnoses: Neuropathy      oxyCODONE-acetaminophen (PERCOCET)  MG per tablet Take 1 tablet by mouth 2 times daily as needed for Pain for up to 30 days.   Qty: 60 tablet, Refills: 0    Comments: Reduce doses taken as pain becomes manageable  Associated Diagnoses: Chronic back pain, unspecified back location, unspecified back pain laterality      famotidine (PEPCID) 20 MG tablet Take 1 tablet by mouth 2 times daily  Qty: 30 tablet, Refills: 0      insulin glargine (LANTUS) 100 UNIT/ML injection vial Inject 8 Units into the skin nightly      metoprolol succinate (TOPROL XL) 100 MG extended release tablet Take 1 tablet by mouth daily  Qty: 30 tablet, Refills: 3      lisinopril (PRINIVIL;ZESTRIL) 10 MG tablet Take 1 tablet by mouth daily  Qty: 30 tablet, Refills: 3      ranolazine (RANEXA) 500 MG extended release tablet Take 1,000 mg by mouth 2 times daily      furosemide (LASIX) 20 MG tablet Take 20 mg by mouth daily      albuterol sulfate HFA (PROAIR HFA) 108 (90 Base) MCG/ACT inhaler Inhale 2 puffs into the

## 2020-04-16 NOTE — CONSULTS
Chart reviewed. Full note to follow.
alcohol  abuse or recreational drug usage. FAMILY HISTORY:  Mother  with cancer and father had coronary artery  disease and diabetes. MEDICINES AND ALLERGIES:  Have been reviewed. PHYSICAL EXAMINATION:  VITAL SIGNS:  Pulse is 79, blood pressure is 142/78, respirations are  18, temperature is 97.9, oxygen saturation 97%. CONSTITUTIONAL:  He is alert and oriented. HEENT EXAMINATION:  His neck is supple. I am unable to appreciate any  jugular venous distention. No carotid bruits were heard. No  thyromegaly. Eyes show slightly pale conjunctivae but no icterus. CARDIAC EXAMINATION:  Reveals normal S1 and S2. I could not appreciate  any gallop, murmur, or rub. LUNGS:  Reveal bibasilar crackles. ABDOMEN:  Soft. Bowel sounds are present. No organomegaly. EXTREMITIES:  Show 1+ nonpitting edema. NEUROLOGICAL EXAMINATION:  He is alert, oriented. Cranial nerves II  through XII intact. No focal deficit appreciated. SKIN:  No rashes. LABORATORY DATA:  Sodium is 138, potassium is 4, chloride is 100, bicarb  22, BUN is 11, creatinine 0.8. ProBNP was 2315. Initial troponin was  0.02, repeat troponin was 0.03. White count is 6.4, hemoglobin is 13.4,  hematocrit is 39.1. Chest x-ray showed no acute findings. EKG personally reviewed, sinus tachycardia, right bundle branch block  with associated ST and T-wave abnormalities. Echocardiogram personally reviewed from 20 showed LVEF of 35% to  40%, ICD in place, no significant valvular abnormalities noted. IMPRESSION:  1. This is a 69-year-old male who has presented with a recurrent chest  pain.   Even though the patient's chest pain appears atypical with his  known extensive coronary artery disease history, previous CABG and  stents and diffuse nature of his coronary artery disease in the presence  of troponin elevation which is suspicious for non-ST-elevation  myocardial infarction, underlying progressive atherosclerotic heart  disease

## 2020-04-16 NOTE — PROGRESS NOTES
Data- discharge order received, pt verbalized agreement to discharge, disposition to previous residence, no needs for HHC/DME. Action- discharge instructions prepared and given to pt, pt verbalized understanding. Medication information packet given r/t NEW and/or CHANGED prescriptions emphasizing name/purpose/side effects, pt verbalized understanding. Discharge instruction summary: Diet- cardiac 1800ml Fluid restriction, Activity- return to normal after 48hrs postcath, Primary Care Physician as follows: Diandra Stauffer [phone] f/u appointment 1 week, immunizations reviewed and complete, prescription medications filled. CHF Education reviewed. Pt/ Family has had a total of 60 minutes CHF education this admission encounter. Response- Pt belongings gathered, IV removed. Disposition is home (no HHC/DME needs), transported with family, taken to lobby via w/c w/ PCA, no complications.
Patient arrived to room 5114 from ER department. Alert & oriented, vital signs stable, sinus rhythm on heart monitor. Oriented to room & call light.
04/14/20  2031 04/15/20  0425   WBC 8.8 8.2 6.4   HGB 15.4 13.8 13.4*   HCT 44.8 42.0 39.1*    206 183     Recent Labs     04/14/20  1557      K 4.0      CO2 22   BUN 11   CREATININE 0.8   CALCIUM 9.7     Recent Labs     04/14/20  1557   AST 12*   ALT 16   BILITOT 0.5   ALKPHOS 69     No results for input(s): INR in the last 72 hours. Recent Labs     04/14/20  1557 04/14/20  2031 04/15/20  0107   TROPONINI 0.02* 0.02* 0.03*       Assessment/Plan:    Active Hospital Problems    Diagnosis Date Noted    NSTEMI (non-ST elevated myocardial infarction) ([facility] [I21.4]     Chest pain [R07.9] 04/25/2019     Typical chest pain.   Hx of CAD s/p CABG  Serial trops stable  Cardiac cath today  On heparin GTT  Continue aspirin, beta blocker and statin  Cardiology following     Type 2 diabetes mellitus  Continue home insulin regimen  Insulin sliding scale     PMH of htn, tobacco abuse    Diet: DIET CARDIAC;  Code Status: Full Code    Dispo - Gee Reynolds MD

## 2020-04-17 ENCOUNTER — CARE COORDINATION (OUTPATIENT)
Dept: CASE MANAGEMENT | Age: 65
End: 2020-04-17

## 2020-04-20 ENCOUNTER — APPOINTMENT (OUTPATIENT)
Dept: GENERAL RADIOLOGY | Age: 65
DRG: 313 | End: 2020-04-20
Payer: MEDICARE

## 2020-04-20 ENCOUNTER — HOSPITAL ENCOUNTER (INPATIENT)
Age: 65
LOS: 2 days | Discharge: LEFT BEFORE DISCHARGE | DRG: 313 | End: 2020-04-23
Attending: EMERGENCY MEDICINE | Admitting: INTERNAL MEDICINE
Payer: MEDICARE

## 2020-04-20 LAB
A/G RATIO: 1.2 (ref 1.1–2.2)
ALBUMIN SERPL-MCNC: 3.8 G/DL (ref 3.4–5)
ALP BLD-CCNC: 56 U/L (ref 40–129)
ALT SERPL-CCNC: <5 U/L (ref 10–40)
ANION GAP SERPL CALCULATED.3IONS-SCNC: 16 MMOL/L (ref 3–16)
AST SERPL-CCNC: 9 U/L (ref 15–37)
BASOPHILS ABSOLUTE: 0 K/UL (ref 0–0.2)
BASOPHILS RELATIVE PERCENT: 0.7 %
BILIRUB SERPL-MCNC: 0.5 MG/DL (ref 0–1)
BUN BLDV-MCNC: 18 MG/DL (ref 7–20)
CALCIUM SERPL-MCNC: 9 MG/DL (ref 8.3–10.6)
CHLORIDE BLD-SCNC: 99 MMOL/L (ref 99–110)
CO2: 21 MMOL/L (ref 21–32)
CREAT SERPL-MCNC: 1.2 MG/DL (ref 0.8–1.3)
EOSINOPHILS ABSOLUTE: 0.1 K/UL (ref 0–0.6)
EOSINOPHILS RELATIVE PERCENT: 1.9 %
GFR AFRICAN AMERICAN: >60
GFR NON-AFRICAN AMERICAN: >60
GLOBULIN: 3.1 G/DL
GLUCOSE BLD-MCNC: 152 MG/DL (ref 70–99)
HCT VFR BLD CALC: 40.5 % (ref 40.5–52.5)
HEMOGLOBIN: 13.4 G/DL (ref 13.5–17.5)
LYMPHOCYTES ABSOLUTE: 2.2 K/UL (ref 1–5.1)
LYMPHOCYTES RELATIVE PERCENT: 32.3 %
MCH RBC QN AUTO: 33.1 PG (ref 26–34)
MCHC RBC AUTO-ENTMCNC: 33.1 G/DL (ref 31–36)
MCV RBC AUTO: 100.2 FL (ref 80–100)
MONOCYTES ABSOLUTE: 0.5 K/UL (ref 0–1.3)
MONOCYTES RELATIVE PERCENT: 7.1 %
NEUTROPHILS ABSOLUTE: 4 K/UL (ref 1.7–7.7)
NEUTROPHILS RELATIVE PERCENT: 58 %
PDW BLD-RTO: 15.3 % (ref 12.4–15.4)
PLATELET # BLD: 225 K/UL (ref 135–450)
PMV BLD AUTO: 9 FL (ref 5–10.5)
POTASSIUM SERPL-SCNC: 4 MMOL/L (ref 3.5–5.1)
PRO-BNP: 2026 PG/ML (ref 0–124)
RBC # BLD: 4.04 M/UL (ref 4.2–5.9)
SODIUM BLD-SCNC: 136 MMOL/L (ref 136–145)
TOTAL PROTEIN: 6.9 G/DL (ref 6.4–8.2)
TROPONIN: 0.02 NG/ML
TROPONIN: 0.03 NG/ML
WBC # BLD: 6.9 K/UL (ref 4–11)

## 2020-04-20 PROCEDURE — 85025 COMPLETE CBC W/AUTO DIFF WBC: CPT

## 2020-04-20 PROCEDURE — 96374 THER/PROPH/DIAG INJ IV PUSH: CPT

## 2020-04-20 PROCEDURE — 93005 ELECTROCARDIOGRAM TRACING: CPT | Performed by: EMERGENCY MEDICINE

## 2020-04-20 PROCEDURE — 84484 ASSAY OF TROPONIN QUANT: CPT

## 2020-04-20 PROCEDURE — 83880 ASSAY OF NATRIURETIC PEPTIDE: CPT

## 2020-04-20 PROCEDURE — 96376 TX/PRO/DX INJ SAME DRUG ADON: CPT

## 2020-04-20 PROCEDURE — 6360000002 HC RX W HCPCS: Performed by: EMERGENCY MEDICINE

## 2020-04-20 PROCEDURE — 96375 TX/PRO/DX INJ NEW DRUG ADDON: CPT

## 2020-04-20 PROCEDURE — 6360000002 HC RX W HCPCS: Performed by: PHYSICIAN ASSISTANT

## 2020-04-20 PROCEDURE — 71045 X-RAY EXAM CHEST 1 VIEW: CPT

## 2020-04-20 PROCEDURE — G0378 HOSPITAL OBSERVATION PER HR: HCPCS

## 2020-04-20 PROCEDURE — 99285 EMERGENCY DEPT VISIT HI MDM: CPT

## 2020-04-20 PROCEDURE — 80053 COMPREHEN METABOLIC PANEL: CPT

## 2020-04-20 RX ORDER — RANOLAZINE 500 MG/1
1000 TABLET, EXTENDED RELEASE ORAL 2 TIMES DAILY
Status: DISCONTINUED | OUTPATIENT
Start: 2020-04-20 | End: 2020-04-23 | Stop reason: HOSPADM

## 2020-04-20 RX ORDER — ONDANSETRON 2 MG/ML
4 INJECTION INTRAMUSCULAR; INTRAVENOUS ONCE
Status: COMPLETED | OUTPATIENT
Start: 2020-04-20 | End: 2020-04-20

## 2020-04-20 RX ORDER — FUROSEMIDE 20 MG/1
20 TABLET ORAL DAILY
Status: DISCONTINUED | OUTPATIENT
Start: 2020-04-21 | End: 2020-04-21

## 2020-04-20 RX ORDER — OXYCODONE AND ACETAMINOPHEN 10; 325 MG/1; MG/1
1 TABLET ORAL 2 TIMES DAILY PRN
Status: DISCONTINUED | OUTPATIENT
Start: 2020-04-20 | End: 2020-04-22

## 2020-04-20 RX ORDER — KETOROLAC TROMETHAMINE 30 MG/ML
30 INJECTION, SOLUTION INTRAMUSCULAR; INTRAVENOUS EVERY 6 HOURS PRN
Status: DISCONTINUED | OUTPATIENT
Start: 2020-04-20 | End: 2020-04-22

## 2020-04-20 RX ORDER — CLOPIDOGREL BISULFATE 75 MG/1
75 TABLET ORAL DAILY
Status: DISCONTINUED | OUTPATIENT
Start: 2020-04-21 | End: 2020-04-23 | Stop reason: HOSPADM

## 2020-04-20 RX ORDER — ACETAMINOPHEN 650 MG/1
650 SUPPOSITORY RECTAL EVERY 4 HOURS PRN
Status: DISCONTINUED | OUTPATIENT
Start: 2020-04-20 | End: 2020-04-23 | Stop reason: HOSPADM

## 2020-04-20 RX ORDER — NICOTINE POLACRILEX 4 MG
15 LOZENGE BUCCAL PRN
Status: DISCONTINUED | OUTPATIENT
Start: 2020-04-20 | End: 2020-04-23 | Stop reason: HOSPADM

## 2020-04-20 RX ORDER — IPRATROPIUM BROMIDE AND ALBUTEROL SULFATE 2.5; .5 MG/3ML; MG/3ML
1 SOLUTION RESPIRATORY (INHALATION) EVERY 4 HOURS PRN
Status: DISCONTINUED | OUTPATIENT
Start: 2020-04-20 | End: 2020-04-23 | Stop reason: HOSPADM

## 2020-04-20 RX ORDER — ISOSORBIDE MONONITRATE 60 MG/1
60 TABLET, EXTENDED RELEASE ORAL DAILY
Status: DISCONTINUED | OUTPATIENT
Start: 2020-04-21 | End: 2020-04-21

## 2020-04-20 RX ORDER — ATORVASTATIN CALCIUM 80 MG/1
80 TABLET, FILM COATED ORAL DAILY
Status: DISCONTINUED | OUTPATIENT
Start: 2020-04-21 | End: 2020-04-23 | Stop reason: HOSPADM

## 2020-04-20 RX ORDER — HYDRALAZINE HYDROCHLORIDE 25 MG/1
25 TABLET, FILM COATED ORAL EVERY 8 HOURS SCHEDULED
Status: DISCONTINUED | OUTPATIENT
Start: 2020-04-20 | End: 2020-04-21

## 2020-04-20 RX ORDER — METOPROLOL SUCCINATE 50 MG/1
100 TABLET, EXTENDED RELEASE ORAL DAILY
Status: DISCONTINUED | OUTPATIENT
Start: 2020-04-21 | End: 2020-04-22

## 2020-04-20 RX ORDER — MORPHINE SULFATE 4 MG/ML
4 INJECTION, SOLUTION INTRAMUSCULAR; INTRAVENOUS ONCE
Status: COMPLETED | OUTPATIENT
Start: 2020-04-20 | End: 2020-04-20

## 2020-04-20 RX ORDER — TIZANIDINE 4 MG/1
2 TABLET ORAL EVERY 8 HOURS PRN
Status: DISCONTINUED | OUTPATIENT
Start: 2020-04-20 | End: 2020-04-23 | Stop reason: HOSPADM

## 2020-04-20 RX ORDER — IBUPROFEN 400 MG/1
400 TABLET ORAL EVERY 6 HOURS PRN
Status: DISCONTINUED | OUTPATIENT
Start: 2020-04-20 | End: 2020-04-22

## 2020-04-20 RX ORDER — SODIUM CHLORIDE 9 MG/ML
INJECTION, SOLUTION INTRAVENOUS CONTINUOUS
Status: DISCONTINUED | OUTPATIENT
Start: 2020-04-21 | End: 2020-04-21

## 2020-04-20 RX ORDER — DEXTROSE MONOHYDRATE 25 G/50ML
12.5 INJECTION, SOLUTION INTRAVENOUS PRN
Status: DISCONTINUED | OUTPATIENT
Start: 2020-04-20 | End: 2020-04-23 | Stop reason: HOSPADM

## 2020-04-20 RX ORDER — GABAPENTIN 300 MG/1
600 CAPSULE ORAL
Status: DISCONTINUED | OUTPATIENT
Start: 2020-04-20 | End: 2020-04-22

## 2020-04-20 RX ORDER — DEXTROSE MONOHYDRATE 50 MG/ML
100 INJECTION, SOLUTION INTRAVENOUS PRN
Status: DISCONTINUED | OUTPATIENT
Start: 2020-04-20 | End: 2020-04-23 | Stop reason: HOSPADM

## 2020-04-20 RX ORDER — ONDANSETRON 2 MG/ML
4 INJECTION INTRAMUSCULAR; INTRAVENOUS EVERY 4 HOURS PRN
Status: DISCONTINUED | OUTPATIENT
Start: 2020-04-20 | End: 2020-04-23 | Stop reason: HOSPADM

## 2020-04-20 RX ORDER — TRAZODONE HYDROCHLORIDE 50 MG/1
50 TABLET ORAL NIGHTLY
Status: DISCONTINUED | OUTPATIENT
Start: 2020-04-20 | End: 2020-04-23 | Stop reason: HOSPADM

## 2020-04-20 RX ORDER — POLYETHYLENE GLYCOL 3350 17 G/17G
17 POWDER, FOR SOLUTION ORAL DAILY PRN
Status: DISCONTINUED | OUTPATIENT
Start: 2020-04-20 | End: 2020-04-23 | Stop reason: HOSPADM

## 2020-04-20 RX ORDER — ASPIRIN 325 MG
325 TABLET ORAL DAILY
Status: DISCONTINUED | OUTPATIENT
Start: 2020-04-21 | End: 2020-04-22

## 2020-04-20 RX ORDER — FAMOTIDINE 20 MG/1
20 TABLET, FILM COATED ORAL 2 TIMES DAILY
Status: DISCONTINUED | OUTPATIENT
Start: 2020-04-20 | End: 2020-04-23 | Stop reason: HOSPADM

## 2020-04-20 RX ORDER — ACETAMINOPHEN 325 MG/1
650 TABLET ORAL EVERY 4 HOURS PRN
Status: DISCONTINUED | OUTPATIENT
Start: 2020-04-20 | End: 2020-04-23 | Stop reason: HOSPADM

## 2020-04-20 RX ORDER — INSULIN GLARGINE 100 [IU]/ML
8 INJECTION, SOLUTION SUBCUTANEOUS NIGHTLY
Status: DISCONTINUED | OUTPATIENT
Start: 2020-04-20 | End: 2020-04-23 | Stop reason: HOSPADM

## 2020-04-20 RX ORDER — MORPHINE SULFATE 2 MG/ML
2 INJECTION, SOLUTION INTRAMUSCULAR; INTRAVENOUS ONCE
Status: COMPLETED | OUTPATIENT
Start: 2020-04-20 | End: 2020-04-20

## 2020-04-20 RX ORDER — MORPHINE SULFATE 2 MG/ML
2 INJECTION, SOLUTION INTRAMUSCULAR; INTRAVENOUS ONCE
Status: DISCONTINUED | OUTPATIENT
Start: 2020-04-20 | End: 2020-04-20

## 2020-04-20 RX ORDER — SODIUM CHLORIDE 0.9 % (FLUSH) 0.9 %
10 SYRINGE (ML) INJECTION EVERY 12 HOURS SCHEDULED
Status: DISCONTINUED | OUTPATIENT
Start: 2020-04-20 | End: 2020-04-23 | Stop reason: HOSPADM

## 2020-04-20 RX ORDER — SODIUM CHLORIDE 0.9 % (FLUSH) 0.9 %
10 SYRINGE (ML) INJECTION PRN
Status: DISCONTINUED | OUTPATIENT
Start: 2020-04-20 | End: 2020-04-23 | Stop reason: HOSPADM

## 2020-04-20 RX ORDER — LISINOPRIL 10 MG/1
10 TABLET ORAL DAILY
Status: DISCONTINUED | OUTPATIENT
Start: 2020-04-21 | End: 2020-04-21

## 2020-04-20 RX ADMIN — MORPHINE SULFATE 4 MG: 4 INJECTION, SOLUTION INTRAMUSCULAR; INTRAVENOUS at 19:58

## 2020-04-20 RX ADMIN — MORPHINE SULFATE 2 MG: 2 INJECTION, SOLUTION INTRAMUSCULAR; INTRAVENOUS at 21:49

## 2020-04-20 RX ADMIN — ONDANSETRON 4 MG: 2 INJECTION INTRAMUSCULAR; INTRAVENOUS at 19:58

## 2020-04-20 ASSESSMENT — ENCOUNTER SYMPTOMS
SHORTNESS OF BREATH: 1
NAUSEA: 0
EYE REDNESS: 0
ABDOMINAL PAIN: 0
BACK PAIN: 0
CHOKING: 0
VOMITING: 0
EYE DISCHARGE: 0
APNEA: 0
FACIAL SWELLING: 0

## 2020-04-20 ASSESSMENT — PAIN DESCRIPTION - ONSET
ONSET: ON-GOING
ONSET: GRADUAL

## 2020-04-20 ASSESSMENT — PAIN DESCRIPTION - DESCRIPTORS
DESCRIPTORS: SHARP
DESCRIPTORS: SHARP

## 2020-04-20 ASSESSMENT — PAIN DESCRIPTION - FREQUENCY
FREQUENCY: CONTINUOUS
FREQUENCY: CONTINUOUS

## 2020-04-20 ASSESSMENT — PAIN DESCRIPTION - PAIN TYPE
TYPE: ACUTE PAIN
TYPE: ACUTE PAIN

## 2020-04-20 ASSESSMENT — PAIN DESCRIPTION - LOCATION
LOCATION: CHEST
LOCATION: CHEST

## 2020-04-20 ASSESSMENT — PAIN SCALES - GENERAL
PAINLEVEL_OUTOF10: 7
PAINLEVEL_OUTOF10: 7

## 2020-04-20 ASSESSMENT — PAIN DESCRIPTION - PROGRESSION
CLINICAL_PROGRESSION: GRADUALLY WORSENING
CLINICAL_PROGRESSION: GRADUALLY WORSENING

## 2020-04-20 ASSESSMENT — PAIN DESCRIPTION - ORIENTATION: ORIENTATION: MID

## 2020-04-20 NOTE — ED PROVIDER NOTES
Attending Supervisory Note/Shared Visit   I have personally performed a face to face diagnostic evaluation on this patient. I have reviewed the mid-levels findings and agree. History and Exam by me shows an alert white male no acute distress. He presents complaining of chest pain that started yesterday, shortness of breath that started today. He has a history of heart disease. He has a history of congestive heart failure. He was status post left heart catheterization on 4/15/2020. He had an ejection fraction of 40%. He had a heavily diseased distal LAD and the right coronary artery was occluded in the midportion. He had a patent saphenous vein graft to the right coronary artery. He took nitroglycerin without relief. Heart: Regular rate and rhythm. No murmurs or gallops noted. Lungs: Breath sounds equal bilaterally and clear. Abdomen: Soft, nondistended, nontender. Musculoskeletal: No lower extremity edema. Intact symmetrical distal pulses. No calf tenderness. Skin: Warm and dry, good turgor. No pallor or cyanosis. Lab reviewed. H&H are 13.4 and 40.5. White blood cell count 6900. Electrolytes BUN and creatinine are normal.  Glucose is 152. Troponin 0 0.02. BNP of 2026. Chest x-ray: No acute cardiopulmonary abnormality. As noted above the patient had a cardiac catheterization 4 days ago. Repeat troponin is pending at this time. Disposition will be determined at that time. (Please note that portions of this note were completed with a voice recognition program.  Efforts were made to edit the dictations but occasionally words are mis-transcribed.)    Scott Thacker MD  Attending Emergency Physician      EKG: Sinus tachycardia, left atrial enlargement, right bundle branch block, lateral ischemic changes. Rhythm strip shows sinus tachycardia with a rate of 109, MI interval 126 ms, QRS moderate 26 ms with no other ectopy as interpreted by me.   This is compared to an EKG

## 2020-04-20 NOTE — ED PROVIDER NOTES
**EVALUATED BY ADVANCED PRACTICE PROVIDER**        [facility]  EMERGENCY DEPARTMENT ENCOUNTER      Pt Name: Tyesha Mead  XKR:[id number]  Tiera 1955  Date of evaluation: 4/20/2020  Provider: Angelica Castañeda PA-C      Chief Complaint:    Chief Complaint   Patient presents with    Chest Pain     STARTED LAST NIGHT    Shortness of Breath     started 6 hours ago recently here and had stents placed       Nursing Notes, Past Medical Hx, Past Surgical Hx, Social Hx, Allergies, and Family Hx were all reviewed and agreed with or any disagreements were addressed in the HPI.    HPI:  (Location, Duration, Timing, Severity, Quality, Assoc Sx, Context, Modifying factors)  This is a  59 y.o. male complaint of chest pain. Said started having chest pain last night is not sure exactly what time but he says somewhere between 9 and 12. Woke up this morning with shortness of breath. Denies cough, no fever. Pain is located to the left upper chest does go down his left arm. Denies pain rating to his neck or to his back. Described the pain as sharp. He took a baby aspirin. Squad gave him 4 baby aspirin and he took nitroglycerin hour prior to arriving and said it did not help much. Denies nausea vomiting, no abdominal pain, no extremity weakness. Denies extremity swelling. Does have a significant cardiac history consisting of congestive heart failure, coronary artery disease, hypertension, hyperlipidemia and diabetes and COPD. He is sees Dr. Natalie Trujillo cardiologist.  Recently had a cath on April 15. No other complaints.       PastMedical/Surgical History:      Diagnosis Date    Anxiety     Arthritis     Asthma     CAD (coronary artery disease)     Calcium kidney stone     Cardiomyopathy ([address]     Cerebral artery occlusion with cerebral infarction ([address]     CHF (congestive heart failure) ([address]     Clostridium difficile diarrhea 02/12/2020    COPD (chronic obstructive pulmonary disease) ([address] mild    Depression     DM2 (diabetes mellitus, type 2) (HCC)     Fibromyalgia     GERD (gastroesophageal reflux disease)     Hyperlipidemia     Hypertension     Liver disease     Pacemaker 2012    Medtronic model # [id number]    Pneumonia     Seizures [facility]     TIA (transient ischemic attack) 2007    Ulcerative colitis ([facility]          Procedure Laterality Date    BACK SURGERY      CARDIAC SURGERY      CHOLECYSTECTOMY      COLONOSCOPY  01/10/2017    COLONOSCOPY  01/10/2017    CORONARY ANGIOPLASTY WITH STENT PLACEMENT  2012    CORONARY ARTERY BYPASS GRAFT  2010, 11/2015    ENDOSCOPY, COLON, DIAGNOSTIC      PACEMAKER PLACEMENT      UPPER GASTROINTESTINAL ENDOSCOPY  02/07/2017       Medications:  Discharge Medication List as of 4/23/2020  1:29 PM      CONTINUE these medications which have NOT CHANGED    Details   traZODone (DESYREL) 50 MG tablet TAKE 1 TABLET BY MOUTH EVERY NIGHT AT BEDTIME, Disp-30 tablet, R-0Needs appt with PCP before next refillNormal      gabapentin (NEURONTIN) 600 MG tablet TAKE 1 TABLET BY MOUTH FIVE TIMES DAILY, Disp-150 tablet, R-1Normal      ibuprofen (ADVIL;MOTRIN) 400 MG tablet Take 1 tablet by mouth every 6 hours as needed for Pain, Disp-20 tablet, R-0Print      insulin glargine (LANTUS) 100 UNIT/ML injection vial Inject 8 Units into the skin nightlyOTC      metoprolol succinate (TOPROL XL) 100 MG extended release tablet Take 1 tablet by mouth daily, Disp-30 tablet, R-3Normal      lisinopril (PRINIVIL;ZESTRIL) 10 MG tablet Take 1 tablet by mouth daily, Disp-30 tablet, R-3Normal      ranolazine (RANEXA) 500 MG extended release tablet Take 1,000 mg by mouth 2 times dailyHistorical Med      furosemide (LASIX) 20 MG tablet Take 20 mg by mouth dailyHistorical Med      empagliflozin (JARDIANCE) 10 MG tablet Take 1 tablet by mouth daily, Disp-28 tablet, R-0Sample      isosorbide mononitrate (IMDUR) 30 MG extended release tablet Take 60 mg by mouth daily Historical Med hydrALAZINE (APRESOLINE) 25 MG tablet Take 1 tablet by mouth every 8 hours, Disp-90 tablet, R-3Normal      acetaminophen (TYLENOL) 325 MG tablet Take 650 mg by mouth every 6 hours as needed for PainHistorical Med      clopidogrel (PLAVIX) 75 MG tablet TAKE 1 TABLET BY MOUTH DAILY, Disp-90 tablet, R-3Normal      aspirin 325 MG tablet Take 325 mg by mouth daily Historical Med      atorvastatin (LIPITOR) 80 MG tablet TAKE 1 TABLET BY MOUTH DAILY, Disp-90 tablet, R-1Normal      tiZANidine (ZANAFLEX) 2 MG tablet TAKE 1 TABLET BY MOUTH EVERY 8 HOURS AS NEEDED FOR MUSCLE SPASM, Disp-30 tablet, R-0Normal      famotidine (PEPCID) 20 MG tablet Take 1 tablet by mouth 2 times daily, Disp-30 tablet, R-0Print      albuterol sulfate HFA (PROAIR HFA) 108 (90 Base) MCG/ACT inhaler Inhale 2 puffs into the lungs every 6 hours as needed for WheezingHistorical Med      nitroGLYCERIN (NITROSTAT) 0.4 MG SL tablet PLACE 1 TABLET UNDER THE TONGUE EVERY 5 MINUTES AS NEEDED FOR CHEST PAIN, Disp-25 tablet, R-1Normal               Review of Systems:  Review of Systems   Constitutional: Negative for chills and fever. HENT: Negative for congestion, facial swelling and sneezing. Eyes: Negative for discharge and redness. Respiratory: Positive for shortness of breath. Negative for apnea and choking. Cardiovascular: Positive for chest pain. Gastrointestinal: Negative for abdominal pain, nausea and vomiting. Genitourinary: Negative for dysuria. Musculoskeletal: Negative for back pain, neck pain and neck stiffness. Neurological: Negative for dizziness, tremors, seizures and headaches. All other systems reviewed and are negative. Positives and Pertinent negatives as per HPI. Except as noted above in the ROS, problem specific ROS was completed and is negative. Physical Exam:  Physical Exam  Vitals signs and nursing note reviewed. Constitutional:       Appearance: He is well-developed. He is not diaphoretic.    HENT: Head: Normocephalic and atraumatic. Nose: Nose normal.      Mouth/Throat:      Mouth: Mucous membranes are moist.      Pharynx: Oropharynx is clear. Eyes:      General:         Right eye: No discharge. Left eye: No discharge. Extraocular Movements: Extraocular movements intact. Conjunctiva/sclera: Conjunctivae normal.      Pupils: Pupils are equal, round, and reactive to light. Neck:      Musculoskeletal: Normal range of motion and neck supple. Cardiovascular:      Rate and Rhythm: Normal rate and regular rhythm. Heart sounds: Normal heart sounds. No murmur. No friction rub. No gallop. Pulmonary:      Effort: Pulmonary effort is normal. No respiratory distress. Breath sounds: Normal breath sounds. No wheezing or rales. Chest:      Chest wall: No tenderness. Abdominal:      General: Abdomen is flat. Bowel sounds are normal. There is no distension. Palpations: Abdomen is soft. There is no mass. Tenderness: There is no abdominal tenderness. There is no guarding. Hernia: No hernia is present. Musculoskeletal: Normal range of motion. Skin:     General: Skin is warm and dry. Neurological:      Mental Status: He is alert and oriented to person, place, and time.    Psychiatric:         Behavior: Behavior normal.         MEDICAL DECISION MAKING    Vitals:    Vitals:    04/22/20 2130 04/22/20 2358 04/23/20 0517 04/23/20 0910   BP: 111/71 139/81 138/86 139/78   Pulse: 69 65 68 71   Resp: 18 18 18 18   Temp: 97.5 °F (36.4 °C) 97.5 °F (36.4 °C) 98.2 °F (36.8 °C) 97.8 °F (36.6 °C)   TempSrc: Axillary Oral Oral Oral   SpO2: 97% 96% 97% 96%   Weight:   190 lb 0.6 oz (86.2 kg)    Height:           LABS:  Labs Reviewed   CBC WITH AUTO DIFFERENTIAL - Abnormal; Notable for the following components:       Result Value    RBC 4.04 (*)     Hemoglobin 13.4 (*)     .2 (*)     All other components within normal limits    Narrative:     Performed at:  [facility]  [address], [facility] [employer] [address]   Phone [phone]   COMPREHENSIVE METABOLIC PANEL - Abnormal; Notable for the following components:    Glucose 152 (*)     ALT <5 (*)     AST 9 (*)     All other components within normal limits    Narrative:     Performed at:  [facility]  [address], [place] [employer] [address]   Phone [phone]   TROPONIN - Abnormal; Notable for the following components:    Troponin 0.02 (*)     All other components within normal limits    Narrative:     Performed at:  [facility]  [address], [facility] [employer] [address]   Phone [phone]   BRAIN NATRIURETIC PEPTIDE - Abnormal; Notable for the following components:    Pro-BNP 2,026 (*)     All other components within normal limits    Narrative:     Performed at:  [facility]  [address], [facility] [place] [address]   Phone [phone]   TROPONIN - Abnormal; Notable for the following components:    Troponin 0.03 (*)     All other components within normal limits    Narrative:     Performed at:  [facility]  [address], [facility] [employer] [address]   Phone [phone]   BASIC METABOLIC PANEL W/ REFLEX TO MG FOR LOW K - Abnormal; Notable for the following components:    CO2 19 (*)     Anion Gap 17 (*)     Glucose 103 (*)     BUN 24 (*)     CREATININE 2.0 (*)     GFR Non- 34 (*)     GFR  41 (*)     All other components within normal limits    Narrative:     Performed at:  [facility]  [address], [facility] [place] [address]   Phone [phone]   CBC WITH AUTO DIFFERENTIAL - Abnormal; Notable for the following components:    RBC 3.67 (*)     Hemoglobin 12.3 (*)     Hematocrit 36.7 (*)     RDW 15.6 (*)     All other components within normal limits    Narrative:     Performed at:  [facility]) - 4/14/2020  EXAMINATION: TWO XRAY VIEWS OF THE CHEST 4/14/2020 3:56 pm COMPARISON: March 10, 2020 HISTORY: ORDERING SYSTEM PROVIDED HISTORY: Chest pain TECHNOLOGIST PROVIDED HISTORY: Reason for exam:->Chest pain Reason for Exam: Chest Pain (patient arrived via EMS from home with c/o chest pain x7-8 hours. Acuity: Chronic Type of Exam: Ongoing FINDINGS: Cardiac silhouette is enlarged. Lungs appear clear. No acute bony abnormality. Left-sided cardiac device which appears similar to previous exam.     No acute findings     Xr Chest Portable    Result Date: 4/20/2020  EXAMINATION: ONE XRAY VIEW OF THE CHEST 4/20/2020 7:43 pm COMPARISON: 04/14/2020 HISTORY: ORDERING SYSTEM PROVIDED HISTORY: chest pain TECHNOLOGIST PROVIDED HISTORY: Reason for exam:->chest pain Reason for Exam: chest pain Acuity: Acute Type of Exam: Initial FINDINGS: Single portable frontal view of the chest is submitted for review. The cardiac silhouette is normal in size. Lung parenchyma is clear without focal airspace consolidation, sizeable pleural effusion, or pneumothorax. Implantable cardiac device leads overlying the right atrium and ventricle. Status post midline sternotomy. Trachea is midline. Visualized osseous structures and soft tissues are grossly intact. No acute cardiopulmonary pathology. Xr Chest Portable    Result Date: 4/7/2020  Site: Trisha Fix #: [id number] #: [id number]: GSEDAccount #: [de-identified] #: [id number] #: [id number]: XR CHEST AP PORTABLEExam Date/Time: 04/07/2020 12:15 PMAdmitting Diagnosis: Chest painReason for Exam: Chest pain Dictated by: Jessy Friedman BETITO: 04/07/2020 12:29 PMT: This document is confidential medical information. Unauthorized disclosure or use of this information is prohibited by law. If you are not the intended recipient of this document, please advise us by calling immediately [phone].  Impression/Conclusion below HISTORY:   Chest pain

## 2020-04-21 LAB
ANION GAP SERPL CALCULATED.3IONS-SCNC: 17 MMOL/L (ref 3–16)
BASOPHILS ABSOLUTE: 0.1 K/UL (ref 0–0.2)
BASOPHILS RELATIVE PERCENT: 1 %
BUN BLDV-MCNC: 24 MG/DL (ref 7–20)
CALCIUM SERPL-MCNC: 8.8 MG/DL (ref 8.3–10.6)
CHLORIDE BLD-SCNC: 104 MMOL/L (ref 99–110)
CO2: 19 MMOL/L (ref 21–32)
CREAT SERPL-MCNC: 2 MG/DL (ref 0.8–1.3)
D DIMER: 737 NG/ML DDU (ref 0–229)
EKG ATRIAL RATE: 109 BPM
EKG DIAGNOSIS: NORMAL
EKG P AXIS: 58 DEGREES
EKG P-R INTERVAL: 126 MS
EKG Q-T INTERVAL: 402 MS
EKG QRS DURATION: 126 MS
EKG QTC CALCULATION (BAZETT): 541 MS
EKG R AXIS: 75 DEGREES
EKG T AXIS: 76 DEGREES
EKG VENTRICULAR RATE: 109 BPM
EOSINOPHILS ABSOLUTE: 0.2 K/UL (ref 0–0.6)
EOSINOPHILS RELATIVE PERCENT: 3 %
GFR AFRICAN AMERICAN: 41
GFR NON-AFRICAN AMERICAN: 34
GLUCOSE BLD-MCNC: 103 MG/DL (ref 70–99)
GLUCOSE BLD-MCNC: 103 MG/DL (ref 70–99)
GLUCOSE BLD-MCNC: 108 MG/DL (ref 70–99)
GLUCOSE BLD-MCNC: 120 MG/DL (ref 70–99)
GLUCOSE BLD-MCNC: 122 MG/DL (ref 70–99)
GLUCOSE BLD-MCNC: 128 MG/DL (ref 70–99)
GLUCOSE BLD-MCNC: 171 MG/DL (ref 70–99)
HCT VFR BLD CALC: 36.7 % (ref 40.5–52.5)
HEMOGLOBIN: 12.3 G/DL (ref 13.5–17.5)
LYMPHOCYTES ABSOLUTE: 2.8 K/UL (ref 1–5.1)
LYMPHOCYTES RELATIVE PERCENT: 42.8 %
MCH RBC QN AUTO: 33.5 PG (ref 26–34)
MCHC RBC AUTO-ENTMCNC: 33.6 G/DL (ref 31–36)
MCV RBC AUTO: 100 FL (ref 80–100)
MONOCYTES ABSOLUTE: 0.5 K/UL (ref 0–1.3)
MONOCYTES RELATIVE PERCENT: 8.2 %
NEUTROPHILS ABSOLUTE: 2.9 K/UL (ref 1.7–7.7)
NEUTROPHILS RELATIVE PERCENT: 45 %
PDW BLD-RTO: 15.6 % (ref 12.4–15.4)
PERFORMED ON: ABNORMAL
PLATELET # BLD: 201 K/UL (ref 135–450)
PMV BLD AUTO: 8.8 FL (ref 5–10.5)
POTASSIUM REFLEX MAGNESIUM: 3.8 MMOL/L (ref 3.5–5.1)
RBC # BLD: 3.67 M/UL (ref 4.2–5.9)
SODIUM BLD-SCNC: 140 MMOL/L (ref 136–145)
TROPONIN: 0.03 NG/ML
TROPONIN: 0.05 NG/ML
TROPONIN: 0.06 NG/ML
TROPONIN: 0.07 NG/ML
WBC # BLD: 6.5 K/UL (ref 4–11)

## 2020-04-21 PROCEDURE — 6370000000 HC RX 637 (ALT 250 FOR IP)

## 2020-04-21 PROCEDURE — 93010 ELECTROCARDIOGRAM REPORT: CPT | Performed by: INTERNAL MEDICINE

## 2020-04-21 PROCEDURE — 96376 TX/PRO/DX INJ SAME DRUG ADON: CPT

## 2020-04-21 PROCEDURE — 36415 COLL VENOUS BLD VENIPUNCTURE: CPT

## 2020-04-21 PROCEDURE — 2580000003 HC RX 258: Performed by: INTERNAL MEDICINE

## 2020-04-21 PROCEDURE — 6360000002 HC RX W HCPCS: Performed by: NURSE PRACTITIONER

## 2020-04-21 PROCEDURE — G0378 HOSPITAL OBSERVATION PER HR: HCPCS

## 2020-04-21 PROCEDURE — 6370000000 HC RX 637 (ALT 250 FOR IP): Performed by: INTERNAL MEDICINE

## 2020-04-21 PROCEDURE — 80048 BASIC METABOLIC PNL TOTAL CA: CPT

## 2020-04-21 PROCEDURE — 1200000000 HC SEMI PRIVATE

## 2020-04-21 PROCEDURE — 6360000002 HC RX W HCPCS: Performed by: INTERNAL MEDICINE

## 2020-04-21 PROCEDURE — 85025 COMPLETE CBC W/AUTO DIFF WBC: CPT

## 2020-04-21 PROCEDURE — 85379 FIBRIN DEGRADATION QUANT: CPT

## 2020-04-21 PROCEDURE — 96372 THER/PROPH/DIAG INJ SC/IM: CPT

## 2020-04-21 PROCEDURE — 87086 URINE CULTURE/COLONY COUNT: CPT

## 2020-04-21 PROCEDURE — 84484 ASSAY OF TROPONIN QUANT: CPT

## 2020-04-21 PROCEDURE — 99222 1ST HOSP IP/OBS MODERATE 55: CPT | Performed by: INTERNAL MEDICINE

## 2020-04-21 PROCEDURE — 96375 TX/PRO/DX INJ NEW DRUG ADDON: CPT

## 2020-04-21 RX ORDER — MORPHINE SULFATE 2 MG/ML
2 INJECTION, SOLUTION INTRAMUSCULAR; INTRAVENOUS EVERY 4 HOURS PRN
Status: DISCONTINUED | OUTPATIENT
Start: 2020-04-21 | End: 2020-04-22

## 2020-04-21 RX ORDER — SODIUM CHLORIDE 9 MG/ML
INJECTION, SOLUTION INTRAVENOUS ONCE
Status: COMPLETED | OUTPATIENT
Start: 2020-04-21 | End: 2020-04-21

## 2020-04-21 RX ORDER — SODIUM CHLORIDE 9 MG/ML
INJECTION, SOLUTION INTRAVENOUS CONTINUOUS
Status: ACTIVE | OUTPATIENT
Start: 2020-04-21 | End: 2020-04-21

## 2020-04-21 RX ORDER — ISOSORBIDE MONONITRATE 30 MG/1
30 TABLET, EXTENDED RELEASE ORAL DAILY
Status: DISCONTINUED | OUTPATIENT
Start: 2020-04-22 | End: 2020-04-23 | Stop reason: HOSPADM

## 2020-04-21 RX ADMIN — GABAPENTIN 600 MG: 300 CAPSULE ORAL at 09:04

## 2020-04-21 RX ADMIN — MORPHINE SULFATE 2 MG: 2 INJECTION, SOLUTION INTRAMUSCULAR; INTRAVENOUS at 22:17

## 2020-04-21 RX ADMIN — OXYCODONE HYDROCHLORIDE AND ACETAMINOPHEN 1 TABLET: 10; 325 TABLET ORAL at 23:36

## 2020-04-21 RX ADMIN — GABAPENTIN 600 MG: 300 CAPSULE ORAL at 06:32

## 2020-04-21 RX ADMIN — ENOXAPARIN SODIUM 40 MG: 40 INJECTION SUBCUTANEOUS at 10:22

## 2020-04-21 RX ADMIN — METOPROLOL SUCCINATE 100 MG: 50 TABLET, EXTENDED RELEASE ORAL at 09:04

## 2020-04-21 RX ADMIN — GABAPENTIN 600 MG: 300 CAPSULE ORAL at 20:19

## 2020-04-21 RX ADMIN — FAMOTIDINE 20 MG: 20 TABLET ORAL at 09:01

## 2020-04-21 RX ADMIN — ONDANSETRON 4 MG: 2 INJECTION INTRAMUSCULAR; INTRAVENOUS at 13:23

## 2020-04-21 RX ADMIN — SODIUM CHLORIDE: 9 INJECTION, SOLUTION INTRAVENOUS at 00:17

## 2020-04-21 RX ADMIN — FAMOTIDINE 20 MG: 20 TABLET ORAL at 00:21

## 2020-04-21 RX ADMIN — SODIUM CHLORIDE: 9 INJECTION, SOLUTION INTRAVENOUS at 13:23

## 2020-04-21 RX ADMIN — INSULIN GLARGINE 8 UNITS: 100 INJECTION, SOLUTION SUBCUTANEOUS at 00:20

## 2020-04-21 RX ADMIN — SODIUM CHLORIDE: 9 INJECTION, SOLUTION INTRAVENOUS at 14:49

## 2020-04-21 RX ADMIN — GABAPENTIN 600 MG: 300 CAPSULE ORAL at 00:21

## 2020-04-21 RX ADMIN — ONDANSETRON 4 MG: 2 INJECTION INTRAMUSCULAR; INTRAVENOUS at 20:25

## 2020-04-21 RX ADMIN — HYDRALAZINE HYDROCHLORIDE 25 MG: 25 TABLET, FILM COATED ORAL at 00:21

## 2020-04-21 RX ADMIN — INSULIN GLARGINE 8 UNITS: 100 INJECTION, SOLUTION SUBCUTANEOUS at 20:19

## 2020-04-21 RX ADMIN — FAMOTIDINE 20 MG: 20 TABLET ORAL at 20:19

## 2020-04-21 RX ADMIN — KETOROLAC TROMETHAMINE 30 MG: 30 INJECTION, SOLUTION INTRAMUSCULAR at 00:22

## 2020-04-21 RX ADMIN — LISINOPRIL 10 MG: 10 TABLET ORAL at 09:04

## 2020-04-21 RX ADMIN — HYDRALAZINE HYDROCHLORIDE 25 MG: 25 TABLET, FILM COATED ORAL at 06:32

## 2020-04-21 RX ADMIN — OXYCODONE HYDROCHLORIDE AND ACETAMINOPHEN 1 TABLET: 10; 325 TABLET ORAL at 11:38

## 2020-04-21 RX ADMIN — GABAPENTIN 600 MG: 300 CAPSULE ORAL at 14:49

## 2020-04-21 RX ADMIN — SODIUM CHLORIDE: 9 INJECTION, SOLUTION INTRAVENOUS at 11:48

## 2020-04-21 RX ADMIN — ISOSORBIDE MONONITRATE 60 MG: 60 TABLET, EXTENDED RELEASE ORAL at 09:01

## 2020-04-21 RX ADMIN — KETOROLAC TROMETHAMINE 30 MG: 30 INJECTION, SOLUTION INTRAMUSCULAR at 20:25

## 2020-04-21 RX ADMIN — CLOPIDOGREL BISULFATE 75 MG: 75 TABLET ORAL at 09:01

## 2020-04-21 RX ADMIN — RANOLAZINE 1000 MG: 500 TABLET, FILM COATED, EXTENDED RELEASE ORAL at 20:19

## 2020-04-21 RX ADMIN — ASPIRIN 325 MG ORAL TABLET 325 MG: 325 PILL ORAL at 09:01

## 2020-04-21 RX ADMIN — RANOLAZINE 1000 MG: 500 TABLET, FILM COATED, EXTENDED RELEASE ORAL at 00:21

## 2020-04-21 RX ADMIN — TRAZODONE HYDROCHLORIDE 50 MG: 50 TABLET ORAL at 00:21

## 2020-04-21 RX ADMIN — FUROSEMIDE 20 MG: 20 TABLET ORAL at 09:02

## 2020-04-21 RX ADMIN — RANOLAZINE 1000 MG: 500 TABLET, FILM COATED, EXTENDED RELEASE ORAL at 09:02

## 2020-04-21 RX ADMIN — OXYCODONE HYDROCHLORIDE AND ACETAMINOPHEN 1 TABLET: 10; 325 TABLET ORAL at 04:10

## 2020-04-21 RX ADMIN — TRAZODONE HYDROCHLORIDE 50 MG: 50 TABLET ORAL at 20:19

## 2020-04-21 RX ADMIN — INSULIN LISPRO 2 UNITS: 100 INJECTION, SOLUTION INTRAVENOUS; SUBCUTANEOUS at 17:34

## 2020-04-21 RX ADMIN — ATORVASTATIN CALCIUM 80 MG: 80 TABLET, FILM COATED ORAL at 09:02

## 2020-04-21 RX ADMIN — SODIUM CHLORIDE: 9 INJECTION, SOLUTION INTRAVENOUS at 10:22

## 2020-04-21 RX ADMIN — GABAPENTIN 600 MG: 300 CAPSULE ORAL at 23:35

## 2020-04-21 ASSESSMENT — PAIN DESCRIPTION - ORIENTATION
ORIENTATION: MID

## 2020-04-21 ASSESSMENT — PAIN DESCRIPTION - DESCRIPTORS
DESCRIPTORS: SHARP
DESCRIPTORS: SHARP
DESCRIPTORS: ACHING;SHARP
DESCRIPTORS: SHARP

## 2020-04-21 ASSESSMENT — PAIN DESCRIPTION - ONSET
ONSET: ON-GOING

## 2020-04-21 ASSESSMENT — PAIN DESCRIPTION - PAIN TYPE
TYPE: ACUTE PAIN

## 2020-04-21 ASSESSMENT — PAIN SCALES - GENERAL
PAINLEVEL_OUTOF10: 7
PAINLEVEL_OUTOF10: 6
PAINLEVEL_OUTOF10: 6
PAINLEVEL_OUTOF10: 8
PAINLEVEL_OUTOF10: 0
PAINLEVEL_OUTOF10: 8
PAINLEVEL_OUTOF10: 0
PAINLEVEL_OUTOF10: 6
PAINLEVEL_OUTOF10: 5

## 2020-04-21 ASSESSMENT — PAIN DESCRIPTION - PROGRESSION
CLINICAL_PROGRESSION: NOT CHANGED

## 2020-04-21 ASSESSMENT — PAIN - FUNCTIONAL ASSESSMENT
PAIN_FUNCTIONAL_ASSESSMENT: PREVENTS OR INTERFERES SOME ACTIVE ACTIVITIES AND ADLS
PAIN_FUNCTIONAL_ASSESSMENT: ACTIVITIES ARE NOT PREVENTED
PAIN_FUNCTIONAL_ASSESSMENT: PREVENTS OR INTERFERES SOME ACTIVE ACTIVITIES AND ADLS
PAIN_FUNCTIONAL_ASSESSMENT: PREVENTS OR INTERFERES SOME ACTIVE ACTIVITIES AND ADLS
PAIN_FUNCTIONAL_ASSESSMENT: ACTIVITIES ARE NOT PREVENTED
PAIN_FUNCTIONAL_ASSESSMENT: ACTIVITIES ARE NOT PREVENTED

## 2020-04-21 ASSESSMENT — PAIN DESCRIPTION - FREQUENCY
FREQUENCY: CONTINUOUS

## 2020-04-21 ASSESSMENT — PAIN DESCRIPTION - LOCATION
LOCATION: CHEST
LOCATION: CHEST
LOCATION: CHEST;ABDOMEN
LOCATION: CHEST

## 2020-04-21 NOTE — CONSULTS
to 40%. 4.  Diabetes mellitus. 5.  History of CVA. 6.  Fibromyalgia. 7.  Hyperlipidemia. 8.  History of ulcerative colitis. 9.  History of anxiety and depression. PAST SURGICAL HISTORY:  He had a back surgery, CABG, stent placed in his  left main, protected; and history of ICD insertion. SOCIAL HISTORY:  He has a longstanding history of smoking and he  continues to smoke still about a half pack per day. He denies any  alcohol abuse. FAMILY HISTORY:  Mother  with cancer and father had coronary artery  disease at a young age as well as diabetes. CURRENT MEDICATIONS:  Have been reviewed. ALLERGIES:  Have been reviewed. PHYSICAL EXAMINATION:  VITAL SIGNS:  Pulse is 61 and regular, blood pressure 84/54 currently,  respirations are 15, oxygen saturation 93%, temperature is 97.6. CONSTITUTIONAL:  He is alert and oriented. HEENT:  His neck is supple. No jugular venous distention. No carotid  bruits were heard. No thyromegaly. Eyes show slightly pale  conjunctivae. No icterus noticed. CARDIAC:  Normal S1 and S2. I am unable to appreciate any gallop,  murmur, or rub. LUNGS:  Bilateral expiratory wheezes. ABDOMEN:  Soft, nontender. Bowel sounds are present. No organomegaly  appreciated. EXTREMITIES:  No edema. NEUROLOGICAL:  Alert, oriented. Cranial nerves II through XII intact. No focal deficit. SKIN:  No rashes. LABORATORY DATA:  Sodium is 140, potassium 3.8, chloride is 104, bicarb  19, BUN is 24, creatinine 2 which have worsened from BUN of 18 and  creatinine 1.2 from yesterday. BNP was . Troponin was 0.03  initially, repeat troponin is 0.05. White count 6.5, hemoglobin 12.3,  hematocrit is 36.7. EKG from 04/15/2020 personally reviewed by me showed sinus tachycardia  with rate of 103, right bundle branch block, diffuse ST and T-wave  abnormalities secondary to right bundle branch block. Coronary angiography personally reviewed showed patent stented left  main. The left anterior descending artery is occluded in the midsegment  with competitive flow. Saphenous vein graft to the LAD is patent. The  saphenous vein graft to the right coronary artery has a moderate  stenosis and the native right coronary artery is 100% occluded in the  midsegment. There is evidence of left ventricular systolic dysfunction. IMPRESSION:  1. This is a 19-year-old male who has presented with shortness of  breath which according to the patient is sudden. Etiology of this  remains unclear. I would like to rule out PE though suspicion is on the  lower side. 2.  Borderline troponin elevation. This may be related to his renal  insufficiency, but certainly with his known coronary artery disease, he  will require further evaluation to see if there is any need to fix his  right coronary artery but will require a chemical stress test which we  will consider in the next day or so.  3.  We will also obtain an echocardiogram to make sure there is no  evidence of pericardial effusion or a tamponade to account for his  sudden onset of shortness of breath. I appreciate the opportunity to participate in the care of this pleasant  white but anxious male.         Brett Wilson MD    D: 04/21/2020 10:56:09       T: 04/21/2020 15:26:19     [email or web address]  Job#: [id number]     Doc#: [id number]    CC:

## 2020-04-21 NOTE — H&P
Hospital Medicine  History and Physical    PCP: Gabrielle Roldan MD  Patient Name: Ezra Coffey    Date of Service: Pt seen/examined on 04/20/2020 and placed in observation. CHIEF COMPLAINT:  Pt c/o chest pain  HISTORY OF PRESENT ILLNESS: Patient is a 70-year-old man with history hypertension, hyperlipidemia, diabetes mellitus, coronary artery disease with an ischemic cardiomyopathy and chronic systolic congestive heart failure with a history of a remote CABG and a cardiac cath in 2/2020 and another cardiac cath on 4/15/2020 (5 days ago) with the finding of an occlusion at the mid portion of the right coronary artery and a heavily diseased distal LAD. The conclusion was that the patient had nonobstructive coronary artery disease and the plan was medical therapy. He presents to the emergency room for evaluation following an acute onset of moderately severe, sharp upper chest pain that radiates down his left arm and is associated with shortness of breath. He took an aspirin and nitroglycerin at home, and EMS gave him four baby aspirin and this did not help. Cardiology was called and asked that the patient be admitted so that Dr. Carol Ferrer could see him in the morning, as the cardiologist on call is not familiar with the patient. He is being admitted for further evaluation and treatment. Associated signs and symptoms do not include fever or chills, nausea, vomiting, abdominal pain, hemoptysis, hematochezia, diarrhea, constipation or urinary symptoms.       Past Medical History:        Diagnosis Date    Anxiety     Arthritis     Asthma     CAD (coronary artery disease)     Calcium kidney stone     Cardiomyopathy ([address]     Cerebral artery occlusion with cerebral infarction ([address]     CHF (congestive heart failure) ([address]     Clostridium difficile diarrhea 02/12/2020    COPD (chronic obstructive pulmonary disease) (HCC)     mild    Depression     DM2 (diabetes mellitus, type 2) ([address]     Historical Provider, MD   empagliflozin (JARDIANCE) 10 MG tablet Take 1 tablet by mouth daily 11/27/19  Yes Debbie Pope MD   isosorbide mononitrate (IMDUR) 30 MG extended release tablet Take 60 mg by mouth daily    Yes Historical Provider, MD   hydrALAZINE (APRESOLINE) 25 MG tablet Take 1 tablet by mouth every 8 hours 9/18/19  Yes Roderick Monae MD   acetaminophen (TYLENOL) 325 MG tablet Take 650 mg by mouth every 6 hours as needed for Pain   Yes Historical Provider, MD   clopidogrel (PLAVIX) 75 MG tablet TAKE 1 TABLET BY MOUTH DAILY 4/7/19  Yes Debbie Pope MD   aspirin 325 MG tablet Take 325 mg by mouth daily    Yes Historical Provider, MD   atorvastatin (LIPITOR) 80 MG tablet TAKE 1 TABLET BY MOUTH DAILY 2/15/18  Yes Debbie Pope MD   tiZANidine (ZANAFLEX) 2 MG tablet TAKE 1 TABLET BY MOUTH EVERY 8 HOURS AS NEEDED FOR MUSCLE SPASM 4/20/20   Jose Juan Quezada DO   famotidine (PEPCID) 20 MG tablet Take 1 tablet by mouth 2 times daily 3/10/20   ABIGAIL Benedict   albuterol sulfate HFA (PROAIR HFA) 108 (90 Base) MCG/ACT inhaler Inhale 2 puffs into the lungs every 6 hours as needed for Wheezing    Historical Provider, MD   nitroGLYCERIN (NITROSTAT) 0.4 MG SL tablet PLACE 1 TABLET UNDER THE TONGUE EVERY 5 MINUTES AS NEEDED FOR CHEST PAIN 9/2/18   Debbie Pope MD       Family History:       Problem Relation Age of Onset    Diabetes Father     Coronary Art Dis Father     Cancer Mother         Lung with mets     Social History:   TOBACCO:   reports that he has been smoking cigarettes. He has a 22.00 pack-year smoking history. He has never used smokeless tobacco.  ETOH:   reports no history of alcohol use.   OCCUPATION:      REVIEW OF SYSTEMS:  A full review of systems was performed and is negative except for that which appears in the HPI    Physical Exam:    Vitals: /65   Pulse 93   Temp 97.4 °F (36.3 °C) (Oral)   Resp 18   Ht 6' (1.829 m)   Wt 191 lb 9.3 oz (86.9 kg)   SpO2 95%

## 2020-04-21 NOTE — PROGRESS NOTES
BP holding around 90/58. Patient reports that dizziness and nausea is gone. \"I feel better. \" Will continue to monitor. Electronically signed by Rea Louis RN on 4/21/2020 at 6:29 PM

## 2020-04-21 NOTE — PROGRESS NOTES
Progress Note  Admit Date: 4/20/2020      PCP: Yamilet Valencia MD     CC: F/U for chest pain    Days in hospital:  0    SUBJECTIVE / Interval History:  Patient does not complain of any chest pain or shortness of breath. Feels very dizzy and lethargic        Allergies  Dopamine hcl and Melatonin    Medications    Scheduled Meds:   traZODone  50 mg Oral Nightly    ranolazine  1,000 mg Oral BID    metoprolol succinate  100 mg Oral Daily    lisinopril  10 mg Oral Daily    isosorbide mononitrate  60 mg Oral Daily    insulin glargine  8 Units Subcutaneous Nightly    hydrALAZINE  25 mg Oral 3 times per day    gabapentin  600 mg Oral 5x Daily    furosemide  20 mg Oral Daily    famotidine  20 mg Oral BID    clopidogrel  75 mg Oral Daily    atorvastatin  80 mg Oral Daily    aspirin  325 mg Oral Daily    sodium chloride flush  10 mL Intravenous 2 times per day    insulin lispro  0-12 Units Subcutaneous Q4H    enoxaparin  40 mg Subcutaneous Daily     Continuous Infusions:   dextrose      sodium chloride 50 mL/hr at 04/21/20 0017       PRN Meds:  tiZANidine, oxyCODONE-acetaminophen, ibuprofen, ipratropium-albuterol, sodium chloride flush, acetaminophen **OR** acetaminophen, polyethylene glycol, ondansetron, glucose, dextrose, glucagon (rDNA), dextrose, ketorolac    Vitals    /74   Pulse 106   Temp 97.4 °F (36.3 °C) (Oral)   Resp 18   Ht 6' (1.829 m)   Wt 191 lb 9.3 oz (86.9 kg)   SpO2 95%   BMI 25.98 kg/m²     Exam:    Gen: No distress. Ill-looking   eyes: PERRL. No sclera icterus. No conjunctival injection. ENT: No discharge. Pharynx clear. External appearance of ears and nose normal.  Neck: Trachea midline. No obvious mass. Resp: No accessory muscle use. No crackles. No wheezes. No rhonchi. No dullness on percussion. CV: Regular rate. Regular rhythm. No murmur or rub. No edema. GI: Non-tender. Non-distended. No hernia. Skin: Warm, dry, normal texture and turgor.  No nodule on last 72 hours. RADIOLOGY:    XR CHEST PORTABLE   Final Result   No acute cardiopulmonary pathology. CONSULTS:    IP CONSULT TO CARDIOLOGY    ASSESSMENT AND PLAN:      Principal Problem:    Chest pain  Active Problems:    Chronic chest pain    S/P CABG (coronary artery bypass graft)    Essential hypertension    Ischemic cardiomyopathy    Hyperlipidemia LDL goal <70    Chronic back pain    Elevated troponin    DMII (diabetes mellitus, type 2) ([facility])    COPD (chronic obstructive pulmonary disease) ([facility])    Chronic systolic (congestive) heart failure ([facility])    CAD (coronary artery disease)  Resolved Problems:    * No resolved hospital problems. *    Patient is a 40-year-old male with a past medical history of hypertension, hyperlipidemia, coronary artery disease with ischemic cardiomyopathy and chronic systolic CHF. Patient had a cardiac cath on 4/15/2020 which showed an occlusion at the midportion of the right coronary artery disease and heavily diseased LAD. Cardiology was consulted and the plan was to continue medical therapy. Patient woke up with severe chest pain radiating down the left when shortness of breath. Hypotension  -Patient's blood pressure in the low 70s. Will stop lisinopril. Hydralazine stopped.   Cut back improved-Patient given 500 mL fluid bolus  -Monitor blood pressure    Acute kidney injury  --Bump from 1.2-2 most likely secondary to hypotension.  -Monitor  -Continue low-dose IV fluids    Chest pain with coronary artery disease and ischemic cardiomyopathy  -Had a recent coronary angiogram  -Continue aspirin Plavix statin and beta-blocker  -Cardiology consult  -Troponin 0.03 >0.05    Chronic systolic heart failure  -Last EF 40%  -Continue Lasix I's and O's and daily weight    Chronic back pain  -Continue home medications    History of COPD  -Continue home inhalers    Diabetes type 2  -Continue Lantus and sliding scale insulin    Hyperlipidemia  -Continue statin        DVT

## 2020-04-21 NOTE — PROGRESS NOTES
Asked to admit for chest pain. Uncontrolled pain. I know this patient well from previous admissions. Had cath 4 days ago with known vessel disease. Cardiology opted medical management. He was discharge and presents back with similar pain. I was told he had EKG changes which I can not appreciate when reviewed. I do not feel admission is warranted at this time and asked them to call cardiology. No second troponin ordered. I ordered and if stable can d/c from ED.     TAYLA Singh CNP 4/20/2020 10:03 PM

## 2020-04-21 NOTE — CARE COORDINATION
INITIAL CASE MANAGEMENT ASSESSMENT    Reviewed chart, met with patient to assess possible discharge needs. Explained Case Management role/services. Living Situation: Patient lives with his wife in a 2 family home with 14 steps to enter. ADLs: Independent     DME: walker    PT/OT Recs: not ordered     Active Services: none     Transportation: Active /Wife will transport     Medications: [employer]/no barriers    PCP: Dr. Margaret Negron      HD/PD: n/a    PLAN/COMMENTS: Patient plans to return to home. SW/CM provided contact information for patient or family to call with any questions. SW/CM will follow and assist as needed. Electronically signed by Min Dubois RN on 4/21/2020 at 11:55 AM

## 2020-04-22 LAB
ANION GAP SERPL CALCULATED.3IONS-SCNC: 15 MMOL/L (ref 3–16)
BASOPHILS ABSOLUTE: 0.1 K/UL (ref 0–0.2)
BASOPHILS RELATIVE PERCENT: 1.5 %
BILIRUBIN URINE: NEGATIVE
BLOOD, URINE: NEGATIVE
BUN BLDV-MCNC: 31 MG/DL (ref 7–20)
CALCIUM SERPL-MCNC: 8.2 MG/DL (ref 8.3–10.6)
CHLORIDE BLD-SCNC: 99 MMOL/L (ref 99–110)
CLARITY: CLEAR
CO2: 21 MMOL/L (ref 21–32)
COLOR: YELLOW
CREAT SERPL-MCNC: 3.5 MG/DL (ref 0.8–1.3)
CREATININE URINE: 144.4 MG/DL (ref 39–259)
EOSINOPHILS ABSOLUTE: 0.2 K/UL (ref 0–0.6)
EOSINOPHILS RELATIVE PERCENT: 2.6 %
EPITHELIAL CELLS, UA: 1 /HPF (ref 0–5)
GFR AFRICAN AMERICAN: 21
GFR NON-AFRICAN AMERICAN: 18
GLUCOSE BLD-MCNC: 100 MG/DL (ref 70–99)
GLUCOSE BLD-MCNC: 104 MG/DL (ref 70–99)
GLUCOSE BLD-MCNC: 109 MG/DL (ref 70–99)
GLUCOSE BLD-MCNC: 134 MG/DL (ref 70–99)
GLUCOSE BLD-MCNC: 141 MG/DL (ref 70–99)
GLUCOSE URINE: 250 MG/DL
HCT VFR BLD CALC: 35.6 % (ref 40.5–52.5)
HEMOGLOBIN: 12 G/DL (ref 13.5–17.5)
HYALINE CASTS: 5 /LPF (ref 0–8)
KETONES, URINE: NEGATIVE MG/DL
LEUKOCYTE ESTERASE, URINE: NEGATIVE
LYMPHOCYTES ABSOLUTE: 2.6 K/UL (ref 1–5.1)
LYMPHOCYTES RELATIVE PERCENT: 35.8 %
MCH RBC QN AUTO: 33.9 PG (ref 26–34)
MCHC RBC AUTO-ENTMCNC: 33.7 G/DL (ref 31–36)
MCV RBC AUTO: 100.5 FL (ref 80–100)
MICROSCOPIC EXAMINATION: ABNORMAL
MONOCYTES ABSOLUTE: 0.5 K/UL (ref 0–1.3)
MONOCYTES RELATIVE PERCENT: 7.4 %
NEUTROPHILS ABSOLUTE: 3.8 K/UL (ref 1.7–7.7)
NEUTROPHILS RELATIVE PERCENT: 52.7 %
NITRITE, URINE: NEGATIVE
PDW BLD-RTO: 15.8 % (ref 12.4–15.4)
PERFORMED ON: ABNORMAL
PH UA: 5 (ref 5–8)
PLATELET # BLD: 196 K/UL (ref 135–450)
PMV BLD AUTO: 8.7 FL (ref 5–10.5)
POTASSIUM REFLEX MAGNESIUM: 4.1 MMOL/L (ref 3.5–5.1)
PROTEIN UA: NEGATIVE MG/DL
RBC # BLD: 3.54 M/UL (ref 4.2–5.9)
RBC UA: 1 /HPF (ref 0–4)
SODIUM BLD-SCNC: 135 MMOL/L (ref 136–145)
SPECIFIC GRAVITY UA: 1.01 (ref 1–1.03)
TROPONIN: 0.02 NG/ML
TROPONIN: 0.04 NG/ML
TROPONIN: 0.05 NG/ML
TROPONIN: 0.07 NG/ML
UREA NITROGEN, UR: 451.8 MG/DL (ref 800–1666)
URINE CULTURE, ROUTINE: NORMAL
URINE TYPE: ABNORMAL
UROBILINOGEN, URINE: 0.2 E.U./DL
WBC # BLD: 7.3 K/UL (ref 4–11)
WBC UA: 3 /HPF (ref 0–5)

## 2020-04-22 PROCEDURE — 82570 ASSAY OF URINE CREATININE: CPT

## 2020-04-22 PROCEDURE — 6360000002 HC RX W HCPCS: Performed by: INTERNAL MEDICINE

## 2020-04-22 PROCEDURE — 6370000000 HC RX 637 (ALT 250 FOR IP): Performed by: INTERNAL MEDICINE

## 2020-04-22 PROCEDURE — 6370000000 HC RX 637 (ALT 250 FOR IP)

## 2020-04-22 PROCEDURE — 2580000003 HC RX 258: Performed by: INTERNAL MEDICINE

## 2020-04-22 PROCEDURE — 84540 ASSAY OF URINE/UREA-N: CPT

## 2020-04-22 PROCEDURE — 36415 COLL VENOUS BLD VENIPUNCTURE: CPT

## 2020-04-22 PROCEDURE — 99233 SBSQ HOSP IP/OBS HIGH 50: CPT | Performed by: INTERNAL MEDICINE

## 2020-04-22 PROCEDURE — 80048 BASIC METABOLIC PNL TOTAL CA: CPT

## 2020-04-22 PROCEDURE — 84484 ASSAY OF TROPONIN QUANT: CPT

## 2020-04-22 PROCEDURE — 1200000000 HC SEMI PRIVATE

## 2020-04-22 PROCEDURE — 81001 URINALYSIS AUTO W/SCOPE: CPT

## 2020-04-22 PROCEDURE — 85025 COMPLETE CBC W/AUTO DIFF WBC: CPT

## 2020-04-22 RX ORDER — OXYCODONE AND ACETAMINOPHEN 10; 325 MG/1; MG/1
1 TABLET ORAL EVERY 6 HOURS PRN
Status: DISCONTINUED | OUTPATIENT
Start: 2020-04-22 | End: 2020-04-23 | Stop reason: HOSPADM

## 2020-04-22 RX ORDER — METOPROLOL SUCCINATE 25 MG/1
25 TABLET, EXTENDED RELEASE ORAL DAILY
Status: DISCONTINUED | OUTPATIENT
Start: 2020-04-23 | End: 2020-04-23 | Stop reason: HOSPADM

## 2020-04-22 RX ORDER — ASPIRIN 81 MG/1
81 TABLET ORAL ONCE
Status: DISCONTINUED | OUTPATIENT
Start: 2020-04-23 | End: 2020-04-22

## 2020-04-22 RX ORDER — HEPARIN SODIUM 5000 [USP'U]/ML
5000 INJECTION, SOLUTION INTRAVENOUS; SUBCUTANEOUS EVERY 8 HOURS SCHEDULED
Status: DISCONTINUED | OUTPATIENT
Start: 2020-04-23 | End: 2020-04-23 | Stop reason: HOSPADM

## 2020-04-22 RX ORDER — SODIUM CHLORIDE 9 MG/ML
INJECTION, SOLUTION INTRAVENOUS CONTINUOUS
Status: DISCONTINUED | OUTPATIENT
Start: 2020-04-22 | End: 2020-04-23

## 2020-04-22 RX ORDER — METOPROLOL SUCCINATE 50 MG/1
50 TABLET, EXTENDED RELEASE ORAL DAILY
Status: DISCONTINUED | OUTPATIENT
Start: 2020-04-23 | End: 2020-04-22

## 2020-04-22 RX ORDER — ASPIRIN 81 MG/1
81 TABLET, CHEWABLE ORAL DAILY
Status: DISCONTINUED | OUTPATIENT
Start: 2020-04-23 | End: 2020-04-23 | Stop reason: HOSPADM

## 2020-04-22 RX ADMIN — ASPIRIN 325 MG ORAL TABLET 325 MG: 325 PILL ORAL at 08:48

## 2020-04-22 RX ADMIN — ATORVASTATIN CALCIUM 80 MG: 80 TABLET, FILM COATED ORAL at 08:48

## 2020-04-22 RX ADMIN — METOPROLOL SUCCINATE 100 MG: 50 TABLET, EXTENDED RELEASE ORAL at 08:47

## 2020-04-22 RX ADMIN — OXYCODONE HYDROCHLORIDE AND ACETAMINOPHEN 1 TABLET: 10; 325 TABLET ORAL at 17:12

## 2020-04-22 RX ADMIN — TRAZODONE HYDROCHLORIDE 50 MG: 50 TABLET ORAL at 20:32

## 2020-04-22 RX ADMIN — FAMOTIDINE 20 MG: 20 TABLET ORAL at 08:48

## 2020-04-22 RX ADMIN — INSULIN LISPRO 2 UNITS: 100 INJECTION, SOLUTION INTRAVENOUS; SUBCUTANEOUS at 12:00

## 2020-04-22 RX ADMIN — GABAPENTIN 600 MG: 300 CAPSULE ORAL at 06:15

## 2020-04-22 RX ADMIN — RANOLAZINE 1000 MG: 500 TABLET, FILM COATED, EXTENDED RELEASE ORAL at 20:32

## 2020-04-22 RX ADMIN — SODIUM CHLORIDE: 9 INJECTION, SOLUTION INTRAVENOUS at 09:45

## 2020-04-22 RX ADMIN — INSULIN GLARGINE 8 UNITS: 100 INJECTION, SOLUTION SUBCUTANEOUS at 20:32

## 2020-04-22 RX ADMIN — KETOROLAC TROMETHAMINE 30 MG: 30 INJECTION, SOLUTION INTRAMUSCULAR at 08:48

## 2020-04-22 RX ADMIN — RANOLAZINE 1000 MG: 500 TABLET, FILM COATED, EXTENDED RELEASE ORAL at 08:48

## 2020-04-22 RX ADMIN — SODIUM CHLORIDE: 9 INJECTION, SOLUTION INTRAVENOUS at 20:33

## 2020-04-22 RX ADMIN — CLOPIDOGREL BISULFATE 75 MG: 75 TABLET ORAL at 08:48

## 2020-04-22 RX ADMIN — Medication 10 ML: at 08:49

## 2020-04-22 RX ADMIN — ENOXAPARIN SODIUM 40 MG: 40 INJECTION SUBCUTANEOUS at 08:48

## 2020-04-22 RX ADMIN — OXYCODONE HYDROCHLORIDE AND ACETAMINOPHEN 1 TABLET: 10; 325 TABLET ORAL at 11:00

## 2020-04-22 RX ADMIN — FAMOTIDINE 20 MG: 20 TABLET ORAL at 20:32

## 2020-04-22 RX ADMIN — ISOSORBIDE MONONITRATE 30 MG: 30 TABLET, EXTENDED RELEASE ORAL at 08:48

## 2020-04-22 ASSESSMENT — PAIN DESCRIPTION - DESCRIPTORS
DESCRIPTORS: SHARP

## 2020-04-22 ASSESSMENT — PAIN SCALES - GENERAL
PAINLEVEL_OUTOF10: 5
PAINLEVEL_OUTOF10: 3
PAINLEVEL_OUTOF10: 5
PAINLEVEL_OUTOF10: 5
PAINLEVEL_OUTOF10: 3
PAINLEVEL_OUTOF10: 5

## 2020-04-22 ASSESSMENT — PAIN DESCRIPTION - FREQUENCY
FREQUENCY: CONTINUOUS

## 2020-04-22 ASSESSMENT — PAIN DESCRIPTION - PROGRESSION
CLINICAL_PROGRESSION: NOT CHANGED

## 2020-04-22 ASSESSMENT — PAIN DESCRIPTION - ONSET
ONSET: ON-GOING

## 2020-04-22 ASSESSMENT — PAIN DESCRIPTION - PAIN TYPE
TYPE: ACUTE PAIN

## 2020-04-22 ASSESSMENT — PAIN DESCRIPTION - LOCATION
LOCATION: CHEST

## 2020-04-22 ASSESSMENT — PAIN - FUNCTIONAL ASSESSMENT
PAIN_FUNCTIONAL_ASSESSMENT: ACTIVITIES ARE NOT PREVENTED

## 2020-04-22 ASSESSMENT — PAIN DESCRIPTION - ORIENTATION
ORIENTATION: MID

## 2020-04-22 NOTE — CONSULTS
HTN  -Blood pressure at goal    -hydralazine, lisinopril on hold    BP Readings from Last 1 Encounters:   04/22/20 124/74       3. CAD/CHF  -TTE (feb 2020): LVEF 35-40%  -recent [facility]    4. Others  -DM: not on metformin  -COPD    Plan:     -NO NSAID  -Agree to hold Lisinopril  -Hold Lasix  -avoid IVF unless needed for hemodynamic support  -UA, Ur creat, urea  -AM: BMP    -Monitor I/O, UOP  -Maintain MAP>65  -Avoid nephrotoxin, if able. -Dose meds to current eGFR    Thank you for allowing us to participate in care of [address] [place] [address] . We will continue to follow. Feel free to contact me with any questions.       Branden Green  4/22/2020    [facility] [address]  Office : [phone]  Fax :[phone]

## 2020-04-22 NOTE — PROGRESS NOTES
Patient is alert and oriented x4, VSS, sitting up in bed finishing dinner. Patient c/o nausea, diarrhea x1, slight SOB, and pain in his abdomen and mid chest.  Patient denies vomiting. PRN pain and nausea medications given per MAR. Patient states that the prn toradol does not help, will notify MD per patients request.  Patient denies further needs at this time. Bed in lowest position, non-slip socks on, call light within reach. Will continue to monitor pt needs.

## 2020-04-22 NOTE — PROGRESS NOTES
Progress Note  Admit Date: 4/20/2020      PCP: Diandra Stauffer MD     CC: F/U for chest pain    Days in hospital:  1    SUBJECTIVE / Interval History:  Patient does not complain of any chest pain or shortness of breath. BP better      Allergies  Dopamine hcl and Melatonin    Medications    Scheduled Meds:   isosorbide mononitrate  30 mg Oral Daily    insulin lispro  0-12 Units Subcutaneous TID WC    insulin lispro  0-6 Units Subcutaneous Nightly    traZODone  50 mg Oral Nightly    ranolazine  1,000 mg Oral BID    metoprolol succinate  100 mg Oral Daily    insulin glargine  8 Units Subcutaneous Nightly    famotidine  20 mg Oral BID    clopidogrel  75 mg Oral Daily    atorvastatin  80 mg Oral Daily    aspirin  325 mg Oral Daily    sodium chloride flush  10 mL Intravenous 2 times per day    enoxaparin  40 mg Subcutaneous Daily     Continuous Infusions:   dextrose         PRN Meds:  tiZANidine, oxyCODONE-acetaminophen, ibuprofen, ipratropium-albuterol, sodium chloride flush, acetaminophen **OR** acetaminophen, polyethylene glycol, ondansetron, glucose, dextrose, glucagon (rDNA), dextrose, ketorolac    Vitals    /74   Pulse 71   Temp 97.7 °F (36.5 °C) (Oral)   Resp 16   Ht 6' (1.829 m)   Wt 191 lb 9.3 oz (86.9 kg)   SpO2 94%   BMI 25.98 kg/m²     Exam:    Gen: No distress. Ill-looking   eyes: PERRL. No sclera icterus. No conjunctival injection. ENT: No discharge. Pharynx clear. External appearance of ears and nose normal.  Neck: Trachea midline. No obvious mass. Resp: No accessory muscle use. No crackles. No wheezes. No rhonchi. No dullness on percussion. CV: Regular rate. Regular rhythm. No murmur or rub. No edema. GI: Non-tender. Non-distended. No hernia. Skin: Warm, dry, normal texture and turgor. No nodule on exposed extremities. Lymph: No cervical LAD. No supraclavicular LAD. M/S: No cyanosis. No clubbing. No joint deformity. Neuro:  Moves all four hours.    RADIOLOGY:    XR CHEST PORTABLE   Final Result   No acute cardiopulmonary pathology. CONSULTS:    IP CONSULT TO CARDIOLOGY  IP CONSULT TO NEPHROLOGY    ASSESSMENT AND PLAN:      Principal Problem:    Chest pain  Active Problems:    Chronic chest pain    S/P CABG (coronary artery bypass graft)    Essential hypertension    Ischemic cardiomyopathy    Hyperlipidemia LDL goal <70    Chronic back pain    Elevated troponin    DMII (diabetes mellitus, type 2) ([facility])    COPD (chronic obstructive pulmonary disease) ([facility])    Chronic systolic (congestive) heart failure ([facility])    CAD (coronary artery disease)    Hypotension  Resolved Problems:    * No resolved hospital problems. *    Patient is a 63-year-old male with a past medical history of hypertension, hyperlipidemia, coronary artery disease with ischemic cardiomyopathy and chronic systolic CHF. Patient had a cardiac cath on 4/15/2020 which showed an occlusion at the midportion of the right coronary artery disease and heavily diseased LAD. Cardiology was consulted and the plan was to continue medical therapy. Patient woke up with severe chest pain radiating down the left when shortness of breath. Hypotension- BP better  -Patient's blood pressure in the low 70s. Will stop lisinopril,  Hydralazine .     Imdur and BB dose decreased with holding parameters    Acute kidney injury- worse , creat now 3.2- consult nephrology  --Bump from 1.2-2 most likely secondary to hypotension.  -Monitor  -Continue low-dose IV fluids  - hold neurontin    Chest pain with coronary artery disease and ischemic cardiomyopathy  -Had a recent coronary angiogram  -Continue aspirin Plavix statin and beta-blocker  -Cardiology consult  -Troponin 0.03 >0.05    Chronic systolic heart failure- repeat ECHO pending  -Last EF 40%  -I's and O's and daily weight  -hold lasix    Chronic back pain  -Continue home medications    History of COPD  -Continue home inhalers    Diabetes type 2  -Continue Lantus and sliding scale insulin    Hyperlipidemia  -Continue statin        DVT Prophylaxis: lovenox  Diet: DIET CARDIAC; Low Sodium (2 GM); Daily Fluid Restriction: 1800 ml  Code Status: Full Code        Discharge plan -ct care    The patient and / or the family were informed of the results of any tests, a time was given to answer questions, a plan was proposed and they agreed with plan. Discussed with consulting physicians, nursing and social work     The note was completed using EMR. Every effort was made to ensure accuracy; however, inadvertent computerized transcription errors may be present.        Mark Gutierrez MD

## 2020-04-22 NOTE — PROGRESS NOTES
Exam:  General:  NAD, Awake, alert and oriented X4  Skin:  Warm and dry  Neck:  Supple, no JVP appreciated, no bruit  Chest:  Clear to auscultation, no wheezes/rhonchi. Has few rales  Cardiovascular:  Regular rate. S1S2  Abdomen:  Soft, nontender, +bowel sounds  Extremities:  No LE edema    Cardiac Diagnosis:  diabetes, hypertension, coronary artery disease, CHF, status post CABG and status post coronary artery stenting    Medications:    [START ON 4/23/2020] heparin (porcine)  5,000 Units Subcutaneous 3 times per day    [START ON 4/23/2020] metoprolol succinate  50 mg Oral Daily    isosorbide mononitrate  30 mg Oral Daily    insulin lispro  0-12 Units Subcutaneous TID WC    insulin lispro  0-6 Units Subcutaneous Nightly    traZODone  50 mg Oral Nightly    ranolazine  1,000 mg Oral BID    insulin glargine  8 Units Subcutaneous Nightly    famotidine  20 mg Oral BID    clopidogrel  75 mg Oral Daily    atorvastatin  80 mg Oral Daily    aspirin  325 mg Oral Daily    sodium chloride flush  10 mL Intravenous 2 times per day      sodium chloride 100 mL/hr at 04/22/20 0945    dextrose       tiZANidine, oxyCODONE-acetaminophen, ibuprofen, ipratropium-albuterol, sodium chloride flush, acetaminophen **OR** acetaminophen, polyethylene glycol, ondansetron, glucose, dextrose, glucagon (rDNA), dextrose, ketorolac    Lab Data:  CBC:   Recent Labs     04/20/20 1936 04/21/20  0526 04/22/20  0556   WBC 6.9 6.5 7.3   HGB 13.4* 12.3* 12.0*    201 196     BMP:    Recent Labs     04/20/20 1936 04/21/20  0526 04/22/20  0556    140 135*   K 4.0 3.8 4.1   CO2 21 19* 21   BUN 18 24* 31*   CREATININE 1.2 2.0* 3.5*     LIVR:   Recent Labs     04/20/20 1936   AST 9*   ALT <5*     INR:  No results for input(s): INR in the last 72 hours. APTT: No results for input(s): APTT in the last 72 hours. BNP:  No results for input(s): BNP in the last 72 hours.     Imaging:ECHO(2/18/20) Left ventricular cavity size is mildly dilated with mild LV wall   hypertrophy. There is moderate global LV systolic dysfunction. Ejection fraction is visually estimated to be 35-40%. Normal right ventricular size with mildly reduced function. Pacer / ICD wire is visualized in the right ventricle. There are no structural valve abnormalities appreciated in this study. Cardiac cath(4/15/20)  . Left main is patent with patent stent providing blood to the LAD,  circumflex, and diagonal.  2.  Saphenous vein graft to diagonal is patent. 3.  Saphenous vein graft to the right coronary artery PDA is patent. 4.  Right coronary artery is occluded in the midportion. 5.  Distal LAD is heavily diseased. 6.  LIMA was not studied as it is known to be atretic. 7.  LV ejection fraction of 40%.       Assessment/Plan  SOB  - overall better  - D-dimer is elevated. Doubt PE but will consider VQ scan  Since he cannot have CTPA due to LALITA  -Will have limited echo to r/o pericardial effusion . Troponin elevation  - Had small troponin elevation  very likely due to renal failure  - Doubt ACS   - Marina angio personally reviewed. He does have moderate SVG stenosis. LALITA on CKD  - Renal function significantly worse due to hypotension, ACEI ,diuretics  -Nephrology is consulted  - Is now on I/v fluids. CAD  - s/p CABG, PCI. - - Marina angio personally reviewed. He does have moderate SVG stenosis. - will ct medical therapy but change ASA to 81 mg daily since he is also on plavix  - will consider chemical stress test if he has recurrent angina     Complexity of decision making is high.     Electronically signed by Ronni Erickson MD on 4/22/2020 at 9:55 AM

## 2020-04-22 NOTE — PROGRESS NOTES
Spoke with Camila Ramon from cardiology, patient may eat breakfast. No need for NPO, no stress test will be done today.

## 2020-04-23 VITALS
BODY MASS INDEX: 25.74 KG/M2 | DIASTOLIC BLOOD PRESSURE: 78 MMHG | TEMPERATURE: 97.8 F | HEART RATE: 71 BPM | OXYGEN SATURATION: 96 % | RESPIRATION RATE: 18 BRPM | WEIGHT: 190.04 LBS | HEIGHT: 72 IN | SYSTOLIC BLOOD PRESSURE: 139 MMHG

## 2020-04-23 LAB
ALBUMIN SERPL-MCNC: 3.2 G/DL (ref 3.4–5)
ANION GAP SERPL CALCULATED.3IONS-SCNC: 12 MMOL/L (ref 3–16)
BASOPHILS ABSOLUTE: 0 K/UL (ref 0–0.2)
BASOPHILS RELATIVE PERCENT: 0.7 %
BUN BLDV-MCNC: 27 MG/DL (ref 7–20)
CALCIUM SERPL-MCNC: 8 MG/DL (ref 8.3–10.6)
CHLORIDE BLD-SCNC: 105 MMOL/L (ref 99–110)
CO2: 19 MMOL/L (ref 21–32)
CREAT SERPL-MCNC: 1.8 MG/DL (ref 0.8–1.3)
EOSINOPHILS ABSOLUTE: 0.2 K/UL (ref 0–0.6)
EOSINOPHILS RELATIVE PERCENT: 2.6 %
GFR AFRICAN AMERICAN: 46
GFR NON-AFRICAN AMERICAN: 38
GLUCOSE BLD-MCNC: 112 MG/DL (ref 70–99)
GLUCOSE BLD-MCNC: 121 MG/DL (ref 70–99)
GLUCOSE BLD-MCNC: 150 MG/DL (ref 70–99)
HCT VFR BLD CALC: 34.7 % (ref 40.5–52.5)
HEMOGLOBIN: 11.6 G/DL (ref 13.5–17.5)
LYMPHOCYTES ABSOLUTE: 1.7 K/UL (ref 1–5.1)
LYMPHOCYTES RELATIVE PERCENT: 26.8 %
MAGNESIUM: 2 MG/DL (ref 1.8–2.4)
MCH RBC QN AUTO: 33.3 PG (ref 26–34)
MCHC RBC AUTO-ENTMCNC: 33.3 G/DL (ref 31–36)
MCV RBC AUTO: 100 FL (ref 80–100)
MONOCYTES ABSOLUTE: 0.4 K/UL (ref 0–1.3)
MONOCYTES RELATIVE PERCENT: 6.8 %
NEUTROPHILS ABSOLUTE: 4 K/UL (ref 1.7–7.7)
NEUTROPHILS RELATIVE PERCENT: 63.1 %
PDW BLD-RTO: 15.5 % (ref 12.4–15.4)
PERFORMED ON: ABNORMAL
PERFORMED ON: ABNORMAL
PHOSPHORUS: 3.9 MG/DL (ref 2.5–4.9)
PLATELET # BLD: 179 K/UL (ref 135–450)
PMV BLD AUTO: 9.5 FL (ref 5–10.5)
POTASSIUM SERPL-SCNC: 4.1 MMOL/L (ref 3.5–5.1)
RBC # BLD: 3.47 M/UL (ref 4.2–5.9)
SODIUM BLD-SCNC: 136 MMOL/L (ref 136–145)
URIC ACID, SERUM: 5.9 MG/DL (ref 3.5–7.2)
WBC # BLD: 6.3 K/UL (ref 4–11)

## 2020-04-23 PROCEDURE — 6370000000 HC RX 637 (ALT 250 FOR IP): Performed by: INTERNAL MEDICINE

## 2020-04-23 PROCEDURE — 36415 COLL VENOUS BLD VENIPUNCTURE: CPT

## 2020-04-23 PROCEDURE — 85025 COMPLETE CBC W/AUTO DIFF WBC: CPT

## 2020-04-23 PROCEDURE — 80069 RENAL FUNCTION PANEL: CPT

## 2020-04-23 PROCEDURE — 83735 ASSAY OF MAGNESIUM: CPT

## 2020-04-23 PROCEDURE — 93308 TTE F-UP OR LMTD: CPT

## 2020-04-23 PROCEDURE — 6360000002 HC RX W HCPCS: Performed by: INTERNAL MEDICINE

## 2020-04-23 PROCEDURE — 2580000003 HC RX 258: Performed by: INTERNAL MEDICINE

## 2020-04-23 PROCEDURE — 84550 ASSAY OF BLOOD/URIC ACID: CPT

## 2020-04-23 PROCEDURE — 6370000000 HC RX 637 (ALT 250 FOR IP)

## 2020-04-23 RX ORDER — GABAPENTIN 100 MG/1
100 CAPSULE ORAL 4 TIMES DAILY
Status: DISCONTINUED | OUTPATIENT
Start: 2020-04-23 | End: 2020-04-23 | Stop reason: HOSPADM

## 2020-04-23 RX ADMIN — ASPIRIN 81 MG 81 MG: 81 TABLET ORAL at 09:04

## 2020-04-23 RX ADMIN — RANOLAZINE 1000 MG: 500 TABLET, FILM COATED, EXTENDED RELEASE ORAL at 09:03

## 2020-04-23 RX ADMIN — GABAPENTIN 100 MG: 100 CAPSULE ORAL at 11:49

## 2020-04-23 RX ADMIN — ISOSORBIDE MONONITRATE 30 MG: 30 TABLET, EXTENDED RELEASE ORAL at 09:03

## 2020-04-23 RX ADMIN — ATORVASTATIN CALCIUM 80 MG: 80 TABLET, FILM COATED ORAL at 09:03

## 2020-04-23 RX ADMIN — OXYCODONE HYDROCHLORIDE AND ACETAMINOPHEN 1 TABLET: 10; 325 TABLET ORAL at 06:04

## 2020-04-23 RX ADMIN — HEPARIN SODIUM 5000 UNITS: 5000 INJECTION INTRAVENOUS; SUBCUTANEOUS at 06:12

## 2020-04-23 RX ADMIN — INSULIN LISPRO 2 UNITS: 100 INJECTION, SOLUTION INTRAVENOUS; SUBCUTANEOUS at 11:49

## 2020-04-23 RX ADMIN — SODIUM CHLORIDE: 9 INJECTION, SOLUTION INTRAVENOUS at 05:54

## 2020-04-23 RX ADMIN — OXYCODONE HYDROCHLORIDE AND ACETAMINOPHEN 1 TABLET: 10; 325 TABLET ORAL at 12:38

## 2020-04-23 RX ADMIN — OXYCODONE HYDROCHLORIDE AND ACETAMINOPHEN 1 TABLET: 10; 325 TABLET ORAL at 00:07

## 2020-04-23 RX ADMIN — CLOPIDOGREL BISULFATE 75 MG: 75 TABLET ORAL at 09:03

## 2020-04-23 RX ADMIN — FAMOTIDINE 20 MG: 20 TABLET ORAL at 09:03

## 2020-04-23 RX ADMIN — METOPROLOL SUCCINATE 25 MG: 25 TABLET, EXTENDED RELEASE ORAL at 09:03

## 2020-04-23 ASSESSMENT — PAIN DESCRIPTION - LOCATION: LOCATION: CHEST

## 2020-04-23 ASSESSMENT — PAIN SCALES - GENERAL
PAINLEVEL_OUTOF10: 3
PAINLEVEL_OUTOF10: 0
PAINLEVEL_OUTOF10: 7
PAINLEVEL_OUTOF10: 6
PAINLEVEL_OUTOF10: 7

## 2020-04-23 ASSESSMENT — PAIN DESCRIPTION - DESCRIPTORS: DESCRIPTORS: SHARP

## 2020-04-23 ASSESSMENT — PAIN DESCRIPTION - PAIN TYPE: TYPE: ACUTE PAIN

## 2020-04-23 ASSESSMENT — PAIN DESCRIPTION - FREQUENCY: FREQUENCY: INTERMITTENT

## 2020-04-23 NOTE — PROGRESS NOTES
Progress Note  Admit Date: 4/20/2020      PCP: Gabrielle Roldan MD     CC: F/U for chest pain    Days in hospital:  2    SUBJECTIVE / Interval History:  Patient does not complain of any chest pain or shortness of breath. Patient wanting to go home. Not ready for discharge      Allergies  Dopamine hcl and Melatonin    Medications    Scheduled Meds:   heparin (porcine)  5,000 Units Subcutaneous 3 times per day    aspirin  81 mg Oral Daily    metoprolol succinate  25 mg Oral Daily    isosorbide mononitrate  30 mg Oral Daily    insulin lispro  0-12 Units Subcutaneous TID WC    insulin lispro  0-6 Units Subcutaneous Nightly    traZODone  50 mg Oral Nightly    ranolazine  1,000 mg Oral BID    insulin glargine  8 Units Subcutaneous Nightly    famotidine  20 mg Oral BID    clopidogrel  75 mg Oral Daily    atorvastatin  80 mg Oral Daily    sodium chloride flush  10 mL Intravenous 2 times per day     Continuous Infusions:   dextrose         PRN Meds:  oxyCODONE-acetaminophen, tiZANidine, ipratropium-albuterol, sodium chloride flush, acetaminophen **OR** acetaminophen, polyethylene glycol, ondansetron, glucose, dextrose, glucagon (rDNA), dextrose    Vitals    /78   Pulse 71   Temp 97.8 °F (36.6 °C) (Oral)   Resp 18   Ht 6' (1.829 m)   Wt 190 lb 0.6 oz (86.2 kg)   SpO2 96%   BMI 25.77 kg/m²     Exam:    Gen: No distress. Ill-looking   eyes: PERRL. No sclera icterus. No conjunctival injection. ENT: No discharge. Pharynx clear. External appearance of ears and nose normal.  Neck: Trachea midline. No obvious mass. Resp: No accessory muscle use. No crackles. No wheezes. No rhonchi. No dullness on percussion. CV: Regular rate. Regular rhythm. No murmur or rub. No edema. GI: Non-tender. Non-distended. No hernia. Skin: Warm, dry, normal texture and turgor. No nodule on exposed extremities. Lymph: No cervical LAD. No supraclavicular LAD. M/S: No cyanosis. No clubbing.  No joint deformity. Neuro: Moves all four extremities. CN 2-12 tested, no defect noted. Psych: Oriented x 3. No anxiety. Awake. Alert. Intact judgement and insight. Data    LABS  CBC:   Recent Labs     04/21/20  0526 04/22/20  0556 04/23/20  0523   WBC 6.5 7.3 6.3   HGB 12.3* 12.0* 11.6*   HCT 36.7* 35.6* 34.7*   .0 100.5* 100.0    196 179     BMP:   Recent Labs     04/21/20  0526 04/22/20  0556 04/23/20  0523    135* 136   K 3.8 4.1 4.1    99 105   CO2 19* 21 19*   PHOS  --   --  3.9   BUN 24* 31* 27*   CREATININE 2.0* 3.5* 1.8*   GLUCOSE 103* 104* 112*     POC GLUCOSE:    Recent Labs     04/22/20  0744 04/22/20  1144 04/22/20  1652 04/22/20 2026 04/23/20  0725   POCGLU 109* 141* 100* 134* 121*     LIVER PROFILE:   Recent Labs     04/20/20  1936 04/23/20  0523   AST 9*  --    ALT <5*  --    LABALBU 3.8 3.2*   BILITOT 0.5  --    ALKPHOS 56  --      PT/INR: No results for input(s): PROTIME, INR in the last 72 hours. APTT: No results for input(s): APTT in the last 72 hours. UA:  Recent Labs     04/22/20  1304   COLORU YELLOW   PHUR 5.0   WBCUA 3   RBCUA 1   CLARITYU Clear   SPECGRAV 1.014   LEUKOCYTESUR Negative   UROBILINOGEN 0.2   BILIRUBINUR Negative   BLOODU Negative   GLUCOSEU 250*   KETUA Negative     Microbiology:  Wound Culture: No results for input(s): WNDABS, ORG in the last 72 hours. Invalid input(s):  LABGRAM  Nasal Culture: No results for input(s): ORG, MRSAPCR in the last 72 hours. Blood Culture: No results for input(s): BC, BLOODCULT2 in the last 72 hours. Fungal Culture:   No results for input(s): FUNGSM in the last 72 hours. No results for input(s): FUNCXBLD in the last 72 hours. CSF Culture:  No results for input(s): COLORCSF, APPEARCSF, CFTUBE, CLOTCSF, WBCCSF, RBCCSF, NEUTCSF, NUMCELLSCSF, LYMPHSCSF, MONOCSF, GLUCCSF, VOLCSF in the last 72 hours. Respiratory Culture:  No results for input(s): Len Level in the last 72 hours.   AFB:No results for input(s): AFBSMEAR in the last 72 hours. Urine Culture  Recent Labs     04/21/20  1507   LABURIN No growth at 18-36 hours       RADIOLOGY:    XR CHEST PORTABLE   Final Result   No acute cardiopulmonary pathology. CONSULTS:    IP CONSULT TO CARDIOLOGY  IP CONSULT TO NEPHROLOGY    ASSESSMENT AND PLAN:      Principal Problem:    Chest pain  Active Problems:    Chronic chest pain    S/P CABG (coronary artery bypass graft)    Essential hypertension    Ischemic cardiomyopathy    Hyperlipidemia LDL goal <70    Chronic back pain    Elevated troponin    DMII (diabetes mellitus, type 2) ([facility])    COPD (chronic obstructive pulmonary disease) ([facility])    Chronic systolic (congestive) heart failure ([facility])    CAD (coronary artery disease)    Hypotension  Resolved Problems:    * No resolved hospital problems. *    Patient is a 70-year-old male with a past medical history of hypertension, hyperlipidemia, coronary artery disease with ischemic cardiomyopathy and chronic systolic CHF. Patient had a cardiac cath on 4/15/2020 which showed an occlusion at the midportion of the right coronary artery disease and heavily diseased LAD. Cardiology was consulted and the plan was to continue medical therapy. Patient woke up with severe chest pain radiating down the left when shortness of breath. Hypotension- BP better, will need to optimize blood pressure medications  -Patient's blood pressure in the low 70s. Will stop lisinopril,  Hydralazine .     Imdur and BB dose decreased with holding parameters    Acute kidney injury-improving , stop IVF as creat in improving and   --Bump from 1.2> 3.2  most likely secondary to hypotension.  -Monitor  -Continue low-dose IV fluids  - restart low dose neurontin    Chest pain with coronary artery disease and ischemic cardiomyopathy  -Had a recent coronary angiogram  -Continue aspirin Plavix statin and beta-blocker  -Cardiology consult  -Troponin 0.03 >0.05    Chronic systolic heart failure- worse, EF

## 2020-04-23 NOTE — PROGRESS NOTES
Office: [phone]       Fax: [phone]      Nephrology Progress Note        Patient's Name: Justin Toledo  Admit Date: 4/20/2020  Date of Visit: 4/23/2020    Reason for Consult:  LALITA       Subjective:      Justin Toledo is a 59 y.o. male with PMHx of hypertension, hyperlipidemia, coronary artery disease status post coronary artery bypass grafting, CHF, diabetes mellitus, COPD, ch pain syndrome on opioids who was hospitalized on 4/20/2020 with complaints of chest pain. Patient was also complaining of shortness of breath. Creat 1.2 on admission, now 3.5. Had [address] done on 4/15: medical management planned  Patient has received 90 mg of toradol since admission. He was noted to be on lisinopril that was discontinued. Patient denies previous renal dis although noted to have AKIs in past. Taking Ibuprofen 200 mg/d at home. No history of renal stone, hematuria. Reported that his son possibly had nephritis (at age 10 month), full details not known. His son is currently in [place] + for COVID-19  Previously seen in Feb 2020 when he had LALITA wit peak creat 4.4, suspected 2/2 low BP, NSAID, iv contrast.    INTERVAL HISTORY    Feels better  Shortness of breath: No   UOP: Good   Creat: trending down  No NV     Medications:    Allergies:  Dopamine hcl and Melatonin    Scheduled Meds:   gabapentin  100 mg Oral 4x Daily    heparin (porcine)  5,000 Units Subcutaneous 3 times per day    aspirin  81 mg Oral Daily    metoprolol succinate  25 mg Oral Daily    isosorbide mononitrate  30 mg Oral Daily    insulin lispro  0-12 Units Subcutaneous TID     insulin lispro  0-6 Units Subcutaneous Nightly    traZODone  50 mg Oral Nightly    ranolazine  1,000 mg Oral BID    insulin glargine  8 Units Subcutaneous Nightly    famotidine  20 mg Oral BID    clopidogrel  75 mg Oral Daily    atorvastatin  80 mg Oral Daily    sodium chloride flush  10 mL Intravenous 2 times per day     Continuous Infusions:   dextrose         Labs:  CBC:   Recent Labs     04/21/20  0526 04/22/20  0556 04/23/20  0523   WBC 6.5 7.3 6.3   HGB 12.3* 12.0* 11.6*    196 179     Ca/Mg/Phos:   Recent Labs     04/21/20  0526 04/22/20  0556 04/23/20  0523   CALCIUM 8.8 8.2* 8.0*   MG  --   --  2.00   PHOS  --   --  3.9     UA:  Recent Labs     04/22/20  1304   COLORU YELLOW   CLARITYU Clear   GLUCOSEU 250*   BILIRUBINUR Negative   KETUA Negative   SPECGRAV 1.014   BLOODU Negative   PHUR 5.0   PROTEINU Negative   UROBILINOGEN 0.2   NITRU Negative   LEUKOCYTESUR Negative   LABMICR Not Indicated   URINETYPE NotGiven      Urine Microscopic:   Recent Labs     04/22/20  1304   HYALCAST 5   WBCUA 3   RBCUA 1   EPIU 1     Urine Chemistry:   Recent Labs     04/22/20  1304   LABCREA 144.4           Objective:     Vitals: /78   Pulse 71   Temp 97.8 °F (36.6 °C) (Oral)   Resp 18   Ht 6' (1.829 m)   Wt 190 lb 0.6 oz (86.2 kg)   SpO2 96%   BMI 25.77 kg/m²    Wt Readings from Last 3 Encounters:   04/23/20 190 lb 0.6 oz (86.2 kg)   04/16/20 171 lb 15.3 oz (78 kg)   04/13/20 202 lb (91.6 kg)      24HR INTAKE/OUTPUT:      Intake/Output Summary (Last 24 hours) at 4/23/2020 1032  Last data filed at 4/23/2020 3653  Gross per 24 hour   Intake 3210 ml   Output 100 ml   Net 3110 ml     Constitutional:  awake, NAD  HEENT:  MMM, No icterus  Neck: no bruits, No JVD  Cardiovascular:  S1, S2 reg  Respiratory: CTA, no crackles  Abdomen:  +BS, soft, NT, ND  Ext: no lower extremity edema  Psychiatric: mood and affect appropriate  CNS: alert, no agitation    IMAGING:  XR CHEST PORTABLE   Final Result   No acute cardiopulmonary pathology. Assessment :     1. LALITA on suspected CKD  -Non-Oliguric  -Baseline creat: 0.9-1.3 Now 1.2->2->3.5->1.8  -UA prot -, bld -.  FeUrea 35.4%  -Volume: Euvolemic  -Electrolytes: No

## 2020-04-23 NOTE — PROGRESS NOTES
systolic dysfunction. Ejection fraction is visually estimated to be 35-40%. Normal right ventricular size with mildly reduced function. Pacer / ICD wire is visualized in the right ventricle. There are no structural valve abnormalities appreciated in this study. Cardiac cath(4/15/20)  . Left main is patent with patent stent providing blood to the LAD,  circumflex, and diagonal.  2.  Saphenous vein graft to diagonal is patent. 3.  Saphenous vein graft to the right coronary artery PDA is patent. 4.  Right coronary artery is occluded in the midportion. 5.  Distal LAD is heavily diseased. 6.  LIMA was not studied as it is known to be atretic. 7.  LV ejection fraction of 40%. Echocardiogram(4/23/2020)  There is mild concentric left ventricular hypertrophy. Left ventricular cavity size is dilated. Ejection fraction is visually estimated to be 25-30%. Abnormal septal motion likely due to pacing   No pericardial effusion noted. Assessment/Plan  SOB  - overall better  - D-dimer is elevated. Doubt PE but will consider VQ scan  Since he cannot have CTPA due to LALITA  -   limited echo t showed no pericardial effusion LVEF is down to 25 to 30%. Troponin elevation  -Denies any symptoms of angina  - Had small troponin elevation  very likely due to renal failure  - Doubt ACS   - Marina angio personally reviewed. He does have moderate SVG stenosis. -Consider chemical stress test rule out reversible ischemia in the right coronary artery territory he has recurrent symptoms of angina  LALITA on CKD  - Renal function has improved  -Nephrology is consulted  - . CAD  - s/p CABG, PCI. - - Marina angio personally reviewed. He does have moderate SVG stenosis.   - will ct medical therapy but change ASA to 81 mg daily since he is also on plavix  - will consider chemical stress test if he has recurrent angina        Pt decided to sign out AMA before he can be seen physically    Electronically signed by Millie Wheatley

## 2020-04-23 NOTE — PLAN OF CARE
Problem: Pain:  Description: Pain management should include both nonpharmacologic and pharmacologic interventions. Goal: Pain level will decrease  Description: Pain level will decrease  Outcome: Ongoing   Adequate pain control achieved this shift. See MAR. Problem: Falls - Risk of:  Goal: Will remain free from falls  Description: Will remain free from falls  Outcome: Ongoing   Pt. Free of falls and injuries this shift.       Problem: Discharge Planning:  Goal: Patients continuum of care needs are met  Description: Patients continuum of care needs are met  Outcome: Ongoing
Problem: Pain:  Goal: Pain level will decrease  Description: Pain level will decrease  Outcome: Ongoing  Goal: Control of acute pain  Description: Control of acute pain  Outcome: Ongoing  Goal: Control of chronic pain  Description: Control of chronic pain  Outcome: Ongoing     Problem: Falls - Risk of:  Goal: Will remain free from falls  Description: Will remain free from falls  Outcome: Ongoing  Goal: Absence of physical injury  Description: Absence of physical injury  Outcome: Ongoing     Problem: Safety:  Goal: Free from accidental physical injury  Description: Free from accidental physical injury  Outcome: Ongoing  Goal: Free from intentional harm  Description: Free from intentional harm  Outcome: Ongoing     Problem: Daily Care:  Goal: Daily care needs are met  Description: Daily care needs are met  Outcome: Ongoing     Problem: Discharge Planning:  Goal: Patients continuum of care needs are met  Description: Patients continuum of care needs are met  Outcome: Ongoing
RN  Outcome: Ongoing

## 2020-04-24 ENCOUNTER — CARE COORDINATION (OUTPATIENT)
Dept: CASE MANAGEMENT | Age: 65
End: 2020-04-24

## 2020-04-27 ENCOUNTER — CARE COORDINATION (OUTPATIENT)
Dept: CASE MANAGEMENT | Age: 65
End: 2020-04-27

## 2020-05-06 ENCOUNTER — APPOINTMENT (OUTPATIENT)
Dept: GENERAL RADIOLOGY | Age: 65
End: 2020-05-06
Payer: MEDICARE

## 2020-05-06 ENCOUNTER — CARE COORDINATION (OUTPATIENT)
Dept: CASE MANAGEMENT | Age: 65
End: 2020-05-06

## 2020-05-06 ENCOUNTER — HOSPITAL ENCOUNTER (OUTPATIENT)
Age: 65
Setting detail: OBSERVATION
Discharge: HOME | End: 2020-05-07
Attending: EMERGENCY MEDICINE | Admitting: INTERNAL MEDICINE
Payer: MEDICARE

## 2020-05-06 LAB
A/G RATIO: 1.1 (ref 1.1–2.2)
ALBUMIN SERPL-MCNC: 3.3 G/DL (ref 3.4–5)
ALP BLD-CCNC: 57 U/L (ref 40–129)
ALT SERPL-CCNC: 14 U/L (ref 10–40)
ANION GAP SERPL CALCULATED.3IONS-SCNC: 10 MMOL/L (ref 3–16)
AST SERPL-CCNC: 17 U/L (ref 15–37)
BASOPHILS ABSOLUTE: 0 K/UL (ref 0–0.2)
BASOPHILS RELATIVE PERCENT: 0.1 %
BILIRUB SERPL-MCNC: <0.2 MG/DL (ref 0–1)
BUN BLDV-MCNC: 14 MG/DL (ref 7–20)
CALCIUM SERPL-MCNC: 8.7 MG/DL (ref 8.3–10.6)
CHLORIDE BLD-SCNC: 103 MMOL/L (ref 99–110)
CO2: 23 MMOL/L (ref 21–32)
CREAT SERPL-MCNC: 0.8 MG/DL (ref 0.8–1.3)
EOSINOPHILS ABSOLUTE: 0.2 K/UL (ref 0–0.6)
EOSINOPHILS RELATIVE PERCENT: 3 %
GFR AFRICAN AMERICAN: >60
GFR NON-AFRICAN AMERICAN: >60
GLOBULIN: 3.1 G/DL
GLUCOSE BLD-MCNC: 168 MG/DL (ref 70–99)
HCT VFR BLD CALC: 36.9 % (ref 40.5–52.5)
HEMOGLOBIN: 12.6 G/DL (ref 13.5–17.5)
LYMPHOCYTES ABSOLUTE: 1.7 K/UL (ref 1–5.1)
LYMPHOCYTES RELATIVE PERCENT: 28.4 %
MCH RBC QN AUTO: 33.3 PG (ref 26–34)
MCHC RBC AUTO-ENTMCNC: 34 G/DL (ref 31–36)
MCV RBC AUTO: 97.9 FL (ref 80–100)
MONOCYTES ABSOLUTE: 0.4 K/UL (ref 0–1.3)
MONOCYTES RELATIVE PERCENT: 6.9 %
NEUTROPHILS ABSOLUTE: 3.6 K/UL (ref 1.7–7.7)
NEUTROPHILS RELATIVE PERCENT: 61.6 %
PDW BLD-RTO: 14.9 % (ref 12.4–15.4)
PLATELET # BLD: 179 K/UL (ref 135–450)
PMV BLD AUTO: 9.1 FL (ref 5–10.5)
POTASSIUM REFLEX MAGNESIUM: 4.5 MMOL/L (ref 3.5–5.1)
PRO-BNP: 1268 PG/ML (ref 0–124)
RBC # BLD: 3.77 M/UL (ref 4.2–5.9)
SODIUM BLD-SCNC: 136 MMOL/L (ref 136–145)
TOTAL PROTEIN: 6.4 G/DL (ref 6.4–8.2)
TROPONIN: 0.02 NG/ML
WBC # BLD: 5.9 K/UL (ref 4–11)

## 2020-05-06 PROCEDURE — 96374 THER/PROPH/DIAG INJ IV PUSH: CPT

## 2020-05-06 PROCEDURE — 99285 EMERGENCY DEPT VISIT HI MDM: CPT

## 2020-05-06 PROCEDURE — G0378 HOSPITAL OBSERVATION PER HR: HCPCS

## 2020-05-06 PROCEDURE — 84484 ASSAY OF TROPONIN QUANT: CPT

## 2020-05-06 PROCEDURE — 96376 TX/PRO/DX INJ SAME DRUG ADON: CPT

## 2020-05-06 PROCEDURE — 80053 COMPREHEN METABOLIC PANEL: CPT

## 2020-05-06 PROCEDURE — 6360000002 HC RX W HCPCS: Performed by: NURSE PRACTITIONER

## 2020-05-06 PROCEDURE — 83880 ASSAY OF NATRIURETIC PEPTIDE: CPT

## 2020-05-06 PROCEDURE — 85025 COMPLETE CBC W/AUTO DIFF WBC: CPT

## 2020-05-06 PROCEDURE — 6360000002 HC RX W HCPCS: Performed by: INTERNAL MEDICINE

## 2020-05-06 PROCEDURE — 71045 X-RAY EXAM CHEST 1 VIEW: CPT

## 2020-05-06 PROCEDURE — 36415 COLL VENOUS BLD VENIPUNCTURE: CPT

## 2020-05-06 PROCEDURE — 6370000000 HC RX 637 (ALT 250 FOR IP): Performed by: NURSE PRACTITIONER

## 2020-05-06 PROCEDURE — 93005 ELECTROCARDIOGRAM TRACING: CPT | Performed by: NURSE PRACTITIONER

## 2020-05-06 PROCEDURE — 96375 TX/PRO/DX INJ NEW DRUG ADDON: CPT

## 2020-05-06 RX ORDER — MORPHINE SULFATE 2 MG/ML
2 INJECTION, SOLUTION INTRAMUSCULAR; INTRAVENOUS
Status: DISCONTINUED | OUTPATIENT
Start: 2020-05-06 | End: 2020-05-06

## 2020-05-06 RX ORDER — ONDANSETRON 2 MG/ML
4 INJECTION INTRAMUSCULAR; INTRAVENOUS EVERY 30 MIN PRN
Status: COMPLETED | OUTPATIENT
Start: 2020-05-06 | End: 2020-05-06

## 2020-05-06 RX ORDER — TIZANIDINE 4 MG/1
2 TABLET ORAL EVERY 8 HOURS PRN
Status: DISCONTINUED | OUTPATIENT
Start: 2020-05-06 | End: 2020-05-07 | Stop reason: HOSPADM

## 2020-05-06 RX ORDER — INSULIN GLARGINE 100 [IU]/ML
8 INJECTION, SOLUTION SUBCUTANEOUS NIGHTLY
Status: DISCONTINUED | OUTPATIENT
Start: 2020-05-07 | End: 2020-05-07 | Stop reason: HOSPADM

## 2020-05-06 RX ORDER — HYDRALAZINE HYDROCHLORIDE 25 MG/1
25 TABLET, FILM COATED ORAL EVERY 8 HOURS SCHEDULED
Status: DISCONTINUED | OUTPATIENT
Start: 2020-05-07 | End: 2020-05-07 | Stop reason: HOSPADM

## 2020-05-06 RX ORDER — ALBUTEROL SULFATE 2.5 MG/3ML
2.5 SOLUTION RESPIRATORY (INHALATION) EVERY 4 HOURS PRN
Status: DISCONTINUED | OUTPATIENT
Start: 2020-05-06 | End: 2020-05-07 | Stop reason: HOSPADM

## 2020-05-06 RX ORDER — NICOTINE POLACRILEX 4 MG
15 LOZENGE BUCCAL PRN
Status: DISCONTINUED | OUTPATIENT
Start: 2020-05-06 | End: 2020-05-07 | Stop reason: HOSPADM

## 2020-05-06 RX ORDER — ISOSORBIDE MONONITRATE 60 MG/1
60 TABLET, EXTENDED RELEASE ORAL DAILY
Status: DISCONTINUED | OUTPATIENT
Start: 2020-05-07 | End: 2020-05-07 | Stop reason: HOSPADM

## 2020-05-06 RX ORDER — METOPROLOL SUCCINATE 50 MG/1
100 TABLET, EXTENDED RELEASE ORAL DAILY
Status: DISCONTINUED | OUTPATIENT
Start: 2020-05-07 | End: 2020-05-07 | Stop reason: HOSPADM

## 2020-05-06 RX ORDER — DEXTROSE MONOHYDRATE 50 MG/ML
100 INJECTION, SOLUTION INTRAVENOUS PRN
Status: DISCONTINUED | OUTPATIENT
Start: 2020-05-06 | End: 2020-05-07 | Stop reason: HOSPADM

## 2020-05-06 RX ORDER — OXYCODONE AND ACETAMINOPHEN 10; 325 MG/1; MG/1
1 TABLET ORAL 2 TIMES DAILY PRN
Status: DISCONTINUED | OUTPATIENT
Start: 2020-05-06 | End: 2020-05-07 | Stop reason: HOSPADM

## 2020-05-06 RX ORDER — DEXTROSE MONOHYDRATE 25 G/50ML
12.5 INJECTION, SOLUTION INTRAVENOUS PRN
Status: DISCONTINUED | OUTPATIENT
Start: 2020-05-06 | End: 2020-05-07 | Stop reason: HOSPADM

## 2020-05-06 RX ORDER — MORPHINE SULFATE 2 MG/ML
2 INJECTION, SOLUTION INTRAMUSCULAR; INTRAVENOUS ONCE
Status: COMPLETED | OUTPATIENT
Start: 2020-05-06 | End: 2020-05-06

## 2020-05-06 RX ORDER — KETOROLAC TROMETHAMINE 30 MG/ML
30 INJECTION, SOLUTION INTRAMUSCULAR; INTRAVENOUS ONCE
Status: CANCELLED | OUTPATIENT
Start: 2020-05-06 | End: 2020-05-06

## 2020-05-06 RX ORDER — KETOROLAC TROMETHAMINE 30 MG/ML
30 INJECTION, SOLUTION INTRAMUSCULAR; INTRAVENOUS EVERY 6 HOURS PRN
Status: DISCONTINUED | OUTPATIENT
Start: 2020-05-06 | End: 2020-05-07

## 2020-05-06 RX ORDER — TRAZODONE HYDROCHLORIDE 50 MG/1
50 TABLET ORAL NIGHTLY PRN
Status: DISCONTINUED | OUTPATIENT
Start: 2020-05-06 | End: 2020-05-07 | Stop reason: HOSPADM

## 2020-05-06 RX ORDER — ATORVASTATIN CALCIUM 80 MG/1
80 TABLET, FILM COATED ORAL DAILY
Status: DISCONTINUED | OUTPATIENT
Start: 2020-05-07 | End: 2020-05-07 | Stop reason: HOSPADM

## 2020-05-06 RX ORDER — GABAPENTIN 300 MG/1
600 CAPSULE ORAL
Status: DISCONTINUED | OUTPATIENT
Start: 2020-05-07 | End: 2020-05-07 | Stop reason: HOSPADM

## 2020-05-06 RX ORDER — ASPIRIN 325 MG
325 TABLET ORAL DAILY
Status: DISCONTINUED | OUTPATIENT
Start: 2020-05-07 | End: 2020-05-07 | Stop reason: HOSPADM

## 2020-05-06 RX ORDER — CLOPIDOGREL BISULFATE 75 MG/1
75 TABLET ORAL DAILY
Status: DISCONTINUED | OUTPATIENT
Start: 2020-05-07 | End: 2020-05-07 | Stop reason: HOSPADM

## 2020-05-06 RX ORDER — RANOLAZINE 500 MG/1
1000 TABLET, EXTENDED RELEASE ORAL 2 TIMES DAILY
Status: DISCONTINUED | OUTPATIENT
Start: 2020-05-07 | End: 2020-05-07 | Stop reason: HOSPADM

## 2020-05-06 RX ORDER — ASPIRIN 81 MG/1
324 TABLET, CHEWABLE ORAL ONCE
Status: COMPLETED | OUTPATIENT
Start: 2020-05-06 | End: 2020-05-06

## 2020-05-06 RX ORDER — ACETAMINOPHEN 325 MG/1
650 TABLET ORAL EVERY 6 HOURS PRN
Status: DISCONTINUED | OUTPATIENT
Start: 2020-05-06 | End: 2020-05-07 | Stop reason: SDUPTHER

## 2020-05-06 RX ORDER — LISINOPRIL 10 MG/1
10 TABLET ORAL DAILY
Status: DISCONTINUED | OUTPATIENT
Start: 2020-05-07 | End: 2020-05-07 | Stop reason: HOSPADM

## 2020-05-06 RX ADMIN — KETOROLAC TROMETHAMINE 30 MG: 30 INJECTION, SOLUTION INTRAMUSCULAR at 23:04

## 2020-05-06 RX ADMIN — ONDANSETRON 4 MG: 2 INJECTION INTRAMUSCULAR; INTRAVENOUS at 21:30

## 2020-05-06 RX ADMIN — MORPHINE SULFATE 2 MG: 2 INJECTION, SOLUTION INTRAMUSCULAR; INTRAVENOUS at 21:30

## 2020-05-06 RX ADMIN — ONDANSETRON 4 MG: 2 INJECTION INTRAMUSCULAR; INTRAVENOUS at 20:19

## 2020-05-06 RX ADMIN — MORPHINE SULFATE 2 MG: 2 INJECTION, SOLUTION INTRAMUSCULAR; INTRAVENOUS at 20:23

## 2020-05-06 RX ADMIN — ASPIRIN 81 MG 324 MG: 81 TABLET ORAL at 20:06

## 2020-05-06 RX ADMIN — NITROGLYCERIN 0.5 INCH: 20 OINTMENT TOPICAL at 20:06

## 2020-05-06 ASSESSMENT — PAIN DESCRIPTION - DESCRIPTORS
DESCRIPTORS: SHARP
DESCRIPTORS: SHARP
DESCRIPTORS: CONSTANT;SHARP

## 2020-05-06 ASSESSMENT — PAIN DESCRIPTION - FREQUENCY
FREQUENCY: CONTINUOUS
FREQUENCY: CONTINUOUS

## 2020-05-06 ASSESSMENT — PAIN DESCRIPTION - LOCATION
LOCATION: CHEST

## 2020-05-06 ASSESSMENT — PAIN SCALES - GENERAL
PAINLEVEL_OUTOF10: 8
PAINLEVEL_OUTOF10: 5
PAINLEVEL_OUTOF10: 8
PAINLEVEL_OUTOF10: 4
PAINLEVEL_OUTOF10: 7
PAINLEVEL_OUTOF10: 7

## 2020-05-06 ASSESSMENT — HEART SCORE: ECG: 1

## 2020-05-06 ASSESSMENT — PAIN DESCRIPTION - PROGRESSION: CLINICAL_PROGRESSION: GRADUALLY WORSENING

## 2020-05-06 ASSESSMENT — PAIN DESCRIPTION - ORIENTATION
ORIENTATION: LEFT
ORIENTATION: LEFT

## 2020-05-06 ASSESSMENT — PAIN DESCRIPTION - PAIN TYPE
TYPE: ACUTE PAIN

## 2020-05-06 ASSESSMENT — PAIN DESCRIPTION - ONSET: ONSET: ON-GOING

## 2020-05-06 NOTE — ED TRIAGE NOTES

## 2020-05-07 VITALS
DIASTOLIC BLOOD PRESSURE: 85 MMHG | TEMPERATURE: 97.9 F | WEIGHT: 198.63 LBS | HEART RATE: 87 BPM | SYSTOLIC BLOOD PRESSURE: 155 MMHG | BODY MASS INDEX: 26.9 KG/M2 | OXYGEN SATURATION: 95 % | HEIGHT: 72 IN | RESPIRATION RATE: 16 BRPM

## 2020-05-07 LAB
ANION GAP SERPL CALCULATED.3IONS-SCNC: 12 MMOL/L (ref 3–16)
BASOPHILS ABSOLUTE: 0 K/UL (ref 0–0.2)
BASOPHILS RELATIVE PERCENT: 0.6 %
BUN BLDV-MCNC: 17 MG/DL (ref 7–20)
CALCIUM SERPL-MCNC: 8.8 MG/DL (ref 8.3–10.6)
CHLORIDE BLD-SCNC: 106 MMOL/L (ref 99–110)
CO2: 22 MMOL/L (ref 21–32)
CREAT SERPL-MCNC: 1.1 MG/DL (ref 0.8–1.3)
EKG ATRIAL RATE: 107 BPM
EKG DIAGNOSIS: NORMAL
EKG P AXIS: 68 DEGREES
EKG P-R INTERVAL: 130 MS
EKG Q-T INTERVAL: 382 MS
EKG QRS DURATION: 136 MS
EKG QTC CALCULATION (BAZETT): 509 MS
EKG R AXIS: 74 DEGREES
EKG T AXIS: 51 DEGREES
EKG VENTRICULAR RATE: 107 BPM
EOSINOPHILS ABSOLUTE: 0.2 K/UL (ref 0–0.6)
EOSINOPHILS RELATIVE PERCENT: 4 %
GFR AFRICAN AMERICAN: >60
GFR NON-AFRICAN AMERICAN: >60
GLUCOSE BLD-MCNC: 141 MG/DL (ref 70–99)
GLUCOSE BLD-MCNC: 147 MG/DL (ref 70–99)
GLUCOSE BLD-MCNC: 159 MG/DL (ref 70–99)
GLUCOSE BLD-MCNC: 162 MG/DL (ref 70–99)
HCT VFR BLD CALC: 34.3 % (ref 40.5–52.5)
HEMOGLOBIN: 11.7 G/DL (ref 13.5–17.5)
LV EF: 41 %
LVEF MODALITY: NORMAL
LYMPHOCYTES ABSOLUTE: 2.5 K/UL (ref 1–5.1)
LYMPHOCYTES RELATIVE PERCENT: 43.9 %
MCH RBC QN AUTO: 33.7 PG (ref 26–34)
MCHC RBC AUTO-ENTMCNC: 34.1 G/DL (ref 31–36)
MCV RBC AUTO: 98.6 FL (ref 80–100)
MONOCYTES ABSOLUTE: 0.4 K/UL (ref 0–1.3)
MONOCYTES RELATIVE PERCENT: 6.9 %
NEUTROPHILS ABSOLUTE: 2.5 K/UL (ref 1.7–7.7)
NEUTROPHILS RELATIVE PERCENT: 44.6 %
PDW BLD-RTO: 14.8 % (ref 12.4–15.4)
PERFORMED ON: ABNORMAL
PLATELET # BLD: 192 K/UL (ref 135–450)
PMV BLD AUTO: 9 FL (ref 5–10.5)
POTASSIUM REFLEX MAGNESIUM: 4 MMOL/L (ref 3.5–5.1)
RBC # BLD: 3.48 M/UL (ref 4.2–5.9)
SODIUM BLD-SCNC: 140 MMOL/L (ref 136–145)
TROPONIN: 0.01 NG/ML
TROPONIN: 0.02 NG/ML
WBC # BLD: 5.7 K/UL (ref 4–11)

## 2020-05-07 PROCEDURE — 96372 THER/PROPH/DIAG INJ SC/IM: CPT

## 2020-05-07 PROCEDURE — 6360000002 HC RX W HCPCS: Performed by: INTERNAL MEDICINE

## 2020-05-07 PROCEDURE — 78452 HT MUSCLE IMAGE SPECT MULT: CPT

## 2020-05-07 PROCEDURE — 36415 COLL VENOUS BLD VENIPUNCTURE: CPT

## 2020-05-07 PROCEDURE — G0378 HOSPITAL OBSERVATION PER HR: HCPCS

## 2020-05-07 PROCEDURE — 80048 BASIC METABOLIC PNL TOTAL CA: CPT

## 2020-05-07 PROCEDURE — 93017 CV STRESS TEST TRACING ONLY: CPT

## 2020-05-07 PROCEDURE — 84484 ASSAY OF TROPONIN QUANT: CPT

## 2020-05-07 PROCEDURE — 85025 COMPLETE CBC W/AUTO DIFF WBC: CPT

## 2020-05-07 PROCEDURE — 6370000000 HC RX 637 (ALT 250 FOR IP): Performed by: INTERNAL MEDICINE

## 2020-05-07 PROCEDURE — 3430000000 HC RX DIAGNOSTIC RADIOPHARMACEUTICAL: Performed by: INTERNAL MEDICINE

## 2020-05-07 PROCEDURE — 2580000003 HC RX 258: Performed by: INTERNAL MEDICINE

## 2020-05-07 PROCEDURE — A9502 TC99M TETROFOSMIN: HCPCS | Performed by: INTERNAL MEDICINE

## 2020-05-07 PROCEDURE — 93010 ELECTROCARDIOGRAM REPORT: CPT | Performed by: INTERNAL MEDICINE

## 2020-05-07 PROCEDURE — 96376 TX/PRO/DX INJ SAME DRUG ADON: CPT

## 2020-05-07 RX ORDER — ACETAMINOPHEN 325 MG/1
650 TABLET ORAL EVERY 4 HOURS PRN
Status: DISCONTINUED | OUTPATIENT
Start: 2020-05-07 | End: 2020-05-07 | Stop reason: HOSPADM

## 2020-05-07 RX ORDER — SODIUM CHLORIDE 0.9 % (FLUSH) 0.9 %
10 SYRINGE (ML) INJECTION EVERY 12 HOURS SCHEDULED
Status: DISCONTINUED | OUTPATIENT
Start: 2020-05-07 | End: 2020-05-07 | Stop reason: HOSPADM

## 2020-05-07 RX ORDER — ONDANSETRON 2 MG/ML
4 INJECTION INTRAMUSCULAR; INTRAVENOUS EVERY 4 HOURS PRN
Status: DISCONTINUED | OUTPATIENT
Start: 2020-05-07 | End: 2020-05-07 | Stop reason: HOSPADM

## 2020-05-07 RX ORDER — SODIUM CHLORIDE 0.9 % (FLUSH) 0.9 %
10 SYRINGE (ML) INJECTION PRN
Status: DISCONTINUED | OUTPATIENT
Start: 2020-05-07 | End: 2020-05-07 | Stop reason: HOSPADM

## 2020-05-07 RX ORDER — MORPHINE SULFATE 2 MG/ML
2 INJECTION, SOLUTION INTRAMUSCULAR; INTRAVENOUS
Status: DISCONTINUED | OUTPATIENT
Start: 2020-05-07 | End: 2020-05-07 | Stop reason: HOSPADM

## 2020-05-07 RX ORDER — MORPHINE SULFATE 4 MG/ML
4 INJECTION, SOLUTION INTRAMUSCULAR; INTRAVENOUS
Status: DISCONTINUED | OUTPATIENT
Start: 2020-05-07 | End: 2020-05-07 | Stop reason: HOSPADM

## 2020-05-07 RX ORDER — POLYETHYLENE GLYCOL 3350 17 G/17G
17 POWDER, FOR SOLUTION ORAL DAILY PRN
Status: DISCONTINUED | OUTPATIENT
Start: 2020-05-07 | End: 2020-05-07 | Stop reason: HOSPADM

## 2020-05-07 RX ORDER — ACETAMINOPHEN 650 MG/1
650 SUPPOSITORY RECTAL EVERY 4 HOURS PRN
Status: DISCONTINUED | OUTPATIENT
Start: 2020-05-07 | End: 2020-05-07 | Stop reason: HOSPADM

## 2020-05-07 RX ADMIN — ISOSORBIDE MONONITRATE 60 MG: 60 TABLET, EXTENDED RELEASE ORAL at 09:56

## 2020-05-07 RX ADMIN — TETROFOSMIN 30 MILLICURIE: 1.38 INJECTION, POWDER, LYOPHILIZED, FOR SOLUTION INTRAVENOUS at 08:30

## 2020-05-07 RX ADMIN — GABAPENTIN 600 MG: 300 CAPSULE ORAL at 12:04

## 2020-05-07 RX ADMIN — INSULIN GLARGINE 8 UNITS: 100 INJECTION, SOLUTION SUBCUTANEOUS at 00:18

## 2020-05-07 RX ADMIN — GABAPENTIN 600 MG: 300 CAPSULE ORAL at 06:54

## 2020-05-07 RX ADMIN — HYDRALAZINE HYDROCHLORIDE 25 MG: 25 TABLET, FILM COATED ORAL at 00:16

## 2020-05-07 RX ADMIN — NITROGLYCERIN 0.5 INCH: 20 OINTMENT TOPICAL at 06:54

## 2020-05-07 RX ADMIN — CLOPIDOGREL BISULFATE 75 MG: 75 TABLET ORAL at 09:56

## 2020-05-07 RX ADMIN — NITROGLYCERIN 0.5 INCH: 20 OINTMENT TOPICAL at 00:19

## 2020-05-07 RX ADMIN — OXYCODONE HYDROCHLORIDE AND ACETAMINOPHEN 1 TABLET: 10; 325 TABLET ORAL at 00:16

## 2020-05-07 RX ADMIN — METOPROLOL SUCCINATE 100 MG: 50 TABLET, EXTENDED RELEASE ORAL at 09:56

## 2020-05-07 RX ADMIN — NITROGLYCERIN 0.5 INCH: 20 OINTMENT TOPICAL at 12:11

## 2020-05-07 RX ADMIN — OXYCODONE HYDROCHLORIDE AND ACETAMINOPHEN 1 TABLET: 10; 325 TABLET ORAL at 12:04

## 2020-05-07 RX ADMIN — TRAZODONE HYDROCHLORIDE 50 MG: 50 TABLET ORAL at 00:17

## 2020-05-07 RX ADMIN — ENOXAPARIN SODIUM 40 MG: 40 INJECTION SUBCUTANEOUS at 09:56

## 2020-05-07 RX ADMIN — ASPIRIN 325 MG ORAL TABLET 325 MG: 325 PILL ORAL at 09:56

## 2020-05-07 RX ADMIN — LISINOPRIL 10 MG: 10 TABLET ORAL at 09:56

## 2020-05-07 RX ADMIN — Medication 10 ML: at 09:57

## 2020-05-07 RX ADMIN — RANOLAZINE 1000 MG: 500 TABLET, FILM COATED, EXTENDED RELEASE ORAL at 09:56

## 2020-05-07 RX ADMIN — GABAPENTIN 600 MG: 300 CAPSULE ORAL at 00:16

## 2020-05-07 RX ADMIN — HYDRALAZINE HYDROCHLORIDE 25 MG: 25 TABLET, FILM COATED ORAL at 06:53

## 2020-05-07 RX ADMIN — REGADENOSON 0.4 MG: 0.08 INJECTION, SOLUTION INTRAVENOUS at 08:20

## 2020-05-07 RX ADMIN — ATORVASTATIN CALCIUM 80 MG: 80 TABLET, FILM COATED ORAL at 09:56

## 2020-05-07 RX ADMIN — TETROFOSMIN 10 MILLICURIE: 1.38 INJECTION, POWDER, LYOPHILIZED, FOR SOLUTION INTRAVENOUS at 07:13

## 2020-05-07 RX ADMIN — RANOLAZINE 1000 MG: 500 TABLET, FILM COATED, EXTENDED RELEASE ORAL at 00:16

## 2020-05-07 RX ADMIN — MORPHINE SULFATE 2 MG: 2 INJECTION, SOLUTION INTRAMUSCULAR; INTRAVENOUS at 06:54

## 2020-05-07 ASSESSMENT — PAIN SCALES - GENERAL
PAINLEVEL_OUTOF10: 2
PAINLEVEL_OUTOF10: 7
PAINLEVEL_OUTOF10: 7
PAINLEVEL_OUTOF10: 6

## 2020-05-07 ASSESSMENT — PAIN DESCRIPTION - FREQUENCY
FREQUENCY: CONTINUOUS
FREQUENCY: CONTINUOUS

## 2020-05-07 ASSESSMENT — PAIN DESCRIPTION - PAIN TYPE
TYPE: ACUTE PAIN
TYPE: ACUTE PAIN

## 2020-05-07 ASSESSMENT — PAIN SCALES - WONG BAKER: WONGBAKER_NUMERICALRESPONSE: 0

## 2020-05-07 ASSESSMENT — PAIN DESCRIPTION - PROGRESSION
CLINICAL_PROGRESSION: NOT CHANGED
CLINICAL_PROGRESSION: NOT CHANGED

## 2020-05-07 ASSESSMENT — PAIN DESCRIPTION - DESCRIPTORS
DESCRIPTORS: SHARP
DESCRIPTORS: SHARP

## 2020-05-07 ASSESSMENT — PAIN DESCRIPTION - ORIENTATION
ORIENTATION: LEFT
ORIENTATION: LEFT

## 2020-05-07 ASSESSMENT — PAIN DESCRIPTION - LOCATION
LOCATION: CHEST
LOCATION: CHEST

## 2020-05-07 ASSESSMENT — PAIN DESCRIPTION - ONSET
ONSET: ON-GOING
ONSET: ON-GOING

## 2020-05-07 NOTE — DISCHARGE INSTR - COC
Continuity of Care Form    Patient Name: Zane Morris   :  1955  MRN:  [id number]    Admit date:  2020  Discharge date:  ***    Code Status Order: Full Code   Advance Directives:   Advance Care Flowsheet Documentation     Date/Time Healthcare Directive Type of Healthcare Directive Copy in [address] Agent's Name Healthcare Agent's Phone Number    [phone]  No, patient does not have an advance directive for healthcare treatment -- -- -- -- --          Admitting Physician:  Corine Chou MD  PCP: Joe Navarro MD    Discharging Nurse: [facility] Unit/Room#: Q2Z-4425/4490-65  Discharging Unit Phone Number: ***    Emergency Contact:   Extended Emergency Contact Information  Primary Emergency Contact: [facility]  Address: [address] [place] [address] Phone: [phone]  Relation: Spouse  Secondary Emergency Contact: [facility] Phone: [phone]  Work Phone: [phone]  Relation: Child    Past Surgical History:  Past Surgical History:   Procedure Laterality Date    APPENDECTOMY      BACK SURGERY      CARDIAC SURGERY      CHOLECYSTECTOMY      COLONOSCOPY  01/10/2017    COLONOSCOPY  01/10/2017    CORONARY ANGIOPLASTY WITH STENT PLACEMENT  2012    CORONARY ARTERY BYPASS GRAFT  , 2015    ENDOSCOPY, COLON, DIAGNOSTIC      PACEMAKER PLACEMENT      UPPER GASTROINTESTINAL ENDOSCOPY  2017    VASCULAR SURGERY         Immunization History:   Immunization History   Administered Date(s) Administered    Influenza 2013    Influenza Vaccine, unspecified formulation 10/24/2011, 2013, 2015, 2017    Influenza Virus Vaccine 2014, 2015, 2019    Influenza, Quadv, IM, (6 mo and older Fluzone, Flulaval, Fluarix and 3 yrs and older Afluria) 10/17/2018    Influenza, Quadv, IM, PF (6 mo and older Fluzone, Flulaval, Fluarix, and 3 yrs and older Afluria) 10/17/2016, 11/27/2019    Pneumococcal Polysaccharide ([id number]) 10/24/2011, 09/18/2015    Tdap (Boostrix, Adacel) 05/04/2015       Active Problems:  Patient Active Problem List   Diagnosis Code    Chronic chest pain R07.9, G89.29    S/P CABG (coronary artery bypass graft) Z95.1    Essential hypertension I10    ICD (implantable cardioverter-defibrillator), dual, in situ Z95.810    Abdominal pain R10.9    Ischemic cardiomyopathy I25.5    Hyperlipidemia LDL goal <70 E78.5    CHF (congestive heart failure) ([facility]) I50.9    Chronic back pain M54.9, G89.29    Type 2 diabetes mellitus without complication, with long-term current use of insulin ([facility]) E11.9, Z79.4    Coronary artery disease involving native coronary artery of native heart without angina pectoris I25.10    COPD exacerbation ([facility]) J44.1    Anemia,normocytic normochromic ,mixed folic aci deficiency and iron deficiency D64.9    Guaiac + stool R19.5    Gastrointestinal hemorrhage K92.2    Morbid obesity due to excess calories ([facility]) E66.01    Anxiety F41.9    Coronary artery disease involving coronary bypass graft of native heart with angina pectoris ([facility]) I25.709    Chronically elevated troponin R79.89    Iron deficiency anemia due to chronic blood loss D50.0    Tobacco abuse Z72.0    Restrictive lung disease J98.4    Acute on chronic diastolic congestive heart failure ([facility]) I50.33    Ischemic stroke, left frontal lobe (4/30/18) ([facility]) I63.9    PFO (patent foramen ovale) Q21.1    DMII (diabetes mellitus, type 2) ([facility]) E11.9    COPD (chronic obstructive pulmonary disease) ([facility]) J44.9    Renal insufficiency N28.9    Chest pain R07.9    Chronic systolic (congestive) heart failure ([facility]) I50.22    CAD (coronary artery disease) I25.10    Compression fracture of L2, sequela S32.020S    Back pain with radiculopathy M54.10    Low back pain M54.5    Intractable back pain M54.9    Acute colitis K52.9    Back recorded.     Safety Concerns:     [address] [facility] Safety Concerns:684897514}    Impairments/Disabilities:      [address] [facility] Impairments/Disabilities:744824584}    Nutrition Therapy:  Current Nutrition Therapy:   [address] [facility] Diet List:468404567}    Routes of Feeding: {CHP DME Other Feedings:285865504}  Liquids: {Slp liquid thickness:58884}  Daily Fluid Restriction: {CHP DME Yes amt example:362049974}  Last Modified Barium Swallow with Video (Video Swallowing Test): {Done Not Done WJSJ:344300795}    Treatments at the Time of Hospital Discharge:   Respiratory Treatments: ***  Oxygen Therapy:  {Therapy; copd oxygen:43265}  Ventilator:    { CC Vent QZMR:141312801}    Rehab Therapies: {THERAPEUTIC INTERVENTION:8651116528}  Weight Bearing Status/Restrictions: [address]  Weight Bearin}  Other Medical Equipment (for information only, NOT a DME order):  {EQUIPMENT:538212226}  Other Treatments: ***    Patient's personal belongings (please select all that are sent with patient):  {P DME Belongings:381266823}    RN SIGNATURE:  {Esignature:555004967}    CASE MANAGEMENT/SOCIAL WORK SECTION    Inpatient Status Date: ***    Readmission Risk Assessment Score:  Readmission Risk              Risk of Unplanned Readmission:        0           Discharging to Facility/ Agency   · Name:   · Address:  · Phone:  · Fax:    Dialysis Facility (if applicable)   · Name:  · Address:  · Dialysis Schedule:  · Phone:  · Fax:    / signature: {Esignature:448547826}    PHYSICIAN SECTION    Prognosis: {Prognosis:3759293710}    Condition at Discharge: [address] Patient Condition:262541645}    Rehab Potential (if transferring to Rehab): {Prognosis:5879695861}    Recommended Labs or Other Treatments After Discharge: ***    Physician Certification: I certify the above information and transfer of Mildred Marcus  is necessary for the continuing treatment of the diagnosis listed and that he requires {Admit to Appropriate Level of Care:01341} for

## 2020-05-07 NOTE — CARE COORDINATION
INITIAL CASE MANAGEMENT ASSESSMENT    Reviewed chart, spoke with  patient to assess possible discharge needs. Explained Case Management role/services. Living Situation: pt live with spouse in 2 family home 14 steps to enter. ADLs: independent     DME: walker    PT/OT Recs: none     Active Services: none , spouse assists as needed. Transportation: pt is an active , spouse will transport pt home at dc. Medications: [facility] no barriers. PCP: Bernice Goodman MD       HD/PD: N/A    PLAN/COMMENTS: spoke with pt who is aware and agreeable to dc today. He denies any dc needs. Spouse to transport pt home later today. ,Electronically signed by ROSEANNA Montana on 5/7/2020 at 1:47 PM    SW/JONELLE provided contact information for patient or family to call with any questions. SW/CM will follow and assist as needed.

## 2020-05-07 NOTE — DISCHARGE SUMMARY
Hospital Medicine Discharge Summary    Patient ID: Claudean Sherry      Patient's PCP: Lou Ruiz MD    Admit Date: 5/6/2020     Discharge Date:   5/7/2020    Admitting Physician: Killian Shaw MD     Discharge Physician: TAYLA Weston - CNP     Discharge Diagnoses: Active Hospital Problems    Diagnosis Date Noted    Chronic chest pain [R07.9, G89.29] 03/05/2013     Priority: High    Ischemic cardiomyopathy [I25.5] 11/02/2014     Priority: Medium    Essential hypertension [I10] 06/04/2013     Priority: Medium    S/P CABG (coronary artery bypass graft) [Z95.1] 03/05/2013     Priority: Medium    Hyperlipidemia LDL goal <70 [E78.5] 11/02/2014     Priority: Low    CAD (coronary artery disease) [I25.10] 04/27/2019    Chronic systolic (congestive) heart failure (HCC) [I50.22] 04/27/2019    Chest pain [R07.9] 04/25/2019    DMII (diabetes mellitus, type 2) ([facility] [address] [E11.9] 11/27/2018    Chronically elevated troponin [R79.89]     Chronic back pain [M54.9, G89.29] 06/23/2015       The patient was seen and examined on day of discharge and this discharge summary is in conjunction with any daily progress note from day of discharge. Hospital Course:   Patient is a 57-year-old man with history of hypertension, hyperlipidemia, diabetes mellitus, coronary artery disease status post CABG, chronic systolic congestive heart failure, and a chronically elevated troponin. He underwent a cardiac cath on 4/15/2020 with the finding of an inclusion at the midportion of the right coronary artery and a heavily diseased distal LAD. It was decided that he would be managed medically. He was admitted for chest pain again on 4/20/2020 and left AMA on 4/23/2020 after his symptoms subsided and he was feeling better. He presents to the emergency room for evaluation following a four hour history of left sided chest pain. He states that the pain is severe, sharp and constant.  He took an 81 mg aspirin this (PLAVIX) 75 MG tablet TAKE 1 TABLET BY MOUTH DAILY  Qty: 90 tablet, Refills: 3      nitroGLYCERIN (NITROSTAT) 0.4 MG SL tablet PLACE 1 TABLET UNDER THE TONGUE EVERY 5 MINUTES AS NEEDED FOR CHEST PAIN  Qty: 25 tablet, Refills: 1      aspirin 325 MG tablet Take 325 mg by mouth daily       atorvastatin (LIPITOR) 80 MG tablet TAKE 1 TABLET BY MOUTH DAILY  Qty: 90 tablet, Refills: 1    Associated Diagnoses: Coronary artery disease involving native heart without angina pectoris, unspecified vessel or lesion type      famotidine (PEPCID) 20 MG tablet Take 1 tablet by mouth 2 times daily  Qty: 30 tablet, Refills: 0      albuterol sulfate HFA (PROAIR HFA) 108 (90 Base) MCG/ACT inhaler Inhale 2 puffs into the lungs every 6 hours as needed for Wheezing             Future Appointments   Date Time Provider [facility]   5/15/2020 10:30 AM Meghana Oden MD [facility]   5/21/2020  2:00 PM Tre Marie MD [facility]       Time Spent on discharge is more than 30 minutes in the examination, evaluation, counseling and review of medications and discharge plan. Nga To, TAYLA - LYNN   5/7/2020      Thank you Estella Espino MD for the opportunity to be involved in this patient's care. If you have any questions or concerns please feel free to contact me at [phone].

## 2020-05-07 NOTE — H&P
Liver disease     Pacemaker 2012    Medtronic model # I575BIG    Pneumonia     Seizures ([facility] [address]     TIA (transient ischemic attack) 2007    Ulcerative colitis ([facility] [address]        Past Surgical History:        Procedure Laterality Date    APPENDECTOMY      BACK SURGERY      CARDIAC SURGERY      CHOLECYSTECTOMY      COLONOSCOPY  01/10/2017    COLONOSCOPY  01/10/2017    CORONARY ANGIOPLASTY WITH STENT PLACEMENT  2012    CORONARY ARTERY BYPASS GRAFT  2010, 11/2015    ENDOSCOPY, COLON, DIAGNOSTIC      PACEMAKER PLACEMENT      UPPER GASTROINTESTINAL ENDOSCOPY  02/07/2017    VASCULAR SURGERY         Allergies:  Dopamine hcl and Melatonin    Medications Prior to Admission:    Prior to Admission medications    Medication Sig Start Date End Date Taking? Authorizing Provider   tiZANidine (ZANAFLEX) 2 MG tablet TAKE 1 TABLET BY MOUTH EVERY 8 HOURS AS NEEDED FOR MUSCLE SPASM 5/5/20  Yes Ronald Rich MD   traZODone (DESYREL) 50 MG tablet TAKE 1 TABLET BY MOUTH EVERY NIGHT AT BEDTIME 5/5/20  Yes Ronald Rich MD   oxyCODONE-acetaminophen (PERCOCET)  MG per tablet Take 1 tablet by mouth 2 times daily as needed for Pain for up to 30 days.  4/22/20 5/22/20 Yes Ronald Rich MD   gabapentin (NEURONTIN) 600 MG tablet TAKE 1 TABLET BY MOUTH FIVE TIMES DAILY 3/28/20 5/28/20 Yes Ronald Rich MD   ibuprofen (ADVIL;MOTRIN) 400 MG tablet Take 1 tablet by mouth every 6 hours as needed for Pain 2/26/20  Yes Elidia La MD   insulin glargine (LANTUS) 100 UNIT/ML injection vial Inject 8 Units into the skin nightly 2/6/20  Yes Chayo Gaspar MD   metoprolol succinate (TOPROL XL) 100 MG extended release tablet Take 1 tablet by mouth daily 2/7/20  Yes Chayo Gaspar MD   lisinopril (PRINIVIL;ZESTRIL) 10 MG tablet Take 1 tablet by mouth daily 12/21/19  Yes Michelle Sheikh MD   ranolazine (RANEXA) 500 MG extended release tablet Take 1,000 mg by mouth 2 times daily   Yes Historical WD/WN 59y.o. year-old  male who is alert, appears stated age and is cooperative  HEENT: Head: Normocephalic, no lesions, without obvious abnormality. Eye: Normal external eye, conjunctiva, lids cornea, TYE. Ears: Normal external ears. Non-tender. Nose: Normal external nose, mucus membranes and septum. Pharynx: Dental Hygiene adequate. Normal buccal mucosa. Normal pharynx. Neck: no adenopathy, no carotid bruit, no JVD, supple, symmetrical, trachea midline and thyroid not enlarged, symmetric, no tenderness/mass/nodules  Lungs: clear to auscultation bilaterally and no use of accessory muscles. Heart: regular rate and rhythm, S1, S2 normal, no murmur, click, rub or gallop and normal apical impulse  Abdomen: soft, non-tender; bowel sounds normal; no masses, no organomegaly  Extremities: extremities atraumatic, no cyanosis or edema and Homans sign is negative, no sign of DVT. Capillary Refill: Acceptable < 3 seconds   Peripheral Pulses: +3 easily felt, not easily obliterated with pressures   Skin: Skin color, texture, turgor normal. No rashes or lesions on exposed skin  Neurologic: Neurovascularly intact without any focal sensory/motor deficits. Cranial nerves: II-XII intact, grossly non-focal. Gait was not tested.   Psychiatric: Alert and oriented, thought content appropriate, normal insight    CBC:   Recent Labs     05/06/20 2034   WBC 5.9   HGB 12.6*        BMP:    Recent Labs     05/06/20 2034      K 4.5      CO2 23   BUN 14   CREATININE 0.8   GLUCOSE 168*     Troponin:   Recent Labs     05/06/20 2034 05/06/20  2353   TROPONINI 0.02* 0.01     PT/INR:  No results found for: PTINR  U/A:    Lab Results   Component Value Date    LEUKOCYTESUR Negative 04/22/2020    NITRITE neg 05/23/2014    RBCUA 1 04/22/2020    SPECGRAV 1.014 04/22/2020    UROBILINOGEN 0.2 04/22/2020    BILIRUBINUR Negative 04/22/2020    BILIRUBINUR neg 05/23/2014    BLOODU Negative 04/22/2020    GLUCOSEU 250 04/22/2020    PROTEINU Negative 04/22/2020         RAD:   I have independently reviewed and interpreted the imaging studies below and based my recommendations to the patient on those findings. Xr Chest Standard (2 Vw)    Result Date: 4/14/2020  EXAMINATION: TWO XRAY VIEWS OF THE CHEST 4/14/2020 3:56 pm COMPARISON: March 10, 2020 HISTORY: ORDERING SYSTEM PROVIDED HISTORY: Chest pain TECHNOLOGIST PROVIDED HISTORY: Reason for exam:->Chest pain Reason for Exam: Chest Pain (patient arrived via EMS from home with c/o chest pain x7-8 hours. Acuity: Chronic Type of Exam: Ongoing FINDINGS: Cardiac silhouette is enlarged. Lungs appear clear. No acute bony abnormality. Left-sided cardiac device which appears similar to previous exam.     No acute findings     Xr Chest Portable    Result Date: 5/6/2020  EXAMINATION: ONE XRAY VIEW OF THE CHEST 5/6/2020 7:54 pm COMPARISON: 04/20/2020 HISTORY: ORDERING SYSTEM PROVIDED HISTORY: CP TECHNOLOGIST PROVIDED HISTORY: Reason for exam:->CP Reason for Exam: Chest Pain (sharp left sided chest pain for 4 hours. extensive cardiac hx) Acuity: Acute Type of Exam: Initial FINDINGS: Elevation of right hemidiaphragm. Status post median sternotomy. AICD in the left chest wall. No confluent airspace disease. Left costophrenic angle is incompletely imaged though no large pleural effusion is seen. No pneumothorax. Cardiac and mediastinal silhouettes are similar to prior. No acute cardiopulmonary disease. Xr Chest Portable    Result Date: 4/20/2020  EXAMINATION: ONE XRAY VIEW OF THE CHEST 4/20/2020 7:43 pm COMPARISON: 04/14/2020 HISTORY: ORDERING SYSTEM PROVIDED HISTORY: chest pain TECHNOLOGIST PROVIDED HISTORY: Reason for exam:->chest pain Reason for Exam: chest pain Acuity: Acute Type of Exam: Initial FINDINGS: Single portable frontal view of the chest is submitted for review. The cardiac silhouette is normal in size.   Lung parenchyma is clear without focal airspace consolidation, sizeable pleural effusion, or pneumothorax. Implantable cardiac device leads overlying the right atrium and ventricle. Status post midline sternotomy. Trachea is midline. Visualized osseous structures and soft tissues are grossly intact. No acute cardiopulmonary pathology. Xr Chest Portable    Result Date: 4/7/2020  Site: Karla Desai #: [id number] #: [id number]: GSEDAccount #: [de-identified] #: [id number] #: [id number]: XR CHEST AP PORTABLEExam Date/Time: 04/07/2020 12:15 PMAdmitting Diagnosis: Chest painReason for Exam: Chest pain Dictated by: Naheed Stuart BETITO: 04/07/2020 12:29 PMT: This document is confidential medical information. Unauthorized disclosure or use of this information is prohibited by law. If you are not the intended recipient of this document, please advise us by calling immediately [phone].  Impression/Conclusion below HISTORY:   Chest pain chest pain that started this AM COMPARISON: March 11, 2020 NOTE:  If there are questions about the content of this report, please contact [facility] radiology by calling [phone] FINDINGS: LINES/DEVICES: Left chest wall pacemaker LUNGS/PLEURA:  Unremarkable HEART:  Unremarkable MEDIASTINUM/CARLEEN:  Changes of CABG BONES:  Unremarkable OTHER:  None  IMPRESSION: Negative portable chest SIGNED BY: Elsy Uriostegui MD on 4/7/2020 12:26 PM   [address] [facility] [phone] - [address]: [phone]           EKG:   Read by ER in the absence of a Cardiologist shows sinus tachycardia LAE RBBB no ectopy no stemi      Assessment:   Principal Problem:    Chest pain  Active Problems:    Chronic chest pain    S/P CABG (coronary artery bypass graft)    Essential hypertension    Ischemic cardiomyopathy    Hyperlipidemia LDL goal <70    Chronic back pain    Chronically elevated troponin    DMII (diabetes mellitus, type 2) (HCC)    Chronic systolic (congestive) heart failure clinical course.      Tracey Vanessa MD

## 2020-05-07 NOTE — ED PROVIDER NOTES
Take 1 tablet by mouth daily 30 tablet 3    lisinopril (PRINIVIL;ZESTRIL) 10 MG tablet Take 1 tablet by mouth daily 30 tablet 3    ranolazine (RANEXA) 500 MG extended release tablet Take 1,000 mg by mouth 2 times daily      furosemide (LASIX) 20 MG tablet Take 20 mg by mouth daily      albuterol sulfate HFA (PROAIR HFA) 108 (90 Base) MCG/ACT inhaler Inhale 2 puffs into the lungs every 6 hours as needed for Wheezing      empagliflozin (JARDIANCE) 10 MG tablet Take 1 tablet by mouth daily 28 tablet 0    isosorbide mononitrate (IMDUR) 30 MG extended release tablet Take 60 mg by mouth daily       hydrALAZINE (APRESOLINE) 25 MG tablet Take 1 tablet by mouth every 8 hours 90 tablet 3    acetaminophen (TYLENOL) 325 MG tablet Take 650 mg by mouth every 6 hours as needed for Pain      clopidogrel (PLAVIX) 75 MG tablet TAKE 1 TABLET BY MOUTH DAILY 90 tablet 3    nitroGLYCERIN (NITROSTAT) 0.4 MG SL tablet PLACE 1 TABLET UNDER THE TONGUE EVERY 5 MINUTES AS NEEDED FOR CHEST PAIN 25 tablet 1    aspirin 325 MG tablet Take 325 mg by mouth daily       atorvastatin (LIPITOR) 80 MG tablet TAKE 1 TABLET BY MOUTH DAILY 90 tablet 1       ALLERGIES    Allergies   Allergen Reactions    Dopamine Hcl Other (See Comments)     Compulsive gambling    Melatonin Other (See Comments)     Restless leg syndrome         SOCIAL & FAMILY HISTORY    Social History     Socioeconomic History    Marital status:      Spouse name: None    Number of children: 1    Years of education: None    Highest education level: None   Occupational History    Occupation: disabled   Social Needs    Financial resource strain: None    Food insecurity     Worry: None     Inability: None    Transportation needs     Medical: None     Non-medical: None   Tobacco Use    Smoking status: Current Every Day Smoker     Packs/day: 0.50     Years: 44.00     Pack years: 22.00     Types: Cigarettes    Smokeless tobacco: Never Used    Tobacco comment: urged to stop   Substance and Sexual Activity    Alcohol use: No     Alcohol/week: 0.0 standard drinks    Drug use: No    Sexual activity: Yes     Partners: Female   Lifestyle    Physical activity     Days per week: None     Minutes per session: None    Stress: None   Relationships    Social connections     Talks on phone: None     Gets together: None     Attends Gnosticism service: None     Active member of club or organization: None     Attends meetings of clubs or organizations: None     Relationship status: None    Intimate partner violence     Fear of current or ex partner: None     Emotionally abused: None     Physically abused: None     Forced sexual activity: None   Other Topics Concern    None   Social History Narrative    None     Family History   Problem Relation Age of Onset    Diabetes Father     Coronary Art Dis Father     Cancer Mother         Lung with mets       PHYSICAL EXAM    VITAL SIGNS: BP (!) 154/84   Pulse 103   Temp 98.3 °F (36.8 °C) (Oral)   Resp (!) 32   Ht 6' (1.829 m)   Wt 198 lb 10.2 oz (90.1 kg)   SpO2 99%   BMI 26.94 kg/m²    Constitutional:  Well developed, well nourished, no acute distress   HENT:  Atraumatic, moist mucus membranes  Neck: supple, no JVD   Respiratory:  Lungs clear to auscultation bilaterally, no retractions   Cardiovascular: Tachycardic rate, no murmurs, rubs or gallops  Vascular: Radial and DP pulses 2+ and equal bilaterally  GI:  Soft, nontender, normal bowel sounds  Musculoskeletal:  no lower extremity edema, no lower extremity asymmetry, no calf tenderness, no thigh tenderness, no acute deformities  Integument:  Skin warm and dry, no petechiae   Neurologic:  Alert & oriented, no slurred speech  Psych: Pleasant affect, no hallucinations    EKG    Please see the physician note for EKG interpretation.     LABS  Results for orders placed or performed during the hospital encounter of 05/06/20   CBC Auto Differential   Result Value Ref Range    WBC 5.9 4.0 - 11.0 K/uL    RBC 3.77 (L) 4.20 - 5.90 M/uL    Hemoglobin 12.6 (L) 13.5 - 17.5 g/dL    Hematocrit 36.9 (L) 40.5 - 52.5 %    MCV 97.9 80.0 - 100.0 fL    MCH 33.3 26.0 - 34.0 pg    MCHC 34.0 31.0 - 36.0 g/dL    RDW 14.9 12.4 - 15.4 %    Platelets 627 066 - 715 K/uL    MPV 9.1 5.0 - 10.5 fL    Neutrophils % 61.6 %    Lymphocytes % 28.4 %    Monocytes % 6.9 %    Eosinophils % 3.0 %    Basophils % 0.1 %    Neutrophils Absolute 3.6 1.7 - 7.7 K/uL    Lymphocytes Absolute 1.7 1.0 - 5.1 K/uL    Monocytes Absolute 0.4 0.0 - 1.3 K/uL    Eosinophils Absolute 0.2 0.0 - 0.6 K/uL    Basophils Absolute 0.0 0.0 - 0.2 K/uL   Comprehensive Metabolic Panel w/ Reflex to MG   Result Value Ref Range    Sodium 136 136 - 145 mmol/L    Potassium reflex Magnesium 4.5 3.5 - 5.1 mmol/L    Chloride 103 99 - 110 mmol/L    CO2 23 21 - 32 mmol/L    Anion Gap 10 3 - 16    Glucose 168 (H) 70 - 99 mg/dL    BUN 14 7 - 20 mg/dL    CREATININE 0.8 0.8 - 1.3 mg/dL    GFR Non-African American >60 >60    GFR African American >60 >60    Calcium 8.7 8.3 - 10.6 mg/dL    Total Protein 6.4 6.4 - 8.2 g/dL    Alb 3.3 (L) 3.4 - 5.0 g/dL    Albumin/Globulin Ratio 1.1 1.1 - 2.2    Total Bilirubin <0.2 0.0 - 1.0 mg/dL    Alkaline Phosphatase 57 40 - 129 U/L    ALT 14 10 - 40 U/L    AST 17 15 - 37 U/L    Globulin 3.1 g/dL   Troponin   Result Value Ref Range    Troponin 0.02 (H) <0.01 ng/mL   Brain Natriuretic Peptide   Result Value Ref Range    Pro-BNP 1,268 (H) 0 - 124 pg/mL         RADIOLOGY/PROCEDURES    XR CHEST PORTABLE   Final Result   No acute cardiopulmonary disease. ED COURSE & MEDICAL DECISION MAKING    Pertinent Labs & Imaging studies reviewed and interpreted. (See chart for details)  The patient was immediately placed on the cardiac monitor. IV access obtained. ASA was given, given the fact that the patient had 2 sublingual nitroglycerin prior to arrival nitroglycerin paste was ordered as well as morphine for his CP.     See chart for details of

## 2020-05-08 ENCOUNTER — CARE COORDINATION (OUTPATIENT)
Dept: CARE COORDINATION | Age: 65
End: 2020-05-08

## 2020-05-08 ENCOUNTER — CARE COORDINATION (OUTPATIENT)
Dept: CASE MANAGEMENT | Age: 65
End: 2020-05-08

## 2020-05-08 NOTE — CARE COORDINATION
Friday. He stated his wife drives him to his appointments. Denies any acute needs at present time. Agreeable to f/u calls. Educated on the use of  physicians 24 hr access line if assistance is needed after hours. Patient has writer's contact information.       Follow Up  Future Appointments   Date Time Provider Fred Gregory   5/15/2020 10:30 AM MD LIZZIE Pollock [facility]   5/21/2020  2:00 PM Brenda Gibbs MD [facility]       Zayda Davenport RN

## 2020-05-08 NOTE — CARE COORDINATION
Patient contacted regarding Lana Castellanos. Care Transition Nurse/ Ambulatory Care Manager contacted the patient by telephone to perform post discharge assessment. Verified name and  with patient as identifiers. Provided introduction to self, and explanation of the CTN/ACM role, and reason for call due to risk factors for infection and/or exposure to COVID-19. Symptoms reviewed with patient who verbalized the following symptoms: no new symptoms and no worsening symptoms. Due to no new or worsening symptoms encounter was not routed to provider for escalation. Patient has following risk factors of: heart failure and COPD. CTN/ACM reviewed discharge instructions, medical action plan and red flags such as increased shortness of breath, increasing fever and signs of decompensation with patient who verbalized understanding. Discussed exposure protocols and quarantine with CDC Guidelines What to do if you are sick with coronavirus disease .  Patient was given an opportunity for questions and concerns. The patient agrees to contact the [employer] exposure line [phone], local [facility] department [place] [facility]: ([phone]) and PCP office for questions related to their healthcare. CTN/ACM provided contact information for future needs. Reviewed and educated patient on any new and changed medications related to discharge diagnosis     Patient/family/caregiver given information for GetWell Loop and agrees to enroll yes  Patient's preferred e-mail: [email or web address]   Patient's preferred phone number: [phone]  Based on Loop alert triggers, patient will be contacted by nurse care manager for worsening symptoms. Pt will be further monitored by COVID Loop Team based on severity of symptoms and risk factors.

## 2020-05-11 PROBLEM — A04.72 C. DIFFICILE DIARRHEA: Status: RESOLVED | Noted: 2020-02-12 | Resolved: 2020-05-11

## 2020-05-11 PROBLEM — K52.9 ACUTE COLITIS: Status: RESOLVED | Noted: 2019-09-13 | Resolved: 2020-05-11

## 2020-05-11 PROBLEM — M54.10 BACK PAIN WITH RADICULOPATHY: Status: RESOLVED | Noted: 2019-05-23 | Resolved: 2020-05-11

## 2020-05-19 ENCOUNTER — APPOINTMENT (OUTPATIENT)
Dept: CT IMAGING | Age: 65
DRG: 303 | End: 2020-05-19
Payer: MEDICARE

## 2020-05-19 ENCOUNTER — APPOINTMENT (OUTPATIENT)
Dept: GENERAL RADIOLOGY | Age: 65
DRG: 303 | End: 2020-05-19
Payer: MEDICARE

## 2020-05-19 ENCOUNTER — TELEPHONE (OUTPATIENT)
Dept: OTHER | Facility: CLINIC | Age: 65
End: 2020-05-19

## 2020-05-19 ENCOUNTER — HOSPITAL ENCOUNTER (EMERGENCY)
Age: 65
Discharge: HOME | DRG: 303 | End: 2020-05-19
Attending: EMERGENCY MEDICINE
Payer: MEDICARE

## 2020-05-19 VITALS
HEART RATE: 61 BPM | SYSTOLIC BLOOD PRESSURE: 126 MMHG | RESPIRATION RATE: 15 BRPM | TEMPERATURE: 98.4 F | WEIGHT: 185.85 LBS | OXYGEN SATURATION: 93 % | BODY MASS INDEX: 25.17 KG/M2 | HEIGHT: 72 IN | DIASTOLIC BLOOD PRESSURE: 60 MMHG

## 2020-05-19 LAB
A/G RATIO: 1.2 (ref 1.1–2.2)
ALBUMIN SERPL-MCNC: 3.9 G/DL (ref 3.4–5)
ALP BLD-CCNC: 56 U/L (ref 40–129)
ALT SERPL-CCNC: <5 U/L (ref 10–40)
ANION GAP SERPL CALCULATED.3IONS-SCNC: 10 MMOL/L (ref 3–16)
AST SERPL-CCNC: 7 U/L (ref 15–37)
BASOPHILS ABSOLUTE: 0 K/UL (ref 0–0.2)
BASOPHILS RELATIVE PERCENT: 0.4 %
BILIRUB SERPL-MCNC: <0.2 MG/DL (ref 0–1)
BUN BLDV-MCNC: 14 MG/DL (ref 7–20)
CALCIUM SERPL-MCNC: 8.6 MG/DL (ref 8.3–10.6)
CHLORIDE BLD-SCNC: 102 MMOL/L (ref 99–110)
CO2: 23 MMOL/L (ref 21–32)
CREAT SERPL-MCNC: 1 MG/DL (ref 0.8–1.3)
EKG ATRIAL RATE: 68 BPM
EKG DIAGNOSIS: NORMAL
EKG P AXIS: 66 DEGREES
EKG P-R INTERVAL: 126 MS
EKG Q-T INTERVAL: 468 MS
EKG QRS DURATION: 142 MS
EKG QTC CALCULATION (BAZETT): 497 MS
EKG R AXIS: 80 DEGREES
EKG T AXIS: 52 DEGREES
EKG VENTRICULAR RATE: 68 BPM
EOSINOPHILS ABSOLUTE: 0.3 K/UL (ref 0–0.6)
EOSINOPHILS RELATIVE PERCENT: 3.9 %
GFR AFRICAN AMERICAN: >60
GFR NON-AFRICAN AMERICAN: >60
GLOBULIN: 3.2 G/DL
GLUCOSE BLD-MCNC: 142 MG/DL (ref 70–99)
HCT VFR BLD CALC: 40.7 % (ref 40.5–52.5)
HEMOGLOBIN: 13.8 G/DL (ref 13.5–17.5)
LYMPHOCYTES ABSOLUTE: 2.7 K/UL (ref 1–5.1)
LYMPHOCYTES RELATIVE PERCENT: 40.3 %
MCH RBC QN AUTO: 33.7 PG (ref 26–34)
MCHC RBC AUTO-ENTMCNC: 33.8 G/DL (ref 31–36)
MCV RBC AUTO: 99.5 FL (ref 80–100)
MONOCYTES ABSOLUTE: 0.5 K/UL (ref 0–1.3)
MONOCYTES RELATIVE PERCENT: 6.8 %
NEUTROPHILS ABSOLUTE: 3.3 K/UL (ref 1.7–7.7)
NEUTROPHILS RELATIVE PERCENT: 48.6 %
PDW BLD-RTO: 15.4 % (ref 12.4–15.4)
PLATELET # BLD: 238 K/UL (ref 135–450)
PMV BLD AUTO: 9.1 FL (ref 5–10.5)
POTASSIUM REFLEX MAGNESIUM: 3.9 MMOL/L (ref 3.5–5.1)
PRO-BNP: 1663 PG/ML (ref 0–124)
RBC # BLD: 4.09 M/UL (ref 4.2–5.9)
SODIUM BLD-SCNC: 135 MMOL/L (ref 136–145)
TOTAL PROTEIN: 7.1 G/DL (ref 6.4–8.2)
TROPONIN: <0.01 NG/ML
TROPONIN: <0.01 NG/ML
WBC # BLD: 6.7 K/UL (ref 4–11)

## 2020-05-19 PROCEDURE — 93010 ELECTROCARDIOGRAM REPORT: CPT | Performed by: INTERNAL MEDICINE

## 2020-05-19 PROCEDURE — 83880 ASSAY OF NATRIURETIC PEPTIDE: CPT

## 2020-05-19 PROCEDURE — 93005 ELECTROCARDIOGRAM TRACING: CPT | Performed by: EMERGENCY MEDICINE

## 2020-05-19 PROCEDURE — 84484 ASSAY OF TROPONIN QUANT: CPT

## 2020-05-19 PROCEDURE — 36415 COLL VENOUS BLD VENIPUNCTURE: CPT

## 2020-05-19 PROCEDURE — 71045 X-RAY EXAM CHEST 1 VIEW: CPT

## 2020-05-19 PROCEDURE — 74174 CTA ABD&PLVS W/CONTRAST: CPT

## 2020-05-19 PROCEDURE — 85025 COMPLETE CBC W/AUTO DIFF WBC: CPT

## 2020-05-19 PROCEDURE — 80053 COMPREHEN METABOLIC PANEL: CPT

## 2020-05-19 PROCEDURE — 6360000002 HC RX W HCPCS: Performed by: EMERGENCY MEDICINE

## 2020-05-19 PROCEDURE — 99285 EMERGENCY DEPT VISIT HI MDM: CPT

## 2020-05-19 PROCEDURE — 6360000004 HC RX CONTRAST MEDICATION: Performed by: EMERGENCY MEDICINE

## 2020-05-19 PROCEDURE — 96376 TX/PRO/DX INJ SAME DRUG ADON: CPT

## 2020-05-19 PROCEDURE — 96374 THER/PROPH/DIAG INJ IV PUSH: CPT

## 2020-05-19 PROCEDURE — 96375 TX/PRO/DX INJ NEW DRUG ADDON: CPT

## 2020-05-19 RX ORDER — ONDANSETRON 2 MG/ML
4 INJECTION INTRAMUSCULAR; INTRAVENOUS ONCE
Status: COMPLETED | OUTPATIENT
Start: 2020-05-19 | End: 2020-05-19

## 2020-05-19 RX ORDER — MORPHINE SULFATE 4 MG/ML
4 INJECTION, SOLUTION INTRAMUSCULAR; INTRAVENOUS ONCE
Status: COMPLETED | OUTPATIENT
Start: 2020-05-19 | End: 2020-05-19

## 2020-05-19 RX ADMIN — ONDANSETRON 4 MG: 2 INJECTION INTRAMUSCULAR; INTRAVENOUS at 02:00

## 2020-05-19 RX ADMIN — MORPHINE SULFATE 4 MG: 4 INJECTION, SOLUTION INTRAMUSCULAR; INTRAVENOUS at 03:31

## 2020-05-19 RX ADMIN — IOPAMIDOL 75 ML: 755 INJECTION, SOLUTION INTRAVENOUS at 03:39

## 2020-05-19 RX ADMIN — MORPHINE SULFATE 4 MG: 4 INJECTION, SOLUTION INTRAMUSCULAR; INTRAVENOUS at 02:00

## 2020-05-19 ASSESSMENT — PAIN SCALES - GENERAL
PAINLEVEL_OUTOF10: 3
PAINLEVEL_OUTOF10: 4
PAINLEVEL_OUTOF10: 2
PAINLEVEL_OUTOF10: 8
PAINLEVEL_OUTOF10: 8
PAINLEVEL_OUTOF10: 10
PAINLEVEL_OUTOF10: 4

## 2020-05-19 ASSESSMENT — PAIN - FUNCTIONAL ASSESSMENT
PAIN_FUNCTIONAL_ASSESSMENT: ACTIVITIES ARE NOT PREVENTED
PAIN_FUNCTIONAL_ASSESSMENT: ACTIVITIES ARE NOT PREVENTED
PAIN_FUNCTIONAL_ASSESSMENT: 0-10

## 2020-05-19 ASSESSMENT — PAIN DESCRIPTION - LOCATION
LOCATION: CHEST

## 2020-05-19 ASSESSMENT — PAIN DESCRIPTION - PROGRESSION
CLINICAL_PROGRESSION: NOT CHANGED
CLINICAL_PROGRESSION: GRADUALLY WORSENING
CLINICAL_PROGRESSION: GRADUALLY IMPROVING
CLINICAL_PROGRESSION: RAPIDLY IMPROVING

## 2020-05-19 ASSESSMENT — PAIN DESCRIPTION - PAIN TYPE
TYPE: ACUTE PAIN

## 2020-05-19 ASSESSMENT — PAIN DESCRIPTION - ORIENTATION
ORIENTATION: MID;LEFT

## 2020-05-19 ASSESSMENT — PAIN DESCRIPTION - DESCRIPTORS
DESCRIPTORS: SHARP;SHOOTING
DESCRIPTORS: SHARP;SHOOTING;PRESSURE
DESCRIPTORS: ACHING

## 2020-05-19 ASSESSMENT — PAIN DESCRIPTION - FREQUENCY
FREQUENCY: CONTINUOUS

## 2020-05-19 ASSESSMENT — PAIN DESCRIPTION - ONSET: ONSET: SUDDEN

## 2020-05-19 NOTE — ED TRIAGE NOTES
Patient with sudden onset chest pain, when shocked by pacemaker approximately 1 hour ago. Patient was sitting in his chair dozing off to sleep with sudden shock and onset of persistent sharp, shooting pain in left side of chest.     Patient now with SOB, denies cough, is afebrile. States is having some dizziness with the intense pain. Rates pain 8/10. Denies other symptoms.

## 2020-05-19 NOTE — ED PROVIDER NOTES
rhinorrhea and sore throat. Eyes: Negative for redness or drainage. Gastrointestinal: Negative for abdominal pain. Negative for vomiting or diarrhea. Genitourinary: Negative for flank pain. Negative for dysuria. Negative for hematuria. Neurological: Negative for headache. No vision change, speech change, weakness or numbness. Musculoskeletal:  Negative edema. All systems are reviewed and are negative except for those listed above in the history of present illness and ROS.         PAST MEDICAL HISTORY     Past Medical History:   Diagnosis Date    Anxiety     Arthritis     Asthma     CAD (coronary artery disease)     Calcium kidney stone     Cardiomyopathy ([address]     Cerebral artery occlusion with cerebral infarction ([address]     CHF (congestive heart failure) ([address]     Clostridium difficile diarrhea 02/12/2020    COPD (chronic obstructive pulmonary disease) [facility])     mild    Depression     DM2 (diabetes mellitus, type 2) ([facility])     Fibromyalgia     GERD (gastroesophageal reflux disease)     Hyperlipidemia     Hypertension     Liver disease     Pacemaker 2012    Medtronic model # [id number]    Pneumonia     Seizures ([address]     TIA (transient ischemic attack) 2007    Ulcerative colitis ([facility]          SURGICAL HISTORY       Past Surgical History:   Procedure Laterality Date    APPENDECTOMY      BACK SURGERY      CARDIAC SURGERY      CHOLECYSTECTOMY      COLONOSCOPY  01/10/2017    COLONOSCOPY  01/10/2017    CORONARY ANGIOPLASTY WITH STENT PLACEMENT  2012    CORONARY ARTERY BYPASS GRAFT  2010, 11/2015    ENDOSCOPY, COLON, DIAGNOSTIC      PACEMAKER PLACEMENT      UPPER GASTROINTESTINAL ENDOSCOPY  02/07/2017    VASCULAR SURGERY           CURRENT MEDICATIONS       Previous Medications    ACETAMINOPHEN (TYLENOL) 325 MG TABLET    Take 650 mg by mouth every 6 hours as needed for Pain    ALBUTEROL SULFATE HFA (PROAIR HFA) 108 (90 BASE) MCG/ACT INHALER    Inhale 2 puffs for the following components:       Result Value    RBC 4.09 (*)     All other components within normal limits    Narrative:     Performed at:  [place] [address] [address] [facility] [employer] [address]   Phone [phone]   COMPREHENSIVE METABOLIC PANEL W/ REFLEX TO MG FOR LOW K - Abnormal; Notable for the following components:    Sodium 135 (*)     Glucose 142 (*)     ALT <5 (*)     AST 7 (*)     All other components within normal limits    Narrative:     Performed at:  [address] [facility] [employer] [address]   Phone [phone]   BRAIN NATRIURETIC PEPTIDE - Abnormal; Notable for the following components:    Pro-BNP 1,663 (*)     All other components within normal limits    Narrative:     Performed at:  [address] [facility] [employer] [address]   Phone [phone]   TROPONIN    Narrative:     Performed at:  [address] [facility] [employer] [address]   Phone [phone]   TROPONIN    Narrative:     Performed at:  [facility]  [address] [place] [address] [facility] [address]   Phone [phone]       All other labs were within normal range or not returned as of this dictation. EMERGENCY DEPARTMENT COURSE and DIFFERENTIAL DIAGNOSIS/MDM:   Vitals:    Vitals:    05/19/20 0333 05/19/20 0351 05/19/20 0455 05/19/20 0534   BP: (!) 147/88 (!) 136/116 124/77 126/60   Pulse: 68 59  61   Resp: 24 19 16 15   Temp:   98.4 °F (36.9 °C)    TempSrc:   Oral    SpO2: 97% 93%     Weight:       Height:           The patient presented with left-sided chest pain associated with a defibrillator shock. He is currently in sinus rhythm. EKG shows right bundle branch block. He currently rates his pain an 8/10 intensity. He was given morphine 4 mg IV and Zofran 4 mg IV. No relief with nitroglycerin.   He did take aspirin 325 mg earlier today. Prior records were reviewed. The patient was recently admitted for chest pain on May 6 and discharged on May 7. In addition he was also admitted on April 20, 2020. Prior echocardiogram from April 23, 2020 was reviewed. ------------------------------------------------------------------   Electronically signed by Vicki Rose MD (Interpreting   physician) on 04/23/2020 at 09:18 AM   ------------------------------------------------------------------      Findings      Left Ventricle   There is mild concentric left ventricular hypertrophy. Left ventricular cavity size is dilated. Ejection fraction is visually estimated to be 25-30%. Abnormal septal motion likely due to pacing      Pericardial Effusion   No pericardial effusion noted. Pleural Effusion   No pleural effusion. M-Mode/2D Measurements (cm)      LV Diastolic Dimension: 2.81 cm LV Systolic Dimension: 7.72 cm   LV Septum Diastolic: 4.38 cm   LV PW Diastolic: 3.32 cm        AO Root Dimension: 4 cm     Aorta      Aortic Root: 4 cm   Ascending Aorta: 3.1 cm    Prior stress test from May 7, 2020 was reviewed.          Summary    No evidence of reversible ischemia.   Oval [place]ools is a small sized, mild intensity, fixed defect of the basal and apical    inferior wall. Cannot rule out prior non-transmural infarct.    Moderately dilated left ventricle with moderately reduced ejection fraction    of 41 %.    Overall findings represent a intermediate risk study based on the dilated LV    with reduced EF.         Cardiac catheterization from April 14, 2020 was reviewed. ANGIOGRAPHY FINDINGS:  1. Left main is patent with patent stent providing blood to the LAD,  circumflex, and diagonal.  2.  Saphenous vein graft to diagonal is patent. 3.  Saphenous vein graft to the right coronary artery PDA is patent. 4.  Right coronary artery is occluded in the midportion. 5.  Distal LAD is heavily diseased.   6.  LIMA was not studied as it is I advised him to continue current medications. If his condition worsens or new symptoms develop, he was advised to return immediately to the emergency department. 6:31 AM: Repeat 3-hour troponin is normal.  The patient still wishes to go home. He remained symptom-free. CRITICAL CARE TIME   Total Critical Care time was 0 minutes, excluding separately reportable procedures. There was a high probability of clinically significant/life threatening deterioration in the patient's condition which required my urgent intervention. CONSULTS:  None    PROCEDURES:  Unless otherwise noted below, none     Procedures        FINAL IMPRESSION      1. Chest pain, unspecified type          DISPOSITION/PLAN   DISPOSITION        PATIENT REFERRED TO:  Karl Mina MD  [address] [address]  [phone]    Call today      Cristian Redman MD  [address] [facility] [address] 21     Call today        DISCHARGE MEDICATIONS:  New Prescriptions    No medications on file     Controlled Substances Monitoring:     RX Monitoring 5/30/2019   Attestation The Prescription Monitoring Report for this patient was reviewed today. Acute Pain Prescriptions -   Periodic Controlled Substance Monitoring No signs of potential drug abuse or diversion identified: otherwise, see note documentation   Chronic Pain > 80 MEDD -       (Please note that portions of this note were completed with a voice recognition program.  Efforts were made to edit the dictations but occasionally words are mis-transcribed. )    [address] Schumacher DO (electronically signed)  Attending Emergency Physician          Stalin Samuel DO  05/19/20 0231

## 2020-05-19 NOTE — TELEPHONE ENCOUNTER
Pt discharged. RN Access will attempt to assist patient with follow up care as directed by ED Physician.

## 2020-05-20 ENCOUNTER — APPOINTMENT (OUTPATIENT)
Dept: GENERAL RADIOLOGY | Age: 65
DRG: 303 | End: 2020-05-20
Payer: MEDICARE

## 2020-05-20 ENCOUNTER — CARE COORDINATION (OUTPATIENT)
Dept: CASE MANAGEMENT | Age: 65
End: 2020-05-20

## 2020-05-20 ENCOUNTER — HOSPITAL ENCOUNTER (INPATIENT)
Age: 65
LOS: 4 days | Discharge: HOME | DRG: 303 | End: 2020-05-24
Attending: EMERGENCY MEDICINE | Admitting: INTERNAL MEDICINE
Payer: MEDICARE

## 2020-05-20 ENCOUNTER — TELEPHONE (OUTPATIENT)
Dept: OTHER | Facility: CLINIC | Age: 65
End: 2020-05-20

## 2020-05-20 PROBLEM — I47.20 VENTRICULAR TACHYCARDIA (HCC): Status: ACTIVE | Noted: 2020-05-20

## 2020-05-20 LAB
A/G RATIO: 1.4 (ref 1.1–2.2)
ALBUMIN SERPL-MCNC: 4.2 G/DL (ref 3.4–5)
ALP BLD-CCNC: 61 U/L (ref 40–129)
ALT SERPL-CCNC: <5 U/L (ref 10–40)
ANION GAP SERPL CALCULATED.3IONS-SCNC: 15 MMOL/L (ref 3–16)
AST SERPL-CCNC: 8 U/L (ref 15–37)
BASOPHILS ABSOLUTE: 0.3 K/UL (ref 0–0.2)
BASOPHILS RELATIVE PERCENT: 4.2 %
BILIRUB SERPL-MCNC: 0.4 MG/DL (ref 0–1)
BUN BLDV-MCNC: 9 MG/DL (ref 7–20)
CALCIUM SERPL-MCNC: 8.7 MG/DL (ref 8.3–10.6)
CHLORIDE BLD-SCNC: 102 MMOL/L (ref 99–110)
CO2: 19 MMOL/L (ref 21–32)
CREAT SERPL-MCNC: 0.8 MG/DL (ref 0.8–1.3)
EOSINOPHILS ABSOLUTE: 0.1 K/UL (ref 0–0.6)
EOSINOPHILS RELATIVE PERCENT: 2 %
GFR AFRICAN AMERICAN: >60
GFR NON-AFRICAN AMERICAN: >60
GLOBULIN: 3 G/DL
GLUCOSE BLD-MCNC: 146 MG/DL (ref 70–99)
HCT VFR BLD CALC: 44.9 % (ref 40.5–52.5)
HEMOGLOBIN: 15.1 G/DL (ref 13.5–17.5)
LYMPHOCYTES ABSOLUTE: 2.2 K/UL (ref 1–5.1)
LYMPHOCYTES RELATIVE PERCENT: 31.9 %
MCH RBC QN AUTO: 33.3 PG (ref 26–34)
MCHC RBC AUTO-ENTMCNC: 33.7 G/DL (ref 31–36)
MCV RBC AUTO: 98.8 FL (ref 80–100)
MONOCYTES ABSOLUTE: 0.5 K/UL (ref 0–1.3)
MONOCYTES RELATIVE PERCENT: 7.4 %
NEUTROPHILS ABSOLUTE: 3.7 K/UL (ref 1.7–7.7)
NEUTROPHILS RELATIVE PERCENT: 54.5 %
PDW BLD-RTO: 15.2 % (ref 12.4–15.4)
PLATELET # BLD: 228 K/UL (ref 135–450)
PMV BLD AUTO: 9.2 FL (ref 5–10.5)
POTASSIUM SERPL-SCNC: 3.3 MMOL/L (ref 3.5–5.1)
RBC # BLD: 4.54 M/UL (ref 4.2–5.9)
SODIUM BLD-SCNC: 136 MMOL/L (ref 136–145)
TOTAL PROTEIN: 7.2 G/DL (ref 6.4–8.2)
TROPONIN: 0.01 NG/ML
WBC # BLD: 6.9 K/UL (ref 4–11)

## 2020-05-20 PROCEDURE — 84484 ASSAY OF TROPONIN QUANT: CPT

## 2020-05-20 PROCEDURE — 71045 X-RAY EXAM CHEST 1 VIEW: CPT

## 2020-05-20 PROCEDURE — 6370000000 HC RX 637 (ALT 250 FOR IP): Performed by: PHYSICIAN ASSISTANT

## 2020-05-20 PROCEDURE — 99285 EMERGENCY DEPT VISIT HI MDM: CPT

## 2020-05-20 PROCEDURE — 85025 COMPLETE CBC W/AUTO DIFF WBC: CPT

## 2020-05-20 PROCEDURE — 2060000000 HC ICU INTERMEDIATE R&B

## 2020-05-20 PROCEDURE — 96374 THER/PROPH/DIAG INJ IV PUSH: CPT

## 2020-05-20 PROCEDURE — 80053 COMPREHEN METABOLIC PANEL: CPT

## 2020-05-20 PROCEDURE — 2500000003 HC RX 250 WO HCPCS: Performed by: EMERGENCY MEDICINE

## 2020-05-20 PROCEDURE — 93005 ELECTROCARDIOGRAM TRACING: CPT | Performed by: PHYSICIAN ASSISTANT

## 2020-05-20 RX ORDER — LABETALOL HYDROCHLORIDE 5 MG/ML
10 INJECTION, SOLUTION INTRAVENOUS ONCE
Status: COMPLETED | OUTPATIENT
Start: 2020-05-20 | End: 2020-05-20

## 2020-05-20 RX ORDER — OXYCODONE HYDROCHLORIDE AND ACETAMINOPHEN 5; 325 MG/1; MG/1
1 TABLET ORAL ONCE
Status: COMPLETED | OUTPATIENT
Start: 2020-05-20 | End: 2020-05-20

## 2020-05-20 RX ADMIN — OXYCODONE HYDROCHLORIDE AND ACETAMINOPHEN 1 TABLET: 5; 325 TABLET ORAL at 22:12

## 2020-05-20 RX ADMIN — OXYCODONE HYDROCHLORIDE AND ACETAMINOPHEN 1 TABLET: 5; 325 TABLET ORAL at 22:16

## 2020-05-20 RX ADMIN — LABETALOL HYDROCHLORIDE 10 MG: 5 INJECTION INTRAVENOUS at 22:53

## 2020-05-20 ASSESSMENT — PAIN SCALES - GENERAL
PAINLEVEL_OUTOF10: 3
PAINLEVEL_OUTOF10: 8

## 2020-05-20 ASSESSMENT — ENCOUNTER SYMPTOMS
SHORTNESS OF BREATH: 0
ABDOMINAL PAIN: 0
VOMITING: 0
COUGH: 0
COLOR CHANGE: 0

## 2020-05-20 ASSESSMENT — PAIN DESCRIPTION - DESCRIPTORS
DESCRIPTORS: STABBING
DESCRIPTORS: SHARP

## 2020-05-20 ASSESSMENT — PAIN DESCRIPTION - PAIN TYPE
TYPE: ACUTE PAIN
TYPE: ACUTE PAIN

## 2020-05-20 ASSESSMENT — PAIN DESCRIPTION - FREQUENCY
FREQUENCY: INTERMITTENT
FREQUENCY: CONTINUOUS

## 2020-05-20 ASSESSMENT — PAIN DESCRIPTION - ORIENTATION
ORIENTATION: LEFT
ORIENTATION: LEFT

## 2020-05-20 ASSESSMENT — PAIN DESCRIPTION - LOCATION
LOCATION: CHEST
LOCATION: CHEST

## 2020-05-21 LAB
ANION GAP SERPL CALCULATED.3IONS-SCNC: 14 MMOL/L (ref 3–16)
BASOPHILS ABSOLUTE: 0 K/UL (ref 0–0.2)
BASOPHILS RELATIVE PERCENT: 0.3 %
BUN BLDV-MCNC: 10 MG/DL (ref 7–20)
CALCIUM SERPL-MCNC: 8.7 MG/DL (ref 8.3–10.6)
CHLORIDE BLD-SCNC: 101 MMOL/L (ref 99–110)
CO2: 20 MMOL/L (ref 21–32)
CREAT SERPL-MCNC: 0.9 MG/DL (ref 0.8–1.3)
EKG ATRIAL RATE: 111 BPM
EKG DIAGNOSIS: NORMAL
EKG P AXIS: 63 DEGREES
EKG P-R INTERVAL: 130 MS
EKG Q-T INTERVAL: 372 MS
EKG QRS DURATION: 128 MS
EKG QTC CALCULATION (BAZETT): 505 MS
EKG R AXIS: 91 DEGREES
EKG T AXIS: 35 DEGREES
EKG VENTRICULAR RATE: 111 BPM
EOSINOPHILS ABSOLUTE: 0.2 K/UL (ref 0–0.6)
EOSINOPHILS RELATIVE PERCENT: 2.5 %
GFR AFRICAN AMERICAN: >60
GFR NON-AFRICAN AMERICAN: >60
GLUCOSE BLD-MCNC: 104 MG/DL (ref 70–99)
GLUCOSE BLD-MCNC: 106 MG/DL (ref 70–99)
GLUCOSE BLD-MCNC: 115 MG/DL (ref 70–99)
GLUCOSE BLD-MCNC: 133 MG/DL (ref 70–99)
GLUCOSE BLD-MCNC: 165 MG/DL (ref 70–99)
HCT VFR BLD CALC: 46.5 % (ref 40.5–52.5)
HEMOGLOBIN: 15.8 G/DL (ref 13.5–17.5)
LYMPHOCYTES ABSOLUTE: 2.9 K/UL (ref 1–5.1)
LYMPHOCYTES RELATIVE PERCENT: 41.2 %
MAGNESIUM: 1.9 MG/DL (ref 1.8–2.4)
MCH RBC QN AUTO: 33.5 PG (ref 26–34)
MCHC RBC AUTO-ENTMCNC: 33.9 G/DL (ref 31–36)
MCV RBC AUTO: 98.9 FL (ref 80–100)
MONOCYTES ABSOLUTE: 0.4 K/UL (ref 0–1.3)
MONOCYTES RELATIVE PERCENT: 6.4 %
NEUTROPHILS ABSOLUTE: 3.4 K/UL (ref 1.7–7.7)
NEUTROPHILS RELATIVE PERCENT: 49.6 %
PDW BLD-RTO: 15.3 % (ref 12.4–15.4)
PERFORMED ON: ABNORMAL
PLATELET # BLD: 202 K/UL (ref 135–450)
PMV BLD AUTO: 8.9 FL (ref 5–10.5)
POTASSIUM REFLEX MAGNESIUM: 3.3 MMOL/L (ref 3.5–5.1)
RBC # BLD: 4.7 M/UL (ref 4.2–5.9)
SODIUM BLD-SCNC: 135 MMOL/L (ref 136–145)
TROPONIN: 0.01 NG/ML
TROPONIN: 0.02 NG/ML
WBC # BLD: 6.9 K/UL (ref 4–11)

## 2020-05-21 PROCEDURE — 36415 COLL VENOUS BLD VENIPUNCTURE: CPT

## 2020-05-21 PROCEDURE — 84484 ASSAY OF TROPONIN QUANT: CPT

## 2020-05-21 PROCEDURE — 2060000000 HC ICU INTERMEDIATE R&B

## 2020-05-21 PROCEDURE — 2580000003 HC RX 258: Performed by: INTERNAL MEDICINE

## 2020-05-21 PROCEDURE — 83735 ASSAY OF MAGNESIUM: CPT

## 2020-05-21 PROCEDURE — 93010 ELECTROCARDIOGRAM REPORT: CPT | Performed by: INTERNAL MEDICINE

## 2020-05-21 PROCEDURE — 80048 BASIC METABOLIC PNL TOTAL CA: CPT

## 2020-05-21 PROCEDURE — 85025 COMPLETE CBC W/AUTO DIFF WBC: CPT

## 2020-05-21 PROCEDURE — 6370000000 HC RX 637 (ALT 250 FOR IP): Performed by: INTERNAL MEDICINE

## 2020-05-21 PROCEDURE — 6360000002 HC RX W HCPCS: Performed by: PHYSICIAN ASSISTANT

## 2020-05-21 PROCEDURE — 99222 1ST HOSP IP/OBS MODERATE 55: CPT | Performed by: INTERNAL MEDICINE

## 2020-05-21 PROCEDURE — 6360000002 HC RX W HCPCS: Performed by: INTERNAL MEDICINE

## 2020-05-21 RX ORDER — LISINOPRIL 10 MG/1
10 TABLET ORAL DAILY
Status: DISCONTINUED | OUTPATIENT
Start: 2020-05-21 | End: 2020-05-24 | Stop reason: HOSPADM

## 2020-05-21 RX ORDER — GABAPENTIN 300 MG/1
600 CAPSULE ORAL ONCE
Status: COMPLETED | OUTPATIENT
Start: 2020-05-21 | End: 2020-05-21

## 2020-05-21 RX ORDER — TIZANIDINE 4 MG/1
4 TABLET ORAL EVERY 8 HOURS PRN
Status: DISCONTINUED | OUTPATIENT
Start: 2020-05-21 | End: 2020-05-24 | Stop reason: HOSPADM

## 2020-05-21 RX ORDER — TRAZODONE HYDROCHLORIDE 50 MG/1
50 TABLET ORAL NIGHTLY
Status: DISCONTINUED | OUTPATIENT
Start: 2020-05-21 | End: 2020-05-24 | Stop reason: HOSPADM

## 2020-05-21 RX ORDER — SODIUM CHLORIDE 0.9 % (FLUSH) 0.9 %
10 SYRINGE (ML) INJECTION EVERY 12 HOURS SCHEDULED
Status: DISCONTINUED | OUTPATIENT
Start: 2020-05-21 | End: 2020-05-24 | Stop reason: HOSPADM

## 2020-05-21 RX ORDER — FAMOTIDINE 20 MG/1
20 TABLET, FILM COATED ORAL 2 TIMES DAILY
Status: DISCONTINUED | OUTPATIENT
Start: 2020-05-21 | End: 2020-05-24 | Stop reason: HOSPADM

## 2020-05-21 RX ORDER — ATORVASTATIN CALCIUM 80 MG/1
80 TABLET, FILM COATED ORAL DAILY
Status: DISCONTINUED | OUTPATIENT
Start: 2020-05-21 | End: 2020-05-24 | Stop reason: HOSPADM

## 2020-05-21 RX ORDER — DEXTROSE MONOHYDRATE 50 MG/ML
100 INJECTION, SOLUTION INTRAVENOUS PRN
Status: DISCONTINUED | OUTPATIENT
Start: 2020-05-21 | End: 2020-05-24 | Stop reason: HOSPADM

## 2020-05-21 RX ORDER — GABAPENTIN 300 MG/1
600 CAPSULE ORAL
Status: DISCONTINUED | OUTPATIENT
Start: 2020-05-21 | End: 2020-05-24 | Stop reason: HOSPADM

## 2020-05-21 RX ORDER — SPIRONOLACTONE 25 MG/1
25 TABLET ORAL DAILY
Status: DISCONTINUED | OUTPATIENT
Start: 2020-05-21 | End: 2020-05-24 | Stop reason: HOSPADM

## 2020-05-21 RX ORDER — POTASSIUM CHLORIDE 750 MG/1
40 TABLET, FILM COATED, EXTENDED RELEASE ORAL ONCE
Status: COMPLETED | OUTPATIENT
Start: 2020-05-21 | End: 2020-05-21

## 2020-05-21 RX ORDER — SODIUM CHLORIDE 0.9 % (FLUSH) 0.9 %
10 SYRINGE (ML) INJECTION PRN
Status: DISCONTINUED | OUTPATIENT
Start: 2020-05-21 | End: 2020-05-24 | Stop reason: HOSPADM

## 2020-05-21 RX ORDER — OXYCODONE AND ACETAMINOPHEN 10; 325 MG/1; MG/1
1 TABLET ORAL EVERY 4 HOURS PRN
Status: DISCONTINUED | OUTPATIENT
Start: 2020-05-21 | End: 2020-05-24 | Stop reason: HOSPADM

## 2020-05-21 RX ORDER — AMIODARONE HYDROCHLORIDE 200 MG/1
200 TABLET ORAL DAILY
Status: DISCONTINUED | OUTPATIENT
Start: 2020-05-21 | End: 2020-05-21

## 2020-05-21 RX ORDER — METOPROLOL SUCCINATE 50 MG/1
100 TABLET, EXTENDED RELEASE ORAL DAILY
Status: DISCONTINUED | OUTPATIENT
Start: 2020-05-21 | End: 2020-05-24 | Stop reason: HOSPADM

## 2020-05-21 RX ORDER — ONDANSETRON 2 MG/ML
4 INJECTION INTRAMUSCULAR; INTRAVENOUS EVERY 4 HOURS PRN
Status: DISCONTINUED | OUTPATIENT
Start: 2020-05-21 | End: 2020-05-24 | Stop reason: HOSPADM

## 2020-05-21 RX ORDER — DEXTROSE MONOHYDRATE 25 G/50ML
12.5 INJECTION, SOLUTION INTRAVENOUS PRN
Status: DISCONTINUED | OUTPATIENT
Start: 2020-05-21 | End: 2020-05-24 | Stop reason: HOSPADM

## 2020-05-21 RX ORDER — IBUPROFEN 400 MG/1
400 TABLET ORAL EVERY 6 HOURS PRN
Status: DISCONTINUED | OUTPATIENT
Start: 2020-05-21 | End: 2020-05-24 | Stop reason: HOSPADM

## 2020-05-21 RX ORDER — HYDRALAZINE HYDROCHLORIDE 25 MG/1
25 TABLET, FILM COATED ORAL EVERY 8 HOURS SCHEDULED
Status: DISCONTINUED | OUTPATIENT
Start: 2020-05-21 | End: 2020-05-24 | Stop reason: HOSPADM

## 2020-05-21 RX ORDER — AMIODARONE HYDROCHLORIDE 200 MG/1
200 TABLET ORAL DAILY
Status: DISCONTINUED | OUTPATIENT
Start: 2020-05-28 | End: 2020-05-24 | Stop reason: HOSPADM

## 2020-05-21 RX ORDER — INSULIN GLARGINE 100 [IU]/ML
8 INJECTION, SOLUTION SUBCUTANEOUS NIGHTLY
Status: DISCONTINUED | OUTPATIENT
Start: 2020-05-21 | End: 2020-05-24 | Stop reason: HOSPADM

## 2020-05-21 RX ORDER — ONDANSETRON 2 MG/ML
4 INJECTION INTRAMUSCULAR; INTRAVENOUS ONCE
Status: COMPLETED | OUTPATIENT
Start: 2020-05-21 | End: 2020-05-21

## 2020-05-21 RX ORDER — ACETAMINOPHEN 325 MG/1
650 TABLET ORAL EVERY 4 HOURS PRN
Status: DISCONTINUED | OUTPATIENT
Start: 2020-05-21 | End: 2020-05-23 | Stop reason: ALTCHOICE

## 2020-05-21 RX ORDER — ISOSORBIDE MONONITRATE 60 MG/1
60 TABLET, EXTENDED RELEASE ORAL DAILY
Status: DISCONTINUED | OUTPATIENT
Start: 2020-05-21 | End: 2020-05-24 | Stop reason: HOSPADM

## 2020-05-21 RX ORDER — CLOPIDOGREL BISULFATE 75 MG/1
75 TABLET ORAL DAILY
Status: DISCONTINUED | OUTPATIENT
Start: 2020-05-21 | End: 2020-05-24 | Stop reason: HOSPADM

## 2020-05-21 RX ORDER — ALBUTEROL SULFATE 2.5 MG/3ML
2.5 SOLUTION RESPIRATORY (INHALATION) EVERY 4 HOURS PRN
Status: DISCONTINUED | OUTPATIENT
Start: 2020-05-21 | End: 2020-05-24 | Stop reason: HOSPADM

## 2020-05-21 RX ORDER — NICOTINE POLACRILEX 4 MG
15 LOZENGE BUCCAL PRN
Status: DISCONTINUED | OUTPATIENT
Start: 2020-05-21 | End: 2020-05-24 | Stop reason: HOSPADM

## 2020-05-21 RX ORDER — ASPIRIN 325 MG
325 TABLET ORAL DAILY
Status: DISCONTINUED | OUTPATIENT
Start: 2020-05-21 | End: 2020-05-24 | Stop reason: HOSPADM

## 2020-05-21 RX ORDER — RANOLAZINE 500 MG/1
1000 TABLET, EXTENDED RELEASE ORAL 2 TIMES DAILY
Status: DISCONTINUED | OUTPATIENT
Start: 2020-05-21 | End: 2020-05-24 | Stop reason: HOSPADM

## 2020-05-21 RX ORDER — FUROSEMIDE 20 MG/1
20 TABLET ORAL DAILY
Status: DISCONTINUED | OUTPATIENT
Start: 2020-05-21 | End: 2020-05-24 | Stop reason: HOSPADM

## 2020-05-21 RX ORDER — POLYETHYLENE GLYCOL 3350 17 G/17G
17 POWDER, FOR SOLUTION ORAL DAILY PRN
Status: DISCONTINUED | OUTPATIENT
Start: 2020-05-21 | End: 2020-05-24 | Stop reason: HOSPADM

## 2020-05-21 RX ORDER — AMIODARONE HYDROCHLORIDE 200 MG/1
200 TABLET ORAL 2 TIMES DAILY
Status: DISCONTINUED | OUTPATIENT
Start: 2020-05-21 | End: 2020-05-24 | Stop reason: HOSPADM

## 2020-05-21 RX ORDER — HYDRALAZINE HYDROCHLORIDE 20 MG/ML
10 INJECTION INTRAMUSCULAR; INTRAVENOUS EVERY 4 HOURS PRN
Status: DISCONTINUED | OUTPATIENT
Start: 2020-05-21 | End: 2020-05-24 | Stop reason: HOSPADM

## 2020-05-21 RX ADMIN — ATORVASTATIN CALCIUM 80 MG: 80 TABLET, FILM COATED ORAL at 09:42

## 2020-05-21 RX ADMIN — ISOSORBIDE MONONITRATE 60 MG: 60 TABLET, EXTENDED RELEASE ORAL at 09:42

## 2020-05-21 RX ADMIN — HYDRALAZINE HYDROCHLORIDE 25 MG: 25 TABLET, FILM COATED ORAL at 06:23

## 2020-05-21 RX ADMIN — FAMOTIDINE 20 MG: 20 TABLET, FILM COATED ORAL at 01:46

## 2020-05-21 RX ADMIN — FAMOTIDINE 20 MG: 20 TABLET, FILM COATED ORAL at 09:42

## 2020-05-21 RX ADMIN — CLOPIDOGREL BISULFATE 75 MG: 75 TABLET ORAL at 09:42

## 2020-05-21 RX ADMIN — METOPROLOL SUCCINATE 100 MG: 50 TABLET, EXTENDED RELEASE ORAL at 09:42

## 2020-05-21 RX ADMIN — POTASSIUM CHLORIDE 40 MEQ: 750 TABLET, FILM COATED, EXTENDED RELEASE ORAL at 09:42

## 2020-05-21 RX ADMIN — ENOXAPARIN SODIUM 40 MG: 40 INJECTION SUBCUTANEOUS at 09:42

## 2020-05-21 RX ADMIN — SPIRONOLACTONE 25 MG: 25 TABLET ORAL at 11:15

## 2020-05-21 RX ADMIN — RANOLAZINE 1000 MG: 500 TABLET, FILM COATED, EXTENDED RELEASE ORAL at 20:53

## 2020-05-21 RX ADMIN — HYDRALAZINE HYDROCHLORIDE 25 MG: 25 TABLET, FILM COATED ORAL at 22:27

## 2020-05-21 RX ADMIN — SODIUM CHLORIDE, PRESERVATIVE FREE 10 ML: 5 INJECTION INTRAVENOUS at 20:56

## 2020-05-21 RX ADMIN — LISINOPRIL 10 MG: 10 TABLET ORAL at 09:42

## 2020-05-21 RX ADMIN — AMIODARONE HYDROCHLORIDE 200 MG: 200 TABLET ORAL at 20:54

## 2020-05-21 RX ADMIN — OXYCODONE HYDROCHLORIDE AND ACETAMINOPHEN 1 TABLET: 10; 325 TABLET ORAL at 15:18

## 2020-05-21 RX ADMIN — FUROSEMIDE 20 MG: 20 TABLET ORAL at 09:42

## 2020-05-21 RX ADMIN — OXYCODONE HYDROCHLORIDE AND ACETAMINOPHEN 1 TABLET: 10; 325 TABLET ORAL at 11:09

## 2020-05-21 RX ADMIN — FAMOTIDINE 20 MG: 20 TABLET, FILM COATED ORAL at 20:54

## 2020-05-21 RX ADMIN — SODIUM CHLORIDE, PRESERVATIVE FREE 10 ML: 5 INJECTION INTRAVENOUS at 09:42

## 2020-05-21 RX ADMIN — RANOLAZINE 1000 MG: 500 TABLET, FILM COATED, EXTENDED RELEASE ORAL at 01:46

## 2020-05-21 RX ADMIN — GABAPENTIN 600 MG: 300 CAPSULE ORAL at 06:23

## 2020-05-21 RX ADMIN — HYDRALAZINE HYDROCHLORIDE 25 MG: 25 TABLET, FILM COATED ORAL at 14:50

## 2020-05-21 RX ADMIN — GABAPENTIN 600 MG: 300 CAPSULE ORAL at 02:12

## 2020-05-21 RX ADMIN — GABAPENTIN 600 MG: 300 CAPSULE ORAL at 22:27

## 2020-05-21 RX ADMIN — TRAZODONE HYDROCHLORIDE 50 MG: 50 TABLET ORAL at 20:54

## 2020-05-21 RX ADMIN — ONDANSETRON 4 MG: 2 INJECTION INTRAMUSCULAR; INTRAVENOUS at 00:33

## 2020-05-21 RX ADMIN — OXYCODONE HYDROCHLORIDE AND ACETAMINOPHEN 1 TABLET: 10; 325 TABLET ORAL at 06:23

## 2020-05-21 RX ADMIN — OXYCODONE HYDROCHLORIDE AND ACETAMINOPHEN 1 TABLET: 10; 325 TABLET ORAL at 19:29

## 2020-05-21 RX ADMIN — GABAPENTIN 600 MG: 300 CAPSULE ORAL at 18:54

## 2020-05-21 RX ADMIN — RANOLAZINE 1000 MG: 500 TABLET, FILM COATED, EXTENDED RELEASE ORAL at 09:42

## 2020-05-21 RX ADMIN — INSULIN GLARGINE 8 UNITS: 100 INJECTION, SOLUTION SUBCUTANEOUS at 20:54

## 2020-05-21 RX ADMIN — ASPIRIN 325 MG ORAL TABLET 325 MG: 325 PILL ORAL at 09:42

## 2020-05-21 RX ADMIN — GABAPENTIN 600 MG: 300 CAPSULE ORAL at 11:09

## 2020-05-21 RX ADMIN — GABAPENTIN 600 MG: 300 CAPSULE ORAL at 14:50

## 2020-05-21 RX ADMIN — AMIODARONE HYDROCHLORIDE 200 MG: 200 TABLET ORAL at 12:01

## 2020-05-21 RX ADMIN — OXYCODONE HYDROCHLORIDE AND ACETAMINOPHEN 1 TABLET: 10; 325 TABLET ORAL at 01:46

## 2020-05-21 ASSESSMENT — PAIN DESCRIPTION - FREQUENCY
FREQUENCY: INTERMITTENT
FREQUENCY: CONTINUOUS
FREQUENCY: INTERMITTENT
FREQUENCY: CONTINUOUS
FREQUENCY: INTERMITTENT
FREQUENCY: INTERMITTENT

## 2020-05-21 ASSESSMENT — PAIN DESCRIPTION - PROGRESSION
CLINICAL_PROGRESSION: GRADUALLY WORSENING
CLINICAL_PROGRESSION: NOT CHANGED
CLINICAL_PROGRESSION: GRADUALLY WORSENING
CLINICAL_PROGRESSION: GRADUALLY IMPROVING

## 2020-05-21 ASSESSMENT — PAIN DESCRIPTION - ORIENTATION
ORIENTATION: LOWER
ORIENTATION: LEFT
ORIENTATION: LOWER
ORIENTATION: LEFT

## 2020-05-21 ASSESSMENT — PAIN DESCRIPTION - PAIN TYPE
TYPE: ACUTE PAIN
TYPE: ACUTE PAIN
TYPE: CHRONIC PAIN
TYPE: ACUTE PAIN
TYPE: CHRONIC PAIN
TYPE: ACUTE PAIN
TYPE: ACUTE PAIN

## 2020-05-21 ASSESSMENT — PAIN DESCRIPTION - LOCATION
LOCATION: CHEST
LOCATION: CHEST
LOCATION: BACK
LOCATION: CHEST
LOCATION: BACK

## 2020-05-21 ASSESSMENT — PAIN DESCRIPTION - DESCRIPTORS
DESCRIPTORS: ACHING
DESCRIPTORS: SHARP
DESCRIPTORS: ACHING;DISCOMFORT

## 2020-05-21 ASSESSMENT — PAIN SCALES - WONG BAKER
WONGBAKER_NUMERICALRESPONSE: 6

## 2020-05-21 ASSESSMENT — PAIN - FUNCTIONAL ASSESSMENT
PAIN_FUNCTIONAL_ASSESSMENT: PREVENTS OR INTERFERES SOME ACTIVE ACTIVITIES AND ADLS

## 2020-05-21 ASSESSMENT — PAIN SCALES - GENERAL
PAINLEVEL_OUTOF10: 7
PAINLEVEL_OUTOF10: 5
PAINLEVEL_OUTOF10: 6
PAINLEVEL_OUTOF10: 7
PAINLEVEL_OUTOF10: 5
PAINLEVEL_OUTOF10: 6
PAINLEVEL_OUTOF10: 7
PAINLEVEL_OUTOF10: 6
PAINLEVEL_OUTOF10: 6
PAINLEVEL_OUTOF10: 7

## 2020-05-21 ASSESSMENT — PAIN DESCRIPTION - ONSET
ONSET: SUDDEN

## 2020-05-21 NOTE — PROGRESS NOTES
Hospitalist Progress Note      PCP: Ronald Rich MD    Date of Admission: 5/20/2020      Subjective: Feels ok now, he stated that the defibrillator fired up 2 times. No dizziness, chest pain or SOB at this time. Medications:  Reviewed    Infusion Medications    dextrose       Scheduled Medications    traZODone  50 mg Oral Nightly    ranolazine  1,000 mg Oral BID    metoprolol succinate  100 mg Oral Daily    lisinopril  10 mg Oral Daily    isosorbide mononitrate  60 mg Oral Daily    insulin glargine  8 Units Subcutaneous Nightly    hydrALAZINE  25 mg Oral 3 times per day    gabapentin  600 mg Oral 5x Daily    furosemide  20 mg Oral Daily    famotidine  20 mg Oral BID    clopidogrel  75 mg Oral Daily    atorvastatin  80 mg Oral Daily    aspirin  325 mg Oral Daily    sodium chloride flush  10 mL Intravenous 2 times per day    insulin lispro  0-12 Units Subcutaneous TID WC    insulin lispro  0-6 Units Subcutaneous Nightly    enoxaparin  40 mg Subcutaneous Daily     PRN Meds: tiZANidine, oxyCODONE-acetaminophen, ibuprofen, albuterol, acetaminophen, sodium chloride flush, polyethylene glycol, ondansetron, glucose, dextrose, glucagon (rDNA), dextrose, hydrALAZINE    No intake or output data in the 24 hours ending 05/21/20 0914    Physical Exam Performed:    BP (!) 145/90   Pulse 94   Temp 97.9 °F (36.6 °C) (Oral)   Resp 16   Ht 6' (1.829 m)   Wt 186 lb 11.7 oz (84.7 kg)   SpO2 95%   BMI 25.33 kg/m²     General appearance: No apparent distress  Neck: Supple  Respiratory:  Normal respiratory effort. Clear to auscultation, bilaterally without Rales/Wheezes/Rhonchi. Cardiovascular: Regular rate and rhythm with normal S1/S2 without murmurs, rubs or gallops. Abdomen: Soft, non-tender, non-distended  Musculoskeletal: No clubbing, cyanosis   Skin: Skin color, texture, turgor normal.  No rashes or lesions.   Neurologic:  No focal weakness   Psychiatric: Alert and oriented  Capillary Refill: Brisk,< 3 seconds   Peripheral Pulses: +2 palpable, equal bilaterally       Labs:   Recent Labs     05/19/20  0157 05/20/20 2108 05/21/20  0353   WBC 6.7 6.9 6.9   HGB 13.8 15.1 15.8   HCT 40.7 44.9 46.5    228 202     Recent Labs     05/19/20  0157 05/20/20 2108 05/21/20  0353   * 136 135*   K 3.9 3.3* 3.3*    102 101   CO2 23 19* 20*   BUN 14 9 10   CREATININE 1.0 0.8 0.9   CALCIUM 8.6 8.7 8.7     Recent Labs     05/19/20 0157 05/20/20 2108   AST 7* 8*   ALT <5* <5*   BILITOT <0.2 0.4   ALKPHOS 56 61     No results for input(s): INR in the last 72 hours.   Recent Labs     05/19/20  0537 05/20/20 2108 05/21/20  0353   TROPONINI <0.01 0.01 0.01       Urinalysis:      Lab Results   Component Value Date    NITRU Negative 04/22/2020    WBCUA 3 04/22/2020    RBCUA 1 04/22/2020    BLOODU Negative 04/22/2020    SPECGRAV 1.014 04/22/2020    GLUCOSEU 250 04/22/2020       Radiology:  XR CHEST PORTABLE   Final Result   No active cardiopulmonary disease                 Assessment/Plan:    Active Hospital Problems    Diagnosis    Coronary artery disease involving native coronary artery of native heart without angina pectoris [I25.10]     Priority: Medium    Type 2 diabetes mellitus without complication, with long-term current use of insulin ([facility]) [E11.9, Z79.4]     Priority: Medium    Ischemic cardiomyopathy [I25.5]     Priority: Medium    Essential hypertension [I10]     Priority: Medium    S/P CABG (coronary artery bypass graft) [Z95.1]     Priority: Medium    Hyperlipidemia LDL goal <70 [E78.5]     Priority: Low    ICD (implantable cardioverter-defibrillator), dual, in situ [Z95.810]     Priority: Low    Ventricular tachycardia ([facility] [I47.2]    Sinus tachycardia [R00.0]    Chronic systolic (congestive) heart failure (HCC) [I50.22]    COPD (chronic obstructive pulmonary disease) ([facility] [J44.9]    Chronically elevated troponin [R79.89]    Morbid obesity due to excess calories ([facility]

## 2020-05-21 NOTE — CONSULTS
[address]                                  CONSULTATION    PATIENT NAME: Nilda Marroquin                    :        1955  MED REC NO:   [id number]                          ROOM:       5131  ACCOUNT NO:   [de-identified]                           ADMIT DATE: 2020  PROVIDER:     Selena Dewey MD    CARDIAC CONSULTATION    CONSULT DATE:  2020    HISTORY OF PRESENT ILLNESS:  This is a 77-year-old male with a complex  cardiac history and numerous admissions, who has a history of  hypertension, diabetes, hyperlipidemia, coronary artery disease status  post CABG, status post PCI; COPD, who presented to the hospital after he  felt the defibrillator went off. He noticed that the day before his  admission and again on the day of his admission and he came to the  emergency room. He has his device interrogated which did reveal a run  of ventricular tachycardia but he did not have his device fired. He  also complains of slightly increased shortness of breath though denies  any chest tightness, pressure to me, or jaw pain. He has been smoking  significantly lately. He has no orthopnea or PND. No leg edema. No  fever, chills, or rigors. REVIEW OF SYSTEMS:  Please see HPI. All other systems are reviewed and  they are negative. PAST MEDICAL HISTORY:  1. As mentioned, coronary artery disease status post remote CABG. The  patient is also status post stenting of his protected left main. 2.  History of chronic chest pain. He has numerous admissions and has  had recent angiography and felt that patient be treated medically. He  also had a nuclear stress test done showing no evidence of reversible  ischemia. 3.  Cardiomyopathy. The EF on echo read about 20% to 25% and a nuclear  stress test showed EF of 41%. 4.  COPD. 5.  Depression. 6.  Diabetes. 7.  Hypertension. 8.  Hyperlipidemia.   9.

## 2020-05-21 NOTE — ED PROVIDER NOTES
All other components within normal limits    Narrative:     Performed at:  [facility]  [address] [place] [address]   Phone [phone]   COMPREHENSIVE METABOLIC PANEL - Abnormal; Notable for the following components:    Potassium 3.3 (*)     CO2 19 (*)     Glucose 146 (*)     ALT <5 (*)     AST 8 (*)     All other components within normal limits    Narrative:     Performed at:  [facility]  [address] [place] [facility] [address]   Phone [phone]   TROPONIN    Narrative:     Performed at:  [facility]  [address] [place] [facility] [address]   Phone [phone]       All other labs were within normal range or not returned as of this dictation. EMERGENCY DEPARTMENT COURSE and DIFFERENTIAL DIAGNOSIS/MDM:   Vitals:    Vitals:    05/20/20 2130 05/20/20 2145 05/20/20 2200 05/20/20 2215   BP: (!) 174/102 (!) 165/104 (!) 175/101 (!) 176/111   Pulse: 106 108 110 94   Resp: 22 20 20 19   Temp:       TempSrc:       SpO2: 96% 96% 97% 98%   Weight:       Height:         Patient has chest pain following reported defibrillator discharge. I spoke with [employer]. He had an episode around the time of his sensation of defibrillator discharging of V. tach lasting 114 seconds. There is no evidence of discharge from [address] and. The representative believes that he did not reach to the heart rate of 188 to discharge the defibrillator. The patient continues to have chest pain. He is given his home dose of Percocet. His troponin is 0.01. Per recommendation of the attending physician will give labetalol for tachycardia and hypertension and consult the hospitalist for admission. Patient is agreeable. CONSULTS:  IP CONSULT TO HOSPITALIST    PROCEDURES:  Procedures      FINAL IMPRESSION      1. Chest pain, unspecified type    2. Tachycardia    3.  Ventricular tachycardia ([address]

## 2020-05-21 NOTE — PROGRESS NOTES
Nutrition Assessment    Type and Reason for Visit: Positive Nutrition Screen(MST2)    Nutrition Recommendations:   Continue Carb control (4CHO/meal). Monitor need for supplement. Nutrition Assessment: Pt presented with chest pain. Pt with PMH of DM, CAD s/p CABG, and CHF. Pt noted with wt fluctuations in EMR. Pt was trying to lose wt last year and down from 220#. Pt wt has been fluctuating the past month between 180-190#. Unsure if fluid shifts are the cause. On last admission last month, pt was eating well. 1 meal noted, %. Will monitor need for supplement. Malnutrition Assessment:  · Malnutrition Status: Insufficient data    Nutrition Risk Level: Moderate    Nutrient Needs:  · Estimated Daily Total Kcal: 9596-5010 kcal (20-25kcal/kg CBW)  · Estimated Daily Protein (g): 103-160 gm (1.3-2gm/kg IBW)  · Estimated Daily Total Fluid (ml/day): per MD    Nutrition Diagnosis:   · Problem: No nutrition diagnosis at this time    Objective Information:  · Nutrition-Focused Physical Findings: BM 5/20  · Wound Type: None  · Current Nutrition Therapies:  · Oral Diet Orders: Carb Control 4 Carbs/Meal   · Oral Diet intake: %  · Oral Nutrition Supplement (ONS) Orders: None  · Anthropometric Measures:  · Ht: 6' (182.9 cm)   · Current Body Wt: 186 lb (84.4 kg)  · % Weight Change:   Last year pt weighed 220# and intentionally lost wt. Wt has been fluctuating the last month  · Ideal Body Wt: 178 lb (80.7 kg),  · BMI Classification: BMI 25.0 - 29.9 Overweight    Nutrition Interventions:   Continue current diet  Continued Inpatient Monitoring    Nutrition Evaluation:   · Evaluation: Goals set   · Goals: consume >50% of meals during admission.     · Monitoring: Meal Intake, Skin Integrity, Weight, Pertinent Labs      Electronically signed by Julia Lopez RD, LD on 5/21/20 at 10:58 AM EDT    Contact Number: [phone]

## 2020-05-21 NOTE — PLAN OF CARE
Problem: Falls - Risk of:  Goal: Will remain free from falls. Pt. States that his legs feel \"weak\". Up with assist.    Description: Will remain free from falls  Outcome: Ongoing  Goal: Absence of physical injury  Description: Absence of physical injury  Outcome: Ongoing. Bed alarm on, frequently used items within reach.       Problem: PAIN MANAGEMENT  Goal: Able to cope with pain  Description: Able to cope with pain     Outcome: Ongoing

## 2020-05-21 NOTE — ED NOTES
Bed: [facility]  Expected date:   Expected time:   Means of arrival: [facility]  Comments:  GIL Coffey RN  05/20/20 6532

## 2020-05-21 NOTE — PLAN OF CARE
Problem: Falls - Risk of:  Goal: Will remain free from falls  Description: Will remain free from falls  5/21/2020 1326 by Jonas Henderson RN  Outcome: Ongoing     Problem: Falls - Risk of:  Goal: Absence of physical injury  Description: Absence of physical injury  5/21/2020 1326 by Jonas Henderson RN  Outcome: Ongoing     Problem: PAIN MANAGEMENT  Goal: Able to cope with pain  Description: Able to cope with pain     5/21/2020 1326 by Jonas Henderson RN  Outcome: Ongoing     Problem: Nutrition  Goal: Optimal nutrition therapy  5/21/2020 1326 by Jonas Henderson RN  Outcome: Ongoing     Problem: OXYGENATION/RESPIRATORY FUNCTION  Goal: Patient will maintain patent airway  Outcome: Ongoing     Problem: OXYGENATION/RESPIRATORY FUNCTION  Goal: Patient will achieve/maintain normal respiratory rate/effort  Description: Respiratory rate and effort will be within normal limits for the patient  Outcome: Ongoing     Problem: HEMODYNAMIC STATUS  Goal: Patient has stable vital signs and fluid balance  Outcome: Ongoing     Problem: FLUID AND ELECTROLYTE IMBALANCE  Goal: Fluid and electrolyte balance are achieved/maintained  Outcome: Ongoing     Problem: ACTIVITY INTOLERANCE/IMPAIRED MOBILITY  Goal: Mobility/activity is maintained at optimum level for patient  Outcome: Ongoing   Electronically signed by Muna Park RN on 5/21/2020 at 1:27 PM

## 2020-05-21 NOTE — TELEPHONE ENCOUNTER
Writer contacted Lorena Rollins ,ED provider to inform of 30 day readmission risk. Pt to be readmitted.

## 2020-05-21 NOTE — FLOWSHEET NOTE
Pt. Arrived in room 5131. Rates his chest pain a 7. Up to standing scale without difficulty. Oriented to room. CMU contacted- cardiac monitoring confirmed.

## 2020-05-21 NOTE — ED NOTES
ED SBAR report provider to [facility], [address] [facility]. Patient to be transported to Room 5131 via stretcher by ED tech. Patient transported with bedside cardiac monitor and with IV medications infusing. IV site clean, dry, and intact. MEWS score and pain assessed and documented. Updated patient on plan of care.      Sangita Win RN  05/21/20 5747

## 2020-05-21 NOTE — H&P
Hospital Medicine  History and Physical    PCP: Cornelius Hernandez MD  Patient Name: Nigel Glasgow    Date of Service: Pt seen/examined on 05/20/2020 and admitted to Inpatient with expected LOS greater than two midnights due to medical therapy    CHIEF COMPLAINT:  Pt c/o defibrillator shocks and chest pain  HISTORY OF PRESENT ILLNESS: Patient is a 68-year-old man with history of hypertension, hyperlipidemia, diabetes mellitus, coronary artery disease status post CABG, chronic systolic congestive heart failure, and a chronically elevated troponin. He underwent a cardiac cath on 4/15/2020 with the finding of an inclusion at the midportion of the right coronary artery and a heavily diseased distal LAD. It was decided that he would be managed medically. He was seen in the ER on 4/19/2020 for similar symptoms and was discharged to home. He was admitted for chest pain again on 4/20/2020 and left AMA on 4/23/2020 after his symptoms subsided and he was feeling better. He was admitted again on 5/6/2020 and was discharged on 5/7/2020 after a nuclear stress test showed a fixed defect and a stable ejection fraction of 41%. Cardiology saw the patient on that admission as well. He presents to the emergency room today after he felt as though his defibrillator fired at approximately 1015 this evening. It was accompanied by severe chest pain. He took 324 mg of aspirin, along with nitroglycerin and EMS gave him fentanyl in route. [employer] confirmed episode of Ventricular Tachycardia with NO defibrillator shocks at the time of his symptoms. He is being admitted for further evaluation and treatment. Associated signs and symptoms do not include typical chest pain, shortness of breath, diaphoresis, edema, orthopnea, paroxysmal nocturnal dyspnea, fever or chills.       Past Medical History:        Diagnosis Date    Anxiety     Arthritis     Asthma     CAD (coronary artery disease)     Calcium kidney stone     Cardiomyopathy insulin glargine (LANTUS) 100 UNIT/ML injection vial Inject 8 Units into the skin nightly 2/6/20   Chasity Sharma MD   metoprolol succinate (TOPROL XL) 100 MG extended release tablet Take 1 tablet by mouth daily 2/7/20   Chasity Sharma MD   lisinopril (PRINIVIL;ZESTRIL) 10 MG tablet Take 1 tablet by mouth daily 12/21/19   Shanthi Rosa MD   ranolazine (RANEXA) 500 MG extended release tablet Take 1,000 mg by mouth 2 times daily    Historical Provider, MD   furosemide (LASIX) 20 MG tablet Take 20 mg by mouth daily    Historical Provider, MD   albuterol sulfate HFA (PROAIR HFA) 108 (90 Base) MCG/ACT inhaler Inhale 2 puffs into the lungs every 6 hours as needed for Wheezing    Historical Provider, MD   empagliflozin (JARDIANCE) 10 MG tablet Take 1 tablet by mouth daily 11/27/19   Debbie Pope MD   isosorbide mononitrate (IMDUR) 30 MG extended release tablet Take 60 mg by mouth daily     Historical Provider, MD   hydrALAZINE (APRESOLINE) 25 MG tablet Take 1 tablet by mouth every 8 hours 9/18/19   Roderick Monae MD   acetaminophen (TYLENOL) 325 MG tablet Take 650 mg by mouth every 6 hours as needed for Pain    Historical Provider, MD   clopidogrel (PLAVIX) 75 MG tablet TAKE 1 TABLET BY MOUTH DAILY 4/7/19   Debbie Pope MD   nitroGLYCERIN (NITROSTAT) 0.4 MG SL tablet PLACE 1 TABLET UNDER THE TONGUE EVERY 5 MINUTES AS NEEDED FOR CHEST PAIN 9/2/18   Debbie Pope MD   aspirin 325 MG tablet Take 325 mg by mouth daily     Historical Provider, MD   atorvastatin (LIPITOR) 80 MG tablet TAKE 1 TABLET BY MOUTH DAILY 2/15/18   Debbie Pope MD       Family History:       Problem Relation Age of Onset    Coronary Art Dis Father     Cancer Mother         Lung with mets    Diabetes Mother      Social History:   TOBACCO:   reports that he has been smoking cigarettes. He has a 22.00 pack-year smoking history.  He has never used smokeless tobacco.  ETOH:   reports no history of Troponin:   Recent Labs     05/19/20  0157 05/19/20  0537 05/20/20  2108   TROPONINI <0.01 <0.01 0.01     PT/INR:  No results found for: PTINR  U/A:    Lab Results   Component Value Date    LEUKOCYTESUR Negative 04/22/2020    NITRITE neg 05/23/2014    RBCUA 1 04/22/2020    SPECGRAV 1.014 04/22/2020    UROBILINOGEN 0.2 04/22/2020    BILIRUBINUR Negative 04/22/2020    BILIRUBINUR neg 05/23/2014    BLOODU Negative 04/22/2020    GLUCOSEU 250 04/22/2020    PROTEINU Negative 04/22/2020         RAD:   I have independently reviewed and interpreted the imaging studies below and based my recommendations to the patient on those findings. Xr Chest Portable    Result Date: 5/20/2020  EXAMINATION: ONE XRAY VIEW OF THE CHEST 5/20/2020 9:06 pm COMPARISON: 05/19/2020 HISTORY: ORDERING SYSTEM PROVIDED HISTORY: chest pain TECHNOLOGIST PROVIDED HISTORY: Reason for exam:->chest pain Reason for Exam: Chest Pain (pts defibrilator fired at home Acuity: Acute Type of Exam: Initial FINDINGS: Status post coronary bypass. Left subclavian AICD. Heart size and pulmonary vessels within normal limits. Lungs clear. Costophrenic angles sharp     No active cardiopulmonary disease     Xr Chest Portable    Result Date: 5/19/2020  EXAMINATION: ONE XRAY VIEW OF THE CHEST 5/19/2020 1:32 am COMPARISON: 05/06/2020 HISTORY: ORDERING SYSTEM PROVIDED HISTORY: Chest Pain TECHNOLOGIST PROVIDED HISTORY: Reason for exam:->Chest Pain Reason for Exam: chest pain Acuity: Acute Type of Exam: Unknown FINDINGS: Status post median sternotomy. AICD in the left chest wall. Elevation of right hemidiaphragm. No confluent airspace disease. No pleural effusion or pneumothorax. Cardiac and mediastinal silhouettes are similar to prior. No acute cardiopulmonary disease.      Xr Chest Portable    Result Date: 5/6/2020  EXAMINATION: ONE XRAY VIEW OF THE CHEST 5/6/2020 7:54 pm COMPARISON: 04/20/2020 HISTORY: ORDERING SYSTEM PROVIDED HISTORY: CP TECHNOLOGIST PROVIDED HISTORY: Reason for exam:->CP Reason for Exam: Chest Pain (sharp left sided chest pain for 4 hours. extensive cardiac hx) Acuity: Acute Type of Exam: Initial FINDINGS: Elevation of right hemidiaphragm. Status post median sternotomy. AICD in the left chest wall. No confluent airspace disease. Left costophrenic angle is incompletely imaged though no large pleural effusion is seen. No pneumothorax. Cardiac and mediastinal silhouettes are similar to prior. No acute cardiopulmonary disease. Cta Chest Abdomen Pelvis W Contrast    Result Date: 5/19/2020  EXAMINATION: CTA OF THE CHEST, ABDOMEN AND PELVIS WITH CONTRAST 5/19/2020 3:31 am TECHNIQUE: CTA of the chest, abdomen and pelvis was performed after the administration of intravenous contrast.  Multiplanar reformatted images are provided for review. MIP images are provided for review. Dose modulation, iterative reconstruction, and/or weight based adjustment of the mA/kV was utilized to reduce the radiation dose to as low as reasonably achievable. COMPARISON: 02/10/2020, CT chest 12/10/2016 HISTORY: ORDERING SYSTEM PROVIDED HISTORY: dissection TECHNOLOGIST PROVIDED HISTORY: Reason for exam:->dissection Reason for Exam: r/o dissection Acuity: Acute Relevant Medical/Surgical History: hx hypertension, chf, copd, diabetes, appendectomy, jose FINDINGS: Chest: Mediastinum: Calcification of the thoracic aorta. No aortic intraluminal flap. Coronary artery calcification. Exam was not tailored for pulmonary arterial evaluation though no large central filling defects are seen. Status post median sternotomy. No mediastinal adenopathy. Thyroid is symmetric. No axillary adenopathy. AICD in the left chest wall. Lungs/pleura: 1.7 x 1.3 cm medial right lower lobe pulmonary nodule appears unchanged. Scattered linear and ground-glass opacity, likely atelectasis. No pleural effusion or pneumothorax.   Filling defects within the trachea and mainstem bronchi, as Findings  No ischemic ECG changes. Arrhythmias  Frequent premature ventricular contractions. Symptoms  Lexiscan 0.4 mg IV given followed by brief  shortness of breath and chest pressure 3/10. Sharp  chest pain remained at pretest level 6/10. O2  saturation 98% on room air. Complications  Procedure complication was none. Stress Interpretation  No ECG changes with pharmacologic stress. Procedure Medications   - Lexiscan I.V. 0.4 mg.10 sec  Imaging Protocols   - One Day   Rest                       Stress   Isotope:Myoview            Isotope: Myoview  Isotope dose:13.2 mCi      Isotope dose:33 mCi  Administration Route:I.V. Administration Route:I.V.  Date:05/07/2020 00:00      Date:05/07/2020 00:00                              Technique:     Gated  Perfusion Images Rest and Stress: Segments: 1 -Normal 2 -Fixed 3 -Reversible 4 -Partialy reversible 5 -Scar 6 -Defect +-----------------+---------+--------+----+---------+---------+ ! Segment          ! Perfusion! Severity! Wall! Artifact ! Artreason! +-----------------+---------+--------+----+---------+---------+ ! BASAL - Inferior ! Defect   !        !    !         !         ! +-----------------+---------+--------+----+---------+---------+ ! APICAL - Inferior! Defect   !        !    !         !         ! +-----------------+---------+--------+----+---------+---------+ Imaging Results    Summed scores     - Summed stress score: 4     - Summed rest score: 8     - Summed difference score:    1     Stress ejection    Ejection fraction:41 %    EDV :192 ml    ESV :114 ml    Stroke volume :78 ml  LV size:Enlarged LV LV systolic function impairment: Moderate Medical History   Additional Medical History   Recent LHC [4/15/2020], ischemic cardiomyopathy, GERD,  fibromyalgia, seizures, CKD, pacemaker / ICD.    Signatures   ------------------------------------------------------------------  Electronically signed by Eliana Tejada MD  (Interpreting physician) on 05/07/2020 at S/P CABG (coronary artery bypass graft) - Continue Beta Blocker, Statin, Plavix and Aspirin. Monitor on Telemetry. Ischemic cardiomyopathy with chronic systolic (congestive) heart failure ([facility] [address] - A 2D Echocardiogram on 04/23/2020 shows an EF of 25-30%. Continue home Lasix. Monitor strict I&Os and daily weights. Pt on ACEI, as EF is <40%. Cardiology has been consulted. COPD (chronic obstructive pulmonary disease) - without acute exacerbation. Will provide Nebulizer treatments as needed and continue home medications. Patient will be monitored closely, and deep breathing and coughing will be encouraged while awake. Diabetes mellitus II - Lantus, SSI and a CCD     Hyperlipidemia - No current evidence of Rhabdomyolysis or other adverse effects. Continue statin therapy while in the hospital     Chronic back pain - provide symptomatic treatment           DVT Prophylaxis: Lovenox  Diet: DIET CARB CONTROL;  Code Status: Full Code  (Advanced care planning has been discussed with patient and/or responsible family member and is reflected in the code status. Further orders associated with this have been entered if appropriate)    Disposition: Anticipate that patient will remain in the hospital for 2 to 3 days depending on further evaluation and clinical course.      Lola Matthews MD

## 2020-05-21 NOTE — ED PROVIDER NOTES
his defibrillator twice in the past 24 hours. He has been visiting the ER for complaints of being defibrillated. However, his interrogations did not reveal any evidence of defibrillation. Physical exam showed to be tachycardic and hypertensive. Heart was regular rate and rhythm. Therefore, interrogation was performed and they states that he did not get defibrillated. He did have a wide-complex tachycardia that could have been ventricular tachycardia versus supraventricular tachycardia with wide complex. EKG was negative. This is not an issue we can diagnose in the emergency department. Therefore, patient was admitted to the hospital for further evaluation treatment of his defibrillator shocks. He was hypertensive in nurse department was given labetalol for his hypertension and tachycardia.     Vitals:    05/20/20 2215   BP: (!) 176/111   Pulse: 94   Resp: 19   Temp:    SpO2: 98%       Lab results  Labs Reviewed   CBC WITH AUTO DIFFERENTIAL - Abnormal; Notable for the following components:       Result Value    Basophils Absolute 0.3 (*)     All other components within normal limits    Narrative:     Performed at:  [address] [facility] [employer]   Phone [phone]   COMPREHENSIVE METABOLIC PANEL - Abnormal; Notable for the following components:    Potassium 3.3 (*)     CO2 19 (*)     Glucose 146 (*)     ALT <5 (*)     AST 8 (*)     All other components within normal limits    Narrative:     Performed at:  [address] [email or web address]   Phone [phone]   TROPONIN    Narrative:     Performed at:  [address] [employer]   Phone [phone]       EKG Results  EKG Interpretation    Interpreted by emergency department physician  Time read: 2101    Rhythm: Sinus tachycardia with quadrigeminal PVCs  Ventricular Rate: 111  QRS Axis: 91  Ectopy: Frequent PVCs  Conduction: Sinus tachycardia with frequent PVCs and right bundle branch block with left atrial and left ventricular enlargement by voltage  ST Segments: Consistent with right bundle branch block  T Waves: Consistent with right bundle branch block  Q Waves: None noted    Other findings: Motion artifact    Compared to EKG on: None to compare    Clinical Impression: Sinus tachycardia with frequent PVCs and right bundle branch block with left atrial enlargement and left ventricular hypertrophy    Texie Bloch [facility]      Radiology results  XR CHEST PORTABLE   Final Result   No active cardiopulmonary disease             Medications   oxyCODONE-acetaminophen (PERCOCET) 5-325 MG per tablet 1 tablet (1 tablet Oral Given 5/20/20 2212)   oxyCODONE-acetaminophen (PERCOCET) 5-325 MG per tablet 1 tablet (1 tablet Oral Given 5/20/20 2216)   labetalol (NORMODYNE;TRANDATE) injection 10 mg (10 mg Intravenous Given 5/20/20 2253)       New Prescriptions    No medications on file       The patient's blood pressure was found to be elevated according to CMS/Medicare and the Affordable Care Act/ObamaCare criteria. Elevated blood pressure could occur because of pain or anxiety or other reasons and does not mean that they need to have their blood pressure treated or medications otherwise adjusted. However, this could also be a sign that they will need to have their blood pressure treated or medications changed. The patient was instructed to follow up closely with their personal physician to have their blood pressure rechecked. The patient was instructed to take a list of recent blood pressure readings to their next visit with their personal physician. IMPRESSIONS:  1. Chest pain, unspecified type    2. Tachycardia    3.  [facility]cardia [facility] Andrew DO  05/20/20 5388

## 2020-05-22 LAB
ANION GAP SERPL CALCULATED.3IONS-SCNC: 12 MMOL/L (ref 3–16)
BASOPHILS ABSOLUTE: 0.1 K/UL (ref 0–0.2)
BASOPHILS RELATIVE PERCENT: 0.8 %
BUN BLDV-MCNC: 15 MG/DL (ref 7–20)
BUN BLDV-MCNC: 15 MG/DL (ref 7–20)
BUN BLDV-MCNC: 17 MG/DL (ref 7–20)
CALCIUM SERPL-MCNC: 8.2 MG/DL (ref 8.3–10.6)
CALCIUM SERPL-MCNC: 8.5 MG/DL (ref 8.3–10.6)
CALCIUM SERPL-MCNC: 8.7 MG/DL (ref 8.3–10.6)
CHLORIDE BLD-SCNC: 100 MMOL/L (ref 99–110)
CHLORIDE BLD-SCNC: 97 MMOL/L (ref 99–110)
CHLORIDE BLD-SCNC: 99 MMOL/L (ref 99–110)
CO2: 20 MMOL/L (ref 21–32)
CREAT SERPL-MCNC: 1.5 MG/DL (ref 0.8–1.3)
CREAT SERPL-MCNC: 1.5 MG/DL (ref 0.8–1.3)
CREAT SERPL-MCNC: 1.8 MG/DL (ref 0.8–1.3)
EOSINOPHILS ABSOLUTE: 0.1 K/UL (ref 0–0.6)
EOSINOPHILS RELATIVE PERCENT: 2 %
GFR AFRICAN AMERICAN: 46
GFR AFRICAN AMERICAN: 57
GFR AFRICAN AMERICAN: 57
GFR NON-AFRICAN AMERICAN: 38
GFR NON-AFRICAN AMERICAN: 47
GFR NON-AFRICAN AMERICAN: 47
GLUCOSE BLD-MCNC: 100 MG/DL (ref 70–99)
GLUCOSE BLD-MCNC: 106 MG/DL (ref 70–99)
GLUCOSE BLD-MCNC: 107 MG/DL (ref 70–99)
GLUCOSE BLD-MCNC: 109 MG/DL (ref 70–99)
GLUCOSE BLD-MCNC: 112 MG/DL (ref 70–99)
GLUCOSE BLD-MCNC: 116 MG/DL (ref 70–99)
GLUCOSE BLD-MCNC: 132 MG/DL (ref 70–99)
HCT VFR BLD CALC: 38.3 % (ref 40.5–52.5)
HEMOGLOBIN: 12.8 G/DL (ref 13.5–17.5)
LYMPHOCYTES ABSOLUTE: 2.7 K/UL (ref 1–5.1)
LYMPHOCYTES RELATIVE PERCENT: 42.3 %
MCH RBC QN AUTO: 33.8 PG (ref 26–34)
MCHC RBC AUTO-ENTMCNC: 33.5 G/DL (ref 31–36)
MCV RBC AUTO: 100.8 FL (ref 80–100)
MONOCYTES ABSOLUTE: 0.5 K/UL (ref 0–1.3)
MONOCYTES RELATIVE PERCENT: 7.5 %
NEUTROPHILS ABSOLUTE: 3 K/UL (ref 1.7–7.7)
NEUTROPHILS RELATIVE PERCENT: 47.4 %
PDW BLD-RTO: 15.7 % (ref 12.4–15.4)
PERFORMED ON: ABNORMAL
PLATELET # BLD: 167 K/UL (ref 135–450)
PMV BLD AUTO: 8.9 FL (ref 5–10.5)
POTASSIUM REFLEX MAGNESIUM: 3.7 MMOL/L (ref 3.5–5.1)
POTASSIUM SERPL-SCNC: 3.8 MMOL/L (ref 3.5–5.1)
POTASSIUM SERPL-SCNC: 3.8 MMOL/L (ref 3.5–5.1)
RBC # BLD: 3.8 M/UL (ref 4.2–5.9)
SODIUM BLD-SCNC: 129 MMOL/L (ref 136–145)
SODIUM BLD-SCNC: 131 MMOL/L (ref 136–145)
SODIUM BLD-SCNC: 132 MMOL/L (ref 136–145)
WBC # BLD: 6.4 K/UL (ref 4–11)

## 2020-05-22 PROCEDURE — 36415 COLL VENOUS BLD VENIPUNCTURE: CPT

## 2020-05-22 PROCEDURE — 85025 COMPLETE CBC W/AUTO DIFF WBC: CPT

## 2020-05-22 PROCEDURE — 6370000000 HC RX 637 (ALT 250 FOR IP): Performed by: INTERNAL MEDICINE

## 2020-05-22 PROCEDURE — 80048 BASIC METABOLIC PNL TOTAL CA: CPT

## 2020-05-22 PROCEDURE — 99233 SBSQ HOSP IP/OBS HIGH 50: CPT | Performed by: INTERNAL MEDICINE

## 2020-05-22 PROCEDURE — 2580000003 HC RX 258: Performed by: INTERNAL MEDICINE

## 2020-05-22 PROCEDURE — 51798 US URINE CAPACITY MEASURE: CPT

## 2020-05-22 PROCEDURE — 2060000000 HC ICU INTERMEDIATE R&B

## 2020-05-22 PROCEDURE — 6360000002 HC RX W HCPCS: Performed by: INTERNAL MEDICINE

## 2020-05-22 RX ORDER — NICOTINE 21 MG/24HR
1 PATCH, TRANSDERMAL 24 HOURS TRANSDERMAL DAILY
Status: DISCONTINUED | OUTPATIENT
Start: 2020-05-22 | End: 2020-05-24 | Stop reason: HOSPADM

## 2020-05-22 RX ADMIN — ENOXAPARIN SODIUM 40 MG: 40 INJECTION SUBCUTANEOUS at 09:20

## 2020-05-22 RX ADMIN — FUROSEMIDE 20 MG: 20 TABLET ORAL at 09:20

## 2020-05-22 RX ADMIN — ATORVASTATIN CALCIUM 80 MG: 80 TABLET, FILM COATED ORAL at 09:21

## 2020-05-22 RX ADMIN — RANOLAZINE 1000 MG: 500 TABLET, FILM COATED, EXTENDED RELEASE ORAL at 09:20

## 2020-05-22 RX ADMIN — CLOPIDOGREL BISULFATE 75 MG: 75 TABLET ORAL at 09:21

## 2020-05-22 RX ADMIN — OXYCODONE HYDROCHLORIDE AND ACETAMINOPHEN 1 TABLET: 10; 325 TABLET ORAL at 10:53

## 2020-05-22 RX ADMIN — AMIODARONE HYDROCHLORIDE 200 MG: 200 TABLET ORAL at 09:21

## 2020-05-22 RX ADMIN — SODIUM CHLORIDE, PRESERVATIVE FREE 10 ML: 5 INJECTION INTRAVENOUS at 09:22

## 2020-05-22 RX ADMIN — GABAPENTIN 600 MG: 300 CAPSULE ORAL at 14:38

## 2020-05-22 RX ADMIN — ASPIRIN 325 MG ORAL TABLET 325 MG: 325 PILL ORAL at 09:21

## 2020-05-22 RX ADMIN — HYDRALAZINE HYDROCHLORIDE 25 MG: 25 TABLET, FILM COATED ORAL at 06:45

## 2020-05-22 RX ADMIN — TRAZODONE HYDROCHLORIDE 50 MG: 50 TABLET ORAL at 21:54

## 2020-05-22 RX ADMIN — GABAPENTIN 600 MG: 300 CAPSULE ORAL at 19:15

## 2020-05-22 RX ADMIN — AMIODARONE HYDROCHLORIDE 200 MG: 200 TABLET ORAL at 21:53

## 2020-05-22 RX ADMIN — SODIUM CHLORIDE, PRESERVATIVE FREE 10 ML: 5 INJECTION INTRAVENOUS at 21:54

## 2020-05-22 RX ADMIN — OXYCODONE HYDROCHLORIDE AND ACETAMINOPHEN 1 TABLET: 10; 325 TABLET ORAL at 06:45

## 2020-05-22 RX ADMIN — GABAPENTIN 600 MG: 300 CAPSULE ORAL at 10:53

## 2020-05-22 RX ADMIN — HYDRALAZINE HYDROCHLORIDE 25 MG: 25 TABLET, FILM COATED ORAL at 21:54

## 2020-05-22 RX ADMIN — METOPROLOL SUCCINATE 100 MG: 50 TABLET, EXTENDED RELEASE ORAL at 09:20

## 2020-05-22 RX ADMIN — RANOLAZINE 1000 MG: 500 TABLET, FILM COATED, EXTENDED RELEASE ORAL at 21:54

## 2020-05-22 RX ADMIN — LISINOPRIL 10 MG: 10 TABLET ORAL at 09:21

## 2020-05-22 RX ADMIN — GABAPENTIN 600 MG: 300 CAPSULE ORAL at 06:45

## 2020-05-22 RX ADMIN — OXYCODONE HYDROCHLORIDE AND ACETAMINOPHEN 1 TABLET: 10; 325 TABLET ORAL at 15:39

## 2020-05-22 RX ADMIN — HYDRALAZINE HYDROCHLORIDE 25 MG: 25 TABLET, FILM COATED ORAL at 14:38

## 2020-05-22 RX ADMIN — FAMOTIDINE 20 MG: 20 TABLET, FILM COATED ORAL at 09:21

## 2020-05-22 RX ADMIN — OXYCODONE HYDROCHLORIDE AND ACETAMINOPHEN 1 TABLET: 10; 325 TABLET ORAL at 02:35

## 2020-05-22 RX ADMIN — ISOSORBIDE MONONITRATE 60 MG: 60 TABLET, EXTENDED RELEASE ORAL at 09:21

## 2020-05-22 RX ADMIN — OXYCODONE HYDROCHLORIDE AND ACETAMINOPHEN 1 TABLET: 10; 325 TABLET ORAL at 20:17

## 2020-05-22 RX ADMIN — SPIRONOLACTONE 25 MG: 25 TABLET ORAL at 09:21

## 2020-05-22 RX ADMIN — FAMOTIDINE 20 MG: 20 TABLET, FILM COATED ORAL at 21:54

## 2020-05-22 ASSESSMENT — PAIN DESCRIPTION - FREQUENCY
FREQUENCY: INTERMITTENT
FREQUENCY: CONTINUOUS

## 2020-05-22 ASSESSMENT — PAIN DESCRIPTION - PAIN TYPE
TYPE: ACUTE PAIN
TYPE: CHRONIC PAIN
TYPE: ACUTE PAIN
TYPE: CHRONIC PAIN
TYPE: CHRONIC PAIN
TYPE: ACUTE PAIN

## 2020-05-22 ASSESSMENT — PAIN - FUNCTIONAL ASSESSMENT
PAIN_FUNCTIONAL_ASSESSMENT: PREVENTS OR INTERFERES SOME ACTIVE ACTIVITIES AND ADLS

## 2020-05-22 ASSESSMENT — PAIN DESCRIPTION - LOCATION
LOCATION: CHEST
LOCATION: BACK
LOCATION: CHEST
LOCATION: BACK
LOCATION: CHEST

## 2020-05-22 ASSESSMENT — PAIN DESCRIPTION - DESCRIPTORS
DESCRIPTORS: ACHING;DISCOMFORT
DESCRIPTORS: SHARP
DESCRIPTORS: ACHING;DISCOMFORT

## 2020-05-22 ASSESSMENT — PAIN SCALES - GENERAL
PAINLEVEL_OUTOF10: 6
PAINLEVEL_OUTOF10: 5
PAINLEVEL_OUTOF10: 5
PAINLEVEL_OUTOF10: 8
PAINLEVEL_OUTOF10: 5
PAINLEVEL_OUTOF10: 7
PAINLEVEL_OUTOF10: 7
PAINLEVEL_OUTOF10: 4
PAINLEVEL_OUTOF10: 5
PAINLEVEL_OUTOF10: 6

## 2020-05-22 ASSESSMENT — PAIN DESCRIPTION - ORIENTATION
ORIENTATION: LOWER

## 2020-05-22 ASSESSMENT — PAIN DESCRIPTION - PROGRESSION
CLINICAL_PROGRESSION: NOT CHANGED
CLINICAL_PROGRESSION: NOT CHANGED

## 2020-05-22 ASSESSMENT — PAIN DESCRIPTION - ONSET: ONSET: ON-GOING

## 2020-05-22 NOTE — PROGRESS NOTES
Hospitalist Progress Note      PCP: Diamond Romeo MD    Date of Admission: 5/20/2020        Subjective: Feels ok, no fever, chills, nausea, vomiting or abdominal pain, no chest pain or palpitation. Medications:  Reviewed    Infusion Medications    dextrose       Scheduled Medications    traZODone  50 mg Oral Nightly    ranolazine  1,000 mg Oral BID    metoprolol succinate  100 mg Oral Daily    lisinopril  10 mg Oral Daily    isosorbide mononitrate  60 mg Oral Daily    insulin glargine  8 Units Subcutaneous Nightly    hydrALAZINE  25 mg Oral 3 times per day    gabapentin  600 mg Oral 5x Daily    furosemide  20 mg Oral Daily    famotidine  20 mg Oral BID    clopidogrel  75 mg Oral Daily    atorvastatin  80 mg Oral Daily    aspirin  325 mg Oral Daily    sodium chloride flush  10 mL Intravenous 2 times per day    insulin lispro  0-12 Units Subcutaneous TID WC    insulin lispro  0-6 Units Subcutaneous Nightly    enoxaparin  40 mg Subcutaneous Daily    spironolactone  25 mg Oral Daily    amiodarone  200 mg Oral BID    Followed by   Ruth Leyva ON 5/28/2020] amiodarone  200 mg Oral Daily     PRN Meds: tiZANidine, oxyCODONE-acetaminophen, ibuprofen, albuterol, acetaminophen, sodium chloride flush, polyethylene glycol, ondansetron, glucose, dextrose, glucagon (rDNA), dextrose, hydrALAZINE      Intake/Output Summary (Last 24 hours) at 5/22/2020 1140  Last data filed at 5/22/2020 0917  Gross per 24 hour   Intake 1610 ml   Output 500 ml   Net 1110 ml       Physical Exam Performed:    /89   Pulse 64   Temp 98 °F (36.7 °C) (Oral)   Resp 16   Ht 6' (1.829 m)   Wt 192 lb 14.4 oz (87.5 kg)   SpO2 96%   BMI 26.16 kg/m²     General appearance: No apparent distress  Neck: Supple  Respiratory:  Normal respiratory effort. Clear to auscultation, bilaterally without Rales/Wheezes/Rhonchi. Cardiovascular: Regular rate and rhythm with normal S1/S2 without murmurs, rubs or gallops.   Abdomen: (congestive) heart failure (HCC) [I50.22]    COPD (chronic obstructive pulmonary disease) ([facility] [address] [J44.9]    Chronically elevated troponin [R79.89]    Morbid obesity due to excess calories ([facility] [address] [E66.01]    Chronic back pain [M54.9, G89.29]     1. Ventricular tachycardia, [employer] confirmed episode of Ventricular Tachycardia for 114 secondaswith NO defibrillator shocks reported at the time, but patient adamant that the defibrillator fired up 2 times, added amiodarone and spironolactone per cardiology. 2. LALITA, will repeat BMP now. 3. Essential (primary) hypertension, po medications. 4. Coronary artery disease involving native coronary artery, no chest pain  5. Ischemic cardiomyopathy with chronic systolic (congestive) heart failure, no acute exacerbation. 6. COPD, no obvious exacerbation. 7. Diabetes mellitus II, Lantus, SSI and a CCD  8. Tobacco smoking counseled for cessation. 9. Chronically elevated troponin, no chest pain, cardiology consulted. Diet: DIET CARB CONTROL; Low Sodium (2 GM);  Daily Fluid Restriction: 2000 ml  Code Status: Full Code        Fannie Padilla MD

## 2020-05-22 NOTE — PLAN OF CARE
Problem: Falls - Risk of:  Goal: Will remain free from falls  Description: Will remain free from falls  5/22/2020 0251 by Maryann Buchanan RN  Outcome: Ongoing  Note: Fall risk assessment completed every shift. All precautions in place. Pt has call light within reach at all times. Room clear of clutter. Pt aware to call for assistance when getting up. Patient assessed for fall risk; fall precautions initiated. Patient and family instructed about safety devices. Environment kept free of clutter and adequate lighting provided. Bed locked and in lowest position. Call light within reach. Will continue to monitor. 5/21/2020 1326 by Nehemias Mckeon RN  Outcome: Ongoing  Goal: Absence of physical injury  Description: Absence of physical injury  5/21/2020 1326 by Nehemias Mckeon RN  Outcome: Ongoing     Problem: PAIN MANAGEMENT  Goal: Able to cope with pain  Description: Able to cope with pain     5/21/2020 1326 by Nehemias Mckeon RN  Outcome: Ongoing     Problem: Nutrition  Goal: Optimal nutrition therapy  5/21/2020 1326 by Nehemias Mckeon RN  Outcome: Ongoing     Problem: OXYGENATION/RESPIRATORY FUNCTION  Goal: Patient will maintain patent airway  5/21/2020 1326 by Nehemias Mckeon RN  Outcome: Ongoing  Goal: Patient will achieve/maintain normal respiratory rate/effort  Description: Respiratory rate and effort will be within normal limits for the patient  5/21/2020 1326 by Nehemias Mckeon RN  Outcome: Ongoing     Problem: HEMODYNAMIC STATUS  Goal: Patient has stable vital signs and fluid balance  5/21/2020 1326 by Nehemias Mckeon RN  Outcome: Ongoing     Problem: FLUID AND ELECTROLYTE IMBALANCE  Goal: Fluid and electrolyte balance are achieved/maintained  5/22/2020 0251 by Maryann Buchanan RN  Outcome: Ongoing  Note: Educated patient/family on CHF signs/ symptoms, causes, treatments, limited fluid intake, daily weights, low sodium diet, and follow-up.   Pt to call attending physician if weight gain of 3 pounds in one day or 5

## 2020-05-23 LAB
ANION GAP SERPL CALCULATED.3IONS-SCNC: 10 MMOL/L (ref 3–16)
BASOPHILS ABSOLUTE: 0.1 K/UL (ref 0–0.2)
BASOPHILS RELATIVE PERCENT: 1 %
BUN BLDV-MCNC: 16 MG/DL (ref 7–20)
CALCIUM SERPL-MCNC: 8.2 MG/DL (ref 8.3–10.6)
CHLORIDE BLD-SCNC: 104 MMOL/L (ref 99–110)
CO2: 21 MMOL/L (ref 21–32)
CREAT SERPL-MCNC: 1.2 MG/DL (ref 0.8–1.3)
EOSINOPHILS ABSOLUTE: 0.2 K/UL (ref 0–0.6)
EOSINOPHILS RELATIVE PERCENT: 2.7 %
GFR AFRICAN AMERICAN: >60
GFR NON-AFRICAN AMERICAN: >60
GLUCOSE BLD-MCNC: 113 MG/DL (ref 70–99)
GLUCOSE BLD-MCNC: 126 MG/DL (ref 70–99)
GLUCOSE BLD-MCNC: 130 MG/DL (ref 70–99)
GLUCOSE BLD-MCNC: 135 MG/DL (ref 70–99)
GLUCOSE BLD-MCNC: 141 MG/DL (ref 70–99)
GLUCOSE BLD-MCNC: 145 MG/DL (ref 70–99)
HCT VFR BLD CALC: 38.8 % (ref 40.5–52.5)
HEMOGLOBIN: 12.9 G/DL (ref 13.5–17.5)
LYMPHOCYTES ABSOLUTE: 2.5 K/UL (ref 1–5.1)
LYMPHOCYTES RELATIVE PERCENT: 41.2 %
MCH RBC QN AUTO: 33.5 PG (ref 26–34)
MCHC RBC AUTO-ENTMCNC: 33.3 G/DL (ref 31–36)
MCV RBC AUTO: 100.5 FL (ref 80–100)
MONOCYTES ABSOLUTE: 0.4 K/UL (ref 0–1.3)
MONOCYTES RELATIVE PERCENT: 7.2 %
NEUTROPHILS ABSOLUTE: 2.9 K/UL (ref 1.7–7.7)
NEUTROPHILS RELATIVE PERCENT: 47.9 %
PDW BLD-RTO: 15.2 % (ref 12.4–15.4)
PERFORMED ON: ABNORMAL
PLATELET # BLD: 170 K/UL (ref 135–450)
PMV BLD AUTO: 9 FL (ref 5–10.5)
POTASSIUM REFLEX MAGNESIUM: 3.7 MMOL/L (ref 3.5–5.1)
RBC # BLD: 3.86 M/UL (ref 4.2–5.9)
SARS-COV-2, NAAT: NOT DETECTED
SODIUM BLD-SCNC: 135 MMOL/L (ref 136–145)
TROPONIN: <0.01 NG/ML
TROPONIN: <0.01 NG/ML
WBC # BLD: 6.1 K/UL (ref 4–11)

## 2020-05-23 PROCEDURE — 80048 BASIC METABOLIC PNL TOTAL CA: CPT

## 2020-05-23 PROCEDURE — 6360000002 HC RX W HCPCS: Performed by: INTERNAL MEDICINE

## 2020-05-23 PROCEDURE — 85025 COMPLETE CBC W/AUTO DIFF WBC: CPT

## 2020-05-23 PROCEDURE — 6370000000 HC RX 637 (ALT 250 FOR IP): Performed by: INTERNAL MEDICINE

## 2020-05-23 PROCEDURE — 2580000003 HC RX 258: Performed by: INTERNAL MEDICINE

## 2020-05-23 PROCEDURE — 36415 COLL VENOUS BLD VENIPUNCTURE: CPT

## 2020-05-23 PROCEDURE — 2060000000 HC ICU INTERMEDIATE R&B

## 2020-05-23 PROCEDURE — 93005 ELECTROCARDIOGRAM TRACING: CPT | Performed by: INTERNAL MEDICINE

## 2020-05-23 PROCEDURE — U0002 COVID-19 LAB TEST NON-CDC: HCPCS

## 2020-05-23 PROCEDURE — 84484 ASSAY OF TROPONIN QUANT: CPT

## 2020-05-23 PROCEDURE — 99233 SBSQ HOSP IP/OBS HIGH 50: CPT | Performed by: INTERNAL MEDICINE

## 2020-05-23 RX ORDER — SPIRONOLACTONE 25 MG/1
25 TABLET ORAL DAILY
Qty: 30 TABLET | Refills: 0 | Status: ON HOLD | OUTPATIENT
Start: 2020-05-24 | End: 2020-07-16 | Stop reason: HOSPADM

## 2020-05-23 RX ORDER — POTASSIUM CHLORIDE 750 MG/1
10 TABLET, FILM COATED, EXTENDED RELEASE ORAL
Status: DISCONTINUED | OUTPATIENT
Start: 2020-05-23 | End: 2020-05-24 | Stop reason: HOSPADM

## 2020-05-23 RX ORDER — AMIODARONE HYDROCHLORIDE 200 MG/1
200 TABLET ORAL DAILY
Qty: 30 TABLET | Refills: 0 | Status: ON HOLD | OUTPATIENT
Start: 2020-05-28 | End: 2020-07-16 | Stop reason: SDUPTHER

## 2020-05-23 RX ORDER — AMIODARONE HYDROCHLORIDE 200 MG/1
200 TABLET ORAL 2 TIMES DAILY
Qty: 8 TABLET | Refills: 0 | Status: ON HOLD | OUTPATIENT
Start: 2020-05-23 | End: 2020-06-18

## 2020-05-23 RX ORDER — ACETAMINOPHEN 325 MG/1
650 TABLET ORAL EVERY 6 HOURS PRN
Status: DISCONTINUED | OUTPATIENT
Start: 2020-05-23 | End: 2020-05-24 | Stop reason: HOSPADM

## 2020-05-23 RX ORDER — ACETAMINOPHEN 650 MG/1
650 SUPPOSITORY RECTAL EVERY 6 HOURS PRN
Status: DISCONTINUED | OUTPATIENT
Start: 2020-05-23 | End: 2020-05-24 | Stop reason: HOSPADM

## 2020-05-23 RX ORDER — POTASSIUM CHLORIDE 750 MG/1
10 TABLET, FILM COATED, EXTENDED RELEASE ORAL
Qty: 60 TABLET | Refills: 0 | Status: SHIPPED | OUTPATIENT
Start: 2020-05-23 | End: 2020-06-26 | Stop reason: CLARIF

## 2020-05-23 RX ADMIN — HYDRALAZINE HYDROCHLORIDE 25 MG: 25 TABLET, FILM COATED ORAL at 15:47

## 2020-05-23 RX ADMIN — OXYCODONE HYDROCHLORIDE AND ACETAMINOPHEN 1 TABLET: 10; 325 TABLET ORAL at 12:58

## 2020-05-23 RX ADMIN — FUROSEMIDE 20 MG: 20 TABLET ORAL at 08:32

## 2020-05-23 RX ADMIN — OXYCODONE HYDROCHLORIDE AND ACETAMINOPHEN 1 TABLET: 10; 325 TABLET ORAL at 08:32

## 2020-05-23 RX ADMIN — GABAPENTIN 600 MG: 300 CAPSULE ORAL at 10:57

## 2020-05-23 RX ADMIN — AMIODARONE HYDROCHLORIDE 200 MG: 200 TABLET ORAL at 09:59

## 2020-05-23 RX ADMIN — ISOSORBIDE MONONITRATE 60 MG: 60 TABLET, EXTENDED RELEASE ORAL at 10:57

## 2020-05-23 RX ADMIN — FAMOTIDINE 20 MG: 20 TABLET, FILM COATED ORAL at 21:51

## 2020-05-23 RX ADMIN — AMIODARONE HYDROCHLORIDE 200 MG: 200 TABLET ORAL at 21:51

## 2020-05-23 RX ADMIN — ENOXAPARIN SODIUM 40 MG: 40 INJECTION SUBCUTANEOUS at 08:34

## 2020-05-23 RX ADMIN — GABAPENTIN 600 MG: 300 CAPSULE ORAL at 21:51

## 2020-05-23 RX ADMIN — OXYCODONE HYDROCHLORIDE AND ACETAMINOPHEN 1 TABLET: 10; 325 TABLET ORAL at 04:32

## 2020-05-23 RX ADMIN — HYDRALAZINE HYDROCHLORIDE 25 MG: 25 TABLET, FILM COATED ORAL at 21:51

## 2020-05-23 RX ADMIN — HYDRALAZINE HYDROCHLORIDE 25 MG: 25 TABLET, FILM COATED ORAL at 06:08

## 2020-05-23 RX ADMIN — GABAPENTIN 600 MG: 300 CAPSULE ORAL at 00:09

## 2020-05-23 RX ADMIN — POTASSIUM CHLORIDE 10 MEQ: 750 TABLET, FILM COATED, EXTENDED RELEASE ORAL at 12:02

## 2020-05-23 RX ADMIN — SODIUM CHLORIDE, PRESERVATIVE FREE 10 ML: 5 INJECTION INTRAVENOUS at 08:34

## 2020-05-23 RX ADMIN — GABAPENTIN 600 MG: 300 CAPSULE ORAL at 15:47

## 2020-05-23 RX ADMIN — OXYCODONE HYDROCHLORIDE AND ACETAMINOPHEN 1 TABLET: 10; 325 TABLET ORAL at 21:51

## 2020-05-23 RX ADMIN — SODIUM CHLORIDE, PRESERVATIVE FREE 10 ML: 5 INJECTION INTRAVENOUS at 21:52

## 2020-05-23 RX ADMIN — FAMOTIDINE 20 MG: 20 TABLET, FILM COATED ORAL at 08:32

## 2020-05-23 RX ADMIN — OXYCODONE HYDROCHLORIDE AND ACETAMINOPHEN 1 TABLET: 10; 325 TABLET ORAL at 00:09

## 2020-05-23 RX ADMIN — LISINOPRIL 10 MG: 10 TABLET ORAL at 09:59

## 2020-05-23 RX ADMIN — GABAPENTIN 600 MG: 300 CAPSULE ORAL at 06:08

## 2020-05-23 RX ADMIN — CLOPIDOGREL BISULFATE 75 MG: 75 TABLET ORAL at 08:32

## 2020-05-23 RX ADMIN — TRAZODONE HYDROCHLORIDE 50 MG: 50 TABLET ORAL at 21:51

## 2020-05-23 RX ADMIN — OXYCODONE HYDROCHLORIDE AND ACETAMINOPHEN 1 TABLET: 10; 325 TABLET ORAL at 17:36

## 2020-05-23 RX ADMIN — ATORVASTATIN CALCIUM 80 MG: 80 TABLET, FILM COATED ORAL at 08:32

## 2020-05-23 RX ADMIN — GABAPENTIN 600 MG: 300 CAPSULE ORAL at 19:37

## 2020-05-23 RX ADMIN — RANOLAZINE 1000 MG: 500 TABLET, FILM COATED, EXTENDED RELEASE ORAL at 21:51

## 2020-05-23 RX ADMIN — RANOLAZINE 1000 MG: 500 TABLET, FILM COATED, EXTENDED RELEASE ORAL at 08:32

## 2020-05-23 RX ADMIN — SPIRONOLACTONE 25 MG: 25 TABLET ORAL at 08:33

## 2020-05-23 RX ADMIN — TIZANIDINE 4 MG: 4 TABLET ORAL at 10:58

## 2020-05-23 RX ADMIN — ASPIRIN 325 MG ORAL TABLET 325 MG: 325 PILL ORAL at 08:31

## 2020-05-23 RX ADMIN — METOPROLOL SUCCINATE 100 MG: 50 TABLET, EXTENDED RELEASE ORAL at 10:57

## 2020-05-23 ASSESSMENT — PAIN SCALES - GENERAL
PAINLEVEL_OUTOF10: 2
PAINLEVEL_OUTOF10: 0
PAINLEVEL_OUTOF10: 6
PAINLEVEL_OUTOF10: 2
PAINLEVEL_OUTOF10: 6
PAINLEVEL_OUTOF10: 6
PAINLEVEL_OUTOF10: 4
PAINLEVEL_OUTOF10: 6
PAINLEVEL_OUTOF10: 7
PAINLEVEL_OUTOF10: 0
PAINLEVEL_OUTOF10: 6
PAINLEVEL_OUTOF10: 4

## 2020-05-23 ASSESSMENT — PAIN DESCRIPTION - ONSET
ONSET: ON-GOING

## 2020-05-23 ASSESSMENT — PAIN DESCRIPTION - DESCRIPTORS
DESCRIPTORS: SHARP
DESCRIPTORS: CRAMPING
DESCRIPTORS: ACHING;DISCOMFORT
DESCRIPTORS: ACHING;DISCOMFORT
DESCRIPTORS: SHARP
DESCRIPTORS: SHARP

## 2020-05-23 ASSESSMENT — PAIN DESCRIPTION - LOCATION
LOCATION: CHEST
LOCATION: CHEST;BACK
LOCATION: CHEST
LOCATION: CHEST
LOCATION: BACK
LOCATION: CHEST;BACK

## 2020-05-23 ASSESSMENT — PAIN - FUNCTIONAL ASSESSMENT
PAIN_FUNCTIONAL_ASSESSMENT: PREVENTS OR INTERFERES SOME ACTIVE ACTIVITIES AND ADLS

## 2020-05-23 ASSESSMENT — PAIN DESCRIPTION - PAIN TYPE
TYPE: ACUTE PAIN
TYPE: CHRONIC PAIN
TYPE: CHRONIC PAIN
TYPE: ACUTE PAIN
TYPE: ACUTE PAIN
TYPE: CHRONIC PAIN

## 2020-05-23 ASSESSMENT — PAIN DESCRIPTION - FREQUENCY
FREQUENCY: INTERMITTENT
FREQUENCY: CONTINUOUS

## 2020-05-23 ASSESSMENT — PAIN DESCRIPTION - ORIENTATION
ORIENTATION: LOWER

## 2020-05-23 ASSESSMENT — PAIN SCALES - WONG BAKER: WONGBAKER_NUMERICALRESPONSE: 4

## 2020-05-23 ASSESSMENT — PAIN DESCRIPTION - PROGRESSION: CLINICAL_PROGRESSION: NOT CHANGED

## 2020-05-23 NOTE — PROGRESS NOTES
Hospitalist Progress Note      PCP: Suellen Moon MD    Date of Admission: 5/20/2020      Subjective: having some chest pain, no palpitation, no nausea, vomiting or abdominal pain. Medications:  Reviewed    Infusion Medications    dextrose       Scheduled Medications    potassium chloride  10 mEq Oral Daily with breakfast    nicotine  1 patch Transdermal Daily    traZODone  50 mg Oral Nightly    ranolazine  1,000 mg Oral BID    metoprolol succinate  100 mg Oral Daily    lisinopril  10 mg Oral Daily    isosorbide mononitrate  60 mg Oral Daily    insulin glargine  8 Units Subcutaneous Nightly    hydrALAZINE  25 mg Oral 3 times per day    gabapentin  600 mg Oral 5x Daily    furosemide  20 mg Oral Daily    famotidine  20 mg Oral BID    clopidogrel  75 mg Oral Daily    atorvastatin  80 mg Oral Daily    aspirin  325 mg Oral Daily    sodium chloride flush  10 mL Intravenous 2 times per day    insulin lispro  0-12 Units Subcutaneous TID WC    insulin lispro  0-6 Units Subcutaneous Nightly    enoxaparin  40 mg Subcutaneous Daily    spironolactone  25 mg Oral Daily    amiodarone  200 mg Oral BID    Followed by   Es Lacey ON 5/28/2020] amiodarone  200 mg Oral Daily     PRN Meds: tiZANidine, oxyCODONE-acetaminophen, ibuprofen, albuterol, acetaminophen, sodium chloride flush, polyethylene glycol, ondansetron, glucose, dextrose, glucagon (rDNA), dextrose, hydrALAZINE      Intake/Output Summary (Last 24 hours) at 5/23/2020 1217  Last data filed at 5/23/2020 1206  Gross per 24 hour   Intake 1920 ml   Output 1500 ml   Net 420 ml       Physical Exam Performed:    /69   Pulse 65   Temp 97.6 °F (36.4 °C) (Oral)   Resp 16   Ht 6' (1.829 m)   Wt 194 lb 7.1 oz (88.2 kg)   SpO2 96%   BMI 26.37 kg/m²     General appearance:  No apparent distress  HEENT:  Normal cephalic  Neck: Supple  Respiratory:  Normal respiratory effort.  Clear to auscultation, bilaterally without

## 2020-05-23 NOTE — CONSULTS
Office: [phone]       Fax: [phone]      Nephrology Initial Consult Note        Patient's Name: Eve Cornejo  Admit Date: 5/20/2020  Date of Visit: 5/23/2020    Reason for Consult: LALITA  Requesting Physician:  Ruiz Ludwig MD  PCP: Cornelia Beckman MD    Chief Complaint:  chest pain      History of Present Illness:      Eve Cornejo is a 59 y.o. male with PMHx of hypertension, hyperlipidemia, coronary artery disease, coronary artery bypass grafting, diabetes mellitus, HFrEF, ICD who was hospitalized on 5/20/2020 with complaints of chest pain after ICD fired. Patient was scheduled to see me in clinic on 5/21. He was seen in hospital in April 2020 for LALITA (peak creat 3.5) that was thought to be due to low BP and concomitant use of NSAID. Patient has been seen by cardiology. He is on amiodarone, aldactone. Per pt, he was feeling fine this AM but now suddenly feels dizzy. No shortness of breath.   Currently not taking NSAID     Past Medical History:   Diagnosis Date    Anxiety     Arthritis     Asthma     CAD (coronary artery disease)     Calcium kidney stone     Cardiomyopathy ([address]     Cerebral artery occlusion with cerebral infarction ([address]     CHF (congestive heart failure) ([address]     Clostridium difficile diarrhea 02/12/2020    [facility]ch[facility] obstructive [facility])     mild    Depression     DM2 (diabetes mellitus, type 2) (HCC)     Fibromyalgia     GERD (gastroesophageal reflux disease)     Hyperlipidemia     Hypertension     Liver disease     Pacemaker 2012    [employer] model # [id number]    Pneumonia     Seizures ([address]     TIA (transient ischemic attack) 2007    Ulcerative colitis ([address]        Past Surgical History:   Procedure Laterality Date    APPENDECTOMY      BACK SURGERY     

## 2020-05-23 NOTE — PROGRESS NOTES
[facility] 81   Daily Progress Note      Admit Date:  5/20/2020    CC: \" I feel much better today\"  This is a 70-year-old male with a complex  cardiac history and numerous admissions, who has a history of  hypertension, diabetes, hyperlipidemia, coronary artery disease status  post CABG, status post PCI; COPD, who presented to the hospital after he  felt the defibrillator went off. He noticed that the day before his  admission and again on the day of his admission and he came to the  emergency room. He has his device interrogated which did reveal a run  of ventricular tachycardia but he did not have his device fired. He  also complains of slightly increased shortness of breath though denies  any chest tightness, pressure to me, or jaw pain. He has been smoking  significantly lately. He has no orthopnea or PND. No leg edema. No  fever, chills, or rigors. Subjective:  Complains of diffuse aches and pain, severe headache, no significant ventricular ectopy overnight     Objective:   /60   Pulse 55   Temp 97.4 °F (36.3 °C) (Oral)   Resp 17   Ht 6' (1.829 m)   Wt 194 lb 7.1 oz (88.2 kg)   SpO2 95%   BMI 26.37 kg/m²       Intake/Output Summary (Last 24 hours) at 5/23/2020 1847  Last data filed at 5/23/2020 1403  Gross per 24 hour   Intake 1920 ml   Output 1425 ml   Net 495 ml     Wt Readings from Last 3 Encounters:   05/23/20 194 lb 7.1 oz (88.2 kg)   05/19/20 185 lb 13.6 oz (84.3 kg)   05/15/20 170 lb (77.1 kg)     Telemetry:NSR    Physical Exam:  General:  NAD, Awake, alert and oriented X4  Skin:  Warm and dry  Neck:  Supple, no JVP appreciated, no bruit  Chest:  Clear to auscultation, no wheezes/rhonchi. Few rales  Cardiovascular:  Regular rate.  S1S2  Abdomen:  Soft, nontender, +bowel sounds  Extremities:  No LE edema    Cardiac Diagnosis:  diabetes, hypertension, hyperlipidemia, coronary artery disease and CHF    Medications:    potassium chloride  10 mEq Oral Daily with - c/w amio/beta blockade  - outpatient follow up     2) CAD  - GDMT   - asa/statin/plavix/beta blockade   -recent nuclear stress ( 5/7/20) negative for ischemia, troponin negative     2) Chronic LV systolic heart failure   - NYHA class III, Stage C  - well compensated  - beta blockade, bidil, acei, aldactone   - low dose lasix     3) Smoking  addiction   -strongly urged him to work on smoking cessation    Cleared for discharge from cardiology     Outpatient follow up with Dr. Remington Glaser in one week     Latesha Coats MD [address] [facility] [address]   (C): [phone]  (O): [phone]

## 2020-05-24 VITALS
HEART RATE: 61 BPM | DIASTOLIC BLOOD PRESSURE: 69 MMHG | OXYGEN SATURATION: 96 % | BODY MASS INDEX: 26.43 KG/M2 | WEIGHT: 195.11 LBS | TEMPERATURE: 98.2 F | RESPIRATION RATE: 14 BRPM | HEIGHT: 72 IN | SYSTOLIC BLOOD PRESSURE: 170 MMHG

## 2020-05-24 LAB
ANION GAP SERPL CALCULATED.3IONS-SCNC: 9 MMOL/L (ref 3–16)
BASOPHILS ABSOLUTE: 0.1 K/UL (ref 0–0.2)
BASOPHILS RELATIVE PERCENT: 1 %
BUN BLDV-MCNC: 16 MG/DL (ref 7–20)
CALCIUM SERPL-MCNC: 8.1 MG/DL (ref 8.3–10.6)
CHLORIDE BLD-SCNC: 108 MMOL/L (ref 99–110)
CO2: 23 MMOL/L (ref 21–32)
CREAT SERPL-MCNC: 1.2 MG/DL (ref 0.8–1.3)
EOSINOPHILS ABSOLUTE: 0.2 K/UL (ref 0–0.6)
EOSINOPHILS RELATIVE PERCENT: 3.2 %
GFR AFRICAN AMERICAN: >60
GFR NON-AFRICAN AMERICAN: >60
GLUCOSE BLD-MCNC: 132 MG/DL (ref 70–99)
GLUCOSE BLD-MCNC: 133 MG/DL (ref 70–99)
HCT VFR BLD CALC: 36.8 % (ref 40.5–52.5)
HEMOGLOBIN: 12.4 G/DL (ref 13.5–17.5)
LYMPHOCYTES ABSOLUTE: 1.9 K/UL (ref 1–5.1)
LYMPHOCYTES RELATIVE PERCENT: 37.6 %
MCH RBC QN AUTO: 33.9 PG (ref 26–34)
MCHC RBC AUTO-ENTMCNC: 33.8 G/DL (ref 31–36)
MCV RBC AUTO: 100.5 FL (ref 80–100)
MONOCYTES ABSOLUTE: 0.4 K/UL (ref 0–1.3)
MONOCYTES RELATIVE PERCENT: 8.2 %
NEUTROPHILS ABSOLUTE: 2.5 K/UL (ref 1.7–7.7)
NEUTROPHILS RELATIVE PERCENT: 50 %
PDW BLD-RTO: 15.3 % (ref 12.4–15.4)
PERFORMED ON: ABNORMAL
PLATELET # BLD: 131 K/UL (ref 135–450)
PMV BLD AUTO: 9.2 FL (ref 5–10.5)
POTASSIUM REFLEX MAGNESIUM: 4.6 MMOL/L (ref 3.5–5.1)
RBC # BLD: 3.66 M/UL (ref 4.2–5.9)
SODIUM BLD-SCNC: 140 MMOL/L (ref 136–145)
WBC # BLD: 5.1 K/UL (ref 4–11)

## 2020-05-24 PROCEDURE — 6370000000 HC RX 637 (ALT 250 FOR IP): Performed by: INTERNAL MEDICINE

## 2020-05-24 PROCEDURE — 85025 COMPLETE CBC W/AUTO DIFF WBC: CPT

## 2020-05-24 PROCEDURE — 6360000002 HC RX W HCPCS: Performed by: INTERNAL MEDICINE

## 2020-05-24 PROCEDURE — 80048 BASIC METABOLIC PNL TOTAL CA: CPT

## 2020-05-24 PROCEDURE — 36415 COLL VENOUS BLD VENIPUNCTURE: CPT

## 2020-05-24 RX ADMIN — METOPROLOL SUCCINATE 100 MG: 50 TABLET, EXTENDED RELEASE ORAL at 09:47

## 2020-05-24 RX ADMIN — OXYCODONE HYDROCHLORIDE AND ACETAMINOPHEN 1 TABLET: 10; 325 TABLET ORAL at 05:56

## 2020-05-24 RX ADMIN — ISOSORBIDE MONONITRATE 60 MG: 60 TABLET, EXTENDED RELEASE ORAL at 10:05

## 2020-05-24 RX ADMIN — ENOXAPARIN SODIUM 40 MG: 40 INJECTION SUBCUTANEOUS at 08:01

## 2020-05-24 RX ADMIN — ASPIRIN 325 MG ORAL TABLET 325 MG: 325 PILL ORAL at 08:03

## 2020-05-24 RX ADMIN — POTASSIUM CHLORIDE 10 MEQ: 750 TABLET, FILM COATED, EXTENDED RELEASE ORAL at 08:03

## 2020-05-24 RX ADMIN — LISINOPRIL 10 MG: 10 TABLET ORAL at 09:47

## 2020-05-24 RX ADMIN — AMIODARONE HYDROCHLORIDE 200 MG: 200 TABLET ORAL at 10:05

## 2020-05-24 RX ADMIN — ATORVASTATIN CALCIUM 80 MG: 80 TABLET, FILM COATED ORAL at 08:04

## 2020-05-24 RX ADMIN — HYDRALAZINE HYDROCHLORIDE 25 MG: 25 TABLET, FILM COATED ORAL at 05:56

## 2020-05-24 RX ADMIN — RANOLAZINE 1000 MG: 500 TABLET, FILM COATED, EXTENDED RELEASE ORAL at 08:03

## 2020-05-24 RX ADMIN — SPIRONOLACTONE 25 MG: 25 TABLET ORAL at 08:04

## 2020-05-24 RX ADMIN — FAMOTIDINE 20 MG: 20 TABLET, FILM COATED ORAL at 08:03

## 2020-05-24 RX ADMIN — OXYCODONE HYDROCHLORIDE AND ACETAMINOPHEN 1 TABLET: 10; 325 TABLET ORAL at 10:04

## 2020-05-24 RX ADMIN — FUROSEMIDE 20 MG: 20 TABLET ORAL at 08:04

## 2020-05-24 RX ADMIN — GABAPENTIN 600 MG: 300 CAPSULE ORAL at 05:56

## 2020-05-24 RX ADMIN — CLOPIDOGREL BISULFATE 75 MG: 75 TABLET ORAL at 08:04

## 2020-05-24 ASSESSMENT — PAIN SCALES - GENERAL
PAINLEVEL_OUTOF10: 7
PAINLEVEL_OUTOF10: 6
PAINLEVEL_OUTOF10: 0

## 2020-05-24 ASSESSMENT — PAIN DESCRIPTION - PROGRESSION: CLINICAL_PROGRESSION: NOT CHANGED

## 2020-05-24 ASSESSMENT — PAIN - FUNCTIONAL ASSESSMENT: PAIN_FUNCTIONAL_ASSESSMENT: PREVENTS OR INTERFERES SOME ACTIVE ACTIVITIES AND ADLS

## 2020-05-24 ASSESSMENT — PAIN DESCRIPTION - ORIENTATION: ORIENTATION: LOWER

## 2020-05-24 ASSESSMENT — PAIN DESCRIPTION - LOCATION
LOCATION: CHEST
LOCATION: BACK

## 2020-05-24 ASSESSMENT — PAIN DESCRIPTION - DESCRIPTORS
DESCRIPTORS: DISCOMFORT
DESCRIPTORS: SHARP

## 2020-05-24 ASSESSMENT — PAIN DESCRIPTION - PAIN TYPE
TYPE: ACUTE PAIN
TYPE: CHRONIC PAIN

## 2020-05-24 ASSESSMENT — PAIN DESCRIPTION - ONSET
ONSET: ON-GOING
ONSET: ON-GOING

## 2020-05-24 ASSESSMENT — PAIN DESCRIPTION - FREQUENCY
FREQUENCY: CONTINUOUS
FREQUENCY: INTERMITTENT

## 2020-05-24 NOTE — DISCHARGE SUMMARY
discharged to home. He was admitted for chest pain again on 4/20/2020 and left AMA on 4/23/2020 after his symptoms subsided and he was feeling better. He was admitted again on 5/6/2020 and was discharged on 5/7/2020 after a nuclear stress test showed a fixed defect and a stable ejection fraction of 41%. Cardiology saw the patient on that admission as well. He presents to the emergency room today after he felt as though his defibrillator fired at approximately 1015 this evening. It was accompanied by severe chest pain. He took 324 mg of aspirin, along with nitroglycerin and EMS gave him fentanyl in route. [employer] confirmed episode of Ventricular Tachycardia with NO defibrillator shocks at the time of his symptoms. He is being admitted for further evaluation and treatment. Associated signs and symptoms do not include typical chest pain, shortness of breath, diaphoresis, edema, orthopnea, paroxysmal nocturnal dyspnea, fever or chills. \"      1. Ventricular tachycardia, [employer] confirmed episode of Ventricular Tachycardia for 114 secondaswith NO defibrillator shocks reported at the time, but patient adamant that the defibrillator fired up 2 times, added amiodarone and spironolactone per cardiology. doing good, follow up with cardiology as outpatient. 2. LALITA, improved, nephrology consulted, could be related to tachycardia and hypoperfusion due to that on admission.    3. Essential (primary) hypertension, po medications. 4. Coronary artery disease involving native coronary artery, had more chest pain yesterday, EKG checked, troponin negative, seen by cardiology, ok to discharge, resolved pain. 5. Ischemic cardiomyopathy with chronic systolic (congestive) heart failure, no acute exacerbation. 6. COPD, no obvious exacerbation. 7. Diabetes mellitus II, Lantus, SSI and a CCD  8.  Tobacco smoking counseled for cessation.   .        Physical Exam Performed:     BP (!) 103/58   Pulse 62   Temp 98.2 °F (36.8 °C) Refills: 0      spironolactone (ALDACTONE) 25 MG tablet Take 1 tablet by mouth daily  Qty: 30 tablet, Refills: 0      potassium chloride (KLOR-CON) 10 MEQ extended release tablet Take 1 tablet by mouth daily (with breakfast)  Qty: 60 tablet, Refills: 0       !! - Potential duplicate medications found. Please discuss with provider.            Details   tiZANidine (ZANAFLEX) 2 MG tablet TAKE 1 TABLET BY MOUTH EVERY 8 HOURS AS NEEDED FOR MUSCLE SPASM  Qty: 30 tablet, Refills: 0      traZODone (DESYREL) 50 MG tablet TAKE 1 TABLET BY MOUTH EVERY NIGHT AT BEDTIME  Qty: 30 tablet, Refills: 2    Associated Diagnoses: Primary insomnia      gabapentin (NEURONTIN) 600 MG tablet TAKE 1 TABLET BY MOUTH FIVE TIMES DAILY  Qty: 150 tablet, Refills: 1    Associated Diagnoses: Neuropathy      famotidine (PEPCID) 20 MG tablet Take 1 tablet by mouth 2 times daily  Qty: 30 tablet, Refills: 0      ibuprofen (ADVIL;MOTRIN) 400 MG tablet Take 1 tablet by mouth every 6 hours as needed for Pain  Qty: 20 tablet, Refills: 0      insulin glargine (LANTUS) 100 UNIT/ML injection vial Inject 8 Units into the skin nightly      metoprolol succinate (TOPROL XL) 100 MG extended release tablet Take 1 tablet by mouth daily  Qty: 30 tablet, Refills: 3      lisinopril (PRINIVIL;ZESTRIL) 10 MG tablet Take 1 tablet by mouth daily  Qty: 30 tablet, Refills: 3      ranolazine (RANEXA) 500 MG extended release tablet Take 1,000 mg by mouth 2 times daily      furosemide (LASIX) 20 MG tablet Take 20 mg by mouth daily      albuterol sulfate HFA (PROAIR HFA) 108 (90 Base) MCG/ACT inhaler Inhale 2 puffs into the lungs every 6 hours as needed for Wheezing      empagliflozin (JARDIANCE) 10 MG tablet Take 1 tablet by mouth daily  Qty: 28 tablet, Refills: 0      isosorbide mononitrate (IMDUR) 30 MG extended release tablet Take 60 mg by mouth daily       hydrALAZINE (APRESOLINE) 25 MG tablet Take 1 tablet by mouth every 8 hours  Qty: 90 tablet, Refills: 3 acetaminophen (TYLENOL) 325 MG tablet Take 650 mg by mouth every 6 hours as needed for Pain      clopidogrel (PLAVIX) 75 MG tablet TAKE 1 TABLET BY MOUTH DAILY  Qty: 90 tablet, Refills: 3      nitroGLYCERIN (NITROSTAT) 0.4 MG SL tablet PLACE 1 TABLET UNDER THE TONGUE EVERY 5 MINUTES AS NEEDED FOR CHEST PAIN  Qty: 25 tablet, Refills: 1      aspirin 325 MG tablet Take 325 mg by mouth daily       atorvastatin (LIPITOR) 80 MG tablet TAKE 1 TABLET BY MOUTH DAILY  Qty: 90 tablet, Refills: 1    Associated Diagnoses: Coronary artery disease involving native heart without angina pectoris, unspecified vessel or lesion type             Time Spent on discharge is more than 1 hour in the examination, evaluation, counseling and review of medications and discharge plan. Signed:    Shira Howard MD   5/24/2020      Thank you Jerilyn Craig MD for the opportunity to be involved in this patient's care. If you have any questions or concerns please feel free to contact me at [phone].

## 2020-05-25 LAB
EKG ATRIAL RATE: 64 BPM
EKG DIAGNOSIS: NORMAL
EKG P AXIS: 60 DEGREES
EKG P-R INTERVAL: 158 MS
EKG Q-T INTERVAL: 484 MS
EKG QRS DURATION: 162 MS
EKG QTC CALCULATION (BAZETT): 499 MS
EKG R AXIS: 65 DEGREES
EKG T AXIS: 75 DEGREES
EKG VENTRICULAR RATE: 64 BPM

## 2020-05-25 PROCEDURE — 93010 ELECTROCARDIOGRAM REPORT: CPT | Performed by: INTERNAL MEDICINE

## 2020-05-26 ENCOUNTER — CARE COORDINATION (OUTPATIENT)
Dept: CASE MANAGEMENT | Age: 65
End: 2020-05-26

## 2020-05-26 ENCOUNTER — TELEPHONE (OUTPATIENT)
Dept: CARDIOLOGY CLINIC | Age: 65
End: 2020-05-26

## 2020-05-26 NOTE — TELEPHONE ENCOUNTER
I left a message for Ruy to call the office back to schedule a 2 week hospital follow up with Dr. Shabbir Hay.

## 2020-05-26 NOTE — CARE COORDINATION
[facility] Transitions Initial Follow Up Call    Call within 2 business days of discharge: Yes    Patient: Jorge Cannon Patient : 1955   MRN: [id number]  Reason for Admission: CP  Discharge Date: 20 RARS: Readmission Risk Score: 62      Last Discharge [facility]       Complaint Diagnosis Description Type Department Provider    20 Chest Pain Chest pain, unspecified type . .. ED to Hosp-Admission (Discharged) (ADMITTED) Randy Dale MD; Francesco Thompson . .. Facility: [facility]    Non-face-to-face services provided:  Obtained and reviewed discharge summary and/or continuity of care documents    Care Transitions 24 Hour Call    Do you have all of your prescriptions and are they filled?:  Yes  Do you have support at home?:  Partner/Spouse/SO  Are you an active caregiver in your home?:  No  Care Transitions Interventions       Attempted to reach patient via phone for initial post hospital transition call. VM left stating purpose of call along with my contact information requesting a return call.       Follow Up  Future Appointments   Date Time Provider Fred Gregory   2020 10:30 AM Luke Hussein MD [facility]       Natasha Hong, RN

## 2020-05-27 ENCOUNTER — CARE COORDINATION (OUTPATIENT)
Dept: CASE MANAGEMENT | Age: 65
End: 2020-05-27

## 2020-05-28 ENCOUNTER — CARE COORDINATION (OUTPATIENT)
Dept: CASE MANAGEMENT | Age: 65
End: 2020-05-28

## 2020-06-17 ENCOUNTER — APPOINTMENT (OUTPATIENT)
Dept: CT IMAGING | Age: 65
DRG: 176 | End: 2020-06-17
Payer: MEDICARE

## 2020-06-17 ENCOUNTER — HOSPITAL ENCOUNTER (INPATIENT)
Age: 65
LOS: 6 days | Discharge: HOME | DRG: 176 | End: 2020-06-23
Attending: INTERNAL MEDICINE | Admitting: INTERNAL MEDICINE
Payer: MEDICARE

## 2020-06-17 ENCOUNTER — APPOINTMENT (OUTPATIENT)
Dept: GENERAL RADIOLOGY | Age: 65
DRG: 176 | End: 2020-06-17
Payer: MEDICARE

## 2020-06-17 PROBLEM — I16.0 HYPERTENSIVE URGENCY: Status: ACTIVE | Noted: 2020-06-17

## 2020-06-17 LAB
A/G RATIO: 1.5 (ref 1.1–2.2)
ALBUMIN SERPL-MCNC: 4.1 G/DL (ref 3.4–5)
ALP BLD-CCNC: 54 U/L (ref 40–129)
ALT SERPL-CCNC: <5 U/L (ref 10–40)
ANION GAP SERPL CALCULATED.3IONS-SCNC: 14 MMOL/L (ref 3–16)
APTT: 26.3 SEC (ref 24.2–36.2)
AST SERPL-CCNC: 7 U/L (ref 15–37)
BASOPHILS ABSOLUTE: 0.1 K/UL (ref 0–0.2)
BASOPHILS RELATIVE PERCENT: 1.3 %
BILIRUB SERPL-MCNC: 0.5 MG/DL (ref 0–1)
BUN BLDV-MCNC: 8 MG/DL (ref 7–20)
CALCIUM SERPL-MCNC: 8.7 MG/DL (ref 8.3–10.6)
CHLORIDE BLD-SCNC: 104 MMOL/L (ref 99–110)
CO2: 19 MMOL/L (ref 21–32)
CREAT SERPL-MCNC: 0.8 MG/DL (ref 0.8–1.3)
EOSINOPHILS ABSOLUTE: 0.1 K/UL (ref 0–0.6)
EOSINOPHILS RELATIVE PERCENT: 2 %
GFR AFRICAN AMERICAN: >60
GFR NON-AFRICAN AMERICAN: >60
GLOBULIN: 2.8 G/DL
GLUCOSE BLD-MCNC: 114 MG/DL (ref 70–99)
GLUCOSE BLD-MCNC: 142 MG/DL (ref 70–99)
HCT VFR BLD CALC: 44.4 % (ref 40.5–52.5)
HEMOGLOBIN: 14.9 G/DL (ref 13.5–17.5)
LYMPHOCYTES ABSOLUTE: 1.9 K/UL (ref 1–5.1)
LYMPHOCYTES RELATIVE PERCENT: 28.6 %
MAGNESIUM: 2 MG/DL (ref 1.8–2.4)
MCH RBC QN AUTO: 33.6 PG (ref 26–34)
MCHC RBC AUTO-ENTMCNC: 33.6 G/DL (ref 31–36)
MCV RBC AUTO: 100.1 FL (ref 80–100)
MONOCYTES ABSOLUTE: 0.4 K/UL (ref 0–1.3)
MONOCYTES RELATIVE PERCENT: 5.9 %
NEUTROPHILS ABSOLUTE: 4 K/UL (ref 1.7–7.7)
NEUTROPHILS RELATIVE PERCENT: 62.2 %
PDW BLD-RTO: 14.6 % (ref 12.4–15.4)
PERFORMED ON: ABNORMAL
PLATELET # BLD: 183 K/UL (ref 135–450)
PMV BLD AUTO: 8.7 FL (ref 5–10.5)
POTASSIUM SERPL-SCNC: 3.1 MMOL/L (ref 3.5–5.1)
PRO-BNP: 3723 PG/ML (ref 0–124)
RBC # BLD: 4.43 M/UL (ref 4.2–5.9)
SODIUM BLD-SCNC: 137 MMOL/L (ref 136–145)
TOTAL PROTEIN: 6.9 G/DL (ref 6.4–8.2)
TROPONIN: <0.01 NG/ML
WBC # BLD: 6.5 K/UL (ref 4–11)

## 2020-06-17 PROCEDURE — 1200000000 HC SEMI PRIVATE

## 2020-06-17 PROCEDURE — 83735 ASSAY OF MAGNESIUM: CPT

## 2020-06-17 PROCEDURE — 96374 THER/PROPH/DIAG INJ IV PUSH: CPT

## 2020-06-17 PROCEDURE — 6370000000 HC RX 637 (ALT 250 FOR IP): Performed by: INTERNAL MEDICINE

## 2020-06-17 PROCEDURE — 71046 X-RAY EXAM CHEST 2 VIEWS: CPT

## 2020-06-17 PROCEDURE — 99285 EMERGENCY DEPT VISIT HI MDM: CPT

## 2020-06-17 PROCEDURE — 84484 ASSAY OF TROPONIN QUANT: CPT

## 2020-06-17 PROCEDURE — 85730 THROMBOPLASTIN TIME PARTIAL: CPT

## 2020-06-17 PROCEDURE — 6360000002 HC RX W HCPCS: Performed by: PHYSICIAN ASSISTANT

## 2020-06-17 PROCEDURE — 96376 TX/PRO/DX INJ SAME DRUG ADON: CPT

## 2020-06-17 PROCEDURE — 83880 ASSAY OF NATRIURETIC PEPTIDE: CPT

## 2020-06-17 PROCEDURE — 96375 TX/PRO/DX INJ NEW DRUG ADDON: CPT

## 2020-06-17 PROCEDURE — 85025 COMPLETE CBC W/AUTO DIFF WBC: CPT

## 2020-06-17 PROCEDURE — 6370000000 HC RX 637 (ALT 250 FOR IP): Performed by: PHYSICIAN ASSISTANT

## 2020-06-17 PROCEDURE — 80053 COMPREHEN METABOLIC PANEL: CPT

## 2020-06-17 PROCEDURE — 6360000004 HC RX CONTRAST MEDICATION: Performed by: PHYSICIAN ASSISTANT

## 2020-06-17 PROCEDURE — 93005 ELECTROCARDIOGRAM TRACING: CPT | Performed by: PHYSICIAN ASSISTANT

## 2020-06-17 PROCEDURE — 71260 CT THORAX DX C+: CPT

## 2020-06-17 RX ORDER — GABAPENTIN 300 MG/1
600 CAPSULE ORAL
Status: DISCONTINUED | OUTPATIENT
Start: 2020-06-17 | End: 2020-06-23 | Stop reason: HOSPADM

## 2020-06-17 RX ORDER — HYDRALAZINE HYDROCHLORIDE 25 MG/1
25 TABLET, FILM COATED ORAL EVERY 8 HOURS SCHEDULED
Status: DISCONTINUED | OUTPATIENT
Start: 2020-06-17 | End: 2020-06-23 | Stop reason: HOSPADM

## 2020-06-17 RX ORDER — PANTOPRAZOLE SODIUM 40 MG/1
40 TABLET, DELAYED RELEASE ORAL
Status: DISCONTINUED | OUTPATIENT
Start: 2020-06-18 | End: 2020-06-23 | Stop reason: HOSPADM

## 2020-06-17 RX ORDER — ONDANSETRON 2 MG/ML
4 INJECTION INTRAMUSCULAR; INTRAVENOUS ONCE
Status: COMPLETED | OUTPATIENT
Start: 2020-06-17 | End: 2020-06-17

## 2020-06-17 RX ORDER — POLYETHYLENE GLYCOL 3350 17 G/17G
17 POWDER, FOR SOLUTION ORAL DAILY PRN
Status: DISCONTINUED | OUTPATIENT
Start: 2020-06-17 | End: 2020-06-20

## 2020-06-17 RX ORDER — ONDANSETRON 2 MG/ML
4 INJECTION INTRAMUSCULAR; INTRAVENOUS EVERY 4 HOURS PRN
Status: DISCONTINUED | OUTPATIENT
Start: 2020-06-17 | End: 2020-06-23 | Stop reason: HOSPADM

## 2020-06-17 RX ORDER — HEPARIN SODIUM 1000 [USP'U]/ML
40 INJECTION, SOLUTION INTRAVENOUS; SUBCUTANEOUS PRN
Status: DISCONTINUED | OUTPATIENT
Start: 2020-06-17 | End: 2020-06-17

## 2020-06-17 RX ORDER — FUROSEMIDE 20 MG/1
20 TABLET ORAL DAILY
Status: DISCONTINUED | OUTPATIENT
Start: 2020-06-18 | End: 2020-06-19

## 2020-06-17 RX ORDER — MORPHINE SULFATE 2 MG/ML
2 INJECTION, SOLUTION INTRAMUSCULAR; INTRAVENOUS ONCE
Status: COMPLETED | OUTPATIENT
Start: 2020-06-17 | End: 2020-06-17

## 2020-06-17 RX ORDER — ENALAPRILAT 2.5 MG/2ML
1.25 INJECTION INTRAVENOUS EVERY 6 HOURS PRN
Status: DISCONTINUED | OUTPATIENT
Start: 2020-06-17 | End: 2020-06-23 | Stop reason: HOSPADM

## 2020-06-17 RX ORDER — TRAZODONE HYDROCHLORIDE 50 MG/1
50 TABLET ORAL NIGHTLY
Status: DISCONTINUED | OUTPATIENT
Start: 2020-06-17 | End: 2020-06-23 | Stop reason: HOSPADM

## 2020-06-17 RX ORDER — ISOSORBIDE MONONITRATE 60 MG/1
60 TABLET, EXTENDED RELEASE ORAL DAILY
Status: DISCONTINUED | OUTPATIENT
Start: 2020-06-18 | End: 2020-06-23 | Stop reason: HOSPADM

## 2020-06-17 RX ORDER — CLOPIDOGREL BISULFATE 75 MG/1
75 TABLET ORAL DAILY
Status: DISCONTINUED | OUTPATIENT
Start: 2020-06-18 | End: 2020-06-20

## 2020-06-17 RX ORDER — DEXTROSE MONOHYDRATE 25 G/50ML
12.5 INJECTION, SOLUTION INTRAVENOUS PRN
Status: DISCONTINUED | OUTPATIENT
Start: 2020-06-17 | End: 2020-06-23 | Stop reason: HOSPADM

## 2020-06-17 RX ORDER — DEXTROSE MONOHYDRATE 50 MG/ML
100 INJECTION, SOLUTION INTRAVENOUS PRN
Status: DISCONTINUED | OUTPATIENT
Start: 2020-06-17 | End: 2020-06-23 | Stop reason: HOSPADM

## 2020-06-17 RX ORDER — ZOLPIDEM TARTRATE 5 MG/1
5 TABLET ORAL NIGHTLY PRN
Status: DISCONTINUED | OUTPATIENT
Start: 2020-06-17 | End: 2020-06-23 | Stop reason: HOSPADM

## 2020-06-17 RX ORDER — ACETAMINOPHEN 325 MG/1
650 TABLET ORAL EVERY 4 HOURS PRN
Status: DISCONTINUED | OUTPATIENT
Start: 2020-06-17 | End: 2020-06-23 | Stop reason: HOSPADM

## 2020-06-17 RX ORDER — AMIODARONE HYDROCHLORIDE 200 MG/1
200 TABLET ORAL DAILY
Status: DISCONTINUED | OUTPATIENT
Start: 2020-06-18 | End: 2020-06-23 | Stop reason: HOSPADM

## 2020-06-17 RX ORDER — ALBUTEROL SULFATE 2.5 MG/3ML
2.5 SOLUTION RESPIRATORY (INHALATION) EVERY 6 HOURS PRN
Status: DISCONTINUED | OUTPATIENT
Start: 2020-06-17 | End: 2020-06-23 | Stop reason: HOSPADM

## 2020-06-17 RX ORDER — METOPROLOL SUCCINATE 50 MG/1
100 TABLET, EXTENDED RELEASE ORAL DAILY
Status: DISCONTINUED | OUTPATIENT
Start: 2020-06-18 | End: 2020-06-23 | Stop reason: HOSPADM

## 2020-06-17 RX ORDER — POTASSIUM CHLORIDE AND SODIUM CHLORIDE 900; 300 MG/100ML; MG/100ML
INJECTION, SOLUTION INTRAVENOUS CONTINUOUS
Status: DISCONTINUED | OUTPATIENT
Start: 2020-06-17 | End: 2020-06-17

## 2020-06-17 RX ORDER — ACETAMINOPHEN 650 MG/1
650 SUPPOSITORY RECTAL EVERY 4 HOURS PRN
Status: DISCONTINUED | OUTPATIENT
Start: 2020-06-17 | End: 2020-06-23 | Stop reason: HOSPADM

## 2020-06-17 RX ORDER — ASPIRIN 81 MG/1
324 TABLET, CHEWABLE ORAL ONCE
Status: COMPLETED | OUTPATIENT
Start: 2020-06-17 | End: 2020-06-17

## 2020-06-17 RX ORDER — RANOLAZINE 500 MG/1
1000 TABLET, EXTENDED RELEASE ORAL 2 TIMES DAILY
Status: DISCONTINUED | OUTPATIENT
Start: 2020-06-17 | End: 2020-06-23 | Stop reason: HOSPADM

## 2020-06-17 RX ORDER — OXYCODONE AND ACETAMINOPHEN 10; 325 MG/1; MG/1
1 TABLET ORAL 3 TIMES DAILY PRN
Status: DISCONTINUED | OUTPATIENT
Start: 2020-06-17 | End: 2020-06-20

## 2020-06-17 RX ORDER — ATORVASTATIN CALCIUM 80 MG/1
80 TABLET, FILM COATED ORAL DAILY
Status: DISCONTINUED | OUTPATIENT
Start: 2020-06-18 | End: 2020-06-23 | Stop reason: HOSPADM

## 2020-06-17 RX ORDER — ASPIRIN 325 MG
325 TABLET ORAL DAILY
Status: DISCONTINUED | OUTPATIENT
Start: 2020-06-18 | End: 2020-06-19

## 2020-06-17 RX ORDER — HYDRALAZINE HYDROCHLORIDE 20 MG/ML
10 INJECTION INTRAMUSCULAR; INTRAVENOUS ONCE
Status: COMPLETED | OUTPATIENT
Start: 2020-06-17 | End: 2020-06-17

## 2020-06-17 RX ORDER — SODIUM CHLORIDE 0.9 % (FLUSH) 0.9 %
10 SYRINGE (ML) INJECTION PRN
Status: DISCONTINUED | OUTPATIENT
Start: 2020-06-17 | End: 2020-06-23 | Stop reason: HOSPADM

## 2020-06-17 RX ORDER — POTASSIUM CHLORIDE 750 MG/1
40 TABLET, FILM COATED, EXTENDED RELEASE ORAL ONCE
Status: COMPLETED | OUTPATIENT
Start: 2020-06-17 | End: 2020-06-17

## 2020-06-17 RX ORDER — HEPARIN SODIUM 1000 [USP'U]/ML
80 INJECTION, SOLUTION INTRAVENOUS; SUBCUTANEOUS PRN
Status: DISCONTINUED | OUTPATIENT
Start: 2020-06-17 | End: 2020-06-17

## 2020-06-17 RX ORDER — SODIUM CHLORIDE 0.9 % (FLUSH) 0.9 %
10 SYRINGE (ML) INJECTION EVERY 12 HOURS SCHEDULED
Status: DISCONTINUED | OUTPATIENT
Start: 2020-06-17 | End: 2020-06-23 | Stop reason: HOSPADM

## 2020-06-17 RX ORDER — HEPARIN SODIUM 1000 [USP'U]/ML
6600 INJECTION, SOLUTION INTRAVENOUS; SUBCUTANEOUS ONCE
Status: COMPLETED | OUTPATIENT
Start: 2020-06-17 | End: 2020-06-17

## 2020-06-17 RX ORDER — KETOROLAC TROMETHAMINE 30 MG/ML
30 INJECTION, SOLUTION INTRAMUSCULAR; INTRAVENOUS EVERY 6 HOURS PRN
Status: DISPENSED | OUTPATIENT
Start: 2020-06-17 | End: 2020-06-22

## 2020-06-17 RX ORDER — NICOTINE POLACRILEX 4 MG
15 LOZENGE BUCCAL PRN
Status: DISCONTINUED | OUTPATIENT
Start: 2020-06-17 | End: 2020-06-23 | Stop reason: HOSPADM

## 2020-06-17 RX ORDER — HEPARIN SODIUM 10000 [USP'U]/100ML
9.9 INJECTION, SOLUTION INTRAVENOUS CONTINUOUS
Status: DISPENSED | OUTPATIENT
Start: 2020-06-17 | End: 2020-06-19

## 2020-06-17 RX ORDER — LISINOPRIL 10 MG/1
10 TABLET ORAL DAILY
Status: DISCONTINUED | OUTPATIENT
Start: 2020-06-18 | End: 2020-06-19

## 2020-06-17 RX ORDER — INSULIN GLARGINE 100 [IU]/ML
8 INJECTION, SOLUTION SUBCUTANEOUS NIGHTLY
Status: DISCONTINUED | OUTPATIENT
Start: 2020-06-17 | End: 2020-06-23 | Stop reason: HOSPADM

## 2020-06-17 RX ORDER — SPIRONOLACTONE 25 MG/1
25 TABLET ORAL DAILY
Status: DISCONTINUED | OUTPATIENT
Start: 2020-06-18 | End: 2020-06-19

## 2020-06-17 RX ADMIN — HYDRALAZINE HYDROCHLORIDE 10 MG: 20 INJECTION INTRAMUSCULAR; INTRAVENOUS at 19:29

## 2020-06-17 RX ADMIN — POTASSIUM CHLORIDE AND SODIUM CHLORIDE: 900; 300 INJECTION, SOLUTION INTRAVENOUS at 19:29

## 2020-06-17 RX ADMIN — HEPARIN SODIUM 14.8 ML/HR: 10000 INJECTION, SOLUTION INTRAVENOUS at 20:49

## 2020-06-17 RX ADMIN — HEPARIN SODIUM 6600 UNITS: 1000 INJECTION, SOLUTION INTRAVENOUS; SUBCUTANEOUS at 20:49

## 2020-06-17 RX ADMIN — MORPHINE SULFATE 2 MG: 2 INJECTION, SOLUTION INTRAMUSCULAR; INTRAVENOUS at 17:21

## 2020-06-17 RX ADMIN — ONDANSETRON 4 MG: 2 INJECTION INTRAMUSCULAR; INTRAVENOUS at 19:29

## 2020-06-17 RX ADMIN — ZOLPIDEM TARTRATE 5 MG: 5 TABLET ORAL at 23:06

## 2020-06-17 RX ADMIN — POTASSIUM CHLORIDE 40 MEQ: 750 TABLET, FILM COATED, EXTENDED RELEASE ORAL at 23:06

## 2020-06-17 RX ADMIN — MORPHINE SULFATE 2 MG: 2 INJECTION, SOLUTION INTRAMUSCULAR; INTRAVENOUS at 19:29

## 2020-06-17 RX ADMIN — ASPIRIN 81 MG 324 MG: 81 TABLET ORAL at 17:10

## 2020-06-17 RX ADMIN — IOPAMIDOL 75 ML: 755 INJECTION, SOLUTION INTRAVENOUS at 18:10

## 2020-06-17 RX ADMIN — INSULIN GLARGINE 8 UNITS: 100 INJECTION, SOLUTION SUBCUTANEOUS at 23:10

## 2020-06-17 RX ADMIN — OXYCODONE HYDROCHLORIDE AND ACETAMINOPHEN 1 TABLET: 10; 325 TABLET ORAL at 23:06

## 2020-06-17 RX ADMIN — RANOLAZINE 1000 MG: 500 TABLET, FILM COATED, EXTENDED RELEASE ORAL at 23:06

## 2020-06-17 RX ADMIN — TRAZODONE HYDROCHLORIDE 50 MG: 50 TABLET ORAL at 23:06

## 2020-06-17 RX ADMIN — GABAPENTIN 600 MG: 300 CAPSULE ORAL at 23:06

## 2020-06-17 ASSESSMENT — PAIN SCALES - GENERAL
PAINLEVEL_OUTOF10: 3
PAINLEVEL_OUTOF10: 8
PAINLEVEL_OUTOF10: 8
PAINLEVEL_OUTOF10: 6
PAINLEVEL_OUTOF10: 9
PAINLEVEL_OUTOF10: 4
PAINLEVEL_OUTOF10: 3
PAINLEVEL_OUTOF10: 10

## 2020-06-17 ASSESSMENT — PAIN DESCRIPTION - DESCRIPTORS
DESCRIPTORS: SHARP
DESCRIPTORS: SHARP;PRESSURE

## 2020-06-17 ASSESSMENT — PAIN DESCRIPTION - ORIENTATION
ORIENTATION: LEFT;MID
ORIENTATION: LEFT;MID

## 2020-06-17 ASSESSMENT — PAIN DESCRIPTION - ONSET
ONSET: SUDDEN
ONSET: SUDDEN

## 2020-06-17 ASSESSMENT — PAIN DESCRIPTION - PROGRESSION
CLINICAL_PROGRESSION: NOT CHANGED
CLINICAL_PROGRESSION: NOT CHANGED

## 2020-06-17 ASSESSMENT — PAIN DESCRIPTION - LOCATION
LOCATION: CHEST
LOCATION: CHEST

## 2020-06-17 ASSESSMENT — PAIN DESCRIPTION - PAIN TYPE
TYPE: ACUTE PAIN
TYPE: ACUTE PAIN

## 2020-06-17 ASSESSMENT — PAIN - FUNCTIONAL ASSESSMENT
PAIN_FUNCTIONAL_ASSESSMENT: PREVENTS OR INTERFERES WITH MANY ACTIVE NOT PASSIVE ACTIVITIES
PAIN_FUNCTIONAL_ASSESSMENT: PREVENTS OR INTERFERES SOME ACTIVE ACTIVITIES AND ADLS

## 2020-06-17 ASSESSMENT — PAIN DESCRIPTION - FREQUENCY
FREQUENCY: CONTINUOUS
FREQUENCY: CONTINUOUS

## 2020-06-17 NOTE — ED PROVIDER NOTES
seizures. Nursing Notes were reviewedand agreed with or any disagreements were addressed in the HPI. REVIEW OF SYSTEMS    (2-9 systems for level 4, 10 or more for level 5)     Review of Systems   Constitutional: Negative for chills and fever. HENT: Negative. Respiratory: Positive for shortness of breath. Negative for chest tightness. Cardiovascular: Positive for chest pain. Gastrointestinal: Positive for nausea. Negative for vomiting. Genitourinary: Negative. Musculoskeletal: Negative for arthralgias and myalgias. Skin: Negative. Neurological: Negative for dizziness and light-headedness. Psychiatric/Behavioral: Negative for behavioral problems and confusion. Except as noted above the remainder of the review of systems was reviewed and negative.        PAST MEDICAL HISTORY         Diagnosis Date    Anxiety     Arthritis     Asthma     CAD (coronary artery disease)     Calcium kidney stone     Cardiomyopathy ([address]     Cerebral artery occlusion with cerebral infarction ([address]     CHF (congestive heart failure) ([address]     Clostridium difficile diarrhea 02/12/2020    COPD [facility] obstructive pulmonary disease) [facility])     mild    Depression     DM2 (diabetes mellitus, type 2) ([facility])     Fibromyalgia     GERD (gastroesophageal reflux disease)     Hyperlipidemia     Hypertension     Liver disease     Pacemaker 2012    [employer]tr[employer] model # [id number]    Pneumonia     Seizures ([address]     TIA (transient ischemic attack) 2007    Ulcerative colitis ([address]        SURGICAL HISTORY           Procedure Laterality Date    APPENDECTOMY      BACK SURGERY      CARDIAC SURGERY      CHOLECYSTECTOMY      COLONOSCOPY  01/10/2017    COLONOSCOPY  01/10/2017    CORONARY ANGIOPLASTY WITH STENT PLACEMENT  2012    CORONARY ARTERY BYPASS GRAFT  2010, 11/2015    ENDOSCOPY, COLON, DIAGNOSTIC      PACEMAKER PLACEMENT      UPPER GASTROINTESTINAL ENDOSCOPY  02/07/2017    VASCULAR Performed at:  [facility]  [address] [place] [facility] [address]   Phone [phone]   APTT    Narrative:     Performed at:  [facility]  [address] [facility] [address]   Phone [phone]   MAGNESIUM    Narrative:     Performed at:  [facility]  [address]  [place] [facility] [address]   Phone [phone]   APTT   BASIC METABOLIC PANEL W/ REFLEX TO MG FOR LOW K   CBC WITH AUTO DIFFERENTIAL   POCT GLUCOSE   POCT GLUCOSE   POCT GLUCOSE   POCT GLUCOSE   POCT GLUCOSE   POCT GLUCOSE   POCT GLUCOSE       All other labs were within normal range or not returned as of this dictation. EMERGENCY DEPARTMENT COURSE and DIFFERENTIAL DIAGNOSIS/MDM:   Vitals:    Vitals:    06/17/20 1902 06/17/20 2032 06/17/20 2046 06/17/20 2140   BP: (!) 196/113 (!) 175/98 (!) 169/94 (!) 131/101   Pulse: 110 105 103 73   Resp: 18 20 18 12   Temp:    98.1 °F (36.7 °C)   TempSrc:    Oral   SpO2: 96% 99% 100% 97%   Weight:       Height:           MDM     Patient presents ED with HPI noted above. Patient hypertensive upon arrival.  Patient tachycardic with pulse of 114 upon arrival.  Oxygen saturation 95% on room air upon arrival.      Given tachycardia, nature chest pain, recent hospitalization,  history of DVT there was concerned for possible PE.  CTA showed suspected emboli within the posterior segmental and subsegmental pulmonary arteries supplying left lower lobe. There is no heart strain. Patient started on heparin in the ED. EKG obtained, please see my attendings note regarding official interpretation. Troponin within normal limits. BNP elevated 3723. Remainder of labs as above. Patient with hypokalemia, patient given IV potassium supplement in the ED. Patient with persistence of hypertension and tachycardia. After discussion with my attending, 10 mg IV hydralazine given for hypertension.   Blood pressure

## 2020-06-17 NOTE — ED NOTES
Pt resting in bed comfortably at this time, B/p remains elevated, PA aware. Pt verbalized relief after receiving morphine. Will continue to monitor.       Reji Naylor RN  06/17/20 1931

## 2020-06-17 NOTE — ED TRIAGE NOTES
Pt to ED with c/o mid/left side chest pain, describes the pain as sharp, started 2 hours prior to ED arrival. States he is nauseous and sob.

## 2020-06-18 PROBLEM — I26.99 ACUTE PULMONARY EMBOLISM WITHOUT ACUTE COR PULMONALE (HCC): Status: ACTIVE | Noted: 2020-06-18

## 2020-06-18 LAB
ANION GAP SERPL CALCULATED.3IONS-SCNC: 10 MMOL/L (ref 3–16)
APTT: 205.6 SEC (ref 24.2–36.2)
APTT: 54.5 SEC (ref 24.2–36.2)
APTT: 55.5 SEC (ref 24.2–36.2)
BASOPHILS ABSOLUTE: 0.1 K/UL (ref 0–0.2)
BASOPHILS RELATIVE PERCENT: 1.5 %
BUN BLDV-MCNC: 13 MG/DL (ref 7–20)
CALCIUM SERPL-MCNC: 8.6 MG/DL (ref 8.3–10.6)
CHLORIDE BLD-SCNC: 105 MMOL/L (ref 99–110)
CO2: 21 MMOL/L (ref 21–32)
CREAT SERPL-MCNC: 1.1 MG/DL (ref 0.8–1.3)
EKG ATRIAL RATE: 119 BPM
EKG DIAGNOSIS: NORMAL
EKG P AXIS: 65 DEGREES
EKG P-R INTERVAL: 128 MS
EKG Q-T INTERVAL: 360 MS
EKG QRS DURATION: 130 MS
EKG QTC CALCULATION (BAZETT): 506 MS
EKG R AXIS: 89 DEGREES
EKG T AXIS: 55 DEGREES
EKG VENTRICULAR RATE: 119 BPM
EOSINOPHILS ABSOLUTE: 0.2 K/UL (ref 0–0.6)
EOSINOPHILS RELATIVE PERCENT: 2.9 %
GFR AFRICAN AMERICAN: >60
GFR NON-AFRICAN AMERICAN: >60
GLUCOSE BLD-MCNC: 107 MG/DL (ref 70–99)
GLUCOSE BLD-MCNC: 122 MG/DL (ref 70–99)
GLUCOSE BLD-MCNC: 127 MG/DL (ref 70–99)
GLUCOSE BLD-MCNC: 142 MG/DL (ref 70–99)
GLUCOSE BLD-MCNC: 96 MG/DL (ref 70–99)
HCT VFR BLD CALC: 41.7 % (ref 40.5–52.5)
HEMOGLOBIN: 14.2 G/DL (ref 13.5–17.5)
LYMPHOCYTES ABSOLUTE: 2.3 K/UL (ref 1–5.1)
LYMPHOCYTES RELATIVE PERCENT: 37.1 %
MAGNESIUM: 2.1 MG/DL (ref 1.8–2.4)
MCH RBC QN AUTO: 34 PG (ref 26–34)
MCHC RBC AUTO-ENTMCNC: 34 G/DL (ref 31–36)
MCV RBC AUTO: 100.1 FL (ref 80–100)
MONOCYTES ABSOLUTE: 0.4 K/UL (ref 0–1.3)
MONOCYTES RELATIVE PERCENT: 6.7 %
NEUTROPHILS ABSOLUTE: 3.2 K/UL (ref 1.7–7.7)
NEUTROPHILS RELATIVE PERCENT: 51.8 %
PDW BLD-RTO: 15.1 % (ref 12.4–15.4)
PERFORMED ON: ABNORMAL
PLATELET # BLD: 179 K/UL (ref 135–450)
PMV BLD AUTO: 8.8 FL (ref 5–10.5)
POTASSIUM REFLEX MAGNESIUM: 3.5 MMOL/L (ref 3.5–5.1)
RBC # BLD: 4.17 M/UL (ref 4.2–5.9)
SODIUM BLD-SCNC: 136 MMOL/L (ref 136–145)
WBC # BLD: 6.2 K/UL (ref 4–11)

## 2020-06-18 PROCEDURE — 1200000000 HC SEMI PRIVATE

## 2020-06-18 PROCEDURE — 6360000002 HC RX W HCPCS: Performed by: INTERNAL MEDICINE

## 2020-06-18 PROCEDURE — 93970 EXTREMITY STUDY: CPT

## 2020-06-18 PROCEDURE — 36415 COLL VENOUS BLD VENIPUNCTURE: CPT

## 2020-06-18 PROCEDURE — 83735 ASSAY OF MAGNESIUM: CPT

## 2020-06-18 PROCEDURE — 6370000000 HC RX 637 (ALT 250 FOR IP): Performed by: INTERNAL MEDICINE

## 2020-06-18 PROCEDURE — 85730 THROMBOPLASTIN TIME PARTIAL: CPT

## 2020-06-18 PROCEDURE — 81240 F2 GENE: CPT

## 2020-06-18 PROCEDURE — 85025 COMPLETE CBC W/AUTO DIFF WBC: CPT

## 2020-06-18 PROCEDURE — 80048 BASIC METABOLIC PNL TOTAL CA: CPT

## 2020-06-18 PROCEDURE — 86147 CARDIOLIPIN ANTIBODY EA IG: CPT

## 2020-06-18 PROCEDURE — 81241 F5 GENE: CPT

## 2020-06-18 PROCEDURE — 2580000003 HC RX 258: Performed by: INTERNAL MEDICINE

## 2020-06-18 PROCEDURE — 93010 ELECTROCARDIOGRAM REPORT: CPT | Performed by: INTERNAL MEDICINE

## 2020-06-18 PROCEDURE — 86146 BETA-2 GLYCOPROTEIN ANTIBODY: CPT

## 2020-06-18 RX ADMIN — HYDRALAZINE HYDROCHLORIDE 25 MG: 25 TABLET, FILM COATED ORAL at 14:14

## 2020-06-18 RX ADMIN — LISINOPRIL 10 MG: 10 TABLET ORAL at 09:27

## 2020-06-18 RX ADMIN — AMIODARONE HYDROCHLORIDE 200 MG: 200 TABLET ORAL at 09:28

## 2020-06-18 RX ADMIN — SPIRONOLACTONE 25 MG: 25 TABLET ORAL at 09:29

## 2020-06-18 RX ADMIN — GABAPENTIN 600 MG: 300 CAPSULE ORAL at 10:17

## 2020-06-18 RX ADMIN — HYDRALAZINE HYDROCHLORIDE 25 MG: 25 TABLET, FILM COATED ORAL at 05:25

## 2020-06-18 RX ADMIN — GABAPENTIN 600 MG: 300 CAPSULE ORAL at 05:25

## 2020-06-18 RX ADMIN — HEPARIN SODIUM 9.9 ML/HR: 10000 INJECTION, SOLUTION INTRAVENOUS at 21:55

## 2020-06-18 RX ADMIN — ASPIRIN 325 MG ORAL TABLET 325 MG: 325 PILL ORAL at 09:27

## 2020-06-18 RX ADMIN — OXYCODONE HYDROCHLORIDE AND ACETAMINOPHEN 1 TABLET: 10; 325 TABLET ORAL at 15:26

## 2020-06-18 RX ADMIN — SODIUM CHLORIDE, PRESERVATIVE FREE 10 ML: 5 INJECTION INTRAVENOUS at 22:02

## 2020-06-18 RX ADMIN — RANOLAZINE 1000 MG: 500 TABLET, FILM COATED, EXTENDED RELEASE ORAL at 22:00

## 2020-06-18 RX ADMIN — OXYCODONE HYDROCHLORIDE AND ACETAMINOPHEN 1 TABLET: 10; 325 TABLET ORAL at 23:24

## 2020-06-18 RX ADMIN — CLOPIDOGREL BISULFATE 75 MG: 75 TABLET ORAL at 09:27

## 2020-06-18 RX ADMIN — ZOLPIDEM TARTRATE 5 MG: 5 TABLET ORAL at 23:24

## 2020-06-18 RX ADMIN — GABAPENTIN 600 MG: 300 CAPSULE ORAL at 14:14

## 2020-06-18 RX ADMIN — ISOSORBIDE MONONITRATE 60 MG: 60 TABLET, EXTENDED RELEASE ORAL at 09:27

## 2020-06-18 RX ADMIN — PANTOPRAZOLE SODIUM 40 MG: 40 TABLET, DELAYED RELEASE ORAL at 05:25

## 2020-06-18 RX ADMIN — OXYCODONE HYDROCHLORIDE AND ACETAMINOPHEN 1 TABLET: 10; 325 TABLET ORAL at 07:28

## 2020-06-18 RX ADMIN — GABAPENTIN 600 MG: 300 CAPSULE ORAL at 22:00

## 2020-06-18 RX ADMIN — METOPROLOL SUCCINATE 100 MG: 50 TABLET, EXTENDED RELEASE ORAL at 09:27

## 2020-06-18 RX ADMIN — FUROSEMIDE 20 MG: 20 TABLET ORAL at 09:28

## 2020-06-18 RX ADMIN — GABAPENTIN 600 MG: 300 CAPSULE ORAL at 18:30

## 2020-06-18 RX ADMIN — ATORVASTATIN CALCIUM 80 MG: 80 TABLET, FILM COATED ORAL at 09:27

## 2020-06-18 RX ADMIN — INSULIN GLARGINE 8 UNITS: 100 INJECTION, SOLUTION SUBCUTANEOUS at 21:55

## 2020-06-18 RX ADMIN — RANOLAZINE 1000 MG: 500 TABLET, FILM COATED, EXTENDED RELEASE ORAL at 09:30

## 2020-06-18 RX ADMIN — INSULIN LISPRO 1 UNITS: 100 INJECTION, SOLUTION INTRAVENOUS; SUBCUTANEOUS at 21:55

## 2020-06-18 ASSESSMENT — PAIN SCALES - GENERAL
PAINLEVEL_OUTOF10: 5
PAINLEVEL_OUTOF10: 5
PAINLEVEL_OUTOF10: 6
PAINLEVEL_OUTOF10: 3

## 2020-06-18 ASSESSMENT — PAIN DESCRIPTION - DESCRIPTORS
DESCRIPTORS: ACHING
DESCRIPTORS: ACHING

## 2020-06-18 ASSESSMENT — PAIN DESCRIPTION - ONSET
ONSET: ON-GOING
ONSET: ON-GOING

## 2020-06-18 ASSESSMENT — ENCOUNTER SYMPTOMS
SHORTNESS OF BREATH: 1
VOMITING: 0
NAUSEA: 1
CHEST TIGHTNESS: 0

## 2020-06-18 ASSESSMENT — PAIN DESCRIPTION - ORIENTATION: ORIENTATION: MID;LEFT

## 2020-06-18 ASSESSMENT — PAIN DESCRIPTION - PAIN TYPE
TYPE: CHRONIC PAIN
TYPE: ACUTE PAIN

## 2020-06-18 ASSESSMENT — PAIN DESCRIPTION - PROGRESSION
CLINICAL_PROGRESSION: NOT CHANGED
CLINICAL_PROGRESSION: NOT CHANGED

## 2020-06-18 ASSESSMENT — PAIN DESCRIPTION - LOCATION
LOCATION: GENERALIZED
LOCATION: CHEST

## 2020-06-18 ASSESSMENT — PAIN DESCRIPTION - FREQUENCY
FREQUENCY: CONTINUOUS
FREQUENCY: CONTINUOUS

## 2020-06-18 NOTE — H&P
Lawrence Trejo MD   metoprolol succinate (TOPROL XL) 100 MG extended release tablet Take 1 tablet by mouth daily 2/7/20  Yes Kathi Lewis MD   lisinopril (PRINIVIL;ZESTRIL) 10 MG tablet Take 1 tablet by mouth daily 12/21/19  Yes Guicho Mireles MD   ranolazine (RANEXA) 500 MG extended release tablet Take 1,000 mg by mouth 2 times daily   Yes Historical Provider, MD   furosemide (LASIX) 20 MG tablet Take 20 mg by mouth daily   Yes Historical Provider, MD   albuterol sulfate HFA (PROAIR HFA) 108 (90 Base) MCG/ACT inhaler Inhale 2 puffs into the lungs every 6 hours as needed for Wheezing   Yes Historical Provider, MD   empagliflozin (JARDIANCE) 10 MG tablet Take 1 tablet by mouth daily 11/27/19  Yes Lou Ruiz MD   isosorbide mononitrate (IMDUR) 30 MG extended release tablet Take 60 mg by mouth daily    Yes Historical Provider, MD   hydrALAZINE (APRESOLINE) 25 MG tablet Take 1 tablet by mouth every 8 hours 9/18/19  Yes Gil Meckel, MD   acetaminophen (TYLENOL) 325 MG tablet Take 650 mg by mouth every 6 hours as needed for Pain   Yes Historical Provider, MD   clopidogrel (PLAVIX) 75 MG tablet TAKE 1 TABLET BY MOUTH DAILY 4/7/19  Yes Lou Ruiz MD   nitroGLYCERIN (NITROSTAT) 0.4 MG SL tablet PLACE 1 TABLET UNDER THE TONGUE EVERY 5 MINUTES AS NEEDED FOR CHEST PAIN 9/2/18  Yes Lou Ruiz MD   aspirin 325 MG tablet Take 325 mg by mouth daily    Yes Historical Provider, MD   atorvastatin (LIPITOR) 80 MG tablet TAKE 1 TABLET BY MOUTH DAILY 2/15/18  Yes Lou Ruiz MD   amiodarone (CORDARONE) 200 MG tablet Take 1 tablet by mouth 2 times daily for 4 days 5/23/20 5/27/20  Ethan Cloud MD   amiodarone (CORDARONE) 200 MG tablet Take 1 tablet by mouth daily 5/28/20   Ethan Cloud MD   ibuprofen (ADVIL;MOTRIN) 400 MG tablet Take 1 tablet by mouth every 6 hours as needed for Pain 2/26/20   Courtney Galeana MD       Family History:       Problem Relation Age of Onset    Coronary Art Dis Father     Cancer Mother         Lung with mets    Diabetes Mother      Social History:   TOBACCO:   reports that he has been smoking cigarettes. He has a 22.00 pack-year smoking history. He has never used smokeless tobacco.  ETOH:   reports no history of alcohol use. OCCUPATION:      REVIEW OF SYSTEMS:  A full review of systems was performed and is negative except for that which appears in the HPI    Physical Exam:    Vitals: /88   Pulse 91   Temp 98.1 °F (36.7 °C) (Oral)   Resp 14   Ht 6' (1.829 m)   Wt 182 lb 8.7 oz (82.8 kg)   SpO2 95%   BMI 24.76 kg/m²   General appearance: WD/WN 59y.o. year-old  male who is alert, appears stated age and is cooperative  HEENT: Head: Normocephalic, no lesions, without obvious abnormality. Eye: Normal external eye, conjunctiva, lids cornea, TYE. Ears: Normal external ears. Non-tender. Nose: Normal external nose, mucus membranes and septum. Pharynx: Dental Hygiene adequate. Normal buccal mucosa. Normal pharynx. Neck: no adenopathy, no carotid bruit, no JVD, supple, symmetrical, trachea midline and thyroid not enlarged, symmetric, no tenderness/mass/nodules  Lungs: clear to auscultation bilaterally and no use of accessory muscles. Heart: regular rate and rhythm, S1, S2 normal, no murmur, click, rub or gallop and normal apical impulse  Abdomen: soft, non-tender; bowel sounds normal; no masses, no organomegaly  Extremities: extremities atraumatic, no cyanosis or edema and Homans sign is negative, no sign of DVT. Capillary Refill: Acceptable < 3 seconds   Peripheral Pulses: +3 easily felt, not easily obliterated with pressures   Skin: Skin color, texture, turgor normal. No rashes or lesions on exposed skin  Neurologic: Neurovascularly intact without any focal sensory/motor deficits. Cranial nerves: II-XII intact, grossly non-focal. Gait was not tested.   Psychiatric: Alert and oriented, thought content appropriate, normal Exam: Initial FINDINGS: Status post coronary bypass. Left subclavian AICD. Heart size and pulmonary vessels within normal limits. Lungs clear. Costophrenic angles sharp     No active cardiopulmonary disease     Ct Chest Pulmonary Embolism W Contrast    Result Date: 6/17/2020  EXAMINATION: CTA OF THE CHEST 6/17/2020 5:02 pm TECHNIQUE: CTA of the chest was performed after the administration of intravenous contrast.  Multiplanar reformatted images are provided for review. MIP images are provided for review. Dose modulation, iterative reconstruction, and/or weight based adjustment of the mA/kV was utilized to reduce the radiation dose to as low as reasonably achievable. COMPARISON: Chest x-ray today, CT chest 04/25/2019 HISTORY: ORDERING SYSTEM PROVIDED HISTORY: SOB suspect pulmonary embolus TECHNOLOGIST PROVIDED HISTORY: If patient is on cardiac monitor and/or pulse ox, they may be taken off cardiac monitor and pulse ox, left on O2 if currently on. All monitors reattached when patient returns to room. Reason for exam:->SOB suspect pulmonary embolus Reason for Exam: mid chest pain, sob, bypass, Acuity: Acute Type of Exam: Initial History of cardiomyopathy, hyperlipidemia, hypertension, coronary artery disease, congestive heart failure, obstructive lung disease, asthma, pneumonia. Mid to left sided sharp chest pain which began 2 hours prior to arrival. Also, nausea and shortness of breath. FINDINGS: Pulmonary Arteries: Motion artifact degrades images, particularly of the lung bases. There are questionable central low attenuation filling defects within posterior segmental and subsegmental branches supplying the left lower lobe. The central pulmonary arteries are normal in caliber and course. Mediastinum: No evidence of mediastinal lymphadenopathy. The heart and pericardium demonstrate no acute abnormality. There is no acute abnormality of the thoracic aorta. Postsurgical changes of bypass surgery are present. Trace pericardial fluid. The heart size is stable. Coronary arterial and aortic calcifications are evident. The RV/LV ratio is approximately 0.6. Lungs/pleura: The central airways are patent. There is partial opacification 7 the airways supplying the left lower lobe, likely with mucous secretions. The lungs are clear with exception of a lobulated noncalcified nodular opacity in the medial left lower lobe measuring 1.7 x 1.2 cm, also present on multiple prior studies including April 2018. Upper Abdomen: The limited images of the upper abdomen show no acute abnormality. The gallbladder surgically absent. Small hiatal hernia. Soft Tissues/Bones: The bones appear demineralized and show degenerative changes. There is mild loss of height of the T7 vertebral body posterior segmental and subsegmental pulmonary arterial branches of the left lower lobe compatible with remote compression fracture. Suspected emboli within the posterior segmental and subsegmental pulmonary arteries supplying the left lower lobe. Findings were discussed with Sera Alva at 7:01 pm on 6/17/2020. Assessment:   Principal Problem:    Hypertensive urgency  Active Problems:    S/P CABG (coronary artery bypass graft)    Essential hypertension    Ischemic cardiomyopathy    Coronary artery disease involving native coronary artery of native heart without angina pectoris    ICD (implantable cardioverter-defibrillator), dual, in situ    Hyperlipidemia LDL goal <70    DMII (diabetes mellitus, type 2) ([facility])    COPD (chronic obstructive pulmonary disease) ([facility])    Chronic systolic (congestive) heart failure ([facility])    S/p NSTEMI (non-ST elevated myocardial infarction) ([facility] [address]    Acute pulmonary embolism without acute cor pulmonale ([facility])  Resolved Problems:    * No resolved hospital problems.  *      Plan:       Hypertensive urgency - will give his home meds, start prn Hydralazine and prn Vasotec with initial goal of a 20% reduction in BP and then

## 2020-06-18 NOTE — PROGRESS NOTES
Clinical Pharmacy Note  Heparin Dosing       Lab Results   Component Value Date    APTT 205.6 06/18/2020     Lab Results   Component Value Date    HGB 14.9 06/17/2020    HCT 44.4 06/17/2020     06/17/2020    INR 0.97 02/16/2020       Current Infusion Rate: 14.8 mL/hr    Plan:  Hold heparin for 1 hour  Rate: decrease to 9.9 mL/hr  Next aPTT: 1200 6/18/20    Pharmacy will continue to monitor and adjust based on aPTT results.   Sejal Garibay, PharmD

## 2020-06-18 NOTE — CONSULTS
[address] [address]                                  CONSULTATION    PATIENT NAME: Eloise Hargrove                    :        1955  MED REC NO:   [id number]                          ROOM:       [id number]  ACCOUNT NO:   [de-identified]                           ADMIT DATE: 2020  PROVIDER:     Belkis Hicks MD    HEMATOLOGY CONSULTATION    CONSULT DATE:  2020    REASON FOR CONSULTATION:  Pulmonary embolism. CONSULTING PROVIDER:  Luly Cano MD    HISTORY OF PRESENT ILLNESS:  The patient is a 66-year-old gentleman with  history of ischemic cardiomyopathy and hypertension and diabetes who  presented to the hospital with a few-hour history of pleuritic chest  pain in his left chest.  A CT pulmonary embolus study was done yesterday  that showed suspected emboli within the left lower lobe. He was placed  on heparin. A Doppler done today shows no evidence of clotting. He  does remember some pain in his left leg recently. He does have chronic  back issues and is quite sedentary as a result of that. His chest pain  has improved. He denies any recent surgeries. He denies any personal  history of clots. His mother had clots. PAST MEDICAL HISTORY:  1. Coronary artery disease status post CABG. 2.  Ischemic cardiomyopathy with an EF of 25%. 3.  Diabetes. 4.  Hypertension. 5.  COPD. 6.  Fibromyalgia. 7.  GERD. 8.  Hyperlipidemia. 9.  Pacemaker. 10.  Seizures. 11.  Ulcerative colitis. PAST SURGICAL HISTORY:  1.  Status post appendectomy. 2.  Status post cholecystectomy. 3.  Status post CABG. ALLERGIES:  He is allergic to DOPAMINE and _____ which caused unknown  reactions. MEDICINES:  Amiodarone 200 mg p.o. daily, aspirin 324 mg p.o. daily,  Lipitor 80 mg p.o. daily, Plavix 75 mg p.o. daily, Neurontin 600 mg p.o.  five times a day, lisinopril 10 mg p.o. daily, Imdur 60 mg p.o. daily,  metoprolol  mg p.o. daily, Protonix 40 mg p.o. daily, Aldactone 25  mg p.o. daily, trazodone 50 mg p.o. daily. SOCIAL HISTORY:  He is . Actually, he takes care of his wife,  has a history of blood clots. He smokes one pack per day and has done  so for many years. He does not drink. FAMILY HISTORY:  Mother had blood clots. His mother also  of lung  cancer. REVIEW OF SYSTEMS:  He denies any recent fever, chills, sweats,  shortness of breath, headaches, any new bone aches, dysphagia,  odynophagia, diarrhea, constipation, hemoptysis, hematemesis, change in  vision/hearing/smell/taste, weakness, neuropathy, skin rashes, or  productive cough, urinary or bowel prolapse or incontinence, petechiae,  purpura, skin rashes, pruritus, hallucinations, nasal congestion or  drainage, seizures, strokes, syncope, depression, anxiety, suicidal  ideations, melena, or hematochezia. He has mild to moderate fatigue. His pleuritic chest pain has improved. His 10-system review of systems  is otherwise negative. PHYSICAL EXAMINATION:  VITAL SIGNS:  He is afebrile with normal vital signs. GENERAL:  He is in no acute distress. HEENT:  His pupils are round and reactive to light and accommodation. Extraocular muscles are intact. NECK:  He has no jugular venous distention. No thyromegaly. His  oropharynx is clear. He has no carotid bruits. He has no palpable  cervical, supraclavicular, or axillary lymphadenopathy. HEART:  Regular rate and rhythm. LUNGS:  Clear to auscultation bilaterally. ABDOMEN:  Nondistended, nontender with bowel sounds x4. No  hepatosplenomegaly. EXTREMITIES:  He has no peripheral clubbing, cyanosis, or edema. NEUROLOGIC EXAM:  Nonfocal.    ASSESSMENT AND PLAN:  Pulmonary emboli. He denies any recent surgeries  or long car rides or plane rides. This is unprovoked. He does have a  chronic sedentary lifestyle which might necessitate long-term  anticoagulation.   I

## 2020-06-18 NOTE — PROGRESS NOTES
ondansetron, glucose, dextrose, glucagon (rDNA), dextrose, enalaprilat, zolpidem, oxyCODONE-acetaminophen    Vitals   Vitals /wt   Patient Vitals for the past 8 hrs:   BP Temp Temp src Pulse Resp SpO2 Weight   06/18/20 1002 124/77 97.4 °F (36.3 °C) -- 101 18 95 % --   06/18/20 0927 109/73 -- -- 93 -- -- --   06/18/20 0656 137/88 98.1 °F (36.7 °C) Oral 91 14 95 % 182 lb 8.7 oz (82.8 kg)        72HR INTAKE/OUTPUT:      Intake/Output Summary (Last 24 hours) at 6/18/2020 1013  Last data filed at 6/18/2020 0816  Gross per 24 hour   Intake 1551 ml   Output 200 ml   Net 1351 ml       Exam:    Gen:   Alert and oriented ×3   Eyes: PERRL. No sclera icterus. No conjunctival injection. ENT: No discharge. Pharynx clear. External appearance of ears and nose normal.  Neck: Trachea midline. No obvious mass. Resp: Right-sided expiratory rhonchi  CV: Regular rate. Regular rhythm. No murmur or rub. No edema. GI: Non-tender. Non-distended. No hernia. Skin: Warm, dry, normal texture and turgor. Lymph: No cervical LAD. No supraclavicular LAD. M/S: / Ext. No cyanosis. No clubbing. No joint deformity. Neuro: CN 2-12 are intact,  no neurologic deficits noted. PT/INR: No results for input(s): PROTIME, INR in the last 72 hours. APTT:   Recent Labs     06/17/20  1635 06/18/20  0321   APTT 26.3 205.6*       CBC:   Recent Labs     06/17/20  1635 06/18/20  0732   WBC 6.5 6.2   HGB 14.9 14.2   HCT 44.4 41.7   .1* 100.1*    179       BMP:   Recent Labs     06/17/20  1635 06/18/20  0732    136   K 3.1* 3.5    105   CO2 19* 21   BUN 8 13   CREATININE 0.8 1.1       LIVER PROFILE:   Recent Labs     06/17/20  1635   ALKPHOS 54   AST 7*   ALT <5*   BILITOT 0.5     No results for input(s): AMYLASE in the last 72 hours. No results for input(s): LIPASE in the last 72 hours. UA:No results for input(s): NITRITE, LABCAST, WBCUA, RBCUA, MUCUS in the last 72 hours.     TROPONIN:   Recent Labs     06/17/20  1635

## 2020-06-18 NOTE — CARE COORDINATION
INITIAL CASE MANAGEMENT ASSESSMENT    Reviewed chart, met with patient to assess possible discharge needs. Explained Case Management role/services. Living Situation: confirmed address. Lives w/ his wife. Has 14 steps to enter    ADLs: independent     DME: RW, glucometer (only tests occasionally, no longer on insulin)    PT/OT Recs: not currently ordered     Active Services: none     Transportation: he drives     Medications: confirmed [employer], rx are covered and obtained at [facility] in [place]    PCP: confirmed Kierra Lee      HD/PD: n/a    PLAN/COMMENTS: denied dc needs     SW/CM provided contact information for patient or family to call with any questions. SW/CM will follow and assist as needed.   Electronically signed by Mckayla Velásquez RN on 6/18/2020 at 2:07 PM

## 2020-06-19 ENCOUNTER — APPOINTMENT (OUTPATIENT)
Dept: ULTRASOUND IMAGING | Age: 65
DRG: 176 | End: 2020-06-19
Payer: MEDICARE

## 2020-06-19 LAB
ANION GAP SERPL CALCULATED.3IONS-SCNC: 13 MMOL/L (ref 3–16)
APTT: 52.1 SEC (ref 24.2–36.2)
BASOPHILS ABSOLUTE: 0 K/UL (ref 0–0.2)
BASOPHILS RELATIVE PERCENT: 0.5 %
BUN BLDV-MCNC: 21 MG/DL (ref 7–20)
CALCIUM SERPL-MCNC: 8.2 MG/DL (ref 8.3–10.6)
CHLORIDE BLD-SCNC: 102 MMOL/L (ref 99–110)
CO2: 19 MMOL/L (ref 21–32)
CREAT SERPL-MCNC: 1.9 MG/DL (ref 0.8–1.3)
EOSINOPHILS ABSOLUTE: 0.2 K/UL (ref 0–0.6)
EOSINOPHILS RELATIVE PERCENT: 2 %
GFR AFRICAN AMERICAN: 43
GFR NON-AFRICAN AMERICAN: 36
GLUCOSE BLD-MCNC: 101 MG/DL (ref 70–99)
GLUCOSE BLD-MCNC: 108 MG/DL (ref 70–99)
GLUCOSE BLD-MCNC: 111 MG/DL (ref 70–99)
GLUCOSE BLD-MCNC: 135 MG/DL (ref 70–99)
GLUCOSE BLD-MCNC: 143 MG/DL (ref 70–99)
HCT VFR BLD CALC: 37.6 % (ref 40.5–52.5)
HEMOGLOBIN: 12.6 G/DL (ref 13.5–17.5)
LYMPHOCYTES ABSOLUTE: 2.7 K/UL (ref 1–5.1)
LYMPHOCYTES RELATIVE PERCENT: 32.8 %
MCH RBC QN AUTO: 34.2 PG (ref 26–34)
MCHC RBC AUTO-ENTMCNC: 33.5 G/DL (ref 31–36)
MCV RBC AUTO: 102 FL (ref 80–100)
MONOCYTES ABSOLUTE: 0.4 K/UL (ref 0–1.3)
MONOCYTES RELATIVE PERCENT: 4.8 %
NEUTROPHILS ABSOLUTE: 5 K/UL (ref 1.7–7.7)
NEUTROPHILS RELATIVE PERCENT: 59.9 %
PDW BLD-RTO: 15.7 % (ref 12.4–15.4)
PERFORMED ON: ABNORMAL
PLATELET # BLD: 171 K/UL (ref 135–450)
PMV BLD AUTO: 8.9 FL (ref 5–10.5)
POTASSIUM REFLEX MAGNESIUM: 3.8 MMOL/L (ref 3.5–5.1)
RBC # BLD: 3.68 M/UL (ref 4.2–5.9)
SODIUM BLD-SCNC: 134 MMOL/L (ref 136–145)
WBC # BLD: 8.3 K/UL (ref 4–11)

## 2020-06-19 PROCEDURE — 6370000000 HC RX 637 (ALT 250 FOR IP): Performed by: INTERNAL MEDICINE

## 2020-06-19 PROCEDURE — 6370000000 HC RX 637 (ALT 250 FOR IP): Performed by: HOSPITALIST

## 2020-06-19 PROCEDURE — 76770 US EXAM ABDO BACK WALL COMP: CPT

## 2020-06-19 PROCEDURE — 2580000003 HC RX 258: Performed by: INTERNAL MEDICINE

## 2020-06-19 PROCEDURE — 51798 US URINE CAPACITY MEASURE: CPT

## 2020-06-19 PROCEDURE — 36415 COLL VENOUS BLD VENIPUNCTURE: CPT

## 2020-06-19 PROCEDURE — 85610 PROTHROMBIN TIME: CPT

## 2020-06-19 PROCEDURE — 85613 RUSSELL VIPER VENOM DILUTED: CPT

## 2020-06-19 PROCEDURE — 80048 BASIC METABOLIC PNL TOTAL CA: CPT

## 2020-06-19 PROCEDURE — 85306 CLOT INHIBIT PROT S FREE: CPT

## 2020-06-19 PROCEDURE — 85730 THROMBOPLASTIN TIME PARTIAL: CPT

## 2020-06-19 PROCEDURE — 2580000003 HC RX 258: Performed by: HOSPITALIST

## 2020-06-19 PROCEDURE — 85025 COMPLETE CBC W/AUTO DIFF WBC: CPT

## 2020-06-19 PROCEDURE — 99223 1ST HOSP IP/OBS HIGH 75: CPT | Performed by: INTERNAL MEDICINE

## 2020-06-19 PROCEDURE — 85303 CLOT INHIBIT PROT C ACTIVITY: CPT

## 2020-06-19 PROCEDURE — 6360000002 HC RX W HCPCS: Performed by: HOSPITALIST

## 2020-06-19 PROCEDURE — 1200000000 HC SEMI PRIVATE

## 2020-06-19 RX ORDER — HEPARIN SODIUM 10000 [USP'U]/100ML
9.9 INJECTION, SOLUTION INTRAVENOUS CONTINUOUS
Status: DISCONTINUED | OUTPATIENT
Start: 2020-06-19 | End: 2020-06-19 | Stop reason: CLARIF

## 2020-06-19 RX ORDER — SODIUM CHLORIDE 9 MG/ML
INJECTION, SOLUTION INTRAVENOUS CONTINUOUS
Status: DISCONTINUED | OUTPATIENT
Start: 2020-06-19 | End: 2020-06-20

## 2020-06-19 RX ORDER — HEPARIN SODIUM 10000 [USP'U]/100ML
9.9 INJECTION, SOLUTION INTRAVENOUS CONTINUOUS
Status: DISPENSED | OUTPATIENT
Start: 2020-06-19 | End: 2020-06-19

## 2020-06-19 RX ORDER — SODIUM CHLORIDE 9 MG/ML
INJECTION, SOLUTION INTRAVENOUS ONCE
Status: DISCONTINUED | OUTPATIENT
Start: 2020-06-19 | End: 2020-06-19

## 2020-06-19 RX ORDER — TAMSULOSIN HYDROCHLORIDE 0.4 MG/1
0.4 CAPSULE ORAL DAILY
Status: DISCONTINUED | OUTPATIENT
Start: 2020-06-19 | End: 2020-06-23 | Stop reason: HOSPADM

## 2020-06-19 RX ORDER — NICOTINE 21 MG/24HR
1 PATCH, TRANSDERMAL 24 HOURS TRANSDERMAL DAILY
Status: DISCONTINUED | OUTPATIENT
Start: 2020-06-19 | End: 2020-06-23 | Stop reason: HOSPADM

## 2020-06-19 RX ADMIN — AMIODARONE HYDROCHLORIDE 200 MG: 200 TABLET ORAL at 09:37

## 2020-06-19 RX ADMIN — HEPARIN SODIUM AND DEXTROSE 9.9 ML/HR: 10000; 5 INJECTION INTRAVENOUS at 22:29

## 2020-06-19 RX ADMIN — RANOLAZINE 1000 MG: 500 TABLET, FILM COATED, EXTENDED RELEASE ORAL at 21:20

## 2020-06-19 RX ADMIN — INSULIN GLARGINE 8 UNITS: 100 INJECTION, SOLUTION SUBCUTANEOUS at 21:20

## 2020-06-19 RX ADMIN — INSULIN LISPRO 2 UNITS: 100 INJECTION, SOLUTION INTRAVENOUS; SUBCUTANEOUS at 13:18

## 2020-06-19 RX ADMIN — GABAPENTIN 600 MG: 300 CAPSULE ORAL at 11:54

## 2020-06-19 RX ADMIN — HYDRALAZINE HYDROCHLORIDE 25 MG: 25 TABLET, FILM COATED ORAL at 21:20

## 2020-06-19 RX ADMIN — OXYCODONE HYDROCHLORIDE AND ACETAMINOPHEN 1 TABLET: 10; 325 TABLET ORAL at 23:49

## 2020-06-19 RX ADMIN — TRAZODONE HYDROCHLORIDE 50 MG: 50 TABLET ORAL at 21:20

## 2020-06-19 RX ADMIN — PANTOPRAZOLE SODIUM 40 MG: 40 TABLET, DELAYED RELEASE ORAL at 06:31

## 2020-06-19 RX ADMIN — GABAPENTIN 600 MG: 300 CAPSULE ORAL at 21:20

## 2020-06-19 RX ADMIN — ZOLPIDEM TARTRATE 5 MG: 5 TABLET ORAL at 23:49

## 2020-06-19 RX ADMIN — APIXABAN 2.5 MG: 2.5 TABLET, FILM COATED ORAL at 21:20

## 2020-06-19 RX ADMIN — SODIUM CHLORIDE: 9 INJECTION, SOLUTION INTRAVENOUS at 13:12

## 2020-06-19 RX ADMIN — RANOLAZINE 1000 MG: 500 TABLET, FILM COATED, EXTENDED RELEASE ORAL at 09:37

## 2020-06-19 RX ADMIN — SODIUM CHLORIDE, PRESERVATIVE FREE 10 ML: 5 INJECTION INTRAVENOUS at 08:48

## 2020-06-19 RX ADMIN — ATORVASTATIN CALCIUM 80 MG: 80 TABLET, FILM COATED ORAL at 09:38

## 2020-06-19 RX ADMIN — ASPIRIN 325 MG ORAL TABLET 325 MG: 325 PILL ORAL at 09:38

## 2020-06-19 RX ADMIN — GABAPENTIN 600 MG: 300 CAPSULE ORAL at 06:31

## 2020-06-19 RX ADMIN — GABAPENTIN 600 MG: 300 CAPSULE ORAL at 23:51

## 2020-06-19 RX ADMIN — GABAPENTIN 600 MG: 300 CAPSULE ORAL at 15:18

## 2020-06-19 RX ADMIN — TAMSULOSIN HYDROCHLORIDE 0.4 MG: 0.4 CAPSULE ORAL at 15:18

## 2020-06-19 RX ADMIN — OXYCODONE HYDROCHLORIDE AND ACETAMINOPHEN 1 TABLET: 10; 325 TABLET ORAL at 15:42

## 2020-06-19 RX ADMIN — SODIUM CHLORIDE, PRESERVATIVE FREE 10 ML: 5 INJECTION INTRAVENOUS at 21:20

## 2020-06-19 RX ADMIN — CLOPIDOGREL BISULFATE 75 MG: 75 TABLET ORAL at 09:37

## 2020-06-19 RX ADMIN — OXYCODONE HYDROCHLORIDE AND ACETAMINOPHEN 1 TABLET: 10; 325 TABLET ORAL at 07:42

## 2020-06-19 ASSESSMENT — PAIN SCALES - GENERAL
PAINLEVEL_OUTOF10: 4
PAINLEVEL_OUTOF10: 5
PAINLEVEL_OUTOF10: 6

## 2020-06-19 ASSESSMENT — PAIN DESCRIPTION - DESCRIPTORS: DESCRIPTORS: ACHING

## 2020-06-19 ASSESSMENT — PAIN - FUNCTIONAL ASSESSMENT: PAIN_FUNCTIONAL_ASSESSMENT: ACTIVITIES ARE NOT PREVENTED

## 2020-06-19 ASSESSMENT — PAIN DESCRIPTION - ONSET: ONSET: ON-GOING

## 2020-06-19 ASSESSMENT — PAIN DESCRIPTION - PAIN TYPE: TYPE: ACUTE PAIN

## 2020-06-19 ASSESSMENT — PAIN DESCRIPTION - LOCATION: LOCATION: CHEST

## 2020-06-19 ASSESSMENT — PAIN DESCRIPTION - FREQUENCY: FREQUENCY: CONTINUOUS

## 2020-06-19 ASSESSMENT — PAIN DESCRIPTION - ORIENTATION: ORIENTATION: MID

## 2020-06-19 NOTE — PROGRESS NOTES
4 Eyes Skin Assessment     The patient is being assess for  Admission    I agree that 2 RN's have performed a thorough Head to Toe Skin Assessment on the patient. ALL assessment sites listed below have been assessed. Areas assessed by both nurses:   [x]   Head, Face, and Ears   [x]   Shoulders, Back, and Chest  [x]   Arms, Elbows, and Hands   [x]   Coccyx, Sacrum, and IschIum  [x]   Legs, Feet, and Heels        Does the Patient have Skin Breakdown?   No         Jhonathan Prevention initiated:  No   Wound Care Orders initiated:  No      [facility] nurse consulted for Pressure Injury (Stage 3,4, Unstageable, DTI, NWPT, and Complex wounds), New and Established Ostomies:  No      Nurse 1 eSignature: Electronically signed by Nisha Estrada RN on 6/19/20 at 6:44 AM EDT    **SHARE this note so that the co-signing nurse is able to place an eSignature**    Nurse 2 eSignature: Electronically signed by Gopal Knott RN on 6/19/20 at 6:44 AM EDT

## 2020-06-19 NOTE — PROGRESS NOTES
ICD (implantable cardioverter-defibrillator) discharge    Diabetic peripheral neuropathy (HCC)    Hypotension    Neuritis    S/p NSTEMI (non-ST elevated myocardial infarction) ([facility])    Ventricular tachycardia (HCC)    Hypertensive urgency    Acute pulmonary embolism without acute cor pulmonale ([facility])         Presenting symptoms:  chest pain    Diagnostic workup:  CT CHEST PULMONARY EMBOLISM W CONTRAST         CONSULTS DURING ADMISSION :   PHARMACY TO DOSE MEDICATION  IP CONSULT TO ONCOLOGY  IP CONSULT TO CARDIOLOGY      Patient was diagnosed with:        Treatment while inpatient:  Pt presented to the emergency room for evaluation following a two hour history of moderately severe, sharp pain in his left chest.                                                                                        ----------------------------------------------------------      SUBJECTIVE COMPLAINTS-  Follow up for chest pain    Diet: DIET LOW SODIUM 2 GM; 1800 ml      OBJECTIVE:   Patient Active Problem List   Diagnosis    S/P CABG (coronary artery bypass graft)    Essential hypertension    ICD (implantable cardioverter-defibrillator), dual, in situ    Abdominal pain    Ischemic cardiomyopathy    Hyperlipidemia LDL goal <70    CHF (congestive heart failure) ([facility])    Chronic back pain    Type 2 diabetes mellitus without complication, with long-term current use of insulin ([address]    Coronary artery disease involving native coronary artery of native heart without angina pectoris    COPD exacerbation (HCC)    Anemia,normocytic normochromic ,mixed folic aci deficiency and iron deficiency    Guaiac + stool    Gastrointestinal hemorrhage    Morbid obesity due to excess calories ([address]    Anxiety    Coronary artery disease involving coronary bypass graft of native heart with angina pectoris ([facility])    Chronically elevated troponin    Iron deficiency anemia due to chronic blood loss    Tobacco abuse    Restrictive lung or the family were informed of the results of any tests, a time was given to answer questions, a plan was proposed and they agreed with plan. Dawit Dangelo MD    This note was transcribed using [id number]. Please disregard any translational errors.

## 2020-06-19 NOTE — PROGRESS NOTES
1900: handoff report received from [facility].   2120: head to toe assessment complete, see flowsheet. Pt received night time medication, Eliquis given, will stop heparin gtt at 2320.  2330: heparin gtt stopped as ordered. 0030: bladder scanned pt, >900 showing on the scanner. Pt attempted to use urinal, 500ml output noted. 0154: pt voided 450ml in urinal, no c/o of abd pain. Perfect served Dr. Jackie Brown per order. 0200: new order of resendiz insertion. Attempted to insert resendiz, no urine return noted, attempt failed. Bloody urine noted. 1698: bladder scanned pt again, 348ml noted. Patient tried to void in the urinal, 50ml bloody urine with noticeable clots noted. Pt c/o pain, refused toradol. 5809: messaged sent out to Ar Dior NP. Waiting for response. 36: messaged sent out to Dr. Jackie Brown. Waiting for response. 3921: new orders noted. Urology consult order is in.    Electronically signed by Gary Caraballo RN on 6/20/2020 at 6:51 AM

## 2020-06-20 LAB
ANION GAP SERPL CALCULATED.3IONS-SCNC: 11 MMOL/L (ref 3–16)
BASOPHILS ABSOLUTE: 0.1 K/UL (ref 0–0.2)
BASOPHILS RELATIVE PERCENT: 1.3 %
BETA-2 GLYCOPROTEIN 1 IGG ANTIBODY: 0 SGU (ref 0–20)
BETA-2 GLYCOPROTEIN 1 IGM ANTIBODY: 1 SMU (ref 0–20)
BUN BLDV-MCNC: 16 MG/DL (ref 7–20)
CALCIUM SERPL-MCNC: 7.9 MG/DL (ref 8.3–10.6)
CHLORIDE BLD-SCNC: 110 MMOL/L (ref 99–110)
CO2: 19 MMOL/L (ref 21–32)
CREAT SERPL-MCNC: 1.1 MG/DL (ref 0.8–1.3)
EOSINOPHILS ABSOLUTE: 0.1 K/UL (ref 0–0.6)
EOSINOPHILS RELATIVE PERCENT: 3 %
FERRITIN: 59.9 NG/ML (ref 30–400)
GFR AFRICAN AMERICAN: >60
GFR NON-AFRICAN AMERICAN: >60
GLUCOSE BLD-MCNC: 117 MG/DL (ref 70–99)
GLUCOSE BLD-MCNC: 129 MG/DL (ref 70–99)
GLUCOSE BLD-MCNC: 131 MG/DL (ref 70–99)
GLUCOSE BLD-MCNC: 136 MG/DL (ref 70–99)
GLUCOSE BLD-MCNC: 98 MG/DL (ref 70–99)
HCT VFR BLD CALC: 35.4 % (ref 40.5–52.5)
HEMOGLOBIN: 12.1 G/DL (ref 13.5–17.5)
IRON SATURATION: 23 % (ref 20–50)
IRON: 46 UG/DL (ref 59–158)
LYMPHOCYTES ABSOLUTE: 1.9 K/UL (ref 1–5.1)
LYMPHOCYTES RELATIVE PERCENT: 38.6 %
MCH RBC QN AUTO: 34.6 PG (ref 26–34)
MCHC RBC AUTO-ENTMCNC: 34.1 G/DL (ref 31–36)
MCV RBC AUTO: 101.6 FL (ref 80–100)
MONOCYTES ABSOLUTE: 0.3 K/UL (ref 0–1.3)
MONOCYTES RELATIVE PERCENT: 5.9 %
NEUTROPHILS ABSOLUTE: 2.5 K/UL (ref 1.7–7.7)
NEUTROPHILS RELATIVE PERCENT: 51.2 %
PDW BLD-RTO: 15.7 % (ref 12.4–15.4)
PERFORMED ON: ABNORMAL
PLATELET # BLD: 133 K/UL (ref 135–450)
PMV BLD AUTO: 8.9 FL (ref 5–10.5)
POTASSIUM REFLEX MAGNESIUM: 4.2 MMOL/L (ref 3.5–5.1)
POTASSIUM SERPL-SCNC: 4.2 MMOL/L (ref 3.5–5.1)
PROSTATE SPECIFIC ANTIGEN: 2.27 NG/ML (ref 0–4)
RBC # BLD: 3.49 M/UL (ref 4.2–5.9)
SODIUM BLD-SCNC: 140 MMOL/L (ref 136–145)
TOTAL IRON BINDING CAPACITY: 200 UG/DL (ref 260–445)
WBC # BLD: 4.9 K/UL (ref 4–11)

## 2020-06-20 PROCEDURE — 1200000000 HC SEMI PRIVATE

## 2020-06-20 PROCEDURE — 6360000002 HC RX W HCPCS: Performed by: INTERNAL MEDICINE

## 2020-06-20 PROCEDURE — 80048 BASIC METABOLIC PNL TOTAL CA: CPT

## 2020-06-20 PROCEDURE — 84165 PROTEIN E-PHORESIS SERUM: CPT

## 2020-06-20 PROCEDURE — 6370000000 HC RX 637 (ALT 250 FOR IP): Performed by: INTERNAL MEDICINE

## 2020-06-20 PROCEDURE — 84153 ASSAY OF PSA TOTAL: CPT

## 2020-06-20 PROCEDURE — 36415 COLL VENOUS BLD VENIPUNCTURE: CPT

## 2020-06-20 PROCEDURE — 2580000003 HC RX 258: Performed by: INTERNAL MEDICINE

## 2020-06-20 PROCEDURE — 51798 US URINE CAPACITY MEASURE: CPT

## 2020-06-20 PROCEDURE — 84155 ASSAY OF PROTEIN SERUM: CPT

## 2020-06-20 PROCEDURE — 85025 COMPLETE CBC W/AUTO DIFF WBC: CPT

## 2020-06-20 PROCEDURE — 82728 ASSAY OF FERRITIN: CPT

## 2020-06-20 PROCEDURE — 6370000000 HC RX 637 (ALT 250 FOR IP): Performed by: HOSPITALIST

## 2020-06-20 PROCEDURE — 99233 SBSQ HOSP IP/OBS HIGH 50: CPT | Performed by: INTERNAL MEDICINE

## 2020-06-20 PROCEDURE — 2580000003 HC RX 258: Performed by: HOSPITALIST

## 2020-06-20 PROCEDURE — 83540 ASSAY OF IRON: CPT

## 2020-06-20 PROCEDURE — 83550 IRON BINDING TEST: CPT

## 2020-06-20 RX ORDER — POLYETHYLENE GLYCOL 3350 17 G/17G
17 POWDER, FOR SOLUTION ORAL DAILY
Status: DISCONTINUED | OUTPATIENT
Start: 2020-06-20 | End: 2020-06-23 | Stop reason: HOSPADM

## 2020-06-20 RX ORDER — LISINOPRIL 10 MG/1
10 TABLET ORAL DAILY
Status: DISCONTINUED | OUTPATIENT
Start: 2020-06-21 | End: 2020-06-23 | Stop reason: HOSPADM

## 2020-06-20 RX ORDER — OXYCODONE AND ACETAMINOPHEN 10; 325 MG/1; MG/1
1 TABLET ORAL EVERY 6 HOURS PRN
Status: DISCONTINUED | OUTPATIENT
Start: 2020-06-20 | End: 2020-06-23 | Stop reason: HOSPADM

## 2020-06-20 RX ORDER — FUROSEMIDE 20 MG/1
20 TABLET ORAL DAILY
Status: DISCONTINUED | OUTPATIENT
Start: 2020-06-21 | End: 2020-06-23 | Stop reason: HOSPADM

## 2020-06-20 RX ORDER — MORPHINE SULFATE 2 MG/ML
2 INJECTION, SOLUTION INTRAMUSCULAR; INTRAVENOUS ONCE
Status: COMPLETED | OUTPATIENT
Start: 2020-06-20 | End: 2020-06-20

## 2020-06-20 RX ORDER — SPIRONOLACTONE 25 MG/1
25 TABLET ORAL DAILY
Status: DISCONTINUED | OUTPATIENT
Start: 2020-06-21 | End: 2020-06-23 | Stop reason: HOSPADM

## 2020-06-20 RX ORDER — ASPIRIN 81 MG/1
81 TABLET, CHEWABLE ORAL DAILY
Status: DISCONTINUED | OUTPATIENT
Start: 2020-06-21 | End: 2020-06-23 | Stop reason: HOSPADM

## 2020-06-20 RX ADMIN — TAMSULOSIN HYDROCHLORIDE 0.4 MG: 0.4 CAPSULE ORAL at 08:18

## 2020-06-20 RX ADMIN — HYDRALAZINE HYDROCHLORIDE 25 MG: 25 TABLET, FILM COATED ORAL at 04:40

## 2020-06-20 RX ADMIN — ZOLPIDEM TARTRATE 5 MG: 5 TABLET ORAL at 23:30

## 2020-06-20 RX ADMIN — AMIODARONE HYDROCHLORIDE 200 MG: 200 TABLET ORAL at 08:18

## 2020-06-20 RX ADMIN — OXYCODONE HYDROCHLORIDE AND ACETAMINOPHEN 1 TABLET: 10; 325 TABLET ORAL at 08:18

## 2020-06-20 RX ADMIN — HYDRALAZINE HYDROCHLORIDE 25 MG: 25 TABLET, FILM COATED ORAL at 14:53

## 2020-06-20 RX ADMIN — SODIUM CHLORIDE, PRESERVATIVE FREE 10 ML: 5 INJECTION INTRAVENOUS at 08:19

## 2020-06-20 RX ADMIN — HYDRALAZINE HYDROCHLORIDE 25 MG: 25 TABLET, FILM COATED ORAL at 21:03

## 2020-06-20 RX ADMIN — OXYCODONE HYDROCHLORIDE AND ACETAMINOPHEN 1 TABLET: 10; 325 TABLET ORAL at 14:53

## 2020-06-20 RX ADMIN — PANTOPRAZOLE SODIUM 40 MG: 40 TABLET, DELAYED RELEASE ORAL at 05:07

## 2020-06-20 RX ADMIN — ISOSORBIDE MONONITRATE 60 MG: 60 TABLET, EXTENDED RELEASE ORAL at 08:18

## 2020-06-20 RX ADMIN — GABAPENTIN 600 MG: 300 CAPSULE ORAL at 23:30

## 2020-06-20 RX ADMIN — ATORVASTATIN CALCIUM 80 MG: 80 TABLET, FILM COATED ORAL at 08:18

## 2020-06-20 RX ADMIN — MORPHINE SULFATE 2 MG: 2 INJECTION, SOLUTION INTRAMUSCULAR; INTRAVENOUS at 06:18

## 2020-06-20 RX ADMIN — POLYETHYLENE GLYCOL 3350 17 G: 17 POWDER, FOR SOLUTION ORAL at 14:53

## 2020-06-20 RX ADMIN — APIXABAN 2.5 MG: 2.5 TABLET, FILM COATED ORAL at 08:18

## 2020-06-20 RX ADMIN — OXYCODONE HYDROCHLORIDE AND ACETAMINOPHEN 1 TABLET: 10; 325 TABLET ORAL at 21:04

## 2020-06-20 RX ADMIN — RANOLAZINE 1000 MG: 500 TABLET, FILM COATED, EXTENDED RELEASE ORAL at 08:18

## 2020-06-20 RX ADMIN — GABAPENTIN 600 MG: 300 CAPSULE ORAL at 17:23

## 2020-06-20 RX ADMIN — CLOPIDOGREL BISULFATE 75 MG: 75 TABLET ORAL at 08:18

## 2020-06-20 RX ADMIN — RANOLAZINE 1000 MG: 500 TABLET, FILM COATED, EXTENDED RELEASE ORAL at 21:03

## 2020-06-20 RX ADMIN — INSULIN GLARGINE 8 UNITS: 100 INJECTION, SOLUTION SUBCUTANEOUS at 21:04

## 2020-06-20 RX ADMIN — GABAPENTIN 600 MG: 300 CAPSULE ORAL at 05:07

## 2020-06-20 RX ADMIN — TRAZODONE HYDROCHLORIDE 50 MG: 50 TABLET ORAL at 21:02

## 2020-06-20 RX ADMIN — APIXABAN 10 MG: 5 TABLET, FILM COATED ORAL at 21:03

## 2020-06-20 RX ADMIN — GABAPENTIN 600 MG: 300 CAPSULE ORAL at 11:50

## 2020-06-20 RX ADMIN — SODIUM CHLORIDE: 9 INJECTION, SOLUTION INTRAVENOUS at 01:47

## 2020-06-20 RX ADMIN — SODIUM CHLORIDE, PRESERVATIVE FREE 10 ML: 5 INJECTION INTRAVENOUS at 20:30

## 2020-06-20 RX ADMIN — METOPROLOL SUCCINATE 100 MG: 50 TABLET, EXTENDED RELEASE ORAL at 08:18

## 2020-06-20 ASSESSMENT — PAIN - FUNCTIONAL ASSESSMENT
PAIN_FUNCTIONAL_ASSESSMENT: ACTIVITIES ARE NOT PREVENTED
PAIN_FUNCTIONAL_ASSESSMENT: PREVENTS OR INTERFERES SOME ACTIVE ACTIVITIES AND ADLS
PAIN_FUNCTIONAL_ASSESSMENT: ACTIVITIES ARE NOT PREVENTED
PAIN_FUNCTIONAL_ASSESSMENT: ACTIVITIES ARE NOT PREVENTED
PAIN_FUNCTIONAL_ASSESSMENT: PREVENTS OR INTERFERES SOME ACTIVE ACTIVITIES AND ADLS

## 2020-06-20 ASSESSMENT — PAIN DESCRIPTION - ONSET
ONSET: ON-GOING

## 2020-06-20 ASSESSMENT — PAIN SCALES - GENERAL
PAINLEVEL_OUTOF10: 4
PAINLEVEL_OUTOF10: 6
PAINLEVEL_OUTOF10: 8
PAINLEVEL_OUTOF10: 6
PAINLEVEL_OUTOF10: 9
PAINLEVEL_OUTOF10: 5
PAINLEVEL_OUTOF10: 6
PAINLEVEL_OUTOF10: 4
PAINLEVEL_OUTOF10: 3

## 2020-06-20 ASSESSMENT — PAIN DESCRIPTION - PROGRESSION
CLINICAL_PROGRESSION: NOT CHANGED
CLINICAL_PROGRESSION: NOT CHANGED
CLINICAL_PROGRESSION: GRADUALLY IMPROVING
CLINICAL_PROGRESSION: NOT CHANGED
CLINICAL_PROGRESSION: NOT CHANGED
CLINICAL_PROGRESSION: GRADUALLY IMPROVING
CLINICAL_PROGRESSION: NOT CHANGED
CLINICAL_PROGRESSION: GRADUALLY IMPROVING

## 2020-06-20 ASSESSMENT — PAIN DESCRIPTION - FREQUENCY
FREQUENCY: CONTINUOUS
FREQUENCY: INTERMITTENT
FREQUENCY: CONTINUOUS

## 2020-06-20 ASSESSMENT — PAIN DESCRIPTION - PAIN TYPE
TYPE: ACUTE PAIN
TYPE: CHRONIC PAIN;ACUTE PAIN
TYPE: CHRONIC PAIN
TYPE: CHRONIC PAIN
TYPE: ACUTE PAIN
TYPE: ACUTE PAIN;CHRONIC PAIN

## 2020-06-20 ASSESSMENT — PAIN DESCRIPTION - DESCRIPTORS
DESCRIPTORS: DISCOMFORT
DESCRIPTORS: ACHING
DESCRIPTORS: DISCOMFORT
DESCRIPTORS: ACHING

## 2020-06-20 ASSESSMENT — PAIN DESCRIPTION - LOCATION
LOCATION: CHEST
LOCATION: BACK;PENIS
LOCATION: ABDOMEN;OTHER (COMMENT)
LOCATION: BACK
LOCATION: BACK

## 2020-06-20 ASSESSMENT — PAIN DESCRIPTION - ORIENTATION
ORIENTATION: MID
ORIENTATION: LEFT;LOWER;MID
ORIENTATION: MID;LOWER

## 2020-06-20 NOTE — PROGRESS NOTES
Pt reporting worsening chronic back pain and pennis pain at present time. Pt also reported pain in his pennis r/t injury from  unsuccessful catheter placement. Pt offered Toradol as ordered. Pt refuses Toradol, pt states it does not work for him. Ann Locke notified and received new orders.  Electronically signed by Mary Pandya RN on 6/20/2020 at 2:48 PM

## 2020-06-20 NOTE — CONSULTS
Hypercoagulable workup has been  requested for this patient. The patient is hemodynamically stable. 2.  Ischemic cardiomyopathy with no clinical evidence of congestive  heart failure. 3.  Acute kidney injury status post dye insertion for pulmonary  embolism. Certainly, underlying renal disease in presence of  longstanding diabetes is also a strong consideration. 4.  Coronary artery disease status post CABG. The patient is currently  stable. 5.  Status post ICD. RECOMMENDATIONS:  1. Since the patient needs to be on chronic anticoagulation therapy, we  will discontinue Plavix and start the patient on Eliquis 2.5 b.i.d.  2.  We will gently hydrate the patient due to his worsening renal  insufficiency. Closely following to make sure he does not flood into  heart failure. 3.  Nephrology consult which probably has been requested. 4.  We will hold Aldactone therapy in the morning until the renal  function comes back. I have also encouraged the patient to slightly  drink more liquids. 5.  We will continue the patient on his current evidence-based cardiac  medications except lisinopril. We will also hold the patient's  Aldactone therapy until his renal function improves further. I appreciate the opportunity to participate in the care of this pleasant  male.         Chris Bird MD    D: 06/19/2020 16:16:14       T: 06/19/2020 17:16:03     [email or web address]  Job#: [id number]     Doc#: [id number]    CC:

## 2020-06-20 NOTE — CONSULTS
 ENDOSCOPY, COLON, DIAGNOSTIC      PACEMAKER PLACEMENT      UPPER GASTROINTESTINAL ENDOSCOPY  02/07/2017    VASCULAR SURGERY         Social History:  Social History     Socioeconomic History    Marital status:      Spouse name: Not on file    Number of children: 1    Years of education: Not on file    Highest education level: Not on file   Occupational History    Occupation: disabled   Social Needs    Financial resource strain: Not on file    Food insecurity     Worry: Not on file     Inability: Not on file   [employer] needs     Medical: Not on file     Non-medical: Not on file   Tobacco Use    Smoking status: Current Every Day Smoker     Packs/day: 0.50     Years: 44.00     Pack years: 22.00     Types: Cigarettes    Smokeless tobacco: Never Used    Tobacco comment: urged to stop   Substance and Sexual Activity    Alcohol use: No     Alcohol/week: 0.0 standard drinks    Drug use: No    Sexual activity: Not Currently     Partners: Female   Lifestyle    Physical activity     Days per week: Not on file     Minutes per session: Not on file    Stress: Not on file   Relationships    Social connections     Talks on phone: Not on file     Gets together: Not on file     Attends Orthodox service: Not on file     Active member of club or organization: Not on file     Attends meetings of clubs or organizations: Not on file     Relationship status: Not on file    Intimate partner violence     Fear of current or ex partner: Not on file     Emotionally abused: Not on file     Physically abused: Not on file     Forced sexual activity: Not on file   Other Topics Concern    Not on file   Social History Narrative    Not on file       Family History:  Family History   Problem Relation Age of Onset    Coronary Art Dis Father     Cancer Mother         Lung with mets    Diabetes Mother        Meds:   Current Facility-Administered Medications: [START ON 6/21/2020] furosemide (LASIX) tablet 20 mg,

## 2020-06-20 NOTE — PROGRESS NOTES
ONCOLOGY HEMATOLOGY CARE  PROGRESS NOTE    SUBJECTIVE:       Pt had difficulty urinating yesterday, attempt to place resendiz was difficult, resulting in gross hematuria. He got started on Eliquis last night as well. Dr. Dakota Shell has placed  consult. MrRayne Amin tells me that he was supposed to see Dr. Faustino Lobo for a kidney mass found previously. PHYSICAL EXAM:       /72   Pulse 79   Temp 97.7 °F (36.5 °C) (Oral)   Resp 18   Ht 6' (1.829 m)   Wt 187 lb 6.3 oz (85 kg)   SpO2 95%   BMI 25.41 kg/m²     General appearance: Alert and cooperative. Appears stated age. Comfortable. Head: Normocephalic, without obvious abnormality, atraumatic  EENT:  No icterus. Lungs: Breathing comfortably, not on O2. Heart: Regular rate and rhythm, S1, S2 normal  Abdomen: Soft, non-tender; bowel sounds normal; no masses,  No hepatosplenomegaly, distention. Extremities: without cyanosis, clubbing, edema  Skin: No jaundice, purpura or petechiae  Musculoskeletal:  No joint swelling, deformities, tenderness, erythema. Neuro:  Alert and oriented. Speech is normal.  .  Cranial nerves are intact. Our conversation was appropriate.       MEDS:     Current Facility-Administered Medications   Medication Dose Route Frequency Provider Last Rate Last Dose    nicotine (NICODERM CQ) 21 MG/24HR 1 patch  1 patch Transdermal Daily Idania Watt MD   1 patch at 06/20/20 0818    0.9 % sodium chloride infusion   Intravenous Continuous Idania Watt MD 75 mL/hr at 06/20/20 0147      tamsulosin (FLOMAX) capsule 0.4 mg  0.4 mg Oral Daily Idania Watt MD   0.4 mg at 06/20/20 0818    apixaban (ELIQUIS) tablet 2.5 mg  2.5 mg Oral BID Tiffanie Simpson MD   2.5 mg at 06/20/20 0818    traZODone (DESYREL) tablet 50 mg  50 mg Oral Nightly Princess Дмитрий MD   50 mg at 06/19/20 2120    ranolazine (RANEXA) extended release tablet 1,000 mg  1,000 mg Oral BID Princess Дмитрий MD   1,000 mg at 06/20/20 0818    bypass graft) Z95.1    Essential hypertension I10    ICD (implantable cardioverter-defibrillator), dual, in situ Z95.810    Abdominal pain R10.9    Ischemic cardiomyopathy I25.5    Hyperlipidemia LDL goal <70 E78.5    CHF (congestive heart failure) ([facility]) I50.9    Chronic back pain M54.9, G89.29    Type 2 diabetes mellitus without complication, with long-term current use of insulin ([facility]) E11.9, Z79.4    Coronary artery disease involving native coronary artery of native heart without angina pectoris I25.10    COPD exacerbation ([facility]) J44.1    Anemia,normocytic normochromic ,mixed folic aci deficiency and iron deficiency D64.9    Guaiac + stool R19.5    Gastrointestinal hemorrhage K92.2    Morbid obesity due to excess calories ([facility]) E66.01    Anxiety F41.9    Coronary artery disease involving coronary bypass graft of native heart with angina pectoris ([facility]) I25.709    Chronically elevated troponin R79.89    Iron deficiency anemia due to chronic blood loss D50.0    LALITA (acute kidney injury) ([facility]) N17.9    Tobacco abuse Z72.0    Restrictive lung disease J98.4    Acute on chronic diastolic congestive heart failure ([facility]) I50.33    Ischemic stroke, left frontal lobe (4/30/18) ([facility]) I63.9    PFO (patent foramen ovale) Q21.1    DMII (diabetes mellitus, type 2) ([facility]) E11.9    COPD (chronic obstructive pulmonary disease) ([facility]) J44.9    Renal insufficiency N28.9    Chest pain R07.9    Chronic systolic (congestive) heart failure ([facility]) I50.22    CAD (coronary artery disease) I25.10    Compression fracture of L2, sequela S32.020S    Low back pain M54.5    Intractable back pain M54.9    Back pain M54.9    Stroke determined by clinical assessment ([facility]) I63.9    NSVT (nonsustained ventricular tachycardia) ([facility]) I47.2    Sinus tachycardia R00.0    ICD (implantable cardioverter-defibrillator) discharge Z45.02    Diabetic peripheral neuropathy ([facility]) E11.42    Hypotension I95.9    Neuritis M79.2   

## 2020-06-20 NOTE — PROGRESS NOTES
[facility]   Daily Progress Note      Admit Date:  6/17/2020    Reason for initial consultation: Ischemic cardiomyopathy    CC: \"penis hurts\"    Interval History:  Pt with no acute overnight cardiac events. Denies chest pain, palpitations, and dyspnea at rest. Patient having penile discomfort from traumatic resendiz insertion. Urology evaluated. Review of Systems:   · Constitutional: No unanticipated weight loss. There's been no change in energy level, sleep pattern, or activity level. No fevers, chills. · Eyes: No visual changes or diplopia. No scleral icterus. · ENT: No Headaches, hearing loss or vertigo. No mouth sores or sore throat. · Cardiovascular: as reviewed in HPI  · Respiratory: No cough or wheezing, no sputum production. No hemoptysis. · Gastrointestinal: No abdominal pain, appetite loss, blood in stools. No change in bowel or bladder habits. · Genitourinary: No dysuria, trouble voiding, or hematuria. · Musculoskeletal:  No gait disturbance, no joint complaints. · Integumentary: No rash or pruritis. · Neurological: No headache, diplopia, change in muscle strength, numbness or tingling. · Psychiatric: No anxiety or depression. · Endocrine: No temperature intolerance. No excessive thirst, fluid intake, or urination. No tremor. · Hematologic/Lymphatic: No abnormal bruising or bleeding, blood clots or swollen lymph nodes. · Allergic/Immunologic: No nasal congestion or hives. Objective:   /78   Pulse 69   Temp 97.4 °F (36.3 °C) (Oral)   Resp 18   Ht 6' (1.829 m)   Wt 187 lb 6.3 oz (85 kg)   SpO2 95%   BMI 25.41 kg/m²       Intake/Output Summary (Last 24 hours) at 6/20/2020 1432  Last data filed at 6/20/2020 1143  Gross per 24 hour   Intake 1106 ml   Output 3739 ml   Net -2633 ml     Wt Readings from Last 3 Encounters:   06/20/20 187 lb 6.3 oz (85 kg)   05/24/20 195 lb 1.7 oz (88.5 kg)   05/19/20 185 lb 13.6 oz (84.3 kg)       Physical Exam:  General:  NAD.

## 2020-06-21 LAB
ANION GAP SERPL CALCULATED.3IONS-SCNC: 10 MMOL/L (ref 3–16)
BASOPHILS ABSOLUTE: 0 K/UL (ref 0–0.2)
BASOPHILS RELATIVE PERCENT: 0.6 %
BUN BLDV-MCNC: 13 MG/DL (ref 7–20)
CALCIUM SERPL-MCNC: 8.2 MG/DL (ref 8.3–10.6)
CHLORIDE BLD-SCNC: 109 MMOL/L (ref 99–110)
CO2: 22 MMOL/L (ref 21–32)
CREAT SERPL-MCNC: 1 MG/DL (ref 0.8–1.3)
DRVVT CONFIRMATION TEST: ABNORMAL RATIO
DRVVT SCREEN: 29 SEC (ref 33–44)
DRVVT,DIL: ABNORMAL SEC (ref 33–44)
EOSINOPHILS ABSOLUTE: 0.1 K/UL (ref 0–0.6)
EOSINOPHILS RELATIVE PERCENT: 2.5 %
GFR AFRICAN AMERICAN: >60
GFR NON-AFRICAN AMERICAN: >60
GLUCOSE BLD-MCNC: 118 MG/DL (ref 70–99)
GLUCOSE BLD-MCNC: 120 MG/DL (ref 70–99)
GLUCOSE BLD-MCNC: 124 MG/DL (ref 70–99)
GLUCOSE BLD-MCNC: 127 MG/DL (ref 70–99)
GLUCOSE BLD-MCNC: 137 MG/DL (ref 70–99)
HCT VFR BLD CALC: 32.7 % (ref 40.5–52.5)
HEMOGLOBIN: 11.1 G/DL (ref 13.5–17.5)
HEXAGONAL PHOSPHOLIPID NEUTRALIZAT TEST: ABNORMAL
LUPUS ANTICOAG INTERP: ABNORMAL
LYMPHOCYTES ABSOLUTE: 1.3 K/UL (ref 1–5.1)
LYMPHOCYTES RELATIVE PERCENT: 26.3 %
MCH RBC QN AUTO: 34.5 PG (ref 26–34)
MCHC RBC AUTO-ENTMCNC: 33.9 G/DL (ref 31–36)
MCV RBC AUTO: 101.6 FL (ref 80–100)
MONOCYTES ABSOLUTE: 0.3 K/UL (ref 0–1.3)
MONOCYTES RELATIVE PERCENT: 6.4 %
NEUTROPHILS ABSOLUTE: 3.1 K/UL (ref 1.7–7.7)
NEUTROPHILS RELATIVE PERCENT: 64.2 %
PDW BLD-RTO: 15.6 % (ref 12.4–15.4)
PERFORMED ON: ABNORMAL
PLATELET # BLD: 127 K/UL (ref 135–450)
PLT NEUTA: ABNORMAL
PMV BLD AUTO: 9.1 FL (ref 5–10.5)
POTASSIUM REFLEX MAGNESIUM: 4.3 MMOL/L (ref 3.5–5.1)
POTASSIUM SERPL-SCNC: 4.3 MMOL/L (ref 3.5–5.1)
PT D: 12.5 SEC (ref 12–15.5)
PTT D: 58 SEC (ref 32–48)
PTT-D CORR REFLEX: ABNORMAL SEC (ref 32–48)
PTT-HEPARIN NEUTRALIZED: 37 SEC (ref 32–48)
RBC # BLD: 3.21 M/UL (ref 4.2–5.9)
REPTILASE TIME: 17 SEC
SODIUM BLD-SCNC: 141 MMOL/L (ref 136–145)
THROMBIN TIME: 36 SEC (ref 14.7–19.5)
WBC # BLD: 4.9 K/UL (ref 4–11)

## 2020-06-21 PROCEDURE — 85635 REPTILASE TEST: CPT

## 2020-06-21 PROCEDURE — 36415 COLL VENOUS BLD VENIPUNCTURE: CPT

## 2020-06-21 PROCEDURE — 85025 COMPLETE CBC W/AUTO DIFF WBC: CPT

## 2020-06-21 PROCEDURE — 6370000000 HC RX 637 (ALT 250 FOR IP): Performed by: HOSPITALIST

## 2020-06-21 PROCEDURE — 1200000000 HC SEMI PRIVATE

## 2020-06-21 PROCEDURE — 85730 THROMBOPLASTIN TIME PARTIAL: CPT

## 2020-06-21 PROCEDURE — 80048 BASIC METABOLIC PNL TOTAL CA: CPT

## 2020-06-21 PROCEDURE — 6370000000 HC RX 637 (ALT 250 FOR IP): Performed by: INTERNAL MEDICINE

## 2020-06-21 PROCEDURE — 2580000003 HC RX 258: Performed by: INTERNAL MEDICINE

## 2020-06-21 PROCEDURE — 85670 THROMBIN TIME PLASMA: CPT

## 2020-06-21 RX ADMIN — OXYCODONE HYDROCHLORIDE AND ACETAMINOPHEN 1 TABLET: 10; 325 TABLET ORAL at 06:26

## 2020-06-21 RX ADMIN — GABAPENTIN 600 MG: 300 CAPSULE ORAL at 11:07

## 2020-06-21 RX ADMIN — SODIUM CHLORIDE, PRESERVATIVE FREE 10 ML: 5 INJECTION INTRAVENOUS at 08:37

## 2020-06-21 RX ADMIN — HYDRALAZINE HYDROCHLORIDE 25 MG: 25 TABLET, FILM COATED ORAL at 15:16

## 2020-06-21 RX ADMIN — GABAPENTIN 600 MG: 300 CAPSULE ORAL at 15:17

## 2020-06-21 RX ADMIN — OXYCODONE HYDROCHLORIDE AND ACETAMINOPHEN 1 TABLET: 10; 325 TABLET ORAL at 12:34

## 2020-06-21 RX ADMIN — AMIODARONE HYDROCHLORIDE 200 MG: 200 TABLET ORAL at 08:36

## 2020-06-21 RX ADMIN — LISINOPRIL 10 MG: 10 TABLET ORAL at 08:36

## 2020-06-21 RX ADMIN — GABAPENTIN 600 MG: 300 CAPSULE ORAL at 23:19

## 2020-06-21 RX ADMIN — ATORVASTATIN CALCIUM 80 MG: 80 TABLET, FILM COATED ORAL at 08:36

## 2020-06-21 RX ADMIN — ISOSORBIDE MONONITRATE 60 MG: 60 TABLET, EXTENDED RELEASE ORAL at 08:36

## 2020-06-21 RX ADMIN — PANTOPRAZOLE SODIUM 40 MG: 40 TABLET, DELAYED RELEASE ORAL at 06:26

## 2020-06-21 RX ADMIN — HYDRALAZINE HYDROCHLORIDE 25 MG: 25 TABLET, FILM COATED ORAL at 06:26

## 2020-06-21 RX ADMIN — OXYCODONE HYDROCHLORIDE AND ACETAMINOPHEN 1 TABLET: 10; 325 TABLET ORAL at 18:46

## 2020-06-21 RX ADMIN — METOPROLOL SUCCINATE 100 MG: 50 TABLET, EXTENDED RELEASE ORAL at 08:36

## 2020-06-21 RX ADMIN — SPIRONOLACTONE 25 MG: 25 TABLET ORAL at 08:36

## 2020-06-21 RX ADMIN — RANOLAZINE 1000 MG: 500 TABLET, FILM COATED, EXTENDED RELEASE ORAL at 08:36

## 2020-06-21 RX ADMIN — GABAPENTIN 600 MG: 300 CAPSULE ORAL at 06:25

## 2020-06-21 RX ADMIN — RANOLAZINE 1000 MG: 500 TABLET, FILM COATED, EXTENDED RELEASE ORAL at 23:19

## 2020-06-21 RX ADMIN — ASPIRIN 81 MG 81 MG: 81 TABLET ORAL at 08:36

## 2020-06-21 RX ADMIN — FUROSEMIDE 20 MG: 20 TABLET ORAL at 08:36

## 2020-06-21 RX ADMIN — SODIUM CHLORIDE, PRESERVATIVE FREE 10 ML: 5 INJECTION INTRAVENOUS at 23:19

## 2020-06-21 RX ADMIN — HYDRALAZINE HYDROCHLORIDE 25 MG: 25 TABLET, FILM COATED ORAL at 23:19

## 2020-06-21 RX ADMIN — APIXABAN 10 MG: 5 TABLET, FILM COATED ORAL at 23:19

## 2020-06-21 RX ADMIN — APIXABAN 10 MG: 5 TABLET, FILM COATED ORAL at 08:36

## 2020-06-21 RX ADMIN — TRAZODONE HYDROCHLORIDE 50 MG: 50 TABLET ORAL at 23:19

## 2020-06-21 RX ADMIN — INSULIN GLARGINE 8 UNITS: 100 INJECTION, SOLUTION SUBCUTANEOUS at 23:20

## 2020-06-21 RX ADMIN — POLYETHYLENE GLYCOL 3350 17 G: 17 POWDER, FOR SOLUTION ORAL at 08:36

## 2020-06-21 RX ADMIN — TAMSULOSIN HYDROCHLORIDE 0.4 MG: 0.4 CAPSULE ORAL at 08:36

## 2020-06-21 ASSESSMENT — PAIN DESCRIPTION - DESCRIPTORS
DESCRIPTORS: DISCOMFORT
DESCRIPTORS: ACHING
DESCRIPTORS: DISCOMFORT

## 2020-06-21 ASSESSMENT — PAIN DESCRIPTION - PAIN TYPE
TYPE: CHRONIC PAIN
TYPE: ACUTE PAIN;CHRONIC PAIN
TYPE: CHRONIC PAIN

## 2020-06-21 ASSESSMENT — PAIN DESCRIPTION - ONSET
ONSET: ON-GOING

## 2020-06-21 ASSESSMENT — PAIN SCALES - GENERAL
PAINLEVEL_OUTOF10: 5
PAINLEVEL_OUTOF10: 3
PAINLEVEL_OUTOF10: 7
PAINLEVEL_OUTOF10: 4
PAINLEVEL_OUTOF10: 6
PAINLEVEL_OUTOF10: 4
PAINLEVEL_OUTOF10: 4

## 2020-06-21 ASSESSMENT — PAIN DESCRIPTION - ORIENTATION
ORIENTATION: MID;LEFT
ORIENTATION: LEFT

## 2020-06-21 ASSESSMENT — PAIN DESCRIPTION - FREQUENCY
FREQUENCY: INTERMITTENT

## 2020-06-21 ASSESSMENT — PAIN DESCRIPTION - LOCATION
LOCATION: BACK
LOCATION: BACK;PENIS

## 2020-06-21 ASSESSMENT — PAIN DESCRIPTION - PROGRESSION
CLINICAL_PROGRESSION: GRADUALLY IMPROVING

## 2020-06-21 NOTE — PROGRESS NOTES
Hospitalist Progress Note      PCP: Diandra Stauffer MD    Date of Admission: 6/17/2020      Subjective:   Mentions he still has inspirational chest pain. Denies nausea, vomiting, shortness of breath, fever or chills. mention feels overall better    Medications:  Reviewed    Infusion Medications    dextrose       Scheduled Medications    furosemide  20 mg Oral Daily    polyethylene glycol  17 g Oral Daily    apixaban  10 mg Oral BID    [START ON 6/27/2020] apixaban  5 mg Oral BID    lisinopril  10 mg Oral Daily    spironolactone  25 mg Oral Daily    aspirin  81 mg Oral Daily    nicotine  1 patch Transdermal Daily    tamsulosin  0.4 mg Oral Daily    traZODone  50 mg Oral Nightly    ranolazine  1,000 mg Oral BID    pantoprazole  40 mg Oral QAM AC    metoprolol succinate  100 mg Oral Daily    isosorbide mononitrate  60 mg Oral Daily    insulin glargine  8 Units Subcutaneous Nightly    hydrALAZINE  25 mg Oral 3 times per day    gabapentin  600 mg Oral 5x Daily    atorvastatin  80 mg Oral Daily    amiodarone  200 mg Oral Daily    sodium chloride flush  10 mL Intravenous 2 times per day    insulin lispro  0-12 Units Subcutaneous TID WC    insulin lispro  0-6 Units Subcutaneous Nightly     PRN Meds: oxyCODONE-acetaminophen, ketorolac, albuterol, sodium chloride flush, acetaminophen **OR** acetaminophen, ondansetron, glucose, dextrose, glucagon (rDNA), dextrose, enalaprilat, zolpidem      Intake/Output Summary (Last 24 hours) at 6/21/2020 1755  Last data filed at 6/21/2020 1613  Gross per 24 hour   Intake 730 ml   Output 2800 ml   Net -2070 ml       Physical Exam Performed:    /78   Pulse 68   Temp 97.7 °F (36.5 °C) (Oral)   Resp 17   Ht 6' (1.829 m)   Wt 187 lb 6.3 oz (85 kg)   SpO2 94%   BMI 25.41 kg/m²     General appearance: No apparent distress,   HEENT:  Conjunctivae/corneas clear. Neck: Supple, with full range of motion. Respiratory:  Normal respiratory effort.  Clear

## 2020-06-21 NOTE — PROGRESS NOTES
Pt alert and oriented x4. VSS. Pt states pennis pain is improving. Pt chronic back and acute pennis pain is tolerable at this time. Pt still SOB with exertion. Respirations are diminished and unlabored on room air. Pt denies other new complains. Pt resting in bed. Call light and item need in reach. Electronically signed by Lorraine Nolasco RN on 6/21/2020 at 8:53 AM

## 2020-06-22 LAB
ALBUMIN SERPL-MCNC: 2.8 G/DL (ref 3.1–4.9)
ALPHA-1-GLOBULIN: 0.3 G/DL (ref 0.2–0.4)
ALPHA-2-GLOBULIN: 0.8 G/DL (ref 0.4–1.1)
ANION GAP SERPL CALCULATED.3IONS-SCNC: 13 MMOL/L (ref 3–16)
ANTICARDIOLIPIN IGG ANTIBODY: 3 GPL (ref 0–14)
BASOPHILS ABSOLUTE: 0.1 K/UL (ref 0–0.2)
BASOPHILS RELATIVE PERCENT: 1.2 %
BETA GLOBULIN: 0.8 G/DL (ref 0.9–1.6)
BUN BLDV-MCNC: 12 MG/DL (ref 7–20)
CALCIUM SERPL-MCNC: 8.7 MG/DL (ref 8.3–10.6)
CARDIOLIPIN AB IGM: 0 MPL (ref 0–12)
CHLORIDE BLD-SCNC: 103 MMOL/L (ref 99–110)
CO2: 23 MMOL/L (ref 21–32)
CREAT SERPL-MCNC: 0.9 MG/DL (ref 0.8–1.3)
EOSINOPHILS ABSOLUTE: 0.2 K/UL (ref 0–0.6)
EOSINOPHILS RELATIVE PERCENT: 2.8 %
GAMMA GLOBULIN: 0.6 G/DL (ref 0.6–1.8)
GFR AFRICAN AMERICAN: >60
GFR NON-AFRICAN AMERICAN: >60
GLUCOSE BLD-MCNC: 117 MG/DL (ref 70–99)
GLUCOSE BLD-MCNC: 118 MG/DL (ref 70–99)
GLUCOSE BLD-MCNC: 119 MG/DL (ref 70–99)
GLUCOSE BLD-MCNC: 135 MG/DL (ref 70–99)
GLUCOSE BLD-MCNC: 219 MG/DL (ref 70–99)
HCT VFR BLD CALC: 37 % (ref 40.5–52.5)
HEMOGLOBIN: 12.4 G/DL (ref 13.5–17.5)
LYMPHOCYTES ABSOLUTE: 1.7 K/UL (ref 1–5.1)
LYMPHOCYTES RELATIVE PERCENT: 29.3 %
MCH RBC QN AUTO: 34.2 PG (ref 26–34)
MCHC RBC AUTO-ENTMCNC: 33.6 G/DL (ref 31–36)
MCV RBC AUTO: 101.7 FL (ref 80–100)
MONOCYTES ABSOLUTE: 0.3 K/UL (ref 0–1.3)
MONOCYTES RELATIVE PERCENT: 6 %
NEUTROPHILS ABSOLUTE: 3.4 K/UL (ref 1.7–7.7)
NEUTROPHILS RELATIVE PERCENT: 60.7 %
PDW BLD-RTO: 16 % (ref 12.4–15.4)
PERFORMED ON: ABNORMAL
PLATELET # BLD: 150 K/UL (ref 135–450)
PMV BLD AUTO: 9.6 FL (ref 5–10.5)
POTASSIUM REFLEX MAGNESIUM: 4.2 MMOL/L (ref 3.5–5.1)
POTASSIUM SERPL-SCNC: 4.2 MMOL/L (ref 3.5–5.1)
PROTEIN C FUNCTIONAL: 117 % (ref 83–168)
PROTEIN S, FUNCTIONAL: 62 % (ref 66–143)
RBC # BLD: 3.64 M/UL (ref 4.2–5.9)
SODIUM BLD-SCNC: 139 MMOL/L (ref 136–145)
SPE/IFE INTERPRETATION: NORMAL
TOTAL PROTEIN: 5.2 G/DL (ref 6.4–8.2)
WBC # BLD: 5.7 K/UL (ref 4–11)

## 2020-06-22 PROCEDURE — 36415 COLL VENOUS BLD VENIPUNCTURE: CPT

## 2020-06-22 PROCEDURE — 6360000002 HC RX W HCPCS: Performed by: INTERNAL MEDICINE

## 2020-06-22 PROCEDURE — 6370000000 HC RX 637 (ALT 250 FOR IP): Performed by: INTERNAL MEDICINE

## 2020-06-22 PROCEDURE — 80048 BASIC METABOLIC PNL TOTAL CA: CPT

## 2020-06-22 PROCEDURE — 85025 COMPLETE CBC W/AUTO DIFF WBC: CPT

## 2020-06-22 PROCEDURE — 2580000003 HC RX 258: Performed by: INTERNAL MEDICINE

## 2020-06-22 PROCEDURE — 1200000000 HC SEMI PRIVATE

## 2020-06-22 PROCEDURE — 6370000000 HC RX 637 (ALT 250 FOR IP): Performed by: HOSPITALIST

## 2020-06-22 RX ORDER — TAMSULOSIN HYDROCHLORIDE 0.4 MG/1
0.4 CAPSULE ORAL DAILY
Qty: 30 CAPSULE | Refills: 3 | Status: ON HOLD | OUTPATIENT
Start: 2020-06-23 | End: 2020-07-16 | Stop reason: SDUPTHER

## 2020-06-22 RX ADMIN — METOPROLOL SUCCINATE 100 MG: 50 TABLET, EXTENDED RELEASE ORAL at 08:36

## 2020-06-22 RX ADMIN — AMIODARONE HYDROCHLORIDE 200 MG: 200 TABLET ORAL at 08:35

## 2020-06-22 RX ADMIN — OXYCODONE HYDROCHLORIDE AND ACETAMINOPHEN 1 TABLET: 10; 325 TABLET ORAL at 08:04

## 2020-06-22 RX ADMIN — ISOSORBIDE MONONITRATE 60 MG: 60 TABLET, EXTENDED RELEASE ORAL at 08:36

## 2020-06-22 RX ADMIN — GABAPENTIN 600 MG: 300 CAPSULE ORAL at 06:10

## 2020-06-22 RX ADMIN — PANTOPRAZOLE SODIUM 40 MG: 40 TABLET, DELAYED RELEASE ORAL at 06:10

## 2020-06-22 RX ADMIN — SODIUM CHLORIDE, PRESERVATIVE FREE 10 ML: 5 INJECTION INTRAVENOUS at 08:49

## 2020-06-22 RX ADMIN — SODIUM CHLORIDE, PRESERVATIVE FREE 10 ML: 5 INJECTION INTRAVENOUS at 21:23

## 2020-06-22 RX ADMIN — SPIRONOLACTONE 25 MG: 25 TABLET ORAL at 08:35

## 2020-06-22 RX ADMIN — GABAPENTIN 600 MG: 300 CAPSULE ORAL at 11:13

## 2020-06-22 RX ADMIN — TAMSULOSIN HYDROCHLORIDE 0.4 MG: 0.4 CAPSULE ORAL at 08:36

## 2020-06-22 RX ADMIN — OXYCODONE HYDROCHLORIDE AND ACETAMINOPHEN 1 TABLET: 10; 325 TABLET ORAL at 21:21

## 2020-06-22 RX ADMIN — OXYCODONE HYDROCHLORIDE AND ACETAMINOPHEN 1 TABLET: 10; 325 TABLET ORAL at 01:17

## 2020-06-22 RX ADMIN — HYDRALAZINE HYDROCHLORIDE 25 MG: 25 TABLET, FILM COATED ORAL at 14:24

## 2020-06-22 RX ADMIN — POLYETHYLENE GLYCOL 3350 17 G: 17 POWDER, FOR SOLUTION ORAL at 08:35

## 2020-06-22 RX ADMIN — KETOROLAC TROMETHAMINE 30 MG: 30 INJECTION, SOLUTION INTRAMUSCULAR at 17:25

## 2020-06-22 RX ADMIN — ASPIRIN 81 MG 81 MG: 81 TABLET ORAL at 08:35

## 2020-06-22 RX ADMIN — GABAPENTIN 600 MG: 300 CAPSULE ORAL at 21:21

## 2020-06-22 RX ADMIN — FUROSEMIDE 20 MG: 20 TABLET ORAL at 08:37

## 2020-06-22 RX ADMIN — ZOLPIDEM TARTRATE 5 MG: 5 TABLET ORAL at 01:17

## 2020-06-22 RX ADMIN — HYDRALAZINE HYDROCHLORIDE 25 MG: 25 TABLET, FILM COATED ORAL at 06:10

## 2020-06-22 RX ADMIN — RANOLAZINE 1000 MG: 500 TABLET, FILM COATED, EXTENDED RELEASE ORAL at 08:36

## 2020-06-22 RX ADMIN — HYDRALAZINE HYDROCHLORIDE 25 MG: 25 TABLET, FILM COATED ORAL at 21:20

## 2020-06-22 RX ADMIN — GABAPENTIN 600 MG: 300 CAPSULE ORAL at 14:24

## 2020-06-22 RX ADMIN — TRAZODONE HYDROCHLORIDE 50 MG: 50 TABLET ORAL at 21:23

## 2020-06-22 RX ADMIN — APIXABAN 10 MG: 5 TABLET, FILM COATED ORAL at 08:36

## 2020-06-22 RX ADMIN — OXYCODONE HYDROCHLORIDE AND ACETAMINOPHEN 1 TABLET: 10; 325 TABLET ORAL at 14:24

## 2020-06-22 RX ADMIN — ATORVASTATIN CALCIUM 80 MG: 80 TABLET, FILM COATED ORAL at 08:36

## 2020-06-22 RX ADMIN — KETOROLAC TROMETHAMINE 30 MG: 30 INJECTION, SOLUTION INTRAMUSCULAR at 11:13

## 2020-06-22 RX ADMIN — APIXABAN 10 MG: 5 TABLET, FILM COATED ORAL at 21:20

## 2020-06-22 RX ADMIN — LISINOPRIL 10 MG: 10 TABLET ORAL at 08:34

## 2020-06-22 ASSESSMENT — PAIN DESCRIPTION - LOCATION
LOCATION: CHEST;ABDOMEN
LOCATION: BACK
LOCATION: CHEST;BACK

## 2020-06-22 ASSESSMENT — PAIN SCALES - GENERAL
PAINLEVEL_OUTOF10: 6
PAINLEVEL_OUTOF10: 7
PAINLEVEL_OUTOF10: 6
PAINLEVEL_OUTOF10: 7
PAINLEVEL_OUTOF10: 5
PAINLEVEL_OUTOF10: 6
PAINLEVEL_OUTOF10: 6
PAINLEVEL_OUTOF10: 2
PAINLEVEL_OUTOF10: 7
PAINLEVEL_OUTOF10: 5
PAINLEVEL_OUTOF10: 7

## 2020-06-22 ASSESSMENT — PAIN DESCRIPTION - ONSET
ONSET: ON-GOING

## 2020-06-22 ASSESSMENT — PAIN SCALES - WONG BAKER
WONGBAKER_NUMERICALRESPONSE: 4
WONGBAKER_NUMERICALRESPONSE: 2
WONGBAKER_NUMERICALRESPONSE: 4

## 2020-06-22 ASSESSMENT — PAIN DESCRIPTION - PROGRESSION
CLINICAL_PROGRESSION: NOT CHANGED
CLINICAL_PROGRESSION: NOT CHANGED
CLINICAL_PROGRESSION: GRADUALLY IMPROVING
CLINICAL_PROGRESSION: NOT CHANGED
CLINICAL_PROGRESSION: GRADUALLY IMPROVING
CLINICAL_PROGRESSION: NOT CHANGED
CLINICAL_PROGRESSION: GRADUALLY IMPROVING
CLINICAL_PROGRESSION: GRADUALLY IMPROVING
CLINICAL_PROGRESSION: NOT CHANGED

## 2020-06-22 ASSESSMENT — PAIN - FUNCTIONAL ASSESSMENT
PAIN_FUNCTIONAL_ASSESSMENT: PREVENTS OR INTERFERES SOME ACTIVE ACTIVITIES AND ADLS
PAIN_FUNCTIONAL_ASSESSMENT: PREVENTS OR INTERFERES WITH MANY ACTIVE NOT PASSIVE ACTIVITIES
PAIN_FUNCTIONAL_ASSESSMENT: PREVENTS OR INTERFERES SOME ACTIVE ACTIVITIES AND ADLS

## 2020-06-22 ASSESSMENT — PAIN DESCRIPTION - DESCRIPTORS
DESCRIPTORS: SHARP;DULL
DESCRIPTORS: SHARP;PRESSURE

## 2020-06-22 ASSESSMENT — PAIN DESCRIPTION - ORIENTATION
ORIENTATION: LEFT;MID
ORIENTATION: MID;LOWER

## 2020-06-22 ASSESSMENT — PAIN DESCRIPTION - PAIN TYPE
TYPE: CHRONIC PAIN
TYPE: ACUTE PAIN;CHRONIC PAIN
TYPE: ACUTE PAIN

## 2020-06-22 ASSESSMENT — PAIN DESCRIPTION - FREQUENCY
FREQUENCY: CONTINUOUS

## 2020-06-22 NOTE — PROGRESS NOTES
D/C discontinued          This RN prepared pt for discharge to home at 1335. This RN provided discharge education regarding medication regimen, and home care. Pt. Verbalized understanding. Denies questions. *** LDAs present at discharge. Discontinued IV without complications. Pt. tolerated well.   Pt is being transferred by *** to **** with ****

## 2020-06-22 NOTE — PLAN OF CARE
Problem: Pain:  Description: Pain management should include both nonpharmacologic and pharmacologic interventions. Goal: Pain level will decrease  Description: Pain level will decrease  6/18/2020 1608 by Torey Castrejon RN  Outcome: Ongoing   Adequate pain control achieved this shift. See MAR. Problem: Falls - Risk of:  Goal: Will remain free from falls  Description: Will remain free from falls  6/18/2020 1608 by Torey Castrejon RN  Outcome: Ongoing   Pt. Free of falls and injuries this shift.
Problem: Pain:  Goal: Pain level will decrease  Description: Pain level will decrease  6/18/2020 2352 by Kristin Moody RN  Outcome: Ongoing  6/18/2020 1608 by Bell Swartz RN  Outcome: Ongoing  Goal: Control of acute pain  Description: Control of acute pain  6/18/2020 2352 by Kristin Moody RN  Outcome: Ongoing  6/18/2020 1608 by Bell Swartz RN  Outcome: Ongoing  Goal: Control of chronic pain  Description: Control of chronic pain  6/18/2020 2352 by Kristin Moody RN  Outcome: Ongoing  6/18/2020 1608 by Bell Swartz RN  Outcome: Ongoing     Problem: Falls - Risk of:  Goal: Will remain free from falls  Description: Will remain free from falls  6/18/2020 2352 by Kristin Moody RN  Outcome: Ongoing  6/18/2020 1608 by Bell Swartz RN  Outcome: Ongoing  Goal: Absence of physical injury  Description: Absence of physical injury  6/18/2020 2352 by Kristin Moody RN  Outcome: Ongoing  6/18/2020 1608 by Bell Swartz RN  Outcome: Ongoing     Problem: Cardiac:  Goal: Hemodynamic stability will improve  Description: Hemodynamic stability will improve  6/18/2020 2352 by Kristin Moody RN  Outcome: Ongoing  6/18/2020 1608 by Bell Swartz RN  Outcome: Ongoing  Goal: Ability to maintain an adequate cardiac output will improve  Description: Ability to maintain an adequate cardiac output will improve  6/18/2020 2352 by Kristin Moody RN  Outcome: Ongoing  6/18/2020 1608 by Bell Swartz RN  Outcome: Ongoing     Problem: Fluid Volume:  Goal: Risk for excess fluid volume will decrease  Description: Risk for excess fluid volume will decrease  6/18/2020 2352 by Kristin Moody RN  Outcome: Ongoing  6/18/2020 1608 by Bell Swartz RN  Outcome: Ongoing     Problem: Health Behavior:  Goal: Ability to manage health-related needs will improve  Description: Ability to manage health-related needs will improve  6/18/2020 2352 by Harley LORENZO
Problem: Pain:  Goal: Pain level will decrease  Description: Pain level will decrease  6/19/2020 1044 by Brian Zabala RN  Outcome: Ongoing  6/18/2020 2352 by Nila Rowan RN  Outcome: Ongoing  Goal: Control of acute pain  Description: Control of acute pain  6/19/2020 1044 by Brian Zabala RN  Outcome: Ongoing  6/18/2020 2352 by Nila Rowan RN  Outcome: Ongoing  Goal: Control of chronic pain  Description: Control of chronic pain  6/19/2020 1044 by Brian Zabala RN  Outcome: Ongoing  6/18/2020 2352 by Nila Rowan RN  Outcome: Ongoing     Problem: Falls - Risk of:  Goal: Will remain free from falls  Description: Will remain free from falls  6/19/2020 1044 by Brian Zabala RN  Outcome: Ongoing  6/18/2020 2352 by Nila Rowan RN  Outcome: Ongoing  Goal: Absence of physical injury  Description: Absence of physical injury  6/19/2020 1044 by Brian Zabala RN  Outcome: Ongoing  6/18/2020 2352 by Nila Rowan RN  Outcome: Ongoing     Problem: Cardiac:  Goal: Hemodynamic stability will improve  Description: Hemodynamic stability will improve  6/19/2020 1044 by Brian Zabala RN  Outcome: Ongoing  6/18/2020 2352 by Nila Rowan RN  Outcome: Ongoing  Goal: Ability to maintain an adequate cardiac output will improve  Description: Ability to maintain an adequate cardiac output will improve  6/19/2020 1044 by Brian Zabala RN  Outcome: Ongoing  6/18/2020 2352 by Nila Rowan RN  Outcome: Ongoing
Problem: Pain:  Goal: Pain level will decrease  Description: Pain level will decrease  Outcome: Ongoing  Note: Pt able to express presence and absence of pain using numerical pain scale. Pt chronic back and pennis pain is managed by PRN analgesics as ordered by MD. Pain reassess after each interventions. Will continue to monitor as needed. Problem: Falls - Risk of:  Goal: Will remain free from falls  Description: Will remain free from falls  Outcome: Ongoing  Note: Pt free from falls this shift. Non skid socks provided. Bed alarm on. Pt educated on use of call light. Call light always within reach. Pt able and agreeable to contact for safety appropriately.        Problem: Fluid Volume:  Goal: Risk for excess fluid volume will decrease  Description: Risk for excess fluid volume will decrease  Outcome: Ongoing     Problem: Health Behavior:  Goal: Ability to manage health-related needs will improve  Description: Ability to manage health-related needs will improve  Outcome: Ongoing     Problem: Discharge Planning:  Goal: Discharged to appropriate level of care  Description: Discharged to appropriate level of care  Outcome: Ongoing
Problem: Pain:  Goal: Pain level will decrease  Description: Pain level will decrease  Outcome: Ongoing  Note: Pt able to express presence and absence of pain using numerical pain scale. Pt chronic back pain and pennis pain is managed by PRN analgesics as ordered by MD. Pain reassess after each interventions. Will continue to monitor as needed. Problem: Falls - Risk of:  Goal: Will remain free from falls  Description: Will remain free from falls  Outcome: Ongoing  Note: Pt free from falls this shift. Non skid socks on. X 2 bedside rails used. Pt educated on use of call light. Call light always within reach. Pt able and agreeable to contact for safety appropriately.        Problem: Discharge Planning:  Goal: Discharged to appropriate level of care  Description: Discharged to appropriate level of care  Outcome: Ongoing
appropriate level of care  6/22/2020 0855 by Rayne Gonzalez, RN  Outcome: Ongoing

## 2020-06-22 NOTE — PROGRESS NOTES
Hospitalist Progress Note      PCP: Alexi Mckeon MD    Date of Admission: 6/17/2020      Subjective:   Mentions he still has inspirational chest pain and is very anxious. Denies nausea, vomiting, shortness of breath, fever or chills. mention feels overall better    Medications:  Reviewed    Infusion Medications    dextrose       Scheduled Medications    furosemide  20 mg Oral Daily    polyethylene glycol  17 g Oral Daily    apixaban  10 mg Oral BID    [START ON 6/27/2020] apixaban  5 mg Oral BID    lisinopril  10 mg Oral Daily    spironolactone  25 mg Oral Daily    aspirin  81 mg Oral Daily    nicotine  1 patch Transdermal Daily    tamsulosin  0.4 mg Oral Daily    traZODone  50 mg Oral Nightly    ranolazine  1,000 mg Oral BID    pantoprazole  40 mg Oral QAM AC    metoprolol succinate  100 mg Oral Daily    isosorbide mononitrate  60 mg Oral Daily    insulin glargine  8 Units Subcutaneous Nightly    hydrALAZINE  25 mg Oral 3 times per day    gabapentin  600 mg Oral 5x Daily    atorvastatin  80 mg Oral Daily    amiodarone  200 mg Oral Daily    sodium chloride flush  10 mL Intravenous 2 times per day    insulin lispro  0-12 Units Subcutaneous TID WC    insulin lispro  0-6 Units Subcutaneous Nightly     PRN Meds: oxyCODONE-acetaminophen, ketorolac, albuterol, sodium chloride flush, acetaminophen **OR** acetaminophen, ondansetron, glucose, dextrose, glucagon (rDNA), dextrose, enalaprilat, zolpidem      Intake/Output Summary (Last 24 hours) at 6/22/2020 1534  Last data filed at 6/22/2020 1400  Gross per 24 hour   Intake 860 ml   Output 3500 ml   Net -2640 ml       Physical Exam Performed:    /71   Pulse 77   Temp 98 °F (36.7 °C) (Oral)   Resp 17   Ht 6' (1.829 m)   Wt 190 lb 11.2 oz (86.5 kg)   SpO2 92%   BMI 25.86 kg/m²     General appearance: No apparent distress,   HEENT:  Conjunctivae/corneas clear. Neck: Supple, with full range of motion.    Respiratory:  Normal respiratory effort. Clear to auscultation, bilaterally without Rales/Wheezes/Rhonchi. Cardiovascular: Regular rate and rhythm with normal S1/S2 without murmurs or rubs  Abdomen: Soft, non-tender, non-distended, normal bowel sounds. Musculoskeletal: No cyanosis or edema bilaterally  Neurologic:  without any focal sensory/motor deficits. grossly non-focal.  Psychiatric: Alert and oriented, Normal mood  Peripheral Pulses: +2 palpable, equal bilaterally       Labs:   Recent Labs     06/20/20  0626 06/21/20  0739 06/22/20  0755   WBC 4.9 4.9 5.7   HGB 12.1* 11.1* 12.4*   HCT 35.4* 32.7* 37.0*   * 127* 150     Recent Labs     06/20/20  0625 06/21/20  0739 06/22/20  0755    141 139   K 4.2  4.2 4.3  4.3 4.2  4.2    109 103   CO2 19* 22 23   BUN 16 13 12   CREATININE 1.1 1.0 0.9   CALCIUM 7.9* 8.2* 8.7     No results for input(s): AST, ALT, BILIDIR, BILITOT, ALKPHOS in the last 72 hours. No results for input(s): INR in the last 72 hours. No results for input(s): Aiden Dust in the last 72 hours. Urinalysis:      Lab Results   Component Value Date    NITRU Negative 04/22/2020    WBCUA 3 04/22/2020    RBCUA 1 04/22/2020    BLOODU Negative 04/22/2020    SPECGRAV 1.014 04/22/2020    GLUCOSEU 250 04/22/2020       Radiology:  US RENAL COMPLETE   Final Result   Unremarkable renal ultrasound. The simple retroperitoneal cyst immediately posterior to the upper left   kidney is essentially unchanged from multiple prior CT scans and is an   incidental finding. VL Extremity Venous Bilateral   Final Result      CT CHEST PULMONARY EMBOLISM W CONTRAST   Final Result   Suspected emboli within the posterior segmental and subsegmental pulmonary   arteries supplying the left lower lobe. Findings were discussed with Hi Echevarria at 7:01 pm on 6/17/2020. XR CHEST STANDARD (2 VW)   Final Result   No evidence of acute cardiopulmonary disease.                Assessment/Plan:    Active

## 2020-06-23 VITALS
BODY MASS INDEX: 26.28 KG/M2 | SYSTOLIC BLOOD PRESSURE: 161 MMHG | OXYGEN SATURATION: 94 % | TEMPERATURE: 98.3 F | HEIGHT: 72 IN | DIASTOLIC BLOOD PRESSURE: 74 MMHG | HEART RATE: 73 BPM | WEIGHT: 194 LBS | RESPIRATION RATE: 16 BRPM

## 2020-06-23 LAB
ANION GAP SERPL CALCULATED.3IONS-SCNC: 13 MMOL/L (ref 3–16)
ANISOCYTOSIS: ABNORMAL
BASOPHILS ABSOLUTE: 0 K/UL (ref 0–0.2)
BASOPHILS RELATIVE PERCENT: 0.3 %
BUN BLDV-MCNC: 16 MG/DL (ref 7–20)
CALCIUM SERPL-MCNC: 8 MG/DL (ref 8.3–10.6)
CHLORIDE BLD-SCNC: 102 MMOL/L (ref 99–110)
CO2: 19 MMOL/L (ref 21–32)
CREAT SERPL-MCNC: 1.2 MG/DL (ref 0.8–1.3)
EOSINOPHILS ABSOLUTE: 0.2 K/UL (ref 0–0.6)
EOSINOPHILS RELATIVE PERCENT: 3.3 %
GFR AFRICAN AMERICAN: >60
GFR NON-AFRICAN AMERICAN: >60
GLUCOSE BLD-MCNC: 104 MG/DL (ref 70–99)
GLUCOSE BLD-MCNC: 123 MG/DL (ref 70–99)
GLUCOSE BLD-MCNC: 126 MG/DL (ref 70–99)
HCT VFR BLD CALC: 34.5 % (ref 40.5–52.5)
HEMOGLOBIN: 11.7 G/DL (ref 13.5–17.5)
LYMPHOCYTES ABSOLUTE: 1.6 K/UL (ref 1–5.1)
LYMPHOCYTES RELATIVE PERCENT: 31.2 %
MCH RBC QN AUTO: 34.8 PG (ref 26–34)
MCHC RBC AUTO-ENTMCNC: 34 G/DL (ref 31–36)
MCV RBC AUTO: 102.3 FL (ref 80–100)
MONOCYTES ABSOLUTE: 0.4 K/UL (ref 0–1.3)
MONOCYTES RELATIVE PERCENT: 7.5 %
NEUTROPHILS ABSOLUTE: 3 K/UL (ref 1.7–7.7)
NEUTROPHILS RELATIVE PERCENT: 57.7 %
PDW BLD-RTO: 15.8 % (ref 12.4–15.4)
PERFORMED ON: ABNORMAL
PERFORMED ON: ABNORMAL
PLATELET # BLD: 112 K/UL (ref 135–450)
PLATELET SLIDE REVIEW: ABNORMAL
PMV BLD AUTO: 10.2 FL (ref 5–10.5)
POTASSIUM REFLEX MAGNESIUM: 4.8 MMOL/L (ref 3.5–5.1)
POTASSIUM SERPL-SCNC: 4.8 MMOL/L (ref 3.5–5.1)
RBC # BLD: 3.37 M/UL (ref 4.2–5.9)
SLIDE REVIEW: ABNORMAL
SODIUM BLD-SCNC: 134 MMOL/L (ref 136–145)
WBC # BLD: 5.1 K/UL (ref 4–11)

## 2020-06-23 PROCEDURE — 2580000003 HC RX 258: Performed by: INTERNAL MEDICINE

## 2020-06-23 PROCEDURE — 6370000000 HC RX 637 (ALT 250 FOR IP): Performed by: INTERNAL MEDICINE

## 2020-06-23 PROCEDURE — 6370000000 HC RX 637 (ALT 250 FOR IP): Performed by: HOSPITALIST

## 2020-06-23 PROCEDURE — 36415 COLL VENOUS BLD VENIPUNCTURE: CPT

## 2020-06-23 PROCEDURE — 80048 BASIC METABOLIC PNL TOTAL CA: CPT

## 2020-06-23 PROCEDURE — 85025 COMPLETE CBC W/AUTO DIFF WBC: CPT

## 2020-06-23 RX ORDER — ALPRAZOLAM 0.25 MG/1
0.25 TABLET ORAL 2 TIMES DAILY PRN
Qty: 6 TABLET | Refills: 0 | Status: SHIPPED | OUTPATIENT
Start: 2020-06-23 | End: 2020-06-26 | Stop reason: CLARIF

## 2020-06-23 RX ADMIN — TAMSULOSIN HYDROCHLORIDE 0.4 MG: 0.4 CAPSULE ORAL at 08:50

## 2020-06-23 RX ADMIN — SPIRONOLACTONE 25 MG: 25 TABLET ORAL at 08:51

## 2020-06-23 RX ADMIN — HYDRALAZINE HYDROCHLORIDE 25 MG: 25 TABLET, FILM COATED ORAL at 05:48

## 2020-06-23 RX ADMIN — POLYETHYLENE GLYCOL 3350 17 G: 17 POWDER, FOR SOLUTION ORAL at 08:51

## 2020-06-23 RX ADMIN — APIXABAN 10 MG: 5 TABLET, FILM COATED ORAL at 08:50

## 2020-06-23 RX ADMIN — FUROSEMIDE 20 MG: 20 TABLET ORAL at 08:50

## 2020-06-23 RX ADMIN — METOPROLOL SUCCINATE 100 MG: 50 TABLET, EXTENDED RELEASE ORAL at 08:49

## 2020-06-23 RX ADMIN — INSULIN GLARGINE 8 UNITS: 100 INJECTION, SOLUTION SUBCUTANEOUS at 00:04

## 2020-06-23 RX ADMIN — OXYCODONE HYDROCHLORIDE AND ACETAMINOPHEN 1 TABLET: 10; 325 TABLET ORAL at 11:50

## 2020-06-23 RX ADMIN — SODIUM CHLORIDE, PRESERVATIVE FREE 10 ML: 5 INJECTION INTRAVENOUS at 08:58

## 2020-06-23 RX ADMIN — GABAPENTIN 600 MG: 300 CAPSULE ORAL at 05:47

## 2020-06-23 RX ADMIN — PANTOPRAZOLE SODIUM 40 MG: 40 TABLET, DELAYED RELEASE ORAL at 05:48

## 2020-06-23 RX ADMIN — RANOLAZINE 1000 MG: 500 TABLET, FILM COATED, EXTENDED RELEASE ORAL at 08:49

## 2020-06-23 RX ADMIN — ZOLPIDEM TARTRATE 5 MG: 5 TABLET ORAL at 00:08

## 2020-06-23 RX ADMIN — ISOSORBIDE MONONITRATE 60 MG: 60 TABLET, EXTENDED RELEASE ORAL at 08:50

## 2020-06-23 RX ADMIN — LISINOPRIL 10 MG: 10 TABLET ORAL at 08:50

## 2020-06-23 RX ADMIN — INSULIN LISPRO 2 UNITS: 100 INJECTION, SOLUTION INTRAVENOUS; SUBCUTANEOUS at 00:04

## 2020-06-23 RX ADMIN — AMIODARONE HYDROCHLORIDE 200 MG: 200 TABLET ORAL at 08:50

## 2020-06-23 RX ADMIN — ASPIRIN 81 MG 81 MG: 81 TABLET ORAL at 08:50

## 2020-06-23 RX ADMIN — OXYCODONE HYDROCHLORIDE AND ACETAMINOPHEN 1 TABLET: 10; 325 TABLET ORAL at 05:48

## 2020-06-23 RX ADMIN — GABAPENTIN 600 MG: 300 CAPSULE ORAL at 11:50

## 2020-06-23 RX ADMIN — ATORVASTATIN CALCIUM 80 MG: 80 TABLET, FILM COATED ORAL at 08:49

## 2020-06-23 ASSESSMENT — PAIN SCALES - GENERAL
PAINLEVEL_OUTOF10: 4
PAINLEVEL_OUTOF10: 3
PAINLEVEL_OUTOF10: 7
PAINLEVEL_OUTOF10: 10
PAINLEVEL_OUTOF10: 2

## 2020-06-23 NOTE — PROGRESS NOTES
Pt alert and oriented x4. Resting in bed. Complains of chronic back pain. Rates pain 7/10. Administered PRN Percocet per order. Also given PRN Ambien for sleep. Denies nausea, vomiting, numbness, tingling, headache, dizziness, blurred vision, shortness of breath or chest pain. No signs or symptoms of distress noted. Respirations easy and unlabored on room air. Vital signs stable. Reminded of fluid restriction. Pt verbalized understanding. Fall precautions in place. Call light within reach. Will continue to monitor.

## 2020-06-23 NOTE — DISCHARGE INSTR - COC
Sinus tachycardia R00.0    ICD (implantable cardioverter-defibrillator) discharge Z45.02    Diabetic peripheral neuropathy ([facility]) E11.42    Hypotension I95.9    Neuritis M79.2    S/p NSTEMI (non-ST elevated myocardial infarction) ([facility]) I21.4    Ventricular tachycardia ([facility]) I47.2    Hypertensive urgency I16.0    Acute pulmonary embolism without acute cor pulmonale ([facility]) I26.99       Isolation/Infection:   Isolation          No Isolation        Patient Infection Status     Infection Onset Added Last Indicated Last Indicated By Review Planned Expiration Resolved Resolved By    None active    Resolved    COVID-19 Rule Out 05/23/20 05/23/20 05/23/20 COVID-19 (Ordered)   05/23/20 Rule-Out Test Resulted    C-diff Rule Out 03/07/20 03/07/20 03/10/20 Clostridium Difficile Toxin/Antigen (Ordered)   03/10/20 Rule-Out Test Resulted          Nurse Assessment:  Last Vital Signs: BP (!) 161/74   Pulse 73   Temp 98.3 °F (36.8 °C) (Oral)   Resp 16   Ht 6' (1.829 m)   Wt 194 lb 0.1 oz (88 kg)   SpO2 94%   BMI 26.31 kg/m²     Last documented pain score (0-10 scale): Pain Level: 4  Last Weight:   Wt Readings from Last 1 Encounters:   06/23/20 194 lb 0.1 oz (88 kg)     Mental Status:  {IP PT MENTAL STATUS:20030}    IV Access:  { SHIRLEY IV ACCESS:434645097}    Nursing Mobility/ADLs:  Walking   {P DME KGCE:160886771}  Transfer  {[facility] DME VDFJ:414997777}  Bathing  {[facility] DME WZAC:969438751}  Dressing  {P DME YYJL:771098063}  Toileting  {[facility] DME EVJF:383996153}  Feeding  {[facility] DME UDEO:530948540}  Med Admin  {[facility] DME VZXE:167882057}  Med Delivery   { SHIRLEY MED Delivery:144187111}    Wound Care Documentation and Therapy:        Elimination:  Continence:   · Bowel: {YES / XV:34541}  · Bladder: {YES / OV:93644}  Urinary Catheter: {Urinary Catheter:384808014}   Colostomy/Ileostomy/Ileal Conduit: {YES / AW:62576}       Date of Last BM: ***    Intake/Output Summary (Last 24 hours) at 6/23/2020 1214  Last data filed at 6/23/2020 8083  Gross per 24 hour   Intake 1800 ml   Output 2225 ml   Net -425 ml     I/O last 3 completed shifts:   In: 9291 [P.O.:1560; I.V.:20]  Out: 2350 [Urine:2350]    Safety Concerns:     [address] SHIRLEY Safety Concerns:692724834}    Impairments/Disabilities:      [address] Impairments/Disabilities:461302290}    Nutrition Therapy:  Current Nutrition Therapy:   { SHIRLEY Diet List:995789218}    Routes of Feeding: {[facility] DME Other Feedings:075226455}  Liquids: {Slp liquid thickness:76342}  Daily Fluid Restriction: {[facility] DME Yes amt example:352353446}  Last Modified Barium Swallow with Video (Video Swallowing Test): {Done Not Done AQDN:527983495}    Treatments at the Time of Hospital Discharge:   Respiratory Treatments: ***  Oxygen Therapy:  {Therapy; copd oxygen:61566}  Ventilator:    { CC Vent GGFS:023767083}    Rehab Therapies: {THERAPEUTIC INTERVENTION:9386317264}  Weight Bearing Status/Restrictions: {[facility] Weight Bearin}  Other Medical Equipment (for information only, NOT a DME order):  {EQUIPMENT:189434016}  Other Treatments: ***    Patient's personal belongings (please select all that are sent with patient):  {[facility] DME Belongings:552117447}    RN SIGNATURE:  {Esignature:428717118}    CASE MANAGEMENT/SOCIAL WORK SECTION    Inpatient Status Date: ***    Readmission Risk Assessment Score:  Readmission Risk              Risk of Unplanned Readmission:        62           Discharging to Facility/ Agency   · Name:   · Address:  · Phone:  · Fax:    Dialysis Facility (if applicable)   · Name:  · Address:  · Dialysis Schedule:  · Phone:  · Fax:    / signature: {Esignature:671537739}    PHYSICIAN SECTION    Prognosis: {Prognosis:9016566470}    Condition at Discharge: [address] Patient Condition:554307849}    Rehab Potential (if transferring to Rehab): {Prognosis:7546365887}    Recommended Labs or Other Treatments After Discharge: ***    Physician Certification: I certify the above information and transfer of

## 2020-06-23 NOTE — PROGRESS NOTES
Removed patient's IV with catheter intact and no complications. Patient tolerated well. Discussed patient's discharge paperwork and gave him printed Xanax prescription. Signed copy in chart.

## 2020-06-23 NOTE — CARE COORDINATION
Provided/instructed on eliquis 30 day free card  Electronically signed by Gayle Kumar RN on 6/23/2020 at 10:47 AM

## 2020-06-24 ENCOUNTER — CARE COORDINATION (OUTPATIENT)
Dept: CASE MANAGEMENT | Age: 65
End: 2020-06-24

## 2020-06-24 LAB
FACTOR V LEIDEN: NEGATIVE
SPECIMEN: NORMAL

## 2020-06-25 ENCOUNTER — CARE COORDINATION (OUTPATIENT)
Dept: CASE MANAGEMENT | Age: 65
End: 2020-06-25

## 2020-06-25 LAB
PROTHROMBIN G20210A MUTATION: NEGATIVE
PT PCR SPECIMEN: NORMAL

## 2020-06-26 PROBLEM — R07.9 CHEST PAIN: Status: RESOLVED | Noted: 2019-04-25 | Resolved: 2020-06-26

## 2020-06-27 NOTE — DISCHARGE SUMMARY
UROLOGY      Code Status:  Prior    Activity: activity as tolerated    Labs: For convenience and continuity at follow-up the following most recent labs are provided:      CBC:    Lab Results   Component Value Date    WBC 5.1 06/23/2020    HGB 11.7 06/23/2020    HCT 34.5 06/23/2020     06/23/2020       Renal:    Lab Results   Component Value Date     06/23/2020    K 4.8 06/23/2020    K 4.8 06/23/2020     06/23/2020    CO2 19 06/23/2020    BUN 16 06/23/2020    CREATININE 1.2 06/23/2020    CALCIUM 8.0 06/23/2020    PHOS 3.9 04/23/2020       Discharge Medications:     Discharge Medication List as of 6/23/2020  1:52 PM           Details   apixaban (ELIQUIS DVT/PE STARTER PACK) 5 MG TABS tablet Take 10 mg (2 tablets) orally twice daily for 7 days, then take 5 mg (1 tablet) orally twice daily thereafter., Disp-74 tablet, R-0Normal      tamsulosin (FLOMAX) 0.4 MG capsule Take 1 capsule by mouth daily, Disp-30 capsule, R-3Normal      ALPRAZolam (XANAX) 0.25 MG tablet Take 1 tablet by mouth 2 times daily as needed for Anxiety for up to 3 days. , Disp-6 tablet, R-0Print              Details   gabapentin (NEURONTIN) 600 MG tablet TAKE 1 TABLET BY MOUTH FIVE TIMES DAILY, Disp-150 tablet, R-1Normal      amiodarone (CORDARONE) 200 MG tablet Take 1 tablet by mouth daily, Disp-30 tablet, R-0Normal      spironolactone (ALDACTONE) 25 MG tablet Take 1 tablet by mouth daily, Disp-30 tablet, R-0Normal      traZODone (DESYREL) 50 MG tablet TAKE 1 TABLET BY MOUTH EVERY NIGHT AT BEDTIME, Disp-30 tablet, R-2Normal      lisinopril (PRINIVIL;ZESTRIL) 10 MG tablet Take 1 tablet by mouth daily, Disp-30 tablet, R-3Normal      ranolazine (RANEXA) 500 MG extended release tablet Take 1,000 mg by mouth 2 times dailyHistorical Med      furosemide (LASIX) 20 MG tablet Take 20 mg by mouth dailyHistorical Med      empagliflozin (JARDIANCE) 10 MG tablet Take 1 tablet by mouth daily, Disp-28 tablet, R-0Sample      isosorbide mononitrate

## 2020-07-06 ENCOUNTER — HOSPITAL ENCOUNTER (OUTPATIENT)
Age: 65
Setting detail: OBSERVATION
Discharge: HOME | End: 2020-07-08
Attending: EMERGENCY MEDICINE | Admitting: PEDIATRICS
Payer: MEDICARE

## 2020-07-06 ENCOUNTER — APPOINTMENT (OUTPATIENT)
Dept: GENERAL RADIOLOGY | Age: 65
End: 2020-07-06
Payer: MEDICARE

## 2020-07-06 PROBLEM — M79.2 NEURITIS: Status: RESOLVED | Noted: 2020-04-13 | Resolved: 2020-07-06

## 2020-07-06 PROBLEM — R07.9 CHEST PAIN: Status: ACTIVE | Noted: 2020-07-06

## 2020-07-06 LAB
A/G RATIO: 1.4 (ref 1.1–2.2)
ALBUMIN SERPL-MCNC: 3.9 G/DL (ref 3.4–5)
ALP BLD-CCNC: 52 U/L (ref 40–129)
ALT SERPL-CCNC: 6 U/L (ref 10–40)
ANION GAP SERPL CALCULATED.3IONS-SCNC: 11 MMOL/L (ref 3–16)
AST SERPL-CCNC: 9 U/L (ref 15–37)
BASOPHILS ABSOLUTE: 0.1 K/UL (ref 0–0.2)
BASOPHILS RELATIVE PERCENT: 1.3 %
BILIRUB SERPL-MCNC: <0.2 MG/DL (ref 0–1)
BUN BLDV-MCNC: 18 MG/DL (ref 7–20)
CALCIUM SERPL-MCNC: 8.6 MG/DL (ref 8.3–10.6)
CHLORIDE BLD-SCNC: 109 MMOL/L (ref 99–110)
CO2: 25 MMOL/L (ref 21–32)
CREAT SERPL-MCNC: 1.1 MG/DL (ref 0.8–1.3)
D DIMER: <200 NG/ML DDU (ref 0–229)
EOSINOPHILS ABSOLUTE: 0.2 K/UL (ref 0–0.6)
EOSINOPHILS RELATIVE PERCENT: 2.8 %
FERRITIN: 31.1 NG/ML (ref 30–400)
GFR AFRICAN AMERICAN: >60
GFR NON-AFRICAN AMERICAN: >60
GLOBULIN: 2.7 G/DL
GLUCOSE BLD-MCNC: 107 MG/DL (ref 70–99)
HCT VFR BLD CALC: 40.8 % (ref 40.5–52.5)
HEMOGLOBIN: 13.8 G/DL (ref 13.5–17.5)
LACTATE DEHYDROGENASE: 146 U/L (ref 100–190)
LACTIC ACID: 1.5 MMOL/L (ref 0.4–2)
LYMPHOCYTES ABSOLUTE: 2.6 K/UL (ref 1–5.1)
LYMPHOCYTES RELATIVE PERCENT: 38.6 %
MCH RBC QN AUTO: 34.5 PG (ref 26–34)
MCHC RBC AUTO-ENTMCNC: 33.7 G/DL (ref 31–36)
MCV RBC AUTO: 102.4 FL (ref 80–100)
MONOCYTES ABSOLUTE: 0.5 K/UL (ref 0–1.3)
MONOCYTES RELATIVE PERCENT: 6.7 %
NEUTROPHILS ABSOLUTE: 3.4 K/UL (ref 1.7–7.7)
NEUTROPHILS RELATIVE PERCENT: 50.6 %
PDW BLD-RTO: 15.2 % (ref 12.4–15.4)
PLATELET # BLD: 186 K/UL (ref 135–450)
PMV BLD AUTO: 9.3 FL (ref 5–10.5)
POTASSIUM REFLEX MAGNESIUM: 4 MMOL/L (ref 3.5–5.1)
PROCALCITONIN: 0.08 NG/ML (ref 0–0.15)
RBC # BLD: 3.99 M/UL (ref 4.2–5.9)
SODIUM BLD-SCNC: 145 MMOL/L (ref 136–145)
TOTAL PROTEIN: 6.6 G/DL (ref 6.4–8.2)
TROPONIN: <0.01 NG/ML
WBC # BLD: 6.7 K/UL (ref 4–11)

## 2020-07-06 PROCEDURE — 84484 ASSAY OF TROPONIN QUANT: CPT

## 2020-07-06 PROCEDURE — 80053 COMPREHEN METABOLIC PANEL: CPT

## 2020-07-06 PROCEDURE — 83605 ASSAY OF LACTIC ACID: CPT

## 2020-07-06 PROCEDURE — 99285 EMERGENCY DEPT VISIT HI MDM: CPT

## 2020-07-06 PROCEDURE — 87040 BLOOD CULTURE FOR BACTERIA: CPT

## 2020-07-06 PROCEDURE — U0002 COVID-19 LAB TEST NON-CDC: HCPCS

## 2020-07-06 PROCEDURE — 85025 COMPLETE CBC W/AUTO DIFF WBC: CPT

## 2020-07-06 PROCEDURE — 83615 LACTATE (LD) (LDH) ENZYME: CPT

## 2020-07-06 PROCEDURE — 93005 ELECTROCARDIOGRAM TRACING: CPT | Performed by: EMERGENCY MEDICINE

## 2020-07-06 PROCEDURE — 85379 FIBRIN DEGRADATION QUANT: CPT

## 2020-07-06 PROCEDURE — U0003 INFECTIOUS AGENT DETECTION BY NUCLEIC ACID (DNA OR RNA); SEVERE ACUTE RESPIRATORY SYNDROME CORONAVIRUS 2 (SARS-COV-2) (CORONAVIRUS DISEASE [COVID-19]), AMPLIFIED PROBE TECHNIQUE, MAKING USE OF HIGH THROUGHPUT TECHNOLOGIES AS DESCRIBED BY CMS-2020-01-R: HCPCS

## 2020-07-06 PROCEDURE — 82728 ASSAY OF FERRITIN: CPT

## 2020-07-06 PROCEDURE — 6370000000 HC RX 637 (ALT 250 FOR IP): Performed by: NURSE PRACTITIONER

## 2020-07-06 PROCEDURE — 84145 PROCALCITONIN (PCT): CPT

## 2020-07-06 PROCEDURE — G0378 HOSPITAL OBSERVATION PER HR: HCPCS

## 2020-07-06 PROCEDURE — 71046 X-RAY EXAM CHEST 2 VIEWS: CPT

## 2020-07-06 RX ORDER — OXYCODONE HYDROCHLORIDE AND ACETAMINOPHEN 5; 325 MG/1; MG/1
2 TABLET ORAL ONCE
Status: COMPLETED | OUTPATIENT
Start: 2020-07-06 | End: 2020-07-06

## 2020-07-06 RX ORDER — NITROGLYCERIN 0.4 MG/1
0.4 TABLET SUBLINGUAL ONCE
Status: DISCONTINUED | OUTPATIENT
Start: 2020-07-06 | End: 2020-07-08 | Stop reason: HOSPADM

## 2020-07-06 RX ORDER — NITROGLYCERIN 0.4 MG/1
0.4 TABLET SUBLINGUAL EVERY 5 MIN PRN
Status: DISCONTINUED | OUTPATIENT
Start: 2020-07-06 | End: 2020-07-07 | Stop reason: SDUPTHER

## 2020-07-06 RX ORDER — ONDANSETRON 2 MG/ML
4 INJECTION INTRAMUSCULAR; INTRAVENOUS ONCE
Status: COMPLETED | OUTPATIENT
Start: 2020-07-06 | End: 2020-07-07

## 2020-07-06 RX ADMIN — OXYCODONE HYDROCHLORIDE AND ACETAMINOPHEN 2 TABLET: 5; 325 TABLET ORAL at 21:28

## 2020-07-06 RX ADMIN — NITROGLYCERIN 0.4 MG: 0.4 TABLET, ORALLY DISINTEGRATING SUBLINGUAL at 22:36

## 2020-07-06 ASSESSMENT — PAIN DESCRIPTION - DESCRIPTORS: DESCRIPTORS: PATIENT UNABLE TO DESCRIBE

## 2020-07-06 ASSESSMENT — PAIN DESCRIPTION - ORIENTATION: ORIENTATION: MID

## 2020-07-06 ASSESSMENT — ENCOUNTER SYMPTOMS
VOMITING: 0
DIARRHEA: 0
SINUS PRESSURE: 0
SORE THROAT: 0
SHORTNESS OF BREATH: 1
ABDOMINAL DISTENTION: 0
BACK PAIN: 0
FACIAL SWELLING: 0
SINUS PAIN: 0
COUGH: 1
ABDOMINAL PAIN: 0
NAUSEA: 0

## 2020-07-06 ASSESSMENT — PAIN SCALES - GENERAL
PAINLEVEL_OUTOF10: 5
PAINLEVEL_OUTOF10: 7

## 2020-07-06 ASSESSMENT — PAIN DESCRIPTION - ONSET: ONSET: ON-GOING

## 2020-07-06 ASSESSMENT — PAIN DESCRIPTION - PROGRESSION: CLINICAL_PROGRESSION: NOT CHANGED

## 2020-07-06 ASSESSMENT — PAIN DESCRIPTION - FREQUENCY: FREQUENCY: CONTINUOUS

## 2020-07-06 ASSESSMENT — PAIN DESCRIPTION - LOCATION: LOCATION: CHEST

## 2020-07-06 ASSESSMENT — PAIN SCALES - WONG BAKER: WONGBAKER_NUMERICALRESPONSE: 0

## 2020-07-06 ASSESSMENT — PAIN DESCRIPTION - PAIN TYPE: TYPE: ACUTE PAIN

## 2020-07-07 LAB
EKG ATRIAL RATE: 108 BPM
EKG DIAGNOSIS: NORMAL
EKG P AXIS: 55 DEGREES
EKG P-R INTERVAL: 128 MS
EKG Q-T INTERVAL: 394 MS
EKG QRS DURATION: 128 MS
EKG QTC CALCULATION (BAZETT): 527 MS
EKG R AXIS: 67 DEGREES
EKG T AXIS: 17 DEGREES
EKG VENTRICULAR RATE: 108 BPM
SARS-COV-2, PCR: NOT DETECTED
TROPONIN: <0.01 NG/ML
TROPONIN: <0.01 NG/ML

## 2020-07-07 PROCEDURE — 96376 TX/PRO/DX INJ SAME DRUG ADON: CPT

## 2020-07-07 PROCEDURE — 6370000000 HC RX 637 (ALT 250 FOR IP): Performed by: INTERNAL MEDICINE

## 2020-07-07 PROCEDURE — 94640 AIRWAY INHALATION TREATMENT: CPT

## 2020-07-07 PROCEDURE — 6370000000 HC RX 637 (ALT 250 FOR IP): Performed by: PEDIATRICS

## 2020-07-07 PROCEDURE — G0378 HOSPITAL OBSERVATION PER HR: HCPCS

## 2020-07-07 PROCEDURE — 6370000000 HC RX 637 (ALT 250 FOR IP): Performed by: NURSE PRACTITIONER

## 2020-07-07 PROCEDURE — 6360000002 HC RX W HCPCS: Performed by: PEDIATRICS

## 2020-07-07 PROCEDURE — 96374 THER/PROPH/DIAG INJ IV PUSH: CPT

## 2020-07-07 PROCEDURE — 96375 TX/PRO/DX INJ NEW DRUG ADDON: CPT

## 2020-07-07 PROCEDURE — 2580000003 HC RX 258: Performed by: PEDIATRICS

## 2020-07-07 PROCEDURE — 93010 ELECTROCARDIOGRAM REPORT: CPT | Performed by: INTERNAL MEDICINE

## 2020-07-07 PROCEDURE — 6360000002 HC RX W HCPCS: Performed by: INTERNAL MEDICINE

## 2020-07-07 PROCEDURE — 6360000002 HC RX W HCPCS: Performed by: NURSE PRACTITIONER

## 2020-07-07 PROCEDURE — 84484 ASSAY OF TROPONIN QUANT: CPT

## 2020-07-07 PROCEDURE — 94760 N-INVAS EAR/PLS OXIMETRY 1: CPT

## 2020-07-07 RX ORDER — NITROGLYCERIN 0.4 MG/1
0.4 TABLET SUBLINGUAL EVERY 5 MIN PRN
Status: DISCONTINUED | OUTPATIENT
Start: 2020-07-07 | End: 2020-07-08 | Stop reason: HOSPADM

## 2020-07-07 RX ORDER — GABAPENTIN 300 MG/1
600 CAPSULE ORAL 2 TIMES DAILY
Status: DISCONTINUED | OUTPATIENT
Start: 2020-07-07 | End: 2020-07-07

## 2020-07-07 RX ORDER — ONDANSETRON 4 MG/1
4 TABLET, ORALLY DISINTEGRATING ORAL EVERY 8 HOURS PRN
Status: DISCONTINUED | OUTPATIENT
Start: 2020-07-07 | End: 2020-07-08 | Stop reason: HOSPADM

## 2020-07-07 RX ORDER — FUROSEMIDE 40 MG/1
20 TABLET ORAL DAILY
Status: DISCONTINUED | OUTPATIENT
Start: 2020-07-07 | End: 2020-07-08 | Stop reason: HOSPADM

## 2020-07-07 RX ORDER — TAMSULOSIN HYDROCHLORIDE 0.4 MG/1
0.4 CAPSULE ORAL DAILY
Status: DISCONTINUED | OUTPATIENT
Start: 2020-07-07 | End: 2020-07-08 | Stop reason: HOSPADM

## 2020-07-07 RX ORDER — METOPROLOL SUCCINATE 25 MG/1
25 TABLET, EXTENDED RELEASE ORAL DAILY
Status: DISCONTINUED | OUTPATIENT
Start: 2020-07-07 | End: 2020-07-08 | Stop reason: HOSPADM

## 2020-07-07 RX ORDER — GABAPENTIN 300 MG/1
600 CAPSULE ORAL
Status: DISCONTINUED | OUTPATIENT
Start: 2020-07-07 | End: 2020-07-08 | Stop reason: HOSPADM

## 2020-07-07 RX ORDER — ONDANSETRON 2 MG/ML
4 INJECTION INTRAMUSCULAR; INTRAVENOUS EVERY 6 HOURS PRN
Status: DISCONTINUED | OUTPATIENT
Start: 2020-07-07 | End: 2020-07-08 | Stop reason: HOSPADM

## 2020-07-07 RX ORDER — ATORVASTATIN CALCIUM 40 MG/1
40 TABLET, FILM COATED ORAL NIGHTLY
Status: DISCONTINUED | OUTPATIENT
Start: 2020-07-07 | End: 2020-07-07

## 2020-07-07 RX ORDER — ISOSORBIDE MONONITRATE 30 MG/1
60 TABLET, EXTENDED RELEASE ORAL DAILY
Status: DISCONTINUED | OUTPATIENT
Start: 2020-07-07 | End: 2020-07-08 | Stop reason: HOSPADM

## 2020-07-07 RX ORDER — ACETAMINOPHEN 325 MG/1
650 TABLET ORAL EVERY 6 HOURS PRN
Status: DISCONTINUED | OUTPATIENT
Start: 2020-07-07 | End: 2020-07-08 | Stop reason: HOSPADM

## 2020-07-07 RX ORDER — TRAZODONE HYDROCHLORIDE 50 MG/1
50 TABLET ORAL NIGHTLY
Status: DISCONTINUED | OUTPATIENT
Start: 2020-07-07 | End: 2020-07-08 | Stop reason: HOSPADM

## 2020-07-07 RX ORDER — ZOLPIDEM TARTRATE 5 MG/1
5 TABLET ORAL NIGHTLY PRN
Status: DISCONTINUED | OUTPATIENT
Start: 2020-07-07 | End: 2020-07-08 | Stop reason: HOSPADM

## 2020-07-07 RX ORDER — ZOLPIDEM TARTRATE 5 MG/1
5 TABLET ORAL NIGHTLY
COMMUNITY
Start: 2020-06-26 | End: 2020-07-13

## 2020-07-07 RX ORDER — AMIODARONE HYDROCHLORIDE 200 MG/1
200 TABLET ORAL DAILY
Status: DISCONTINUED | OUTPATIENT
Start: 2020-07-07 | End: 2020-07-08 | Stop reason: HOSPADM

## 2020-07-07 RX ORDER — ACETAMINOPHEN 650 MG/1
650 SUPPOSITORY RECTAL EVERY 6 HOURS PRN
Status: DISCONTINUED | OUTPATIENT
Start: 2020-07-07 | End: 2020-07-08 | Stop reason: HOSPADM

## 2020-07-07 RX ORDER — MORPHINE SULFATE 2 MG/ML
2 INJECTION, SOLUTION INTRAMUSCULAR; INTRAVENOUS ONCE
Status: COMPLETED | OUTPATIENT
Start: 2020-07-07 | End: 2020-07-07

## 2020-07-07 RX ORDER — POLYETHYLENE GLYCOL 3350 17 G/17G
17 POWDER, FOR SOLUTION ORAL DAILY PRN
Status: DISCONTINUED | OUTPATIENT
Start: 2020-07-07 | End: 2020-07-08 | Stop reason: HOSPADM

## 2020-07-07 RX ORDER — SPIRONOLACTONE 25 MG/1
25 TABLET ORAL DAILY
Status: DISCONTINUED | OUTPATIENT
Start: 2020-07-07 | End: 2020-07-08 | Stop reason: HOSPADM

## 2020-07-07 RX ORDER — NICOTINE 21 MG/24HR
1 PATCH, TRANSDERMAL 24 HOURS TRANSDERMAL DAILY
Status: DISCONTINUED | OUTPATIENT
Start: 2020-07-07 | End: 2020-07-08 | Stop reason: HOSPADM

## 2020-07-07 RX ORDER — HYDRALAZINE HYDROCHLORIDE 25 MG/1
25 TABLET, FILM COATED ORAL EVERY 8 HOURS SCHEDULED
Status: DISCONTINUED | OUTPATIENT
Start: 2020-07-07 | End: 2020-07-08 | Stop reason: HOSPADM

## 2020-07-07 RX ORDER — ATORVASTATIN CALCIUM 80 MG/1
80 TABLET, FILM COATED ORAL DAILY
Status: DISCONTINUED | OUTPATIENT
Start: 2020-07-07 | End: 2020-07-08 | Stop reason: HOSPADM

## 2020-07-07 RX ORDER — SODIUM CHLORIDE 0.9 % (FLUSH) 0.9 %
10 SYRINGE (ML) INJECTION PRN
Status: DISCONTINUED | OUTPATIENT
Start: 2020-07-07 | End: 2020-07-08 | Stop reason: HOSPADM

## 2020-07-07 RX ORDER — LISINOPRIL 10 MG/1
10 TABLET ORAL DAILY
Status: DISCONTINUED | OUTPATIENT
Start: 2020-07-07 | End: 2020-07-08 | Stop reason: HOSPADM

## 2020-07-07 RX ORDER — OXYCODONE AND ACETAMINOPHEN 10; 325 MG/1; MG/1
1 TABLET ORAL EVERY 8 HOURS PRN
Status: DISCONTINUED | OUTPATIENT
Start: 2020-07-07 | End: 2020-07-08 | Stop reason: HOSPADM

## 2020-07-07 RX ORDER — PREDNISONE 20 MG/1
40 TABLET ORAL DAILY
Status: DISCONTINUED | OUTPATIENT
Start: 2020-07-07 | End: 2020-07-08 | Stop reason: HOSPADM

## 2020-07-07 RX ORDER — IPRATROPIUM BROMIDE AND ALBUTEROL SULFATE 2.5; .5 MG/3ML; MG/3ML
1 SOLUTION RESPIRATORY (INHALATION)
Status: DISCONTINUED | OUTPATIENT
Start: 2020-07-07 | End: 2020-07-08 | Stop reason: HOSPADM

## 2020-07-07 RX ORDER — RANOLAZINE 500 MG/1
1000 TABLET, EXTENDED RELEASE ORAL 2 TIMES DAILY
Status: DISCONTINUED | OUTPATIENT
Start: 2020-07-07 | End: 2020-07-08 | Stop reason: HOSPADM

## 2020-07-07 RX ORDER — SODIUM CHLORIDE 0.9 % (FLUSH) 0.9 %
10 SYRINGE (ML) INJECTION EVERY 12 HOURS SCHEDULED
Status: DISCONTINUED | OUTPATIENT
Start: 2020-07-07 | End: 2020-07-08 | Stop reason: HOSPADM

## 2020-07-07 RX ORDER — ASPIRIN 81 MG/1
81 TABLET, CHEWABLE ORAL DAILY
Status: DISCONTINUED | OUTPATIENT
Start: 2020-07-07 | End: 2020-07-08 | Stop reason: HOSPADM

## 2020-07-07 RX ADMIN — PREDNISONE 40 MG: 20 TABLET ORAL at 11:34

## 2020-07-07 RX ADMIN — APIXABAN 5 MG: 5 TABLET, FILM COATED ORAL at 21:05

## 2020-07-07 RX ADMIN — TAMSULOSIN HYDROCHLORIDE 0.4 MG: 0.4 CAPSULE ORAL at 09:36

## 2020-07-07 RX ADMIN — GABAPENTIN 600 MG: 300 CAPSULE ORAL at 07:24

## 2020-07-07 RX ADMIN — OXYCODONE HYDROCHLORIDE AND ACETAMINOPHEN 1 TABLET: 10; 325 TABLET ORAL at 14:58

## 2020-07-07 RX ADMIN — Medication 10 ML: at 21:05

## 2020-07-07 RX ADMIN — Medication 10 ML: at 09:39

## 2020-07-07 RX ADMIN — APIXABAN 5 MG: 5 TABLET, FILM COATED ORAL at 09:36

## 2020-07-07 RX ADMIN — TRAZODONE HYDROCHLORIDE 50 MG: 50 TABLET ORAL at 02:42

## 2020-07-07 RX ADMIN — ZOLPIDEM TARTRATE 5 MG: 5 TABLET ORAL at 03:05

## 2020-07-07 RX ADMIN — IPRATROPIUM BROMIDE AND ALBUTEROL SULFATE 1 AMPULE: .5; 3 SOLUTION RESPIRATORY (INHALATION) at 16:59

## 2020-07-07 RX ADMIN — NITROGLYCERIN 0.4 MG: 0.4 TABLET, ORALLY DISINTEGRATING SUBLINGUAL at 21:05

## 2020-07-07 RX ADMIN — METOPROLOL SUCCINATE 25 MG: 25 TABLET, EXTENDED RELEASE ORAL at 09:37

## 2020-07-07 RX ADMIN — ZOLPIDEM TARTRATE 5 MG: 5 TABLET ORAL at 23:57

## 2020-07-07 RX ADMIN — HYDROMORPHONE HYDROCHLORIDE 1 MG: 1 INJECTION, SOLUTION INTRAMUSCULAR; INTRAVENOUS; SUBCUTANEOUS at 00:14

## 2020-07-07 RX ADMIN — ASPIRIN 81 MG: 81 TABLET, CHEWABLE ORAL at 09:37

## 2020-07-07 RX ADMIN — RANOLAZINE 1000 MG: 500 TABLET, FILM COATED, EXTENDED RELEASE ORAL at 21:05

## 2020-07-07 RX ADMIN — ONDANSETRON 4 MG: 2 INJECTION INTRAMUSCULAR; INTRAVENOUS at 00:14

## 2020-07-07 RX ADMIN — MORPHINE SULFATE 2 MG: 2 INJECTION, SOLUTION INTRAMUSCULAR; INTRAVENOUS at 11:34

## 2020-07-07 RX ADMIN — GABAPENTIN 600 MG: 300 CAPSULE ORAL at 11:10

## 2020-07-07 RX ADMIN — HYDRALAZINE HYDROCHLORIDE 25 MG: 25 TABLET, FILM COATED ORAL at 07:24

## 2020-07-07 RX ADMIN — GABAPENTIN 600 MG: 300 CAPSULE ORAL at 21:05

## 2020-07-07 RX ADMIN — FUROSEMIDE 20 MG: 40 TABLET ORAL at 09:37

## 2020-07-07 RX ADMIN — AMIODARONE HYDROCHLORIDE 200 MG: 200 TABLET ORAL at 09:37

## 2020-07-07 RX ADMIN — RANOLAZINE 1000 MG: 500 TABLET, FILM COATED, EXTENDED RELEASE ORAL at 09:36

## 2020-07-07 RX ADMIN — APIXABAN 5 MG: 5 TABLET, FILM COATED ORAL at 02:42

## 2020-07-07 RX ADMIN — IPRATROPIUM BROMIDE AND ALBUTEROL SULFATE 1 AMPULE: .5; 3 SOLUTION RESPIRATORY (INHALATION) at 12:20

## 2020-07-07 RX ADMIN — ATORVASTATIN CALCIUM 80 MG: 80 TABLET, FILM COATED ORAL at 09:37

## 2020-07-07 RX ADMIN — GABAPENTIN 600 MG: 300 CAPSULE ORAL at 14:58

## 2020-07-07 RX ADMIN — ISOSORBIDE MONONITRATE 60 MG: 30 TABLET, EXTENDED RELEASE ORAL at 09:36

## 2020-07-07 RX ADMIN — OXYCODONE HYDROCHLORIDE AND ACETAMINOPHEN 1 TABLET: 10; 325 TABLET ORAL at 05:52

## 2020-07-07 RX ADMIN — GABAPENTIN 600 MG: 300 CAPSULE ORAL at 02:41

## 2020-07-07 RX ADMIN — OXYCODONE HYDROCHLORIDE AND ACETAMINOPHEN 1 TABLET: 10; 325 TABLET ORAL at 23:57

## 2020-07-07 RX ADMIN — SPIRONOLACTONE 25 MG: 25 TABLET ORAL at 09:36

## 2020-07-07 RX ADMIN — MORPHINE SULFATE 2 MG: 2 INJECTION, SOLUTION INTRAMUSCULAR; INTRAVENOUS at 22:22

## 2020-07-07 RX ADMIN — IPRATROPIUM BROMIDE AND ALBUTEROL SULFATE 1 AMPULE: .5; 3 SOLUTION RESPIRATORY (INHALATION) at 20:01

## 2020-07-07 RX ADMIN — TRAZODONE HYDROCHLORIDE 50 MG: 50 TABLET ORAL at 21:05

## 2020-07-07 RX ADMIN — LISINOPRIL 10 MG: 10 TABLET ORAL at 09:37

## 2020-07-07 RX ADMIN — GABAPENTIN 600 MG: 300 CAPSULE ORAL at 23:57

## 2020-07-07 ASSESSMENT — PAIN DESCRIPTION - FREQUENCY
FREQUENCY: CONTINUOUS
FREQUENCY: INTERMITTENT

## 2020-07-07 ASSESSMENT — PAIN SCALES - GENERAL
PAINLEVEL_OUTOF10: 5
PAINLEVEL_OUTOF10: 6
PAINLEVEL_OUTOF10: 7
PAINLEVEL_OUTOF10: 6
PAINLEVEL_OUTOF10: 8
PAINLEVEL_OUTOF10: 5
PAINLEVEL_OUTOF10: 10
PAINLEVEL_OUTOF10: 6

## 2020-07-07 ASSESSMENT — PAIN DESCRIPTION - ORIENTATION
ORIENTATION: MID
ORIENTATION: LEFT;MID
ORIENTATION: MID
ORIENTATION: MID;LOWER

## 2020-07-07 ASSESSMENT — PAIN DESCRIPTION - ONSET
ONSET: SUDDEN
ONSET: ON-GOING

## 2020-07-07 ASSESSMENT — PAIN DESCRIPTION - LOCATION
LOCATION: CHEST
LOCATION: BACK

## 2020-07-07 ASSESSMENT — PAIN DESCRIPTION - PROGRESSION
CLINICAL_PROGRESSION: NOT CHANGED
CLINICAL_PROGRESSION: NOT CHANGED

## 2020-07-07 ASSESSMENT — PAIN DESCRIPTION - PAIN TYPE
TYPE: ACUTE PAIN
TYPE: CHRONIC PAIN
TYPE: ACUTE PAIN
TYPE: ACUTE PAIN

## 2020-07-07 ASSESSMENT — PAIN DESCRIPTION - DESCRIPTORS
DESCRIPTORS: SHARP
DESCRIPTORS: SHARP
DESCRIPTORS: DISCOMFORT;PRESSURE;SHARP
DESCRIPTORS: ACHING;DISCOMFORT

## 2020-07-07 ASSESSMENT — PAIN DESCRIPTION - DIRECTION: RADIATING_TOWARDS: LEFT ARM

## 2020-07-07 ASSESSMENT — PAIN SCALES - WONG BAKER
WONGBAKER_NUMERICALRESPONSE: 0
WONGBAKER_NUMERICALRESPONSE: 2

## 2020-07-07 NOTE — ED NOTES
Pt resting comfortably in bed. Pt given PRN pain medication. No other needs at this time.      Mendoza Senior, ADEEL  07/07/20 1426

## 2020-07-07 NOTE — PROGRESS NOTES
Pt arrived to room 4255 from ED. Pt is A&O x4. VSS, RA. Fall precautions in place and bed in lowest position. Pt refuses alarm. Call light in reach. Pt educated on Covid precautions. Electronically signed by Karin Rousseau RN on 7/7/2020 at 8:40 AM    Covid 19 test back negative, Dr Reyna Senior order to d/c isolation.  Electronically signed by Karin Rousseau RN on 7/7/2020 at 8:54 AM

## 2020-07-07 NOTE — PLAN OF CARE
Problem: Pain:  Goal: Control of acute pain  Description: Control of acute pain  Note: Pt educated on using pain scale. Pt given PRN pain medication per Dr orders see [place]. Pt agreeable to pain management. Problem: Falls - Risk of:  Goal: Will remain free from falls  Description: Will remain free from falls  Note: Fall precautions in place. Bed in lowest position, wheels locked, call light in reach, non slip socks on, alarm refused. Pt educated on using call light for assistance. Pt agreeable.

## 2020-07-07 NOTE — ED NOTES
Care assumed from [facility]. Pt resting in bed at this time. Call light remains in reach no distress noted. RR even and unlabored, skin warm and dry. Will continue to monitor closely.       Dania Vasquez RN  07/07/20 4543

## 2020-07-07 NOTE — PROGRESS NOTES
Medication Reconciliation    List of medications patient is currently taking is complete. Source of information: 1.  Recent discharge instructions from 06/23/20                                      2. EPIC records

## 2020-07-07 NOTE — PROGRESS NOTES
Hospitalist Progress Note      PCP: Papi Marshall MD    Date of Admission: 7/6/2020    Chief Complaint:   Chief Complaint   Patient presents with    Chest Pain     x4 hours. + sob.  + cough white. Hospital Course: 59 y.o. male with medical history including coronary artery disease with recent stress test in May not indicating any cardiac ischemia, heart failure with impaired systolic function and diastolic function, hypertension, hyperlipidemia, type 2 diabetes mellitus presents to the emergency room with complaints of chest pain that started this evening while seated. Patient says the chest pain was persistent. He says he feels like chest pain he is had in the past which he used to get frequently but has significantly improved since being started on Ranexa and Imdur. He did note some radiation into the left arm. He says the chest pain was manageable but what really made him came into the ER was that he felt like he was having palpitations which are ongoing for about 4 hours. No fever, chronic cough which is stable. Some shortness of breath associated with palpitations. Subjective: Still complaining of sharp chest pain, palpitations have resolved. Denies  shortness of breath, nausea, vomiting, fevers, abdominal pain.        Medications:  Reviewed    Infusion Medications   Scheduled Medications    sodium chloride flush  10 mL Intravenous 2 times per day    aspirin  81 mg Oral Daily    amiodarone  200 mg Oral Daily    atorvastatin  80 mg Oral Daily    furosemide  20 mg Oral Daily    hydrALAZINE  25 mg Oral 3 times per day    isosorbide mononitrate  60 mg Oral Daily    lisinopril  10 mg Oral Daily    metoprolol succinate  25 mg Oral Daily    ranolazine  1,000 mg Oral BID    spironolactone  25 mg Oral Daily    tamsulosin  0.4 mg Oral Daily    traZODone  50 mg Oral Nightly    apixaban  5 mg Oral BID    gabapentin  600 mg Oral 5x Daily    nicotine  1 patch Transdermal Daily    ipratropium-albuterol  1 ampule Inhalation Q4H WA    predniSONE  40 mg Oral Daily    nitroGLYCERIN  0.4 mg Sublingual Once     PRN Meds: sodium chloride flush, acetaminophen **OR** acetaminophen, polyethylene glycol, ondansetron **OR** ondansetron, nitroGLYCERIN, oxyCODONE-acetaminophen, zolpidem      Intake/Output Summary (Last 24 hours) at 7/7/2020 1536  Last data filed at 7/7/2020 1214  Gross per 24 hour   Intake 480 ml   Output --   Net 480 ml       Physical Exam Performed:    BP (!) 106/57   Pulse 65   Temp 98 °F (36.7 °C) (Oral)   Resp 16   Ht 6' (1.829 m)   Wt 180 lb (81.6 kg)   SpO2 91%   BMI 24.41 kg/m²     General appearance: No apparent distress, appears stated age and cooperative. HEENT: Pupils equal, round, and reactive to light. Conjunctivae/corneas clear. Neck: Supple, with full range of motion. Trachea midline. Respiratory:  Normal respiratory effort. Clear to auscultation, bilaterally without Rales/Wheezes/Rhonchi. Cardiovascular: Regular rate and rhythm with normal S1/S2 without murmurs, rubs or gallops. Abdomen: Soft, non-tender, non-distended with normal bowel sounds. Musculoskeletal: No clubbing, cyanosis or edema bilaterally. Full range of motion without deformity. Skin: Skin color, texture, turgor normal.  No rashes or lesions. Neurologic:  Neurovascularly intact without any focal sensory/motor deficits. Cranial nerves: II-XII intact, grossly non-focal.  Psychiatric: Alert and oriented, thought content appropriate, normal insight  Capillary Refill: Brisk,< 3 seconds   Peripheral Pulses: +2 palpable, equal bilaterally       Labs:   Recent Labs     07/06/20 2043   WBC 6.7   HGB 13.8   HCT 40.8        Recent Labs     07/06/20 2043      K 4.0      CO2 25   BUN 18   CREATININE 1.1   CALCIUM 8.6     Recent Labs     07/06/20 2043   AST 9*   ALT 6*   BILITOT <0.2   ALKPHOS 52     No results for input(s): INR in the last 72 hours.   Recent Labs     07/06/20 2043 07/07/20  0254 07/07/20  0556   TROPONINI <0.01 <0.01 <0.01       Urinalysis:      Lab Results   Component Value Date    NITRU Negative 04/22/2020    WBCUA 3 04/22/2020    RBCUA 1 04/22/2020    BLOODU Negative 04/22/2020    SPECGRAV 1.014 04/22/2020    GLUCOSEU 250 04/22/2020       Radiology:  XR CHEST STANDARD (2 VW)   Final Result   No acute cardiopulmonary disease. Assessment/Plan:    Active Hospital Problems    Diagnosis    Chest pain [R07.9]     Chest pain, palpitations, history of coronary artery disease  Troponin is negative, EKG unchanged. He had a left heart cath in April of this year with coronary artery disease but nothing amenable to intervention. He also had a stress test about a month later which was intermediate risk. He says overall he is felt much better since being started on Imdur and Ranexa. Negative d-dimer and is on anticoagulation for recent PE  -Interrogate ICD  -Continue telemetry  -Cardiology consult if there is abnormalities on ICD interrogation, otherwise on optimal medical management with recent LHC  -Continue amiodarone, lisinopril, metoprolol, atorvastatin, Imdur, Ranexa    COPD with acute exacerbation  Wheezing on exam.  No increased coughing or sputum production but patient says that he has had pain like this in the past related to COPD.  -P.o. prednisone 40 mg x 5 days  -Scheduled duo nebs    Recent PE  On Eliquis. Continue. Hypertension  Stable. - continue home meds    Hyperlipidemia  Continue statin. DVT Prophylaxis: eliquis  Diet: DIET CARDIAC; No Caffeine  Code Status: Full Code    PT/OT Eval Status: deferred    Dispo - pending    Cornell Cannon MD    This note was transcribed using [id number] [employer]. Please disregard any translational errors.

## 2020-07-07 NOTE — H&P
Procedure Laterality Date    APPENDECTOMY      BACK SURGERY      CARDIAC SURGERY      CHOLECYSTECTOMY      COLONOSCOPY  01/10/2017    COLONOSCOPY  01/10/2017    CORONARY ANGIOPLASTY WITH STENT PLACEMENT  2012    CORONARY ARTERY BYPASS GRAFT  2010, 11/2015    ENDOSCOPY, COLON, DIAGNOSTIC      PACEMAKER PLACEMENT      UPPER GASTROINTESTINAL ENDOSCOPY  02/07/2017    VASCULAR SURGERY         Medications Prior to Admission:   Prior to Admission medications    Medication Sig Start Date End Date Taking? Authorizing Provider   metoprolol succinate (TOPROL XL) 25 MG extended release tablet Take 1 tablet by mouth daily 7/6/20  Yes Becki Hall MD   zolpidem (AMBIEN) 5 MG tablet Take 1 tablet by mouth nightly as needed for Sleep for up to 60 days. 6/26/20 8/25/20 Yes Becki Hall MD   oxyCODONE-acetaminophen (PERCOCET)  MG per tablet Take 1 tablet by mouth every 8 hours as needed for Pain for up to 30 days. Intended supply: 30 days 6/23/20 7/23/20 Yes Becki Hall MD   apixaban Pablo Ekaterina DVT/PE STARTER PACK) 5 MG TABS tablet Take 10 mg (2 tablets) orally twice daily for 7 days, then take 5 mg (1 tablet) orally twice daily thereafter.  6/22/20  Yes Maciel Harris MD   tamsulosin (FLOMAX) 0.4 MG capsule Take 1 capsule by mouth daily 6/23/20  Yes Maciel Harris MD   gabapentin (NEURONTIN) 600 MG tablet TAKE 1 TABLET BY MOUTH FIVE TIMES DAILY 5/26/20 7/26/20 Yes Becki Hall MD   amiodarone (CORDARONE) 200 MG tablet Take 1 tablet by mouth daily 5/28/20  Yes Ethan Ty MD   spironolactone (ALDACTONE) 25 MG tablet Take 1 tablet by mouth daily 5/24/20  Yes José Ramirez MD   traZODone (DESYREL) 50 MG tablet TAKE 1 TABLET BY MOUTH EVERY NIGHT AT BEDTIME 5/5/20  Yes Becki Hall MD   lisinopril (PRINIVIL;ZESTRIL) 10 MG tablet Take 1 tablet by mouth daily 12/21/19  Yes Shauna Meza MD   ranolazine (RANEXA) 500 MG extended release tablet Take 1,000 mg by mouth 2 times daily   Yes Historical Provider, MD   furosemide (LASIX) 20 MG tablet Take 20 mg by mouth daily   Yes Historical Provider, MD   empagliflozin (JARDIANCE) 10 MG tablet Take 1 tablet by mouth daily 11/27/19  Yes Yadiel Jarvis MD   isosorbide mononitrate (IMDUR) 30 MG extended release tablet Take 60 mg by mouth daily    Yes Historical Provider, MD   hydrALAZINE (APRESOLINE) 25 MG tablet Take 1 tablet by mouth every 8 hours 9/18/19  Yes Rush Lan MD   acetaminophen (TYLENOL) 325 MG tablet Take 650 mg by mouth every 6 hours as needed for Pain   Yes Historical Provider, MD   atorvastatin (LIPITOR) 80 MG tablet TAKE 1 TABLET BY MOUTH DAILY 2/15/18  Yes Yadiel Jarvis MD       Allergies:  Dopamine hcl; Tramadol; and Melatonin    Social History:      The patient currently lives at home with his wife    TOBACCO: [id number] history prior EKGs  ETOH: No  DRUGS: No      Family History:      Positive as follows:        Problem Relation Age of Onset    Coronary Art Dis Father     Cancer Mother         Lung with mets    Diabetes Mother        REVIEW OF SYSTEMS:   Pertinent positives as noted in the HPI. All other systems reviewed and negative. PHYSICAL EXAM PERFORMED:    BP (!) 155/88   Pulse 99   Temp 99.9 °F (37.7 °C) (Oral)   Resp 20   SpO2 98%     General appearance:  No apparent distress, appears stated age and cooperative. HEENT:  Normal cephalic, atraumatic without obvious deformity. Pupils equal, round, and reactive to light. Extra ocular muscles intact. Conjunctivae/corneas clear. Neck: Supple, with full range of motion. No jugular venous distention. Trachea midline. Respiratory:  Normal respiratory effort. Clear to auscultation, bilaterally without Rales/Wheezes/Rhonchi. Cardiovascular:  Regular rate and rhythm with normal S1/S2 without murmurs, rubs or gallops. Abdomen: Soft, non-tender, non-distended with normal bowel sounds.   Musculoskeletal:  No clubbing, cyanosis or edema bilaterally. Full range of motion without deformity. Skin: Skin color, texture, turgor normal.  No rashes or lesions. Neurologic:  Neurovascularly intact without any focal sensory/motor deficits. Cranial nerves: II-XII intact, grossly non-focal.  Psychiatric:  Alert and oriented, thought content appropriate, normal insight  Capillary Refill: Brisk,< 3 seconds   Peripheral Pulses: +2 palpable, equal bilaterally       Labs:     Recent Labs     07/06/20 2043   WBC 6.7   HGB 13.8   HCT 40.8        Recent Labs     07/06/20 2043      K 4.0      CO2 25   BUN 18   CREATININE 1.1   CALCIUM 8.6     Recent Labs     07/06/20 2043   AST 9*   ALT 6*   BILITOT <0.2   ALKPHOS 52     No results for input(s): INR in the last 72 hours. Recent Labs     07/06/20 2043   TROPONINI <0.01       Urinalysis:      Lab Results   Component Value Date    NITRU Negative 04/22/2020    WBCUA 3 04/22/2020    RBCUA 1 04/22/2020    BLOODU Negative 04/22/2020    SPECGRAV 1.014 04/22/2020    GLUCOSEU 250 04/22/2020       EKG:    I have reviewed the EKG with the following interpretation: Right bundle branch block, PVCs, inverted T waves V1 V2. EKG not appreciably different than prior    Radiology:     XR CHEST STANDARD (2 VW)   Final Result   No acute cardiopulmonary disease. ASSESSMENT/PLAN:  Chest pain  Negative troponin initially. Prior history of coronary disease although with negative stress test 2 months prior.   Did receive some relief with nitroglycerin.  -Admitting for pain control, serial troponins, EKGs PRN for worsening pain, telemetry monitoring  -Anticoagulated on Eliquis for recent pulmonary embolism    Coronary artery disease  -Continuing home amiodarone, atorvastatin, isosorbide mononitrate, lisinopril, metoprolol    Combined heart failure  -Continuing home dosing, lisinopril, metoprolol, spironolactone    Hypertension  -Continue home medications as above    Comorbid conditions not actively managed  Recent history of pulmonary embolism on Eliquis  Hyperlipidemia    DVT Prophylaxis: Eliquis  Diet: Controlled carbohydrate and cardiac  Code Status: Full code  Surrogate: Wife, Shane Hoang [id number]     [facility] -admitting to [place] [address] with telemetry. Serial troponins. Cortney Nolan MD    Thank you Balaji Eduardo MD for the opportunity to be involved in this patient's care. If you have any questions or concerns please feel free to contact me at [phone].

## 2020-07-07 NOTE — PROGRESS NOTES
4 Eyes Skin Assessment     The patient is being assess for  Admission    I agree that 2 RN's have performed a thorough Head to Toe Skin Assessment on the patient. ALL assessment sites listed below have been assessed. Areas assessed by both nurses:   [x]   Head, Face, and Ears   [x]   Shoulders, Back, and Chest  [x]   Arms, Elbows, and Hands   [x]   Coccyx, Sacrum, and IschIum  [x]   Legs, Feet, and Heels        Does the Patient have Skin Breakdown?   No         Jhonathan Prevention initiated:  No   Wound Care Orders initiated:  No      [facility] nurse consulted for Pressure Injury (Stage 3,4, Unstageable, DTI, NWPT, and Complex wounds), New and Established Ostomies:  No      Nurse 1 eSignature: Electronically signed by Umm Castellon RN on 7/7/20 at 1:41 PM EDT    **SHARE this note so that the co-signing nurse is able to place an eSignature**    Nurse 2 eSignature: {Esignature:[id number]}

## 2020-07-07 NOTE — ED PROVIDER NOTES
Attending Supervisory Note/Shared Visit   I have personally performed a face to face diagnostic evaluation on this patient. I have reviewed the mid-levels findings and agree. History and Exam by me shows alert white male in no acute distress. He complains of some chest discomfort which he describes as somewhat sharp with associated palpitations that started about 4 hours before he got here while he was sitting on the couch. He states the pain is somewhat similar to the pain he had a few months ago when he had a \"mild heart attack\". He was also recently hospitalized and diagnosed with a pulmonary embolism and anticoagulated about 2 weeks ago. Heart: Regular rate and rhythm. No murmurs or gallops noted. Lungs: Breath sounds equal bilaterally and clear. Abdomen: Soft, nondistended, nontender. Musculoskeletal: No lower extremity edema. No calf tenderness. EKG: Sinus tachycardia, rate of 108, occasional PVC, left atrial enlargement, right bundle branch block. Rhythm strip shows sinus tachycardia with a rate of 108, WV interval 128 ms,  ms with unifocal PVCs, no other ectopy as interpreted by me. No significant change compared EKG dated 6/17/2020 other than the sinus tachycardia. Lab reviewed. H&H are normal.  White blood cell count 6700 with 51 neutrophils and 39 lymphs. Troponin less than 0.01. Electrolytes BUN and creatinine are normal.  Glucose 107. Pro calcitonin of 0.08. Lactic acid 1.5. . D-dimer of less than 200. Chest x-ray: No acute cardiopulmonary abnormality. Some the patient's multiple risk factors including known history of coronary artery disease and recent end STEMI and the fact that his pain is similar to his previous end STEMI pain he will need observation, monitoring, serial cardiac enzymes. His d-dimer is not elevated. My index of concern for repeat pulmonary embolus is low. The hospitalist will be consulted for admission.       (Please note that

## 2020-07-07 NOTE — ED NOTES
Pt states his pain is left mid chest and he has been having pain for about 3 hours now.       Kierra Garg RN  07/06/20 2000

## 2020-07-07 NOTE — ED PROVIDER NOTES
[address] [facility]      Pt Name: Thomas Pope  KRO:[id number]  [place] 1955  Date of evaluation: 7/6/2020  Provider: TAYLA Cruz - CNP   Attending provider:  Lizabeth Aguilar MD      Chief Complaint:    Chief Complaint   Patient presents with    Chest Pain     x4 hours. + sob.  + cough white. Nursing Notes, Past Medical Hx, Past Surgical Hx, Social Hx, Allergies, and Family Hx were all reviewed and agreed with or any disagreements were addressed in the HPI.    HPI:  (Location, Duration, Timing, Severity, Quality, Assoc Sx, Context, Modifying factors)  This is a  59 y.o. male with PMH significant for CHF, CAD, stroke, COPD, DM, HLD, and HTN who presents to the emergency department today from home complaining of chest pain, shortness of breath, cough, and subjective fever. Onset was 4 hours prior to arrival.  The context was he was at rest when the pain started. It is constant. Does not seem to be exertional.  Cough is productive with white sputum. No abdominal pain or GI symptoms. No neck pain or stiffness. Patient was discharged 2 weeks ago after a 6-day admission for PE. He was started on Eliquis at that time, and prior to that he was just taking aspirin for CAD. Left heart cath by Dr. Miryam Cohen 4/2020 for NSTEMI which the summary was nonobstructive CAD.     PastMedical/Surgical History:      Diagnosis Date    Anxiety     Arthritis     Asthma     CAD (coronary artery disease)     Calcium kidney stone     Cardiomyopathy ([address]     Cerebral artery occlusion with cerebral infarction ([address]     CHF (congestive heart failure) ([address]     Clostridium difficile diarrhea 02/12/2020    COPD (chronic obstructive pulmonary disease) (HCC)     mild    Depression     DM2 (diabetes mellitus, type 2) (HCC)     Fibromyalgia     GERD (gastroesophageal reflux disease)     Hyperlipidemia     Hypertension     Liver disease     Pacemaker 2012    Medtronic model # [id number]    Pneumonia     Seizures [facility]     TIA (transient ischemic attack) 2007    Ulcerative colitis ([facility]          Procedure Laterality Date    APPENDECTOMY      BACK SURGERY      CARDIAC SURGERY      CHOLECYSTECTOMY      COLONOSCOPY  01/10/2017    COLONOSCOPY  01/10/2017    CORONARY ANGIOPLASTY WITH STENT PLACEMENT  2012    CORONARY ARTERY BYPASS GRAFT  2010, 11/2015    ENDOSCOPY, COLON, DIAGNOSTIC      PACEMAKER PLACEMENT      UPPER GASTROINTESTINAL ENDOSCOPY  02/07/2017    VASCULAR SURGERY         Medications:  Previous Medications    ACETAMINOPHEN (TYLENOL) 325 MG TABLET    Take 650 mg by mouth every 6 hours as needed for Pain    AMIODARONE (CORDARONE) 200 MG TABLET    Take 1 tablet by mouth daily    APIXABAN (ELIQUIS DVT/PE STARTER PACK) 5 MG TABS TABLET    Take 10 mg (2 tablets) orally twice daily for 7 days, then take 5 mg (1 tablet) orally twice daily thereafter. ATORVASTATIN (LIPITOR) 80 MG TABLET    TAKE 1 TABLET BY MOUTH DAILY    EMPAGLIFLOZIN (JARDIANCE) 10 MG TABLET    Take 1 tablet by mouth daily    FUROSEMIDE (LASIX) 20 MG TABLET    Take 20 mg by mouth daily    GABAPENTIN (NEURONTIN) 600 MG TABLET    TAKE 1 TABLET BY MOUTH FIVE TIMES DAILY    HYDRALAZINE (APRESOLINE) 25 MG TABLET    Take 1 tablet by mouth every 8 hours    ISOSORBIDE MONONITRATE (IMDUR) 30 MG EXTENDED RELEASE TABLET    Take 60 mg by mouth daily     LISINOPRIL (PRINIVIL;ZESTRIL) 10 MG TABLET    Take 1 tablet by mouth daily    METOPROLOL SUCCINATE (TOPROL XL) 25 MG EXTENDED RELEASE TABLET    Take 1 tablet by mouth daily    OXYCODONE-ACETAMINOPHEN (PERCOCET)  MG PER TABLET    Take 1 tablet by mouth every 8 hours as needed for Pain for up to 30 days.  Intended supply: 30 days    RANOLAZINE (RANEXA) 500 MG EXTENDED RELEASE TABLET    Take 1,000 mg by mouth 2 times daily    SPIRONOLACTONE (ALDACTONE) 25 MG TABLET    Take 1 tablet by mouth daily TAMSULOSIN (FLOMAX) 0.4 MG CAPSULE    Take 1 capsule by mouth daily    TRAZODONE (DESYREL) 50 MG TABLET    TAKE 1 TABLET BY MOUTH EVERY NIGHT AT BEDTIME    ZOLPIDEM (AMBIEN) 5 MG TABLET    Take 1 tablet by mouth nightly as needed for Sleep for up to 60 days. Review of Systems:  Review of Systems   Constitutional: Positive for fever. Negative for diaphoresis. HENT: Negative for congestion, ear pain, facial swelling, sinus pressure, sinus pain and sore throat. Eyes: Negative for visual disturbance. Respiratory: Positive for cough and shortness of breath. Cardiovascular: Positive for chest pain and palpitations. Negative for leg swelling. Gastrointestinal: Negative for abdominal distention, abdominal pain, diarrhea, nausea and vomiting. Musculoskeletal: Negative for back pain, neck pain and neck stiffness. Skin: Negative for pallor and rash. Allergic/Immunologic: Negative for immunocompromised state. Neurological: Negative for dizziness, syncope and headaches. Hematological: Negative for adenopathy. Psychiatric/Behavioral: Negative for confusion. All other systems reviewed and are negative. Positives and Pertinent negatives as per HPI. Except as noted above in the ROS, problem specific ROS was completed and is negative. Physical Exam:  Physical Exam  Vitals signs and nursing note reviewed. Constitutional:       General: He is not in acute distress. Appearance: Normal appearance. He is well-developed. He is not toxic-appearing. HENT:      Head: Normocephalic and atraumatic. Eyes:      General: No scleral icterus. Conjunctiva/sclera: Conjunctivae normal.   Neck:      Musculoskeletal: Full passive range of motion without pain and neck supple. No neck rigidity. Vascular: No JVD. Cardiovascular:      Rate and Rhythm: Regular rhythm. Tachycardia present. Heart sounds: No murmur. No friction rub. No gallop.     Pulmonary:      Effort: Pulmonary effort is Laboratory  [address] [address]   Phone [phone]   LACTIC ACID, PLASMA    Narrative:     Performed at:  [facility]  [address] [address]   Phone [phone]   LACTATE DEHYDROGENASE    Narrative:     Performed at:  [facility]  [address] [address]   Phone [phone]   D-DIMER, QUANTITATIVE    Narrative:     Performed at:  [facility]  [address] [address]   Phone [phone]   FERRITIN    Narrative:     Performed at:  [facility]  [address] [address]   Phone [phone]   URINE RT REFLEX TO CULTURE   COVID-19        Remainder of labs reviewed and werenegative at this time or not returned at the time of this note. RADIOLOGY:   Non-plain film images such as CT, Ultrasound and MRI are read by the radiologist. TAYLA Bang CNP have directly visualized the radiologic plain film image(s) with the below findings:        Interpretation per the Radiologist below, if available at the time of this note:    XR CHEST STANDARD (2 VW)   Final Result   No acute cardiopulmonary disease. Xr Chest Standard (2 Vw)    Result Date: 7/6/2020  EXAMINATION: TWO XRAY VIEWS OF THE CHEST 7/6/2020 7:55 pm COMPARISON: June 17, 2020 HISTORY: ORDERING SYSTEM PROVIDED HISTORY: Chest Pain TECHNOLOGIST PROVIDED HISTORY: Reason for exam:->Chest Pain Reason for Exam: chest pain FINDINGS: Left chest wall AICD in place. Normal cardiomediastinal silhouette. Emphysematous changes are present. The lungs are clear without focal consolidation or pleural effusion. No suspicious pulmonary nodules. No pulmonary edema. No pneumothorax. No acute osseous abnormality. No acute cardiopulmonary disease.      Xr Chest Standard (2 Vw)    Result Date: 6/17/2020  EXAMINATION: TWO XRAY VIEWS OF THE CHEST 6/17/2020 4:59 pm COMPARISON: May 20, 2020 and February 12, 2020 HISTORY: ORDERING SYSTEM PROVIDED HISTORY: Chest Discomfort TECHNOLOGIST PROVIDED HISTORY: Reason for exam:->Chest Discomfort Reason for Exam: Chest Pain (Pt to ED with c/o mid/left side chest pain, describes the pain as sharp,, sob Acuity: Acute Type of Exam: Initial FINDINGS: Sternotomy wires mediastinal clips and AICD again noted. No evidence of pneumonia, edema or other acute pulmonary process. No evidence of acute process of the cardiac or mediastinal structures. No evidence of pneumothorax or pleural effusion. Remote compression fracture deformity of a solitary midthoracic vertebral body again noted. No evidence of acute cardiopulmonary disease. Us Renal Complete    Result Date: 6/19/2020  EXAMINATION: RETROPERITONEAL ULTRASOUND OF THE KIDNEYS AND URINARY BLADDER 6/19/2020 COMPARISON: Prior study(s) most recent multiple prior CT scans most recent 06/17/2020 chest and 05/19/2020 abdomen pelvis. Miguel Arauz HISTORY: ORDERING SYSTEM PROVIDED HISTORY: uti TECHNOLOGIST PROVIDED HISTORY: Reason for exam:->uti FINDINGS: Right kidney: 9.4 x 4.4 x 4.9 cm. There is no hydronephrosis or surrounding fluid. Left kidney: 10.5 x 4.4 x 5.6 cm. There is no hydronephrosis or surrounding fluid. There is however a an elliptical cyst which appears separate from the renal parenchyma along the posterior and upper margin of the left kidney. This corresponds with multiple prior CT scans and currently measures 7.7 x 5.1 x 6.5 cm. This is essentially unchanged. In comparison of CT scans, this is somewhat more flattened from anterior-posterior compared to studies from 2018 and prior. In either respect, this is a benign appearing simple cyst. Renal cortical echogenicity is normal relative to adjacent liver and spleen. Urinary bladder is unremarkable. There is no free pelvic fluid. Unremarkable renal ultrasound.  The simple retroperitoneal cyst immediately posterior to the upper left kidney is essentially unchanged from multiple prior CT scans and is an incidental finding. Ct Chest Pulmonary Embolism W Contrast    Result Date: 6/17/2020  EXAMINATION: CTA OF THE CHEST 6/17/2020 5:02 pm TECHNIQUE: CTA of the chest was performed after the administration of intravenous contrast.  Multiplanar reformatted images are provided for review. MIP images are provided for review. Dose modulation, iterative reconstruction, and/or weight based adjustment of the mA/kV was utilized to reduce the radiation dose to as low as reasonably achievable. COMPARISON: Chest x-ray today, CT chest 04/25/2019 HISTORY: ORDERING SYSTEM PROVIDED HISTORY: SOB suspect pulmonary embolus TECHNOLOGIST PROVIDED HISTORY: If patient is on cardiac monitor and/or pulse ox, they may be taken off cardiac monitor and pulse ox, left on O2 if currently on. All monitors reattached when patient returns to room. Reason for exam:->SOB suspect pulmonary embolus Reason for Exam: mid chest pain, sob, bypass, Acuity: Acute Type of Exam: Initial History of cardiomyopathy, hyperlipidemia, hypertension, coronary artery disease, congestive heart failure, obstructive lung disease, asthma, pneumonia. Mid to left sided sharp chest pain which began 2 hours prior to arrival. Also, nausea and shortness of breath. FINDINGS: Pulmonary Arteries: Motion artifact degrades images, particularly of the lung bases. There are questionable central low attenuation filling defects within posterior segmental and subsegmental branches supplying the left lower lobe. The central pulmonary arteries are normal in caliber and course. Mediastinum: No evidence of mediastinal lymphadenopathy. The heart and pericardium demonstrate no acute abnormality. There is no acute abnormality of the thoracic aorta. Postsurgical changes of bypass surgery are present. Trace pericardial fluid. The heart size is stable.   Coronary arterial and Veins:Lower Extremities DVT Study, VASC EXTREMITY VENOUS DUPLEX BILATERAL. Vascular Sonographer Report  Indications for Study:Suspected pulmonary embolism. Impressions Right Impression No evidence of deep vein or superficial vein thrombosis involving the right lower extremity and the left common femoral vein. The GSV was harvested from zone 2-5. Left Impression No evidence of deep vein or superficial vein thrombosis involving the left lower extremity and the right common femoral vein. GSV has been harvested. Conclusions   Summary   No evidence of deep vein or superficial vein thrombosis involving the right  lower extremity and the left common femoral vein. The GSV was harvested from  zone 2-5. No evidence of deep vein or superficial vein thrombosis involving the left  lower extremity and the right common femoral vein. GSV has been harvested. Signature   ------------------------------------------------------------------  Electronically signed by Madelin Reaves MD (Interpreting  physician) on 06/18/2020 at 05:48 PM  ------------------------------------------------------------------  Patient Status:Routine. Study [address] [facility]. Technical Quality:Adequate visualization. Risk Factors History +---------+----+--------+ ! Diagnosis! Date! Comments! +---------+----+--------+ ! CAD      !    !        ! +---------+----+--------+ ! CHF      !    !        ! +---------+----+--------+ Velocities are measured in cm/s ; Diameters are measured in mm Right Lower Extremities DVT Study Measurements Right 2D Measurements +------------------------+----------+---------------+----------+ ! Location                ! Visualized! Compressibility! Thrombosis! +------------------------+----------+---------------+----------+ ! Sapheno Femoral Junction! Yes       ! Yes            ! None      ! +------------------------+----------+---------------+----------+ ! GSV Thigh               ! Partial   !Yes            ! None      ! +------------------------+----------+---------------+----------+ ! Common Femoral          !Yes       ! Yes            ! None      ! +------------------------+----------+---------------+----------+ ! Prox Femoral            !Yes       ! Yes            ! None      ! +------------------------+----------+---------------+----------+ ! Mid Femoral             !Yes       ! Yes            ! None      ! +------------------------+----------+---------------+----------+ ! Dist Femoral            !Yes       ! Yes            ! None      ! +------------------------+----------+---------------+----------+ ! Deep Femoral            !Yes       ! Yes            ! None      ! +------------------------+----------+---------------+----------+ ! Popliteal               !Yes       ! Yes            ! None      ! +------------------------+----------+---------------+----------+ ! GSV Below Knee          ! Partial   !Yes            ! None      ! +------------------------+----------+---------------+----------+ ! Gastroc                 ! Yes       ! Yes            ! None      ! +------------------------+----------+---------------+----------+ ! Soleal                  !Yes       ! Yes            ! None      ! +------------------------+----------+---------------+----------+ ! PTV                     ! Yes       ! Yes            ! None      ! +------------------------+----------+---------------+----------+ ! ATV                     ! Yes       ! Yes            ! None      ! +------------------------+----------+---------------+----------+ ! Peroneal                !Yes       ! Yes            ! None      ! +------------------------+----------+---------------+----------+ ! GSV Calf                ! Yes       ! Yes            ! None      ! +------------------------+----------+---------------+----------+ ! SSV                     ! Yes       ! Yes            ! None      ! +------------------------+----------+---------------+----------+ Right Doppler Measurements +--------------+------+------+------------+ ! Location      ! Signal!Reflux! Reflux (sec)! +--------------+------+------+------------+ ! Common Femoral!Phasic! No    !            ! +--------------+------+------+------------+ ! Popliteal     !Phasic! No    !            ! +--------------+------+------+------------+ Left Lower Extremities DVT Study Measurements Left 2D Measurements +------------------------+----------+---------------+----------+ ! Location                ! Visualized! Compressibility! Thrombosis! +------------------------+----------+---------------+----------+ ! Sapheno Femoral Junction! Yes       ! Yes            ! None      ! +------------------------+----------+---------------+----------+ ! GSV Thigh               ! No        !               !          ! +------------------------+----------+---------------+----------+ ! Common Femoral          !Yes       ! Yes            ! None      ! +------------------------+----------+---------------+----------+ ! Prox Femoral            !Yes       ! Yes            ! None      ! +------------------------+----------+---------------+----------+ ! Mid Femoral             !Yes       ! Yes            ! None      ! +------------------------+----------+---------------+----------+ ! Dist Femoral            !Yes       ! Yes            ! None      ! +------------------------+----------+---------------+----------+ ! Deep Femoral            !Yes       ! Yes            ! None      ! +------------------------+----------+---------------+----------+ ! Popliteal               !Yes       ! Yes            ! None      ! +------------------------+----------+---------------+----------+ ! GSV Below Knee          ! No        !               !          ! +------------------------+----------+---------------+----------+ ! Gastroc                 ! Yes       ! Yes            ! None      ! +------------------------+----------+---------------+----------+ ! Soleal                  !Yes       ! Yes            ! None      ! +------------------------+----------+---------------+----------+ ! PTV                     ! Yes       ! Yes            ! None      ! +------------------------+----------+---------------+----------+ ! ATV                     ! Yes       ! Yes            ! None      ! +------------------------+----------+---------------+----------+ ! Peroneal                !Yes       ! Yes            ! None      ! +------------------------+----------+---------------+----------+ ! GSV Calf                ! Yes       ! Yes            ! None      ! +------------------------+----------+---------------+----------+ ! SSV                     ! Yes       ! Yes            ! None      ! +------------------------+----------+---------------+----------+ Left Doppler Measurements +--------------+------+------+------------+ ! Location      ! Signal!Reflux! Reflux (sec)! +--------------+------+------+------------+ ! Common Femoral!Phasic! No    !            ! +--------------+------+------+------------+ ! Popliteal     !Phasic! No    !            ! +--------------+------+------+------------+         MEDICAL DECISION MAKING / ED COURSE:      PROCEDURES:   Procedures    None    Patient was given:  Medications   nitroGLYCERIN (NITROSTAT) SL tablet 0.4 mg (0.4 mg Sublingual Given 7/6/20 2236)   oxyCODONE-acetaminophen (PERCOCET) 5-325 MG per tablet 2 tablet (2 tablets Oral Given 7/6/20 2128)       Differential Diagnosis: Acute Coronary Syndrome, Congestive Heart Failure, Thoracic Dissection, Pericarditis, Pericardial Effusion, Pulmonary Embolism, Pneumonia, Pneumothorax, Ischemic Bowel, Bowel Obstruction, PUD, GERD, Acute Cholecystitis, Pancreatitis, Hepatitis, Colitis, other    Patient presents with chest pain. See HPI for full presentation. Physical exam as above. 60-year-old lying in bed in no acute distress. Awake alert and oriented. Abdomen soft nontender. Lungs are CTA bilaterally. He is tachycardic otherwise cardiac exam normal.  Troponin negative.   CBC and

## 2020-07-07 NOTE — PROGRESS NOTES
Call placed to [employer] to have pt's ICD interoggated. Representative to come out today.  Electronically signed by Kitty Moon RN on 7/7/2020 at 12:09 PM

## 2020-07-07 NOTE — CARE COORDINATION
INITIAL CASE MANAGEMENT ASSESSMENT    Reviewed chart, met with patient to assess possible discharge needs. Explained Case Management role/services. Living Situation: confirmed address, lives w/ his wife in a home w / 14 steps    ADLs: independent     DME: RW, glucometer    PT/OT Recs: not currently ordered     Active Services: none     Transportation: both he and his wife drive     Medications: confirmed [facility] [employer], rx are obtained at [facility] on [address]    PCP: confirmed Janis Funk      HD/PD: n/a    PLAN/COMMENTS: still using the 30day free rx of eliquis and has not endeavored to get refill yet. He has appt w/ Dr Chris Mayer tomorrow    SW/CM provided contact information for patient or family to call with any questions. SW/CM will follow and assist as needed.   Electronically signed by Dana De Souza RN on 7/7/2020 at 10:53 AM

## 2020-07-07 NOTE — ED NOTES
Pt transferred into a hospital bed for comfort and given night time meds. Pt updated on POC and told he'd be down here until the morning when a bed opens up. Pt verbalized understanding and states he will call if/when he needs anything. Will continue to monitor.      Brady Fishman RN  07/07/20 [id number]

## 2020-07-08 VITALS
HEIGHT: 72 IN | TEMPERATURE: 97.8 F | SYSTOLIC BLOOD PRESSURE: 120 MMHG | HEART RATE: 82 BPM | WEIGHT: 190.48 LBS | OXYGEN SATURATION: 95 % | DIASTOLIC BLOOD PRESSURE: 57 MMHG | BODY MASS INDEX: 25.8 KG/M2 | RESPIRATION RATE: 18 BRPM

## 2020-07-08 LAB
ALBUMIN SERPL-MCNC: 3.6 G/DL (ref 3.4–5)
ANION GAP SERPL CALCULATED.3IONS-SCNC: 11 MMOL/L (ref 3–16)
BUN BLDV-MCNC: 21 MG/DL (ref 7–20)
CALCIUM SERPL-MCNC: 8.6 MG/DL (ref 8.3–10.6)
CHLORIDE BLD-SCNC: 104 MMOL/L (ref 99–110)
CO2: 21 MMOL/L (ref 21–32)
CREAT SERPL-MCNC: 1.1 MG/DL (ref 0.8–1.3)
GFR AFRICAN AMERICAN: >60
GFR NON-AFRICAN AMERICAN: >60
GLUCOSE BLD-MCNC: 107 MG/DL (ref 70–99)
HCT VFR BLD CALC: 37.1 % (ref 40.5–52.5)
HEMOGLOBIN: 12.3 G/DL (ref 13.5–17.5)
MAGNESIUM: 2 MG/DL (ref 1.8–2.4)
MCH RBC QN AUTO: 34 PG (ref 26–34)
MCHC RBC AUTO-ENTMCNC: 33.2 G/DL (ref 31–36)
MCV RBC AUTO: 102.5 FL (ref 80–100)
PDW BLD-RTO: 14.9 % (ref 12.4–15.4)
PHOSPHORUS: 4.1 MG/DL (ref 2.5–4.9)
PLATELET # BLD: 159 K/UL (ref 135–450)
PMV BLD AUTO: 9.6 FL (ref 5–10.5)
POTASSIUM SERPL-SCNC: 4.3 MMOL/L (ref 3.5–5.1)
RBC # BLD: 3.62 M/UL (ref 4.2–5.9)
SODIUM BLD-SCNC: 136 MMOL/L (ref 136–145)
WBC # BLD: 8.4 K/UL (ref 4–11)

## 2020-07-08 PROCEDURE — 6370000000 HC RX 637 (ALT 250 FOR IP): Performed by: PEDIATRICS

## 2020-07-08 PROCEDURE — G0378 HOSPITAL OBSERVATION PER HR: HCPCS

## 2020-07-08 PROCEDURE — 36415 COLL VENOUS BLD VENIPUNCTURE: CPT

## 2020-07-08 PROCEDURE — 6370000000 HC RX 637 (ALT 250 FOR IP): Performed by: INTERNAL MEDICINE

## 2020-07-08 PROCEDURE — 6370000000 HC RX 637 (ALT 250 FOR IP): Performed by: NURSE PRACTITIONER

## 2020-07-08 PROCEDURE — 80069 RENAL FUNCTION PANEL: CPT

## 2020-07-08 PROCEDURE — 2580000003 HC RX 258: Performed by: PEDIATRICS

## 2020-07-08 PROCEDURE — 85027 COMPLETE CBC AUTOMATED: CPT

## 2020-07-08 PROCEDURE — 83735 ASSAY OF MAGNESIUM: CPT

## 2020-07-08 PROCEDURE — 94760 N-INVAS EAR/PLS OXIMETRY 1: CPT

## 2020-07-08 PROCEDURE — 94640 AIRWAY INHALATION TREATMENT: CPT

## 2020-07-08 PROCEDURE — 99214 OFFICE O/P EST MOD 30 MIN: CPT | Performed by: INTERNAL MEDICINE

## 2020-07-08 RX ORDER — ASPIRIN 81 MG/1
81 TABLET, CHEWABLE ORAL DAILY
Qty: 30 TABLET | Refills: 3 | Status: ON HOLD | OUTPATIENT
Start: 2020-07-09 | End: 2020-07-16 | Stop reason: SDUPTHER

## 2020-07-08 RX ORDER — ALBUTEROL SULFATE 90 UG/1
2 AEROSOL, METERED RESPIRATORY (INHALATION) 4 TIMES DAILY PRN
Qty: 1 INHALER | Refills: 0 | Status: ON HOLD | OUTPATIENT
Start: 2020-07-08 | End: 2020-07-16 | Stop reason: SDUPTHER

## 2020-07-08 RX ORDER — PREDNISONE 20 MG/1
40 TABLET ORAL DAILY
Qty: 6 TABLET | Refills: 0 | Status: SHIPPED | OUTPATIENT
Start: 2020-07-09 | End: 2020-07-12

## 2020-07-08 RX ORDER — NICOTINE 21 MG/24HR
1 PATCH, TRANSDERMAL 24 HOURS TRANSDERMAL DAILY
Qty: 42 PATCH | Refills: 0 | Status: SHIPPED | OUTPATIENT
Start: 2020-07-08 | End: 2020-07-13

## 2020-07-08 RX ADMIN — ATORVASTATIN CALCIUM 80 MG: 80 TABLET, FILM COATED ORAL at 08:23

## 2020-07-08 RX ADMIN — IPRATROPIUM BROMIDE AND ALBUTEROL SULFATE 1 AMPULE: .5; 3 SOLUTION RESPIRATORY (INHALATION) at 08:44

## 2020-07-08 RX ADMIN — HYDRALAZINE HYDROCHLORIDE 25 MG: 25 TABLET, FILM COATED ORAL at 06:23

## 2020-07-08 RX ADMIN — SPIRONOLACTONE 25 MG: 25 TABLET ORAL at 08:24

## 2020-07-08 RX ADMIN — Medication 10 ML: at 08:27

## 2020-07-08 RX ADMIN — GABAPENTIN 600 MG: 300 CAPSULE ORAL at 06:22

## 2020-07-08 RX ADMIN — ISOSORBIDE MONONITRATE 60 MG: 30 TABLET, EXTENDED RELEASE ORAL at 08:23

## 2020-07-08 RX ADMIN — PREDNISONE 40 MG: 20 TABLET ORAL at 08:24

## 2020-07-08 RX ADMIN — LISINOPRIL 10 MG: 10 TABLET ORAL at 08:24

## 2020-07-08 RX ADMIN — ASPIRIN 81 MG: 81 TABLET, CHEWABLE ORAL at 08:23

## 2020-07-08 RX ADMIN — OXYCODONE HYDROCHLORIDE AND ACETAMINOPHEN 1 TABLET: 10; 325 TABLET ORAL at 08:24

## 2020-07-08 RX ADMIN — RANOLAZINE 1000 MG: 500 TABLET, FILM COATED, EXTENDED RELEASE ORAL at 08:23

## 2020-07-08 RX ADMIN — TAMSULOSIN HYDROCHLORIDE 0.4 MG: 0.4 CAPSULE ORAL at 08:24

## 2020-07-08 RX ADMIN — APIXABAN 5 MG: 5 TABLET, FILM COATED ORAL at 08:24

## 2020-07-08 RX ADMIN — GABAPENTIN 600 MG: 300 CAPSULE ORAL at 11:01

## 2020-07-08 RX ADMIN — FUROSEMIDE 20 MG: 40 TABLET ORAL at 08:24

## 2020-07-08 RX ADMIN — METOPROLOL SUCCINATE 25 MG: 25 TABLET, EXTENDED RELEASE ORAL at 08:23

## 2020-07-08 RX ADMIN — AMIODARONE HYDROCHLORIDE 200 MG: 200 TABLET ORAL at 08:24

## 2020-07-08 RX ADMIN — NITROGLYCERIN 0.4 MG: 0.4 TABLET, ORALLY DISINTEGRATING SUBLINGUAL at 05:15

## 2020-07-08 ASSESSMENT — PAIN SCALES - GENERAL
PAINLEVEL_OUTOF10: 2
PAINLEVEL_OUTOF10: 5

## 2020-07-08 ASSESSMENT — PAIN DESCRIPTION - PROGRESSION
CLINICAL_PROGRESSION: NOT CHANGED

## 2020-07-08 ASSESSMENT — PAIN SCALES - WONG BAKER
WONGBAKER_NUMERICALRESPONSE: 0

## 2020-07-08 NOTE — PLAN OF CARE
Problem: Pain:  Description: Pain management should include both nonpharmacologic and pharmacologic interventions. Goal: Pain level will decrease  Description: Pain level will decrease  Outcome: Ongoing  Goal: Control of acute pain  Description: Control of acute pain  7/8/2020 0041 by Jeronimo Petit RN  Outcome: Ongoing  7/7/2020 1043 by Pat Shook RN  Note: Pt educated on using pain scale. Pt given PRN pain medication per Dr orders see Lisa Becerril. Pt agreeable to pain management. Goal: Control of chronic pain  Description: Control of chronic pain  Outcome: Ongoing     Problem: Falls - Risk of:  Goal: Will remain free from falls  Description: Will remain free from falls  7/8/2020 0041 by Jeronimo Petit RN  Outcome: Ongoing  7/7/2020 1043 by Pat Shook RN  Note: Fall precautions in place. Bed in lowest position, wheels locked, call light in reach, non slip socks on, alarm refused. Pt educated on using call light for assistance. Pt agreeable.     Goal: Absence of physical injury  Description: Absence of physical injury  Outcome: Ongoing

## 2020-07-08 NOTE — PROGRESS NOTES
Verbal and written discharged instructions given to patient. Denies questions or concerns. Spouse on her way to  patient per patient. Call light in reach.

## 2020-07-08 NOTE — PROGRESS NOTES
Pt currently requesting stronger pain medication for severe chest pain he states started approx. 1 hr ago and is radiating to left arm. c/o SOA. Pt lying in bed not in obvious distress. Has left hand to chest and is watching TV. Denies N/V or any other symptoms. Giving PRN NITRO (Pt reluctant to take but this RN explained no other pain med due) Pt insistant this RN get ahold of Dr for something stronger. Tele: NSR Temp 97.8, HR 70, RESP 16, /64, O2 95% Secured message sent to Anthony Iqbal NP. N.O. for one time dose of morphine received. Pt made aware. Pts hydralizine held due to /64 and received NITRO. Will continue to monitor.

## 2020-07-08 NOTE — PROGRESS NOTES
Upon assessment Pts Right arm PIV was infiltrated. IV removed and site covered with sterile dressing. New PIV placed in left forearm. X1 stick with 22ga, secured with sterile drape. Placement verified by blood return and  Flush without resistance. This RN reinforced EDU of maintaining patency of line and protecting from damage. Pt VU of EDU. Will continue to monitor.

## 2020-07-08 NOTE — DISCHARGE INSTR - DIET

## 2020-07-08 NOTE — DISCHARGE SUMMARY
Hospitalist Discharge Summary    Patient ID:  Chhaya Winston  [id number]  59 y.o.  1955    Admit date: 7/6/2020    Discharge date: 7/8/2020    Disposition: home    Admission Diagnoses:   Patient Active Problem List   Diagnosis    Hx of CABG    Essential hypertension    Abdominal pain    Ischemic cardiomyopathy    Hyperlipidemia LDL goal <70    CHF (congestive heart failure) ([facility])    Chronic back pain    Type 2 diabetes mellitus without complication, with long-term current use of insulin ([address]    Coronary artery disease involving native coronary artery of native heart without angina pectoris    COPD exacerbation ([facility])    Anemia,normocytic normochromic ,mixed folic aci deficiency and iron deficiency    Guaiac + stool    Morbid obesity due to excess calories ([address]    Anxiety    Coronary artery disease involving coronary bypass graft of native heart with angina pectoris ([facility])    Iron deficiency anemia due to chronic blood loss    LALITA (acute kidney injury) ([address]    Tobacco abuse    Restrictive lung disease    Acute on chronic diastolic congestive heart failure ([facility])    Ischemic stroke, left frontal lobe (4/30/18) ([facility])    PFO (patent foramen ovale)    DMII (diabetes mellitus, type 2) ([facility])    COPD (chronic obstructive pulmonary disease) ([facility])    Renal insufficiency    Chronic systolic (congestive) heart failure ([facility])    CAD (coronary artery disease)    Compression fracture of L2, sequela    Low back pain    Intractable back pain    Back pain    Stroke determined by clinical assessment ([address]    NSVT (nonsustained ventricular tachycardia) ([facility])    Sinus tachycardia    ICD (implantable cardioverter-defibrillator) discharge    Diabetic peripheral neuropathy ([facility])    Hypotension    S/p NSTEMI (non-ST elevated myocardial infarction) ([address]    Ventricular tachycardia ([facility])    Hypertensive urgency    Acute pulmonary embolism without acute cor pulmonale ([address]    Chest meds.     Hyperlipidemia  Continue statin. Discharge Medications:   Current Discharge Medication List      START taking these medications    Details   predniSONE (DELTASONE) 20 MG tablet Take 2 tablets by mouth daily for 3 days  Qty: 6 tablet, Refills: 0      aspirin 81 MG chewable tablet Take 1 tablet by mouth daily  Qty: 30 tablet, Refills: 3      nicotine (NICODERM CQ) 21 MG/24HR Place 1 patch onto the skin daily  Qty: 42 patch, Refills: 0      albuterol sulfate HFA (VENTOLIN HFA) 108 (90 Base) MCG/ACT inhaler Inhale 2 puffs into the lungs 4 times daily as needed for Wheezing  Qty: 1 Inhaler, Refills: 0           Current Discharge Medication List        Current Discharge Medication List      CONTINUE these medications which have NOT CHANGED    Details   !! zolpidem (AMBIEN) 5 MG tablet Take 5 mg by mouth nightly. apixaban (ELIQUIS) 5 MG TABS tablet Take 5 mg by mouth 2 times daily      metoprolol succinate (TOPROL XL) 25 MG extended release tablet Take 1 tablet by mouth daily  Qty: 30 tablet, Refills: 3    Associated Diagnoses: Heart palpitations      !! zolpidem (AMBIEN) 5 MG tablet Take 1 tablet by mouth nightly as needed for Sleep for up to 60 days. Qty: 30 tablet, Refills: 1    Associated Diagnoses: Primary insomnia      oxyCODONE-acetaminophen (PERCOCET)  MG per tablet Take 1 tablet by mouth every 8 hours as needed for Pain for up to 30 days.  Intended supply: 30 days  Qty: 90 tablet, Refills: 0    Comments: Reduce doses taken as pain becomes manageable  Associated Diagnoses: Chronic midline low back pain without sciatica      tamsulosin (FLOMAX) 0.4 MG capsule Take 1 capsule by mouth daily  Qty: 30 capsule, Refills: 3      gabapentin (NEURONTIN) 600 MG tablet TAKE 1 TABLET BY MOUTH FIVE TIMES DAILY  Qty: 150 tablet, Refills: 1    Associated Diagnoses: Neuropathy      amiodarone (CORDARONE) 200 MG tablet Take 1 tablet by mouth daily  Qty: 30 tablet, Refills: 0      spironolactone (ALDACTONE) 25 MG tablet Take 1 tablet by mouth daily  Qty: 30 tablet, Refills: 0      traZODone (DESYREL) 50 MG tablet TAKE 1 TABLET BY MOUTH EVERY NIGHT AT BEDTIME  Qty: 30 tablet, Refills: 2    Associated Diagnoses: Primary insomnia      lisinopril (PRINIVIL;ZESTRIL) 10 MG tablet Take 1 tablet by mouth daily  Qty: 30 tablet, Refills: 3      ranolazine (RANEXA) 500 MG extended release tablet Take 1,000 mg by mouth 2 times daily      furosemide (LASIX) 20 MG tablet Take 20 mg by mouth daily      empagliflozin (JARDIANCE) 10 MG tablet Take 1 tablet by mouth daily  Qty: 28 tablet, Refills: 0      isosorbide mononitrate (IMDUR) 30 MG extended release tablet Take 60 mg by mouth daily       hydrALAZINE (APRESOLINE) 25 MG tablet Take 1 tablet by mouth every 8 hours  Qty: 90 tablet, Refills: 3      acetaminophen (TYLENOL) 325 MG tablet Take 650 mg by mouth every 6 hours as needed for Pain      atorvastatin (LIPITOR) 80 MG tablet TAKE 1 TABLET BY MOUTH DAILY  Qty: 90 tablet, Refills: 1    Associated Diagnoses: Coronary artery disease involving native heart without angina pectoris, unspecified vessel or lesion type       !! - Potential duplicate medications found. Please discuss with provider. Current Discharge Medication List          Procedures: None     Assessment on Discharge: Stable, improved     Discharge Exam:  BP (!) 120/57   Pulse 82   Temp 97.8 °F (36.6 °C) (Oral)   Resp 18   Ht 6' (1.829 m)   Wt 190 lb 7.6 oz (86.4 kg)   SpO2 95%   BMI 25.83 kg/m²     General appearance: No apparent distress, appears stated age and cooperative. HEENT: Pupils equal, round, and reactive to light. Conjunctivae/corneas clear. Neck: Supple, with full range of motion. Trachea midline. Respiratory:  Normal respiratory effort. Clear to auscultation, bilaterally without Rales/Wheezes/Rhonchi. Cardiovascular: Regular rate and rhythm with normal S1/S2 without murmurs, rubs or gallops.   Abdomen: Soft, non-tender, non-distended with normal bowel sounds. Musculoskelatal: No clubbing, cyanosis or edema bilaterally. Full range of motion without deformity. Skin: Skin color, texture, turgor normal.  No rashes or lesions. Neurologic:  Neurovascularly intact without any focal sensory/motor deficits. Cranial nerves: II-XII intact, grossly non-focal.  Psychiatric: Alert and oriented, thought content appropriate, normal insight    Pertinent Studies During Hospital Stay:    Radiology:  Xr Chest Standard (2 Vw)    Result Date: 7/6/2020  EXAMINATION: TWO XRAY VIEWS OF THE CHEST 7/6/2020 7:55 pm COMPARISON: June 17, 2020 HISTORY: ORDERING SYSTEM PROVIDED HISTORY: Chest Pain TECHNOLOGIST PROVIDED HISTORY: Reason for exam:->Chest Pain Reason for Exam: chest pain FINDINGS: Left chest wall AICD in place. Normal cardiomediastinal silhouette. Emphysematous changes are present. The lungs are clear without focal consolidation or pleural effusion. No suspicious pulmonary nodules. No pulmonary edema. No pneumothorax. No acute osseous abnormality. No acute cardiopulmonary disease. Xr Chest Standard (2 Vw)    Result Date: 6/17/2020  EXAMINATION: TWO XRAY VIEWS OF THE CHEST 6/17/2020 4:59 pm COMPARISON: May 20, 2020 and February 12, 2020 HISTORY: ORDERING SYSTEM PROVIDED HISTORY: Chest Discomfort TECHNOLOGIST PROVIDED HISTORY: Reason for exam:->Chest Discomfort Reason for Exam: Chest Pain (Pt to ED with c/o mid/left side chest pain, describes the pain as sharp,, sob Acuity: Acute Type of Exam: Initial FINDINGS: Sternotomy wires mediastinal clips and AICD again noted. No evidence of pneumonia, edema or other acute pulmonary process. No evidence of acute process of the cardiac or mediastinal structures. No evidence of pneumothorax or pleural effusion. Remote compression fracture deformity of a solitary midthoracic vertebral body again noted. No evidence of acute cardiopulmonary disease.      Us Renal Complete    Result Date: 6/19/2020  EXAMINATION: femoral vein. GSV has been harvested. Signature   ------------------------------------------------------------------  Electronically signed by Dena Hooker MD (Interpreting  physician) on 06/18/2020 at 05:48 PM  ------------------------------------------------------------------  Patient Status:Routine. Study [facility]  - Vascular Lab. Technical Quality:Adequate visualization. Risk Factors History +---------+----+--------+ ! Diagnosis! Date! Comments! +---------+----+--------+ ! CAD      !    !        ! +---------+----+--------+ ! CHF      !    !        ! +---------+----+--------+ Velocities are measured in cm/s ; Diameters are measured in mm Right Lower Extremities DVT Study Measurements Right 2D Measurements +------------------------+----------+---------------+----------+ ! Location                ! Visualized! Compressibility! Thrombosis! +------------------------+----------+---------------+----------+ ! Sapheno Femoral Junction! Yes       ! Yes            ! None      ! +------------------------+----------+---------------+----------+ ! GSV Thigh               ! Partial   !Yes            ! None      ! +------------------------+----------+---------------+----------+ ! Common Femoral          !Yes       ! Yes            ! None      ! +------------------------+----------+---------------+----------+ ! Prox Femoral            !Yes       ! Yes            ! None      ! +------------------------+----------+---------------+----------+ ! Mid Femoral             !Yes       ! Yes            ! None      ! +------------------------+----------+---------------+----------+ ! Dist Femoral            !Yes       ! Yes            ! None      ! +------------------------+----------+---------------+----------+ ! Deep Femoral            !Yes       ! Yes            ! None      ! +------------------------+----------+---------------+----------+ ! Popliteal               !Yes       ! Yes            ! None      ! +--------------+------+------+------------+ ! Location      ! Signal!Reflux! Reflux (sec)! +--------------+------+------+------------+ ! Common Femoral!Phasic! No    !            ! +--------------+------+------+------------+ ! Popliteal     !Phasic! No    !            ! +--------------+------+------+------------+      Last Labs on Discharge:     Recent Results (from the past 24 hour(s))   CBC    Collection Time: 07/08/20  7:46 AM   Result Value Ref Range    WBC 8.4 4.0 - 11.0 K/uL    RBC 3.62 (L) 4.20 - 5.90 M/uL    Hemoglobin 12.3 (L) 13.5 - 17.5 g/dL    Hematocrit 37.1 (L) 40.5 - 52.5 %    .5 (H) 80.0 - 100.0 fL    MCH 34.0 26.0 - 34.0 pg    MCHC 33.2 31.0 - 36.0 g/dL    RDW 14.9 12.4 - 15.4 %    Platelets 457 257 - 579 K/uL    MPV 9.6 5.0 - 10.5 fL   Magnesium    Collection Time: 07/08/20  7:46 AM   Result Value Ref Range    Magnesium 2.00 1.80 - 2.40 mg/dL   Renal Function Panel    Collection Time: 07/08/20  7:46 AM   Result Value Ref Range    Sodium 136 136 - 145 mmol/L    Potassium 4.3 3.5 - 5.1 mmol/L    Chloride 104 99 - 110 mmol/L    CO2 21 21 - 32 mmol/L    Anion Gap 11 3 - 16    Glucose 107 (H) 70 - 99 mg/dL    BUN 21 (H) 7 - 20 mg/dL    CREATININE 1.1 0.8 - 1.3 mg/dL    GFR Non-African American >60 >60    GFR African American >60 >60    Calcium 8.6 8.3 - 10.6 mg/dL    Phosphorus 4.1 2.5 - 4.9 mg/dL    Alb 3.6 3.4 - 5.0 g/dL         Follow up: with Yadiel Jarvis MD    Note that more  than 30 minutes was spent in preparing discharge papers, discussing discharge with patient, medication review, etc.    Thank you Yadiel Jarvis MD for the opportunity to be involved in this patient's care. If you have any questions or concerns please feel free to contact me at [phone]. Electronically signed by Tex Mccord MD on 7/8/2020 at 10:17 AM    This note was transcribed using [id number] [employer]. Please disregard any translational errors.

## 2020-07-09 ENCOUNTER — INITIAL CONSULT (OUTPATIENT)
Dept: SURGERY | Age: 65
End: 2020-07-09
Payer: MEDICARE

## 2020-07-09 ENCOUNTER — CARE COORDINATION (OUTPATIENT)
Dept: CASE MANAGEMENT | Age: 65
End: 2020-07-09

## 2020-07-09 VITALS
TEMPERATURE: 99.1 F | BODY MASS INDEX: 24.95 KG/M2 | WEIGHT: 184 LBS | SYSTOLIC BLOOD PRESSURE: 142 MMHG | DIASTOLIC BLOOD PRESSURE: 80 MMHG

## 2020-07-09 PROBLEM — D23.5 OTHER BENIGN NEOPLASM OF SKIN OF TRUNK: Status: ACTIVE | Noted: 2020-07-09

## 2020-07-09 PROCEDURE — 99202 OFFICE O/P NEW SF 15 MIN: CPT | Performed by: SURGERY

## 2020-07-09 PROCEDURE — G8427 DOCREV CUR MEDS BY ELIG CLIN: HCPCS | Performed by: SURGERY

## 2020-07-09 PROCEDURE — 11200 RMVL SKIN TAGS UP TO&INC 15: CPT | Performed by: SURGERY

## 2020-07-09 PROCEDURE — G8420 CALC BMI NORM PARAMETERS: HCPCS | Performed by: SURGERY

## 2020-07-09 NOTE — LETTER
CONSENT TO OPERATION  AND/OR OTHER PROCEDURE(S)          PATIENT : Nicolasa Krishnan   YOB: 1955      DATE : 7/9/20          1. I request and consent that Dr. Riddhi Groves and/or his associates or assistants perform an operation and/or procedures on the above patient at  [facility] AT [place], to treat the condition(s) which appear indicated by the diagnostic studies already performed. I have been told that in general terms the nature, purpose and reasonable expectations of the operation and/or procedure(s) are:     Excision of Back  Lesion    2. It has been explained to me by the informing physician that during the course of the operation and/or procedure(s) unforeseen conditions may be revealed that necessitate an extension of the original operation and/or procedure(s) or different operation and/or procedures than those set forth in Paragraph 1. I therefore authorize and request that my physician and/or his associates or assistants perform such operations and/or procedures as are necessary and desirable in the exercise of professional judgment. The authority granted under this Paragraph 2 shall extend to all conditions that require treatment and are known to my physician at the time the operation is commenced. 3. I have been made aware by the informing physician of certain risks and consequences that are associated with the operation and/or procedure(s) described in Paragraph 1, the reasonable alternative methods or treatment, the possible consequences, the possibility that the operation and/or procedure(s) may be unsuccessful and the possibility of complications. I understand the reasonably known risks to be:    ? Bleeding  ? Infection  ? Poor Healing  ? Possible Damage to Nerve, Vessel, Tendon/Muscle or Bone  ? Need for further Treatment/Surgery  ? Stiffness  ? Pain  ? Residual or Recurrent Symptoms  ?  Anesthetic and/or Medical Risks 4. I have also been informed by the informing physician that there are other risks from both known and unknown causes that are attendant to the performance of any surgical procedure. I am aware that the practice of medicine and surgery is not an exact science, and that no guarantees have been made to me concerning the results of the operation and/or procedure(s). 5. I   CONSENT / REFUSE CONSENT  (strike the phrase that does not apply) to the taking of photographs before, during and/or after the operation or procedure for scientific/educational purposes. 6. I consent to the administration of anesthesia and to the use of such anesthetics as may be deemed advisable by the anesthesiologist who has been engaged by me or my physician.     7. I certify that I have read and understand the above consent to operation and/or other procedure(s); that the explanations therein referred to were made to me by the informing physician in advance of my signing this consent; that all blanks or statements requiring insertion or completion were filled in and inapplicable paragraphs, if any, were stricken before I signed; and that all questions asked by me about the operation and/or procedure(s) which I have consented to have been fully answered in a satisfactory manner.               _______________________  Witness        Signature Of Patient

## 2020-07-09 NOTE — CARE COORDINATION
[facility] Transitions Initial Follow Up Call    Call within 2 business days of discharge: Yes    Patient: Liliam Ramos Patient : 1955   MRN: [id number]  Reason for Admission: CP  Discharge Date: 20 RARS: Readmission Risk Score: 62      Last Discharge [facility]       Complaint Diagnosis Description Type Department Provider    20 Chest Pain Chest pain, unspecified type ED to Hosp-Admission (Discharged) (ADMITTED) [facility] Nahid Mosquera MD; Aldo Denver. .. Spoke with: Ahmet Alvarado, wife on hippa form    Facility: [facility]    Non-face-to-face services provided:  Obtained and reviewed discharge summary and/or continuity of care documents    Care Transitions 24 Hour Call    Do you have any ongoing symptoms?:  Yes  Patient-reported symptoms:  Other  Do you have a copy of your discharge instructions?:  Yes  Do you have all of your prescriptions and are they filled?:  No  Have you scheduled your follow up appointment?:  No  Were you discharged with any Home Care or Post Acute Services:  No  Post Acute Services:  Home Health (Comment: agency name unknown)  Do you have support at home?:  Partner/Spouse/SO  Are you an active caregiver in your home?:  No  Care Transitions Interventions       Spoke with patient's wife, Ahmet Alvarado. She stated Trey Oneal has been sleeping a lot since he has been home and was currently sleeping. She stated received copy of discharge instructions. She stated his breathing seems better. She stated Trey Oneal is struggling with low sodium diet. . She stated he has no edema and that his B/P was OK. She declined medication review but stated she saw him take some medicine this morning and that he has been using his inhaler. She did confirm he would start taking aspirin and prednisone, but was waiting for a call back from the pharmacy about nicoderm. Ahmet Alvarado stated patient had seen PCP earlier this week and she would call for an appointment for next week.   Health care decision

## 2020-07-09 NOTE — PROGRESS NOTES
Subjective:      Patient ID: Chhaya Winston is a 59 y.o. male. HPI  Chief Complaint: skin lesion  Patient referred by Dr. Yury Brown for evaluation of skin lesion. Patient reports symptoms of pain, growth. Location of symptoms is left upper back. Symptoms were first noted \"years ago\" but worse over last 2 months. Alleviated by nothing. Symptoms aggravated by trauma to area. Previous evaluation includes exam by PCP. Patient has a history of newly diagnosed PE now on Eliquis. Sees Dr. Vera Berrios soon. Will plan following treatment: excision infarcted accessory skin tag. After consent obtained, left upper back lesion prepped with alcohol swab.  2 ml 2% lidocaine with epi injected. Accessory skin tag elevated with forceps and transected at base with scissors. Hemostasis achieved with direct pressure and silver nitrate. Band aid applied.   No specimen sent        Past Medical History:   Diagnosis Date    Anxiety     Arthritis     Asthma     CAD (coronary artery disease)     Calcium kidney stone     Cardiomyopathy ([address]     Cerebral artery occlusion with cerebral infarction ([address]     CHF (congestive heart failure) ([address]     Clostridium difficile diarrhea 02/12/2020    [facility]     mild    Depression     DM2 (diabetes mellitus, type 2) (HCC)     Fibromyalgia     GERD (gastroesophageal reflux disease)     Hyperlipidemia     Hypertension     Liver disease     Pacemaker 2012    Medtronic model # [id number]    Pneumonia     Seizures ([address]     TIA (transient ischemic attack) 2007    Ulcerative colitis ([address]        Past Surgical History:   Procedure Laterality Date    APPENDECTOMY      BACK SURGERY      CARDIAC SURGERY      CHOLECYSTECTOMY      COLONOSCOPY  01/10/2017    COLONOSCOPY  01/10/2017    CORONARY ANGIOPLASTY WITH STENT PLACEMENT  2012    CORONARY ARTERY BYPASS GRAFT  2010, 11/2015    ENDOSCOPY, COLON, DIAGNOSTIC      mouth daily       hydrALAZINE (APRESOLINE) 25 MG tablet Take 1 tablet by mouth every 8 hours 90 tablet 3    acetaminophen (TYLENOL) 325 MG tablet Take 650 mg by mouth every 6 hours as needed for Pain      atorvastatin (LIPITOR) 80 MG tablet TAKE 1 TABLET BY MOUTH DAILY 90 tablet 1     No current facility-administered medications for this visit. Prior to Admission medications    Medication Sig Start Date End Date Taking? Authorizing Provider   predniSONE (DELTASONE) 20 MG tablet Take 2 tablets by mouth daily for 3 days 7/9/20 7/12/20 Yes Travis Park MD   aspirin 81 MG chewable tablet Take 1 tablet by mouth daily 7/9/20  Yes Travis Park MD   nicotine (NICODERM CQ) 21 MG/24HR Place 1 patch onto the skin daily 7/8/20 8/19/20 Yes Travis Park MD   albuterol sulfate HFA (VENTOLIN HFA) 108 (90 Base) MCG/ACT inhaler Inhale 2 puffs into the lungs 4 times daily as needed for Wheezing 7/8/20  Yes Travis Park MD   zolpidem (AMBIEN) 5 MG tablet Take 5 mg by mouth nightly. 6/26/20 8/26/20 Yes Historical Provider, MD   apixaban (ELIQUIS) 5 MG TABS tablet Take 5 mg by mouth 2 times daily   Yes Historical Provider, MD   metoprolol succinate (TOPROL XL) 25 MG extended release tablet Take 1 tablet by mouth daily 7/6/20  Yes Nas Torres MD   zolpidem (AMBIEN) 5 MG tablet Take 1 tablet by mouth nightly as needed for Sleep for up to 60 days. 6/26/20 8/25/20 Yes Nas Torres MD   oxyCODONE-acetaminophen (PERCOCET)  MG per tablet Take 1 tablet by mouth every 8 hours as needed for Pain for up to 30 days.  Intended supply: 30 days 6/23/20 7/23/20 Yes Nas Torres MD   tamsulosin [facility]) 0.4 MG capsule Take 1 capsule by mouth daily 6/23/20  Yes Facundo Steward MD   gabapentin (NEURONTIN) 600 MG tablet TAKE 1 TABLET BY MOUTH FIVE TIMES DAILY 5/26/20 7/26/20 Yes Nas Torres MD   amiodarone (CORDARONE) 200 MG tablet Take 1 tablet by mouth daily 5/28/20  Yes Ciera Pike MD spironolactone (ALDACTONE) 25 MG tablet Take 1 tablet by mouth daily 5/24/20  Yes Genie Fisher MD   traZODone (DESYREL) 50 MG tablet TAKE 1 TABLET BY MOUTH EVERY NIGHT AT BEDTIME 5/5/20  Yes Corrina Monsivais MD   lisinopril (PRINIVIL;ZESTRIL) 10 MG tablet Take 1 tablet by mouth daily 12/21/19  Yes Alyssa Livingston MD   ranolazine (RANEXA) 500 MG extended release tablet Take 1,000 mg by mouth 2 times daily   Yes Historical Provider, MD   furosemide (LASIX) 20 MG tablet Take 20 mg by mouth daily   Yes Historical Provider, MD   empagliflozin (JARDIANCE) 10 MG tablet Take 1 tablet by mouth daily 11/27/19  Yes Corrina Monsivais MD   isosorbide mononitrate (IMDUR) 30 MG extended release tablet Take 60 mg by mouth daily    Yes Historical Provider, MD   hydrALAZINE (APRESOLINE) 25 MG tablet Take 1 tablet by mouth every 8 hours 9/18/19  Yes Karina Rivers MD   acetaminophen (TYLENOL) 325 MG tablet Take 650 mg by mouth every 6 hours as needed for Pain   Yes Historical Provider, MD   atorvastatin (LIPITOR) 80 MG tablet TAKE 1 TABLET BY MOUTH DAILY 2/15/18  Yes Corrina Monsivais MD         Allergies   Allergen Reactions    Dopamine Hcl Other (See Comments) and Anxiety     Compulsive gambling    Tramadol Other (See Comments)     Dizziness     Melatonin Other (See Comments)     Restless leg syndrome         Social History     Socioeconomic History    Marital status:      Spouse name: Not on file    Number of children: 1    Years of education: Not on file    Highest education level: Not on file   Occupational History    Occupation: disabled   Social Needs    Financial resource strain: Not on file    Food insecurity     Worry: Not on file     Inability: Not on file   [employer] needs     Medical: Not on file     Non-medical: Not on file   Tobacco Use    Smoking status: Current Every Day Smoker     Packs/day: 0.50     Years: 44.00     Pack years: 22.00     Types: Cigarettes    Smokeless

## 2020-07-10 LAB
BLOOD CULTURE, ROUTINE: NORMAL
CULTURE, BLOOD 2: NORMAL

## 2020-07-13 ENCOUNTER — APPOINTMENT (OUTPATIENT)
Dept: GENERAL RADIOLOGY | Age: 65
DRG: 312 | End: 2020-07-13
Payer: MEDICARE

## 2020-07-13 ENCOUNTER — HOSPITAL ENCOUNTER (INPATIENT)
Age: 65
LOS: 1 days | Discharge: LEFT BEFORE DISCHARGE | DRG: 312 | End: 2020-07-16
Attending: EMERGENCY MEDICINE | Admitting: HOSPITALIST
Payer: MEDICARE

## 2020-07-13 ENCOUNTER — APPOINTMENT (OUTPATIENT)
Dept: CT IMAGING | Age: 65
DRG: 312 | End: 2020-07-13
Payer: MEDICARE

## 2020-07-13 ENCOUNTER — TELEPHONE (OUTPATIENT)
Dept: OTHER | Facility: CLINIC | Age: 65
End: 2020-07-13

## 2020-07-13 PROBLEM — R68.84 JAW PAIN: Status: ACTIVE | Noted: 2020-07-13

## 2020-07-13 LAB
A/G RATIO: 1.4 (ref 1.1–2.2)
ALBUMIN SERPL-MCNC: 3.9 G/DL (ref 3.4–5)
ALP BLD-CCNC: 46 U/L (ref 40–129)
ALT SERPL-CCNC: 8 U/L (ref 10–40)
ANION GAP SERPL CALCULATED.3IONS-SCNC: 15 MMOL/L (ref 3–16)
APTT: 26.4 SEC (ref 24.2–36.2)
AST SERPL-CCNC: 9 U/L (ref 15–37)
BASOPHILS ABSOLUTE: 0.1 K/UL (ref 0–0.2)
BASOPHILS RELATIVE PERCENT: 1.2 %
BILIRUB SERPL-MCNC: 0.7 MG/DL (ref 0–1)
BUN BLDV-MCNC: 25 MG/DL (ref 7–20)
CALCIUM SERPL-MCNC: 8.8 MG/DL (ref 8.3–10.6)
CHLORIDE BLD-SCNC: 100 MMOL/L (ref 99–110)
CO2: 20 MMOL/L (ref 21–32)
CREAT SERPL-MCNC: 1.2 MG/DL (ref 0.8–1.3)
EKG ATRIAL RATE: 58 BPM
EKG DIAGNOSIS: NORMAL
EKG P AXIS: 68 DEGREES
EKG P-R INTERVAL: 118 MS
EKG Q-T INTERVAL: 474 MS
EKG QRS DURATION: 138 MS
EKG QTC CALCULATION (BAZETT): 465 MS
EKG R AXIS: 45 DEGREES
EKG T AXIS: 86 DEGREES
EKG VENTRICULAR RATE: 58 BPM
EOSINOPHILS ABSOLUTE: 0.1 K/UL (ref 0–0.6)
EOSINOPHILS RELATIVE PERCENT: 0.8 %
GFR AFRICAN AMERICAN: >60
GFR NON-AFRICAN AMERICAN: >60
GLOBULIN: 2.8 G/DL
GLUCOSE BLD-MCNC: 164 MG/DL (ref 70–99)
HCT VFR BLD CALC: 41.5 % (ref 40.5–52.5)
HEMOGLOBIN: 14.2 G/DL (ref 13.5–17.5)
INR BLD: 0.94 (ref 0.86–1.14)
LYMPHOCYTES ABSOLUTE: 2.6 K/UL (ref 1–5.1)
LYMPHOCYTES RELATIVE PERCENT: 28.7 %
MCH RBC QN AUTO: 34.7 PG (ref 26–34)
MCHC RBC AUTO-ENTMCNC: 34.3 G/DL (ref 31–36)
MCV RBC AUTO: 101.2 FL (ref 80–100)
MONOCYTES ABSOLUTE: 0.7 K/UL (ref 0–1.3)
MONOCYTES RELATIVE PERCENT: 8.1 %
NEUTROPHILS ABSOLUTE: 5.6 K/UL (ref 1.7–7.7)
NEUTROPHILS RELATIVE PERCENT: 61.2 %
PDW BLD-RTO: 14.4 % (ref 12.4–15.4)
PLATELET # BLD: 184 K/UL (ref 135–450)
PMV BLD AUTO: 9.6 FL (ref 5–10.5)
POTASSIUM SERPL-SCNC: 3.9 MMOL/L (ref 3.5–5.1)
PRO-BNP: 977 PG/ML (ref 0–124)
PROTHROMBIN TIME: 10.9 SEC (ref 10–13.2)
RBC # BLD: 4.1 M/UL (ref 4.2–5.9)
SODIUM BLD-SCNC: 135 MMOL/L (ref 136–145)
TOTAL PROTEIN: 6.7 G/DL (ref 6.4–8.2)
TROPONIN: <0.01 NG/ML
TROPONIN: <0.01 NG/ML
WBC # BLD: 9.2 K/UL (ref 4–11)

## 2020-07-13 PROCEDURE — 85025 COMPLETE CBC W/AUTO DIFF WBC: CPT

## 2020-07-13 PROCEDURE — 80053 COMPREHEN METABOLIC PANEL: CPT

## 2020-07-13 PROCEDURE — 85610 PROTHROMBIN TIME: CPT

## 2020-07-13 PROCEDURE — 93010 ELECTROCARDIOGRAM REPORT: CPT | Performed by: INTERNAL MEDICINE

## 2020-07-13 PROCEDURE — 99285 EMERGENCY DEPT VISIT HI MDM: CPT

## 2020-07-13 PROCEDURE — 93005 ELECTROCARDIOGRAM TRACING: CPT | Performed by: EMERGENCY MEDICINE

## 2020-07-13 PROCEDURE — G0378 HOSPITAL OBSERVATION PER HR: HCPCS

## 2020-07-13 PROCEDURE — 84484 ASSAY OF TROPONIN QUANT: CPT

## 2020-07-13 PROCEDURE — 71045 X-RAY EXAM CHEST 1 VIEW: CPT

## 2020-07-13 PROCEDURE — 36415 COLL VENOUS BLD VENIPUNCTURE: CPT

## 2020-07-13 PROCEDURE — 70450 CT HEAD/BRAIN W/O DYE: CPT

## 2020-07-13 PROCEDURE — 83880 ASSAY OF NATRIURETIC PEPTIDE: CPT

## 2020-07-13 PROCEDURE — 6370000000 HC RX 637 (ALT 250 FOR IP): Performed by: EMERGENCY MEDICINE

## 2020-07-13 PROCEDURE — 2580000003 HC RX 258: Performed by: HOSPITALIST

## 2020-07-13 PROCEDURE — 85730 THROMBOPLASTIN TIME PARTIAL: CPT

## 2020-07-13 PROCEDURE — 6370000000 HC RX 637 (ALT 250 FOR IP): Performed by: HOSPITALIST

## 2020-07-13 RX ORDER — ONDANSETRON 2 MG/ML
4 INJECTION INTRAMUSCULAR; INTRAVENOUS EVERY 6 HOURS PRN
Status: DISCONTINUED | OUTPATIENT
Start: 2020-07-13 | End: 2020-07-16 | Stop reason: HOSPADM

## 2020-07-13 RX ORDER — ACETAMINOPHEN 325 MG/1
650 TABLET ORAL EVERY 6 HOURS PRN
Status: DISCONTINUED | OUTPATIENT
Start: 2020-07-13 | End: 2020-07-16 | Stop reason: HOSPADM

## 2020-07-13 RX ORDER — HYDRALAZINE HYDROCHLORIDE 25 MG/1
25 TABLET, FILM COATED ORAL EVERY 8 HOURS SCHEDULED
Status: DISCONTINUED | OUTPATIENT
Start: 2020-07-13 | End: 2020-07-15

## 2020-07-13 RX ORDER — PANTOPRAZOLE SODIUM 40 MG/1
40 TABLET, DELAYED RELEASE ORAL DAILY
Status: ON HOLD | COMMUNITY
End: 2020-07-16 | Stop reason: SDUPTHER

## 2020-07-13 RX ORDER — SPIRONOLACTONE 25 MG/1
25 TABLET ORAL DAILY
Status: DISCONTINUED | OUTPATIENT
Start: 2020-07-14 | End: 2020-07-16

## 2020-07-13 RX ORDER — LISINOPRIL 10 MG/1
10 TABLET ORAL DAILY
Status: DISCONTINUED | OUTPATIENT
Start: 2020-07-14 | End: 2020-07-16

## 2020-07-13 RX ORDER — TRAZODONE HYDROCHLORIDE 50 MG/1
50 TABLET ORAL NIGHTLY
Status: DISCONTINUED | OUTPATIENT
Start: 2020-07-13 | End: 2020-07-16 | Stop reason: HOSPADM

## 2020-07-13 RX ORDER — SODIUM CHLORIDE 9 MG/ML
INJECTION, SOLUTION INTRAVENOUS CONTINUOUS
Status: DISCONTINUED | OUTPATIENT
Start: 2020-07-13 | End: 2020-07-13

## 2020-07-13 RX ORDER — ISOSORBIDE MONONITRATE 60 MG/1
60 TABLET, EXTENDED RELEASE ORAL DAILY
Status: DISCONTINUED | OUTPATIENT
Start: 2020-07-14 | End: 2020-07-15

## 2020-07-13 RX ORDER — GABAPENTIN 300 MG/1
600 CAPSULE ORAL
Status: DISCONTINUED | OUTPATIENT
Start: 2020-07-13 | End: 2020-07-16 | Stop reason: HOSPADM

## 2020-07-13 RX ORDER — PROMETHAZINE HYDROCHLORIDE 25 MG/1
12.5 TABLET ORAL EVERY 6 HOURS PRN
Status: DISCONTINUED | OUTPATIENT
Start: 2020-07-13 | End: 2020-07-16 | Stop reason: HOSPADM

## 2020-07-13 RX ORDER — ACETAMINOPHEN 650 MG/1
650 SUPPOSITORY RECTAL EVERY 6 HOURS PRN
Status: DISCONTINUED | OUTPATIENT
Start: 2020-07-13 | End: 2020-07-16 | Stop reason: HOSPADM

## 2020-07-13 RX ORDER — FUROSEMIDE 20 MG/1
20 TABLET ORAL DAILY
Status: DISCONTINUED | OUTPATIENT
Start: 2020-07-14 | End: 2020-07-15

## 2020-07-13 RX ORDER — METOPROLOL SUCCINATE 25 MG/1
25 TABLET, EXTENDED RELEASE ORAL DAILY
Status: DISCONTINUED | OUTPATIENT
Start: 2020-07-14 | End: 2020-07-16 | Stop reason: HOSPADM

## 2020-07-13 RX ORDER — SODIUM CHLORIDE 0.9 % (FLUSH) 0.9 %
10 SYRINGE (ML) INJECTION PRN
Status: DISCONTINUED | OUTPATIENT
Start: 2020-07-13 | End: 2020-07-16 | Stop reason: HOSPADM

## 2020-07-13 RX ORDER — AMIODARONE HYDROCHLORIDE 200 MG/1
200 TABLET ORAL DAILY
Status: DISCONTINUED | OUTPATIENT
Start: 2020-07-14 | End: 2020-07-14

## 2020-07-13 RX ORDER — FAMOTIDINE 20 MG/1
20 TABLET, FILM COATED ORAL 2 TIMES DAILY
Status: DISCONTINUED | OUTPATIENT
Start: 2020-07-13 | End: 2020-07-16 | Stop reason: HOSPADM

## 2020-07-13 RX ORDER — ATORVASTATIN CALCIUM 80 MG/1
1 TABLET, FILM COATED ORAL DAILY
Status: CANCELLED | OUTPATIENT
Start: 2020-07-13

## 2020-07-13 RX ORDER — ZOLPIDEM TARTRATE 5 MG/1
5 TABLET ORAL NIGHTLY PRN
Status: DISCONTINUED | OUTPATIENT
Start: 2020-07-13 | End: 2020-07-16 | Stop reason: HOSPADM

## 2020-07-13 RX ORDER — NICOTINE 21 MG/24HR
1 PATCH, TRANSDERMAL 24 HOURS TRANSDERMAL DAILY
Status: DISCONTINUED | OUTPATIENT
Start: 2020-07-14 | End: 2020-07-16 | Stop reason: HOSPADM

## 2020-07-13 RX ORDER — BUTALBITAL, ACETAMINOPHEN AND CAFFEINE 50; 325; 40 MG/1; MG/1; MG/1
1 TABLET ORAL ONCE
Status: COMPLETED | OUTPATIENT
Start: 2020-07-13 | End: 2020-07-13

## 2020-07-13 RX ORDER — TAMSULOSIN HYDROCHLORIDE 0.4 MG/1
0.4 CAPSULE ORAL DAILY
Status: DISCONTINUED | OUTPATIENT
Start: 2020-07-14 | End: 2020-07-16 | Stop reason: HOSPADM

## 2020-07-13 RX ORDER — POLYETHYLENE GLYCOL 3350 17 G/17G
17 POWDER, FOR SOLUTION ORAL DAILY PRN
Status: DISCONTINUED | OUTPATIENT
Start: 2020-07-13 | End: 2020-07-16 | Stop reason: HOSPADM

## 2020-07-13 RX ORDER — RANOLAZINE 500 MG/1
1000 TABLET, EXTENDED RELEASE ORAL 2 TIMES DAILY
Status: DISCONTINUED | OUTPATIENT
Start: 2020-07-13 | End: 2020-07-16 | Stop reason: HOSPADM

## 2020-07-13 RX ORDER — ALBUTEROL SULFATE 2.5 MG/3ML
2.5 SOLUTION RESPIRATORY (INHALATION) EVERY 4 HOURS PRN
Status: DISCONTINUED | OUTPATIENT
Start: 2020-07-13 | End: 2020-07-16 | Stop reason: HOSPADM

## 2020-07-13 RX ORDER — OXYCODONE HYDROCHLORIDE AND ACETAMINOPHEN 5; 325 MG/1; MG/1
1 TABLET ORAL ONCE
Status: COMPLETED | OUTPATIENT
Start: 2020-07-13 | End: 2020-07-13

## 2020-07-13 RX ORDER — NITROGLYCERIN 0.4 MG/1
0.4 TABLET SUBLINGUAL EVERY 5 MIN PRN
Status: DISCONTINUED | OUTPATIENT
Start: 2020-07-13 | End: 2020-07-16 | Stop reason: HOSPADM

## 2020-07-13 RX ORDER — ASPIRIN 81 MG/1
324 TABLET, CHEWABLE ORAL ONCE
Status: COMPLETED | OUTPATIENT
Start: 2020-07-13 | End: 2020-07-13

## 2020-07-13 RX ORDER — ASPIRIN 81 MG/1
324 TABLET, CHEWABLE ORAL ONCE
Status: DISCONTINUED | OUTPATIENT
Start: 2020-07-13 | End: 2020-07-13 | Stop reason: SDUPTHER

## 2020-07-13 RX ORDER — ATORVASTATIN CALCIUM 20 MG/1
20 TABLET, FILM COATED ORAL NIGHTLY
Status: DISCONTINUED | OUTPATIENT
Start: 2020-07-13 | End: 2020-07-16 | Stop reason: HOSPADM

## 2020-07-13 RX ORDER — OXYCODONE AND ACETAMINOPHEN 10; 325 MG/1; MG/1
1 TABLET ORAL EVERY 8 HOURS PRN
Status: DISCONTINUED | OUTPATIENT
Start: 2020-07-13 | End: 2020-07-16 | Stop reason: HOSPADM

## 2020-07-13 RX ORDER — ASPIRIN 81 MG/1
81 TABLET, CHEWABLE ORAL DAILY
Status: DISCONTINUED | OUTPATIENT
Start: 2020-07-14 | End: 2020-07-16 | Stop reason: HOSPADM

## 2020-07-13 RX ORDER — SODIUM CHLORIDE 0.9 % (FLUSH) 0.9 %
10 SYRINGE (ML) INJECTION EVERY 12 HOURS SCHEDULED
Status: DISCONTINUED | OUTPATIENT
Start: 2020-07-13 | End: 2020-07-16 | Stop reason: HOSPADM

## 2020-07-13 RX ADMIN — OXYCODONE HYDROCHLORIDE AND ACETAMINOPHEN 1 TABLET: 10; 325 TABLET ORAL at 22:53

## 2020-07-13 RX ADMIN — Medication 10 ML: at 22:56

## 2020-07-13 RX ADMIN — ASPIRIN 81 MG 324 MG: 81 TABLET ORAL at 17:17

## 2020-07-13 RX ADMIN — HYDRALAZINE HYDROCHLORIDE 25 MG: 25 TABLET, FILM COATED ORAL at 22:53

## 2020-07-13 RX ADMIN — RANOLAZINE 1000 MG: 500 TABLET, FILM COATED, EXTENDED RELEASE ORAL at 22:53

## 2020-07-13 RX ADMIN — FAMOTIDINE 20 MG: 20 TABLET ORAL at 22:53

## 2020-07-13 RX ADMIN — TRAZODONE HYDROCHLORIDE 50 MG: 50 TABLET ORAL at 22:53

## 2020-07-13 RX ADMIN — OXYCODONE HYDROCHLORIDE AND ACETAMINOPHEN 1 TABLET: 5; 325 TABLET ORAL at 17:17

## 2020-07-13 RX ADMIN — GABAPENTIN 600 MG: 300 CAPSULE ORAL at 22:53

## 2020-07-13 RX ADMIN — ATORVASTATIN CALCIUM 20 MG: 20 TABLET, FILM COATED ORAL at 22:53

## 2020-07-13 RX ADMIN — SODIUM CHLORIDE: 9 INJECTION, SOLUTION INTRAVENOUS at 22:53

## 2020-07-13 RX ADMIN — APIXABAN 5 MG: 5 TABLET, FILM COATED ORAL at 22:54

## 2020-07-13 RX ADMIN — BUTALBITAL, ACETAMINOPHEN, AND CAFFEINE 1 TABLET: 50; 325; 40 TABLET ORAL at 16:25

## 2020-07-13 ASSESSMENT — PAIN SCALES - GENERAL
PAINLEVEL_OUTOF10: 6
PAINLEVEL_OUTOF10: 0
PAINLEVEL_OUTOF10: 6
PAINLEVEL_OUTOF10: 2
PAINLEVEL_OUTOF10: 6

## 2020-07-13 ASSESSMENT — PAIN DESCRIPTION - PAIN TYPE
TYPE: ACUTE PAIN

## 2020-07-13 ASSESSMENT — PAIN DESCRIPTION - DESCRIPTORS
DESCRIPTORS: CONSTANT
DESCRIPTORS: SHARP;CONSTANT

## 2020-07-13 ASSESSMENT — PAIN DESCRIPTION - ONSET
ONSET: ON-GOING
ONSET: ON-GOING

## 2020-07-13 ASSESSMENT — PAIN DESCRIPTION - FREQUENCY
FREQUENCY: CONTINUOUS
FREQUENCY: CONTINUOUS

## 2020-07-13 ASSESSMENT — PAIN DESCRIPTION - LOCATION
LOCATION: HEAD
LOCATION: HEAD
LOCATION: HEAD;JAW
LOCATION: HEAD

## 2020-07-13 ASSESSMENT — PAIN DESCRIPTION - PROGRESSION
CLINICAL_PROGRESSION: GRADUALLY WORSENING
CLINICAL_PROGRESSION: GRADUALLY WORSENING

## 2020-07-13 ASSESSMENT — PAIN DESCRIPTION - ORIENTATION: ORIENTATION: LEFT;MID

## 2020-07-13 NOTE — ED NOTES
Pt resting in bed at this time. Call light remains in reach instructed pt how to use, and encouraged pt to call if needed assistance, no distress noted. RR even and unlabored, skin warm and dry. No needs at this time. Will continue to monitor closely.          Araceli Negorn RN  07/13/20 2005

## 2020-07-13 NOTE — ED NOTES
Pt resting in bed at this time. Call light remains in reach instructed pt how to use, and encouraged pt to call if needed assistance, no distress noted. RR even and unlabored, skin warm and dry. No needs at this time. Will continue to monitor closely.          Cr Gannon RN  07/13/20 [id number]

## 2020-07-13 NOTE — TELEPHONE ENCOUNTER
Writer contacted Dr. Mara Cameron,  ED provider to inform of 30 day readmission risk. No Decision on disposition at this time.

## 2020-07-13 NOTE — ED PROVIDER NOTES
Triage Chief Complaint:   Dizziness    [place]:  Flavia Oppenheim is a 59 y.o. male with PMH significant for CHF, CAD, stroke, COPD, DM, HLD, and HTN who presents to the emergency department today from home complaining of atraumatic headache accompanied by dizziness. Denies chest pain denies fever denies headache. Of note during his last admission his ICD was interrogated and did show episodes of V. tach and SVT and he had a run of V. tach while admitted    ROS:  At least 12 systems reviewed and otherwise acutely negative except as in the [address]     Past Medical History:   Diagnosis Date    Anxiety     Arthritis     Asthma     CAD (coronary artery disease)     Calcium kidney stone     Cardiomyopathy ([address]     Cerebral artery occlusion with cerebral infarction ([address]     CHF (congestive heart failure) ([address]     Clostridium difficile diarrhea 02/12/2020    COPD (chronic obstructive pulmonary disease) (HCC)     mild    Depression     DM2 (diabetes mellitus, type 2) (HCC)     Fibromyalgia     GERD (gastroesophageal reflux disease)     Hyperlipidemia     Hypertension     Liver disease     Pacemaker 2012    Medtronic model # [id number]    Pneumonia     Seizures ([address]     TIA (transient ischemic attack) 2007    Ulcerative colitis ([address]      Past Surgical History:   Procedure Laterality Date    APPENDECTOMY      BACK SURGERY      CARDIAC SURGERY      CHOLECYSTECTOMY      COLONOSCOPY  01/10/2017    COLONOSCOPY  01/10/2017    CORONARY ANGIOPLASTY WITH STENT PLACEMENT  2012    CORONARY ARTERY BYPASS GRAFT  2010, 11/2015    ENDOSCOPY, COLON, DIAGNOSTIC      PACEMAKER PLACEMENT      UPPER GASTROINTESTINAL ENDOSCOPY  02/07/2017    VASCULAR SURGERY       Family History   Problem Relation Age of Onset    Coronary Art Dis Father     Cancer Mother         Lung with mets    Diabetes Mother      Social History     Socioeconomic History    Marital status:      Spouse name: Not on file  Number of children: 1    Years of education: Not on file    Highest education level: Not on file   Occupational History    Occupation: disabled   Social Needs    Financial resource strain: Not on file    Food insecurity     Worry: Not on file     Inability: Not on file   [employer] needs     Medical: Not on file     Non-medical: Not on file   Tobacco Use    Smoking status: Current Every Day Smoker     Packs/day: 0.50     Years: 44.00     Pack years: 22.00     Types: Cigarettes    Smokeless tobacco: Never Used    Tobacco comment: urged to stop   Substance and Sexual Activity    Alcohol use: No     Alcohol/week: 0.0 standard drinks    Drug use: No    Sexual activity: Not Currently     Partners: Female   Lifestyle    Physical activity     Days per week: Not on file     Minutes per session: Not on file    Stress: Not on file   Relationships    Social connections     Talks on phone: Not on file     Gets together: Not on file     Attends Jehovah's witness service: Not on file     Active member of club or organization: Not on file     Attends meetings of clubs or organizations: Not on file     Relationship status: Not on file    Intimate partner violence     Fear of current or ex partner: Not on file     Emotionally abused: Not on file     Physically abused: Not on file     Forced sexual activity: Not on file   Other Topics Concern    Not on file   Social History Narrative    Not on file     No current facility-administered medications for this encounter. Current Outpatient Medications   Medication Sig Dispense Refill    aspirin 81 MG chewable tablet Take 1 tablet by mouth daily 30 tablet 3    nicotine (NICODERM CQ) 21 MG/24HR Place 1 patch onto the skin daily 42 patch 0    albuterol sulfate HFA (VENTOLIN HFA) 108 (90 Base) MCG/ACT inhaler Inhale 2 puffs into the lungs 4 times daily as needed for Wheezing 1 Inhaler 0    zolpidem (AMBIEN) 5 MG tablet Take 5 mg by mouth nightly.       apixaban (ELIQUIS) 5 MG TABS tablet Take 5 mg by mouth 2 times daily      metoprolol succinate (TOPROL XL) 25 MG extended release tablet Take 1 tablet by mouth daily 30 tablet 3    zolpidem (AMBIEN) 5 MG tablet Take 1 tablet by mouth nightly as needed for Sleep for up to 60 days. 30 tablet 1    oxyCODONE-acetaminophen (PERCOCET)  MG per tablet Take 1 tablet by mouth every 8 hours as needed for Pain for up to 30 days.  Intended supply: 30 days 90 tablet 0    tamsulosin (FLOMAX) 0.4 MG capsule Take 1 capsule by mouth daily 30 capsule 3    gabapentin (NEURONTIN) 600 MG tablet TAKE 1 TABLET BY MOUTH FIVE TIMES DAILY 150 tablet 1    amiodarone (CORDARONE) 200 MG tablet Take 1 tablet by mouth daily 30 tablet 0    spironolactone (ALDACTONE) 25 MG tablet Take 1 tablet by mouth daily 30 tablet 0    traZODone (DESYREL) 50 MG tablet TAKE 1 TABLET BY MOUTH EVERY NIGHT AT BEDTIME 30 tablet 2    lisinopril (PRINIVIL;ZESTRIL) 10 MG tablet Take 1 tablet by mouth daily 30 tablet 3    ranolazine (RANEXA) 500 MG extended release tablet Take 1,000 mg by mouth 2 times daily      furosemide (LASIX) 20 MG tablet Take 20 mg by mouth daily      empagliflozin (JARDIANCE) 10 MG tablet Take 1 tablet by mouth daily 28 tablet 0    isosorbide mononitrate (IMDUR) 30 MG extended release tablet Take 60 mg by mouth daily       hydrALAZINE (APRESOLINE) 25 MG tablet Take 1 tablet by mouth every 8 hours 90 tablet 3    acetaminophen (TYLENOL) 325 MG tablet Take 650 mg by mouth every 6 hours as needed for Pain      atorvastatin (LIPITOR) 80 MG tablet TAKE 1 TABLET BY MOUTH DAILY 90 tablet 1     Allergies   Allergen Reactions    Dopamine Hcl Other (See Comments) and Anxiety     Compulsive gambling    Tramadol Other (See Comments)     Dizziness     Melatonin Other (See Comments)     Restless leg syndrome         [unfilled]    Nursing Notes Reviewed    Physical Exam:  Vitals:    07/13/20 1443   BP: 118/74   Pulse: 80   Resp: 18   Temp: 98.8 °F (37.1 °C)   SpO2: 100%       GENERAL APPEARANCE: Awake and alert. Cooperative. No acute distress. HEAD: Normocephalic. Atraumatic. EYES: EOM's grossly intact. Sclera anicteric. ENT: Mucous membranes are moist. Tolerates saliva. No trismus. NECK: Supple. No meningismus. Trachea midline. HEART: RRR. Radial pulses 2+. LUNGS: Respirations unlabored. CTAB  ABDOMEN: Soft. Non-tender. No guarding or rebound. EXTREMITIES: No acute deformities. SKIN: Warm and dry. NEUROLOGICAL: No gross facial drooping. Moves all 4 extremities spontaneously. PSYCHIATRIC: Normal mood.     I have reviewed and interpreted all of the currently available lab results from this visit (if applicable):  Results for orders placed or performed during the hospital encounter of 07/13/20   CBC Auto Differential   Result Value Ref Range    WBC 9.2 4.0 - 11.0 K/uL    RBC 4.10 (L) 4.20 - 5.90 M/uL    Hemoglobin 14.2 13.5 - 17.5 g/dL    Hematocrit 41.5 40.5 - 52.5 %    .2 (H) 80.0 - 100.0 fL    MCH 34.7 (H) 26.0 - 34.0 pg    MCHC 34.3 31.0 - 36.0 g/dL    RDW 14.4 12.4 - 15.4 %    Platelets 217 429 - 398 K/uL    MPV 9.6 5.0 - 10.5 fL    Neutrophils % 61.2 %    Lymphocytes % 28.7 %    Monocytes % 8.1 %    Eosinophils % 0.8 %    Basophils % 1.2 %    Neutrophils Absolute 5.6 1.7 - 7.7 K/uL    Lymphocytes Absolute 2.6 1.0 - 5.1 K/uL    Monocytes Absolute 0.7 0.0 - 1.3 K/uL    Eosinophils Absolute 0.1 0.0 - 0.6 K/uL    Basophils Absolute 0.1 0.0 - 0.2 K/uL   Comprehensive Metabolic Panel   Result Value Ref Range    Sodium 135 (L) 136 - 145 mmol/L    Potassium 3.9 3.5 - 5.1 mmol/L    Chloride 100 99 - 110 mmol/L    CO2 20 (L) 21 - 32 mmol/L    Anion Gap 15 3 - 16    Glucose 164 (H) 70 - 99 mg/dL    BUN 25 (H) 7 - 20 mg/dL    CREATININE 1.2 0.8 - 1.3 mg/dL    GFR Non-African American >60 >60    GFR African American >60 >60    Calcium 8.8 8.3 - 10.6 mg/dL    Total Protein 6.7 6.4 - 8.2 g/dL    Alb 3.9 3.4 - 5.0 g/dL    Albumin/Globulin Ratio 1.4 1.1 - 2.2    Total Bilirubin 0.7 0.0 - 1.0 mg/dL    Alkaline Phosphatase 46 40 - 129 U/L    ALT 8 (L) 10 - 40 U/L    AST 9 (L) 15 - 37 U/L    Globulin 2.8 g/dL   Protime-INR   Result Value Ref Range    Protime 10.9 10.0 - 13.2 sec    INR 0.94 0.86 - 1.14   APTT   Result Value Ref Range    aPTT 26.4 24.2 - 36.2 sec   EKG 12 Lead   Result Value Ref Range    Ventricular Rate 58 BPM    Atrial Rate 58 BPM    P-R Interval 118 ms    QRS Duration 138 ms    Q-T Interval 474 ms    QTc Calculation (Bazett) 465 ms    P Axis 68 degrees    R Axis 45 degrees    T Axis 86 degrees    Diagnosis       Sinus bradycardiaRight bundle branch blockT wave abnormality, consider anterior ischemia or else repolarization abnormality from RBBBT wave abnormality, consider lateral ischemiaQT interval long for rateAbnormal ECGWhen compared with ECG of 06-JUL-2020 19:13,Premature ventricular complexes are no longer PresentQT has shortened but still prolongedConfirmed by Wellington Yahire, [address] [place] [address] ([facility]) on 7/13/2020 4:33:11 PM        Radiographs (if obtained):  [] The following radiograph was interpreted by myself in the absence of a radiologist:  [x] Radiologist's Report Reviewed:     XR CHEST PORTABLE (Final result)   Result time 07/13/20 16:04:19   Procedure changed from XR CHEST STANDARD (2 VW)   Final result by Henny Huerta MD (07/13/20 16:04:19)                 Impression:     No acute cardiopulmonary process. Narrative:     EXAMINATION:   ONE X-RAY VIEW OF THE CHEST     7/13/2020 3:19 pm     COMPARISON:   July 8, 2020     HISTORY:   ORDERING SYSTEM PROVIDED HISTORY: Dizziness   TECHNOLOGIST PROVIDED HISTORY:   Reason for exam:->Dizziness   Reason for Exam: Dizziness   Acuity: Acute   Type of Exam: Initial     FINDINGS:   The cardiomediastinal silhouette is normal in size.  AICD device is stable in   position. There are stable post sternotomy changes present. Lungs are clear.  No pleural effusion or pneumothorax is present.           CT HEAD WO CONTRAST (Final result)   Result time 07/13/20 14:53:17   Final result by Alessia Palm MD (07/13/20 14:53:17)                 Impression:     No acute intracranial abnormality. Narrative:     EXAMINATION:   CT OF THE HEAD WITHOUT CONTRAST  7/13/2020 2:31 pm     TECHNIQUE:   CT of the head was performed without the administration of intravenous   contrast. Dose modulation, iterative reconstruction, and/or weight based   adjustment of the mA/kV was utilized to reduce the radiation dose to as low   as reasonably achievable. COMPARISON:   December 14, 2019     HISTORY:   ORDERING SYSTEM PROVIDED HISTORY: HA   TECHNOLOGIST PROVIDED HISTORY:   Reason for exam:->HA   Has a \"code stroke\" or \"stroke alert\" been called? ->No   Reason for Exam: headache   Acuity: Acute   Type of Exam: Initial     FINDINGS:   BRAIN/VENTRICLES: There is no acute intracranial hemorrhage, mass effect or   midline shift.  No abnormal extra-axial fluid collection.  The gray-white   differentiation is maintained without evidence of an acute infarct.  There is   no evidence of hydrocephalus. Patchy regions of low-attenuation again noted   throughout the brain likely related to previous infarcts or chronic small   vessel disease.  Appearance and distribution is unchanged.  Small remote   basal ganglia infarct bilaterally again noted. ORBITS: The visualized portion of the orbits demonstrate no acute abnormality. SINUSES: The visualized paranasal sinuses and mastoid air cells demonstrate   no acute abnormality. SOFT TISSUES/SKULL:  No acute abnormality of the visualized skull or soft   tissues.                        EKG (if obtained): (All EKG's are interpreted by myself in the absence of a cardiologist)  Initial EKG on my interpretation shows EKG interpreted by me is sinus bradycardia at the rate of 58 bpm with RBBB. No ST segment elevation.   Block present on prior EKG.    MDM:  Differential diagnosis: Dizziness, ICH, tachydysrhythmia, ACS    Aspirin and pain medication given. He is now stating he does not have a headache rather he has pain radiating into his jaw on the left atraumatically. CT of the head/initial set of labs including troponin are negative however based on his impressive cardiac history and the fact that last time his pacer was interrogated he did have V. tach and SVT I have offered the patient discharge and close follow-up versus admission but he prefers to be admitted. Will admit      Old records reviewed. Labs and imaging reviewed and results discussed with patient. .        Patient was given scripts for the following medications. I counseled patient how to take these medications. New Prescriptions    No medications on file         CRITICAL CARE TIME   Total Critical Care time was 0 minutes, excluding separately reportable procedures. There was a high probability of clinically significant/life threatening deterioration in the patient's condition which required my urgent intervention.       Clinical Impression:  Dizziness  (Please note that portions of this note may have been completed with a voice recognition program. Efforts were made to edit the dictations but occasionally words are mis-transcribed.)    MD Doreen Urrutia MD  07/13/20 0679

## 2020-07-13 NOTE — ED NOTES
Pt resting in bed at this time. Call light remains in reach instructed pt how to use, and encouraged pt to call if needed assistance, no distress noted. RR even and unlabored, skin warm and dry. No needs at this time. Will continue to monitor closely.          Araceli Negron RN  07/13/20 8830

## 2020-07-13 NOTE — ED NOTES
Attempted 4 times to start an IV by 2 separate nurses, ED MD charley Chaparro, RN  07/13/20 3740 [facility]  07/13/20 9751

## 2020-07-14 PROBLEM — I95.1 POSTURAL HYPOTENSION: Status: ACTIVE | Noted: 2020-02-12

## 2020-07-14 LAB
ANION GAP SERPL CALCULATED.3IONS-SCNC: 11 MMOL/L (ref 3–16)
BUN BLDV-MCNC: 34 MG/DL (ref 7–20)
CALCIUM SERPL-MCNC: 8.5 MG/DL (ref 8.3–10.6)
CHLORIDE BLD-SCNC: 105 MMOL/L (ref 99–110)
CHOLESTEROL, TOTAL: 164 MG/DL (ref 0–199)
CO2: 22 MMOL/L (ref 21–32)
CREAT SERPL-MCNC: 1.5 MG/DL (ref 0.8–1.3)
GFR AFRICAN AMERICAN: 57
GFR NON-AFRICAN AMERICAN: 47
GLUCOSE BLD-MCNC: 108 MG/DL (ref 70–99)
HCT VFR BLD CALC: 39.6 % (ref 40.5–52.5)
HDLC SERPL-MCNC: 34 MG/DL (ref 40–60)
HEMOGLOBIN: 13.5 G/DL (ref 13.5–17.5)
LDL CHOLESTEROL CALCULATED: 94 MG/DL
MCH RBC QN AUTO: 34.6 PG (ref 26–34)
MCHC RBC AUTO-ENTMCNC: 34.2 G/DL (ref 31–36)
MCV RBC AUTO: 101.2 FL (ref 80–100)
PDW BLD-RTO: 14.7 % (ref 12.4–15.4)
PLATELET # BLD: 158 K/UL (ref 135–450)
PMV BLD AUTO: 9.6 FL (ref 5–10.5)
POTASSIUM REFLEX MAGNESIUM: 3.6 MMOL/L (ref 3.5–5.1)
RBC # BLD: 3.92 M/UL (ref 4.2–5.9)
SODIUM BLD-SCNC: 138 MMOL/L (ref 136–145)
TRIGL SERPL-MCNC: 182 MG/DL (ref 0–150)
TROPONIN: <0.01 NG/ML
VLDLC SERPL CALC-MCNC: 36 MG/DL
WBC # BLD: 6.5 K/UL (ref 4–11)

## 2020-07-14 PROCEDURE — 2580000003 HC RX 258: Performed by: HOSPITALIST

## 2020-07-14 PROCEDURE — 99214 OFFICE O/P EST MOD 30 MIN: CPT | Performed by: INTERNAL MEDICINE

## 2020-07-14 PROCEDURE — 36415 COLL VENOUS BLD VENIPUNCTURE: CPT

## 2020-07-14 PROCEDURE — 84484 ASSAY OF TROPONIN QUANT: CPT

## 2020-07-14 PROCEDURE — 85027 COMPLETE CBC AUTOMATED: CPT

## 2020-07-14 PROCEDURE — 94761 N-INVAS EAR/PLS OXIMETRY MLT: CPT

## 2020-07-14 PROCEDURE — 80048 BASIC METABOLIC PNL TOTAL CA: CPT

## 2020-07-14 PROCEDURE — 6370000000 HC RX 637 (ALT 250 FOR IP): Performed by: INTERNAL MEDICINE

## 2020-07-14 PROCEDURE — G0378 HOSPITAL OBSERVATION PER HR: HCPCS

## 2020-07-14 PROCEDURE — 80061 LIPID PANEL: CPT

## 2020-07-14 PROCEDURE — 6370000000 HC RX 637 (ALT 250 FOR IP): Performed by: HOSPITALIST

## 2020-07-14 RX ORDER — AMIODARONE HYDROCHLORIDE 200 MG/1
100 TABLET ORAL DAILY
Status: DISCONTINUED | OUTPATIENT
Start: 2020-07-14 | End: 2020-07-16 | Stop reason: HOSPADM

## 2020-07-14 RX ADMIN — RANOLAZINE 1000 MG: 500 TABLET, FILM COATED, EXTENDED RELEASE ORAL at 20:51

## 2020-07-14 RX ADMIN — HYDRALAZINE HYDROCHLORIDE 25 MG: 25 TABLET, FILM COATED ORAL at 06:03

## 2020-07-14 RX ADMIN — FAMOTIDINE 20 MG: 20 TABLET ORAL at 09:16

## 2020-07-14 RX ADMIN — APIXABAN 5 MG: 5 TABLET, FILM COATED ORAL at 09:17

## 2020-07-14 RX ADMIN — GABAPENTIN 600 MG: 300 CAPSULE ORAL at 14:34

## 2020-07-14 RX ADMIN — ATORVASTATIN CALCIUM 20 MG: 20 TABLET, FILM COATED ORAL at 20:51

## 2020-07-14 RX ADMIN — LISINOPRIL 10 MG: 10 TABLET ORAL at 10:23

## 2020-07-14 RX ADMIN — Medication 10 ML: at 09:17

## 2020-07-14 RX ADMIN — GABAPENTIN 600 MG: 300 CAPSULE ORAL at 22:25

## 2020-07-14 RX ADMIN — METOPROLOL SUCCINATE 25 MG: 25 TABLET, FILM COATED, EXTENDED RELEASE ORAL at 10:23

## 2020-07-14 RX ADMIN — OXYCODONE HYDROCHLORIDE AND ACETAMINOPHEN 1 TABLET: 10; 325 TABLET ORAL at 07:59

## 2020-07-14 RX ADMIN — FAMOTIDINE 20 MG: 20 TABLET ORAL at 20:51

## 2020-07-14 RX ADMIN — GABAPENTIN 600 MG: 300 CAPSULE ORAL at 09:17

## 2020-07-14 RX ADMIN — FUROSEMIDE 20 MG: 20 TABLET ORAL at 09:17

## 2020-07-14 RX ADMIN — GABAPENTIN 600 MG: 300 CAPSULE ORAL at 18:33

## 2020-07-14 RX ADMIN — AMIODARONE HYDROCHLORIDE 100 MG: 200 TABLET ORAL at 10:23

## 2020-07-14 RX ADMIN — TRAZODONE HYDROCHLORIDE 50 MG: 50 TABLET ORAL at 20:50

## 2020-07-14 RX ADMIN — SPIRONOLACTONE 25 MG: 25 TABLET ORAL at 09:17

## 2020-07-14 RX ADMIN — RANOLAZINE 1000 MG: 500 TABLET, FILM COATED, EXTENDED RELEASE ORAL at 09:16

## 2020-07-14 RX ADMIN — GABAPENTIN 600 MG: 300 CAPSULE ORAL at 06:03

## 2020-07-14 RX ADMIN — OXYCODONE HYDROCHLORIDE AND ACETAMINOPHEN 1 TABLET: 10; 325 TABLET ORAL at 23:33

## 2020-07-14 RX ADMIN — Medication 10 ML: at 20:52

## 2020-07-14 RX ADMIN — APIXABAN 5 MG: 5 TABLET, FILM COATED ORAL at 20:51

## 2020-07-14 RX ADMIN — ASPIRIN 81 MG: 81 TABLET, CHEWABLE ORAL at 09:17

## 2020-07-14 RX ADMIN — OXYCODONE HYDROCHLORIDE AND ACETAMINOPHEN 1 TABLET: 10; 325 TABLET ORAL at 16:25

## 2020-07-14 RX ADMIN — TAMSULOSIN HYDROCHLORIDE 0.4 MG: 0.4 CAPSULE ORAL at 09:17

## 2020-07-14 ASSESSMENT — PAIN SCALES - GENERAL
PAINLEVEL_OUTOF10: 6
PAINLEVEL_OUTOF10: 3
PAINLEVEL_OUTOF10: 8
PAINLEVEL_OUTOF10: 5

## 2020-07-14 ASSESSMENT — PAIN DESCRIPTION - LOCATION
LOCATION: JAW
LOCATION: JAW
LOCATION: BACK;NECK
LOCATION: NECK

## 2020-07-14 ASSESSMENT — PAIN DESCRIPTION - DESCRIPTORS
DESCRIPTORS: ACHING;CONSTANT
DESCRIPTORS: SHARP

## 2020-07-14 ASSESSMENT — PAIN DESCRIPTION - PAIN TYPE
TYPE: ACUTE PAIN
TYPE: CHRONIC PAIN

## 2020-07-14 ASSESSMENT — PAIN DESCRIPTION - FREQUENCY
FREQUENCY: CONTINUOUS

## 2020-07-14 ASSESSMENT — PAIN DESCRIPTION - PROGRESSION: CLINICAL_PROGRESSION: NOT CHANGED

## 2020-07-14 ASSESSMENT — PAIN DESCRIPTION - ONSET
ONSET: ON-GOING
ONSET: ON-GOING

## 2020-07-14 ASSESSMENT — PAIN DESCRIPTION - ORIENTATION: ORIENTATION: LEFT

## 2020-07-14 ASSESSMENT — PAIN - FUNCTIONAL ASSESSMENT: PAIN_FUNCTIONAL_ASSESSMENT: PREVENTS OR INTERFERES SOME ACTIVE ACTIVITIES AND ADLS

## 2020-07-14 NOTE — CONSULTS
[address] [place] [address] [facility]                                  CONSULTATION    PATIENT NAME: Josie Ang                    :        1955  MED REC NO:   [id number]                          ROOM:       1071  ACCOUNT NO:   [de-identified]                           ADMIT DATE: 2020  PROVIDER:     Miya Ho MD    CARDIAC CONSULTATION    CONSULT DATE:  2020    HISTORY OF PRESENT ILLNESS:  This is a 71-year-old male who is very well  known to me with numerous ER visits and admissions to the hospital in  the last few months with a very complex cardiac history of coronary  artery disease, previous CABG, previous protected left main stenting,  hypertension, hyperlipidemia, chronic systolic dysfunction, COPD, who  presented again to the hospital this time with symptoms of pain in his  jaws which were sharp in nature along with dizziness and he felt that he  was going to probably pass out. He denied any fever, chills, or rigors. He had no cough or sputum production. He had no dark stools though he  did complain of abdominal pain and having blood in the stool. He had no  urinary tract infection symptoms. No fever, chills, or rigors. In the emergency room, his cardiac workup was negative. His troponins  were negative. He is admitted to the hospital.  He is found to be  significantly orthostatic this morning by the blood pressure check. REVIEW OF SYSTEMS:  Please see HPI. All other systems are reviewed but  they are negative. PAST MEDICAL HISTORY:  1. History of coronary artery disease as mentioned, status post CABG,  status post left main stenting. The patient had a recent angiography by  Dr. James Layton and it was felt that the patient be treated medically. 2.  Hypertension. 3.  Diabetes. 4.  History of small PE.  5.  Cardiomyopathy and congestive heart failure. 6.  Pain-seeking behaviors.   7.

## 2020-07-14 NOTE — FLOWSHEET NOTE
Patient called out and said he was dizzy and \"passed out just prior to pressing call button. \" BP: 107/56 HR: 66. Patient denies dizziness at this time. Patient awake, alert and oriented. Denies any further needs. Patient in bed. Call light within reach. Bed in lowest position. Bed alarm on. NP notified. Will continue to monitor.  Electronically signed by Adama Meyers RN on 7/14/2020 at 2:15 PM

## 2020-07-14 NOTE — ED NOTES
Report called to  Andree Quintero RN   To Room  9517  Cardiac monitor on Pt's pain level   Denies   VSS, Afebrile   IV site is clean, dry and intact, Normal saline locked               Scout Almodovar RN  07/13/20 2028

## 2020-07-14 NOTE — H&P
daily    Yes Historical Provider, MD   hydrALAZINE (APRESOLINE) 25 MG tablet Take 1 tablet by mouth every 8 hours 9/18/19  Yes Harpal Ambrocio MD   acetaminophen (TYLENOL) 325 MG tablet Take 650 mg by mouth every 6 hours as needed for Pain   Yes Historical Provider, MD   atorvastatin (LIPITOR) 80 MG tablet TAKE 1 TABLET BY MOUTH DAILY 2/15/18  Yes Mary Jones MD       Allergies:  Dopamine hcl; Tramadol; and Melatonin      Social History:   TOBACCO:   reports that he has been smoking cigarettes. He has a 22.00 pack-year smoking history. He has never used smokeless tobacco.  ETOH:   reports no history of alcohol use. Family History:       Problem Relation Age of Onset    Coronary Art Dis Father     Cancer Mother         Lung with mets    Diabetes Mother            REVIEW OF SYSTEMS:     patients reported symptoms are in BOLD all other symptoms are negative. CONSTITUTIONAL:      fatigue, fever, chills or night sweats, recent weight gain, recent wt loss, insomnia,  General weakness, poor appetite, muscle aches and pains    HEAD: headache, dizziness    EYES:      blurriness,  double vision, dryness,  discharge, irritation,diplopia    EARS:      hearing loss, vertigo, ear discharge,  Earache. Ringing in the ears. NOSE:      Rhinorrhea, sneezing, epistaxis. Discharge, sinusitis,     MOUTH/THROAT:         sore throat, mouth ulcers, Hoarseness    RESPIRATORY:        Shortness of breath, wheezing,  cough, sputum, hemoptysis, obstructive sleep apnea,    CARDIOVASCULAR :Left-sided jaw pain      chest pain, palpitations, dyspnea on exercise, Lower extrimity edema (swelling),     GASTROINTESTINAL:       Dysphagia, Poor appetite,  Nausea, Vomiting, diarrhea, heartburn, abdominal pain. Blood in the stools, hematemesis. Pain with swallowing, constipation    GENITOURINARY:       Urinary frequency, hesitancy,  urgency, Dysuria, hematuria,  Urinary Incontinence. Urinary Retention.   GYNECOLOGICAL: vaginal bleeding , vaginal discharge, menopause    MUSCULOSKELETAL:       joint swelling or stiffness, joint pain, muscle pain, balance problems, low back pain. NEUROLOGICAL:      Gait problems. Tremor. Dizziness. Pain and paresthesias, weakness in extremities. Seizures, memory loss    PSYCHLOGICAL:        Anxiety, depression    SKIN :      Rashes ulcers, skin color changes, easy bruisability, lymphadenopathy      Physical Exam:      Vitals: BP (!) 141/73   Pulse (!) 49   Temp 98.1 °F (36.7 °C) (Oral)   Resp 18   Wt 182 lb 12.2 oz (82.9 kg)   SpO2 96%   BMI 24.79 kg/m²     Gen:          Alert and oriented x3  Eyes: PERRL. No sclera icterus. No conjunctival injection. ENT: No discharge. Pharynx clear. External appearance of ears and nose normal.  Neck: Trachea midline. No obvious mass. Resp: No accessory muscle use. No crackles. No wheezes. No rhonchi. CV: Regular rate. Regular rhythm. No murmur or rub. No edema. GI: Non-tender. Non-distended. No hernia. Skin: Warm, dry, normal texture and turgor. Lymph: No cervical LAD. No supraclavicular LAD. M/S: / Ext. No cyanosis. No clubbing. No joint deformity. Neuro: Moves all four extremities. CN 2-12 tested, no deficits noted. Peripheral pulses and capillary refill is intact. CBC:   Recent Labs     07/13/20  1546   WBC 9.2   HGB 14.2        BMP:    Recent Labs     07/13/20  1546   *   K 3.9      CO2 20*   BUN 25*   CREATININE 1.2   GLUCOSE 164*     Hepatic:   Recent Labs     07/13/20  1546   AST 9*   ALT 8*   BILITOT 0.7   ALKPHOS 46     Troponin:   Recent Labs     07/13/20  1636 07/13/20  2216   TROPONINI <0.01 <0.01     BNP: No results for input(s): BNP in the last 72 hours. INR:   Recent Labs     07/13/20  1546   INR 0.94     Lab Results   Component Value Date    LABA1C 6.9 02/06/2020           No results for input(s): CKTOTAL in the last 72 hours.   -----------------------------------------------------------------    CXR  No acute cardiopulmonary abnormality    CT scan of the head  No acute intracranial changes        Assessment / Plan     Left-sided jaw pain  His recent cardiac cath showed nonobstructive coronary artery disease  Consult to cardiology  Symptomatic treatment      Essential primary hypertension I10  Continue patient on home medication    Type 2 diabetes mellitus E11.9  hold OHA  Insulin sliding scale  Check A1c    Tobacco abuse Z72.0  Nicotine patch and counseling        DVT and GI prophylaxis      Full Code      Miladys Jackson M.D    This note was transcribed using [id number] [employer]. Please disregard any translational errors.

## 2020-07-14 NOTE — FLOWSHEET NOTE
Patient assisted up to the bathroom c/o feeling dizzy. Orthostatic BP's taken: lyin/72 HR: 54, Sitting 115/80 HR:70 Standin/58 HR: 62. NP alerted. Patient assisted to bed. Call light within reach. Bed in lowest position. Bed alarm on. Patient denies further complaints.  Electronically signed by Wai Campbell RN on 2020 at 9:32 AM

## 2020-07-14 NOTE — PROGRESS NOTES
4 Eyes Skin Assessment     The patient is being assess for  Admission    I agree that 2 RN's have performed a thorough Head to Toe Skin Assessment on the patient. ALL assessment sites listed below have been assessed. Areas assessed by both nurses:   [x]   Head, Face, and Ears   [x]   Shoulders, Back, and Chest  [x]   Arms, Elbows, and Hands   [x]   Coccyx, Sacrum, and IschIum  [x]   Legs, Feet, and Heels        Does the Patient have Skin Breakdown? No - scab to left shoulder blade.          Jhonathan Prevention initiated:  NA   Wound Care Orders initiated:  NA      C nurse consulted for Pressure Injury (Stage 3,4, Unstageable, DTI, NWPT, and Complex wounds), New and Established Ostomies:  NA      Nurse 1 eSignature: Electronically signed by Irasema Thurman RN on 7/14/2020 at 5:05 AM      **SHARE this note so that the co-signing nurse is able to place an eSignature**    Nurse 2 eSignature: Electronically signed by Joaquín Najera RN on 7/14/20 at 6:59 AM EDT

## 2020-07-14 NOTE — PROGRESS NOTES
Hospitalist Progress Note      PCP: Tate Koch MD    Date of Admission: 7/13/2020    Chief Complaint: Headache and jaw pain    Hospital Course: The patient Ramiro Fajardo is a 59 y.o.male with PMH significant for CAD with previous CABG, anxiety, depression, CHF, cardiomyopathy, diabetes mellitus, fibromyalgia. And COPD. Patient presented to the ED with a traumatic headache, also associated with dizziness and left sided jaw pain. Last cardiac cath April 2020 - nonobstructive CAD, stress test May 2020. Tropinins in ED (-). Pt D/C on 7/8/2020 following admission for chest pain related to COPD exacerbation. D/C on 6/23/2020 from [place] tx with Eliquis. Subjective:  Pt stood up to go to restroom today with PCA and BP dropped to  80s and felt as if he was going to pass out and dizzy. Pt notes normal HR is above 80, currently between 46-52bpm. Denies cough, SOB, fever, or chills. Current concern of GERD after breakfast, no PMH of GERD. Cardio consult, Dr. Nurys Wilburn would like to hold Lasix and increase PO intake, without further cardiac workup.      Medications:  Reviewed    Infusion Medications   Scheduled Medications    amiodarone  200 mg Oral Daily    apixaban  5 mg Oral BID    aspirin  81 mg Oral Daily    furosemide  20 mg Oral Daily    gabapentin  600 mg Oral 5x Daily    hydrALAZINE  25 mg Oral 3 times per day    isosorbide mononitrate  60 mg Oral Daily    lisinopril  10 mg Oral Daily    metoprolol succinate  25 mg Oral Daily    nicotine  1 patch Transdermal Daily    ranolazine  1,000 mg Oral BID    spironolactone  25 mg Oral Daily    tamsulosin  0.4 mg Oral Daily    traZODone  50 mg Oral Nightly    sodium chloride flush  10 mL Intravenous 2 times per day    famotidine  20 mg Oral BID    atorvastatin  20 mg Oral Nightly     PRN Meds: albuterol, oxyCODONE-acetaminophen, zolpidem, sodium chloride flush, acetaminophen **OR** acetaminophen, polyethylene glycol, promethazine **OR** ondansetron, nitroGLYCERIN      Intake/Output Summary (Last 24 hours) at 2020 0829  Last data filed at 2020 0603  Gross per 24 hour   Intake 60 ml   Output --   Net 60 ml       Exam:    /73   Pulse 52   Temp 97.7 °F (36.5 °C) (Oral)   Resp 18   Ht 6' (1.829 m)   Wt 181 lb 7 oz (82.3 kg)   SpO2 94%   BMI 24.61 kg/m²      Orthostatic BPs  - Lyin/72 HR 54  - Sitting 115/80 HR 70  - Standing 88/58 HR 62    General appearance:  male resting in bed in no apparent distress, appears stated age and cooperative. HEENT: Pupils equal, round, and reactive to light. Conjunctivae/corneas clear. Neck: Supple, with full range of motion. No jugular venous distention. Trachea midline. Respiratory:  Normal respiratory effort. Inspiratory and expiratory wheezing consistent with significant hx of smoking. Cardiovascular: Regular rate and rhythm with normal S1/S2 without murmurs, rubs or gallops. No LE edema  Abdomen: Soft obese abdomen without tenderness, guarding, or distention. Musculoskeletal: No  turgor normal.  No rashes or lesions. clubbing, cyanosis or edema bilaterally. Full range of motion without deformity. Skin: Skin color, texture,  Neurologic:  Neurovascularly intact without any focal sensory/motor deficits.  Cranial nerves: II-XII intact, grossly non-focal.  Psychiatric: Alert and oriented, thought content appropriate, normal insight  Capillary Refill: Brisk,< 3 seconds   Peripheral Pulses: +2 palpable, equal bilaterally     Labs:   Recent Labs     20  1546 20  0658   WBC 9.2 6.5   HGB 14.2 13.5   HCT 41.5 39.6*    158     Recent Labs     20  1546 20  0658   * 138   K 3.9 3.6    105   CO2 20* 22   BUN 25* 34*   CREATININE 1.2 1.5*   CALCIUM 8.8 8.5     Recent Labs     20  1546   AST 9*   ALT 8*   BILITOT 0.7   ALKPHOS 46     Recent Labs     20  1546   INR 0.94     Recent Labs     20  1636 20  2216 20  0400 PHI Edward

## 2020-07-14 NOTE — PROGRESS NOTES
Pharmacy Medication Reconciliation Note     List of medications patient is currently taking is complete. Source of information:   1. Patient   2. EMR    Notes regarding home medications:   1. Patient received all of his morning and afternoon home medication doses before presenting to the ER.       [id number] [facility] Suzan, Pharmacy Intern  7/13/2020 9:27 PM

## 2020-07-14 NOTE — PLAN OF CARE
Problem: Falls - Risk of:  Goal: Will remain free from falls  Description: Will remain free from falls  Outcome: Ongoing  Goal: Absence of physical injury  Description: Absence of physical injury  Outcome: Ongoing   Pt free from falls this shift. Fall precautions in place at all times. Call light within reach. Pt able to and agreeable to contact for safety appropriately. Problem: Safety:  Goal: Free from accidental physical injury  Description: Free from accidental physical injury  Outcome: Ongoing  Goal: Free from intentional harm  Description: Free from intentional harm  Outcome: Ongoing   Pt A&O aware of his/her abilities. Pt understands and knows to call when in need of ambulation. Measures were made to prevent accidental needle stick, friction, shear, etc. Environment kept free of clutter and adequate lighting provided. Will continue to monitor for physical injury. Problem: Pain:  Goal: Patient's pain/discomfort is manageable  Description: Patient's pain/discomfort is manageable  Outcome: Ongoing   Pain/discomfort being managed with PRN analgesics per MD order. Pt able to express presence and absence of pain and rate pain appropriately using numerical scale    Problem: Skin Integrity:  Goal: Skin integrity will stabilize  Description: Skin integrity will stabilize  Outcome: Ongoing   Skin assessment completed every shift. Pt assessed for incontinence, appropriate barrier cream applied prn as needed. Pt encouraged to turn/rotate every 2 hours. Assistance provided if pt unable to do so themselves.

## 2020-07-14 NOTE — PROGRESS NOTES
Pt admitted to room 4116. Alert and oriented x 4. Oriented to room and new orders. HR renny- 49. Pt asymptomatic. Will monitor. Call light in reach.

## 2020-07-14 NOTE — CARE COORDINATION
INITIAL CASE MANAGEMENT ASSESSMENT    Reviewed chart, met with patient to assess possible discharge needs. Explained Case Management role/services. Living Situation: Patient lives with his wife in a house. Confirmed home address    ADLs: Independent     DME: RW as needed    PT/OT Recs: None ordered     Active Services: None. Denied need for services in the home. Discussed home RN due to frequent hospital visits. Continued to deny need      Transportation: Active . Reports his wife drives and will transport home at discharge. Medications: No barriers to taking/obtaining medications. [facility]    PCP: Dr. Weiner Holding      HD/PD: N/A    PLAN/COMMENTS: No needs identified at this time. Readmission assessment completed    SW/CM provided contact information for patient or family to call with any questions. SW/CM will follow and assist as needed.       Electronically signed by BEATA Perez on 7/14/2020 at 4:06 PM

## 2020-07-15 ENCOUNTER — APPOINTMENT (OUTPATIENT)
Dept: GENERAL RADIOLOGY | Age: 65
DRG: 312 | End: 2020-07-15
Payer: MEDICARE

## 2020-07-15 ENCOUNTER — APPOINTMENT (OUTPATIENT)
Dept: CT IMAGING | Age: 65
DRG: 312 | End: 2020-07-15
Payer: MEDICARE

## 2020-07-15 PROBLEM — I95.1 ORTHOSTATIC HYPOTENSION: Status: ACTIVE | Noted: 2020-07-15

## 2020-07-15 LAB
ANION GAP SERPL CALCULATED.3IONS-SCNC: 11 MMOL/L (ref 3–16)
BASOPHILS ABSOLUTE: 0 K/UL (ref 0–0.2)
BASOPHILS RELATIVE PERCENT: 0.6 %
BUN BLDV-MCNC: 44 MG/DL (ref 7–20)
C-REACTIVE PROTEIN: 1.5 MG/L (ref 0–5.1)
CALCIUM SERPL-MCNC: 7.7 MG/DL (ref 8.3–10.6)
CHLORIDE BLD-SCNC: 101 MMOL/L (ref 99–110)
CO2: 21 MMOL/L (ref 21–32)
CREAT SERPL-MCNC: 1.8 MG/DL (ref 0.8–1.3)
EOSINOPHILS ABSOLUTE: 0.2 K/UL (ref 0–0.6)
EOSINOPHILS RELATIVE PERCENT: 2.2 %
GFR AFRICAN AMERICAN: 46
GFR NON-AFRICAN AMERICAN: 38
GLUCOSE BLD-MCNC: 129 MG/DL (ref 70–99)
HCT VFR BLD CALC: 36.5 % (ref 40.5–52.5)
HEMOGLOBIN: 12.3 G/DL (ref 13.5–17.5)
LYMPHOCYTES ABSOLUTE: 2.4 K/UL (ref 1–5.1)
LYMPHOCYTES RELATIVE PERCENT: 33 %
MCH RBC QN AUTO: 34.4 PG (ref 26–34)
MCHC RBC AUTO-ENTMCNC: 33.7 G/DL (ref 31–36)
MCV RBC AUTO: 102 FL (ref 80–100)
MONOCYTES ABSOLUTE: 0.5 K/UL (ref 0–1.3)
MONOCYTES RELATIVE PERCENT: 7.4 %
NEUTROPHILS ABSOLUTE: 4.1 K/UL (ref 1.7–7.7)
NEUTROPHILS RELATIVE PERCENT: 56.8 %
PDW BLD-RTO: 14.7 % (ref 12.4–15.4)
PLATELET # BLD: 134 K/UL (ref 135–450)
PMV BLD AUTO: 9.8 FL (ref 5–10.5)
POTASSIUM SERPL-SCNC: 4 MMOL/L (ref 3.5–5.1)
PRO-BNP: 225 PG/ML (ref 0–124)
RBC # BLD: 3.58 M/UL (ref 4.2–5.9)
SEDIMENTATION RATE, ERYTHROCYTE: 8 MM/HR (ref 0–20)
SODIUM BLD-SCNC: 133 MMOL/L (ref 136–145)
WBC # BLD: 7.2 K/UL (ref 4–11)

## 2020-07-15 PROCEDURE — 1200000000 HC SEMI PRIVATE

## 2020-07-15 PROCEDURE — 99232 SBSQ HOSP IP/OBS MODERATE 35: CPT | Performed by: NURSE PRACTITIONER

## 2020-07-15 PROCEDURE — 83036 HEMOGLOBIN GLYCOSYLATED A1C: CPT

## 2020-07-15 PROCEDURE — 6370000000 HC RX 637 (ALT 250 FOR IP): Performed by: HOSPITALIST

## 2020-07-15 PROCEDURE — 83880 ASSAY OF NATRIURETIC PEPTIDE: CPT

## 2020-07-15 PROCEDURE — 6370000000 HC RX 637 (ALT 250 FOR IP): Performed by: NURSE PRACTITIONER

## 2020-07-15 PROCEDURE — 80048 BASIC METABOLIC PNL TOTAL CA: CPT

## 2020-07-15 PROCEDURE — 2580000003 HC RX 258: Performed by: HOSPITALIST

## 2020-07-15 PROCEDURE — 6370000000 HC RX 637 (ALT 250 FOR IP): Performed by: INTERNAL MEDICINE

## 2020-07-15 PROCEDURE — 86140 C-REACTIVE PROTEIN: CPT

## 2020-07-15 PROCEDURE — 70490 CT SOFT TISSUE NECK W/O DYE: CPT

## 2020-07-15 PROCEDURE — 85652 RBC SED RATE AUTOMATED: CPT

## 2020-07-15 PROCEDURE — 71046 X-RAY EXAM CHEST 2 VIEWS: CPT

## 2020-07-15 PROCEDURE — 6360000002 HC RX W HCPCS: Performed by: NURSE PRACTITIONER

## 2020-07-15 PROCEDURE — 85025 COMPLETE CBC W/AUTO DIFF WBC: CPT

## 2020-07-15 PROCEDURE — G0378 HOSPITAL OBSERVATION PER HR: HCPCS

## 2020-07-15 PROCEDURE — 36415 COLL VENOUS BLD VENIPUNCTURE: CPT

## 2020-07-15 PROCEDURE — 94760 N-INVAS EAR/PLS OXIMETRY 1: CPT

## 2020-07-15 RX ORDER — HYDRALAZINE HYDROCHLORIDE 25 MG/1
25 TABLET, FILM COATED ORAL 2 TIMES DAILY
Status: DISCONTINUED | OUTPATIENT
Start: 2020-07-15 | End: 2020-07-16 | Stop reason: HOSPADM

## 2020-07-15 RX ORDER — LIDOCAINE 4 G/G
1 PATCH TOPICAL DAILY
Status: DISCONTINUED | OUTPATIENT
Start: 2020-07-15 | End: 2020-07-16 | Stop reason: HOSPADM

## 2020-07-15 RX ORDER — METHOCARBAMOL 500 MG/1
500 TABLET, FILM COATED ORAL ONCE
Status: COMPLETED | OUTPATIENT
Start: 2020-07-15 | End: 2020-07-15

## 2020-07-15 RX ADMIN — RANOLAZINE 1000 MG: 500 TABLET, FILM COATED, EXTENDED RELEASE ORAL at 20:27

## 2020-07-15 RX ADMIN — HYDROMORPHONE HYDROCHLORIDE 0.5 MG: 1 INJECTION, SOLUTION INTRAMUSCULAR; INTRAVENOUS; SUBCUTANEOUS at 15:53

## 2020-07-15 RX ADMIN — METHOCARBAMOL TABLETS 500 MG: 500 TABLET, COATED ORAL at 00:21

## 2020-07-15 RX ADMIN — HYDROMORPHONE HYDROCHLORIDE 0.5 MG: 1 INJECTION, SOLUTION INTRAMUSCULAR; INTRAVENOUS; SUBCUTANEOUS at 20:27

## 2020-07-15 RX ADMIN — AMIODARONE HYDROCHLORIDE 100 MG: 200 TABLET ORAL at 08:30

## 2020-07-15 RX ADMIN — RANOLAZINE 1000 MG: 500 TABLET, FILM COATED, EXTENDED RELEASE ORAL at 08:30

## 2020-07-15 RX ADMIN — TAMSULOSIN HYDROCHLORIDE 0.4 MG: 0.4 CAPSULE ORAL at 08:30

## 2020-07-15 RX ADMIN — GABAPENTIN 600 MG: 300 CAPSULE ORAL at 12:00

## 2020-07-15 RX ADMIN — HYDRALAZINE HYDROCHLORIDE 25 MG: 25 TABLET, FILM COATED ORAL at 22:23

## 2020-07-15 RX ADMIN — GABAPENTIN 600 MG: 300 CAPSULE ORAL at 18:17

## 2020-07-15 RX ADMIN — ATORVASTATIN CALCIUM 20 MG: 20 TABLET, FILM COATED ORAL at 20:26

## 2020-07-15 RX ADMIN — TRAZODONE HYDROCHLORIDE 50 MG: 50 TABLET ORAL at 20:26

## 2020-07-15 RX ADMIN — OXYCODONE HYDROCHLORIDE AND ACETAMINOPHEN 1 TABLET: 10; 325 TABLET ORAL at 17:04

## 2020-07-15 RX ADMIN — Medication 10 ML: at 20:27

## 2020-07-15 RX ADMIN — GABAPENTIN 600 MG: 300 CAPSULE ORAL at 23:36

## 2020-07-15 RX ADMIN — APIXABAN 5 MG: 5 TABLET, FILM COATED ORAL at 20:26

## 2020-07-15 RX ADMIN — FAMOTIDINE 20 MG: 20 TABLET ORAL at 20:27

## 2020-07-15 RX ADMIN — SPIRONOLACTONE 25 MG: 25 TABLET ORAL at 08:30

## 2020-07-15 RX ADMIN — APIXABAN 5 MG: 5 TABLET, FILM COATED ORAL at 08:30

## 2020-07-15 RX ADMIN — GABAPENTIN 600 MG: 300 CAPSULE ORAL at 05:33

## 2020-07-15 RX ADMIN — FAMOTIDINE 20 MG: 20 TABLET ORAL at 08:30

## 2020-07-15 RX ADMIN — Medication 10 ML: at 08:32

## 2020-07-15 RX ADMIN — OXYCODONE HYDROCHLORIDE AND ACETAMINOPHEN 1 TABLET: 10; 325 TABLET ORAL at 08:25

## 2020-07-15 RX ADMIN — ASPIRIN 81 MG: 81 TABLET, CHEWABLE ORAL at 08:30

## 2020-07-15 RX ADMIN — GABAPENTIN 600 MG: 300 CAPSULE ORAL at 14:43

## 2020-07-15 ASSESSMENT — PAIN DESCRIPTION - PAIN TYPE
TYPE: CHRONIC PAIN;ACUTE PAIN
TYPE: CHRONIC PAIN
TYPE: ACUTE PAIN
TYPE: CHRONIC PAIN
TYPE: ACUTE PAIN
TYPE: CHRONIC PAIN;ACUTE PAIN

## 2020-07-15 ASSESSMENT — PAIN SCALES - GENERAL
PAINLEVEL_OUTOF10: 1
PAINLEVEL_OUTOF10: 0
PAINLEVEL_OUTOF10: 6
PAINLEVEL_OUTOF10: 8
PAINLEVEL_OUTOF10: 5
PAINLEVEL_OUTOF10: 0
PAINLEVEL_OUTOF10: 6
PAINLEVEL_OUTOF10: 10
PAINLEVEL_OUTOF10: 2

## 2020-07-15 ASSESSMENT — PAIN DESCRIPTION - LOCATION
LOCATION: NECK
LOCATION: JAW;NECK
LOCATION: JAW
LOCATION: JAW
LOCATION: BACK
LOCATION: BACK

## 2020-07-15 ASSESSMENT — PAIN DESCRIPTION - DESCRIPTORS
DESCRIPTORS: ACHING
DESCRIPTORS: DISCOMFORT
DESCRIPTORS: CONSTANT;RADIATING
DESCRIPTORS: ACHING

## 2020-07-15 ASSESSMENT — PAIN DESCRIPTION - ONSET
ONSET: ON-GOING
ONSET: ON-GOING

## 2020-07-15 ASSESSMENT — PAIN DESCRIPTION - ORIENTATION: ORIENTATION: LEFT

## 2020-07-15 ASSESSMENT — PAIN DESCRIPTION - FREQUENCY
FREQUENCY: CONTINUOUS
FREQUENCY: CONTINUOUS

## 2020-07-15 NOTE — PROGRESS NOTES
[facility], [place], operated by [facility]   Daily Progress Note      Admit Date:  7/13/2020    Reason for follow up visit: Dizziness    CC: \"I feel terrible: my heart is too slow, my BP too low and I get dizzy when I get up. \"    60 y/o male with PMH significant for CAD/CABG/LM stenting, HTN, HLP, chronic systolic dysfunction and COPD who presented with jaw pain and dizziness. He has been noted to be slightly orthostatic. His jaw pain is continuous and worsens when he opens his mouth. His troponins were negative. + smoker    Interval History:  Pt. seen and examined; records reviewed  BP reviewed.  + SOB when up  + continuous jaw pain  + dizziness when up  Cr 1.5 today; will hold Lasix    Subjective:  Pt with no acute overnight events. Denies chest pain/discomfort, PND, cough, palpitations  + jaw pain  + dizziness  + SOB  ROS otherwise unremarkable      Objective:   /64   Pulse 53   Temp 97.5 °F (36.4 °C) (Oral)   Resp 18   Ht 6' (1.829 m)   Wt 177 lb 14.6 oz (80.7 kg)   SpO2 95%   BMI 24.13 kg/m²       Intake/Output Summary (Last 24 hours) at 7/15/2020 0940  Last data filed at 7/15/2020 0844  Gross per 24 hour   Intake 960 ml   Output 625 ml   Net 335 ml     Wt Readings from Last 3 Encounters:   07/15/20 177 lb 14.6 oz (80.7 kg)   07/09/20 184 lb (83.5 kg)   07/08/20 190 lb 7.6 oz (86.4 kg)       Physical Exam:  General: In no acute distress. Awake, alert, and oriented X4. Resting in bed  Skin:  Warm and dry. No new appearing rashes or lesions. Neck:  Supple. No JVD or carotid bruit appreciated. Chest: Lungs clear to auscultation. No wheezes/rhonchi/rales  Cardiovascular:  Regular rhythm, bradycardic rate; I/VI systolic murmur  Abdomen:  soft, nontender, nondistended, +bowel sounds. No hepatomegaly  Extremities:  No LE edema. No clubbing or cyanosis. 2+ bilateral radial/DP/PT pulses. Cap refill brisk.      Medications:    lidocaine  1 patch Transdermal Daily    amiodarone  100 mg Oral Daily    apixaban  5 mg Oral BID    aspirin  81 mg Oral Daily    [Held by provider] furosemide  20 mg Oral Daily    gabapentin  600 mg Oral 5x Daily    hydrALAZINE  25 mg Oral 3 times per day    [Held by provider] isosorbide mononitrate  60 mg Oral Daily    lisinopril  10 mg Oral Daily    metoprolol succinate  25 mg Oral Daily    nicotine  1 patch Transdermal Daily    ranolazine  1,000 mg Oral BID    spironolactone  25 mg Oral Daily    tamsulosin  0.4 mg Oral Daily    traZODone  50 mg Oral Nightly    sodium chloride flush  10 mL Intravenous 2 times per day    famotidine  20 mg Oral BID    atorvastatin  20 mg Oral Nightly       albuterol, oxyCODONE-acetaminophen, zolpidem, sodium chloride flush, acetaminophen **OR** acetaminophen, polyethylene glycol, promethazine **OR** ondansetron, nitroGLYCERIN    Lab Data:  CBC:   Recent Labs     07/13/20  1546 07/14/20  0658   WBC 9.2 6.5   HGB 14.2 13.5    158     BMP:    Recent Labs     07/13/20  1546 07/14/20  0658   * 138   K 3.9 3.6   CO2 20* 22   BUN 25* 34*   CREATININE 1.2 1.5*     LIVR:   Recent Labs     07/13/20  1546   AST 9*   ALT 8*     INR:    Recent Labs     07/13/20  1546   INR 0.94     APTT:   Recent Labs     07/13/20  1546   APTT 26.4     Results for Terese Pedersen (MRN [id number]) as of 7/15/2020 09:48   Ref. Range 7/13/2020 22:16 7/14/2020 04:00 7/14/2020 06:58   Pro-BNP Latest Ref Range: 0 - 124 pg/mL 977 (H)     Troponin Latest Ref Range: <0.01 ng/mL <0.01 <0.01    Cholesterol, Total Latest Ref Range: 0 - 199 mg/dL   164   HDL Cholesterol Latest Ref Range: 40 - 60 mg/dL   34 (L)   LDL Calculated Latest Ref Range: <100 mg/dL   94   Triglycerides Latest Ref Range: 0 - 150 mg/dL   182 (H)   VLDL Cholesterol Calculated Latest Ref Range: Not Established mg/dL   36     5/7/2020 Lexiscan-Myoview:  No evidence of reversible ischemia.     There is a small sized, mild intensity, fixed defect of the basal and apical     inferior wall.  Cannot rule out prior non-transmural infarct.     Moderately dilated left ventricle with moderately reduced ejection fraction     of 41 %.     Overall findings represent a intermediate risk study based on the dilated LV     with reduced EF.      4/23/2020 Echo:  Summary   There is mild concentric left ventricular hypertrophy. Left ventricular cavity size is dilated. Ejection fraction is visually estimated to be 25-30%. Abnormal septal motion likely due to pacing   No pericardial effusion noted. 4/15/2020 Cardiac cath:  1. Left main is patent with patent stent providing blood to the LAD,  circumflex, and diagonal.  2.  Saphenous vein graft to diagonal is patent. 3.  Saphenous vein graft to the right coronary artery PDA is patent. 4.  Right coronary artery is occluded in the midportion. 5.  Distal LAD is heavily diseased. 6.  LIMA was not studied as it is known to be atretic. 7.  LV ejection fraction of 40%.     SUMMARY:  Nonobstructive coronary artery disease. There is no lesion  present which could be amenable to intervention. Telemetry: Sinus bradycardia    Assessment/Plan:    1. Jaw pain  -noncardiac  -Rx per Primary team    2. Dizziness/Postural orthostatic hypotension  -diuretic and imdur on hold  -he has some mild orthostasis  -decrease hydralazine    3. Prerenal azotemia  -Cr up to 1.5  -hold diuretic; increase po intake    4. Coronary artery disease   -prior CABG; recent cardiac cath with continued medical management  -no anginal chest pain  -continue ASA, statin, low dose BB, Ranexa and ACE    5.  Cardiomyopathy  -chronic LV systolic dysfunction (last LVEF 25-30%)  -appears euvolemic on exam  -continue ACE, BB and aldactone  -lasix remains on hold d/t elevated Cr    No further cardiac workup planned    Electronically signed by TAYLA Kate - CNP on 7/15/2020 at 9:40 AM

## 2020-07-15 NOTE — PLAN OF CARE
Problem: Falls - Risk of:  Goal: Will remain free from falls  Description: Will remain free from falls  7/15/2020 1116 by Hans Moya RN  Outcome: Ongoing  7/15/2020 0047 by Noah Hoffman RN  Outcome: Ongoing  Goal: Absence of physical injury  Description: Absence of physical injury  7/15/2020 1116 by Hans Moya RN  Outcome: Ongoing  7/15/2020 0047 by Noah Hoffman RN  Outcome: Ongoing     Problem: Safety:  Goal: Free from accidental physical injury  Description: Free from accidental physical injury  7/15/2020 1116 by Hans Moya RN  Outcome: Ongoing  7/15/2020 0047 by Noah Hoffman RN  Outcome: Ongoing  Goal: Free from intentional harm  Description: Free from intentional harm  7/15/2020 1116 by Hans Moya RN  Outcome: Ongoing  7/15/2020 0047 by Noah Hoffman RN  Outcome: Ongoing     Problem: Daily Care:  Goal: Daily care needs are met  Description: Daily care needs are met  7/15/2020 1116 by Hans Moya RN  Outcome: Ongoing  7/15/2020 0047 by Noah Hoffman RN  Outcome: Ongoing     Problem: Pain:  Goal: Patient's pain/discomfort is manageable  Description: Patient's pain/discomfort is manageable  7/15/2020 1116 by Hans Moya RN  Outcome: Ongoing  7/15/2020 0047 by Noah Hoffman RN  Outcome: Ongoing  Goal: Pain level will decrease  Description: Pain level will decrease  7/15/2020 1116 by Hans Moya RN  Outcome: Ongoing  7/15/2020 0047 by Noah Hoffman RN  Outcome: Ongoing  Goal: Control of acute pain  Description: Control of acute pain  7/15/2020 1116 by Hans Moya RN  Outcome: Ongoing  7/15/2020 0047 by Noah Hoffman RN  Outcome: Ongoing  Goal: Control of chronic pain  Description: Control of chronic pain  7/15/2020 1116 by Hans Moya RN  Outcome: Ongoing  7/15/2020 0047 by Noah Hoffman RN  Outcome: Ongoing     Problem: Skin Integrity:  Goal: Skin integrity will stabilize  Description: Skin integrity will stabilize  7/15/2020 1116 by Hans Moya RN  Outcome: Ongoing  7/15/2020 0047 by Zara Coats RN  Outcome: Ongoing     Problem: Discharge Planning:  Goal: Patients continuum of care needs are met  Description: Patients continuum of care needs are met  7/15/2020 1116 by Lemuel Mendoza RN  Outcome: Ongoing  7/15/2020 0047 by Zara Coats RN  Outcome: Ongoing

## 2020-07-15 NOTE — PROGRESS NOTES
Hospitalist Progress Note        PCP: Geraldine Guaman MD     Date of Admission: 7/13/2020     Chief Complaint: Headache and jaw pain     Hospital Course: The patient Ruy Anne is a 59 y.o.male with PMH significant for CAD with previous CABG, anxiety, depression, CHF, cardiomyopathy, diabetes mellitus, fibromyalgia and COPD presented to the ED with a traumatic headache, also associated with dizziness and left sided jaw pain. Denies jaw trauma or PMH vertigo. Last cardiac cath April 2020 - nonobstructive CAD, stress test May 2020. Tropinins in ED (-). Pt had a syncope and near syncope episode on 7/14/2020, orthostatics taken and indicative of orthostatic hypotension - Imdur held per Dr. Rome Barrett.     Pt D/C on 7/8/2020 following admission for chest pain related to COPD exacerbation. D/C on 6/23/2020 from [place], tx with Eliquis.      Subjective:  Pt notes he feels \"even worse\" today. Chief concern continues to be dizzines and pain behind left jaw/almost to ear.  Denies GERD symptoms has been focusing on increasing PO intake per Dr. Rome Barrett instructions.     Medications:  Reviewed     Infusion Medications   Infusions Meds        Scheduled Medications   Scheduled Medications    amiodarone  200 mg Oral Daily    apixaban  5 mg Oral BID    aspirin  81 mg Oral Daily    furosemide  20 mg Oral Daily    gabapentin  600 mg Oral 5x Daily    hydrALAZINE  25 mg Oral 3 times per day    isosorbide mononitrate  60 mg Oral Daily    lisinopril  10 mg Oral Daily    metoprolol succinate  25 mg Oral Daily    nicotine  1 patch Transdermal Daily    ranolazine  1,000 mg Oral BID    spironolactone  25 mg Oral Daily    tamsulosin  0.4 mg Oral Daily    traZODone  50 mg Oral Nightly    sodium chloride flush  10 mL Intravenous 2 times per day    famotidine  20 mg Oral BID    atorvastatin  20 mg Oral Nightly        PRN Meds:   PRN Medications   albuterol, oxyCODONE-acetaminophen, zolpidem, sodium chloride flush, 1.2 1.5*   CALCIUM 8.8 8.5         Recent Labs     20  1546   AST 9*   ALT 8*   BILITOT 0.7   ALKPHOS 46         Recent Labs     20  1546   INR 0.94           Recent Labs     20  1636 20  2216 20  0400   TROPONINI <0.01 <0.01 <0.01        Urinalysis:            Lab Results   Component Value Date     NITRU Negative 2020     WBCUA 3 2020     RBCUA 1 2020     BLOODU Negative 2020     SPECGRAV 1.014 2020     GLUCOSEU 250 2020        Radiology:  XR CHEST PORTABLE   Final Result   No acute cardiopulmonary process.           CT HEAD WO CONTRAST   Final Result   No acute intracranial abnormality.                  Assessment/Plan:          Active Hospital Problems     Diagnosis Date Noted    Jaw pain [R68.84] 2020     Orthostatic Hypotension  - Noted after pt stood to use restroom 2020 and felt like he was going to pass out:   - 2020 Orthostatics Lyin/72 HR 54, Sitting 115/80 HR 70, Standing 88/58 HR 62,   - 7/15/2020 Orthostatics - Lyin/66, Sitting 78/51, Standing 76/54 on   - Continue to monitor patient  - Encourage use of call button to alert PCA before getting up   - Hold Imdur  - Lasix held 2020 per cardio  - Decrease hydralazine 7/15/2020 from TID to BID per cardio  - Advised to increase PO intake  - Consider gentle IV fluid intake pending repeat BNP and CXR  - Jaw pain more than likely MSK due to no cardiac origin.    - 2020 CT Head without contrast - no acute abnormality  -possible medication    Jaw Pain, Left Side  - No cardiac etiology  - ESR and CRP ordered 7/15/2020 normal  -Neck CT-negative  -1 time Dilaudid    CHF - chronic  - BNP 2020 977  - Repeat CXR 7/15/2020 awaiting results  - Imdur held starting 2020 2/2 orthostatic hypotension  - Hydralazine decreased 7/15/2020 from TID to BID 2/2 orthostatic hypotension    CAD  - Cardiac Cath 2020 - no lesion amendable to intervention  - Stress Test May 2020 - intermediate risk  - managed with Imdur, Ranexa, Metoprolol, Lisinopril and Atorvastatin  - Episode of V Tach and SVT during stay asymptomatic - Cardiology determined no further need for intervention.     COPD - chronic, stable  - Admitted on 7/6/-7/8/2020 for exacerbation - tx with duoneb and prednisone, notes unable to afford inhaler but was D/Vic with one     T2DM - chronic, stable  - hold home meds  - SSI  - A1C Feb 2020 6. 9     Recent PE - stable  - Continue Eliquis     HTN - chronic, unstable (see orthostatic hypotension)  - Continue home meds  - Continue to monitor  - Currently orthostatic hypotensive, Imdur held     HLD - chronic, stable  - Continue Statin  - Last value 7/14/2020 164 total cholesterol, 34 HDL, 94 LDL, and 182 TG     Tobacco Abuse  - conseled on cessation  - nicotine patch      DVT Prophylaxis: Eliquis 2/2 PE  Diet: DIET CARDIAC; No Caffeine  Code Status: Full Code     Dispo - Observation     RAD TaveraS

## 2020-07-15 NOTE — PROGRESS NOTES
Hospitalist Progress Note        PCP: Moe Crain MD     Date of Admission: 7/13/2020     Chief Complaint: Headache and jaw pain     Hospital Course: The patient Ruy Anne is a 59 y.o.male with PMH significant for CAD with previous CABG, anxiety, depression, CHF, cardiomyopathy, diabetes mellitus, fibromyalgia and COPD presented to the ED with a traumatic headache, also associated with dizziness and left sided jaw pain. Denies jaw trauma or PMH vertigo. Last cardiac cath April 2020 - nonobstructive CAD, stress test May 2020. Tropinins in ED (-). Pt had a syncope and near syncope episode on 7/14/2020, orthostatics taken and indicative of orthostatic hypotension - Imdur held per Dr. Anastasiya Washington.     Pt D/C on 7/8/2020 following admission for chest pain related to COPD exacerbation. D/C on 6/23/2020 from [place], tx with Eliquis.      Subjective:  Pt notes he feels \"even worse\" today. Chief concern continues to be dizzines and pain behind left jaw/almost to ear.  Denies GERD symptoms has been focusing on increasing PO intake per Dr. Anastasiya Washington instructions.     Medications:  Reviewed     Infusion Medications   Infusions Meds        Scheduled Medications   Scheduled Medications    amiodarone  200 mg Oral Daily    apixaban  5 mg Oral BID    aspirin  81 mg Oral Daily    furosemide  20 mg Oral Daily    gabapentin  600 mg Oral 5x Daily    hydrALAZINE  25 mg Oral 3 times per day    isosorbide mononitrate  60 mg Oral Daily    lisinopril  10 mg Oral Daily    metoprolol succinate  25 mg Oral Daily    nicotine  1 patch Transdermal Daily    ranolazine  1,000 mg Oral BID    spironolactone  25 mg Oral Daily    tamsulosin  0.4 mg Oral Daily    traZODone  50 mg Oral Nightly    sodium chloride flush  10 mL Intravenous 2 times per day    famotidine  20 mg Oral BID    atorvastatin  20 mg Oral Nightly        PRN Meds:   PRN Medications   albuterol, oxyCODONE-acetaminophen, zolpidem, sodium chloride flush, acetaminophen **OR** acetaminophen, polyethylene glycol, promethazine **OR** ondansetron, nitroGLYCERIN          Intake/Output Summary (Last 24 hours) at 2020 0829  Last data filed at 2020 0603      Gross per 24 hour   Intake 60 ml   Output --   Net 60 ml        Exam:     /73   Pulse 52   Temp 97.7 °F (36.5 °C) (Oral)   Resp 18   Ht 6' (1.829 m)   Wt 181 lb 7 oz (82.3 kg)   SpO2 94%   BMI 24.61 kg/m²       Orthostatic BPs  - Lyin/72 HR 54  - Sitting 115/80 HR 70  - Standing 88/58 HR 62     General appearance: 62 y/o  male seated in bed in no apparent distress, appears stated age and cooperative. HEENT: Pupils equal, round, and reactive to light. Conjunctivae/corneas clear. Neck: Supple, with full range of motion. No jugular venous distention. Trachea midline. TTP just posteriorly to TMJ/anterior to mastoid. No palpable click/pop at TMJ with open/close. Respiratory:  Normal respiratory effort. Inspiratory and expiratory wheezing consistent with significant hx of smoking (+) rhonci in upper lobes  Cardiovascular: Regular rate and rhythm with normal S1/S2 without murmurs, rubs or gallops. No LE edema  Abdomen: Soft obese abdomen without tenderness, guarding, or distention. Musculoskeletal: No  turgor normal.  No rashes or lesions. clubbing, cyanosis or edema bilaterally. Full range of motion without deformity. Skin: Skin color, texture,  Neurologic:  Neurovascularly intact without any focal sensory/motor deficits.  Cranial nerves: II-XII intact, grossly non-focal.  Psychiatric: Alert and oriented, thought content appropriate, normal insight  Capillary Refill: Brisk,< 3 seconds   Peripheral Pulses: +2 palpable, equal bilaterally      Labs:        Recent Labs     20  1546 20  0658   WBC 9.2 6.5   HGB 14.2 13.5   HCT 41.5 39.6*    158     Recent Labs     20  1546 20  0658   * 138   K 3.9 3.6    105   CO2 20* 22   BUN 25* 34*   CREATININE 1.2 1.5*   CALCIUM 8.8 8.5         Recent Labs     20  1546   AST 9*   ALT 8*   BILITOT 0.7   ALKPHOS 46         Recent Labs     20  1546   INR 0.94           Recent Labs     20  1636 20  2216 20  0400   TROPONINI <0.01 <0.01 <0.01        Urinalysis:            Lab Results   Component Value Date     NITRU Negative 2020     WBCUA 3 2020     RBCUA 1 2020     BLOODU Negative 2020     SPECGRAV 1.014 2020     GLUCOSEU 250 2020        Radiology:  XR CHEST PORTABLE   Final Result   No acute cardiopulmonary process.           CT HEAD WO CONTRAST   Final Result   No acute intracranial abnormality.                  Assessment/Plan:          Active Hospital Problems     Diagnosis Date Noted    Jaw pain [R68.84] 2020     Orthostatic Hypotension  - Noted after pt stood to use restroom 2020 and felt like he was going to pass out:   - 2020 Orthostatics Lyin/72 HR 54, Sitting 115/80 HR 70, Standing 88/58 HR 62,   - 7/15/2020 Orthostatics - Lyin/66, Sitting 78/51, Standing 76/54 on   - Continue to monitor patient  - Encourage use of call button to alert PCA before getting up   - Hold Imdur  - Lasix held 2020 per cardio  - Decrease hydralazine 7/15/2020 from TID to BID per cardio  - Advised to increase PO intake  - Consider gentle IV fluid intake pending repeat BNP and CXR  - Jaw pain more than likely MSK due to no cardiac origin.    - 2020 CT Head without contrast - no acute abnormality    Jaw Pain, Left Side  - No cardiac etiology  - ESR and CRP ordered 7/15/2020    CHF - chronic  - BNP 2020 977  - Repeat CXR 7/15/2020 awaiting results  - Imdur held starting 2020 2/2 orthostatic hypotension  - Hydralazine decreased 7/15/2020 from TID to BID 2/2 orthostatic hypotension    CAD  - Cardiac Cath 2020 - no lesion amendable to intervention  - Stress Test May 2020 - intermediate risk  - managed with Imdur, Ranexa, Metoprolol, Lisinopril and Atorvastatin  - Episode of V Tach and SVT during stay asymptomatic - Cardiology determined no further need for intervention.     COPD - chronic, stable  - Admitted on 7/6/-7/8/2020 for exacerbation - tx with duoneb and prednisone, notes unable to afford inhaler but was D/Vic with one     T2DM - chronic, stable  - hold home meds  - SSI  - A1C Feb 2020 6. 9     Recent PE - stable  - Continue Eliquis     HTN - chronic, unstable (see orthostatic hypotension)  - Continue home meds  - Continue to monitor  - Currently orthostatic hypotensive, Imdur held     HLD - chronic, stable  - Continue Statin  - Last value 7/14/2020 164 total cholesterol, 34 HDL, 94 LDL, and 182 TG     Tobacco Abuse  - conseled on cessation  - nicotine patch      DVT Prophylaxis: Eliquis 2/2 PE  Diet: DIET CARDIAC; No Caffeine  Code Status: Full Code     Dispo - Observation     ABIGAIL Tavera-S

## 2020-07-16 VITALS
DIASTOLIC BLOOD PRESSURE: 56 MMHG | SYSTOLIC BLOOD PRESSURE: 91 MMHG | HEIGHT: 72 IN | OXYGEN SATURATION: 98 % | RESPIRATION RATE: 18 BRPM | HEART RATE: 65 BPM | BODY MASS INDEX: 24.52 KG/M2 | WEIGHT: 181 LBS | TEMPERATURE: 98 F

## 2020-07-16 LAB
ANION GAP SERPL CALCULATED.3IONS-SCNC: 15 MMOL/L (ref 3–16)
BASOPHILS ABSOLUTE: 0 K/UL (ref 0–0.2)
BASOPHILS RELATIVE PERCENT: 0.7 %
BUN BLDV-MCNC: 30 MG/DL (ref 7–20)
CALCIUM SERPL-MCNC: 8 MG/DL (ref 8.3–10.6)
CHLORIDE BLD-SCNC: 100 MMOL/L (ref 99–110)
CO2: 20 MMOL/L (ref 21–32)
CREAT SERPL-MCNC: 1.3 MG/DL (ref 0.8–1.3)
EOSINOPHILS ABSOLUTE: 0.2 K/UL (ref 0–0.6)
EOSINOPHILS RELATIVE PERCENT: 2.3 %
ESTIMATED AVERAGE GLUCOSE: 116.9 MG/DL
GFR AFRICAN AMERICAN: >60
GFR NON-AFRICAN AMERICAN: 55
GLUCOSE BLD-MCNC: 140 MG/DL (ref 70–99)
HBA1C MFR BLD: 5.7 %
HCT VFR BLD CALC: 40.4 % (ref 40.5–52.5)
HEMOGLOBIN: 13.3 G/DL (ref 13.5–17.5)
LYMPHOCYTES ABSOLUTE: 2 K/UL (ref 1–5.1)
LYMPHOCYTES RELATIVE PERCENT: 29.2 %
MCH RBC QN AUTO: 34.1 PG (ref 26–34)
MCHC RBC AUTO-ENTMCNC: 32.9 G/DL (ref 31–36)
MCV RBC AUTO: 103.6 FL (ref 80–100)
MONOCYTES ABSOLUTE: 0.5 K/UL (ref 0–1.3)
MONOCYTES RELATIVE PERCENT: 6.5 %
NEUTROPHILS ABSOLUTE: 4.3 K/UL (ref 1.7–7.7)
NEUTROPHILS RELATIVE PERCENT: 61.3 %
PDW BLD-RTO: 15 % (ref 12.4–15.4)
PLATELET # BLD: 134 K/UL (ref 135–450)
PMV BLD AUTO: 9.3 FL (ref 5–10.5)
POTASSIUM SERPL-SCNC: 3.8 MMOL/L (ref 3.5–5.1)
RBC # BLD: 3.9 M/UL (ref 4.2–5.9)
SODIUM BLD-SCNC: 135 MMOL/L (ref 136–145)
WBC # BLD: 7 K/UL (ref 4–11)

## 2020-07-16 PROCEDURE — 6370000000 HC RX 637 (ALT 250 FOR IP): Performed by: NURSE PRACTITIONER

## 2020-07-16 PROCEDURE — 99232 SBSQ HOSP IP/OBS MODERATE 35: CPT | Performed by: NURSE PRACTITIONER

## 2020-07-16 PROCEDURE — 80048 BASIC METABOLIC PNL TOTAL CA: CPT

## 2020-07-16 PROCEDURE — 6370000000 HC RX 637 (ALT 250 FOR IP): Performed by: INTERNAL MEDICINE

## 2020-07-16 PROCEDURE — 2580000003 HC RX 258: Performed by: HOSPITALIST

## 2020-07-16 PROCEDURE — 6370000000 HC RX 637 (ALT 250 FOR IP): Performed by: HOSPITALIST

## 2020-07-16 PROCEDURE — 94760 N-INVAS EAR/PLS OXIMETRY 1: CPT

## 2020-07-16 PROCEDURE — 85025 COMPLETE CBC W/AUTO DIFF WBC: CPT

## 2020-07-16 PROCEDURE — 6360000002 HC RX W HCPCS: Performed by: NURSE PRACTITIONER

## 2020-07-16 RX ORDER — ATORVASTATIN CALCIUM 80 MG/1
80 TABLET, FILM COATED ORAL DAILY
Qty: 30 TABLET | Refills: 0 | Status: SHIPPED | OUTPATIENT
Start: 2020-07-16 | End: 2020-10-16 | Stop reason: CLARIF

## 2020-07-16 RX ORDER — RANOLAZINE 500 MG/1
1000 TABLET, EXTENDED RELEASE ORAL 2 TIMES DAILY
Qty: 60 TABLET | Refills: 0 | Status: ON HOLD | OUTPATIENT
Start: 2020-07-16 | End: 2021-03-06 | Stop reason: SDUPTHER

## 2020-07-16 RX ORDER — GABAPENTIN 600 MG/1
TABLET ORAL
Qty: 150 TABLET | Refills: 1 | Status: SHIPPED | OUTPATIENT
Start: 2020-07-16 | End: 2020-09-09 | Stop reason: SDUPTHER

## 2020-07-16 RX ORDER — AMIODARONE HYDROCHLORIDE 200 MG/1
100 TABLET ORAL DAILY
Qty: 30 TABLET | Refills: 0 | Status: SHIPPED | OUTPATIENT
Start: 2020-07-16 | End: 2020-07-16 | Stop reason: HOSPADM

## 2020-07-16 RX ORDER — HYDRALAZINE HYDROCHLORIDE 25 MG/1
25 TABLET, FILM COATED ORAL 2 TIMES DAILY
Qty: 60 TABLET | Refills: 0 | Status: ON HOLD | OUTPATIENT
Start: 2020-07-16 | End: 2020-08-18 | Stop reason: HOSPADM

## 2020-07-16 RX ORDER — METOPROLOL SUCCINATE 25 MG/1
25 TABLET, EXTENDED RELEASE ORAL DAILY
Qty: 30 TABLET | Refills: 0 | Status: ON HOLD | OUTPATIENT
Start: 2020-07-16 | End: 2020-08-18 | Stop reason: HOSPADM

## 2020-07-16 RX ORDER — ZOLPIDEM TARTRATE 5 MG/1
5 TABLET ORAL NIGHTLY PRN
Qty: 30 TABLET | Refills: 0 | Status: SHIPPED | OUTPATIENT
Start: 2020-07-16 | End: 2020-08-31 | Stop reason: SDUPTHER

## 2020-07-16 RX ORDER — PANTOPRAZOLE SODIUM 40 MG/1
40 TABLET, DELAYED RELEASE ORAL DAILY
Qty: 30 TABLET | Refills: 0 | Status: SHIPPED | OUTPATIENT
Start: 2020-07-16 | End: 2020-10-19

## 2020-07-16 RX ORDER — TRAZODONE HYDROCHLORIDE 50 MG/1
50 TABLET ORAL NIGHTLY
Qty: 30 TABLET | Refills: 0 | Status: SHIPPED | OUTPATIENT
Start: 2020-07-16 | End: 2020-07-27

## 2020-07-16 RX ORDER — ALBUTEROL SULFATE 90 UG/1
2 AEROSOL, METERED RESPIRATORY (INHALATION) 4 TIMES DAILY PRN
Qty: 1 INHALER | Refills: 1 | Status: SHIPPED | OUTPATIENT
Start: 2020-07-16 | End: 2021-03-31

## 2020-07-16 RX ORDER — EMPAGLIFLOZIN 10 MG/1
10 TABLET, FILM COATED ORAL DAILY
Qty: 30 TABLET | Refills: 0 | Status: SHIPPED | OUTPATIENT
Start: 2020-07-16 | End: 2022-01-28

## 2020-07-16 RX ORDER — AMIODARONE HYDROCHLORIDE 100 MG/1
100 TABLET ORAL DAILY
Qty: 30 TABLET | Refills: 3 | Status: SHIPPED | OUTPATIENT
Start: 2020-07-17 | End: 2020-10-16 | Stop reason: CLARIF

## 2020-07-16 RX ORDER — TAMSULOSIN HYDROCHLORIDE 0.4 MG/1
0.4 CAPSULE ORAL DAILY
Qty: 30 CAPSULE | Refills: 0 | Status: ON HOLD | OUTPATIENT
Start: 2020-07-16 | End: 2021-03-04

## 2020-07-16 RX ORDER — ASPIRIN 81 MG/1
81 TABLET, CHEWABLE ORAL DAILY
Qty: 30 TABLET | Refills: 3 | Status: SHIPPED | OUTPATIENT
Start: 2020-07-16 | End: 2020-07-29

## 2020-07-16 RX ADMIN — RANOLAZINE 1000 MG: 500 TABLET, FILM COATED, EXTENDED RELEASE ORAL at 10:33

## 2020-07-16 RX ADMIN — GABAPENTIN 600 MG: 300 CAPSULE ORAL at 10:33

## 2020-07-16 RX ADMIN — FAMOTIDINE 20 MG: 20 TABLET ORAL at 10:34

## 2020-07-16 RX ADMIN — ASPIRIN 81 MG: 81 TABLET, CHEWABLE ORAL at 10:34

## 2020-07-16 RX ADMIN — GABAPENTIN 600 MG: 300 CAPSULE ORAL at 15:53

## 2020-07-16 RX ADMIN — HYDRALAZINE HYDROCHLORIDE 25 MG: 25 TABLET, FILM COATED ORAL at 10:33

## 2020-07-16 RX ADMIN — GABAPENTIN 600 MG: 300 CAPSULE ORAL at 05:26

## 2020-07-16 RX ADMIN — TAMSULOSIN HYDROCHLORIDE 0.4 MG: 0.4 CAPSULE ORAL at 10:34

## 2020-07-16 RX ADMIN — HYDROMORPHONE HYDROCHLORIDE 0.5 MG: 1 INJECTION, SOLUTION INTRAMUSCULAR; INTRAVENOUS; SUBCUTANEOUS at 08:11

## 2020-07-16 RX ADMIN — APIXABAN 5 MG: 5 TABLET, FILM COATED ORAL at 10:34

## 2020-07-16 RX ADMIN — OXYCODONE HYDROCHLORIDE AND ACETAMINOPHEN 1 TABLET: 10; 325 TABLET ORAL at 01:55

## 2020-07-16 RX ADMIN — HYDROMORPHONE HYDROCHLORIDE 0.5 MG: 1 INJECTION, SOLUTION INTRAMUSCULAR; INTRAVENOUS; SUBCUTANEOUS at 03:59

## 2020-07-16 RX ADMIN — AMIODARONE HYDROCHLORIDE 100 MG: 200 TABLET ORAL at 10:33

## 2020-07-16 RX ADMIN — OXYCODONE HYDROCHLORIDE AND ACETAMINOPHEN 1 TABLET: 10; 325 TABLET ORAL at 10:32

## 2020-07-16 RX ADMIN — METOPROLOL SUCCINATE 25 MG: 25 TABLET, FILM COATED, EXTENDED RELEASE ORAL at 10:33

## 2020-07-16 RX ADMIN — Medication 10 ML: at 08:12

## 2020-07-16 ASSESSMENT — PAIN SCALES - GENERAL
PAINLEVEL_OUTOF10: 6
PAINLEVEL_OUTOF10: 6
PAINLEVEL_OUTOF10: 3
PAINLEVEL_OUTOF10: 7
PAINLEVEL_OUTOF10: 7
PAINLEVEL_OUTOF10: 3
PAINLEVEL_OUTOF10: 1
PAINLEVEL_OUTOF10: 1
PAINLEVEL_OUTOF10: 7

## 2020-07-16 ASSESSMENT — PAIN DESCRIPTION - LOCATION: LOCATION: JAW

## 2020-07-16 NOTE — FLOWSHEET NOTE
Pt requesting that \"percocet be changed to every 6 hours instead of every 8 hours, it's only one extra pill. \" Natalie Wisdom NP made aware. No new orders. Pt updated and VU.    Electronically signed by Nathen Cantu RN on 7/16/2020 at 4:44 PM

## 2020-07-16 NOTE — PROGRESS NOTES
Hospitalist Progress Note        PCP: Bora Stephens MD     Date of Admission: 7/13/2020     Chief Complaint: Headache and jaw pain     Hospital Course:   The patient Ruy Kessler Show a 59 y.o.male with PMH significant for CAD with previous CABG, anxiety, depression, HTN, HLD, CHF, cardiomyopathy, diabetes mellitus, fibromyalgia and COPD presented to the ED with a traumatic headache, also associated with dizziness and left sided jaw pain. Denies jaw trauma or PMH vertigo. Last cardiac cath April 2020 - nonobstructive CAD, stress test May 2020. Tropinins in ED (-). Pt had a syncope and near syncope episode on 7/14/2020, orthostatics taken and indicative of orthostatic hypotension - Imdur, Lisinopril, Jardiance, and aldtacone held 2/2 this and increased Cr. Pt D/C on 7/8/2020 following admission for chest pain related to COPD exacerbation. D/C on 6/23/2020 from [place], tx with Eliquis.      Subjective:  Pt notes he continues to feel dizzy but it is improving. AM meds were held and BP was 152/76, noting increase PO intake as well. Continues to c/o jaw pain, requesting Perc-10 QID today only, regularly takes TID at home.  Pt requests to go home tomorrow because he would like to celebrate his anniversary (today) with his wife.      Medications:  Reviewed     Infusion Medications   Infusions Meds          Scheduled Medications   Scheduled Medications    amiodarone  200 mg Oral Daily    apixaban  5 mg Oral BID    aspirin  81 mg Oral Daily    furosemide  20 mg Oral Daily    gabapentin  600 mg Oral 5x Daily    hydrALAZINE  25 mg Oral 2 times per day    isosorbide mononitrate  60 mg Oral Daily    lisinopril  10 mg Oral Daily    metoprolol succinate  25 mg Oral Daily    nicotine  1 patch Transdermal Daily    ranolazine  1,000 mg Oral BID    spironolactone  25 mg Oral Daily    tamsulosin  0.4 mg Oral Daily    traZODone  50 mg Oral Nightly    sodium chloride flush  10 mL Intravenous 2 times per day  famotidine  20 mg Oral BID    atorvastatin  20 mg Oral Nightly         PRN Meds:   PRN Medications   albuterol, oxyCODONE-acetaminophen, zolpidem, sodium chloride flush, acetaminophen **OR** acetaminophen, polyethylene glycol, promethazine **OR** ondansetron, nitroGLYCERIN            Intake/Output Summary (Last 24 hours) at 2020 0829  Last data filed at 2020 0603        Gross per 24 hour   Intake 60 ml   Output --   Net 60 ml         Exam:     /73   Pulse 52   Temp 97.7 °F (36.5 °C) (Oral)   Resp 18   Ht 6' (1.829 m)   Wt 181 lb 7 oz (82.3 kg)   SpO2 94%   BMI 24.61 kg/m²       Orthostatic BPs  - Lyin/72 HR 54  - Sitting 115/80 HR 70  - Standing 88/58 HR 62     General appearance: 61 y/o  male seated in bed in no apparent distress, appears stated age and cooperative. HEENT: Pupils equal, round, and reactive to light. Conjunctivae/corneas clear. Neck: Supple, with full range of motion. No jugular venous distention. Trachea midline. TTP just posteriorly to TMJ/anterior to mastoid. No palpable click/pop at TMJ with open/close. Respiratory:  Normal respiratory effort. Inspiratory and expiratory wheezing consistent with significant hx of smoking (+) rhonci in upper lobes  Cardiovascular: Regular rate and rhythm with normal S1/S2 without murmurs, rubs or gallops. No LE edema  Abdomen: Soft obese abdomen without tenderness, guarding, or distention. Musculoskeletal: No  turgor normal.  No rashes or lesions. clubbing, cyanosis or edema bilaterally.  Full range of motion without deformity. Skin: Skin color, texture,  Neurologic:  Neurovascularly intact without any focal sensory/motor deficits.  Cranial nerves: II-XII intact, grossly non-focal.  Psychiatric: Alert and oriented, thought content appropriate, normal insight  Capillary Refill: Brisk,< 3 seconds   Peripheral Pulses: +2 palpable, equal bilaterally      Labs:            Recent Labs             Recent Labs       20  0658 7/15/[id number] 2020 0923    133* 135*   K 3.6 4.0 3.8    101 100   CO2 22 21 20*   BUN 34* 44* 30*   CREATININE 1.5* 1.8* 1.3   CALCIUM 8.5 7.7* 8.0*            Recent Labs     20  1546   AST 9*   ALT 8*   BILITOT 0.7   ALKPHOS 46            Recent Labs     20  1546   INR 0.94                Recent Labs     20  1636 20  2216 20  0400   TROPONINI <0.01 <0.01 <0.01             Recent Labs      20 2216 7/15/2020 1058    225                  7/15/2020 1058   CRP 1.5     Urinalysis:                Lab Results   Component Value Date     NITRU Negative 2020     WBCUA 3 2020     RBCUA 1 2020     BLOODU Negative 2020     SPECGRAV 1.014 2020     GLUCOSEU 250 2020         Radiology:  XR CHEST PORTABLE   Final Result   No acute cardiopulmonary process.           CT HEAD WO CONTRAST   Final Result   No acute intracranial abnormality.       CT SOFT TISSUE NECK WO CONTRAST   Final Results   No acute abnormality of the neck soft tissues or finding to suggest etiology of patient's symptoms                    Assessment/Plan:             Active Hospital Problems     Diagnosis Date Noted    Jaw pain [R68.84] 2020      Orthostatic Hypotension  - Noted after pt stood to use restroom 2020 and felt like he was going to pass out:   - 2020 Orthostatics Lyin/72 HR 54, Sitting 115/80 HR 70, Standing 88/58 HR 62,   - 7/15/2020 Orthostatics - Lyin/66, Sitting 78/51, Standing 76/54 on   - 2020 Orthostatics - Sitting 114/84, Standing 112/75 per cardiology 2020 AM  - Continue to monitor patient  - Encourage use of call button to alert PCA before getting up   - Hold Imdur and Lasix per cardio  - Decrease hydralazine 7/15/2020 from TID to BID per cardio  - Hold aldactone and lisinopril and all nephrotoxic agents 2/2 increased Cr  - Reintroduce medications at lower doses and change time of hypertensives, spoke with  on pharmacy pre-packaging medications to increase compliance  - Advised to increase PO intake    Jaw Pain, Left Side  - No cardiac etiology  - ESR and CRP ordered 7/15/2020 normal  - Jaw pain more than likely MSK due to no cardiac origin.   - 7/13/2020 CT Head without contrast - no acute abnormality, 7/15/2020 Neck CT- no abnormality  -Dilauded D/C 7/16/2020     CHF - chronic  - Echo 2/18/2020 - mild LV dysfxn with EF 35-40% and Pacer/ICD  - BNP 7/13/2020 977, 7/16/2020 225  - Repeat CXR 7/15/2020 -  - Imdur held starting 7/14/2020 2/2 orthostatic hypotension along with liosinopril and aldactone 2/2 increased Cr  - Hydralazine decreased 7/15/2020 from TID to BID 2/2 orthostatic hypotension  - Continue BB an     CAD  - Cardiac Cath April 2020 - no lesion amendable to intervention  - Stress Test May 2020 - intermediate risk  - managed with Imdur, Ranexa, Metoprolol, Lisinopril and Atorvastatin  - Episode of V Tach and SVT during stay asymptomatic - Cardiology determined no further need for intervention  - Lisinopril on hold 7/15/2020 2/2 increased Cr  - Continue ASA, statin, low dose BB, and Ranexa.     COPD - chronic, stable  - Admitted on 7/6/-7/8/2020 for exacerbation - tx with duoneb and prednisone, notes unable to afford inhaler but was D/Vic with one     T2DM - chronic, stable  - hold home meds  - SSI  - A1C Feb 2020 6. 9     Recent PE - stable  - Continue Eliquis   - D/C  From 6/23/2020    HTN - chronic, unstable (see orthostatic hypotension)  - Lisinopril held 2/2 increased Cr  - Continue to monitor  - Currently orthostatic hypotensive, Imdur held     HLD - chronic, stable  - Continue Statin  - Last value 7/14/2020 164 total cholesterol, 34 HDL, 94 LDL, and 182 TG    Chronic Midline Low Back Pain without Sciatica  - Under care of Mili and interested in injection per PCP note, unsure if received  - Increased dose of Perc-10 from BID to TID on 5/11/2020 per Dr. Roc Durham. From Note on 5/11/2020 - \"Will send in 10 more days as he is out. Then may do 1 month at 3 a day but that is the limit.   He understands\"    Tobacco Abuse  - conseled on cessation  - nicotine patch      DVT Prophylaxis: Eliquis 2/2 PE  Diet: DIET CARDIAC; No Caffeine  Code Status: Full Code     Dispo - Inpatient     RAD TaveraS

## 2020-07-16 NOTE — PROGRESS NOTES
If   possible, please document in progress notes and discharge summary further   specificity regarding the type of CHF:    The medical record reflects the following:  Risk Factors: HTN CAD CHF Cardiomyopathy  Clinical Indicators: per PN Cardiology 7/15/20 5. Cardiomyopathy -chronic LV   systolic dysfunction (last LVEF 25-30%  Treatment: Cardiology consult , I&O, daily weights , Lopressor and Aldactone  Options provided:  -- Chronic Systolic CHF/HFrEF  -- Acute on Chronic Systolic CHF/HFrEF  -- Chronic Systolic and Diastolic CHF  -- Refer to Clinical Documentation Reviewer    PROVIDER RESPONSE TEXT:    This patient has chronic systolic and diastolic CHF.     Query created by: Chitra Del Angel on 7/16/2020 8:49 AM      Electronically signed by:  Rk Sethi CNP 7/16/2020 1:18 PM

## 2020-07-16 NOTE — PROGRESS NOTES
Hospitalist Progress Note        PCP: Bora Gonsales MD     Date of Admission: 7/13/2020     Chief Complaint: Headache and jaw pain     Hospital Course:   The patient Ruy Mendoza a 59 y.o.male with PMH significant for CAD with previous CABG, anxiety, depression, HTN, HLD, CHF, cardiomyopathy, diabetes mellitus, fibromyalgia and COPD presented to the ED with a traumatic headache, also associated with dizziness and left sided jaw pain. Denies jaw trauma or PMH vertigo. Last cardiac cath April 2020 - nonobstructive CAD, stress test May 2020. Tropinins in ED (-). Pt had a syncope and near syncope episode on 7/14/2020, orthostatics taken and indicative of orthostatic hypotension - Imdur, Lisinopril, Jardiance, and aldtacone held 2/2 this and increased Cr. Pt D/C on 7/8/2020 following admission for chest pain related to COPD exacerbation. D/C on 6/23/2020 from [place], tx with Eliquis.      Subjective:  Pt notes he continues to feel dizzy but it is improving. AM meds were held and BP was 152/76, noting increase PO intake as well. Continues to c/o jaw pain, requesting Perc-10 QID today only, regularly takes TID at home.  Pt requests to go home tomorrow because he would like to celebrate his anniversary (today) with his wife.      Medications:  Reviewed     Infusion Medications   Infusions Meds          Scheduled Medications   Scheduled Medications    amiodarone  200 mg Oral Daily    apixaban  5 mg Oral BID    aspirin  81 mg Oral Daily    furosemide  20 mg Oral Daily    gabapentin  600 mg Oral 5x Daily    hydrALAZINE  25 mg Oral 2 times per day    isosorbide mononitrate  60 mg Oral Daily    lisinopril  10 mg Oral Daily    metoprolol succinate  25 mg Oral Daily    nicotine  1 patch Transdermal Daily    ranolazine  1,000 mg Oral BID    spironolactone  25 mg Oral Daily    tamsulosin  0.4 mg Oral Daily    traZODone  50 mg Oral Nightly    sodium chloride flush  10 mL Intravenous 2 times per day  famotidine  20 mg Oral BID    atorvastatin  20 mg Oral Nightly         PRN Meds:   PRN Medications   albuterol, oxyCODONE-acetaminophen, zolpidem, sodium chloride flush, acetaminophen **OR** acetaminophen, polyethylene glycol, promethazine **OR** ondansetron, nitroGLYCERIN            Intake/Output Summary (Last 24 hours) at 2020 0829  Last data filed at 2020 0603        Gross per 24 hour   Intake 60 ml   Output --   Net 60 ml         Exam:     /73   Pulse 52   Temp 97.7 °F (36.5 °C) (Oral)   Resp 18   Ht 6' (1.829 m)   Wt 181 lb 7 oz (82.3 kg)   SpO2 94%   BMI 24.61 kg/m²       Orthostatic BPs  - Lyin/72 HR 54  - Sitting 115/80 HR 70  - Standing 88/58 HR 62     General appearance: 59 y/o  male seated in bed in no apparent distress, appears stated age and cooperative. HEENT: Pupils equal, round, and reactive to light. Conjunctivae/corneas clear. Neck: Supple, with full range of motion. No jugular venous distention. Trachea midline. TTP just posteriorly to TMJ/anterior to mastoid. No palpable click/pop at TMJ with open/close. Respiratory:  Normal respiratory effort. Inspiratory and expiratory wheezing consistent with significant hx of smoking (+) rhonci in upper lobes  Cardiovascular: Regular rate and rhythm with normal S1/S2 without murmurs, rubs or gallops. No LE edema  Abdomen: Soft obese abdomen without tenderness, guarding, or distention. Musculoskeletal: No  turgor normal.  No rashes or lesions. clubbing, cyanosis or edema bilaterally.  Full range of motion without deformity. Skin: Skin color, texture,  Neurologic:  Neurovascularly intact without any focal sensory/motor deficits.  Cranial nerves: II-XII intact, grossly non-focal.  Psychiatric: Alert and oriented, thought content appropriate, normal insight  Capillary Refill: Brisk,< 3 seconds   Peripheral Pulses: +2 palpable, equal bilaterally      Labs:            Recent Labs             Recent Labs       20  0658 7/15/[id number] 2020 0923    133* 135*   K 3.6 4.0 3.8    101 100   CO2 22 21 20*   BUN 34* 44* 30*   CREATININE 1.5* 1.8* 1.3   CALCIUM 8.5 7.7* 8.0*            Recent Labs     20  1546   AST 9*   ALT 8*   BILITOT 0.7   ALKPHOS 46            Recent Labs     20  1546   INR 0.94                Recent Labs     20  1636 20  2216 20  0400   TROPONINI <0.01 <0.01 <0.01             Recent Labs      20 2216 7/15/2020 1058    225                  7/15/2020 1058   CRP 1.5     Urinalysis:                Lab Results   Component Value Date     NITRU Negative 2020     WBCUA 3 2020     RBCUA 1 2020     BLOODU Negative 2020     SPECGRAV 1.014 2020     GLUCOSEU 250 2020         Radiology:  XR CHEST PORTABLE   Final Result   No acute cardiopulmonary process.           CT HEAD WO CONTRAST   Final Result   No acute intracranial abnormality.       CT SOFT TISSUE NECK WO CONTRAST   Final Results   No acute abnormality of the neck soft tissues or finding to suggest etiology of patient's symptoms                    Assessment/Plan:             Active Hospital Problems     Diagnosis Date Noted    Jaw pain [R68.84] 2020      Orthostatic Hypotension  - Noted after pt stood to use restroom 2020 and felt like he was going to pass out:   - 2020 Orthostatics Lyin/72 HR 54, Sitting 115/80 HR 70, Standing 88/58 HR 62,   - 7/15/2020 Orthostatics - Lyin/66, Sitting 78/51, Standing 76/54 on   - 2020 Orthostatics - Sitting 114/84, Standing 112/75 per cardiology 2020 AM  - Continue to monitor patient  - Encourage use of call button to alert PCA before getting up   - Hold Imdur and Lasix per cardio  - Decrease hydralazine 7/15/2020 from TID to BID per cardio  - Hold aldactone and lisinopril and all nephrotoxic agents 2/2 increased Cr  - Reintroduce medications at lower doses and change time of hypertensives, spoke with  on pharmacy pre-packaging medications to increase compliance  - Advised to increase PO intake    Jaw Pain, Left Side  - No cardiac etiology  - ESR and CRP ordered 7/15/2020 normal  - Jaw pain more than likely MSK due to no cardiac origin.   - 7/13/2020 CT Head without contrast - no acute abnormality, 7/15/2020 Neck CT- no abnormality  -Dilauded D/C 7/16/2020     CHF - chronic  - Echo 2/18/2020 - mild LV dysfxn with EF 35-40% and Pacer/ICD  - BNP 7/13/2020 977, 7/16/2020 225  - Repeat CXR 7/15/2020 -  - Imdur held starting 7/14/2020 2/2 orthostatic hypotension along with liosinopril and aldactone 2/2 increased Cr  - Hydralazine decreased 7/15/2020 from TID to BID 2/2 orthostatic hypotension  - Continue BB an     CAD  - Cardiac Cath April 2020 - no lesion amendable to intervention  - Stress Test May 2020 - intermediate risk  - managed with Imdur, Ranexa, Metoprolol, Lisinopril and Atorvastatin  - Episode of V Tach and SVT during stay asymptomatic - Cardiology determined no further need for intervention  - Lisinopril on hold 7/15/2020 2/2 increased Cr  - Continue ASA, statin, low dose BB, and Ranexa.     COPD - chronic, stable  - Admitted on 7/6/-7/8/2020 for exacerbation - tx with duoneb and prednisone, notes unable to afford inhaler but was D/Vic with one     T2DM - chronic, stable  - hold home meds  - SSI  - A1C Feb 2020 6. 9     Recent PE - stable  - Continue Eliquis   - D/C  From 6/23/2020    HTN - chronic, unstable (see orthostatic hypotension)  - Lisinopril held 2/2 increased Cr  - Continue to monitor  - Currently orthostatic hypotensive, Imdur held     HLD - chronic, stable  - Continue Statin  - Last value 7/14/2020 164 total cholesterol, 34 HDL, 94 LDL, and 182 TG    Chronic Midline Low Back Pain without Sciatica  - Under care of Mili and interested in injection per PCP note, unsure if received  - Increased dose of Perc-10 from BID to TID on 5/11/2020 per Dr. Spence Records. From Note on 5/11/2020 - \"Will send in 10 more days as he is out. Then may do 1 month at 3 a day but that is the limit.   He understands\"    Tobacco Abuse  - conseled on cessation  - nicotine patch      DVT Prophylaxis: Eliquis 2/2 PE  Diet: DIET CARDIAC; No Caffeine  Code Status: Full Code     Dispo - Inpatient     RAD TaveraS

## 2020-07-16 NOTE — FLOWSHEET NOTE
Pt requesting to leave at this time. D/c order noted for tomorrow 7/17/2020. Pt made aware, VU but still requests to [facility] now. \" Renee Hunter NP made aware. No new orders. D/c date remains as is per S. Hisset. Pt made aware. Pt still wants to leave. Pt A&Ox4. AMA form provided and explained, pt VU and signed. Witnessed by 2nd RN AMY Carpio. PIV removed, no complications noted. Pt transferred off unit in wheelchair. Renee Hunter NP made aware.   Electronically signed by Alejandro Durham RN on 7/16/2020 at 5:09 PM

## 2020-07-16 NOTE — PLAN OF CARE
Problem: Falls - Risk of:  Goal: Will remain free from falls  Description: Will remain free from falls  7/16/2020 1142 by Amita Patel RN  Outcome: Ongoing  Note: Pt free from falls this shift. Fall precautions in place at all times. Call light within reach. Pt able to and agreeable to contact for safety appropriately. Problem: Safety:  Goal: Free from accidental physical injury  Description: Free from accidental physical injury  7/16/2020 1142 by Amita Patel RN  Outcome: Ongoing  Note: Pt A&O aware of his abilities. Pt understands and knows to call when in need of ambulation. Measures were made to prevent accidental needle stick, friction, shear, etc. Environment kept free of clutter and adequate lighting provided. Will continue to monitor for physical injury. Problem: Daily Care:  Goal: Daily care needs are met  Description: Daily care needs are met  7/16/2020 1142 by Amita Patel RN  Outcome: Ongoing  Note: In collaboration with the healthcare team, the patient's daily care needs are meet. Patient is encouraged to perform tasks independently per ability. Problem: Pain:  Goal: Patient's pain/discomfort is manageable  Description: Patient's pain/discomfort is manageable  7/16/2020 1142 by Amita Patel RN  Outcome: Ongoing  Note: Pain/discomfort being managed with PRN analgesics per MD order. Pt able to express presence and absence of pain and rate pain appropriately using numerical scale       Problem: Skin Integrity:  Goal: Skin integrity will stabilize  Description: Skin integrity will stabilize  7/16/2020 1142 by Amita Patel RN  Outcome: Ongoing  Note: Skin assessment completed every shift. Pt assessed for incontinence, appropriate barrier cream applied prn as needed. Pt encouraged to turn/rotate every 2 hours. Assistance provided if pt unable to do so themselves.       Problem: Discharge Planning:  Goal: Patients continuum of care needs are met  Description: Patients continuum of care needs are met  7/16/2020 1142 by Flora Silvestre RN  Outcome: Ongoing  Note: Continuing to work with patient and health care team on discharge plan. Discharge instructions and medication management will be reviewed prior to discharge.

## 2020-07-16 NOTE — PROGRESS NOTES
refill brisk    Medications:    lidocaine  1 patch Transdermal Daily    hydrALAZINE  25 mg Oral BID    amiodarone  100 mg Oral Daily    apixaban  5 mg Oral BID    aspirin  81 mg Oral Daily    gabapentin  600 mg Oral 5x Daily    lisinopril  10 mg Oral Daily    metoprolol succinate  25 mg Oral Daily    nicotine  1 patch Transdermal Daily    ranolazine  1,000 mg Oral BID    spironolactone  25 mg Oral Daily    tamsulosin  0.4 mg Oral Daily    traZODone  50 mg Oral Nightly    sodium chloride flush  10 mL Intravenous 2 times per day    famotidine  20 mg Oral BID    atorvastatin  20 mg Oral Nightly       HYDROmorphone, albuterol, oxyCODONE-acetaminophen, zolpidem, sodium chloride flush, acetaminophen **OR** acetaminophen, polyethylene glycol, promethazine **OR** ondansetron, nitroGLYCERIN    Lab Data:  CBC:   Recent Labs     07/13/20  1546 07/14/20  0658 07/15/20  1058   WBC 9.2 6.5 7.2   HGB 14.2 13.5 12.3*    158 134*     BMP:    Recent Labs     07/13/20  1546 07/14/20  0658 07/15/20  1058   * 138 133*   K 3.9 3.6 4.0   CO2 20* 22 21   BUN 25* 34* 44*   CREATININE 1.2 1.5* 1.8*     LIVR:   Recent Labs     07/13/20  1546   AST 9*   ALT 8*     INR:    Recent Labs     07/13/20  1546   INR 0.94     APTT:   Recent Labs     07/13/20  1546   APTT 26.4     Results for Katelynn Manus (MRN [id number]) as of 7/16/2020 08:53   Ref.  Range 7/13/2020 22:16 7/14/2020 04:00 7/14/2020 06:58 7/15/2020 10:58   Pro-BNP Latest Ref Range: 0 - 124 pg/mL 977 (H)   225 (H)   Troponin Latest Ref Range: <0.01 ng/mL <0.01 <0.01     Cholesterol, Total Latest Ref Range: 0 - 199 mg/dL   164    HDL Cholesterol Latest Ref Range: 40 - 60 mg/dL   34 (L)    LDL Calculated Latest Ref Range: <100 mg/dL   94    Triglycerides Latest Ref Range: 0 - 150 mg/dL   182 (H)    VLDL Cholesterol Calculated Latest Ref Range: Not Established mg/dL   36      ECG: SR with RBBB; nonspecific TW abnormalities    5/7/2020 Lexiscan-Myoview:  No evidence of reversible ischemia.    Gearline Eugenio is a small sized, mild intensity, fixed defect of the basal and apical     inferior wall. Cannot rule out prior non-transmural infarct.     Moderately dilated left ventricle with moderately reduced ejection fraction     of 41 %.     Overall findings represent a intermediate risk study based on the dilated LV     with reduced EF.       4/23/2020 Echo:  Summary   There is mild concentric left ventricular hypertrophy.   Left ventricular cavity size is dilated.   Ejection fraction is visually estimated to be 25-30%.   Abnormal septal motion likely due to pacing   No pericardial effusion noted.     4/15/2020 Cardiac cath:  1.  Left main is patent with patent stent providing blood to the LAD,  circumflex, and diagonal.  2.  Saphenous vein graft to diagonal is patent. 3.  Saphenous vein graft to the right coronary artery PDA is patent. 4.  Right coronary artery is occluded in the midportion. 5.  Distal LAD is heavily diseased. 6.  LIMA was not studied as it is known to be atretic. 7.  LV ejection fraction of 40%.     SUMMARY:  Nonobstructive coronary artery disease.  There is no lesion  present which could be amenable to intervention. Telemetry: SR-sinus bradycardia with PVC's    Assessment/Plan:    1. Jaw pain  -noncardiac  -Rx per Primary team     2. Dizziness/Postural orthostatic hypotension  -orthostatics BP improved this AM (has not had AM meds)  -change timing of antihypertensives  -diuretic and imdur remain on hold     3. LALITA  -Cr up to 1.8  -Lasix on hold; will discontinue aldactone and lisinopril and monitor  -nephrotoxic agents all currently being held  -may need to reintroduce at lower doses  -increase fluid intake     4.  Coronary artery disease   -prior CABG; recent cardiac cath with continued medical management (nonobstructive CAD; no lesion amenable to intervention)  -no recurrent angina  -continue ASA, statin, BB (low dose) and Ranexa  -ACE on hold d/t increased Cr     5. Cardiomyopathy  -chronic LV systolic dysfunction (last LVEF 25-30%)  -euvolemic on exam  -continue BB  -lasix, lisinopril and aldactone currently on hold d/t elevated Cr      Electronically signed by TAYLA Nicholas - CNP on 7/16/2020 at 8:49 AM

## 2020-07-17 ENCOUNTER — CARE COORDINATION (OUTPATIENT)
Dept: CASE MANAGEMENT | Age: 65
End: 2020-07-17

## 2020-07-17 NOTE — DISCHARGE SUMMARY
Received a call from nursing patient stated he wanted his Norco increased from 3 times a day to 4 times a day. On review of the patient's chart it is noted from his PCP that he was not to have any increase or any more narcotics after this last prescription. That was explained to the patient. He then signed out [place]  Prior to this I did arrange for all his prescriptions to be sent to [facility] pharmacy so that they could do blister packs to help him be more compliant with his medications.

## 2020-07-17 NOTE — CARE COORDINATION
Aury 45 Transitions Initial Follow Up Call    Call within 2 business days of discharge: Yes    Patient: Triny Reid Patient : 1955   MRN: [id number]  Reason for Admission: dizziness  Discharge Date: 20 RARS: Readmission Risk Score: 79      Last Discharge [facility]       Complaint Diagnosis Description Type Department Provider    20 Dizziness Dizziness . .. ED to Hosp-Admission (Discharged) (ADMITTED) Estela Taylor MD; Randal Ashby. .. Facility: [facility]    Non-face-to-face services provided:  Obtained and reviewed discharge summary and/or continuity of care documents       Care Transitions 24 Hour Call    Do you have all of your prescriptions and are they filled?:  No  Post Acute Services:  Home Health (Comment: agency name unknown)  Do you have support at home?:  Partner/Spouse/SO  Are you an active caregiver in your home?:  No  Care Transitions Interventions     Attempted to reach patient via phone for initial post hospital transition call. VM left stating purpose of call along with my contact information requesting a return call.         Follow Up  Future Appointments   Date Time Provider Fred Gregory   2020 11:00 AM Luke Jacobs MD JASON [facility]       Travon Carroll, RN

## 2020-07-18 ENCOUNTER — CARE COORDINATION (OUTPATIENT)
Dept: CASE MANAGEMENT | Age: 65
End: 2020-07-18

## 2020-07-18 NOTE — CARE COORDINATION
Aury 45 Transitions Initial Follow Up Call    Call within 2 business days of discharge: Yes    Patient: Vince Lyons Patient : 1955   MRN: <[id number]>  Reason for Admission:   dizziness  Discharge Date: 20 RARS: Readmission Risk Score: 79      Last Discharge [facility]       Complaint Diagnosis Description Type Department Provider    20 Dizziness Dizziness . .. ED to Hosp-Admission (Discharged) (ADMITTED) Bernard Reynolds MD; Emory Schwartz. .. Spoke with:   Patient's spouse    Facility:   [address] [place] [address] 24 Hour Call    Do you have a copy of your discharge instructions?:  Yes  Do you have all of your prescriptions and are they filled?:  No  Have you been contacted by a [address]?:  No  Have you scheduled your follow up appointment?:  No  Were you discharged with any Home Care or Post Acute Services:  No  Post Acute Services:  Home Health (Comment: agency name unknown)  Do you have support at home?:  Partner/Spouse/SO  Do you feel like you have everything you need to keep you well at home?:  Yes  Are you an active caregiver in your home?:  No  Care Transitions Interventions         Follow Up:  Spoke with patient spouse briefly. Reports that patient is doing well; has all of his prescriptions. Patient spouse reports that patient is independent and manages his own medications. Reports that patient is currently not available for further CTN assessment; but would be agreeable to CTN contact at another time. CTN contact information provided. Encouraged call back if needs arise.    Future Appointments   Date Time Provider Fred Gregory   2020 11:00 AM Carlo Snow MD [facility] BRAYAN Rodríguez RN

## 2020-07-27 ENCOUNTER — TELEPHONE (OUTPATIENT)
Dept: PHARMACY | Age: 65
End: 2020-07-27

## 2020-07-29 ENCOUNTER — APPOINTMENT (OUTPATIENT)
Dept: CT IMAGING | Age: 65
DRG: 313 | End: 2020-07-29
Payer: MEDICARE

## 2020-07-29 ENCOUNTER — APPOINTMENT (OUTPATIENT)
Dept: GENERAL RADIOLOGY | Age: 65
DRG: 313 | End: 2020-07-29
Payer: MEDICARE

## 2020-07-29 ENCOUNTER — HOSPITAL ENCOUNTER (INPATIENT)
Age: 65
LOS: 1 days | Discharge: HOME | DRG: 313 | End: 2020-07-30
Attending: EMERGENCY MEDICINE | Admitting: STUDENT IN AN ORGANIZED HEALTH CARE EDUCATION/TRAINING PROGRAM
Payer: MEDICARE

## 2020-07-29 ENCOUNTER — CARE COORDINATION (OUTPATIENT)
Dept: CASE MANAGEMENT | Age: 65
End: 2020-07-29

## 2020-07-29 PROBLEM — I20.9 ANGINAL SYNDROME (HCC): Status: ACTIVE | Noted: 2020-07-29

## 2020-07-29 LAB
ANION GAP SERPL CALCULATED.3IONS-SCNC: 13 MMOL/L (ref 3–16)
BASOPHILS ABSOLUTE: 0.1 K/UL (ref 0–0.2)
BASOPHILS RELATIVE PERCENT: 0.8 %
BUN BLDV-MCNC: 13 MG/DL (ref 7–20)
CALCIUM SERPL-MCNC: 8.3 MG/DL (ref 8.3–10.6)
CHLORIDE BLD-SCNC: 103 MMOL/L (ref 99–110)
CO2: 22 MMOL/L (ref 21–32)
CREAT SERPL-MCNC: 0.9 MG/DL (ref 0.8–1.3)
EOSINOPHILS ABSOLUTE: 0.1 K/UL (ref 0–0.6)
EOSINOPHILS RELATIVE PERCENT: 1.8 %
GFR AFRICAN AMERICAN: >60
GFR NON-AFRICAN AMERICAN: >60
GLUCOSE BLD-MCNC: 157 MG/DL (ref 70–99)
HCT VFR BLD CALC: 40.9 % (ref 40.5–52.5)
HEMOGLOBIN: 13.8 G/DL (ref 13.5–17.5)
LYMPHOCYTES ABSOLUTE: 2.5 K/UL (ref 1–5.1)
LYMPHOCYTES RELATIVE PERCENT: 36.4 %
MCH RBC QN AUTO: 34.3 PG (ref 26–34)
MCHC RBC AUTO-ENTMCNC: 33.8 G/DL (ref 31–36)
MCV RBC AUTO: 101.5 FL (ref 80–100)
MONOCYTES ABSOLUTE: 0.4 K/UL (ref 0–1.3)
MONOCYTES RELATIVE PERCENT: 5.7 %
NEUTROPHILS ABSOLUTE: 3.8 K/UL (ref 1.7–7.7)
NEUTROPHILS RELATIVE PERCENT: 55.3 %
PDW BLD-RTO: 14.8 % (ref 12.4–15.4)
PLATELET # BLD: 186 K/UL (ref 135–450)
PMV BLD AUTO: 8.8 FL (ref 5–10.5)
POTASSIUM REFLEX MAGNESIUM: 3.9 MMOL/L (ref 3.5–5.1)
RBC # BLD: 4.03 M/UL (ref 4.2–5.9)
SODIUM BLD-SCNC: 138 MMOL/L (ref 136–145)
TROPONIN: <0.01 NG/ML
WBC # BLD: 6.8 K/UL (ref 4–11)

## 2020-07-29 PROCEDURE — 71045 X-RAY EXAM CHEST 1 VIEW: CPT

## 2020-07-29 PROCEDURE — 96376 TX/PRO/DX INJ SAME DRUG ADON: CPT

## 2020-07-29 PROCEDURE — 99285 EMERGENCY DEPT VISIT HI MDM: CPT

## 2020-07-29 PROCEDURE — 71260 CT THORAX DX C+: CPT

## 2020-07-29 PROCEDURE — 96374 THER/PROPH/DIAG INJ IV PUSH: CPT

## 2020-07-29 PROCEDURE — 84484 ASSAY OF TROPONIN QUANT: CPT

## 2020-07-29 PROCEDURE — 96375 TX/PRO/DX INJ NEW DRUG ADDON: CPT

## 2020-07-29 PROCEDURE — 2060000000 HC ICU INTERMEDIATE R&B

## 2020-07-29 PROCEDURE — 85025 COMPLETE CBC W/AUTO DIFF WBC: CPT

## 2020-07-29 PROCEDURE — 6360000004 HC RX CONTRAST MEDICATION: Performed by: EMERGENCY MEDICINE

## 2020-07-29 PROCEDURE — 36415 COLL VENOUS BLD VENIPUNCTURE: CPT

## 2020-07-29 PROCEDURE — 93005 ELECTROCARDIOGRAM TRACING: CPT | Performed by: EMERGENCY MEDICINE

## 2020-07-29 PROCEDURE — 6360000002 HC RX W HCPCS: Performed by: EMERGENCY MEDICINE

## 2020-07-29 PROCEDURE — 80048 BASIC METABOLIC PNL TOTAL CA: CPT

## 2020-07-29 PROCEDURE — 6370000000 HC RX 637 (ALT 250 FOR IP): Performed by: EMERGENCY MEDICINE

## 2020-07-29 RX ORDER — MORPHINE SULFATE 4 MG/ML
4 INJECTION, SOLUTION INTRAMUSCULAR; INTRAVENOUS ONCE
Status: COMPLETED | OUTPATIENT
Start: 2020-07-29 | End: 2020-07-29

## 2020-07-29 RX ORDER — MORPHINE SULFATE 2 MG/ML
2 INJECTION, SOLUTION INTRAMUSCULAR; INTRAVENOUS ONCE
Status: COMPLETED | OUTPATIENT
Start: 2020-07-29 | End: 2020-07-29

## 2020-07-29 RX ORDER — ONDANSETRON 2 MG/ML
4 INJECTION INTRAMUSCULAR; INTRAVENOUS ONCE
Status: COMPLETED | OUTPATIENT
Start: 2020-07-29 | End: 2020-07-29

## 2020-07-29 RX ORDER — ASPIRIN 325 MG
325 TABLET ORAL DAILY
Status: ON HOLD | COMMUNITY
End: 2021-04-02 | Stop reason: HOSPADM

## 2020-07-29 RX ADMIN — NITROGLYCERIN 1 INCH: 20 OINTMENT TOPICAL at 23:30

## 2020-07-29 RX ADMIN — MORPHINE SULFATE 4 MG: 4 INJECTION, SOLUTION INTRAMUSCULAR; INTRAVENOUS at 18:39

## 2020-07-29 RX ADMIN — MORPHINE SULFATE 2 MG: 2 INJECTION, SOLUTION INTRAMUSCULAR; INTRAVENOUS at 23:31

## 2020-07-29 RX ADMIN — MORPHINE SULFATE 4 MG: 4 INJECTION, SOLUTION INTRAMUSCULAR; INTRAVENOUS at 21:00

## 2020-07-29 RX ADMIN — IOPAMIDOL 75 ML: 755 INJECTION, SOLUTION INTRAVENOUS at 20:58

## 2020-07-29 RX ADMIN — ONDANSETRON 4 MG: 2 INJECTION INTRAMUSCULAR; INTRAVENOUS at 18:39

## 2020-07-29 ASSESSMENT — PAIN DESCRIPTION - ONSET
ONSET: ON-GOING

## 2020-07-29 ASSESSMENT — PAIN DESCRIPTION - DESCRIPTORS
DESCRIPTORS: SHARP

## 2020-07-29 ASSESSMENT — PAIN DESCRIPTION - PROGRESSION
CLINICAL_PROGRESSION: GRADUALLY WORSENING
CLINICAL_PROGRESSION: GRADUALLY IMPROVING
CLINICAL_PROGRESSION: GRADUALLY IMPROVING

## 2020-07-29 ASSESSMENT — PAIN DESCRIPTION - PAIN TYPE
TYPE: ACUTE PAIN

## 2020-07-29 ASSESSMENT — PAIN DESCRIPTION - LOCATION
LOCATION: CHEST

## 2020-07-29 ASSESSMENT — PAIN DESCRIPTION - ORIENTATION
ORIENTATION: LEFT

## 2020-07-29 ASSESSMENT — PAIN SCALES - GENERAL
PAINLEVEL_OUTOF10: 5
PAINLEVEL_OUTOF10: 7
PAINLEVEL_OUTOF10: 5
PAINLEVEL_OUTOF10: 7

## 2020-07-29 ASSESSMENT — PAIN DESCRIPTION - FREQUENCY
FREQUENCY: CONTINUOUS

## 2020-07-29 NOTE — ED PROVIDER NOTES
chills. HENT: Negative for rhinorrhea and sore throat. Eyes: Negative for redness or drainage. Gastrointestinal: Negative for abdominal pain. Negative for vomiting or diarrhea. Genitourinary: Negative for flank pain. Negative for dysuria. Negative for hematuria. Neurological: Negative for headache. Musculoskeletal:  Negative edema. All systems are reviewed and are negative except for those listed above in the history of present illness and ROS.         PAST MEDICAL HISTORY     Past Medical History:   Diagnosis Date    Anxiety     Arthritis     Asthma     CAD (coronary artery disease)     Calcium kidney stone     Cardiomyopathy ([address]     Cerebral artery occlusion with cerebral infarction ([address]     CHF (congestive heart failure) ([address]     Clostridium difficile diarrhea 02/12/2020    COPD (chronic obstructive pulmonary disease) [facility])     mild    Depression     DM2 (diabetes mellitus, type 2) ([facility])     Fibromyalgia     GERD (gastroesophageal reflux disease)     Hyperlipidemia     Hypertension     Liver disease     Pacemaker 2012    Medtronic model # F390BAH    Pneumonia     Seizures ([address]     TIA (transient ischemic attack) 2007    Ulcerative colitis ([facility]          SURGICAL HISTORY       Past Surgical History:   Procedure Laterality Date    APPENDECTOMY      BACK SURGERY      CARDIAC SURGERY      CHOLECYSTECTOMY      COLONOSCOPY  01/10/2017    COLONOSCOPY  01/10/2017    CORONARY ANGIOPLASTY WITH STENT PLACEMENT  2012    CORONARY ARTERY BYPASS GRAFT  2010, 11/2015    ENDOSCOPY, COLON, DIAGNOSTIC      PACEMAKER PLACEMENT      UPPER GASTROINTESTINAL ENDOSCOPY  02/07/2017    VASCULAR SURGERY           CURRENT MEDICATIONS       Previous Medications    ACETAMINOPHEN (TYLENOL) 325 MG TABLET    Take 650 mg by mouth every 6 hours as needed for Pain    ALBUTEROL SULFATE HFA (VENTOLIN HFA) 108 (90 BASE) MCG/ACT INHALER    Inhale 2 puffs into the lungs 4 times daily as needed for Wheezing    AMIODARONE (PACERONE) 100 MG TABLET    Take 1 tablet by mouth daily    APIXABAN (ELIQUIS) 5 MG TABS TABLET    Take 1 tablet by mouth 2 times daily    ASPIRIN 325 MG TABLET    Take 325 mg by mouth daily    ATORVASTATIN (LIPITOR) 80 MG TABLET    Take 1 tablet by mouth daily    EMPAGLIFLOZIN (JARDIANCE) 10 MG TABLET    Take 1 tablet by mouth daily    GABAPENTIN (NEURONTIN) 600 MG TABLET    1 tab PO 5 times a day    HYDRALAZINE (APRESOLINE) 25 MG TABLET    Take 1 tablet by mouth 2 times daily    METOPROLOL SUCCINATE (TOPROL XL) 25 MG EXTENDED RELEASE TABLET    Take 1 tablet by mouth daily    OXYCODONE-ACETAMINOPHEN (PERCOCET)  MG PER TABLET    Take 1 tablet by mouth every 8 hours as needed for Pain for up to 30 days. Intended supply: 30 days    PANTOPRAZOLE (PROTONIX) 40 MG TABLET    Take 1 tablet by mouth daily    RANOLAZINE (RANEXA) 500 MG EXTENDED RELEASE TABLET    Take 2 tablets by mouth 2 times daily    TAMSULOSIN (FLOMAX) 0.4 MG CAPSULE    Take 1 capsule by mouth daily    TRAZODONE (DESYREL) 50 MG TABLET    TAKE 1 TABLET BY MOUTH EVERY NIGHT AT BEDTIME    ZOLPIDEM (AMBIEN) 5 MG TABLET    Take 1 tablet by mouth nightly as needed for Sleep for up to 60 days.        ALLERGIES     Dopamine hcl; Tramadol; and Melatonin    FAMILY HISTORY       Family History   Problem Relation Age of Onset    Coronary Art Dis Father     Cancer Mother         Lung with mets    Diabetes Mother           SOCIAL HISTORY       Social History     Socioeconomic History    Marital status:      Spouse name: Not on file    Number of children: 1    Years of education: Not on file    Highest education level: Not on file   Occupational History    Occupation: disabled   Social Needs    Financial resource strain: Not on file    Food insecurity     Worry: Not on file     Inability: Not on file   [employer] needs     Medical: Not on file     Non-medical: Not on file   Tobacco Use    Smoking the following components:       Result Value    RBC 4.03 (*)     .5 (*)     MCH 34.3 (*)     All other components within normal limits    Narrative:     Performed at:  [facility]  [address] [place] [address]   Phone [phone]   BASIC METABOLIC PANEL W/ REFLEX TO MG FOR LOW K - Abnormal; Notable for the following components:    Glucose 157 (*)     All other components within normal limits    Narrative:     Performed at:  [facility]  [address] [place] [address]   Phone [phone]   TROPONIN    Narrative:     Performed at:  [address]   Phone [phone]       All other labs were within normal range or not returned as of this dictation. EMERGENCY DEPARTMENT COURSE and DIFFERENTIAL DIAGNOSIS/MDM:   Vitals:    Vitals:    07/29/20 1718 07/29/20 1729 07/29/20 1845 07/29/20 1935   BP: (!) 152/102  (!) 179/96 (!) 144/100   Pulse: 88  73 63   Resp: 20  15 18   Temp: 98.6 °F (37 °C)      TempSrc: Oral      SpO2: 97%      Weight:  174 lb 6.4 oz (79.1 kg)     Height: 6' (1.829 m)          Patient presented with stabbing left-sided chest pain as noted above. He states that it is identical to what he is experienced in the past with his coronary artery disease. He currently rates his pain a 7/10 intensity. He was given additional morphine 4 mg IV and Zofran 4 mg IV. Initial EKG revealed normal sinus rhythm. There is evidence of right bundle branch block. However, EKG is unchanged from prior tracing. Prior records from his most recent hospitalization were reviewed. The patient has had a history of recurrent chest pain and is currently on Imdur and Ranexa. However, he is continued to have symptoms. MDM    Stress test from May 7, 2020 was reviewed.   Please refer to the report listed below.     Conclusions          Summary     No evidence of reversible ischemia.    Monika Friday is a small sized, mild intensity, fixed defect of the basal and apical     inferior wall. Cannot rule out prior non-transmural infarct.     Moderately dilated left ventricle with moderately reduced ejection fraction     of 41 %.     Overall findings represent a intermediate risk study based on the dilated LV     with reduced EF.           Cardiac catheterization from April 14, 2020 was reviewed. Please refer to the report listed below. ANGIOGRAPHY FINDINGS:  1. Left main is patent with patent stent providing blood to the LAD,  circumflex, and diagonal.  2.  Saphenous vein graft to diagonal is patent. 3.  Saphenous vein graft to the right coronary artery PDA is patent. 4.  Right coronary artery is occluded in the midportion. 5.  Distal LAD is heavily diseased. 6.  LIMA was not studied as it is known to be atretic. 7.  LV ejection fraction of 40%.     SUMMARY:  Nonobstructive coronary artery disease. There is no lesion  present which could be amenable to intervention.     RECOMMENDATION:  1. Continue postop post cath care. 2.  Site care. 3.  Risk factor modification. 4.  Aggressive medical therapy. 5.  The patient can be discharged today.          REASSESSMENT          10:34 PM: CTA chest was reviewed. There is no evidence of pulmonary embolus. Repeat EKG revealed no evolving changes. Patient's pain is consistent with what he has had in the past with ischemic chest pain and given the ongoing nature of his pain, he will be admitted for further treatment and evaluation. A call was placed to the hospitalist on-call for admission. CRITICAL CARE TIME   Total Critical Care time was 0 minutes, excluding separately reportable procedures. There was a high probability of clinically significant/life threatening deterioration in the patient's condition which required my urgent intervention.       CONSULTS:  None    PROCEDURES:  Unless otherwise noted below, none

## 2020-07-29 NOTE — ED NOTES
Bed: [facility]  Expected date: 7/29/20  Expected time:   Means of arrival:   Comments:  Chest Pain     Leydi Rodriguez  07/29/20 9232

## 2020-07-29 NOTE — CARE COORDINATION
Aury 45 Transitions Follow Up Call    2020    Patient: Wesley Aguilar  Patient : 1955   MRN: [id number]  Reason for Admission:   Discharge Date: 20 RARS: Readmission Risk Score: 79         Spoke with: no one    Care Transitions Subsequent and Final Call    Subsequent and Final Calls  Care Transitions Interventions  Other Interventions:          Final attempt at CTN follow up phone call. Unable to reach patient. Unable to leave message. No answer - no voicemail.     Follow Up  Future Appointments   Date Time Provider Fred Gregory   2020 11:00 AM Larry Childs MD [facility] BRAYAN Osman RN

## 2020-07-29 NOTE — ED PROVIDER NOTES
EKG Interpretation    Interpreted by emergency department physician  Time read: 4611    Rhythm: Sinus  Ventricular Rate: 93  QRS Axis: 56  Ectopy: None  Conduction: Normal sinus rhythm with left atrial enlargement and right bundle branch block  ST Segments: Consistent with right bundle branch block  T Waves: Consistent with right bundle branch block  Q Waves: Lead III only    Other findings: Mild motion artifact but EKG is readable    Compared to EKG on: 7/13/2020    Clinical Impression: Normal sinus rhythm with left atrial enlargement and right bundle branch block but unchanged from the previous EKG on 7/13/2020    Chip Menchaca DO  07/29/20 1825

## 2020-07-29 NOTE — ED NOTES
Report given to Ellene Eisenmenger, RN.  Said RN to assume pt's care at this time     Thania Brown RN  07/29/20 2013

## 2020-07-29 NOTE — ED NOTES
CT notified me that IV blew; asked Marielena Paez to attempt placement.       Hillary Hays RN  07/29/20 6257

## 2020-07-29 NOTE — ED TRIAGE NOTES
pt brought in by EMS with c/o sharp, stabbing, left-sided, 7/10 constant chest pain x 1hr. Previous open heart surgery, pt given 324 ASA and 0.4mg nitro x3 en route by EMS. Pt said it did not help with the pain.
No

## 2020-07-30 VITALS
HEIGHT: 72 IN | DIASTOLIC BLOOD PRESSURE: 58 MMHG | HEART RATE: 60 BPM | WEIGHT: 176.15 LBS | BODY MASS INDEX: 23.86 KG/M2 | OXYGEN SATURATION: 97 % | RESPIRATION RATE: 15 BRPM | SYSTOLIC BLOOD PRESSURE: 95 MMHG | TEMPERATURE: 97.7 F

## 2020-07-30 LAB
ANION GAP SERPL CALCULATED.3IONS-SCNC: 13 MMOL/L (ref 3–16)
BUN BLDV-MCNC: 14 MG/DL (ref 7–20)
C-REACTIVE PROTEIN: 3.1 MG/L (ref 0–5.1)
CALCIUM SERPL-MCNC: 8.7 MG/DL (ref 8.3–10.6)
CHLORIDE BLD-SCNC: 102 MMOL/L (ref 99–110)
CO2: 22 MMOL/L (ref 21–32)
CREAT SERPL-MCNC: 1 MG/DL (ref 0.8–1.3)
EKG ATRIAL RATE: 64 BPM
EKG ATRIAL RATE: 64 BPM
EKG ATRIAL RATE: 93 BPM
EKG DIAGNOSIS: NORMAL
EKG P AXIS: 41 DEGREES
EKG P AXIS: 50 DEGREES
EKG P AXIS: 51 DEGREES
EKG P-R INTERVAL: 124 MS
EKG P-R INTERVAL: 126 MS
EKG P-R INTERVAL: 134 MS
EKG Q-T INTERVAL: 426 MS
EKG Q-T INTERVAL: 470 MS
EKG Q-T INTERVAL: 498 MS
EKG QRS DURATION: 122 MS
EKG QRS DURATION: 130 MS
EKG QRS DURATION: 144 MS
EKG QTC CALCULATION (BAZETT): 484 MS
EKG QTC CALCULATION (BAZETT): 513 MS
EKG QTC CALCULATION (BAZETT): 529 MS
EKG R AXIS: 56 DEGREES
EKG R AXIS: 63 DEGREES
EKG R AXIS: 72 DEGREES
EKG T AXIS: 108 DEGREES
EKG T AXIS: 57 DEGREES
EKG T AXIS: 96 DEGREES
EKG VENTRICULAR RATE: 64 BPM
EKG VENTRICULAR RATE: 64 BPM
EKG VENTRICULAR RATE: 93 BPM
GFR AFRICAN AMERICAN: >60
GFR NON-AFRICAN AMERICAN: >60
GLUCOSE BLD-MCNC: 125 MG/DL (ref 70–99)
HCT VFR BLD CALC: 42.2 % (ref 40.5–52.5)
HEMOGLOBIN: 14.3 G/DL (ref 13.5–17.5)
MAGNESIUM: 2.9 MG/DL (ref 1.8–2.4)
MCH RBC QN AUTO: 34.4 PG (ref 26–34)
MCHC RBC AUTO-ENTMCNC: 33.8 G/DL (ref 31–36)
MCV RBC AUTO: 101.6 FL (ref 80–100)
PDW BLD-RTO: 14.8 % (ref 12.4–15.4)
PLATELET # BLD: 182 K/UL (ref 135–450)
PMV BLD AUTO: 8.7 FL (ref 5–10.5)
POTASSIUM REFLEX MAGNESIUM: 4.6 MMOL/L (ref 3.5–5.1)
RBC # BLD: 4.15 M/UL (ref 4.2–5.9)
SEDIMENTATION RATE, ERYTHROCYTE: 17 MM/HR (ref 0–20)
SODIUM BLD-SCNC: 137 MMOL/L (ref 136–145)
TROPONIN: <0.01 NG/ML
WBC # BLD: 5.6 K/UL (ref 4–11)

## 2020-07-30 PROCEDURE — 80048 BASIC METABOLIC PNL TOTAL CA: CPT

## 2020-07-30 PROCEDURE — 93005 ELECTROCARDIOGRAM TRACING: CPT | Performed by: STUDENT IN AN ORGANIZED HEALTH CARE EDUCATION/TRAINING PROGRAM

## 2020-07-30 PROCEDURE — 6370000000 HC RX 637 (ALT 250 FOR IP): Performed by: STUDENT IN AN ORGANIZED HEALTH CARE EDUCATION/TRAINING PROGRAM

## 2020-07-30 PROCEDURE — 93010 ELECTROCARDIOGRAM REPORT: CPT | Performed by: INTERNAL MEDICINE

## 2020-07-30 PROCEDURE — 94760 N-INVAS EAR/PLS OXIMETRY 1: CPT

## 2020-07-30 PROCEDURE — 85652 RBC SED RATE AUTOMATED: CPT

## 2020-07-30 PROCEDURE — 2580000003 HC RX 258: Performed by: STUDENT IN AN ORGANIZED HEALTH CARE EDUCATION/TRAINING PROGRAM

## 2020-07-30 PROCEDURE — 83735 ASSAY OF MAGNESIUM: CPT

## 2020-07-30 PROCEDURE — 6360000002 HC RX W HCPCS: Performed by: FAMILY MEDICINE

## 2020-07-30 PROCEDURE — 86140 C-REACTIVE PROTEIN: CPT

## 2020-07-30 PROCEDURE — 85027 COMPLETE CBC AUTOMATED: CPT

## 2020-07-30 PROCEDURE — G0378 HOSPITAL OBSERVATION PER HR: HCPCS

## 2020-07-30 PROCEDURE — 6370000000 HC RX 637 (ALT 250 FOR IP): Performed by: FAMILY MEDICINE

## 2020-07-30 PROCEDURE — 96376 TX/PRO/DX INJ SAME DRUG ADON: CPT

## 2020-07-30 PROCEDURE — 99222 1ST HOSP IP/OBS MODERATE 55: CPT | Performed by: INTERNAL MEDICINE

## 2020-07-30 PROCEDURE — 84484 ASSAY OF TROPONIN QUANT: CPT

## 2020-07-30 PROCEDURE — 36415 COLL VENOUS BLD VENIPUNCTURE: CPT

## 2020-07-30 RX ORDER — OXYCODONE AND ACETAMINOPHEN 10; 325 MG/1; MG/1
1 TABLET ORAL EVERY 8 HOURS PRN
Status: DISCONTINUED | OUTPATIENT
Start: 2020-07-30 | End: 2020-07-30 | Stop reason: HOSPADM

## 2020-07-30 RX ORDER — TRAZODONE HYDROCHLORIDE 50 MG/1
50 TABLET ORAL NIGHTLY
Status: DISCONTINUED | OUTPATIENT
Start: 2020-07-30 | End: 2020-07-30 | Stop reason: HOSPADM

## 2020-07-30 RX ORDER — GABAPENTIN 300 MG/1
600 CAPSULE ORAL ONCE
Status: COMPLETED | OUTPATIENT
Start: 2020-07-30 | End: 2020-07-30

## 2020-07-30 RX ORDER — SODIUM CHLORIDE 0.9 % (FLUSH) 0.9 %
10 SYRINGE (ML) INJECTION PRN
Status: DISCONTINUED | OUTPATIENT
Start: 2020-07-30 | End: 2020-07-30 | Stop reason: HOSPADM

## 2020-07-30 RX ORDER — ATORVASTATIN CALCIUM 80 MG/1
80 TABLET, FILM COATED ORAL DAILY
Status: DISCONTINUED | OUTPATIENT
Start: 2020-07-30 | End: 2020-07-30

## 2020-07-30 RX ORDER — GABAPENTIN 300 MG/1
600 CAPSULE ORAL
Status: DISCONTINUED | OUTPATIENT
Start: 2020-07-30 | End: 2020-07-30 | Stop reason: HOSPADM

## 2020-07-30 RX ORDER — KETOROLAC TROMETHAMINE 15 MG/ML
15 INJECTION, SOLUTION INTRAMUSCULAR; INTRAVENOUS ONCE
Status: COMPLETED | OUTPATIENT
Start: 2020-07-30 | End: 2020-07-30

## 2020-07-30 RX ORDER — NITROGLYCERIN 0.4 MG/1
0.4 TABLET SUBLINGUAL EVERY 5 MIN PRN
Status: DISCONTINUED | OUTPATIENT
Start: 2020-07-30 | End: 2020-07-30 | Stop reason: HOSPADM

## 2020-07-30 RX ORDER — CYCLOBENZAPRINE HCL 10 MG
10 TABLET ORAL 3 TIMES DAILY PRN
Status: DISCONTINUED | OUTPATIENT
Start: 2020-07-30 | End: 2020-07-30 | Stop reason: HOSPADM

## 2020-07-30 RX ORDER — SODIUM CHLORIDE 0.9 % (FLUSH) 0.9 %
10 SYRINGE (ML) INJECTION EVERY 12 HOURS SCHEDULED
Status: DISCONTINUED | OUTPATIENT
Start: 2020-07-30 | End: 2020-07-30 | Stop reason: HOSPADM

## 2020-07-30 RX ORDER — ALBUTEROL SULFATE 2.5 MG/3ML
2.5 SOLUTION RESPIRATORY (INHALATION) EVERY 4 HOURS PRN
Status: DISCONTINUED | OUTPATIENT
Start: 2020-07-30 | End: 2020-07-30 | Stop reason: HOSPADM

## 2020-07-30 RX ORDER — METOPROLOL SUCCINATE 25 MG/1
25 TABLET, EXTENDED RELEASE ORAL DAILY
Status: DISCONTINUED | OUTPATIENT
Start: 2020-07-30 | End: 2020-07-30 | Stop reason: HOSPADM

## 2020-07-30 RX ORDER — HYDRALAZINE HYDROCHLORIDE 25 MG/1
25 TABLET, FILM COATED ORAL 2 TIMES DAILY
Status: DISCONTINUED | OUTPATIENT
Start: 2020-07-30 | End: 2020-07-30 | Stop reason: HOSPADM

## 2020-07-30 RX ORDER — ACETAMINOPHEN 325 MG/1
650 TABLET ORAL EVERY 6 HOURS PRN
Status: DISCONTINUED | OUTPATIENT
Start: 2020-07-30 | End: 2020-07-30 | Stop reason: HOSPADM

## 2020-07-30 RX ORDER — ATORVASTATIN CALCIUM 40 MG/1
80 TABLET, FILM COATED ORAL NIGHTLY
Status: DISCONTINUED | OUTPATIENT
Start: 2020-07-30 | End: 2020-07-30 | Stop reason: HOSPADM

## 2020-07-30 RX ORDER — ASPIRIN 325 MG
325 TABLET ORAL DAILY
Status: DISCONTINUED | OUTPATIENT
Start: 2020-07-30 | End: 2020-07-30 | Stop reason: HOSPADM

## 2020-07-30 RX ORDER — TAMSULOSIN HYDROCHLORIDE 0.4 MG/1
0.4 CAPSULE ORAL DAILY
Status: DISCONTINUED | OUTPATIENT
Start: 2020-07-30 | End: 2020-07-30 | Stop reason: HOSPADM

## 2020-07-30 RX ORDER — PANTOPRAZOLE SODIUM 40 MG/1
40 TABLET, DELAYED RELEASE ORAL DAILY
Status: DISCONTINUED | OUTPATIENT
Start: 2020-07-30 | End: 2020-07-30 | Stop reason: HOSPADM

## 2020-07-30 RX ORDER — AMIODARONE HYDROCHLORIDE 200 MG/1
100 TABLET ORAL DAILY
Status: DISCONTINUED | OUTPATIENT
Start: 2020-07-30 | End: 2020-07-30 | Stop reason: HOSPADM

## 2020-07-30 RX ORDER — ONDANSETRON 2 MG/ML
4 INJECTION INTRAMUSCULAR; INTRAVENOUS EVERY 6 HOURS PRN
Status: DISCONTINUED | OUTPATIENT
Start: 2020-07-30 | End: 2020-07-30 | Stop reason: HOSPADM

## 2020-07-30 RX ADMIN — HYDRALAZINE HYDROCHLORIDE 25 MG: 25 TABLET, FILM COATED ORAL at 03:05

## 2020-07-30 RX ADMIN — KETOROLAC TROMETHAMINE 15 MG: 15 INJECTION, SOLUTION INTRAMUSCULAR; INTRAVENOUS at 11:15

## 2020-07-30 RX ADMIN — APIXABAN 5 MG: 5 TABLET, FILM COATED ORAL at 03:05

## 2020-07-30 RX ADMIN — GABAPENTIN 600 MG: 300 CAPSULE ORAL at 09:24

## 2020-07-30 RX ADMIN — OXYCODONE HYDROCHLORIDE AND ACETAMINOPHEN 1 TABLET: 10; 325 TABLET ORAL at 11:32

## 2020-07-30 RX ADMIN — TAMSULOSIN HYDROCHLORIDE 0.4 MG: 0.4 CAPSULE ORAL at 09:24

## 2020-07-30 RX ADMIN — OXYCODONE HYDROCHLORIDE AND ACETAMINOPHEN 1 TABLET: 10; 325 TABLET ORAL at 03:23

## 2020-07-30 RX ADMIN — SODIUM CHLORIDE, PRESERVATIVE FREE 10 ML: 5 INJECTION INTRAVENOUS at 09:26

## 2020-07-30 RX ADMIN — APIXABAN 5 MG: 5 TABLET, FILM COATED ORAL at 09:24

## 2020-07-30 RX ADMIN — HYDRALAZINE HYDROCHLORIDE 25 MG: 25 TABLET, FILM COATED ORAL at 09:25

## 2020-07-30 RX ADMIN — AMIODARONE HYDROCHLORIDE 100 MG: 200 TABLET ORAL at 09:25

## 2020-07-30 RX ADMIN — PANTOPRAZOLE SODIUM 40 MG: 40 TABLET, DELAYED RELEASE ORAL at 05:27

## 2020-07-30 RX ADMIN — METOPROLOL SUCCINATE 25 MG: 25 TABLET, EXTENDED RELEASE ORAL at 09:25

## 2020-07-30 RX ADMIN — GABAPENTIN 600 MG: 300 CAPSULE ORAL at 13:38

## 2020-07-30 RX ADMIN — ASPIRIN 325 MG ORAL TABLET 325 MG: 325 PILL ORAL at 09:24

## 2020-07-30 ASSESSMENT — PAIN DESCRIPTION - DESCRIPTORS
DESCRIPTORS: DISCOMFORT
DESCRIPTORS: SHARP

## 2020-07-30 ASSESSMENT — PAIN DESCRIPTION - PAIN TYPE
TYPE: ACUTE PAIN

## 2020-07-30 ASSESSMENT — PAIN DESCRIPTION - ONSET
ONSET: ON-GOING

## 2020-07-30 ASSESSMENT — PAIN - FUNCTIONAL ASSESSMENT
PAIN_FUNCTIONAL_ASSESSMENT: ACTIVITIES ARE NOT PREVENTED

## 2020-07-30 ASSESSMENT — PAIN DESCRIPTION - PROGRESSION
CLINICAL_PROGRESSION: NOT CHANGED
CLINICAL_PROGRESSION: GRADUALLY IMPROVING

## 2020-07-30 ASSESSMENT — PAIN SCALES - GENERAL
PAINLEVEL_OUTOF10: 9
PAINLEVEL_OUTOF10: 8
PAINLEVEL_OUTOF10: 0
PAINLEVEL_OUTOF10: 6
PAINLEVEL_OUTOF10: 8
PAINLEVEL_OUTOF10: 8

## 2020-07-30 ASSESSMENT — PAIN DESCRIPTION - LOCATION
LOCATION: GENERALIZED
LOCATION: GENERALIZED
LOCATION: CHEST
LOCATION: GENERALIZED

## 2020-07-30 ASSESSMENT — PAIN DESCRIPTION - FREQUENCY
FREQUENCY: CONTINUOUS

## 2020-07-30 ASSESSMENT — PAIN DESCRIPTION - DIRECTION: RADIATING_TOWARDS: L ARM

## 2020-07-30 ASSESSMENT — PAIN DESCRIPTION - ORIENTATION
ORIENTATION: RIGHT;LEFT
ORIENTATION: LEFT
ORIENTATION: RIGHT;LEFT
ORIENTATION: RIGHT;LEFT

## 2020-07-30 NOTE — CONSULTS
[facility]           [address]                                  CONSULTATION    PATIENT NAME: Patrick Varner                    :        1955  MED REC NO:   [id number]                          ROOM:       5120  ACCOUNT NO:   [de-identified]                           ADMIT DATE: 2020  PROVIDER:     Musa Ferreira MD    CARDIOLOGY CONSULTATION    CONSULT DATE:  2020    HISTORY OF PRESENT ILLNESS:  This is a 30-year-old male who is very  well-known to me and this hospital with his numerous ER visits and  admissions in the last few months. Again, came back to the hospital  with a sharp chest pain which occurred yesterday. He said he could not  take it anymore and decided to come to the hospital.  The pain was sharp  in nature and gets worse with inspiration at times. He has no nausea,  vomiting. He has no shortness of breath, orthopnea, or PND. His workup in the emergency room was normal.  His troponins were all  negative. The patient was again admitted for further evaluation and  treatment. He did have a CAT scan of his chest which showed no PE. REVIEW OF SYSTEMS:  Please see HPI. All other systems are reviewed and  they are negative. PAST MEDICAL HISTORY:  Include  1. History of coronary artery disease. He has previous CABG and  protected left main stenting. He underwent coronary angiography a few  months ago which showed a patent stented site and his other coronary  artery disease was not felt to be amenable to percutaneous intervention  and it was decided to treat him medically. 2.  History of PE.  3.  Hypertension. 4.  Diabetes. 5.  Drug-seeking behavior. 6.  History of cardiomyopathy. 7.  History of congestive heart failure. 8.  History of COPD. 9.  History of anxiety. SOCIAL HISTORY:  The patient has a longstanding history of smoking and I  believe he continues to smoke currently.   No history of alcohol usage. FAMILY HISTORY:  Father had a heart disease at a young age. PAST SURGICAL HISTORY:  Previous bypass surgery, pacemaker insertion,  colonoscopy, cholecystectomy, back surgery, appendectomy. MEDICINES AND ALLERGIES:  Have been reviewed. PHYSICAL EXAMINATION:  VITAL SIGNS:  His pulse is 57, blood pressure 139/58. CONSTITUTIONAL:  He is alert and oriented but anxious. HEENT EXAMINATION:  His neck is supple. No jugular venous distention. No carotid bruits appreciated. No thyromegaly. Eyes show no pale  conjunctivae or icterus. CARDIAC EXAMINATION:  Reveals normal S1 and S2. I am unable to  appreciate any gallop, murmur, or rub. LUNGS:  Largely clear to auscultation and palpation. ABDOMEN:  Soft, nontender. Bowel sounds are present. No organomegaly  appreciated. EXTREMITIES:  Show no edema. NEUROLOGICAL EXAMINATION:  He is alert, oriented. Cranial nerves II  through XII intact. No focal deficits. SKIN:  Shows no rashes. LABORATORY DATA:  Sodium 137, potassium 4.6, chloride 102, bicarb 22,  BUN is 14, creatinine 1. Magnesium is 2.90. Troponin x2 have been  negative. White count 5.6, hemoglobin 14.3, hematocrit is 42.2. The patient's CT of the chest showed no acute pulmonary embolism. He  has a stable 60 mm nodule in his left lower lobe, unchanged. EKG shows  sinus rhythm, right bundle branch block, isolated PVC. IMPRESSION:  This is a 60-year-old male who has presented again with a  recurrent chest pain. His pain is atypical for angina. His EKG,  troponin showed no abnormalities. His pain gets worse with the  inspiration. His pain is probably due to his anxiety though I cannot  completely exclude pleuropericarditis. RECOMMENDATIONS:  1.  I will obtain ESR and CRP levels. If they are negative, then no  further cardiac workup will be necessary on this gentleman.   2.  I have strongly advised him to not come back to the emergency room  since his extensive cardiac workup in a very recent past have been  negative and he continues to have no further EKG or troponin changes. I appreciate the opportunity to participate in the care of this pleasant  male. Complexity of medical decision making is very high.         Anu Hernandez MD    D: 07/30/2020 14:08:45       T: 07/30/2020 17:35:13     [email or web address]  Job#: [id number]     Doc#: [id number]    CC:

## 2020-07-30 NOTE — H&P
Hospital Medicine History & Physical      PCP: Tashia White MD    Date of Admission: 7/29/2020    Date of Service: Pt seen/examined on 7/28/2020 and Admitted to Inpatient. Chief Complaint: Chest pain      History Of Present Illness: The patient is a 59 y.o. male who presents to [facility] with past medical history of significant CAD with history of CABG, pacemaker, hypertension, diabetes, COPD, CHF, asthma, GERD with chief complaint of chest pain. Patient's chest pain has been persistent for at least a month in duration notably comes and goes and patient has a cardiologist and has been tried on a number of medications for it. Patient notes this chest pain recently got worse notes it to be  sharp chest pain. When brought by EMS noted sharp stabbing left-sided chest pain that was 7 out of 10 in patient was given 324 of aspirin and 3 nitro tablets but no resolution of pain. Patient does notes worsening dyspnea on exertion in the past 24 to 48 hours.     Past Medical History:        Diagnosis Date    Anxiety     Arthritis     Asthma     CAD (coronary artery disease)     Calcium kidney stone     Cardiomyopathy ([address]     Cerebral artery occlusion with cerebral infarction ([address]     CHF (congestive heart failure) ([address]     Clostridium difficile diarrhea 02/12/2020    COPD (chronic obstructive pulmonary disease) (HCC)     mild    Depression     DM2 (diabetes mellitus, type 2) (HCC)     Fibromyalgia     GERD (gastroesophageal reflux disease)     Hyperlipidemia     Hypertension     Liver disease     Pacemaker 2012    Medtronic model # [id number]    Pneumonia     Seizures ([address]     TIA (transient ischemic attack) 2007    Ulcerative colitis ([address]        Past Surgical History:        Procedure Laterality Date    APPENDECTOMY      TAYLA Granda CNP   zolpidem (AMBIEN) 5 MG tablet Take 1 tablet by mouth nightly as needed for Sleep for up to 60 days. 7/16/20 9/14/20 Yes TAYLA Miner CNP   ranolazine (RANEXA) 500 MG extended release tablet Take 2 tablets by mouth 2 times daily 7/16/20  Yes Maya Granda APRSHEEBA - CNP   amiodarone (PACERONE) 100 MG tablet Take 1 tablet by mouth daily 7/17/20  Yes TAYLA Miner CNP   acetaminophen (TYLENOL) 325 MG tablet Take 650 mg by mouth every 6 hours as needed for Pain   Yes Historical Provider, MD       Allergies:  Dopamine hcl; Tramadol; and Melatonin    Social History:  The patient currently lives home    TOBACCO:   reports that he has been smoking cigarettes. He has a 22.00 pack-year smoking history. He has never used smokeless tobacco.  ETOH:   reports no history of alcohol use. Family History: . Positive as follows:        Problem Relation Age of Onset    Coronary Art Dis Father     Cancer Mother         Lung with mets    Diabetes Mother        REVIEW OF SYSTEMS:   as noted in the HPI. All other systems reviewed and negative. PHYSICAL EXAM:    BP (!) 146/81   Pulse 61   Temp 97.6 °F (36.4 °C) (Oral)   Resp 16   Ht 6' (1.829 m)   Wt 176 lb 2.4 oz (79.9 kg)   SpO2 95%   BMI 23.89 kg/m²     General appearance: No apparent distress appears stated age and cooperative. HEENT Normal cephalic, atraumatic without obvious deformity. Pupils equal, round, and reactive to light. Extra ocular muscles intact. Conjunctivae/corneas clear. Neck: Supple, No jugular venous distention/bruits. Trachea midline without thyromegaly or adenopathy with full range of motion. Lungs: Clear to auscultation, bilaterally without Rales/Wheezes/Rhonchi with good respiratory effort.   Heart: Regular rate and rhythm with Normal S1/S2 without murmurs, rubs or gallops, point of maximum impulse non-displaced  Abdomen: Soft, non-tender or non-distended without rigidity or guarding and positive bowel sounds all four quadrants. Extremities: No clubbing, cyanosis, or edema bilaterally. Full range of motion without deformity and normal gait intact. Skin: Skin color, texture, turgor normal.  No rashes or lesions. Neurologic: Alert and oriented X 3, neurovascularly intact with sensory/motor intact upper extremities/lower extremities, bilaterally. Cranial nerves: II-XII intact, grossly non-focal.  Mental status: Alert, oriented, thought content appropriate. Capillary Refill: Acceptable  < 3 seconds  Peripheral Pulses: +3 Easily felt, not easily obliterated with pressure      CXR: Negative for acute process  CTA of the chest: Negative for acute pulmonary embolus and other acute cardiopulmonary disease. Stable 60 mm nodule noted in the left upper lobe on comparison to 2018 imaging  EKG: Sinus rhythm at 93, right bundle branch block    CBC   Recent Labs     07/29/20  1808 07/30/20  0446   WBC 6.8 5.6   HGB 13.8 14.3   HCT 40.9 42.2    182      RENAL  Recent Labs     07/29/20  1808      K 3.9      CO2 22   BUN 13   CREATININE 0.9     LFT'S  No results for input(s): AST, ALT, ALB, BILIDIR, BILITOT, ALKPHOS in the last 72 hours. COAG  No results for input(s): INR in the last 72 hours.   CARDIAC ENZYMES  Recent Labs     07/29/20  1808   TROPONINI <0.01       U/A:    Lab Results   Component Value Date    NITRITE neg 05/23/2014    COLORU YELLOW 04/22/2020    WBCUA 3 04/22/2020    RBCUA 1 04/22/2020    CLARITYU Clear 04/22/2020    SPECGRAV 1.014 04/22/2020    LEUKOCYTESUR Negative 04/22/2020    BLOODU Negative 04/22/2020    GLUCOSEU 250 04/22/2020       ABG    Lab Results   Component Value Date    CVA9OIJ 25.5 05/21/2018    BEART -0.2 05/21/2018    I7IXEKPQ 98.4 05/21/2018    PHART 7.345 05/21/2018    CYQ4MJP 48.0 05/21/2018    PO2ART 114.0 05/21/2018    GYQ3MGF 27.0 05/21/2018           Active Hospital Problems    Diagnosis Date Noted    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] 07/29/2020         PHYSICIANS CERTIFICATION:    I certify that Millie Tony is expected to be hospitalized for less  than 2 midnights based on the following assessment and plan:      ASSESSMENT/PLAN:  · Observation  · Cardiology consult: May be a candidate for catheterization based on extensive CAD history  · Trending troponins, currently negative  · Restarted home cardiac meds including Eliquis, aspirin, beta-blocker, statin    DVT Prophylaxis: Lovenox  Diet: Diet NPO, After Midnight  Code Status: Full Code  PT/OT Eval Status: Ambulatory    Dispo -cardiology consult and will await recommendations       Anuj Balderas,     Thank you Bryant Velazquez MD for the opportunity to be involved in this patient's care. If you have any questions or concerns please feel free to contact me at [phone].

## 2020-07-30 NOTE — PROGRESS NOTES
This RN discussed with patient that Dr. Rosalinda Arboleda is keeping the discharge order in. This RN explained to patient that nothing remarkable was found in blood lab values, EKG, radiology images, etc. Therefore, we are not treating him with any new medications or interventions. Patient stated he is going to file a report against us because we are not allowed to kick him out. Patient stated if he had to leave today, then he is going to leave right now. D/C paperwork and instructions provided to patient. Patient verbally understood. IV removed without complications. Tele monitor and leads removed from patient. Patient's belongings were collected and taken by patient. D/C paperwork signed by patient. Pt was wheeled out to wife's car by this RN w/o complications.          Electronically signed by Sen Marino RN on 7/30/2020 at 4:57 PM

## 2020-07-30 NOTE — PROGRESS NOTES
D/C order acknowledged. Pt insisting on staying at the hospital stating, \"I really don't feel good. \" Pt requesting to stay at least another day to see if he feels any better. Dr. Compa Michelle notified via secure message.        Electronically signed by Shireen Mayo RN on 7/30/2020 at 3:28 PM

## 2020-07-30 NOTE — ED NOTES
[facility] to be [facility] to Room 5120 via stretcher by ED tech. Patient transported with bedside cardiac monitor. IV site clean, dry, and intact. MEWS score and pain assessed as 6 and documented. Updated patient on plan of care.        Hank Lares, RN  07/30/20 0128

## 2020-07-30 NOTE — ED NOTES
Called CT to inform them of the new IV, but requested a 20g. I notified my charge nurse.       Armen Viramontes RN  07/29/20 2009

## 2020-07-31 ENCOUNTER — CARE COORDINATION (OUTPATIENT)
Dept: CASE MANAGEMENT | Age: 65
End: 2020-07-31

## 2020-07-31 NOTE — CARE COORDINATION
[facility] Transitions Initial Follow Up Call    Call within 2 business days of discharge: Yes    Patient: Nivia Ferris Patient : 1955   MRN: [id number]  Reason for Admission: CP  Discharge Date: 20 RARS: Readmission Risk Score: 62      Last Discharge [facility]       Complaint Diagnosis Description Type Department Provider    20 Chest Pain Chest pain, unspecified type ED to Hosp-Admission (Discharged) (ADMITTED) ANATOLY 5W Jack Saunders MD; ADRIEL Wood. Spoke with: patient, [facility]    Non-face-to-face services provided:  Obtained and reviewed discharge summary and/or continuity of care documents    Care Transitions 24 Hour Call    Do you have any ongoing symptoms?:  Yes  Patient-reported symptoms:  Chest Pain, Shortness of Breath  Do you have a copy of your discharge instructions?:  Yes  Do you have all of your prescriptions and are they filled?:  Yes  Have you scheduled your follow up appointment?:  Yes  How are you going to get to your appointment?:  Car - drive self  Were you discharged with any Home Care or Post Acute Services:  No  Post Acute Services:  Home Health (Comment: agency name unknown)  Do you have support at home?:  Partner/Spouse/SO  Are you an active caregiver in your home?:  No  Care Transitions Interventions     Spoke with patient, Ruy. He stated received copy of discharge instructions. He stated his weight was 176# this morning and that he has been following low sodium diet and fluid restrictions. He stated he is slightly SOB, but it is not bad and having CP but it is better and he thinks it is muscle related. He stated \"absolutely\" when asked if he is still smoking and is aware of ramifications of doing so. He declined doing a medication review, stating he knew his medications ( there was no changes in his medications upon discharge). Writer verified Health care decision makers with Ruy.   He is aware of appt with  [facility] on [facility] and stated he has an appt with Dr. Kathi Romo some time in the future. Denies any acute needs at present time. Agreeable to f/u calls. Educated on the use of  physicians 24 hr access line if assistance is needed after hours. Shaun Gant declined writer's contact information.         Follow Up  Future Appointments   Date Time Provider Fred Gregory   8/7/2020 11:00 AM Luke Jacobs MD [facility]       Travon Carroll RN

## 2020-08-01 NOTE — DISCHARGE SUMMARY
oriented, thought content appropriate, normal insight      Consults:     IP CONSULT TO CARDIOLOGY    Significant Diagnostic Studies:       CT CHEST PULMONARY EMBOLISM W CONTRAST [[id number]]  Collected: 07/29/20 2127        Order Status: Completed  Updated: 07/29/20 2137       Narrative:         EXAMINATION:   CTA OF THE CHEST 7/29/2020 7:13 pm     TECHNIQUE:   CTA of the chest was performed after the administration of intravenous   contrast.  Multiplanar reformatted images are provided for review.  MIP   images are provided for review. Dose modulation, iterative reconstruction,   and/or weight based adjustment of the mA/kV was utilized to reduce the   radiation dose to as low as reasonably achievable. COMPARISON:   06/17/2020, April 2018, CT chest.     HISTORY:   ORDERING SYSTEM PROVIDED HISTORY: chest pain   TECHNOLOGIST PROVIDED HISTORY:   Reason for exam:->chest pain   Reason for Exam: chest pain   Acuity: Acute   Type of Exam: Initial     FINDINGS:   Pulmonary Arteries: Pulmonary arteries are adequately opacified for   evaluation.  No evidence of intraluminal filling defect to suggest pulmonary   embolism.  Main pulmonary artery is normal in caliber. Mediastinum: There is a left infraclavicular ICD.  There is moderate left   ventricular hypertrophy.  Previous coronary bypass is noted.  Thoracic aorta   is not well opacified.  There is no aneurysm.  There is no mediastinal or   hilar adenopathy. Lungs/pleura: Central airways are patent.  In the medial aspect of the left   lower lobe there is an irregularly-shaped, lobular 16 x 12 mm nodule, stable   since 2018.  No new pulmonary nodules are seen. [place] is no focal airspace   disease or pleural effusion. Upper Abdomen: Previous cholecystectomy is noted. [place] is a simple cyst   projecting posterior to the left kidney measuring 8.6 cm.  It is incompletely   visualized.  No follow-up is recommended. Soft Tissues/Bones:  There is chronic mild anterior height loss of T7.  No   acute fractures are seen.       Impression:         No pulmonary embolus is identified. No acute cardiopulmonary disease. Stable 16 mm nodule in the left lower lobe since 2018.  No follow-up is   recommended.       XR CHEST PORTABLE [[id number]]  Collected: 07/29/20 1815       Order Status: Completed  Updated: 07/29/20 1818       Narrative:         EXAMINATION:   ONE XRAY VIEW OF THE CHEST     7/29/2020 5:52 pm     COMPARISON:   07/15/2020. HISTORY:   ORDERING SYSTEM PROVIDED HISTORY: Chest Pain   TECHNOLOGIST PROVIDED HISTORY:   Reason for exam:->Chest Pain   Reason for Exam: Chest Pain   Acuity: Acute   Type of Exam: Initial     FINDINGS:   The cardiomediastinal silhouette is unremarkable status post median   sternotomy.  The lungs are clear.  No infiltrate, pleural fluid or focal   process is seen.  Left-sided AICD device.       Impression:         No acute cardiopulmonary disease.       XR CHEST (2 VW) [[id number]]  Updated: 07/29/20 1752       Order Status: Canceled  Specimen: Chest                PCP/SNF to follow up: Chronic medical conditions    Disposition: Home    Addition on discharge: Stable    Discharge Instructions/Follow-up: Follow-up with PCP within 1 week    Code Status:  Prior     Activity: activity as tolerated    Diet: cardiac diet    Labs:  For convenience and continuity at follow-up the following most recent labs are provided:      CBC:    Lab Results   Component Value Date    WBC 5.6 07/30/2020    HGB 14.3 07/30/2020    HCT 42.2 07/30/2020     07/30/2020       Renal:    Lab Results   Component Value Date     07/30/2020    K 4.6 07/30/2020     07/30/2020    CO2 22 07/30/2020    BUN 14 07/30/2020    CREATININE 1.0 07/30/2020    CALCIUM 8.7 07/30/2020    PHOS 4.1 07/08/2020       Discharge Medications:     Discharge Medication List as of 7/30/2020  3:59 PM           Details   aspirin 325 MG tablet Take 325 mg by mouth dailyHistorical Med      traZODone (DESYREL) 50 MG tablet TAKE 1 TABLET BY MOUTH EVERY NIGHT AT BEDTIME, Disp-30 tablet,R-2Normal      oxyCODONE-acetaminophen (PERCOCET)  MG per tablet Take 1 tablet by mouth every 8 hours as needed for Pain for up to 30 days. Intended supply: 30 days, Disp-90 tablet,R-0Normal      albuterol sulfate HFA (VENTOLIN HFA) 108 (90 Base) MCG/ACT inhaler Inhale 2 puffs into the lungs 4 times daily as needed for Wheezing, Disp-1 Inhaler,R-1Normal      apixaban (ELIQUIS) 5 MG TABS tablet Take 1 tablet by mouth 2 times daily, Disp-60 tablet,R-1Normal      gabapentin (NEURONTIN) 600 MG tablet 1 tab PO 5 times a day, Disp-150 tablet,R-1Normal      empagliflozin (JARDIANCE) 10 MG tablet Take 1 tablet by mouth daily, Disp-30 tablet,R-0Normal      atorvastatin (LIPITOR) 80 MG tablet Take 1 tablet by mouth daily, Disp-30 tablet,R-0Normal      hydrALAZINE (APRESOLINE) 25 MG tablet Take 1 tablet by mouth 2 times daily, Disp-60 tablet,R-0Normal      metoprolol succinate (TOPROL XL) 25 MG extended release tablet Take 1 tablet by mouth daily, Disp-30 tablet,R-0Normal      tamsulosin (FLOMAX) 0.4 MG capsule Take 1 capsule by mouth daily, Disp-30 capsule,R-0Normal      pantoprazole (PROTONIX) 40 MG tablet Take 1 tablet by mouth daily, Disp-30 tablet,R-0Normal      zolpidem (AMBIEN) 5 MG tablet Take 1 tablet by mouth nightly as needed for Sleep for up to 60 days. , Disp-30 tablet,R-0Print      ranolazine (RANEXA) 500 MG extended release tablet Take 2 tablets by mouth 2 times daily, Disp-60 tablet,R-0Normal      amiodarone (PACERONE) 100 MG tablet Take 1 tablet by mouth daily, Disp-30 tablet,R-3Normal      acetaminophen (TYLENOL) 325 MG tablet Take 650 mg by mouth every 6 hours as needed for PainHistorical Med             Time Spent on discharge is more than 20 minutes in the examination, evaluation, counseling and review of medications and discharge plan. Signed:     Jean-Claude oMon MD   7/31/2020      Thank you Sirena Melendez MD for the opportunity to be involved in this patient's care. If you have any questions or concerns please feel free to contact me at [phone].

## 2020-08-03 ENCOUNTER — TELEPHONE (OUTPATIENT)
Dept: PHARMACY | Age: 65
End: 2020-08-03

## 2020-08-08 ENCOUNTER — HOSPITAL ENCOUNTER (EMERGENCY)
Age: 65
Discharge: HOME | End: 2020-08-09
Attending: EMERGENCY MEDICINE
Payer: MEDICARE

## 2020-08-08 ENCOUNTER — APPOINTMENT (OUTPATIENT)
Dept: GENERAL RADIOLOGY | Age: 65
End: 2020-08-08
Payer: MEDICARE

## 2020-08-08 LAB
A/G RATIO: 1.5 (ref 1.1–2.2)
ALBUMIN SERPL-MCNC: 3.8 G/DL (ref 3.4–5)
ALP BLD-CCNC: 47 U/L (ref 40–129)
ALT SERPL-CCNC: <5 U/L (ref 10–40)
ANION GAP SERPL CALCULATED.3IONS-SCNC: 12 MMOL/L (ref 3–16)
AST SERPL-CCNC: 7 U/L (ref 15–37)
BASOPHILS ABSOLUTE: 0.1 K/UL (ref 0–0.2)
BASOPHILS RELATIVE PERCENT: 0.9 %
BILIRUB SERPL-MCNC: 0.4 MG/DL (ref 0–1)
BUN BLDV-MCNC: 18 MG/DL (ref 7–20)
C-REACTIVE PROTEIN: 1.2 MG/L (ref 0–5.1)
CALCIUM SERPL-MCNC: 8.6 MG/DL (ref 8.3–10.6)
CHLORIDE BLD-SCNC: 103 MMOL/L (ref 99–110)
CO2: 22 MMOL/L (ref 21–32)
CREAT SERPL-MCNC: 1 MG/DL (ref 0.8–1.3)
EOSINOPHILS ABSOLUTE: 0.1 K/UL (ref 0–0.6)
EOSINOPHILS RELATIVE PERCENT: 1.5 %
GFR AFRICAN AMERICAN: >60
GFR NON-AFRICAN AMERICAN: >60
GLOBULIN: 2.6 G/DL
GLUCOSE BLD-MCNC: 132 MG/DL (ref 70–99)
HCT VFR BLD CALC: 39.2 % (ref 40.5–52.5)
HEMOGLOBIN: 13.2 G/DL (ref 13.5–17.5)
LIPASE: 18 U/L (ref 13–60)
LYMPHOCYTES ABSOLUTE: 2 K/UL (ref 1–5.1)
LYMPHOCYTES RELATIVE PERCENT: 33.5 %
MCH RBC QN AUTO: 34.2 PG (ref 26–34)
MCHC RBC AUTO-ENTMCNC: 33.7 G/DL (ref 31–36)
MCV RBC AUTO: 101.5 FL (ref 80–100)
MONOCYTES ABSOLUTE: 0.4 K/UL (ref 0–1.3)
MONOCYTES RELATIVE PERCENT: 7.2 %
NEUTROPHILS ABSOLUTE: 3.4 K/UL (ref 1.7–7.7)
NEUTROPHILS RELATIVE PERCENT: 56.9 %
PDW BLD-RTO: 14.8 % (ref 12.4–15.4)
PLATELET # BLD: 177 K/UL (ref 135–450)
PMV BLD AUTO: 9.2 FL (ref 5–10.5)
POTASSIUM SERPL-SCNC: 3.8 MMOL/L (ref 3.5–5.1)
RBC # BLD: 3.86 M/UL (ref 4.2–5.9)
SEDIMENTATION RATE, ERYTHROCYTE: 17 MM/HR (ref 0–20)
SODIUM BLD-SCNC: 137 MMOL/L (ref 136–145)
TOTAL PROTEIN: 6.4 G/DL (ref 6.4–8.2)
TROPONIN: 0.03 NG/ML
WBC # BLD: 6 K/UL (ref 4–11)

## 2020-08-08 PROCEDURE — 85025 COMPLETE CBC W/AUTO DIFF WBC: CPT

## 2020-08-08 PROCEDURE — 6360000002 HC RX W HCPCS: Performed by: PHYSICIAN ASSISTANT

## 2020-08-08 PROCEDURE — 96374 THER/PROPH/DIAG INJ IV PUSH: CPT

## 2020-08-08 PROCEDURE — 71046 X-RAY EXAM CHEST 2 VIEWS: CPT

## 2020-08-08 PROCEDURE — 80053 COMPREHEN METABOLIC PANEL: CPT

## 2020-08-08 PROCEDURE — 6370000000 HC RX 637 (ALT 250 FOR IP): Performed by: PHYSICIAN ASSISTANT

## 2020-08-08 PROCEDURE — 99285 EMERGENCY DEPT VISIT HI MDM: CPT

## 2020-08-08 PROCEDURE — 93005 ELECTROCARDIOGRAM TRACING: CPT | Performed by: EMERGENCY MEDICINE

## 2020-08-08 PROCEDURE — 2580000003 HC RX 258: Performed by: PHYSICIAN ASSISTANT

## 2020-08-08 PROCEDURE — 84484 ASSAY OF TROPONIN QUANT: CPT

## 2020-08-08 PROCEDURE — 86140 C-REACTIVE PROTEIN: CPT

## 2020-08-08 PROCEDURE — 85652 RBC SED RATE AUTOMATED: CPT

## 2020-08-08 PROCEDURE — 83690 ASSAY OF LIPASE: CPT

## 2020-08-08 RX ORDER — OXYCODONE HYDROCHLORIDE AND ACETAMINOPHEN 5; 325 MG/1; MG/1
2 TABLET ORAL ONCE
Status: COMPLETED | OUTPATIENT
Start: 2020-08-08 | End: 2020-08-08

## 2020-08-08 RX ORDER — ONDANSETRON 2 MG/ML
4 INJECTION INTRAMUSCULAR; INTRAVENOUS ONCE
Status: COMPLETED | OUTPATIENT
Start: 2020-08-08 | End: 2020-08-08

## 2020-08-08 RX ORDER — 0.9 % SODIUM CHLORIDE 0.9 %
500 INTRAVENOUS SOLUTION INTRAVENOUS ONCE
Status: COMPLETED | OUTPATIENT
Start: 2020-08-08 | End: 2020-08-08

## 2020-08-08 RX ORDER — KETOROLAC TROMETHAMINE 30 MG/ML
15 INJECTION, SOLUTION INTRAMUSCULAR; INTRAVENOUS ONCE
Status: DISCONTINUED | OUTPATIENT
Start: 2020-08-08 | End: 2020-08-08

## 2020-08-08 RX ADMIN — ONDANSETRON 4 MG: 2 INJECTION INTRAMUSCULAR; INTRAVENOUS at 22:16

## 2020-08-08 RX ADMIN — SODIUM CHLORIDE 500 ML: 9 INJECTION, SOLUTION INTRAVENOUS at 22:14

## 2020-08-08 RX ADMIN — OXYCODONE HYDROCHLORIDE AND ACETAMINOPHEN 2 TABLET: 5; 325 TABLET ORAL at 22:29

## 2020-08-08 ASSESSMENT — PAIN DESCRIPTION - PAIN TYPE
TYPE: ACUTE PAIN
TYPE: ACUTE PAIN;CHRONIC PAIN

## 2020-08-08 ASSESSMENT — PAIN DESCRIPTION - FREQUENCY
FREQUENCY: CONTINUOUS
FREQUENCY: CONTINUOUS

## 2020-08-08 ASSESSMENT — PAIN DESCRIPTION - LOCATION
LOCATION: CHEST
LOCATION: CHEST

## 2020-08-08 ASSESSMENT — ENCOUNTER SYMPTOMS
COLOR CHANGE: 0
COUGH: 0
VOMITING: 1
ABDOMINAL PAIN: 0
NAUSEA: 1
SHORTNESS OF BREATH: 0

## 2020-08-08 ASSESSMENT — PAIN DESCRIPTION - DESCRIPTORS
DESCRIPTORS: ACHING
DESCRIPTORS: ACHING

## 2020-08-08 ASSESSMENT — PAIN DESCRIPTION - PROGRESSION
CLINICAL_PROGRESSION: NOT CHANGED
CLINICAL_PROGRESSION: NOT CHANGED

## 2020-08-08 ASSESSMENT — PAIN SCALES - GENERAL
PAINLEVEL_OUTOF10: 7
PAINLEVEL_OUTOF10: 7
PAINLEVEL_OUTOF10: 5

## 2020-08-08 ASSESSMENT — PAIN DESCRIPTION - ORIENTATION
ORIENTATION: MID
ORIENTATION: MID

## 2020-08-08 ASSESSMENT — PAIN DESCRIPTION - ONSET
ONSET: ON-GOING
ONSET: ON-GOING

## 2020-08-09 ENCOUNTER — APPOINTMENT (OUTPATIENT)
Dept: GENERAL RADIOLOGY | Age: 65
End: 2020-08-09
Payer: MEDICARE

## 2020-08-09 VITALS
HEIGHT: 72 IN | SYSTOLIC BLOOD PRESSURE: 131 MMHG | BODY MASS INDEX: 24.76 KG/M2 | OXYGEN SATURATION: 97 % | DIASTOLIC BLOOD PRESSURE: 80 MMHG | RESPIRATION RATE: 18 BRPM | TEMPERATURE: 98.2 F | HEART RATE: 71 BPM | WEIGHT: 182.8 LBS

## 2020-08-09 VITALS
TEMPERATURE: 98.3 F | WEIGHT: 181 LBS | OXYGEN SATURATION: 94 % | RESPIRATION RATE: 14 BRPM | HEIGHT: 71 IN | SYSTOLIC BLOOD PRESSURE: 141 MMHG | HEART RATE: 90 BPM | BODY MASS INDEX: 25.34 KG/M2 | DIASTOLIC BLOOD PRESSURE: 79 MMHG

## 2020-08-09 LAB
ANION GAP SERPL CALCULATED.3IONS-SCNC: 12 MMOL/L (ref 3–16)
BASOPHILS ABSOLUTE: 0.1 K/UL (ref 0–0.2)
BASOPHILS RELATIVE PERCENT: 1.1 %
BUN BLDV-MCNC: 13 MG/DL (ref 7–20)
CALCIUM SERPL-MCNC: 8.1 MG/DL (ref 8.3–10.6)
CHLORIDE BLD-SCNC: 102 MMOL/L (ref 99–110)
CO2: 20 MMOL/L (ref 21–32)
CREAT SERPL-MCNC: 0.9 MG/DL (ref 0.8–1.3)
EOSINOPHILS ABSOLUTE: 0.1 K/UL (ref 0–0.6)
EOSINOPHILS RELATIVE PERCENT: 2.2 %
GFR AFRICAN AMERICAN: >60
GFR NON-AFRICAN AMERICAN: >60
GLUCOSE BLD-MCNC: 135 MG/DL (ref 70–99)
HCT VFR BLD CALC: 37.6 % (ref 40.5–52.5)
HEMOGLOBIN: 12.8 G/DL (ref 13.5–17.5)
LYMPHOCYTES ABSOLUTE: 1.8 K/UL (ref 1–5.1)
LYMPHOCYTES RELATIVE PERCENT: 34.3 %
MCH RBC QN AUTO: 34.8 PG (ref 26–34)
MCHC RBC AUTO-ENTMCNC: 34.1 G/DL (ref 31–36)
MCV RBC AUTO: 102.1 FL (ref 80–100)
MONOCYTES ABSOLUTE: 0.4 K/UL (ref 0–1.3)
MONOCYTES RELATIVE PERCENT: 6.8 %
NEUTROPHILS ABSOLUTE: 2.9 K/UL (ref 1.7–7.7)
NEUTROPHILS RELATIVE PERCENT: 55.6 %
PDW BLD-RTO: 14.7 % (ref 12.4–15.4)
PLATELET # BLD: 163 K/UL (ref 135–450)
PLATELET SLIDE REVIEW: ADEQUATE
PMV BLD AUTO: 9.7 FL (ref 5–10.5)
POTASSIUM REFLEX MAGNESIUM: 4.3 MMOL/L (ref 3.5–5.1)
PRO-BNP: 1156 PG/ML (ref 0–124)
RBC # BLD: 3.68 M/UL (ref 4.2–5.9)
SLIDE REVIEW: ABNORMAL
SODIUM BLD-SCNC: 134 MMOL/L (ref 136–145)
TROPONIN: 0.02 NG/ML
TROPONIN: <0.01 NG/ML
WBC # BLD: 5.3 K/UL (ref 4–11)

## 2020-08-09 PROCEDURE — 83880 ASSAY OF NATRIURETIC PEPTIDE: CPT

## 2020-08-09 PROCEDURE — 6370000000 HC RX 637 (ALT 250 FOR IP): Performed by: PHYSICIAN ASSISTANT

## 2020-08-09 PROCEDURE — 85025 COMPLETE CBC W/AUTO DIFF WBC: CPT

## 2020-08-09 PROCEDURE — 71046 X-RAY EXAM CHEST 2 VIEWS: CPT

## 2020-08-09 PROCEDURE — 99285 EMERGENCY DEPT VISIT HI MDM: CPT

## 2020-08-09 PROCEDURE — 94761 N-INVAS EAR/PLS OXIMETRY MLT: CPT

## 2020-08-09 PROCEDURE — 93005 ELECTROCARDIOGRAM TRACING: CPT | Performed by: PHYSICIAN ASSISTANT

## 2020-08-09 PROCEDURE — 94640 AIRWAY INHALATION TREATMENT: CPT

## 2020-08-09 PROCEDURE — 84484 ASSAY OF TROPONIN QUANT: CPT

## 2020-08-09 PROCEDURE — 80048 BASIC METABOLIC PNL TOTAL CA: CPT

## 2020-08-09 RX ORDER — SOFT LENS DISINFECTANT
SOLUTION, NON-ORAL MISCELLANEOUS
Qty: 1 DEVICE | Refills: 0 | Status: SHIPPED | OUTPATIENT
Start: 2020-08-09

## 2020-08-09 RX ORDER — IPRATROPIUM BROMIDE AND ALBUTEROL SULFATE 2.5; .5 MG/3ML; MG/3ML
1 SOLUTION RESPIRATORY (INHALATION) ONCE
Status: COMPLETED | OUTPATIENT
Start: 2020-08-09 | End: 2020-08-09

## 2020-08-09 RX ORDER — OXYCODONE AND ACETAMINOPHEN 10; 325 MG/1; MG/1
1 TABLET ORAL ONCE
Status: COMPLETED | OUTPATIENT
Start: 2020-08-09 | End: 2020-08-09

## 2020-08-09 RX ORDER — PREDNISONE 20 MG/1
40 TABLET ORAL DAILY
Qty: 10 TABLET | Refills: 0 | Status: SHIPPED | OUTPATIENT
Start: 2020-08-09 | End: 2020-08-14

## 2020-08-09 RX ORDER — PREDNISONE 20 MG/1
60 TABLET ORAL ONCE
Status: COMPLETED | OUTPATIENT
Start: 2020-08-09 | End: 2020-08-09

## 2020-08-09 RX ORDER — IPRATROPIUM BROMIDE AND ALBUTEROL SULFATE 2.5; .5 MG/3ML; MG/3ML
1 SOLUTION RESPIRATORY (INHALATION) EVERY 4 HOURS PRN
Qty: 360 ML | Refills: 5 | Status: SHIPPED | OUTPATIENT
Start: 2020-08-09 | End: 2021-03-31

## 2020-08-09 RX ADMIN — IPRATROPIUM BROMIDE AND ALBUTEROL SULFATE 1 AMPULE: .5; 3 SOLUTION RESPIRATORY (INHALATION) at 17:33

## 2020-08-09 RX ADMIN — OXYCODONE HYDROCHLORIDE AND ACETAMINOPHEN 1 TABLET: 10; 325 TABLET ORAL at 17:59

## 2020-08-09 RX ADMIN — PREDNISONE 60 MG: 20 TABLET ORAL at 18:42

## 2020-08-09 ASSESSMENT — HEART SCORE: ECG: 1

## 2020-08-09 ASSESSMENT — PAIN DESCRIPTION - PAIN TYPE: TYPE: ACUTE PAIN

## 2020-08-09 ASSESSMENT — PAIN DESCRIPTION - PROGRESSION: CLINICAL_PROGRESSION: NOT CHANGED

## 2020-08-09 ASSESSMENT — ENCOUNTER SYMPTOMS
COUGH: 1
EYE PAIN: 0
DIARRHEA: 0
SHORTNESS OF BREATH: 1
NAUSEA: 0
BACK PAIN: 0
ABDOMINAL PAIN: 0
VOMITING: 0

## 2020-08-09 ASSESSMENT — PAIN DESCRIPTION - DESCRIPTORS: DESCRIPTORS: PRESSURE

## 2020-08-09 ASSESSMENT — PAIN DESCRIPTION - LOCATION: LOCATION: CHEST

## 2020-08-09 ASSESSMENT — PAIN SCALES - GENERAL
PAINLEVEL_OUTOF10: 0
PAINLEVEL_OUTOF10: 6
PAINLEVEL_OUTOF10: 0
PAINLEVEL_OUTOF10: 7

## 2020-08-09 ASSESSMENT — PAIN DESCRIPTION - FREQUENCY: FREQUENCY: CONTINUOUS

## 2020-08-09 ASSESSMENT — PAIN DESCRIPTION - ONSET: ONSET: ON-GOING

## 2020-08-09 ASSESSMENT — PAIN DESCRIPTION - ORIENTATION: ORIENTATION: LEFT

## 2020-08-09 ASSESSMENT — PAIN - FUNCTIONAL ASSESSMENT: PAIN_FUNCTIONAL_ASSESSMENT: ACTIVITIES ARE NOT PREVENTED

## 2020-08-09 NOTE — ED PROVIDER NOTES
[facility]  eMERGENCY dEPARTMENT eNCOUnter   Physician Attestation    Pt Name: Bernice Parry  MRN: [id number]  [place] 1955  Date of evaluation: 8/9/20        Physician Note:    I havepersonally performed and/or participated in the history, exam and medical decision making and agree with all pertinent clinical information. I have also reviewed and agree with the past medical, family and social historyunless otherwise noted. I have personally performed a face to face diagnostic evaluation onthis patient. I have reviewed the mid-levels findings and agree. History: This is a 26-year-old male smoker with a history of both COPD and coronary artery disease. History of coronary artery bypass grafting. Presents with chest pain. He was here yesterday and had a slight elevation of his troponin to 0.03. This was later repeated and went down to less than 0.01. He did have a cardiac catheterization in April that showed all of his grafts to be patent and no significant obstructive coronary artery disease. He has been having constant chest pain and trouble breathing for the past few days. He did not use his rescue inhaler. He voices complete relief after a breathing treatment here. Physical Exam  Constitutional:       Appearance: He is well-developed. He is not diaphoretic. HENT:      Head: Normocephalic and atraumatic. Right Ear: External ear normal.      Left Ear: External ear normal.   Eyes:      General: No scleral icterus. Right eye: No discharge. Left eye: No discharge. Neck:      Musculoskeletal: Normal range of motion. Thyroid: No thyromegaly. Vascular: No JVD. Trachea: No tracheal deviation. Cardiovascular:      Rate and Rhythm: Normal rate and regular rhythm. Heart sounds: No murmur. No friction rub. No gallop. Pulmonary:      Effort: Pulmonary effort is normal. No respiratory distress.       Breath sounds: Normal breath sounds. No stridor. No wheezing or rales. Abdominal:      General: There is no distension. Palpations: Abdomen is soft. Tenderness: There is no abdominal tenderness. There is no guarding or rebound. Musculoskeletal:         General: No tenderness. Skin:     General: Skin is warm and dry. Findings: No rash (On exposed body surfaces). Neurological:      Mental Status: He is alert and oriented to person, place, and time. Coordination: Coordination normal.   Psychiatric:         Behavior: Behavior normal.         Thought Content: Thought content normal.       EKG visualized preliminarily interpreted by myself. The rhythm is sinus tachycardia at a rate of 103. The axis is -17. There is a right bundle branch block. There is very likely some left atrial enlargement. No significant changes compared to previous cardiogram.      The patient was just admitted a week ago had a CTA for PE serial cardiac enzymes and was seen by cardiology. Although there is a slight bump in the troponin my overall gestalt of this patient is that this was noncardiac pain. He himself insists it was his lungs because he is had such a dramatic improvement after getting a breathing treatment and he has not been using his rescue inhaler. I did write for home aerosols and a 5-day course of prednisone    1. Atypical chest pain    2.  Chronic obstructive pulmonary disease with acute exacerbation [facility])          DISPOSITION/PLAN  PATIENT REFERRED TO:  Devaughn Bird MD  [address]  [phone]    Call in 1 day      DISCHARGE MEDICATIONS:  New Prescriptions    IPRATROPIUM-ALBUTEROL (DUONEB) 0.5-2.5 (3) MG/3ML SOLN NEBULIZER SOLUTION    Inhale 3 mLs into the lungs every 4 hours as needed for Shortness of Breath (wheezing coughing)    PREDNISONE (DELTASONE) 20 MG TABLET    Take 2 tablets by mouth daily for 5 days    RESPIRATORY THERAPY SUPPLIES (NEBULIZER) AALIYAH    Used to give yourself breathing treatment         MD Sami Jeffrey MD  08/09/20 1173

## 2020-08-09 NOTE — ED PROVIDER NOTES
EKG interpretation    Sinus tachycardia at a rate of 108 right bundle en plaque nothing new      Live Avalos MD  08/08/20 4398      I independently evaluated and obtained a history and physical on [address] All diagnostic, treatment, and disposition assistants were made to myself in conjunction the advanced practice provider. For further details of this patient's emergency department encounter, please see the advanced practice provider's documentation. History: Patient is a 71-year-old white male with history of multiple history of coronary artery disease essential hypertension and multiple ED visits presents with chest pain which does not seem cardiac related. Patient had no fever no chills. Patient seen in [facility]. On arrival patient was asking for his pain medication. Does not have a fever. Physician Exam: Patient's 71-year-old white male in no distress. His exam is unremarkable lungs were clear heart regular rate and rhythm his EKG as above. Most of his labs looks good. Plan check with the [facility] if all is negative I agree with disposition home. If there is any change in his labs admission is appropriate.        Live Avalos MD  08/08/20 7008

## 2020-08-09 NOTE — ED PROVIDER NOTES
[address] [facility]        Pt Name: Nicolasa Krishnan  MRN: [id number]  [place] 1955  Date of evaluation: 8/9/2020  Provider: ABIGAIL Nova  PCP: Husam Martinez MD     I have seen and evaluated this patient with my supervising physician Kwesi Vasquez MD.    [facility]       Chief Complaint   Patient presents with    Chest Pain     Pt here with c/o left-sided chest pressure that he rates as a 7/10. Pt took (2) nitro earlier today and said that they helped with the pain but it didn't go away. Patient was here last night as well for same thing. HISTORY OF PRESENT ILLNESS   (Location, Timing/Onset, Context/Setting, Quality, Duration, Modifying Factors, Severity, Associated Signs and Symptoms)  Note limiting factors. Nicolasa Krishnan is a 59 y.o. male with past medical history of coronary artery disease, CHF, COPD, fibromyalgia, on Eliquis, who presents for evaluation of chest pain. Patient was seen in the emergency department yesterday for chest pain, had negative work-up with troponin and delta troponin, EKG, chest x-ray. His pain improved after taking his home Percocet. Patient returns today for 2-day history of cough, shortness of breath, continued chest pain. He thinks this may be due to his COPD. Patient has a history of CAD with last cardiac cath April 2020. He follows with Dr. Bee Oneal cardiology, was discharged from hospital for chest pain about a week ago. He reports compliance with Eliquis. Nursing Notes were all reviewed and agreed with or any disagreements were addressed in the HPI. REVIEW OF SYSTEMS    (2-9 systems for level 4, 10 or more for level 5)     Review of Systems   Constitutional: Negative for chills, fatigue and fever. Eyes: Negative for pain. Respiratory: Positive for cough and shortness of breath. Cardiovascular: Positive for chest pain.    Gastrointestinal: Negative for abdominal pain, diarrhea, nausea and vomiting. Genitourinary: Negative for dysuria. Musculoskeletal: Negative for back pain, neck pain and neck stiffness. Skin: Negative for rash. Neurological: Negative for dizziness and headaches. Psychiatric/Behavioral: Negative for confusion. Positives and Pertinent negatives as per HPI. Except as noted above in the ROS, all other systems were reviewed and negative.        PAST MEDICAL HISTORY     Past Medical History:   Diagnosis Date    Anxiety     Arthritis     Asthma     CAD (coronary artery disease)     Calcium kidney stone     Cardiomyopathy ([address]     Cerebral artery occlusion with cerebral infarction ([address]     CHF (congestive heart failure) ([address]     Clostridium difficile diarrhea 02/12/2020    COPD (chronic obstructive [facility] disease) [facility]     mild    Depression     DM2 (diabetes mellitus, type 2) ([facility])     Fibromyalgia     GERD (gastroesophageal reflux disease)     Hyperlipidemia     Hypertension     Liver disease     Pacemaker 2012    Medtronic model # [id number]    Pneumonia     Seizures ([address]     TIA (transient ischemic attack) 2007    Ulcerative colitis [facility]          SURGICAL HISTORY     Past Surgical History:   Procedure Laterality Date    APPENDECTOMY      BACK SURGERY      CARDIAC SURGERY      CHOLECYSTECTOMY      COLONOSCOPY  01/10/2017    COLONOSCOPY  01/10/2017    CORONARY ANGIOPLASTY WITH STENT PLACEMENT  2012    CORONARY ARTERY BYPASS GRAFT  2010, 11/2015    ENDOSCOPY, COLON, DIAGNOSTIC      PACEMAKER PLACEMENT      UPPER GASTROINTESTINAL ENDOSCOPY  02/07/2017    VASCULAR SURGERY           CURRENTMEDICATIONS       Discharge Medication List as of 8/9/2020  6:40 PM      CONTINUE these medications which have NOT CHANGED    Details   aspirin 325 MG tablet Take 325 mg by mouth dailyHistorical Med      traZODone (DESYREL) 50 MG tablet TAKE 1 TABLET BY MOUTH EVERY NIGHT AT BEDTIME, Disp-30 tablet,R-2Normal      oxyCODONE-acetaminophen (PERCOCET)  MG per tablet Take 1 tablet by mouth every 8 hours as needed for Pain for up to 30 days. Intended supply: 30 days, Disp-90 tablet,R-0Normal      albuterol sulfate HFA (VENTOLIN HFA) 108 (90 Base) MCG/ACT inhaler Inhale 2 puffs into the lungs 4 times daily as needed for Wheezing, Disp-1 Inhaler,R-1Normal      apixaban (ELIQUIS) 5 MG TABS tablet Take 1 tablet by mouth 2 times daily, Disp-60 tablet,R-1Normal      gabapentin (NEURONTIN) 600 MG tablet 1 tab PO 5 times a day, Disp-150 tablet,R-1Normal      empagliflozin (JARDIANCE) 10 MG tablet Take 1 tablet by mouth daily, Disp-30 tablet,R-0Normal      atorvastatin (LIPITOR) 80 MG tablet Take 1 tablet by mouth daily, Disp-30 tablet,R-0Normal      hydrALAZINE (APRESOLINE) 25 MG tablet Take 1 tablet by mouth 2 times daily, Disp-60 tablet,R-0Normal      metoprolol succinate (TOPROL XL) 25 MG extended release tablet Take 1 tablet by mouth daily, Disp-30 tablet,R-0Normal      tamsulosin (FLOMAX) 0.4 MG capsule Take 1 capsule by mouth daily, Disp-30 capsule,R-0Normal      pantoprazole (PROTONIX) 40 MG tablet Take 1 tablet by mouth daily, Disp-30 tablet,R-0Normal      zolpidem (AMBIEN) 5 MG tablet Take 1 tablet by mouth nightly as needed for Sleep for up to 60 days. , Disp-30 tablet,R-0Print      ranolazine (RANEXA) 500 MG extended release tablet Take 2 tablets by mouth 2 times daily, Disp-60 tablet,R-0Normal      amiodarone (PACERONE) 100 MG tablet Take 1 tablet by mouth daily, Disp-30 tablet,R-3Normal      acetaminophen (TYLENOL) 325 MG tablet Take 650 mg by mouth every 6 hours as needed for PainHistorical Med               ALLERGIES     Dopamine hcl; Tramadol; and Melatonin    FAMILYHISTORY       Family History   Problem Relation Age of Onset    Coronary Art Dis Father     Cancer Mother         Lung with mets    Diabetes Mother           SOCIAL HISTORY       Social History     Tobacco Use    Smoking status: Current Every Day Smoker     Packs/day: 0.50     Years: 44.00     Pack years: 22.00     Types: Cigarettes    Smokeless tobacco: Never Used    Tobacco comment: urged to stop   Substance Use Topics    Alcohol use: No     Alcohol/week: 0.0 standard drinks    Drug use: No       SCREENINGS      Heart Score for chest pain patients  History: Slightly Suspicious  ECG: Non-Specifc repolarization disturbance/LBTB/PM  Patient Age: > 39 and < 65 years  *Risk factors for Atherosclerotic disease: Cigarette smoking, Coronary Artery Disease  Risk Factors: 1 or 2 risk factors  Troponin: > 1 and < 3X normal limit  Heart Score Total: 4      PHYSICAL EXAM    (up to 7 for level 4, 8 or more for level 5)     ED Triage Vitals   BP Temp Temp src Pulse Resp SpO2 Height Weight   -- -- -- -- -- -- -- --       Physical Exam  Vitals signs and nursing note reviewed. Constitutional:       General: He is not in acute distress. Appearance: He is well-developed. He is not diaphoretic. HENT:      Head: Normocephalic and atraumatic. Eyes:      General:         Right eye: No discharge. Left eye: No discharge. Neck:      Musculoskeletal: Normal range of motion and neck supple. Cardiovascular:      Rate and Rhythm: Normal rate and regular rhythm. Pulmonary:      Effort: Pulmonary effort is normal. No respiratory distress. Breath sounds: No stridor. Comments: No wheezing but with deep inspiration and expiration patient does have coarse coughing spasm  Musculoskeletal: Normal range of motion. Right lower leg: No edema. Left lower leg: No edema. Skin:     General: Skin is warm and dry. Coloration: Skin is not pale. Neurological:      Mental Status: He is alert and oriented to person, place, and time. Comments: No gross facial drooping. Moves all 4 extremities spontaneously.    Psychiatric:         Behavior: Behavior normal.         DIAGNOSTIC RESULTS   LABS:    Labs Reviewed   CBC WITH AUTO DIFFERENTIAL - Abnormal; Notable for the following components:       Result Value    RBC 3.68 (*)     Hemoglobin 12.8 (*)     Hematocrit 37.6 (*)     .1 (*)     MCH 34.8 (*)     All other components within normal limits    Narrative:     Performed at:  [address] [place]    Phone [phone]   BASIC METABOLIC PANEL W/ REFLEX TO MG FOR LOW K - Abnormal; Notable for the following components:    Sodium 134 (*)     CO2 20 (*)     Glucose 135 (*)     Calcium 8.1 (*)     All other components within normal limits    Narrative:     Performed at:  [address] [place]    Phone [phone]   TROPONIN - Abnormal; Notable for the following components:    Troponin 0.02 (*)     All other components within normal limits    Narrative:     Performed at:  [address] [facility]    Phone [phone]   BRAIN NATRIURETIC PEPTIDE - Abnormal; Notable for the following components:    Pro-BNP 1,156 (*)     All other components within normal limits    Narrative:     Performed at:  [address] [place]    Phone [phone]       All other labs were within normal range or not returned as of this dictation. EKG: All EKG's are interpreted by the Emergency Department Physician in the absence of a cardiologist.  Please see their note for interpretation of EKG. RADIOLOGY:   Non-plain film images such as CT, Ultrasound and MRI are read by the radiologist. Plain radiographic images are visualized and preliminarily interpreted by the ED Provider with the below findings:        Interpretation per the Radiologist below, if available at the time of this note:    XR CHEST (2 VW)   Final Result   Stable portable study.            Xr Chest (2 Vw)    Result Date: 8/8/2020  EXAMINATION: TWO XRAY VIEWS OF THE CHEST 8/8/2020 8:49 pm COMPARISON: 07/29/2020 HISTORY: ORDERING SYSTEM PROVIDED HISTORY: chest pain TECHNOLOGIST PROVIDED HISTORY: Reason for exam:->chest pain Reason for Exam: chest pain Acuity: Acute Type of Exam: Initial FINDINGS: The lungs are clear. The cardiac and mediastinal contours are normal.  There is no pleural effusion or pneumothorax. No acute osseous abnormality is identified. AICD and sternotomy wires are unchanged. Chronic mild compression deformity of the T7 vertebral body is unchanged. Cholecystectomy clips are present. No acute cardiopulmonary abnormality. PROCEDURES   Unless otherwise noted below, none     Procedures    CRITICAL CARE TIME   N/A    CONSULTS:  IP CONSULT TO CARDIOLOGY      EMERGENCY DEPARTMENT COURSE and DIFFERENTIAL DIAGNOSIS/MDM:   Vitals:    Vitals:    08/09/20 1801 08/09/20 1847   BP: (!) 145/81 (!) 141/79   Pulse: 92 90   Resp: 16 14   Temp: 98.3 °F (36.8 °C)    TempSrc: Oral    SpO2: 96% 94%   Weight: 181 lb (82.1 kg)    Height: 5' 11\" (1.803 m)        Patient was given the following medications:  Medications   ipratropium-albuterol (DUONEB) nebulizer solution 1 ampule (1 ampule Inhalation Given 8/9/20 1733)   oxyCODONE-acetaminophen (PERCOCET)  MG per tablet 1 tablet (1 tablet Oral Given 8/9/20 1759)   predniSONE (DELTASONE) tablet 60 mg (60 mg Oral Given 8/9/20 1842)           Differential Diagnosis: Acute bronchitis, Musculoskeletal chest pain, Pleurisy, Pulmonary edema, Congestive Heart Failure, Myocardial Infarction, Pulmonary Embolus, Thoracic Dissection, Pericarditis, Pericardial Effusion, Pneumonia, Pneumothorax, Boerhaave's syndrome, Ischemic Bowel, Bowel Obstruction, PUD, GERD, Acute Cholecystitis, Pancreatitis, Hepatitis, Colitis, other    Patient seen and examined today for chest pain, cough, SOB. See HPI for patient presentation.   Patient is in no acute distress, nontoxic, afebrile with unremarkable vital signs. Compliant with eliquis. Work up yesterday negative. EKG today without any acute changes. Recent CTPA negative for PE last week. Admitted last week for chest pain. Cardiology saw him and given his recent normal heart cath, and normal ESR and CRP and the noncardiac nature of his chest pain he was discharged home without further work-up. Last Cath April 2020 with \"ANGIOGRAPHY FINDINGS:  1. Left main is patent with patent stent providing blood to the LAD, circumflex, and diagonal.  2.  Saphenous vein graft to diagonal is patent. 3.  Saphenous vein graft to the right coronary artery PDA is patent. 4.  Right coronary artery is occluded in the midportion. 5.  Distal LAD is heavily diseased. 6.  LIMA was not studied as it is known to be atretic. 7.  LV ejection fraction of 40%. SUMMARY:  Nonobstructive coronary artery disease. There is no lesion  present which could be amenable to intervention. \"    Patient was given DuoNeb treatment and at reevaluation he feels so much improved. Suspected COPD exacerbation as cause of his CP, cough, SOB. I have reviewed the patient's laboratory results. The patient has normal WBCs, hematocrit and platelets. They have no severe electrolyte abnormality or renal impairment. Troponin was 0.02. Was also seen by my attending Dr. Toribio Ferguson. He consulted with cardiologist Dr. Pati Corrigan on-call for Dr. Malaika Stone regarding his chest pain and troponin of 0.02. The patient was just recently admitted had negative work-up for the noncardiac nature of his chest pain last week and he had recent normal heart cath in April 2020. We have agreed to discharge the patient home and he will return if there is any new or worsening symptoms. The patient states that he feels so much improved after his DuoNeb treatment and will return if he has any new or worsening symptoms.  At this time I believe patient's presentation does not warrant further workup with labs or imaging in the

## 2020-08-09 NOTE — ED PROVIDER NOTES
Skin: Negative for color change, rash and wound. Neurological: Negative for weakness and numbness. Psychiatric/Behavioral: Negative for agitation, behavioral problems and confusion. Except as noted above the remainder of the review of systems was reviewed and negative. PAST MEDICAL HISTORY         Diagnosis Date    Anxiety     Arthritis     Asthma     CAD (coronary artery disease)     Calcium kidney stone     Cardiomyopathy ([facility]     Cerebral artery occlusion with cerebral infarction ([facility]     CHF (congestive heart failure) ([facility]     Clostridium difficile diarrhea 02/12/2020    COPD (chronic obstructive pulmonary disease) ([facility])     mild    Depression     DM2 (diabetes mellitus, type 2) ([facility])     Fibromyalgia     GERD (gastroesophageal reflux disease)     Hyperlipidemia     Hypertension     Liver disease     Pacemaker 2012    Medtronic model # [id number]    Pneumonia     Seizures ([facility]     TIA (transient ischemic attack) 2007    Ulcerative colitis ([facility]        SURGICAL HISTORY           Procedure Laterality Date    APPENDECTOMY      BACK SURGERY      CARDIAC SURGERY      CHOLECYSTECTOMY      COLONOSCOPY  01/10/2017    COLONOSCOPY  01/10/2017    CORONARY ANGIOPLASTY WITH STENT PLACEMENT  2012    CORONARY ARTERY BYPASS GRAFT  2010, 11/2015    ENDOSCOPY, COLON, DIAGNOSTIC      PACEMAKER PLACEMENT      UPPER GASTROINTESTINAL ENDOSCOPY  02/07/2017    VASCULAR SURGERY         CURRENT MEDICATIONS       Discharge Medication List as of 8/9/2020  1:14 AM      CONTINUE these medications which have NOT CHANGED    Details   aspirin 325 MG tablet Take 325 mg by mouth dailyHistorical Med      traZODone (DESYREL) 50 MG tablet TAKE 1 TABLET BY MOUTH EVERY NIGHT AT BEDTIME, Disp-30 tablet,R-2Normal      oxyCODONE-acetaminophen (PERCOCET)  MG per tablet Take 1 tablet by mouth every 8 hours as needed for Pain for up to 30 days.  Intended supply: 30 days, Disp-90 tablet,R-0Normal      albuterol sulfate HFA (VENTOLIN HFA) 108 (90 Base) MCG/ACT inhaler Inhale 2 puffs into the lungs 4 times daily as needed for Wheezing, Disp-1 Inhaler,R-1Normal      apixaban (ELIQUIS) 5 MG TABS tablet Take 1 tablet by mouth 2 times daily, Disp-60 tablet,R-1Normal      gabapentin (NEURONTIN) 600 MG tablet 1 tab PO 5 times a day, Disp-150 tablet,R-1Normal      empagliflozin (JARDIANCE) 10 MG tablet Take 1 tablet by mouth daily, Disp-30 tablet,R-0Normal      atorvastatin (LIPITOR) 80 MG tablet Take 1 tablet by mouth daily, Disp-30 tablet,R-0Normal      hydrALAZINE (APRESOLINE) 25 MG tablet Take 1 tablet by mouth 2 times daily, Disp-60 tablet,R-0Normal      metoprolol succinate (TOPROL XL) 25 MG extended release tablet Take 1 tablet by mouth daily, Disp-30 tablet,R-0Normal      tamsulosin (FLOMAX) 0.4 MG capsule Take 1 capsule by mouth daily, Disp-30 capsule,R-0Normal      pantoprazole (PROTONIX) 40 MG tablet Take 1 tablet by mouth daily, Disp-30 tablet,R-0Normal      zolpidem (AMBIEN) 5 MG tablet Take 1 tablet by mouth nightly as needed for Sleep for up to 60 days. , Disp-30 tablet,R-0Print      ranolazine (RANEXA) 500 MG extended release tablet Take 2 tablets by mouth 2 times daily, Disp-60 tablet,R-0Normal      amiodarone (PACERONE) 100 MG tablet Take 1 tablet by mouth daily, Disp-30 tablet,R-3Normal      acetaminophen (TYLENOL) 325 MG tablet Take 650 mg by mouth every 6 hours as needed for PainHistorical Med             ALLERGIES     Dopamine hcl; Tramadol; and Melatonin    FAMILY HISTORY           Problem Relation Age of Onset    Coronary Art Dis Father     Cancer Mother         Lung with mets    Diabetes Mother      Family Status   Relation Name Status    Father          CAD    Mother   at age 62        Lung cancer        SOCIAL HISTORY      reports that he has been smoking cigarettes. He has a 22.00 pack-year smoking history.  He has never used smokeless tobacco. He Vitals:    08/08/20 2045 08/08/20 2051 08/09/20 0101   BP:  (!) 148/76 131/80   Pulse: 112  71   Resp: 17  18   Temp: 98.2 °F (36.8 °C)     TempSrc: Oral     SpO2: 96%  97%   Weight: 182 lb 12.8 oz (82.9 kg)     Height: 6' (1.829 m)       I discussed with Millie Tony and/or family the exam results, diagnosis, care, prognosis, reasons to return and the importance of follow up. Patient and/or family is in full agreement with plan and all questions have been answered. Specific discharge instructions explained, including reasons to return to the emergency department. Millie Tony is well appearing, non-toxic, and afebrile at the time of discharge. Patient is afebrile he is initially tachycardic with tells me he is due for his Percocet. He states that his pain is much better on serial exam after given his home Percocet. He had a initial troponin that was elevated but on recheck it is negative. Chest x-ray is clear. Patient CRP and ESR not elevated. Will be discharged with instructions to follow-up with cardiology. I estimate there is LOW risk for PULMONARY EMBOLISM, ACUTE CORONARY SYNDROME, OR THORACIC AORTIC DISSECTION, thus I consider the discharge disposition reasonable. CONSULTS:  None    PROCEDURES:  Procedures      FINAL IMPRESSION      1.  Chest pain, unspecified type          DISPOSITION/PLAN   DISPOSITION Decision To Discharge 08/09/2020 01:00:29 AM      PATIENT REFERRED TO:  Bryant Velazquez MD  [address] [place] [id number]  [phone]    Call in 1 day  For follow up      Nano Mares:  Discharge Medication List as of 8/9/2020  1:14 AM          (Please note that portions of this note were completed with a voice recognition program.  Efforts were made to edit the dictations but occasionally words are mis-transcribed.)    Magda Balderrama, [address], [place]  08/09/20 3173

## 2020-08-09 NOTE — ED TRIAGE NOTES
Pt here with c/o left-sided chest pressure that has been on-going. Pt took (2) nitro earlier today and states that it did help but the pain did not fully subside. Patient rates the pain as a 7/10 and describes it as a pressure. Patient was here last night for same issue and was released around 0130 this morning.

## 2020-08-10 ENCOUNTER — CARE COORDINATION (OUTPATIENT)
Dept: CASE MANAGEMENT | Age: 65
End: 2020-08-10

## 2020-08-10 LAB
EKG ATRIAL RATE: 103 BPM
EKG ATRIAL RATE: 108 BPM
EKG DIAGNOSIS: NORMAL
EKG DIAGNOSIS: NORMAL
EKG P AXIS: 66 DEGREES
EKG P AXIS: 7 DEGREES
EKG P-R INTERVAL: 128 MS
EKG P-R INTERVAL: 136 MS
EKG Q-T INTERVAL: 390 MS
EKG Q-T INTERVAL: 404 MS
EKG QRS DURATION: 132 MS
EKG QRS DURATION: 136 MS
EKG QTC CALCULATION (BAZETT): 510 MS
EKG QTC CALCULATION (BAZETT): 541 MS
EKG R AXIS: -17 DEGREES
EKG R AXIS: 80 DEGREES
EKG T AXIS: 47 DEGREES
EKG T AXIS: 9 DEGREES
EKG VENTRICULAR RATE: 103 BPM
EKG VENTRICULAR RATE: 108 BPM

## 2020-08-10 PROCEDURE — 93010 ELECTROCARDIOGRAM REPORT: CPT | Performed by: INTERNAL MEDICINE

## 2020-08-10 NOTE — CARE COORDINATION
[facility] Transitions/ED Follow Up Call    8/10/2020    Patient: Liliam Ramos  Patient : 1955   MRN: [id number]  Reason for Admission: atypical CP  Discharge Date: 20 RARS: Readmission Risk Score: 58             Care Transitions Subsequent and Final Call    Subsequent and Final Calls  Care Transitions Interventions  Other Interventions:        Attempted to reach patient via phone for  post hospital/ED transition call. VM left stating purpose of call along with my contact information requesting a return call.         Follow Up  Future Appointments   Date Time Provider Fred Gregory   2020 11:00 AM TAYLA Friedman - CNP [facility] [place] [address] Lynetta [place], [place]

## 2020-08-11 ENCOUNTER — CARE COORDINATION (OUTPATIENT)
Dept: CASE MANAGEMENT | Age: 65
End: 2020-08-11

## 2020-08-12 ENCOUNTER — CARE COORDINATION (OUTPATIENT)
Dept: CASE MANAGEMENT | Age: 65
End: 2020-08-12

## 2020-08-12 NOTE — CARE COORDINATION
[facility] Transitions Follow Up Call    2020    Patient: Kyle Dougherty  Patient : 1955   MRN: [id number]  Reason for Admission: atypical chest pain  Discharge Date: 20 RARS: Readmission Risk Score: 58           Unable to reach patient x 3 attempts. VM left advising patient that if they have any questions/concerns at anytime, they can always call PCP/specialist as they have an MD on call . No further CTN outreach will be made.           Follow Up  Future Appointments   Date Time Provider Fred Gregory   2020 11:00 AM TAYLA Santiago - CNP [facility] Eddie Daley, [place]

## 2020-08-16 ENCOUNTER — HOSPITAL ENCOUNTER (INPATIENT)
Age: 65
LOS: 1 days | Discharge: HOME | DRG: 281 | End: 2020-08-18
Attending: EMERGENCY MEDICINE | Admitting: INTERNAL MEDICINE
Payer: MEDICARE

## 2020-08-16 PROCEDURE — 99285 EMERGENCY DEPT VISIT HI MDM: CPT

## 2020-08-16 PROCEDURE — 93005 ELECTROCARDIOGRAM TRACING: CPT | Performed by: EMERGENCY MEDICINE

## 2020-08-16 ASSESSMENT — PAIN DESCRIPTION - ONSET: ONSET: ON-GOING

## 2020-08-16 ASSESSMENT — PAIN DESCRIPTION - FREQUENCY: FREQUENCY: CONTINUOUS

## 2020-08-16 ASSESSMENT — PAIN - FUNCTIONAL ASSESSMENT: PAIN_FUNCTIONAL_ASSESSMENT: ACTIVITIES ARE NOT PREVENTED

## 2020-08-16 ASSESSMENT — PAIN SCALES - GENERAL: PAINLEVEL_OUTOF10: 8

## 2020-08-16 ASSESSMENT — PAIN DESCRIPTION - PAIN TYPE: TYPE: ACUTE PAIN

## 2020-08-16 ASSESSMENT — PAIN DESCRIPTION - LOCATION: LOCATION: CHEST

## 2020-08-16 ASSESSMENT — PAIN DESCRIPTION - DESCRIPTORS: DESCRIPTORS: SHARP

## 2020-08-16 ASSESSMENT — PAIN DESCRIPTION - ORIENTATION: ORIENTATION: MID

## 2020-08-16 ASSESSMENT — PAIN DESCRIPTION - PROGRESSION: CLINICAL_PROGRESSION: GRADUALLY WORSENING

## 2020-08-17 ENCOUNTER — APPOINTMENT (OUTPATIENT)
Dept: CT IMAGING | Age: 65
DRG: 281 | End: 2020-08-17
Payer: MEDICARE

## 2020-08-17 ENCOUNTER — TELEPHONE (OUTPATIENT)
Dept: OTHER | Facility: CLINIC | Age: 65
End: 2020-08-17

## 2020-08-17 ENCOUNTER — APPOINTMENT (OUTPATIENT)
Dept: GENERAL RADIOLOGY | Age: 65
DRG: 281 | End: 2020-08-17
Payer: MEDICARE

## 2020-08-17 PROBLEM — I16.9 HYPERTENSIVE CRISIS: Status: ACTIVE | Noted: 2020-06-17

## 2020-08-17 LAB
ANION GAP SERPL CALCULATED.3IONS-SCNC: 11 MMOL/L (ref 3–16)
APTT: 31 SEC (ref 24.2–36.2)
APTT: 35.7 SEC (ref 24.2–36.2)
BASOPHILS ABSOLUTE: 0 K/UL (ref 0–0.2)
BASOPHILS RELATIVE PERCENT: 0.7 %
BILIRUBIN URINE: NEGATIVE
BLOOD, URINE: NEGATIVE
BUN BLDV-MCNC: 17 MG/DL (ref 7–20)
C-REACTIVE PROTEIN: 4.2 MG/L (ref 0–5.1)
CALCIUM SERPL-MCNC: 8.8 MG/DL (ref 8.3–10.6)
CHLORIDE BLD-SCNC: 106 MMOL/L (ref 99–110)
CLARITY: CLEAR
CO2: 23 MMOL/L (ref 21–32)
COLOR: YELLOW
CREAT SERPL-MCNC: 1 MG/DL (ref 0.8–1.3)
EKG ATRIAL RATE: 101 BPM
EKG ATRIAL RATE: 91 BPM
EKG DIAGNOSIS: NORMAL
EKG DIAGNOSIS: NORMAL
EKG P AXIS: 68 DEGREES
EKG P AXIS: 80 DEGREES
EKG P-R INTERVAL: 122 MS
EKG P-R INTERVAL: 132 MS
EKG Q-T INTERVAL: 388 MS
EKG Q-T INTERVAL: 426 MS
EKG QRS DURATION: 134 MS
EKG QRS DURATION: 136 MS
EKG QTC CALCULATION (BAZETT): 503 MS
EKG QTC CALCULATION (BAZETT): 523 MS
EKG R AXIS: 86 DEGREES
EKG R AXIS: 89 DEGREES
EKG T AXIS: 57 DEGREES
EKG T AXIS: 77 DEGREES
EKG VENTRICULAR RATE: 101 BPM
EKG VENTRICULAR RATE: 91 BPM
EOSINOPHILS ABSOLUTE: 0.2 K/UL (ref 0–0.6)
EOSINOPHILS RELATIVE PERCENT: 3.3 %
GFR AFRICAN AMERICAN: >60
GFR NON-AFRICAN AMERICAN: >60
GLUCOSE BLD-MCNC: 105 MG/DL (ref 70–99)
GLUCOSE BLD-MCNC: 140 MG/DL (ref 70–99)
GLUCOSE BLD-MCNC: 140 MG/DL (ref 70–99)
GLUCOSE BLD-MCNC: 151 MG/DL (ref 70–99)
GLUCOSE BLD-MCNC: 99 MG/DL (ref 70–99)
GLUCOSE URINE: NEGATIVE MG/DL
HCT VFR BLD CALC: 40.3 % (ref 40.5–52.5)
HEMOGLOBIN: 13.6 G/DL (ref 13.5–17.5)
KETONES, URINE: NEGATIVE MG/DL
LEUKOCYTE ESTERASE, URINE: NEGATIVE
LYMPHOCYTES ABSOLUTE: 2.3 K/UL (ref 1–5.1)
LYMPHOCYTES RELATIVE PERCENT: 35.6 %
MAGNESIUM: 2.4 MG/DL (ref 1.8–2.4)
MCH RBC QN AUTO: 34.9 PG (ref 26–34)
MCHC RBC AUTO-ENTMCNC: 33.6 G/DL (ref 31–36)
MCV RBC AUTO: 103.8 FL (ref 80–100)
MICROSCOPIC EXAMINATION: NORMAL
MONOCYTES ABSOLUTE: 0.5 K/UL (ref 0–1.3)
MONOCYTES RELATIVE PERCENT: 7.6 %
NEUTROPHILS ABSOLUTE: 3.5 K/UL (ref 1.7–7.7)
NEUTROPHILS RELATIVE PERCENT: 52.8 %
NITRITE, URINE: NEGATIVE
PDW BLD-RTO: 15.9 % (ref 12.4–15.4)
PERFORMED ON: ABNORMAL
PERFORMED ON: NORMAL
PH UA: 6.5 (ref 5–8)
PLATELET # BLD: 174 K/UL (ref 135–450)
PMV BLD AUTO: 9.3 FL (ref 5–10.5)
POTASSIUM REFLEX MAGNESIUM: 4.1 MMOL/L (ref 3.5–5.1)
PRO-BNP: 4237 PG/ML (ref 0–124)
PROTEIN UA: NEGATIVE MG/DL
RBC # BLD: 3.89 M/UL (ref 4.2–5.9)
SEDIMENTATION RATE, ERYTHROCYTE: 19 MM/HR (ref 0–20)
SODIUM BLD-SCNC: 140 MMOL/L (ref 136–145)
SPECIFIC GRAVITY UA: >1.03 (ref 1–1.03)
TROPONIN: 0.19 NG/ML
TROPONIN: 0.22 NG/ML
TROPONIN: 0.22 NG/ML
URINE TYPE: NORMAL
UROBILINOGEN, URINE: 0.2 E.U./DL
WBC # BLD: 6.6 K/UL (ref 4–11)

## 2020-08-17 PROCEDURE — 84484 ASSAY OF TROPONIN QUANT: CPT

## 2020-08-17 PROCEDURE — 86140 C-REACTIVE PROTEIN: CPT

## 2020-08-17 PROCEDURE — 85730 THROMBOPLASTIN TIME PARTIAL: CPT

## 2020-08-17 PROCEDURE — 87086 URINE CULTURE/COLONY COUNT: CPT

## 2020-08-17 PROCEDURE — 94761 N-INVAS EAR/PLS OXIMETRY MLT: CPT

## 2020-08-17 PROCEDURE — 83880 ASSAY OF NATRIURETIC PEPTIDE: CPT

## 2020-08-17 PROCEDURE — 85025 COMPLETE CBC W/AUTO DIFF WBC: CPT

## 2020-08-17 PROCEDURE — 36415 COLL VENOUS BLD VENIPUNCTURE: CPT

## 2020-08-17 PROCEDURE — 71045 X-RAY EXAM CHEST 1 VIEW: CPT

## 2020-08-17 PROCEDURE — 93005 ELECTROCARDIOGRAM TRACING: CPT | Performed by: STUDENT IN AN ORGANIZED HEALTH CARE EDUCATION/TRAINING PROGRAM

## 2020-08-17 PROCEDURE — 2100000000 HC CCU R&B

## 2020-08-17 PROCEDURE — 6360000002 HC RX W HCPCS: Performed by: EMERGENCY MEDICINE

## 2020-08-17 PROCEDURE — 6360000004 HC RX CONTRAST MEDICATION: Performed by: EMERGENCY MEDICINE

## 2020-08-17 PROCEDURE — 80048 BASIC METABOLIC PNL TOTAL CA: CPT

## 2020-08-17 PROCEDURE — 81003 URINALYSIS AUTO W/O SCOPE: CPT

## 2020-08-17 PROCEDURE — 71260 CT THORAX DX C+: CPT

## 2020-08-17 PROCEDURE — 6360000002 HC RX W HCPCS: Performed by: INTERNAL MEDICINE

## 2020-08-17 PROCEDURE — 93010 ELECTROCARDIOGRAM REPORT: CPT | Performed by: INTERNAL MEDICINE

## 2020-08-17 PROCEDURE — 83735 ASSAY OF MAGNESIUM: CPT

## 2020-08-17 PROCEDURE — 2500000003 HC RX 250 WO HCPCS: Performed by: EMERGENCY MEDICINE

## 2020-08-17 PROCEDURE — 6370000000 HC RX 637 (ALT 250 FOR IP): Performed by: INTERNAL MEDICINE

## 2020-08-17 PROCEDURE — 6370000000 HC RX 637 (ALT 250 FOR IP): Performed by: STUDENT IN AN ORGANIZED HEALTH CARE EDUCATION/TRAINING PROGRAM

## 2020-08-17 PROCEDURE — 6370000000 HC RX 637 (ALT 250 FOR IP): Performed by: EMERGENCY MEDICINE

## 2020-08-17 PROCEDURE — 99223 1ST HOSP IP/OBS HIGH 75: CPT | Performed by: INTERNAL MEDICINE

## 2020-08-17 PROCEDURE — 85652 RBC SED RATE AUTOMATED: CPT

## 2020-08-17 RX ORDER — AMIODARONE HYDROCHLORIDE 200 MG/1
100 TABLET ORAL DAILY
Status: DISCONTINUED | OUTPATIENT
Start: 2020-08-17 | End: 2020-08-18 | Stop reason: HOSPADM

## 2020-08-17 RX ORDER — PANTOPRAZOLE SODIUM 40 MG/1
40 TABLET, DELAYED RELEASE ORAL DAILY
Status: DISCONTINUED | OUTPATIENT
Start: 2020-08-17 | End: 2020-08-18 | Stop reason: HOSPADM

## 2020-08-17 RX ORDER — GABAPENTIN 300 MG/1
300 CAPSULE ORAL
Status: DISCONTINUED | OUTPATIENT
Start: 2020-08-17 | End: 2020-08-18 | Stop reason: HOSPADM

## 2020-08-17 RX ORDER — HEPARIN SODIUM 1000 [USP'U]/ML
60 INJECTION, SOLUTION INTRAVENOUS; SUBCUTANEOUS ONCE
Status: DISCONTINUED | OUTPATIENT
Start: 2020-08-17 | End: 2020-08-17 | Stop reason: SDUPTHER

## 2020-08-17 RX ORDER — PROMETHAZINE HYDROCHLORIDE 25 MG/1
12.5 TABLET ORAL EVERY 6 HOURS PRN
Status: DISCONTINUED | OUTPATIENT
Start: 2020-08-17 | End: 2020-08-18 | Stop reason: HOSPADM

## 2020-08-17 RX ORDER — HEPARIN SODIUM 1000 [USP'U]/ML
30 INJECTION, SOLUTION INTRAVENOUS; SUBCUTANEOUS PRN
Status: DISCONTINUED | OUTPATIENT
Start: 2020-08-17 | End: 2020-08-17 | Stop reason: SDUPTHER

## 2020-08-17 RX ORDER — SODIUM CHLORIDE 0.9 % (FLUSH) 0.9 %
10 SYRINGE (ML) INJECTION PRN
Status: DISCONTINUED | OUTPATIENT
Start: 2020-08-17 | End: 2020-08-18 | Stop reason: HOSPADM

## 2020-08-17 RX ORDER — OXYCODONE HYDROCHLORIDE AND ACETAMINOPHEN 5; 325 MG/1; MG/1
1 TABLET ORAL ONCE
Status: COMPLETED | OUTPATIENT
Start: 2020-08-17 | End: 2020-08-17

## 2020-08-17 RX ORDER — OXYCODONE HYDROCHLORIDE 5 MG/1
5 TABLET ORAL ONCE
Status: COMPLETED | OUTPATIENT
Start: 2020-08-17 | End: 2020-08-17

## 2020-08-17 RX ORDER — NITROGLYCERIN 0.4 MG/1
0.4 TABLET SUBLINGUAL ONCE
Status: COMPLETED | OUTPATIENT
Start: 2020-08-17 | End: 2020-08-17

## 2020-08-17 RX ORDER — DEXTROSE MONOHYDRATE 50 MG/ML
100 INJECTION, SOLUTION INTRAVENOUS PRN
Status: DISCONTINUED | OUTPATIENT
Start: 2020-08-17 | End: 2020-08-18 | Stop reason: HOSPADM

## 2020-08-17 RX ORDER — METOPROLOL SUCCINATE 25 MG/1
25 TABLET, EXTENDED RELEASE ORAL DAILY
Status: DISCONTINUED | OUTPATIENT
Start: 2020-08-17 | End: 2020-08-17

## 2020-08-17 RX ORDER — NITROGLYCERIN 20 MG/100ML
5 INJECTION INTRAVENOUS CONTINUOUS
Status: DISCONTINUED | OUTPATIENT
Start: 2020-08-17 | End: 2020-08-18 | Stop reason: HOSPADM

## 2020-08-17 RX ORDER — ASPIRIN 325 MG
325 TABLET ORAL DAILY
Status: DISCONTINUED | OUTPATIENT
Start: 2020-08-17 | End: 2020-08-18 | Stop reason: HOSPADM

## 2020-08-17 RX ORDER — TAMSULOSIN HYDROCHLORIDE 0.4 MG/1
0.4 CAPSULE ORAL DAILY
Status: DISCONTINUED | OUTPATIENT
Start: 2020-08-17 | End: 2020-08-18 | Stop reason: HOSPADM

## 2020-08-17 RX ORDER — RANOLAZINE 500 MG/1
1000 TABLET, EXTENDED RELEASE ORAL 2 TIMES DAILY
Status: DISCONTINUED | OUTPATIENT
Start: 2020-08-17 | End: 2020-08-18 | Stop reason: HOSPADM

## 2020-08-17 RX ORDER — MORPHINE SULFATE 2 MG/ML
2 INJECTION, SOLUTION INTRAMUSCULAR; INTRAVENOUS
Status: COMPLETED | OUTPATIENT
Start: 2020-08-17 | End: 2020-08-17

## 2020-08-17 RX ORDER — DEXTROSE MONOHYDRATE 25 G/50ML
12.5 INJECTION, SOLUTION INTRAVENOUS PRN
Status: DISCONTINUED | OUTPATIENT
Start: 2020-08-17 | End: 2020-08-18 | Stop reason: HOSPADM

## 2020-08-17 RX ORDER — HEPARIN SODIUM 10000 [USP'U]/100ML
12 INJECTION, SOLUTION INTRAVENOUS CONTINUOUS
Status: DISCONTINUED | OUTPATIENT
Start: 2020-08-17 | End: 2020-08-17 | Stop reason: SDUPTHER

## 2020-08-17 RX ORDER — METOPROLOL SUCCINATE 50 MG/1
50 TABLET, EXTENDED RELEASE ORAL DAILY
Status: DISCONTINUED | OUTPATIENT
Start: 2020-08-18 | End: 2020-08-18 | Stop reason: HOSPADM

## 2020-08-17 RX ORDER — HYDRALAZINE HYDROCHLORIDE 25 MG/1
25 TABLET, FILM COATED ORAL EVERY 8 HOURS SCHEDULED
Status: DISCONTINUED | OUTPATIENT
Start: 2020-08-17 | End: 2020-08-18 | Stop reason: HOSPADM

## 2020-08-17 RX ORDER — ZOLPIDEM TARTRATE 5 MG/1
5 TABLET ORAL NIGHTLY PRN
Status: DISCONTINUED | OUTPATIENT
Start: 2020-08-17 | End: 2020-08-18 | Stop reason: HOSPADM

## 2020-08-17 RX ORDER — HEPARIN SODIUM 1000 [USP'U]/ML
4000 INJECTION, SOLUTION INTRAVENOUS; SUBCUTANEOUS ONCE
Status: COMPLETED | OUTPATIENT
Start: 2020-08-17 | End: 2020-08-17

## 2020-08-17 RX ORDER — HEPARIN SODIUM 1000 [USP'U]/ML
4000 INJECTION, SOLUTION INTRAVENOUS; SUBCUTANEOUS PRN
Status: DISCONTINUED | OUTPATIENT
Start: 2020-08-17 | End: 2020-08-17

## 2020-08-17 RX ORDER — NICOTINE POLACRILEX 4 MG
15 LOZENGE BUCCAL PRN
Status: DISCONTINUED | OUTPATIENT
Start: 2020-08-17 | End: 2020-08-18 | Stop reason: HOSPADM

## 2020-08-17 RX ORDER — NITROGLYCERIN 20 MG/100ML
5 INJECTION INTRAVENOUS CONTINUOUS
Status: DISCONTINUED | OUTPATIENT
Start: 2020-08-17 | End: 2020-08-17 | Stop reason: SDUPTHER

## 2020-08-17 RX ORDER — SODIUM CHLORIDE 0.9 % (FLUSH) 0.9 %
10 SYRINGE (ML) INJECTION EVERY 12 HOURS SCHEDULED
Status: DISCONTINUED | OUTPATIENT
Start: 2020-08-17 | End: 2020-08-18 | Stop reason: HOSPADM

## 2020-08-17 RX ORDER — ONDANSETRON 2 MG/ML
4 INJECTION INTRAMUSCULAR; INTRAVENOUS EVERY 6 HOURS PRN
Status: DISCONTINUED | OUTPATIENT
Start: 2020-08-17 | End: 2020-08-18 | Stop reason: HOSPADM

## 2020-08-17 RX ORDER — HEPARIN SODIUM 1000 [USP'U]/ML
60 INJECTION, SOLUTION INTRAVENOUS; SUBCUTANEOUS PRN
Status: DISCONTINUED | OUTPATIENT
Start: 2020-08-17 | End: 2020-08-17 | Stop reason: SDUPTHER

## 2020-08-17 RX ORDER — ATORVASTATIN CALCIUM 80 MG/1
80 TABLET, FILM COATED ORAL DAILY
Status: DISCONTINUED | OUTPATIENT
Start: 2020-08-17 | End: 2020-08-18 | Stop reason: HOSPADM

## 2020-08-17 RX ORDER — HEPARIN SODIUM 1000 [USP'U]/ML
2000 INJECTION, SOLUTION INTRAVENOUS; SUBCUTANEOUS PRN
Status: DISCONTINUED | OUTPATIENT
Start: 2020-08-17 | End: 2020-08-17

## 2020-08-17 RX ORDER — POLYETHYLENE GLYCOL 3350 17 G/17G
17 POWDER, FOR SOLUTION ORAL DAILY PRN
Status: DISCONTINUED | OUTPATIENT
Start: 2020-08-17 | End: 2020-08-18 | Stop reason: HOSPADM

## 2020-08-17 RX ORDER — HYDRALAZINE HYDROCHLORIDE 25 MG/1
25 TABLET, FILM COATED ORAL 2 TIMES DAILY
Status: DISCONTINUED | OUTPATIENT
Start: 2020-08-17 | End: 2020-08-17

## 2020-08-17 RX ORDER — HEPARIN SODIUM 10000 [USP'U]/100ML
13.2 INJECTION, SOLUTION INTRAVENOUS CONTINUOUS
Status: DISCONTINUED | OUTPATIENT
Start: 2020-08-17 | End: 2020-08-17

## 2020-08-17 RX ORDER — OXYCODONE AND ACETAMINOPHEN 10; 325 MG/1; MG/1
1 TABLET ORAL EVERY 8 HOURS PRN
Status: DISCONTINUED | OUTPATIENT
Start: 2020-08-17 | End: 2020-08-18 | Stop reason: HOSPADM

## 2020-08-17 RX ORDER — ACETAMINOPHEN 650 MG/1
650 SUPPOSITORY RECTAL EVERY 6 HOURS PRN
Status: DISCONTINUED | OUTPATIENT
Start: 2020-08-17 | End: 2020-08-18 | Stop reason: HOSPADM

## 2020-08-17 RX ORDER — METOPROLOL SUCCINATE 25 MG/1
25 TABLET, EXTENDED RELEASE ORAL ONCE
Status: COMPLETED | OUTPATIENT
Start: 2020-08-17 | End: 2020-08-17

## 2020-08-17 RX ORDER — ACETAMINOPHEN 325 MG/1
650 TABLET ORAL EVERY 6 HOURS PRN
Status: DISCONTINUED | OUTPATIENT
Start: 2020-08-17 | End: 2020-08-18 | Stop reason: HOSPADM

## 2020-08-17 RX ADMIN — INSULIN LISPRO 1 UNITS: 100 INJECTION, SOLUTION INTRAVENOUS; SUBCUTANEOUS at 12:06

## 2020-08-17 RX ADMIN — HEPARIN SODIUM 9.9 ML/HR: 10000 INJECTION, SOLUTION INTRAVENOUS at 02:13

## 2020-08-17 RX ADMIN — OXYCODONE HYDROCHLORIDE AND ACETAMINOPHEN 1 TABLET: 10; 325 TABLET ORAL at 16:42

## 2020-08-17 RX ADMIN — ASPIRIN 325 MG ORAL TABLET 325 MG: 325 PILL ORAL at 08:37

## 2020-08-17 RX ADMIN — RANOLAZINE 1000 MG: 500 TABLET, FILM COATED, EXTENDED RELEASE ORAL at 08:37

## 2020-08-17 RX ADMIN — AMIODARONE HYDROCHLORIDE 100 MG: 200 TABLET ORAL at 08:36

## 2020-08-17 RX ADMIN — PANTOPRAZOLE SODIUM 40 MG: 40 TABLET, DELAYED RELEASE ORAL at 08:37

## 2020-08-17 RX ADMIN — TAMSULOSIN HYDROCHLORIDE 0.4 MG: 0.4 CAPSULE ORAL at 08:37

## 2020-08-17 RX ADMIN — METOPROLOL SUCCINATE 25 MG: 25 TABLET, EXTENDED RELEASE ORAL at 12:39

## 2020-08-17 RX ADMIN — MORPHINE SULFATE 2 MG: 2 INJECTION, SOLUTION INTRAMUSCULAR; INTRAVENOUS at 00:51

## 2020-08-17 RX ADMIN — GABAPENTIN 300 MG: 300 CAPSULE ORAL at 13:58

## 2020-08-17 RX ADMIN — HEPARIN SODIUM 4000 UNITS: 1000 INJECTION INTRAVENOUS; SUBCUTANEOUS at 02:09

## 2020-08-17 RX ADMIN — GABAPENTIN 300 MG: 300 CAPSULE ORAL at 22:07

## 2020-08-17 RX ADMIN — OXYCODONE HYDROCHLORIDE 5 MG: 5 TABLET ORAL at 03:30

## 2020-08-17 RX ADMIN — HYDRALAZINE HYDROCHLORIDE 25 MG: 25 TABLET, FILM COATED ORAL at 13:58

## 2020-08-17 RX ADMIN — HYDRALAZINE HYDROCHLORIDE 25 MG: 25 TABLET, FILM COATED ORAL at 08:46

## 2020-08-17 RX ADMIN — OXYCODONE HYDROCHLORIDE AND ACETAMINOPHEN 1 TABLET: 10; 325 TABLET ORAL at 08:35

## 2020-08-17 RX ADMIN — NITROGLYCERIN 10 MCG/MIN: 20 INJECTION INTRAVENOUS at 05:57

## 2020-08-17 RX ADMIN — HYDRALAZINE HYDROCHLORIDE 25 MG: 25 TABLET, FILM COATED ORAL at 22:07

## 2020-08-17 RX ADMIN — IOPAMIDOL 75 ML: 755 INJECTION, SOLUTION INTRAVENOUS at 02:01

## 2020-08-17 RX ADMIN — GABAPENTIN 300 MG: 300 CAPSULE ORAL at 18:06

## 2020-08-17 RX ADMIN — RANOLAZINE 1000 MG: 500 TABLET, FILM COATED, EXTENDED RELEASE ORAL at 22:07

## 2020-08-17 RX ADMIN — HEPARIN SODIUM 4000 UNITS: 1000 INJECTION INTRAVENOUS; SUBCUTANEOUS at 10:22

## 2020-08-17 RX ADMIN — ATORVASTATIN CALCIUM 80 MG: 80 TABLET, FILM COATED ORAL at 08:36

## 2020-08-17 RX ADMIN — OXYCODONE HYDROCHLORIDE AND ACETAMINOPHEN 1 TABLET: 5; 325 TABLET ORAL at 02:26

## 2020-08-17 RX ADMIN — NITROGLYCERIN 0.4 MG: 0.4 TABLET, ORALLY DISINTEGRATING SUBLINGUAL at 00:54

## 2020-08-17 RX ADMIN — INSULIN LISPRO 1 UNITS: 100 INJECTION, SOLUTION INTRAVENOUS; SUBCUTANEOUS at 08:35

## 2020-08-17 RX ADMIN — METOPROLOL SUCCINATE 25 MG: 25 TABLET, EXTENDED RELEASE ORAL at 08:37

## 2020-08-17 ASSESSMENT — PAIN SCALES - GENERAL
PAINLEVEL_OUTOF10: 6
PAINLEVEL_OUTOF10: 8
PAINLEVEL_OUTOF10: 8
PAINLEVEL_OUTOF10: 7
PAINLEVEL_OUTOF10: 5
PAINLEVEL_OUTOF10: 7
PAINLEVEL_OUTOF10: 8
PAINLEVEL_OUTOF10: 7
PAINLEVEL_OUTOF10: 8
PAINLEVEL_OUTOF10: 4
PAINLEVEL_OUTOF10: 7
PAINLEVEL_OUTOF10: 5
PAINLEVEL_OUTOF10: 7
PAINLEVEL_OUTOF10: 0
PAINLEVEL_OUTOF10: 8

## 2020-08-17 ASSESSMENT — ENCOUNTER SYMPTOMS
RECTAL PAIN: 0
CONSTIPATION: 0
EYE ITCHING: 0
COUGH: 0
WHEEZING: 0
VOMITING: 0
PHOTOPHOBIA: 0
EYE REDNESS: 0
BLOOD IN STOOL: 0
BACK PAIN: 0
STRIDOR: 0
CHOKING: 0
ANAL BLEEDING: 0
ABDOMINAL DISTENTION: 0
CHEST TIGHTNESS: 0
NAUSEA: 0
ABDOMINAL PAIN: 0
APNEA: 0
SHORTNESS OF BREATH: 0
COLOR CHANGE: 0
DIARRHEA: 0
EYE DISCHARGE: 0
EYE PAIN: 0

## 2020-08-17 ASSESSMENT — PAIN - FUNCTIONAL ASSESSMENT
PAIN_FUNCTIONAL_ASSESSMENT: ACTIVITIES ARE NOT PREVENTED

## 2020-08-17 ASSESSMENT — PAIN DESCRIPTION - ONSET
ONSET: ON-GOING
ONSET: ON-GOING
ONSET_2: ON-GOING
ONSET_2: ON-GOING

## 2020-08-17 ASSESSMENT — PAIN DESCRIPTION - LOCATION
LOCATION: BACK
LOCATION: BACK
LOCATION: CHEST
LOCATION: CHEST
LOCATION: BACK
LOCATION: CHEST
LOCATION_2: CHEST
LOCATION: BACK
LOCATION: CHEST
LOCATION: CHEST
LOCATION: BACK
LOCATION_2: CHEST
LOCATION: CHEST
LOCATION_2: CHEST

## 2020-08-17 ASSESSMENT — PAIN DESCRIPTION - PAIN TYPE
TYPE: CHRONIC PAIN
TYPE: ACUTE PAIN
TYPE_2: ACUTE PAIN
TYPE: CHRONIC PAIN
TYPE: CHRONIC PAIN
TYPE: ACUTE PAIN
TYPE: ACUTE PAIN
TYPE_2: ACUTE PAIN
TYPE_2: ACUTE PAIN
TYPE: CHRONIC PAIN
TYPE: ACUTE PAIN

## 2020-08-17 ASSESSMENT — PAIN DESCRIPTION - ORIENTATION
ORIENTATION: MID
ORIENTATION: LOWER;MID
ORIENTATION_2: MID
ORIENTATION_2: MID;UPPER
ORIENTATION_2: MID;UPPER
ORIENTATION: LOWER;MID

## 2020-08-17 ASSESSMENT — PAIN DESCRIPTION - PROGRESSION
CLINICAL_PROGRESSION: NOT CHANGED
CLINICAL_PROGRESSION_2: GRADUALLY IMPROVING
CLINICAL_PROGRESSION_2: GRADUALLY IMPROVING
CLINICAL_PROGRESSION: GRADUALLY WORSENING
CLINICAL_PROGRESSION: RESOLVED
CLINICAL_PROGRESSION_2: NOT CHANGED

## 2020-08-17 ASSESSMENT — PAIN DESCRIPTION - DURATION
DURATION_2: CONTINUOUS
DURATION_2: CONTINUOUS

## 2020-08-17 ASSESSMENT — PAIN DESCRIPTION - DESCRIPTORS
DESCRIPTORS: ACHING
DESCRIPTORS_2: PRESSURE
DESCRIPTORS: SHARP
DESCRIPTORS_2: PRESSURE

## 2020-08-17 ASSESSMENT — PAIN DESCRIPTION - INTENSITY
RATING_2: 6
RATING_2: 4
RATING_2: 5

## 2020-08-17 ASSESSMENT — PAIN DESCRIPTION - FREQUENCY
FREQUENCY: CONTINUOUS
FREQUENCY: CONTINUOUS

## 2020-08-17 NOTE — ED NOTES
Patient states that his pain has dropped to a 6/10 and he is feeling much better and refused the second nitro     Sally Kline, RN  08/17/20 [address] [facility], [place]  08/17/20 3889

## 2020-08-17 NOTE — PLAN OF CARE
Problem: Falls - Risk of:  Goal: Will remain free from falls  Description: Will remain free from falls  Outcome: Ongoing     Problem: Falls - Risk of:  Goal: Absence of physical injury  Description: Absence of physical injury  Outcome: Ongoing     Problem: Pain:  Goal: Control of acute pain  Description: Control of acute pain  Outcome: Ongoing     Problem: Pain:  Goal: Control of chronic pain  Description: Control of chronic pain  Outcome: Ongoing     Problem: Cardiovascular  Goal: Hemodynamic stability  Outcome: Ongoing  Note: On nitro gtt     Problem: Cardiovascular  Goal: Anticoagulate/Hct stable  Outcome: Ongoing  Note: On heparin gtt     Problem: GI  Goal: No bowel complications  Outcome: Ongoing     Problem: Bleeding:  Goal: Will show no signs and symptoms of excessive bleeding  Description: Will show no signs and symptoms of excessive bleeding  Outcome: Ongoing

## 2020-08-17 NOTE — ED TRIAGE NOTES
Jose Bermeo is a 59 y.o. male was brought to the ER by his wife for eval of chest pain that started at 2100. The patient states that a sharp midsternal chest pain 8/10 started at 2100 and has been constant and he took two nitros and they did not help the pain. The patient states nothing he does makes the pain better or worse. The patient is alert and oriented with an open and patent airway. EKG was preformed. ED provider at bedside.

## 2020-08-17 NOTE — ED NOTES
Pt states that his pain is climbing and that he would like to receive his nightly dose of percocet.  Pain 8/10 Ed provider notified     Rodrigo Roche RN  08/17/20 Timmy Wolf RN  08/17/20 0145       Rodrigo Roche RN  08/17/20 [id number] nonweight-bearing

## 2020-08-17 NOTE — CONSULTS
Clinical Pharmacy Note  Heparin Dosing Consult    Thomas Pope is a 59 y.o. male ordered heparin per low dose nomogram by Dr. Reilly Velásquez. Lab Results   Component Value Date    APTT 31.0 08/17/2020     Lab Results   Component Value Date    HGB 13.6 08/17/2020    HCT 40.3 08/17/2020     08/17/2020    INR 0.94 07/13/2020       Ht Readings from Last 1 Encounters:   08/09/20 5' 11\" (1.803 m)        Wt Readings from Last 1 Encounters:   08/16/20 182 lb 5.1 oz (82.7 kg)     Dosing weight: 82.7 kg    Assessment/Plan:  Initial bolus: 4000 units  Initial infusion rate: 9.9 mL/hr  Next aPTT: 0900  8/17/20    Pharmacy will continue to monitor adjust heparin based on aPTT results using nomogram below:     LOW DOSE HEPARIN PROTOCOL (ACS/STEMI/A FIB)     Initial Bolus: 60 units/kg Max Bolus: 4,000 units       Initial Rate: 12 units/kg/hr Max Initial Rate: 1,000 units/hr     aPTT < 36   Heparin 60 units/kg bolus Increase infusion by 4 units/kg/hr       (maximum 4,000 units)   aPTT 37-48   Heparin 30 units/kg bolus Increase infusion by 2 units/kg/hr       (maximum 2,000 units)   aPTT 49-76   No bolus   No change   aPTT 77-85   No bolus   Decrease infusion by 2 units/kg/hr   aPTT 86-94   Hold heparin for 1 hour Decrease infusion by 3 units/kg/hr   aPTT     Hold heparin for 1 hour Decrease infusion by 4 units/kg/hr   aPTT > 103   Hold heparin for 1 hour Decrease infusion by 6 units/kg/hr    Obtain aPTT 6 hours after initial bolus and 6 hours after any dose change until two consecutive therapeutic aPTTs are achieved - then daily.     Irina Nathan, JuventinoD

## 2020-08-17 NOTE — CONSULTS
Clinical Pharmacy Note  Heparin Dosing Consult    Thomas Pope is a 59 y.o. male ordered heparin per low dose nomogram by Dr. Reilly Velásquez. Lab Results   Component Value Date    APTT 35.7 08/17/2020     Lab Results   Component Value Date    HGB 13.6 08/17/2020    HCT 40.3 08/17/2020     08/17/2020    INR 0.94 07/13/2020       Ht Readings from Last 1 Encounters:   08/17/20 6' (1.829 m)        Wt Readings from Last 1 Encounters:   08/17/20 187 lb 6.3 oz (85 kg)     Dosing weight: 82.7 kg    Assessment/Plan:  Bolus: 4000 units  Increase infusion rate: 13.2 mL/hr  Next aPTT: 1630  8/17/20    Pharmacy will continue to monitor adjust heparin based on aPTT results using nomogram below:     LOW DOSE HEPARIN PROTOCOL (ACS/STEMI/A FIB)     Initial Bolus: 60 units/kg Max Bolus: 4,000 units       Initial Rate: 12 units/kg/hr Max Initial Rate: 1,000 units/hr     aPTT < 36   Heparin 60 units/kg bolus Increase infusion by 4 units/kg/hr       (maximum 4,000 units)   aPTT 37-48   Heparin 30 units/kg bolus Increase infusion by 2 units/kg/hr       (maximum 2,000 units)   aPTT 49-76   No bolus   No change   aPTT 77-85   No bolus   Decrease infusion by 2 units/kg/hr   aPTT 86-94   Hold heparin for 1 hour Decrease infusion by 3 units/kg/hr   aPTT     Hold heparin for 1 hour Decrease infusion by 4 units/kg/hr   aPTT > 103   Hold heparin for 1 hour Decrease infusion by 6 units/kg/hr    Obtain aPTT 6 hours after initial bolus and 6 hours after any dose change until two consecutive therapeutic aPTTs are achieved - then daily.     Irina Nathan, JuventinoD

## 2020-08-17 NOTE — H&P
tablet Take 1 tablet by mouth daily 7/16/20   Maya Ontiverospurnima APRN - CNP   hydrALAZINE (APRESOLINE) 25 MG tablet Take 1 tablet by mouth 2 times daily 7/16/20   Maya Garzayulisa, APRN - CNP   metoprolol succinate (TOPROL XL) 25 MG extended release tablet Take 1 tablet by mouth daily 7/16/20   Maya Garzayulisa, APRN - CNP   tamsulosin (FLOMAX) 0.4 MG capsule Take 1 capsule by mouth daily 7/16/20   Mayabethanie Garzayulisa, APRN - CNP   pantoprazole (PROTONIX) 40 MG tablet Take 1 tablet by mouth daily 7/16/20   Maya Garzayulisa, APRN - CNP   zolpidem (AMBIEN) 5 MG tablet Take 1 tablet by mouth nightly as needed for Sleep for up to 60 days. 7/16/20 9/14/20  Mayabethanie Garzayulisa, APRN - CNP   ranolazine (RANEXA) 500 MG extended release tablet Take 2 tablets by mouth 2 times daily 7/16/20   Maya Garzayulisa, APRN - CNP   amiodarone (PACERONE) 100 MG tablet Take 1 tablet by mouth daily 7/17/20   Maya Garzayulisa APRN - CNP   acetaminophen (TYLENOL) 325 MG tablet Take 650 mg by mouth every 6 hours as needed for Pain    Historical Provider, MD       Allergies:  Dopamine hcl; Tramadol; and Melatonin    Social History:  The patient currently lives at home    TOBACCO:   reports that he has been smoking cigarettes. He has a 22.00 pack-year smoking history. He has never used smokeless tobacco.  ETOH:   reports no history of alcohol use. Family History:  Reviewed in detail and negative for DM, Early CAD, Cancer, CVA. Positive as follows:        Problem Relation Age of Onset    Coronary Art Dis Father     Cancer Mother         Lung with mets    Diabetes Mother        REVIEW OF SYSTEMS:   Positive for and as noted in the HPI. All other systems reviewed and negative. PHYSICAL EXAM:    BP (!) 162/103   Pulse 94   Temp 98.2 °F (36.8 °C) (Oral)   Resp 17   Ht 6' (1.829 m)   Wt 187 lb 6.3 oz (85 kg)   SpO2 96%   BMI 25.41 kg/m²     General appearance: No apparent distress appears stated age and cooperative.   HEENT Normal cephalic, atraumatic without 08/17/20  0559   TROPONINI 0.19* 0.22*       U/A:    Lab Results   Component Value Date    NITRITE neg 05/23/2014    COLORU YELLOW 04/22/2020    WBCUA 3 04/22/2020    RBCUA 1 04/22/2020    CLARITYU Clear 04/22/2020    SPECGRAV 1.014 04/22/2020    LEUKOCYTESUR Negative 04/22/2020    BLOODU Negative 04/22/2020    GLUCOSEU 250 04/22/2020       ABG    Lab Results   Component Value Date    BNV7RIH 25.5 05/21/2018    BEART -0.2 05/21/2018    X1WRMZKF 98.4 05/21/2018    PHART 7.345 05/21/2018    KSA3ZPE 48.0 05/21/2018    PO2ART 114.0 05/21/2018    BNC6XRI 27.0 05/21/2018           Active Hospital Problems    Diagnosis Date Noted    Type 2 diabetes mellitus without complication, with long-term current use of insulin ([facility] [E11.9, Z79.4] 09/16/2015     Priority: Medium    Ischemic cardiomyopathy [I25.5] 11/02/2014     Priority: Medium    Essential hypertension [I10] 06/04/2013     Priority: Medium    Hx of CABG [Z95.1] 03/05/2013     Priority: Medium    COPD exacerbation ([facility] [J44.1]      Priority: Low    Anginal syndrome ([facility] [I20.9] 07/29/2020    Chest pain [R07.9] 07/06/2020    Hypertensive crisis [I16.9] 06/17/2020    ICD (implantable cardioverter-defibrillator) discharge [Z45.02] 02/06/2020    CHF (congestive heart failure) ([facility] [I50.9] 12/01/2013    NSTEMI (non-ST elevated myocardial infarction) [I21.4] 05/29/2013         PHYSICIANS CERTIFICATION:    I certify that Triny Reid is expected to be hospitalized for more than 2 midnights based on the following assessment and plan:      ASSESSMENT/PLAN:      Unstable angina/NSTEMI  -continue heparin gtt  -continue nitro gtt  -cardiology consulted, recs appreciated  -tele monitoring  -trend troponin  -NPO until cardiology eval  -continue home BB, ASA, statin, Renexa    Hypertensive urgency  -continue home meds  -increase hydralazine to TID  -consider Entresto and/or aldactone     Hx CAD s/p CABG  -continue home meds  -tele monitoring  -cardiology consulted,

## 2020-08-17 NOTE — ED PROVIDER NOTES
[address] [facility]      Pt Name: Hilary Christian  MRN: [id number]  [place] 1955  Date of evaluation: 8/16/2020  Provider: Bishop Nubia MD    CHIEF COMPLAINT       Chief Complaint   Patient presents with    Chest Pain     started a couple hours ago, took 2 nitro tabs at home. Sharp midsternal. NOthing makes better or worse. HISTORY OF PRESENT ILLNESS    Hilary Christian is a 59 y.o. male who presents to the emergency department with chest pain. Endorses chest pain starting a few hours prior to arrival. Hx stents and CABG. Denies SOB. Pain is acute 3/10 sharp substernal and constant in nature. Nothing makes symptoms better but sitting up makes symptoms worse. This has  Happened many times before. No other associated symptoms other than previously mentioned. Nursing Notes were reviewed. Including nursing noted for FM, Surgical History, Past Medical History, Social History, vitals, and allergies; agree with all. REVIEW OF SYSTEMS       Review of Systems   Constitutional: Negative for activity change, appetite change, chills, diaphoresis, fatigue, fever and unexpected weight change. HENT: Negative for congestion, dental problem, drooling, ear discharge and ear pain. Eyes: Negative for photophobia, pain, discharge, redness, itching and visual disturbance. Respiratory: Negative for apnea, cough, choking, chest tightness, shortness of breath, wheezing and stridor. Cardiovascular: Positive for chest pain. Negative for palpitations and leg swelling. Gastrointestinal: Negative for abdominal distention, abdominal pain, anal bleeding, blood in stool, constipation, diarrhea, nausea, rectal pain and vomiting. Endocrine: Negative for cold intolerance and heat intolerance. Genitourinary: Negative for decreased urine volume and urgency. Musculoskeletal: Negative for arthralgias and back pain.    Skin: Negative for color change and pallor. Neurological: Negative for dizziness and facial asymmetry. Hematological: Negative for adenopathy. Does not bruise/bleed easily. Psychiatric/Behavioral: Negative for agitation, behavioral problems, confusion and decreased concentration. Except as noted above the remainder of the review of systems was reviewed and negative.      PAST MEDICAL HISTORY     Past Medical History:   Diagnosis Date    Anxiety     Arthritis     Asthma     CAD (coronary artery disease)     Calcium kidney stone     [facility]     Cerebral artery occlusion with cerebral infarction ([facility]     CHF (congestive heart failure) ([facility]     Clostridium difficile diarrhea 02/12/2020    COPD [facility]ch[facility] obstructive [facility] disease) [facility])     mild    Depression     DM2 (diabetes mellitus, type 2) ([facility])     Fibromyalgia     GERD (gastroesophageal reflux disease)     Hyperlipidemia     Hypertension     Liver disease     Pacemaker 2012    Medtronic model # [id number]    Pneumonia     Seizures ([facility] [facility]     TIA (transient ischemic attack) 2007    Ulcerative colitis ([facility] [address]        SURGICAL HISTORY       Past Surgical History:   Procedure Laterality Date    APPENDECTOMY      BACK SURGERY      CARDIAC SURGERY      CHOLECYSTECTOMY      COLONOSCOPY  01/10/2017    COLONOSCOPY  01/10/2017    CORONARY ANGIOPLASTY WITH STENT PLACEMENT  2012    CORONARY ARTERY BYPASS GRAFT  2010, 11/2015    ENDOSCOPY, COLON, DIAGNOSTIC      PACEMAKER PLACEMENT      UPPER GASTROINTESTINAL ENDOSCOPY  02/07/2017    VASCULAR SURGERY         CURRENT MEDICATIONS       Previous Medications    ACETAMINOPHEN (TYLENOL) 325 MG TABLET    Take 650 mg by mouth every 6 hours as needed for Pain    ALBUTEROL SULFATE HFA (VENTOLIN HFA) 108 (90 BASE) MCG/ACT INHALER    Inhale 2 puffs into the lungs 4 times daily as needed for Wheezing    AMIODARONE (PACERONE) 100 MG TABLET    Take 1 tablet by mouth daily    APIXABAN (ELIQUIS) 5 MG TABS TABLET needs     Medical: None     Non-medical: None   Tobacco Use    Smoking status: Current Every Day Smoker     Packs/day: 0.50     Years: 44.00     Pack years: 22.00     Types: Cigarettes    Smokeless tobacco: Never Used    Tobacco comment: urged to stop   Substance and Sexual Activity    Alcohol use: No     Alcohol/week: 0.0 standard drinks    Drug use: No    Sexual activity: Not Currently     Partners: Female   Lifestyle    Physical activity     Days per week: None     Minutes per session: None    Stress: None   Relationships    Social connections     Talks on phone: None     Gets together: None     Attends Holiness service: None     Active member of club or organization: None     Attends meetings of clubs or organizations: None     Relationship status: None    Intimate partner violence     Fear of current or ex partner: None     Emotionally abused: None     Physically abused: None     Forced sexual activity: None   Other Topics Concern    None   Social History Narrative    None       PHYSICAL EXAM       ED Triage Vitals [08/16/20 2344]   BP Temp Temp Source Pulse Resp SpO2 Height Weight   (!) 162/104 98 °F (36.7 °C) Oral 98 16 97 % -- 182 lb 5.1 oz (82.7 kg)       Physical Exam  Vitals signs and nursing note reviewed. Constitutional:       Appearance: He is well-developed. He is ill-appearing. He is not diaphoretic. HENT:      Head: Normocephalic and atraumatic. Eyes:      General:         Right eye: No discharge. Left eye: No discharge. Pupils: Pupils are equal, round, and reactive to light. Neck:      Musculoskeletal: Normal range of motion. Thyroid: No thyromegaly. Trachea: No tracheal deviation. Cardiovascular:      Rate and Rhythm: Normal rate and regular rhythm. Heart sounds: No murmur. Pulmonary:      Breath sounds: No wheezing or rales. Chest:      Chest wall: No tenderness. Abdominal:      General: There is no distension.       Palpations: Abdomen is soft. There is no mass. Tenderness: There is no abdominal tenderness. There is no guarding or rebound. Musculoskeletal: Normal range of motion. General: No tenderness or deformity. Skin:     General: Skin is warm. Coloration: Skin is not pale. Findings: No erythema or rash. Neurological:      Mental Status: He is alert. Cranial Nerves: No cranial nerve deficit. Motor: No abnormal muscle tone. Coordination: Coordination normal.       DIAGNOSTIC RESULTS     EKG: All EKG's are interpreted by the Emergency Department Physician who either signs or Co-signs this chart in the absence of acardiologist.    EKG shows demand pacemaker sinus tachycardia PVCs LAE RBBB NO STEMI    RADIOLOGY:   Non-plain film images such as CT, Ultrasoundand MRI are read by the radiologist. Plain radiographic images are visualized and preliminarily interpreted by the emergency physician with the below findings:    Impression    No evidence of pulmonary embolism or acute pulmonary abnormality.       ED BEDSIDE ULTRASOUND:   Performed by ED Physician - none    LABS:  Labs Reviewed   CBC WITH AUTO DIFFERENTIAL - Abnormal; Notable for the following components:       Result Value    RBC 3.89 (*)     Hematocrit 40.3 (*)     .8 (*)     MCH 34.9 (*)     RDW 15.9 (*)     All other components within normal limits    Narrative:     Performed at:  [facility]  [address] [facility] [address]   Phone [phone]   BASIC METABOLIC PANEL W/ REFLEX TO MG FOR LOW K - Abnormal; Notable for the following components:    Glucose 140 (*)     All other components within normal limits    Narrative:     Performed at:  [facility]  [address] [place], [place] [address]   Phone [phone]   TROPONIN - Abnormal; Notable for the following components:    Troponin 0.19 (*)     All other components within normal limits    Narrative:

## 2020-08-17 NOTE — ED NOTES
Patient now requesting \"morphine shot\" in addition to his percocet. When asking patient where his pain is located, he states \"where is my pain located\". I confirmed this was the correct question. He then continues to be angry and states \"his pain is out of control baby\". He continues to insist that he has morphine ordered every four hours. At this point I turn the computer around to show the patient that he does not have a morphine order at this time. Again ask patient where his pain is located, he finally states his chest. I ask him if he takes his percocet at home for chest pain, he denies and states he uses it for back pain, which he doesn't have at this time. Informed Dr. Romero Daily of patient complaint of out of control chest pain. Order was placed for nitroglycerin gtt. I presented to patient's room with medication. Patient now refusing nitro gtt reporting that nitro really bothers him. I reminded him that he took nitro at home prior to coming. He states, but it wasn't a gtt. I then ask patient what side effects he gets from taking nitro, he now refuses to answer my questions, and reports he will only answer questions from the doctor. Dr. Romero Daily informed that patient has questions about the nitro gtt.       Tiffanie Wright, RN  08/17/20 401 [address], RN  08/17/20 1136

## 2020-08-17 NOTE — CONSULTS
Marj  1955    August 17, 2020    Reason for Consult: Chest Pain    CC: Chest Pain    HPI:  The patient is 59 y.o. male with a past medical history significant for chronic chest pain/chronic elevated troponin assays and CAD s/p CABP/PCI who presented to [facility] with chest pain. I was asked to see him for elevated troponin assays in the setting of chest pain. Mr. Regla Walters is well know to us and technically follows with my partner Dr. Anne Donald. However, he does not keep office appointments and his medication compliance is in doubt. Zain had 5 missed appointments in our office since May. He was markedly hypertensive on admission with -210's/-110's. Cathryn Corrales states that he developed chest pain at rest yesterday. He was sitting in his chair when it occurred. He relates that it was sharp and in the middle of his chest. He had some shortness of breath with it and nausea. He took two nitroglycerin tablets at home with minimal relief so he came to the hospital. He has had multiple presentations for chest pain. He relates that his chest pain is mild now. He reports that he has been compliant with his medications despite his elevated blood pressure on admission. He is not able to name any of his medications but does state that he has been taking his ranolazine and Imdur along with clopidogrel and Eliquis. Review of Systems:  Constitutional: No fatigue, weakness, night sweats or fever. HEENT: No new vision difficulties or ringing in the ears. Respiratory: No new SOB, PND, orthopnea or cough. Cardiovascular: See HPI   GI: No n/v, diarrhea, constipation, abdominal pain or changes in bowel habits. No melena, no hematochezia  : No urinary frequency, urgency, incontinence, hematuria or dysuria. Skin: No cyanosis or skin lesions. Musculoskeletal: No new muscle or joint pain. Neurological: No syncope or TIA-like symptoms.   Psychiatric: No anxiety, insomnia or depression     Past Medical History:   Diagnosis Date    Anxiety     Arthritis     Asthma     CAD (coronary artery disease)     Calcium kidney stone     Cardiomy[facility] [address] [facility]     Cerebral artery occlusion with cerebral infarction ([address]     CHF (congestive heart failure) ([facility] [address] [facility]     Clostridium difficile diarrhea 02/12/2020    COPD (chronic obstructive pulmonary disease) ([facility])     mild    Depression     DM2 (diabetes mellitus, type 2) ([facility])     Fibromyalgia     GERD (gastroesophageal reflux disease)     Hyperlipidemia     Hypertension     Liver disease     Pacemaker 2012    Medtronic model # [id number]    Pneumonia     Seizures ([address]     TIA (transient ischemic attack) 2007    Ulcerative colitis ([facility] [address]      Past Surgical History:   Procedure Laterality Date    APPENDECTOMY      BACK SURGERY      CARDIAC SURGERY      CHOLECYSTECTOMY      COLONOSCOPY  01/10/2017    COLONOSCOPY  01/10/2017    CORONARY ANGIOPLASTY WITH STENT PLACEMENT  2012    CORONARY ARTERY BYPASS GRAFT  2010, 11/2015    ENDOSCOPY, COLON, DIAGNOSTIC      PACEMAKER PLACEMENT      UPPER GASTROINTESTINAL ENDOSCOPY  02/07/2017    VASCULAR SURGERY       Family History   Problem Relation Age of Onset    Coronary Art Dis Father     Cancer Mother         Lung with mets    Diabetes Mother      Social History     Tobacco Use    Smoking status: Current Every Day Smoker     Packs/day: 0.50     Years: 44.00     Pack years: 22.00     Types: Cigarettes    Smokeless tobacco: Never Used    Tobacco comment: urged to stop   Substance Use Topics    Alcohol use: No     Alcohol/week: 0.0 standard drinks    Drug use: No       Allergies   Allergen Reactions    Dopamine Hcl Other (See Comments) and Anxiety     Compulsive gambling    Tramadol Other (See Comments)     Dizziness     Melatonin Other (See Comments)     Restless leg syndrome       Current Facility-Administered Medications   Medication Dose Route Frequency Provider Last Rate Last Dose    heparin 25,000 unit in sodium chloride 0.45% 250 mL infusion  9.9 mL/hr Intravenous Continuous Royce Burk MD 9.9 mL/hr at 08/17/20 0213 9.9 mL/hr at 08/17/20 0213    sodium chloride flush 0.9 % injection 10 mL  10 mL Intravenous 2 times per day [facility],         sodium chloride flush 0.9 % injection 10 mL  10 mL Intravenous PRN [facility], DO        acetaminophen (TYLENOL) tablet 650 mg  650 mg Oral Q6H PRN [facility], DO        Or    acetaminophen (TYLENOL) suppository 650 mg  650 mg Rectal Q6H PRN [facility], DO        polyethylene glycol (GLYCOLAX) packet 17 g  17 g Oral Daily PRN [facility], DO        promethazine (PHENERGAN) tablet 12.5 mg  12.5 mg Oral Q6H PRN [facility], [facility]        Or    ondansetron (ZOFRAN) injection 4 mg  4 mg Intravenous Q6H PRN [facility], DO        amiodarone (CORDARONE) tablet 100 mg  100 mg Oral Daily [facility], DO        aspirin tablet 325 mg  325 mg Oral Daily [facility], DO        empagliflozin (JARDIANCE) tablet TABS 10 mg  10 mg Oral Daily [facility], DO        atorvastatin (LIPITOR) tablet 80 mg  80 mg Oral Daily [facility], DO        metoprolol succinate (TOPROL XL) extended release tablet 25 mg  25 mg Oral Daily [facility], DO        pantoprazole (PROTONIX) tablet 40 mg  40 mg Oral Daily [facility], DO        ranolazine (RANEXA) extended release tablet 1,000 mg  1,000 mg Oral BID [facility], DO        tamsulosin (FLOMAX) capsule 0.4 mg  0.4 mg Oral Daily [facility], DO        nitroGLYCERIN 50 mg in dextrose 5% 250 mL infusion  5 mcg/min Intravenous Continuous [facility], DO 30 mL/hr at 08/17/20 0801 100 mcg/min at 08/17/20 0801    hydrALAZINE (APRESOLINE) tablet 25 mg  25 mg Oral 3 times per day Becca Durand MD        insulin lispro (HUMALOG) injection vial 0-6 Units  0-6 Units Subcutaneous TID AJ CHAVES Quinn Sharpe MD        insulin lispro (HUMALOG) injection vial 0-3 Units  0-3 Units Subcutaneous Nightly Stevie Méndez MD        glucose (GLUTOSE) 40 % oral gel 15 g  15 g Oral PRN Stevie Méndez MD        dextrose 50 % IV solution  12.5 g Intravenous PRN Stevie Méndez MD        glucagon (rDNA) injection 1 mg  1 mg Intramuscular PRN Stevie Méndez MD        dextrose 5 % solution  100 mL/hr Intravenous PRN Stevie Méndez MD        oxyCODONE-acetaminophen (PERCOCET)  MG per tablet 1 tablet  1 tablet Oral Q8H PRN Stevie Méndez MD           Physical Exam:   BP (!) 169/95   Pulse 100   Temp 98.2 °F (36.8 °C) (Oral)   Resp 20   Ht 6' (1.829 m)   Wt 187 lb 6.3 oz (85 kg)   SpO2 95%   BMI 25.41 kg/m²     Intake/Output Summary (Last 24 hours) at 8/17/2020 8808  Last data filed at 8/17/2020 0804  Gross per 24 hour   Intake --   Output 725 ml   Net -725 ml     Wt Readings from Last 2 Encounters:   08/17/20 187 lb 6.3 oz (85 kg)   08/09/20 181 lb (82.1 kg)     Constitutional: He is oriented to person, place, and time. He appears well-developed and well-nourished. In no acute distress. Head: Normocephalic and atraumatic. Neck: Neck supple. No JVD present. Carotid bruit is not present. No mass and no thyromegaly present. No lymphadenopathy present. Cardiovascular: Normal rate, regular rhythm, normal heart sounds and intact distal pulses. Exam reveals no gallop and no friction rub. No murmur heard. Pulmonary/Chest: Effort normal and breath sounds normal. No respiratory distress. He has no wheezes, rhonchi or rales. Abdominal: Soft, non-tender. Bowel sounds and aorta are normal. He exhibits no organomegaly, mass or bruit. Extremities: No edema, cyanosis, or clubbing. Pulses are 2+ radial/carotid/dorsalis pedis and posterior tibial bilaterally. Neurological: He is alert and oriented to person, place, and time. He has normal reflexes. No cranial nerve deficit.  Coordination normal.   Skin: Skin is warm and dry. There is no rash or diaphoresis. Psychiatric: He has a normal mood and affect. His speech is normal and behavior is normal.     EKG Interpretation:  Sinus rhythm with RBBB and frequent uniform PVC's    Lab Review:   Lab Results   Component Value Date    TRIG 182 07/14/2020    HDL 34 07/14/2020    LDLCALC 94 07/14/2020    LDLDIRECT 105 09/17/2015    LABVLDL 36 07/14/2020     Lab Results   Component Value Date     08/17/2020    K 4.1 08/17/2020    BUN 17 08/17/2020    CREATININE 1.0 08/17/2020     Recent Labs     08/17/20  0044   WBC 6.6   HGB 13.6   HCT 40.3*          Assessment:  1. NSTEMI, type 2  2. Hyperlipidemia with goal LDL,70mg/dL  3. Hypertensive Urgency  4. Noncompliance with medication regimen    Plan:  Hudson Quinones is not able to tell me why he won't keep follow-up appointments in our office. He states that he is compliant with his medication regimen though I feel this is suspect given his presentation with hypertensive urgency. His BP is always controlled here when given his home medications. If his BP remains elevated as an outpatient he could be changed to carvedilol from Toprol XL. He has multiple coronary lesions that is distal in the vessel or not amenable to intervention. His hypertensive urgency likely caused this type 2 NSTEMI. He does not check his BP at home. I recommended to Hudson Quinones that he obtain and opinion no his CAD from a referral center such as the [facility]. I also suggested to him that could reflect on why he does not keep his scheduled appointments in our office. I would discontinue his nitro and heparin drips at this point. I have no plans to repeat his left heart catheterization or perform and ischemic evaluation. I did review Dr. Shayna Miller films from April 2020. Once the nitro drip is off we can further titrate his medication. He should ambulate the halls with assistance.

## 2020-08-17 NOTE — PROGRESS NOTES
2922: Arrived to CVU from ED for CP, likely NSTEMI. BP elevated SBP 200s. Heparin and nitro infusing. Pt A/O x4. Rates CP 7/10. Nitro increased- see MAR    0630: Cardiology consult called to ERIKA Shaw NP.    3573: Pt sleeping when writer entered the room. Woke pt by adjusting IV pump for BP. He then stated that his chest pain is still a 7/10. EKG ordered. 6433: EKG completed- SR with PVCs and R bundle branch block. Pt was again sleeping when writer entered the room. Reports chest pain has not changed. BP improving. See NTG titrations.

## 2020-08-18 VITALS
WEIGHT: 186.51 LBS | TEMPERATURE: 97.7 F | HEIGHT: 72 IN | OXYGEN SATURATION: 96 % | HEART RATE: 87 BPM | BODY MASS INDEX: 25.26 KG/M2 | RESPIRATION RATE: 19 BRPM | DIASTOLIC BLOOD PRESSURE: 77 MMHG | SYSTOLIC BLOOD PRESSURE: 150 MMHG

## 2020-08-18 LAB
A/G RATIO: 1.5 (ref 1.1–2.2)
ALBUMIN SERPL-MCNC: 3.5 G/DL (ref 3.4–5)
ALP BLD-CCNC: 40 U/L (ref 40–129)
ALT SERPL-CCNC: <5 U/L (ref 10–40)
ANION GAP SERPL CALCULATED.3IONS-SCNC: 10 MMOL/L (ref 3–16)
AST SERPL-CCNC: 8 U/L (ref 15–37)
BASOPHILS ABSOLUTE: 0 K/UL (ref 0–0.2)
BASOPHILS RELATIVE PERCENT: 0.8 %
BILIRUB SERPL-MCNC: 0.5 MG/DL (ref 0–1)
BUN BLDV-MCNC: 15 MG/DL (ref 7–20)
CALCIUM SERPL-MCNC: 8.6 MG/DL (ref 8.3–10.6)
CHLORIDE BLD-SCNC: 106 MMOL/L (ref 99–110)
CO2: 22 MMOL/L (ref 21–32)
CREAT SERPL-MCNC: 1.3 MG/DL (ref 0.8–1.3)
EKG ATRIAL RATE: 82 BPM
EKG DIAGNOSIS: NORMAL
EKG P AXIS: 61 DEGREES
EKG P-R INTERVAL: 136 MS
EKG Q-T INTERVAL: 452 MS
EKG QRS DURATION: 144 MS
EKG QTC CALCULATION (BAZETT): 528 MS
EKG R AXIS: 4 DEGREES
EKG T AXIS: 46 DEGREES
EKG VENTRICULAR RATE: 82 BPM
EOSINOPHILS ABSOLUTE: 0.2 K/UL (ref 0–0.6)
EOSINOPHILS RELATIVE PERCENT: 2.9 %
GFR AFRICAN AMERICAN: >60
GFR NON-AFRICAN AMERICAN: 55
GLOBULIN: 2.4 G/DL
GLUCOSE BLD-MCNC: 96 MG/DL (ref 70–99)
GLUCOSE BLD-MCNC: 97 MG/DL (ref 70–99)
HCT VFR BLD CALC: 37.5 % (ref 40.5–52.5)
HEMOGLOBIN: 12.7 G/DL (ref 13.5–17.5)
LYMPHOCYTES ABSOLUTE: 2.5 K/UL (ref 1–5.1)
LYMPHOCYTES RELATIVE PERCENT: 42.5 %
MAGNESIUM: 2.1 MG/DL (ref 1.8–2.4)
MCH RBC QN AUTO: 35 PG (ref 26–34)
MCHC RBC AUTO-ENTMCNC: 33.9 G/DL (ref 31–36)
MCV RBC AUTO: 103.3 FL (ref 80–100)
MONOCYTES ABSOLUTE: 0.3 K/UL (ref 0–1.3)
MONOCYTES RELATIVE PERCENT: 5.3 %
NEUTROPHILS ABSOLUTE: 2.8 K/UL (ref 1.7–7.7)
NEUTROPHILS RELATIVE PERCENT: 48.5 %
PDW BLD-RTO: 15.4 % (ref 12.4–15.4)
PERFORMED ON: NORMAL
PLATELET # BLD: 151 K/UL (ref 135–450)
PMV BLD AUTO: 8.9 FL (ref 5–10.5)
POTASSIUM REFLEX MAGNESIUM: 4.1 MMOL/L (ref 3.5–5.1)
RBC # BLD: 3.63 M/UL (ref 4.2–5.9)
SODIUM BLD-SCNC: 138 MMOL/L (ref 136–145)
TOTAL PROTEIN: 5.9 G/DL (ref 6.4–8.2)
URINE CULTURE, ROUTINE: NORMAL
WBC # BLD: 5.8 K/UL (ref 4–11)

## 2020-08-18 PROCEDURE — 93005 ELECTROCARDIOGRAM TRACING: CPT | Performed by: STUDENT IN AN ORGANIZED HEALTH CARE EDUCATION/TRAINING PROGRAM

## 2020-08-18 PROCEDURE — 80053 COMPREHEN METABOLIC PANEL: CPT

## 2020-08-18 PROCEDURE — 6370000000 HC RX 637 (ALT 250 FOR IP): Performed by: STUDENT IN AN ORGANIZED HEALTH CARE EDUCATION/TRAINING PROGRAM

## 2020-08-18 PROCEDURE — 36415 COLL VENOUS BLD VENIPUNCTURE: CPT

## 2020-08-18 PROCEDURE — 6370000000 HC RX 637 (ALT 250 FOR IP): Performed by: INTERNAL MEDICINE

## 2020-08-18 PROCEDURE — 93010 ELECTROCARDIOGRAM REPORT: CPT | Performed by: INTERNAL MEDICINE

## 2020-08-18 PROCEDURE — 94761 N-INVAS EAR/PLS OXIMETRY MLT: CPT

## 2020-08-18 PROCEDURE — 83735 ASSAY OF MAGNESIUM: CPT

## 2020-08-18 PROCEDURE — 85025 COMPLETE CBC W/AUTO DIFF WBC: CPT

## 2020-08-18 RX ORDER — HYDRALAZINE HYDROCHLORIDE 25 MG/1
25 TABLET, FILM COATED ORAL EVERY 8 HOURS SCHEDULED
Qty: 90 TABLET | Refills: 3 | Status: SHIPPED | OUTPATIENT
Start: 2020-08-18 | End: 2020-10-16 | Stop reason: CLARIF

## 2020-08-18 RX ORDER — METOPROLOL SUCCINATE 50 MG/1
50 TABLET, EXTENDED RELEASE ORAL DAILY
Qty: 30 TABLET | Refills: 3 | Status: SHIPPED | OUTPATIENT
Start: 2020-08-18 | End: 2020-10-16 | Stop reason: CLARIF

## 2020-08-18 RX ADMIN — GABAPENTIN 300 MG: 300 CAPSULE ORAL at 07:58

## 2020-08-18 RX ADMIN — OXYCODONE HYDROCHLORIDE AND ACETAMINOPHEN 1 TABLET: 10; 325 TABLET ORAL at 00:45

## 2020-08-18 RX ADMIN — OXYCODONE HYDROCHLORIDE AND ACETAMINOPHEN 1 TABLET: 10; 325 TABLET ORAL at 08:56

## 2020-08-18 RX ADMIN — METOPROLOL SUCCINATE 50 MG: 50 TABLET, EXTENDED RELEASE ORAL at 09:07

## 2020-08-18 RX ADMIN — TAMSULOSIN HYDROCHLORIDE 0.4 MG: 0.4 CAPSULE ORAL at 09:07

## 2020-08-18 RX ADMIN — RANOLAZINE 1000 MG: 500 TABLET, FILM COATED, EXTENDED RELEASE ORAL at 09:08

## 2020-08-18 RX ADMIN — HYDRALAZINE HYDROCHLORIDE 25 MG: 25 TABLET, FILM COATED ORAL at 07:57

## 2020-08-18 RX ADMIN — AMIODARONE HYDROCHLORIDE 100 MG: 200 TABLET ORAL at 09:07

## 2020-08-18 RX ADMIN — ASPIRIN 325 MG ORAL TABLET 325 MG: 325 PILL ORAL at 09:07

## 2020-08-18 RX ADMIN — ATORVASTATIN CALCIUM 80 MG: 80 TABLET, FILM COATED ORAL at 09:07

## 2020-08-18 RX ADMIN — ZOLPIDEM TARTRATE 5 MG: 5 TABLET ORAL at 00:09

## 2020-08-18 RX ADMIN — PANTOPRAZOLE SODIUM 40 MG: 40 TABLET, DELAYED RELEASE ORAL at 07:57

## 2020-08-18 ASSESSMENT — PAIN DESCRIPTION - ORIENTATION
ORIENTATION: LOWER;MID
ORIENTATION_2: MID

## 2020-08-18 ASSESSMENT — PAIN DESCRIPTION - PROGRESSION
CLINICAL_PROGRESSION_2: NOT CHANGED
CLINICAL_PROGRESSION: GRADUALLY WORSENING

## 2020-08-18 ASSESSMENT — PAIN SCALES - GENERAL
PAINLEVEL_OUTOF10: 0
PAINLEVEL_OUTOF10: 8
PAINLEVEL_OUTOF10: 7

## 2020-08-18 ASSESSMENT — PAIN DESCRIPTION - PAIN TYPE
TYPE: CHRONIC PAIN
TYPE_2: CHRONIC PAIN

## 2020-08-18 ASSESSMENT — PAIN DESCRIPTION - LOCATION
LOCATION_2: CHEST
LOCATION: BACK

## 2020-08-18 ASSESSMENT — PAIN DESCRIPTION - DURATION: DURATION_2: CONTINUOUS

## 2020-08-18 ASSESSMENT — PAIN DESCRIPTION - DESCRIPTORS
DESCRIPTORS: ACHING;CONSTANT
DESCRIPTORS_2: PRESSURE

## 2020-08-18 ASSESSMENT — PAIN DESCRIPTION - INTENSITY: RATING_2: 4

## 2020-08-18 ASSESSMENT — PAIN - FUNCTIONAL ASSESSMENT: PAIN_FUNCTIONAL_ASSESSMENT: ACTIVITIES ARE NOT PREVENTED

## 2020-08-18 ASSESSMENT — PAIN DESCRIPTION - ONSET
ONSET_2: ON-GOING
ONSET: ON-GOING

## 2020-08-18 ASSESSMENT — PAIN DESCRIPTION - FREQUENCY: FREQUENCY: CONTINUOUS

## 2020-08-18 NOTE — PROGRESS NOTES
0745--Handoff report with Sara Lyon RN. Pt A&Ox4 in bed. 0835--Pt very mad that he cannot have his pain medication yet (not due yet). He states he wants to leave and I told him he has that right and can sign the AMA paperwork if/when he chooses to leave. He says I can get his vitals/do assessment when I give him his pain med    0905--Dr. Radha Ryan at bedside. He is putting D/C orders in pending, cards eval.     1047--I notified Dr. Radha Molina and Dr. Radha Ryan that pt is signing himself out AMA. I told pt the discharge order is in and that he just needs to wait until Dr. Radha Molina sees him but he refuses. I explained the risk to him and he understands. 1055--[facility] 1058--Per pt request, he was wheeled to ED where his car is parked.      Electronically signed by Saba Fall RN on 8/18/2020 at 10:59 AM

## 2020-08-18 NOTE — DISCHARGE SUMMARY
Hospital Medicine Discharge Summary    Patient ID: Thomas Pope      Patient's PCP: Niels Gonzalez MD    Admit Date: 8/16/2020     Discharge Date:   8/18/2020    Admitting Physician: Yasir Bowers DO     Discharge Physician: Dejon Schulz MD     Discharge Diagnoses: Active Hospital Problems    Diagnosis Date Noted    Type 2 diabetes mellitus without complication, with long-term current use of insulin ([facility] [address] [E11.9, Z79.4] 09/16/2015     Priority: Medium    Ischemic cardiomyopathy [I25.5] 11/02/2014     Priority: Medium    Essential hypertension [I10] 06/04/2013     Priority: Medium    Hx of CABG [Z95.1] 03/05/2013     Priority: Medium    COPD exacerbation ([facility] [address] [J44.1]      Priority: Low    Anginal syndrome ([facility] [address] [I20.9] 07/29/2020    Chest pain [R07.9] 07/06/2020    Hypertensive crisis [I16.9] 06/17/2020    ICD (implantable cardioverter-defibrillator) discharge [Z45.02] 02/06/2020    CHF (congestive heart failure) ([facility] [address] [I50.9] 12/01/2013    NSTEMI (non-ST elevated myocardial infarction) [I21.4] 05/29/2013       The patient was seen and examined on day of discharge and this discharge summary is in conjunction with any daily progress note from day of discharge. Hospital Course: The patient is a 59 y.o. male with hx CAD s/p CABG, stents, type 2 DM, HTN, HLD, fibromyalgia, systolic CHF s/p ICD, asthma, COPD who presents to [facility] with chest pain. Patient states the pain started yesterday afternoon while he was sitting, 10/10 in severity, comes and goes, substernal to left sided, reproducible, with nothing making the pain better or worse. Denies fever, chills, shortness of breath, abdominal pain, nausea, vomiting, constipation, diarrhea, and dysuria. This pain has happened many times before and he has been to the ED and admitted several times with this complaint.      In the ED, labs were significant for a pro-BNP of 4,237, troponin of 0.19.  CXR showed no acute cardiopulmonary process. CTA Chest showed no evidence of pulmonary embolism or acute pulmonary abnormality. EKG showed demand pacemaker with sinus tachycardia and frequent PVCs, RBBB. He was started on a heparin gtt and nitro gtt and cardiology was consulted. NSTEMI, type 2  -started on heparin gtt and then stopped after cardiology evaluation  -started on nitro gtt and then stopped after cardiology evaluation  -cardiology consulted, recs appreciated  -tele monitoring  -trended troponin, likely elevated due to demand ischemia  -no further evaluation per cardiology  -continue home BB, ASA, statin, Renexa; increased BB to improve heart rate     Hypertensive urgency  -continue home meds  -increased hydralazine to TID     Hx CAD s/p CABG  -continue home meds  -tele monitoring  -cardiology consulted, recs appreciated     Type 2 DM  -SSI  -hypoglycemia protocol  -POCT glucose checks  -carb control diet once able to have diet     COPD  -not in acute exacerbation  -continue home inahlers     Chronic systolic CHF - last Echo EF 25-30% on 4/23/2020 s/p ICD  -appears compensated on exam  -continue home meds  -can consider Entresto and/or aldactone, but there is concern for compliance at discharge so will not prescribe for now     Asthma  -not in acute exacerbation  -continue home inhalers     GERD  -PPI      Exam:     BP (!) 150/77   Pulse 87   Temp 97.7 °F (36.5 °C) (Oral)   Resp 19   Ht 6' (1.829 m)   Wt 186 lb 8.2 oz (84.6 kg)   SpO2 96%   BMI 25.30 kg/m²       General appearance:  No apparent distress, appears stated age and cooperative. HEENT:  Normal cephalic, atraumatic without obvious deformity. Pupils equal, round, and reactive to light. Extra ocular muscles intact. Conjunctivae/corneas clear. Neck: Supple, with full range of motion. No jugular venous distention. Trachea midline. Respiratory:  Normal respiratory effort. Clear to auscultation, bilaterally without Rales/Wheezes/Rhonchi.   Cardiovascular: Regular rate and rhythm with normal S1/S2 without murmurs, rubs or gallops. Abdomen: Soft, non-tender, non-distended with normal bowel sounds. Musculoskeletal:  No clubbing, cyanosis or edema bilaterally. Full range of motion without deformity. Skin: Skin color, texture, turgor normal.  No rashes or lesions. Neurologic:  Neurovascularly intact without any focal sensory/motor deficits. Cranial nerves: II-XII intact, grossly non-focal.  Psychiatric:  Alert and oriented, thought content appropriate, normal insight  Capillary Refill: Brisk,< 3 seconds   Peripheral Pulses: +2 palpable, equal bilaterally       Labs: For convenience and continuity at follow-up the following most recent labs are provided:      CBC:    Lab Results   Component Value Date    WBC 5.8 08/18/2020    HGB 12.7 08/18/2020    HCT 37.5 08/18/2020     08/18/2020       Renal:    Lab Results   Component Value Date     08/18/2020    K 4.1 08/18/2020     08/18/2020    CO2 22 08/18/2020    BUN 15 08/18/2020    CREATININE 1.3 08/18/2020    CALCIUM 8.6 08/18/2020    PHOS 4.1 07/08/2020         Significant Diagnostic Studies    Radiology:   CT CHEST PULMONARY EMBOLISM W CONTRAST   Final Result   No evidence of pulmonary embolism or acute pulmonary abnormality. XR CHEST PORTABLE   Final Result   No acute pulmonary process.                 Consults:     IP CONSULT TO CARDIOLOGY    Disposition:  home     Condition at Discharge: Stable    Discharge Instructions/Follow-up:  meds as prescribed, follow-up with cardiology and PCP at discharge    Code Status:  Full Code     Activity: activity as tolerated    Diet: diabetic diet      Discharge Medications:     Current Discharge Medication List           Details   hydrALAZINE (APRESOLINE) 25 MG tablet Take 1 tablet by mouth every 8 hours  Qty: 90 tablet, Refills: 3      metoprolol succinate (TOPROL XL) 50 MG extended release tablet Take 1 tablet by mouth daily  Qty: 30 tablet, Refills: 3 Details   oxyCODONE-acetaminophen (PERCOCET)  MG per tablet Take 1 tablet by mouth every 8 hours as needed for Pain for up to 30 days. Intended supply: 30 days  Qty: 90 tablet, Refills: 0    Comments: Reduce doses taken as pain becomes manageable  Associated Diagnoses: Chronic midline low back pain without sciatica      Respiratory Therapy Supplies (NEBULIZER) AALIYAH Used to give yourself breathing treatment  Qty: 1 Device, Refills: 0      ipratropium-albuterol (DUONEB) 0.5-2.5 (3) MG/3ML SOLN nebulizer solution Inhale 3 mLs into the lungs every 4 hours as needed for Shortness of Breath (wheezing coughing)  Qty: 360 mL, Refills: 5      aspirin 325 MG tablet Take 325 mg by mouth daily      traZODone (DESYREL) 50 MG tablet TAKE 1 TABLET BY MOUTH EVERY NIGHT AT BEDTIME  Qty: 30 tablet, Refills: 2    Associated Diagnoses: Primary insomnia      albuterol sulfate HFA (VENTOLIN HFA) 108 (90 Base) MCG/ACT inhaler Inhale 2 puffs into the lungs 4 times daily as needed for Wheezing  Qty: 1 Inhaler, Refills: 1      apixaban (ELIQUIS) 5 MG TABS tablet Take 1 tablet by mouth 2 times daily  Qty: 60 tablet, Refills: 1      gabapentin (NEURONTIN) 600 MG tablet 1 tab PO 5 times a day  Qty: 150 tablet, Refills: 1    Associated Diagnoses: Neuropathy      empagliflozin (JARDIANCE) 10 MG tablet Take 1 tablet by mouth daily  Qty: 30 tablet, Refills: 0      atorvastatin (LIPITOR) 80 MG tablet Take 1 tablet by mouth daily  Qty: 30 tablet, Refills: 0    Associated Diagnoses: Coronary artery disease involving native heart without angina pectoris, unspecified vessel or lesion type      tamsulosin (FLOMAX) 0.4 MG capsule Take 1 capsule by mouth daily  Qty: 30 capsule, Refills: 0      pantoprazole (PROTONIX) 40 MG tablet Take 1 tablet by mouth daily  Qty: 30 tablet, Refills: 0      zolpidem (AMBIEN) 5 MG tablet Take 1 tablet by mouth nightly as needed for Sleep for up to 60 days.   Qty: 30 tablet, Refills: 0    Associated Diagnoses:

## 2020-08-19 ENCOUNTER — CARE COORDINATION (OUTPATIENT)
Dept: CASE MANAGEMENT | Age: 65
End: 2020-08-19

## 2020-08-19 NOTE — CARE COORDINATION
[address] Transitions Initial Follow Up Call    Call within 2 business days of discharge: Yes    Patient: Sosa Marks Patient : 1955   MRN: [id number]  Reason for Admission: CP  Discharge Date: 20 RARS: Readmission Risk Score: 76      Last Discharge [facility]       Complaint Diagnosis Description Type Department Provider    20 Chest Pain NSTEMI (non-ST elevated myocardial infarction) [facility]) ED to Hosp-Admission (Discharged) (ADMITTED) ANATOLY CVLISAU Corona West MD; Frank Rivero. .. Spoke with: Gabriella Crouch (patient)    Facility: [facility]    Initial attempt at [facility] discharge phone call. Spoke briefly with Ruy. He declines discharge phone call and any additional CTN outreach.         Follow Up  Future Appointments   Date Time Provider Fred Gregory   2020 11:00 AM TAYLA Frederick - CNP [facility]       Sue Foreman RN

## 2020-08-29 ENCOUNTER — APPOINTMENT (OUTPATIENT)
Dept: GENERAL RADIOLOGY | Age: 65
End: 2020-08-29
Payer: MEDICARE

## 2020-08-29 ENCOUNTER — HOSPITAL ENCOUNTER (EMERGENCY)
Age: 65
Discharge: HOME | End: 2020-08-29
Attending: EMERGENCY MEDICINE
Payer: MEDICARE

## 2020-08-29 VITALS
DIASTOLIC BLOOD PRESSURE: 77 MMHG | TEMPERATURE: 98.7 F | HEIGHT: 72 IN | RESPIRATION RATE: 15 BRPM | OXYGEN SATURATION: 97 % | BODY MASS INDEX: 24.75 KG/M2 | WEIGHT: 182.76 LBS | HEART RATE: 81 BPM | SYSTOLIC BLOOD PRESSURE: 145 MMHG

## 2020-08-29 LAB
A/G RATIO: 1.6 (ref 1.1–2.2)
ALBUMIN SERPL-MCNC: 3.9 G/DL (ref 3.4–5)
ALP BLD-CCNC: 48 U/L (ref 40–129)
ALT SERPL-CCNC: <5 U/L (ref 10–40)
AMPHETAMINE SCREEN, URINE: POSITIVE
ANION GAP SERPL CALCULATED.3IONS-SCNC: 14 MMOL/L (ref 3–16)
AST SERPL-CCNC: 8 U/L (ref 15–37)
BARBITURATE SCREEN URINE: ABNORMAL
BASOPHILS ABSOLUTE: 0.1 K/UL (ref 0–0.2)
BASOPHILS RELATIVE PERCENT: 0.8 %
BENZODIAZEPINE SCREEN, URINE: ABNORMAL
BILIRUB SERPL-MCNC: <0.2 MG/DL (ref 0–1)
BILIRUBIN URINE: ABNORMAL
BLOOD, URINE: NEGATIVE
BUN BLDV-MCNC: 25 MG/DL (ref 7–20)
CALCIUM SERPL-MCNC: 8.5 MG/DL (ref 8.3–10.6)
CANNABINOID SCREEN URINE: ABNORMAL
CHLORIDE BLD-SCNC: 112 MMOL/L (ref 99–110)
CLARITY: CLEAR
CO2: 25 MMOL/L (ref 21–32)
COCAINE METABOLITE SCREEN URINE: ABNORMAL
COLOR: YELLOW
CREAT SERPL-MCNC: 1.4 MG/DL (ref 0.8–1.3)
EOSINOPHILS ABSOLUTE: 0.2 K/UL (ref 0–0.6)
EOSINOPHILS RELATIVE PERCENT: 2.7 %
EPITHELIAL CELLS, UA: 2 /HPF (ref 0–5)
GFR AFRICAN AMERICAN: >60
GFR NON-AFRICAN AMERICAN: 51
GLOBULIN: 2.5 G/DL
GLUCOSE BLD-MCNC: 141 MG/DL (ref 70–99)
GLUCOSE URINE: 100 MG/DL
HCT VFR BLD CALC: 41.4 % (ref 40.5–52.5)
HEMOGLOBIN: 13.8 G/DL (ref 13.5–17.5)
HYALINE CASTS: 6 /LPF (ref 0–8)
KETONES, URINE: ABNORMAL MG/DL
LACTIC ACID: 2.4 MMOL/L (ref 0.4–2)
LEUKOCYTE ESTERASE, URINE: NEGATIVE
LIPASE: 34 U/L (ref 13–60)
LYMPHOCYTES ABSOLUTE: 2.1 K/UL (ref 1–5.1)
LYMPHOCYTES RELATIVE PERCENT: 27.5 %
Lab: ABNORMAL
MCH RBC QN AUTO: 34.6 PG (ref 26–34)
MCHC RBC AUTO-ENTMCNC: 33.3 G/DL (ref 31–36)
MCV RBC AUTO: 103.9 FL (ref 80–100)
METHADONE SCREEN, URINE: ABNORMAL
MICROSCOPIC EXAMINATION: YES
MONOCYTES ABSOLUTE: 0.5 K/UL (ref 0–1.3)
MONOCYTES RELATIVE PERCENT: 6.6 %
NEUTROPHILS ABSOLUTE: 4.7 K/UL (ref 1.7–7.7)
NEUTROPHILS RELATIVE PERCENT: 62.4 %
NITRITE, URINE: NEGATIVE
OPIATE SCREEN URINE: POSITIVE
OXYCODONE URINE: POSITIVE
PDW BLD-RTO: 15.5 % (ref 12.4–15.4)
PH UA: 5.5 (ref 5–8)
PH UA: 6
PHENCYCLIDINE SCREEN URINE: ABNORMAL
PLATELET # BLD: 187 K/UL (ref 135–450)
PMV BLD AUTO: 9.5 FL (ref 5–10.5)
POTASSIUM REFLEX MAGNESIUM: 3.8 MMOL/L (ref 3.5–5.1)
PROPOXYPHENE SCREEN: ABNORMAL
PROTEIN UA: 30 MG/DL
RBC # BLD: 3.99 M/UL (ref 4.2–5.9)
RBC UA: 1 /HPF (ref 0–4)
SODIUM BLD-SCNC: 151 MMOL/L (ref 136–145)
SPECIFIC GRAVITY UA: >1.03 (ref 1–1.03)
TOTAL PROTEIN: 6.4 G/DL (ref 6.4–8.2)
TROPONIN: <0.01 NG/ML
URINE REFLEX TO CULTURE: ABNORMAL
URINE TYPE: ABNORMAL
UROBILINOGEN, URINE: 0.2 E.U./DL
WBC # BLD: 7.5 K/UL (ref 4–11)
WBC UA: 1 /HPF (ref 0–5)

## 2020-08-29 PROCEDURE — 73560 X-RAY EXAM OF KNEE 1 OR 2: CPT

## 2020-08-29 PROCEDURE — 80307 DRUG TEST PRSMV CHEM ANLYZR: CPT

## 2020-08-29 PROCEDURE — 85025 COMPLETE CBC W/AUTO DIFF WBC: CPT

## 2020-08-29 PROCEDURE — 83690 ASSAY OF LIPASE: CPT

## 2020-08-29 PROCEDURE — 99284 EMERGENCY DEPT VISIT MOD MDM: CPT

## 2020-08-29 PROCEDURE — 80053 COMPREHEN METABOLIC PANEL: CPT

## 2020-08-29 PROCEDURE — 83605 ASSAY OF LACTIC ACID: CPT

## 2020-08-29 PROCEDURE — 84484 ASSAY OF TROPONIN QUANT: CPT

## 2020-08-29 PROCEDURE — 2580000003 HC RX 258: Performed by: EMERGENCY MEDICINE

## 2020-08-29 PROCEDURE — 93005 ELECTROCARDIOGRAM TRACING: CPT | Performed by: EMERGENCY MEDICINE

## 2020-08-29 PROCEDURE — 81001 URINALYSIS AUTO W/SCOPE: CPT

## 2020-08-29 PROCEDURE — 96374 THER/PROPH/DIAG INJ IV PUSH: CPT

## 2020-08-29 PROCEDURE — 71046 X-RAY EXAM CHEST 2 VIEWS: CPT

## 2020-08-29 PROCEDURE — 6360000002 HC RX W HCPCS: Performed by: EMERGENCY MEDICINE

## 2020-08-29 PROCEDURE — 6370000000 HC RX 637 (ALT 250 FOR IP): Performed by: EMERGENCY MEDICINE

## 2020-08-29 PROCEDURE — 36415 COLL VENOUS BLD VENIPUNCTURE: CPT

## 2020-08-29 RX ORDER — MORPHINE SULFATE 4 MG/ML
4 INJECTION, SOLUTION INTRAMUSCULAR; INTRAVENOUS ONCE
Status: COMPLETED | OUTPATIENT
Start: 2020-08-29 | End: 2020-08-29

## 2020-08-29 RX ORDER — ASPIRIN 325 MG
325 TABLET ORAL ONCE
Status: COMPLETED | OUTPATIENT
Start: 2020-08-29 | End: 2020-08-29

## 2020-08-29 RX ORDER — OXYCODONE HYDROCHLORIDE AND ACETAMINOPHEN 5; 325 MG/1; MG/1
1 TABLET ORAL ONCE
Status: COMPLETED | OUTPATIENT
Start: 2020-08-29 | End: 2020-08-29

## 2020-08-29 RX ORDER — 0.9 % SODIUM CHLORIDE 0.9 %
500 INTRAVENOUS SOLUTION INTRAVENOUS ONCE
Status: COMPLETED | OUTPATIENT
Start: 2020-08-29 | End: 2020-08-29

## 2020-08-29 RX ADMIN — OXYCODONE HYDROCHLORIDE AND ACETAMINOPHEN 1 TABLET: 5; 325 TABLET ORAL at 05:45

## 2020-08-29 RX ADMIN — ASPIRIN 325 MG ORAL TABLET 325 MG: 325 PILL ORAL at 03:23

## 2020-08-29 RX ADMIN — SODIUM CHLORIDE 500 ML: 9 INJECTION, SOLUTION INTRAVENOUS at 03:27

## 2020-08-29 RX ADMIN — MORPHINE SULFATE 4 MG: 4 INJECTION, SOLUTION INTRAMUSCULAR; INTRAVENOUS at 03:52

## 2020-08-29 ASSESSMENT — ENCOUNTER SYMPTOMS
ABDOMINAL PAIN: 0
NAUSEA: 0
BACK PAIN: 0
COLOR CHANGE: 0
SHORTNESS OF BREATH: 0
PHOTOPHOBIA: 0
WHEEZING: 0
VOMITING: 0
RHINORRHEA: 0

## 2020-08-29 ASSESSMENT — PAIN SCALES - GENERAL
PAINLEVEL_OUTOF10: 4
PAINLEVEL_OUTOF10: 7
PAINLEVEL_OUTOF10: 6
PAINLEVEL_OUTOF10: 3
PAINLEVEL_OUTOF10: 7

## 2020-08-29 ASSESSMENT — PAIN DESCRIPTION - ORIENTATION: ORIENTATION: OTHER (COMMENT)

## 2020-08-29 ASSESSMENT — PAIN DESCRIPTION - PAIN TYPE
TYPE: ACUTE PAIN

## 2020-08-29 ASSESSMENT — PAIN DESCRIPTION - LOCATION
LOCATION: KNEE
LOCATION: KNEE

## 2020-08-29 ASSESSMENT — PAIN DESCRIPTION - DESCRIPTORS: DESCRIPTORS: SHARP

## 2020-08-29 NOTE — ED PROVIDER NOTES
[facility]      Pt Name: Liliam Ramos  MRN: [id number]  [place] 1955  Date ofevaluation: 8/29/2020  Provider: Horacio Gayle MD    CHIEF COMPLAINT       Chief Complaint   Patient presents with    Fall     Patient states he has fallen 4 times over the past two days. Patient denies LOC, reports hitting his head once, denies neck pain. Abrasion to right knee. Denies dizziness or lightheadedness, states \"My knees just give out. \"    Knee Pain         HISTORY OF PRESENT ILLNESS   (Location/Symptom, Timing/Onset,Context/Setting, Quality, Duration, Modifying Factors, Severity)  Note limiting factors. Liliam Ramos is a 59 y.o. male  who  has a past medical history of Anxiety, Arthritis, Asthma, CAD (coronary artery disease), Calcium kidney stone, Cardiomyopathy ([address] Cerebral artery occlusion with cerebral infarction ([address] CHF (congestive heart failure) ([address] Clostridium difficile diarrhea, COPD (chronic obstructive pulmonary disease) ([address] Depression, DM2 (diabetes mellitus, type 2) ([address] Fibromyalgia, GERD (gastroesophageal reflux disease), Hyperlipidemia, Hypertension, Liver disease, Pacemaker, Pneumonia, Seizures ([address] TIA (transient ischemic attack), and Ulcerative colitis ([address] who presents to the emergency department for evaluation of right knee pain and multiple falls. Patient states over the past 2 days he has had four falls. Patient states that he has episodes where his legs \"give out\". He states that he does feel like his legs are weak afterwards. He states this has not happened to him in the past.  He denies sensory changes or skin changes. Denies headache chest pain shortness of breath abdominal pain nausea or vomiting. Reviewing the patient's medical record he recently was admitted for N STEMI and hypertensive emergency. He states that since being discharged he has been feeling well until the last 2 days.   States he has been taking his medications denies a change in p.o. intake. HPI    NursingNotes were reviewed. REVIEW OF SYSTEMS    (2-9 systems for level 4, 10 or more for level 5)     Review of Systems   Constitutional: Negative for activity change, chills, fatigue and fever. HENT: Negative for congestion and rhinorrhea. Eyes: Negative for photophobia and visual disturbance. Respiratory: Negative for shortness of breath and wheezing. Cardiovascular: Negative for palpitations and leg swelling. Gastrointestinal: Negative for abdominal pain, nausea and vomiting. Endocrine: Negative for polydipsia and polyuria. Genitourinary: Negative for decreased urine volume, difficulty urinating, frequency and urgency. Musculoskeletal: Negative for back pain and gait problem. Skin: Negative for color change and rash. Neurological: Negative for dizziness, facial asymmetry, speech difficulty, weakness, light-headedness, numbness and headaches. Psychiatric/Behavioral: Negative for confusion. The patient is not nervous/anxious. All other systems reviewed and are negative. Except as noted above the remainder of the review of systems was reviewed and negative.        PAST MEDICAL HISTORY     Past Medical History:   Diagnosis Date    Anxiety     Arthritis     Asthma     CAD (coronary artery disease)     Calcium kidney stone     Cardiomyopathy ([address]     Cerebral artery occlusion with cerebral infarction ([address]     CHF (congestive heart failure) ([address]     Clostridium difficile diarrhea 02/12/2020    COPD [facility]     mild    Depression     DM2 (diabetes mellitus, type 2) ([facility])     Fibromyalgia     GERD (gastroesophageal reflux disease)     Hyperlipidemia     Hypertension     Liver disease     Pacemaker 2012    Medtronic model # [id number]    Pneumonia     Seizures ([address]     TIA (transient ischemic attack) 2007    Ulcerative colitis ([address]  SURGICALHISTORY       Past Surgical History:   Procedure Laterality Date    APPENDECTOMY      BACK SURGERY      CARDIAC SURGERY      CHOLECYSTECTOMY      COLONOSCOPY  01/10/2017    COLONOSCOPY  01/10/2017    CORONARY ANGIOPLASTY WITH STENT PLACEMENT  2012    CORONARY ARTERY BYPASS GRAFT  2010, 11/2015    ENDOSCOPY, COLON, DIAGNOSTIC      PACEMAKER PLACEMENT      UPPER GASTROINTESTINAL ENDOSCOPY  02/07/2017    VASCULAR SURGERY           CURRENT MEDICATIONS       Previous Medications    ACETAMINOPHEN (TYLENOL) 325 MG TABLET    Take 650 mg by mouth every 6 hours as needed for Pain    ALBUTEROL SULFATE HFA (VENTOLIN HFA) 108 (90 BASE) MCG/ACT INHALER    Inhale 2 puffs into the lungs 4 times daily as needed for Wheezing    AMIODARONE (PACERONE) 100 MG TABLET    Take 1 tablet by mouth daily    APIXABAN (ELIQUIS) 5 MG TABS TABLET    Take 1 tablet by mouth 2 times daily    ASPIRIN 325 MG TABLET    Take 325 mg by mouth daily    ATORVASTATIN (LIPITOR) 80 MG TABLET    Take 1 tablet by mouth daily    EMPAGLIFLOZIN (JARDIANCE) 10 MG TABLET    Take 1 tablet by mouth daily    GABAPENTIN (NEURONTIN) 600 MG TABLET    1 tab PO 5 times a day    HYDRALAZINE (APRESOLINE) 25 MG TABLET    Take 1 tablet by mouth every 8 hours    IPRATROPIUM-ALBUTEROL (DUONEB) 0.5-2.5 (3) MG/3ML SOLN NEBULIZER SOLUTION    Inhale 3 mLs into the lungs every 4 hours as needed for Shortness of Breath (wheezing coughing)    METOPROLOL SUCCINATE (TOPROL XL) 50 MG EXTENDED RELEASE TABLET    Take 1 tablet by mouth daily    OXYCODONE-ACETAMINOPHEN (PERCOCET)  MG PER TABLET    Take 1 tablet by mouth every 8 hours as needed for Pain for up to 30 days.  Intended supply: 30 days    PANTOPRAZOLE (PROTONIX) 40 MG TABLET    Take 1 tablet by mouth daily    RANOLAZINE (RANEXA) 500 MG EXTENDED RELEASE TABLET    Take 2 tablets by mouth 2 times daily    RESPIRATORY THERAPY SUPPLIES (NEBULIZER) AALIYAH    Used to give yourself breathing treatment TAMSULOSIN (FLOMAX) 0.4 MG CAPSULE    Take 1 capsule by mouth daily    TRAZODONE (DESYREL) 50 MG TABLET    TAKE 1 TABLET BY MOUTH EVERY NIGHT AT BEDTIME    ZOLPIDEM (AMBIEN) 5 MG TABLET    Take 1 tablet by mouth nightly as needed for Sleep for up to 60 days.             Dopamine hcl; Tramadol; and Melatonin    FAMILY HISTORY       Family History   Problem Relation Age of Onset    Coronary Art Dis Father     Cancer Mother         Lung with mets    Diabetes Mother           SOCIAL HISTORY       Social History     Socioeconomic History    Marital status:      Spouse name: None    Number of children: 1    Years of education: None    Highest education level: None   Occupational History    Occupation: disabled   Social Needs    Financial resource strain: None    Food insecurity     Worry: None     Inability: None    Transportation needs     Medical: None     Non-medical: None   Tobacco Use    Smoking status: Current Every Day Smoker     Packs/day: 1.00     Years: 44.00     Pack years: 44.00     Types: Cigarettes    Smokeless tobacco: Never Used    Tobacco comment: urged to stop   Substance and Sexual Activity    Alcohol use: No     Alcohol/week: 0.0 standard drinks    Drug use: No    Sexual activity: Not Currently     Partners: Female   Lifestyle    Physical activity     Days per week: None     Minutes per session: None    Stress: None   Relationships    Social connections     Talks on phone: None     Gets together: None     Attends Mandaeism service: None     Active member of club or organization: None     Attends meetings of clubs or organizations: None     Relationship status: None    Intimate partner violence     Fear of current or ex partner: None     Emotionally abused: None     Physically abused: None     Forced sexual activity: None   Other Topics Concern    None   Social History Narrative    None       SCREENINGS    [place] Coma Scale  Eye Opening: Spontaneous  Best Verbal Response: Co-signsthis chart in the absence of a cardiologist.    Patient's EKG shows a sinus rhythm with a ventricular 114 bpm.  Patient's [place] interval is within normal limits he is prolonged QTc interval.  Patient has a normal axis. Patient has T wave inversions in the anterior lateral leads as well as the inferior leads. Compared to EKG from 8/16/2020 these do appear to be new. Patient has a right bundle branch block that is not new. RADIOLOGY:   Non-plain filmimages such as CT, Ultrasound and MRI are read by the radiologist. Plain radiographic images are visualized and preliminarily interpreted by the emergency physician with the below findings:      Interpretation per the Radiologist below, if available at the time ofthis note:    XR KNEE RIGHT (3 VIEWS)    (Results Pending)   XR CHEST (2 VW)    (Results Pending)         ED BEDSIDE ULTRASOUND:   Performed by ED Physician - none    LABS:  Labs Reviewed   CBC WITH AUTO DIFFERENTIAL - Abnormal; Notable for the following components:       Result Value    RBC 3.99 (*)     .9 (*)     MCH 34.6 (*)     RDW 15.5 (*)     All other components within normal limits    Narrative:     Performed at:  [address] [address]   Phone [phone]   COMPREHENSIVE METABOLIC PANEL W/ REFLEX TO MG FOR LOW K   LIPASE   TROPONIN   URINE RT REFLEX TO CULTURE   LACTIC ACID, PLASMA   URINE DRUG SCREEN       All other labs were within normal range or not returned as of this dictation.     EMERGENCY DEPARTMENT COURSE and DIFFERENTIAL DIAGNOSIS/MDM:   Vitals:    Vitals:    08/29/20 0315 08/29/20 0330 08/29/20 0345 08/29/20 0400   BP: (!) 145/81 (!) 168/82 (!) 175/87 (!) 150/77   Pulse: 121 116 103 99   Resp: 21 21 19 18   Temp:       TempSrc:       SpO2: 98% 100% 98% 98%   Weight:       Height:           Patient was given thefollowing medications:  Medications   0.9 % sodium chloride bolus (500 mLs Intravenous New Bag 8/29/20 0367)   aspirin tablet 325 mg (325 mg Oral Given 8/29/20 8253)   morphine (PF) injection 4 mg (4 mg Intravenous Given 8/29/20 4719)       ED COURSE & MEDICAL DECISION MAKING    Pertinent Labs & Imaging studies reviewed. (See chart for details)   -  Patient seen and evaluated in the emergency department. -  Triage and nursing notes reviewed and incorporated. -  Old chart records reviewed and incorporated. -  Differential diagnosis includes: Differential diagnosis: includes but not limited to Arterial Injury/Ischemia, Fracture, Dislocation, Infection, Compartment Syndrome, Neurologic Deficit/Injury. -  Work-up included:  See above  -  ED treatment included: See above  -  Results discussed with patient. Labs show slightly elevated BUN and creatinine. Patient does have an elevated lactate but no leukocytosis. Patient's sodium was slightly elevated but he received IV fluids. He may be slightly dehydrated. there are no signs of infection on chest x-ray or urinalysis. Patient did present to the ED tachycardic but this improved after administration of IV fluids and narcotic medications. Patient did report not receiving his oral narcotic pain medications and I suspect his tachycardia could be secondary to withdrawal.  He is currently on Xarelto and denies shortness of breath or chest pain of low suspicion for pulmonary embolism or dissection. Urinalysis does show ketonuria as well as proteinuria which is consistent with dehydration. Patient feels well on reevaluation is amenable discharge home with outpatient follow-up. He is able to ambulate in the emergency department. The patient is agreeable with plan of care and disposition. REASSESSMENT          CRITICAL CARE TIME   Total Critical Care time was 15 minutes, excluding separatelyreportable procedures.   There was a high probability ofclinically significant/life threatening deterioration in the patient's condition which required my urgent intervention. CONSULTS:  None    PROCEDURES:  Unless otherwise noted below, none     Procedures    FINAL IMPRESSION      1. Hypernatremia    2. Contusion of left knee, initial encounter    3. Lactic acidosis          DISPOSITION/PLAN   DISPOSITION        PATIENT REFERREDTO:  No follow-up provider specified.     DISCHARGEMEDICATIONS:  New Prescriptions    No medications on file          (Please note that portions of this note were completed with a voice recognition program.  Efforts were made to edit the dictations but occasionally words are mis-transcribed.)    Bonita Maciel MD (electronically signed)  Attending Emergency Physician          Bonita Maciel MD  08/29/20 9882

## 2020-08-29 NOTE — ED TRIAGE NOTES
Patient presents to ED complaining of multiple falls over the last 2 days. Patient denies dizziness or lightheadedness, states \"My knees just give out on me. \" States he struck his head one time. Is on Elequis at home. Denies LOC, denies neck pain. Normally ambulates at home with a walker. Hx COPD. Patient resting in bed. Reports some difficulty breathing and mild increase of his \"Normal\" chest pain.

## 2020-08-29 NOTE — ED NOTES
Fall risk screening completed. Fall risk bracelet applied to patient. Non-skid socks provided and placed on patient. The fall risk is indicated using  fall sign . Patient alert and oriented. The call light is within the patient's reach, and instructions for use were provided. The bed is in the lowest position with wheels locked. The patient has been advised to notify staff, using the call light, if there is a need to get up or use restroom. The patient verbalized understanding of safety precautions and how to contact staff for assistance.         Fidelia Gonzalez RN  08/29/20 2583

## 2020-08-29 NOTE — ED NOTES
Patient assisted from ED via wheelchair. AVS provided and discussed with patient. All questions answered. Patient verbalizes understanding of discharge instructions. Respirations even and easy. No obvious distress at this time. Patient states his wife is on her way and will be here to pick him up momentarily. Offered for patient to wait in lobby. Patient refuses and requests to wait outside instead.        Jean Claude Anna RN  08/29/20 5028

## 2020-08-30 LAB
EKG ATRIAL RATE: 114 BPM
EKG DIAGNOSIS: NORMAL
EKG P AXIS: 60 DEGREES
EKG P-R INTERVAL: 126 MS
EKG Q-T INTERVAL: 390 MS
EKG QRS DURATION: 126 MS
EKG QTC CALCULATION (BAZETT): 537 MS
EKG R AXIS: 74 DEGREES
EKG T AXIS: 3 DEGREES
EKG VENTRICULAR RATE: 114 BPM

## 2020-08-30 PROCEDURE — 93010 ELECTROCARDIOGRAM REPORT: CPT | Performed by: INTERNAL MEDICINE

## 2020-08-31 ENCOUNTER — CARE COORDINATION (OUTPATIENT)
Dept: FAMILY MEDICINE CLINIC | Age: 65
End: 2020-08-31

## 2020-08-31 NOTE — CARE COORDINATION
Patient contacted regarding recent discharge and COVID-19 risk. Discussed COVID-19 related testing which was not done at this time. Test results were not done. Patient informed of results, if available?      Care Transition Nurse/ Ambulatory Care Manager contacted the family by telephone to perform post discharge assessment. Verified name and  with family as identifiers. Patient has following risk factors of: heart failure, COPD and diabetes. CTN/ACM reviewed discharge instructions, medical action plan and red flags related to discharge diagnosis. Reviewed and educated them on any new and changed medications related to discharge diagnosis. Spoke with patient's wife Geeta Horta who is approved per HIPPA release form  Patient declines coming to the phone  ACM explained purpose of call and introduced myself and role  Explained my collaboration with Ruy's PCP and offered care coordination  Encouraged Mellocedrick Horta to explain my role to the patient and consider CC for support of her 's chronic conditions  Oksana was appreciative of the offer, but declines care coordination or needs at this time. Provided ACM contact information if needs change. Patient an wife Geeta Horta declined further calls based on severity of symptoms and risk factors.     Anais Mcgraw RN, MSN  Ambulatory Care Manager  [phone]

## 2020-09-03 ENCOUNTER — HOSPITAL ENCOUNTER (EMERGENCY)
Age: 65
Discharge: HOME | DRG: 281 | End: 2020-09-04
Attending: EMERGENCY MEDICINE
Payer: MEDICARE

## 2020-09-03 ENCOUNTER — APPOINTMENT (OUTPATIENT)
Dept: GENERAL RADIOLOGY | Age: 65
DRG: 281 | End: 2020-09-03

## 2020-09-03 LAB
A/G RATIO: 1.4 (ref 1.1–2.2)
ALBUMIN SERPL-MCNC: 3.7 G/DL (ref 3.4–5)
ALP BLD-CCNC: 51 U/L (ref 40–129)
ALT SERPL-CCNC: 7 U/L (ref 10–40)
ANION GAP SERPL CALCULATED.3IONS-SCNC: 13 MMOL/L (ref 3–16)
AST SERPL-CCNC: 11 U/L (ref 15–37)
BASE EXCESS VENOUS: -7.4 MMOL/L
BASOPHILS ABSOLUTE: 0.1 K/UL (ref 0–0.2)
BASOPHILS RELATIVE PERCENT: 1.3 %
BILIRUB SERPL-MCNC: <0.2 MG/DL (ref 0–1)
BUN BLDV-MCNC: 19 MG/DL (ref 7–20)
CALCIUM SERPL-MCNC: 8.8 MG/DL (ref 8.3–10.6)
CARBOXYHEMOGLOBIN: 5.2 %
CHLORIDE BLD-SCNC: 104 MMOL/L (ref 99–110)
CO2: 21 MMOL/L (ref 21–32)
CREAT SERPL-MCNC: 1.1 MG/DL (ref 0.8–1.3)
EOSINOPHILS ABSOLUTE: 0.1 K/UL (ref 0–0.6)
EOSINOPHILS RELATIVE PERCENT: 1.7 %
GFR AFRICAN AMERICAN: >60
GFR NON-AFRICAN AMERICAN: >60
GLOBULIN: 2.6 G/DL
GLUCOSE BLD-MCNC: 147 MG/DL (ref 70–99)
HCO3 VENOUS: 15 MMOL/L (ref 23–29)
HCT VFR BLD CALC: 40.9 % (ref 40.5–52.5)
HEMOGLOBIN: 13.8 G/DL (ref 13.5–17.5)
LIPASE: 26 U/L (ref 13–60)
LYMPHOCYTES ABSOLUTE: 1.8 K/UL (ref 1–5.1)
LYMPHOCYTES RELATIVE PERCENT: 21.7 %
MCH RBC QN AUTO: 34.7 PG (ref 26–34)
MCHC RBC AUTO-ENTMCNC: 33.8 G/DL (ref 31–36)
MCV RBC AUTO: 102.7 FL (ref 80–100)
METHEMOGLOBIN VENOUS: 0.7 %
MONOCYTES ABSOLUTE: 0.7 K/UL (ref 0–1.3)
MONOCYTES RELATIVE PERCENT: 8.5 %
NEUTROPHILS ABSOLUTE: 5.6 K/UL (ref 1.7–7.7)
NEUTROPHILS RELATIVE PERCENT: 66.8 %
O2 CONTENT, VEN: 12 ML/DL
O2 SAT, VEN: 94 %
O2 THERAPY: ABNORMAL
PCO2, VEN: 22.3 MMHG (ref 40–50)
PDW BLD-RTO: 14.5 % (ref 12.4–15.4)
PH VENOUS: 7.44 (ref 7.35–7.45)
PLATELET # BLD: 186 K/UL (ref 135–450)
PMV BLD AUTO: 9.1 FL (ref 5–10.5)
PO2, VEN: 59 MMHG
POTASSIUM REFLEX MAGNESIUM: 3.7 MMOL/L (ref 3.5–5.1)
RBC # BLD: 3.99 M/UL (ref 4.2–5.9)
SODIUM BLD-SCNC: 138 MMOL/L (ref 136–145)
TCO2 CALC VENOUS: 16 MMOL/L
TOTAL PROTEIN: 6.3 G/DL (ref 6.4–8.2)
TROPONIN: 0.02 NG/ML
WBC # BLD: 8.4 K/UL (ref 4–11)

## 2020-09-03 PROCEDURE — 6360000002 HC RX W HCPCS: Performed by: EMERGENCY MEDICINE

## 2020-09-03 PROCEDURE — 84484 ASSAY OF TROPONIN QUANT: CPT

## 2020-09-03 PROCEDURE — 82803 BLOOD GASES ANY COMBINATION: CPT

## 2020-09-03 PROCEDURE — 83690 ASSAY OF LIPASE: CPT

## 2020-09-03 PROCEDURE — 6370000000 HC RX 637 (ALT 250 FOR IP): Performed by: EMERGENCY MEDICINE

## 2020-09-03 PROCEDURE — 85025 COMPLETE CBC W/AUTO DIFF WBC: CPT

## 2020-09-03 PROCEDURE — 80053 COMPREHEN METABOLIC PANEL: CPT

## 2020-09-03 PROCEDURE — 93005 ELECTROCARDIOGRAM TRACING: CPT | Performed by: EMERGENCY MEDICINE

## 2020-09-03 PROCEDURE — 6360000002 HC RX W HCPCS: Performed by: NURSE PRACTITIONER

## 2020-09-03 PROCEDURE — 36415 COLL VENOUS BLD VENIPUNCTURE: CPT

## 2020-09-03 PROCEDURE — 96375 TX/PRO/DX INJ NEW DRUG ADDON: CPT

## 2020-09-03 PROCEDURE — 99291 CRITICAL CARE FIRST HOUR: CPT

## 2020-09-03 PROCEDURE — 71046 X-RAY EXAM CHEST 2 VIEWS: CPT

## 2020-09-03 PROCEDURE — 96374 THER/PROPH/DIAG INJ IV PUSH: CPT

## 2020-09-03 PROCEDURE — 6370000000 HC RX 637 (ALT 250 FOR IP): Performed by: NURSE PRACTITIONER

## 2020-09-03 RX ORDER — ONDANSETRON 2 MG/ML
4 INJECTION INTRAMUSCULAR; INTRAVENOUS ONCE
Status: COMPLETED | OUTPATIENT
Start: 2020-09-03 | End: 2020-09-03

## 2020-09-03 RX ORDER — ACETAMINOPHEN 325 MG/1
650 TABLET ORAL ONCE
Status: COMPLETED | OUTPATIENT
Start: 2020-09-03 | End: 2020-09-03

## 2020-09-03 RX ORDER — MORPHINE SULFATE 2 MG/ML
2 INJECTION, SOLUTION INTRAMUSCULAR; INTRAVENOUS ONCE
Status: COMPLETED | OUTPATIENT
Start: 2020-09-03 | End: 2020-09-03

## 2020-09-03 RX ORDER — NITROGLYCERIN 0.4 MG/1
0.4 TABLET SUBLINGUAL EVERY 5 MIN PRN
Status: DISCONTINUED | OUTPATIENT
Start: 2020-09-03 | End: 2020-09-04 | Stop reason: HOSPADM

## 2020-09-03 RX ADMIN — ONDANSETRON 4 MG: 2 INJECTION INTRAMUSCULAR; INTRAVENOUS at 23:26

## 2020-09-03 RX ADMIN — MORPHINE SULFATE 2 MG: 2 INJECTION, SOLUTION INTRAMUSCULAR; INTRAVENOUS at 23:25

## 2020-09-03 RX ADMIN — ACETAMINOPHEN 650 MG: 325 TABLET ORAL at 23:26

## 2020-09-03 RX ADMIN — NITROGLYCERIN 0.4 MG: 0.4 TABLET SUBLINGUAL at 23:24

## 2020-09-03 RX ADMIN — NITROGLYCERIN 0.4 MG: 0.4 TABLET SUBLINGUAL at 23:02

## 2020-09-03 ASSESSMENT — ENCOUNTER SYMPTOMS
ABDOMINAL PAIN: 0
SHORTNESS OF BREATH: 1
TROUBLE SWALLOWING: 0
EYES NEGATIVE: 1

## 2020-09-03 ASSESSMENT — PAIN DESCRIPTION - PROGRESSION: CLINICAL_PROGRESSION: NOT CHANGED

## 2020-09-03 ASSESSMENT — PAIN DESCRIPTION - PAIN TYPE: TYPE: ACUTE PAIN

## 2020-09-03 ASSESSMENT — PAIN SCALES - GENERAL
PAINLEVEL_OUTOF10: 9
PAINLEVEL_OUTOF10: 8
PAINLEVEL_OUTOF10: 8

## 2020-09-03 ASSESSMENT — PAIN DESCRIPTION - ONSET: ONSET: ON-GOING

## 2020-09-03 ASSESSMENT — PAIN DESCRIPTION - ORIENTATION: ORIENTATION: MID;LEFT

## 2020-09-03 ASSESSMENT — PAIN DESCRIPTION - DESCRIPTORS: DESCRIPTORS: SHARP

## 2020-09-03 ASSESSMENT — PAIN DESCRIPTION - FREQUENCY: FREQUENCY: CONTINUOUS

## 2020-09-03 ASSESSMENT — PAIN DESCRIPTION - LOCATION: LOCATION: CHEST

## 2020-09-03 ASSESSMENT — PAIN - FUNCTIONAL ASSESSMENT: PAIN_FUNCTIONAL_ASSESSMENT: ACTIVITIES ARE NOT PREVENTED

## 2020-09-03 NOTE — LETTER
[facility] Emergency Department  [address] [address]  Phone: [phone]    Patient: Wesley Aguilar  YOB: 1955  Date: 9/4/2020 Time: 3:40 AM    Leaving the Hospital Against Medical Advice    Chart #:[id number]    This will certify that I, the undersigned,    ______________________________________________________________________    A patient in the above named medical center, having requested discharge and removal from the medical center against the advice of my attending physician(s), hereby release the Emergency Department, its physicians, officers and employees, severally and individually, from any and all liability of any nature whatsoever for any injury or harm or complication of any kind that may result directly or indirectly, by reason of my terminating my stay as a patient from [facility], and hereby waive any and all rights of action I may now have or later acquire as a result of my voluntary departure from [facility] and the termination of my stay as a patient therein. This release is made with the full knowledge of the danger that may result from the action which I am taking.       Date:_______________________                         ___________________________                                                                                    Patient/Legal Representative    Witness:        ____________________________                          ___________________________  Nurse                                                                        Physician

## 2020-09-04 ENCOUNTER — APPOINTMENT (OUTPATIENT)
Dept: GENERAL RADIOLOGY | Age: 65
DRG: 281 | End: 2020-09-04

## 2020-09-04 ENCOUNTER — HOSPITAL ENCOUNTER (INPATIENT)
Age: 65
LOS: 1 days | Discharge: HOME | DRG: 281 | End: 2020-09-05
Attending: STUDENT IN AN ORGANIZED HEALTH CARE EDUCATION/TRAINING PROGRAM | Admitting: HOSPITALIST
Payer: MEDICARE

## 2020-09-04 VITALS
HEIGHT: 72 IN | BODY MASS INDEX: 23.98 KG/M2 | HEART RATE: 80 BPM | RESPIRATION RATE: 16 BRPM | SYSTOLIC BLOOD PRESSURE: 109 MMHG | TEMPERATURE: 98.9 F | OXYGEN SATURATION: 97 % | DIASTOLIC BLOOD PRESSURE: 62 MMHG | WEIGHT: 177 LBS

## 2020-09-04 LAB
A/G RATIO: 1.5 (ref 1.1–2.2)
ALBUMIN SERPL-MCNC: 4 G/DL (ref 3.4–5)
ALP BLD-CCNC: 55 U/L (ref 40–129)
ALT SERPL-CCNC: 8 U/L (ref 10–40)
AMPHETAMINE SCREEN, URINE: POSITIVE
ANION GAP SERPL CALCULATED.3IONS-SCNC: 13 MMOL/L (ref 3–16)
APTT: 28.9 SEC (ref 24.2–36.2)
AST SERPL-CCNC: 14 U/L (ref 15–37)
BARBITURATE SCREEN URINE: ABNORMAL
BASOPHILS ABSOLUTE: 0.1 K/UL (ref 0–0.2)
BASOPHILS RELATIVE PERCENT: 1.4 %
BENZODIAZEPINE SCREEN, URINE: ABNORMAL
BILIRUB SERPL-MCNC: 0.5 MG/DL (ref 0–1)
BUN BLDV-MCNC: 19 MG/DL (ref 7–20)
CALCIUM SERPL-MCNC: 9 MG/DL (ref 8.3–10.6)
CANNABINOID SCREEN URINE: ABNORMAL
CHLORIDE BLD-SCNC: 103 MMOL/L (ref 99–110)
CO2: 24 MMOL/L (ref 21–32)
COCAINE METABOLITE SCREEN URINE: ABNORMAL
CREAT SERPL-MCNC: 1 MG/DL (ref 0.8–1.3)
EKG ATRIAL RATE: 112 BPM
EKG DIAGNOSIS: NORMAL
EKG P AXIS: 52 DEGREES
EKG P-R INTERVAL: 124 MS
EKG Q-T INTERVAL: 388 MS
EKG QRS DURATION: 128 MS
EKG QTC CALCULATION (BAZETT): 529 MS
EKG R AXIS: 81 DEGREES
EKG T AXIS: 58 DEGREES
EKG VENTRICULAR RATE: 112 BPM
EOSINOPHILS ABSOLUTE: 0.2 K/UL (ref 0–0.6)
EOSINOPHILS RELATIVE PERCENT: 2.7 %
GFR AFRICAN AMERICAN: >60
GFR NON-AFRICAN AMERICAN: >60
GLOBULIN: 2.6 G/DL
GLUCOSE BLD-MCNC: 138 MG/DL (ref 70–99)
GLUCOSE BLD-MCNC: 180 MG/DL (ref 70–99)
HCT VFR BLD CALC: 39.9 % (ref 40.5–52.5)
HEMOGLOBIN: 13.7 G/DL (ref 13.5–17.5)
INR BLD: 0.91 (ref 0.86–1.14)
LYMPHOCYTES ABSOLUTE: 1.6 K/UL (ref 1–5.1)
LYMPHOCYTES RELATIVE PERCENT: 28.5 %
Lab: ABNORMAL
MCH RBC QN AUTO: 34.9 PG (ref 26–34)
MCHC RBC AUTO-ENTMCNC: 34.4 G/DL (ref 31–36)
MCV RBC AUTO: 101.5 FL (ref 80–100)
METHADONE SCREEN, URINE: ABNORMAL
MONOCYTES ABSOLUTE: 0.4 K/UL (ref 0–1.3)
MONOCYTES RELATIVE PERCENT: 6.6 %
NEUTROPHILS ABSOLUTE: 3.5 K/UL (ref 1.7–7.7)
NEUTROPHILS RELATIVE PERCENT: 60.8 %
OPIATE SCREEN URINE: POSITIVE
OXYCODONE URINE: ABNORMAL
PDW BLD-RTO: 14.6 % (ref 12.4–15.4)
PERFORMED ON: ABNORMAL
PH UA: 5.5
PHENCYCLIDINE SCREEN URINE: ABNORMAL
PLATELET # BLD: 201 K/UL (ref 135–450)
PMV BLD AUTO: 8.9 FL (ref 5–10.5)
POTASSIUM SERPL-SCNC: 3.7 MMOL/L (ref 3.5–5.1)
PRO-BNP: 1522 PG/ML (ref 0–124)
PRO-BNP: 1868 PG/ML (ref 0–124)
PROPOXYPHENE SCREEN: ABNORMAL
PROTHROMBIN TIME: 10.6 SEC (ref 10–13.2)
RBC # BLD: 3.93 M/UL (ref 4.2–5.9)
SODIUM BLD-SCNC: 140 MMOL/L (ref 136–145)
TOTAL PROTEIN: 6.6 G/DL (ref 6.4–8.2)
TROPONIN: 0.04 NG/ML
TROPONIN: 0.09 NG/ML
TROPONIN: 0.09 NG/ML
WBC # BLD: 5.7 K/UL (ref 4–11)

## 2020-09-04 PROCEDURE — 93005 ELECTROCARDIOGRAM TRACING: CPT | Performed by: PHYSICIAN ASSISTANT

## 2020-09-04 PROCEDURE — 71046 X-RAY EXAM CHEST 2 VIEWS: CPT

## 2020-09-04 PROCEDURE — 83880 ASSAY OF NATRIURETIC PEPTIDE: CPT

## 2020-09-04 PROCEDURE — 84484 ASSAY OF TROPONIN QUANT: CPT

## 2020-09-04 PROCEDURE — 93010 ELECTROCARDIOGRAM REPORT: CPT | Performed by: INTERNAL MEDICINE

## 2020-09-04 PROCEDURE — 85610 PROTHROMBIN TIME: CPT

## 2020-09-04 PROCEDURE — 6370000000 HC RX 637 (ALT 250 FOR IP): Performed by: NURSE PRACTITIONER

## 2020-09-04 PROCEDURE — 6370000000 HC RX 637 (ALT 250 FOR IP): Performed by: HOSPITALIST

## 2020-09-04 PROCEDURE — 85025 COMPLETE CBC W/AUTO DIFF WBC: CPT

## 2020-09-04 PROCEDURE — 6360000002 HC RX W HCPCS: Performed by: EMERGENCY MEDICINE

## 2020-09-04 PROCEDURE — 99285 EMERGENCY DEPT VISIT HI MDM: CPT

## 2020-09-04 PROCEDURE — 2580000003 HC RX 258: Performed by: HOSPITALIST

## 2020-09-04 PROCEDURE — 1200000000 HC SEMI PRIVATE

## 2020-09-04 PROCEDURE — 96375 TX/PRO/DX INJ NEW DRUG ADDON: CPT

## 2020-09-04 PROCEDURE — 6360000002 HC RX W HCPCS: Performed by: PHYSICIAN ASSISTANT

## 2020-09-04 PROCEDURE — 80307 DRUG TEST PRSMV CHEM ANLYZR: CPT

## 2020-09-04 PROCEDURE — 96374 THER/PROPH/DIAG INJ IV PUSH: CPT

## 2020-09-04 PROCEDURE — 36415 COLL VENOUS BLD VENIPUNCTURE: CPT

## 2020-09-04 PROCEDURE — 85730 THROMBOPLASTIN TIME PARTIAL: CPT

## 2020-09-04 PROCEDURE — 80053 COMPREHEN METABOLIC PANEL: CPT

## 2020-09-04 RX ORDER — ACETAMINOPHEN 650 MG/1
650 SUPPOSITORY RECTAL EVERY 6 HOURS PRN
Status: DISCONTINUED | OUTPATIENT
Start: 2020-09-04 | End: 2020-09-05 | Stop reason: HOSPADM

## 2020-09-04 RX ORDER — TAMSULOSIN HYDROCHLORIDE 0.4 MG/1
0.4 CAPSULE ORAL DAILY
Status: DISCONTINUED | OUTPATIENT
Start: 2020-09-05 | End: 2020-09-05 | Stop reason: HOSPADM

## 2020-09-04 RX ORDER — GABAPENTIN 300 MG/1
600 CAPSULE ORAL
Status: DISCONTINUED | OUTPATIENT
Start: 2020-09-04 | End: 2020-09-05 | Stop reason: HOSPADM

## 2020-09-04 RX ORDER — SODIUM CHLORIDE 9 MG/ML
INJECTION, SOLUTION INTRAVENOUS CONTINUOUS
Status: DISCONTINUED | OUTPATIENT
Start: 2020-09-04 | End: 2020-09-05 | Stop reason: HOSPADM

## 2020-09-04 RX ORDER — SODIUM CHLORIDE 0.9 % (FLUSH) 0.9 %
10 SYRINGE (ML) INJECTION PRN
Status: DISCONTINUED | OUTPATIENT
Start: 2020-09-04 | End: 2020-09-05 | Stop reason: HOSPADM

## 2020-09-04 RX ORDER — ATORVASTATIN CALCIUM 20 MG/1
20 TABLET, FILM COATED ORAL NIGHTLY
Status: DISCONTINUED | OUTPATIENT
Start: 2020-09-04 | End: 2020-09-04 | Stop reason: SDUPTHER

## 2020-09-04 RX ORDER — ONDANSETRON 2 MG/ML
4 INJECTION INTRAMUSCULAR; INTRAVENOUS EVERY 6 HOURS PRN
Status: DISCONTINUED | OUTPATIENT
Start: 2020-09-04 | End: 2020-09-05 | Stop reason: HOSPADM

## 2020-09-04 RX ORDER — ACETAMINOPHEN 325 MG/1
650 TABLET ORAL EVERY 6 HOURS PRN
Status: DISCONTINUED | OUTPATIENT
Start: 2020-09-04 | End: 2020-09-04 | Stop reason: SDUPTHER

## 2020-09-04 RX ORDER — PANTOPRAZOLE SODIUM 40 MG/1
40 TABLET, DELAYED RELEASE ORAL DAILY
Status: DISCONTINUED | OUTPATIENT
Start: 2020-09-05 | End: 2020-09-05 | Stop reason: HOSPADM

## 2020-09-04 RX ORDER — GABAPENTIN 600 MG/1
600 TABLET ORAL 3 TIMES DAILY
Status: DISCONTINUED | OUTPATIENT
Start: 2020-09-04 | End: 2020-09-04

## 2020-09-04 RX ORDER — ZOLPIDEM TARTRATE 5 MG/1
5 TABLET ORAL NIGHTLY PRN
Status: DISCONTINUED | OUTPATIENT
Start: 2020-09-04 | End: 2020-09-05 | Stop reason: HOSPADM

## 2020-09-04 RX ORDER — NICOTINE POLACRILEX 4 MG
15 LOZENGE BUCCAL PRN
Status: DISCONTINUED | OUTPATIENT
Start: 2020-09-04 | End: 2020-09-05 | Stop reason: HOSPADM

## 2020-09-04 RX ORDER — METOPROLOL SUCCINATE 50 MG/1
50 TABLET, EXTENDED RELEASE ORAL DAILY
Status: DISCONTINUED | OUTPATIENT
Start: 2020-09-05 | End: 2020-09-05 | Stop reason: HOSPADM

## 2020-09-04 RX ORDER — ATORVASTATIN CALCIUM 80 MG/1
80 TABLET, FILM COATED ORAL DAILY
Status: DISCONTINUED | OUTPATIENT
Start: 2020-09-05 | End: 2020-09-05 | Stop reason: HOSPADM

## 2020-09-04 RX ORDER — AMIODARONE HYDROCHLORIDE 200 MG/1
100 TABLET ORAL DAILY
Status: DISCONTINUED | OUTPATIENT
Start: 2020-09-05 | End: 2020-09-05 | Stop reason: HOSPADM

## 2020-09-04 RX ORDER — POTASSIUM CHLORIDE 20 MEQ/1
40 TABLET, EXTENDED RELEASE ORAL PRN
Status: DISCONTINUED | OUTPATIENT
Start: 2020-09-04 | End: 2020-09-05 | Stop reason: HOSPADM

## 2020-09-04 RX ORDER — TRAZODONE HYDROCHLORIDE 50 MG/1
50 TABLET ORAL NIGHTLY
Status: DISCONTINUED | OUTPATIENT
Start: 2020-09-04 | End: 2020-09-05 | Stop reason: HOSPADM

## 2020-09-04 RX ORDER — KETOROLAC TROMETHAMINE 30 MG/ML
15 INJECTION, SOLUTION INTRAMUSCULAR; INTRAVENOUS ONCE
Status: COMPLETED | OUTPATIENT
Start: 2020-09-04 | End: 2020-09-04

## 2020-09-04 RX ORDER — ASPIRIN 81 MG/1
324 TABLET, CHEWABLE ORAL ONCE
Status: COMPLETED | OUTPATIENT
Start: 2020-09-04 | End: 2020-09-04

## 2020-09-04 RX ORDER — DEXTROSE MONOHYDRATE 25 G/50ML
12.5 INJECTION, SOLUTION INTRAVENOUS PRN
Status: DISCONTINUED | OUTPATIENT
Start: 2020-09-04 | End: 2020-09-05 | Stop reason: HOSPADM

## 2020-09-04 RX ORDER — SODIUM CHLORIDE 0.9 % (FLUSH) 0.9 %
10 SYRINGE (ML) INJECTION EVERY 12 HOURS SCHEDULED
Status: DISCONTINUED | OUTPATIENT
Start: 2020-09-04 | End: 2020-09-05 | Stop reason: HOSPADM

## 2020-09-04 RX ORDER — DEXTROSE MONOHYDRATE 50 MG/ML
100 INJECTION, SOLUTION INTRAVENOUS PRN
Status: DISCONTINUED | OUTPATIENT
Start: 2020-09-04 | End: 2020-09-05 | Stop reason: HOSPADM

## 2020-09-04 RX ORDER — PROMETHAZINE HYDROCHLORIDE 25 MG/1
12.5 TABLET ORAL EVERY 6 HOURS PRN
Status: DISCONTINUED | OUTPATIENT
Start: 2020-09-04 | End: 2020-09-05 | Stop reason: HOSPADM

## 2020-09-04 RX ORDER — RANOLAZINE 500 MG/1
1000 TABLET, EXTENDED RELEASE ORAL 2 TIMES DAILY
Status: DISCONTINUED | OUTPATIENT
Start: 2020-09-04 | End: 2020-09-05 | Stop reason: HOSPADM

## 2020-09-04 RX ORDER — NITROGLYCERIN 0.4 MG/1
0.4 TABLET SUBLINGUAL EVERY 5 MIN PRN
Status: DISCONTINUED | OUTPATIENT
Start: 2020-09-04 | End: 2020-09-05 | Stop reason: HOSPADM

## 2020-09-04 RX ORDER — ACETAMINOPHEN 325 MG/1
650 TABLET ORAL EVERY 6 HOURS PRN
Status: DISCONTINUED | OUTPATIENT
Start: 2020-09-04 | End: 2020-09-05 | Stop reason: HOSPADM

## 2020-09-04 RX ORDER — POTASSIUM CHLORIDE 7.45 MG/ML
10 INJECTION INTRAVENOUS PRN
Status: DISCONTINUED | OUTPATIENT
Start: 2020-09-04 | End: 2020-09-05 | Stop reason: HOSPADM

## 2020-09-04 RX ORDER — HYDRALAZINE HYDROCHLORIDE 25 MG/1
25 TABLET, FILM COATED ORAL EVERY 8 HOURS SCHEDULED
Status: DISCONTINUED | OUTPATIENT
Start: 2020-09-04 | End: 2020-09-05 | Stop reason: HOSPADM

## 2020-09-04 RX ORDER — MORPHINE SULFATE 2 MG/ML
2 INJECTION, SOLUTION INTRAMUSCULAR; INTRAVENOUS ONCE
Status: COMPLETED | OUTPATIENT
Start: 2020-09-04 | End: 2020-09-04

## 2020-09-04 RX ORDER — OXYCODONE AND ACETAMINOPHEN 10; 325 MG/1; MG/1
1 TABLET ORAL EVERY 8 HOURS PRN
Status: DISCONTINUED | OUTPATIENT
Start: 2020-09-04 | End: 2020-09-05 | Stop reason: HOSPADM

## 2020-09-04 RX ORDER — FAMOTIDINE 20 MG/1
20 TABLET, FILM COATED ORAL 2 TIMES DAILY
Status: CANCELLED | OUTPATIENT
Start: 2020-09-04

## 2020-09-04 RX ORDER — ALBUTEROL SULFATE 90 UG/1
2 AEROSOL, METERED RESPIRATORY (INHALATION) 4 TIMES DAILY PRN
Status: DISCONTINUED | OUTPATIENT
Start: 2020-09-04 | End: 2020-09-05 | Stop reason: HOSPADM

## 2020-09-04 RX ORDER — IPRATROPIUM BROMIDE AND ALBUTEROL SULFATE 2.5; .5 MG/3ML; MG/3ML
1 SOLUTION RESPIRATORY (INHALATION) EVERY 4 HOURS PRN
Status: DISCONTINUED | OUTPATIENT
Start: 2020-09-04 | End: 2020-09-05 | Stop reason: HOSPADM

## 2020-09-04 RX ORDER — ASPIRIN 325 MG
325 TABLET ORAL DAILY
Status: DISCONTINUED | OUTPATIENT
Start: 2020-09-05 | End: 2020-09-05 | Stop reason: HOSPADM

## 2020-09-04 RX ADMIN — KETOROLAC TROMETHAMINE 15 MG: 30 INJECTION, SOLUTION INTRAMUSCULAR at 01:12

## 2020-09-04 RX ADMIN — OXYCODONE HYDROCHLORIDE AND ACETAMINOPHEN 1 TABLET: 10; 325 TABLET ORAL at 18:37

## 2020-09-04 RX ADMIN — RANOLAZINE 1000 MG: 500 TABLET, FILM COATED, EXTENDED RELEASE ORAL at 22:05

## 2020-09-04 RX ADMIN — TRAZODONE HYDROCHLORIDE 50 MG: 50 TABLET ORAL at 22:05

## 2020-09-04 RX ADMIN — MORPHINE SULFATE 2 MG: 2 INJECTION, SOLUTION INTRAMUSCULAR; INTRAVENOUS at 16:35

## 2020-09-04 RX ADMIN — HYDRALAZINE HYDROCHLORIDE 25 MG: 25 TABLET, FILM COATED ORAL at 22:05

## 2020-09-04 RX ADMIN — APIXABAN 5 MG: 5 TABLET, FILM COATED ORAL at 22:05

## 2020-09-04 RX ADMIN — GABAPENTIN 600 MG: 300 CAPSULE ORAL at 22:04

## 2020-09-04 RX ADMIN — SODIUM CHLORIDE: 9 INJECTION, SOLUTION INTRAVENOUS at 22:04

## 2020-09-04 RX ADMIN — ASPIRIN 324 MG: 81 TABLET, CHEWABLE ORAL at 22:13

## 2020-09-04 RX ADMIN — Medication 10 ML: at 22:04

## 2020-09-04 ASSESSMENT — PAIN SCALES - GENERAL
PAINLEVEL_OUTOF10: 7
PAINLEVEL_OUTOF10: 7
PAINLEVEL_OUTOF10: 8
PAINLEVEL_OUTOF10: 7
PAINLEVEL_OUTOF10: 8
PAINLEVEL_OUTOF10: 0
PAINLEVEL_OUTOF10: 8

## 2020-09-04 ASSESSMENT — ENCOUNTER SYMPTOMS
EYE REDNESS: 0
APNEA: 0
ABDOMINAL PAIN: 0
BACK PAIN: 0
FACIAL SWELLING: 0
VOMITING: 0
CHOKING: 0
NAUSEA: 0
SHORTNESS OF BREATH: 0
EYE DISCHARGE: 0

## 2020-09-04 ASSESSMENT — PAIN DESCRIPTION - LOCATION: LOCATION: CHEST

## 2020-09-04 ASSESSMENT — PAIN DESCRIPTION - DESCRIPTORS: DESCRIPTORS: SHARP

## 2020-09-04 ASSESSMENT — PAIN DESCRIPTION - FREQUENCY: FREQUENCY: CONTINUOUS

## 2020-09-04 ASSESSMENT — PAIN - FUNCTIONAL ASSESSMENT: PAIN_FUNCTIONAL_ASSESSMENT: ACTIVITIES ARE NOT PREVENTED

## 2020-09-04 ASSESSMENT — PAIN DESCRIPTION - PAIN TYPE
TYPE: ACUTE PAIN
TYPE: ACUTE PAIN

## 2020-09-04 NOTE — ED PROVIDER NOTES
[address] [facility]      Pt Name: Jessica Baeza  [facility]:[id number]  [place] 1955  Date of evaluation: 9/4/2020  Provider: Makeda Mitchell PA-C     This patient was seen and evaluated by attending physician Dr. Zen Ignacio MD      Chief Complaint:    Chief Complaint   Patient presents with    Chest Pain     x 3 hours ago. sharp mid left chest pain. constant. associated sob. hx afib. Nursing Notes, Past Medical Hx, Past Surgical Hx, Social Hx, Allergies, and Family Hx were all reviewed and agreed with or any disagreements were addressed in the HPI.    HPI:  (Location, Duration, Timing, Severity, Quality, Assoc Sx, Context, Modifying factors)  This is a  59 y.o. male complaint of left side chest pain that radiated down his left arm and into his neck that started about 3 hours prior to his arrival.  Patient was seen last night for the same. Had elevated troponin 0 0.04. Denies pain rating to his back. No pain with deep breathing. He still smokes 1 pack/day. Has a pacemaker. Cardiac history of hyperlipidemia, hypertension, coronary artery disease and diabetes. Denies diaphoresis, no nausea vomiting, no abdominal pain. Denies extremity weakness. No other complaints.   He sees Dr. Kavita Chong who is his cardiologist.      PastMedical/Surgical History:      Diagnosis Date    Anxiety     Arthritis     Asthma     CAD (coronary artery disease)     Calcium kidney stone     Cardiomyopathy ([address]     Cerebral artery occlusion with cerebral infarction ([address]     CHF (congestive heart failure) ([address]     Clostridium difficile diarrhea 02/12/2020    COPD (chronic obstructive pulmonary disease) ([address]     mild    Depression     DM2 (diabetes mellitus, type 2) ([address]     Fibromyalgia     GERD (gastroesophageal reflux disease)     Hyperlipidemia     Hypertension     Liver disease     Pacemaker 2012    Medtronic model # [id number]    Pneumonia     Seizures [facility]     TIA (transient ischemic attack) 2007    Ulcerative colitis ([facility]          Procedure Laterality Date    APPENDECTOMY      BACK SURGERY      CARDIAC SURGERY      CHOLECYSTECTOMY      COLONOSCOPY  01/10/2017    COLONOSCOPY  01/10/2017    CORONARY ANGIOPLASTY WITH STENT PLACEMENT  2012    CORONARY ARTERY BYPASS GRAFT  2010, 11/2015    ENDOSCOPY, COLON, DIAGNOSTIC      PACEMAKER PLACEMENT      UPPER GASTROINTESTINAL ENDOSCOPY  02/07/2017    VASCULAR SURGERY         Medications:  Previous Medications    ACETAMINOPHEN (TYLENOL) 325 MG TABLET    Take 650 mg by mouth every 6 hours as needed for Pain    ALBUTEROL SULFATE HFA (VENTOLIN HFA) 108 (90 BASE) MCG/ACT INHALER    Inhale 2 puffs into the lungs 4 times daily as needed for Wheezing    AMIODARONE (PACERONE) 100 MG TABLET    Take 1 tablet by mouth daily    APIXABAN (ELIQUIS) 5 MG TABS TABLET    Take 1 tablet by mouth 2 times daily    ASPIRIN 325 MG TABLET    Take 325 mg by mouth daily    ATORVASTATIN (LIPITOR) 80 MG TABLET    Take 1 tablet by mouth daily    EMPAGLIFLOZIN (JARDIANCE) 10 MG TABLET    Take 1 tablet by mouth daily    GABAPENTIN (NEURONTIN) 600 MG TABLET    1 tab PO 5 times a day    HYDRALAZINE (APRESOLINE) 25 MG TABLET    Take 1 tablet by mouth every 8 hours    IPRATROPIUM-ALBUTEROL (DUONEB) 0.5-2.5 (3) MG/3ML SOLN NEBULIZER SOLUTION    Inhale 3 mLs into the lungs every 4 hours as needed for Shortness of Breath (wheezing coughing)    METOPROLOL SUCCINATE (TOPROL XL) 50 MG EXTENDED RELEASE TABLET    Take 1 tablet by mouth daily    OXYCODONE-ACETAMINOPHEN (PERCOCET)  MG PER TABLET    Take 1 tablet by mouth every 8 hours as needed for Pain for up to 30 days.  Intended supply: 30 days    PANTOPRAZOLE (PROTONIX) 40 MG TABLET    Take 1 tablet by mouth daily    RANOLAZINE (RANEXA) 500 MG EXTENDED RELEASE TABLET    Take 2 tablets by mouth 2 times daily    RESPIRATORY THERAPY SUPPLIES (NEBULIZER) AALIYAH    Used to give yourself breathing treatment    TAMSULOSIN (FLOMAX) 0.4 MG CAPSULE    Take 1 capsule by mouth daily    TRAZODONE (DESYREL) 50 MG TABLET    TAKE 1 TABLET BY MOUTH EVERY NIGHT AT BEDTIME    ZOLPIDEM (AMBIEN) 5 MG TABLET    Take 1 tablet by mouth nightly as needed for Sleep for up to 60 days. Review of Systems:  Review of Systems   Constitutional: Negative for chills and fever. HENT: Negative for congestion, facial swelling and sneezing. Eyes: Negative for discharge and redness. Respiratory: Negative for apnea, choking and shortness of breath. Cardiovascular: Positive for chest pain. Gastrointestinal: Negative for abdominal pain, nausea and vomiting. Genitourinary: Negative for dysuria. Musculoskeletal: Negative for back pain, neck pain and neck stiffness. Neurological: Negative for dizziness, tremors, seizures and headaches. All other systems reviewed and are negative. Positives and Pertinent negatives as per HPI. Except as noted above in the ROS, problem specific ROS was completed and is negative. Physical Exam:  Physical Exam  Vitals signs and nursing note reviewed. Constitutional:       Appearance: He is well-developed. He is not diaphoretic. HENT:      Head: Normocephalic and atraumatic. Nose: Nose normal.      Mouth/Throat:      Mouth: Mucous membranes are moist.      Pharynx: Oropharynx is clear. Eyes:      General:         Right eye: No discharge. Left eye: No discharge. Extraocular Movements: Extraocular movements intact. Conjunctiva/sclera: Conjunctivae normal.      Pupils: Pupils are equal, round, and reactive to light. Neck:      Musculoskeletal: Normal range of motion and neck supple. Cardiovascular:      Rate and Rhythm: Normal rate and regular rhythm. Heart sounds: Normal heart sounds. No murmur. No friction rub. No gallop. Pulmonary:      Effort: Pulmonary effort is normal. No respiratory distress.       Breath sounds: Normal breath sounds. No wheezing or rales. Chest:      Chest wall: No tenderness. Abdominal:      General: Abdomen is flat. Bowel sounds are normal. There is no distension. Palpations: Abdomen is soft. There is no mass. Tenderness: There is no abdominal tenderness. There is no guarding or rebound. Musculoskeletal: Normal range of motion. Skin:     General: Skin is warm and dry. Neurological:      Mental Status: He is alert and oriented to person, place, and time.    Psychiatric:         Behavior: Behavior normal.         MEDICAL DECISION MAKING    Vitals:    Vitals:    09/04/20 1554   BP: (!) 167/101   Pulse: 125   Resp: 20   Temp: 100.3 °F (37.9 °C)   TempSrc: Oral   SpO2: 93%   Weight: 176 lb 9.4 oz (80.1 kg)   Height: 6' (1.829 m)       LABS:  Labs Reviewed   CBC WITH AUTO DIFFERENTIAL - Abnormal; Notable for the following components:       Result Value    RBC 3.93 (*)     Hematocrit 39.9 (*)     .5 (*)     MCH 34.9 (*)     All other components within normal limits    Narrative:     Performed at:  [facility]  [address] [facility] [address]   Phone [phone]   COMPREHENSIVE METABOLIC PANEL - Abnormal; Notable for the following components:    Glucose 180 (*)     ALT 8 (*)     AST 14 (*)     All other components within normal limits    Narrative:     Performed at:  [facility]  [address] [facility] [address]   Phone [phone]   TROPONIN - Abnormal; Notable for the following components:    Troponin 0.09 (*)     All other components within normal limits    Narrative:     Performed at:  [facility]  [address] [facility] [address]   Phone [phone]   BRAIN NATRIURETIC PEPTIDE - Abnormal; Notable for the following components:    Pro-BNP 1,522 (*)     All other components within normal limits    Narrative:     Performed at:  [address]  [facility]  [address] [place] [address] [address]   Phone [phone]   PROTIME-INR    Narrative:     Performed at:  [facility]  [address] [facility] [address] [address]   Phone [phone]   APTT    Narrative:     Performed at:  [facility]  [address] [facility] [place] [address]   Phone ([phone] of labs reviewed and werenegative at this time or not returned at the time of this note. RADIOLOGY:   Non-plain film images such as CT, Ultrasound and MRI are read by the radiologist. Soheila Askew PA-C have directly visualized the radiologic plain film image(s) with the below findings:        Interpretation per the Radiologist below, if available at the time of this note:    XR CHEST (2 VW)   Final Result   No acute abnormality. No change from prior study              Xr Chest (2 Vw)    Result Date: 9/3/2020  EXAMINATION: TWO XRAY VIEWS OF THE CHEST 9/3/2020 10:44 pm COMPARISON: Prior studies including most recent 08/29/2020 HISTORY: ORDERING SYSTEM PROVIDED HISTORY: Chest Pain TECHNOLOGIST PROVIDED HISTORY: Reason for exam:->Chest Pain Reason for Exam: chest pain Acuity: Acute Type of Exam: Initial Relevant Medical/Surgical History: copd FINDINGS: No acute bony abnormality. A left subclavian cardiac device sternotomy wires are again identified. The heart size and mediastinal contours are stable. The lungs are clear. No acute disease. Xr Chest (2 Vw)    Result Date: 8/29/2020  EXAMINATION: TWO XRAY VIEWS OF THE CHEST 8/29/2020 4:11 am COMPARISON: Chest radiograph and CT chest angiogram 08/17/2020. HISTORY: ORDERING SYSTEM PROVIDED HISTORY: sob TECHNOLOGIST PROVIDED HISTORY: Reason for exam:->sob Reason for Exam: sob FINDINGS: Status post median sternotomy. A dual lead left chest AICD is in place. The cardiomediastinal silhouette is within normal limits.   No pneumothorax, vascular congestion, consolidation, or pleural effusion is identified. No acute osseous abnormality. No acute process. Xr Chest (2 Vw)    Result Date: 8/9/2020  EXAMINATION: TWO XRAY VIEWS OF THE CHEST 8/9/2020 5:14 pm COMPARISON: 08/08/2020 at 2104 hours, CT chest 07/29/2020 HISTORY: ORDERING SYSTEM PROVIDED HISTORY: chest pain TECHNOLOGIST PROVIDED HISTORY: Reason for exam:->chest pain Reason for Exam: second day in a row, sob FINDINGS: A left subclavian cardiac device is unchanged. Sternotomy wires are present. The heart size and mediastinal contours are stable. The lungs are clear. Stable portable study. Xr Chest (2 Vw)    Result Date: 8/8/2020  EXAMINATION: TWO XRAY VIEWS OF THE CHEST 8/8/2020 8:49 pm COMPARISON: 07/29/2020 HISTORY: ORDERING SYSTEM PROVIDED HISTORY: chest pain TECHNOLOGIST PROVIDED HISTORY: Reason for exam:->chest pain Reason for Exam: chest pain Acuity: Acute Type of Exam: Initial FINDINGS: The lungs are clear. The cardiac and mediastinal contours are normal.  There is no pleural effusion or pneumothorax. No acute osseous abnormality is identified. AICD and sternotomy wires are unchanged. Chronic mild compression deformity of the T7 vertebral body is unchanged. Cholecystectomy clips are present. No acute cardiopulmonary abnormality. Xr Knee Left (1-2 Views)    Result Date: 8/29/2020  EXAMINATION: 2 XRAY VIEWS OF THE LEFT KNEE 8/29/2020 4:27 am COMPARISON: Bilateral knee radiographs 05/12/2015. HISTORY: ORDERING SYSTEM PROVIDED HISTORY: Injury TECHNOLOGIST PROVIDED HISTORY: Reason for exam:->Injury Reason for Exam: pain FINDINGS: No fracture, dislocation, or joint effusion. No significant degenerative changes. Extensive atherosclerotic vascular calcifications. Surgical clips in the medial and posterior soft tissues. No acute osseous abnormality. Xr Knee Right (1-2 Views)    Result Date: 8/29/2020  EXAMINATION: Right knee radiographs 11/10/2016.  XRAY VIEWS OF THE RIGHT KNEE 8/29/2020 4:11 am COMPARISON: None. HISTORY: ORDERING SYSTEM PROVIDED HISTORY: fall TECHNOLOGIST PROVIDED HISTORY: Reason for exam:->fall Reason for Exam: several falls within the last few days; pain FINDINGS: No fracture, dislocation, or joint effusion. Mild medial and patellofemoral compartment degenerative changes. Extensive atherosclerotic vascular calcifications. No acute osseous abnormality. Xr Chest Portable    Result Date: 8/17/2020  EXAMINATION: ONE XRAY VIEW OF THE CHEST 8/17/2020 12:12 am COMPARISON: 08/09/2020 HISTORY: ORDERING SYSTEM PROVIDED HISTORY: SOB TECHNOLOGIST PROVIDED HISTORY: Reason for exam:->SOB Reason for Exam: sob FINDINGS: Left-sided bipolar cardiac pacer/defibrillator is unchanged in configuration. The lungs are clear. Heart size and vascularity are stable. There is no pneumothorax or effusion. Note is made of prior CABG. No acute pulmonary process. Ct Chest Pulmonary Embolism W Contrast    Result Date: 8/17/2020  EXAMINATION: CTA OF THE CHEST 8/17/2020 1:31 am TECHNIQUE: CTA of the chest was performed after the administration of intravenous contrast.  Multiplanar reformatted images are provided for review. MIP images are provided for review. Dose modulation, iterative reconstruction, and/or weight based adjustment of the mA/kV was utilized to reduce the radiation dose to as low as reasonably achievable. COMPARISON: July 29, 2020. April 20, 2018. HISTORY: ORDERING SYSTEM PROVIDED HISTORY: PE R/O Trop 0.19 TECHNOLOGIST PROVIDED HISTORY: Reason for exam:->PE R/O Trop 0.19 Reason for Exam: PE R/O Trop 0.19 Acuity: Acute Type of Exam: Initial FINDINGS: Pulmonary Arteries: Pulmonary arteries are adequately opacified for evaluation. No evidence of intraluminal filling defect to suggest pulmonary embolism. Main pulmonary artery is normal in caliber. Mediastinum: Cardiomegaly. No pericardial effusion. Coronary artery calcifications.   Atherosclerosis of the thoracic aorta. No thoracic aortic aneurysm. Postsurgical changes of CABG. Normal esophagus. No intrathoracic lymphadenopathy. Visualized thyroid gland is normal.  Left pectoral trans venous cardiac pacer device. Lungs/pleura: Respiratory motion. Stable medial left lower lobe pulmonary nodule, no imaging follow-up recommended. Small amount of heterogeneous material in the trachea may relate to retained secretions. The lungs are without acute process. No focal consolidation or pulmonary edema. No evidence of pleural effusion or pneumothorax. Upper Abdomen: Prior cholecystectomy. Atherosclerosis. Partially visualized large left renal cyst.  Bilateral low-attenuation adrenal gland thickening may relate to adenomatous hyperplasia. Soft Tissues/Bones: Prior median sternotomy. Stable chronic T7 superior endplate height loss. Prior median sternotomy. Suggestion of osteopenia. Multilevel degenerative changes in the visualized spine. No evidence of pulmonary embolism or acute pulmonary abnormality. MEDICAL DECISION MAKING / ED COURSE:      PROCEDURES:   Procedures    None    Patient was given:  Medications   morphine (PF) injection 2 mg (2 mg Intravenous Given 9/4/20 1635)       Emergency room course: Patient on exam pupils equal round and reactive to light extraocular movement is intact. No nystagmus noted. Throat is clear. Nonerythematous no exudate. Cardiovascular regular rhythm. No rub murmur or gallop noted. Lungs are clear. No wheeze, rales or rhonchi. No reproducible chest wall pain with palpation. Abdomen is soft nontender. No rebound or guarding noted. Normal bowel sounds all 4 quadrant. Bilateral lower extremities show no edema. Good strength against resisted plantar dorsiflexion.  strength 5+ equal bilaterally. No facial drooping or slurred speech noted. Patient does not appear to be in acute distress. He is morbidly obese.     Lab result from today shows:  CBC with edit the dictations but occasionally words are mis-transcribed.)    Electronically signed, Derrek Juan PA-C,          Derrek Juan PA-C  09/04/20 [id number]

## 2020-09-04 NOTE — ED PROVIDER NOTES
[address] [facility]      Pt Name: Jesse Kurtz  MRN: [id number]  [place] 1955  Date of evaluation: 9/3/2020  Provider: Molly Patricia MD    CHIEF COMPLAINT       Chief Complaint   Patient presents with    Chest Pain     h/o MI, pain started while sitting in his chair at home approx 1 hour prior to arrival at the hospital    Shortness of Breath     started at the same time as the CP     HISTORY OF PRESENT ILLNESS  (Location/Symptom, Timing/Onset,Context/Setting, Quality, Duration, Modifying Factors, Severity). Note limiting factors. Jesse Kurtz is a 59 y.o. male who presents to the emergency department secondary to concern for chest pain. He reports that he started having chest pain approximately 90 minutes ago (around 9:30 PM) when he was sitting and watching TV. He denies he was exerting self. He states the pain was sharp located in the center of her chest and did not radiate anywhere. He endorses nausea at the time and continued to have nausea. Denies any vomiting. Denies any diaphoresis. He did not syncopized or pass out. He denies any dizziness or headache. No vision changes. He states the last time he felt like this he had a heart attack (2 years ago status post CABG, currently on aspirin Plavix and takes both as prescribed). He did state he took his aspirin (full dose) earlier today. Past medical history noted below, significant for anxiety, arthritis, asthma, coronary artery disease, kidney stones, cardiomyopathy, stroke, heart failure, COPD, C. difficile, depression, diabetes, fibromyalgia, gastric reflux disease, hyperlipidemia, hypertension, liver disease, pneumonia, seizures, TIA, ulcerative colitis, and pacemaker. States he does smoke currently, denies any illicit drug use though has used drugs in the past. Aside from what is stated above denies any other symptoms or modifying factors.     Nursing Notes reviewed. REVIEW OF SYSTEMS  (2-9 systems for level 4, 10 or more for level 5)   Review of Systems   Constitutional: Negative for appetite change, chills, fever and unexpected weight change. HENT: Negative for congestion and trouble swallowing. Eyes: Negative. Respiratory: Positive for shortness of breath (With the chest pain). Gastrointestinal: Negative for abdominal pain. Genitourinary: Negative for dysuria and urgency. Musculoskeletal: Negative for neck pain. Skin:        Right knee scab from a fall a week and a half ago   Neurological: Negative for dizziness and headaches. All other systems reviewed and are negative.       PAST MEDICAL HISTORY     Past Medical History:   Diagnosis Date    Anxiety     Arthritis     Asthma     CAD (coronary artery disease)     Calcium kidney stone     Cardiomyopathy ([address]     Cerebral artery occlusion with cerebral infarction ([address]     CHF (congestive heart failure) ([address]     Clostridium difficile diarrhea 02/12/2020    COPD [facility]ch[facility]) [facility])     mild    Depression     DM2 (diabetes mellitus, type 2) ([facility])     Fibromyalgia     GERD (gastroesophageal reflux disease)     Hyperlipidemia     Hypertension     Liver disease     Pacemaker 2012    Medtronic model # [id number]    Pneumonia     Seizures ([address]     TIA (transient ischemic attack) 2007    Ulcerative colitis ([address]        SURGICALHISTORY       Past Surgical History:   Procedure Laterality Date    APPENDECTOMY      BACK SURGERY      CARDIAC SURGERY      CHOLECYSTECTOMY      COLONOSCOPY  01/10/2017    COLONOSCOPY  01/10/2017    CORONARY ANGIOPLASTY WITH STENT PLACEMENT  2012    CORONARY ARTERY BYPASS GRAFT  2010, 11/2015    ENDOSCOPY, COLON, DIAGNOSTIC      PACEMAKER PLACEMENT      UPPER GASTROINTESTINAL ENDOSCOPY  02/07/2017    VASCULAR SURGERY       CURRENT MEDICATIONS       Previous Medications    ACETAMINOPHEN (TYLENOL) 325 MG TABLET Take 650 mg by mouth every 6 hours as needed for Pain    ALBUTEROL SULFATE HFA (VENTOLIN HFA) 108 (90 BASE) MCG/ACT INHALER    Inhale 2 puffs into the lungs 4 times daily as needed for Wheezing    AMIODARONE (PACERONE) 100 MG TABLET    Take 1 tablet by mouth daily    APIXABAN (ELIQUIS) 5 MG TABS TABLET    Take 1 tablet by mouth 2 times daily    ASPIRIN 325 MG TABLET    Take 325 mg by mouth daily    ATORVASTATIN (LIPITOR) 80 MG TABLET    Take 1 tablet by mouth daily    EMPAGLIFLOZIN (JARDIANCE) 10 MG TABLET    Take 1 tablet by mouth daily    GABAPENTIN (NEURONTIN) 600 MG TABLET    1 tab PO 5 times a day    HYDRALAZINE (APRESOLINE) 25 MG TABLET    Take 1 tablet by mouth every 8 hours    IPRATROPIUM-ALBUTEROL (DUONEB) 0.5-2.5 (3) MG/3ML SOLN NEBULIZER SOLUTION    Inhale 3 mLs into the lungs every 4 hours as needed for Shortness of Breath (wheezing coughing)    METOPROLOL SUCCINATE (TOPROL XL) 50 MG EXTENDED RELEASE TABLET    Take 1 tablet by mouth daily    OXYCODONE-ACETAMINOPHEN (PERCOCET)  MG PER TABLET    Take 1 tablet by mouth every 8 hours as needed for Pain for up to 30 days. Intended supply: 30 days    PANTOPRAZOLE (PROTONIX) 40 MG TABLET    Take 1 tablet by mouth daily    RANOLAZINE (RANEXA) 500 MG EXTENDED RELEASE TABLET    Take 2 tablets by mouth 2 times daily    RESPIRATORY THERAPY SUPPLIES (NEBULIZER) AALIYAH    Used to give yourself breathing treatment    TAMSULOSIN (FLOMAX) 0.4 MG CAPSULE    Take 1 capsule by mouth daily    TRAZODONE (DESYREL) 50 MG TABLET    TAKE 1 TABLET BY MOUTH EVERY NIGHT AT BEDTIME    ZOLPIDEM (AMBIEN) 5 MG TABLET    Take 1 tablet by mouth nightly as needed for Sleep for up to 60 days.       ALLERGIES     Dopamine hcl; Tramadol; and Melatonin  FAMILY HISTORY       Family History   Problem Relation Age of Onset    Coronary Art Dis Father     Cancer Mother         Lung with mets    Diabetes Mother      SOCIAL HISTORY       Social History     Socioeconomic History    Marital Notable for the following components:       Result Value    RBC 3.99 (*)     .7 (*)     MCH 34.7 (*)     All other components within normal limits    Narrative:     Performed at:  [address] [facility]   Phone [phone]   COMPREHENSIVE METABOLIC PANEL W/ REFLEX TO MG FOR LOW K - Abnormal; Notable for the following components:    Glucose 147 (*)     Total Protein 6.3 (*)     ALT 7 (*)     AST 11 (*)     All other components within normal limits    Narrative:     Performed at:  [address] [facility] [employer]   Phone [phone]   TROPONIN - Abnormal; Notable for the following components:    Troponin 0.04 (*)     All other components within normal limits    Narrative:     Performed at:  [address] [facility]   Phone [phone]   [address] - Abnormal; Notable for the following components:    Amphetamine Screen, Urine POSITIVE (*)     Opiate Scrn, Ur POSITIVE (*)     All other components within normal limits    Narrative:     Performed at:  [address] [facility]   Phone [phone]   BLOOD GAS, VENOUS - Abnormal; Notable for the following components:    pCO2, Angelo 22.3 (*)     HCO3, Venous 15 (*)     All other components within normal limits    Narrative:     Performed at:  [address] [employer]   Phone [phone]   TROPONIN - Abnormal; Notable for the following components:    Troponin 0.02 (*)     All other components within normal limits    Narrative:     Performed at:  [address] [employer]   Phone [phone]   LIPASE    Narrative:     Performed at:  [facility]  [place] [address] [address] [place], [place] [address] [address]   Phone [phone]       All other labs were within normal range or not returned as of this dictation. EMERGENCY DEPARTMENT COURSE and DIFFERENTIAL DIAGNOSIS/MDM:   Patient was given the following medications:  Orders Placed This Encounter   Medications    nitroGLYCERIN (NITROSTAT) SL tablet 0.4 mg    morphine (PF) injection 2 mg    acetaminophen (TYLENOL) tablet 650 mg    ondansetron (ZOFRAN) injection 4 mg    ketorolac (TORADOL) injection 15 mg     CONSULTS:  None  INITIAL VITALS: BP: (!) 170/98, Temp: 98.9 °F (37.2 °C), Pulse: 106, Resp: 16, SpO2: 98 %   RECENT VITALS:  BP: 136/76,Temp: 98.9 °F (37.2 °C), Pulse: 83, Resp: 16, SpO2: 96 %     Nivia Ferris is a 59 y.o. male who presents to the emergency department secondary to concern for chest pain with a notable cardiac history. On arrival he is awake, alert, oriented with vitals that are notable for an elevated blood pressure 170/98 and mild tachycardia in the 100s to 1 teens. Otherwise hemodynamically stable and within normal limits. On exam he has clear lungs, benign abdomen, no peripheral edema. Peripheral IV was placed, labs were ordered along with 2 mg of IM the morphine, Tylenol. He had already taken full dose of aspirin today so this was not ordered. EKG abnormal though without signs of acute ischemia. Chest x-ray unremarkable. Labs returned notable for troponin of 0.02 and urinalysis positive both for amphetamine and opiates. He did urinate after he had received 2 mg of morphine here so potentially opiate positivity is from our medication. When we discussed the results of his labs he adamantly denied doing any types of drugs. At that time on reassessment he stated he still had some chest pain though his vitals remained hemodynamically stable and within normal limits and he was resting comfortably when I entered the room.       His repeat troponin increased to 0.04 at which time the minutes providing critical care. There was a high probability of clinically significant/life threatening deterioration in the patient's condition which required my urgent intervention. This time excludes time spent performing procedures but includes time spent on direct patient care, history retrieval, review of the chart, and discussions with patient, family, and consultant(s). FINAL IMPRESSION      1. Chest pain, unspecified type    2.  Elevated troponin        DISPOSITION/PLAN   DISPOSITION Decision To Admit 09/04/2020 03:26:09 AM    (Please note that portions of this note were completed with a voice recognition program. Efforts were made to edit the dictations but occasionally words are mis-transcribed.)    Rabia Fontana MD (electronically signed)  Attending Emergency Physician          Rabia Fontana MD  09/04/20 [id number]

## 2020-09-04 NOTE — ED NOTES
Pt requesting his scheduled oxycodone, this order was obtained from sign and held orders, released and awaiting for med to be verified.       Jasper Goldberg, RN  09/04/20 [id number]

## 2020-09-05 VITALS
DIASTOLIC BLOOD PRESSURE: 85 MMHG | WEIGHT: 172.84 LBS | OXYGEN SATURATION: 95 % | TEMPERATURE: 98 F | SYSTOLIC BLOOD PRESSURE: 170 MMHG | HEART RATE: 80 BPM | BODY MASS INDEX: 23.41 KG/M2 | HEIGHT: 72 IN | RESPIRATION RATE: 15 BRPM

## 2020-09-05 LAB
ANION GAP SERPL CALCULATED.3IONS-SCNC: 12 MMOL/L (ref 3–16)
BUN BLDV-MCNC: 20 MG/DL (ref 7–20)
CALCIUM SERPL-MCNC: 8.5 MG/DL (ref 8.3–10.6)
CHLORIDE BLD-SCNC: 106 MMOL/L (ref 99–110)
CHOLESTEROL, TOTAL: 192 MG/DL (ref 0–199)
CO2: 22 MMOL/L (ref 21–32)
CREAT SERPL-MCNC: 0.9 MG/DL (ref 0.8–1.3)
ESTIMATED AVERAGE GLUCOSE: 125.5 MG/DL
ESTIMATED AVERAGE GLUCOSE: 128.4 MG/DL
GFR AFRICAN AMERICAN: >60
GFR NON-AFRICAN AMERICAN: >60
GLUCOSE BLD-MCNC: 105 MG/DL (ref 70–99)
GLUCOSE BLD-MCNC: 114 MG/DL (ref 70–99)
GLUCOSE BLD-MCNC: 209 MG/DL (ref 70–99)
HBA1C MFR BLD: 6 %
HBA1C MFR BLD: 6.1 %
HCT VFR BLD CALC: 37.2 % (ref 40.5–52.5)
HDLC SERPL-MCNC: 39 MG/DL (ref 40–60)
HEMOGLOBIN: 12.6 G/DL (ref 13.5–17.5)
LDL CHOLESTEROL CALCULATED: 126 MG/DL
MCH RBC QN AUTO: 35.2 PG (ref 26–34)
MCHC RBC AUTO-ENTMCNC: 33.9 G/DL (ref 31–36)
MCV RBC AUTO: 103.8 FL (ref 80–100)
PDW BLD-RTO: 14.8 % (ref 12.4–15.4)
PERFORMED ON: ABNORMAL
PERFORMED ON: ABNORMAL
PLATELET # BLD: 163 K/UL (ref 135–450)
PMV BLD AUTO: 8.9 FL (ref 5–10.5)
POTASSIUM REFLEX MAGNESIUM: 3.7 MMOL/L (ref 3.5–5.1)
RBC # BLD: 3.59 M/UL (ref 4.2–5.9)
SODIUM BLD-SCNC: 140 MMOL/L (ref 136–145)
TRIGL SERPL-MCNC: 133 MG/DL (ref 0–150)
TROPONIN: 0.06 NG/ML
VLDLC SERPL CALC-MCNC: 27 MG/DL
WBC # BLD: 5.4 K/UL (ref 4–11)

## 2020-09-05 PROCEDURE — 6370000000 HC RX 637 (ALT 250 FOR IP): Performed by: NURSE PRACTITIONER

## 2020-09-05 PROCEDURE — 83036 HEMOGLOBIN GLYCOSYLATED A1C: CPT

## 2020-09-05 PROCEDURE — 80061 LIPID PANEL: CPT

## 2020-09-05 PROCEDURE — 80048 BASIC METABOLIC PNL TOTAL CA: CPT

## 2020-09-05 PROCEDURE — 84484 ASSAY OF TROPONIN QUANT: CPT

## 2020-09-05 PROCEDURE — 93010 ELECTROCARDIOGRAM REPORT: CPT | Performed by: INTERNAL MEDICINE

## 2020-09-05 PROCEDURE — 94760 N-INVAS EAR/PLS OXIMETRY 1: CPT

## 2020-09-05 PROCEDURE — 6370000000 HC RX 637 (ALT 250 FOR IP): Performed by: HOSPITALIST

## 2020-09-05 PROCEDURE — 85027 COMPLETE CBC AUTOMATED: CPT

## 2020-09-05 PROCEDURE — 99222 1ST HOSP IP/OBS MODERATE 55: CPT | Performed by: INTERNAL MEDICINE

## 2020-09-05 PROCEDURE — 36415 COLL VENOUS BLD VENIPUNCTURE: CPT

## 2020-09-05 RX ORDER — ISOSORBIDE MONONITRATE 60 MG/1
60 TABLET, EXTENDED RELEASE ORAL DAILY
Qty: 30 TABLET | Refills: 3 | Status: SHIPPED | OUTPATIENT
Start: 2020-09-05 | End: 2020-10-16 | Stop reason: CLARIF

## 2020-09-05 RX ADMIN — TAMSULOSIN HYDROCHLORIDE 0.4 MG: 0.4 CAPSULE ORAL at 09:01

## 2020-09-05 RX ADMIN — GABAPENTIN 600 MG: 300 CAPSULE ORAL at 06:23

## 2020-09-05 RX ADMIN — GABAPENTIN 600 MG: 300 CAPSULE ORAL at 16:59

## 2020-09-05 RX ADMIN — ATORVASTATIN CALCIUM 80 MG: 80 TABLET, FILM COATED ORAL at 09:01

## 2020-09-05 RX ADMIN — RANOLAZINE 1000 MG: 500 TABLET, FILM COATED, EXTENDED RELEASE ORAL at 09:00

## 2020-09-05 RX ADMIN — HYDRALAZINE HYDROCHLORIDE 25 MG: 25 TABLET, FILM COATED ORAL at 16:59

## 2020-09-05 RX ADMIN — OXYCODONE HYDROCHLORIDE AND ACETAMINOPHEN 1 TABLET: 10; 325 TABLET ORAL at 04:20

## 2020-09-05 RX ADMIN — HYDRALAZINE HYDROCHLORIDE 25 MG: 25 TABLET, FILM COATED ORAL at 06:24

## 2020-09-05 RX ADMIN — APIXABAN 5 MG: 5 TABLET, FILM COATED ORAL at 09:01

## 2020-09-05 RX ADMIN — OXYCODONE HYDROCHLORIDE AND ACETAMINOPHEN 1 TABLET: 10; 325 TABLET ORAL at 12:41

## 2020-09-05 RX ADMIN — GABAPENTIN 600 MG: 300 CAPSULE ORAL at 12:45

## 2020-09-05 RX ADMIN — ASPIRIN 325 MG ORAL TABLET 325 MG: 325 PILL ORAL at 09:00

## 2020-09-05 RX ADMIN — PANTOPRAZOLE SODIUM 40 MG: 40 TABLET, DELAYED RELEASE ORAL at 09:01

## 2020-09-05 RX ADMIN — METOPROLOL SUCCINATE 50 MG: 50 TABLET, EXTENDED RELEASE ORAL at 09:01

## 2020-09-05 RX ADMIN — AMIODARONE HYDROCHLORIDE 100 MG: 200 TABLET ORAL at 09:01

## 2020-09-05 ASSESSMENT — PAIN DESCRIPTION - ONSET
ONSET: ON-GOING
ONSET: ON-GOING

## 2020-09-05 ASSESSMENT — PAIN SCALES - GENERAL
PAINLEVEL_OUTOF10: 7
PAINLEVEL_OUTOF10: 6
PAINLEVEL_OUTOF10: 3
PAINLEVEL_OUTOF10: 7

## 2020-09-05 ASSESSMENT — PAIN DESCRIPTION - FREQUENCY
FREQUENCY: CONTINUOUS
FREQUENCY: CONTINUOUS

## 2020-09-05 ASSESSMENT — PAIN DESCRIPTION - LOCATION
LOCATION: BACK
LOCATION: ABDOMEN

## 2020-09-05 ASSESSMENT — PAIN DESCRIPTION - PROGRESSION
CLINICAL_PROGRESSION: NOT CHANGED
CLINICAL_PROGRESSION: NOT CHANGED

## 2020-09-05 ASSESSMENT — PAIN DESCRIPTION - PAIN TYPE
TYPE: CHRONIC PAIN
TYPE: ACUTE PAIN

## 2020-09-05 ASSESSMENT — PAIN DESCRIPTION - DESCRIPTORS
DESCRIPTORS: ACHING
DESCRIPTORS: SHARP

## 2020-09-05 ASSESSMENT — PAIN DESCRIPTION - ORIENTATION
ORIENTATION: MID
ORIENTATION: MID

## 2020-09-05 NOTE — PROGRESS NOTES
4 Eyes Skin Assessment     The patient is being assess for  Admission    I agree that 2 RN's have performed a thorough Head to Toe Skin Assessment on the patient. ALL assessment sites listed below have been assessed. Areas assessed by both nurses:   [x]   Head, Face, and Ears   [x]   Shoulders, Back, and Chest  [x]   Arms, Elbows, and Hands   [x]   Coccyx, Sacrum, and IschIum  [x]   Legs, Feet, and Heels        Does the Patient have Skin Breakdown?   No         Jhonathan Prevention initiated:  No   Wound Care Orders initiated:  No      [facility] nurse consulted for Pressure Injury (Stage 3,4, Unstageable, DTI, NWPT, and Complex wounds), New and Established Ostomies:  No      Nurse 1 eSignature: Electronically signed by Farzaneh Palacios RN on 9/4/20 at 11:38 PM EDT    **SHARE this note so that the co-signing nurse is able to place an eSignature**    Nurse 2 eSignature: Electronically signed by Sanchez Turpin RN on 9/5/20 at 6:42 AM EDT

## 2020-09-05 NOTE — H&P
Hospitalist  History and Physical    Patient:  María Elena Rice  MRN: [id number]  PCP: Nas Torres MD    CHIEF COMPLAINT:  Chest pain      HISTORY OF PRESENT ILLNESS:   The patient María Elena Rice is a 59 y.o.male With medical history significant for coronary artery disease patient is status post coronary artery bypass grafting, history of cardiomyopathy, CVA and CHF and COPD. Also has history of anxiety depression. Patient presented to the emergency room with chest pain  Patient started having chest pain a few hours ago when he was watching TV. Pain was substernal and sharp. The patient denies radiation of the pain. Pain was associated with nausea but patient denies vomiting denies shortness of breath. Patient feels this pain is similar to his chest pain when he had a heart attack.     Past Medical History:        Diagnosis Date    Anxiety     Arthritis     Asthma     CAD (coronary artery disease)     Calcium kidney stone     Cardiomyopathy ([address]     Cerebral artery occlusion with cerebral infarction ([address]     CHF (congestive heart failure) ([address]     Clostridium difficile diarrhea 02/12/2020    COPD [facility]ch[facility]     mild    Depression     DM2 (diabetes mellitus, type 2) ([facility]     Fibromyalgia     GERD (gastroesophageal reflux disease)     Hyperlipidemia     Hypertension     Liver disease     Pacemaker 2012    Medtronic model # [id number]    Pneumonia     Seizures ([address]     TIA (transient ischemic attack) 2007    Ulcerative colitis ([address]        Past Surgical History:        Procedure Laterality Date    APPENDECTOMY      BACK SURGERY      CARDIAC SURGERY      CHOLECYSTECTOMY      COLONOSCOPY  01/10/2017    COLONOSCOPY  01/10/2017    CORONARY ANGIOPLASTY WITH STENT PLACEMENT  2012    CORONARY ARTERY BYPASS GRAFT  2010, 11/2015    ENDOSCOPY, COLON, DIAGNOSTIC      PACEMAKER PLACEMENT      UPPER GASTROINTESTINAL ENDOSCOPY 02/07/2017    VASCULAR SURGERY         Medications Prior to Admission:    Prior to Admission medications    Medication Sig Start Date End Date Taking? Authorizing Provider   zolpidem (AMBIEN) 5 MG tablet Take 1 tablet by mouth nightly as needed for Sleep for up to 60 days. 8/31/20 10/30/20 Yes Ab Dejesus MD   oxyCODONE-acetaminophen (PERCOCET)  MG per tablet Take 1 tablet by mouth every 8 hours as needed for Pain for up to 30 days.  Intended supply: 30 days 8/18/20 9/17/20 Yes Ab Dejesus MD   hydrALAZINE (APRESOLINE) 25 MG tablet Take 1 tablet by mouth every 8 hours 8/18/20  Yes Negrito Bush MD   metoprolol succinate (TOPROL XL) 50 MG extended release tablet Take 1 tablet by mouth daily 8/18/20  Yes Negrito Bush MD   ipratropium-albuterol (DUONEB) 0.5-2.5 (3) MG/3ML SOLN nebulizer solution Inhale 3 mLs into the lungs every 4 hours as needed for Shortness of Breath (wheezing coughing) 8/9/20  Yes Anton Freeman MD   aspirin 325 MG tablet Take 325 mg by mouth daily   Yes Historical Provider, MD   traZODone (DESYREL) 50 MG tablet TAKE 1 TABLET BY MOUTH EVERY NIGHT AT BEDTIME 7/27/20  Yes Ab Dejesus MD   albuterol sulfate HFA (VENTOLIN HFA) 108 (90 Base) MCG/ACT inhaler Inhale 2 puffs into the lungs 4 times daily as needed for Wheezing 7/16/20  Yes TAYLA Miner CNP   apixaban (ELIQUIS) 5 MG TABS tablet Take 1 tablet by mouth 2 times daily 7/16/20  Yes TAYLA Miner CNP   gabapentin (NEURONTIN) 600 MG tablet 1 tab PO 5 times a day 7/16/20 9/15/20 Yes TAYLA Miner CNP   empagliflozin (JARDIANCE) 10 MG tablet Take 1 tablet by mouth daily 7/16/20  Yes TAYLA Miner CNP   atorvastatin (LIPITOR) 80 MG tablet Take 1 tablet by mouth daily 7/16/20  Yes TAYLA Miner CNP   tamsulosin (FLOMAX) 0.4 MG capsule Take 1 capsule by mouth daily 7/16/20  Yes TAYLA Miner - CNP   pantoprazole (PROTONIX) 40 MG tablet Take 1 tablet by mouth daily Urinary frequency, hesitancy,  urgency, Dysuria, hematuria,  Urinary Incontinence. Urinary Retention. GYNECOLOGICAL: vaginal bleeding , vaginal discharge, menopause    MUSCULOSKELETAL:       joint swelling or stiffness, joint pain, muscle pain, balance problems, low back pain. NEUROLOGICAL:      Gait problems. Tremor. Dizziness. Pain and paresthesias, weakness in extremities. Seizures, memory loss    PSYCHLOGICAL:        Anxiety, depression    SKIN :      Rashes ulcers, skin color changes, easy bruisability, lymphadenopathy      Physical Exam:      Vitals: BP (!) 152/83   Pulse 84   Temp 98.4 °F (36.9 °C) (Oral)   Resp 17   Ht 6' (1.829 m)   Wt 176 lb 9.4 oz (80.1 kg)   SpO2 97%   BMI 23.95 kg/m²     Gen:          Alert and oriented x 3  Eyes: PERRL. No sclera icterus. No conjunctival injection. ENT: No discharge. Pharynx clear. External appearance of ears and nose normal.  Neck: Trachea midline. No obvious mass. Resp: No accessory muscle use. No crackles. No wheezes. No rhonchi. CV: Regular rate. Regular rhythm. No murmur or rub. No edema. GI: Non-tender. Non-distended. No hernia. Skin: Warm, dry, normal texture and turgor. Lymph: No cervical LAD. No supraclavicular LAD. M/S: / Ext. No cyanosis. No clubbing. No joint deformity. Neuro: Moves all four extremities. CN 2-12 tested, no deficits noted. Peripheral pulses and capillary refill is intact.       CBC:   Recent Labs     09/03/20 2311 09/04/20 1619   WBC 8.4 5.7   HGB 13.8 13.7    201     BMP:    Recent Labs     09/03/20 2311 09/04/20  1619    140   K 3.7 3.7    103   CO2 21 24   BUN 19 19   CREATININE 1.1 1.0   GLUCOSE 147* 180*     Hepatic:   Recent Labs     09/03/20 2311 09/04/20 1619   AST 11* 14*   ALT 7* 8*   BILITOT <0.2 0.5   ALKPHOS 51 55     Troponin:   Recent Labs     09/04/20  0250 09/04/20 1619 09/04/20 1959   TROPONINI 0.04* 0.09* 0.09*     BNP: No results for input(s): BNP in the last 72 hours.  INR:   Recent Labs     09/04/20  1619   INR 0.91     Lab Results   Component Value Date    LABA1C 5.7 07/15/2020           No results for input(s): CKTOTAL in the last 72 hours. -----------------------------------------------------------------    CXR  No acute cardiopulmonary abnormality          Assessment / Plan     NSTEMI  Troponin is elevated  T wave inversion in precordial leads  Patient is on Eliquis  Received aspirin  Consult cardiology  Monitor on telemetry    Pain management  R52  Continue with the IV and oral pain medication    COPD unspecified J44.9  Continue with the bronchodilators      Type 2 diabetes mellitus E11.9  hold OHA  Insulin sliding scale  Check A1c            DVT and GI prophylaxis      Full Code      Jana Rosa M.D    This note was transcribed using [id number]. Please disregard any translational errors.

## 2020-09-05 NOTE — PROGRESS NOTES
Pt resting calmly in bed after arriving from ED. Pt is alert and oriented, denies pain, but then states he has mild discomfort, but declines need for pain meds.

## 2020-09-05 NOTE — DISCHARGE SUMMARY
Hospital Medicine Discharge Summary      Patient ID: Millie Tony      Patient's PCP: Bryant Velazquez MD    Admit Date: 9/4/2020     Discharge Date: 9/5/20    Admitting Physician: Darby Miller MD     Discharge Provider: TAYLA Whitaker NP     Discharge Diagnoses: Active Hospital Problems    Diagnosis Date Noted    Chest pain [R07.9] 07/06/2020     Disposition:  [x] Home  [] Home with home health [] Rehab [] Psych [] SNF  [] LTAC  [] Long term nursing home or group home [] Transfer to ICU  [] Transfer to PCU [] Other:    Discharge Instructions/Follow-up:      Please call and schedule an appointment with your Primary Care Provider Dr. Bryant Velazquez MD at [phone] for a follow up visit within 1 week. F/u with cardiology as instructed    Stop smoking    Take medications as prescribed. - If you have questions about your medications and/or changes to your medications, please ask your hospital provider and/or your primary care provider. Return to the emergency room for fever, cough, chest pain or worsening symptoms. PCP/SNF to follow up: As above    Discharge Condition: Stable      Code Status:  Full Code     Activity: activity as tolerated    Diet: DIET CARDIAC; No Caffeine     Discharge Medications:     Current Discharge Medication List           Details   isosorbide mononitrate (IMDUR) 60 MG extended release tablet Take 1 tablet by mouth daily  Qty: 30 tablet, Refills: 3              Details   zolpidem (AMBIEN) 5 MG tablet Take 1 tablet by mouth nightly as needed for Sleep for up to 60 days. Qty: 30 tablet, Refills: 0    Associated Diagnoses: Primary insomnia      oxyCODONE-acetaminophen (PERCOCET)  MG per tablet Take 1 tablet by mouth every 8 hours as needed for Pain for up to 30 days.  Intended supply: 30 days  Qty: 90 tablet, Refills: 0    Comments: Reduce doses taken as pain becomes manageable  Associated Diagnoses: Chronic midline low back pain without sciatica      hydrALAZINE (APRESOLINE) 25 MG tablet Take 1 tablet by mouth every 8 hours  Qty: 90 tablet, Refills: 3      metoprolol succinate (TOPROL XL) 50 MG extended release tablet Take 1 tablet by mouth daily  Qty: 30 tablet, Refills: 3      ipratropium-albuterol (DUONEB) 0.5-2.5 (3) MG/3ML SOLN nebulizer solution Inhale 3 mLs into the lungs every 4 hours as needed for Shortness of Breath (wheezing coughing)  Qty: 360 mL, Refills: 5      aspirin 325 MG tablet Take 325 mg by mouth daily      traZODone (DESYREL) 50 MG tablet TAKE 1 TABLET BY MOUTH EVERY NIGHT AT BEDTIME  Qty: 30 tablet, Refills: 2    Associated Diagnoses: Primary insomnia      albuterol sulfate HFA (VENTOLIN HFA) 108 (90 Base) MCG/ACT inhaler Inhale 2 puffs into the lungs 4 times daily as needed for Wheezing  Qty: 1 Inhaler, Refills: 1      apixaban (ELIQUIS) 5 MG TABS tablet Take 1 tablet by mouth 2 times daily  Qty: 60 tablet, Refills: 1      gabapentin (NEURONTIN) 600 MG tablet 1 tab PO 5 times a day  Qty: 150 tablet, Refills: 1    Associated Diagnoses: Neuropathy      empagliflozin (JARDIANCE) 10 MG tablet Take 1 tablet by mouth daily  Qty: 30 tablet, Refills: 0      atorvastatin (LIPITOR) 80 MG tablet Take 1 tablet by mouth daily  Qty: 30 tablet, Refills: 0    Associated Diagnoses: Coronary artery disease involving native heart without angina pectoris, unspecified vessel or lesion type      tamsulosin (FLOMAX) 0.4 MG capsule Take 1 capsule by mouth daily  Qty: 30 capsule, Refills: 0      pantoprazole (PROTONIX) 40 MG tablet Take 1 tablet by mouth daily  Qty: 30 tablet, Refills: 0      ranolazine (RANEXA) 500 MG extended release tablet Take 2 tablets by mouth 2 times daily  Qty: 60 tablet, Refills: 0      amiodarone (PACERONE) 100 MG tablet Take 1 tablet by mouth daily  Qty: 30 tablet, Refills: 3      acetaminophen (TYLENOL) 325 MG tablet Take 650 mg by mouth every 6 hours as needed for Pain      Respiratory Therapy Supplies (NEBULIZER) AALIYAH Used to give yourself breathing treatment  Qty: 1 Device, Refills: 0             The patient was seen and examined on day of discharge and this discharge summary is in conjunction with any daily progress note from day of discharge. Hospital Course: The patient is a 59 yrs old male, who  has a past medical history of Anxiety, Arthritis, Asthma, CAD (coronary artery disease), Calcium kidney stone, Cardiomyopathy ([address] Cerebral artery occlusion with cerebral infarction ([address] CHF (congestive heart failure) ([address] Clostridium difficile diarrhea, COPD (chronic obstructive pulmonary disease) ([address] Depression, DM2 (diabetes mellitus, type 2) ([address] Fibromyalgia, GERD (gastroesophageal reflux disease), Hyperlipidemia, Hypertension, Liver disease, Pacemaker, Pneumonia, Seizures ([address] TIA (transient ischemic attack), and Ulcerative colitis ([address] He presented to [facility] ER for evaluation following a 90 minute episode of CP. CP was stabbing in nature to his L chest. He noted no relieving factors. Pain began while sitting and watching TV. Initial troponin was 0.04 (--> 0.09 --> 0.06). EKG showed ST depression. He was admitted for further workup and treatment. Cardiology was consulted. Cardiology noted that the patient has known chronic and severe CAD that was not amenable to intervention. CP had improved during his hospital stay. Cardiology noted that imdur could be added to the patient's medication regimen. Per cardiology, the patient was felt safe to discharge to home with OP f/u. At this time, the patient is feeling well. He will be discharged to home in stable condition with medication adjustments and OP f/u as above. Exam:     BP (!) 170/85   Pulse 80   Temp 98 °F (36.7 °C) (Oral)   Resp 15   Ht 6' (1.829 m)   Wt 172 lb 13.5 oz (78.4 kg)   SpO2 95%   BMI 23.44 kg/m²     General: Alert and oriented. Resting in bed in no apparent distress. Very pleasant and cooperative. HEENT: Normocephalic. Atraumatic. Pupils equal and reactive. EOM intact. Oral mucosa pink/moist/intact. Neck: Supple. Symmetrical. Trachea midline. Lungs: Clear to auscultation bilaterally. Respirations even and unlabored. Chest: Exam unremarkable. Cardiac: S1/S2 noted. Regular Rhythm and rate. Abdomen/GI: Soft. Non-tender. Non-distended. BS+. Extremities: PP+. Atraumatic. No redness/cyanosis/edema noted. Brisk cap refill. Musculoskeletal: LOPEZ. No change/significant limitation in ROM. Skin: Dry and intact. No lesions noted. Neuro: Grossly intact. No focal deficits noted. Consults:     IP CONSULT TO CARDIOLOGY  IP CONSULT TO SOCIAL WORK    Significant Diagnostic Studies:     XR CHEST (2 VW)   Final Result   No acute abnormality. No change from prior study           troponin was 0.04 (--> 0.09 --> 0.06)    Labs: For convenience and continuity at follow-up the following most recent labs are provided:    CBC:    Lab Results   Component Value Date    WBC 5.4 09/05/2020    HGB 12.6 09/05/2020    HCT 37.2 09/05/2020     09/05/2020       Renal:    Lab Results   Component Value Date     09/05/2020    K 3.7 09/05/2020     09/05/2020    CO2 22 09/05/2020    BUN 20 09/05/2020    CREATININE 0.9 09/05/2020    CALCIUM 8.5 09/05/2020    PHOS 4.1 07/08/2020       No future appointments. Time Spent on discharge is more than 45 minutes in the examination, evaluation, counseling and review of medications and discharge plan. RX monitoring reviewed N/A    Signed:    TAYLA Pratt NP   9/5/2020      Thank you Bud Terry MD for the opportunity to be involved in this patient's care. If you have any questions or concerns please feel free to contact me at [phone].

## 2020-09-05 NOTE — CARE COORDINATION
INITIAL CASE MANAGEMENT ASSESSMENT    Reviewed chart, called patient to assess possible discharge needs. Explained Case Management role/services. Living Situation: Patient lives with his wife in a house. Confirmed home address     ADLs: Independent     DME: RW as needed     PT/OT Recs: None ordered     Active Services: None. Denied need for services in the home. Discussed home RN due to frequent hospital visits. Continued to deny need      Transportation: Active . Reports his wife drives and will transport home at discharge.      Medications: No barriers to taking/obtaining medications. Pharmacy is [facility]     PCP: Dr. Mcmanus Been  HD/PD: N/A    PLAN/COMMENTS: There was a SW consult for the patient regarding \"Pt is unable to afford diabetic supplies, and does not have a bed\". Called patient to discuss. Patient explained that he does not have any issues with obtaining his medications. He explained that he no longer needs many of his previous diabetic supplies because he lost 100 lbs and only needs to take his Jardiance. Discussed all aspects of discharge planning and patient is adamant he does not have discharge needs at this time. SW/CM provided contact information for patient or family to call with any questions. SW/CM will follow and assist as needed.     Electronically signed by Nory Benitez RN MSN on 9/5/2020 at 4:28 PM

## 2020-09-05 NOTE — FLOWSHEET NOTE
Discharge paperwork discussed with patient, no further questions at this time. Pt is aware of follow up with cardiologist in one week. IV and tele removed, belongings gathered and wheelchair will be called.

## 2020-09-05 NOTE — CONSULTS
needed for Pain for up to 30 days.  Intended supply: 30 days 8/18/20 9/17/20 Yes Zoltan Maria MD   hydrALAZINE (APRESOLINE) 25 MG tablet Take 1 tablet by mouth every 8 hours 8/18/20  Yes Nilesh Tirado MD   metoprolol succinate (TOPROL XL) 50 MG extended release tablet Take 1 tablet by mouth daily 8/18/20  Yes Nilesh Tirado MD   ipratropium-albuterol (DUONEB) 0.5-2.5 (3) MG/3ML SOLN nebulizer solution Inhale 3 mLs into the lungs every 4 hours as needed for Shortness of Breath (wheezing coughing) 8/9/20  Yes Enma Rangel MD   aspirin 325 MG tablet Take 325 mg by mouth daily   Yes Earnest You MD   traZODone (DESYREL) 50 MG tablet TAKE 1 TABLET BY MOUTH EVERY NIGHT AT BEDTIME 7/27/20  Yes Zoltan Maria MD   albuterol sulfate HFA (VENTOLIN HFA) 108 (90 Base) MCG/ACT inhaler Inhale 2 puffs into the lungs 4 times daily as needed for Wheezing 7/16/20  Yes TAYLA Miner CNP   apixaban (ELIQUIS) 5 MG TABS tablet Take 1 tablet by mouth 2 times daily 7/16/20  Yes TAYLA Claudio CNP   gabapentin (NEURONTIN) 600 MG tablet 1 tab PO 5 times a day 7/16/20 9/15/20 Yes TAYLA Miner CNP   empagliflozin (JARDIANCE) 10 MG tablet Take 1 tablet by mouth daily 7/16/20  Yes TAYLA Miner CNP   atorvastatin (LIPITOR) 80 MG tablet Take 1 tablet by mouth daily 7/16/20  Yes TAYLA Miner CNP   tamsulosin (FLOMAX) 0.4 MG capsule Take 1 capsule by mouth daily 7/16/20  Yes TAYLA Miner CNP   pantoprazole (PROTONIX) 40 MG tablet Take 1 tablet by mouth daily 7/16/20  Yes TAYLA Miner CNP   ranolazine (RANEXA) 500 MG extended release tablet Take 2 tablets by mouth 2 times daily 7/16/20  Yes TAYLA Miner CNP   amiodarone (PACERONE) 100 MG tablet Take 1 tablet by mouth daily 7/17/20  Yes TAYLA Miner CNP   acetaminophen (TYLENOL) 325 MG tablet Take 650 mg by mouth every 6 hours as needed for Pain   Yes Historical Provider, MD   Respiratory Therapy Supplies (NEBULIZER) AALIYAH Used to give yourself breathing treatment 8/9/20   Yony Goldsmith MD          Allergies:  Dopamine hcl; Tramadol; and Melatonin     Review of Systems:   · Constitutional: no unanticipated weight loss. There's been no change in energy level, sleep pattern, or activity level. No fevers, chills. · Eyes: No visual changes or diplopia. No scleral icterus. · ENT: No Headaches, hearing loss or vertigo. No mouth sores or sore throat. · Cardiovascular: as reviewed in HPI  · Respiratory: No cough or wheezing, no sputum production. No hemoptysis. · Gastrointestinal: No abdominal pain, appetite loss, blood in stools. No change in bowel or bladder habits. · Genitourinary: No dysuria, trouble voiding, or hematuria. · Musculoskeletal:  No gait disturbance, no joint complaints. · Integumentary: No rash or pruritis. · Neurological: No headache, diplopia, change in muscle strength, numbness or tingling. · Psychiatric: No anxiety or depression. · Endocrine: No temperature intolerance. No excessive thirst, fluid intake, or urination. No tremor. · Hematologic/Lymphatic: No abnormal bruising or bleeding, blood clots or swollen lymph nodes. · Allergic/Immunologic: No nasal congestion or hives. Objective:   PHYSICAL EXAM:    Vitals:    09/05/20 1250   BP:    Pulse:    Resp:    Temp:    SpO2: 95%    Weight: 172 lb 13.5 oz (78.4 kg)       General Appearance:  Alert, cooperative, no distress, appears stated age. Head:  Normocephalic, without obvious abnormality, atraumatic. Eyes:  Pupils equal and round. No scleral icterus. Mouth: Moist mucosa, no pharyngeal erythema. Nose: Nares normal. No drainage or sinus tenderness. Neck: Supple, symmetrical, trachea midline. No adenopathy. No tenderness/mass/nodules. No carotid bruit or elevated JVD. Lungs:   Clear to auscultation bilaterally, respirations unlabored. No wheeze, rales, or rhonchi.    Chest Wall:  No tenderness or deformity. Heart:  Regular rate. S1/S2 normal. No murmur, rub, or gallop. Abdomen:   Soft, non-tender, bowel sounds active. Musculoskeletal: No muscle wasting or digital clubbing. Extremities: Extremities normal, atraumatic. No cyanosis or edema. Pulses: 2+ radial and carotid pulses, symmetric. Skin: No rashes or lesions. Pysch: Normal mood and affect. Alert and oriented x 4.    Neurologic: Normal gross motor and sensory exam.       Labs     CBC:   Lab Results   Component Value Date    WBC 5.4 09/05/2020    RBC 3.59 09/05/2020    HGB 12.6 09/05/2020    HCT 37.2 09/05/2020    .8 09/05/2020    RDW 14.8 09/05/2020     09/05/2020     CMP:  Lab Results   Component Value Date     09/05/2020    K 3.7 09/05/2020     09/05/2020    CO2 22 09/05/2020    BUN 20 09/05/2020    CREATININE 0.9 09/05/2020    GFRAA >60 09/05/2020    GFRAA >60 05/30/2013    AGRATIO 1.5 09/04/2020    LABGLOM >60 09/05/2020    LABGLOM 87.6 07/11/2011    GLUCOSE 105 09/05/2020    GLUCOSE 208 07/11/2011    PROT 6.6 09/04/2020    PROT 7.8 03/13/2013    CALCIUM 8.5 09/05/2020    BILITOT 0.5 09/04/2020    ALKPHOS 55 09/04/2020    AST 14 09/04/2020    ALT 8 09/04/2020     PT/INR:  No results found for: PTINR  HgBA1c:  Lab Results   Component Value Date    LABA1C 6.1 09/05/2020     @RESUFAST(CKTOTAL,CKMB,CKMBINDEX,TROPONINI      CURRENT Medications:  Current Facility-Administered Medications: albuterol sulfate  (90 Base) MCG/ACT inhaler 2 puff, 2 puff, Inhalation, 4x Daily PRN  amiodarone (CORDARONE) tablet 100 mg, 100 mg, Oral, Daily  apixaban (ELIQUIS) tablet 5 mg, 5 mg, Oral, BID  aspirin tablet 325 mg, 325 mg, Oral, Daily  atorvastatin (LIPITOR) tablet 80 mg, 80 mg, Oral, Daily  hydrALAZINE (APRESOLINE) tablet 25 mg, 25 mg, Oral, 3 times per day  ipratropium-albuterol (DUONEB) nebulizer solution 3 mL, 1 vial, Inhalation, Q4H PRN  metoprolol succinate (TOPROL XL) extended release tablet 50 mg, 50 mg, Oral, Daily  oxyCODONE-acetaminophen (PERCOCET)  MG per tablet 1 tablet, 1 tablet, Oral, Q8H PRN  pantoprazole (PROTONIX) tablet 40 mg, 40 mg, Oral, Daily  ranolazine (RANEXA) extended release tablet 1,000 mg, 1,000 mg, Oral, BID  tamsulosin (FLOMAX) capsule 0.4 mg, 0.4 mg, Oral, Daily  traZODone (DESYREL) tablet 50 mg, 50 mg, Oral, Nightly  zolpidem (AMBIEN) tablet 5 mg, 5 mg, Oral, Nightly PRN  0.9 % sodium chloride infusion, , Intravenous, Continuous  sodium chloride flush 0.9 % injection 10 mL, 10 mL, Intravenous, 2 times per day  sodium chloride flush 0.9 % injection 10 mL, 10 mL, Intravenous, PRN  potassium chloride (KLOR-CON M) extended release tablet 40 mEq, 40 mEq, Oral, PRN **OR** potassium bicarb-citric acid (EFFER-K) effervescent tablet 40 mEq, 40 mEq, Oral, PRN **OR** potassium chloride 10 mEq/100 mL IVPB (Peripheral Line), 10 mEq, Intravenous, PRN  acetaminophen (TYLENOL) tablet 650 mg, 650 mg, Oral, Q6H PRN **OR** acetaminophen (TYLENOL) suppository 650 mg, 650 mg, Rectal, Q6H PRN  promethazine (PHENERGAN) tablet 12.5 mg, 12.5 mg, Oral, Q6H PRN **OR** ondansetron (ZOFRAN) injection 4 mg, 4 mg, Intravenous, Q6H PRN  nitroGLYCERIN (NITROSTAT) SL tablet 0.4 mg, 0.4 mg, Sublingual, Q5 Min PRN  bisacodyl (DULCOLAX) EC tablet 5 mg, 5 mg, Oral, Daily PRN  gabapentin (NEURONTIN) capsule 600 mg, 600 mg, Oral, 5x Daily  insulin lispro (HUMALOG) injection vial 0-12 Units, 0-12 Units, Subcutaneous, TID WC  insulin lispro (HUMALOG) injection vial 0-6 Units, 0-6 Units, Subcutaneous, Nightly  glucose (GLUTOSE) 40 % oral gel 15 g, 15 g, Oral, PRN  dextrose 50 % IV solution, 12.5 g, Intravenous, PRN  glucagon (rDNA) injection 1 mg, 1 mg, Intramuscular, PRN  dextrose 5 % solution, 100 mL/hr, Intravenous, PRN     Cardiac testing     EKG: sinus, RBBB, no acute ischemia     Echo:     Stress Test:     Cath:      All above diagnostic testing was independently visualized and reviewed by me (not simply review of report) Patient Active Problem List   Diagnosis    Hx of CABG    NSTEMI (non-ST elevated myocardial infarction)    Essential hypertension    Abdominal pain    Ischemic cardiomyopathy    Hyperlipidemia LDL goal <70    CHF (congestive heart failure) (HCC)    Chronic back pain    Type 2 diabetes mellitus without complication, with long-term current use of insulin ([address]    Coronary artery disease involving native coronary artery of native heart without angina pectoris    COPD exacerbation (HCC)    Anemia,normocytic normochromic ,mixed folic aci deficiency and iron deficiency    Guaiac + stool    Morbid obesity due to excess calories ([address]    Anxiety    Coronary artery disease involving coronary bypass graft of native heart with angina pectoris (HCC)    Iron deficiency anemia due to chronic blood loss    LALITA (acute kidney injury) ([address]    Tobacco abuse    Restrictive lung disease    Acute on chronic diastolic congestive heart failure ([facility])    Ischemic stroke, left frontal lobe (4/30/18) ([facility])    PFO (patent foramen ovale)    DMII (diabetes mellitus, type 2) ([facility])    COPD (chronic obstructive pulmonary disease) ([facility])    Renal insufficiency    Chronic systolic (congestive) heart failure (HCC)    CAD (coronary artery disease)    Compression fracture of L2, sequela    Low back pain    Intractable back pain    Back pain    Stroke determined by clinical assessment ([address]    NSVT (nonsustained ventricular tachycardia) ([facility])    Sinus tachycardia    ICD (implantable cardioverter-defibrillator) discharge    Diabetic peripheral neuropathy ([facility])    Postural hypotension    Ventricular tachycardia ([facility])    Hypertensive crisis    Acute pulmonary embolism without acute cor pulmonale ([facility])    Chest pain    Other benign neoplasm of skin of trunk     Jaw pain    Dizziness    Orthostatic hypotension    Anginal syndrome (HCC)         Assessment and Plan   1) Troponin elevation  - known chronic and

## 2020-09-05 NOTE — ED PROVIDER NOTES
Attending Supervisory Note/Shared Visit     I have personally performed a face to face diagnostic evaluation on this patient. I have fully participated in the care of this patient. I have reviewed and agree with all pertinent clinical information including history, physical exam, diagnostic tests, and the plan. HPI: Tj Winston presented with chest pain x 24 hours. Presented last night with similar symptoms, onset gradual, described as sharp, radiating to this neck, associated with nausea and shortness of breath, similar to prior heart attacks per pt. Pt with significant history of coronary artery bypass graft, prior myocardial infarctions, amphetamine abuse. States symptoms not otherwise alleviated or exacerbated by other factors. Denies fevers, chills. Took aspirin prior to arrival.      Chief Complaint   Patient presents with    Chest Pain     x 3 hours ago. sharp mid left chest pain. constant. associated sob. hx afib. Review of Systems: See [facility] note  Vital Signs: BP (!) 152/83   Pulse 84   Temp 98.4 °F (36.9 °C) (Oral)   Resp 17   Ht 6' (1.829 m)   Wt 176 lb 9.4 oz (80.1 kg)   SpO2 97%   BMI 23.95 kg/m²     Alert 59 y.o. male who does not appear toxic or acutely ill  HENT: Atraumatic, oral mucosa moist  Neck: Grossly normal ROM  Chest/Lungs: respiratory effort normal   Heart: Tachycardic, regular rhythm, intact peripheral pulses  Abdomen: Soft, nontender, nondistended. Extremities: Nonedematous   Musculoskeletal: Grossly normal ROM  Skin: No palor or diaphoresis    Medical Decision Making and Plan:  Pertinent Labs & Imaging studies reviewed. (See [facility] chart for details)  I agree with ABIGAIL Mcrae's assessment and plan.     68-year-old male with significant cardiac history 3 presents to the emergency department chest pain, associated with shortness of breath, nausea, similar to prior heart attacks, new T wave inversions in the anterior precordial leads, stable right bundle branch block morphology, given similar to his prior presentations for anginal chest pain, ACS is higher on the differential than PE for this patient. Has uptrending troponin from last night, last night 0.02 then 0.04, today 0.09. Possible sympathomimetic toxicity leading to demand ischemia as well. Patient high risk for ACS. Meets clinical criteria for non-ST elevation myocardial infarction. Patient took aspirin prior to arrival, placed on heparin drip. Discussed with cardiologist who deferred to our management. Patient admitted to medicine, went up in stable condition.       Debra Leyva MD  Attending Emergency Physician        Debra Leyva MD  09/05/20 [id number]

## 2020-09-06 ENCOUNTER — TELEPHONE (OUTPATIENT)
Dept: OTHER | Facility: CLINIC | Age: 65
End: 2020-09-06

## 2020-09-06 ENCOUNTER — APPOINTMENT (OUTPATIENT)
Dept: GENERAL RADIOLOGY | Age: 65
End: 2020-09-06
Payer: MEDICARE

## 2020-09-06 ENCOUNTER — HOSPITAL ENCOUNTER (EMERGENCY)
Age: 65
Discharge: HOME | End: 2020-09-06
Attending: EMERGENCY MEDICINE
Payer: MEDICARE

## 2020-09-06 VITALS
WEIGHT: 183.86 LBS | SYSTOLIC BLOOD PRESSURE: 114 MMHG | HEIGHT: 72 IN | TEMPERATURE: 98 F | RESPIRATION RATE: 21 BRPM | HEART RATE: 74 BPM | DIASTOLIC BLOOD PRESSURE: 62 MMHG | OXYGEN SATURATION: 97 % | BODY MASS INDEX: 24.9 KG/M2

## 2020-09-06 LAB
ANION GAP SERPL CALCULATED.3IONS-SCNC: 9 MMOL/L (ref 3–16)
BASOPHILS ABSOLUTE: 0.1 K/UL (ref 0–0.2)
BASOPHILS RELATIVE PERCENT: 1.1 %
BUN BLDV-MCNC: 17 MG/DL (ref 7–20)
CALCIUM SERPL-MCNC: 8.3 MG/DL (ref 8.3–10.6)
CHLORIDE BLD-SCNC: 109 MMOL/L (ref 99–110)
CO2: 22 MMOL/L (ref 21–32)
CREAT SERPL-MCNC: 1.2 MG/DL (ref 0.8–1.3)
EOSINOPHILS ABSOLUTE: 0.3 K/UL (ref 0–0.6)
EOSINOPHILS RELATIVE PERCENT: 3.7 %
GFR AFRICAN AMERICAN: >60
GFR NON-AFRICAN AMERICAN: >60
GLUCOSE BLD-MCNC: 129 MG/DL (ref 70–99)
HCT VFR BLD CALC: 36.2 % (ref 40.5–52.5)
HEMOGLOBIN: 12.3 G/DL (ref 13.5–17.5)
LYMPHOCYTES ABSOLUTE: 2.2 K/UL (ref 1–5.1)
LYMPHOCYTES RELATIVE PERCENT: 31.7 %
MCH RBC QN AUTO: 35.4 PG (ref 26–34)
MCHC RBC AUTO-ENTMCNC: 34.1 G/DL (ref 31–36)
MCV RBC AUTO: 103.9 FL (ref 80–100)
MONOCYTES ABSOLUTE: 0.5 K/UL (ref 0–1.3)
MONOCYTES RELATIVE PERCENT: 7.1 %
NEUTROPHILS ABSOLUTE: 3.9 K/UL (ref 1.7–7.7)
NEUTROPHILS RELATIVE PERCENT: 56.4 %
PDW BLD-RTO: 14.8 % (ref 12.4–15.4)
PLATELET # BLD: 191 K/UL (ref 135–450)
PMV BLD AUTO: 9 FL (ref 5–10.5)
POTASSIUM REFLEX MAGNESIUM: 3.8 MMOL/L (ref 3.5–5.1)
RBC # BLD: 3.49 M/UL (ref 4.2–5.9)
SODIUM BLD-SCNC: 140 MMOL/L (ref 136–145)
TROPONIN: 0.07 NG/ML
TROPONIN: 0.07 NG/ML
WBC # BLD: 6.8 K/UL (ref 4–11)

## 2020-09-06 PROCEDURE — 84484 ASSAY OF TROPONIN QUANT: CPT

## 2020-09-06 PROCEDURE — 36415 COLL VENOUS BLD VENIPUNCTURE: CPT

## 2020-09-06 PROCEDURE — 6370000000 HC RX 637 (ALT 250 FOR IP): Performed by: EMERGENCY MEDICINE

## 2020-09-06 PROCEDURE — 93005 ELECTROCARDIOGRAM TRACING: CPT | Performed by: EMERGENCY MEDICINE

## 2020-09-06 PROCEDURE — 80048 BASIC METABOLIC PNL TOTAL CA: CPT

## 2020-09-06 PROCEDURE — 85025 COMPLETE CBC W/AUTO DIFF WBC: CPT

## 2020-09-06 PROCEDURE — 99285 EMERGENCY DEPT VISIT HI MDM: CPT

## 2020-09-06 PROCEDURE — 71046 X-RAY EXAM CHEST 2 VIEWS: CPT

## 2020-09-06 RX ORDER — NITROGLYCERIN 0.4 MG/1
0.4 TABLET SUBLINGUAL ONCE
Status: COMPLETED | OUTPATIENT
Start: 2020-09-06 | End: 2020-09-06

## 2020-09-06 RX ORDER — OXYCODONE HYDROCHLORIDE AND ACETAMINOPHEN 5; 325 MG/1; MG/1
2 TABLET ORAL ONCE
Status: COMPLETED | OUTPATIENT
Start: 2020-09-06 | End: 2020-09-06

## 2020-09-06 RX ADMIN — OXYCODONE HYDROCHLORIDE AND ACETAMINOPHEN 2 TABLET: 5; 325 TABLET ORAL at 18:42

## 2020-09-06 RX ADMIN — NITROGLYCERIN 0.4 MG: 0.4 TABLET, ORALLY DISINTEGRATING SUBLINGUAL at 18:42

## 2020-09-06 ASSESSMENT — PAIN DESCRIPTION - DESCRIPTORS
DESCRIPTORS: ACHING
DESCRIPTORS: ACHING

## 2020-09-06 ASSESSMENT — PAIN DESCRIPTION - ORIENTATION
ORIENTATION: MID
ORIENTATION: MID

## 2020-09-06 ASSESSMENT — PAIN - FUNCTIONAL ASSESSMENT
PAIN_FUNCTIONAL_ASSESSMENT: PREVENTS OR INTERFERES SOME ACTIVE ACTIVITIES AND ADLS
PAIN_FUNCTIONAL_ASSESSMENT: ACTIVITIES ARE NOT PREVENTED

## 2020-09-06 ASSESSMENT — PAIN DESCRIPTION - FREQUENCY
FREQUENCY: CONTINUOUS
FREQUENCY: CONTINUOUS

## 2020-09-06 ASSESSMENT — PAIN SCALES - GENERAL
PAINLEVEL_OUTOF10: 7

## 2020-09-06 ASSESSMENT — PAIN DESCRIPTION - ONSET
ONSET: ON-GOING
ONSET: ON-GOING

## 2020-09-06 ASSESSMENT — PAIN DESCRIPTION - PROGRESSION
CLINICAL_PROGRESSION: NOT CHANGED
CLINICAL_PROGRESSION: NOT CHANGED

## 2020-09-06 ASSESSMENT — PAIN DESCRIPTION - LOCATION
LOCATION: CHEST
LOCATION: CHEST

## 2020-09-06 NOTE — ED NOTES
Bed: B-07  Expected date: 9/6/20  Expected time: 5:55 PM  Means of arrival:   Comments:  Chest pain      Elisa Collazo RN  09/06/20 [id number]

## 2020-09-06 NOTE — ED TRIAGE NOTES
Chest pain today. Pt took 2 nitro prior to EMS arrival.  Pt states chest pain restarted today. Pt weas admitted to hospital and discharged yesterday from here. Denies n/v.   Denies diaphoresis

## 2020-09-06 NOTE — ED PROVIDER NOTES
(diabetes mellitus, type 2) (HCC)     Fibromyalgia     GERD (gastroesophageal reflux disease)     Hyperlipidemia     Hypertension     Liver disease     Pacemaker 2012    Medtronic model # [id number]    Pneumonia     Seizures ([facility] [address]     TIA (transient ischemic attack) 2007    Ulcerative colitis ([facility] [address]      Past Surgical History:   Procedure Laterality Date    APPENDECTOMY      BACK SURGERY      CARDIAC SURGERY      CHOLECYSTECTOMY      COLONOSCOPY  01/10/2017    COLONOSCOPY  01/10/2017    CORONARY ANGIOPLASTY WITH STENT PLACEMENT  2012    CORONARY ARTERY BYPASS GRAFT  2010, 11/2015    ENDOSCOPY, COLON, DIAGNOSTIC      PACEMAKER PLACEMENT      UPPER GASTROINTESTINAL ENDOSCOPY  02/07/2017    VASCULAR SURGERY       Family History   Problem Relation Age of Onset    Coronary Art Dis Father     Cancer Mother         Lung with mets    Diabetes Mother      Social History     Socioeconomic History    Marital status:      Spouse name: Not on file    Number of children: 1    Years of education: Not on file    Highest education level: Not on file   Occupational History    Occupation: disabled   Social Needs    Financial resource strain: Not on file    Food insecurity     Worry: Not on file     Inability: Not on file   Lingoda needs     Medical: Not on file     Non-medical: Not on file   Tobacco Use    Smoking status: Current Every Day Smoker     Packs/day: 1.00     Years: 44.00     Pack years: 44.00     Types: Cigarettes    Smokeless tobacco: Never Used    Tobacco comment: urged to stop   Substance and Sexual Activity    Alcohol use: No     Alcohol/week: 0.0 standard drinks    Drug use: No    Sexual activity: Not Currently     Partners: Female   Lifestyle    Physical activity     Days per week: Not on file     Minutes per session: Not on file    Stress: Not on file   Relationships    Social connections     Talks on phone: Not on file     Gets together: Not on file Lymphocytes % 31.7 %    Monocytes % 7.1 %    Eosinophils % 3.7 %    Basophils % 1.1 %    Neutrophils Absolute 3.9 1.7 - 7.7 K/uL    Lymphocytes Absolute 2.2 1.0 - 5.1 K/uL    Monocytes Absolute 0.5 0.0 - 1.3 K/uL    Eosinophils Absolute 0.3 0.0 - 0.6 K/uL    Basophils Absolute 0.1 0.0 - 0.2 K/uL   Basic Metabolic Panel w/ Reflex to MG   Result Value Ref Range    Sodium 140 136 - 145 mmol/L    Potassium reflex Magnesium 3.8 3.5 - 5.1 mmol/L    Chloride 109 99 - 110 mmol/L    CO2 22 21 - 32 mmol/L    Anion Gap 9 3 - 16    Glucose 129 (H) 70 - 99 mg/dL    BUN 17 7 - 20 mg/dL    CREATININE 1.2 0.8 - 1.3 mg/dL    GFR Non-African American >60 >60    GFR African American >60 >60    Calcium 8.3 8.3 - 10.6 mg/dL   Troponin   Result Value Ref Range    Troponin 0.07 (H) <0.01 ng/mL   Troponin   Result Value Ref Range    Troponin 0.07 (H) <0.01 ng/mL       I estimate there is LOW risk for PULMONARY EMBOLISM, ACUTE CORONARY SYNDROME, OR THORACIC AORTIC DISSECTION, thus I consider the discharge disposition reasonable. Breann Boyd and I have discussed the diagnosis and risks, and we agree with discharging home to follow-up with their primary doctor. We also discussed returning to the Emergency Department immediately if new or worsening symptoms occur. We have discussed the symptoms which are most concerning (e.g., bloody sputum, fever, worsening pain or shortness of breath, vomiting) that necessitate immediate return. Patient was given scripts for the following medications. I counseled patient how to take these medications. New Prescriptions    No medications on file           CLINICAL IMPRESSION  1. Chest pain, unspecified type        Blood pressure (!) 150/79, pulse 84, temperature 98 °F (36.7 °C), temperature source Oral, resp. rate 16, height 6' (1.829 m), weight 183 lb 13.8 oz (83.4 kg), SpO2 97 %. DISPOSITION  Patient was discharged to home in good condition.     [facility], [address] [place] [address] [facility] [address]  [phone]    Call in 2 days  For a follow up appointment. Disclaimer: All medical record entries made by [employer] [facility] dictation.       (Please note that this note was completed with a voice recognition program. Every attempt was made to edit the dictations, but inevitably there remain words that are mis-transcribed.)            Aneudy Manjarrez MD  09/06/20 [id number]

## 2020-09-07 LAB
EKG ATRIAL RATE: 125 BPM
EKG ATRIAL RATE: 88 BPM
EKG DIAGNOSIS: NORMAL
EKG DIAGNOSIS: NORMAL
EKG P AXIS: 56 DEGREES
EKG P AXIS: 64 DEGREES
EKG P-R INTERVAL: 120 MS
EKG P-R INTERVAL: 126 MS
EKG Q-T INTERVAL: 358 MS
EKG Q-T INTERVAL: 432 MS
EKG QRS DURATION: 130 MS
EKG QRS DURATION: 132 MS
EKG QTC CALCULATION (BAZETT): 516 MS
EKG QTC CALCULATION (BAZETT): 522 MS
EKG R AXIS: 63 DEGREES
EKG R AXIS: 84 DEGREES
EKG T AXIS: 28 DEGREES
EKG T AXIS: 29 DEGREES
EKG VENTRICULAR RATE: 125 BPM
EKG VENTRICULAR RATE: 88 BPM

## 2020-09-07 PROCEDURE — 93010 ELECTROCARDIOGRAM REPORT: CPT | Performed by: INTERNAL MEDICINE

## 2020-09-08 ENCOUNTER — CARE COORDINATION (OUTPATIENT)
Dept: CASE MANAGEMENT | Age: 65
End: 2020-09-08

## 2020-09-08 ENCOUNTER — TELEPHONE (OUTPATIENT)
Dept: OTHER | Facility: CLINIC | Age: 65
End: 2020-09-08

## 2020-09-08 NOTE — TELEPHONE ENCOUNTER
RN Access contacted Dr. Harish Olguin to schedule ED f/u appt. Spoke with Larose, appt scheduled for Tuesday 9-15 @11:00am with Dr. Harish Olguin.

## 2020-09-09 ENCOUNTER — CARE COORDINATION (OUTPATIENT)
Dept: CASE MANAGEMENT | Age: 65
End: 2020-09-09

## 2020-09-09 NOTE — CARE COORDINATION
[facility] Transitions Initial Follow Up Call    Call within 2 business days of discharge: Yes    Patient: Sosa Marks Patient : 1955   MRN: [id number]  Reason for Admission: CP  Discharge Date: 20 RARS: Readmission Risk Score: 72      Last Discharge [facility]       Complaint Diagnosis Description Type Department Provider    20 Chest Pain Chest pain, unspecified type ED (DISCHARGE) [facility] ED Karsten Motley MD               Facility: [facility]    Non-face-to-face services provided:  Obtained and reviewed discharge summary and/or continuity of care documents    Care Transitions 24 Hour Call    Do you have all of your prescriptions and are they filled?:  Yes  Do you have support at home?:  Partner/Spouse/SO  Are you an active caregiver in your home?:  No  Care Transitions Interventions     Second attempt made to reach patient for initial post hospital transition call. VM left stating purpose of call along with my contact information requesting a return call.             Follow Up  Future Appointments   Date Time Provider Fred Gregory   9/15/2020 11:00 AM Becki Hall MD [facility]       Charmayne Coe, RN

## 2020-09-10 ENCOUNTER — CARE COORDINATION (OUTPATIENT)
Dept: CASE MANAGEMENT | Age: 65
End: 2020-09-10

## 2020-09-10 NOTE — CARE COORDINATION
[facility] Transitions Initial Follow Up Call    Call within 2 business days of discharge: Yes    Patient: Hilary Christian Patient : 1955   MRN: [id number]  Reason for Admission: CP  Discharge Date: 20 RARS: Readmission Risk Score: 72      Last Discharge [facility]       Complaint Diagnosis Description Type Department Provider    20 Chest Pain Chest pain, unspecified type ED (DISCHARGE) [facility] ED Jayde Nam MD           Spoke with: Jaylyn Esparza (patient)    Facility: [facility]    3rd attempt at [facility] discharge phone call. Spoke briefly with Ruy. He states he was recently admitted at a [facility] facility with CP - had emergency stent placement - and was just discharged yesterday. Did not wish to complete CTN phone call. Declines additional CTN calls.         Follow Up  Future Appointments   Date Time Provider Fred Gregory   9/15/2020 11:00 AM Kirby Doll MD [facility]       Jose Alfredo Schaeffer RN

## 2020-09-22 PROBLEM — I95.1 POSTURAL HYPOTENSION: Status: RESOLVED | Noted: 2020-02-12 | Resolved: 2020-09-22

## 2020-09-22 PROBLEM — I20.9 ANGINAL SYNDROME (HCC): Status: RESOLVED | Noted: 2020-07-29 | Resolved: 2020-09-22

## 2020-09-22 PROBLEM — R07.9 CHEST PAIN: Status: RESOLVED | Noted: 2020-07-06 | Resolved: 2020-09-22

## 2020-10-07 ENCOUNTER — CARE COORDINATION (OUTPATIENT)
Dept: CARE COORDINATION | Age: 65
End: 2020-10-07

## 2020-10-07 NOTE — CARE COORDINATION
Ambulatory Care Coordination Note  10/7/2020  CM Risk Score: 15  Charlson 10 Year Mortality Risk Score: 100%     ACC: Noman Madera RN    Summary Note: Outreach call made to [facility] to review recent ED visits. He reports that he was diagnosed with pneumonia. He states that he took his last does of azithromycin today. He also states he was tested for COVID-19 on 10/3/20 and results were negative. ACM offered to assist him with scheduling a follow up appointment with PCP and he states that he will do it. Stressed the importance of following up since he was diagnosed with pneumonia. Understanding voiced by [facility]. ACM explained care management and offered enrollment in program. [facility] agrees to participate. M provided contact information and encouraged to call with questions/concerns. Plan:  F/u on scheduling an appointment with PCP  PCAM and SDOH review  Set goal      Ambulatory Care Coordination Assessment    Care Coordination Protocol  Program Enrollment:  Complex Care  Referral from Primary Care Provider:  No  Week 1 - Initial Assessment     Do you have all of your prescriptions and are they filled?:  Yes  Barriers to medication adherence:  None  Are you able to afford your medications?:  No  How often do you have trouble taking your medications the way you have been told to take them?:  Sometimes I take them as prescribed. Do you have Home O2 Therapy?:  No      Ability to seek help/take action for Emergent Urgent situations i.e. fire, crime, inclement weather or health crisis. :  Independent  Ability to ambulate to restroom:  Independent  Ability handle personal hygeine needs (bathing/dressing/grooming): Independent  Ability to manage Medications: Independent  Ability to prepare Food Preparation:  Independent  Ability to maintain home (clean home, laundry):   Independent  Ability to drive and/or has transportation:  Independent  Ability to do shopping:  Independent  Ability to manage finances: Independent  Is patient able to live independently?:  Yes     Current Housing:  Apartment        Per the Fall Risk Screening, did the patient have 2 or more falls or 1 fall with injury in the past year?:  No        Are you experiencing loss of meaning?:  No  Are you experiencing loss of hope and peace?:  No     Suggested Interventions and Community Resources  Fall Risk Prevention:  Completed Registered Dietician:  Not Started   Smoking Cessation:  Not Started   Zone Management Tools:  Not Started         Set up/Review an Education Plan              Prior to Admission medications    Medication Sig Start Date End Date Taking? Authorizing Provider   gabapentin (NEURONTIN) 600 MG tablet 1 tab PO 5 times a day 9/30/20 11/30/20 Yes Brandy Alpers, MD   zolpidem (AMBIEN) 5 MG tablet Take 1 tablet by mouth nightly as needed for Sleep for up to 60 days. 9/29/20 11/28/20 Yes Brandy Alpers, MD   oxyCODONE-acetaminophen (PERCOCET)  MG per tablet Take 1 tablet by mouth every 8 hours as needed for Pain for up to 30 days.  Intended supply: 30 days 9/16/20 10/16/20 Yes Brandy Alpers, MD   isosorbide mononitrate (IMDUR) 60 MG extended release tablet Take 1 tablet by mouth daily 9/5/20  Yes TAYLA Galeano NP   hydrALAZINE (APRESOLINE) 25 MG tablet Take 1 tablet by mouth every 8 hours 8/18/20  Yes Tramaine Rodriguez MD   metoprolol succinate (TOPROL XL) 50 MG extended release tablet Take 1 tablet by mouth daily 8/18/20  Yes Tramaine Rodriguez MD   Respiratory Therapy Supplies (NEBULIZER) AALIYAH Used to give yourself breathing treatment 8/9/20  Yes Paige Melton MD   ipratropium-albuterol (DUONEB) 0.5-2.5 (3) MG/3ML SOLN nebulizer solution Inhale 3 mLs into the lungs every 4 hours as needed for Shortness of Breath (wheezing coughing) 8/9/20  Yes Paige Melton MD   aspirin 325 MG tablet Take 325 mg by mouth daily   Yes Historical Provider, MD   traZODone (DESYREL) 50 MG tablet TAKE 1 TABLET BY MOUTH EVERY NIGHT AT BEDTIME 7/27/20  Yes Brandy Alpers, MD   albuterol sulfate HFA (VENTOLIN HFA) 108 (90 Base) MCG/ACT inhaler Inhale 2 puffs into the lungs 4 times daily as needed for Wheezing 7/16/20  Yes TAYLA Miner CNP   apixaban (ELIQUIS) 5 MG TABS tablet Take 1 tablet by mouth 2 times daily 7/16/20  Yes TAYLA Friedman CNP   empagliflozin (JARDIANCE) 10 MG tablet Take 1 tablet by mouth daily 7/16/20  Yes TAYLA Miner CNP   atorvastatin (LIPITOR) 80 MG tablet Take 1 tablet by mouth daily 7/16/20  Yes TAYLA Miner CNP   tamsulosin (FLOMAX) 0.4 MG capsule Take 1 capsule by mouth daily 7/16/20  Yes TAYLA Miner CNP   pantoprazole (PROTONIX) 40 MG tablet Take 1 tablet by mouth daily 7/16/20  Yes TAYLA Miner CNP   ranolazine (RANEXA) 500 MG extended release tablet Take 2 tablets by mouth 2 times daily 7/16/20  Yes TAYLA Miner CNP   amiodarone (PACERONE) 100 MG tablet Take 1 tablet by mouth daily 7/17/20  Yes TAYLA Miner CNP   acetaminophen (TYLENOL) 325 MG tablet Take 650 mg by mouth every 6 hours as needed for Pain   Yes Historical Provider, MD       No future appointments. ,   Diabetes Assessment    Medic Alert ID:  No  Meal Planning:  None   How often do you test your blood sugar?:  Meals   Do you have barriers with adherence to non-pharmacologic self-management interventions?  (Nutrition/Exercise/Self-Monitoring):  Yes   Have you ever had to go to the ED for symptoms of low blood sugar?:  No       No patient-reported symptoms      ,   Congestive Heart Failure Assessment    Are you currently restricting fluids?:  No Restriction  Do you understand a low sodium diet?:  Yes  Do you understand how to read food labels?:  Yes  How many restaurant meals do you eat per week?:  1-2  Do you salt your food before tasting it?:  No     No patient-reported symptoms      Symptoms:      Symptom course:  stable     ,   COPD Assessment    Does the patient understand envrionmental exposure?:  Yes  Is the patient able to verbalize Rescue vs. Long Acting medications?:  Yes  Does the patient have a nebulizer?:  No  Does the patient use a space with inhaled medications?:  No     No patient-reported symptoms         Symptoms:      Have you had a recent diagnosis of pneumonia either by PCP or at a hospital?:  Yes at Hospital  Have you seen your PCP for follow up or do you have an appointment scheduled?:  No  Are you taking your antibiotics as prescribed?:  Yes  Symptom Course:  improving      and   General Assessment    Do you have any symptoms that are causing concern?:  Yes  Progression since Onset:  Gradually Improving  Reported Symptoms:  Chest Pain (Comment: recovering from pneumonia)

## 2020-10-13 ENCOUNTER — CARE COORDINATION (OUTPATIENT)
Dept: FAMILY MEDICINE CLINIC | Age: 65
End: 2020-10-13

## 2020-10-13 NOTE — CARE COORDINATION
Call to patient for ED f/u for Chest pain on 10/12/20 and to further assess Care Coordination needs. HIPPA compliant voice mail left with ACM contact information and request for return call.     Kevin Morris RN, MSN  Ambulatory Care Manager  [phone]

## 2020-10-16 PROBLEM — M54.9 BACK PAIN: Status: RESOLVED | Noted: 2019-10-21 | Resolved: 2020-10-16

## 2020-10-19 ENCOUNTER — HOSPITAL ENCOUNTER (OUTPATIENT)
Age: 65
Setting detail: OBSERVATION
Discharge: HOME | End: 2020-10-21
Attending: EMERGENCY MEDICINE | Admitting: STUDENT IN AN ORGANIZED HEALTH CARE EDUCATION/TRAINING PROGRAM
Payer: MEDICARE

## 2020-10-19 ENCOUNTER — CARE COORDINATION (OUTPATIENT)
Dept: FAMILY MEDICINE CLINIC | Age: 65
End: 2020-10-19

## 2020-10-19 ENCOUNTER — APPOINTMENT (OUTPATIENT)
Dept: GENERAL RADIOLOGY | Age: 65
End: 2020-10-19
Payer: MEDICARE

## 2020-10-19 PROBLEM — R07.9 CHEST PAIN: Status: ACTIVE | Noted: 2020-10-19

## 2020-10-19 LAB
A/G RATIO: 1.5 (ref 1.1–2.2)
ALBUMIN SERPL-MCNC: 4.2 G/DL (ref 3.4–5)
ALP BLD-CCNC: 59 U/L (ref 40–129)
ALT SERPL-CCNC: <5 U/L (ref 10–40)
ANION GAP SERPL CALCULATED.3IONS-SCNC: 16 MMOL/L (ref 3–16)
AST SERPL-CCNC: 9 U/L (ref 15–37)
BASOPHILS ABSOLUTE: 0.1 K/UL (ref 0–0.2)
BASOPHILS RELATIVE PERCENT: 1.3 %
BILIRUB SERPL-MCNC: 0.3 MG/DL (ref 0–1)
BUN BLDV-MCNC: 11 MG/DL (ref 7–20)
CALCIUM SERPL-MCNC: 9.2 MG/DL (ref 8.3–10.6)
CHLORIDE BLD-SCNC: 100 MMOL/L (ref 99–110)
CO2: 20 MMOL/L (ref 21–32)
CREAT SERPL-MCNC: 1 MG/DL (ref 0.8–1.3)
EOSINOPHILS ABSOLUTE: 0.2 K/UL (ref 0–0.6)
EOSINOPHILS RELATIVE PERCENT: 3.6 %
GFR AFRICAN AMERICAN: >60
GFR NON-AFRICAN AMERICAN: >60
GLOBULIN: 2.8 G/DL
GLUCOSE BLD-MCNC: 102 MG/DL (ref 70–99)
GLUCOSE BLD-MCNC: 146 MG/DL (ref 70–99)
HCT VFR BLD CALC: 39.6 % (ref 40.5–52.5)
HEMOGLOBIN: 13.6 G/DL (ref 13.5–17.5)
LIPASE: 9 U/L (ref 13–60)
LYMPHOCYTES ABSOLUTE: 1.4 K/UL (ref 1–5.1)
LYMPHOCYTES RELATIVE PERCENT: 29.2 %
MCH RBC QN AUTO: 35.2 PG (ref 26–34)
MCHC RBC AUTO-ENTMCNC: 34.2 G/DL (ref 31–36)
MCV RBC AUTO: 102.9 FL (ref 80–100)
MONOCYTES ABSOLUTE: 0.2 K/UL (ref 0–1.3)
MONOCYTES RELATIVE PERCENT: 5 %
NEUTROPHILS ABSOLUTE: 2.9 K/UL (ref 1.7–7.7)
NEUTROPHILS RELATIVE PERCENT: 60.9 %
PDW BLD-RTO: 15.4 % (ref 12.4–15.4)
PERFORMED ON: ABNORMAL
PLATELET # BLD: 258 K/UL (ref 135–450)
PMV BLD AUTO: 7.9 FL (ref 5–10.5)
POTASSIUM REFLEX MAGNESIUM: 4.2 MMOL/L (ref 3.5–5.1)
PRO-BNP: 1230 PG/ML (ref 0–124)
RBC # BLD: 3.85 M/UL (ref 4.2–5.9)
SODIUM BLD-SCNC: 136 MMOL/L (ref 136–145)
TOTAL PROTEIN: 7 G/DL (ref 6.4–8.2)
TROPONIN: 0.01 NG/ML
TROPONIN: <0.01 NG/ML
WBC # BLD: 4.8 K/UL (ref 4–11)

## 2020-10-19 PROCEDURE — 93005 ELECTROCARDIOGRAM TRACING: CPT | Performed by: EMERGENCY MEDICINE

## 2020-10-19 PROCEDURE — 85025 COMPLETE CBC W/AUTO DIFF WBC: CPT

## 2020-10-19 PROCEDURE — 6370000000 HC RX 637 (ALT 250 FOR IP): Performed by: EMERGENCY MEDICINE

## 2020-10-19 PROCEDURE — G0378 HOSPITAL OBSERVATION PER HR: HCPCS

## 2020-10-19 PROCEDURE — 36415 COLL VENOUS BLD VENIPUNCTURE: CPT

## 2020-10-19 PROCEDURE — 83690 ASSAY OF LIPASE: CPT

## 2020-10-19 PROCEDURE — 6360000002 HC RX W HCPCS: Performed by: EMERGENCY MEDICINE

## 2020-10-19 PROCEDURE — 96374 THER/PROPH/DIAG INJ IV PUSH: CPT

## 2020-10-19 PROCEDURE — 84484 ASSAY OF TROPONIN QUANT: CPT

## 2020-10-19 PROCEDURE — 80053 COMPREHEN METABOLIC PANEL: CPT

## 2020-10-19 PROCEDURE — 99285 EMERGENCY DEPT VISIT HI MDM: CPT

## 2020-10-19 PROCEDURE — 83880 ASSAY OF NATRIURETIC PEPTIDE: CPT

## 2020-10-19 PROCEDURE — 71045 X-RAY EXAM CHEST 1 VIEW: CPT

## 2020-10-19 RX ORDER — ASPIRIN 81 MG/1
81 TABLET, CHEWABLE ORAL DAILY
Status: DISCONTINUED | OUTPATIENT
Start: 2020-10-20 | End: 2020-10-21 | Stop reason: HOSPADM

## 2020-10-19 RX ORDER — CLOPIDOGREL BISULFATE 75 MG/1
75 TABLET ORAL DAILY
COMMUNITY
End: 2022-01-28

## 2020-10-19 RX ORDER — SODIUM CHLORIDE 0.9 % (FLUSH) 0.9 %
10 SYRINGE (ML) INJECTION EVERY 12 HOURS SCHEDULED
Status: DISCONTINUED | OUTPATIENT
Start: 2020-10-19 | End: 2020-10-21 | Stop reason: HOSPADM

## 2020-10-19 RX ORDER — NITROGLYCERIN 0.4 MG/1
0.4 TABLET SUBLINGUAL EVERY 5 MIN PRN
Status: COMPLETED | OUTPATIENT
Start: 2020-10-19 | End: 2020-10-20

## 2020-10-19 RX ORDER — ACETAMINOPHEN 325 MG/1
650 TABLET ORAL EVERY 6 HOURS PRN
Status: DISCONTINUED | OUTPATIENT
Start: 2020-10-19 | End: 2020-10-21 | Stop reason: HOSPADM

## 2020-10-19 RX ORDER — ASPIRIN 81 MG/1
324 TABLET, CHEWABLE ORAL ONCE
Status: DISCONTINUED | OUTPATIENT
Start: 2020-10-19 | End: 2020-10-21 | Stop reason: HOSPADM

## 2020-10-19 RX ORDER — SPIRONOLACTONE 25 MG/1
25 TABLET ORAL DAILY
Status: DISCONTINUED | OUTPATIENT
Start: 2020-10-20 | End: 2020-10-20

## 2020-10-19 RX ORDER — RANOLAZINE 500 MG/1
1000 TABLET, EXTENDED RELEASE ORAL 2 TIMES DAILY
Status: DISCONTINUED | OUTPATIENT
Start: 2020-10-20 | End: 2020-10-21 | Stop reason: HOSPADM

## 2020-10-19 RX ORDER — DEXTROSE MONOHYDRATE 25 G/50ML
12.5 INJECTION, SOLUTION INTRAVENOUS PRN
Status: DISCONTINUED | OUTPATIENT
Start: 2020-10-19 | End: 2020-10-21 | Stop reason: HOSPADM

## 2020-10-19 RX ORDER — ACETAMINOPHEN 650 MG/1
650 SUPPOSITORY RECTAL EVERY 6 HOURS PRN
Status: DISCONTINUED | OUTPATIENT
Start: 2020-10-19 | End: 2020-10-21 | Stop reason: HOSPADM

## 2020-10-19 RX ORDER — ZOLPIDEM TARTRATE 5 MG/1
5 TABLET ORAL NIGHTLY PRN
Status: DISCONTINUED | OUTPATIENT
Start: 2020-10-19 | End: 2020-10-21 | Stop reason: HOSPADM

## 2020-10-19 RX ORDER — OXYCODONE AND ACETAMINOPHEN 10; 325 MG/1; MG/1
1 TABLET ORAL ONCE
Status: COMPLETED | OUTPATIENT
Start: 2020-10-19 | End: 2020-10-19

## 2020-10-19 RX ORDER — ALBUTEROL SULFATE 90 UG/1
2 AEROSOL, METERED RESPIRATORY (INHALATION) 4 TIMES DAILY PRN
Status: DISCONTINUED | OUTPATIENT
Start: 2020-10-19 | End: 2020-10-19 | Stop reason: CLARIF

## 2020-10-19 RX ORDER — CARVEDILOL 12.5 MG/1
12.5 TABLET ORAL 2 TIMES DAILY WITH MEALS
Status: DISCONTINUED | OUTPATIENT
Start: 2020-10-20 | End: 2020-10-21 | Stop reason: HOSPADM

## 2020-10-19 RX ORDER — DEXTROSE MONOHYDRATE 50 MG/ML
100 INJECTION, SOLUTION INTRAVENOUS PRN
Status: DISCONTINUED | OUTPATIENT
Start: 2020-10-19 | End: 2020-10-21 | Stop reason: HOSPADM

## 2020-10-19 RX ORDER — OXYCODONE AND ACETAMINOPHEN 10; 325 MG/1; MG/1
1 TABLET ORAL EVERY 8 HOURS PRN
Status: DISCONTINUED | OUTPATIENT
Start: 2020-10-20 | End: 2020-10-21 | Stop reason: HOSPADM

## 2020-10-19 RX ORDER — ISOSORBIDE DINITRATE 10 MG/1
40 TABLET ORAL 3 TIMES DAILY
Status: DISCONTINUED | OUTPATIENT
Start: 2020-10-19 | End: 2020-10-19

## 2020-10-19 RX ORDER — SODIUM CHLORIDE 0.9 % (FLUSH) 0.9 %
10 SYRINGE (ML) INJECTION PRN
Status: DISCONTINUED | OUTPATIENT
Start: 2020-10-19 | End: 2020-10-21 | Stop reason: HOSPADM

## 2020-10-19 RX ORDER — FENTANYL CITRATE 50 UG/ML
50 INJECTION, SOLUTION INTRAMUSCULAR; INTRAVENOUS ONCE
Status: COMPLETED | OUTPATIENT
Start: 2020-10-19 | End: 2020-10-19

## 2020-10-19 RX ORDER — CARVEDILOL 6.25 MG/1
12.5 TABLET ORAL 2 TIMES DAILY WITH MEALS
Status: DISCONTINUED | OUTPATIENT
Start: 2020-10-19 | End: 2020-10-19 | Stop reason: SDUPTHER

## 2020-10-19 RX ORDER — PROMETHAZINE HYDROCHLORIDE 25 MG/1
12.5 TABLET ORAL EVERY 6 HOURS PRN
Status: DISCONTINUED | OUTPATIENT
Start: 2020-10-19 | End: 2020-10-21 | Stop reason: HOSPADM

## 2020-10-19 RX ORDER — NICOTINE POLACRILEX 4 MG
15 LOZENGE BUCCAL PRN
Status: DISCONTINUED | OUTPATIENT
Start: 2020-10-19 | End: 2020-10-21 | Stop reason: HOSPADM

## 2020-10-19 RX ORDER — ALBUTEROL SULFATE 2.5 MG/3ML
2.5 SOLUTION RESPIRATORY (INHALATION) EVERY 6 HOURS PRN
Status: DISCONTINUED | OUTPATIENT
Start: 2020-10-19 | End: 2020-10-21 | Stop reason: HOSPADM

## 2020-10-19 RX ORDER — PANTOPRAZOLE SODIUM 40 MG/1
40 TABLET, DELAYED RELEASE ORAL DAILY
Status: DISCONTINUED | OUTPATIENT
Start: 2020-10-20 | End: 2020-10-19

## 2020-10-19 RX ORDER — POLYETHYLENE GLYCOL 3350 17 G/17G
17 POWDER, FOR SOLUTION ORAL DAILY PRN
Status: DISCONTINUED | OUTPATIENT
Start: 2020-10-19 | End: 2020-10-21 | Stop reason: HOSPADM

## 2020-10-19 RX ORDER — ONDANSETRON 2 MG/ML
4 INJECTION INTRAMUSCULAR; INTRAVENOUS EVERY 6 HOURS PRN
Status: DISCONTINUED | OUTPATIENT
Start: 2020-10-19 | End: 2020-10-21 | Stop reason: HOSPADM

## 2020-10-19 RX ORDER — ROSUVASTATIN CALCIUM 40 MG/1
40 TABLET, COATED ORAL NIGHTLY
Status: DISCONTINUED | OUTPATIENT
Start: 2020-10-20 | End: 2020-10-21 | Stop reason: HOSPADM

## 2020-10-19 RX ORDER — ISOSORBIDE MONONITRATE 60 MG/1
60 TABLET, EXTENDED RELEASE ORAL DAILY
Status: ON HOLD | COMMUNITY
End: 2021-03-06 | Stop reason: HOSPADM

## 2020-10-19 RX ORDER — TAMSULOSIN HYDROCHLORIDE 0.4 MG/1
0.4 CAPSULE ORAL DAILY
Status: DISCONTINUED | OUTPATIENT
Start: 2020-10-20 | End: 2020-10-21 | Stop reason: HOSPADM

## 2020-10-19 RX ADMIN — OXYCODONE HYDROCHLORIDE AND ACETAMINOPHEN 1 TABLET: 10; 325 TABLET ORAL at 20:41

## 2020-10-19 RX ADMIN — CARVEDILOL 12.5 MG: 6.25 TABLET, FILM COATED ORAL at 21:23

## 2020-10-19 RX ADMIN — NITROGLYCERIN 0.4 MG: 0.4 TABLET, ORALLY DISINTEGRATING SUBLINGUAL at 16:33

## 2020-10-19 RX ADMIN — FENTANYL CITRATE 50 MCG: 50 INJECTION, SOLUTION INTRAMUSCULAR; INTRAVENOUS at 18:02

## 2020-10-19 ASSESSMENT — PAIN DESCRIPTION - ORIENTATION
ORIENTATION: MID

## 2020-10-19 ASSESSMENT — PAIN SCALES - GENERAL
PAINLEVEL_OUTOF10: 4
PAINLEVEL_OUTOF10: 8
PAINLEVEL_OUTOF10: 6
PAINLEVEL_OUTOF10: 8
PAINLEVEL_OUTOF10: 10
PAINLEVEL_OUTOF10: 5

## 2020-10-19 ASSESSMENT — PAIN DESCRIPTION - DESCRIPTORS
DESCRIPTORS: SORE
DESCRIPTORS: SHARP
DESCRIPTORS: SORE

## 2020-10-19 ASSESSMENT — PAIN DESCRIPTION - LOCATION
LOCATION: CHEST;BACK
LOCATION: CHEST
LOCATION: CHEST

## 2020-10-19 ASSESSMENT — PAIN - FUNCTIONAL ASSESSMENT: PAIN_FUNCTIONAL_ASSESSMENT: ACTIVITIES ARE NOT PREVENTED

## 2020-10-19 ASSESSMENT — PAIN DESCRIPTION - PAIN TYPE
TYPE: ACUTE PAIN
TYPE: ACUTE PAIN;CHRONIC PAIN
TYPE: ACUTE PAIN;CHRONIC PAIN

## 2020-10-19 ASSESSMENT — PAIN DESCRIPTION - FREQUENCY
FREQUENCY: INTERMITTENT

## 2020-10-19 NOTE — ED NOTES
Patient called out requesting daily percocet for his chronic back pain.       Perla Ignacio RN  10/19/20 1945

## 2020-10-19 NOTE — ED NOTES
Patient still with chest pain. Patient states \"nitro not working. \" patient states, \" I already had 3 nitro at home with no relief. \" provider notified      Yosef Edwards RN  10/19/20 [address]  10/19/20 0244

## 2020-10-19 NOTE — ED PROVIDER NOTES
[address] [facility]      Pt Name: Gaby Horn  MRN: [id number]  [place] 1955  Date of evaluation: 10/19/2020  [facility]  Chief Complaint   Patient presents with    Chest Pain     patient brought by EMS from home with complaints of Chest pain. patient with hx of stents placed one month ago. patient took 3 nitro at home. EMS gave 4 -81mg ASA enroute. patient with c/o 8/10 pain at this time. I wore personal protective equipment when I was in the room the entire time. This includes gloves, N95 mask, face shield, and a glove over my stethoscope for protection. HPI  Gaby Horn is a 59 y.o. male who presents with chest pain that started 2 hours ago. He states that it is sharp and pressure sensation on the left side of his chest.  He has a history of COPD. He has been coughing but it is not changed. He does admit to shortness of breath which is unusual for him. Denies any fevers or chills. Does not wear oxygen at home. He states he has had similar pain with myocardial infarction. He denies any nausea or vomiting. Her 20 takes a deep breath or coughs. He has cut back to 1/2 pack of cigarettes per day. He is down from 2 packs/day. He has a cardiologist at [facility].  His last myocardial infarction was 2 years ago. ? REVIEW OF SYSTEMS  All systems negative except as noted in the HPI. Reviewed Nurses' notes and concur. No LMP for male patient.     PAST MEDICAL HISTORY  Past Medical History:   Diagnosis Date    Anxiety     Arthritis     Asthma     CAD (coronary artery disease)     Calcium kidney stone     Cardiomyopathy ([address]     Cerebral artery [facility] with [facility]     CHF (congestive heart failure) ([facility] [facility]     Clostridium difficile diarrhea 02/12/2020    COPD (chronic obstructive pulmonary disease) ([facility])     mild    Depression  DM2 (diabetes mellitus, type 2) (HCC)     Fibromyalgia     GERD (gastroesophageal reflux disease)     Hyperlipidemia     Hypertension     Liver disease     Pacemaker 2012    Medtronic model # [id number]    Pneumonia     Seizures [facility]     TIA (transient ischemic attack) 2007    Ulcerative colitis ([facility]        FAMILY HISTORY  Family History   Problem Relation Age of Onset    Coronary Art Dis Father     Cancer Mother         Lung with mets    Diabetes Mother        SOCIAL HISTORY   reports that he has been smoking cigarettes. He has a 44.00 pack-year smoking history. He has never used smokeless tobacco. He reports that he does not drink alcohol or use drugs. SURGICAL HISTORY  Past Surgical History:   Procedure Laterality Date    APPENDECTOMY      BACK SURGERY      CARDIAC SURGERY      CHOLECYSTECTOMY      COLONOSCOPY  01/10/2017    COLONOSCOPY  01/10/2017    CORONARY ANGIOPLASTY WITH STENT PLACEMENT  2012    CORONARY ARTERY BYPASS GRAFT  2010, 11/2015    ENDOSCOPY, COLON, DIAGNOSTIC      PACEMAKER PLACEMENT      UPPER GASTROINTESTINAL ENDOSCOPY  02/07/2017    VASCULAR SURGERY         CURRENT MEDICATIONS  Current Outpatient Rx   Medication Sig Dispense Refill    carvedilol (COREG) 12.5 MG tablet Take 12.5 mg by mouth 2 times daily (with meals)      isosorbide dinitrate (ISORDIL) 20 MG tablet Take 40 mg by mouth 3 times daily      rosuvastatin (CRESTOR) 40 MG tablet Take 40 mg by mouth every evening      sacubitril-valsartan (ENTRESTO) 49-51 MG per tablet Take 1 tablet by mouth 2 times daily      ticagrelor (BRILINTA) 90 MG TABS tablet Take 90 mg by mouth 2 times daily      spironolactone (ALDACTONE) 25 MG tablet Take 25 mg by mouth daily      oxyCODONE-acetaminophen (PERCOCET)  MG per tablet Take 1 tablet by mouth every 8 hours as needed for Pain for up to 30 days.  Intended supply: 30 days 90 tablet 0    gabapentin (NEURONTIN) 600 MG tablet 1 tab PO 5 times a day 150 tablet 1    zolpidem (AMBIEN) 5 MG tablet Take 1 tablet by mouth nightly as needed for Sleep for up to 60 days.  30 tablet 1    Respiratory Therapy Supplies (NEBULIZER) AALIYAH Used to give yourself breathing treatment 1 Device 0    ipratropium-albuterol (DUONEB) 0.5-2.5 (3) MG/3ML SOLN nebulizer solution Inhale 3 mLs into the lungs every 4 hours as needed for Shortness of Breath (wheezing coughing) 360 mL 5    aspirin 325 MG tablet Take 325 mg by mouth daily      traZODone (DESYREL) 50 MG tablet TAKE 1 TABLET BY MOUTH EVERY NIGHT AT BEDTIME 30 tablet 2    albuterol sulfate HFA (VENTOLIN HFA) 108 (90 Base) MCG/ACT inhaler Inhale 2 puffs into the lungs 4 times daily as needed for Wheezing 1 Inhaler 1    apixaban (ELIQUIS) 5 MG TABS tablet Take 1 tablet by mouth 2 times daily 60 tablet 1    empagliflozin (JARDIANCE) 10 MG tablet Take 1 tablet by mouth daily 30 tablet 0    tamsulosin (FLOMAX) 0.4 MG capsule Take 1 capsule by mouth daily 30 capsule 0    pantoprazole (PROTONIX) 40 MG tablet Take 1 tablet by mouth daily 30 tablet 0    ranolazine (RANEXA) 500 MG extended release tablet Take 2 tablets by mouth 2 times daily 60 tablet 0    acetaminophen (TYLENOL) 325 MG tablet Take 650 mg by mouth every 6 hours as needed for Pain         ALLERGIES  Allergies   Allergen Reactions    Dopamine Hcl Other (See Comments) and Anxiety     Compulsive gambling    Tramadol Other (See Comments)     Dizziness     Melatonin Other (See Comments)     Restless leg syndrome           PHYSICAL EXAM  VITAL SIGNS: BP (!) 170/76   Pulse 68   Temp 97.8 °F (36.6 °C) (Oral)   Resp 15   Wt 170 lb 3.2 oz (77.2 kg)   SpO2 98%   BMI 23.08 kg/m²   Constitutional: Well-developed, well-nourished, appears normal, nontoxic, activity: Resting comfortably in the cart, no obvious pain, when he takes a deep breath he holds the left side of his chest.  HENT: Normocephalic, Atraumatic, Bilateral ears are normal, Oropharynx moist, No oral exudates, Nose normal.  Eyes: PERRLA, EOMI, Conjunctiva normal, No discharge. No scleral icterus. Neck: Normal range of motion, No tenderness, Supple,  Lymphatic: No lymphadenopathy noted. Cardiovascular: normal heart rate, normal rhythm, no murmurs, no clicks, no rubs, no gallops  Thorax & Lungs: Mildly decreased breath sounds, no respiratory distress, no wheezing, no rales, no rhonchi  Abdomen: Soft, no tender with no distension, no masses, no pulsatile masses, no hepatosplenomegaly, normal bowel sounds  Skin: Warm, Dry, No erythema, No rash. Back: No tenderness, Full range of motion, No scoliosis. Extremities: No edema, No tenderness, No cyanosis, No clubbing. No amputations, capillary refill less than 2 seconds. Musculoskeletal: Good range of motion in all major joints, No tenderness to palpation or major deformities noted.   Neurologic: Alert & oriented x 3  Psychiatric: Affect normal, Mood normal.      LABORATORY  Labs Reviewed   CBC WITH AUTO DIFFERENTIAL - Abnormal; Notable for the following components:       Result Value    RBC 3.85 (*)     Hematocrit 39.6 (*)     .9 (*)     MCH 35.2 (*)     All other components within normal limits    Narrative:     Performed at:  [facility]  [address]   Phone [phone]   COMPREHENSIVE METABOLIC PANEL W/ REFLEX TO MG FOR LOW K - Abnormal; Notable for the following components:    CO2 20 (*)     Glucose 102 (*)     ALT <5 (*)     AST 9 (*)     All other components within normal limits    Narrative:     Performed at:  [facility]  [address]   Phone [phone]   LIPASE - Abnormal; Notable for the following components:    Lipase 9.0 (*)     All other components within normal limits    Narrative:     Performed at:  [facility]  [address]   Phone ([phone])          Medications   nitroGLYCERIN (NITROSTAT) SL tablet 0.4 mg (0.4 mg Sublingual Given 10/19/20 1633)   aspirin chewable tablet 324 mg (324 mg Oral Not Given 10/19/20 1553)   oxyCODONE-acetaminophen (PERCOCET)  MG per tablet 1 tablet (has no administration in time range)   fentaNYL (SUBLIMAZE) injection 50 mcg (50 mcg Intravenous Given 10/19/20 1802)       New Prescriptions    No medications on file       SEP-1 CORE MEASURE DATA  Exclusion criteria: the patient is NOT to be included for sepsis due to:  SIRS criteria are not met     Patient remained stable in the ED. he improved with fentanyl. He did not get better with nitroglycerin. However, he states that his chest pain is similar to his myocardial infarction. His EKG did not show any acute changes. His BNP is elevated as usual.  His troponin went from less than 0.01 to equal to 0.01. Patient is still having pain at this time and he is requesting pain medicine on multiple occasions. He did not get better with nitroglycerin. Therefore, patient was admitted to the hospital for ongoing chest pain. Hospitalist was notified at 2020. The patient's blood pressure was found to be elevated according to CMS/Medicare and the Affordable Care Act/ObamaCare criteria. Elevated blood pressure could occur because of pain or anxiety or other reasons and does not mean that they need to have their blood pressure treated or medications otherwise adjusted. However, this could also be a sign that they will need to have their blood pressure treated or medications changed. The patient was instructed to follow up closely with their personal physician to have their blood pressure rechecked. The patient was instructed to take a list of recent blood pressure readings to their next visit with their personal physician. See discharge instructions for specific medications, discharge information, and treatments.  They were verbally instructed to return to emergency if any problems. (This chart has been completed using [address]. Although attempts have been made to ensure accuracy, words and/or phrases may not be transcribed as intended.)    Patient requested pain medicines at the time of their exam.    IMPRESSION(S):  1. Acute chest pain    2. History of myocardial infarction        ? Recheck Times: 1850, 2015  Critical Care Time: 35 minutes not including billable procedures    Diagnostic considerations include but are not limited to:  myocardial infarction, pulmonary embolus, pneumothorax, pneumonia, aortic dissection, empyema, musculoskeletal chest pain, pulmonary contusion, pericardial effusion, pericarditis, and referred abdominal pain.          Reche Boast, DO  10/19/20 2022

## 2020-10-19 NOTE — ED NOTES
Bed: [facility]  Expected date:   Expected time:   Means of arrival: [facility]  Comments:  64M Chest pain     Anamika Machado RN  10/19/20 8239

## 2020-10-20 LAB
BASOPHILS ABSOLUTE: 0 K/UL (ref 0–0.2)
BASOPHILS RELATIVE PERCENT: 0.2 %
CHOLESTEROL, TOTAL: 136 MG/DL (ref 0–199)
EKG ATRIAL RATE: 91 BPM
EKG ATRIAL RATE: 94 BPM
EKG DIAGNOSIS: NORMAL
EKG DIAGNOSIS: NORMAL
EKG P AXIS: 70 DEGREES
EKG P AXIS: 72 DEGREES
EKG P-R INTERVAL: 126 MS
EKG P-R INTERVAL: 136 MS
EKG Q-T INTERVAL: 418 MS
EKG Q-T INTERVAL: 422 MS
EKG QRS DURATION: 134 MS
EKG QRS DURATION: 146 MS
EKG QTC CALCULATION (BAZETT): 519 MS
EKG QTC CALCULATION (BAZETT): 522 MS
EKG R AXIS: 65 DEGREES
EKG R AXIS: 81 DEGREES
EKG T AXIS: 41 DEGREES
EKG T AXIS: 70 DEGREES
EKG VENTRICULAR RATE: 91 BPM
EKG VENTRICULAR RATE: 94 BPM
EOSINOPHILS ABSOLUTE: 0.2 K/UL (ref 0–0.6)
EOSINOPHILS RELATIVE PERCENT: 4.6 %
GLUCOSE BLD-MCNC: 110 MG/DL (ref 70–99)
GLUCOSE BLD-MCNC: 115 MG/DL (ref 70–99)
GLUCOSE BLD-MCNC: 130 MG/DL (ref 70–99)
GLUCOSE BLD-MCNC: 156 MG/DL (ref 70–99)
HCT VFR BLD CALC: 39.8 % (ref 40.5–52.5)
HDLC SERPL-MCNC: 41 MG/DL (ref 40–60)
HEMOGLOBIN: 13.3 G/DL (ref 13.5–17.5)
LDL CHOLESTEROL CALCULATED: 63 MG/DL
LYMPHOCYTES ABSOLUTE: 2.2 K/UL (ref 1–5.1)
LYMPHOCYTES RELATIVE PERCENT: 46.4 %
MCH RBC QN AUTO: 34.3 PG (ref 26–34)
MCHC RBC AUTO-ENTMCNC: 33.4 G/DL (ref 31–36)
MCV RBC AUTO: 102.7 FL (ref 80–100)
MONOCYTES ABSOLUTE: 0.4 K/UL (ref 0–1.3)
MONOCYTES RELATIVE PERCENT: 8 %
NEUTROPHILS ABSOLUTE: 1.9 K/UL (ref 1.7–7.7)
NEUTROPHILS RELATIVE PERCENT: 40.8 %
PDW BLD-RTO: 15 % (ref 12.4–15.4)
PERFORMED ON: ABNORMAL
PLATELET # BLD: 244 K/UL (ref 135–450)
PMV BLD AUTO: 8.4 FL (ref 5–10.5)
RBC # BLD: 3.87 M/UL (ref 4.2–5.9)
TRIGL SERPL-MCNC: 158 MG/DL (ref 0–150)
TROPONIN: <0.01 NG/ML
TROPONIN: <0.01 NG/ML
VLDLC SERPL CALC-MCNC: 32 MG/DL
WBC # BLD: 4.7 K/UL (ref 4–11)

## 2020-10-20 PROCEDURE — 96375 TX/PRO/DX INJ NEW DRUG ADDON: CPT

## 2020-10-20 PROCEDURE — 80061 LIPID PANEL: CPT

## 2020-10-20 PROCEDURE — 93010 ELECTROCARDIOGRAM REPORT: CPT | Performed by: INTERNAL MEDICINE

## 2020-10-20 PROCEDURE — 84484 ASSAY OF TROPONIN QUANT: CPT

## 2020-10-20 PROCEDURE — G0378 HOSPITAL OBSERVATION PER HR: HCPCS

## 2020-10-20 PROCEDURE — 6370000000 HC RX 637 (ALT 250 FOR IP): Performed by: NURSE PRACTITIONER

## 2020-10-20 PROCEDURE — 6370000000 HC RX 637 (ALT 250 FOR IP): Performed by: INTERNAL MEDICINE

## 2020-10-20 PROCEDURE — 6370000000 HC RX 637 (ALT 250 FOR IP): Performed by: EMERGENCY MEDICINE

## 2020-10-20 PROCEDURE — 99215 OFFICE O/P EST HI 40 MIN: CPT | Performed by: INTERNAL MEDICINE

## 2020-10-20 PROCEDURE — 2580000003 HC RX 258: Performed by: NURSE PRACTITIONER

## 2020-10-20 PROCEDURE — 36415 COLL VENOUS BLD VENIPUNCTURE: CPT

## 2020-10-20 PROCEDURE — 85025 COMPLETE CBC W/AUTO DIFF WBC: CPT

## 2020-10-20 PROCEDURE — 6360000002 HC RX W HCPCS: Performed by: INTERNAL MEDICINE

## 2020-10-20 PROCEDURE — 93005 ELECTROCARDIOGRAM TRACING: CPT | Performed by: NURSE PRACTITIONER

## 2020-10-20 RX ORDER — DEXTROSE MONOHYDRATE 25 G/50ML
12.5 INJECTION, SOLUTION INTRAVENOUS PRN
Status: DISCONTINUED | OUTPATIENT
Start: 2020-10-20 | End: 2020-10-20 | Stop reason: SDUPTHER

## 2020-10-20 RX ORDER — LIDOCAINE 4 G/G
1 PATCH TOPICAL DAILY
Status: DISCONTINUED | OUTPATIENT
Start: 2020-10-20 | End: 2020-10-21 | Stop reason: HOSPADM

## 2020-10-20 RX ORDER — OXYCODONE HYDROCHLORIDE 10 MG/1
10 TABLET ORAL ONCE
Status: COMPLETED | OUTPATIENT
Start: 2020-10-20 | End: 2020-10-20

## 2020-10-20 RX ORDER — GABAPENTIN 300 MG/1
600 CAPSULE ORAL
Status: DISCONTINUED | OUTPATIENT
Start: 2020-10-20 | End: 2020-10-21 | Stop reason: HOSPADM

## 2020-10-20 RX ORDER — GABAPENTIN 300 MG/1
600 CAPSULE ORAL 3 TIMES DAILY
Status: DISCONTINUED | OUTPATIENT
Start: 2020-10-20 | End: 2020-10-20

## 2020-10-20 RX ORDER — DEXTROSE MONOHYDRATE 50 MG/ML
100 INJECTION, SOLUTION INTRAVENOUS PRN
Status: DISCONTINUED | OUTPATIENT
Start: 2020-10-20 | End: 2020-10-20 | Stop reason: SDUPTHER

## 2020-10-20 RX ORDER — MORPHINE SULFATE 2 MG/ML
2 INJECTION, SOLUTION INTRAMUSCULAR; INTRAVENOUS EVERY 4 HOURS PRN
Status: DISCONTINUED | OUTPATIENT
Start: 2020-10-20 | End: 2020-10-21 | Stop reason: HOSPADM

## 2020-10-20 RX ORDER — NICOTINE POLACRILEX 4 MG
15 LOZENGE BUCCAL PRN
Status: DISCONTINUED | OUTPATIENT
Start: 2020-10-20 | End: 2020-10-20 | Stop reason: SDUPTHER

## 2020-10-20 RX ADMIN — OXYCODONE HYDROCHLORIDE AND ACETAMINOPHEN 1 TABLET: 10; 325 TABLET ORAL at 23:42

## 2020-10-20 RX ADMIN — SODIUM CHLORIDE, PRESERVATIVE FREE 10 ML: 5 INJECTION INTRAVENOUS at 09:25

## 2020-10-20 RX ADMIN — SACUBITRIL AND VALSARTAN 1 TABLET: 49; 51 TABLET, FILM COATED ORAL at 09:23

## 2020-10-20 RX ADMIN — ASPIRIN 81 MG: 81 TABLET, CHEWABLE ORAL at 09:23

## 2020-10-20 RX ADMIN — ZOLPIDEM TARTRATE 5 MG: 5 TABLET ORAL at 22:40

## 2020-10-20 RX ADMIN — NITROGLYCERIN 0.4 MG: 0.4 TABLET, ORALLY DISINTEGRATING SUBLINGUAL at 09:52

## 2020-10-20 RX ADMIN — SACUBITRIL AND VALSARTAN 1 TABLET: 49; 51 TABLET, FILM COATED ORAL at 01:24

## 2020-10-20 RX ADMIN — GABAPENTIN 600 MG: 300 CAPSULE ORAL at 15:34

## 2020-10-20 RX ADMIN — ZOLPIDEM TARTRATE 5 MG: 5 TABLET ORAL at 00:45

## 2020-10-20 RX ADMIN — RANOLAZINE 1000 MG: 500 TABLET, FILM COATED, EXTENDED RELEASE ORAL at 20:28

## 2020-10-20 RX ADMIN — CARVEDILOL 12.5 MG: 12.5 TABLET, FILM COATED ORAL at 18:02

## 2020-10-20 RX ADMIN — GABAPENTIN 600 MG: 300 CAPSULE ORAL at 21:57

## 2020-10-20 RX ADMIN — OXYCODONE HYDROCHLORIDE 10 MG: 10 TABLET ORAL at 00:44

## 2020-10-20 RX ADMIN — ROSUVASTATIN CALCIUM 40 MG: 40 TABLET, FILM COATED ORAL at 01:24

## 2020-10-20 RX ADMIN — OXYCODONE HYDROCHLORIDE AND ACETAMINOPHEN 1 TABLET: 10; 325 TABLET ORAL at 15:34

## 2020-10-20 RX ADMIN — SODIUM CHLORIDE, PRESERVATIVE FREE 10 ML: 5 INJECTION INTRAVENOUS at 00:45

## 2020-10-20 RX ADMIN — GABAPENTIN 600 MG: 300 CAPSULE ORAL at 18:02

## 2020-10-20 RX ADMIN — APIXABAN 5 MG: 5 TABLET, FILM COATED ORAL at 09:24

## 2020-10-20 RX ADMIN — SODIUM CHLORIDE, PRESERVATIVE FREE 10 ML: 5 INJECTION INTRAVENOUS at 20:32

## 2020-10-20 RX ADMIN — MORPHINE SULFATE 2 MG: 2 INJECTION, SOLUTION INTRAMUSCULAR; INTRAVENOUS at 10:09

## 2020-10-20 RX ADMIN — RANOLAZINE 1000 MG: 500 TABLET, FILM COATED, EXTENDED RELEASE ORAL at 09:23

## 2020-10-20 RX ADMIN — NITROGLYCERIN 1 INCH: 20 OINTMENT TOPICAL at 00:44

## 2020-10-20 RX ADMIN — APIXABAN 5 MG: 5 TABLET, FILM COATED ORAL at 20:27

## 2020-10-20 RX ADMIN — ROSUVASTATIN CALCIUM 40 MG: 40 TABLET, FILM COATED ORAL at 20:28

## 2020-10-20 RX ADMIN — NITROGLYCERIN 1 INCH: 20 OINTMENT TOPICAL at 18:02

## 2020-10-20 RX ADMIN — NITROGLYCERIN 1 INCH: 20 OINTMENT TOPICAL at 11:51

## 2020-10-20 RX ADMIN — TAMSULOSIN HYDROCHLORIDE 0.4 MG: 0.4 CAPSULE ORAL at 09:23

## 2020-10-20 RX ADMIN — NITROGLYCERIN 0.4 MG: 0.4 TABLET, ORALLY DISINTEGRATING SUBLINGUAL at 09:32

## 2020-10-20 RX ADMIN — SACUBITRIL AND VALSARTAN 1 TABLET: 49; 51 TABLET, FILM COATED ORAL at 20:28

## 2020-10-20 RX ADMIN — RANOLAZINE 1000 MG: 500 TABLET, FILM COATED, EXTENDED RELEASE ORAL at 00:44

## 2020-10-20 RX ADMIN — NITROGLYCERIN 1 INCH: 20 OINTMENT TOPICAL at 23:42

## 2020-10-20 RX ADMIN — APIXABAN 5 MG: 5 TABLET, FILM COATED ORAL at 00:43

## 2020-10-20 RX ADMIN — NITROGLYCERIN 1 INCH: 20 OINTMENT TOPICAL at 06:59

## 2020-10-20 RX ADMIN — CARVEDILOL 12.5 MG: 12.5 TABLET, FILM COATED ORAL at 09:23

## 2020-10-20 RX ADMIN — OXYCODONE HYDROCHLORIDE AND ACETAMINOPHEN 1 TABLET: 10; 325 TABLET ORAL at 07:07

## 2020-10-20 ASSESSMENT — PAIN DESCRIPTION - ORIENTATION
ORIENTATION: LEFT;MID
ORIENTATION: MID
ORIENTATION: LEFT;MID

## 2020-10-20 ASSESSMENT — PAIN SCALES - GENERAL
PAINLEVEL_OUTOF10: 10
PAINLEVEL_OUTOF10: 10
PAINLEVEL_OUTOF10: 6
PAINLEVEL_OUTOF10: 3
PAINLEVEL_OUTOF10: 0
PAINLEVEL_OUTOF10: 7
PAINLEVEL_OUTOF10: 6
PAINLEVEL_OUTOF10: 6
PAINLEVEL_OUTOF10: 8
PAINLEVEL_OUTOF10: 0
PAINLEVEL_OUTOF10: 5
PAINLEVEL_OUTOF10: 0
PAINLEVEL_OUTOF10: 10
PAINLEVEL_OUTOF10: 7

## 2020-10-20 ASSESSMENT — PAIN - FUNCTIONAL ASSESSMENT
PAIN_FUNCTIONAL_ASSESSMENT: ACTIVITIES ARE NOT PREVENTED

## 2020-10-20 ASSESSMENT — PAIN DESCRIPTION - DESCRIPTORS
DESCRIPTORS: ACHING
DESCRIPTORS: SHARP;ACHING

## 2020-10-20 ASSESSMENT — PAIN DESCRIPTION - LOCATION
LOCATION: BACK;CHEST
LOCATION: CHEST

## 2020-10-20 ASSESSMENT — PAIN DESCRIPTION - PROGRESSION
CLINICAL_PROGRESSION: NOT CHANGED
CLINICAL_PROGRESSION: NOT CHANGED
CLINICAL_PROGRESSION: GRADUALLY IMPROVING
CLINICAL_PROGRESSION: NOT CHANGED

## 2020-10-20 ASSESSMENT — PAIN DESCRIPTION - PAIN TYPE
TYPE: ACUTE PAIN
TYPE: ACUTE PAIN
TYPE: ACUTE PAIN;CHRONIC PAIN
TYPE: ACUTE PAIN
TYPE: ACUTE PAIN

## 2020-10-20 ASSESSMENT — PAIN DESCRIPTION - FREQUENCY
FREQUENCY: CONTINUOUS

## 2020-10-20 ASSESSMENT — PAIN DESCRIPTION - ONSET
ONSET: ON-GOING

## 2020-10-20 NOTE — H&P
Hospital Medicine History & Physical      PCP: Leila Montejo MD    Date of Admission: 10/19/2020    Date of Service: Pt seen/examined on 10/19/2020 and Admitted to Inpatient  Placed in Observation. Chief Complaint: Chest pain      History Of Present Illness: The patient is a 59 y.o. male who presents to [facility] with chest pain that started 2 hours prior to coming to the emergency department via life squad. He was sitting in a chair. This was a nonexertional onset of chest discomfort. No shortness of breath associated with it. Describes it as being sharp nonmigratory and very similar to that when he had a heart 2 years ago. .  And is located on the left side of his chest.    He has a history of COPD. He has been coughing but it is not changed. Reports a hospitalization approximately 4 weeks ago for COPD exacerbation and pneumonia. Was seen by his primary care provider on Friday for a follow-up. At the visit he was doing fine and felt his normal self. Still smokes tobacco, half a pack a day. Not oxygen dependent. Denies any fevers or chills. Denies calf pain tenderness or swelling. Denies weight gain. Denies any change in his current medication regimen. .      Patient received 3 nitroglycerin at home  And EMS administered 81 mg x 4 aspirin in route. ED work-up revealed: Negative for pneumonia, acute distress. His EKG was unchanged from September 2020. Troponin and delta troponin were negative for evidence of acute change or evidence of STEMI. BNP was slightly elevated at 1230. Chest x-ray was negative for vascular congestion or widened mediastinum. On my exam: Patient was resting comfortably. Blood pressure 166/86.   He denied having chest pain at that time but does report he had a soreness in the chest wall at the location where Provider, MD   sacubitril-valsartan (ENTRESTO) 49-51 MG per tablet Take 1 tablet by mouth 2 times daily   Yes Historical Provider, MD   oxyCODONE-acetaminophen (PERCOCET)  MG per tablet Take 1 tablet by mouth every 8 hours as needed for Pain for up to 30 days. Intended supply: 30 days 10/14/20 11/13/20 Yes Benita Morrell MD   gabapentin (NEURONTIN) 600 MG tablet 1 tab PO 5 times a day 9/30/20 11/30/20 Yes Benita Morrell MD   zolpidem (AMBIEN) 5 MG tablet Take 1 tablet by mouth nightly as needed for Sleep for up to 60 days.  9/29/20 11/28/20 Yes Benita Morrell MD   ipratropium-albuterol (DUONEB) 0.5-2.5 (3) MG/3ML SOLN nebulizer solution Inhale 3 mLs into the lungs every 4 hours as needed for Shortness of Breath (wheezing coughing) 8/9/20  Yes Clifton Lee MD   aspirin 325 MG tablet Take 325 mg by mouth daily   Yes Historical Provider, MD   traZODone (DESYREL) 50 MG tablet TAKE 1 TABLET BY MOUTH EVERY NIGHT AT BEDTIME 7/27/20  Yes Benita Morrell MD   albuterol sulfate HFA (VENTOLIN HFA) 108 (90 Base) MCG/ACT inhaler Inhale 2 puffs into the lungs 4 times daily as needed for Wheezing 7/16/20  Yes TAYLA Miner CNP   apixaban (ELIQUIS) 5 MG TABS tablet Take 1 tablet by mouth 2 times daily 7/16/20  Yes TAYLA Miner CNP   empagliflozin (JARDIANCE) 10 MG tablet Take 1 tablet by mouth daily 7/16/20  Yes TAYLA Miner CNP   tamsulosin (FLOMAX) 0.4 MG capsule Take 1 capsule by mouth daily 7/16/20  Yes TAYLA Miner CNP   ranolazine (RANEXA) 500 MG extended release tablet Take 2 tablets by mouth 2 times daily 7/16/20  Yes TAYLA Miner CNP   acetaminophen (TYLENOL) 325 MG tablet Take 650 mg by mouth every 6 hours as needed for Pain   Yes Historical Provider, MD   Respiratory Therapy Supplies (NEBULIZER) AALIYAH Used to give yourself breathing treatment 8/9/20   Clifton Lee MD       Allergies:  Dopamine hcl; Tramadol; and Melatonin    Social History: The patient currently lives independently at home. TOBACCO:   reports that he has been smoking cigarettes. He has a 44.00 pack-year smoking history. He has never used smokeless tobacco.   Continues to smoke half a pack a day  ETOH:   reports no history of alcohol use. Family History:  Reviewed in detail and negative for DM, Early CAD, Cancer, CVA. Positive as follows:        Problem Relation Age of Onset    Coronary Art Dis Father     Cancer Mother         Lung with mets    Diabetes Mother        REVIEW OF SYSTEMS:   Positive for cp and as noted in the HPI. All other systems reviewed and negative. PHYSICAL EXAM:    BP (!) 165/77   Pulse 69   Temp 98.1 °F (36.7 °C) (Oral)   Resp 16   Wt 168 lb 3.4 oz (76.3 kg)   SpO2 94%   BMI 22.81 kg/m²     General appearance: No apparent distress appears stated age and cooperative. HEENT Normal cephalic, atraumatic without obvious deformity. Pupils equal, round, and reactive to light. Extra ocular muscles intact. Conjunctivae/corneas clear. Neck: Supple, No jugular venous distention/bruits. Trachea midline without thyromegaly or adenopathy with full range of motion. Lungs: Fairly clear, coarse scattered rhonchi, clears with cough. No wheezing or rales. Pulse oximetry 98 to 99%. No evidence of conversational dyspnea. Heart: Regular rate and rhythm with Normal S1/S2 without murmurs, rubs or gallops, point of maximum impulse non-displaced  Sternotomy scar noted. Left subclavicular ICD in place. No chest wall swelling or redness. No rash. Tenderness left sternal border 2nd-3rd and fourth intercostal line medial to the ICD device  Abdomen: Soft, non-tender or non-distended without rigidity or guarding and positive bowel sounds all four quadrants. Extremities: No clubbing, cyanosis, or edema bilaterally. Full range of motion without deformity and normal gait intact. Skin: Skin color, texture, turgor normal.  No rashes or lesions.   Neurologic: Alert and oriented X 3, neurovascularly intact with sensory/motor intact upper extremities/lower extremities, bilaterally. Cranial nerves: II-XII intact, grossly non-focal.  Mental status: Alert, oriented, thought content appropriate. Capillary Refill: Acceptable  < 3 seconds  Peripheral Pulses: +3 Easily felt, not easily obliterated with pressure      CXR:  I have reviewed the CXR with the following interpretation: Negative for vascular congestion, widened mediastinum. EKG:  I have reviewed the EKG with the following interpretation: Sinus rhythm, RBBB, rate 94. T wave inversion V1 through V3. EKG unchanged from September 6, 2020. No evidence of STEMI or ischemia. CBC   Recent Labs     10/19/20  1625   WBC 4.8   HGB 13.6   HCT 39.6*         RENAL  Recent Labs     10/19/20  1625      K 4.2      CO2 20*   BUN 11   CREATININE 1.0     LFT'S  Recent Labs     10/19/20  1625   AST 9*   ALT <5*   BILITOT 0.3   ALKPHOS 59     COAG  No results for input(s): INR in the last 72 hours.   CARDIAC ENZYMES  Recent Labs     10/19/20  1625 10/19/20  1834 10/19/20  2340   TROPONINI <0.01 0.01 <0.01       U/A:    Lab Results   Component Value Date    NITRITE neg 05/23/2014    COLORU YELLOW 08/29/2020    WBCUA 1 08/29/2020    RBCUA 1 08/29/2020    CLARITYU Clear 08/29/2020    SPECGRAV >1.030 08/29/2020    LEUKOCYTESUR Negative 08/29/2020    BLOODU Negative 08/29/2020    GLUCOSEU 100 08/29/2020       ABG    Lab Results   Component Value Date    GAP1EPT 25.5 05/21/2018    BEART -0.2 05/21/2018    O0AOSDOQ 98.4 05/21/2018    PHART 7.345 05/21/2018    ANT4GVI 48.0 05/21/2018    PO2ART 114.0 05/21/2018    JQU2DWN 27.0 05/21/2018           Active Hospital Problems    Diagnosis Date Noted    Chest pain [R07.9] 10/19/2020         PHYSICIANS CERTIFICATION:    I certify that Yulissa Shepard is expected to be hospitalized for less than 2 midnights based on the following assessment and plan:      ASSESSMENT/PLAN:    Chest pain: DDx: Evolving MI, unstable angina, MSK  History of CAD and MI, CABG, ICD, Chronic systolic HF  Heart score:4  Serial troponin  EKG in a.m. Continue home antihypertensive medication regimen  Continue aspirin regimen  Nitroglycerin sublingual as needed  Nitroglycerin paste  Continue anticoagulation and antiplatelet therapy: Brilinta  Continue Entresto  Consult cardiology    ECHO: 4/2020  There is mild concentric left ventricular hypertrophy. Left ventricular cavity size is dilated. Ejection fraction is visually estimated to be 25-30%. Abnormal septal motion likely due to pacing   No pericardial effusion noted. LHC: Non-obstructive CAD  1. Left main is patent with patent stent providing blood to the LAD,  circumflex, and diagonal.  2.  Saphenous vein graft to diagonal is patent. 3.  Saphenous vein graft to the right coronary artery PDA is patent. 4.  Right coronary artery is occluded in the midportion. 5.  Distal LAD is heavily diseased. 6.  LIMA was not studied as it is known to be atretic. 7.  LV ejection fraction of 40%. Chronic pain: Continue Percocet 10 mg/325 per home regimen  OARRS report: OD risk score: 530, narcotic score 491  62 prescriptions, 9 prescribers, 5 pharmacies  Prescriptions: Oxycodone 10/325 #90; 30-day supply: Filled October 14 October 6 gabapentin #150, September 30 Ambien. COPD: Home regimen: Stable, no evidence of hypoxia or distress    Type II DM: BG stable: 102  Continue Jardiance  FSBS & hypoglycemia protocol            DVT Prophylaxis: Eliquis  Diet: DIET CARDIAC; No Caffeine  Code Status: Full Code  PT/OT Eval Status: independent    Dispo - admitted, stable, OBS       Denise Greenberg, APRN - CNP    Thank you Lakesha Steinberg MD for the opportunity to be involved in this patient's care. If you have any questions or concerns please feel free to contact me at [phone].

## 2020-10-20 NOTE — PLAN OF CARE
Problem: Pain:  Description: Pain management should include both nonpharmacologic and pharmacologic interventions.   Goal: Pain level will decrease  Description: Pain level will decrease  10/20/2020 1034 by Rojelio Melvin RN  Outcome: Ongoing  10/20/2020 0258 by Guera Mathew RN  Outcome: Ongoing  Goal: Control of acute pain  Description: Control of acute pain  10/20/2020 1034 by Rojelio Melvin RN  Outcome: Ongoing  10/20/2020 0258 by Guera Mathew RN  Outcome: Ongoing  Goal: Control of chronic pain  Description: Control of chronic pain  10/20/2020 1034 by Rojelio Melvin RN  Outcome: Ongoing  10/20/2020 0258 by Guera Mathew RN  Outcome: Ongoing     Problem: Falls - Risk of:  Goal: Will remain free from falls  Description: Will remain free from falls  10/20/2020 1034 by Rojelio Melvin RN  Outcome: Ongoing  10/20/2020 0258 by Guera Mathew RN  Outcome: Ongoing  Goal: Absence of physical injury  Description: Absence of physical injury  10/20/2020 1034 by Rojelio Melvin RN  Outcome: Ongoing  10/20/2020 0258 by Guera Mathew RN  Outcome: Ongoing     Problem: Cardiac:  Goal: Ability to maintain an adequate cardiac output will improve  Description: Ability to maintain an adequate cardiac output will improve  10/20/2020 1034 by Rojelio Melvin RN  Outcome: Ongoing  10/20/2020 0258 by Guera Mathew RN  Outcome: Ongoing  Goal: Complications related to the disease process, condition or treatment will be avoided or minimized  Description: Complications related to the disease process, condition or treatment will be avoided or minimized  10/20/2020 1034 by Rojelio Melvin RN  Outcome: Ongoing  10/20/2020 0258 by Guera Mathew RN  Outcome: Ongoing  Goal: Hemodynamic stability will improve  Description: Hemodynamic stability will improve  10/20/2020 1034 by Rojelio Melvin RN  Outcome: Ongoing  10/20/2020 0258 by Guera Mathew RN  Outcome: Ongoing  Goal: Risk factors for ineffective tissue perfusion will decrease  Description: Risk factors

## 2020-10-20 NOTE — CONSULTS
[address] [facility]                                  CONSULTATION    PATIENT NAME: Rudy Baker                    :        1955  MED REC NO:   [id number]                          ROOM:       4116  ACCOUNT NO:   [de-identified]                           ADMIT DATE: 10/19/2020  PROVIDER:     Damon Newby MD    CARDIOLOGY CONSULTATION    CONSULT DATE:  10/20/2020    HISTORY OF PRESENT ILLNESS:  This is a 72-year-old male who is very well  known to me with numerous admissions in the hospitals with a known  history of coronary artery disease status post CABG, multiple stents,  cardiomyopathy, diabetes, hypertension, hyperlipidemia, have again  presented to the hospital with a severe pain in the center of the chest.  The patient has been admitted numerous times to our hospital and he also  recently went to [facility] with chest pain. He, at that  time, underwent PCI of his SVG to RCA. He went home and again, had to  be brought back for a repeat PCI of his SVG to RCA. He again comes back  with similar symptoms. His EKG and blood test showed no evidence of  myocardial infarction. He says his pain gets better with morphine. REVIEW OF SYSTEM:  Please see HPI. All other systems are reviewed and  they are negative. PAST MEDICAL HISTORY:  1. History of CAD status post CABG many years ago. 2.  Status post multiple stents including the latest one being the SVG  to RCA at [facility], details of which are not currently  available. 3.  History of seizure disorder. 4.  Hypertension. 5.  Hyperlipidemia. 6.  Diabetes mellitus. 7.  Fibromyalgia. 8.  Ulcerative colitis. 9.  History of diastolic heart failure. SOCIAL HISTORY:  The patient still smokes and he smokes currently about  half-pack per day. Denies any alcohol abuse.     FAMILY HISTORY:  Father  of coronary artery disease. PAST SURGICAL HISTORY:  1. As mentioned, the patient had numerous coronary artery angioplasties  and stents. 2.  Status post CABG. 3.  Cholecystectomy. 4.  Back surgery. 5.  Appendectomy. MEDICINES AND ALLERGIES:  Have been reviewed. PHYSICAL EXAMINATION:  GENERAL:  He is a pleasant but anxious male. VITAL SIGNS:  Pulse is about 83, blood pressure 122/77, respirations are  18, oxygen saturation 92%. CONSTITUTIONAL:  He is alert, oriented but anxious. HEENT EXAMINATION:  His neck is supple. No JVD. No thyromegaly. No  carotid bruits were heard. Eyes show no pale conjunctivae or icterus. CARDIAC EXAMINATION:  Reveals normal S1 and S2. There is a soft  systolic murmur at the base of the heart. LUNGS:  Largely clear to auscultation and palpation. ABDOMEN:  Soft, nontender. Bowel sounds are present  EXTREMITIES:  Show no edema. NEUROLOGICAL EXAMINATION:  He is alert, oriented. Cranial nerves II  through XII intact. No focal deficit. SKIN:  Shows no rashes. LABORATORY DATA:  Sodium is 136, potassium 4.2, chloride 100, bicarb 20,  BUN is 11, creatinine 1. The patient's proBNP is _____. Troponin x2  are negative. Liver functions are normal.  White count 4.7, hemoglobin  13.3, and 39.8. EKG shows sinus rhythm, right bundle branch block with associated ST and  T-wave changes. IMPRESSION:  1. This is a 55-year-old male who has again presented with a recurrent  chest pain. Etiology of this remains unclear. Symptoms appear atypical  for angina. Though patient does have extensive cardiac history. There  is some element of pain-seeking behavior as well. 2.  Coronary artery disease status post recent stents. 3.  Hypertension. RECOMMENDATIONS:  1.  I will continue to observe the patient's symptoms closely. 2.  I will obtain the patient's actual coronary angiography from [facility] and review them carefully.   3.  If the patient's angiography from [facility] showed no  significant abnormalities, the patient should be considered for pain  management _____. 4.  Complexity of medical decision making is extremely high. I appreciate the opportunity to participate in the care of this anxious  white male.         Kris Martin MD    D: 10/20/2020 14:22:29       T: 10/20/2020 15:43:59     [email or web address]  Job#: [id number]     Doc#: [id number]    CC:

## 2020-10-21 VITALS
SYSTOLIC BLOOD PRESSURE: 98 MMHG | WEIGHT: 171.96 LBS | HEIGHT: 72 IN | HEART RATE: 76 BPM | OXYGEN SATURATION: 92 % | RESPIRATION RATE: 16 BRPM | TEMPERATURE: 97.5 F | DIASTOLIC BLOOD PRESSURE: 62 MMHG | BODY MASS INDEX: 23.29 KG/M2

## 2020-10-21 LAB
ANION GAP SERPL CALCULATED.3IONS-SCNC: 11 MMOL/L (ref 3–16)
BUN BLDV-MCNC: 17 MG/DL (ref 7–20)
CALCIUM SERPL-MCNC: 8.6 MG/DL (ref 8.3–10.6)
CHLORIDE BLD-SCNC: 101 MMOL/L (ref 99–110)
CO2: 23 MMOL/L (ref 21–32)
CREAT SERPL-MCNC: 1 MG/DL (ref 0.8–1.3)
ESTIMATED AVERAGE GLUCOSE: 116.9 MG/DL
GFR AFRICAN AMERICAN: >60
GFR NON-AFRICAN AMERICAN: >60
GLUCOSE BLD-MCNC: 102 MG/DL (ref 70–99)
GLUCOSE BLD-MCNC: 161 MG/DL (ref 70–99)
HBA1C MFR BLD: 5.7 %
HCT VFR BLD CALC: 38.2 % (ref 40.5–52.5)
HEMOGLOBIN: 12.8 G/DL (ref 13.5–17.5)
MCH RBC QN AUTO: 34.4 PG (ref 26–34)
MCHC RBC AUTO-ENTMCNC: 33.5 G/DL (ref 31–36)
MCV RBC AUTO: 102.7 FL (ref 80–100)
PDW BLD-RTO: 15 % (ref 12.4–15.4)
PERFORMED ON: ABNORMAL
PLATELET # BLD: 217 K/UL (ref 135–450)
PMV BLD AUTO: 8.8 FL (ref 5–10.5)
POTASSIUM REFLEX MAGNESIUM: 3.8 MMOL/L (ref 3.5–5.1)
RBC # BLD: 3.72 M/UL (ref 4.2–5.9)
SODIUM BLD-SCNC: 135 MMOL/L (ref 136–145)
WBC # BLD: 4.5 K/UL (ref 4–11)

## 2020-10-21 PROCEDURE — 83036 HEMOGLOBIN GLYCOSYLATED A1C: CPT

## 2020-10-21 PROCEDURE — 6370000000 HC RX 637 (ALT 250 FOR IP): Performed by: INTERNAL MEDICINE

## 2020-10-21 PROCEDURE — 99215 OFFICE O/P EST HI 40 MIN: CPT | Performed by: INTERNAL MEDICINE

## 2020-10-21 PROCEDURE — 6370000000 HC RX 637 (ALT 250 FOR IP): Performed by: NURSE PRACTITIONER

## 2020-10-21 PROCEDURE — 85027 COMPLETE CBC AUTOMATED: CPT

## 2020-10-21 PROCEDURE — 80048 BASIC METABOLIC PNL TOTAL CA: CPT

## 2020-10-21 PROCEDURE — G0378 HOSPITAL OBSERVATION PER HR: HCPCS

## 2020-10-21 PROCEDURE — 2580000003 HC RX 258: Performed by: NURSE PRACTITIONER

## 2020-10-21 RX ORDER — ISOSORBIDE MONONITRATE 60 MG/1
60 TABLET, EXTENDED RELEASE ORAL DAILY
Status: DISCONTINUED | OUTPATIENT
Start: 2020-10-21 | End: 2020-10-21 | Stop reason: HOSPADM

## 2020-10-21 RX ORDER — CLOPIDOGREL BISULFATE 75 MG/1
300 TABLET ORAL ONCE
Status: COMPLETED | OUTPATIENT
Start: 2020-10-21 | End: 2020-10-21

## 2020-10-21 RX ORDER — CLOPIDOGREL BISULFATE 75 MG/1
75 TABLET ORAL DAILY
Status: DISCONTINUED | OUTPATIENT
Start: 2020-10-22 | End: 2020-10-21 | Stop reason: HOSPADM

## 2020-10-21 RX ORDER — OXYCODONE AND ACETAMINOPHEN 10; 325 MG/1; MG/1
1 TABLET ORAL EVERY 8 HOURS PRN
Qty: 90 TABLET | Refills: 0 | Status: CANCELLED | OUTPATIENT
Start: 2020-10-21 | End: 2020-10-24

## 2020-10-21 RX ADMIN — SODIUM CHLORIDE, PRESERVATIVE FREE 10 ML: 5 INJECTION INTRAVENOUS at 08:31

## 2020-10-21 RX ADMIN — RANOLAZINE 1000 MG: 500 TABLET, FILM COATED, EXTENDED RELEASE ORAL at 08:29

## 2020-10-21 RX ADMIN — GABAPENTIN 600 MG: 300 CAPSULE ORAL at 10:54

## 2020-10-21 RX ADMIN — GABAPENTIN 600 MG: 300 CAPSULE ORAL at 06:23

## 2020-10-21 RX ADMIN — ASPIRIN 81 MG: 81 TABLET, CHEWABLE ORAL at 08:29

## 2020-10-21 RX ADMIN — TAMSULOSIN HYDROCHLORIDE 0.4 MG: 0.4 CAPSULE ORAL at 08:30

## 2020-10-21 RX ADMIN — OXYCODONE HYDROCHLORIDE AND ACETAMINOPHEN 1 TABLET: 10; 325 TABLET ORAL at 08:31

## 2020-10-21 RX ADMIN — APIXABAN 5 MG: 5 TABLET, FILM COATED ORAL at 08:29

## 2020-10-21 RX ADMIN — ISOSORBIDE MONONITRATE 60 MG: 60 TABLET, EXTENDED RELEASE ORAL at 09:59

## 2020-10-21 RX ADMIN — SACUBITRIL AND VALSARTAN 1 TABLET: 49; 51 TABLET, FILM COATED ORAL at 08:43

## 2020-10-21 RX ADMIN — CARVEDILOL 12.5 MG: 12.5 TABLET, FILM COATED ORAL at 08:30

## 2020-10-21 RX ADMIN — NITROGLYCERIN 1 INCH: 20 OINTMENT TOPICAL at 06:23

## 2020-10-21 RX ADMIN — CLOPIDOGREL BISULFATE 300 MG: 75 TABLET ORAL at 09:59

## 2020-10-21 ASSESSMENT — PAIN DESCRIPTION - PROGRESSION
CLINICAL_PROGRESSION: NOT CHANGED
CLINICAL_PROGRESSION: NOT CHANGED

## 2020-10-21 ASSESSMENT — PAIN - FUNCTIONAL ASSESSMENT: PAIN_FUNCTIONAL_ASSESSMENT: ACTIVITIES ARE NOT PREVENTED

## 2020-10-21 ASSESSMENT — PAIN DESCRIPTION - PAIN TYPE: TYPE: ACUTE PAIN

## 2020-10-21 ASSESSMENT — PAIN DESCRIPTION - FREQUENCY: FREQUENCY: CONTINUOUS

## 2020-10-21 ASSESSMENT — PAIN DESCRIPTION - LOCATION: LOCATION: BACK

## 2020-10-21 ASSESSMENT — PAIN DESCRIPTION - DESCRIPTORS: DESCRIPTORS: ACHING

## 2020-10-21 ASSESSMENT — PAIN DESCRIPTION - ONSET: ONSET: ON-GOING

## 2020-10-21 ASSESSMENT — PAIN SCALES - GENERAL
PAINLEVEL_OUTOF10: 6
PAINLEVEL_OUTOF10: 3
PAINLEVEL_OUTOF10: 7

## 2020-10-21 ASSESSMENT — PAIN DESCRIPTION - ORIENTATION: ORIENTATION: LOWER;MID

## 2020-10-21 NOTE — PLAN OF CARE
Problem: Pain:  Description: Pain management should include both nonpharmacologic and pharmacologic interventions.   Goal: Pain level will decrease  Description: Pain level will decrease  10/21/2020 1034 by Bella Rowland RN  Outcome: Ongoing  10/21/2020 0304 by Tracy Bolton RN  Outcome: Ongoing  Goal: Control of acute pain  Description: Control of acute pain  10/21/2020 1034 by Bella Rowland RN  Outcome: Ongoing  10/21/2020 0304 by Tracy Bolton RN  Outcome: Ongoing  Goal: Control of chronic pain  Description: Control of chronic pain  10/21/2020 1034 by Bella Rowland RN  Outcome: Ongoing  10/21/2020 0304 by Tracy Bolton RN  Outcome: Ongoing     Problem: Falls - Risk of:  Goal: Will remain free from falls  Description: Will remain free from falls  10/21/2020 1034 by Bella Rowland RN  Outcome: Ongoing  10/21/2020 0304 by Tracy Bolton RN  Outcome: Ongoing  Goal: Absence of physical injury  Description: Absence of physical injury  10/21/2020 1034 by Bella Rowland RN  Outcome: Ongoing  10/21/2020 0304 by Tracy Bolton RN  Outcome: Ongoing     Problem: Cardiac:  Goal: Ability to maintain an adequate cardiac output will improve  Description: Ability to maintain an adequate cardiac output will improve  10/21/2020 1034 by Bella Rowland RN  Outcome: Ongoing  10/21/2020 0304 by Tracy Bolton RN  Outcome: Ongoing  Goal: Complications related to the disease process, condition or treatment will be avoided or minimized  Description: Complications related to the disease process, condition or treatment will be avoided or minimized  10/21/2020 1034 by Bella Rowland RN  Outcome: Ongoing  10/21/2020 0304 by Tracy Bolton RN  Outcome: Ongoing  Goal: Hemodynamic stability will improve  Description: Hemodynamic stability will improve  10/21/2020 1034 by Bella Rowland RN  Outcome: Ongoing  10/21/2020 0304 by Tracy Bolton RN  Outcome: Ongoing  Goal: Risk factors for ineffective tissue perfusion will decrease  Description: Risk factors for ineffective tissue perfusion will decrease  10/21/2020 1034 by Yan Andrews RN  Outcome: Ongoing  10/21/2020 0304 by Brayan Jacob RN  Outcome: Ongoing     Problem: Fluid Volume:  Goal: Will show no signs or symptoms of fluid imbalance  Description: Will show no signs or symptoms of fluid imbalance  10/21/2020 1034 by Yan Andrews RN  Outcome: Ongoing  10/21/2020 0304 by Brayan Jacob RN  Outcome: Ongoing     Problem: Skin Integrity:  Goal: Will show no infection signs and symptoms  Description: Will show no infection signs and symptoms  10/21/2020 1034 by Yan Andrews RN  Outcome: Ongoing  10/21/2020 0304 by Brayan Jacob RN  Outcome: Ongoing  Goal: Absence of new skin breakdown  Description: Absence of new skin breakdown  10/21/2020 1034 by Yan Andrews RN  Outcome: Ongoing  10/21/2020 0304 by Brayan Jacob RN  Outcome: Ongoing

## 2020-10-21 NOTE — PROGRESS NOTES
4 Eyes Skin Assessment     NAME:  Marilin Boswell  YOB: 1955  MEDICAL RECORD NUMBER:  [id number]    The patient is being assess for  Admission    I agree that 2 RN's have performed a thorough Head to Toe Skin Assessment on the patient. ALL assessment sites listed below have been assessed. Areas assessed by both nurses:    Head, Face, Ears, Shoulders, Back, Chest, Arms, Elbows, Hands, Sacrum. Buttock, Coccyx, Ischium and Legs. Feet and Heels        Does the Patient have a Wound?  No noted wound(s)       Jhonathan Prevention initiated:  Yes   Wound Care Orders initiated:  No    Pressure Injury (Stage 3,4, Unstageable, DTI, NWPT, and Complex wounds) if present place consult order under [de-identified] No    New and Established Ostomies if present place consult order under : No      Nurse 1 eSignature: Electronically signed by Emiliano Morgan RN on 10/20/20 at 2:58 AM EDT    **SHARE this note so that the co-signing nurse is able to place an eSignature**    Nurse 2 eSignature: {Esignature:[id number]}
Discharge and follow-up instructions given to patient, patient verbalized understanding. Transportation called for wheelchair for discharge.
Hospitalist Progress Note      PCP: Melissa Ruelas MD    Date of Admission: 10/19/2020    Hospital Course: 56yo M hx COPD presented with left sided chest pain and dyspnea. He had admission to [facility] 4 weeks ago and had PCI. He has recurrent admissions for chest pain. He continues to smoke. He is admitted for rule out ACS. Subjective:   He had nausea,dyspnea and chest pain today. Medications:  Reviewed    Infusion Medications    dextrose       Scheduled Medications    lidocaine  1 patch Transdermal Daily    aspirin  324 mg Oral Once    apixaban  5 mg Oral BID    carvedilol  12.5 mg Oral BID WC    empagliflozin  10 mg Oral Daily    ranolazine  1,000 mg Oral BID    rosuvastatin  40 mg Oral Nightly    sacubitril-valsartan  1 tablet Oral BID    tamsulosin  0.4 mg Oral Daily    sodium chloride flush  10 mL Intravenous 2 times per day    aspirin  81 mg Oral Daily    nitroglycerin  1 inch Topical 4 times per day     PRN Meds: morphine, oxyCODONE-acetaminophen, zolpidem, sodium chloride flush, acetaminophen **OR** acetaminophen, polyethylene glycol, promethazine **OR** ondansetron, glucose, dextrose, glucagon (rDNA), dextrose, albuterol    No intake or output data in the 24 hours ending 10/20/20 1502    Physical Exam Performed:    /77   Pulse 83   Temp 98.3 °F (36.8 °C) (Oral)   Resp 18   Ht 6' (1.829 m)   Wt 168 lb 3.4 oz (76.3 kg)   SpO2 92%   BMI 22.81 kg/m²     General appearance: No apparent distress, appears stated age and cooperative. HEENT: Pupils equal, round, and reactive to light. Conjunctivae/corneas clear. Neck: Supple, with full range of motion. No jugular venous distention. Trachea midline. Respiratory:  Normal respiratory effort. Clear to auscultation, bilaterally without Rales/Wheezes/Rhonchi. Cardiovascular: Regular rate and rhythm with normal S1/S2 without murmurs, rubs or gallops.   Abdomen: Soft, non-tender, non-distended with normal bowel
Patient alert and oriented x4, VSS, sitting up in bed eating breakfast. Patient denies n/v, diarrhea, SOB, chest pain, states chronic back pain, prn pain medication given. Patient states no needs at this time. Bed in lowest position, patient is UAL and tolerating well. Non-slip socks on, call light within reach. Will continue to monitor pt needs.
Patient called RN into room. Patient demanding caffeinated coffee or refusing to eat breakfast. Patient also c/p left chest and arm pain, prn nitro given, patient demanding to see MD and to get additional pain medication. Dr. Khanh De Anda notified, awaiting reply. Will continue to monitor.
Patient non-compliant with cardiac diet order. Patient wants MD to change diet order, demanded RN allow his current order into dietary for lunch.  Will notify MD of patients request.
Patient states the \"doctor said it was ok to leave\". Explained to patient that is was cardiology stating that they were ok for him to leave, however that the hospitalist has final decision on discharge. Patient states that he needs to leave prior to noon today and \"will leave without him. \"  RN notified Dr. Michael Patel of patients desire to leave before noon. Will continue to monitor pt needs.
Pharmacy Medication Reconciliation Note     List of medications patient is currently taking is complete. Source of information:   1. Patient  2. EMR    Notes regarding home medications:   1. Patient received all of his morning home medication doses before presenting to the ER. 2. Patient doesn't know his medications well, likely poor adherence based on his pharmacy dispense history.       [id number] [facility] Suzan, Pharmacy Intern  10/19/2020 9:29 PM
Pt arrive on the unit alert and oriented complaining of chest pain, pt has been oriented to the room call light within reach,bed in the lowest position and locked, NP made aware of the pts pain, vs stable will continue to monitor.
insulin lispro  0-3 Units Subcutaneous Nightly    gabapentin  600 mg Oral 5x Daily    aspirin  324 mg Oral Once    apixaban  5 mg Oral BID    carvedilol  12.5 mg Oral BID WC    ranolazine  1,000 mg Oral BID    rosuvastatin  40 mg Oral Nightly    sacubitril-valsartan  1 tablet Oral BID    tamsulosin  0.4 mg Oral Daily    sodium chloride flush  10 mL Intravenous 2 times per day    aspirin  81 mg Oral Daily    nitroglycerin  1 inch Topical 4 times per day      dextrose       morphine, oxyCODONE-acetaminophen, zolpidem, sodium chloride flush, acetaminophen **OR** acetaminophen, polyethylene glycol, promethazine **OR** ondansetron, glucose, dextrose, glucagon (rDNA), dextrose, albuterol    Lab Data:  CBC:   Recent Labs     10/19/20  1625 10/20/20  0324   WBC 4.8 4.7   HGB 13.6 13.3*    244     BMP:    Recent Labs     10/19/20  1625      K 4.2   CO2 20*   BUN 11   CREATININE 1.0     LIVR:   Recent Labs     10/19/20  1625   AST 9*   ALT <5*     INR:  No results for input(s): INR in the last 72 hours. APTT: No results for input(s): APTT in the last 72 hours. BNP:  No results for input(s): BNP in the last 72 hours. Imaging:Marina angio from [facility](10/20) reviewed. Assessment:Plan:  1)  Chronic chest pain  - EKG, troponin negative  - cor angio from [facility] done few weeks a go reviewed. He had widely patent stented SVG to RCA. His other bypasses are patent  - His chest pain is non  cardiac in origin . - he will ideally need referral to pain specialist      CAD s/p CABG with recently stented SVG to RCA  - Came with chest pain but appears atypical for angina  - will restart plavix 300 mg bolus followed by 75 mg daily  ( did not receive dose during this admission)  -  Pt is on BBlocker, Statin and Antiplatelet therapy    - will switch him from nitropaste to imdur    Cardiomyopathy  - no CHF  -ON BB.entresto. Complexity of decision making high     Ok for discharge today. Will need follow up in 1

## 2020-10-21 NOTE — CARE COORDINATION
Spoke with pt who is aware and agreeable to dc home today. Pt states his family will transport him home later today and pt denies need for home care. Perfect Serve message sent to MD.  Pt states he will follow up with cardiology as out pt.    Electronically signed by ROSEANNA Romero on 10/21/2020 at 12:08 PM

## 2020-10-21 NOTE — DISCHARGE INSTR - DIET

## 2020-10-21 NOTE — DISCHARGE INSTR - COC
Continuity of Care Form    Patient Name: Bernice Landaverde   :  1955  MRN:  [id number]    Admit date:  10/19/2020  Discharge date:  10/21/2020    Code Status Order: Full Code   Advance Directives:   [facility] Documentation       Date/Time Healthcare Directive Type of Healthcare Directive Copy in [facility] [address] Agent's Name Healthcare Agent's Phone Number    10/20/20 0228  No, patient does not have an advance directive for healthcare treatment -- -- -- -- --            Admitting Physician:  Shyanne Blanca DO  PCP: Kale Alcocer MD    Discharging Nurse: Robles Marshall RN  [address] Unit/Room#: K7M-4459/8548-97  Discharging Unit Phone Number: [phone]    Emergency Contact:   Extended Emergency Contact Information  Primary Emergency Contact: Mitzya Ganser  Address: [address] [place] [address] Phone: [phone]  Relation: Spouse  Secondary Emergency Contact: [facility] Phone: [phone]  Work Phone: [phone]  Relation: Child    Past Surgical History:  Past Surgical History:   Procedure Laterality Date    APPENDECTOMY      BACK SURGERY      CARDIAC SURGERY      CHOLECYSTECTOMY      COLONOSCOPY  01/10/2017    COLONOSCOPY  01/10/2017    CORONARY ANGIOPLASTY WITH STENT PLACEMENT      CORONARY ARTERY BYPASS GRAFT  , 2015    ENDOSCOPY, COLON, DIAGNOSTIC      PACEMAKER PLACEMENT      UPPER GASTROINTESTINAL ENDOSCOPY  2017    VASCULAR SURGERY         Immunization History:   Immunization History   Administered Date(s) Administered    Influenza 2013    Influenza Vaccine, unspecified formulation 10/24/2011, 2013, 2015, 2017    Influenza Virus Vaccine 2014, 2015, 10/22/2018, 2019    Influenza, Quadv, IM, (6 mo and older Fluzone, Flulaval, Fluarix and 3 yrs and older Afluria) 10/17/2018    Influenza, Quadv, IM, PF (6 mo and older Fluzone, Flulaval, Fluarix, and 3 yrs and older Afluria) 10/17/2016, 11/27/2019, 09/22/2020    Pneumococcal Polysaccharide ([id number]) 10/24/2011, 09/18/2015    Tdap (Boostrix, Adacel) 05/04/2015       Active Problems:  Patient Active Problem List   Diagnosis Code    Hx of CABG Z95.1    NSTEMI (non-ST elevated myocardial infarction) I21.4    Essential hypertension I10    Ischemic cardiomyopathy I25.5    Hyperlipidemia LDL goal <70 E78.5    CHF (congestive heart failure) ([facility]) I50.9    Chronic back pain M54.9, G89.29    Type 2 diabetes mellitus without complication, with long-term current use of insulin ([facility]) E11.9, Z79.4    Coronary artery disease involving native coronary artery of native heart without angina pectoris I25.10    Anemia,normocytic normochromic ,mixed folic aci deficiency and iron deficiency D64.9    Guaiac + stool R19.5    Morbid obesity due to excess calories ([facility]) E66.01    Anxiety F41.9    Coronary artery disease involving coronary bypass graft of native heart with angina pectoris ([facility]) I25.709    Iron deficiency anemia due to chronic blood loss D50.0    LALITA (acute kidney injury) ([facility] [address] N17.9    Tobacco abuse Z72.0    Restrictive lung disease J98.4    Acute on chronic diastolic congestive heart failure ([facility]) I50.33    Ischemic stroke, left frontal lobe (4/30/18) ([facility]) I63.9    PFO (patent foramen ovale) Q21.1    DMII (diabetes mellitus, type 2) ([facility]) E11.9    COPD (chronic obstructive pulmonary disease) ([facility]) J44.9    Renal insufficiency N28.9    Chronic systolic (congestive) heart failure ([facility]) I50.22    CAD (coronary artery disease) I25.10    Compression fracture of L2, sequela S32.020S    Low back pain M54.5    Intractable back pain M54.9    Stroke determined by clinical assessment ([facility] [address] I63.9    NSVT (nonsustained ventricular tachycardia) ([facility]) I47.2    Sinus tachycardia R00.0    ICD (implantable cardioverter-defibrillator) discharge Z45.02    Diabetic peripheral neuropathy (HCC) E11.42    Ventricular tachycardia (HCC) I47.2    Hypertensive crisis I16.9    Acute pulmonary embolism without acute cor pulmonale (HCC) I26.99    Other benign neoplasm of skin of trunk  D23.5    Jaw pain R68.84    Orthostatic hypotension I95.1    Acute chest pain R07.9       Isolation/Infection:   Isolation            No Isolation          Patient Infection Status       Infection Onset Added Last Indicated Last Indicated By Review Planned Expiration Resolved Resolved By    None active    Resolved    C-diff Rule Out 07/30/20 07/30/20 08/17/20 Clostridium Difficile Toxin/Antigen (Ordered)   09/05/20 Irene Quinteros RN    COVID-19 Rule Out 07/06/20 07/06/20 07/06/20 COVID-19 (Ordered)   07/07/20 Rule-Out Test Resulted    COVID-19 Rule Out 05/23/20 05/23/20 05/23/20 COVID-19 (Ordered)   05/23/20 Rule-Out Test Resulted    C-diff Rule Out 03/07/20 03/07/20 03/10/20 Clostridium Difficile Toxin/Antigen (Ordered)   03/10/20 Rule-Out Test Resulted            Nurse Assessment:  Last Vital Signs: /75   Pulse 74   Temp 97.9 °F (36.6 °C) (Oral)   Resp 17   Ht 6' (1.829 m)   Wt 171 lb 15.3 oz (78 kg)   SpO2 96%   BMI 23.32 kg/m²     Last documented pain score (0-10 scale): Pain Level: 3  Last Weight:   Wt Readings from Last 1 Encounters:   10/21/20 171 lb 15.3 oz (78 kg)     Mental Status:  oriented and alert    IV Access:  - None    Nursing Mobility/ADLs:  Walking   Independent  Transfer  Independent  Bathing  Independent  Dressing  Independent  Toileting  Independent  Feeding  Independent  Med Admin  Independent  Med Delivery   whole    Wound Care Documentation and Therapy:        Elimination:  Continence:   · Bowel:  Yes  · Bladder: Yes  Urinary Catheter: None   Colostomy/Ileostomy/Ileal Conduit: No       Date of Last BM: 10/19/2020    Intake/Output Summary (Last 24 hours) at 10/21/2020 1028  Last data filed at 10/20/2020 1804  Gross per 24 hour   Intake 240 ml   Output -- Net 240 ml     I/O last 3 completed shifts: In: 240 [P.O.:240]  Out: -     Safety Concerns:     None    Impairments/Disabilities:      None    Nutrition Therapy:  Current Nutrition Therapy:   - Oral Diet:  Carb Control 4 carbs/meal (1800kcals/day), Cardiac and no caffeine    Routes of Feeding: Oral  Liquids: No Restrictions  Daily Fluid Restriction: no  Last Modified Barium Swallow with Video (Video Swallowing Test): not done    Treatments at the Time of Hospital Discharge:   Respiratory Treatments: nebulizers  Oxygen Therapy:  is not on home oxygen therapy. Ventilator:    - No ventilator support    Rehab Therapies: none  Weight Bearing Status/Restrictions: No weight bearing restirctions  Other Medical Equipment (for information only, NOT a DME order):  none  Other Treatments: none    Patient's personal belongings (please select all that are sent with patient):  patient discharged with all belongings    RN SIGNATURE:  Electronically signed by Henry Velez RN on 10/21/20 at 10:43 AM EDT    CASE MANAGEMENT/SOCIAL WORK SECTION    Inpatient Status Date: ***    Readmission Risk Assessment Score:  Readmission Risk              Risk of Unplanned Readmission:        0           Discharging to Facility/ Agency   · Name:   · Address:  · Phone:  · Fax:    Dialysis Facility (if applicable)   · Name:  · Address:  · Dialysis Schedule:  · Phone:  · Fax:    / signature: {Esignature:[id number]:::0}    PHYSICIAN SECTION    Prognosis: Good    Condition at Discharge: Stable    Rehab Potential (if transferring to Rehab): Good    Recommended Labs or Other Treatments After Discharge: Follow up with cardiologist, PCP in 3 days, establish pain specialist    Physician Certification: I certify the above information and transfer of Gaby Horn  is necessary for the continuing treatment of the diagnosis listed and that he requires Home Care for greater 30 days.      Update Admission H&P: {CHP DME Changes in MPADX:[id number]:::6}    PHYSICIAN SIGNATURE:  Electronically signed by Chao Zimmer MD on 10/21/20 at 10:28 AM EDT

## 2020-10-22 ENCOUNTER — CARE COORDINATION (OUTPATIENT)
Dept: CARE COORDINATION | Age: 65
End: 2020-10-22

## 2020-10-22 NOTE — CARE COORDINATION
Outreach call made to f/u on pt's hospital discharge from 10/21/20. No answer and message with ACM contact's information left.

## 2020-10-26 ENCOUNTER — CARE COORDINATION (OUTPATIENT)
Dept: CARE COORDINATION | Age: 65
End: 2020-10-26

## 2020-10-26 NOTE — CARE COORDINATION
Outreach call made to f/u with patient on hospital discharge from 10/21/20 and to complete enrollment in care management. No answer and message with ACM contact left. This is the fourth outreach attempt that the [facility] team has not been able to reach patient. No further outreaches scheduled at this time and episode resolved.

## 2020-10-26 NOTE — DISCHARGE SUMMARY
Hospital Medicine Discharge Summary    Patient ID: Josephine Gillespie      Patient's PCP: Farhan Singleton MD    Admit Date: 10/19/2020     Discharge Date: 10/21/2020     Admitting Physician: Esau Stahl DO     Discharge Physician: Renae Wyatt MD     Discharge Diagnoses: Active Hospital Problems    Diagnosis Date Noted    Acute chest pain [R07.9] 10/19/2020       The patient was seen and examined on day of discharge and this discharge summary is in conjunction with any daily progress note from day of discharge. Condition at discharge - stable    Hospital Course: 56yo M hx COPD presented with left sided chest pain and dyspnea. He had admission to [facility] 4 weeks ago and had PCI. He has recurrent admissions for chest pain. He continues to smoke. He is admitted for rule out ACS. patient seen and evaluated on the day of discharge. Patient informed about following up with appointments. Patient verbalized understanding for follow-up appointments. All questions of the patient answered, patient is in agreement with the discharge plan. Medical reconciliation performed. Patient will be discharged stable condition. Patient seen and evaluated by cardiology, cleared for discharge, follow up as OP, patient agreed with discharge plan.       Chest pain-musculoskeletal, non cardiac in origin      History of CAD s/p CABG twice  -PCI SVG on 9/8/20 and repeat PCO SVG to RCA on 9/14. Recent cardiac cath 10/1/20 at [address] [place] [address] showed patent stents but distal disease.  -Continue above medications     Chronic systolic HF EF 08% s/p ICD  -Continue entresto. Not on diuretics.     HTN  -BP controlled     Chronic pain:  -Pain control. Sees PCP.  -Resumed home dose of gabapentin. Continue lidocaine patch     COPD  -Continue inhalors     Type II DM: BG stable: 102  -D/C jardiance  -Start Insulin coverage        DVT Prophylaxis: Eliquis  Diet: DIET CARDIAC; No Caffeine  Code Status: Full Code          Exam:     BP 98/62   Pulse 76   Temp 97.5 °F (36.4 °C) (Oral)   Resp 16   Ht 6' (1.829 m)   Wt 171 lb 15.3 oz (78 kg)   SpO2 92%   BMI 23.32 kg/m²     General:  NAD, Awake, alert and oriented X4  Skin:  Warm and dry  Neck:  Supple, no JVP appreciated, no bruit  Chest:  Clear to auscultation, no wheezes/rhonchi/rales  Cardiovascular:  Regular rate. S1S2  Abdomen:  Soft, nontender, +bowel sounds  Extremities:  No LE edema    Consults:     IP CONSULT TO CARDIOLOGY  IP CONSULT TO HOME CARE NEEDS      Code Status:  Prior    Activity: activity as tolerated    Labs: For convenience and continuity at follow-up the following most recent labs are provided:      CBC:    Lab Results   Component Value Date    WBC 4.5 10/21/2020    HGB 12.8 10/21/2020    HCT 38.2 10/21/2020     10/21/2020       Renal:    Lab Results   Component Value Date     10/21/2020    K 3.8 10/21/2020     10/21/2020    CO2 23 10/21/2020    BUN 17 10/21/2020    CREATININE 1.0 10/21/2020    CALCIUM 8.6 10/21/2020    PHOS 4.1 07/08/2020       Discharge Medications:     Discharge Medication List as of 10/21/2020 11:59 AM           Details   isosorbide mononitrate (IMDUR) 60 MG extended release tablet Take 60 mg by mouth dailyHistorical Med      clopidogrel (PLAVIX) 75 MG tablet Take 75 mg by mouth dailyHistorical Med      carvedilol (COREG) 12.5 MG tablet Take 12.5 mg by mouth 2 times daily (with meals)Historical Med      rosuvastatin (CRESTOR) 40 MG tablet Take 40 mg by mouth every eveningHistorical Med      sacubitril-valsartan (ENTRESTO) 49-51 MG per tablet Take 1 tablet by mouth 2 times dailyHistorical Med      oxyCODONE-acetaminophen (PERCOCET)  MG per tablet Take 1 tablet by mouth every 8 hours as needed for Pain for up to 30 days.  Intended supply: 30 days, Disp-90 tablet,R-0Normal      gabapentin (NEURONTIN) 600 MG tablet 1 tab PO 5 times a day, Disp-150 tablet,R-1Normal      zolpidem (AMBIEN) 5 MG tablet Take 1 tablet by mouth nightly as needed for Sleep for up to 60 days. , Disp-30 tablet,R-1Normal      Respiratory Therapy Supplies (NEBULIZER) AALIYAH Disp-1 Device,R-0, PrintUsed to give yourself breathing treatment      ipratropium-albuterol (DUONEB) 0.5-2.5 (3) MG/3ML SOLN nebulizer solution Inhale 3 mLs into the lungs every 4 hours as needed for Shortness of Breath (wheezing coughing), Disp-360 mL,R-5Print      aspirin 325 MG tablet Take 325 mg by mouth dailyHistorical Med      traZODone (DESYREL) 50 MG tablet TAKE 1 TABLET BY MOUTH EVERY NIGHT AT BEDTIME, Disp-30 tablet,R-2Normal      albuterol sulfate HFA (VENTOLIN HFA) 108 (90 Base) MCG/ACT inhaler Inhale 2 puffs into the lungs 4 times daily as needed for Wheezing, Disp-1 Inhaler,R-1Normal      apixaban (ELIQUIS) 5 MG TABS tablet Take 1 tablet by mouth 2 times daily, Disp-60 tablet,R-1Normal      empagliflozin (JARDIANCE) 10 MG tablet Take 1 tablet by mouth daily, Disp-30 tablet,R-0Normal      tamsulosin (FLOMAX) 0.4 MG capsule Take 1 capsule by mouth daily, Disp-30 capsule,R-0Normal      ranolazine (RANEXA) 500 MG extended release tablet Take 2 tablets by mouth 2 times daily, Disp-60 tablet,R-0Normal      acetaminophen (TYLENOL) 325 MG tablet Take 650 mg by mouth every 6 hours as needed for PainHistorical Med             Time Spent on discharge is more than 30 mints in the examination, evaluation, counseling and review of medications and discharge plan. Signed:    Terry Knott MD   10/25/2020      Thank you Melissa Ruelas MD for the opportunity to be involved in this patient's care. If you have any questions or concerns please feel free to contact me at [phone].

## 2020-10-29 ENCOUNTER — HOSPITAL ENCOUNTER (EMERGENCY)
Age: 65
Discharge: HOME | End: 2020-10-29
Attending: EMERGENCY MEDICINE
Payer: MEDICARE

## 2020-10-29 ENCOUNTER — APPOINTMENT (OUTPATIENT)
Dept: GENERAL RADIOLOGY | Age: 65
End: 2020-10-29
Payer: MEDICARE

## 2020-10-29 VITALS
DIASTOLIC BLOOD PRESSURE: 95 MMHG | BODY MASS INDEX: 23.17 KG/M2 | HEART RATE: 94 BPM | OXYGEN SATURATION: 99 % | WEIGHT: 170.86 LBS | TEMPERATURE: 98.5 F | RESPIRATION RATE: 12 BRPM | SYSTOLIC BLOOD PRESSURE: 162 MMHG

## 2020-10-29 LAB
ANION GAP SERPL CALCULATED.3IONS-SCNC: 14 MMOL/L (ref 3–16)
BASOPHILS ABSOLUTE: 0.1 K/UL (ref 0–0.2)
BASOPHILS RELATIVE PERCENT: 1.2 %
BUN BLDV-MCNC: 13 MG/DL (ref 7–20)
CALCIUM SERPL-MCNC: 8.8 MG/DL (ref 8.3–10.6)
CHLORIDE BLD-SCNC: 100 MMOL/L (ref 99–110)
CO2: 20 MMOL/L (ref 21–32)
CREAT SERPL-MCNC: 1 MG/DL (ref 0.8–1.3)
EKG ATRIAL RATE: 103 BPM
EKG DIAGNOSIS: NORMAL
EKG P AXIS: 65 DEGREES
EKG P-R INTERVAL: 128 MS
EKG Q-T INTERVAL: 408 MS
EKG QRS DURATION: 140 MS
EKG QTC CALCULATION (BAZETT): 534 MS
EKG R AXIS: 84 DEGREES
EKG T AXIS: 24 DEGREES
EKG VENTRICULAR RATE: 103 BPM
EOSINOPHILS ABSOLUTE: 0.1 K/UL (ref 0–0.6)
EOSINOPHILS RELATIVE PERCENT: 2.9 %
GFR AFRICAN AMERICAN: >60
GFR NON-AFRICAN AMERICAN: >60
GLUCOSE BLD-MCNC: 158 MG/DL (ref 70–99)
HCT VFR BLD CALC: 40.6 % (ref 40.5–52.5)
HEMOGLOBIN: 13.9 G/DL (ref 13.5–17.5)
LYMPHOCYTES ABSOLUTE: 1.8 K/UL (ref 1–5.1)
LYMPHOCYTES RELATIVE PERCENT: 36 %
MCH RBC QN AUTO: 35.4 PG (ref 26–34)
MCHC RBC AUTO-ENTMCNC: 34.1 G/DL (ref 31–36)
MCV RBC AUTO: 103.5 FL (ref 80–100)
MONOCYTES ABSOLUTE: 0.4 K/UL (ref 0–1.3)
MONOCYTES RELATIVE PERCENT: 8.8 %
NEUTROPHILS ABSOLUTE: 2.6 K/UL (ref 1.7–7.7)
NEUTROPHILS RELATIVE PERCENT: 51.1 %
PDW BLD-RTO: 14.8 % (ref 12.4–15.4)
PLATELET # BLD: 161 K/UL (ref 135–450)
PMV BLD AUTO: 9.1 FL (ref 5–10.5)
POTASSIUM SERPL-SCNC: 5 MMOL/L (ref 3.5–5.1)
RBC # BLD: 3.92 M/UL (ref 4.2–5.9)
SODIUM BLD-SCNC: 134 MMOL/L (ref 136–145)
TROPONIN: <0.01 NG/ML
WBC # BLD: 5 K/UL (ref 4–11)

## 2020-10-29 PROCEDURE — 85025 COMPLETE CBC W/AUTO DIFF WBC: CPT

## 2020-10-29 PROCEDURE — 93005 ELECTROCARDIOGRAM TRACING: CPT

## 2020-10-29 PROCEDURE — 80048 BASIC METABOLIC PNL TOTAL CA: CPT

## 2020-10-29 PROCEDURE — 99285 EMERGENCY DEPT VISIT HI MDM: CPT

## 2020-10-29 PROCEDURE — 84484 ASSAY OF TROPONIN QUANT: CPT

## 2020-10-29 PROCEDURE — 6370000000 HC RX 637 (ALT 250 FOR IP): Performed by: EMERGENCY MEDICINE

## 2020-10-29 PROCEDURE — 71046 X-RAY EXAM CHEST 2 VIEWS: CPT

## 2020-10-29 RX ORDER — ACETAMINOPHEN 325 MG/1
650 TABLET ORAL ONCE
Status: COMPLETED | OUTPATIENT
Start: 2020-10-29 | End: 2020-10-29

## 2020-10-29 RX ORDER — OXYCODONE HYDROCHLORIDE AND ACETAMINOPHEN 5; 325 MG/1; MG/1
1 TABLET ORAL ONCE
Status: COMPLETED | OUTPATIENT
Start: 2020-10-29 | End: 2020-10-29

## 2020-10-29 RX ORDER — RANOLAZINE 500 MG/1
500 TABLET, EXTENDED RELEASE ORAL ONCE
Status: COMPLETED | OUTPATIENT
Start: 2020-10-29 | End: 2020-10-29

## 2020-10-29 RX ORDER — ASPIRIN 81 MG/1
324 TABLET, CHEWABLE ORAL ONCE
Status: COMPLETED | OUTPATIENT
Start: 2020-10-29 | End: 2020-10-29

## 2020-10-29 RX ORDER — CARVEDILOL 6.25 MG/1
12.5 TABLET ORAL ONCE
Status: COMPLETED | OUTPATIENT
Start: 2020-10-29 | End: 2020-10-29

## 2020-10-29 RX ORDER — CLOPIDOGREL BISULFATE 75 MG/1
75 TABLET ORAL ONCE
Status: COMPLETED | OUTPATIENT
Start: 2020-10-29 | End: 2020-10-29

## 2020-10-29 RX ADMIN — CLOPIDOGREL BISULFATE 75 MG: 75 TABLET ORAL at 09:01

## 2020-10-29 RX ADMIN — SACUBITRIL AND VALSARTAN 1 TABLET: 49; 51 TABLET, FILM COATED ORAL at 09:35

## 2020-10-29 RX ADMIN — CARVEDILOL 12.5 MG: 6.25 TABLET, FILM COATED ORAL at 09:01

## 2020-10-29 RX ADMIN — ASPIRIN 324 MG: 81 TABLET, CHEWABLE ORAL at 09:01

## 2020-10-29 RX ADMIN — ACETAMINOPHEN 650 MG: 325 TABLET ORAL at 09:01

## 2020-10-29 RX ADMIN — RANOLAZINE 500 MG: 500 TABLET, FILM COATED, EXTENDED RELEASE ORAL at 09:35

## 2020-10-29 RX ADMIN — OXYCODONE HYDROCHLORIDE AND ACETAMINOPHEN 1 TABLET: 5; 325 TABLET ORAL at 09:01

## 2020-10-29 RX ADMIN — APIXABAN 5 MG: 5 TABLET, FILM COATED ORAL at 09:01

## 2020-10-29 ASSESSMENT — ENCOUNTER SYMPTOMS
VOMITING: 0
EYE REDNESS: 0
NAUSEA: 0
APNEA: 0
FACIAL SWELLING: 0
CHOKING: 0
BACK PAIN: 0
EYE DISCHARGE: 0
ABDOMINAL PAIN: 0
SHORTNESS OF BREATH: 0

## 2020-10-29 ASSESSMENT — PAIN DESCRIPTION - LOCATION: LOCATION: CHEST

## 2020-10-29 ASSESSMENT — PAIN SCALES - GENERAL
PAINLEVEL_OUTOF10: 8
PAINLEVEL_OUTOF10: 8

## 2020-10-29 ASSESSMENT — PAIN DESCRIPTION - PAIN TYPE: TYPE: ACUTE PAIN

## 2020-10-29 ASSESSMENT — HEART SCORE: ECG: 1

## 2020-10-29 ASSESSMENT — PAIN DESCRIPTION - DESCRIPTORS: DESCRIPTORS: CONSTANT

## 2020-10-29 ASSESSMENT — PAIN DESCRIPTION - ORIENTATION: ORIENTATION: LEFT

## 2020-10-29 NOTE — ED PROVIDER NOTES
ED Attending Attestation Note     Date of evaluation: 10/29/2020    This patient was seen by the [facility] practice provider. I have seen and examined the patient, agree with the workup, evaluation, management and diagnosis. The care plan has been discussed. 58yo male who presents to the ED via EMS with chest pain. He was seen overnight at [facility] but left AMA. He reports he left because he felt as though he was being ignored. On arrival here he appears well overall. He appears anxious. He has clear lungs, benign abdomen, no peripheral edema. When I asked him about following up with cardiology he says that he has that when I asked him what he has been told to do he says he does not know. He continues to smoke though is cutting back. Review of the medical record shows he had a heart cath done on October 1 which showed bypass grafts that were widely patent. He does have significant disease noted which is not amenable to PCI. He is on medication management. Past medical history is notable for anxiety, arthritis, asthma, coronary artery disease, kidney stones, cardiomyopathy, prior CVA, heart failure, COPD, depression, type 2 diabetes, fibromyalgia, GERD, hyperlipidemia, hypertension, liver disease, pacemaker, pneumonia, seizures, TIA, ulcerative colitis. As he has not taken any of his medications this morning he was ordered his home dose medications along with Tylenol. He did request morphine for the pain which given his current vitals and history I let him know I did not feel comfortable giving him IV narcotics. He does take Percocet at home for pain and this home dose was ordered for him. His work-up here is unremarkable including negative troponin and a chest x-ray that is unremarkable.     We reiterated with him the importance of following up with his primary care and cardiologist.  We discussed following up as soon as possible to further discuss what steps he should take when he is having pain as he has known cardiac disease. We discussed return precautions as well which he verbalized understanding of prior to discharge. He walked out of the emergency department with a steady gait without assistance and in no acute distress. EKG Indication: Chest pain   EKG Interpretation: Rate tachycardic 103, rhythm sinus, axis normal.  TN within normal limits.  with right bundle branch block noted. QTc prolonged 534. Nonspecific T wave abnormality noted. No T/ST changes consistent with acute ischemia. Comparison to prior EKG from October 20, 2020 shows consistent T wave abnormality noted with no other acute ischemic changes noted. INITIAL VITALS: BP: (!) 177/107, Temp: 98.5 °F (36.9 °C), Pulse: 104, Resp: 24, SpO2: 98 %   RECENT VITALS:  BP: (!) 162/95,Temp: 98.5 °F (36.9 °C), Pulse: 94, Resp: 12, SpO2: 99 %     Patient was given the following medications:  Orders Placed This Encounter   Medications    aspirin chewable tablet 324 mg    acetaminophen (TYLENOL) tablet 650 mg    carvedilol (COREG) tablet 12.5 mg    sacubitril-valsartan (ENTRESTO) 49-51 MG per tablet 1 tablet    ranolazine (RANEXA) extended release tablet 500 mg    clopidogrel (PLAVIX) tablet 75 mg    apixaban (ELIQUIS) tablet 5 mg    oxyCODONE-acetaminophen (PERCOCET) 5-325 MG per tablet 1 tablet     Critical Care:  Due to the immediate potential for life-threatening deterioration due to abnormal vitals with chest pain, I spent 15 minutes providing critical care. This time excludes time spent performing procedures but includes time spent on direct patient care, history retrieval, review of the chart, and discussions with patient, family, and consultant(s). FINAL IMPRESSION      1. Chest pain, unspecified type    2. Palpitations    3.  Elevated blood pressure reading        DISPOSITION/PLAN   DISPOSITION Decision To Discharge 10/29/2020 09:45:09 AM      PATIENT REFERRED TO:  Elizabeth Farley [facility] [id number]  [phone]    Call in 1 day  As needed, If symptoms worsen      DISCHARGE MEDICATIONS:  Discharge Medication List as of 10/29/2020  9:59 AM               (Please note that portions of this note were completed with a voice recognition program. Efforts were made to edit the dictations but occasionally words are mis-transcribed.)    Meryl Lopez MD (electronically signed)  Attending Emergency Physician       Meryl Lopez MD  10/29/20 [id number]

## 2020-10-29 NOTE — ED NOTES
Unable to completed discharge pain assessment. Patient left without being seen.      Norberto Delgado RN  10/29/20 1007

## 2020-10-29 NOTE — ED PROVIDER NOTES
Rx completed by Heather.         disease     Pacemaker 2012    Medtronic model # [id number]    Pneumonia     Seizures ([facility] [address]     TIA (transient ischemic attack) 2007    Ulcerative colitis ([facility] [address]          Procedure Laterality Date    APPENDECTOMY      BACK SURGERY      CARDIAC SURGERY      CHOLECYSTECTOMY      COLONOSCOPY  01/10/2017    COLONOSCOPY  01/10/2017    CORONARY ANGIOPLASTY WITH STENT PLACEMENT  2012    CORONARY ARTERY BYPASS GRAFT  2010, 11/2015    ENDOSCOPY, COLON, DIAGNOSTIC      PACEMAKER PLACEMENT      UPPER GASTROINTESTINAL ENDOSCOPY  02/07/2017    VASCULAR SURGERY         Medications:  Previous Medications    ACETAMINOPHEN (TYLENOL) 325 MG TABLET    Take 650 mg by mouth every 6 hours as needed for Pain    ALBUTEROL SULFATE HFA (VENTOLIN HFA) 108 (90 BASE) MCG/ACT INHALER    Inhale 2 puffs into the lungs 4 times daily as needed for Wheezing    APIXABAN (ELIQUIS) 5 MG TABS TABLET    Take 1 tablet by mouth 2 times daily    ASPIRIN 325 MG TABLET    Take 325 mg by mouth daily    CARVEDILOL (COREG) 12.5 MG TABLET    Take 12.5 mg by mouth 2 times daily (with meals)    CLOPIDOGREL (PLAVIX) 75 MG TABLET    Take 75 mg by mouth daily    EMPAGLIFLOZIN (JARDIANCE) 10 MG TABLET    Take 1 tablet by mouth daily    GABAPENTIN (NEURONTIN) 600 MG TABLET    1 tab PO 5 times a day    IPRATROPIUM-ALBUTEROL (DUONEB) 0.5-2.5 (3) MG/3ML SOLN NEBULIZER SOLUTION    Inhale 3 mLs into the lungs every 4 hours as needed for Shortness of Breath (wheezing coughing)    ISOSORBIDE MONONITRATE (IMDUR) 60 MG EXTENDED RELEASE TABLET    Take 60 mg by mouth daily    OXYCODONE-ACETAMINOPHEN (PERCOCET)  MG PER TABLET    Take 1 tablet by mouth every 8 hours as needed for Pain for up to 30 days.  Intended supply: 30 days    RANOLAZINE (RANEXA) 500 MG EXTENDED RELEASE TABLET    Take 2 tablets by mouth 2 times daily    RESPIRATORY THERAPY SUPPLIES (NEBULIZER) AALIYAH    Used to give yourself breathing treatment    ROSUVASTATIN (CRESTOR) 40 MG TABLET    Take 40 mg by mouth every evening    SACUBITRIL-VALSARTAN (ENTRESTO) 49-51 MG PER TABLET    Take 1 tablet by mouth 2 times daily    TAMSULOSIN (FLOMAX) 0.4 MG CAPSULE    Take 1 capsule by mouth daily    TRAZODONE (DESYREL) 50 MG TABLET    TAKE 1 TABLET BY MOUTH EVERY NIGHT AT BEDTIME    ZOLPIDEM (AMBIEN) 5 MG TABLET    Take 1 tablet by mouth nightly as needed for Sleep for up to 60 days. Review of Systems:  Review of Systems   Constitutional: Negative for chills and fever. HENT: Negative for congestion, facial swelling and sneezing. Eyes: Negative for discharge and redness. Respiratory: Negative for apnea, choking and shortness of breath. Cardiovascular: Positive for chest pain. Gastrointestinal: Negative for abdominal pain, nausea and vomiting. Genitourinary: Negative for dysuria. Musculoskeletal: Negative for back pain, neck pain and neck stiffness. Neurological: Negative for dizziness, tremors, seizures and headaches. All other systems reviewed and are negative. Positives and Pertinent negatives as per HPI. Except as noted above in the ROS, problem specific ROS was completed and is negative. Physical Exam:  Physical Exam  Vitals signs and nursing note reviewed. Constitutional:       Appearance: He is well-developed. He is not diaphoretic. HENT:      Head: Normocephalic and atraumatic. Nose: Nose normal.      Mouth/Throat:      Mouth: Mucous membranes are moist.      Pharynx: Oropharynx is clear. Eyes:      General:         Right eye: No discharge. Left eye: No discharge. Extraocular Movements: Extraocular movements intact. Conjunctiva/sclera: Conjunctivae normal.      Pupils: Pupils are equal, round, and reactive to light. Neck:      Musculoskeletal: Normal range of motion and neck supple. Cardiovascular:      Rate and Rhythm: Normal rate and regular rhythm. Heart sounds: Normal heart sounds. No murmur. No friction rub. No gallop.     Pulmonary: Effort: Pulmonary effort is normal. No respiratory distress. Breath sounds: Normal breath sounds. No wheezing or rales. Chest:      Chest wall: Tenderness present. Abdominal:      General: Abdomen is flat. Bowel sounds are normal. There is no distension. Palpations: Abdomen is soft. There is no mass. Tenderness: There is no abdominal tenderness. There is no guarding or rebound. Musculoskeletal: Normal range of motion. Skin:     General: Skin is warm and dry. Neurological:      Mental Status: He is alert and oriented to person, place, and time. Psychiatric:         Behavior: Behavior normal.         MEDICAL DECISION MAKING    Vitals:    Vitals:    10/29/20 0810 10/29/20 0820 10/29/20 0840 10/29/20 0850   BP: (!) 177/107 (!) 166/106 (!) 162/95    Pulse: 105 93 94    Resp: 18 18 12    Temp:       TempSrc:       SpO2: 97%  97% 99%   Weight:           LABS:  Labs Reviewed   CBC WITH AUTO DIFFERENTIAL - Abnormal; Notable for the following components:       Result Value    RBC 3.92 (*)     .5 (*)     MCH 35.4 (*)     All other components within normal limits    Narrative:     Performed at:  [address] [address]   Phone [phone]   BASIC METABOLIC PANEL - Abnormal; Notable for the following components:    Sodium 134 (*)     CO2 20 (*)     Glucose 158 (*)     All other components within normal limits    Narrative:     Performed at:  [address] [address]   Phone [phone]   TROPONIN    Narrative:     Performed at:  [address] [address]   Phone [phone] labs reviewed and werenegative at this time or not returned at the time of this note.     RADIOLOGY:   Non-plain film images such as CT, Ultrasound and MRI are read by the radiologist. Amauri Justice PA-C have directly visualized the radiologic plain film image(s) with the below findings:        Interpretation per the Radiologist below, if available at the time of this note:    XR CHEST (2 VW)   Final Result   No acute abnormality              Xr Chest (2 Vw)    Result Date: 10/12/2020  Site: Jenny Gould #: [id number] #: [id number]: Sanna Smith #: [de-identified] #: [id number] #: [id number]: XR CHEST PA AND LATERALExam Date/Time: 10/12/2020 03:35 PMAdmitting Diagnosis: SOB/painReason for Exam: SOB/pain Dictated by: Jolynn Jalloh BETITO: 10/12/2020 04:06 PMT: This document is confidential medical information. Unauthorized disclosure or use of this information is prohibited by law. If you are not the intended recipient of this document, please advise us by calling immediately [phone]. Impression/Conclusion below HISTORY:   SOB/pain sob/pain COMPARISON: 10/5/2020 NOTE:  If there are questions about the content of this report, please contact [address] [place] [address] radiology by calling [phone] FINDINGS: LINES/DEVICES: Left subclavian pacemaker defibrillator. Median sternotomy wires. EKG leads. LUNGS/PLEURA:  Unremarkable HEART:  Unremarkable MEDIASTINUM/CARLEEN:  Unremarkable BONES:  Chronic midthoracic compression fracture. OTHER:  None  IMPRESSION: No radiographic evidence of acute intrathoracic abnormality SIGNED BY: Estefani Garibay MD on 10/12/2020  4:02 PM   [address] [facility] [phone] - [address] [facility] [address]: [phone]  Pulmonary W And Wo Contrast    Result Date: 10/3/2020  Site: Jenny Gould #: [id number] #: [id number]: GSEDAccount #: [de-identified] #: [id number] #: [id number]: CT ANGIOGRAM CHEST FOR PEExam Date/Time: 10/03/2020 01:40 PMAdmitting Diagnosis: Chest pain, suspected PE, high  probabilityReason for Exam: Chest pain, suspected PE, high probability Dictated by: LYNETTE RANGEL  [facility] BETITO: 10/03/2020 02:47 PMT: This document is confidential medical information. Unauthorized disclosure or use of this information is prohibited by law. If you are not the intended recipient of this document, please advise us  by calling immediately [phone]. Impression/Conclusion below 75 HISTORY:   Chest pain, suspected PE, high probability sob . Rule out pulmonary embolus COMPARISON: 7/26/2020 TECHNIQUE: Postcontrast multiplanar CT images of the chest, including 3D MIP reconstructions, with special attention to the pulmonary arteries NOTE:  If there are questions about the content of this report, please contact [facility] radiology by calling [phone]. FINDINGS: PULMONARY ARTERIES:  Enhance normally without filling defect or other evidence of pulmonary embolism LUNGS/AIRWAYS:  Stable 1.4 cm left lower lobe nodule since 2015 consistent with benign etiology. New geographic groundglass opacity mixed with interstitial thickening bilateral upper  lobes PLEURA: Unremarkable. No pleural effusion or pneumothorax MEDIASTINUM/CARLEEN:  Unremarkable HEART/PERICARDIUM:  Prior CABG VESSELS:  Unremarkable. No aneurysm CHEST WALL/LOWER NECK:  Unremarkable UPPER ABDOMEN:  Surgically absent gallbladder. Stable left renal cysts. Stable bilateral adrenal adenoma BONES:  Unremarkable OTHER:  None  IMPRESSION: No evidence of pulmonary embolus New multifocal bilateral upper lobe pneumonia. Pattern is suggestive of atypical/viral pneumonia. SIGNED BY: Tereso Riojas MD on 10/3/2020  2:44 PM   121 [facility] [phone] -  [address] [facility] [address]: [phone]       Xr Chest Portable    Result Date: 10/29/2020  Site: Brandie Penaloza #: [id number] #: [id number]: Nancy Castillo #: [de-identified] #: [id number] #: [id number]: XR CHEST AP PORTABLEExam Date/Time: 10/29/2020 01:15 AMAdmitting Diagnosis: Chest PainReason for Exam: Chest Pain Dictated by: Amanda Metzger BETITO: 10/29/2020 02:07 AMT: This document is confidential medical information. Unauthorized disclosure or use of this information is prohibited by law. If you are not the intended recipient of this document, please advise us by calling immediately [phone]. Impression/Conclusion below HISTORY:   Chest Pain COMPARISON: Previous, most recent 10/13/2020. NOTE:  If there are questions about the content of this report, please contact [address] [place] [address] radiology by calling [phone] FINDINGS: LINES/DEVICES: Monitor leads overlie the chest. Defibrillator/pacing device and its leads are intact to the extent evaluated. LUNGS/PLEURA:  Clear HEART:  Normal heart size and pulmonary vascularity. Stable postsurgical changes from CABG. Long coronary stent is seen to the right of midline. MEDIASTINUM/CARLEEN:  Stable including mild aortic atherosclerosis BONES:  The bony thorax appears grossly intact. OTHER:  No other significant change allowing for some rotation of the patient towards the right   IMPRESSION: No infiltrates or other acute cardiopulmonary disease. SIGNED BY: Lucy Pastor. Joana Cooley MD on 10/29/2020  2:04 AM   121 [facility] [phone] -  [address] [facility] [address]: [phone]      Xr Chest Portable    Result Date: 10/19/2020  EXAMINATION: ONE XRAY VIEW OF THE CHEST 10/19/2020 3:56 pm COMPARISON: 09/06/2020 HISTORY: ORDERING SYSTEM PROVIDED HISTORY: chest pain TECHNOLOGIST PROVIDED HISTORY: Reason for exam:->chest pain Reason for Exam: chest pain FINDINGS: The heart and pulmonary vascularity are within normal limits. There are no focal areas of consolidation or pleural effusion. The osseous structures are intact. The left subclavian pacemaker is unchanged. A metallic stent overlies the right heart border.      No acute abnormality     Xr Chest Portable    Result Date: 10/13/2020  Site: [facility] #: [id number] #: [id number]: GSEDAccount #: [de-identified] #: [id number] #: [id number]: XR CHEST AP PORTABLEExam Date/Time: 10/13/2020 04:25 PMAdmitting Diagnosis: Chest PainReason for Exam: Chest Pain Dictated by: Lexie Cobb [facility] BETITO: 10/13/2020 04:37 PMT: This document is confidential medical information. Unauthorized disclosure or use of this information is prohibited by law. If you are not the intended recipient of this document, please advise us by calling immediately [phone]. Impression/Conclusion below HISTORY:   Chest Pain Chest Pain COMPARISON: 10/12/2020 NOTE:  If there are questions about the content of this report, please contact [address] [place] [address] radiology by calling [phone] FINDINGS: LINES/DEVICES: Left subclavian pacemaker/ICD LUNGS/PLEURA:  Unremarkable HEART:  Unremarkable MEDIASTINUM/CARLEEN:  Unremarkable BONES:  Unremarkable OTHER:  None  IMPRESSION: No acute pulmonary or pleural abnormality. SIGNED BY: Doug Montez MD on 10/13/2020  4:34 PM   [address] [facility] [phone] - 2011 [facility]: [phone]       Xr Chest Portable    Result Date: 10/5/2020  Site: Mayo Fernandes #: [id number] #: [id number]: Ligia Staples #: [de-identified] #: [id number] #: [id number]: XR CHEST AP PORTABLEExam Date/Time: 10/05/2020 01:30 AMAdmitting Diagnosis: Chest painReason for Exam: Chest pain Dictated by: Henry Levi BETITO: 10/05/2020 01:45 AMT: This document is confidential medical information. Unauthorized disclosure or use of this information is prohibited by law. If you are not the intended recipient of this document, please advise us by calling immediately [phone].  Impression/Conclusion below HISTORY:   Chest pain COMPARISON: Previous, most recent dated 10/4/2020 NOTE:  If there are questions about the content of this report, please contact [address] [place] [address] radiology by calling [phone] FINDINGS: LINES/DEVICES: Monitor leads overlie the chest. Defibrillator/pacing device and its leads are intact to the extent evaluated. LUNGS/PLEURA:  No new infiltrates or effusions. Previously observed nodular infiltrate is partially obscured by the posterior left 11th rib. HEART:  Normal heart size and pulmonary vascularity. Stable postsurgical changes from CABG. MEDIASTINUM/CARLEEN:  Stable including mild aortic atherosclerosis. BONES:  No acute osseous abnormality. OTHER:  None  IMPRESSION: No new acute cardiopulmonary disease. Nodular infiltrate seen previously in the left lower lobe is partially obscured by the posterior left 11th rib. SIGNED BY: Nubia Brunner. Baldwin Dandy, MD on 10/5/2020  1:42 AM   121 [facility] [phone] -  2011 [facility]: [phone]       Xr Chest Portable    Result Date: 10/4/2020  Site: Armando Dandy #: [id number] #: [id number]: Irene Kalen #: [de-identified] #: [id number] #: [id number]: XR CHEST AP PORTABLEExam Date/Time: 10/04/2020 12:55 PMAdmitting Diagnosis: Chest painReason for Exam: Chest pain Dictated by: Nancy Hamilton BETITO: 10/04/2020 01:11 PMT: This document is confidential medical information. Unauthorized disclosure or use of this information is prohibited by law. If you are not the intended recipient of this document, please advise us by calling immediately [phone].  Impression/Conclusion below HISTORY:   Chest pain COMPARISON: 10/3/2020 and 9/30/2020 NOTE:  If there are questions about the content of this report, please contact [address] [place] [address] radiology by calling [phone] FINDINGS: LINES/DEVICES: Stable left pacemaker LUNGS/PLEURA:  Unremarkable HEART:  Unremarkable MEDIASTINUM/CARLEEN:  Previous median sternotomy and CABG BONES:  Unremarkable OTHER:  None  IMPRESSION: No acute abnormality SIGNED BY: Hermelindo Campa DO on 10/4/2020  1:08 PM   121 [facility] [phone] -  2011 [facility]: [phone]       Xr Chest Portable    Result Date: 10/3/2020  Site: Farzaneh Bautista #: [id number] #: [id number]: Cortney Paige #: [de-identified] #: [id number] #: [id number]: XR CHEST AP PORTABLEExam Date/Time: 10/03/2020 12:40 PMAdmitting Diagnosis: Chest PainReason for Exam: Chest Pain Dictated by: Joe Gallo BETITO: 10/03/2020 02:04 PMT: This document is confidential medical information. Unauthorized disclosure or use of this information is prohibited by law. If you are not the intended recipient of this document, please advise us by calling immediately [phone]. Impression/Conclusion below HISTORY:   Chest Pain chest pain COMPARISON: 9/3/2020 NOTE:  If there are questions about the content of this report, please contact [address] [place] [address] radiology by calling [phone] FINDINGS: LINES/DEVICES: Implanted left chest device. LUNGS/PLEURA:  Nonacute chest. HEART:  Unremarkable MEDIASTINUM/CARLENE:  Stable postsurgical changes BONES:  Unremarkable OTHER:  None  IMPRESSION: Stable nonacute chest SIGNED BY: Vinh Linares DO on 10/3/2020  2:01 PM   [facility] - [address] [facility] [facility] [phone] -  [facility] Call Center: [phone]       Xr Chest Portable    Result Date: 9/30/2020  Site: [facility] #: [id number] #: [id number]: Cortney Paige #: [de-identified] #: [id number] #: [id number]: XR CHEST AP PORTABLEExam Date/Time: 09/30/2020 10:40 AMAdmitting Diagnosis: Chest painReason for Exam: Chest pain Dictated by: LYNETTE RANGEL [facility] BETITO: 09/30/2020 11:26 AMT: This document is confidential medical information. Unauthorized disclosure or use of this information is prohibited by law. If you are not the intended recipient of this document, please advise us  by calling immediately [phone].  Impression/Conclusion below HISTORY:   Chest pain chest pain COMPARISON: 9/14/2020 NOTE:  If there are questions about the content of this report, please contact [address] radiology by calling [phone] FINDINGS: LINES/DEVICES: Left subclavian pacemaker/ICD. Sternal wires and surgical clips from CABG LUNGS/PLEURA:  Unremarkable HEART:  Unremarkable MEDIASTINUM/CARLEEN:  Unremarkable BONES:  Unremarkable OTHER:  None  IMPRESSION: No significant abnormality SIGNED BY: Alonso Jones. Shakira Medina MD on 9/30/2020 11:23 AM   [address] [phone] - [address]: [phone] / ED COURSE:      PROCEDURES:   Procedures    None    Patient was given:  Medications   aspirin chewable tablet 324 mg (324 mg Oral Given 10/29/20 0901)   acetaminophen (TYLENOL) tablet 650 mg (650 mg Oral Given 10/29/20 0901)   carvedilol (COREG) tablet 12.5 mg (12.5 mg Oral Given 10/29/20 0901)   sacubitril-valsartan (ENTRESTO) 49-51 MG per tablet 1 tablet (1 tablet Oral Given 10/29/20 0935)   ranolazine (RANEXA) extended release tablet 500 mg (500 mg Oral Given 10/29/20 0935)   clopidogrel (PLAVIX) tablet 75 mg (75 mg Oral Given 10/29/20 0901)   apixaban (ELIQUIS) tablet 5 mg (5 mg Oral Given 10/29/20 0901)   oxyCODONE-acetaminophen (PERCOCET) 5-325 MG per tablet 1 tablet (1 tablet Oral Given 10/29/20 0901)       Emergency room course: Patient on exam pupils equal round and reactive to light extraocular movement is intact. Throat is clear. Cardiovascular regular rate rhythm, lungs are clear. No wheeze, rales or rhonchi noted. Reproducible chest wall tenderness with palpation. No flail chest no crepitus noted. No step-off noted. Cardiovascular regular rate rhythm, lungs are clear. No wheeze, rales or rhonchi noted. Abdomen is soft nontender. Nondistended. Normal bowel sounds all 4 quadrant. No CVA no flank tenderness. Bilateral lower extremities show no tenderness no edema. Full range of motion all extremity. Neurologically no motor or sensory deficit noted. Patient is alert oriented x4. Does not appear to be in acute distress. Patient heart score is 5.       Lab results today shows:  CBC with a white count 5.0, RBC 3.52, hemoglobin 13.9 hematocrit 40.6. BMP shows sodium 134, potassium 5.0, chloride 100 with a BUN of 11 and creatinine 1.0. Troponin less than 0.01. Chest x-ray shows no acute abnormality. I discussed with patient his lab results and x-ray results from today with him. Discussed discharge plan. Have him follow-up with his cardiologist.  Continue his home medication. Has had normal vitals. He will be discharged in stable condition. Patient was okay with this plan. The patient tolerated their visit well. I evaluated the patient. The physician was available for consultation as needed. The patient and / or the family were informed of the results of any tests, a time was given to answer questions, a plan was proposed and they agreed with plan. CLINICAL IMPRESSION:  1. Chest pain, unspecified type    2.  Palpitations        DISPOSITION Decision To Discharge 10/29/2020 09:45:09 AM      PATIENT REFERRED TO:  Melissa Ruelas, [address] [facility] [address]  [phone]    Call in 1 day  As needed, If symptoms worsen      DISCHARGE MEDICATIONS:  New Prescriptions    No medications on file       DISCONTINUED MEDICATIONS:  Discontinued Medications    No medications on file              (Please note the MDM and HPI sections of this note were completed with a voice recognition program.  Efforts were made to edit the dictations but occasionally words are mis-transcribed.)    Electronically signed, Helga Falcon PA-C,          Helga Falcon PA-C  10/29/20 0953

## 2020-10-29 NOTE — ED NOTES
Bed: [facility]  Expected date: 10/29/20  Expected time: 7:50 AM  Means of arrival: [facility]  Comments:  54M Chest Pain     Marianna Mauro RN  10/29/20 7562

## 2020-10-30 ENCOUNTER — APPOINTMENT (OUTPATIENT)
Dept: CT IMAGING | Age: 65
End: 2020-10-30
Payer: MEDICARE

## 2020-10-30 ENCOUNTER — APPOINTMENT (OUTPATIENT)
Dept: GENERAL RADIOLOGY | Age: 65
End: 2020-10-30
Payer: MEDICARE

## 2020-10-30 ENCOUNTER — HOSPITAL ENCOUNTER (EMERGENCY)
Age: 65
Discharge: HOME | End: 2020-10-30
Attending: EMERGENCY MEDICINE
Payer: MEDICARE

## 2020-10-30 VITALS
BODY MASS INDEX: 22.54 KG/M2 | OXYGEN SATURATION: 97 % | DIASTOLIC BLOOD PRESSURE: 110 MMHG | RESPIRATION RATE: 25 BRPM | SYSTOLIC BLOOD PRESSURE: 165 MMHG | HEIGHT: 73 IN | HEART RATE: 90 BPM | TEMPERATURE: 97.5 F

## 2020-10-30 LAB
ANION GAP SERPL CALCULATED.3IONS-SCNC: 11 MMOL/L (ref 3–16)
BASOPHILS ABSOLUTE: 0.1 K/UL (ref 0–0.2)
BASOPHILS RELATIVE PERCENT: 1.3 %
BUN BLDV-MCNC: 18 MG/DL (ref 7–20)
CALCIUM SERPL-MCNC: 8.9 MG/DL (ref 8.3–10.6)
CHLORIDE BLD-SCNC: 105 MMOL/L (ref 99–110)
CO2: 25 MMOL/L (ref 21–32)
CREAT SERPL-MCNC: 1.2 MG/DL (ref 0.8–1.3)
EKG ATRIAL RATE: 111 BPM
EKG DIAGNOSIS: NORMAL
EKG P AXIS: 56 DEGREES
EKG P-R INTERVAL: 128 MS
EKG Q-T INTERVAL: 372 MS
EKG QRS DURATION: 130 MS
EKG QTC CALCULATION (BAZETT): 505 MS
EKG R AXIS: 86 DEGREES
EKG T AXIS: 24 DEGREES
EKG VENTRICULAR RATE: 111 BPM
EOSINOPHILS ABSOLUTE: 0.1 K/UL (ref 0–0.6)
EOSINOPHILS RELATIVE PERCENT: 1.9 %
GFR AFRICAN AMERICAN: >60
GFR NON-AFRICAN AMERICAN: >60
GLUCOSE BLD-MCNC: 109 MG/DL (ref 70–99)
HCT VFR BLD CALC: 41.3 % (ref 40.5–52.5)
HEMOGLOBIN: 13.8 G/DL (ref 13.5–17.5)
LYMPHOCYTES ABSOLUTE: 1.5 K/UL (ref 1–5.1)
LYMPHOCYTES RELATIVE PERCENT: 25.6 %
MCH RBC QN AUTO: 34.8 PG (ref 26–34)
MCHC RBC AUTO-ENTMCNC: 33.5 G/DL (ref 31–36)
MCV RBC AUTO: 103.9 FL (ref 80–100)
MONOCYTES ABSOLUTE: 0.6 K/UL (ref 0–1.3)
MONOCYTES RELATIVE PERCENT: 9.4 %
NEUTROPHILS ABSOLUTE: 3.7 K/UL (ref 1.7–7.7)
NEUTROPHILS RELATIVE PERCENT: 61.8 %
PDW BLD-RTO: 14.9 % (ref 12.4–15.4)
PLATELET # BLD: 174 K/UL (ref 135–450)
PMV BLD AUTO: 8.8 FL (ref 5–10.5)
POTASSIUM REFLEX MAGNESIUM: 4 MMOL/L (ref 3.5–5.1)
RBC # BLD: 3.98 M/UL (ref 4.2–5.9)
SODIUM BLD-SCNC: 141 MMOL/L (ref 136–145)
TROPONIN: <0.01 NG/ML
WBC # BLD: 6 K/UL (ref 4–11)

## 2020-10-30 PROCEDURE — 71250 CT THORAX DX C-: CPT

## 2020-10-30 PROCEDURE — 6370000000 HC RX 637 (ALT 250 FOR IP): Performed by: EMERGENCY MEDICINE

## 2020-10-30 PROCEDURE — 85025 COMPLETE CBC W/AUTO DIFF WBC: CPT

## 2020-10-30 PROCEDURE — 93010 ELECTROCARDIOGRAM REPORT: CPT | Performed by: INTERNAL MEDICINE

## 2020-10-30 PROCEDURE — 93005 ELECTROCARDIOGRAM TRACING: CPT | Performed by: EMERGENCY MEDICINE

## 2020-10-30 PROCEDURE — 99285 EMERGENCY DEPT VISIT HI MDM: CPT

## 2020-10-30 PROCEDURE — 3209999900 CT LUMBAR SPINE TRAUMA RECONSTRUCTION

## 2020-10-30 PROCEDURE — 80048 BASIC METABOLIC PNL TOTAL CA: CPT

## 2020-10-30 PROCEDURE — 70450 CT HEAD/BRAIN W/O DYE: CPT

## 2020-10-30 PROCEDURE — 84484 ASSAY OF TROPONIN QUANT: CPT

## 2020-10-30 PROCEDURE — 73562 X-RAY EXAM OF KNEE 3: CPT

## 2020-10-30 RX ORDER — ACETAMINOPHEN 325 MG/1
650 TABLET ORAL ONCE
Status: DISCONTINUED | OUTPATIENT
Start: 2020-10-30 | End: 2020-10-30

## 2020-10-30 RX ORDER — OXYCODONE HYDROCHLORIDE AND ACETAMINOPHEN 5; 325 MG/1; MG/1
2 TABLET ORAL ONCE
Status: COMPLETED | OUTPATIENT
Start: 2020-10-30 | End: 2020-10-30

## 2020-10-30 RX ADMIN — OXYCODONE HYDROCHLORIDE AND ACETAMINOPHEN 2 TABLET: 5; 325 TABLET ORAL at 03:03

## 2020-10-30 ASSESSMENT — ENCOUNTER SYMPTOMS
EYE ITCHING: 0
BACK PAIN: 1
EYE DISCHARGE: 0
ABDOMINAL DISTENTION: 0

## 2020-10-30 ASSESSMENT — PAIN DESCRIPTION - PROGRESSION: CLINICAL_PROGRESSION: GRADUALLY WORSENING

## 2020-10-30 ASSESSMENT — PAIN DESCRIPTION - ONSET: ONSET: SUDDEN

## 2020-10-30 ASSESSMENT — PAIN SCALES - GENERAL
PAINLEVEL_OUTOF10: 8
PAINLEVEL_OUTOF10: 8

## 2020-10-30 ASSESSMENT — PAIN - FUNCTIONAL ASSESSMENT: PAIN_FUNCTIONAL_ASSESSMENT: PREVENTS OR INTERFERES SOME ACTIVE ACTIVITIES AND ADLS

## 2020-10-30 ASSESSMENT — PAIN DESCRIPTION - DESCRIPTORS: DESCRIPTORS: ACHING

## 2020-10-30 ASSESSMENT — PAIN DESCRIPTION - ORIENTATION: ORIENTATION: LEFT

## 2020-10-30 ASSESSMENT — PAIN DESCRIPTION - PAIN TYPE: TYPE: ACUTE PAIN

## 2020-10-30 ASSESSMENT — PAIN DESCRIPTION - LOCATION: LOCATION: BACK
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
## 2020-10-30 NOTE — ED NOTES
Patient requesting pain medicine for arm and leg, but refused tylenol stating \"that's junk\" and \"doesn't work\".      Dwana Snellen, RN  10/30/20 5674

## 2020-10-30 NOTE — ED NOTES
Bed: [facility]  Expected date:   Expected time:   Means of arrival:   Comments:  [address] [facility]  10/30/20 5277
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
## 2020-11-02 ENCOUNTER — TELEPHONE (OUTPATIENT)
Dept: OTHER | Facility: CLINIC | Age: 65
End: 2020-11-02

## 2020-11-02 NOTE — TELEPHONE ENCOUNTER
RN Access attempted to contact pt to notify him of ED f/u appt. Attempt unsuccessful. Voicemail left stating time and date of appt.

## 2020-11-02 NOTE — TELEPHONE ENCOUNTER
RN Access contacted Dr. Chico Pereira Office to schedule ED f/u appt. Spoke with office, appt scheduled for Tuesday 11-3 @2:20pm with Dr. Chico Pereira.

## 2020-11-28 ENCOUNTER — APPOINTMENT (OUTPATIENT)
Dept: GENERAL RADIOLOGY | Age: 65
End: 2020-11-28
Payer: MEDICARE

## 2020-11-28 ENCOUNTER — HOSPITAL ENCOUNTER (EMERGENCY)
Age: 65
Discharge: HOME | End: 2020-11-28
Attending: EMERGENCY MEDICINE
Payer: MEDICARE

## 2020-11-28 VITALS
RESPIRATION RATE: 24 BRPM | HEART RATE: 107 BPM | SYSTOLIC BLOOD PRESSURE: 174 MMHG | TEMPERATURE: 98.1 F | DIASTOLIC BLOOD PRESSURE: 90 MMHG | OXYGEN SATURATION: 99 %

## 2020-11-28 LAB
ANION GAP SERPL CALCULATED.3IONS-SCNC: 11 MMOL/L (ref 3–16)
BASOPHILS ABSOLUTE: 0.2 K/UL (ref 0–0.2)
BASOPHILS RELATIVE PERCENT: 3.4 %
BUN BLDV-MCNC: 21 MG/DL (ref 7–20)
CALCIUM SERPL-MCNC: 9.1 MG/DL (ref 8.3–10.6)
CHLORIDE BLD-SCNC: 105 MMOL/L (ref 99–110)
CO2: 24 MMOL/L (ref 21–32)
CREAT SERPL-MCNC: 1.6 MG/DL (ref 0.8–1.3)
EKG ATRIAL RATE: 111 BPM
EKG DIAGNOSIS: NORMAL
EKG P AXIS: 68 DEGREES
EKG P-R INTERVAL: 126 MS
EKG Q-T INTERVAL: 386 MS
EKG QRS DURATION: 126 MS
EKG QTC CALCULATION (BAZETT): 524 MS
EKG R AXIS: 71 DEGREES
EKG T AXIS: 39 DEGREES
EKG VENTRICULAR RATE: 111 BPM
EOSINOPHILS ABSOLUTE: 0.2 K/UL (ref 0–0.6)
EOSINOPHILS RELATIVE PERCENT: 3 %
GFR AFRICAN AMERICAN: 53
GFR NON-AFRICAN AMERICAN: 44
GLUCOSE BLD-MCNC: 111 MG/DL (ref 70–99)
HCT VFR BLD CALC: 39 % (ref 40.5–52.5)
HEMOGLOBIN: 13.2 G/DL (ref 13.5–17.5)
LYMPHOCYTES ABSOLUTE: 2.4 K/UL (ref 1–5.1)
LYMPHOCYTES RELATIVE PERCENT: 32.4 %
MCH RBC QN AUTO: 34.8 PG (ref 26–34)
MCHC RBC AUTO-ENTMCNC: 33.7 G/DL (ref 31–36)
MCV RBC AUTO: 103.3 FL (ref 80–100)
MONOCYTES ABSOLUTE: 0.5 K/UL (ref 0–1.3)
MONOCYTES RELATIVE PERCENT: 6.6 %
NEUTROPHILS ABSOLUTE: 4 K/UL (ref 1.7–7.7)
NEUTROPHILS RELATIVE PERCENT: 54.6 %
PDW BLD-RTO: 14.8 % (ref 12.4–15.4)
PLATELET # BLD: 229 K/UL (ref 135–450)
PMV BLD AUTO: 8.6 FL (ref 5–10.5)
POTASSIUM REFLEX MAGNESIUM: 3.9 MMOL/L (ref 3.5–5.1)
PRO-BNP: 2415 PG/ML (ref 0–124)
RBC # BLD: 3.78 M/UL (ref 4.2–5.9)
SODIUM BLD-SCNC: 140 MMOL/L (ref 136–145)
TROPONIN: <0.01 NG/ML
WBC # BLD: 7.3 K/UL (ref 4–11)

## 2020-11-28 PROCEDURE — 83880 ASSAY OF NATRIURETIC PEPTIDE: CPT

## 2020-11-28 PROCEDURE — 71045 X-RAY EXAM CHEST 1 VIEW: CPT

## 2020-11-28 PROCEDURE — 85025 COMPLETE CBC W/AUTO DIFF WBC: CPT

## 2020-11-28 PROCEDURE — 6370000000 HC RX 637 (ALT 250 FOR IP): Performed by: EMERGENCY MEDICINE

## 2020-11-28 PROCEDURE — 80048 BASIC METABOLIC PNL TOTAL CA: CPT

## 2020-11-28 PROCEDURE — 93010 ELECTROCARDIOGRAM REPORT: CPT | Performed by: INTERNAL MEDICINE

## 2020-11-28 PROCEDURE — 93005 ELECTROCARDIOGRAM TRACING: CPT | Performed by: EMERGENCY MEDICINE

## 2020-11-28 PROCEDURE — 84484 ASSAY OF TROPONIN QUANT: CPT

## 2020-11-28 PROCEDURE — 99284 EMERGENCY DEPT VISIT MOD MDM: CPT

## 2020-11-28 RX ORDER — OXYCODONE HYDROCHLORIDE AND ACETAMINOPHEN 5; 325 MG/1; MG/1
2 TABLET ORAL ONCE
Status: COMPLETED | OUTPATIENT
Start: 2020-11-28 | End: 2020-11-28

## 2020-11-28 RX ORDER — 0.9 % SODIUM CHLORIDE 0.9 %
250 INTRAVENOUS SOLUTION INTRAVENOUS ONCE
Status: DISCONTINUED | OUTPATIENT
Start: 2020-11-28 | End: 2020-11-28 | Stop reason: HOSPADM

## 2020-11-28 RX ORDER — GABAPENTIN 300 MG/1
600 CAPSULE ORAL ONCE
Status: COMPLETED | OUTPATIENT
Start: 2020-11-28 | End: 2020-11-28

## 2020-11-28 RX ADMIN — GABAPENTIN 600 MG: 300 CAPSULE ORAL at 03:01

## 2020-11-28 RX ADMIN — OXYCODONE AND ACETAMINOPHEN 2 TABLET: 5; 325 TABLET ORAL at 03:01

## 2020-11-28 ASSESSMENT — PAIN DESCRIPTION - DESCRIPTORS: DESCRIPTORS: ACHING

## 2020-11-28 ASSESSMENT — ENCOUNTER SYMPTOMS
BLOOD IN STOOL: 0
DIARRHEA: 0
STRIDOR: 0
ABDOMINAL PAIN: 0
WHEEZING: 0
PHOTOPHOBIA: 0
ANAL BLEEDING: 0
COUGH: 0
NAUSEA: 0
APNEA: 0
SHORTNESS OF BREATH: 0
ABDOMINAL DISTENTION: 0
EYE DISCHARGE: 0
RECTAL PAIN: 0
CONSTIPATION: 0
EYE ITCHING: 0
BACK PAIN: 0
COLOR CHANGE: 0
EYE PAIN: 0
CHEST TIGHTNESS: 0
VOMITING: 0
EYE REDNESS: 0
CHOKING: 0

## 2020-11-28 ASSESSMENT — PAIN SCALES - GENERAL
PAINLEVEL_OUTOF10: 5
PAINLEVEL_OUTOF10: 9

## 2020-11-28 ASSESSMENT — PAIN DESCRIPTION - LOCATION: LOCATION: CHEST

## 2020-11-28 ASSESSMENT — PAIN DESCRIPTION - PAIN TYPE: TYPE: ACUTE PAIN

## 2020-11-28 NOTE — ED PROVIDER NOTES
[address] [facility]      Pt Name: Bernice Landaverde  MRN: [id number]  [place] 1955  Date of evaluation: 11/28/2020  Provider: Brian Coleman MD    CHIEF COMPLAINT       Chief Complaint   Patient presents with    AICD Problem     Pt states defibrilator fired tonight 1 time, CP since then       Angelika is a 59 y.o. male who presents to the emergency department with AICD problem. States his AICD went off tonight and now has chest pain 2/2 that. Pain is acute 9/10 sharp and constant in nature. Has been taking OTC medications with minimal relief. Nothing makes symptoms better but movement makes symptoms worse. This has never happened before. No other associated symptoms other than previously mentioned. Nursing Notes were reviewed. Including nursing noted for FM, Surgical History, Past Medical History, Social History, vitals, and allergies; agree with all. REVIEW OF SYSTEMS       Review of Systems   Constitutional: Negative for activity change, appetite change, chills, diaphoresis, fatigue, fever and unexpected weight change. HENT: Negative for congestion, dental problem, drooling, ear discharge and ear pain. Eyes: Negative for photophobia, pain, discharge, redness, itching and visual disturbance. Respiratory: Negative for apnea, cough, choking, chest tightness, shortness of breath, wheezing and stridor. Cardiovascular: Positive for chest pain. Negative for palpitations and leg swelling. Gastrointestinal: Negative for abdominal distention, abdominal pain, anal bleeding, blood in stool, constipation, diarrhea, nausea, rectal pain and vomiting. Endocrine: Negative for cold intolerance and heat intolerance. Genitourinary: Negative for decreased urine volume and urgency. Musculoskeletal: Negative for arthralgias and back pain. Skin: Negative for color change and pallor.    Neurological: Negative tablet,R-0Normal      isosorbide mononitrate (IMDUR) 60 MG extended release tablet Take 60 mg by mouth dailyHistorical Med      clopidogrel (PLAVIX) 75 MG tablet Take 75 mg by mouth dailyHistorical Med      carvedilol (COREG) 12.5 MG tablet Take 12.5 mg by mouth 2 times daily (with meals)Historical Med      rosuvastatin (CRESTOR) 40 MG tablet Take 40 mg by mouth every eveningHistorical Med      sacubitril-valsartan (ENTRESTO) 49-51 MG per tablet Take 1 tablet by mouth 2 times dailyHistorical Med      gabapentin (NEURONTIN) 600 MG tablet 1 tab PO 5 times a day, Disp-150 tablet,R-1Normal      Respiratory Therapy Supplies (NEBULIZER) AALIYAH Disp-1 Device,R-0, PrintUsed to give yourself breathing treatment      ipratropium-albuterol (DUONEB) 0.5-2.5 (3) MG/3ML SOLN nebulizer solution Inhale 3 mLs into the lungs every 4 hours as needed for Shortness of Breath (wheezing coughing), Disp-360 mL,R-5Print      aspirin 325 MG tablet Take 325 mg by mouth dailyHistorical Med      traZODone (DESYREL) 50 MG tablet TAKE 1 TABLET BY MOUTH EVERY NIGHT AT BEDTIME, Disp-30 tablet,R-2Normal      albuterol sulfate HFA (VENTOLIN HFA) 108 (90 Base) MCG/ACT inhaler Inhale 2 puffs into the lungs 4 times daily as needed for Wheezing, Disp-1 Inhaler,R-1Normal      apixaban (ELIQUIS) 5 MG TABS tablet Take 1 tablet by mouth 2 times daily, Disp-60 tablet,R-1Normal      empagliflozin (JARDIANCE) 10 MG tablet Take 1 tablet by mouth daily, Disp-30 tablet,R-0Normal      tamsulosin (FLOMAX) 0.4 MG capsule Take 1 capsule by mouth daily, Disp-30 capsule,R-0Normal      ranolazine (RANEXA) 500 MG extended release tablet Take 2 tablets by mouth 2 times daily, Disp-60 tablet,R-0Normal      acetaminophen (TYLENOL) 325 MG tablet Take 650 mg by mouth every 6 hours as needed for PainHistorical Med             ALLERGIES     Dopamine hcl; Tramadol; and Melatonin    FAMILY HISTORY        Family History   Problem Relation Age of Onset    Coronary Art Dis Father    [facility] Cancer Mother         Lung with mets    Diabetes Mother        SOCIAL HISTORY       Social History     Socioeconomic History    Marital status:      Spouse name: None    Number of children: 1    Years of education: None    Highest education level: None   Occupational History    Occupation: disabled   Social Needs    Financial resource strain: None    Food insecurity     Worry: None     Inability: None    Transportation needs     Medical: None     Non-medical: None   Tobacco Use    Smoking status: Current Every Day Smoker     Packs/day: 1.00     Years: 44.00     Pack years: 44.00     Types: Cigarettes    Smokeless tobacco: Never Used    Tobacco comment: cutting down   Substance and Sexual Activity    Alcohol use: No     Alcohol/week: 0.0 standard drinks    Drug use: No    Sexual activity: Not Currently     Partners: Female   Lifestyle    Physical activity     Days per week: None     Minutes per session: None    Stress: None   Relationships    Social connections     Talks on phone: None     Gets together: None     Attends Restoration service: None     Active member of club or organization: None     Attends meetings of clubs or organizations: None     Relationship status: None    Intimate partner violence     Fear of current or ex partner: None     Emotionally abused: None     Physically abused: None     Forced sexual activity: None   Other Topics Concern    None   Social History Narrative    None       PHYSICAL EXAM       ED Triage Vitals [11/28/20 0030]   BP Temp Temp Source Pulse Resp SpO2 Height Weight   (!) 174/90 98.1 °F (36.7 °C) Oral 107 24 99 % -- --       Physical Exam  Vitals signs and nursing note reviewed. Constitutional:       General: He is not in acute distress. Appearance: He is well-developed. He is not diaphoretic. HENT:      Head: Normocephalic and atraumatic. Eyes:      General:         Right eye: No discharge. Left eye: No discharge.       Pupils: Pupils are equal, round, and reactive to light. Neck:      Musculoskeletal: Normal range of motion. Thyroid: No thyromegaly. Trachea: No tracheal deviation. Cardiovascular:      Rate and Rhythm: Normal rate and regular rhythm. Heart sounds: No murmur. Comments: TTP over chest wall  Pulmonary:      Breath sounds: No wheezing or rales. Chest:      Chest wall: No tenderness. Abdominal:      General: There is no distension. Palpations: Abdomen is soft. There is no mass. Tenderness: There is no abdominal tenderness. There is no guarding or rebound. Musculoskeletal: Normal range of motion. General: No tenderness or deformity. Skin:     General: Skin is warm. Coloration: Skin is not pale. Findings: No erythema or rash. Neurological:      Mental Status: He is alert. Cranial Nerves: No cranial nerve deficit. Motor: No abnormal muscle tone.       Coordination: Coordination normal.         DIAGNOSTIC RESULTS     EKG: All EKG's are interpreted by the Emergency Department Physician who either signs or Co-signs this chart in the absence of acardiologist.    EKG shows sinus with RBBB no ectopy no acute changes similar to EKG on 10/30/20    RADIOLOGY:   Non-plain film images such as CT, Ultrasoundand MRI are read by the radiologist. Plain radiographic images are visualized and preliminarily interpreted by the emergency physician with the below findings:    CXR clear    ED BEDSIDE ULTRASOUND:   Performed by ED Physician - none    LABS:  Labs Reviewed   CBC WITH AUTO DIFFERENTIAL - Abnormal; Notable for the following components:       Result Value    RBC 3.78 (*)     Hemoglobin 13.2 (*)     Hematocrit 39.0 (*)     .3 (*)     MCH 34.8 (*)     All other components within normal limits    Narrative:     Performed at:  [address] [address]   Phone [phone]   Gabriela Firenza [address] FOR LOW K - Abnormal; Notable for the following components:    Glucose 111 (*)     BUN 21 (*)     CREATININE 1.6 (*)     GFR Non- 44 (*)     GFR  48 (*)     All other components within normal limits    Narrative:     Performed at:  [facility]  [address] [place], [place] [employer] [address]   Phone [phone]   BRAIN NATRIURETIC PEPTIDE - Abnormal; Notable for the following components:    Pro-BNP 2,415 (*)     All other components within normal limits    Narrative:     Performed at:  [facility]  [address] [place], [place] [employer] [place] [address]   Phone [phone]   TROPONIN    Narrative:     Performed at:  [facility]  [address], [address] [address]   Phone [phone]       All other labs were withinnormal range or not returned as of this dictation. EMERGENCY DEPARTMENT COURSE and DIFFERENTIAL DIAGNOSIS/MDM:     PMH, Surgical Hx, FH, Social Hx reviewed by myself (ETOH usage, Tobacco usage, Drug usage reviewed by myself, no pertinent Hx)- No Pertinent Hx     Old records were reviewed by me    Had [employer] AICD interrogated. No shocks delivered tonight. Has had multiple cardiac work up. History not ACS related. Appears MSK in nature. I estimate there is LOW risk for Sepsis, MI, Stroke, Tamponade, PTX, Toxicity or other life threatening etiology thus I consider the discharge disposition reasonable. The patient is at low risk for mortality based on demographic, history and clinical factors. Given the best available information and clinical assessment, I estimate the risk of hospitalization to be greater than risk of treatment at home. I have explained to the patient that the risk could rapidly change, given precautions for return and instructions. Explained to patient that the risk for mortality is low based on demographic, history and clinical factors.      I discussed

## 2020-11-28 NOTE — ED NOTES
Bed: C-29  Expected date: 11/28/20  Expected time: 12:08 AM  Means of arrival: [facility]  Comments:  Defib fired     Brian Gómez RN  11/28/20 1696

## 2020-12-10 ENCOUNTER — TELEPHONE (OUTPATIENT)
Dept: PHARMACY | Age: 65
End: 2020-12-10

## 2021-03-03 ENCOUNTER — HOSPITAL ENCOUNTER (INPATIENT)
Age: 66
LOS: 3 days | Discharge: HOME | DRG: 303 | End: 2021-03-06
Attending: EMERGENCY MEDICINE | Admitting: HOSPITALIST
Payer: MEDICARE

## 2021-03-03 ENCOUNTER — APPOINTMENT (OUTPATIENT)
Dept: GENERAL RADIOLOGY | Age: 66
DRG: 303 | End: 2021-03-03
Payer: MEDICARE

## 2021-03-03 ENCOUNTER — APPOINTMENT (OUTPATIENT)
Dept: CT IMAGING | Age: 66
DRG: 303 | End: 2021-03-03
Payer: MEDICARE

## 2021-03-03 DIAGNOSIS — Z79.02 ANTIPLATELET OR ANTITHROMBOTIC LONG-TERM USE: ICD-10-CM

## 2021-03-03 DIAGNOSIS — F51.01 PRIMARY INSOMNIA: ICD-10-CM

## 2021-03-03 DIAGNOSIS — R07.9 CHEST PAIN, UNSPECIFIED TYPE: Primary | ICD-10-CM

## 2021-03-03 DIAGNOSIS — R77.8 ELEVATED TROPONIN: ICD-10-CM

## 2021-03-03 LAB
A/G RATIO: 1.3 (ref 1.1–2.2)
ALBUMIN SERPL-MCNC: 3.9 G/DL (ref 3.4–5)
ALP BLD-CCNC: 57 U/L (ref 40–129)
ALT SERPL-CCNC: <5 U/L (ref 10–40)
ANION GAP SERPL CALCULATED.3IONS-SCNC: 13 MMOL/L (ref 3–16)
AST SERPL-CCNC: 14 U/L (ref 15–37)
BASOPHILS ABSOLUTE: 0.1 K/UL (ref 0–0.2)
BASOPHILS RELATIVE PERCENT: 1.1 %
BILIRUB SERPL-MCNC: <0.2 MG/DL (ref 0–1)
BUN BLDV-MCNC: 18 MG/DL (ref 7–20)
CALCIUM SERPL-MCNC: 8.9 MG/DL (ref 8.3–10.6)
CHLORIDE BLD-SCNC: 102 MMOL/L (ref 99–110)
CO2: 21 MMOL/L (ref 21–32)
CREAT SERPL-MCNC: 1 MG/DL (ref 0.8–1.3)
D DIMER: 238 NG/ML DDU (ref 0–229)
EOSINOPHILS ABSOLUTE: 0.1 K/UL (ref 0–0.6)
EOSINOPHILS RELATIVE PERCENT: 1.6 %
GFR AFRICAN AMERICAN: >60
GFR NON-AFRICAN AMERICAN: >60
GLOBULIN: 3.1 G/DL
GLUCOSE BLD-MCNC: 100 MG/DL (ref 70–99)
GLUCOSE BLD-MCNC: 139 MG/DL (ref 70–99)
HCT VFR BLD CALC: 38.4 % (ref 40.5–52.5)
HEMOGLOBIN: 13 G/DL (ref 13.5–17.5)
LIPASE: 33 U/L (ref 13–60)
LYMPHOCYTES ABSOLUTE: 2.4 K/UL (ref 1–5.1)
LYMPHOCYTES RELATIVE PERCENT: 38.6 %
MCH RBC QN AUTO: 34.7 PG (ref 26–34)
MCHC RBC AUTO-ENTMCNC: 33.7 G/DL (ref 31–36)
MCV RBC AUTO: 102.8 FL (ref 80–100)
MONOCYTES ABSOLUTE: 0.5 K/UL (ref 0–1.3)
MONOCYTES RELATIVE PERCENT: 7.6 %
NEUTROPHILS ABSOLUTE: 3.2 K/UL (ref 1.7–7.7)
NEUTROPHILS RELATIVE PERCENT: 51.1 %
PDW BLD-RTO: 14.9 % (ref 12.4–15.4)
PERFORMED ON: ABNORMAL
PLATELET # BLD: 214 K/UL (ref 135–450)
PMV BLD AUTO: 9.5 FL (ref 5–10.5)
POTASSIUM REFLEX MAGNESIUM: 4.5 MMOL/L (ref 3.5–5.1)
PRO-BNP: 1970 PG/ML (ref 0–124)
RBC # BLD: 3.74 M/UL (ref 4.2–5.9)
REASON FOR REJECTION: NORMAL
REJECTED TEST: NORMAL
SODIUM BLD-SCNC: 136 MMOL/L (ref 136–145)
TOTAL PROTEIN: 7 G/DL (ref 6.4–8.2)
TROPONIN: 0.02 NG/ML
TSH REFLEX: 1.86 UIU/ML (ref 0.27–4.2)
WBC # BLD: 6.3 K/UL (ref 4–11)

## 2021-03-03 PROCEDURE — 71260 CT THORAX DX C+: CPT

## 2021-03-03 PROCEDURE — 99285 EMERGENCY DEPT VISIT HI MDM: CPT

## 2021-03-03 PROCEDURE — G0378 HOSPITAL OBSERVATION PER HR: HCPCS

## 2021-03-03 PROCEDURE — 96376 TX/PRO/DX INJ SAME DRUG ADON: CPT

## 2021-03-03 PROCEDURE — 84484 ASSAY OF TROPONIN QUANT: CPT

## 2021-03-03 PROCEDURE — 83880 ASSAY OF NATRIURETIC PEPTIDE: CPT

## 2021-03-03 PROCEDURE — 80053 COMPREHEN METABOLIC PANEL: CPT

## 2021-03-03 PROCEDURE — 6360000004 HC RX CONTRAST MEDICATION: Performed by: NURSE PRACTITIONER

## 2021-03-03 PROCEDURE — 93005 ELECTROCARDIOGRAM TRACING: CPT | Performed by: EMERGENCY MEDICINE

## 2021-03-03 PROCEDURE — 1200000000 HC SEMI PRIVATE

## 2021-03-03 PROCEDURE — 85379 FIBRIN DEGRADATION QUANT: CPT

## 2021-03-03 PROCEDURE — 85025 COMPLETE CBC W/AUTO DIFF WBC: CPT

## 2021-03-03 PROCEDURE — 71045 X-RAY EXAM CHEST 1 VIEW: CPT

## 2021-03-03 PROCEDURE — 83690 ASSAY OF LIPASE: CPT

## 2021-03-03 PROCEDURE — 96374 THER/PROPH/DIAG INJ IV PUSH: CPT

## 2021-03-03 PROCEDURE — 84443 ASSAY THYROID STIM HORMONE: CPT

## 2021-03-03 PROCEDURE — 36415 COLL VENOUS BLD VENIPUNCTURE: CPT

## 2021-03-03 PROCEDURE — 6360000002 HC RX W HCPCS: Performed by: NURSE PRACTITIONER

## 2021-03-03 RX ORDER — CARVEDILOL 12.5 MG/1
12.5 TABLET ORAL 2 TIMES DAILY WITH MEALS
Status: DISCONTINUED | OUTPATIENT
Start: 2021-03-04 | End: 2021-03-06 | Stop reason: HOSPADM

## 2021-03-03 RX ORDER — DEXTROSE MONOHYDRATE 25 G/50ML
12.5 INJECTION, SOLUTION INTRAVENOUS PRN
Status: DISCONTINUED | OUTPATIENT
Start: 2021-03-03 | End: 2021-03-06 | Stop reason: HOSPADM

## 2021-03-03 RX ORDER — POLYETHYLENE GLYCOL 3350 17 G/17G
17 POWDER, FOR SOLUTION ORAL DAILY PRN
Status: DISCONTINUED | OUTPATIENT
Start: 2021-03-03 | End: 2021-03-06 | Stop reason: HOSPADM

## 2021-03-03 RX ORDER — SODIUM CHLORIDE 0.9 % (FLUSH) 0.9 %
10 SYRINGE (ML) INJECTION EVERY 12 HOURS SCHEDULED
Status: DISCONTINUED | OUTPATIENT
Start: 2021-03-03 | End: 2021-03-06 | Stop reason: HOSPADM

## 2021-03-03 RX ORDER — ACETAMINOPHEN 650 MG/1
650 SUPPOSITORY RECTAL EVERY 6 HOURS PRN
Status: DISCONTINUED | OUTPATIENT
Start: 2021-03-03 | End: 2021-03-06 | Stop reason: HOSPADM

## 2021-03-03 RX ORDER — GABAPENTIN 300 MG/1
600 CAPSULE ORAL
Status: DISCONTINUED | OUTPATIENT
Start: 2021-03-03 | End: 2021-03-06 | Stop reason: HOSPADM

## 2021-03-03 RX ORDER — IPRATROPIUM BROMIDE AND ALBUTEROL SULFATE 2.5; .5 MG/3ML; MG/3ML
1 SOLUTION RESPIRATORY (INHALATION) EVERY 4 HOURS PRN
Status: DISCONTINUED | OUTPATIENT
Start: 2021-03-03 | End: 2021-03-06 | Stop reason: HOSPADM

## 2021-03-03 RX ORDER — TRAZODONE HYDROCHLORIDE 50 MG/1
50 TABLET ORAL NIGHTLY PRN
Status: DISCONTINUED | OUTPATIENT
Start: 2021-03-03 | End: 2021-03-06 | Stop reason: HOSPADM

## 2021-03-03 RX ORDER — ASPIRIN 81 MG/1
81 TABLET, CHEWABLE ORAL DAILY
Status: DISCONTINUED | OUTPATIENT
Start: 2021-03-04 | End: 2021-03-06 | Stop reason: HOSPADM

## 2021-03-03 RX ORDER — NICOTINE POLACRILEX 4 MG
15 LOZENGE BUCCAL PRN
Status: DISCONTINUED | OUTPATIENT
Start: 2021-03-03 | End: 2021-03-06 | Stop reason: HOSPADM

## 2021-03-03 RX ORDER — SODIUM CHLORIDE 0.9 % (FLUSH) 0.9 %
10 SYRINGE (ML) INJECTION PRN
Status: DISCONTINUED | OUTPATIENT
Start: 2021-03-03 | End: 2021-03-06 | Stop reason: HOSPADM

## 2021-03-03 RX ORDER — CLOPIDOGREL BISULFATE 75 MG/1
75 TABLET ORAL DAILY
Status: DISCONTINUED | OUTPATIENT
Start: 2021-03-04 | End: 2021-03-06 | Stop reason: HOSPADM

## 2021-03-03 RX ORDER — ISOSORBIDE MONONITRATE 60 MG/1
60 TABLET, EXTENDED RELEASE ORAL DAILY
Status: DISCONTINUED | OUTPATIENT
Start: 2021-03-04 | End: 2021-03-05

## 2021-03-03 RX ORDER — ROSUVASTATIN CALCIUM 40 MG/1
40 TABLET, COATED ORAL EVERY EVENING
Status: DISCONTINUED | OUTPATIENT
Start: 2021-03-03 | End: 2021-03-06 | Stop reason: HOSPADM

## 2021-03-03 RX ORDER — RANOLAZINE 500 MG/1
1000 TABLET, EXTENDED RELEASE ORAL 2 TIMES DAILY
Status: DISCONTINUED | OUTPATIENT
Start: 2021-03-03 | End: 2021-03-04 | Stop reason: SDUPTHER

## 2021-03-03 RX ORDER — DEXTROSE MONOHYDRATE 50 MG/ML
100 INJECTION, SOLUTION INTRAVENOUS PRN
Status: DISCONTINUED | OUTPATIENT
Start: 2021-03-03 | End: 2021-03-06 | Stop reason: HOSPADM

## 2021-03-03 RX ORDER — NITROGLYCERIN 0.4 MG/1
0.4 TABLET SUBLINGUAL EVERY 5 MIN PRN
Status: DISCONTINUED | OUTPATIENT
Start: 2021-03-03 | End: 2021-03-06 | Stop reason: HOSPADM

## 2021-03-03 RX ORDER — PROMETHAZINE HYDROCHLORIDE 25 MG/1
12.5 TABLET ORAL EVERY 6 HOURS PRN
Status: DISCONTINUED | OUTPATIENT
Start: 2021-03-03 | End: 2021-03-06 | Stop reason: HOSPADM

## 2021-03-03 RX ORDER — ACETAMINOPHEN 325 MG/1
650 TABLET ORAL EVERY 6 HOURS PRN
Status: DISCONTINUED | OUTPATIENT
Start: 2021-03-03 | End: 2021-03-06 | Stop reason: HOSPADM

## 2021-03-03 RX ORDER — TAMSULOSIN HYDROCHLORIDE 0.4 MG/1
0.4 CAPSULE ORAL DAILY
Status: DISCONTINUED | OUTPATIENT
Start: 2021-03-04 | End: 2021-03-06 | Stop reason: HOSPADM

## 2021-03-03 RX ORDER — ONDANSETRON 2 MG/ML
4 INJECTION INTRAMUSCULAR; INTRAVENOUS EVERY 6 HOURS PRN
Status: DISCONTINUED | OUTPATIENT
Start: 2021-03-03 | End: 2021-03-06 | Stop reason: HOSPADM

## 2021-03-03 RX ORDER — OXYCODONE AND ACETAMINOPHEN 10; 325 MG/1; MG/1
1 TABLET ORAL EVERY 8 HOURS PRN
Status: DISCONTINUED | OUTPATIENT
Start: 2021-03-03 | End: 2021-03-06 | Stop reason: HOSPADM

## 2021-03-03 RX ADMIN — IOPAMIDOL 75 ML: 755 INJECTION, SOLUTION INTRAVENOUS at 21:30

## 2021-03-03 RX ADMIN — HYDROMORPHONE HYDROCHLORIDE 0.5 MG: 1 INJECTION, SOLUTION INTRAMUSCULAR; INTRAVENOUS; SUBCUTANEOUS at 22:01

## 2021-03-03 RX ADMIN — HYDROMORPHONE HYDROCHLORIDE 1 MG: 1 INJECTION, SOLUTION INTRAMUSCULAR; INTRAVENOUS; SUBCUTANEOUS at 20:25

## 2021-03-03 ASSESSMENT — ENCOUNTER SYMPTOMS
ANAL BLEEDING: 0
VOMITING: 0
COUGH: 0
COLOR CHANGE: 0
ABDOMINAL DISTENTION: 0
EYE REDNESS: 0
PHOTOPHOBIA: 0
WHEEZING: 0
DIARRHEA: 0
EYE DISCHARGE: 0
CHEST TIGHTNESS: 0
CHOKING: 0
STRIDOR: 0
RECTAL PAIN: 0
APNEA: 0
BLOOD IN STOOL: 0
BACK PAIN: 0
CONSTIPATION: 0
EYE PAIN: 0
EYE ITCHING: 0
SHORTNESS OF BREATH: 1
ABDOMINAL PAIN: 0
NAUSEA: 0

## 2021-03-03 ASSESSMENT — PAIN DESCRIPTION - DESCRIPTORS: DESCRIPTORS: CRUSHING;STABBING

## 2021-03-03 ASSESSMENT — PAIN DESCRIPTION - PAIN TYPE: TYPE: ACUTE PAIN

## 2021-03-03 ASSESSMENT — PAIN DESCRIPTION - FREQUENCY: FREQUENCY: CONTINUOUS

## 2021-03-03 ASSESSMENT — PAIN SCALES - GENERAL: PAINLEVEL_OUTOF10: 10

## 2021-03-04 LAB
ANION GAP SERPL CALCULATED.3IONS-SCNC: 10 MMOL/L (ref 3–16)
BUN BLDV-MCNC: 15 MG/DL (ref 7–20)
CALCIUM SERPL-MCNC: 8.6 MG/DL (ref 8.3–10.6)
CHLORIDE BLD-SCNC: 103 MMOL/L (ref 99–110)
CO2: 23 MMOL/L (ref 21–32)
CREAT SERPL-MCNC: 1.1 MG/DL (ref 0.8–1.3)
EKG ATRIAL RATE: 104 BPM
EKG ATRIAL RATE: 79 BPM
EKG DIAGNOSIS: NORMAL
EKG DIAGNOSIS: NORMAL
EKG P AXIS: 55 DEGREES
EKG P AXIS: 74 DEGREES
EKG P-R INTERVAL: 128 MS
EKG P-R INTERVAL: 136 MS
EKG Q-T INTERVAL: 384 MS
EKG Q-T INTERVAL: 452 MS
EKG QRS DURATION: 134 MS
EKG QRS DURATION: 136 MS
EKG QTC CALCULATION (BAZETT): 504 MS
EKG QTC CALCULATION (BAZETT): 518 MS
EKG R AXIS: 35 DEGREES
EKG R AXIS: 88 DEGREES
EKG T AXIS: 43 DEGREES
EKG T AXIS: 65 DEGREES
EKG VENTRICULAR RATE: 104 BPM
EKG VENTRICULAR RATE: 79 BPM
GFR AFRICAN AMERICAN: >60
GFR NON-AFRICAN AMERICAN: >60
GLUCOSE BLD-MCNC: 101 MG/DL (ref 70–99)
GLUCOSE BLD-MCNC: 112 MG/DL (ref 70–99)
GLUCOSE BLD-MCNC: 128 MG/DL (ref 70–99)
GLUCOSE BLD-MCNC: 144 MG/DL (ref 70–99)
GLUCOSE BLD-MCNC: 156 MG/DL (ref 70–99)
HCT VFR BLD CALC: 34.8 % (ref 40.5–52.5)
HEMOGLOBIN: 11.7 G/DL (ref 13.5–17.5)
MCH RBC QN AUTO: 34.6 PG (ref 26–34)
MCHC RBC AUTO-ENTMCNC: 33.6 G/DL (ref 31–36)
MCV RBC AUTO: 103.1 FL (ref 80–100)
PDW BLD-RTO: 15.2 % (ref 12.4–15.4)
PERFORMED ON: ABNORMAL
PLATELET # BLD: 193 K/UL (ref 135–450)
PMV BLD AUTO: 8.9 FL (ref 5–10.5)
POTASSIUM REFLEX MAGNESIUM: 4.2 MMOL/L (ref 3.5–5.1)
RBC # BLD: 3.37 M/UL (ref 4.2–5.9)
SODIUM BLD-SCNC: 136 MMOL/L (ref 136–145)
TROPONIN: 0.02 NG/ML
TROPONIN: 0.02 NG/ML
WBC # BLD: 5.1 K/UL (ref 4–11)

## 2021-03-04 PROCEDURE — 93010 ELECTROCARDIOGRAM REPORT: CPT | Performed by: INTERNAL MEDICINE

## 2021-03-04 PROCEDURE — 93005 ELECTROCARDIOGRAM TRACING: CPT | Performed by: NURSE PRACTITIONER

## 2021-03-04 PROCEDURE — 80048 BASIC METABOLIC PNL TOTAL CA: CPT

## 2021-03-04 PROCEDURE — G0378 HOSPITAL OBSERVATION PER HR: HCPCS

## 2021-03-04 PROCEDURE — 6370000000 HC RX 637 (ALT 250 FOR IP): Performed by: INTERNAL MEDICINE

## 2021-03-04 PROCEDURE — 99214 OFFICE O/P EST MOD 30 MIN: CPT | Performed by: INTERNAL MEDICINE

## 2021-03-04 PROCEDURE — 85027 COMPLETE CBC AUTOMATED: CPT

## 2021-03-04 PROCEDURE — 6360000002 HC RX W HCPCS: Performed by: INTERNAL MEDICINE

## 2021-03-04 PROCEDURE — 96375 TX/PRO/DX INJ NEW DRUG ADDON: CPT

## 2021-03-04 PROCEDURE — 2580000003 HC RX 258: Performed by: NURSE PRACTITIONER

## 2021-03-04 PROCEDURE — 36415 COLL VENOUS BLD VENIPUNCTURE: CPT

## 2021-03-04 PROCEDURE — 1200000000 HC SEMI PRIVATE

## 2021-03-04 PROCEDURE — 6370000000 HC RX 637 (ALT 250 FOR IP): Performed by: HOSPITALIST

## 2021-03-04 PROCEDURE — 6370000000 HC RX 637 (ALT 250 FOR IP): Performed by: NURSE PRACTITIONER

## 2021-03-04 PROCEDURE — 84484 ASSAY OF TROPONIN QUANT: CPT

## 2021-03-04 RX ORDER — MIDODRINE HYDROCHLORIDE 10 MG/1
10 TABLET ORAL
Status: DISCONTINUED | OUTPATIENT
Start: 2021-03-04 | End: 2021-03-06 | Stop reason: HOSPADM

## 2021-03-04 RX ORDER — RANOLAZINE 500 MG/1
500 TABLET, EXTENDED RELEASE ORAL 2 TIMES DAILY
Status: DISCONTINUED | OUTPATIENT
Start: 2021-03-04 | End: 2021-03-06 | Stop reason: HOSPADM

## 2021-03-04 RX ORDER — ISOSORBIDE MONONITRATE 60 MG/1
60 TABLET, EXTENDED RELEASE ORAL ONCE
Status: COMPLETED | OUTPATIENT
Start: 2021-03-04 | End: 2021-03-04

## 2021-03-04 RX ORDER — MORPHINE SULFATE 4 MG/ML
4 INJECTION, SOLUTION INTRAMUSCULAR; INTRAVENOUS ONCE
Status: COMPLETED | OUTPATIENT
Start: 2021-03-04 | End: 2021-03-04

## 2021-03-04 RX ORDER — RANOLAZINE 500 MG/1
1000 TABLET, EXTENDED RELEASE ORAL ONCE
Status: DISCONTINUED | OUTPATIENT
Start: 2021-03-04 | End: 2021-03-04

## 2021-03-04 RX ADMIN — OXYCODONE AND ACETAMINOPHEN 1 TABLET: 10; 325 TABLET ORAL at 00:18

## 2021-03-04 RX ADMIN — ASPIRIN 81 MG: 81 TABLET, CHEWABLE ORAL at 09:07

## 2021-03-04 RX ADMIN — SACUBITRIL AND VALSARTAN 1 TABLET: 49; 51 TABLET, FILM COATED ORAL at 01:24

## 2021-03-04 RX ADMIN — INSULIN LISPRO 1 UNITS: 100 INJECTION, SOLUTION INTRAVENOUS; SUBCUTANEOUS at 17:28

## 2021-03-04 RX ADMIN — GABAPENTIN 600 MG: 300 CAPSULE ORAL at 16:16

## 2021-03-04 RX ADMIN — OXYCODONE AND ACETAMINOPHEN 1 TABLET: 10; 325 TABLET ORAL at 08:05

## 2021-03-04 RX ADMIN — APIXABAN 5 MG: 5 TABLET, FILM COATED ORAL at 09:07

## 2021-03-04 RX ADMIN — MIDODRINE HYDROCHLORIDE 10 MG: 10 TABLET ORAL at 17:28

## 2021-03-04 RX ADMIN — OXYCODONE AND ACETAMINOPHEN 1 TABLET: 10; 325 TABLET ORAL at 16:16

## 2021-03-04 RX ADMIN — RANOLAZINE 1000 MG: 500 TABLET, FILM COATED, EXTENDED RELEASE ORAL at 00:18

## 2021-03-04 RX ADMIN — ISOSORBIDE MONONITRATE 60 MG: 60 TABLET, EXTENDED RELEASE ORAL at 03:45

## 2021-03-04 RX ADMIN — ROSUVASTATIN CALCIUM 40 MG: 40 TABLET, FILM COATED ORAL at 17:28

## 2021-03-04 RX ADMIN — GABAPENTIN 600 MG: 300 CAPSULE ORAL at 06:03

## 2021-03-04 RX ADMIN — ISOSORBIDE MONONITRATE 60 MG: 60 TABLET, EXTENDED RELEASE ORAL at 09:07

## 2021-03-04 RX ADMIN — CARVEDILOL 12.5 MG: 12.5 TABLET, FILM COATED ORAL at 09:18

## 2021-03-04 RX ADMIN — GABAPENTIN 600 MG: 300 CAPSULE ORAL at 00:18

## 2021-03-04 RX ADMIN — APIXABAN 5 MG: 5 TABLET, FILM COATED ORAL at 00:18

## 2021-03-04 RX ADMIN — TAMSULOSIN HYDROCHLORIDE 0.4 MG: 0.4 CAPSULE ORAL at 09:07

## 2021-03-04 RX ADMIN — MORPHINE SULFATE 4 MG: 4 INJECTION, SOLUTION INTRAMUSCULAR; INTRAVENOUS at 03:44

## 2021-03-04 RX ADMIN — ROSUVASTATIN CALCIUM 40 MG: 40 TABLET, FILM COATED ORAL at 00:18

## 2021-03-04 RX ADMIN — SODIUM CHLORIDE, PRESERVATIVE FREE 10 ML: 5 INJECTION INTRAVENOUS at 02:06

## 2021-03-04 RX ADMIN — CLOPIDOGREL BISULFATE 75 MG: 75 TABLET ORAL at 09:08

## 2021-03-04 RX ADMIN — SODIUM CHLORIDE, PRESERVATIVE FREE 10 ML: 5 INJECTION INTRAVENOUS at 09:08

## 2021-03-04 RX ADMIN — TRAZODONE HYDROCHLORIDE 50 MG: 50 TABLET ORAL at 00:18

## 2021-03-04 RX ADMIN — RANOLAZINE 500 MG: 500 TABLET, FILM COATED, EXTENDED RELEASE ORAL at 09:18

## 2021-03-04 RX ADMIN — GABAPENTIN 600 MG: 300 CAPSULE ORAL at 11:28

## 2021-03-04 RX ADMIN — SACUBITRIL AND VALSARTAN 1 TABLET: 49; 51 TABLET, FILM COATED ORAL at 09:07

## 2021-03-04 RX ADMIN — GABAPENTIN 600 MG: 300 CAPSULE ORAL at 18:19

## 2021-03-04 ASSESSMENT — PAIN DESCRIPTION - ONSET
ONSET: ON-GOING

## 2021-03-04 ASSESSMENT — PAIN DESCRIPTION - DESCRIPTORS
DESCRIPTORS: ACHING
DESCRIPTORS: ACHING
DESCRIPTORS: STABBING
DESCRIPTORS: STABBING

## 2021-03-04 ASSESSMENT — PAIN DESCRIPTION - ORIENTATION
ORIENTATION: MID
ORIENTATION: LOWER
ORIENTATION: MID;LEFT
ORIENTATION: LOWER
ORIENTATION: MID
ORIENTATION: MID

## 2021-03-04 ASSESSMENT — PAIN DESCRIPTION - FREQUENCY
FREQUENCY: CONTINUOUS

## 2021-03-04 ASSESSMENT — PAIN DESCRIPTION - PAIN TYPE
TYPE: ACUTE PAIN
TYPE: CHRONIC PAIN
TYPE: CHRONIC PAIN
TYPE: ACUTE PAIN
TYPE: ACUTE PAIN

## 2021-03-04 ASSESSMENT — PAIN DESCRIPTION - LOCATION
LOCATION: CHEST
LOCATION: BACK
LOCATION: CHEST
LOCATION: CHEST

## 2021-03-04 ASSESSMENT — PAIN - FUNCTIONAL ASSESSMENT
PAIN_FUNCTIONAL_ASSESSMENT: ACTIVITIES ARE NOT PREVENTED

## 2021-03-04 ASSESSMENT — PAIN SCALES - GENERAL
PAINLEVEL_OUTOF10: 4
PAINLEVEL_OUTOF10: 9
PAINLEVEL_OUTOF10: 5
PAINLEVEL_OUTOF10: 4
PAINLEVEL_OUTOF10: 7
PAINLEVEL_OUTOF10: 9

## 2021-03-04 ASSESSMENT — PAIN SCALES - WONG BAKER: WONGBAKER_NUMERICALRESPONSE: 0

## 2021-03-04 NOTE — PROGRESS NOTES
Hospitalist Progress Note      PCP: Dolores Cavazos MD    Date of Admission: 3/3/2021      Subjective:   Patient complains of on and off chest pain. .. Plan was to have been discharged after cardiology evaluation but then later became hypotensive. Yasmin Roland SBP in the 70s      Medications:  Reviewed    Infusion Medications    dextrose       Scheduled Medications    ranolazine  500 mg Oral BID    apixaban  5 mg Oral BID    carvedilol  12.5 mg Oral BID WC    clopidogrel  75 mg Oral Daily    gabapentin  600 mg Oral 5x Daily    isosorbide mononitrate  60 mg Oral Daily    rosuvastatin  40 mg Oral QPM    sacubitril-valsartan  1 tablet Oral BID    tamsulosin  0.4 mg Oral Daily    sodium chloride flush  10 mL Intravenous 2 times per day    aspirin  81 mg Oral Daily    insulin lispro  0-6 Units Subcutaneous TID WC    insulin lispro  0-3 Units Subcutaneous Nightly     PRN Meds: ipratropium-albuterol, oxyCODONE-acetaminophen, traZODone, sodium chloride flush, promethazine **OR** ondansetron, acetaminophen **OR** acetaminophen, polyethylene glycol, nitroGLYCERIN, glucose, dextrose, glucagon (rDNA), dextrose      Intake/Output Summary (Last 24 hours) at 3/4/2021 1416  Last data filed at 3/4/2021 1013  Gross per 24 hour   Intake 130 ml   Output 600 ml   Net -470 ml       Exam:    BP (!) 67/42   Pulse 77   Temp 97.5 °F (36.4 °C) (Oral)   Resp 14   Ht 6' (1.829 m)   Wt 182 lb 1.6 oz (82.6 kg)   SpO2 95%   BMI 24.70 kg/m²     General appearance: No apparent distress, appears stated age and cooperative. HEENT: Pupils equal, round, and reactive to light. Conjunctivae/corneas clear. Neck: Supple, with full range of motion. No jugular venous distention. Trachea midline. Respiratory:  Normal respiratory effort. Clear to auscultation, bilaterally without Rales/Wheezes/Rhonchi. Cardiovascular: Regular rate and rhythm with normal S1/S2 without murmurs, rubs or gallops.   Abdomen: Soft, non-tender, non-distended with normal bowel sounds. Musculoskeletal: No clubbing, cyanosis or edema bilaterally. Full range of motion without deformity. Skin: Skin color, texture, turgor normal.  No rashes or lesions. Neurologic:  Neurovascularly intact without any focal sensory/motor deficits. Cranial nerves: II-XII intact, grossly non-focal.  Psychiatric: Alert and oriented, thought content appropriate, normal insight  Capillary Refill: Brisk,< 3 seconds   Peripheral Pulses: +2 palpable, equal bilaterally       Labs:   Recent Labs     03/03/21 2050 03/04/21  0600   WBC 6.3 5.1   HGB 13.0* 11.7*   HCT 38.4* 34.8*    193     Recent Labs     03/03/21 2024 03/04/21  0600    136   K 4.5 4.2    103   CO2 21 23   BUN 18 15   CREATININE 1.0 1.1   CALCIUM 8.9 8.6     Recent Labs     03/03/21 2024   AST 14*   ALT <5*   BILITOT <0.2   ALKPHOS 57     No results for input(s): INR in the last 72 hours. Recent Labs     03/03/21 2024 03/03/21  2334 03/04/21  0204   TROPONINI 0.02* 0.02* 0.02*       Assessment/Plan:    -Chest pain/angina. . CTA negative. Troponin is marginally elevated. EKG no acute ischemic changes. -CAD-s/p CABG x2-diffuse not amenable to intervention--  He underwent LHC which showed RCA and LCx  and lesions in the LAD but all grafts patent with stable LVEF. Chest pain was not thought to be ischemic in origin. He was on maximal antianginal therapy. He was encouraged to quit smoking and was provided with NRT prescriptions on discharge. He was discharged to continue home meds  -Ischemic cardiomyopathy/chronic systolic heart failure status AICD, EF 25 to 30%  -Hypotension 3/4/21 SBP 70s  -COPD-stable  -Chronic tobacco abuse  -Chronic pain syndrome/fibromyalgia  -Diabetes mellitus type 2  -Prior CVA    Plan  -Discussed with cardiology. . Patient has had multiple evaluations and frequent admissions. . Has diffuse coronary disease-not amenable to intervention-apparently had left heart cath in January--Ranexa added and okay to discharge per cardiology  -Patient unfortunately has become hypotensive. .. Plan is to hold BP meds and give fluid boluse. . Closely observe in the hospital    DVT Prophylaxis: Eliquis  Diet: DIET CARDIAC; Low Sodium (2 GM);  Daily Fluid Restriction: 2000 ml  Code Status: Full Code      Samara Howe MD

## 2021-03-04 NOTE — ED NOTES
ED SBAR report provider to [facility]. Patient to be transported to Room 3121 via stretcher by transport tech. Patient transported with bedside cardiac monitor and with IV medications infusing. IV site clean, dry, and intact. MEWS score and pain assessed as 5/10 and documented. Patient's score on the DHILLON Fall scale reviewed with receiving RN. Updated patient and family on plan of care.        Cuco Zabala RN  03/03/21 9494

## 2021-03-04 NOTE — PROGRESS NOTES
Perfect serve to Dr Devaughn Jurado: BP was 204/93 pulse 88 he felt like he was going to pass out, BP now while sleeping is 62/41, pulse 67, says don't know why I feel so bad. went back to sleep

## 2021-03-04 NOTE — PROGRESS NOTES
Patient resting in bed at this time, A/Ox4, c/o chest pain 7 out of 10. States chest pain has been present at all times since coming to the ER and dilaudid was the only medication that helped, bring pain down to a 5 out of 10. Timothy Clement NP paged about additional PRN pain meds, states no additional medication at this time, will relay this info to patient.

## 2021-03-04 NOTE — PROGRESS NOTES
Patient complaining of chest pain and discomfort again, is being persistent about getting additional pain meds. Dr. Ezio Dumont paged, waiting on reply.

## 2021-03-04 NOTE — ED NOTES
Called pt's wife to inform her of pt status per pt ok. She denied any further questions.      Ebonie Julian RN  03/03/21 9445

## 2021-03-04 NOTE — ED PROVIDER NOTES
[address] [facility]      Pt Name: Olga Riddle  MRN: [id number]  [place] 1955  Date of evaluation: 3/3/2021  Provider: Hola Azevedo MD    CHIEF COMPLAINT       Chief Complaint   Patient presents with    Chest Pain       HISTORY OF PRESENT ILLNESS    Olga Riddle is a 72 y.o. male who presents to the emergency department with chest pain. Patient endorses chest pain and shortness of breath. Onset was just prior to arrival.  Pain woke him up from sleep. Pain described as stabbing pressure-like 10 out of 10. No nausea or diaphoresis. No injury. Has happened many times before. Requesting narcotic pain medicine. Took aspirin in nature with no relief. No other associated symptoms. Nursing Notes were reviewed. Including nursing noted for FM, Surgical History, Past Medical History, Social History, vitals, and allergies; agree with all. REVIEW OF SYSTEMS       Review of Systems   Constitutional: Negative for fever and unexpected weight change. HENT: Negative for congestion, dental problem, drooling, ear discharge and ear pain. Eyes: Negative for photophobia, pain, discharge, redness, itching and visual disturbance. Respiratory: Positive for shortness of breath. Negative for apnea, cough, choking, chest tightness, wheezing and stridor. Cardiovascular: Positive for chest pain. Negative for palpitations and leg swelling. Gastrointestinal: Negative for abdominal distention, abdominal pain, anal bleeding, blood in stool, constipation, diarrhea, nausea, rectal pain and vomiting. Endocrine: Negative for cold intolerance and heat intolerance. Genitourinary: Negative for decreased urine volume and urgency. Musculoskeletal: Negative for arthralgias and back pain. Skin: Negative for color change and pallor. Neurological: Negative for dizziness and facial asymmetry. Hematological: Negative for adenopathy.  Does not bruise/bleed easily. Psychiatric/Behavioral: Negative for agitation, behavioral problems, confusion and decreased concentration. Except as noted above the remainder of the review of systems was reviewed and negative.      PAST MEDICAL HISTORY     Past Medical History:   Diagnosis Date    Anxiety     Arthritis     Asthma     CAD (coronary artery disease)     Calcium kidney stone     Cardiomy[facility]     Cerebral artery occlusion with cerebral infarction ([facility]     CHF (congestive heart failure) ([facility]     Clostridium difficile diarrhea 02/12/2020    COPD (chronic obstructive [facility] disease) [facility]     mild    Depression     DM2 (diabetes mellitus, type 2) ([facility])     Fibromyalgia     GERD (gastroesophageal reflux disease)     Hyperlipidemia     Hypertension     Liver disease     Pacemaker 2012    Medtronic model # H855YPF    Pneumonia     Seizures ([facility]     TIA (transient ischemic attack) 2007    [facility]        SURGICAL HISTORY       Past Surgical History:   Procedure Laterality Date    APPENDECTOMY      BACK SURGERY      CARDIAC SURGERY      CHOLECYSTECTOMY      COLONOSCOPY  01/10/2017    COLONOSCOPY  01/10/2017    CORONARY ANGIOPLASTY WITH STENT PLACEMENT  2012    CORONARY ARTERY BYPASS GRAFT  2010, 11/2015    ENDOSCOPY, COLON, DIAGNOSTIC      PACEMAKER PLACEMENT      UPPER GASTROINTESTINAL ENDOSCOPY  02/07/2017    VASCULAR SURGERY         CURRENT MEDICATIONS       Previous Medications    ACETAMINOPHEN (TYLENOL) 325 MG TABLET    Take 650 mg by mouth every 6 hours as needed for Pain    ALBUTEROL SULFATE HFA (VENTOLIN HFA) 108 (90 BASE) MCG/ACT INHALER    Inhale 2 puffs into the lungs 4 times daily as needed for Wheezing    APIXABAN (ELIQUIS) 5 MG TABS TABLET    Take 1 tablet by mouth 2 times daily    ASPIRIN 325 MG TABLET    Take 325 mg by mouth daily    CARVEDILOL (COREG) 12.5 MG TABLET    Take 12.5 mg by mouth 2 times daily (with meals) CLOPIDOGREL (PLAVIX) 75 MG TABLET    Take 75 mg by mouth daily    EMPAGLIFLOZIN (JARDIANCE) 10 MG TABLET    Take 1 tablet by mouth daily    GABAPENTIN (NEURONTIN) 600 MG TABLET    TAKE ONE TABLET BY MOUTH FIVE TIMES A DAY    IPRATROPIUM-ALBUTEROL (DUONEB) 0.5-2.5 (3) MG/3ML SOLN NEBULIZER SOLUTION    Inhale 3 mLs into the lungs every 4 hours as needed for Shortness of Breath (wheezing coughing)    ISOSORBIDE MONONITRATE (IMDUR) 60 MG EXTENDED RELEASE TABLET    Take 60 mg by mouth daily    OXYCODONE-ACETAMINOPHEN (PERCOCET)  MG PER TABLET    Take 1 tablet by mouth every 8 hours as needed for Pain for up to 30 days.     RANOLAZINE (RANEXA) 500 MG EXTENDED RELEASE TABLET    Take 2 tablets by mouth 2 times daily    RESPIRATORY THERAPY SUPPLIES (NEBULIZER) AALIYAH    Used to give yourself breathing treatment    ROSUVASTATIN (CRESTOR) 40 MG TABLET    Take 40 mg by mouth every evening    SACUBITRIL-VALSARTAN (ENTRESTO) 49-51 MG PER TABLET    Take 1 tablet by mouth 2 times daily    TAMSULOSIN (FLOMAX) 0.4 MG CAPSULE    Take 1 capsule by mouth daily    TRAZODONE (DESYREL) 50 MG TABLET    TAKE 1 TABLET BY MOUTH EVERY NIGHT AT BEDTIME    TRAZODONE (DESYREL) 50 MG TABLET    Take 1 tablet by mouth nightly       ALLERGIES     Dopamine hcl, Tramadol, and Melatonin    FAMILY HISTORY        Family History   Problem Relation Age of Onset    Coronary Art Dis Father     Cancer Mother         Lung with mets    Diabetes Mother        SOCIAL HISTORY       Social History     Socioeconomic History    Marital status:      Spouse name: None    Number of children: 1    Years of education: None    Highest education level: None   Occupational History    Occupation: disabled   Social Needs    Financial resource strain: None    Food insecurity     Worry: None     Inability: None    Transportation needs     Medical: None     Non-medical: None   Tobacco Use    Smoking status: Current Every Day Smoker     Packs/day: 1.00 or rebound. Musculoskeletal: Normal range of motion. General: No tenderness or deformity. Skin:     General: Skin is warm. Coloration: Skin is not pale. Findings: No erythema or rash. Neurological:      Mental Status: He is alert. Cranial Nerves: No cranial nerve deficit. Motor: No abnormal muscle tone.       Coordination: Coordination normal.         DIAGNOSTIC RESULTS     EKG: All EKG's are interpreted by the Emergency Department Physician who either signs or Co-signs this chart in the absence of acardiologist.    EKG sinus tachycardia normal axis deviation left atrial enlargement right bundle branch block no STEMI    RADIOLOGY:   Non-plain film images such as CT, Ultrasoundand MRI are read by the radiologist. Plain radiographic images are visualized and preliminarily interpreted by the emergency physician with the below findings:    Imaging reassuring    ED BEDSIDE ULTRASOUND:   Performed by ED Physician - none    LABS:  Labs Reviewed   COMPREHENSIVE METABOLIC PANEL W/ REFLEX TO MG FOR LOW K - Abnormal; Notable for the following components:       Result Value    Glucose 139 (*)     ALT <5 (*)     AST 14 (*)     All other components within normal limits    Narrative:     Skylar Amadoidel tel. [phone],  Rejected Test Name/Called to: Gunnar Del Real, 03/03/2021 20:36, by [facility]  Performed at:  [address] [address]   Phone [phone]   TROPONIN - Abnormal; Notable for the following components:    Troponin 0.02 (*)     All other components within normal limits    Narrative:     Skylar Seidel tel. [phone],  Rejected Test Name/Called to: Gunnar Del Real, 03/03/2021 20:36, by [facility]  Performed at:  [facility]  [address] [place], [place] [address]   Phone [phone]   BRAIN NATRIURETIC PEPTIDE - Abnormal; Notable for the following components:    Pro-BNP 1,970 (*)     All other Tobacco usage, Drug usage reviewed by myself, no pertinent Hx)- No Pertinent Hx     Old records were reviewed by me     80-year-old male with chest pain. Elevated troponin. Cardiology consulted. Will be admitted for further inpatient evaluation. To note patient has been admitted for chest pain and a significant amount of times. There has been in the past pain concern for narcotic abuse. I do not necessarily think he needs any more narcotic pain medicine. CRITICAL CARE TIME   Total Critical Caretime was 39 minutes, excluding separately reportable procedures. There was a high probability of clinically significant/life threatening deterioration in the patient's condition which required my urgent intervention. PROCEDURES:  Unlessotherwise noted below, none    FINAL IMPRESSION      1. Chest pain, unspecified type    2. Elevated troponin    3. Antiplatelet or antithrombotic long-term use          DISPOSITION/PLAN   DISPOSITION Admitted 03/03/2021 10:08:49 PM    PATIENT REFERRED TO:  No follow-up provider specified.     DISCHARGE MEDICATIONS:  New Prescriptions    No medications on file          (Please note that portions ofthis note were completed with a voice recognition program.  Efforts were made to edit the dictations but occasionally words are mis-transcribed.)    Ramila Lanza MD(electronically signed)  Attending Emergency Physician           Ramila Lanza MD  03/03/21 7240

## 2021-03-04 NOTE — ED NOTES
Bed: A-15  Expected date:   Expected time:   Means of arrival:   Comments:  [place] 64yo chest pain     Kady Clemons RN  03/03/21 2005

## 2021-03-04 NOTE — ED PROVIDER NOTES
[address] [facility]        Pt Name: Remy Restrepo  MRN: [id number]  [place] 1955  Date of evaluation: 3/3/2021  Provider: TAYLA Polk - LYNN  PCP: Hanna Flores MD     I have seen and evaluated this patient with my supervising physician Parish Prado MD.    [address] [place] [address]       Chief Complaint   Patient presents with    Chest Pain       HISTORY OF PRESENT ILLNESS   (Location, Timing/Onset, Context/Setting, Quality, Duration, Modifying Factors, Severity, Associated Signs and Symptoms)  Note limiting factors. Remy Restrepo is a 72 y.o. male with PMH significant for CAD s/p CABG 2010/2015 and stent 2012 on Eliquis and Plavix, CHF, COPD, stroke, DM, HLD, HTN, and GERD who presents to the emergency department today via EMS from home complaining of chest pain and shortness of breath. Onset was just prior to arrival.  This pain woke him up out of his sleep while he was dozing off after eating dinner of a country fried steak. Pain described as a stabbing pressure and rated 10/10. No nausea or diaphoresis. No recent illness or injury. No cough or fever. Patient taken aspirin and 3 nitro which did not help. Nursing Notes were all reviewed and agreed with or any disagreements were addressed in the HPI. REVIEW OF SYSTEMS    (2-9 systems for level 4, 10 or more for level 5)     Review of Systems   Constitutional: Negative for chills, diaphoresis and fever. HENT: Negative. Respiratory: Positive for shortness of breath. Negative for cough. Cardiovascular: Positive for chest pain. Gastrointestinal: Negative for abdominal distention, abdominal pain, diarrhea, nausea and vomiting. Genitourinary: Negative for dysuria, flank pain and hematuria. Musculoskeletal: Negative for back pain, neck pain and neck stiffness. Skin: Negative for color change, pallor and rash.    Allergic/Immunologic: Negative for immunocompromised state. Neurological: Negative for dizziness, syncope, weakness and headaches. Hematological: Negative for adenopathy. Psychiatric/Behavioral: Negative for confusion. All other systems reviewed and are negative. Positives and Pertinent negatives as per HPI. Except as noted above in the ROS, all other systems were reviewed and negative.        PAST MEDICAL HISTORY     Past Medical History:   Diagnosis Date    Anxiety     Arthritis     Asthma     CAD (coronary artery disease)     Calcium kidney stone     Cardiomyopathy ([address]     Cerebral artery occlusion with cerebral infarction ([address]     CHF (congestive heart failure) ([address]     Clostridium difficile diarrhea 02/12/2020    COPD (chronic obstructive pulmonary disease) ([facility])     mild    Depression     DM2 (diabetes mellitus, type 2) ([facility])     Fibromyalgia     GERD (gastroesophageal reflux disease)     Hyperlipidemia     Hypertension     Liver disease     Pacemaker 2012    Medtronic model # S139VIT    Pneumonia     Seizures ([address]     TIA (transient ischemic attack) 2007    Ulcerative colitis ([address]          SURGICAL HISTORY     Past Surgical History:   Procedure Laterality Date    APPENDECTOMY      BACK SURGERY      CARDIAC SURGERY      CHOLECYSTECTOMY      COLONOSCOPY  01/10/2017    COLONOSCOPY  01/10/2017    CORONARY ANGIOPLASTY WITH STENT PLACEMENT  2012    CORONARY ARTERY BYPASS GRAFT  2010, 11/2015    ENDOSCOPY, COLON, DIAGNOSTIC      PACEMAKER PLACEMENT      UPPER GASTROINTESTINAL ENDOSCOPY  02/07/2017    VASCULAR SURGERY           CURRENTMEDICATIONS       Previous Medications    ACETAMINOPHEN (TYLENOL) 325 MG TABLET    Take 650 mg by mouth every 6 hours as needed for Pain    ALBUTEROL SULFATE HFA (VENTOLIN HFA) 108 (90 BASE) MCG/ACT INHALER    Inhale 2 puffs into the lungs 4 times daily as needed for Wheezing    APIXABAN (ELIQUIS) 5 MG TABS TABLET    Take 1 tablet by mouth 2 times daily ASPIRIN 325 MG TABLET    Take 325 mg by mouth daily    CARVEDILOL (COREG) 12.5 MG TABLET    Take 12.5 mg by mouth 2 times daily (with meals)    CLOPIDOGREL (PLAVIX) 75 MG TABLET    Take 75 mg by mouth daily    EMPAGLIFLOZIN (JARDIANCE) 10 MG TABLET    Take 1 tablet by mouth daily    GABAPENTIN (NEURONTIN) 600 MG TABLET    TAKE ONE TABLET BY MOUTH FIVE TIMES A DAY    IPRATROPIUM-ALBUTEROL (DUONEB) 0.5-2.5 (3) MG/3ML SOLN NEBULIZER SOLUTION    Inhale 3 mLs into the lungs every 4 hours as needed for Shortness of Breath (wheezing coughing)    ISOSORBIDE MONONITRATE (IMDUR) 60 MG EXTENDED RELEASE TABLET    Take 60 mg by mouth daily    OXYCODONE-ACETAMINOPHEN (PERCOCET)  MG PER TABLET    Take 1 tablet by mouth every 8 hours as needed for Pain for up to 30 days. RANOLAZINE (RANEXA) 500 MG EXTENDED RELEASE TABLET    Take 2 tablets by mouth 2 times daily    RESPIRATORY THERAPY SUPPLIES (NEBULIZER) AALIYAH    Used to give yourself breathing treatment    ROSUVASTATIN (CRESTOR) 40 MG TABLET    Take 40 mg by mouth every evening    SACUBITRIL-VALSARTAN (ENTRESTO) 49-51 MG PER TABLET    Take 1 tablet by mouth 2 times daily    TAMSULOSIN (FLOMAX) 0.4 MG CAPSULE    Take 1 capsule by mouth daily    TRAZODONE (DESYREL) 50 MG TABLET    TAKE 1 TABLET BY MOUTH EVERY NIGHT AT BEDTIME    TRAZODONE (DESYREL) 50 MG TABLET    Take 1 tablet by mouth nightly         ALLERGIES     Dopamine hcl, Tramadol, and Melatonin    FAMILYHISTORY       Family History   Problem Relation Age of Onset    Coronary Art Dis Father     Cancer Mother         Lung with mets    Diabetes Mother           SOCIAL HISTORY       Social History     Tobacco Use    Smoking status: Current Every Day Smoker     Packs/day: 1.00     Years: 44.00     Pack years: 44.00     Types: Cigarettes    Smokeless tobacco: Never Used    Tobacco comment: cutting down   Substance Use Topics    Alcohol use: No     Alcohol/week: 0.0 standard drinks    Drug use:  No SCREENINGS             PHYSICAL EXAM    (up to 7 for level 4, 8 or more for level 5)     ED Triage Vitals [03/03/21 2013]   BP Temp Temp Source Pulse Resp SpO2 Height Weight   (!) 155/100 98.3 °F (36.8 °C) Oral 106 20 97 % 6' (1.829 m) 182 lb 1.6 oz (82.6 kg)       Physical Exam  Vitals signs and nursing note reviewed. Constitutional:       General: He is not in acute distress. Appearance: Normal appearance. He is well-developed. He is not toxic-appearing. HENT:      Head: Normocephalic and atraumatic. Eyes:      General: No scleral icterus. Conjunctiva/sclera: Conjunctivae normal.   Neck:      Musculoskeletal: Normal range of motion and neck supple. No neck rigidity. Vascular: No JVD. Cardiovascular:      Rate and Rhythm: Regular rhythm. Tachycardia present. Heart sounds: Normal heart sounds. Pulmonary:      Effort: Pulmonary effort is normal. No respiratory distress. Breath sounds: Normal breath sounds. Abdominal:      General: There is no distension. Palpations: Abdomen is soft. Abdomen is not rigid. Tenderness: There is no abdominal tenderness. There is no guarding or rebound. Musculoskeletal: Normal range of motion. Skin:     General: Skin is warm and dry. Capillary Refill: Capillary refill takes less than 2 seconds. Findings: No rash. Neurological:      General: No focal deficit present. Mental Status: He is alert and oriented to person, place, and time.    Psychiatric:         Mood and Affect: Mood normal.         DIAGNOSTIC RESULTS   LABS:    Labs Reviewed   COMPREHENSIVE METABOLIC PANEL W/ REFLEX TO MG FOR LOW K - Abnormal; Notable for the following components:       Result Value    Glucose 139 (*)     ALT <5 (*)     AST 14 (*)     All other components within normal limits    Narrative:     [address] [facility] tel. [phone],  Rejected Test Name/Called to: Cici Gary, 03/03/2021 20:36, by [facility]  Performed at:  [facility]  [address] [place], [place] [address]   Phone [phone]   TROPONIN - Abnormal; Notable for the following components:    Troponin 0.02 (*)     All other components within normal limits    Narrative:     Inocente Hsu tel. [phone],  Rejected Test Name/Called to: Johnny Chowdhury, 03/03/2021 20:36, by [facility]  Performed at:  [facility]  [address] [place], [place] [address]   Phone [phone]   BRAIN NATRIURETIC PEPTIDE - Abnormal; Notable for the following components:    Pro-BNP 1,970 (*)     All other components within normal limits    Narrative:     Inocente Hsu tel. [phone],  Rejected Test Name/Called to: Johnny Chowdhury, 03/03/2021 20:36, by [facility]  Performed at:  [facility]  [address] [facility] [address]   Phone [phone]   CBC WITH AUTO DIFFERENTIAL - Abnormal; Notable for the following components:    RBC 3.74 (*)     Hemoglobin 13.0 (*)     Hematocrit 38.4 (*)     .8 (*)     MCH 34.7 (*)     All other components within normal limits    Narrative:     Performed at:  [facility]  [address] [facility] [address]   Phone [phone]   D-DIMER, QUANTITATIVE - Abnormal; Notable for the following components:    D-Dimer, Quant 238 (*)     All other components within normal limits    Narrative:     Performed at:  [facility]  [address] [facility] [place] [address]   Phone [phone]   LIPASE    Narrative:     Inocente Hsu tel. [phone],  Rejected Test Name/Called to: Johnny Chowdhury, 03/03/2021 20:36, by [facility]  Performed at:  [address] [place] [address]   Phone [phone]   [facility] WITH REFLEX    Narrative:     [address],  Rejected Test Name/Called to: Johnny Chowdhury, 03/03/2021 20:36, by [facility]  Performed at:  [facility]  [address] [place] [address]   Phone [phone]    Narrative:     Inocente Hsu tel. [phone],  Rejected Test Name/Called to: Johnny Chowdhury, 03/03/2021 20:36, by [facility]  Performed at:  [facility]  [address] [place] [address]   Phone [phone]       All other labs were within normal range or not returned as of this dictation. EKG: All EKG's are interpreted by the Emergency Department Physician in the absence of a cardiologist.  Please see their note for interpretation of EKG. RADIOLOGY:   Non-plain film images such as CT, Ultrasound and MRI are read by the radiologist. Plain radiographic images are visualized and preliminarily interpreted by the ED Provider with the below findings:        Interpretation per the Radiologist below, if available at the time of this note:    CT CHEST PULMONARY EMBOLISM W CONTRAST   Final Result   1. No acute abnormality. XR CHEST PORTABLE   Final Result   No acute process by radiograph. No results found. PROCEDURES   Unless otherwise noted below, none     Procedures    CRITICAL CARE TIME   CRITICAL CARE NOTE:  There was a high probability of clinically significant life-threatening deterioration of the patient's condition requiring my urgent intervention. Total critical care time was at least 35 minutes. This includes vital sign monitoring, pulse oximetry monitoring, telemetry monitoring, clinical response to the IV medications, reviewing the nursing notes, consultation time, dictation/documentation time, and interpretation of the labwork. This excludes any separately billable procedures performed.       CONSULTS:  IP CONSULT TO HOSPITALIST  IP CONSULT TO CARDIOLOGY      EMERGENCY DEPARTMENT COURSE and DIFFERENTIAL DIAGNOSIS/MDM:   Vitals:    Vitals:    03/03/21 2013 03/03/21 2014 03/03/21 2028 03/03/21 2058   BP: (!) 155/100 (!) 155/100 (!) 143/85 (!) 122/91   Pulse: 106 107 96 79   Resp: 20 21 17 14   Temp: 98.3 °F (36.8 °C)      TempSrc: Oral      SpO2: 97% 97% 98% 95%   Weight: 182 lb 1.6 oz (82.6 kg)      Height: 6' (1.829 m)          Patient was given the following medications:  Medications   HYDROmorphone (DILAUDID) injection 1 mg (1 mg Intravenous Given 3/3/21 2025)   iopamidol (ISOVUE-370) 76 % injection 75 mL (75 mLs Intravenous Given 3/3/21 2130)   HYDROmorphone (DILAUDID) injection 0.5 mg (0.5 mg Intravenous Given 3/3/21 2201)           Differential Diagnosis: Acute Coronary Syndrome, Congestive Heart Failure, Thoracic Dissection, Pericarditis, Pericardial Effusion, Pulmonary Embolism, Pneumonia, Pneumothorax, Ischemic Bowel, Bowel Obstruction, PUD, GERD, Acute Cholecystitis, Pancreatitis, Hepatitis, Colitis, other    Patient presents with chest tightness and shortness of breath. See HPI for full presentation. Physical exam as above. 72year-old lying in bed in no acute distress. Lungs are CTA bilaterally. He is tachycardic with an otherwise normal cardiac exam.  Abdomen soft and nontender. Neck supple. No neurovascular deficits. EKG shows no acute ST changes when compared to prior. Troponin is slightly elevated at 0.02. BNP 2000. CT pulmonary shows no PE or acute pulmonary abnormality. CBC and chemistry unremarkable. Emergency department course included pain medicine. Patient requesting more every time I am in the room. Nitro did not help with the pain. He is already on Eliquis and Plavix. I consulted with cardiology, José Chavarria, who recommended holding on heparin and last repeat troponin is elevated higher. Patient will be admitted for further work-up and treatment. He was given all test results and given an opportunity to ask and have any questions answered. He was agreeable to admission. He requested additional pain medication, but I advised him to hold off until hospitalist sees him.   The hospitalist, the Roslyn Diane, was consulted and agreed to admit patient and write orders. The patient tolerated their visit well. They were seen and evaluated by the attending physician, Lamine Adair MD who agreed with the assessment and plan. The patient and / or the family were informed of the results of any tests, a time was given to answer questions, a plan was proposed and they agreed with plan. FINAL IMPRESSION      1. Chest pain, unspecified type    2. Elevated troponin    3. Antiplatelet or antithrombotic long-term use          DISPOSITION/PLAN   DISPOSITION Decision To Admit 03/03/2021 09:22:40 PM      PATIENT REFERREDTO:  No follow-up provider specified.     DISCHARGE MEDICATIONS:  New Prescriptions    No medications on file       DISCONTINUED MEDICATIONS:  Discontinued Medications    No medications on file              (Please note that portions of this note were completed with a voice recognition program.  Efforts were made to edit the dictations but occasionally words are mis-transcribed.)    TAYLA Adamson CNP (electronically signed)            TAYLA Adamson CNP  03/03/21 4109

## 2021-03-04 NOTE — CARE COORDINATION
INITIAL CASE MANAGEMENT ASSESSMENT    Reviewed chart, met with patient to assess possible discharge needs. Explained Case Management role/services. Living Situation: Patient lives with supportive spouse. ADLs: independent      DME: rolling walker available if needed    PT/OT Recs: n/a     Active Services: none     Transportation: active  or spouse     Medications: takes on his own    PCP: Lupe Elkins MD      PLAN/COMMENTS: Patient plans to return home at dc and denied needs at the present time. SW/CM provided contact information for patient or family to call with any questions. SW/CM will follow and assist as needed.     Electronically signed by BEATA Rosen, LUC, Case Management on 3/4/2021 at 3:10 PM  [facility] [phone]

## 2021-03-04 NOTE — ED TRIAGE NOTES
Pt arrived to ED via EMS for a complaint of chest pain. Pt states that he was awoken with chest pain about an hour ago when he thought that his defibrillator engaged. Pt states that he took 324 ASA and 2 nitro with no relief. EMS gave 1 more nitro and pt states no relief. Pt states that he has an extensive cardiac hx with an MI a year and a half ago. Pt states that he is nauseous but has no vomiting. Pt denies headaches. VS noted, tachycardic and hypertensive and stable. Patient A&Ox4. Respirations easy and unlabored. Skin warm and dry and appropriate for ethnicity. No acute distress noted at this time. Patient placed on continuous telemetry and pulse ox upon arrival to room.

## 2021-03-04 NOTE — PROGRESS NOTES
Pharmacy Medication Reconciliation Note     List of medications patient is currently taking is complete. Source of information:   1. Per pt + EMR    Notes regarding home medications:   1. Spoke to pt and had only AM meds PTA  2. Pt requests 2 perez at bedtime as well as his Perc   3.  Pt does not believe he is taking tamsulosin     Pt denies taking any other OTC or herbal medications    To Woods, Pharmacy Intern  3/3/2021  10:20 PM

## 2021-03-04 NOTE — PROGRESS NOTES
Patient grabbing at chest and stating pain is 9 out of 10, stabbing. Dr. Edmundo Mcgee read message from this RN, waiting on reply.

## 2021-03-04 NOTE — H&P
Hospital Medicine History & Physical      PCP: Karlyn Osgood, MD    Date of Admission: 3/3/2021    Date of Service: Pt seen/examined on 3/3/2021 and Admitted to Inpatient     Chief Complaint:  Chest pain, SOB      History Of Present Illness: The patient is a 72 y.o. male who presents to [facility] with PMHx: CABG x 2, AICD in place. CAD on Eliquis and Plavix, CHF, COPD, stroke, DM, HLD, HTN, and GERD whom presented to the ED this evening via EMS from home complaining of chest pain and shortness of breath. Positive for tobacco smoking. Patient reports that he ate chicken fried steak for dinner. And then he laid down and as he was dozing off to sleep the midsternal chest pain woke him up. It was sharp, rated at a 10 out of 10. Radiated towards his left arm. Reported no nausea, positive sweating and a hot sensation. Reports that he took aspirin and 3 nitroglycerin. Reported that when he arrived to the emergency department he was still having 10 out of 10 pain. ED workup: Trop: 0.02. EKG sinus tachycardia without evidence of ST elevation or depression. Nonspecific changes noted. CBC and metabolic panel is unremarkable. Chest x-ray confirming AICD in place and sternal wires with clips. CTA of the chest negative for abnormality. Patient considered high risk given his comorbid conditions, age and history. On my exam the patient is awake and alert. Reports that he still has a little bit of an aching sensation substernal, but states it is almost gone. He is warm and dry. Appears to be a reliable historian. CODE STATUS full.   Lives at home with his wife.       Past Medical History:        Diagnosis Date    Anxiety     Arthritis     Asthma     CAD (coronary artery disease)     Calcium kidney stone     [facility]     [facility]bral [facility] occlusion with [facility]     CHF [facility]congestive heart failure) [facility]     Clostridium difficile diarrhea 02/12/2020    COPD (chronic obstructive pulmonary disease) [facility]     mild    Depression     DM2 (diabetes mellitus, type 2) ([facility])     Fibromyalgia     GERD (gastroesophageal reflux disease)     Hyperlipidemia     Hypertension     Liver disease     Pacemaker 2012    Medtronic model # [id number]    Pneumonia     Seizures ([facility]     TIA (transient ischemic attack) 2007    [facility]        Past Surgical History:        Procedure Laterality Date    APPENDECTOMY      BACK SURGERY      CARDIAC SURGERY      CHOLECYSTECTOMY      COLONOSCOPY  01/10/2017    COLONOSCOPY  01/10/2017    CORONARY ANGIOPLASTY WITH STENT PLACEMENT  2012    CORONARY ARTERY BYPASS GRAFT  2010, 11/2015    ENDOSCOPY, COLON, DIAGNOSTIC      PACEMAKER PLACEMENT      UPPER GASTROINTESTINAL ENDOSCOPY  02/07/2017    VASCULAR SURGERY         Medications Prior to Admission:    Prior to Admission medications    Medication Sig Start Date End Date Taking? Authorizing Provider   oxyCODONE-acetaminophen (PERCOCET)  MG per tablet Take 1 tablet by mouth every 8 hours as needed for Pain for up to 30 days.  2/8/21 3/10/21 Yes Crow Agustin MD   gabapentin (NEURONTIN) 600 MG tablet TAKE ONE TABLET BY MOUTH FIVE TIMES A DAY 1/20/21 3/20/21 Yes Crow Agustin MD   isosorbide mononitrate (IMDUR) 60 MG extended release tablet Take 60 mg by mouth daily   Yes Historical Provider, MD   clopidogrel (PLAVIX) 75 MG tablet Take 75 mg by mouth daily   Yes Historical Provider, MD   carvedilol (COREG) 12.5 MG tablet Take 12.5 mg by mouth 2 times daily (with meals)   Yes Historical Provider, MD   rosuvastatin (CRESTOR) 40 MG tablet Take 40 mg by mouth every evening   Yes Historical Provider, MD   sacubitril-valsartan (ENTRESTO) 49-51 MG per tablet Take 1 tablet by mouth 2 times daily Yes Historical Provider, MD   aspirin 325 MG tablet Take 325 mg by mouth daily   Yes Historical Provider, MD   traZODone (DESYREL) 50 MG tablet TAKE 1 TABLET BY MOUTH EVERY NIGHT AT BEDTIME 7/27/20  Yes Karlyn Osgood, MD   apixaban (ELIQUIS) 5 MG TABS tablet Take 1 tablet by mouth 2 times daily 7/16/20  Yes TAYLA Galeano CNP   empagliflozin (JARDIANCE) 10 MG tablet Take 1 tablet by mouth daily 7/16/20  Yes TAYLA Miner CNP   ranolazine (RANEXA) 500 MG extended release tablet Take 2 tablets by mouth 2 times daily 7/16/20  Yes TAYLA Miner CNP   acetaminophen (TYLENOL) 325 MG tablet Take 650 mg by mouth every 6 hours as needed for Pain   Yes Historical Provider, MD   traZODone (DESYREL) 50 MG tablet Take 1 tablet by mouth nightly 1/14/21   Karlyn Osgood, MD   Respiratory Therapy Supplies (NEBULIZER) AALIYAH Used to give yourself breathing treatment 8/9/20   Dayan Sims MD   ipratropium-albuterol (DUONEB) 0.5-2.5 (3) MG/3ML SOLN nebulizer solution Inhale 3 mLs into the lungs every 4 hours as needed for Shortness of Breath (wheezing coughing) 8/9/20   Dayan Sims MD   albuterol sulfate HFA (VENTOLIN HFA) 108 (90 Base) MCG/ACT inhaler Inhale 2 puffs into the lungs 4 times daily as needed for Wheezing 7/16/20   TAYLA Miner CNP   tamsulosin (FLOMAX) 0.4 MG capsule Take 1 capsule by mouth daily 7/16/20   TAYLA Miner CNP       Allergies:  Dopamine hcl, Tramadol, and Melatonin    Social History:  The patient currently lives at home. TOBACCO:   reports that he has been smoking cigarettes. He has a 44.00 pack-year smoking history. He has never used smokeless tobacco.  ETOH:   reports no history of alcohol use. Family History:  Reviewed in detail and negative for DM, Early CAD, Cancer, CVA.  Positive as follows:        Problem Relation Age of Onset    Coronary Art Dis Father     Cancer Mother         Lung with mets    Diabetes Mother        REVIEW OF SYSTEMS:   Positive for substernal chest pain and as noted in the HPI. All other systems reviewed and negative. PHYSICAL EXAM:    BP (!) 166/89   Pulse 78   Temp 97.9 °F (36.6 °C) (Oral)   Resp 17   Ht 6' (1.829 m)   Wt 182 lb 1.6 oz (82.6 kg)   SpO2 97%   BMI 24.70 kg/m²     General appearance: No apparent distress appears stated age and cooperative. HEENT Normal cephalic, atraumatic without obvious deformity. Pupils equal, round, and reactive to light. Extra ocular muscles intact. Conjunctivae/corneas clear. Neck: Supple, No jugular venous distention/bruits. Trachea midline without thyromegaly or adenopathy with full range of motion. Lungs: Clear to auscultation, bilaterally without Rales/Wheezes/Rhonchi with good respiratory effort. Heart: Regular rate and rhythm with Normal S1/S2 without murmurs, rubs or gallops, point of maximum impulse non-displaced  Left subclavicular palpable device in place  Sternotomy scar noted    Abdomen: Soft, non-tender or non-distended without rigidity or guarding and positive bowel sounds all four quadrants. Extremities: No clubbing, cyanosis, or edema bilaterally. Full range of motion without deformity and normal gait intact. Skin: Skin color, texture, turgor normal.  No rashes or lesions. Neurologic: Alert and oriented X 3, neurovascularly intact with sensory/motor intact upper extremities/lower extremities, bilaterally. Cranial nerves: II-XII intact, grossly non-focal.  Mental status: Alert, oriented, thought content appropriate. Capillary Refill: Acceptable  < 3 seconds  Peripheral Pulses: +3 Easily felt, not easily obliterated with pressure      CXR:  I have reviewed the CXR with the following interpretation: No acute disease, AICD in place, sternal wires and clips in place  EKG:  I have reviewed the EKG with the following interpretation: Sinus tachycardia. No evidence of STEMI.     CBC   Recent Labs     03/03/21 2050   WBC 6.3   HGB 13.0*   HCT 38.4*       RENAL  Recent Labs     03/03/21 2024      K 4.5      CO2 21   BUN 18   CREATININE 1.0     LFT'S  Recent Labs     03/03/21 2024   AST 14*   ALT <5*   BILITOT <0.2   ALKPHOS 57     COAG  No results for input(s): INR in the last 72 hours. CARDIAC ENZYMES  Recent Labs     03/03/21 2024 03/03/21  2334   TROPONINI 0.02* 0.02*       U/A:    Lab Results   Component Value Date    NITRITE neg 05/23/2014    COLORU YELLOW 08/29/2020    WBCUA 1 08/29/2020    RBCUA 1 08/29/2020    CLARITYU Clear 08/29/2020    SPECGRAV >1.030 08/29/2020    LEUKOCYTESUR Negative 08/29/2020    BLOODU Negative 08/29/2020    GLUCOSEU 100 08/29/2020       ABG    Lab Results   Component Value Date    NVS3NJA 25.5 05/21/2018    BEART -0.2 05/21/2018    L8QYLEEI 98.4 05/21/2018    PHART 7.345 05/21/2018    QHQ7BHH 48.0 05/21/2018    PO2ART 114.0 05/21/2018    IDH5RWY 27.0 05/21/2018           Active Hospital Problems    Diagnosis Date Noted    Chest pain [R07.9] 03/03/2021         PHYSICIANS CERTIFICATION:    I certify that Marie Metzger is expected to be hospitalized for more than 2 midnights based on the following assessment and plan:      ASSESSMENT/PLAN:    CP: DDX: NSTEMI, ACS, evolving MI  AICD: Medtronic device: Model # O630TAL    Jan 21, 2021: left heart cath per dR. Jaycob Escobar @ [facility] [place]:  He underwent LHC which showed RCA and LCx  and lesions in the LAD but all grafts patent with stable LVEF. Chest pain was not thought to be ischemic in origin. He was on maximal antianginal therapy. He was encouraged to quit smoking and was provided with NRT prescriptions on discharge. He was discharged to continue home meds. Initial troponin 0 0.02-> will trend  CTA chest negative for PE or acute abnormality  No evidence of anemia.   No evidence of acute renal failure  EKG is negative for evidence of STEMI  Cardiology consulted from ED: Initiate heparin if troponin elevates above 0.02  Admit and cardiology will follow  Continue: aspirin, Eliquis, Plavix, carvedilol, isosorbide, Ranexa, statin therapy, Entresto per home regimen    Tobacco dependence: Encourage smoking cessation and counceling    DM:   Hold home regimen  FSBS, SSI, hypoglycemia protocol    Chronic pain: Continue oxycodone 10/325 per home regimen    DVT Prophylaxis: Eliquis  Diet: DIET CLEAR LIQUID; No Caffeine  Code Status: Full Code  PT/OT Eval Status: Independent    Dispo -admit, observation       Merly Borges, APRN - CNP    Thank you Manny Dominguez MD for the opportunity to be involved in this patient's care. If you have any questions or concerns please feel free to contact me at [phone].

## 2021-03-05 PROBLEM — I95.9 HYPOTENSION: Status: ACTIVE | Noted: 2021-03-05

## 2021-03-05 LAB
GLUCOSE BLD-MCNC: 120 MG/DL (ref 70–99)
GLUCOSE BLD-MCNC: 124 MG/DL (ref 70–99)
GLUCOSE BLD-MCNC: 133 MG/DL (ref 70–99)
GLUCOSE BLD-MCNC: 173 MG/DL (ref 70–99)
PERFORMED ON: ABNORMAL

## 2021-03-05 PROCEDURE — G0378 HOSPITAL OBSERVATION PER HR: HCPCS

## 2021-03-05 PROCEDURE — 99232 SBSQ HOSP IP/OBS MODERATE 35: CPT | Performed by: NURSE PRACTITIONER

## 2021-03-05 PROCEDURE — 6370000000 HC RX 637 (ALT 250 FOR IP): Performed by: HOSPITALIST

## 2021-03-05 PROCEDURE — 6370000000 HC RX 637 (ALT 250 FOR IP): Performed by: NURSE PRACTITIONER

## 2021-03-05 PROCEDURE — 1200000000 HC SEMI PRIVATE

## 2021-03-05 PROCEDURE — 6370000000 HC RX 637 (ALT 250 FOR IP): Performed by: INTERNAL MEDICINE

## 2021-03-05 PROCEDURE — 2580000003 HC RX 258: Performed by: NURSE PRACTITIONER

## 2021-03-05 PROCEDURE — 94761 N-INVAS EAR/PLS OXIMETRY MLT: CPT

## 2021-03-05 RX ADMIN — MIDODRINE HYDROCHLORIDE 10 MG: 10 TABLET ORAL at 08:21

## 2021-03-05 RX ADMIN — NITROGLYCERIN 0.4 MG: 0.4 TABLET, ORALLY DISINTEGRATING SUBLINGUAL at 22:04

## 2021-03-05 RX ADMIN — APIXABAN 5 MG: 5 TABLET, FILM COATED ORAL at 08:22

## 2021-03-05 RX ADMIN — TAMSULOSIN HYDROCHLORIDE 0.4 MG: 0.4 CAPSULE ORAL at 08:21

## 2021-03-05 RX ADMIN — GABAPENTIN 600 MG: 300 CAPSULE ORAL at 10:52

## 2021-03-05 RX ADMIN — OXYCODONE AND ACETAMINOPHEN 1 TABLET: 10; 325 TABLET ORAL at 08:21

## 2021-03-05 RX ADMIN — INSULIN LISPRO 1 UNITS: 100 INJECTION, SOLUTION INTRAVENOUS; SUBCUTANEOUS at 00:19

## 2021-03-05 RX ADMIN — RANOLAZINE 500 MG: 500 TABLET, FILM COATED, EXTENDED RELEASE ORAL at 20:52

## 2021-03-05 RX ADMIN — INSULIN LISPRO 1 UNITS: 100 INJECTION, SOLUTION INTRAVENOUS; SUBCUTANEOUS at 08:22

## 2021-03-05 RX ADMIN — SACUBITRIL AND VALSARTAN 0.5 TABLET: 49; 51 TABLET, FILM COATED ORAL at 20:52

## 2021-03-05 RX ADMIN — MIDODRINE HYDROCHLORIDE 10 MG: 10 TABLET ORAL at 12:13

## 2021-03-05 RX ADMIN — OXYCODONE AND ACETAMINOPHEN 1 TABLET: 10; 325 TABLET ORAL at 16:18

## 2021-03-05 RX ADMIN — OXYCODONE AND ACETAMINOPHEN 1 TABLET: 10; 325 TABLET ORAL at 00:19

## 2021-03-05 RX ADMIN — ASPIRIN 81 MG: 81 TABLET, CHEWABLE ORAL at 08:22

## 2021-03-05 RX ADMIN — SODIUM CHLORIDE, PRESERVATIVE FREE 10 ML: 5 INJECTION INTRAVENOUS at 20:53

## 2021-03-05 RX ADMIN — APIXABAN 5 MG: 5 TABLET, FILM COATED ORAL at 00:34

## 2021-03-05 RX ADMIN — GABAPENTIN 600 MG: 300 CAPSULE ORAL at 17:57

## 2021-03-05 RX ADMIN — SODIUM CHLORIDE, PRESERVATIVE FREE 10 ML: 5 INJECTION INTRAVENOUS at 08:23

## 2021-03-05 RX ADMIN — APIXABAN 5 MG: 5 TABLET, FILM COATED ORAL at 20:52

## 2021-03-05 RX ADMIN — ROSUVASTATIN CALCIUM 40 MG: 40 TABLET, FILM COATED ORAL at 17:57

## 2021-03-05 RX ADMIN — GABAPENTIN 600 MG: 300 CAPSULE ORAL at 16:18

## 2021-03-05 RX ADMIN — CLOPIDOGREL BISULFATE 75 MG: 75 TABLET ORAL at 08:22

## 2021-03-05 RX ADMIN — RANOLAZINE 500 MG: 500 TABLET, FILM COATED, EXTENDED RELEASE ORAL at 08:21

## 2021-03-05 RX ADMIN — GABAPENTIN 600 MG: 300 CAPSULE ORAL at 22:22

## 2021-03-05 RX ADMIN — TRAZODONE HYDROCHLORIDE 50 MG: 50 TABLET ORAL at 23:44

## 2021-03-05 RX ADMIN — SACUBITRIL AND VALSARTAN 1 TABLET: 49; 51 TABLET, FILM COATED ORAL at 08:30

## 2021-03-05 RX ADMIN — NITROGLYCERIN 0.4 MG: 0.4 TABLET, ORALLY DISINTEGRATING SUBLINGUAL at 22:12

## 2021-03-05 RX ADMIN — MIDODRINE HYDROCHLORIDE 10 MG: 10 TABLET ORAL at 17:57

## 2021-03-05 RX ADMIN — SODIUM CHLORIDE, PRESERVATIVE FREE 10 ML: 5 INJECTION INTRAVENOUS at 00:20

## 2021-03-05 RX ADMIN — NITROGLYCERIN 0.4 MG: 0.4 TABLET, ORALLY DISINTEGRATING SUBLINGUAL at 22:20

## 2021-03-05 RX ADMIN — OXYCODONE AND ACETAMINOPHEN 1 TABLET: 10; 325 TABLET ORAL at 23:44

## 2021-03-05 RX ADMIN — TRAZODONE HYDROCHLORIDE 50 MG: 50 TABLET ORAL at 00:19

## 2021-03-05 RX ADMIN — RANOLAZINE 500 MG: 500 TABLET, FILM COATED, EXTENDED RELEASE ORAL at 00:19

## 2021-03-05 RX ADMIN — GABAPENTIN 600 MG: 300 CAPSULE ORAL at 05:59

## 2021-03-05 RX ADMIN — GABAPENTIN 600 MG: 300 CAPSULE ORAL at 00:29

## 2021-03-05 ASSESSMENT — PAIN DESCRIPTION - PROGRESSION
CLINICAL_PROGRESSION: GRADUALLY IMPROVING
CLINICAL_PROGRESSION: GRADUALLY WORSENING
CLINICAL_PROGRESSION: GRADUALLY WORSENING
CLINICAL_PROGRESSION: GRADUALLY IMPROVING

## 2021-03-05 ASSESSMENT — PAIN DESCRIPTION - FREQUENCY
FREQUENCY: CONTINUOUS

## 2021-03-05 ASSESSMENT — PAIN DESCRIPTION - DESCRIPTORS
DESCRIPTORS: ACHING

## 2021-03-05 ASSESSMENT — PAIN DESCRIPTION - LOCATION
LOCATION: BACK

## 2021-03-05 ASSESSMENT — PAIN DESCRIPTION - ORIENTATION
ORIENTATION: LOWER
ORIENTATION: LOWER
ORIENTATION: LEFT
ORIENTATION: LOWER

## 2021-03-05 ASSESSMENT — PAIN DESCRIPTION - ONSET
ONSET: PROGRESSIVE
ONSET: ON-GOING
ONSET: GRADUAL
ONSET: ON-GOING
ONSET: ON-GOING

## 2021-03-05 ASSESSMENT — PAIN - FUNCTIONAL ASSESSMENT
PAIN_FUNCTIONAL_ASSESSMENT: PREVENTS OR INTERFERES SOME ACTIVE ACTIVITIES AND ADLS
PAIN_FUNCTIONAL_ASSESSMENT: ACTIVITIES ARE NOT PREVENTED
PAIN_FUNCTIONAL_ASSESSMENT: ACTIVITIES ARE NOT PREVENTED

## 2021-03-05 ASSESSMENT — PAIN SCALES - GENERAL
PAINLEVEL_OUTOF10: 8
PAINLEVEL_OUTOF10: 4
PAINLEVEL_OUTOF10: 5
PAINLEVEL_OUTOF10: 9
PAINLEVEL_OUTOF10: 9
PAINLEVEL_OUTOF10: 7

## 2021-03-05 ASSESSMENT — PAIN DESCRIPTION - PAIN TYPE
TYPE: CHRONIC PAIN
TYPE: ACUTE PAIN

## 2021-03-05 NOTE — CONSULTS
[address] [place]                                  CONSULTATION    PATIENT NAME: Kurt Guerra                    :        1955  MED REC NO:   [id number]                          ROOM:       3121  ACCOUNT NO:   [de-identified]                           ADMIT DATE: 2021  PROVIDER:     Niels Washington MD    CARDIOLOGY CONSULTATION    CONSULT DATE:  2021    HISTORY OF PRESENT ILLNESS:  This is a 60-year-old male who is very well  known to me with numerous ER and hospital visits for chest pain, has  again come back with the pain in the chest which woke him up from the  sleep. He has known coronary artery disease status post CABG, numerous  PCIs, diabetes, diffuse coronary artery disease, ischemic  cardiomyopathy, was recently at [facility] with chest pain  and had an angiography performed. He said he woke up from the sleep  with this pain and took nitroglycerin without any relief and he decided  to come to the hospital.  In the emergency room, his EKG showed no acute  changes. He did have minor troponin bump and he was admitted for  further evaluation and treatment. He continues to have some soreness in  his chest.  He denies any diaphoresis, nausea, vomiting. REVIEW OF SYSTEMS:  Please see HPI. All other systems are reviewed and  they are negative. PAST MEDICAL HISTORY:  1. History of coronary artery disease status post remote CABG. He also  is status post stenting of his protected left main and he had a stent in  his saphenous vein graft to the obtuse marginal branch. Recent coronary  angiography showed diffuse disease distally with patent stented  segments. 2.  Ischemic cardiomyopathy. Last available EF from [facility] is on  2020 is EF of 25% to 30%. 3.  Diabetes mellitus. 4.  History of pain-seeking behavior. 5.  COPD. 6.  Peripheral artery disease.     SOCIAL HISTORY: The patient still smokes about half a pack a day. Denies any alcohol usage. FAMILY HISTORY:  Currently not available. PAST SURGICAL HISTORY:  Previous CABG, numerous PCIs as mentioned,  endoscopy, status post pacemaker placement, cholecystectomy. MEDICINE AND ALLERGIES:  Have been reviewed. PHYSICAL EXAMINATION:  VITAL SIGNS:  His last pulse is 72, blood pressure _____/51,  respirations are 18, oxygen saturation 94%. HEENT:  His neck is supple. No JVD. No thyromegaly. No  lymphadenopathy. No bruits were heard. CARDIAC EXAMINATION:  Reveals normal S1 and S2. I could not appreciate  any gallop, murmur, or rub. LUNGS:  Reveals bibasilar crackles. ABDOMEN:  Soft, nontender. Bowel sounds are present. EXTREMITIES:  Show no edema. NEUROLOGICAL EXAMINATION:  Alert, oriented. Cranial nerves II through  XII are intact. No focal deficit. SKIN:  Shows no rashes. LABORATORY DATA:  Sodium is 137, potassium 4.2, chloride 103, bicarb 23,  BUN is 15, creatinine 1.1. The patient's troponin x2 was 0.02. His  white count 5.1, hemoglobin 11.7, hematocrit is 34.8, MCV is 103. His EKG shows sinus rhythm, right bundle branch block with associated ST  and T-wave abnormalities. The chest x-ray showed no acute process. The  patient's coronary angiography from [facility] showed patent grafts with  diffuse disease of the native coronary circulation. IMPRESSION:  1. This is a 27-year-old male with a known history of coronary artery  disease and multiple stents and previous CABG comes with a recurrent  chest pain. His pain appeared atypical for angina but with his diffuse  coronary artery disease and mild troponin bump, I cannot rule out this  to be cardiac in nature. The patient has diffuse distal disease which  certainly can lead to the pain as well as mild troponin bump. He also  has element of anxiety which also plays a very major role in his  recurrent symptoms of chest pain. 2.  Hyperlipidemia. 3. COPD. 4.  Smoking abuse. RECOMMENDATIONS:  1. We will start the patient on Ranexa. 2.  We will have the patient see back his cardiologist at [facility]. 3.  No need to consider the patient for stress test.    I appreciate the opportunity to participate in the care of this pleasant  white male. Complexity of medical decision making is high.         Santosh Snyder MD    D: 03/04/2021 15:04:39       T: 03/04/2021 18:09:50     [email or web address]  Job#: [id number]     Doc#: [id number]    CC:

## 2021-03-05 NOTE — PROGRESS NOTES
Hospitalist Progress Note      PCP: Karlyn Osgood, MD    Date of Admission: 3/3/2021      Subjective:       Feels better today except for dizziness when he sits up. . Blood pressures have been low but slowly improving    Medications:  Reviewed    Infusion Medications    dextrose       Scheduled Medications    sacubitril-valsartan  0.5 tablet Oral BID    ranolazine  500 mg Oral BID    midodrine  10 mg Oral TID WC    apixaban  5 mg Oral BID    carvedilol  12.5 mg Oral BID WC    clopidogrel  75 mg Oral Daily    gabapentin  600 mg Oral 5x Daily    rosuvastatin  40 mg Oral QPM    tamsulosin  0.4 mg Oral Daily    sodium chloride flush  10 mL Intravenous 2 times per day    aspirin  81 mg Oral Daily    insulin lispro  0-6 Units Subcutaneous TID WC    insulin lispro  0-3 Units Subcutaneous Nightly     PRN Meds: ipratropium-albuterol, oxyCODONE-acetaminophen, traZODone, sodium chloride flush, promethazine **OR** ondansetron, acetaminophen **OR** acetaminophen, polyethylene glycol, nitroGLYCERIN, glucose, dextrose, glucagon (rDNA), dextrose      Intake/Output Summary (Last 24 hours) at 3/5/2021 0946  Last data filed at 3/5/2021 5690  Gross per 24 hour   Intake 1302 ml   Output 550 ml   Net 752 ml       Exam:    BP (!) 86/57   Pulse 84   Temp 97.7 °F (36.5 °C) (Oral)   Resp 20   Ht 6' (1.829 m)   Wt 181 lb 7 oz (82.3 kg)   SpO2 96%   BMI 24.61 kg/m²     General appearance: No apparent distress, appears stated age and cooperative. HEENT: Pupils equal, round, and reactive to light. Conjunctivae/corneas clear. Neck: Supple, with full range of motion. No jugular venous distention. Trachea midline. Respiratory:  Normal respiratory effort. Clear to auscultation, bilaterally without Rales/Wheezes/Rhonchi. Cardiovascular: Regular rate and rhythm with normal S1/S2 without murmurs, rubs or gallops. Abdomen: Soft, non-tender, non-distended with normal bowel sounds.   Musculoskeletal: No clubbing, cyanosis or edema bilaterally. Full range of motion without deformity. Skin: Skin color, texture, turgor normal.  No rashes or lesions. Neurologic:  Neurovascularly intact without any focal sensory/motor deficits. Cranial nerves: II-XII intact, grossly non-focal.  Psychiatric: Alert and oriented, thought content appropriate, normal insight  Capillary Refill: Brisk,< 3 seconds   Peripheral Pulses: +2 palpable, equal bilaterally       Labs:   Recent Labs     03/03/21 2050 03/04/21  0600   WBC 6.3 5.1   HGB 13.0* 11.7*   HCT 38.4* 34.8*    193     Recent Labs     03/03/21 2024 03/04/21  0600    136   K 4.5 4.2    103   CO2 21 23   BUN 18 15   CREATININE 1.0 1.1   CALCIUM 8.9 8.6     Recent Labs     03/03/21 2024   AST 14*   ALT <5*   BILITOT <0.2   ALKPHOS 57     No results for input(s): INR in the last 72 hours. Recent Labs     03/03/21 2024 03/03/21  2334 03/04/21  0204   TROPONINI 0.02* 0.02* 0.02*       Assessment/Plan:    -Chest pain/angina. . CTA negative. Troponin is marginally elevated. EKG no acute ischemic changes. -CAD-s/p CABG x2-diffuse not amenable to intervention--  He underwent LHC which showed RCA and LCx  and lesions in the LAD but all grafts patent with stable LVEF. Chest pain was not thought to be ischemic in origin. He was on maximal antianginal therapy. He was encouraged to quit smoking and was provided with NRT prescriptions on discharge. He was discharged to continue home meds  -Ischemic cardiomyopathy/chronic systolic heart failure status AICD, EF 25 to 30%  -Hypotension since 3/4/21 SBP 70s--improving--on midodrine. . BP meds held  -COPD-stable  -Chronic tobacco abuse  -Chronic pain syndrome/fibromyalgia  -Diabetes mellitus type 2  -Prior CVA    Plan    Continue to monitor blood pressures  Continue midodrine for hypotension  Resume BP meds as tolerated at lower doses  Appreciate cardiology consult  Anticipate discharge in 24 hours if BP remains stable      DVT Prophylaxis: Eliquis  Diet: DIET CARDIAC; Low Sodium (2 GM);  Daily Fluid Restriction: 2000 ml  Code Status: Full Code      Vandana Morrow MD

## 2021-03-05 NOTE — PROGRESS NOTES
[facility] 81   Daily Progress Note      Admit Date:  3/3/2021    Reason for follow up visit: Chest pain    CC: \"I feel a lot better. I haven't had any more chest pain. \"    73 y/o male with PMH significant for CAD/CABG/prior stents/recent cath [facility], diabetes mellitus, ischemic cardiomyopathy, COPD and PAD who was admitted with chest pain that awakened him from sleep. He took NTG without relief and came to ED. ECG showed no acute changes, troponin 0.02 (flat) and admitted for further evaluation. Since admission has had issues with hypotension and proamatine added yesterday. Interval History:  Pt. seen and examined; records reviewed  BP reviewed (remains hypotensive)  On proamatine  + dizziness when he gets up  Denies chest pain or SOB    Subjective:  Pt with no acute overnight events. Denies chest pain, palpitations, and dyspnea. Review of Systems:   · Constitutional: no unanticipated weight loss. There's been no change in energy level, sleep pattern, or activity level. No fevers, chills. · Eyes: No visual changes or diplopia. No scleral icterus. · ENT: No Headaches, hearing loss or vertigo. No mouth sores or sore throat. · Cardiovascular: as reviewed in HPI  · Respiratory: No cough or wheezing, no sputum production. No hematemesis. · Gastrointestinal: No abdominal pain, appetite loss, blood in stools. No change in bowel or bladder habits. · Genitourinary: No dysuria, trouble voiding, or hematuria. · Musculoskeletal:  No gait disturbance, no joint complaints. · Integumentary: No rash or pruritis. · Neurological: No headache, diplopia, change in muscle strength, numbness or tingling. · Psychiatric: No anxiety or depression. · Endocrine: No temperature intolerance. No excessive thirst, fluid intake, or urination. No tremor. · Hematologic/Lymphatic: No abnormal bruising or bleeding, blood clots or swollen lymph nodes.   · Allergic/Immunologic: No nasal congestion or hives.    Objective:   BP (!) 86/57   Pulse 84   Temp 97.7 °F (36.5 °C) (Oral)   Resp 20   Ht 6' (1.829 m)   Wt 181 lb 7 oz (82.3 kg)   SpO2 96%   BMI 24.61 kg/m²       Intake/Output Summary (Last 24 hours) at 3/5/2021 0910  Last data filed at 3/5/2021 7399  Gross per 24 hour   Intake 1302 ml   Output 550 ml   Net 752 ml     Wt Readings from Last 3 Encounters:   03/05/21 181 lb 7 oz (82.3 kg)   10/29/20 170 lb 13.7 oz (77.5 kg)   10/21/20 171 lb 15.3 oz (78 kg)       Physical Exam:  General: In no acute distress. Awake, alert, and oriented X4. Skin:  Warm and dry. No new appearing rashes or lesions. Neck:  Supple. No JVD or bruit appreciated. Chest:  Clear to auscultation. No wheezes/rhonchi/rales  Cardiovascular:  RRR. S1S2. No M/R/G. Abdomen:  Soft, nontender, +bowel sounds. Extremities:  No LE edema. No clubbing or cyanosis.      Medications:    ranolazine  500 mg Oral BID    midodrine  10 mg Oral TID WC    apixaban  5 mg Oral BID    carvedilol  12.5 mg Oral BID WC    clopidogrel  75 mg Oral Daily    gabapentin  600 mg Oral 5x Daily    isosorbide mononitrate  60 mg Oral Daily    rosuvastatin  40 mg Oral QPM    sacubitril-valsartan  1 tablet Oral BID    tamsulosin  0.4 mg Oral Daily    sodium chloride flush  10 mL Intravenous 2 times per day    aspirin  81 mg Oral Daily    insulin lispro  0-6 Units Subcutaneous TID WC    insulin lispro  0-3 Units Subcutaneous Nightly      dextrose       ipratropium-albuterol, oxyCODONE-acetaminophen, traZODone, sodium chloride flush, promethazine **OR** ondansetron, acetaminophen **OR** acetaminophen, polyethylene glycol, nitroGLYCERIN, glucose, dextrose, glucagon (rDNA), dextrose    Lab Data:  CBC:   Recent Labs     03/03/21 2050 03/04/21  0600   WBC 6.3 5.1   HGB 13.0* 11.7*    193     BMP:    Recent Labs     03/03/21 2024 03/04/21  0600    136   K 4.5 4.2   CO2 21 23   BUN 18 15   CREATININE 1.0 1.1     LIVR:   Recent Labs 21   AST 14*   ALT <5*     Results for Maria Isabel Abrams (MRN [id number]) as of 3/5/2021 09:15   Ref. Range 3/3/2021 20:24 3/3/2021 23:34 3/4/2021 02:04   Pro-BNP Latest Ref Range: 0 - 124 pg/mL 1,970 (H)     Troponin Latest Ref Range: <0.01 ng/mL 0.02 (H) 0.02 (H) 0.02 (H)     ECG:  Sinus rhythm with occasional Premature ventricular complexes  Right bundle branch block  Nonspecific T wave abnormality  Abnormal ECG    2021 Cardiac cath: ([facility])  3 Vessel CAD  Patent JENIFER-LAD  Patent SVG-D2-OM2 with patent ostial graft stent  Patent SVG_RPDA w/ patent proximal and mid graft stents  Mild to moderate LV dysfunction  Normal LVEDP  Normal systemic pressures    Plan:  Continue medical therapy for cardiovascular risk factor reduction. Echo 2021:  The left ventricular function is moderately reduced. Overall left ventricular ejection fraction is estimated to be 35-40%. There is moderate global hypokinesis of the left ventricle. The left atrium is mildly dilated. The left ventricle is mildly dilated. There is mild mitral regurgitation. There is a pacemaker lead in the right ventricle. The diastolic function is impaired and classified as Grade 1 (impaired  relaxation). There is moderate concentric left ventricular hypertrophy. 10/2020 Cardiac cath: [facility]  Coronary Angiography:     Left Main: Widely patent left main stent with mild diffuse in-stent restenosis  Right Coronary: Dominant.  Proximal 100% occlusion  Left Anterior Descendin% proximal stenosis. Mid 90% stenosis.    First diagonal with diffuse disease 40% proximal lesion. Left Circumflex: 100% proximal occlusion  JENIFER-LAD:  Widely patent.  It arises from a common fletcher with the vein graft to the OM and diagonal  SVG-PDA:Widely patent stents in the proximal and mid vein graft with mild diffuse disease.   SVG-OM2 and Diagonal:Widely patent stent in the proximal vein graft to the OM 2 and diagonal.  Distally there is mild diffuse disease. This graft is diffusely ectatic and aneurysmal    Left Ventricle:EF of 40% with mild LV systolic dysfunction. Justa Jim is no significant MR. Justa Jim is no significant LV-Ao gradient.  /8 mmHg. Ao 112/66 mmHg      Impression: EF 40%  With normal LVEDP. Severe triple-vessel coronary artery disease    Widely patent JENIFER to the LAD. Widely patent left main stent. Widely patent SVG to RPDA. The stents in the proximal and midportion are widely patent   Widely patent SVG with a Y graft to the OM 2 and diagonal.  Stent in the proximal portion is widely patent. There is diffuse distal branch vessel disease which could account for   the patient's angina.  However these are not amenable to PCI.      Plan:Continue medical therapy and risk factor modification.      Smoking cessation imperative    9/14/2020 Cardiac cath: [facility]  1. Severe 3v native CAD with 4/4 bypass grafts patent with thrombotic   degeneration of SVG-RCA as the culprit for the clinical presentation of   ACS/UA  2. Successful complex PCI of the SVG-RCA using distal protection device   and overlapping drug-eluting stents  3. Low normal LV systolic function (improved from recent LV gram performed   9/8/2020)  4. Normal systemic pressure  5. Normal LVEDP   6.  Successful hemostasis of the right radial artery with TR band    9/8/2020: [facility]  Impression:   EF 40%  With normal LVEDP      Severe triple-vessel coronary artery disease      Widely patent JENIFER to the LAD.      Widely patent left main stent.      SVG to RCA has diffuse disease up to 60%      SVG with a Y graft to the OM 2 and diagonal has ostial 80% lesion.      S/p PCI of the SVG with 4 x 38 mm Synergy ANGELINA and using the 80% lesion   to 0%    11/2019 CArdiac cath: [facility]  3 Vessel CAD  Patent free JENIFER-LAD  Patent SVG-D1-OM2  Patent SVG-RPDA  Patent LMCA/proximal LAD stents  Mild LV dysfunction  Normal LVEDP  Normal systemic pressures    5/2020 Lexiscan-Myoview:  No evidence

## 2021-03-05 NOTE — PROGRESS NOTES
Patient doing better BP is much better, he is alert and oriented x4. He reports felling alright. No chest pain, nausea, vomiting. Telly box remains in place. He reports that about a year and a half ago he had the same symptoms and his blood pressure tanked like it did today.  Call light in reach, bed alarm set, will monitor

## 2021-03-05 NOTE — PLAN OF CARE
Problem: Pain:  Goal: Pain level will decrease  Description: Pain level will decrease  Outcome: Met This Shift  Note: Patient has been assessed for pain on a regular bases, patient has been given pain medication as appropriate, patient has been reassessed for pain after medication has been administered  Goal: Control of acute pain  Description: Control of acute pain  Outcome: Met This Shift  Note: Patient is alert and oriented X4. Patient is able to identify the sensation of pain and communicate the need for pain medication appropriately. Goal: Control of chronic pain  Description: Control of chronic pain  Outcome: Met This Shift  Note: Chronic pain controled with PRN percocet, this is also what he takes at home      Problem: OXYGENATION/RESPIRATORY FUNCTION  Goal: Patient will maintain patent airway  Outcome: Met This Shift  Note: Pt RR WDL, VSS. Pt airway is patent, and pt is able to clear airway effectively by coughing. Will continue to monitor and assess. Goal: Patient will achieve/maintain normal respiratory rate/effort  Description: Respiratory rate and effort will be within normal limits for the patient  Outcome: Met This Shift  Note: Respiratory rate is normal, he is on room air      Problem: HEMODYNAMIC STATUS  Goal: Patient has stable vital signs and fluid balance  Outcome: Met This Shift  Note: Vital signs stable, respiratory rate normal, heart rate is WNLs and regular on cardiac monitor, +2 pulses and less than 3 second cap refill noted in all extremities, body color appropriate for ethnicity and temperature warm, patient repositions  self, and daily weights are ordered  Will continue to monitor and reassess.       Problem: FLUID AND ELECTROLYTE IMBALANCE  Goal: Fluid and electrolyte balance are achieved/maintained  Outcome: Met This Shift     Problem: ACTIVITY INTOLERANCE/IMPAIRED MOBILITY  Goal: Mobility/activity is maintained at optimum level for patient  Outcome: Met This Shift     Problem: Falls - Risk of:  Goal: Will remain free from falls  Description: Will remain free from falls  Outcome: Met This Shift  Note: No falls noted this shift. Patient ambulates without difficulty and one staff assist.  Bed kept in low position. Safe environment maintained. Bedside table & call light in reach. Uses call light appropriately when needing assistance.    Goal: Absence of physical injury  Description: Absence of physical injury  Outcome: Met This Shift

## 2021-03-05 NOTE — PROGRESS NOTES
Patient is in bed, eating breakfast, he denies chest pain. BP is still running low, patient is not symptomatic, will continue to hold BP medications per MD nursing order. Patient educated that he must call if he wants to get up as he is a fall risk, he verbalized understanding.   Call light in reach, bed alarm set, will monitor

## 2021-03-06 VITALS
DIASTOLIC BLOOD PRESSURE: 83 MMHG | OXYGEN SATURATION: 93 % | HEIGHT: 72 IN | BODY MASS INDEX: 25.14 KG/M2 | HEART RATE: 118 BPM | RESPIRATION RATE: 14 BRPM | SYSTOLIC BLOOD PRESSURE: 106 MMHG | TEMPERATURE: 97.8 F | WEIGHT: 185.63 LBS

## 2021-03-06 LAB
GLUCOSE BLD-MCNC: 100 MG/DL (ref 70–99)
GLUCOSE BLD-MCNC: 104 MG/DL (ref 70–99)
PERFORMED ON: ABNORMAL
PERFORMED ON: ABNORMAL

## 2021-03-06 PROCEDURE — 6370000000 HC RX 637 (ALT 250 FOR IP): Performed by: HOSPITALIST

## 2021-03-06 PROCEDURE — 2580000003 HC RX 258: Performed by: NURSE PRACTITIONER

## 2021-03-06 PROCEDURE — 94760 N-INVAS EAR/PLS OXIMETRY 1: CPT

## 2021-03-06 PROCEDURE — 6370000000 HC RX 637 (ALT 250 FOR IP): Performed by: INTERNAL MEDICINE

## 2021-03-06 PROCEDURE — 6370000000 HC RX 637 (ALT 250 FOR IP): Performed by: NURSE PRACTITIONER

## 2021-03-06 RX ORDER — MIDODRINE HYDROCHLORIDE 10 MG/1
10 TABLET ORAL
Qty: 90 TABLET | Refills: 1 | Status: ON HOLD | OUTPATIENT
Start: 2021-03-06 | End: 2021-04-02 | Stop reason: HOSPADM

## 2021-03-06 RX ORDER — CARVEDILOL 12.5 MG/1
6.25 TABLET ORAL 2 TIMES DAILY WITH MEALS
Qty: 60 TABLET | Refills: 3 | COMMUNITY
Start: 2021-03-06 | End: 2021-11-27

## 2021-03-06 RX ORDER — RANOLAZINE 500 MG/1
1000 TABLET, EXTENDED RELEASE ORAL 2 TIMES DAILY
Qty: 60 TABLET | Refills: 3 | Status: SHIPPED | OUTPATIENT
Start: 2021-03-06 | End: 2021-06-17

## 2021-03-06 RX ORDER — SACUBITRIL AND VALSARTAN 49; 51 MG/1; MG/1
0.5 TABLET, FILM COATED ORAL 2 TIMES DAILY
Qty: 60 TABLET | Refills: 0 | COMMUNITY
Start: 2021-03-06 | End: 2022-01-28

## 2021-03-06 RX ADMIN — MIDODRINE HYDROCHLORIDE 10 MG: 10 TABLET ORAL at 12:47

## 2021-03-06 RX ADMIN — TAMSULOSIN HYDROCHLORIDE 0.4 MG: 0.4 CAPSULE ORAL at 08:27

## 2021-03-06 RX ADMIN — MIDODRINE HYDROCHLORIDE 10 MG: 10 TABLET ORAL at 08:26

## 2021-03-06 RX ADMIN — GABAPENTIN 600 MG: 300 CAPSULE ORAL at 13:01

## 2021-03-06 RX ADMIN — CLOPIDOGREL BISULFATE 75 MG: 75 TABLET ORAL at 08:26

## 2021-03-06 RX ADMIN — RANOLAZINE 500 MG: 500 TABLET, FILM COATED, EXTENDED RELEASE ORAL at 08:27

## 2021-03-06 RX ADMIN — OXYCODONE AND ACETAMINOPHEN 1 TABLET: 10; 325 TABLET ORAL at 08:07

## 2021-03-06 RX ADMIN — GABAPENTIN 600 MG: 300 CAPSULE ORAL at 06:16

## 2021-03-06 RX ADMIN — APIXABAN 5 MG: 5 TABLET, FILM COATED ORAL at 08:27

## 2021-03-06 RX ADMIN — ASPIRIN 81 MG: 81 TABLET, CHEWABLE ORAL at 08:27

## 2021-03-06 RX ADMIN — SODIUM CHLORIDE, PRESERVATIVE FREE 10 ML: 5 INJECTION INTRAVENOUS at 08:30

## 2021-03-06 ASSESSMENT — PAIN DESCRIPTION - LOCATION
LOCATION: CHEST
LOCATION: CHEST

## 2021-03-06 ASSESSMENT — PAIN DESCRIPTION - PROGRESSION
CLINICAL_PROGRESSION: GRADUALLY IMPROVING

## 2021-03-06 ASSESSMENT — PAIN DESCRIPTION - ORIENTATION
ORIENTATION: LEFT
ORIENTATION: LEFT

## 2021-03-06 ASSESSMENT — PAIN DESCRIPTION - FREQUENCY
FREQUENCY: CONTINUOUS
FREQUENCY: CONTINUOUS

## 2021-03-06 ASSESSMENT — PAIN DESCRIPTION - ONSET: ONSET: ON-GOING

## 2021-03-06 ASSESSMENT — PAIN DESCRIPTION - PAIN TYPE: TYPE: ACUTE PAIN

## 2021-03-06 ASSESSMENT — PAIN SCALES - GENERAL
PAINLEVEL_OUTOF10: 5
PAINLEVEL_OUTOF10: 0

## 2021-03-06 NOTE — PROGRESS NOTES
Patient is A&O. Patient is resting in bed, awake and quiet. room air. Side rails are up x2. Fall precautions are in place. Bed is in lowest position. Call light, telephone and bedside table are within reach. Patient is UAL. VSS taken. AM meds given. Shift assessment completed. Needs met. Will continue to monitor patient per unit protocols.  Electronically signed by Carmen Rose RN on 3/6/2021 at 10:23 AM

## 2021-03-06 NOTE — PROGRESS NOTES
Pt rested quietly overnight after Trazodone and Percocet given. This am pt states his pain is improved. Rating 6/10 on pain scale. Still describing as sharp, pressure. VS remained stable. Remains SR on tele. Denies any needs. He would like his next pain pill at 0745 when it's due. Call light in reach. Will continue to monitor.

## 2021-03-06 NOTE — DISCHARGE INSTR - COC
Continuity of Care Form    Patient Name: Karin Nam   :  1955  MRN:  [id number]    Admit date:  3/3/2021  Discharge date:  ***    Code Status Order: Full Code   Advance Directives:   Advance Care Flowsheet Documentation     Date/Time Healthcare Directive Type of Healthcare Directive Copy in [address] Agent's Name Healthcare Agent's Phone Number    21 [phone]  Yes, patient has an advance directive for healthcare treatment  Durable power of  for health care  No, copy requested from medical records  --  Brittany Roberts  --          Admitting Physician:  Rg Bernstein MD  PCP: Michelle Burns MD    Discharging Nurse: [facility] Unit/Room#: R0H-6426/3121-01  Discharging Unit Phone Number: ***    Emergency Contact:   Extended Emergency Contact Information  Primary Emergency Contact: Madelinelisa Lieduar  Address: [address] [place] [address] Phone: [phone]  Relation: Spouse  Secondary Emergency Contact: [facility] Phone: [phone]  Work Phone: [phone]  Relation: Child    Past Surgical History:  Past Surgical History:   Procedure Laterality Date    APPENDECTOMY      BACK SURGERY      CARDIAC SURGERY      CHOLECYSTECTOMY      COLONOSCOPY  01/10/2017    COLONOSCOPY  01/10/2017    CORONARY ANGIOPLASTY WITH STENT PLACEMENT  2012    CORONARY ARTERY BYPASS GRAFT  , 2015    ENDOSCOPY, COLON, DIAGNOSTIC      PACEMAKER PLACEMENT      UPPER GASTROINTESTINAL ENDOSCOPY  2017    VASCULAR SURGERY         Immunization History:   Immunization History   Administered Date(s) Administered    Influenza 2013    Influenza Vaccine, unspecified formulation 10/24/2011, 2013, 2015, 2017    Influenza Virus Vaccine 2014, 2015, 10/22/2018, 2019    Influenza, Quadv, IM, (6 mo and older Fluzone, Flulaval, Fluarix and 3 yrs and older Afluria) 10/17/2018    Influenza, Leanord Muck, IM, PF (6 mo and older Fluzone, Flulaval, Fluarix, and 3 yrs and older Afluria) 10/17/2016, 11/27/2019, 09/22/2020    Pneumococcal Polysaccharide (Xokhintfx03) 10/24/2011, 09/18/2015    Tdap (Boostrix, Adacel) 05/04/2015       Active Problems:  Patient Active Problem List   Diagnosis Code    Hx of CABG Z95.1    NSTEMI (non-ST elevated myocardial infarction) I21.4    Essential hypertension I10    Ischemic cardiomyopathy I25.5    Hyperlipidemia LDL goal <70 E78.5    CHF (congestive heart failure) ([facility]) I50.9    Chronic back pain M54.9, G89.29    Type 2 diabetes mellitus without complication, with long-term current use of insulin ([facility]) E11.9, Z79.4    Coronary artery disease involving native coronary artery of native heart without angina pectoris I25.10    Anemia,normocytic normochromic ,mixed folic aci deficiency and iron deficiency D64.9    Guaiac + stool R19.5    Morbid obesity due to excess calories ([facility]) E66.01    Anxiety F41.9    Coronary artery disease involving coronary bypass graft of native heart with angina pectoris ([facility]) I25.709    Iron deficiency anemia due to chronic blood loss D50.0    LALITA (acute kidney injury) ([facility] N17.9    Tobacco abuse Z72.0    Restrictive lung disease J98.4    Acute on chronic diastolic congestive heart failure ([facility]) I50.33    Ischemic stroke, left frontal lobe (4/30/18) ([facility]) I63.9    PFO (patent foramen ovale) Q21.1    DMII (diabetes mellitus, type 2) ([facility]) E11.9    COPD (chronic obstructive pulmonary disease) ([facility]) J44.9    Renal insufficiency N28.9    Chronic systolic (congestive) heart failure ([facility]) I50.22    CAD (coronary artery disease) I25.10    Compression fracture of L2, sequela S32.020S    Low back pain M54.5    Intractable back pain M54.9    Stroke determined by clinical assessment ([facility] I63.9    NSVT (nonsustained ventricular tachycardia) ([facility]) I47.2    Sinus tachycardia R00.0    ICD (implantable cardioverter-defibrillator) discharge Z45.02    Diabetic peripheral neuropathy (HCC) E11.42    Ventricular tachycardia (HCC) I47.2    Hypertensive crisis I16.9    Acute pulmonary embolism without acute cor pulmonale (HCC) I26.99    Other benign neoplasm of skin of trunk  D23.5    Jaw pain R68.84    Orthostatic hypotension I95.1    Acute chest pain R07.9    Chest pain R07.9    Hypotension I95.9       Isolation/Infection:   Isolation          No Isolation        Patient Infection Status     Infection Onset Added Last Indicated Last Indicated By Review Planned Expiration Resolved Resolved By    None active    Resolved    C-diff Rule Out 07/30/20 07/30/20 08/17/20 Clostridium Difficile Toxin/Antigen (Ordered)   09/05/20 Luis Antonio Red RN    COVID-19 Rule Out 07/06/20 07/06/20 07/06/20 COVID-19 (Ordered)   07/07/20 Rule-Out Test Resulted    COVID-19 Rule Out 05/23/20 05/23/20 05/23/20 COVID-19 (Ordered)   05/23/20 Rule-Out Test Resulted    C-diff Rule Out 03/07/20 03/07/20 03/10/20 Clostridium Difficile Toxin/Antigen (Ordered)   03/10/20 Rule-Out Test Resulted          Nurse Assessment:  Last Vital Signs: /83   Pulse 118   Temp 97.8 °F (36.6 °C) (Axillary)   Resp 14   Ht 6' (1.829 m)   Wt 185 lb 10 oz (84.2 kg)   SpO2 93%   BMI 25.18 kg/m²     Last documented pain score (0-10 scale): Pain Level: 5  Last Weight:   Wt Readings from Last 1 Encounters:   03/06/21 185 lb 10 oz (84.2 kg)     Mental Status:  {IP PT MENTAL STATUS:20030}    IV Access:  {[facility] IV ACCESS:827538125}    Nursing Mobility/ADLs:  Walking   {[facility] DME LQDV:777740220}  Transfer  {P DME RWOF:797251129}  Bathing  {P DME URRP:289248735}  Dressing  {P DME UNIB:002957419}  Toileting  {[facility] DME JWQR:298740126}  Feeding  {[facility] DME PBKB:230557511}  Med Admin  {[facility] DME VCTA:187714414}  Med Delivery   { [facility] Delivery:937947798}    Wound Care Documentation and Therapy:        Elimination:  Continence:   · Bowel: {YES / IA:12359}  · Bladder: {YES / WI:96525}  Urinary Catheter: {Urinary Catheter:277924447}   Colostomy/Ileostomy/Ileal Conduit: {YES / IE:91510}       Date of Last BM: ***    Intake/Output Summary (Last 24 hours) at 3/6/2021 1457  Last data filed at 3/6/2021 1041  Gross per 24 hour   Intake 870 ml   Output 1150 ml   Net -280 ml     I/O last 3 completed shifts:   In: 3290 [P.O.:1470]  Out: 56 [Urine:1150]    Safety Concerns:     [employer] Safety Concerns:429563026}    Impairments/Disabilities:      [employer] Impairments/Disabilities:793620248}    Nutrition Therapy:  Current Nutrition Therapy:   [employer] Diet List:832623365}    Routes of Feeding: {[facility] DME Other Feedings:107898417}  Liquids: {Slp liquid thickness:19708}  Daily Fluid Restriction: {CHP DME Yes amt example:632125544}  Last Modified Barium Swallow with Video (Video Swallowing Test): {Done Not Done UZLY:391776264}    Treatments at the Time of Hospital Discharge:   Respiratory Treatments: ***  Oxygen Therapy:  {Therapy; copd oxygen:32575}  Ventilator:    { CC Vent KUTP:589203167}    Rehab Therapies: {THERAPEUTIC INTERVENTION:7728478335}  Weight Bearing Status/Restrictions: [address] Weight Bearin}  Other Medical Equipment (for information only, NOT a DME order):  {EQUIPMENT:411399317}  Other Treatments: ***    Patient's personal belongings (please select all that are sent with patient):  {[facility] DME Belongings:303606844}    RN SIGNATURE:  {Esignature:146804732}    CASE MANAGEMENT/SOCIAL WORK SECTION    Inpatient Status Date: ***    Readmission Risk Assessment Score:  Readmission Risk              Risk of Unplanned Readmission:        41           Discharging to Facility/ Agency   · Name:   · Address:  · Phone:  · Fax:    Dialysis Facility (if applicable)   · Name:  · Address:  · Dialysis Schedule:  · Phone:  · Fax:    / signature: {Esignature:322445907}    PHYSICIAN SECTION    Prognosis: {Prognosis:3628856928}    Condition at Discharge: [address] Patient Condition:196503671}    Rehab Potential (if transferring to Rehab): {Prognosis:2577404463}    Recommended Labs or Other Treatments After Discharge: ***    Physician Certification: I certify the above information and transfer of Tre Osorio  is necessary for the continuing treatment of the diagnosis listed and that he requires {Admit to Appropriate Level of Care:94887} for {GREATER/LESS:955943624} 30 days.      Update Admission H&P: {CHP DME Changes in NCDQI:560648684}    PHYSICIAN SIGNATURE:  {Esignature:228907490}

## 2021-03-06 NOTE — PROGRESS NOTES
Pt AAO x4.  C/o chest pain in left chest 9/10 on pain scale. Describing as sharp, pressure. Gave PRN Nitro, will monitor for effectiveness. SR on tele. VSS. Assessment completed and charted. Denies further needs. Call light in reach. Will monitor.

## 2021-03-06 NOTE — PROGRESS NOTES
Data- discharge order received, pt verbalized agreement to discharge, disposition to previous residence, no needs for HHC/DME. Action- discharge instructions prepared and given to pt, pt verbalized understanding. Medication information packet given r/t NEW and/or CHANGED prescriptions emphasizing name/purpose/side effects, pt verbalized understanding. Discharge instruction summary: Diet- low sodium/ cardiac, Activity- UAL, Primary Care Physician as follows: Karlyn Osgood, [phone] f/u appointment, immunizations reviewed and prescription medications filled with patient's preferred pharmacy. 60 minutes of CHF Education reviewed. Pt/ Family has had a total of 60 minutes CHF education this admission encounter. Reviewed discharge instructions with the patient. No further questions at this time. Response- Pt belongings gathered, IV removed without complications. Dry dressing applied. Disposition is home (no HHC/DME needs), transported with Lyft ride, taken to lobby via w/c w/ belonging and discharge paperwok, no complications.  Electronically signed by Param Porter RN on 3/6/2021 at 3:35 PM

## 2021-03-06 NOTE — PLAN OF CARE
Problem: Pain:  Goal: Pain level will decrease  Description: Pain level will decrease  3/6/2021 0728 by Ananya Dale RN  Outcome: Ongoing   Pain level will decrease  Problem: Pain:  Goal: Control of acute pain  Description: Control of acute pain  3/6/2021 0728 by Ananya Dale RN  Outcome: Ongoing   Patient educated on acute pain. Taught patient to use call light to ask for pain medication. PRN pain medication given for acute pain. Will continue to monitor pain per unit protocol. Problem: Pain:  Goal: Control of chronic pain  Description: Control of chronic pain  3/6/2021 0728 by Ananya Dale RN  Outcome: Ongoing     Problem: OXYGENATION/RESPIRATORY FUNCTION  Goal: Patient will maintain patent airway  3/6/2021 0728 by Ananya Dale RN  Outcome: Ongoing     Problem: OXYGENATION/RESPIRATORY FUNCTION  Goal: Patient will achieve/maintain normal respiratory rate/effort  Description: Respiratory rate and effort will be within normal limits for the patient  3/6/2021 0728 by Ananya Dale RN  Outcome: Ongoing     Problem: HEMODYNAMIC STATUS  Goal: Patient has stable vital signs and fluid balance  3/6/2021 0728 by Ananya Dale RN  Outcome: Ongoing     Problem: FLUID AND ELECTROLYTE IMBALANCE  Goal: Fluid and electrolyte balance are achieved/maintained  3/6/2021 0728 by Ananya Dale RN  Outcome: Ongoing     Problem: ACTIVITY INTOLERANCE/IMPAIRED MOBILITY  Goal: Mobility/activity is maintained at optimum level for patient  3/6/2021 0728 by Ananya Dale RN  Outcome: Ongoing     Problem: Falls - Risk of:  Goal: Will remain free from falls  Description: Will remain free from falls  3/6/2021 0728 by Ananya Dale RN  Outcome: Ongoing   Will remain free from falls. Fall precautions are in place. Call light, telephone and bedside table are within reach.    Problem: Falls - Risk of:  Goal: Absence of physical injury  Description: Absence of physical injury  3/6/2021 0728 by Reyna Branham Juvenal Pereyra RN  Outcome: Ongoing

## 2021-03-06 NOTE — PROGRESS NOTES
Pt is still c/o chest pain. He would like something else for pain and PRN Percocet is not due for 45 minutes. Another perfect serve sent to Tiffany Montanez NP regarding pt requesting something else for pain and she stated to give PRN Percocet now. Percocet given now along with Trazodone per pt request.  Pt is upset with only getting Percocet. Explained to him after 1 hour if Percocet ineffective will perfect serve NP again. Denies further needs. Will continue to monitor.

## 2021-03-06 NOTE — PLAN OF CARE
Problem: Pain:  Goal: Pain level will decrease  Description: Pain level will decrease  3/5/2021 2235 by Meme oBwer RN  Outcome: Ongoing     Problem: Pain:  Goal: Control of acute pain  Description: Control of acute pain  3/5/2021 2235 by Meme Bower RN  Outcome: Ongoing     Problem: Pain:  Goal: Control of chronic pain  Description: Control of chronic pain  3/5/2021 2235 by Meme Bower RN  Outcome: Ongoing   Pain/discomfort being managed with PRN analgesics per MD orders. Pt able to express presence and absence of pain and rate pain appropriately using numerical scale. Problem: OXYGENATION/RESPIRATORY FUNCTION  Goal: Patient will maintain patent airway  3/5/2021 2235 by Meme Bower RN  Outcome: Ongoing     Problem: OXYGENATION/RESPIRATORY FUNCTION  Goal: Patient will achieve/maintain normal respiratory rate/effort  Description: Respiratory rate and effort will be within normal limits for the patient  3/5/2021 2235 by Meme Bower RN  Outcome: Ongoing     Problem: HEMODYNAMIC STATUS  Goal: Patient has stable vital signs and fluid balance  3/5/2021 2235 by Meme Bower RN  Outcome: Ongoing     Problem: FLUID AND ELECTROLYTE IMBALANCE  Goal: Fluid and electrolyte balance are achieved/maintained  3/5/2021 2235 by Meme Bower RN  Outcome: Ongoing     Problem: ACTIVITY INTOLERANCE/IMPAIRED MOBILITY  Goal: Mobility/activity is maintained at optimum level for patient  3/5/2021 2235 by Meme Bower RN  Outcome: Ongoing     Problem: Falls - Risk of:  Goal: Will remain free from falls  Description: Will remain free from falls  3/5/2021 2235 by Meme Bower RN  Outcome: Ongoing     Problem: Falls - Risk of:  Goal: Absence of physical injury  Description: Absence of physical injury  3/5/2021 2235 by Meme Bower RN  Outcome: Ongoing   Fall risk assessment completed every shift. All precautions in place.  Pt has call light within reach at all times. Room clear of clutter. Pt aware to call for assistance when getting up.

## 2021-03-06 NOTE — DISCHARGE SUMMARY
Hospital Discharge Summary    Patient's PCP: Anneliese Berger MD  Admit Date: 3/3/2021   Discharge Date: 3/6/2021    Admitting Physician: Dr. Gayatri Jaramillo MD  [facility]   Consults: cardiology    Brief HPI/hospital course:      72 y.o. male who presents to [facility] with PMHx: CABG x 2, AICD in place. CAD on Eliquis and Plavix, CHF, COPD, stroke, DM, HLD, HTN, and GERD whom presented to the ED this evening via EMS from home complaining of chest pain and shortness of breath. He was treated for the following:    -Chest pain/angina. . CTA negative. Troponin  marginally elevated. EKG no acute ischemic changes. Continued medical management recommended by cardiology. . To follow-up with primary cardiologist outpatient. .. Ranexa added. . Advised to quit smoking  -CAD-s/p CABG x2-diffuse not amenable to intervention--  He underwent LHC which showed RCA and LCx  and lesions in the LAD but all grafts patent with stable LVEF. Chest pain was not thought to be ischemic in origin. He was on maximal antianginal therapy. He was encouraged to quit smoking and was provided with NRT prescriptions on discharge. He was discharged to continue home meds  -Ischemic cardiomyopathy/chronic systolic heart failure status AICD, EF 25 to 30%  -Hypotension since 3/4/21 SBP 70s improved to 90s-started on midodrine. . BP meds held--upon discharge Imdur was discontinued, resumed Coreg and Entresto 50% dose  -COPD-stable  -Chronic tobacco abuse-advised to stop  -Chronic pain syndrome/fibromyalgia  -Diabetes mellitus type 2-on oral hypoglycemics  -Prior CVA-continue statin and antiplatelet    Invasive procedures:  none    Discharge Diagnoses: Active Problems:    Chest pain    Hypotension  Resolved Problems:    * No resolved hospital problems.  *      Physical Exam: BP 98/65   Pulse 89   Temp 98.1 °F (36.7 °C) (Oral)   Resp 10   Ht 6' (1.829 m)   Wt 185 lb 10 oz (84.2 kg)   SpO2 95%   BMI 25.18 kg/m²   Gen/overall appearance: Not in acute distress. Alert. Head: Normocephalic, atraumatic  Eyes: EOMI, good acuity  ENT:- Oral mucosa moist  Neck: No JVD, thyromegaly  CVS: Nml S1S2, no MRG, RRR  Pulm: Clear bilaterally. No crackles/wheezes  Gastrointestinal: Soft, NT/ND, +BS  Musculoskeletal: No edema. Warm  Neuro: No focal deficit. Moves extremity spontaneously. Psychiatry: Appropriate affect. Not agitated. Skin: Warm, dry with normal turgor. No rash        Significant Diagnostic Studies:    See above        Treatments: As above. Discharge Medications:     Medication List      START taking these medications    midodrine 10 MG tablet  Commonly known as: PROAMATINE  Take 1 tablet by mouth 3 times daily (with meals)        CHANGE how you take these medications    carvedilol 12.5 MG tablet  Commonly known as: COREG  What changed: how much to take     Entresto 49-51 MG per tablet  Generic drug: sacubitril-valsartan  What changed: how much to take     traZODone 50 MG tablet  Commonly known as: DESYREL  TAKE 1 TABLET BY MOUTH EVERY NIGHT AT BEDTIME  What changed: Another medication with the same name was removed. Continue taking this medication, and follow the directions you see here.         CONTINUE taking these medications    acetaminophen 325 MG tablet  Commonly known as: TYLENOL     albuterol sulfate  (90 Base) MCG/ACT inhaler  Commonly known as: Ventolin HFA  Inhale 2 puffs into the lungs 4 times daily as needed for Wheezing     apixaban 5 MG Tabs tablet  Commonly known as: Eliquis  Take 1 tablet by mouth 2 times daily     aspirin 325 MG tablet     clopidogrel 75 MG tablet  Commonly known as: PLAVIX     Crestor 40 MG tablet  Generic drug: rosuvastatin     gabapentin 600 MG tablet  Commonly known as: NEURONTIN  TAKE ONE TABLET BY MOUTH FIVE TIMES A DAY     ipratropium-albuterol 0.5-2.5 (3) MG/3ML Soln nebulizer solution  Commonly known as: DUONEB  Inhale 3 mLs into the lungs every 4 hours as needed for Shortness of Breath (wheezing coughing)     Jardiance 10 MG tablet  Generic drug: empagliflozin  Take 1 tablet by mouth daily     Nebulizer Tanna  Used to give yourself breathing treatment     oxyCODONE-acetaminophen  MG per tablet  Commonly known as: PERCOCET  Take 1 tablet by mouth every 8 hours as needed for Pain for up to 30 days. ranolazine 500 MG extended release tablet  Commonly known as: RANEXA  Take 2 tablets by mouth 2 times daily        STOP taking these medications    isosorbide mononitrate 60 MG extended release tablet  Commonly known as: IMDUR           Where to Get Your Medications      These medications were sent to [facility] [address] [facility] [address]    Phone: [phone]   · midodrine 10 MG tablet  · ranolazine 500 MG extended release tablet         Activity: activity as tolerated  Diet: DIET CARDIAC; Low Sodium (2 GM); Daily Fluid Restriction: 2000 ml      Disposition: home  Discharged Condition: Stable  Follow Up:   Will Sorensen MD  [place] [id number] [id number]  [facility] [phone]    Go on 4/7/2021  Your hospital follow up is at 1pm with Dr Joshua Cuello at the Psychiatric location. Code status:  Full Code         Total time spent on discharge, finalizing medications, referrals and arranging outpatient follow up was more than 45 minutes      Thank you Dr. Karlyn Osgood, MD for the opportunity to be involved in this patients care.

## 2021-03-06 NOTE — PROGRESS NOTES
SL Nitro given x3 with no relief in chest pain. VS remain stable. Perfect serve sent to Zhang Arias NP regarding situation and no new orders given after she reviewed pt's chart.

## 2021-03-08 ENCOUNTER — CARE COORDINATION (OUTPATIENT)
Dept: CASE MANAGEMENT | Age: 66
End: 2021-03-08

## 2021-03-08 NOTE — CARE COORDINATION
[facility] Transitions Initial Follow Up Call    Call within 2 business days of discharge: Yes    Patient: Tre Osorio Patient : 1955   MRN: [id number]  Reason for Admission: CP  Discharge Date: 3/6/21 RARS: Readmission Risk Score: 41      Last Discharge [facility]       Complaint Diagnosis Description Type Department Provider    3/3/21 Chest Pain Chest pain, unspecified type . .. ED to Hosp-Admission (Discharged) (ADMITTED) [facility] Gayatri Jaramillo MD; Yumiko Campbell. .. Challenges to be reviewed by the provider   Additional needs identified to be addressed with provider No  none             Method of communication with provider : none    Discussed COVID-19 related testing which was not done at this time. Test results were not done. Patient informed of results, if available? Not done this admission      Was this a readmission? No    Care Transition Nurse (CTN) contacted the family by telephone to perform post hospital discharge assessment. Verified name and  with family as identifiers. Provided introduction to self, and explanation of the CTN role. CTN reviewed discharge instructions, medical action plan and red flags with family who verbalized understanding. Family given an opportunity to ask questions and does not have any further questions or concerns at this time. Were discharge instructions available to patient? Yes. Reviewed appropriate site of care based on symptoms and resources available to patient including: PCP and Specialist. The family agrees to contact the PCP office for questions related to their healthcare. Medication reconciliation was not performed with family. Patient unavailable, start and changed medications reviewed with wife. Discussed follow-up appointments. If no appointment was previously scheduled, appointment scheduling offered: has appt with PCP 3- and with [facility] 4-7. Is follow up appointment scheduled within 7 days of discharge? Yes  Non-[facility] follow up appointment(s): Dr Mat Cleaning 4-7    Plan for follow-up call in 5-7 days based on severity of symptoms and risk factors. Plan for next call: symptom management-CP, weakness  CTN provided contact information for future needs. Non-face-to-face services provided:  Obtained and reviewed discharge summary and/or continuity of care documents    Care Transitions 24 Hour Call    Do you have any ongoing symptoms?: Yes  Patient-reported symptoms: Weakness  Do you have a copy of your discharge instructions?: Yes  Do you have all of your prescriptions and are they filled?: Yes  Have you scheduled your follow up appointment?: Yes  Were you discharged with any Home Care or Post Acute Services: No  Post Acute Services: Home Health (Comment: agency name unknown)  Do you have support at home?: Partner/Spouse/SO  Are you an active caregiver in your home?: No  Care Transitions Interventions     Writer spoke with wife, Tiera Brock (Fred Galvan per hippa form) who stated patient was unavailable and she would answer what she could. She stated patient doing \"OK\", a little weak and is SOB but she stated patient always SOB. Wife confirmed patient back to his baseline breathing. She stated patient has no edema and no CP. Writer informed wife of the two f/u appts scheduled. Wife stated they were working on low sodium diet and fluid restrictions.       Follow Up  Future Appointments   Date Time Provider Fred Gregory   3/11/2021  2:40 PM Lars Mercado MD [facility] BRAYAN Squires RN

## 2021-03-11 DIAGNOSIS — Z79.4 TYPE 2 DIABETES MELLITUS WITHOUT COMPLICATION, WITH LONG-TERM CURRENT USE OF INSULIN (HCC): ICD-10-CM

## 2021-03-11 DIAGNOSIS — E11.9 TYPE 2 DIABETES MELLITUS WITHOUT COMPLICATION, WITH LONG-TERM CURRENT USE OF INSULIN (HCC): ICD-10-CM

## 2021-03-11 PROBLEM — S32.020S COMPRESSION FRACTURE OF L2, SEQUELA: Status: RESOLVED | Noted: 2019-05-22 | Resolved: 2021-03-11

## 2021-03-11 PROBLEM — I16.9 HYPERTENSIVE CRISIS: Status: RESOLVED | Noted: 2020-06-17 | Resolved: 2021-03-11

## 2021-03-11 PROBLEM — R00.0 SINUS TACHYCARDIA: Status: RESOLVED | Noted: 2020-02-06 | Resolved: 2021-03-11

## 2021-03-11 PROBLEM — D23.5 OTHER BENIGN NEOPLASM OF SKIN OF TRUNK: Status: RESOLVED | Noted: 2020-07-09 | Resolved: 2021-03-11

## 2021-03-11 PROBLEM — I47.29 NSVT (NONSUSTAINED VENTRICULAR TACHYCARDIA) (HCC): Status: RESOLVED | Noted: 2020-02-05 | Resolved: 2021-03-11

## 2021-03-11 PROBLEM — I95.1 ORTHOSTATIC HYPOTENSION: Status: RESOLVED | Noted: 2020-07-15 | Resolved: 2021-03-11

## 2021-03-11 PROBLEM — I95.9 HYPOTENSION: Status: RESOLVED | Noted: 2021-03-05 | Resolved: 2021-03-11

## 2021-03-11 PROBLEM — I50.22 CHRONIC SYSTOLIC (CONGESTIVE) HEART FAILURE (HCC): Status: RESOLVED | Noted: 2019-04-27 | Resolved: 2021-03-11

## 2021-03-11 PROBLEM — R07.9 CHEST PAIN: Status: RESOLVED | Noted: 2021-03-03 | Resolved: 2021-03-11

## 2021-03-11 PROBLEM — M54.9 INTRACTABLE BACK PAIN: Status: RESOLVED | Noted: 2019-06-22 | Resolved: 2021-03-11

## 2021-03-11 PROBLEM — D50.0 IRON DEFICIENCY ANEMIA DUE TO CHRONIC BLOOD LOSS: Status: RESOLVED | Noted: 2017-03-31 | Resolved: 2021-03-11

## 2021-03-11 PROBLEM — R68.84 JAW PAIN: Status: RESOLVED | Noted: 2020-07-13 | Resolved: 2021-03-11

## 2021-03-12 LAB
ESTIMATED AVERAGE GLUCOSE: 177.2 MG/DL
HBA1C MFR BLD: 7.8 %

## 2021-03-16 ENCOUNTER — CARE COORDINATION (OUTPATIENT)
Dept: CASE MANAGEMENT | Age: 66
End: 2021-03-16

## 2021-03-16 NOTE — CARE COORDINATION
[facility] Transitions Follow Up Call    3/16/2021    Patient: Shawn Butterfield  Patient : 1955   MRN: [id number]  Reason for Admission: CP, increased troponin   Discharge Date: 3/6/21 RARS: Readmission Risk Score: 41         Attempted to reach patient via phone for  post hospital transition call. VM left  with my contact information requesting a return call. Care Transitions Subsequent and Final Call    Subsequent and Final Calls  Care Transitions Interventions  Other Interventions: Follow Up  No future appointments.     Lalo Crowder RN

## 2021-03-22 ENCOUNTER — TELEPHONE (OUTPATIENT)
Dept: OTHER | Facility: CLINIC | Age: 66
End: 2021-03-22

## 2021-03-22 ENCOUNTER — HOSPITAL ENCOUNTER (EMERGENCY)
Age: 66
Discharge: HOME | End: 2021-03-23
Attending: EMERGENCY MEDICINE
Payer: MEDICARE

## 2021-03-22 ENCOUNTER — APPOINTMENT (OUTPATIENT)
Dept: CT IMAGING | Age: 66
End: 2021-03-22
Payer: MEDICARE

## 2021-03-22 ENCOUNTER — APPOINTMENT (OUTPATIENT)
Dept: GENERAL RADIOLOGY | Age: 66
End: 2021-03-22
Payer: MEDICARE

## 2021-03-22 DIAGNOSIS — R00.0 SINUS TACHYCARDIA: ICD-10-CM

## 2021-03-22 DIAGNOSIS — R20.2 PARESTHESIA OF UPPER AND LOWER EXTREMITIES OF BOTH SIDES: Primary | ICD-10-CM

## 2021-03-22 LAB
A/G RATIO: 1.4 (ref 1.1–2.2)
ALBUMIN SERPL-MCNC: 4.6 G/DL (ref 3.4–5)
ALP BLD-CCNC: 65 U/L (ref 40–129)
ALT SERPL-CCNC: <5 U/L (ref 10–40)
ANION GAP SERPL CALCULATED.3IONS-SCNC: 16 MMOL/L (ref 3–16)
AST SERPL-CCNC: 11 U/L (ref 15–37)
BASE EXCESS VENOUS: -0.3 MMOL/L
BASOPHILS ABSOLUTE: 0.1 K/UL (ref 0–0.2)
BASOPHILS RELATIVE PERCENT: 1 %
BILIRUB SERPL-MCNC: 0.4 MG/DL (ref 0–1)
BUN BLDV-MCNC: 20 MG/DL (ref 7–20)
CALCIUM SERPL-MCNC: 9.3 MG/DL (ref 8.3–10.6)
CARBOXYHEMOGLOBIN: 5.5 %
CHLORIDE BLD-SCNC: 106 MMOL/L (ref 99–110)
CO2: 21 MMOL/L (ref 21–32)
CREAT SERPL-MCNC: 1 MG/DL (ref 0.8–1.3)
EOSINOPHILS ABSOLUTE: 0.2 K/UL (ref 0–0.6)
EOSINOPHILS RELATIVE PERCENT: 2.4 %
GFR AFRICAN AMERICAN: >60
GFR NON-AFRICAN AMERICAN: >60
GLOBULIN: 3.4 G/DL
GLUCOSE BLD-MCNC: 104 MG/DL (ref 70–99)
GLUCOSE BLD-MCNC: 97 MG/DL (ref 70–99)
HCO3 VENOUS: 26 MMOL/L (ref 23–29)
HCT VFR BLD CALC: 44.3 % (ref 40.5–52.5)
HEMOGLOBIN: 14.8 G/DL (ref 13.5–17.5)
LYMPHOCYTES ABSOLUTE: 2.4 K/UL (ref 1–5.1)
LYMPHOCYTES RELATIVE PERCENT: 35.1 %
MAGNESIUM: 2.2 MG/DL (ref 1.8–2.4)
MCH RBC QN AUTO: 34.4 PG (ref 26–34)
MCHC RBC AUTO-ENTMCNC: 33.5 G/DL (ref 31–36)
MCV RBC AUTO: 102.7 FL (ref 80–100)
METHEMOGLOBIN VENOUS: 0.4 %
MONOCYTES ABSOLUTE: 0.5 K/UL (ref 0–1.3)
MONOCYTES RELATIVE PERCENT: 7.4 %
NEUTROPHILS ABSOLUTE: 3.6 K/UL (ref 1.7–7.7)
NEUTROPHILS RELATIVE PERCENT: 54.1 %
O2 CONTENT, VEN: 6 ML/DL
O2 SAT, VEN: 31 %
O2 THERAPY: ABNORMAL
PCO2, VEN: 47.6 MMHG (ref 40–50)
PDW BLD-RTO: 15.1 % (ref 12.4–15.4)
PERFORMED ON: NORMAL
PH VENOUS: 7.35 (ref 7.35–7.45)
PLATELET # BLD: 194 K/UL (ref 135–450)
PMV BLD AUTO: 8.8 FL (ref 5–10.5)
PO2, VEN: 19 MMHG
POTASSIUM SERPL-SCNC: 3.5 MMOL/L (ref 3.5–5.1)
RBC # BLD: 4.31 M/UL (ref 4.2–5.9)
SODIUM BLD-SCNC: 143 MMOL/L (ref 136–145)
TCO2 CALC VENOUS: 28 MMOL/L
TOTAL PROTEIN: 8 G/DL (ref 6.4–8.2)
WBC # BLD: 6.7 K/UL (ref 4–11)

## 2021-03-22 PROCEDURE — 70450 CT HEAD/BRAIN W/O DYE: CPT

## 2021-03-22 PROCEDURE — 71045 X-RAY EXAM CHEST 1 VIEW: CPT

## 2021-03-22 PROCEDURE — 83735 ASSAY OF MAGNESIUM: CPT

## 2021-03-22 PROCEDURE — 82803 BLOOD GASES ANY COMBINATION: CPT

## 2021-03-22 PROCEDURE — 96374 THER/PROPH/DIAG INJ IV PUSH: CPT

## 2021-03-22 PROCEDURE — 36415 COLL VENOUS BLD VENIPUNCTURE: CPT

## 2021-03-22 PROCEDURE — 6360000002 HC RX W HCPCS: Performed by: EMERGENCY MEDICINE

## 2021-03-22 PROCEDURE — 99285 EMERGENCY DEPT VISIT HI MDM: CPT

## 2021-03-22 PROCEDURE — 80053 COMPREHEN METABOLIC PANEL: CPT

## 2021-03-22 PROCEDURE — 93005 ELECTROCARDIOGRAM TRACING: CPT | Performed by: EMERGENCY MEDICINE

## 2021-03-22 PROCEDURE — 85025 COMPLETE CBC W/AUTO DIFF WBC: CPT

## 2021-03-22 PROCEDURE — 6370000000 HC RX 637 (ALT 250 FOR IP): Performed by: EMERGENCY MEDICINE

## 2021-03-22 RX ORDER — LORAZEPAM 2 MG/ML
0.5 INJECTION INTRAMUSCULAR ONCE
Status: COMPLETED | OUTPATIENT
Start: 2021-03-22 | End: 2021-03-22

## 2021-03-22 RX ORDER — OXYCODONE HYDROCHLORIDE 10 MG/1
10 TABLET ORAL ONCE
Status: COMPLETED | OUTPATIENT
Start: 2021-03-22 | End: 2021-03-22

## 2021-03-22 RX ADMIN — OXYCODONE HYDROCHLORIDE 10 MG: 10 TABLET ORAL at 22:47

## 2021-03-22 RX ADMIN — LORAZEPAM 0.5 MG: 2 INJECTION INTRAMUSCULAR; INTRAVENOUS at 22:18

## 2021-03-22 ASSESSMENT — PAIN SCALES - GENERAL: PAINLEVEL_OUTOF10: 8

## 2021-03-23 ENCOUNTER — CARE COORDINATION (OUTPATIENT)
Dept: CASE MANAGEMENT | Age: 66
End: 2021-03-23

## 2021-03-23 VITALS
DIASTOLIC BLOOD PRESSURE: 100 MMHG | SYSTOLIC BLOOD PRESSURE: 142 MMHG | RESPIRATION RATE: 19 BRPM | HEART RATE: 100 BPM | OXYGEN SATURATION: 98 % | TEMPERATURE: 99.3 F | WEIGHT: 174.38 LBS | BODY MASS INDEX: 23.65 KG/M2

## 2021-03-23 LAB
EKG ATRIAL RATE: 98 BPM
EKG DIAGNOSIS: NORMAL
EKG P AXIS: 86 DEGREES
EKG P-R INTERVAL: 130 MS
EKG Q-T INTERVAL: 396 MS
EKG QRS DURATION: 148 MS
EKG QTC CALCULATION (BAZETT): 505 MS
EKG R AXIS: 90 DEGREES
EKG T AXIS: 48 DEGREES
EKG VENTRICULAR RATE: 98 BPM

## 2021-03-23 PROCEDURE — 93010 ELECTROCARDIOGRAM REPORT: CPT | Performed by: INTERNAL MEDICINE

## 2021-03-23 NOTE — ED TRIAGE NOTES
Pt arrived to ED via EMS for a complaint of bilateral extremity weakness/numbness and blurry vision. Pt states that his symptoms started 3 hours prior to arrival. Pt states that he was sitting on the couch watching tv when the symptoms started. Pt has a hx of CVA and extensive cardiac hx. Pt denies any chest pain, shortness of breath, or dizziness. Pt states that he felt like this the last time he had a stroke. Pt is compliant with all of his medications and is denying any other symptoms or complaints at this time. VS noted and stable. Patient A&Ox4. Respirations easy and unlabored. Skin warm and dry and appropriate for ethnicity. No acute distress noted at this time. Patient placed on continuous telemetry and pulse ox upon arrival to room.

## 2021-03-23 NOTE — ED NOTES
Rn at pt bedside with MRI paper work and pt states \"absolutley not I will not have this test done because I have a pacemaker\" RN attempting to explain and pt states I will go ahead and leave AMA I want to be wit my wife for her testing tomorrow.  Physician made aware,  Physician at bedside to speak with pt and pt continues to state I want to leave      Anson Leventhal, RN  03/23/21 0013

## 2021-03-23 NOTE — ED NOTES
Pt requesting pain pills at this time for his chronic back pain. Pt states that he is due for his medications and that he will leave if he doesn't get them.  Dr Alonzo Nickerson notified     Jose Chiu RN  03/22/21 3881

## 2021-03-23 NOTE — ED NOTES
Hand off report given to [facility] RN to assume care.      Dionna Celaya RN  03/22/21 3751

## 2021-03-23 NOTE — ED PROVIDER NOTES
[facility]  eMERGENCY dEPARTMENT eNCOUnter      Pt Name: Jimbo Miarnda  MRN: [id number]  [place] 1955  Date of evaluation: 3/22/2021  Provider: Sobia Colorado MD    [address] [place] [address]       Chief Complaint   Patient presents with    Numbness    Blurred Vision    Extremity Weakness         CRITICAL CARE TIME   Total Critical Care time was 0 minutes, excluding separately reportable procedures. There was a high probability of clinically significant/life threatening deterioration in the patient's condition which required my urgent intervention. HISTORY OF PRESENT ILLNESS  (Location/Symptom, Timing/Onset, Context/Setting, Quality, Duration, Modifying Factors, Severity.)   Jimbo Miranda is a 72 y.o. male who presents to the emergency department complaining of his vision being blurred, and he has tingling in his arms and legs bilaterally. His symptoms started around 6 PM.  No difficulty speaking. He states he has a mild headache. No difficulty using his arms or legs. No ataxia. No difficulty ambulating. He denies chest pain. He states he feels a little short of breath at times. No abdominal pain nausea or vomiting. Nursing Notes were reviewed and I agree. REVIEW OF SYSTEMS    (2-9 systems for level 4, 10 or more for level 5)     General: No fever or chills. Eyes: Blurred vision. ENT: No nasal congestion sore throat or earache. Cardiovascular: No chest pain or palpitation. Pulmonary: States he feels little short of breath at times. No cough. GI: No abdominal pain nausea vomiting. Musculoskeletal: No leg pain or leg swelling. Neuro: Mild headache. No dizziness. His vision is blurred. No double vision. No speech difficulty. Has some tingling in his arms and legs bilaterally. No weakness. No difficulty ambulating. No speech problems. Except as noted above the remainder of the review of systems was reviewed and negative.        PAST MEDICAL HISTORY     Past Medical History:   Diagnosis Date    Anxiety     Arthritis     Asthma     CAD (coronary artery disease)     Calcium kidney stone     [facility]     Cerebral artery occlusion with cerebral infarction ([facility]     CHF (congestive heart failure) ([facility]     Clostridium difficile diarrhea 02/12/2020    [facility]) [facility])     mild    Depression     DM2 (diabetes mellitus, type 2) ([facility])     Fibromyalgia     GERD (gastroesophageal reflux disease)     Hyperlipidemia     Hypertension     Liver disease     Pacemaker 2012    Medtronic model # [id number]    Pneumonia     Seizures ([facility]     TIA (transient ischemic attack) 2007    Ulcerative colitis ([facility]          SURGICAL HISTORY       Past Surgical History:   Procedure Laterality Date    APPENDECTOMY      BACK SURGERY      CARDIAC SURGERY      CHOLECYSTECTOMY      COLONOSCOPY  01/10/2017    COLONOSCOPY  01/10/2017    CORONARY ANGIOPLASTY WITH STENT PLACEMENT  2012    CORONARY ARTERY BYPASS GRAFT  2010, 11/2015    ENDOSCOPY, COLON, DIAGNOSTIC      PACEMAKER PLACEMENT      UPPER GASTROINTESTINAL ENDOSCOPY  02/07/2017    VASCULAR SURGERY           CURRENT MEDICATIONS       Previous Medications    ACETAMINOPHEN (TYLENOL) 325 MG TABLET    Take 650 mg by mouth every 6 hours as needed for Pain    ALBUTEROL SULFATE HFA (VENTOLIN HFA) 108 (90 BASE) MCG/ACT INHALER    Inhale 2 puffs into the lungs 4 times daily as needed for Wheezing    APIXABAN (ELIQUIS) 5 MG TABS TABLET    Take 1 tablet by mouth 2 times daily    ASPIRIN 325 MG TABLET    Take 325 mg by mouth daily    BLOOD GLUCOSE MONITOR STRIPS    Test 1 times a day & as needed for symptoms of irregular blood glucose.     CARVEDILOL (COREG) 12.5 MG TABLET    Take 0.5 tablets by mouth 2 times daily (with meals)    CLOPIDOGREL (PLAVIX) 75 MG TABLET    Take 75 mg by mouth daily    EMPAGLIFLOZIN (JARDIANCE) 10 MG TABLET    Take 1 tablet by mouth daily    GABAPENTIN (NEURONTIN) 600 MG TABLET    TAKE ONE TABLET BY MOUTH FIVE TIMES A DAY    GLUCOSE MONITORING KIT (FREESTYLE) MONITORING KIT    1 kit by Does not apply route daily    INSULIN GLARGINE (LANTUS SOLOSTAR) 100 UNIT/ML INJECTION PEN    Inject 10 Units into the skin nightly    INSULIN PEN NEEDLE 32G X 4 MM MISC    1 each by Does not apply route daily    IPRATROPIUM-ALBUTEROL (DUONEB) 0.5-2.5 (3) MG/3ML SOLN NEBULIZER SOLUTION    Inhale 3 mLs into the lungs every 4 hours as needed for Shortness of Breath (wheezing coughing)    LANCETS MISC    1 each by Does not apply route daily    MIDODRINE (PROAMATINE) 10 MG TABLET    Take 1 tablet by mouth 3 times daily (with meals)    NICOTINE POLACRILEX (NICORETTE) 2 MG GUM        NITROGLYCERIN (NITROSTAT) 0.4 MG SL TABLET    Place 0.4 mg under the tongue every 5 minutes as needed    OXYCODONE-ACETAMINOPHEN (PERCOCET)  MG PER TABLET    Take 1 tablet by mouth every 8 hours as needed for Pain for up to 30 days.     RANOLAZINE (RANEXA) 500 MG EXTENDED RELEASE TABLET    Take 2 tablets by mouth 2 times daily    RESPIRATORY THERAPY SUPPLIES (NEBULIZER) AALIYAH    Used to give yourself breathing treatment    ROSUVASTATIN (CRESTOR) 40 MG TABLET    Take 40 mg by mouth every evening    SACUBITRIL-VALSARTAN (ENTRESTO) 49-51 MG PER TABLET    Take 0.5 tablets by mouth 2 times daily    SPIRONOLACTONE (ALDACTONE) 25 MG TABLET        TRAZODONE (DESYREL) 50 MG TABLET    TAKE 1 TABLET BY MOUTH EVERY NIGHT AT BEDTIME       ALLERGIES     Dopamine hcl, Tramadol, Morphine and related, and Melatonin    FAMILY HISTORY       Family History   Problem Relation Age of Onset    Coronary Art Dis Father     Cancer Mother         Lung with mets    Diabetes Mother           SOCIAL HISTORY       Social History     Socioeconomic History    Marital status:      Spouse name: Not on file    Number of children: 1    Years of education: Not on file   [place] level: Not on file   Occupational History    Occupation: disabled   Social Needs    Financial resource strain: Not on file    Food insecurity     Worry: Not on file     Inability: Not on file   [employer] needs     Medical: Not on file     Non-medical: Not on file   Tobacco Use    Smoking status: Current Every Day Smoker     Packs/day: 1.00     Years: 44.00     Pack years: 44.00     Types: Cigarettes    Smokeless tobacco: Never Used    Tobacco comment: cutting down   Substance and Sexual Activity    Alcohol use: No     Alcohol/week: 0.0 standard drinks    Drug use: No    Sexual activity: Not Currently     Partners: Female   Lifestyle    Physical activity     Days per week: Not on file     Minutes per session: Not on file    Stress: Not on file   Relationships    Social connections     Talks on phone: Not on file     Gets together: Not on file     Attends Catholic service: Not on file     Active member of club or organization: Not on file     Attends meetings of clubs or organizations: Not on file     Relationship status: Not on file    Intimate partner violence     Fear of current or ex partner: Not on file     Emotionally abused: Not on file     Physically abused: Not on file     Forced sexual activity: Not on file   Other Topics Concern    Not on file   Social History Narrative    Not on file         PHYSICAL EXAM    (up to 7 for level 4, 8 or more for level 5)     ED Triage Vitals [03/22/21 2111]   BP Temp Temp Source Pulse Resp SpO2 Height Weight   (!) 168/88 99.3 °F (37.4 °C) Oral 107 18 97 % -- 174 lb 6.1 oz (79.1 kg)       General: Alert white male no acute distress. Head: Atraumatic and normocephalic. Eyes: No conjunctival injection. Pupils equal round reactive. Extraocular movements are intact. ENT: Deepti Meals is clear. Oropharynx moist without erythema. Neck: Supple, nontender, no adenopathy. Heart: Tachycardic, regular, no murmurs or gallops noted.   Lungs: Breath sounds equal (*)     MCH 34.4 (*)     All other components within normal limits    Narrative:     Performed at:  [facility]  [address] [place] [address]   Phone [phone]   COMPREHENSIVE METABOLIC PANEL - Abnormal; Notable for the following components:    Glucose 104 (*)     ALT <5 (*)     AST 11 (*)     All other components within normal limits    Narrative:     Performed at:  [facility]  [address] [place] [address]   Phone [phone]   BLOOD GAS, VENOUS - Abnormal; Notable for the following components:    pH, Angelo 7.347 (*)     All other components within normal limits    Narrative:     Performed at:  [facility]  [address] [facility] [place] [employer] [address]   Phone [phone]   MAGNESIUM    Narrative:     Performed at:  [facility]  [address] [facility] [place] [employer] [address]   Phone [phone]   POCT GLUCOSE    Narrative:     Performed at:  [facility]  [address] [facility] [place] [employer] [address]   Phone [phone]   POCT GLUCOSE       All other labs were within normal range or not returned as of this dictation. EMERGENCY DEPARTMENT COURSE and DIFFERENTIAL DIAGNOSIS/MDM:   Vitals:    Vitals:    03/22/21 2222 03/22/21 2317 03/22/21 2345 03/23/21 0002   BP: (!) 167/90 (!) 168/79 (!) 141/89 (!) 142/80   Pulse: 103 101 102 95   Resp: 22 24 25 20   Temp:       TempSrc:       SpO2: 93% 95% 93% 92%   Weight:           Patient presents with some paresthesias in his arms and legs bilaterally. He complains of some generalized weakness, he has no focal weakness. His vision has been a little blurred. He has no headache. His NH stroke scale is 0. He is mildly hypertensive. Throughout most of his stay his heart rates been around 105. His EKG showed a normal sinus rhythm with a left bundle branch block. His H&H are stable. His white blood cell count is normal.  His platelet count is normal.  His glucose is 104. His renal function is normal.  His calcium and magnesium are normal.  His pH is 7.35 with a PCO2 of 48. His CT head shows no acute intracranial findings. The etiology of his bilateral upper and lower extremity paresthesias is not clear. He has no deficits on exam.  He has no drift, no limb ataxia. He has no decreased sensation to touch. He is persistently tachycardic with no source of infection. No electrolyte abnormalities to explain his paresthesias, normal calcium and magnesium. He is anticoagulated and is on Eliquis, Plavix. I am going to consult the hospitalist for admission. He may need further evaluation. I will discuss with them the possible need to get an MRI of his cervical spine to rule out any occult bleeding related to his anticoagulation therapy. Test results, diagnosis, and treatment plan were discussed with the patient. He understands the treatment plan and need for admission and is agreeable. 0002:  Discussed with Dr. Hinton [facility]. He agrees that the patient needs further work-up. He feels that the MRI of the cervical spine should be done tonight emergently. He also asked for an MRI of the brain. These have been ordered. Once these are resulted he will be contacted to determine the patient's disposition at that time. Care will be assumed by Dr. Donalynn Gottron to make the final disposition. 0015: Patient initially agreed to stay. He changed his mind and wanted to leave [address] Once again I discussed with him my concerns including the possibility of bleeding in the spinal cord or bleeding around the spinal cord which could potentially cause serious neurologic deficits. The patient is alert and oriented and has the capacity to make the decision and understands the risk of leaving but wants to leave [address] He states he does not want any further testing done tonight. He says he will return if his symptoms worsen or if he develops new symptoms. Patient fully understands the risk of leaving [address] and will sign out AMA. CONSULTS:  IP CONSULT TO HOSPITALIST    PROCEDURES:  None    FINAL IMPRESSION      1. Paresthesia of upper and lower extremities of both sides    2.  Sinus tachycardia          DISPOSITION/PLAN   DISPOSITION        PATIENT REFERRED TO:  MD CORDELIA Mariee [address] [facility]  [phone]    In 1 day        DISCHARGE MEDICATIONS:  New Prescriptions    No medications on file       (Please note that portions of this note were completed with a voice recognition program.  Efforts were made to edit the dictations but occasionally words are mis-transcribed.)    Kieth Snellen, MD  Attending Emergency Physician        Sheridan Acosta MD  03/23/21 0002       Sheridan Acosta MD  03/23/21 8389

## 2021-03-23 NOTE — TELEPHONE ENCOUNTER
Writer contacted Dr. Lavinia Neves to inform of 30 day readmission risk. Dr. Lavinia Neves informed writer there is no decision on disposition at this time.

## 2021-03-23 NOTE — ED NOTES
Bed: B-34  Expected date:   Expected time:   Means of arrival:   Comments:  66yo numbness bilateral legs and arms      [address] Kaleb Robledo RN  03/22/21 2119

## 2021-03-26 ENCOUNTER — CARE COORDINATION (OUTPATIENT)
Dept: CASE MANAGEMENT | Age: 66
End: 2021-03-26

## 2021-03-26 PROBLEM — N28.9 RENAL INSUFFICIENCY: Status: RESOLVED | Noted: 2018-12-26 | Resolved: 2021-03-26

## 2021-03-26 NOTE — CARE COORDINATION
[facility] Transitions Follow Up Call    3/26/2021    Patient: Alba Adams  Patient : 1955   MRN: [id number]  Reason for Admission: CP  Discharge Date: 3/23/21 RARS: Readmission Risk Score: 41      Needs to be reviewed by the provider   Additional needs identified to be addressed with provider No  none           Method of communication with provider : none    Discussed COVID-19 related testing which was not done at this time. Test results were not done. Patient informed of results, if available? Not done this admission    Care Transition Nurse (CTN) contacted the family by telephone to follow up after admission on 3-3-2021 Verified name and  with family as identifiers. Follow up appointment completed? Yes. Patients top risk factors for readmission: medical condition  Interventions to address risk factors: Obtained and reviewed discharge summary and/or continuity of care documents    Plan for follow-up call in 7-10 days based on severity of symptoms and risk factors. Plan for next call: symptom management-CP, blurred vision, tingling  CTN provided contact information for future needs. Writer spoke briefly with wife, Leland Roy, she stated patient was sleeping. She stated patient was doing\"fine\" and would be seeing a neurologist about his symptoms. Care Transitions Subsequent and Final Call    Subsequent and Final Calls  Care Transitions Interventions  Other Interventions:            Follow Up  Future Appointments   Date Time Provider Fred Gregory   2021  8:40 AM [facility] [address] [facility]       Cayetano Croft RN

## 2021-03-31 ENCOUNTER — HOSPITAL ENCOUNTER (OUTPATIENT)
Age: 66
Setting detail: OBSERVATION
Discharge: HOME | End: 2021-04-02
Attending: EMERGENCY MEDICINE | Admitting: INTERNAL MEDICINE
Payer: MEDICARE

## 2021-03-31 ENCOUNTER — APPOINTMENT (OUTPATIENT)
Dept: CT IMAGING | Age: 66
End: 2021-03-31
Payer: MEDICARE

## 2021-03-31 DIAGNOSIS — R20.2 BILATERAL NUMBNESS AND TINGLING OF ARMS AND LEGS: ICD-10-CM

## 2021-03-31 DIAGNOSIS — I47.29 NONSUSTAINED VENTRICULAR TACHYCARDIA: Primary | ICD-10-CM

## 2021-03-31 DIAGNOSIS — R20.0 BILATERAL NUMBNESS AND TINGLING OF ARMS AND LEGS: ICD-10-CM

## 2021-03-31 LAB
A/G RATIO: 1.4 (ref 1.1–2.2)
ALBUMIN SERPL-MCNC: 3.9 G/DL (ref 3.4–5)
ALP BLD-CCNC: 49 U/L (ref 40–129)
ALT SERPL-CCNC: 13 U/L (ref 10–40)
ANION GAP SERPL CALCULATED.3IONS-SCNC: 14 MMOL/L (ref 3–16)
AST SERPL-CCNC: 17 U/L (ref 15–37)
BASE EXCESS VENOUS: -3.2 MMOL/L
BASOPHILS ABSOLUTE: 0.1 K/UL (ref 0–0.2)
BASOPHILS RELATIVE PERCENT: 1.1 %
BILIRUB SERPL-MCNC: <0.2 MG/DL (ref 0–1)
BUN BLDV-MCNC: 27 MG/DL (ref 7–20)
CALCIUM SERPL-MCNC: 8.1 MG/DL (ref 8.3–10.6)
CARBOXYHEMOGLOBIN: 5.5 %
CHLORIDE BLD-SCNC: 107 MMOL/L (ref 99–110)
CO2: 18 MMOL/L (ref 21–32)
CREAT SERPL-MCNC: 1.1 MG/DL (ref 0.8–1.3)
EOSINOPHILS ABSOLUTE: 0.2 K/UL (ref 0–0.6)
EOSINOPHILS RELATIVE PERCENT: 2.8 %
GFR AFRICAN AMERICAN: >60
GFR NON-AFRICAN AMERICAN: >60
GLOBULIN: 2.7 G/DL
GLUCOSE BLD-MCNC: 107 MG/DL (ref 70–99)
GLUCOSE BLD-MCNC: 139 MG/DL (ref 70–99)
GLUCOSE BLD-MCNC: 146 MG/DL (ref 70–99)
HCO3 VENOUS: 22 MMOL/L (ref 23–29)
HCT VFR BLD CALC: 35.7 % (ref 40.5–52.5)
HEMOGLOBIN: 12 G/DL (ref 13.5–17.5)
LYMPHOCYTES ABSOLUTE: 2.4 K/UL (ref 1–5.1)
LYMPHOCYTES RELATIVE PERCENT: 38.3 %
MCH RBC QN AUTO: 34.4 PG (ref 26–34)
MCHC RBC AUTO-ENTMCNC: 33.7 G/DL (ref 31–36)
MCV RBC AUTO: 102.1 FL (ref 80–100)
METHEMOGLOBIN VENOUS: 0.4 %
MONOCYTES ABSOLUTE: 0.6 K/UL (ref 0–1.3)
MONOCYTES RELATIVE PERCENT: 8.8 %
NEUTROPHILS ABSOLUTE: 3.1 K/UL (ref 1.7–7.7)
NEUTROPHILS RELATIVE PERCENT: 49 %
O2 CONTENT, VEN: 15 ML/DL
O2 SAT, VEN: 97 %
O2 THERAPY: ABNORMAL
PCO2, VEN: 36.4 MMHG (ref 40–50)
PDW BLD-RTO: 15.8 % (ref 12.4–15.4)
PERFORMED ON: ABNORMAL
PERFORMED ON: ABNORMAL
PH VENOUS: 7.38 (ref 7.35–7.45)
PLATELET # BLD: 159 K/UL (ref 135–450)
PMV BLD AUTO: 9.2 FL (ref 5–10.5)
PO2, VEN: 87 MMHG
POTASSIUM REFLEX MAGNESIUM: 4.2 MMOL/L (ref 3.5–5.1)
RBC # BLD: 3.5 M/UL (ref 4.2–5.9)
SODIUM BLD-SCNC: 139 MMOL/L (ref 136–145)
TCO2 CALC VENOUS: 23 MMOL/L
TOTAL PROTEIN: 6.6 G/DL (ref 6.4–8.2)
TROPONIN: 0.01 NG/ML
WBC # BLD: 6.4 K/UL (ref 4–11)

## 2021-03-31 PROCEDURE — 80053 COMPREHEN METABOLIC PANEL: CPT

## 2021-03-31 PROCEDURE — 94640 AIRWAY INHALATION TREATMENT: CPT

## 2021-03-31 PROCEDURE — 70450 CT HEAD/BRAIN W/O DYE: CPT

## 2021-03-31 PROCEDURE — G0378 HOSPITAL OBSERVATION PER HR: HCPCS

## 2021-03-31 PROCEDURE — 96374 THER/PROPH/DIAG INJ IV PUSH: CPT

## 2021-03-31 PROCEDURE — 6370000000 HC RX 637 (ALT 250 FOR IP): Performed by: EMERGENCY MEDICINE

## 2021-03-31 PROCEDURE — 85025 COMPLETE CBC W/AUTO DIFF WBC: CPT

## 2021-03-31 PROCEDURE — 82607 VITAMIN B-12: CPT

## 2021-03-31 PROCEDURE — 82803 BLOOD GASES ANY COMBINATION: CPT

## 2021-03-31 PROCEDURE — 86780 TREPONEMA PALLIDUM: CPT

## 2021-03-31 PROCEDURE — 82746 ASSAY OF FOLIC ACID SERUM: CPT

## 2021-03-31 PROCEDURE — 6370000000 HC RX 637 (ALT 250 FOR IP): Performed by: NURSE PRACTITIONER

## 2021-03-31 PROCEDURE — 99284 EMERGENCY DEPT VISIT MOD MDM: CPT

## 2021-03-31 PROCEDURE — 93005 ELECTROCARDIOGRAM TRACING: CPT | Performed by: EMERGENCY MEDICINE

## 2021-03-31 PROCEDURE — 6360000004 HC RX CONTRAST MEDICATION: Performed by: EMERGENCY MEDICINE

## 2021-03-31 PROCEDURE — 70498 CT ANGIOGRAPHY NECK: CPT

## 2021-03-31 PROCEDURE — 84484 ASSAY OF TROPONIN QUANT: CPT

## 2021-03-31 PROCEDURE — 6360000002 HC RX W HCPCS: Performed by: EMERGENCY MEDICINE

## 2021-03-31 RX ORDER — DEXTROSE MONOHYDRATE 50 MG/ML
100 INJECTION, SOLUTION INTRAVENOUS PRN
Status: DISCONTINUED | OUTPATIENT
Start: 2021-03-31 | End: 2021-04-02 | Stop reason: HOSPADM

## 2021-03-31 RX ORDER — ASPIRIN 325 MG
325 TABLET ORAL DAILY
Status: DISCONTINUED | OUTPATIENT
Start: 2021-04-01 | End: 2021-04-02 | Stop reason: HOSPADM

## 2021-03-31 RX ORDER — ACETAMINOPHEN 650 MG/1
650 SUPPOSITORY RECTAL EVERY 6 HOURS PRN
Status: DISCONTINUED | OUTPATIENT
Start: 2021-03-31 | End: 2021-04-02 | Stop reason: HOSPADM

## 2021-03-31 RX ORDER — ROSUVASTATIN CALCIUM 40 MG/1
40 TABLET, COATED ORAL EVERY EVENING
Status: DISCONTINUED | OUTPATIENT
Start: 2021-03-31 | End: 2021-04-02 | Stop reason: HOSPADM

## 2021-03-31 RX ORDER — INSULIN GLARGINE 100 [IU]/ML
10 INJECTION, SOLUTION SUBCUTANEOUS NIGHTLY
Status: DISCONTINUED | OUTPATIENT
Start: 2021-03-31 | End: 2021-04-02 | Stop reason: HOSPADM

## 2021-03-31 RX ORDER — POTASSIUM CHLORIDE 20 MEQ/1
40 TABLET, EXTENDED RELEASE ORAL PRN
Status: DISCONTINUED | OUTPATIENT
Start: 2021-03-31 | End: 2021-04-02 | Stop reason: HOSPADM

## 2021-03-31 RX ORDER — OXYCODONE AND ACETAMINOPHEN 10; 325 MG/1; MG/1
1 TABLET ORAL ONCE
Status: COMPLETED | OUTPATIENT
Start: 2021-03-31 | End: 2021-03-31

## 2021-03-31 RX ORDER — SODIUM CHLORIDE 0.9 % (FLUSH) 0.9 %
10 SYRINGE (ML) INJECTION PRN
Status: DISCONTINUED | OUTPATIENT
Start: 2021-03-31 | End: 2021-04-02 | Stop reason: HOSPADM

## 2021-03-31 RX ORDER — POTASSIUM CHLORIDE 7.45 MG/ML
10 INJECTION INTRAVENOUS PRN
Status: DISCONTINUED | OUTPATIENT
Start: 2021-03-31 | End: 2021-04-02 | Stop reason: HOSPADM

## 2021-03-31 RX ORDER — DEXTROSE MONOHYDRATE 25 G/50ML
12.5 INJECTION, SOLUTION INTRAVENOUS PRN
Status: DISCONTINUED | OUTPATIENT
Start: 2021-03-31 | End: 2021-04-02 | Stop reason: HOSPADM

## 2021-03-31 RX ORDER — OXYCODONE AND ACETAMINOPHEN 10; 325 MG/1; MG/1
1 TABLET ORAL EVERY 8 HOURS PRN
Status: DISCONTINUED | OUTPATIENT
Start: 2021-03-31 | End: 2021-04-02 | Stop reason: HOSPADM

## 2021-03-31 RX ORDER — CLOPIDOGREL BISULFATE 75 MG/1
75 TABLET ORAL DAILY
Status: DISCONTINUED | OUTPATIENT
Start: 2021-04-01 | End: 2021-04-02 | Stop reason: HOSPADM

## 2021-03-31 RX ORDER — PROMETHAZINE HYDROCHLORIDE 25 MG/1
12.5 TABLET ORAL EVERY 6 HOURS PRN
Status: DISCONTINUED | OUTPATIENT
Start: 2021-03-31 | End: 2021-04-02 | Stop reason: HOSPADM

## 2021-03-31 RX ORDER — FENTANYL CITRATE 50 UG/ML
75 INJECTION, SOLUTION INTRAMUSCULAR; INTRAVENOUS ONCE
Status: COMPLETED | OUTPATIENT
Start: 2021-03-31 | End: 2021-03-31

## 2021-03-31 RX ORDER — ACETAMINOPHEN 325 MG/1
650 TABLET ORAL EVERY 6 HOURS PRN
Status: DISCONTINUED | OUTPATIENT
Start: 2021-03-31 | End: 2021-04-02 | Stop reason: HOSPADM

## 2021-03-31 RX ORDER — CARVEDILOL 6.25 MG/1
6.25 TABLET ORAL 2 TIMES DAILY WITH MEALS
Status: DISCONTINUED | OUTPATIENT
Start: 2021-04-01 | End: 2021-04-02 | Stop reason: HOSPADM

## 2021-03-31 RX ORDER — RANOLAZINE 500 MG/1
1000 TABLET, EXTENDED RELEASE ORAL 2 TIMES DAILY
Status: DISCONTINUED | OUTPATIENT
Start: 2021-03-31 | End: 2021-04-02 | Stop reason: HOSPADM

## 2021-03-31 RX ORDER — GABAPENTIN 300 MG/1
600 CAPSULE ORAL
Status: DISCONTINUED | OUTPATIENT
Start: 2021-03-31 | End: 2021-04-02 | Stop reason: HOSPADM

## 2021-03-31 RX ORDER — ZOLPIDEM TARTRATE 5 MG/1
5 TABLET ORAL NIGHTLY PRN
Status: DISCONTINUED | OUTPATIENT
Start: 2021-03-31 | End: 2021-04-02 | Stop reason: HOSPADM

## 2021-03-31 RX ORDER — SODIUM CHLORIDE 0.9 % (FLUSH) 0.9 %
10 SYRINGE (ML) INJECTION EVERY 12 HOURS SCHEDULED
Status: DISCONTINUED | OUTPATIENT
Start: 2021-03-31 | End: 2021-04-02 | Stop reason: HOSPADM

## 2021-03-31 RX ORDER — POLYETHYLENE GLYCOL 3350 17 G/17G
17 POWDER, FOR SOLUTION ORAL DAILY PRN
Status: DISCONTINUED | OUTPATIENT
Start: 2021-03-31 | End: 2021-04-02 | Stop reason: HOSPADM

## 2021-03-31 RX ORDER — SPIRONOLACTONE 25 MG/1
25 TABLET ORAL DAILY
Status: DISCONTINUED | OUTPATIENT
Start: 2021-04-01 | End: 2021-04-02 | Stop reason: HOSPADM

## 2021-03-31 RX ORDER — SODIUM CHLORIDE 9 MG/ML
25 INJECTION, SOLUTION INTRAVENOUS PRN
Status: DISCONTINUED | OUTPATIENT
Start: 2021-03-31 | End: 2021-04-02 | Stop reason: HOSPADM

## 2021-03-31 RX ORDER — MIDODRINE HYDROCHLORIDE 10 MG/1
10 TABLET ORAL
Status: DISCONTINUED | OUTPATIENT
Start: 2021-04-01 | End: 2021-04-02 | Stop reason: HOSPADM

## 2021-03-31 RX ORDER — ONDANSETRON 2 MG/ML
4 INJECTION INTRAMUSCULAR; INTRAVENOUS EVERY 6 HOURS PRN
Status: DISCONTINUED | OUTPATIENT
Start: 2021-03-31 | End: 2021-04-02 | Stop reason: HOSPADM

## 2021-03-31 RX ORDER — NICOTINE POLACRILEX 4 MG
15 LOZENGE BUCCAL PRN
Status: DISCONTINUED | OUTPATIENT
Start: 2021-03-31 | End: 2021-04-02 | Stop reason: HOSPADM

## 2021-03-31 RX ORDER — OXYCODONE HYDROCHLORIDE AND ACETAMINOPHEN 5; 325 MG/1; MG/1
1 TABLET ORAL ONCE
Status: DISCONTINUED | OUTPATIENT
Start: 2021-03-31 | End: 2021-03-31

## 2021-03-31 RX ORDER — BUDESONIDE AND FORMOTEROL FUMARATE DIHYDRATE 80; 4.5 UG/1; UG/1
2 AEROSOL RESPIRATORY (INHALATION) 2 TIMES DAILY
Status: DISCONTINUED | OUTPATIENT
Start: 2021-03-31 | End: 2021-04-02 | Stop reason: HOSPADM

## 2021-03-31 RX ORDER — MAGNESIUM SULFATE IN WATER 40 MG/ML
2000 INJECTION, SOLUTION INTRAVENOUS PRN
Status: DISCONTINUED | OUTPATIENT
Start: 2021-03-31 | End: 2021-04-02 | Stop reason: HOSPADM

## 2021-03-31 RX ADMIN — SACUBITRIL AND VALSARTAN 1 TABLET: 24; 26 TABLET, FILM COATED ORAL at 23:33

## 2021-03-31 RX ADMIN — IOPAMIDOL 75 ML: 755 INJECTION, SOLUTION INTRAVENOUS at 19:10

## 2021-03-31 RX ADMIN — INSULIN GLARGINE 10 UNITS: 100 INJECTION, SOLUTION SUBCUTANEOUS at 23:33

## 2021-03-31 RX ADMIN — RANOLAZINE 1000 MG: 500 TABLET, FILM COATED, EXTENDED RELEASE ORAL at 23:32

## 2021-03-31 RX ADMIN — APIXABAN 5 MG: 5 TABLET, FILM COATED ORAL at 23:32

## 2021-03-31 RX ADMIN — ZOLPIDEM TARTRATE 5 MG: 5 TABLET ORAL at 23:32

## 2021-03-31 RX ADMIN — GABAPENTIN 600 MG: 300 CAPSULE ORAL at 23:32

## 2021-03-31 RX ADMIN — OXYCODONE AND ACETAMINOPHEN 1 TABLET: 325; 10 TABLET ORAL at 20:51

## 2021-03-31 RX ADMIN — BUDESONIDE AND FORMOTEROL FUMARATE DIHYDRATE 2 PUFF: 80; 4.5 AEROSOL RESPIRATORY (INHALATION) at 22:10

## 2021-03-31 RX ADMIN — FENTANYL CITRATE 75 MCG: 50 INJECTION INTRAMUSCULAR; INTRAVENOUS at 17:44

## 2021-03-31 RX ADMIN — ROSUVASTATIN CALCIUM 40 MG: 40 TABLET, FILM COATED ORAL at 23:33

## 2021-03-31 ASSESSMENT — PAIN DESCRIPTION - FREQUENCY: FREQUENCY: CONTINUOUS

## 2021-03-31 ASSESSMENT — PAIN SCALES - GENERAL
PAINLEVEL_OUTOF10: 9
PAINLEVEL_OUTOF10: 10
PAINLEVEL_OUTOF10: 10
PAINLEVEL_OUTOF10: 9
PAINLEVEL_OUTOF10: 0

## 2021-03-31 ASSESSMENT — PAIN DESCRIPTION - PROGRESSION
CLINICAL_PROGRESSION: GRADUALLY WORSENING
CLINICAL_PROGRESSION: NOT CHANGED

## 2021-03-31 ASSESSMENT — PAIN DESCRIPTION - LOCATION
LOCATION: BACK

## 2021-03-31 ASSESSMENT — PAIN DESCRIPTION - PAIN TYPE
TYPE: CHRONIC PAIN
TYPE: ACUTE PAIN

## 2021-03-31 ASSESSMENT — PAIN DESCRIPTION - ORIENTATION: ORIENTATION: LOWER

## 2021-03-31 ASSESSMENT — PAIN DESCRIPTION - ONSET: ONSET: GRADUAL

## 2021-03-31 NOTE — ED NOTES
emed informed of pts.  C/o numbness     [facility]  03/31/21 8977

## 2021-03-31 NOTE — ED PROVIDER NOTES
CHIEF COMPLAINT  Numbness (bilat arms and legs x 4 hours, near syncope)      HISTORY OF PRESENT ILLNESS  Cristina Reich is a 72 y.o. male who presents to the ED complaining of bilateral upper and lower extremity numbness and tingling has been present over the past 4 hours. Patient states that after the symptoms started he had an episode of dizziness and thinks he was feeling very anxious at the time. Patient states he does have previous history of cerebral infarctions but denies any residual deficits from his previous infarct. Denies any associated chest pain or shortness of breath. No nausea or vomiting. No weakness. No abdominal pain. Normal urinary and bowel habits. Denies any falls or trauma. No headache or vision changes. Patient does take Eliquis daily. No other complaints, modifying factors or associated symptoms. Nursing notes reviewed.    Past Medical History:   Diagnosis Date    Anxiety     Arthritis     Asthma     CAD (coronary artery disease)     Calcium kidney stone     Cardiomyopathy ([address]     Cerebral artery occlusion with cerebral infarction ([address]     CHF (congestive heart failure) ([address]     Clostridium difficile diarrhea 02/12/2020    COPD (chronic [facility] disease) [facility])     mild    Depression     DM2 (diabetes mellitus, type 2) ([facility])     Fibromyalgia     GERD (gastroesophageal reflux disease)     Hyperlipidemia     Hypertension     Liver disease     Pacemaker 2012    Medtronic model # [id number]    Pneumonia     Seizures ([address]     TIA (transient ischemic attack) 2007    Ulcerative colitis ([address]      Past Surgical History:   Procedure Laterality Date    APPENDECTOMY      BACK SURGERY      CARDIAC SURGERY      CHOLECYSTECTOMY      COLONOSCOPY  01/10/2017    COLONOSCOPY  01/10/2017    CORONARY ANGIOPLASTY WITH STENT PLACEMENT  2012    CORONARY ARTERY BYPASS GRAFT  2010, 11/2015    ENDOSCOPY, COLON, DIAGNOSTIC      PACEMAKER PLACEMENT      UPPER GASTROINTESTINAL ENDOSCOPY  02/07/2017    VASCULAR SURGERY       Family History   Problem Relation Age of Onset    Coronary Art Dis Father     Cancer Mother         Lung with mets    Diabetes Mother      Social History     Socioeconomic History    Marital status:      Spouse name: Not on file    Number of children: 1    Years of education: Not on file    Highest education level: Not on file   Occupational History    Occupation: disabled   Social Needs    Financial resource strain: Not on file    Food insecurity     Worry: Not on file     Inability: Not on file   [employer] needs     Medical: Not on file     Non-medical: Not on file   Tobacco Use    Smoking status: Current Every Day Smoker     Packs/day: 0.50     Years: 44.00     Pack years: 22.00     Types: Cigarettes    Smokeless tobacco: Never Used    Tobacco comment: cutting down   Substance and Sexual Activity    Alcohol use: No     Alcohol/week: 0.0 standard drinks    Drug use: No    Sexual activity: Not Currently     Partners: Female   Lifestyle    Physical activity     Days per week: Not on file     Minutes per session: Not on file    Stress: Not on file   Relationships    Social connections     Talks on phone: Not on file     Gets together: Not on file     Attends Hoahaoism service: Not on file     Active member of club or organization: Not on file     Attends meetings of clubs or organizations: Not on file     Relationship status: Not on file    Intimate partner violence     Fear of current or ex partner: Not on file     Emotionally abused: Not on file     Physically abused: Not on file     Forced sexual activity: Not on file   Other Topics Concern    Not on file   Social History Narrative    Not on file     No current facility-administered medications for this encounter.       Current Outpatient Medications   Medication Sig Dispense Refill    zolpidem (AMBIEN) 5 MG tablet Take 1 tablet by mouth nightly as needed for Sleep for up to 30 days. 30 tablet 0    fluticasone-salmeterol (ADVAIR DISKUS) 100-50 MCG/DOSE diskus inhaler Inhale 2 puffs into the lungs 2 times daily wixela inhub inhaler ( generic advair diskus) 60 each 3    gabapentin (NEURONTIN) 600 MG tablet TAKE ONE TABLET BY MOUTH FIVE TIMES A  tablet 1    insulin glargine (LANTUS SOLOSTAR) 100 UNIT/ML injection pen Inject 10 Units into the skin nightly 5 pen 3    spironolactone (ALDACTONE) 25 MG tablet Take 25 mg by mouth daily       nitroGLYCERIN (NITROSTAT) 0.4 MG SL tablet Place 0.4 mg under the tongue every 5 minutes as needed      oxyCODONE-acetaminophen (PERCOCET)  MG per tablet Take 1 tablet by mouth every 8 hours as needed for Pain for up to 30 days. 90 tablet 0    carvedilol (COREG) 12.5 MG tablet Take 0.5 tablets by mouth 2 times daily (with meals) 60 tablet 3    ranolazine (RANEXA) 500 MG extended release tablet Take 2 tablets by mouth 2 times daily 60 tablet 3    sacubitril-valsartan (ENTRESTO) 49-51 MG per tablet Take 0.5 tablets by mouth 2 times daily 60 tablet 0    clopidogrel (PLAVIX) 75 MG tablet Take 75 mg by mouth daily      rosuvastatin (CRESTOR) 40 MG tablet Take 40 mg by mouth every evening      aspirin 325 MG tablet Take 325 mg by mouth daily      apixaban (ELIQUIS) 5 MG TABS tablet Take 1 tablet by mouth 2 times daily 60 tablet 1    empagliflozin (JARDIANCE) 10 MG tablet Take 1 tablet by mouth daily 30 tablet 0    acetaminophen (TYLENOL) 325 MG tablet Take 650 mg by mouth every 6 hours as needed for Pain      Continuous Blood Gluc Sensor (FREESTYLE NANDO 2 SENSOR) MISC DX E11.9 14 each 1    Continuous Blood Gluc  (FREESTYLE NANDO 2 READER) AALIYAH DX E 11.9.   Test 4 times a day 1 each 0    Insulin Pen Needle 32G X 4 MM MISC 1 each by Does not apply route daily 100 each 3    glucose monitoring kit (FREESTYLE) monitoring kit 1 kit by Does not apply route daily 1 kit 0    blood glucose monitor strips Test 1 times a day & as needed for symptoms of irregular blood glucose. 100 strip 3    Lancets MISC 1 each by Does not apply route daily 100 each 5    midodrine (PROAMATINE) 10 MG tablet Take 1 tablet by mouth 3 times daily (with meals) 90 tablet 1    Respiratory Therapy Supplies (NEBULIZER) AALIYAH Used to give yourself breathing treatment 1 Device 0     Allergies   Allergen Reactions    Dopamine Hcl Other (See Comments) and Anxiety     Compulsive gambling    Tramadol Other (See Comments)     Dizziness     Morphine And Related Itching    Melatonin Other (See Comments)     Restless leg syndrome           REVIEW OF SYSTEMS  10 systems reviewed, pertinent positives per HPI otherwise noted to be negative    PHYSICAL EXAM  BP (!) 188/102   Pulse 98   Temp 98 °F (36.7 °C) (Temporal)   Resp 20   Wt 189 lb 6 oz (85.9 kg)   SpO2 99%   BMI 25.68 kg/m²      CONSTITUTIONAL: AOx4, cooperative with exam, afebrile   HEAD: normocephalic, atraumatic   EYES: PERRL, EOMI, anicteric sclera   ENT: Moist mucous membranes, uvula midline   BACK: Symmetric, no deformity, no midline tenderness of the cervical thoracic or lumbar spine   LUNGS: Bilateral breath sounds, CTAB, no rales/ronchi/wheezes   CARDIOVASCULAR: RRR, normal S1/S2, no m/r/g, 2+ pulses throughout   ABDOMEN: Soft, non-tender, non-distended, +BS   NEUROLOGIC:  MAEx4, 5/5 strength throughout; slightly diminished fine touch touch sensation throughout bilateral upper and lower extremities; normal gait; finger-to-nose testing normal; GCS 15, cranial nerves II through XII intact, no dysarthria, no aphasia, no facial droop   MUSCULOSKELETAL: No clubbing, cyanosis or edema   SKIN: No rash, pallor or wounds on exposed surfaces         RADIOLOGY  X-RAYS:  I have reviewed radiologic plain film image(s). ALL OTHER NON-PLAIN FILM IMAGES SUCH AS CT, ULTRASOUND AND MRI HAVE BEEN READ BY THE RADIOLOGIST.   CT Head WO Contrast   Final Result   No acute intracranial Prescriptions    No medications on file     CRITICAL CARE TIME   Total Critical Care time was 33 minutes, excluding separately reportable procedures. There was a high probability of clinically significant/life threatening deterioration in the patient's condition which required my urgent intervention. CLINICAL IMPRESSION  1. Nonsustained ventricular tachycardia ([address]    2. Bilateral numbness and tingling of arms and legs        Blood pressure (!) 188/102, pulse 98, temperature 98 °F (36.7 °C), temperature source Temporal, resp. rate 20, weight 189 lb 6 oz (85.9 kg), SpO2 99 %. DISPOSITION  Admitted     Disclaimer: All medical record entries made by [employer] dictation.       (Please note that this note was completed with a voice recognition program. Every attempt was made to edit the dictations, but inevitably there remain words that are mis-transcribed.)            Yaima Keenan MD  03/31/21 6873

## 2021-03-31 NOTE — PROGRESS NOTES
Pharmacy Medication Reconciliation Note     List of medications patient is currently taking is complete. Source of information:   1. Patient  2. EMR    Notes regarding home medications:   1. Patient reports taking his morning medications. 2. Patient unclear if he is taking midodrine, he did not know what it is for. Last dispense 3/6/21. Looks like it may have been prescribed after an ER visit on 03/03/21 for hypotension. Patient's BP in room was 198/103.   3. Patient reports he was unsure if he was taking his apixaban once or twice daily. Counseled patient to make sure he was taking it twice daily. 4. Denies any herbal medications.      Brenda Vidal, Pharmacy Intern  3/31/2021  7:39 PM

## 2021-03-31 NOTE — ED TRIAGE NOTES
Pt states that at one pm when he was sitting down watching TV his arms and legs became heavy , saying the sensation is off. He can feel but states its decreased. He reports this happened 2 weeks ago, he cannot have a mri, they have a special CT scheduled on 4/2.  Pt denies headache, denies n/v/d denies cold like symptoms

## 2021-04-01 ENCOUNTER — APPOINTMENT (OUTPATIENT)
Dept: CT IMAGING | Age: 66
End: 2021-04-01
Payer: MEDICARE

## 2021-04-01 LAB
ANION GAP SERPL CALCULATED.3IONS-SCNC: 13 MMOL/L (ref 3–16)
BUN BLDV-MCNC: 18 MG/DL (ref 7–20)
CALCIUM SERPL-MCNC: 8.4 MG/DL (ref 8.3–10.6)
CHLORIDE BLD-SCNC: 107 MMOL/L (ref 99–110)
CO2: 20 MMOL/L (ref 21–32)
CREAT SERPL-MCNC: 0.9 MG/DL (ref 0.8–1.3)
EKG ATRIAL RATE: 96 BPM
EKG DIAGNOSIS: NORMAL
EKG P AXIS: 61 DEGREES
EKG P-R INTERVAL: 130 MS
EKG Q-T INTERVAL: 416 MS
EKG QRS DURATION: 136 MS
EKG QTC CALCULATION (BAZETT): 525 MS
EKG R AXIS: 43 DEGREES
EKG T AXIS: 47 DEGREES
EKG VENTRICULAR RATE: 96 BPM
FOLATE: 6.4 NG/ML (ref 4.78–24.2)
GFR AFRICAN AMERICAN: >60
GFR NON-AFRICAN AMERICAN: >60
GLUCOSE BLD-MCNC: 100 MG/DL (ref 70–99)
GLUCOSE BLD-MCNC: 107 MG/DL (ref 70–99)
GLUCOSE BLD-MCNC: 129 MG/DL (ref 70–99)
GLUCOSE BLD-MCNC: 135 MG/DL (ref 70–99)
GLUCOSE BLD-MCNC: 136 MG/DL (ref 70–99)
MAGNESIUM: 2.3 MG/DL (ref 1.8–2.4)
PERFORMED ON: ABNORMAL
POTASSIUM REFLEX MAGNESIUM: 4 MMOL/L (ref 3.5–5.1)
SODIUM BLD-SCNC: 140 MMOL/L (ref 136–145)
TROPONIN: 0.03 NG/ML
VITAMIN B-12: 331 PG/ML (ref 211–911)

## 2021-04-01 PROCEDURE — 72125 CT NECK SPINE W/O DYE: CPT

## 2021-04-01 PROCEDURE — 84484 ASSAY OF TROPONIN QUANT: CPT

## 2021-04-01 PROCEDURE — 36415 COLL VENOUS BLD VENIPUNCTURE: CPT

## 2021-04-01 PROCEDURE — G0378 HOSPITAL OBSERVATION PER HR: HCPCS

## 2021-04-01 PROCEDURE — 2580000003 HC RX 258: Performed by: NURSE PRACTITIONER

## 2021-04-01 PROCEDURE — 93010 ELECTROCARDIOGRAM REPORT: CPT | Performed by: INTERNAL MEDICINE

## 2021-04-01 PROCEDURE — 94761 N-INVAS EAR/PLS OXIMETRY MLT: CPT

## 2021-04-01 PROCEDURE — 94640 AIRWAY INHALATION TREATMENT: CPT

## 2021-04-01 PROCEDURE — 6370000000 HC RX 637 (ALT 250 FOR IP): Performed by: NURSE PRACTITIONER

## 2021-04-01 PROCEDURE — 80048 BASIC METABOLIC PNL TOTAL CA: CPT

## 2021-04-01 PROCEDURE — 83735 ASSAY OF MAGNESIUM: CPT

## 2021-04-01 RX ADMIN — INSULIN GLARGINE 10 UNITS: 100 INJECTION, SOLUTION SUBCUTANEOUS at 20:26

## 2021-04-01 RX ADMIN — CLOPIDOGREL BISULFATE 75 MG: 75 TABLET ORAL at 08:35

## 2021-04-01 RX ADMIN — OXYCODONE AND ACETAMINOPHEN 1 TABLET: 10; 325 TABLET ORAL at 12:29

## 2021-04-01 RX ADMIN — OXYCODONE AND ACETAMINOPHEN 1 TABLET: 10; 325 TABLET ORAL at 20:23

## 2021-04-01 RX ADMIN — CARVEDILOL 6.25 MG: 6.25 TABLET, FILM COATED ORAL at 16:43

## 2021-04-01 RX ADMIN — GABAPENTIN 600 MG: 300 CAPSULE ORAL at 21:27

## 2021-04-01 RX ADMIN — SPIRONOLACTONE 25 MG: 25 TABLET ORAL at 08:35

## 2021-04-01 RX ADMIN — SACUBITRIL AND VALSARTAN 1 TABLET: 24; 26 TABLET, FILM COATED ORAL at 08:34

## 2021-04-01 RX ADMIN — GABAPENTIN 600 MG: 300 CAPSULE ORAL at 06:30

## 2021-04-01 RX ADMIN — SODIUM CHLORIDE, PRESERVATIVE FREE 10 ML: 5 INJECTION INTRAVENOUS at 08:37

## 2021-04-01 RX ADMIN — ASPIRIN 325 MG ORAL TABLET 325 MG: 325 PILL ORAL at 08:35

## 2021-04-01 RX ADMIN — RANOLAZINE 1000 MG: 500 TABLET, FILM COATED, EXTENDED RELEASE ORAL at 08:34

## 2021-04-01 RX ADMIN — BUDESONIDE AND FORMOTEROL FUMARATE DIHYDRATE 2 PUFF: 80; 4.5 AEROSOL RESPIRATORY (INHALATION) at 07:26

## 2021-04-01 RX ADMIN — GABAPENTIN 600 MG: 300 CAPSULE ORAL at 16:43

## 2021-04-01 RX ADMIN — SACUBITRIL AND VALSARTAN 1 TABLET: 24; 26 TABLET, FILM COATED ORAL at 20:23

## 2021-04-01 RX ADMIN — CARVEDILOL 6.25 MG: 6.25 TABLET, FILM COATED ORAL at 08:34

## 2021-04-01 RX ADMIN — APIXABAN 5 MG: 5 TABLET, FILM COATED ORAL at 08:34

## 2021-04-01 RX ADMIN — MIDODRINE HYDROCHLORIDE 10 MG: 10 TABLET ORAL at 08:34

## 2021-04-01 RX ADMIN — OXYCODONE AND ACETAMINOPHEN 1 TABLET: 10; 325 TABLET ORAL at 04:40

## 2021-04-01 RX ADMIN — APIXABAN 5 MG: 5 TABLET, FILM COATED ORAL at 20:23

## 2021-04-01 RX ADMIN — GABAPENTIN 600 MG: 300 CAPSULE ORAL at 11:41

## 2021-04-01 RX ADMIN — SODIUM CHLORIDE, PRESERVATIVE FREE 10 ML: 5 INJECTION INTRAVENOUS at 00:00

## 2021-04-01 RX ADMIN — SODIUM CHLORIDE, PRESERVATIVE FREE 10 ML: 5 INJECTION INTRAVENOUS at 21:00

## 2021-04-01 RX ADMIN — BUDESONIDE AND FORMOTEROL FUMARATE DIHYDRATE 2 PUFF: 80; 4.5 AEROSOL RESPIRATORY (INHALATION) at 19:59

## 2021-04-01 RX ADMIN — GABAPENTIN 600 MG: 300 CAPSULE ORAL at 18:31

## 2021-04-01 RX ADMIN — ROSUVASTATIN CALCIUM 40 MG: 40 TABLET, FILM COATED ORAL at 18:31

## 2021-04-01 RX ADMIN — RANOLAZINE 1000 MG: 500 TABLET, FILM COATED, EXTENDED RELEASE ORAL at 20:23

## 2021-04-01 ASSESSMENT — PAIN DESCRIPTION - PROGRESSION
CLINICAL_PROGRESSION: NOT CHANGED
CLINICAL_PROGRESSION: GRADUALLY IMPROVING
CLINICAL_PROGRESSION: GRADUALLY IMPROVING

## 2021-04-01 ASSESSMENT — PAIN SCALES - WONG BAKER
WONGBAKER_NUMERICALRESPONSE: 2

## 2021-04-01 ASSESSMENT — PAIN DESCRIPTION - FREQUENCY
FREQUENCY: CONTINUOUS
FREQUENCY: CONTINUOUS

## 2021-04-01 ASSESSMENT — PAIN DESCRIPTION - PAIN TYPE
TYPE: CHRONIC PAIN;ACUTE PAIN
TYPE: CHRONIC PAIN;ACUTE PAIN

## 2021-04-01 ASSESSMENT — PAIN DESCRIPTION - ONSET
ONSET: ON-GOING
ONSET: ON-GOING

## 2021-04-01 ASSESSMENT — PAIN DESCRIPTION - LOCATION: LOCATION: BACK

## 2021-04-01 ASSESSMENT — PAIN DESCRIPTION - DESCRIPTORS
DESCRIPTORS: THROBBING
DESCRIPTORS: ACHING;DISCOMFORT;TINGLING

## 2021-04-01 ASSESSMENT — PAIN DESCRIPTION - ORIENTATION
ORIENTATION: LOWER
ORIENTATION: LOWER

## 2021-04-01 ASSESSMENT — PAIN SCALES - GENERAL
PAINLEVEL_OUTOF10: 8
PAINLEVEL_OUTOF10: 7
PAINLEVEL_OUTOF10: 6
PAINLEVEL_OUTOF10: 9

## 2021-04-01 NOTE — CONSULTS
symptoms. Those records have been reviewed.       Medical History:  Past Medical History:   Diagnosis Date    Anxiety     Arthritis     Asthma     CAD (coronary artery disease)     Calcium kidney stone     [facility]     [facility]erebral artery occlusion with cerebral infarction [facility]     CHF [facility]congestive heart failure) [facility]     Clostridium difficile diarrhea 02/12/2020    [facility] disease) [facility])     mild    Depression     DM2 [facility]diabetes mellitus, type 2) ([facility])     Fibromyalgia     GERD (gastroesophageal reflux disease)     Hyperlipidemia     Hypertension     Liver disease     Pacemaker 2012    Medtronic model # [id number]    Pneumonia     Seizures ([facility]     TIA (transient ischemic attack) 2007    Ulcerative colitis ([facility]      Past Surgical History:   Procedure Laterality Date    APPENDECTOMY      BACK SURGERY      CARDIAC SURGERY      CHOLECYSTECTOMY      COLONOSCOPY  01/10/2017    COLONOSCOPY  01/10/2017    CORONARY ANGIOPLASTY WITH STENT PLACEMENT  2012    CORONARY ARTERY BYPASS GRAFT  2010, 11/2015    ENDOSCOPY, COLON, DIAGNOSTIC      PACEMAKER PLACEMENT      UPPER GASTROINTESTINAL ENDOSCOPY  02/07/2017    VASCULAR SURGERY       Scheduled Meds:   apixaban  5 mg Oral BID    aspirin  325 mg Oral Daily    carvedilol  6.25 mg Oral BID WC    clopidogrel  75 mg Oral Daily    budesonide-formoterol  2 puff Inhalation BID    gabapentin  600 mg Oral 5x Daily    insulin glargine  10 Units Subcutaneous Nightly    midodrine  10 mg Oral TID WC    ranolazine  1,000 mg Oral BID    rosuvastatin  40 mg Oral QPM    sacubitril-valsartan  1 tablet Oral BID    spironolactone  25 mg Oral Daily    insulin lispro  0-12 Units Subcutaneous TID WC    insulin lispro  0-6 Units Subcutaneous Nightly    sodium chloride flush  10 mL Intravenous 2 times per day     Medications Prior to Admission:   zolpidem (AMBIEN) 5 MG tablet, Take 1 tablet by mouth nightly as needed for Sleep for up to 30 days. fluticasone-salmeterol (ADVAIR DISKUS) 100-50 MCG/DOSE diskus inhaler, Inhale 2 puffs into the lungs 2 times daily wixela inhub inhaler ( generic advair diskus)  gabapentin (NEURONTIN) 600 MG tablet, TAKE ONE TABLET BY MOUTH FIVE TIMES A DAY  insulin glargine (LANTUS SOLOSTAR) 100 UNIT/ML injection pen, Inject 10 Units into the skin nightly  spironolactone (ALDACTONE) 25 MG tablet, Take 25 mg by mouth daily   nitroGLYCERIN (NITROSTAT) 0.4 MG SL tablet, Place 0.4 mg under the tongue every 5 minutes as needed  oxyCODONE-acetaminophen (PERCOCET)  MG per tablet, Take 1 tablet by mouth every 8 hours as needed for Pain for up to 30 days. carvedilol (COREG) 12.5 MG tablet, Take 0.5 tablets by mouth 2 times daily (with meals)  ranolazine (RANEXA) 500 MG extended release tablet, Take 2 tablets by mouth 2 times daily  sacubitril-valsartan (ENTRESTO) 49-51 MG per tablet, Take 0.5 tablets by mouth 2 times daily  clopidogrel (PLAVIX) 75 MG tablet, Take 75 mg by mouth daily  rosuvastatin (CRESTOR) 40 MG tablet, Take 40 mg by mouth every evening  aspirin 325 MG tablet, Take 325 mg by mouth daily  apixaban (ELIQUIS) 5 MG TABS tablet, Take 1 tablet by mouth 2 times daily  empagliflozin (JARDIANCE) 10 MG tablet, Take 1 tablet by mouth daily  acetaminophen (TYLENOL) 325 MG tablet, Take 650 mg by mouth every 6 hours as needed for Pain  Continuous Blood Gluc Sensor (FREESTYLE NANDO 2 SENSOR) MISC, DX E11.9  Continuous Blood Gluc  (FREESTYLE NNADO 2 READER) AALIYAH, DX E 11.9. Test 4 times a day  Insulin Pen Needle 32G X 4 MM MISC, 1 each by Does not apply route daily  glucose monitoring kit (FREESTYLE) monitoring kit, 1 kit by Does not apply route daily  blood glucose monitor strips, Test 1 times a day & as needed for symptoms of irregular blood glucose.   Lancets MISC, 1 each by Does not apply route daily  midodrine (PROAMATINE) 10 MG tablet, Take 1 tablet by mouth 3 times daily (with noted.  Neurologic  Mental status:   orientation to person, place, time, situation. General fund of knowledge grossly intact   Memory grossly intact   Attention intact as able to attend well to the exam     Language fluent in conversation   Comprehension intact; follows simple commands  Cranial nerves:   CN2: visual fields full  CN 3,4,6: extraocular muscles intact  CN5: facial sensation symmetric   CN7: face symmetric without dysarthria  CN8: hearing grossly intact  CN9: palate elevated symmetrically  CN11: trap full strength on shoulder shrug  CN12: tongue midline with protrusion  Strength: good strength in all 4 extremities   Deep tendon reflexes: 1+ BUE, diminished BLE. Sensory: reports numbness below the knee bilaterally, tingling above. Says his BUE are tingling throughout. Cerebellar/coordination: finger nose finger normal without ataxia  Tone: normal in all 4 extremities  Gait: deferred at this time for safety. Labs  Glucose 107  Na 140  K 4.0  Mg 2.30  BUN 18  Cr 0.9    LFTs normal    WBC 6.4K  Hg 12.0  Platelets 861    TSH 1.86  HgA1c 7.8    Studies  CT head w/o 3/31/21, independently reviewed  No acute abnormality. Stable regions of encephalomalacia in the R posterior temporal parietal region and posterior L frontal lobe. CTA head/neck 3/31/21, independently reviewed  No large vessel occlusion or hemodynamic stenosis involving intracranial   cervical vasculature. Impression  1. BUE/BLE paresthesias. Unclear etiology. Neurologic exam is reassuring. 2.  Hx stroke. No residual deficit. 3.  Diabetes. 4.  ?hx fibromyalgia. 5.  Pacemaker. Reportedly not MRI compatible. Recommendations  1. CT cervical spine w/o.    2.  B12.    3.  PT/OT evaluation. 4.  Follow up w/ Dr. Veronique Rolilns as scheduled.       Delrae Burkitt NP  [address] [place] [address] [id number] Neurology    A copy of this note was provided for Dr Radha Lange MD

## 2021-04-01 NOTE — PROGRESS NOTES
Patient resting in bed upon assessment with eyes closed, A/Ox4, vitals stable, tele on and NSR. Patient states some tingling in extremities, no pain at this time. Scheduled medications given, call light in reach, will continue to monitor.

## 2021-04-01 NOTE — H&P
Hospital Medicine History & Physical      PCP: Charmaine Jacobs MD    Date of Admission: 3/31/2021    Date of Service: Pt seen/examined on 3/31/2021 and Placed in Observation. Chief Complaint:  Numbness to both arms and legs      History Of Present Illness:      72 y.o. male with PMHx of anxiety, CAD, CHF, Depression, DM, GERD, HLD, HTN, Pacemaker and TIA presented to [facility] with numbness to both upper and lower extremities. He states that this began about 4 hours ago. He denies weakness to either extremity. He denies difficulty with speech or swallowing. No headache, dizziness or lightheadedness. No chest pain or shortness of breath. No pain to neck or back. No spinal injury or trauma. Patient did have very similar episode several weeks ago and was seen in the emergency department. They recommended admission and MRI evaluation, however he left AMA from the emergency department. He states the symptoms lasted about 24 hours and resolved spontaneously. ED work-up has been unremarkable with the exception of a 15 beat nonsustained episode of V. tach. The patient was sleeping at the time and had no symptoms. ER requested admission for evaluation of both nonsustained V. tach and numbness.     Past Medical History:          Diagnosis Date    Anxiety     Arthritis     Asthma     CAD (coronary artery disease)     Calcium kidney stone     Cardiomyopathy ([address]     Cerebral artery occlusion with cerebral infarction ([address]     CHF (congestive heart failure) ([address]     Clostridium difficile diarrhea 02/12/2020    [facility])     mild    Depression     DM2 (diabetes mellitus, type 2) ([facility])     Fibromyalgia     GERD (gastroesophageal reflux disease)     Hyperlipidemia     Hypertension     Liver disease     Pacemaker 2012    Medtronic model # [id number]    Pneumonia     Seizures ([address]     TIA (transient ischemic attack) 2007    [facility]cerative [facility]        Past Surgical History:          Procedure Laterality Date    APPENDECTOMY      BACK SURGERY      CARDIAC SURGERY      CHOLECYSTECTOMY      COLONOSCOPY  01/10/2017    COLONOSCOPY  01/10/2017    CORONARY ANGIOPLASTY WITH STENT PLACEMENT  2012    CORONARY ARTERY BYPASS GRAFT  2010, 11/2015    ENDOSCOPY, COLON, DIAGNOSTIC      PACEMAKER PLACEMENT      UPPER GASTROINTESTINAL ENDOSCOPY  02/07/2017    VASCULAR SURGERY         Medications Prior to Admission:      Prior to Admission medications    Medication Sig Start Date End Date Taking? Authorizing Provider   zolpidem (AMBIEN) 5 MG tablet Take 1 tablet by mouth nightly as needed for Sleep for up to 30 days. 3/30/21 4/29/21 Yes Erik Krishnan MD   fluticasone-salmeterol (ADVAIR DISKUS) 100-50 MCG/DOSE diskus inhaler Inhale 2 puffs into the lungs 2 times daily wixela inhub inhaler ( generic advair diskus) 3/26/21  Yes Erik Krishnan MD   gabapentin (NEURONTIN) 600 MG tablet TAKE ONE TABLET BY MOUTH FIVE TIMES A DAY 3/24/21 5/22/21 Yes Erik Krishnan MD   insulin glargine (LANTUS SOLOSTAR) 100 UNIT/ML injection pen Inject 10 Units into the skin nightly 3/18/21  Yes Erik Krishnan MD   spironolactone (ALDACTONE) 25 MG tablet Take 25 mg by mouth daily  3/5/21  Yes Historical Provider, MD   nitroGLYCERIN (NITROSTAT) 0.4 MG SL tablet Place 0.4 mg under the tongue every 5 minutes as needed 11/13/20  Yes Historical Provider, MD   oxyCODONE-acetaminophen (PERCOCET)  MG per tablet Take 1 tablet by mouth every 8 hours as needed for Pain for up to 30 days.  3/8/21 4/7/21 Yes Erik Krishnan MD   carvedilol (COREG) 12.5 MG tablet Take 0.5 tablets by mouth 2 times daily (with meals) 3/6/21  Yes uLl Frias MD   ranolazine (RANEXA) 500 MG extended release tablet Take 2 tablets by mouth 2 times daily 3/6/21  Yes Lul Frias MD   sacubitril-valsartan (ENTRESTO) 49-51 MG per tablet Take 0.5 tablets by mouth 2 times daily 3/6/21  Yes Lul Frias MD   clopidogrel (PLAVIX) 75 MG tablet Take 75 mg by mouth daily   Yes Historical Provider, MD   rosuvastatin (CRESTOR) 40 MG tablet Take 40 mg by mouth every evening   Yes Historical Provider, MD   aspirin 325 MG tablet Take 325 mg by mouth daily   Yes Historical Provider, MD   apixaban (ELIQUIS) 5 MG TABS tablet Take 1 tablet by mouth 2 times daily 7/16/20  Yes TAYLA Miner CNP   empagliflozin (JARDIANCE) 10 MG tablet Take 1 tablet by mouth daily 7/16/20  Yes TAYLA Miner CNP   acetaminophen (TYLENOL) 325 MG tablet Take 650 mg by mouth every 6 hours as needed for Pain   Yes Historical Provider, MD   Continuous Blood Gluc Sensor (FREESTYLE NANDO 2 SENSOR) MISC DX E11.9 3/24/21   Erik Krishnan MD   Continuous Blood Gluc  (FREESTYLE NANDO 2 READER) AALIYAH DX E 11.9. Test 4 times a day 3/24/21   Erik Krishnan MD   Insulin Pen Needle 32G X 4 MM MISC 1 each by Does not apply route daily 3/18/21   Erik Krishnan MD   glucose monitoring kit (FREESTYLE) monitoring kit 1 kit by Does not apply route daily 3/18/21   Erik Krishnan MD   blood glucose monitor strips Test 1 times a day & as needed for symptoms of irregular blood glucose. 3/18/21   Erik Krishnan MD   Lancets MISC 1 each by Does not apply route daily 3/18/21   Erik Krishnan MD   midodrine (PROAMATINE) 10 MG tablet Take 1 tablet by mouth 3 times daily (with meals) 3/6/21   Lul Frias MD   Respiratory Therapy Supplies (NEBULIZER) AALIYAH Used to give yourself breathing treatment 8/9/20   Jamey Swift MD       Allergies:  Dopamine hcl, Tramadol, Morphine and related, and Melatonin    Social History:      The patient currently lives at home alone    TOBACCO:   reports that he has been smoking cigarettes. He has a 22.00 pack-year smoking history.  He has never used smokeless tobacco.  ETOH:   reports no history of alcohol Status: Full Code    Dispo - Observation       [address] A Sonu Sethi CNP    Thank you Myrna Polk MD for the opportunity to be involved in this patient's care. If you have any questions or concerns please feel free to contact me at [phone].

## 2021-04-01 NOTE — PROGRESS NOTES
4 Eyes Admission Assessment     I agree as the admission nurse that 2 RN's have performed a thorough Head to Toe Skin Assessment on the patient. ALL assessment sites listed below have been assessed on admission. Areas assessed by both nurses:  [x]   Head, Face, and Ears   [x]   Shoulders, Back, and Chest  [x]   Arms, Elbows, and Hands   [x]   Coccyx, Sacrum, and Ischum  [x]   Legs, Feet, and Heels        Does the Patient have Skin Breakdown?  no              [facility] nurse consulted for Pressure Injury (Stage 3,4, Unstageable, DTI, NWPT, and Complex wounds):  no    Nurse 1 eSignature: Electronically signed by Roosevelt Bryant RN on 4/1/2021 at 4:38 AM    **SHARE this note so that the co-signing nurse is able to place an eSignature**    Nurse 2 eSignature: Electronically signed by Kenroy De Luna RN on 4/1/21 at 4:38 AM EDT

## 2021-04-01 NOTE — PROGRESS NOTES
Hospitalist Progress Note      PCP: Franco Sanchez MD    Date of Admission: 3/31/2021      Subjective:     Still has numbness in the legs and arms but better. . No weakness    Medications:  Reviewed    Infusion Medications    dextrose      sodium chloride       Scheduled Medications    apixaban  5 mg Oral BID    aspirin  325 mg Oral Daily    carvedilol  6.25 mg Oral BID WC    clopidogrel  75 mg Oral Daily    budesonide-formoterol  2 puff Inhalation BID    gabapentin  600 mg Oral 5x Daily    insulin glargine  10 Units Subcutaneous Nightly    midodrine  10 mg Oral TID WC    ranolazine  1,000 mg Oral BID    rosuvastatin  40 mg Oral QPM    sacubitril-valsartan  1 tablet Oral BID    spironolactone  25 mg Oral Daily    insulin lispro  0-12 Units Subcutaneous TID WC    insulin lispro  0-6 Units Subcutaneous Nightly    sodium chloride flush  10 mL Intravenous 2 times per day     PRN Meds: oxyCODONE-acetaminophen, zolpidem, glucose, dextrose, glucagon (rDNA), dextrose, sodium chloride flush, sodium chloride, promethazine **OR** ondansetron, polyethylene glycol, acetaminophen **OR** acetaminophen, potassium chloride **OR** potassium alternative oral replacement **OR** potassium chloride, magnesium sulfate      Intake/Output Summary (Last 24 hours) at 4/1/2021 1312  Last data filed at 4/1/2021 1117  Gross per 24 hour   Intake 240 ml   Output 1250 ml   Net -1010 ml       Exam:    BP (!) 154/86   Pulse 77   Temp 98 °F (36.7 °C) (Oral)   Resp 16   Ht 6' (1.829 m)   Wt 179 lb 14.3 oz (81.6 kg)   SpO2 97%   BMI 24.40 kg/m²     General appearance: No apparent distress, appears stated age and cooperative. HEENT: Pupils equal, round, and reactive to light. Conjunctivae/corneas clear. Neck: Supple, with full range of motion. No jugular venous distention. Trachea midline. Respiratory:  Normal respiratory effort. Clear to auscultation, bilaterally without Rales/Wheezes/Rhonchi.   Cardiovascular: Regular rate and rhythm with normal S1/S2 without murmurs, rubs or gallops. Abdomen: Soft, non-tender, non-distended with normal bowel sounds. Musculoskeletal: No clubbing, cyanosis or edema bilaterally. Full range of motion without deformity. Skin: Skin color, texture, turgor normal.  No rashes or lesions. Neurologic:  Neurovascularly intact without any focal sensory/motor deficits. Cranial nerves: II-XII intact, grossly non-focal.  Psychiatric: Alert and oriented, thought content appropriate, normal insight  Capillary Refill: Brisk,< 3 seconds   Peripheral Pulses: +2 palpable, equal bilaterally       Labs:   Recent Labs     03/31/21  1730   WBC 6.4   HGB 12.0*   HCT 35.7*        Recent Labs     03/31/21  1730 04/01/21  0419    140   K 4.2 4.0    107   CO2 18* 20*   BUN 27* 18   CREATININE 1.1 0.9   CALCIUM 8.1* 8.4     Recent Labs     03/31/21  1730   AST 17   ALT 13   BILITOT <0.2   ALKPHOS 49     No results for input(s): INR in the last 72 hours. Recent Labs     03/31/21  1730 04/01/21  0419   TROPONINI 0.01 0.03*       Assessment/Plan:    -Paresthesias of upper and lower extremities bilaterally--unclear etiology--CT head/cervical spine negative. B12 within normal limits. Has a pacemaker was not compatible MRI  -CAD-s/p CABG x2/ chronic angina-stable  -Ischemic cardiomyopathy/chronic systolic heart failure status post AICD, EF 25 to 30%  -NSVT  -Uncontrolled essential pretension--DC midodrine and continue BP meds  -History of hypotension on midodrine -resolved-discontinue midodrine  -COPD-stable  -Chronic tobacco abuse-advised to stop  -Chronic pain syndrome/fibromyalgia  -Diabetes mellitus type 2--hemoglobin A1c 7.8--blood sugars acceptable  -Prior CVA-continue statin and antiplatelet      Plan  PT and OT for disposition  Neurology evaluation  Continue current treatment    DVT Prophylaxis: Eliquis  Diet: DIET CARB CONTROL; Low Sodium (2 GM);  Daily Fluid Restriction: 2000 ml; Cardiac  Code Status: Full Code      Burton Watson MD

## 2021-04-01 NOTE — ED NOTES
Attempted to give pt pain medication percocet 5mg / pt very upset and angry adamant about getting percocet 10mg/ Md notified and aware.       Miranda Polk RN  03/31/21 2045

## 2021-04-01 NOTE — PROGRESS NOTES
Patient is up in bed. A/Ox4. Patient isn't complaining of any pain at this time. All meds are given, all needs are met, and safety measures in place. Will continue to monitor and assess.

## 2021-04-01 NOTE — ED NOTES
Pt is calling out demanding pain medication/ has ask 8 times in 20 mins. Instructed pt that I am waiting for order from MD/TAYLOR.      Pink Ormond, RN  03/31/21 2029

## 2021-04-01 NOTE — ED NOTES
MLP( Josefa Sandoval) from Hospitalist group present to see pt. Pt aware of admission to 3w room 3103. Awaiting transport.       Memo Shanks RN  03/31/21 0567

## 2021-04-01 NOTE — PROGRESS NOTES
Patient resting in bed this am states, \"I feel terrible\" complaining of tingling in all extremities. Patient states that earlier in the shift when pain and nerve medication were given these helped and he was feeling better, but is now starting to feel uncomfortable again. Percocet given, vitals stable, will continue to monitor.

## 2021-04-01 NOTE — CARE COORDINATION
INITIAL CASE MANAGEMENT ASSESSMENT    Reviewed chart, met with patient to assess possible discharge needs. Explained Case Management role/services. Living Situation: Pt lives with his spouse in an apartment. [address] the apartment. There is no elevator in the building. Pt stated that he has a hard time getting in and out of the building recently. SW asked pt if he feels like he would able to get up the steps if he were to be discharged and he believes he would be able to get up the steps at this time. ADLs: Independent. Pt's wife can assist pt if needed. DME: Pt has roll-aid walker. Pt has no need for DME at this time. PT/OT Recs: TBD. Active Services: Pt is not active with any services at this time. Pt denies any need for services at this time. Transportation: Pt is an active . Pt's spouse will transport him home. Medications: [address] in [place]. Pt denies any trouble affording medication at this time. PCP: Kaye Dalton MD      HD/PD: N/A     PLAN/COMMENTS: Pt's plan is to return home at discharge. Pt's spouse will transport pt home at discharge. Readmission Questions completed. SW/CM provided contact information for patient or family to call with any questions. SW/CM will follow and assist as needed. The Plan for Transition of Care is related to the following treatment goals: to return home. The Patient was provided with a choice of provider and agrees   with the discharge plan. [x] Yes [] No    Freedom of choice list was provided with basic dialogue that supports the patient's individualized plan of care/goals, treatment preferences and shares the quality data associated with the providers.  [x] Yes [] No

## 2021-04-02 VITALS
HEIGHT: 72 IN | OXYGEN SATURATION: 98 % | RESPIRATION RATE: 16 BRPM | WEIGHT: 182.32 LBS | BODY MASS INDEX: 24.69 KG/M2 | TEMPERATURE: 98 F | HEART RATE: 72 BPM | SYSTOLIC BLOOD PRESSURE: 147 MMHG | DIASTOLIC BLOOD PRESSURE: 83 MMHG

## 2021-04-02 LAB
GLUCOSE BLD-MCNC: 170 MG/DL (ref 70–99)
GLUCOSE BLD-MCNC: 199 MG/DL (ref 70–99)
PERFORMED ON: ABNORMAL
PERFORMED ON: ABNORMAL
TOTAL SYPHILLIS IGG/IGM: NORMAL

## 2021-04-02 PROCEDURE — G0378 HOSPITAL OBSERVATION PER HR: HCPCS

## 2021-04-02 PROCEDURE — 6370000000 HC RX 637 (ALT 250 FOR IP): Performed by: NURSE PRACTITIONER

## 2021-04-02 PROCEDURE — 2580000003 HC RX 258: Performed by: NURSE PRACTITIONER

## 2021-04-02 PROCEDURE — 94761 N-INVAS EAR/PLS OXIMETRY MLT: CPT

## 2021-04-02 RX ORDER — ASPIRIN 81 MG/1
81 TABLET ORAL DAILY
Qty: 90 TABLET | Refills: 1 | Status: SHIPPED | OUTPATIENT
Start: 2021-04-02 | End: 2021-08-08

## 2021-04-02 RX ADMIN — ASPIRIN 325 MG ORAL TABLET 325 MG: 325 PILL ORAL at 08:35

## 2021-04-02 RX ADMIN — CLOPIDOGREL BISULFATE 75 MG: 75 TABLET ORAL at 08:34

## 2021-04-02 RX ADMIN — GABAPENTIN 600 MG: 300 CAPSULE ORAL at 11:39

## 2021-04-02 RX ADMIN — CARVEDILOL 6.25 MG: 6.25 TABLET, FILM COATED ORAL at 08:35

## 2021-04-02 RX ADMIN — SPIRONOLACTONE 25 MG: 25 TABLET ORAL at 08:35

## 2021-04-02 RX ADMIN — APIXABAN 5 MG: 5 TABLET, FILM COATED ORAL at 08:35

## 2021-04-02 RX ADMIN — OXYCODONE AND ACETAMINOPHEN 1 TABLET: 10; 325 TABLET ORAL at 04:14

## 2021-04-02 RX ADMIN — INSULIN LISPRO 2 UNITS: 100 INJECTION, SOLUTION INTRAVENOUS; SUBCUTANEOUS at 12:45

## 2021-04-02 RX ADMIN — GABAPENTIN 600 MG: 300 CAPSULE ORAL at 06:07

## 2021-04-02 RX ADMIN — SODIUM CHLORIDE, PRESERVATIVE FREE 10 ML: 5 INJECTION INTRAVENOUS at 08:43

## 2021-04-02 RX ADMIN — RANOLAZINE 1000 MG: 500 TABLET, FILM COATED, EXTENDED RELEASE ORAL at 08:35

## 2021-04-02 RX ADMIN — OXYCODONE AND ACETAMINOPHEN 1 TABLET: 10; 325 TABLET ORAL at 12:46

## 2021-04-02 RX ADMIN — SACUBITRIL AND VALSARTAN 1 TABLET: 24; 26 TABLET, FILM COATED ORAL at 08:35

## 2021-04-02 RX ADMIN — INSULIN LISPRO 2 UNITS: 100 INJECTION, SOLUTION INTRAVENOUS; SUBCUTANEOUS at 08:35

## 2021-04-02 ASSESSMENT — PAIN SCALES - WONG BAKER
WONGBAKER_NUMERICALRESPONSE: 0
WONGBAKER_NUMERICALRESPONSE: 0

## 2021-04-02 ASSESSMENT — PAIN DESCRIPTION - PAIN TYPE: TYPE: ACUTE PAIN;CHRONIC PAIN

## 2021-04-02 ASSESSMENT — PAIN SCALES - GENERAL: PAINLEVEL_OUTOF10: 7

## 2021-04-02 ASSESSMENT — PAIN DESCRIPTION - PROGRESSION: CLINICAL_PROGRESSION: GRADUALLY IMPROVING

## 2021-04-02 ASSESSMENT — PAIN DESCRIPTION - DESCRIPTORS: DESCRIPTORS: ACHING

## 2021-04-02 ASSESSMENT — PAIN DESCRIPTION - ONSET: ONSET: ON-GOING

## 2021-04-02 NOTE — CARE COORDINATION
Noris sent message to attending regarding tentative dc time.     Electronically signed by BEATA Crane, LUC, Case Management on 4/2/2021 at 10:16 AM  [facility] [id number]

## 2021-04-02 NOTE — DISCHARGE SUMMARY
Hospital Discharge Summary    Patient's PCP: Ed Nolan MD  Admit Date: 3/31/2021   Discharge Date: 4/2/2021    Admitting Physician: Dr. Ronak Leung MD  Discharge Physician: Dr. Raymond Burk   Consults: neurology    Brief HPI/hospital course:         72 y.o. male with PMHx of anxiety, CAD, CHF, Depression, DM, GERD, HLD, HTN, Pacemaker and TIA presented to [facility] with numbness to both upper and lower extremities, onset 4 hours prior to admission. He was treated for the following in consultation with neurology:     -Acute paresthesias of upper and lower extremities mqnipltjqbe-efooerrp-nnvyrno etiology--CT head/cervical spine negative. B12 within normal limits. Has a pacemaker was not compatible MRI. Carmelo Ace Continue stroke prevention measures, continue Neurontin for diabetic neuropathy  -CAD-s/p CABG [id number] medical management  -Ischemic cardiomyopathy/chronic systolic heart failure status post AICD, EF 25 to 30%  -NSVT. Carmelo Ace Patient has AICD. Carmelo Ace Continue beta-blocker  -Uncontrolled essential pretension--DC midodrine and continue BP meds  -History of hypotension on midodrine -resolved-discontinue midodrine  -COPD-stable  -Chronic tobacco abuse-advised to stop  -Chronic pain syndrome/fibromyalgia  -Diabetes mellitus type 2--hemoglobin A1c 7.8--blood sugars acceptable  -Prior CVA-continue statin and antiplatelet    Invasive procedures:  none    Discharge Diagnoses: Active Problems:    Paresthesia of upper and lower extremities of both sides  Resolved Problems:    * No resolved hospital problems. *      Physical Exam: /88   Pulse 80   Temp 98 °F (36.7 °C) (Oral)   Resp 16   Ht 6' (1.829 m)   Wt 182 lb 5.1 oz (82.7 kg)   SpO2 94%   BMI 24.73 kg/m²   Gen/overall appearance: Not in acute distress. Alert. Head: Normocephalic, atraumatic  Eyes: EOMI, good acuity  ENT:- Oral mucosa moist  Neck: No JVD, thyromegaly  CVS: Nml S1S2, no MRG, RRR  Pulm: Clear bilaterally.  No crackles/wheezes  Gastrointestinal: Soft, NT/ND, +BS  Musculoskeletal: No edema. Warm  Neuro: No focal deficit. Moves extremity spontaneously. Psychiatry: Appropriate affect. Not agitated. Skin: Warm, dry with normal turgor. No rash        Significant Diagnostic Studies:    See above        Treatments: As above. Discharge Medications:     Medication List      START taking these medications    aspirin EC 81 MG EC tablet  Take 1 tablet by mouth daily  Replaces: aspirin 325 MG tablet        CONTINUE taking these medications    acetaminophen 325 MG tablet  Commonly known as: TYLENOL     apixaban 5 MG Tabs tablet  Commonly known as: Eliquis  Take 1 tablet by mouth 2 times daily     carvedilol 12.5 MG tablet  Commonly known as: COREG     clopidogrel 75 MG tablet  Commonly known as: PLAVIX     Crestor 40 MG tablet  Generic drug: rosuvastatin     Entresto 49-51 MG per tablet  Generic drug: sacubitril-valsartan     fluticasone-salmeterol 100-50 MCG/DOSE diskus inhaler  Commonly known as: Advair Diskus  Inhale 2 puffs into the lungs 2 times daily wixela inhub inhaler ( generic advair diskus)     gabapentin 600 MG tablet  Commonly known as: NEURONTIN  TAKE ONE TABLET BY MOUTH FIVE TIMES A DAY     glucose monitoring kit monitoring kit  1 kit by Does not apply route daily     Insulin Pen Needle 32G X 4 MM Misc  1 each by Does not apply route daily     Jardiance 10 MG tablet  Generic drug: empagliflozin  Take 1 tablet by mouth daily     Lantus SoloStar 100 UNIT/ML injection pen  Generic drug: insulin glargine  Inject 10 Units into the skin nightly     Nebulizer Tanna  Used to give yourself breathing treatment     nitroGLYCERIN 0.4 MG SL tablet  Commonly known as: NITROSTAT     oxyCODONE-acetaminophen  MG per tablet  Commonly known as: PERCOCET  Take 1 tablet by mouth every 8 hours as needed for Pain for up to 30 days.      ranolazine 500 MG extended release tablet  Commonly known as: RANEXA  Take 2 tablets by mouth 2 times daily     spironolactone 25 MG tablet  Commonly known as: ALDACTONE     zolpidem 5 MG tablet  Commonly known as: Ambien  Take 1 tablet by mouth nightly as needed for Sleep for up to 30 days. STOP taking these medications    aspirin 325 MG tablet  Replaced by: aspirin EC 81 MG EC tablet     blood glucose test strips     FreeStyle Justin 2 [place] Rob Riddle 2 Sensor Misc     Lancets Misc     midodrine 10 MG tablet  Commonly known as: PROAMATINE           Where to Get Your Medications      These medications were sent to [address] [place] [address] & [place] [id number] [facility], [address] [address]    Phone: [phone]   · aspirin EC 81 MG EC tablet         Activity: activity as tolerated  Diet: DIET CARB CONTROL; Low Sodium (2 GM); Daily Fluid Restriction: 2000 ml; Cardiac      Disposition: home  Discharged Condition: Stable  Follow Up: Izabella Min 3  [phone]    Schedule an appointment as soon as possible for a visit          Code status:  Full Code         Total time spent on discharge, finalizing medications, referrals and arranging outpatient follow up was more than 45 minutes      Thank you Dr. Birgit Chan MD for the opportunity to be involved in this patients care.

## 2021-04-02 NOTE — PROGRESS NOTES
Patient is laying in bed watching TV. A/Ox4. Stated he's ready to go home. All morning meds are given, all needs are met, and safety precautions are in place. Will continue to monitor and assess.   Electronically signed by Karina Portillo RN on 4/2/2021 at 9:03 AM

## 2021-04-02 NOTE — DISCHARGE INSTR - DIET

## 2021-04-02 NOTE — PLAN OF CARE
Problem: Pain:  Goal: Pain level will decrease  Description: Pain level will decrease  4/1/2021 2139 by Terrance Merchant RN  Outcome: Ongoing  Note: Patients pain level will decrease. 4/1/2021 0825 by Nuris Reyes RN  Outcome: Ongoing  Note: Pt assessed for pain. Pt in pain and assessed with 0-10 pain rating scale. Pt given prescribed analgesic for pain. (See eMar) Pt satisfied with pain relief thus far. Will reassess and continue to monitor. Goal: Control of acute pain  Description: Control of acute pain  4/1/2021 2139 by Terrance Merchant RN  Outcome: Ongoing  Note: Patients pain level will decrease. 4/1/2021 0825 by Nuris Reyes RN  Outcome: Ongoing  Note: Pt assessed for pain. Pt in pain and assessed with 0-10 pain rating scale. Pt given prescribed analgesic for pain. (See eMar) Pt satisfied with pain relief thus far. Will reassess and continue to monitor. Goal: Control of chronic pain  Description: Control of chronic pain  4/1/2021 2139 by Terrance Merchant RN  Outcome: Ongoing  Note: Patients pain level will decrease. 4/1/2021 0825 by Nuris Reyes RN  Outcome: Ongoing  Note: Pt assessed for pain. Pt in pain and assessed with 0-10 pain rating scale. Pt given prescribed analgesic for pain. (See eMar) Pt satisfied with pain relief thus far. Will reassess and continue to monitor. Problem: OXYGENATION/RESPIRATORY FUNCTION  Goal: Patient will maintain patent airway  Outcome: Ongoing  Note: Patient will maintain a patent airway. Goal: Patient will achieve/maintain normal respiratory rate/effort  Description: Respiratory rate and effort will be within normal limits for the patient  Outcome: Ongoing     Problem: HEMODYNAMIC STATUS  Goal: Patient has stable vital signs and fluid balance  Outcome: Ongoing  Note: Patients vitals will remain stable.       Problem: FLUID AND ELECTROLYTE IMBALANCE  Goal: Fluid and electrolyte balance are achieved/maintained  Outcome: Ongoing Problem: ACTIVITY INTOLERANCE/IMPAIRED MOBILITY  Goal: Mobility/activity is maintained at optimum level for patient  Outcome: Ongoing

## 2021-04-02 NOTE — PROGRESS NOTES
Patient says he's going to get his new prescription of aspirin OTC. IV removed and all needs met. Patient is being discharged home.    Electronically signed by Bethany Hi RN on 4/2/2021 at 2:06 PM

## 2021-04-02 NOTE — PROGRESS NOTES
Occupational and Physical Therapy  4/2/2021  T[facility]  [id number]  [id number]    Therapy orders noted. Per t.c. to RN, patient is up ad dominique in room, and in process of being DC'd. RN provided with this writer's number to call if physical/functional issues arise. Will cancel therapy orders at this time.     Electronically signed by Michael Mason, [address] [place] [address] Drive (#[phone])  on 4/2/2021 at 12:44 PM     Electronically signed by BIENVENIDO Bell on 4/2/2021 at 1:28 PM

## 2021-04-02 NOTE — PROGRESS NOTES
Patient resting in bed, vital signs stable, complaining of chronic back pain and some tingling/numbness in extremities, but states it is getting better. NSR on tele, scheduled medications given, all needs met at this time, will continue to monitor.

## 2021-04-02 NOTE — PLAN OF CARE
Problem: Pain:  Goal: Pain level will decrease  Description: Pain level will decrease  4/2/2021 0701 by Tommy Porras RN  Outcome: Ongoing  Note: Pain level will decrease. Electronically signed by Tommy Porras RN on 4/2/2021 at 7:01 AM    4/1/2021 2139 by Dk Mccarty RN  Outcome: Ongoing  Note: Patients pain level will decrease. Goal: Control of acute pain  Description: Control of acute pain  4/2/2021 0701 by Tommy Porras RN  Outcome: Ongoing  Note: Control of acute pain. Electronically signed by Tommy Porras RN on 4/2/2021 at 7:02 AM    4/1/2021 2139 by Dk Mccarty RN  Outcome: Ongoing  Note: Patients pain level will decrease. Goal: Control of chronic pain  Description: Control of chronic pain  4/2/2021 0701 by Tommy Porras RN  Outcome: Ongoing  Note: Control chronic pain. Electronically signed by Tommy Porras RN on 4/2/2021 at 7:02 AM    4/1/2021 2139 by Dk Mccarty RN  Outcome: Ongoing  Note: Patients pain level will decrease. Problem: OXYGENATION/RESPIRATORY FUNCTION  Goal: Patient will maintain patent airway  4/2/2021 0701 by Tommy Porras RN  Outcome: Ongoing  Note: Patient will maintain patent airway. Electronically signed by Tommy Porras RN on 4/2/2021 at 7:06 AM    4/1/2021 2139 by Dk Mccarty RN  Outcome: Ongoing  Note: Patient will maintain a patent airway. Goal: Patient will achieve/maintain normal respiratory rate/effort  Description: Respiratory rate and effort will be within normal limits for the patient  4/2/2021 0701 by Tommy Porras RN  Outcome: Ongoing  Note: Patient will achieve/maintain normal respiratory rate/effort.   Electronically signed by Tommy Porras RN on 4/2/2021 at 7:07 AM    4/1/2021 2139 by Dk Mccarty RN  Outcome: Ongoing     Problem: HEMODYNAMIC STATUS  Goal: Patient has stable vital signs and fluid balance  4/2/2021 0701 by Tommy Porras RN  Outcome: Ongoing  Note: Patient has stable vital signs and fluid balance. Electronically signed by Anh Blackburn RN on 4/2/2021 at 7:07 AM    4/1/2021 2139 by Clay Reyes RN  Outcome: Ongoing  Note: Patients vitals will remain stable. Problem: FLUID AND ELECTROLYTE IMBALANCE  Goal: Fluid and electrolyte balance are achieved/maintained  4/2/2021 0701 by Anh Blackburn RN  Outcome: Ongoing  Note: Fluid and elevtrolyte balance are achieved/maintained. Electronically signed by Anh Blackburn RN on 4/2/2021 at 7:07 AM    4/1/2021 2139 by Clay Reyes RN  Outcome: Ongoing     Problem: ACTIVITY INTOLERANCE/IMPAIRED MOBILITY  Goal: Mobility/activity is maintained at optimum level for patient  4/2/2021 0701 by Anh Blackburn RN  Outcome: Ongoing  Note: Mobility/activity is maintained at optimum level for patient.   Electronically signed by Anh Blackburn RN on 4/2/2021 at 7:08 AM    4/1/2021 2139 by Clay Reyes RN  Outcome: Ongoing

## 2021-04-03 ENCOUNTER — CARE COORDINATION (OUTPATIENT)
Dept: CASE MANAGEMENT | Age: 66
End: 2021-04-03

## 2021-04-03 NOTE — CARE COORDINATION
[facility] Transitions Initial Follow Up Call    Call within 2 business days of discharge: Yes    Patient: Rosalinda Mandel Patient : 1955   MRN: [id number]  Reason for Admission: nonsustained VT  Discharge Date: 21 RARS: Readmission Risk Score: 41      Last Discharge [facility]       Complaint Diagnosis Description Type Department Provider    3/31/21 Numbness Nonsustained ventricular tachycardia ([facility] [address] . .. ED to Hosp-Admission (Discharged) (ADMITTED) ANATOLY 3W Lynne Romo MD; Emmanuel Blunt. .. Attempted to reach patient via phone for initial post hospital transition call. VM left  with my contact information requesting a return call. Non-face-to-face services provided:  Obtained and reviewed discharge summary and/or continuity of care documents    Care Transitions 24 Hour Call    Do you have all of your prescriptions and are they filled?: Yes  Do you have support at home?: Partner/Spouse/SO  Are you an active caregiver in your home?: No  Care Transitions Interventions         Follow Up  No future appointments.     Dina Delgado RN

## 2021-04-05 ENCOUNTER — CARE COORDINATION (OUTPATIENT)
Dept: CASE MANAGEMENT | Age: 66
End: 2021-04-05

## 2021-06-17 ENCOUNTER — APPOINTMENT (OUTPATIENT)
Dept: CT IMAGING | Age: 66
DRG: 392 | End: 2021-06-17
Payer: MEDICARE

## 2021-06-17 ENCOUNTER — HOSPITAL ENCOUNTER (INPATIENT)
Age: 66
LOS: 4 days | Discharge: HOME | DRG: 392 | End: 2021-06-21
Attending: EMERGENCY MEDICINE | Admitting: HOSPITALIST
Payer: MEDICARE

## 2021-06-17 DIAGNOSIS — K76.9 LESION OF LIVER: ICD-10-CM

## 2021-06-17 DIAGNOSIS — R10.31 ABDOMINAL PAIN, RIGHT LOWER QUADRANT: Primary | ICD-10-CM

## 2021-06-17 PROBLEM — R10.9 INTRACTABLE ABDOMINAL PAIN: Status: ACTIVE | Noted: 2021-06-17

## 2021-06-17 LAB
A/G RATIO: 1.1 (ref 1.1–2.2)
ALBUMIN SERPL-MCNC: 3.5 G/DL (ref 3.4–5)
ALP BLD-CCNC: 56 U/L (ref 40–129)
ALT SERPL-CCNC: 10 U/L (ref 10–40)
ANION GAP SERPL CALCULATED.3IONS-SCNC: 9 MMOL/L (ref 3–16)
AST SERPL-CCNC: 13 U/L (ref 15–37)
BASOPHILS ABSOLUTE: 0 K/UL (ref 0–0.2)
BASOPHILS RELATIVE PERCENT: 0.2 %
BILIRUB SERPL-MCNC: <0.2 MG/DL (ref 0–1)
BUN BLDV-MCNC: 24 MG/DL (ref 7–20)
CALCIUM SERPL-MCNC: 8.6 MG/DL (ref 8.3–10.6)
CHLORIDE BLD-SCNC: 103 MMOL/L (ref 99–110)
CO2: 23 MMOL/L (ref 21–32)
CREAT SERPL-MCNC: 1.2 MG/DL (ref 0.8–1.3)
EOSINOPHILS ABSOLUTE: 0.1 K/UL (ref 0–0.6)
EOSINOPHILS RELATIVE PERCENT: 2.5 %
GFR AFRICAN AMERICAN: >60
GFR NON-AFRICAN AMERICAN: >60
GLOBULIN: 3.3 G/DL
GLUCOSE BLD-MCNC: 119 MG/DL (ref 70–99)
GLUCOSE BLD-MCNC: 98 MG/DL (ref 70–99)
HCT VFR BLD CALC: 38.2 % (ref 40.5–52.5)
HEMOGLOBIN: 13 G/DL (ref 13.5–17.5)
LIPASE: 17 U/L (ref 13–60)
LYMPHOCYTES ABSOLUTE: 1.6 K/UL (ref 1–5.1)
LYMPHOCYTES RELATIVE PERCENT: 27.7 %
MCH RBC QN AUTO: 34.5 PG (ref 26–34)
MCHC RBC AUTO-ENTMCNC: 34.1 G/DL (ref 31–36)
MCV RBC AUTO: 101.3 FL (ref 80–100)
MONOCYTES ABSOLUTE: 0.4 K/UL (ref 0–1.3)
MONOCYTES RELATIVE PERCENT: 6.8 %
NEUTROPHILS ABSOLUTE: 3.6 K/UL (ref 1.7–7.7)
NEUTROPHILS RELATIVE PERCENT: 62.8 %
PDW BLD-RTO: 14.4 % (ref 12.4–15.4)
PERFORMED ON: NORMAL
PLATELET # BLD: 265 K/UL (ref 135–450)
PMV BLD AUTO: 8.8 FL (ref 5–10.5)
POTASSIUM SERPL-SCNC: 5.1 MMOL/L (ref 3.5–5.1)
RBC # BLD: 3.76 M/UL (ref 4.2–5.9)
SODIUM BLD-SCNC: 135 MMOL/L (ref 136–145)
TOTAL PROTEIN: 6.8 G/DL (ref 6.4–8.2)
WBC # BLD: 5.8 K/UL (ref 4–11)

## 2021-06-17 PROCEDURE — 6360000002 HC RX W HCPCS: Performed by: HOSPITALIST

## 2021-06-17 PROCEDURE — 74177 CT ABD & PELVIS W/CONTRAST: CPT

## 2021-06-17 PROCEDURE — 6360000004 HC RX CONTRAST MEDICATION: Performed by: PHYSICIAN ASSISTANT

## 2021-06-17 PROCEDURE — 36415 COLL VENOUS BLD VENIPUNCTURE: CPT

## 2021-06-17 PROCEDURE — 96374 THER/PROPH/DIAG INJ IV PUSH: CPT

## 2021-06-17 PROCEDURE — 6360000002 HC RX W HCPCS: Performed by: PHYSICIAN ASSISTANT

## 2021-06-17 PROCEDURE — 2580000003 HC RX 258: Performed by: PHYSICIAN ASSISTANT

## 2021-06-17 PROCEDURE — 83036 HEMOGLOBIN GLYCOSYLATED A1C: CPT

## 2021-06-17 PROCEDURE — 96361 HYDRATE IV INFUSION ADD-ON: CPT

## 2021-06-17 PROCEDURE — 99284 EMERGENCY DEPT VISIT MOD MDM: CPT

## 2021-06-17 PROCEDURE — 96375 TX/PRO/DX INJ NEW DRUG ADDON: CPT

## 2021-06-17 PROCEDURE — 2580000003 HC RX 258: Performed by: HOSPITALIST

## 2021-06-17 PROCEDURE — 85025 COMPLETE CBC W/AUTO DIFF WBC: CPT

## 2021-06-17 PROCEDURE — 6370000000 HC RX 637 (ALT 250 FOR IP): Performed by: HOSPITALIST

## 2021-06-17 PROCEDURE — 83690 ASSAY OF LIPASE: CPT

## 2021-06-17 PROCEDURE — 1200000000 HC SEMI PRIVATE

## 2021-06-17 PROCEDURE — 6370000000 HC RX 637 (ALT 250 FOR IP): Performed by: PHYSICIAN ASSISTANT

## 2021-06-17 PROCEDURE — 93005 ELECTROCARDIOGRAM TRACING: CPT | Performed by: PHYSICIAN ASSISTANT

## 2021-06-17 PROCEDURE — 80053 COMPREHEN METABOLIC PANEL: CPT

## 2021-06-17 RX ORDER — GABAPENTIN 300 MG/1
600 CAPSULE ORAL
Status: DISCONTINUED | OUTPATIENT
Start: 2021-06-17 | End: 2021-06-21 | Stop reason: HOSPADM

## 2021-06-17 RX ORDER — ONDANSETRON 2 MG/ML
4 INJECTION INTRAMUSCULAR; INTRAVENOUS ONCE
Status: COMPLETED | OUTPATIENT
Start: 2021-06-17 | End: 2021-06-17

## 2021-06-17 RX ORDER — CARVEDILOL 6.25 MG/1
6.25 TABLET ORAL 2 TIMES DAILY WITH MEALS
Status: DISCONTINUED | OUTPATIENT
Start: 2021-06-17 | End: 2021-06-21 | Stop reason: HOSPADM

## 2021-06-17 RX ORDER — OXYCODONE HYDROCHLORIDE AND ACETAMINOPHEN 5; 325 MG/1; MG/1
1 TABLET ORAL ONCE
Status: COMPLETED | OUTPATIENT
Start: 2021-06-17 | End: 2021-06-17

## 2021-06-17 RX ORDER — ACETAMINOPHEN 325 MG/1
650 TABLET ORAL EVERY 6 HOURS PRN
Status: DISCONTINUED | OUTPATIENT
Start: 2021-06-17 | End: 2021-06-21 | Stop reason: HOSPADM

## 2021-06-17 RX ORDER — SODIUM CHLORIDE 9 MG/ML
25 INJECTION, SOLUTION INTRAVENOUS PRN
Status: DISCONTINUED | OUTPATIENT
Start: 2021-06-17 | End: 2021-06-21 | Stop reason: HOSPADM

## 2021-06-17 RX ORDER — SPIRONOLACTONE 25 MG/1
25 TABLET ORAL DAILY
Status: DISCONTINUED | OUTPATIENT
Start: 2021-06-18 | End: 2021-06-21 | Stop reason: HOSPADM

## 2021-06-17 RX ORDER — CYCLOBENZAPRINE HCL 10 MG
5 TABLET ORAL 3 TIMES DAILY PRN
Status: DISCONTINUED | OUTPATIENT
Start: 2021-06-17 | End: 2021-06-21 | Stop reason: HOSPADM

## 2021-06-17 RX ORDER — TRAZODONE HYDROCHLORIDE 50 MG/1
50 TABLET ORAL NIGHTLY
COMMUNITY
End: 2022-01-03 | Stop reason: SDUPTHER

## 2021-06-17 RX ORDER — LANOLIN ALCOHOL/MO/W.PET/CERES
1000 CREAM (GRAM) TOPICAL DAILY
Status: DISCONTINUED | OUTPATIENT
Start: 2021-06-18 | End: 2021-06-21 | Stop reason: HOSPADM

## 2021-06-17 RX ORDER — ONDANSETRON 2 MG/ML
4 INJECTION INTRAMUSCULAR; INTRAVENOUS EVERY 6 HOURS PRN
Status: DISCONTINUED | OUTPATIENT
Start: 2021-06-17 | End: 2021-06-21 | Stop reason: HOSPADM

## 2021-06-17 RX ORDER — ASPIRIN 81 MG/1
81 TABLET ORAL DAILY
Status: DISCONTINUED | OUTPATIENT
Start: 2021-06-18 | End: 2021-06-18

## 2021-06-17 RX ORDER — 0.9 % SODIUM CHLORIDE 0.9 %
500 INTRAVENOUS SOLUTION INTRAVENOUS ONCE
Status: COMPLETED | OUTPATIENT
Start: 2021-06-17 | End: 2021-06-17

## 2021-06-17 RX ORDER — NITROGLYCERIN 0.4 MG/1
0.4 TABLET SUBLINGUAL EVERY 5 MIN PRN
Status: DISCONTINUED | OUTPATIENT
Start: 2021-06-17 | End: 2021-06-21 | Stop reason: HOSPADM

## 2021-06-17 RX ORDER — SODIUM CHLORIDE 0.9 % (FLUSH) 0.9 %
5-40 SYRINGE (ML) INJECTION EVERY 12 HOURS SCHEDULED
Status: DISCONTINUED | OUTPATIENT
Start: 2021-06-17 | End: 2021-06-21 | Stop reason: HOSPADM

## 2021-06-17 RX ORDER — ROSUVASTATIN CALCIUM 40 MG/1
40 TABLET, COATED ORAL NIGHTLY
Status: DISCONTINUED | OUTPATIENT
Start: 2021-06-17 | End: 2021-06-21 | Stop reason: HOSPADM

## 2021-06-17 RX ORDER — ZOLPIDEM TARTRATE 5 MG/1
5 TABLET ORAL NIGHTLY PRN
Status: DISCONTINUED | OUTPATIENT
Start: 2021-06-17 | End: 2021-06-21 | Stop reason: HOSPADM

## 2021-06-17 RX ORDER — ACETAMINOPHEN 650 MG/1
650 SUPPOSITORY RECTAL EVERY 6 HOURS PRN
Status: DISCONTINUED | OUTPATIENT
Start: 2021-06-17 | End: 2021-06-21 | Stop reason: HOSPADM

## 2021-06-17 RX ORDER — CLOPIDOGREL BISULFATE 75 MG/1
75 TABLET ORAL DAILY
Status: DISCONTINUED | OUTPATIENT
Start: 2021-06-18 | End: 2021-06-18

## 2021-06-17 RX ORDER — TRAZODONE HYDROCHLORIDE 50 MG/1
50 TABLET ORAL NIGHTLY
Status: DISCONTINUED | OUTPATIENT
Start: 2021-06-17 | End: 2021-06-21 | Stop reason: HOSPADM

## 2021-06-17 RX ORDER — NICOTINE POLACRILEX 4 MG
15 LOZENGE BUCCAL PRN
Status: DISCONTINUED | OUTPATIENT
Start: 2021-06-17 | End: 2021-06-21 | Stop reason: HOSPADM

## 2021-06-17 RX ORDER — INSULIN GLARGINE 100 [IU]/ML
10 INJECTION, SOLUTION SUBCUTANEOUS NIGHTLY
Status: DISCONTINUED | OUTPATIENT
Start: 2021-06-17 | End: 2021-06-21 | Stop reason: HOSPADM

## 2021-06-17 RX ORDER — ONDANSETRON 4 MG/1
4 TABLET, ORALLY DISINTEGRATING ORAL EVERY 8 HOURS PRN
Status: DISCONTINUED | OUTPATIENT
Start: 2021-06-17 | End: 2021-06-21 | Stop reason: HOSPADM

## 2021-06-17 RX ORDER — LANOLIN ALCOHOL/MO/W.PET/CERES
1000 CREAM (GRAM) TOPICAL DAILY
COMMUNITY
End: 2022-01-28

## 2021-06-17 RX ORDER — DEXTROSE MONOHYDRATE 25 G/50ML
12.5 INJECTION, SOLUTION INTRAVENOUS PRN
Status: DISCONTINUED | OUTPATIENT
Start: 2021-06-17 | End: 2021-06-21 | Stop reason: HOSPADM

## 2021-06-17 RX ORDER — DEXTROSE MONOHYDRATE 50 MG/ML
100 INJECTION, SOLUTION INTRAVENOUS PRN
Status: DISCONTINUED | OUTPATIENT
Start: 2021-06-17 | End: 2021-06-21 | Stop reason: HOSPADM

## 2021-06-17 RX ORDER — BUDESONIDE AND FORMOTEROL FUMARATE DIHYDRATE 80; 4.5 UG/1; UG/1
2 AEROSOL RESPIRATORY (INHALATION) 2 TIMES DAILY
Status: DISCONTINUED | OUTPATIENT
Start: 2021-06-17 | End: 2021-06-21 | Stop reason: HOSPADM

## 2021-06-17 RX ORDER — SODIUM CHLORIDE 0.9 % (FLUSH) 0.9 %
5-40 SYRINGE (ML) INJECTION PRN
Status: DISCONTINUED | OUTPATIENT
Start: 2021-06-17 | End: 2021-06-21 | Stop reason: HOSPADM

## 2021-06-17 RX ORDER — OXYCODONE AND ACETAMINOPHEN 10; 325 MG/1; MG/1
1 TABLET ORAL EVERY 8 HOURS PRN
Status: DISCONTINUED | OUTPATIENT
Start: 2021-06-17 | End: 2021-06-21 | Stop reason: HOSPADM

## 2021-06-17 RX ORDER — POLYETHYLENE GLYCOL 3350 17 G/17G
17 POWDER, FOR SOLUTION ORAL DAILY PRN
Status: DISCONTINUED | OUTPATIENT
Start: 2021-06-17 | End: 2021-06-21 | Stop reason: HOSPADM

## 2021-06-17 RX ADMIN — OXYCODONE HYDROCHLORIDE AND ACETAMINOPHEN 1 TABLET: 5; 325 TABLET ORAL at 17:34

## 2021-06-17 RX ADMIN — GABAPENTIN 600 MG: 300 CAPSULE ORAL at 21:28

## 2021-06-17 RX ADMIN — ONDANSETRON 4 MG: 2 INJECTION INTRAMUSCULAR; INTRAVENOUS at 15:51

## 2021-06-17 RX ADMIN — APIXABAN 5 MG: 5 TABLET, FILM COATED ORAL at 21:28

## 2021-06-17 RX ADMIN — SACUBITRIL AND VALSARTAN 0.5 TABLET: 49; 51 TABLET, FILM COATED ORAL at 21:28

## 2021-06-17 RX ADMIN — INSULIN GLARGINE 10 UNITS: 100 INJECTION, SOLUTION SUBCUTANEOUS at 21:28

## 2021-06-17 RX ADMIN — SODIUM CHLORIDE 500 ML: 9 INJECTION, SOLUTION INTRAVENOUS at 15:51

## 2021-06-17 RX ADMIN — TRAZODONE HYDROCHLORIDE 50 MG: 50 TABLET ORAL at 21:30

## 2021-06-17 RX ADMIN — HYDROMORPHONE HYDROCHLORIDE 0.5 MG: 1 INJECTION, SOLUTION INTRAMUSCULAR; INTRAVENOUS; SUBCUTANEOUS at 15:51

## 2021-06-17 RX ADMIN — IOPAMIDOL 75 ML: 755 INJECTION, SOLUTION INTRAVENOUS at 16:00

## 2021-06-17 RX ADMIN — SODIUM CHLORIDE, PRESERVATIVE FREE 10 ML: 5 INJECTION INTRAVENOUS at 21:35

## 2021-06-17 RX ADMIN — HYDROMORPHONE HYDROCHLORIDE 0.5 MG: 1 INJECTION, SOLUTION INTRAMUSCULAR; INTRAVENOUS; SUBCUTANEOUS at 19:52

## 2021-06-17 RX ADMIN — CARVEDILOL 6.25 MG: 6.25 TABLET, FILM COATED ORAL at 21:35

## 2021-06-17 RX ADMIN — ROSUVASTATIN CALCIUM 40 MG: 40 TABLET, FILM COATED ORAL at 21:28

## 2021-06-17 RX ADMIN — OXYCODONE AND ACETAMINOPHEN 1 TABLET: 10; 325 TABLET ORAL at 21:30

## 2021-06-17 ASSESSMENT — PAIN DESCRIPTION - LOCATION
LOCATION: ABDOMEN

## 2021-06-17 ASSESSMENT — PAIN SCALES - GENERAL
PAINLEVEL_OUTOF10: 6
PAINLEVEL_OUTOF10: 9
PAINLEVEL_OUTOF10: 6
PAINLEVEL_OUTOF10: 8

## 2021-06-17 ASSESSMENT — ENCOUNTER SYMPTOMS
CONSTIPATION: 0
SHORTNESS OF BREATH: 0
NAUSEA: 1
DIARRHEA: 0
BLOOD IN STOOL: 0
ABDOMINAL PAIN: 1
COUGH: 0
VOMITING: 0

## 2021-06-17 ASSESSMENT — PAIN DESCRIPTION - ORIENTATION
ORIENTATION: RIGHT;LOWER
ORIENTATION: RIGHT
ORIENTATION: RIGHT;LOWER
ORIENTATION: RIGHT;LOWER

## 2021-06-17 ASSESSMENT — PAIN DESCRIPTION - DESCRIPTORS
DESCRIPTORS: SHARP

## 2021-06-17 ASSESSMENT — PAIN DESCRIPTION - PAIN TYPE
TYPE: ACUTE PAIN

## 2021-06-17 ASSESSMENT — PAIN - FUNCTIONAL ASSESSMENT: PAIN_FUNCTIONAL_ASSESSMENT: PREVENTS OR INTERFERES SOME ACTIVE ACTIVITIES AND ADLS

## 2021-06-17 ASSESSMENT — PAIN DESCRIPTION - PROGRESSION: CLINICAL_PROGRESSION: GRADUALLY WORSENING

## 2021-06-17 ASSESSMENT — PAIN DESCRIPTION - FREQUENCY: FREQUENCY: CONTINUOUS

## 2021-06-17 ASSESSMENT — PAIN DESCRIPTION - ONSET: ONSET: ON-GOING

## 2021-06-17 NOTE — ED NOTES
PT remains alert and oriented with no acute distress noted. Report called to 1000 [facility] RN to assume care when the pt reaches the floor. Pain score and pt condition reviewed. All questions answered.      Aida Agustin RN  06/17/21 5120

## 2021-06-17 NOTE — H&P
Hospital Medicine History & Physical      PCP: Lisa Olguin MD    Date of Admission: 6/17/2021    Date of Service: Pt seen/examined on 6/17/2021 and Admitted to Inpatient with expected LOS greater than two midnights due to medical therapy. Chief Complaint:  R sided abdominal pain      History Of Present Illness:       72 y.o. male presents following acute onset of R sided abdominal pain yesterday around noon. He describes the pain as a dull ache with occasional sharp pains severe enough to cause him to double over. Pain is not affected by PO intake and he's noted normal bowel movement yesterday morning as well as today, and denies vomiting though has felt continuously nauseated since onset of pain. He denies fever, chills, chest pain, sob, cough.  Pain slightly worsening with urination, however he denies dysuria, hematutia    Past Medical History:          Diagnosis Date    Anxiety     Arthritis     Asthma     CAD (coronary artery disease)     Calcium kidney stone     Cardiomyopathy ([address]     Cerebral artery occlusion with cerebral infarction ([address]     CHF (congestive heart failure) ([address]     Clostridium difficile diarrhea 02/12/2020    COPD (chronic obstructive pulmonary disease) ([facility])     mild    Depression     DM2 (diabetes mellitus, type 2) ([facility])     Fibromyalgia     GERD (gastroesophageal reflux disease)     Hyperlipidemia     Hypertension     Liver disease     Pacemaker 2012    Medtronic model # [id number]    Pneumonia     Seizures ([address]     TIA (transient ischemic attack) 2007    Ulcerative colitis ([address]        Past Surgical History:          Procedure Laterality Date    APPENDECTOMY      BACK SURGERY      CARDIAC SURGERY      CHOLECYSTECTOMY      COLONOSCOPY  01/10/2017    COLONOSCOPY  01/10/2017    CORONARY ANGIOPLASTY WITH STENT PLACEMENT  2012    CORONARY ARTERY BYPASS GRAFT  2010, 11/2015    ENDOSCOPY, COLON, DIAGNOSTIC      PACEMAKER PLACEMENT  UPPER GASTROINTESTINAL ENDOSCOPY  02/07/2017    VASCULAR SURGERY         Medications Prior to Admission:      Prior to Admission medications    Medication Sig Start Date End Date Taking? Authorizing Provider   cyclobenzaprine (FLEXERIL) 5 MG tablet Take 5 mg by mouth 3 times daily as needed 4/19/21  Yes Historical Provider, MD   vitamin B-12 (CYANOCOBALAMIN) 1000 MCG tablet Take 1,000 mcg by mouth daily   Yes Historical Provider, MD   traZODone (DESYREL) 50 MG tablet Take 50 mg by mouth nightly   Yes Historical Provider, MD   zolpidem (AMBIEN) 5 MG tablet TAKE 1 TABLET BY MOUTH NIGHTLY AS NEEDED FOR SLEEP FOR UP TO 30 DAYS. 6/11/21 7/11/21 Yes Mejia Merchant MD   oxyCODONE-acetaminophen (PERCOCET)  MG per tablet Take 1 tablet by mouth every 8 hours as needed for Pain for up to 30 days. 6/1/21 7/1/21 Yes Mejia Merchant MD   gabapentin (NEURONTIN) 600 MG tablet TAKE ONE TABLET BY MOUTH FIVE TIMES A DAY  Patient taking differently: Take 600 mg by mouth 5 times daily.  TAKE ONE TABLET BY MOUTH FIVE TIMES A DAY 5/19/21 8/19/21 Yes Mejia Merchant MD   aspirin EC 81 MG EC tablet Take 1 tablet by mouth daily 4/2/21  Yes Michael Gilliland MD   fluticasone-salmeterol (ADVAIR DISKUS) 100-50 MCG/DOSE diskus inhaler Inhale 2 puffs into the lungs 2 times daily wixela inhub inhaler ( generic advair diskus) 3/26/21  Yes Mejia Merchant MD   insulin glargine (LANTUS SOLOSTAR) 100 UNIT/ML injection pen Inject 10 Units into the skin nightly 3/18/21  Yes Mejia Merchant MD   spironolactone (ALDACTONE) 25 MG tablet Take 25 mg by mouth daily  3/5/21  Yes Historical Provider, MD   carvedilol (COREG) 12.5 MG tablet Take 0.5 tablets by mouth 2 times daily (with meals) 3/6/21  Yes Michael Gilliland MD   sacubitril-valsartan [facility]) 49-51 MG per tablet Take 0.5 tablets by mouth 2 times daily 3/6/21  Yes Michael Gilliland MD   clopidogrel (PLAVIX) 75 MG tablet Take 75 mg by mouth daily   Yes Historical Provider, MD   apixaban (ELIQUIS) 5 MG TABS tablet Take 1 tablet by mouth 2 times daily 7/16/20  Yes TAYLA Miner CNP   empagliflozin (JARDIANCE) 10 MG tablet Take 1 tablet by mouth daily 7/16/20  Yes TAYLA Miner CNP   acetaminophen (TYLENOL) 325 MG tablet Take 650 mg by mouth every 6 hours as needed for Pain   Yes Historical Provider, MD   Insulin Pen Needle 32G X 4 MM MISC 1 each by Does not apply route daily 3/18/21   Lois Turner MD   glucose monitoring kit (FREESTYLE) monitoring kit 1 kit by Does not apply route daily 3/18/21   Lois Turner MD   nitroGLYCERIN (NITROSTAT) 0.4 MG SL tablet Place 0.4 mg under the tongue every 5 minutes as needed 11/13/20   Historical Provider, MD   rosuvastatin (CRESTOR) 40 MG tablet Take 40 mg by mouth every evening    Historical Provider, MD   Respiratory Therapy Supplies (NEBULIZER) AALIYAH Used to give yourself breathing treatment 8/9/20   Cammy Dale MD       Allergies:  Dopamine hcl, Tramadol, Morphine and related, and Melatonin    Social History:           TOBACCO:   reports that he has been smoking cigarettes. He has a 22.00 pack-year smoking history. He has never used smokeless tobacco.  ETOH:   reports no history of alcohol use. Family History:               Problem Relation Age of Onset    Coronary Art Dis Father     Cancer Mother         Lung with mets    Diabetes Mother        REVIEW OF SYSTEMS:   Pertinent positives as noted in the HPI. All other systems reviewed and negative. PHYSICAL EXAM PERFORMED:    BP (!) 159/86   Pulse 89   Temp 99.1 °F (37.3 °C) (Temporal)   Resp 16   Ht 6' (1.829 m)   Wt 188 lb 0.8 oz (85.3 kg)   SpO2 97%   BMI 25.50 kg/m²     General appearance:  No apparent distress, appears stated age and cooperative. HEENT:  Normal cephalic, atraumatic without obvious deformity. Pupils equal, round,  Extra ocular muscles intact. Conjunctivae/corneas clear.   Neck: Supple, with full range of motion. No jugular venous distention. Trachea midline. Respiratory:  Normal respiratory effort. No use of accessory muscles, no intercostal retractions  Cardiovascular:  Regular rate and rhythm , no ectopy  Abdomen: Soft, (+0 RUQ/RLQ -tenderness, non-distended    Musculoskeletal:  No clubbing, cyanosis or edema bilaterally. No calf tenderness  Skin: Skin color, texture, turgor normal.  No rashes or lesions. Neurologic:   grossly non-focal.  Psychiatric:  Alert and oriented, thought content appropriate, normal insight  Capillary Refill: Brisk,< 3 seconds   Peripheral Pulses: +2 palpable, equal bilaterally       Labs:     Recent Labs     06/17/21  1438   WBC 5.8   HGB 13.0*   HCT 38.2*        Recent Labs     06/17/21  1438   *   K 5.1      CO2 23   BUN 24*   CREATININE 1.2   CALCIUM 8.6     Recent Labs     06/17/21  1438   AST 13*   ALT 10   BILITOT <0.2   ALKPHOS 56     No results for input(s): INR in the last 72 hours. No results for input(s): Marletta Yantis in the last 72 hours. Urinalysis:      Lab Results   Component Value Date    NITRU Negative 08/29/2020    WBCUA 1 08/29/2020    RBCUA 1 08/29/2020    BLOODU Negative 08/29/2020    SPECGRAV >1.030 08/29/2020    GLUCOSEU 100 08/29/2020       Radiology:          CT ABDOMEN PELVIS W IV CONTRAST Additional Contrast? None   Final Result   1. No acute process demonstrated   2. New indeterminate low-attenuation liver lesions. Finding is concerning   for hepatic metastatic disease. A primary malignancy or other findings of   abdominopelvic metastatic disease are not evident on this exam.  MRI would   better characterize the finding   3.  Stable known left lower lobe nodule         MRI ABDOMEN W WO CONTRAST    (Results Pending)       ASSESSMENT:    Active Hospital Problems    Diagnosis Date Noted    Intractable abdominal pain [R10.9] 06/17/2021         PLAN:    1) Abd pain  - prn IV pain meds  - Follow up urinalysis  - MRI ordered for f/u CT abd with liver lesions    2) CHF  - appears compensated, continue home meds    3) CAD  - clinically stable    4) DM  - lantus with corrective ISS    DVT Prophylaxis: lovenox  Diet: No diet orders on file  Code Status: Prior         Cinthya Martínez MD    Thank you Ria Lake MD for the opportunity to be involved in this patient's care. If you have any questions or concerns please feel free to contact me at [phone].

## 2021-06-17 NOTE — ED PROVIDER NOTES
[address] [facility]        Pt Name: Lucia Andrade  MRN: [id number]  [place] 1955  Date of evaluation: 6/17/2021  Provider: ABIGAIL Florence  PCP: Jazzy Adler MD  Note Started: 3:30 PM EDT        I have seen and evaluated this patient with my supervising physician Kimmy Gonzalez MD.    [address] [place] [address]       Chief Complaint   Patient presents with    Abdominal Pain     RLQ abd pain that started yesterday, worse today. + nausea. No v/d        HISTORY OF PRESENT ILLNESS   (Location, Timing/Onset, Context/Setting, Quality, Duration, Modifying Factors, Severity, Associated Signs and Symptoms)  Note limiting factors. Lucia Andrade is a 72 y.o. male who presents with a Chief Complaint of right lower quadrant abdominal pain. He reports symptoms started yesterday and have gotten worse today. He states sometimes the pain is so severe that it causes him to double over in pain. He states it is a constant ache that every now and then there will be a flare of the pain. He does have nausea but no vomiting. He denies any diarrhea, constipation, or urinary symptoms. He has no fever or chills. He last ate around 11:00 this morning, a full lunch of pancakes and sausages. He has previously had his gallbladder removed but denies any other abdominal surgery that he is aware of. He has not tried taking anything to treat his symptoms. He has no further complaints at this time. Nursing Notes were all reviewed and agreed with or any disagreements were addressed in the HPI. REVIEW OF SYSTEMS    (2-9 systems for level 4, 10 or more for level 5)     Review of Systems   Constitutional: Negative for chills and fever. Respiratory: Negative for cough and shortness of breath. Cardiovascular: Negative for chest pain and palpitations. Gastrointestinal: Positive for abdominal pain and nausea.  Negative for blood in stool, constipation, mouth 3 times daily as needed    EMPAGLIFLOZIN (JARDIANCE) 10 MG TABLET    Take 1 tablet by mouth daily    FLUTICASONE-SALMETEROL (ADVAIR DISKUS) 100-50 MCG/DOSE DISKUS INHALER    Inhale 2 puffs into the lungs 2 times daily wixela inhub inhaler ( generic advair diskus)    GABAPENTIN (NEURONTIN) 600 MG TABLET    TAKE ONE TABLET BY MOUTH FIVE TIMES A DAY    GLUCOSE MONITORING KIT (FREESTYLE) MONITORING KIT    1 kit by Does not apply route daily    INSULIN GLARGINE (LANTUS SOLOSTAR) 100 UNIT/ML INJECTION PEN    Inject 10 Units into the skin nightly    INSULIN PEN NEEDLE 32G X 4 MM MISC    1 each by Does not apply route daily    NITROGLYCERIN (NITROSTAT) 0.4 MG SL TABLET    Place 0.4 mg under the tongue every 5 minutes as needed    OXYCODONE-ACETAMINOPHEN (PERCOCET)  MG PER TABLET    Take 1 tablet by mouth every 8 hours as needed for Pain for up to 30 days. RESPIRATORY THERAPY SUPPLIES (NEBULIZER) AALIYAH    Used to give yourself breathing treatment    ROSUVASTATIN (CRESTOR) 40 MG TABLET    Take 40 mg by mouth every evening    SACUBITRIL-VALSARTAN (ENTRESTO) 49-51 MG PER TABLET    Take 0.5 tablets by mouth 2 times daily    SPIRONOLACTONE (ALDACTONE) 25 MG TABLET    Take 25 mg by mouth daily     TRAZODONE (DESYREL) 50 MG TABLET    Take 50 mg by mouth nightly    VITAMIN B-12 (CYANOCOBALAMIN) 1000 MCG TABLET    Take 1,000 mcg by mouth daily    ZOLPIDEM (AMBIEN) 5 MG TABLET    TAKE 1 TABLET BY MOUTH NIGHTLY AS NEEDED FOR SLEEP FOR UP TO 30 DAYS.          ALLERGIES     Dopamine hcl, Tramadol, Morphine and related, and Melatonin    FAMILYHISTORY       Family History   Problem Relation Age of Onset    Coronary Art Dis Father     Cancer Mother         Lung with mets    Diabetes Mother           SOCIAL HISTORY       Social History     Tobacco Use    Smoking status: Current Every Day Smoker     Packs/day: 0.50     Years: 44.00     Pack years: 22.00     Types: Cigarettes    Smokeless tobacco: Never Used    Tobacco comment: cutting down   Vaping Use    Vaping Use: Never used   Substance Use Topics    Alcohol use: No     Alcohol/week: 0.0 standard drinks    Drug use: No       SCREENINGS             PHYSICAL EXAM    (up to 7 for level 4, 8 or more for level 5)     ED Triage Vitals [06/17/21 1428]   BP Temp Temp Source Pulse Resp SpO2 Height Weight   (!) 159/86 99.1 °F (37.3 °C) Temporal 110 14 96 % 6' (1.829 m) 188 lb 0.8 oz (85.3 kg)       Physical Exam  Vitals and nursing note reviewed. Constitutional:       General: He is not in acute distress. Appearance: He is well-developed. He is not ill-appearing, toxic-appearing or diaphoretic. HENT:      Head: Normocephalic and atraumatic. Eyes:      Conjunctiva/sclera: Conjunctivae normal.      Pupils: Pupils are equal, round, and reactive to light. Cardiovascular:      Rate and Rhythm: Regular rhythm. Tachycardia present. Pulmonary:      Effort: Pulmonary effort is normal. No respiratory distress. Abdominal:      General: Abdomen is flat. Bowel sounds are normal.      Palpations: Abdomen is soft. Tenderness: There is abdominal tenderness in the right lower quadrant. Skin:     General: Skin is warm and dry. Neurological:      Mental Status: He is alert and oriented to person, place, and time. Psychiatric:         Behavior: Behavior normal. Behavior is cooperative. Thought Content:  Thought content normal.         DIAGNOSTIC RESULTS   LABS:    Labs Reviewed   CBC WITH AUTO DIFFERENTIAL - Abnormal; Notable for the following components:       Result Value    RBC 3.76 (*)     Hemoglobin 13.0 (*)     Hematocrit 38.2 (*)     .3 (*)     MCH 34.5 (*)     All other components within normal limits    Narrative:     Performed at:  [address] [address]   Phone [phone]   COMPREHENSIVE METABOLIC PANEL - Abnormal; Notable for the following components:    Sodium 135 (*)     Glucose 119 (*)     BUN 24 (*)     AST 13 (*)     All other components within normal limits    Narrative:     Performed at:  [facility]  [address] [place] [address]   Phone [phone]   LIPASE    Narrative:     Performed at:  [facility]  [address] [place], [place] [address]   Phone [phone]   URINE RT REFLEX TO CULTURE       All other labs were within normal range or not returned as of this dictation. EKG: All EKG's are interpreted by the Emergency Department Physician in the absence of a cardiologist.  Please see their note for interpretation of EKG. RADIOLOGY:   Non-plain film images such as CT, Ultrasound and MRI are read by the radiologist. Plain radiographic images are visualized and preliminarily interpreted by the ED Provider with the below findings:        Interpretation per the Radiologist below, if available at the time of this note:    CT ABDOMEN PELVIS W IV CONTRAST Additional Contrast? None   Final Result   1. No acute process demonstrated   2. New indeterminate low-attenuation liver lesions. Finding is concerning   for hepatic metastatic disease. A primary malignancy or other findings of   abdominopelvic metastatic disease are not evident on this exam.  MRI would   better characterize the finding   3. Stable known left lower lobe nodule           CTA Pulmonary W and WO Contrast    Result Date: 6/13/2021  Site: Colton Faisal #: [id number] #: [id number]: Velia Javier #: [de-identified] #: [id number] #: [id number]: CT ANGIOGRAM CHEST FOR PEExam Date/Time: 06/12/2021 10:20 AMAdmitting Diagnosis: Chest pain, normal ekgReason  for Exam: Chest pain, normal ekg Dictated by: Deneen Arboleda BETITO: 06/13/2021 10:25 AMT: This document is confidential medical information. Unauthorized disclosure or use of this information is prohibited by law. If you are not the intended recipient disclosure or use of this information is prohibited by law. If you are not the intended recipient of this document, please advise us by calling immediately [phone]. Impression/Conclusion below HISTORY:   chest pain COMPARISON: Chest radiograph 5/25/2021 NOTE:  If there are questions about the content of this report, please contact [facility] radiology by calling [phone] FINDINGS: LINES/DEVICES: Left chest pacer/AICD. LUNGS/PLEURA:  Minimal left basilar atelectasis versus focal scarring. No consolidation, pneumothorax, or pleural effusion. HEART:  Mildly enlarged. Scattered clips. MEDIASTINUM/CARLEEN:  Atherosclerotic calcifications thoracic aorta. BONES:  Median sternotomy wires. OTHER:  None  IMPRESSION: No significant acute abnormality.  SIGNED BY: Stacy Montoya MD on 6/11/2021  2:26 PM   [facility] [phone] -  [address] [facility] [address]: [phone]             PROCEDURES   Unless otherwise noted below, none     Procedures    CRITICAL CARE TIME   N/A    CONSULTS:  IP CONSULT TO HOSPITALIST      EMERGENCY DEPARTMENT COURSE and DIFFERENTIAL DIAGNOSIS/MDM:   Vitals:    Vitals:    06/17/21 1428 06/17/21 1600 06/17/21 1630   BP: (!) 159/86     Pulse: 110 89 89   Resp: 14 15 16   Temp: 99.1 °F (37.3 °C)     TempSrc: Temporal     SpO2: 96% 95% 97%   Weight: 188 lb 0.8 oz (85.3 kg)     Height: 6' (1.829 m)         Patient was given the following medications:  Medications   0.9 % sodium chloride bolus (500 mLs Intravenous New Bag 6/17/21 1551)   HYDROmorphone (DILAUDID) injection 0.5 mg (0.5 mg Intravenous Given 6/17/21 1551)   ondansetron (ZOFRAN) injection 4 mg (4 mg Intravenous Given 6/17/21 1551)   iopamidol (ISOVUE-370) 76 % injection 75 mL (75 mLs Intravenous Given 6/17/21 1600)   oxyCODONE-acetaminophen (PERCOCET) 5-325 MG per tablet 1 tablet (1 tablet Oral Given 6/17/21 0610)           ED COURSE & MEDICAL DECISION MAKING    - The patient presented to the ER with complaints of RLQ pain that started yesterday and got worse today as detailed above. Vital signs were reviewed. Exam with WDWN male in no apparent distress resting on hospital cot. Peripheral IV placed. Labs, Imaging ordered. - Pertinent Labs & Imaging studies reviewed. (See chart for details)   -  Patient seen and evaluated in the emergency department. -  Triage and nursing notes reviewed and incorporated. -  Old chart records reviewed and incorporated. [place] [facility] [place], \" Patient has history of extensive CAD with CABG, stents, CHF, EF 35%, diabetes, hypertension, COPD.  Patient came to the ER for sudden onset chest pain. In the ER, high sensitive troponin was 1243 (in the 20s in last admission for CP), .  Patient was started on heparin drip, he said he still has chest pain.  Cardiology was consulted. Patient was sent to Cath Lab from the ER. Angiogram: 3 vessel CAD with 4/4 patent grafts and occlusion of small RPDA, likely culprit, plan medical management per cardiology.  Patient was continued on aspirin, Plavix, Imdur, Ranexa, Eliquis was restarted, beta-blocker dose was increased, Jardiance and Zetia were added. Patient is still complaining of severe chest pain after angiogram, so CT angiogram of the chest was also done, which was negative. Patient's chest pain improved, he will be discharged home, follow-up with cardiology. \"\"  -   I have seen and evaluated this patient with my supervising physician Makayla Lopez MD.  -  Differential diagnosis includes: kidney stone, pyelonephritis, UTI, appendicitis, bowel obstruction, diverticulitis, hernia, gastritis/gastroenteritis, pancreatitis, cholecystitis, hepatitis, constipation, IBS, IBD  -  Work-up included:  See above  -  ED treatment included:  dilaudid, zofran, IV fluids  - Consults: Hospitalist for admission  -  Results discussed with patient and/or family.   Labs show no leukocytosis or concerning anemia, there are no concerning electrolyte abnormalities. His lipase is not elevated at 17. Imaging studies show no acute process within the abdomen pelvis. He does have a new indeterminate low attenuation liver lesions that are concerning for metastatic disease with no primary malignancy or other finding of abdominal pelvic metastatic disease evident in a stable known left lower lobe nodule. Patient feels unwell, still experiencing a lot of pain. At this time, we recommend admission, as the patient has severe pain, and also has new findings of lesions on his liver. The patient and/or family is agreeable with plan of care and disposition.  -  Disposition:  Admission  - Critical Care: Because of high probability of sudden clinical deterioration of the patient's condition or  further deterioration, critical care time included my full attention to the patient's condition, including chart data review, documentation, medication ordering, reviewing the patient's old records, reevaluation patient's cardiac, pulmonary and neurological status. Reassessment of vital signs. Consultations with ED attending and admitting physician. Ordering, interpreting reviewing diagnostic testing. Therefore, my critical care time was 12 minutes of direct attention to the patient's condition did not include time spent on separately billable procedures. FINAL IMPRESSION      1. Abdominal pain, right lower quadrant    2. Lesion of liver          DISPOSITION/PLAN   DISPOSITION        PATIENT REFERRED TO:  No follow-up provider specified.     DISCHARGE MEDICATIONS:  New Prescriptions    No medications on file       DISCONTINUED MEDICATIONS:  Discontinued Medications    EZETIMIBE (ZETIA) 10 MG TABLET    Take 10 mg by mouth nightly    MIDODRINE (PROAMATINE) 10 MG TABLET    Take 10 mg by mouth daily as needed    RANOLAZINE (RANEXA) 500 MG EXTENDED RELEASE TABLET    Take 2 tablets by mouth 2 times daily              (Please note that portions of this note were completed with a voice recognition program.  Efforts were made to edit the dictations but occasionally words are mis-transcribed.)    ABIGAIL Cook (electronically signed)            Lenore Cook  06/17/21 0670

## 2021-06-17 NOTE — PROGRESS NOTES
Pt arrived to room 4253 via stretcher. Pt is alert and oriented, VSS. Pt demonstrated steady gait to bed. Pt educated on fall risks, fall precautions in place. Pt oriented to room, call light within reach.  Pt requesting pain medicine at this time

## 2021-06-17 NOTE — ED NOTES
Pt states that MRI's have been ordered for him in the past and have been unable to be carried out due to his pacemaker. Pt left with the MRI check list to complete just in case MRI is okay to proceed and MRI made aware of the pacemaker and looking into it at this time.      [facility] Heather, RN  06/17/21 5283

## 2021-06-17 NOTE — ED PROVIDER NOTES
I independently evaluated and obtained a history and physical on [address] All diagnostic, treatment, and disposition assistants were made to myself in conjunction the advanced practice provider. For further details of this patient's emergency department encounter, please see the advanced practice provider's documentation. History: 71-year-old male presents with complaints of right lower quadrant abdominal pain since yesterday. No associated nausea or vomiting. Normal urinary bowel habits. Denies any testicular pain or swelling. Physician Exam: Normocephalic and atraumatic moist his membranes, regular rate and rhythm, lungs clear to auscultation bilaterally, diffuse abdominal tenderness worse in the right lower quadrant, positive guarding, no palpable masses, skin warm and dry      The Ekg interpreted by me shows  Normal sinus rhythm with a rate of 82, normal axis, , , QTc prolonged at 516, right bundle branch block present, no ST elevations, rhythm change from paced rhythm to normal sinus rhythm compared EKG from 10/2/2019      Patient seen and evaluated. History and physical as above. Nontoxic and afebrile. Patient with right lower quadrant tenderness. Patient had CT abdomen pelvis which is normal appendix with patient does have new finding of hypoattenuated liver lesions concerning for possible malignancy. With new findings on CT abdomen pelvis and patient having abdominal pain, plan for admission for further evaluation of his liver lesions. Patient agreeable with admission.     Damián Gamez MD   Acute Care [facility]  6/17/21  5:11 PM EDT         Damián Gamez MD  06/17/21 7149

## 2021-06-17 NOTE — ED TRIAGE NOTES
Pt arrived to dept via private vehicle. Pt c/o sudden onset lower right abd pain x 1 day. Pt is unsure if he has had his appendix removed. Nausea with no vomiting or diarrhea. Pt awake, alert and oriented x 3. Skin warm and dry/normal color for ethnicity. Resp easy and unlabored. Pt placed in gown and on cardiac monitor. Call light in reach. Will continue to monitor.

## 2021-06-17 NOTE — ED NOTES
Spoke with the MRI who reported that one lead of the pt's pacemaker is MRI incompatible. Spoke with Dr. David Rodriguez to inform him.       Karin Becker RN  06/17/21 5276 John Plummer RN  06/17/21 6823

## 2021-06-17 NOTE — PROGRESS NOTES
4 Eyes Skin Assessment     NAME:  Antonio White  YOB: 1955  MEDICAL RECORD NUMBER:  [id number]    The patient is being assess for  Admission    I agree that 2 RN's have performed a thorough Head to Toe Skin Assessment on the patient. ALL assessment sites listed below have been assessed. Areas assessed by both nurses:    Head, Face, Ears, Shoulders, Back, Chest, Arms, Elbows, Hands, Sacrum. Buttock, Coccyx, Ischium and Legs. Feet and Heels        Does the Patient have a Wound?  No noted wound(s)       Jhonathan Prevention initiated:  No   Wound Care Orders initiated:  No    Pressure Injury (Stage 3,4, Unstageable, DTI, NWPT, and Complex wounds) if present place consult order under [de-identified] No    New and Established Ostomies if present place consult order under : No      Nurse 1 eSignature: Electronically signed by Golden Maya RN on 6/17/21 at 7:57 PM EDT    **SHARE this note so that the co-signing nurse is able to place an eSignature**    Nurse 2 eSignature: Electronically signed by Bailee Norwood RN on 6/17/21 at 10:48 PM EDT

## 2021-06-17 NOTE — PROGRESS NOTES
Medication Reconciliation    List of medications for Katelynn Waldron is currently taking is complete.      Source of information:   Epic records  Conversation with patient, with prompting     Allergies  Allergy list not thoroughly reviewed with patient at this time  Allergies listed in Epic as follows: Dopamine hcl, Tramadol, Morphine and related, and Melatonin     Notes regarding home medications:   Patient received all of their AM home medications prior to arrival to the emergency department  When asked about his medications patient stated \"I don't know I take a whole handful of them\"  Removed ezetimibe, midodrine, and ranolazine as patient stated he does not have these at home and has not been taking them  Added vitamin B12 QD and trazodone QHS      BELL Carr [facility], PharmD   6/17/2021 5:47 PM

## 2021-06-18 LAB
ALBUMIN SERPL-MCNC: 3.2 G/DL (ref 3.4–5)
ALP BLD-CCNC: 49 U/L (ref 40–129)
ALT SERPL-CCNC: 9 U/L (ref 10–40)
ANION GAP SERPL CALCULATED.3IONS-SCNC: 10 MMOL/L (ref 3–16)
AST SERPL-CCNC: 12 U/L (ref 15–37)
BASOPHILS ABSOLUTE: 0 K/UL (ref 0–0.2)
BASOPHILS RELATIVE PERCENT: 0.9 %
BILIRUB SERPL-MCNC: <0.2 MG/DL (ref 0–1)
BILIRUBIN DIRECT: <0.2 MG/DL (ref 0–0.3)
BILIRUBIN, INDIRECT: ABNORMAL MG/DL (ref 0–1)
BUN BLDV-MCNC: 26 MG/DL (ref 7–20)
CALCIUM SERPL-MCNC: 8.4 MG/DL (ref 8.3–10.6)
CEA: 2.9 NG/ML (ref 0–5)
CHLORIDE BLD-SCNC: 104 MMOL/L (ref 99–110)
CO2: 24 MMOL/L (ref 21–32)
CREAT SERPL-MCNC: 1.4 MG/DL (ref 0.8–1.3)
EKG ATRIAL RATE: 82 BPM
EKG DIAGNOSIS: NORMAL
EKG P AXIS: 51 DEGREES
EKG P-R INTERVAL: 148 MS
EKG Q-T INTERVAL: 442 MS
EKG QRS DURATION: 136 MS
EKG QTC CALCULATION (BAZETT): 516 MS
EKG R AXIS: 97 DEGREES
EKG T AXIS: 49 DEGREES
EKG VENTRICULAR RATE: 82 BPM
EOSINOPHILS ABSOLUTE: 0.2 K/UL (ref 0–0.6)
EOSINOPHILS RELATIVE PERCENT: 3.9 %
ESTIMATED AVERAGE GLUCOSE: 114 MG/DL
GFR AFRICAN AMERICAN: >60
GFR NON-AFRICAN AMERICAN: 51
GLUCOSE BLD-MCNC: 110 MG/DL (ref 70–99)
GLUCOSE BLD-MCNC: 134 MG/DL (ref 70–99)
GLUCOSE BLD-MCNC: 141 MG/DL (ref 70–99)
GLUCOSE BLD-MCNC: 141 MG/DL (ref 70–99)
GLUCOSE BLD-MCNC: 156 MG/DL (ref 70–99)
GLUCOSE BLD-MCNC: 86 MG/DL (ref 70–99)
HBA1C MFR BLD: 5.6 %
HCT VFR BLD CALC: 34 % (ref 40.5–52.5)
HEMOGLOBIN: 11.7 G/DL (ref 13.5–17.5)
LACTIC ACID: 1.4 MMOL/L (ref 0.4–2)
LYMPHOCYTES ABSOLUTE: 2.2 K/UL (ref 1–5.1)
LYMPHOCYTES RELATIVE PERCENT: 47.4 %
MCH RBC QN AUTO: 34.8 PG (ref 26–34)
MCHC RBC AUTO-ENTMCNC: 34.3 G/DL (ref 31–36)
MCV RBC AUTO: 101.4 FL (ref 80–100)
MONOCYTES ABSOLUTE: 0.4 K/UL (ref 0–1.3)
MONOCYTES RELATIVE PERCENT: 8.7 %
NEUTROPHILS ABSOLUTE: 1.9 K/UL (ref 1.7–7.7)
NEUTROPHILS RELATIVE PERCENT: 39.1 %
PDW BLD-RTO: 14.3 % (ref 12.4–15.4)
PERFORMED ON: ABNORMAL
PERFORMED ON: NORMAL
PLATELET # BLD: 229 K/UL (ref 135–450)
PMV BLD AUTO: 9 FL (ref 5–10.5)
POTASSIUM REFLEX MAGNESIUM: 4.4 MMOL/L (ref 3.5–5.1)
RBC # BLD: 3.35 M/UL (ref 4.2–5.9)
SODIUM BLD-SCNC: 138 MMOL/L (ref 136–145)
TOTAL PROTEIN: 6 G/DL (ref 6.4–8.2)
WBC # BLD: 4.7 K/UL (ref 4–11)

## 2021-06-18 PROCEDURE — 6360000002 HC RX W HCPCS: Performed by: INTERNAL MEDICINE

## 2021-06-18 PROCEDURE — 1200000000 HC SEMI PRIVATE

## 2021-06-18 PROCEDURE — 93010 ELECTROCARDIOGRAM REPORT: CPT | Performed by: INTERNAL MEDICINE

## 2021-06-18 PROCEDURE — 82105 ALPHA-FETOPROTEIN SERUM: CPT

## 2021-06-18 PROCEDURE — 82378 CARCINOEMBRYONIC ANTIGEN: CPT

## 2021-06-18 PROCEDURE — 85025 COMPLETE CBC W/AUTO DIFF WBC: CPT

## 2021-06-18 PROCEDURE — 6370000000 HC RX 637 (ALT 250 FOR IP): Performed by: HOSPITALIST

## 2021-06-18 PROCEDURE — 83605 ASSAY OF LACTIC ACID: CPT

## 2021-06-18 PROCEDURE — 94760 N-INVAS EAR/PLS OXIMETRY 1: CPT

## 2021-06-18 PROCEDURE — 86301 IMMUNOASSAY TUMOR CA 19-9: CPT

## 2021-06-18 PROCEDURE — 2580000003 HC RX 258: Performed by: PHYSICIAN ASSISTANT

## 2021-06-18 PROCEDURE — 80076 HEPATIC FUNCTION PANEL: CPT

## 2021-06-18 PROCEDURE — 36415 COLL VENOUS BLD VENIPUNCTURE: CPT

## 2021-06-18 PROCEDURE — 80048 BASIC METABOLIC PNL TOTAL CA: CPT

## 2021-06-18 PROCEDURE — 6360000002 HC RX W HCPCS: Performed by: HOSPITALIST

## 2021-06-18 PROCEDURE — 2580000003 HC RX 258: Performed by: HOSPITALIST

## 2021-06-18 RX ADMIN — HYDROMORPHONE HYDROCHLORIDE 0.5 MG: 1 INJECTION, SOLUTION INTRAMUSCULAR; INTRAVENOUS; SUBCUTANEOUS at 00:14

## 2021-06-18 RX ADMIN — CARVEDILOL 6.25 MG: 6.25 TABLET, FILM COATED ORAL at 08:28

## 2021-06-18 RX ADMIN — GABAPENTIN 600 MG: 300 CAPSULE ORAL at 05:59

## 2021-06-18 RX ADMIN — SPIRONOLACTONE 25 MG: 25 TABLET ORAL at 08:28

## 2021-06-18 RX ADMIN — CYANOCOBALAMIN TAB 1000 MCG 1000 MCG: 1000 TAB at 08:27

## 2021-06-18 RX ADMIN — SODIUM CHLORIDE, PRESERVATIVE FREE 10 ML: 5 INJECTION INTRAVENOUS at 23:23

## 2021-06-18 RX ADMIN — INSULIN LISPRO 1 UNITS: 100 INJECTION, SOLUTION INTRAVENOUS; SUBCUTANEOUS at 11:57

## 2021-06-18 RX ADMIN — HYDROMORPHONE HYDROCHLORIDE 1 MG: 1 INJECTION, SOLUTION INTRAMUSCULAR; INTRAVENOUS; SUBCUTANEOUS at 20:43

## 2021-06-18 RX ADMIN — GABAPENTIN 600 MG: 300 CAPSULE ORAL at 14:41

## 2021-06-18 RX ADMIN — OXYCODONE AND ACETAMINOPHEN 1 TABLET: 10; 325 TABLET ORAL at 05:59

## 2021-06-18 RX ADMIN — SACUBITRIL AND VALSARTAN 0.5 TABLET: 49; 51 TABLET, FILM COATED ORAL at 08:27

## 2021-06-18 RX ADMIN — TRAZODONE HYDROCHLORIDE 50 MG: 50 TABLET ORAL at 20:43

## 2021-06-18 RX ADMIN — APIXABAN 5 MG: 5 TABLET, FILM COATED ORAL at 20:43

## 2021-06-18 RX ADMIN — CLOPIDOGREL BISULFATE 75 MG: 75 TABLET ORAL at 08:28

## 2021-06-18 RX ADMIN — GABAPENTIN 600 MG: 300 CAPSULE ORAL at 00:12

## 2021-06-18 RX ADMIN — HYDROMORPHONE HYDROCHLORIDE 0.5 MG: 1 INJECTION, SOLUTION INTRAMUSCULAR; INTRAVENOUS; SUBCUTANEOUS at 08:28

## 2021-06-18 RX ADMIN — INSULIN LISPRO 1 UNITS: 100 INJECTION, SOLUTION INTRAVENOUS; SUBCUTANEOUS at 20:48

## 2021-06-18 RX ADMIN — CARVEDILOL 6.25 MG: 6.25 TABLET, FILM COATED ORAL at 19:01

## 2021-06-18 RX ADMIN — SODIUM CHLORIDE: 9 INJECTION, SOLUTION INTRAVENOUS at 14:45

## 2021-06-18 RX ADMIN — HYDROMORPHONE HYDROCHLORIDE 1 MG: 1 INJECTION, SOLUTION INTRAMUSCULAR; INTRAVENOUS; SUBCUTANEOUS at 16:15

## 2021-06-18 RX ADMIN — GABAPENTIN 600 MG: 300 CAPSULE ORAL at 11:56

## 2021-06-18 RX ADMIN — OXYCODONE AND ACETAMINOPHEN 1 TABLET: 10; 325 TABLET ORAL at 14:41

## 2021-06-18 RX ADMIN — GABAPENTIN 600 MG: 300 CAPSULE ORAL at 22:26

## 2021-06-18 RX ADMIN — SODIUM CHLORIDE, PRESERVATIVE FREE 10 ML: 5 INJECTION INTRAVENOUS at 08:27

## 2021-06-18 RX ADMIN — ROSUVASTATIN CALCIUM 40 MG: 40 TABLET, FILM COATED ORAL at 20:42

## 2021-06-18 RX ADMIN — ASPIRIN 81 MG: 81 TABLET, COATED ORAL at 08:28

## 2021-06-18 RX ADMIN — APIXABAN 5 MG: 5 TABLET, FILM COATED ORAL at 08:27

## 2021-06-18 RX ADMIN — INSULIN GLARGINE 10 UNITS: 100 INJECTION, SOLUTION SUBCUTANEOUS at 20:47

## 2021-06-18 RX ADMIN — SACUBITRIL AND VALSARTAN 0.5 TABLET: 49; 51 TABLET, FILM COATED ORAL at 20:43

## 2021-06-18 RX ADMIN — HYDROMORPHONE HYDROCHLORIDE 1 MG: 1 INJECTION, SOLUTION INTRAMUSCULAR; INTRAVENOUS; SUBCUTANEOUS at 11:56

## 2021-06-18 ASSESSMENT — PAIN DESCRIPTION - PAIN TYPE
TYPE: ACUTE PAIN

## 2021-06-18 ASSESSMENT — PAIN DESCRIPTION - PROGRESSION
CLINICAL_PROGRESSION: GRADUALLY WORSENING

## 2021-06-18 ASSESSMENT — PAIN DESCRIPTION - LOCATION
LOCATION: ABDOMEN

## 2021-06-18 ASSESSMENT — PAIN - FUNCTIONAL ASSESSMENT: PAIN_FUNCTIONAL_ASSESSMENT: PREVENTS OR INTERFERES WITH ALL ACTIVE AND SOME PASSIVE ACTIVITIES

## 2021-06-18 ASSESSMENT — PAIN SCALES - GENERAL
PAINLEVEL_OUTOF10: 8
PAINLEVEL_OUTOF10: 10
PAINLEVEL_OUTOF10: 1
PAINLEVEL_OUTOF10: 8
PAINLEVEL_OUTOF10: 6
PAINLEVEL_OUTOF10: 8
PAINLEVEL_OUTOF10: 10
PAINLEVEL_OUTOF10: 6
PAINLEVEL_OUTOF10: 8
PAINLEVEL_OUTOF10: 6
PAINLEVEL_OUTOF10: 8
PAINLEVEL_OUTOF10: 8

## 2021-06-18 ASSESSMENT — PAIN DESCRIPTION - FREQUENCY: FREQUENCY: CONTINUOUS

## 2021-06-18 ASSESSMENT — PAIN DESCRIPTION - ORIENTATION
ORIENTATION: RIGHT;LOWER

## 2021-06-18 ASSESSMENT — PAIN DESCRIPTION - DESCRIPTORS
DESCRIPTORS: SHARP
DESCRIPTORS: SHARP

## 2021-06-18 ASSESSMENT — PAIN DESCRIPTION - ONSET: ONSET: ON-GOING

## 2021-06-18 NOTE — PROGRESS NOTES
Hospitalist Progress Note      PCP: Erik Krishnan MD    Date of Admission: 6/17/2021    Chief Complaint: R side abd pain. Subjective:     Pt with ongoing abdominal pain. Medications:  Reviewed    Infusion Medications    IV infusion builder 75 mL/hr at 06/18/21 1445    dextrose      sodium chloride       Scheduled Medications    apixaban  5 mg Oral BID    carvedilol  6.25 mg Oral BID WC    budesonide-formoterol  2 puff Inhalation BID    gabapentin  600 mg Oral 5x Daily    insulin glargine  10 Units Subcutaneous Nightly    insulin lispro  0-6 Units Subcutaneous TID WC    insulin lispro  0-3 Units Subcutaneous Nightly    rosuvastatin  40 mg Oral Nightly    sacubitril-valsartan  0.5 tablet Oral BID    spironolactone  25 mg Oral Daily    traZODone  50 mg Oral Nightly    vitamin B-12  1,000 mcg Oral Daily    sodium chloride flush  5-40 mL Intravenous 2 times per day     PRN Meds: HYDROmorphone, cyclobenzaprine, nitroGLYCERIN, oxyCODONE-acetaminophen, glucose, dextrose, glucagon (rDNA), dextrose, zolpidem, sodium chloride flush, sodium chloride, ondansetron **OR** ondansetron, polyethylene glycol, acetaminophen **OR** acetaminophen      Intake/Output Summary (Last 24 hours) at 6/18/2021 1544  Last data filed at 6/18/2021 1454  Gross per 24 hour   Intake 1080 ml   Output --   Net 1080 ml       Physical Exam Performed:    BP (!) 122/55   Pulse 54   Temp 97.8 °F (36.6 °C) (Oral)   Resp 18   Ht 6' (1.829 m)   Wt 192 lb 10.9 oz (87.4 kg)   SpO2 95%   BMI 26.13 kg/m²     General appearance: No apparent distress, appears stated age and cooperative. HEENT: Pupils equal, round, and reactive to light. Conjunctivae/corneas clear. Neck: Supple, with full range of motion. No jugular venous distention. Trachea midline. Respiratory:  Normal respiratory effort. Clear to auscultation, bilaterally without Rales/Wheezes/Rhonchi.   Cardiovascular: Regular rate and rhythm with normal S1/S2 without murmurs, rubs or gallops. Abdomen: Soft, non-tender, non-distended with normal bowel sounds. Musculoskeletal: No clubbing, cyanosis or edema bilaterally. Full range of motion without deformity. Skin: Skin color, texture, turgor normal.  No rashes or lesions. Neurologic:  Neurovascularly intact without any focal sensory/motor deficits. Cranial nerves: II-XII intact, grossly non-focal.  Psychiatric: Alert and oriented, thought content appropriate, normal insight  Capillary Refill: Brisk,3 seconds, normal   Peripheral Pulses: +2 palpable, equal bilaterally       Labs:   Recent Labs     06/17/21 1438 06/18/21 0622   WBC 5.8 4.7   HGB 13.0* 11.7*   HCT 38.2* 34.0*    229     Recent Labs     06/17/21  1438 06/18/21 0622   * 138   K 5.1 4.4    104   CO2 23 24   BUN 24* 26*   CREATININE 1.2 1.4*   CALCIUM 8.6 8.4     Recent Labs     06/17/21  1438 06/18/21 0622   AST 13* 12*   ALT 10 9*   BILIDIR  --  <0.2   BILITOT <0.2 <0.2   ALKPHOS 56 49     No results for input(s): INR in the last 72 hours. No results for input(s): Rik Journey in the last 72 hours. Urinalysis:      Lab Results   Component Value Date    NITRU Negative 08/29/2020    WBCUA 1 08/29/2020    RBCUA 1 08/29/2020    BLOODU Negative 08/29/2020    SPECGRAV >1.030 08/29/2020    GLUCOSEU 100 08/29/2020       Radiology:  CT ABDOMEN PELVIS W IV CONTRAST Additional Contrast? None   Final Result   1. No acute process demonstrated   2. New indeterminate low-attenuation liver lesions. Finding is concerning   for hepatic metastatic disease. A primary malignancy or other findings of   abdominopelvic metastatic disease are not evident on this exam.  MRI would   better characterize the finding   3. Stable known left lower lobe nodule                 Assessment/Plan:    Active Hospital Problems    Diagnosis     Intractable abdominal pain [R10.9]      Abd pain. Unclear if related to liver nodules or separate process.    Will check LA - normal - making mesenteric ischemia less likely. Cont PRN pain control. Liver nodules - ?if metastatic process. Primary unclear at this time. Biopsy will be difficult due to both location of the lesions and pt's extensive cardiac Hx of DAPT and eliquis. Discussed with Dr La Nena Champion. Will start hold ASA and plavix today. Cont eliquis - 48hr prior to procedure will stop eliquis and start heparin gtt. Discussed with pt at length. CAD   Chronic Systolic CHF EF 99-25%. Pacemaker in place. Last PTCA stent 4 months ago. Recent LHC - occluded vessels too small to be stented. Cont PTA regimen. Pt will be too high risk to just stop his [facility] for biopsy. - so we are holding ASA and plavix. - plan to bridge with heparin gtt in the kamila biopsy period. DVT Prophylaxis: on eliquis. Diet: ADULT DIET; Regular; 3 carb choices (45 gm/meal); Low Sodium (2 gm); 2000 ml  Code Status: Full Code        Dispo - inpatient.      Marin Lin MD

## 2021-06-18 NOTE — CONSULTS
0 [address]                                  CONSULTATION    PATIENT NAME: Leonila Senior                    :        1955  MED REC NO:   [id number]                          ROOM:       [id number]  ACCOUNT NO:   [de-identified]                           ADMIT DATE: 2021  PROVIDER:     Leila Ward MD    ONCOLOGY CONSULTATION    CONSULT DATE:  2021    REASON FOR CONSULTATION:  Liver lesions. CONSULTING PROVIDER:  Dagoberto Knight MD    HISTORY OF PRESENT ILLNESS:  The patient is a 60-year-old gentleman with  history of PE and severe coronary artery disease who presented to the  hospital with abdominal pain mainly in the right lower quadrant. It was  going on for a few days and getting worse. CT of the abdomen and pelvis  was done that showed new indeterminate liver lesions but concerning for  hepatic metastatic disease. There was a 1 cm faint lesion on the right  hepatic lobe and a larger 2 cm lesion in the left hepatic lobe - both of  which are new. A CT of the chest last week did not show any new lung  lesions. He is feeling a bit better today. He denies any shortness of  breath or chest pain or melena or hematochezia. PAST MEDICAL HISTORY:  1. Coronary artery disease status post CABG. 2.  Ischemic cardiomyopathy status post AICD. 3.  Pulmonary embolus, diagnosed in 2020.  4.  COPD. 5.  Diabetes. 6.  Depression. 7.  GERD. 8.  Hypertension. 9.  Hyperlipidemia. 10.  Seizures. 11.  TIA. 12.  Ulcerative colitis. PAST SURGICAL HISTORY:  1.  Status post appendectomy. 2.  Status post back surgery. 3.  Status post cholecystectomy. ALLERGIES:  He is allergic to DOPAMINE, TRAMADOL, MORPHINE, and  MELATONIN which cause unknown reactions.     MEDICINES:  Eliquis 5 mg p.o. b.i.d., Coreg 6.25 mg p.o. b.i.d.,  gabapentin 600 mg p.o. five times a day, insulin, Percocet as needed,  Crestor 40 mg p.o. daily, Plavix 75 mg p.o. daily, Aldactone 25 mg p.o.  daily. SOCIAL HISTORY:  He is . He smokes one pack per day and has done  so for many years. He does not drink. He is retired. FAMILY HISTORY:  Noncontributory. REVIEW OF SYSTEMS:  He denies any recent fever, chills, sweats, nausea,  vomiting, chest pain, shortness of breath, headaches, any new bone  aches, dysphagia, odynophagia, diarrhea, constipation, hemoptysis,  hematemesis, change in vision/hearing/smell/taste, weakness, neuropathy,  skin rashes, productive cough, urinary or bowel prolapse or  incontinence, petechiae, purpura, skin rashes, pruritus, hallucinations,  nasal congestion or drainage, depression, anxiety, suicidal ideations,  melena, or hematochezia. He has mild to moderate fatigue. He has  abdominal pain as above. His 10-system review of systems is otherwise  negative. PHYSICAL EXAMINATION:  VITAL SIGNS:  He is afebrile with normal vital signs. GENERAL:  He is in no acute distress. HEENT:  His pupils are round and reactive to light and accommodation. Extraocular muscles are intact. NECK:  He has no jugular venous distention. No thyromegaly. His  oropharynx is clear. He has no carotid bruits. He has no palpable  lymphadenopathy. HEART:  Regular rate and rhythm. LUNGS:  Clear to auscultation bilaterally. ABDOMEN:  Nondistended, nontender with bowel sounds x4. No  hepatosplenomegaly. EXTREMITIES:  He has no peripheral clubbing, cyanosis, or edema. NEUROLOGIC EXAM:  Nonfocal.    LABORATORY DATA:  His white blood cell count is 4.7, hemoglobin 11.7,  platelets of 327. ASSESSMENT AND PLAN:  1. Liver lesions/unknown primary. His recent CT of the chest was  negative. Various tumor markers were sent including a CEA, CA 19-9, and  alpha-fetoprotein. Unfortunately, an MRI could not be done since it is  not compatible with his pacemaker leads.   IR is checking to see if these  are accessible by biopsy. If so, his Plavix will need to be on hold for  five days, therefore, he cannot have a biopsy until Wednesday. I spoke  with Dr. Jef Aguilera and he wants to keep him in the hospital due to his  severe coronary artery disease and history of pulmonary embolus and the  need to overlap his anticoagulation. His aspirin and Plavix will be  held as of now. The Eliquis will be continued until 48 hours prior to  this procedure and he will then be switched to heparin. Dr. Jef Aguilera  is going to handle that process. I will see the patient again on  Monday. Please reconsult over the weekend if questions arise. 2.  History of pulmonary embolus. It was decided in the past to do a  lifetime anticoagulation due to his chronic immobility due to his back  issues. Daniel Cespedes MD    D: 06/18/2021 14:03:51       T: 06/18/2021 18:04:22     [id number]  Job#: [id number]     Doc#: [id number]    CC:   So Stephens MD

## 2021-06-18 NOTE — CARE COORDINATION
DISCHARGE PLAN: Pt plans to return home with his spouse at d/c. No d/c needs noted at this time. ___________________________________    Met w/pt to address barriers to dc. HOME: Pt reported that he lives in a two family home with his spouse, two cats and 4 birds. Pt stated that he has 11 GEO. COVID Vaccination: Yes    DME/O2: Pt reported that he does not have any DME needs and has no DME at home. ACTIVE SERVICES: Pt reported that he was independent with all self care PTA. Pt stated that his spouse is a good support and would help if needed. TRANSPORTATION: Pt stated that he is an active  and stated that his spouse will transport him home at d/c. Pt stated that he may take a cab home as his spouse doesn't like to drive. PHARMACY: Denies difficulty obtaining/taking meds. Pt uses [address] in [place]. Pt stated that his insulin is expensive but he is able to afford it. PCP: Yvonne Lema    DEMOGRAPHICS: Verified address/phone number as correct    INSURANCE:  [employer]  PRESCRIPTION COVERAGE: [employer]    HD/PD: No      THERAPY RECS Not ordered    Discharge planning team will remain available for needs. Please consult for any specifics not addressed in this note.     Davion Flaherty  [phone]  Electronically signed by Ramakrishna Davis on 6/18/2021 at 1:55 PM

## 2021-06-18 NOTE — PROGRESS NOTES
Oncology Addendum    I just spoke with IR. These liver lesions are going to be very difficult to biopsy. They can try but it may be nondiagnostic. I then spoke with Dr. Arnel Solomon who is going to order a triphasic CT of the abdomen tomorrow to better evaluate these liver lesions and go from there. His last EGD and colonoscopy was in 2017. He may or may not repeat the EGD/colon depending on the results of the CT scan.     Nila Nelson MD

## 2021-06-18 NOTE — CONSULTS
Stable know left lower lobe nodule. LFTs unremarkable. Lipase normal. Hgb 11.7 which is near his baseline. .4. Folate and B12 normal. TSH 3/2021 normal. No white count. The patient had a colonoscopy in 2013 where he had a segment of focal colitis, thought to represent local ischemic colitis. He was treated conservatively with antibiotics at that time. He had a repeat colonoscopy 1/10/17 without evidence of colitis, and multiple hyperplastic polyps. At that time, the patient was iron deficient and also underwent EGD notable for esophagitis and gastritis, and VCE notable for a 3 mm non-bleeding AVM.      Prior Endoscopic Evaluations:  EGD and capsule endoscopy 2/2017 / COLONOSCOPY 1/20/17:  1) Five small transverse colon polyps, measuring 2-5mm in size.  These were completely removed with biopsy polypectomy. There was no residual polyp or significant bleeding at the polypectomy sites.    2) Normal terminal ileum.    3) Esophagitis and gastritis, and VCE notable for a 3 mm non-bleeding AVM  -Biopsies showed hyperplastic polyps     COLONOSCOPY 12/2013:  IMPRESSION:  Colitis limited to the splenic flexure (approximately 10 cm length).  Random biopsies taken. The rest of the colon and terminal ileum appeared unremarkable.     FINAL DIAGNOSIS:    A. Terminal ileum, biopsy:    - Fragments of benign terminal ileal mucosa without significant    pathologic alteration. B. Colon, right, random, biopsies:    - Fragments of benign colonic mucosa, negative for chronic colitis,    dysplasia, or carcinoma.    - The collagen table is within normal limits and intraepithelial    lymphocytes are not increased, negative for features of collagenous    colitis or lymphocytic colitis (microscopic colitis).     C. Colon, left, random, biopsies:    - Fragments of benign colonic mucosa, negative for chronic colitis,    dysplasia, or carcinoma.    - The collagen table is within normal limits and intraepithelial    lymphocytes are not increased, negative for features of collagenous    colitis or lymphocytic colitis (microscopic colitis). D. Colon, sigmoid, biopsy:    - Fragments of inflamed and ulcerated colonic mucosa with features of    chronic colitis (see comment).     COMMENT:     The sigmoid biopsy sample (part D) shows minimally active  chronic colitis with surface ulceration and no features of dysplasia or  malignancy.  These findings are not specific and may indicate focal  inflammatory bowel disease (particularly Crohn's), though distinct  features such as granulomas are not identified.  Alternatively, the  findings may represent a local effect such as diverticular disease, or  other process.  Correlation with clinical findings and history is  recommended.    SHEMI/SHEMI 12/23/13    PAST MEDICAL, SURGICAL, FAMILY, and SOCIAL HISTORY     Past Medical History:   Diagnosis Date    Anxiety     Arthritis     Asthma     CAD (coronary artery disease)     Calcium kidney stone     Cardiomyopathy ([address]     Cerebral artery occlusion with cerebral infarction ([address]     CHF (congestive heart failure) ([address]     Clostridium difficile diarrhea 02/12/2020    COPD (chronic obstructive pulmonary disease) ([address]     mild    Depression     DM2 (diabetes mellitus, type 2) ([address]     Fibromyalgia     GERD (gastroesophageal reflux disease)     Hyperlipidemia     Hypertension     Liver disease     Pacemaker 2012    Medtronic model # [id number]    Pneumonia     Seizures ([address]     TIA (transient ischemic attack) 2007    Ulcerative colitis ([address]      Past Surgical History:   Procedure Laterality Date    APPENDECTOMY      BACK SURGERY      CARDIAC SURGERY      CHOLECYSTECTOMY      COLONOSCOPY  01/10/2017    COLONOSCOPY  01/10/2017    CORONARY ANGIOPLASTY WITH STENT PLACEMENT  2012    CORONARY ARTERY BYPASS GRAFT  2010, 11/2015    ENDOSCOPY, COLON, DIAGNOSTIC      PACEMAKER PLACEMENT      UPPER GASTROINTESTINAL ENDOSCOPY 02/07/2017    VASCULAR SURGERY       Family History   Problem Relation Age of Onset    Coronary Art Dis Father     Cancer Mother         Lung with mets    Diabetes Mother      Social History     Socioeconomic History    Marital status:      Spouse name: None    Number of children: 1    Years of education: None    Highest education level: None   Occupational History    Occupation: disabled   Tobacco Use    Smoking status: Current Every Day Smoker     Packs/day: 0.50     Years: 44.00     Pack years: 22.00     Types: Cigarettes    Smokeless tobacco: Never Used    Tobacco comment: cutting down   Vaping Use    Vaping Use: Never used   Substance and Sexual Activity    Alcohol use: No     Alcohol/week: 0.0 standard drinks    Drug use: No    Sexual activity: Not Currently     Partners: Female   Other Topics Concern    None   Social History Narrative    None     Social Determinants of Health     Financial Resource Strain:     Difficulty of Paying Living Expenses:    Food Insecurity:     Worried About Running Out of Food in the Last Year:     Ran Out of Food in the Last Year:    Transportation Needs:     Lack of Transportation (Medical):      Lack of Transportation (Non-Medical):    Physical Activity:     Days of Exercise per Week:     Minutes of Exercise per Session:    Stress:     Feeling of Stress :    Social Connections:     Frequency of Communication with Friends and Family:     Frequency of Social Gatherings with Friends and Family:     Attends Restoration Services:     Active Member of Clubs or Organizations:     Attends Club or Organization Meetings:     Marital Status:    Intimate Partner Violence:     Fear of Current or Ex-Partner:     Emotionally Abused:     Physically Abused:     Sexually Abused:        MEDICATIONS   SCHEDULED:  apixaban, 5 mg, BID  aspirin EC, 81 mg, Daily  carvedilol, 6.25 mg, BID WC  clopidogrel, 75 mg, Daily  budesonide-formoterol, 2 puff, BID  gabapentin, 600 mg, 5x Daily  insulin glargine, 10 Units, Nightly  insulin lispro, 0-6 Units, TID WC  insulin lispro, 0-3 Units, Nightly  rosuvastatin, 40 mg, Nightly  sacubitril-valsartan, 0.5 tablet, BID  spironolactone, 25 mg, Daily  traZODone, 50 mg, Nightly  vitamin B-12, 1,000 mcg, Daily  sodium chloride flush, 5-40 mL, 2 times per day      FLUIDS/DRIPS:     dextrose      sodium chloride       PRNs: cyclobenzaprine, 5 mg, TID PRN  nitroGLYCERIN, 0.4 mg, Q5 Min PRN  oxyCODONE-acetaminophen, 1 tablet, Q8H PRN  HYDROmorphone, 0.5 mg, Q4H PRN  glucose, 15 g, PRN  dextrose, 12.5 g, PRN  glucagon (rDNA), 1 mg, PRN  dextrose, 100 mL/hr, PRN  zolpidem, 5 mg, Nightly PRN  sodium chloride flush, 5-40 mL, PRN  sodium chloride, 25 mL, PRN  ondansetron, 4 mg, Q8H PRN   Or  ondansetron, 4 mg, Q6H PRN  polyethylene glycol, 17 g, Daily PRN  acetaminophen, 650 mg, Q6H PRN   Or  acetaminophen, 650 mg, Q6H PRN      ALLERGIES:  He   Allergies   Allergen Reactions    Dopamine Hcl Other (See Comments) and Anxiety     Compulsive gambling    Tramadol Other (See Comments)     Dizziness     Morphine And Related Itching     Pt reports nausea and vomiting with morphine, able to tolerate the morphine if also given with an antiemetic, denies any swelling, itching or hives when taken.  Melatonin Other (See Comments)     Restless leg syndrome         REVIEW OF SYSTEMS   Pertinent ROS noted in HPI    PHYSICAL EXAM     Vitals:    06/17/21 1817 06/17/21 1951 06/18/21 0413 06/18/21 0827   BP: (!) 175/88 (!) 167/95 114/65 131/77   Pulse: 82 84 67 61   Resp: 15 18 16 18   Temp:  98.1 °F (36.7 °C) 97.5 °F (36.4 °C) 97.9 °F (36.6 °C)   TempSrc:  Oral Oral Oral   SpO2: 98% 97% 96% 93%   Weight:  192 lb 10.9 oz (87.4 kg) 192 lb 10.9 oz (87.4 kg)    Height:  6' (1.829 m)         No intake/output data recorded.       Physical Exam:  General appearance: alert, cooperative, no distress, appears stated age  Eyes: Anicteric  Head: Normocephalic, without obvious abnormality  Lungs: clear to auscultation bilaterally, Normal Effort  Heart: regular rate and rhythm, normal S1 and S2, no murmurs or rubs  Abdomen: soft, non-distended, severe right sided tenderness. Bowel sounds normal. No masses,  no organomegaly. Extremities: atraumatic, no cyanosis or edema  Skin: warm and dry, no jaundice  Neuro: Grossly intact, A&OX3      LABS AND IMAGING   Laboratory   Recent Labs     06/17/21  1438 06/18/21  0622   WBC 5.8 4.7   HGB 13.0* 11.7*   HCT 38.2* 34.0*   .3* 101.4*    229     Recent Labs     06/17/21  1438 06/18/21  0622   * 138   K 5.1 4.4    104   CO2 23 24   BUN 24* 26*   CREATININE 1.2 1.4*     Recent Labs     06/17/21  1438 06/18/21  0622   AST 13* 12*   ALT 10 9*   BILIDIR  --  <0.2   BILITOT <0.2 <0.2   ALKPHOS 56 49     Recent Labs     06/17/21  1438   LIPASE 17.0     No results for input(s): PROTIME, INR in the last 72 hours. Imaging  CT ABDOMEN PELVIS W IV CONTRAST Additional Contrast? None   Final Result   1. No acute process demonstrated   2. New indeterminate low-attenuation liver lesions. Finding is concerning   for hepatic metastatic disease. A primary malignancy or other findings of   abdominopelvic metastatic disease are not evident on this exam.  MRI would   better characterize the finding   3. Stable known left lower lobe nodule               ASSESSMENT AND RECOMMENDATIONS   Katelynn Waldron is a 72 y.o. male with a PMH of extensive CAD with CABG, stents, CHF, EF 35%, ischemic cardiomyopathy, DM-II, HTN, HLD, TIA, COPD, depression, anxiety, cholecystectomy who presented on 6/17/2021 with acute onset of right sided abdominal pain that started two days ago. We have been consulted regarding liver lesions concerning for metastatic process with primary unclear.      IMPRESSION:    1. New liver lesions on CT concerning for metastatic disease with primary unclear  -CT A/P with IV contrast shows no acute process. New indeterminate low-attenuation liver lesions concerning for hepatic metastatic disease. A primary malignancy or other findings of abdominopelvic metastatic disease are not evident on this exam. Stable know left lower lobe nodule. LFTs unremarkable. Lipase normal. Hgb 11.7 which is near his baseline. .4. No white count. -FHX: mother with lung cancer   -120lb unintentional weight loss over the past year due to poor appetite. Since his appetite has improved he's recently been able to gain about 20lbs back. 2. Right sided abdominal pain  -Not associated with PO intake. S/p cholecystectomy. Only aggravating factor is movement and standing up. Nausea with no specific aggravating foods. Denies vomiting. 3. Chronic macrocytic anemia  -Colonoscopy in 2013: segment of focal colitis, thought to represent local ischemic colitis. He was treated conservatively with antibiotics at that time. He had a repeat colonoscopy 1/10/17 without evidence of colitis, and multiple hyperplastic polyps. At that time, the patient was iron deficient and also underwent EGD notable for esophagitis and gastritis, and VCE notable for a 3 mm non-bleeding AVM. -Folate and B12 normal. TSH 3/2021 normal. Denies history of heavy etoh use. 4. CAD s/p CABG - on Eliquis, Plavix and ASA      RECOMMENDATIONS:    -Patient could use some gentle hydration to see if we can improve his renal insufficiency for repeat imaging.  -Due to leads from pacemaker patient is not eligible for MRI so cannot proceed with triphasic MRI (liver protocol). Would likely repeat CT but with a triphasic liver protocol with IV tomorrow once renal function improves. -Ordered AFP, Ca 19-9, and CEA. -Will discuss with Dr. Hung Kaufman. Please await his further recommendations.      If you have any questions or need any further information, please feel free to contact our consult team.  Thank you for allowing us to participate in the care of [id number] [facility] Alejandra. The note was completed using Dragon voice recognition transcription. Every effort was made to ensure accuracy; however, inadvertent transcription errors may be present despite my best efforts to edit errors. Whitney Moreland PA-C    Attending physician addendum:      I have personally seen and examined the patient, reviewed the patient's medical record and pertinent labs and clinical imaging. I have personally staffed the case with Whitney Moreland PA-C. I agree with her consultation note, exam findings, assessment and plans  as written above. I have made appropriate modifications and edited her assessment and plan where needed to reflect my impression and plans for this patient. This is a 28-year-old  male with extensive past medical history for coronary disease with prior CABG, PCI, systolic heart failure with an ejection fraction of 35%, diabetes, hypertension, previous TIA, COPD, who presented with several days of worsening right periumbilical abdominal pain. The patient denies any inciting factors. It is worsened with lying flat and with palpation. There is no worsening of his pain with bowel movements or with eating. He denies any change in his stool caliber. He denies any dyspeptic symptoms. He does have some underlying back problems but does not have any radiation of his pain to his back. His pain can wax and wane. He does take aspirin, Plavix, and Eliquis. He does give himself insulin occasionally, but he does not inject his right side. He denies any trauma or falls. He was sent to the ER by his primary care provider and had a CT scan ordered. The CT scan incidentally found some hepatic lesions of uncertain etiology. He was admitted for further management of his pain and work-up of these nonspecific liver lesions. He did report incidentally 100 pound weight loss in the past year. He had a CTA of his chest performed as well that was negative for any malignancy.   He had a previous EGD, colonoscopy, and capsule endoscopy performed for anemia in 2017. He had some benign hyperplastic polyps and has no history of Morales's esophagus or any H. pylori gastritis. His capsule endoscopy showed a small AVM. He had a remote history of ischemic colitis in 2013. He denies any fevers, chills, night sweats. BP (!) 122/55   Pulse 54   Temp 97.8 °F (36.6 °C) (Oral)   Resp 18   Ht 6' (1.829 m)   Wt 192 lb 10.9 oz (87.4 kg)   SpO2 95%   BMI 26.13 kg/m²      Gen- NAD  CV- bradycardic  Lungs- CTAB  Abd- soft, with focal tenderness in the right mid to lower quadrant. There was a 3-4 inch area that was lateral to the umbilicus. No obvious hernias were noted. No G/R were noted   Negative Carnett's and Clifton's sign      Impression    1) Right periumbilical abdominal pain-the patient has very focal right periumbilical pain. This is worsened with lying flat and movement. Question whether there could be an early or mild intramuscular hematoma from him being on aspirin, Plavix, and Eliquis. I did review the CT and did not see any obvious bleeding on CT. The patient does not have any hernias appreciated. The patient does not have any symptoms consistent with biliary colic or any mesenteric ischemia. The patient does have 2 nonspecific liver lesions that need further evaluation, but I do not believe that these are at all contributing to his pain. He has no symptoms to suggest colitis or enteritis. He does endorse some nonspecific weight loss of over 100 pounds in the past year, although he recently gained some weight back. 2) Nonspecific liver lesions-the patient does have a right-sided as well as left-sided liver lesion. These are nonspecific. These are new since his prior CT in 2020. He has no known history of cirrhosis. His liver enzymes are normal.  He has no symptoms to suggest a pyogenic liver abscess. He does have some unexplained weight loss.   We are sending off routine tumor markers. He did have an EGD, colonoscopy, and capsule endoscopy in 2017 that were unrevealing without any high risk features for malignancy, and there are no signs of intra-abdominal or intrathoracic malignancy noted on CT scans. 3) Unintentional weight loss. - as per above. 4) CAD with prior CABG and ischemic cardiomyopathy and EF 35%  5) Macrocytic anemia- stable  6) JARRETT- Cr 1.4  7) Hx of TIA    Plan:  IV fluids x 1.5 L to see if we can improve JARRETT   Attempt repeat triphasic liver protocol CT tomorrow if creatinine improved (cannot do MRI in light of pacemaker  CEA, AFP, CA-19-9  Hold plavix and Eliquis  Will follow up over weekend  No immediate plans for repeating EGD and colonoscopy until CT is repeated. Thank you for allowing me to participate in this patient's care. If there are any questions or concerns regarding this patient, or the plan we have set in place, please feel free to contact me at [phone].      Annabelle Russell, DO

## 2021-06-18 NOTE — CONSULTS
Oncology Consult    See dictation    A/P:  1. Liver lesions. Patient cannot have an MRI due to his pacemaker leads. I spoke with IR. His Plavix will need to be on hold for 5 days. His aspirin will be held now as well. His Eliquis only has to be held for 48 hours. I spoke with . He will put him on heparin after the Eliquis is held due to his severe coronary disease and history of PE. The primary is not evident. Tumor markers were sent. We will get the biopsy and go from there. I will see the patient again Monday. Please reconsult over the weekend if new issues arise. Thank you for the consultation. We will follow closely.     Esther Ingram MD

## 2021-06-19 ENCOUNTER — APPOINTMENT (OUTPATIENT)
Dept: CT IMAGING | Age: 66
DRG: 392 | End: 2021-06-19
Payer: MEDICARE

## 2021-06-19 LAB
ANION GAP SERPL CALCULATED.3IONS-SCNC: 8 MMOL/L (ref 3–16)
BUN BLDV-MCNC: 23 MG/DL (ref 7–20)
CALCIUM SERPL-MCNC: 8.3 MG/DL (ref 8.3–10.6)
CHLORIDE BLD-SCNC: 102 MMOL/L (ref 99–110)
CO2: 24 MMOL/L (ref 21–32)
CREAT SERPL-MCNC: 1.2 MG/DL (ref 0.8–1.3)
GFR AFRICAN AMERICAN: >60
GFR NON-AFRICAN AMERICAN: >60
GLUCOSE BLD-MCNC: 101 MG/DL (ref 70–99)
GLUCOSE BLD-MCNC: 159 MG/DL (ref 70–99)
GLUCOSE BLD-MCNC: 79 MG/DL (ref 70–99)
GLUCOSE BLD-MCNC: 85 MG/DL (ref 70–99)
GLUCOSE BLD-MCNC: 97 MG/DL (ref 70–99)
HCT VFR BLD CALC: 35.7 % (ref 40.5–52.5)
HEMOGLOBIN: 12 G/DL (ref 13.5–17.5)
MCH RBC QN AUTO: 34 PG (ref 26–34)
MCHC RBC AUTO-ENTMCNC: 33.5 G/DL (ref 31–36)
MCV RBC AUTO: 101.4 FL (ref 80–100)
PDW BLD-RTO: 14.2 % (ref 12.4–15.4)
PERFORMED ON: ABNORMAL
PERFORMED ON: NORMAL
PLATELET # BLD: 252 K/UL (ref 135–450)
PMV BLD AUTO: 8.6 FL (ref 5–10.5)
POTASSIUM SERPL-SCNC: 5 MMOL/L (ref 3.5–5.1)
RBC # BLD: 3.52 M/UL (ref 4.2–5.9)
SODIUM BLD-SCNC: 134 MMOL/L (ref 136–145)
WBC # BLD: 5.5 K/UL (ref 4–11)

## 2021-06-19 PROCEDURE — 74178 CT ABD&PLV WO CNTR FLWD CNTR: CPT

## 2021-06-19 PROCEDURE — 6370000000 HC RX 637 (ALT 250 FOR IP): Performed by: HOSPITALIST

## 2021-06-19 PROCEDURE — 6370000000 HC RX 637 (ALT 250 FOR IP): Performed by: INTERNAL MEDICINE

## 2021-06-19 PROCEDURE — 85027 COMPLETE CBC AUTOMATED: CPT

## 2021-06-19 PROCEDURE — 6360000002 HC RX W HCPCS: Performed by: INTERNAL MEDICINE

## 2021-06-19 PROCEDURE — 6360000004 HC RX CONTRAST MEDICATION: Performed by: INTERNAL MEDICINE

## 2021-06-19 PROCEDURE — 80048 BASIC METABOLIC PNL TOTAL CA: CPT

## 2021-06-19 PROCEDURE — 1200000000 HC SEMI PRIVATE

## 2021-06-19 PROCEDURE — 2580000003 HC RX 258: Performed by: HOSPITALIST

## 2021-06-19 PROCEDURE — 36415 COLL VENOUS BLD VENIPUNCTURE: CPT

## 2021-06-19 RX ORDER — POLYETHYLENE GLYCOL 3350 17 G/17G
17 POWDER, FOR SOLUTION ORAL 2 TIMES DAILY
Status: DISCONTINUED | OUTPATIENT
Start: 2021-06-19 | End: 2021-06-21 | Stop reason: HOSPADM

## 2021-06-19 RX ORDER — OXYCODONE HCL 10 MG/1
20 TABLET, FILM COATED, EXTENDED RELEASE ORAL EVERY 12 HOURS SCHEDULED
Status: DISCONTINUED | OUTPATIENT
Start: 2021-06-19 | End: 2021-06-21 | Stop reason: HOSPADM

## 2021-06-19 RX ORDER — DOCUSATE SODIUM 100 MG/1
100 CAPSULE, LIQUID FILLED ORAL 2 TIMES DAILY
Status: DISCONTINUED | OUTPATIENT
Start: 2021-06-19 | End: 2021-06-21 | Stop reason: HOSPADM

## 2021-06-19 RX ADMIN — HYDROMORPHONE HYDROCHLORIDE 1 MG: 1 INJECTION, SOLUTION INTRAMUSCULAR; INTRAVENOUS; SUBCUTANEOUS at 06:17

## 2021-06-19 RX ADMIN — OXYCODONE AND ACETAMINOPHEN 1 TABLET: 10; 325 TABLET ORAL at 07:46

## 2021-06-19 RX ADMIN — TRAZODONE HYDROCHLORIDE 50 MG: 50 TABLET ORAL at 20:25

## 2021-06-19 RX ADMIN — DOCUSATE SODIUM 100 MG: 100 CAPSULE, LIQUID FILLED ORAL at 09:19

## 2021-06-19 RX ADMIN — GABAPENTIN 600 MG: 300 CAPSULE ORAL at 11:25

## 2021-06-19 RX ADMIN — OXYCODONE HYDROCHLORIDE 20 MG: 10 TABLET, FILM COATED, EXTENDED RELEASE ORAL at 21:51

## 2021-06-19 RX ADMIN — HYDROMORPHONE HYDROCHLORIDE 1 MG: 1 INJECTION, SOLUTION INTRAMUSCULAR; INTRAVENOUS; SUBCUTANEOUS at 20:26

## 2021-06-19 RX ADMIN — GABAPENTIN 600 MG: 300 CAPSULE ORAL at 18:30

## 2021-06-19 RX ADMIN — HYDROMORPHONE HYDROCHLORIDE 1 MG: 1 INJECTION, SOLUTION INTRAMUSCULAR; INTRAVENOUS; SUBCUTANEOUS at 00:45

## 2021-06-19 RX ADMIN — CYANOCOBALAMIN TAB 1000 MCG 1000 MCG: 1000 TAB at 09:02

## 2021-06-19 RX ADMIN — HYDROMORPHONE HYDROCHLORIDE 1 MG: 1 INJECTION, SOLUTION INTRAMUSCULAR; INTRAVENOUS; SUBCUTANEOUS at 11:25

## 2021-06-19 RX ADMIN — HYDROMORPHONE HYDROCHLORIDE 1 MG: 1 INJECTION, SOLUTION INTRAMUSCULAR; INTRAVENOUS; SUBCUTANEOUS at 16:22

## 2021-06-19 RX ADMIN — OXYCODONE HYDROCHLORIDE 20 MG: 10 TABLET, FILM COATED, EXTENDED RELEASE ORAL at 09:19

## 2021-06-19 RX ADMIN — ROSUVASTATIN CALCIUM 40 MG: 40 TABLET, FILM COATED ORAL at 20:25

## 2021-06-19 RX ADMIN — SPIRONOLACTONE 25 MG: 25 TABLET ORAL at 09:02

## 2021-06-19 RX ADMIN — APIXABAN 5 MG: 5 TABLET, FILM COATED ORAL at 09:02

## 2021-06-19 RX ADMIN — SACUBITRIL AND VALSARTAN 0.5 TABLET: 49; 51 TABLET, FILM COATED ORAL at 09:02

## 2021-06-19 RX ADMIN — SODIUM CHLORIDE, PRESERVATIVE FREE 10 ML: 5 INJECTION INTRAVENOUS at 21:44

## 2021-06-19 RX ADMIN — CARVEDILOL 6.25 MG: 6.25 TABLET, FILM COATED ORAL at 09:03

## 2021-06-19 RX ADMIN — DOCUSATE SODIUM 100 MG: 100 CAPSULE, LIQUID FILLED ORAL at 20:25

## 2021-06-19 RX ADMIN — INSULIN GLARGINE 10 UNITS: 100 INJECTION, SOLUTION SUBCUTANEOUS at 20:32

## 2021-06-19 RX ADMIN — APIXABAN 5 MG: 5 TABLET, FILM COATED ORAL at 20:25

## 2021-06-19 RX ADMIN — SACUBITRIL AND VALSARTAN 0.5 TABLET: 49; 51 TABLET, FILM COATED ORAL at 20:25

## 2021-06-19 RX ADMIN — POLYETHYLENE GLYCOL 3350 17 G: 17 POWDER, FOR SOLUTION ORAL at 09:19

## 2021-06-19 RX ADMIN — CARVEDILOL 6.25 MG: 6.25 TABLET, FILM COATED ORAL at 18:30

## 2021-06-19 RX ADMIN — GABAPENTIN 600 MG: 300 CAPSULE ORAL at 16:23

## 2021-06-19 RX ADMIN — OXYCODONE AND ACETAMINOPHEN 1 TABLET: 10; 325 TABLET ORAL at 18:33

## 2021-06-19 RX ADMIN — GABAPENTIN 600 MG: 300 CAPSULE ORAL at 07:41

## 2021-06-19 RX ADMIN — IOPAMIDOL 75 ML: 755 INJECTION, SOLUTION INTRAVENOUS at 10:54

## 2021-06-19 RX ADMIN — POLYETHYLENE GLYCOL 3350 17 G: 17 POWDER, FOR SOLUTION ORAL at 20:27

## 2021-06-19 ASSESSMENT — PAIN SCALES - GENERAL
PAINLEVEL_OUTOF10: 7
PAINLEVEL_OUTOF10: 8
PAINLEVEL_OUTOF10: 7
PAINLEVEL_OUTOF10: 5
PAINLEVEL_OUTOF10: 7
PAINLEVEL_OUTOF10: 7
PAINLEVEL_OUTOF10: 9
PAINLEVEL_OUTOF10: 9
PAINLEVEL_OUTOF10: 5
PAINLEVEL_OUTOF10: 8
PAINLEVEL_OUTOF10: 6
PAINLEVEL_OUTOF10: 7

## 2021-06-19 ASSESSMENT — PAIN DESCRIPTION - PROGRESSION
CLINICAL_PROGRESSION: GRADUALLY WORSENING

## 2021-06-19 ASSESSMENT — PAIN DESCRIPTION - ONSET
ONSET: ON-GOING

## 2021-06-19 ASSESSMENT — PAIN DESCRIPTION - PAIN TYPE
TYPE: ACUTE PAIN

## 2021-06-19 ASSESSMENT — PAIN DESCRIPTION - LOCATION
LOCATION: ABDOMEN

## 2021-06-19 ASSESSMENT — PAIN DESCRIPTION - DESCRIPTORS
DESCRIPTORS: SHARP

## 2021-06-19 ASSESSMENT — PAIN DESCRIPTION - ORIENTATION
ORIENTATION: RIGHT
ORIENTATION: RIGHT;LOWER
ORIENTATION: RIGHT;LOWER

## 2021-06-19 ASSESSMENT — PAIN DESCRIPTION - FREQUENCY
FREQUENCY: CONTINUOUS

## 2021-06-19 NOTE — PROGRESS NOTES
Gastroenterology Progress Note    Matt Rodriguez is a 72 y.o. male patient. Hospitalization Day:2    SUBJECTIVE:  Still with similar right periumbilical pain. Worsened with lying flat and movement and palpation. No nausea or vomiting. ROS:  Gastrointestinal ROS: positive for - abdominal pain    Physical    VITALS:  BP (!) 174/93   Pulse 79   Temp 97.6 °F (36.4 °C) (Oral)   Resp 16   Ht 6' (1.829 m)   Wt 201 lb (91.2 kg)   SpO2 94%   BMI 27.26 kg/m²   TEMPERATURE:  Current - Temp: 97.6 °F (36.4 °C); Max - Temp  Av.7 °F (36.5 °C)  Min: 97.6 °F (36.4 °C)  Max: 97.8 °F (36.6 °C)    General:  Alert and oriented,  No apparent distress  Skin- without jaundice  Eyes: anicteric sclera  Cardiac: RRR, Nl s1s2, without murmurs  Lungs CTA Bilaterally, normal effort  Abdomen soft, with focal tenderness in the right mid to lower quadrant. There was a 3-4 inch area that was lateral to the umbilicus. No obvious hernias were noted. No G/R were noted   Negative Carnett's and Clifton's sign  Ext: without edema  Neuro: no asterixis     Data    Data Review:    Recent Labs     21  1438 21  0904   WBC 5.8 4.7 5.5   HGB 13.0* 11.7* 12.0*   HCT 38.2* 34.0* 35.7*   .3* 101.4* 101.4*    229 252     Recent Labs     21  1438 21  06   * 138   K 5.1 4.4    104   CO2 23 24   BUN 24* 26*   CREATININE 1.2 1.4*     Recent Labs     21  1438 21  06   AST 13* 12*   ALT 10 9*   BILIDIR  --  <0.2   BILITOT <0.2 <0.2   ALKPHOS 56 49     Recent Labs     21  1438   LIPASE 17.0     No results for input(s): PROTIME, INR in the last 72 hours. Radiology Review:    CT ABDOMEN PELVIS W IV CONTRAST Additional Contrast? None   Final Result   1. No acute process demonstrated   2. New indeterminate low-attenuation liver lesions. Finding is concerning   for hepatic metastatic disease.   A primary malignancy or other findings of   abdominopelvic metastatic disease are not evident on this exam.  MRI would   better characterize the finding   3. Stable known left lower lobe nodule               ASSESSMENT:72year-old  male with extensive past medical history for coronary disease with prior CABG, PCI, systolic heart failure with an ejection fraction of 35%, diabetes, hypertension, previous TIA, COPD, on ASA/Plavix/Eliquis, who presented with several days of worsening right periumbilical abdominal pain      1) Acute right periumbilical abdominal pain- Paint is very focal and is worsened with lying flat and movement/palpation. I still question whether there could be a mild intramuscular hematoma from him being on aspirin, Plavix, and Eliquis. I did review the initial CT and did not see any obvious bleeding on CT. The patient does not have any hernias appreciated. The patient's pain is not consistent with biliary colic or any mesenteric ischemia. The patient does have 2 nonspecific liver lesions that need further evaluation, but I do not believe that these are at all contributing to his pain. He has no symptoms to suggest colitis or enteritis. He does endorse some nonspecific weight loss of over 100 pounds in the past year, although he recently gained some weight back.    2) Nonspecific liver lesions-the patient does have a right lobe as well as left lobe indeterminate liver lesion. These are new since his prior CT in 2020. He has no known history of cirrhosis. His liver enzymes are normal.  He has no symptoms to suggest a pyogenic liver abscess. He does have some unexplained weight loss. We are sending off routine tumor markers. He did have an EGD, colonoscopy, and capsule endoscopy in 2017 that were unrevealing without any high risk features for malignancy, and there are no signs of intra-abdominal or intrathoracic malignancy noted on CT scans.    3) Unintentional weight loss. - as per above.   CEA normal.   4) CAD with prior CABG and ischemic cardiomyopathy and EF 35%  5) Macrocytic anemia- stable  6) JARRETT- Cr 1.4. Repeat renal today pending  7) Hx of TIA     Plan:  Follow up on today's renal panel. If improved, will set the patient up for a triphasic CT to further evaluate the liver lesions (cannot do MRI in light of pacemaker)  AFP, CA-19-9  Hold plavix  May need to hold Eliquis if biopsy planned   Will follow up over weekend  No immediate plans for repeating EGD and colonoscopy until CT is repeated. Thank you for allowing me to participate in the care of your patient. Please feel free to contact me with any concerns.   [address] [facility] [place] Marco A, DO

## 2021-06-19 NOTE — PROGRESS NOTES
Hospitalist Progress Note      PCP: Corrina Monsivais MD    Date of Admission: 6/17/2021    Chief Complaint: R side abd pain. Subjective:     Ongoing complaints of abd pain. No emesis, no diarrhea. Medications:  Reviewed    Infusion Medications    IV infusion builder 75 mL/hr at 06/18/21 1445    dextrose      sodium chloride       Scheduled Medications    oxyCODONE  20 mg Oral 2 times per day    docusate sodium  100 mg Oral BID    polyethylene glycol  17 g Oral BID    apixaban  5 mg Oral BID    carvedilol  6.25 mg Oral BID WC    budesonide-formoterol  2 puff Inhalation BID    gabapentin  600 mg Oral 5x Daily    insulin glargine  10 Units Subcutaneous Nightly    insulin lispro  0-6 Units Subcutaneous TID WC    insulin lispro  0-3 Units Subcutaneous Nightly    rosuvastatin  40 mg Oral Nightly    sacubitril-valsartan  0.5 tablet Oral BID    spironolactone  25 mg Oral Daily    traZODone  50 mg Oral Nightly    vitamin B-12  1,000 mcg Oral Daily    sodium chloride flush  5-40 mL Intravenous 2 times per day     PRN Meds: HYDROmorphone, cyclobenzaprine, nitroGLYCERIN, oxyCODONE-acetaminophen, glucose, dextrose, glucagon (rDNA), dextrose, zolpidem, sodium chloride flush, sodium chloride, ondansetron **OR** ondansetron, polyethylene glycol, acetaminophen **OR** acetaminophen      Intake/Output Summary (Last 24 hours) at 6/19/2021 1348  Last data filed at 6/19/2021 1232  Gross per 24 hour   Intake 1080 ml   Output --   Net 1080 ml       Physical Exam Performed:    BP (!) 148/60   Pulse 65   Temp 97.8 °F (36.6 °C) (Oral)   Resp 18   Ht 6' (1.829 m)   Wt 201 lb (91.2 kg)   SpO2 98%   BMI 27.26 kg/m²     General appearance: No apparent distress, appears stated age and cooperative. HEENT: Pupils equal, round, and reactive to light. Conjunctivae/corneas clear. Neck: Supple, with full range of motion. No jugular venous distention. Trachea midline.   Respiratory:  Normal respiratory effort. Clear to auscultation, bilaterally without Rales/Wheezes/Rhonchi. Cardiovascular: Regular rate and rhythm with normal S1/S2 without murmurs, rubs or gallops. Abdomen: Soft, non-tender, non-distended with normal bowel sounds. Musculoskeletal: No clubbing, cyanosis or edema bilaterally. Full range of motion without deformity. Skin: Skin color, texture, turgor normal.  No rashes or lesions. Neurologic:  Neurovascularly intact without any focal sensory/motor deficits. Cranial nerves: II-XII intact, grossly non-focal.  Psychiatric: Alert and oriented, thought content appropriate, normal insight  Capillary Refill: Brisk,3 seconds, normal   Peripheral Pulses: +2 palpable, equal bilaterally       Labs:   Recent Labs     06/17/21  1438 06/18/21  0622 06/19/21  0904   WBC 5.8 4.7 5.5   HGB 13.0* 11.7* 12.0*   HCT 38.2* 34.0* 35.7*    229 252     Recent Labs     06/17/21  1438 06/18/21  0622 06/19/21  0904   * 138 134*   K 5.1 4.4 5.0    104 102   CO2 23 24 24   BUN 24* 26* 23*   CREATININE 1.2 1.4* 1.2   CALCIUM 8.6 8.4 8.3     Recent Labs     06/17/21  1438 06/18/21  0622   AST 13* 12*   ALT 10 9*   BILIDIR  --  <0.2   BILITOT <0.2 <0.2   ALKPHOS 56 49     No results for input(s): INR in the last 72 hours. No results for input(s): [place] in the last 72 hours. Urinalysis:      Lab Results   Component Value Date    NITRU Negative 08/29/2020    WBCUA 1 08/29/2020    RBCUA 1 08/29/2020    BLOODU Negative 08/29/2020    SPECGRAV >1.030 08/29/2020    GLUCOSEU 100 08/29/2020       Radiology:  CT ABDOMEN PELVIS W WO CONTRAST Additional Contrast? None   Final Result   1. A 3.5 cm x 2.3 cm lesion in the left hemiliver and a 0.8 cm x 0.5 cm   lesion in hepatic segment 7 correspond with the recent CT findings. There is   centripetal enhancement of the larger lesion that could be seen with a   hemangioma, but the finding does not appear to be present on older studies.    The smaller lesion

## 2021-06-19 NOTE — PROGRESS NOTES
Pt states he does not take Advair or Symbicort at home and refuses Symbicort inhaler while in patient

## 2021-06-19 NOTE — PLAN OF CARE
Problem: Falls - Risk of:  Goal: Will remain free from falls  Description: Will remain free from falls  6/19/2021 0329 by Denilson Metz RN  Outcome: Ongoing     Problem: Pain:  Goal: Pain level will decrease  Description: Pain level will decrease  6/19/2021 0329 by Denilson Metz RN  Outcome: Ongoing

## 2021-06-20 LAB
GLUCOSE BLD-MCNC: 124 MG/DL (ref 70–99)
GLUCOSE BLD-MCNC: 126 MG/DL (ref 70–99)
GLUCOSE BLD-MCNC: 88 MG/DL (ref 70–99)
GLUCOSE BLD-MCNC: 89 MG/DL (ref 70–99)
PERFORMED ON: ABNORMAL
PERFORMED ON: ABNORMAL
PERFORMED ON: NORMAL
PERFORMED ON: NORMAL

## 2021-06-20 PROCEDURE — 1200000000 HC SEMI PRIVATE

## 2021-06-20 PROCEDURE — 6370000000 HC RX 637 (ALT 250 FOR IP): Performed by: HOSPITALIST

## 2021-06-20 PROCEDURE — 6370000000 HC RX 637 (ALT 250 FOR IP): Performed by: INTERNAL MEDICINE

## 2021-06-20 PROCEDURE — 2580000003 HC RX 258: Performed by: HOSPITALIST

## 2021-06-20 PROCEDURE — 6360000002 HC RX W HCPCS: Performed by: INTERNAL MEDICINE

## 2021-06-20 PROCEDURE — 94760 N-INVAS EAR/PLS OXIMETRY 1: CPT

## 2021-06-20 RX ORDER — ASPIRIN 81 MG/1
81 TABLET, CHEWABLE ORAL DAILY
Status: DISCONTINUED | OUTPATIENT
Start: 2021-06-20 | End: 2021-06-21 | Stop reason: HOSPADM

## 2021-06-20 RX ORDER — CLOPIDOGREL BISULFATE 75 MG/1
75 TABLET ORAL DAILY
Status: DISCONTINUED | OUTPATIENT
Start: 2021-06-20 | End: 2021-06-21 | Stop reason: HOSPADM

## 2021-06-20 RX ADMIN — GABAPENTIN 600 MG: 300 CAPSULE ORAL at 07:39

## 2021-06-20 RX ADMIN — OXYCODONE HYDROCHLORIDE 20 MG: 10 TABLET, FILM COATED, EXTENDED RELEASE ORAL at 08:00

## 2021-06-20 RX ADMIN — ROSUVASTATIN CALCIUM 40 MG: 40 TABLET, FILM COATED ORAL at 20:31

## 2021-06-20 RX ADMIN — GABAPENTIN 600 MG: 300 CAPSULE ORAL at 16:59

## 2021-06-20 RX ADMIN — POLYETHYLENE GLYCOL 3350 17 G: 17 POWDER, FOR SOLUTION ORAL at 20:31

## 2021-06-20 RX ADMIN — APIXABAN 5 MG: 5 TABLET, FILM COATED ORAL at 20:31

## 2021-06-20 RX ADMIN — APIXABAN 5 MG: 5 TABLET, FILM COATED ORAL at 07:59

## 2021-06-20 RX ADMIN — CYANOCOBALAMIN TAB 1000 MCG 1000 MCG: 1000 TAB at 08:00

## 2021-06-20 RX ADMIN — OXYCODONE AND ACETAMINOPHEN 1 TABLET: 10; 325 TABLET ORAL at 04:16

## 2021-06-20 RX ADMIN — SPIRONOLACTONE 25 MG: 25 TABLET ORAL at 08:00

## 2021-06-20 RX ADMIN — HYDROMORPHONE HYDROCHLORIDE 1 MG: 1 INJECTION, SOLUTION INTRAMUSCULAR; INTRAVENOUS; SUBCUTANEOUS at 09:51

## 2021-06-20 RX ADMIN — CARVEDILOL 6.25 MG: 6.25 TABLET, FILM COATED ORAL at 07:59

## 2021-06-20 RX ADMIN — POLYETHYLENE GLYCOL 3350 17 G: 17 POWDER, FOR SOLUTION ORAL at 08:00

## 2021-06-20 RX ADMIN — TRAZODONE HYDROCHLORIDE 50 MG: 50 TABLET ORAL at 20:30

## 2021-06-20 RX ADMIN — DOCUSATE SODIUM 100 MG: 100 CAPSULE, LIQUID FILLED ORAL at 08:00

## 2021-06-20 RX ADMIN — SACUBITRIL AND VALSARTAN 0.5 TABLET: 49; 51 TABLET, FILM COATED ORAL at 20:31

## 2021-06-20 RX ADMIN — GABAPENTIN 600 MG: 300 CAPSULE ORAL at 11:52

## 2021-06-20 RX ADMIN — HYDROMORPHONE HYDROCHLORIDE 1 MG: 1 INJECTION, SOLUTION INTRAMUSCULAR; INTRAVENOUS; SUBCUTANEOUS at 06:14

## 2021-06-20 RX ADMIN — HYDROMORPHONE HYDROCHLORIDE 1 MG: 1 INJECTION, SOLUTION INTRAMUSCULAR; INTRAVENOUS; SUBCUTANEOUS at 22:23

## 2021-06-20 RX ADMIN — SODIUM CHLORIDE, PRESERVATIVE FREE 10 ML: 5 INJECTION INTRAVENOUS at 09:52

## 2021-06-20 RX ADMIN — ASPIRIN 81 MG: 81 TABLET, CHEWABLE ORAL at 13:58

## 2021-06-20 RX ADMIN — OXYCODONE HYDROCHLORIDE 20 MG: 10 TABLET, FILM COATED, EXTENDED RELEASE ORAL at 20:31

## 2021-06-20 RX ADMIN — HYDROMORPHONE HYDROCHLORIDE 1 MG: 1 INJECTION, SOLUTION INTRAMUSCULAR; INTRAVENOUS; SUBCUTANEOUS at 17:59

## 2021-06-20 RX ADMIN — HYDROMORPHONE HYDROCHLORIDE 1 MG: 1 INJECTION, SOLUTION INTRAMUSCULAR; INTRAVENOUS; SUBCUTANEOUS at 13:58

## 2021-06-20 RX ADMIN — INSULIN GLARGINE 10 UNITS: 100 INJECTION, SOLUTION SUBCUTANEOUS at 20:34

## 2021-06-20 RX ADMIN — DOCUSATE SODIUM 100 MG: 100 CAPSULE, LIQUID FILLED ORAL at 20:30

## 2021-06-20 RX ADMIN — OXYCODONE AND ACETAMINOPHEN 1 TABLET: 10; 325 TABLET ORAL at 11:52

## 2021-06-20 RX ADMIN — CARVEDILOL 6.25 MG: 6.25 TABLET, FILM COATED ORAL at 16:59

## 2021-06-20 RX ADMIN — CLOPIDOGREL BISULFATE 75 MG: 75 TABLET ORAL at 13:58

## 2021-06-20 RX ADMIN — HYDROMORPHONE HYDROCHLORIDE 1 MG: 1 INJECTION, SOLUTION INTRAMUSCULAR; INTRAVENOUS; SUBCUTANEOUS at 01:23

## 2021-06-20 RX ADMIN — GABAPENTIN 600 MG: 300 CAPSULE ORAL at 19:06

## 2021-06-20 RX ADMIN — SACUBITRIL AND VALSARTAN 0.5 TABLET: 49; 51 TABLET, FILM COATED ORAL at 09:51

## 2021-06-20 ASSESSMENT — PAIN DESCRIPTION - ONSET
ONSET: ON-GOING

## 2021-06-20 ASSESSMENT — PAIN SCALES - GENERAL
PAINLEVEL_OUTOF10: 7
PAINLEVEL_OUTOF10: 7
PAINLEVEL_OUTOF10: 5
PAINLEVEL_OUTOF10: 5
PAINLEVEL_OUTOF10: 7
PAINLEVEL_OUTOF10: 7
PAINLEVEL_OUTOF10: 9
PAINLEVEL_OUTOF10: 9
PAINLEVEL_OUTOF10: 8
PAINLEVEL_OUTOF10: 8
PAINLEVEL_OUTOF10: 5
PAINLEVEL_OUTOF10: 6
PAINLEVEL_OUTOF10: 7
PAINLEVEL_OUTOF10: 5
PAINLEVEL_OUTOF10: 6
PAINLEVEL_OUTOF10: 6
PAINLEVEL_OUTOF10: 9

## 2021-06-20 ASSESSMENT — PAIN DESCRIPTION - FREQUENCY
FREQUENCY: CONTINUOUS

## 2021-06-20 ASSESSMENT — PAIN - FUNCTIONAL ASSESSMENT
PAIN_FUNCTIONAL_ASSESSMENT: PREVENTS OR INTERFERES SOME ACTIVE ACTIVITIES AND ADLS
PAIN_FUNCTIONAL_ASSESSMENT: PREVENTS OR INTERFERES SOME ACTIVE ACTIVITIES AND ADLS
PAIN_FUNCTIONAL_ASSESSMENT: ACTIVITIES ARE NOT PREVENTED
PAIN_FUNCTIONAL_ASSESSMENT: ACTIVITIES ARE NOT PREVENTED
PAIN_FUNCTIONAL_ASSESSMENT: PREVENTS OR INTERFERES SOME ACTIVE ACTIVITIES AND ADLS

## 2021-06-20 ASSESSMENT — PAIN DESCRIPTION - DESCRIPTORS
DESCRIPTORS: STABBING
DESCRIPTORS: SHARP

## 2021-06-20 ASSESSMENT — PAIN DESCRIPTION - PROGRESSION
CLINICAL_PROGRESSION: NOT CHANGED

## 2021-06-20 ASSESSMENT — PAIN DESCRIPTION - ORIENTATION
ORIENTATION: RIGHT;LOWER
ORIENTATION: RIGHT

## 2021-06-20 ASSESSMENT — PAIN DESCRIPTION - LOCATION
LOCATION: ABDOMEN

## 2021-06-20 ASSESSMENT — PAIN DESCRIPTION - PAIN TYPE
TYPE: ACUTE PAIN

## 2021-06-20 NOTE — PROGRESS NOTES
Gastroenterology Progress Note    Katelynn Waldron is a 72 y.o. male patient. Hospitalization Day:3    SUBJECTIVE:  Pain better controlled. CT repeated yesterday. No vomiting or nausea. ROS:  Gastrointestinal ROS: positive for - abdominal pain    Physical    VITALS:  /66   Pulse 56   Temp 97.8 °F (36.6 °C) (Oral)   Resp 18   Ht 6' (1.829 m)   Wt 188 lb 0.8 oz (85.3 kg)   SpO2 93%   BMI 25.50 kg/m²   TEMPERATURE:  Current - Temp: 97.8 °F (36.6 °C); Max - Temp  Av.9 °F (36.6 °C)  Min: 97.8 °F (36.6 °C)  Max: 98 °F (36.7 °C)       General:  Alert and oriented,  No apparent distress  Skin- without jaundice  Eyes: anicteric sclera  Cardiac: RRR, Nl s1s2, without murmurs  Lungs CTA Bilaterally, normal effort  Abdomen soft, with focal tenderness in the right mid to lower quadrant.  There was a 3-4 inch area that was lateral to the umbilicus.   No obvious hernias were noted.   No G/R were noted   Negative Carnett's and Clifton's sign  Ext: without edema  Neuro: no asterixis     Data    Data Review:    Recent Labs     21  14321  0621  0904   WBC 5.8 4.7 5.5   HGB 13.0* 11.7* 12.0*   HCT 38.2* 34.0* 35.7*   .3* 101.4* 101.4*    229 252     Recent Labs     21  1438 21  0622 21  0904   * 138 134*   K 5.1 4.4 5.0    104 102   CO2 23 24 24   BUN 24* 26* 23*   CREATININE 1.2 1.4* 1.2     Recent Labs     218 21  0622   AST 13* 12*   ALT 10 9*   BILIDIR  --  <0.2   BILITOT <0.2 <0.2   ALKPHOS 56 49     Recent Labs     21  1438   LIPASE 17.0     No results for input(s): PROTIME, INR in the last 72 hours. Radiology Review:    CT ABDOMEN PELVIS W WO CONTRAST Additional Contrast? None   Final Result   1. A 3.5 cm x 2.3 cm lesion in the left hemiliver and a 0.8 cm x 0.5 cm   lesion in hepatic segment 7 correspond with the recent CT findings.   There is   centripetal enhancement of the larger lesion that could be seen with a   hemangioma, but the finding does not appear to be present on older studies. The smaller lesion may also demonstrate delayed enhancement. Cholangiocarcinoma and metastatic disease should be considered. Consider   sonography and or short-term follow-up in 3-6 months. 2. Additional incidental findings as above. CT ABDOMEN PELVIS W IV CONTRAST Additional Contrast? None   Final Result   1. No acute process demonstrated   2. New indeterminate low-attenuation liver lesions. Finding is concerning   for hepatic metastatic disease. A primary malignancy or other findings of   abdominopelvic metastatic disease are not evident on this exam.  MRI would   better characterize the finding   3. Stable known left lower lobe nodule               ASSESSMENT:72year-old  male with extensive past medical history for coronary disease with prior CABG, PCI, systolic heart failure with an ejection fraction of 35%, diabetes, hypertension, previous TIA, COPD, on ASA/Plavix/Eliquis, who presented with several days of worsening right periumbilical abdominal pain        1) Acute right periumbilical abdominal pain- Paint is very focal and is worsened with lying flat and movement/palpation. ? Panniculitis vs musculoskeletal strain. No appendicitis or hernia. The patient's pain is not consistent with biliary colic or any mesenteric ischemia.  Liver lesions are not contributing to his pain.  He has no symptoms to suggest colitis or enteritis.  He does endorse some nonspecific weight loss of over 100 pounds in the past year, although he recently gained some weight back. Had prior EGD and colonoscopy in 2017. CEA normal     2) Nonspecific liver lesions- Repeat CT shows delayed enhancement o the 3cm left lesion which is consistent with hemangioma. The smaller right lobe lesion was not enhancing. The radiologist recommended US follow up in 3 months.   These were new since his prior CT in 2020. Kirsten Fernandez has no known history of cirrhosis.  His liver enzymes are normal.  He has no symptoms to suggest a pyogenic liver abscess.  AFP and CA-19-9 pending. CEA normal.  He did have an EGD, colonoscopy, and capsule endoscopy in 2017 that were unrevealing without any high risk features for malignancy, and there are no signs of intra-abdominal or intrathoracic malignancy noted on CT scans.    3) Unintentional weight loss. - as per above. CEA normal.   4) CAD with prior CABG and ischemic cardiomyopathy and EF 35%  5) Macrocytic anemia- stable  6) JARRETT- resolved  7) Hx of TIA     Plan:  OK for D/C home in this evening or in am  Would not plan for a liver biopsy in light of relatively benign characteristics of left liver lesion (suspected hemangioma) and right lesion being in a small and difficult location to biopsy percutaneously. Patient will need close interval follow up on US in 3 months to ensure stability of these liver lesions. Follow up AFP, CA-19-9  Eliquis continued. If no biopsy planned can resume Plavix  Will follow up over weekend  No immediate plans for repeating EGD and colonoscopy at this time. Supportive care of RLQ pain. Thank you for allowing me to participate in the care of your patient. Please feel free to contact me with any concerns.    A[address] K[address] Marco A, DO

## 2021-06-20 NOTE — PROGRESS NOTES
Notified patient of discharge order. Patient very upset and states \" I cant leave. I am in way too much pain. I was just going to call you and ask for a pain shot. \" Requesting to speak to MD regarding pain management.  Paged Dr Aisha Alexander

## 2021-06-20 NOTE — DISCHARGE SUMMARY
Hospital Medicine Discharge Summary    Patient ID: Ifeanyi Calzada      Patient's PCP: Lyubov Breaux MD    Admit Date: 6/17/2021     Discharge Date:   6/20/2021    Admitting Physician: Bonilla Sweet MD     Discharge Physician: Dagoberto Knight MD     Discharge Diagnoses: Active Hospital Problems    Diagnosis     Intractable abdominal pain [R10.9]        The patient was seen and examined on day of discharge and this discharge summary is in conjunction with any daily progress note from day of discharge. Hospital Course: 72yo man with extensive cardiac Hx s/p multiple PTCA and LHC and pacemaker in place and chronic systolic CHF, and chronic pain and opiate dependent, presented with R abdominal pain. Abd pain. Unlikely related to liver nodules. LA - normal - making mesenteric ischemia less likely. Cont PRN pain control.               - return to chronic pain regimen on discharge - no new opiate scripts on discharge.         Liver nodules - ?if metastatic process. Primary unclear at this time. Biopsy will be difficult and high risk due to both location of the lesions and pt's extensive cardiac Hx of DAPT and eliquis. Discussed with Dr Yannick Mukherjee. ASA and plavix were put hold as of 6/18. GI involved. - liver protocol CT actually fairly reassuring.    - noted GI recs for short term (3 months) US follow up (ordered in Epic). Biopsy plans are now on hold. - I will resume his ASA and plavix and ok to discharge home today.          CAD   Chronic Systolic CHF EF 76-48%. Pacemaker in place. Last PTCA stent 4 months ago. Recent LHC - occluded vessels too small to be stented. Cont PTA regimen. Pt will be too high risk to just stop his [facility] for biopsy. - so we are holding ASA and plavix.                - plan to bridge with heparin gtt in the kamila biopsy period.               Physical Exam Performed:     /66   Pulse 56   Temp 97.8 °F (36.6 °C) (Oral)   Resp 18   Ht 6' (1.829 m)   Wt 188 lb 0.8 oz (85.3 kg)   SpO2 93%   BMI 25.50 kg/m²     General appearance: No apparent distress, appears stated age and cooperative. HEENT: Pupils equal, round, and reactive to light. Conjunctivae/corneas clear. Neck: Supple, with full range of motion. No jugular venous distention. Trachea midline. Respiratory:  Normal respiratory effort. Clear to auscultation, bilaterally without Rales/Wheezes/Rhonchi. Cardiovascular: Regular rate and rhythm with normal S1/S2 without murmurs, rubs or gallops. Abdomen: Soft, non-tender, non-distended with normal bowel sounds. Musculoskeletal: No clubbing, cyanosis or edema bilaterally. Full range of motion without deformity. Skin: Skin color, texture, turgor normal.  No rashes or lesions. Neurologic:  Neurovascularly intact without any focal sensory/motor deficits. Cranial nerves: II-XII intact, grossly non-focal.  Psychiatric: Alert and oriented, thought content appropriate, normal insight  Capillary Refill: Brisk,3 seconds, normal   Peripheral Pulses: +2 palpable, equal bilaterally          Labs: For convenience and continuity at follow-up the following most recent labs are provided:      CBC:    Lab Results   Component Value Date    WBC 5.5 06/19/2021    HGB 12.0 06/19/2021    HCT 35.7 06/19/2021     06/19/2021       Renal:    Lab Results   Component Value Date     06/19/2021    K 5.0 06/19/2021    K 4.4 06/18/2021     06/19/2021    CO2 24 06/19/2021    BUN 23 06/19/2021    CREATININE 1.2 06/19/2021    CALCIUM 8.3 06/19/2021    PHOS 4.1 07/08/2020         Significant Diagnostic Studies    Radiology:   CT ABDOMEN PELVIS W WO CONTRAST Additional Contrast? None   Final Result   1. A 3.5 cm x 2.3 cm lesion in the left hemiliver and a 0.8 cm x 0.5 cm   lesion in hepatic segment 7 correspond with the recent CT findings.   There is   centripetal enhancement of the larger lesion that could be seen with a hemangioma, but the finding does not appear to be present on older studies. The smaller lesion may also demonstrate delayed enhancement. Cholangiocarcinoma and metastatic disease should be considered. Consider   sonography and or short-term follow-up in 3-6 months. 2. Additional incidental findings as above. CT ABDOMEN PELVIS W IV CONTRAST Additional Contrast? None   Final Result   1. No acute process demonstrated   2. New indeterminate low-attenuation liver lesions. Finding is concerning   for hepatic metastatic disease. A primary malignancy or other findings of   abdominopelvic metastatic disease are not evident on this exam.  MRI would   better characterize the finding   3. Stable known left lower lobe nodule         US LIVER    (Results Pending)          Consults:     IP CONSULT TO HOSPITALIST  IP CONSULT TO GI  IP CONSULT TO HEM/ONC  IP CONSULT TO GI    Disposition:  home     Condition at Discharge: Stable    Discharge Instructions/Follow-up:  GI with Liver US 3 months - ordered. Code Status:  Full Code     Activity: activity as tolerated    Diet: regular diet cardiac      Discharge Medications:     Current Discharge Medication List           Details   cyclobenzaprine (FLEXERIL) 5 MG tablet Take 5 mg by mouth 3 times daily as needed      vitamin B-12 (CYANOCOBALAMIN) 1000 MCG tablet Take 1,000 mcg by mouth daily      traZODone (DESYREL) 50 MG tablet Take 50 mg by mouth nightly      zolpidem (AMBIEN) 5 MG tablet TAKE 1 TABLET BY MOUTH NIGHTLY AS NEEDED FOR SLEEP FOR UP TO 30 DAYS. Qty: 30 tablet, Refills: 0    Comments: REFILL REQUESTED 6/11/21 @@ 9:15 A. M. DLB  Associated Diagnoses: Primary insomnia      oxyCODONE-acetaminophen (PERCOCET)  MG per tablet Take 1 tablet by mouth every 8 hours as needed for Pain for up to 30 days.   Qty: 90 tablet, Refills: 0    Comments: Reduce doses taken as pain becomes manageable  Associated Diagnoses: Chronic midline low back pain without sciatica gabapentin (NEURONTIN) 600 MG tablet TAKE ONE TABLET BY MOUTH FIVE TIMES A DAY  Qty: 150 tablet, Refills: 2    Comments: REFILL REQUESTED WEDS 5/19/21 @@ 9:28AM  Associated Diagnoses: Neuropathy      aspirin EC 81 MG EC tablet Take 1 tablet by mouth daily  Qty: 90 tablet, Refills: 1      fluticasone-salmeterol (ADVAIR DISKUS) 100-50 MCG/DOSE diskus inhaler Inhale 2 puffs into the lungs 2 times daily wixela inhub inhaler ( generic advair diskus)  Qty: 60 each, Refills: 3      insulin glargine (LANTUS SOLOSTAR) 100 UNIT/ML injection pen Inject 10 Units into the skin nightly  Qty: 5 pen, Refills: 3    Associated Diagnoses: Type 2 diabetes mellitus without complication, with long-term current use of insulin ([facility])      spironolactone (ALDACTONE) 25 MG tablet Take 25 mg by mouth daily       carvedilol (COREG) 12.5 MG tablet Take 0.5 tablets by mouth 2 times daily (with meals)  Qty: 60 tablet, Refills: 3      sacubitril-valsartan (ENTRESTO) 49-51 MG per tablet Take 0.5 tablets by mouth 2 times daily  Qty: 60 tablet, Refills: 0      clopidogrel (PLAVIX) 75 MG tablet Take 75 mg by mouth daily      apixaban (ELIQUIS) 5 MG TABS tablet Take 1 tablet by mouth 2 times daily  Qty: 60 tablet, Refills: 1      empagliflozin (JARDIANCE) 10 MG tablet Take 1 tablet by mouth daily  Qty: 30 tablet, Refills: 0      acetaminophen (TYLENOL) 325 MG tablet Take 650 mg by mouth every 6 hours as needed for Pain      Insulin Pen Needle 32G X 4 MM MISC 1 each by Does not apply route daily  Qty: 100 each, Refills: 3    Comments: Brand per insurance preference.   Associated Diagnoses: Type 2 diabetes mellitus without complication, with long-term current use of insulin ([facility])      glucose monitoring kit (FREESTYLE) monitoring kit 1 kit by Does not apply route daily  Qty: 1 kit, Refills: 0    Comments: Brand per insurance preference  Associated Diagnoses: Type 2 diabetes mellitus without complication, with long-term current use of insulin ([address] nitroGLYCERIN (NITROSTAT) 0.4 MG SL tablet Place 0.4 mg under the tongue every 5 minutes as needed      rosuvastatin (CRESTOR) 40 MG tablet Take 40 mg by mouth every evening      Respiratory Therapy Supplies (NEBULIZER) AALIYAH Used to give yourself breathing treatment  Qty: 1 Device, Refills: 0             Time Spent on discharge is more than 30 minutes in the examination, evaluation, counseling and review of medications and discharge plan. Signed:    Brtet Ceja MD   6/20/2021      Thank you Lois Turner MD for the opportunity to be involved in this patient's care. If you have any questions or concerns please feel free to contact me at [phone].

## 2021-06-20 NOTE — PROGRESS NOTES
Hospitalist Progress Note      PCP: Birgit Chan MD    Date of Admission: 6/17/2021    Chief Complaint: R side abd pain. Subjective:     Ongoing complaints of abd pain. No emesis, no diarrhea. Medications:  Reviewed    Infusion Medications    IV infusion builder Stopped (06/19/21 1411)    dextrose      sodium chloride       Scheduled Medications    aspirin  81 mg Oral Daily    clopidogrel  75 mg Oral Daily    oxyCODONE  20 mg Oral 2 times per day    docusate sodium  100 mg Oral BID    polyethylene glycol  17 g Oral BID    apixaban  5 mg Oral BID    carvedilol  6.25 mg Oral BID WC    budesonide-formoterol  2 puff Inhalation BID    gabapentin  600 mg Oral 5x Daily    insulin glargine  10 Units Subcutaneous Nightly    insulin lispro  0-6 Units Subcutaneous TID WC    insulin lispro  0-3 Units Subcutaneous Nightly    rosuvastatin  40 mg Oral Nightly    sacubitril-valsartan  0.5 tablet Oral BID    spironolactone  25 mg Oral Daily    traZODone  50 mg Oral Nightly    vitamin B-12  1,000 mcg Oral Daily    sodium chloride flush  5-40 mL Intravenous 2 times per day     PRN Meds: HYDROmorphone, cyclobenzaprine, nitroGLYCERIN, oxyCODONE-acetaminophen, glucose, dextrose, glucagon (rDNA), dextrose, zolpidem, sodium chloride flush, sodium chloride, ondansetron **OR** ondansetron, polyethylene glycol, acetaminophen **OR** acetaminophen      Intake/Output Summary (Last 24 hours) at 6/20/2021 1525  Last data filed at 6/20/2021 1327  Gross per 24 hour   Intake 960 ml   Output --   Net 960 ml       Physical Exam Performed:    BP (!) 174/92   Pulse 70   Temp 98.4 °F (36.9 °C) (Oral)   Resp 16   Ht 6' (1.829 m)   Wt 188 lb 0.8 oz (85.3 kg)   SpO2 97%   BMI 25.50 kg/m²     General appearance: No apparent distress, appears stated age and cooperative. HEENT: Pupils equal, round, and reactive to light. Conjunctivae/corneas clear. Neck: Supple, with full range of motion.  No jugular venous distention. Trachea midline. Respiratory:  Normal respiratory effort. Clear to auscultation, bilaterally without Rales/Wheezes/Rhonchi. Cardiovascular: Regular rate and rhythm with normal S1/S2 without murmurs, rubs or gallops. Abdomen: Soft, non-tender, non-distended with normal bowel sounds. Musculoskeletal: No clubbing, cyanosis or edema bilaterally. Full range of motion without deformity. Skin: Skin color, texture, turgor normal.  No rashes or lesions. Neurologic:  Neurovascularly intact without any focal sensory/motor deficits. Cranial nerves: II-XII intact, grossly non-focal.  Psychiatric: Alert and oriented, thought content appropriate, normal insight  Capillary Refill: Brisk,3 seconds, normal   Peripheral Pulses: +2 palpable, equal bilaterally       Labs:   Recent Labs     06/18/21 0622 06/19/21  0904   WBC 4.7 5.5   HGB 11.7* 12.0*   HCT 34.0* 35.7*    252     Recent Labs     06/18/21 0622 06/19/21  0904    134*   K 4.4 5.0    102   CO2 24 24   BUN 26* 23*   CREATININE 1.4* 1.2   CALCIUM 8.4 8.3     Recent Labs     06/18/21 0622   AST 12*   ALT 9*   BILIDIR <0.2   BILITOT <0.2   ALKPHOS 49     No results for input(s): INR in the last 72 hours. No results for input(s): Lakhwinder Ang in the last 72 hours. Urinalysis:      Lab Results   Component Value Date    NITRU Negative 08/29/2020    WBCUA 1 08/29/2020    RBCUA 1 08/29/2020    BLOODU Negative 08/29/2020    SPECGRAV >1.030 08/29/2020    GLUCOSEU 100 08/29/2020       Radiology:  CT ABDOMEN PELVIS W WO CONTRAST Additional Contrast? None   Final Result   1. A 3.5 cm x 2.3 cm lesion in the left hemiliver and a 0.8 cm x 0.5 cm   lesion in hepatic segment 7 correspond with the recent CT findings. There is   centripetal enhancement of the larger lesion that could be seen with a   hemangioma, but the finding does not appear to be present on older studies. The smaller lesion may also demonstrate delayed enhancement. Cholangiocarcinoma and metastatic disease should be considered. Consider   sonography and or short-term follow-up in 3-6 months. 2. Additional incidental findings as above. CT ABDOMEN PELVIS W IV CONTRAST Additional Contrast? None   Final Result   1. No acute process demonstrated   2. New indeterminate low-attenuation liver lesions. Finding is concerning   for hepatic metastatic disease. A primary malignancy or other findings of   abdominopelvic metastatic disease are not evident on this exam.  MRI would   better characterize the finding   3. Stable known left lower lobe nodule         US LIVER    (Results Pending)           Assessment/Plan:    Active Hospital Problems    Diagnosis     Intractable abdominal pain [R10.9]           Abd pain. Unlikely related to liver nodules. LA - normal - making mesenteric ischemia less likely. Cont PRN pain control.               - return to chronic pain regimen on discharge - no new opiate scripts on discharge.         Liver nodules - ?if metastatic process. Primary unclear at this time. Biopsy will be difficult and high risk due to both location of the lesions and pt's extensive cardiac Hx of DAPT and eliquis. Discussed with Dr Torres Steven. ASA and plavix were put hold as of 6/18. GI involved. - liver protocol CT actually fairly reassuring.               - noted GI recs for short term (3 months) US follow up (ordered in Epic). Biopsy plans are now on hold. - I will resume his ASA and plavix and ok to discharge home today.          CAD   Chronic Systolic CHF EF 53-10%. Pacemaker in place. Last PTCA stent 4 months ago. Recent LHC - occluded vessels too small to be stented. Cont PTA regimen. ASA and plavix resumed              Pt refused discharge today due to ongoing abdominal pain. DVT Prophylaxis: on eliquis. Diet: ADULT DIET; Regular; 3 carb choices (45 gm/meal);  Low Sodium (2 gm); 2000 ml  Code Status: Full Code        Dispo - inpatient.      Gorge Peres MD

## 2021-06-20 NOTE — PROGRESS NOTES
Discussed discharge with Dr Maria Esther Foreman and Dr Aisha Alexander. Patient complaining of severe pain in abdomen. Has not improved and requiring oxycontin, percocet and dilaudid around the clock to keep pain manageable. Patient states his percocet for chronic pain is prescribed by his PCP and has no way of contacting them on Sunday for pain medication. Very anxious, requesting to discharge tomorrow for better pain med transition. Discharge order on hold per Dr Aisha Alexander.

## 2021-06-21 VITALS
OXYGEN SATURATION: 94 % | HEIGHT: 72 IN | RESPIRATION RATE: 18 BRPM | WEIGHT: 182.98 LBS | HEART RATE: 73 BPM | DIASTOLIC BLOOD PRESSURE: 67 MMHG | TEMPERATURE: 97.6 F | BODY MASS INDEX: 24.78 KG/M2 | SYSTOLIC BLOOD PRESSURE: 117 MMHG

## 2021-06-21 LAB
ALBUMIN SERPL-MCNC: 3.5 G/DL (ref 3.4–5)
ALP BLD-CCNC: 50 U/L (ref 40–129)
ALT SERPL-CCNC: 8 U/L (ref 10–40)
ANION GAP SERPL CALCULATED.3IONS-SCNC: 12 MMOL/L (ref 3–16)
AST SERPL-CCNC: 15 U/L (ref 15–37)
BASOPHILS ABSOLUTE: 0.1 K/UL (ref 0–0.2)
BASOPHILS RELATIVE PERCENT: 1.4 %
BILIRUB SERPL-MCNC: <0.2 MG/DL (ref 0–1)
BILIRUBIN DIRECT: <0.2 MG/DL (ref 0–0.3)
BILIRUBIN, INDIRECT: ABNORMAL MG/DL (ref 0–1)
BUN BLDV-MCNC: 26 MG/DL (ref 7–20)
CALCIUM SERPL-MCNC: 8.7 MG/DL (ref 8.3–10.6)
CHLORIDE BLD-SCNC: 104 MMOL/L (ref 99–110)
CO2: 21 MMOL/L (ref 21–32)
CREAT SERPL-MCNC: 1.3 MG/DL (ref 0.8–1.3)
EOSINOPHILS ABSOLUTE: 0.2 K/UL (ref 0–0.6)
EOSINOPHILS RELATIVE PERCENT: 3 %
GFR AFRICAN AMERICAN: >60
GFR NON-AFRICAN AMERICAN: 55
GLUCOSE BLD-MCNC: 88 MG/DL (ref 70–99)
GLUCOSE BLD-MCNC: 92 MG/DL (ref 70–99)
HCT VFR BLD CALC: 39.5 % (ref 40.5–52.5)
HEMOGLOBIN: 13.2 G/DL (ref 13.5–17.5)
LYMPHOCYTES ABSOLUTE: 1.9 K/UL (ref 1–5.1)
LYMPHOCYTES RELATIVE PERCENT: 34.4 %
MCH RBC QN AUTO: 34 PG (ref 26–34)
MCHC RBC AUTO-ENTMCNC: 33.5 G/DL (ref 31–36)
MCV RBC AUTO: 101.5 FL (ref 80–100)
MONOCYTES ABSOLUTE: 0.4 K/UL (ref 0–1.3)
MONOCYTES RELATIVE PERCENT: 7.7 %
NEUTROPHILS ABSOLUTE: 2.9 K/UL (ref 1.7–7.7)
NEUTROPHILS RELATIVE PERCENT: 53.5 %
PDW BLD-RTO: 14.2 % (ref 12.4–15.4)
PERFORMED ON: NORMAL
PHOSPHORUS: 5.1 MG/DL (ref 2.5–4.9)
PLATELET # BLD: 269 K/UL (ref 135–450)
PMV BLD AUTO: 8.5 FL (ref 5–10.5)
POTASSIUM SERPL-SCNC: 4.7 MMOL/L (ref 3.5–5.1)
RBC # BLD: 3.89 M/UL (ref 4.2–5.9)
REASON FOR REJECTION: NORMAL
REJECTED TEST: NORMAL
SODIUM BLD-SCNC: 137 MMOL/L (ref 136–145)
TOTAL PROTEIN: 6.4 G/DL (ref 6.4–8.2)
WBC # BLD: 5.4 K/UL (ref 4–11)

## 2021-06-21 PROCEDURE — 6370000000 HC RX 637 (ALT 250 FOR IP): Performed by: INTERNAL MEDICINE

## 2021-06-21 PROCEDURE — 2580000003 HC RX 258: Performed by: HOSPITALIST

## 2021-06-21 PROCEDURE — 80069 RENAL FUNCTION PANEL: CPT

## 2021-06-21 PROCEDURE — 36415 COLL VENOUS BLD VENIPUNCTURE: CPT

## 2021-06-21 PROCEDURE — 6370000000 HC RX 637 (ALT 250 FOR IP): Performed by: PHYSICIAN ASSISTANT

## 2021-06-21 PROCEDURE — 6370000000 HC RX 637 (ALT 250 FOR IP): Performed by: HOSPITALIST

## 2021-06-21 PROCEDURE — 94761 N-INVAS EAR/PLS OXIMETRY MLT: CPT

## 2021-06-21 PROCEDURE — 85025 COMPLETE CBC W/AUTO DIFF WBC: CPT

## 2021-06-21 PROCEDURE — 6360000002 HC RX W HCPCS: Performed by: INTERNAL MEDICINE

## 2021-06-21 PROCEDURE — 80076 HEPATIC FUNCTION PANEL: CPT

## 2021-06-21 RX ORDER — LIDOCAINE 4 G/G
1 PATCH TOPICAL DAILY
Status: DISCONTINUED | OUTPATIENT
Start: 2021-06-21 | End: 2021-06-21 | Stop reason: HOSPADM

## 2021-06-21 RX ADMIN — HYDROMORPHONE HYDROCHLORIDE 1 MG: 1 INJECTION, SOLUTION INTRAMUSCULAR; INTRAVENOUS; SUBCUTANEOUS at 04:40

## 2021-06-21 RX ADMIN — CYANOCOBALAMIN TAB 1000 MCG 1000 MCG: 1000 TAB at 08:45

## 2021-06-21 RX ADMIN — POLYETHYLENE GLYCOL 3350 17 G: 17 POWDER, FOR SOLUTION ORAL at 08:45

## 2021-06-21 RX ADMIN — HYDROMORPHONE HYDROCHLORIDE 1 MG: 1 INJECTION, SOLUTION INTRAMUSCULAR; INTRAVENOUS; SUBCUTANEOUS at 09:10

## 2021-06-21 RX ADMIN — GABAPENTIN 600 MG: 300 CAPSULE ORAL at 06:47

## 2021-06-21 RX ADMIN — OXYCODONE HYDROCHLORIDE 20 MG: 10 TABLET, FILM COATED, EXTENDED RELEASE ORAL at 09:48

## 2021-06-21 RX ADMIN — SACUBITRIL AND VALSARTAN 0.5 TABLET: 49; 51 TABLET, FILM COATED ORAL at 08:45

## 2021-06-21 RX ADMIN — CLOPIDOGREL BISULFATE 75 MG: 75 TABLET ORAL at 08:45

## 2021-06-21 RX ADMIN — CYCLOBENZAPRINE 5 MG: 10 TABLET, FILM COATED ORAL at 11:03

## 2021-06-21 RX ADMIN — SPIRONOLACTONE 25 MG: 25 TABLET ORAL at 08:45

## 2021-06-21 RX ADMIN — ASPIRIN 81 MG: 81 TABLET, CHEWABLE ORAL at 08:45

## 2021-06-21 RX ADMIN — SODIUM CHLORIDE, PRESERVATIVE FREE 10 ML: 5 INJECTION INTRAVENOUS at 00:18

## 2021-06-21 RX ADMIN — CARVEDILOL 6.25 MG: 6.25 TABLET, FILM COATED ORAL at 08:45

## 2021-06-21 RX ADMIN — DOCUSATE SODIUM 100 MG: 100 CAPSULE, LIQUID FILLED ORAL at 08:45

## 2021-06-21 RX ADMIN — OXYCODONE AND ACETAMINOPHEN 1 TABLET: 10; 325 TABLET ORAL at 07:49

## 2021-06-21 RX ADMIN — APIXABAN 5 MG: 5 TABLET, FILM COATED ORAL at 08:45

## 2021-06-21 RX ADMIN — GABAPENTIN 600 MG: 300 CAPSULE ORAL at 11:03

## 2021-06-21 RX ADMIN — SODIUM CHLORIDE, PRESERVATIVE FREE 10 ML: 5 INJECTION INTRAVENOUS at 08:49

## 2021-06-21 ASSESSMENT — PAIN DESCRIPTION - ORIENTATION: ORIENTATION: RIGHT

## 2021-06-21 ASSESSMENT — PAIN DESCRIPTION - ONSET: ONSET: ON-GOING

## 2021-06-21 ASSESSMENT — PAIN SCALES - GENERAL
PAINLEVEL_OUTOF10: 8
PAINLEVEL_OUTOF10: 6
PAINLEVEL_OUTOF10: 6
PAINLEVEL_OUTOF10: 7
PAINLEVEL_OUTOF10: 8

## 2021-06-21 ASSESSMENT — PAIN DESCRIPTION - FREQUENCY: FREQUENCY: CONTINUOUS

## 2021-06-21 ASSESSMENT — PAIN DESCRIPTION - PROGRESSION: CLINICAL_PROGRESSION: NOT CHANGED

## 2021-06-21 ASSESSMENT — PAIN DESCRIPTION - LOCATION: LOCATION: ABDOMEN

## 2021-06-21 ASSESSMENT — PAIN DESCRIPTION - DESCRIPTORS: DESCRIPTORS: ACHING;SHARP

## 2021-06-21 ASSESSMENT — PAIN - FUNCTIONAL ASSESSMENT: PAIN_FUNCTIONAL_ASSESSMENT: PREVENTS OR INTERFERES WITH MANY ACTIVE NOT PASSIVE ACTIVITIES

## 2021-06-21 ASSESSMENT — PAIN DESCRIPTION - PAIN TYPE: TYPE: ACUTE PAIN

## 2021-06-21 NOTE — PROGRESS NOTES
Hematology Oncology Daily Progress Note    Admit Date: 6/17/2021  Hospital day several    Subjective:     Patient has complaints of ongoing RLQ abdominal pain--denies sob/cp. Medication side effects: none    Scheduled Meds:   aspirin  81 mg Oral Daily    clopidogrel  75 mg Oral Daily    oxyCODONE  20 mg Oral 2 times per day    docusate sodium  100 mg Oral BID    polyethylene glycol  17 g Oral BID    apixaban  5 mg Oral BID    carvedilol  6.25 mg Oral BID WC    budesonide-formoterol  2 puff Inhalation BID    gabapentin  600 mg Oral 5x Daily    insulin glargine  10 Units Subcutaneous Nightly    insulin lispro  0-6 Units Subcutaneous TID WC    insulin lispro  0-3 Units Subcutaneous Nightly    rosuvastatin  40 mg Oral Nightly    sacubitril-valsartan  0.5 tablet Oral BID    spironolactone  25 mg Oral Daily    traZODone  50 mg Oral Nightly    vitamin B-12  1,000 mcg Oral Daily    sodium chloride flush  5-40 mL Intravenous 2 times per day     Continuous Infusions:   IV infusion builder Stopped (06/19/21 1411)    dextrose      sodium chloride       PRN Meds:HYDROmorphone, cyclobenzaprine, nitroGLYCERIN, oxyCODONE-acetaminophen, glucose, dextrose, glucagon (rDNA), dextrose, zolpidem, sodium chloride flush, sodium chloride, ondansetron **OR** ondansetron, polyethylene glycol, acetaminophen **OR** acetaminophen    Review of Systems  Pertinent items are noted in HPI. REVIEW OF SYSTEMS:         · Constitutional: Denies fever, sweats, weight loss     · Eyes: No visual changes or diplopia. No scleral icterus. · ENT: No Headaches, hearing loss or vertigo. No mouth sores or sore throat. · Cardiovascular: No chest pain, dyspnea on exertion, palpitations or loss of consciousness. · Respiratory: No cough or wheezing, no sputum production. No hemoptysis. .    · Gastrointestinal: No abdominal pain, appetite loss, blood in stools. No change in bowel habits.   · Genitourinary: No dysuria, trouble voiding, or hematuria. · Musculoskeletal:  Generalized weakness. No joint complaints. · Integumentary: No rash or pruritis. · Neurological: No headache, diplopia. No change in gait, balance, or coordination. No paresthesias. · Endocrine: No temperature intolerance. No excessive thirst, fluid intake, or urination. · Hematologic/Lymphatic: No abnormal bruising or ecchymoses, blood clots or swollen lymph nodes. · Allergic/Immunologic: No nasal congestion or hives. ·     Objective:     Patient Vitals for the past 8 hrs:   BP Temp Temp src Pulse Resp SpO2 Weight   06/21/21 0321 123/68 97.6 °F (36.4 °C) Oral 62 18 95 % 182 lb 15.7 oz (83 kg)     I/O last 3 completed shifts: In: 1200 [P.O.:1200]  Out: -   No intake/output data recorded.     /68   Pulse 62   Temp 97.6 °F (36.4 °C) (Oral)   Resp 18   Ht 6' (1.829 m)   Wt 182 lb 15.7 oz (83 kg)   SpO2 95%   BMI 24.82 kg/m²     General Appearance:    Alert, cooperative, no distress, appears stated age   Head:    Normocephalic, without obvious abnormality, atraumatic   Eyes:    PERRL, conjunctiva/corneas clear, EOM's intact, fundi     benign, both eyes        Ears:    Normal TM's and external ear canals, both ears   Nose:   Nares normal, septum midline, mucosa normal, no drainage    or sinus tenderness   Throat:   Lips, mucosa, and tongue normal; teeth and gums normal   Neck:   Supple, symmetrical, trachea midline, no adenopathy;        thyroid:  No enlargement/tenderness/nodules; no carotid    bruit or JVD   Back:     Symmetric, no curvature, ROM normal, no CVA tenderness   Lungs:     Clear to auscultation bilaterally, respirations unlabored   Chest wall:    No tenderness or deformity   Heart:    Regular rate and rhythm, S1 and S2 normal, no murmur, rub   or gallop   Abdomen:     Soft, non-tender, bowel sounds active all four quadrants,     no masses, no organomegaly           Extremities:   Extremities normal, atraumatic, no cyanosis or edema   Pulses:   2+ and symmetric all extremities   Skin:   Skin color, texture, turgor normal, no rashes or lesions   Lymph nodes:   Cervical, supraclavicular, and axillary nodes normal   Neurologic:   CNII-XII intact. Normal strength, sensation and reflexes       throughout     Data Review  CBC:   Lab Results   Component Value Date    WBC 5.5 06/19/2021    RBC 3.52 06/19/2021       Assessment:     Active Problems:    Intractable abdominal pain  Resolved Problems:    * No resolved hospital problems. *      Plan:     1. Liver lesions. On triphasic CT, the chance of these being cancer has gone down a bit. They would be difficult to biopsy per IR. There would be some significant risk due to their location. Therefore, it is recommended to repeat a CT in 3 months. 2.  Abdominal pain. We still do not have an explanation for this. Perhaps he needs a repeat colonoscopy. I will defer to GI.         Electronically signed by Bernard Gresham MD on 6/21/2021 at 7:54 AM

## 2021-06-21 NOTE — PROGRESS NOTES
Patient requesting  Medication every 1-2hrs . Pain rate over 8/10 each time. Pt requests pain medicine very often alternating between each dilaudid dose, percocet and oxycodone. . Last pain medication request by pt, RN went in room to give percocet, pt was sleeping eyes closed . No pain medication administered at this time,as pt was sleeping . Will continue to check vitals and status before each pain medication administration. Possible d/c in the morning.

## 2021-06-21 NOTE — PROGRESS NOTES
INPATIENT PROGRESS NOTE        IDENTIFYING DATA/REASON FOR CONSULTATION   PATIENT:  Vianney Daniels  MRN:  [id number]  ADMIT DATE: 6/17/2021  TIME OF EVALUATION: 6/21/2021 10:50 AM  HOSPITAL STAY:   LOS: 4 days   CONSULTING PHYSICIAN: David Kasper MD   REASON FOR CONSULTATION: abdominal pain, liver lesion    Subjective:    Patient seen in follow up. He continues to have abdominal pain. He describes the pain is very sharp, worse with movement. The pain is not associated with food intake. He is tolerating food well.     MEDICATIONS   SCHEDULED:  lidocaine, 1 patch, Daily  aspirin, 81 mg, Daily  clopidogrel, 75 mg, Daily  oxyCODONE, 20 mg, 2 times per day  docusate sodium, 100 mg, BID  polyethylene glycol, 17 g, BID  apixaban, 5 mg, BID  carvedilol, 6.25 mg, BID WC  budesonide-formoterol, 2 puff, BID  gabapentin, 600 mg, 5x Daily  insulin glargine, 10 Units, Nightly  insulin lispro, 0-6 Units, TID WC  insulin lispro, 0-3 Units, Nightly  rosuvastatin, 40 mg, Nightly  sacubitril-valsartan, 0.5 tablet, BID  spironolactone, 25 mg, Daily  traZODone, 50 mg, Nightly  vitamin B-12, 1,000 mcg, Daily  sodium chloride flush, 5-40 mL, 2 times per day      FLUIDS/DRIPS:     IV infusion builder Stopped (06/19/21 1411)    dextrose      sodium chloride       PRNs: HYDROmorphone, 1 mg, Q8H PRN  cyclobenzaprine, 5 mg, TID PRN  nitroGLYCERIN, 0.4 mg, Q5 Min PRN  oxyCODONE-acetaminophen, 1 tablet, Q8H PRN  glucose, 15 g, PRN  dextrose, 12.5 g, PRN  glucagon (rDNA), 1 mg, PRN  dextrose, 100 mL/hr, PRN  zolpidem, 5 mg, Nightly PRN  sodium chloride flush, 5-40 mL, PRN  sodium chloride, 25 mL, PRN  ondansetron, 4 mg, Q8H PRN   Or  ondansetron, 4 mg, Q6H PRN  polyethylene glycol, 17 g, Daily PRN  acetaminophen, 650 mg, Q6H PRN   Or  acetaminophen, 650 mg, Q6H PRN      ALLERGIES:    Allergies   Allergen Reactions    Dopamine Hcl Other (See Comments) and Anxiety     Compulsive gambling    Tramadol Other (See Comments) Dizziness     Morphine And Related Itching     Pt reports nausea and vomiting with morphine, able to tolerate the morphine if also given with an antiemetic, denies any swelling, itching or hives when taken.  Melatonin Other (See Comments)     Restless leg syndrome           PHYSICAL EXAM   VITALS:  /67   Pulse 73   Temp 97.6 °F (36.4 °C) (Oral)   Resp 18   Ht 6' (1.829 m)   Wt 182 lb 15.7 oz (83 kg)   SpO2 94%   BMI 24.82 kg/m²   TEMPERATURE:  Current - Temp: 97.6 °F (36.4 °C); Max - Temp  Av °F (36.7 °C)  Min: 97.6 °F (36.4 °C)  Max: 98.4 °F (36.9 °C)    Physical Exam:  General appearance: alert, cooperative, no distress  Eyes: Anicteric  Head: Normocephalic, without obvious abnormality  Lungs: clear to auscultation bilaterally, Normal Effort  Heart: regular rate and rhythm, normal S1 and S2, no murmurs or rubs  Abdomen: soft, non-distended, TTP RLQ  Extremities: atraumatic, no cyanosis or edema  Skin: warm and dry, no jaundice  Neuro: Grossly intact, A&OX3    LABS AND IMAGING   Laboratory   Recent Labs     21  0904 21  0801   WBC 5.5 5.4   HGB 12.0* 13.2*   HCT 35.7* 39.5*   .4* 101.5*    269     Recent Labs     21  0904 21  0646   * 137   K 5.0 4.7    104   CO2 24 21   PHOS  --  5.1*   BUN 23* 26*   CREATININE 1.2 1.3     Recent Labs     21  0646   AST 15   ALT 8*   BILIDIR <0.2   BILITOT <0.2   ALKPHOS 50     No results for input(s): LIPASE, AMYLASE in the last 72 hours. No results for input(s): PROTIME, INR in the last 72 hours. Imaging  CT ABDOMEN PELVIS W WO CONTRAST Additional Contrast? None   Final Result   1. A 3.5 cm x 2.3 cm lesion in the left hemiliver and a 0.8 cm x 0.5 cm   lesion in hepatic segment 7 correspond with the recent CT findings. There is   centripetal enhancement of the larger lesion that could be seen with a   hemangioma, but the finding does not appear to be present on older studies.    The smaller lesion may also demonstrate delayed enhancement. Cholangiocarcinoma and metastatic disease should be considered. Consider   sonography and or short-term follow-up in 3-6 months. 2. Additional incidental findings as above. CT ABDOMEN PELVIS W IV CONTRAST Additional Contrast? None   Final Result   1. No acute process demonstrated   2. New indeterminate low-attenuation liver lesions. Finding is concerning   for hepatic metastatic disease. A primary malignancy or other findings of   abdominopelvic metastatic disease are not evident on this exam.  MRI would   better characterize the finding   3. Stable known left lower lobe nodule         US LIVER    (Results Pending)       Endoscopy      ASSESSMENT AND RECOMMENDATIONS   Shelly Valerio is a 72 y.o. male with PMH of CAD, CABG, PCI, systolic heart failure, DM, HTN, TIA, COPD who presents with:    1. Acute right periumbilical abdominal pain  -? Panniculitis vs musculoskeletal strain. Pain is very focal and is worsened with lying flat and movement/palpation. No appendicitis or hernia on CT. The patient's pain is not consistent with biliary colic or any mesenteric ischemia.  Liver lesions are not contributing to his pain.  He has no symptoms to suggest colitis or enteritis.  He does endorse some nonspecific weight loss of over 100 pounds in the past year, although he recently gained some weight back. Had prior EGD and colonoscopy in 2017. CEA normal    2. Nonspecific liver lesions  -Repeat CT shows delayed enhancement o the 3cm left lesion which is consistent with hemangioma. The smaller right lobe lesion was not enhancing. The radiologist recommended US follow up in 3 months. These were new since his prior CT in 2020. Maryellen Hazel has no known history of cirrhosis.  His liver enzymes are normal.  He has no symptoms to suggest a pyogenic liver abscess.  AFP and CA-19-9 pending.  CEA normal.  He did have an EGD, colonoscopy, and capsule endoscopy in 2017 that were unrevealing without any high risk features for malignancy, and there are no signs of intra-abdominal or intrathoracic malignancy noted on CT scans. 3. Unintentional weight loss  -as per above.  CEA normal    4. Macrocytic anemia- stable      RECOMMENDATIONS:    Will order lidocaine patch for RLQ abd pain  Follow up on AFP and CA 19-9  US in 3 months to ensure stability of liver lesions. No immediate plans for repeating EGD and colonoscopy at this time. Diet as tolerated     If you have any questions or need any further information, please feel free to contact us [phone]. Thank you for allowing us to participate in the care of [address] The note was completed using Dragon voice recognition transcription. Every effort was made to ensure accuracy; however, inadvertent transcription errors may be present despite my best efforts to edit errors. Loreto HAYES    Attending physician addendum:    I have personally seen and examined the patient, reviewed the patient's medical record and pertinent labs and clinical imaging. I have personally staffed the case with Loreto HAYES. I agree with her consultation note, exam findings, assessment and plans  as written above. I have made appropriate modifications and edited her assessment and plan where needed to reflect my impression and plans for this patient. Cayetano Lagos is a 73 YO  male with extensive past medical history for coronary disease with prior CABG, PCI, systolic heart failure with an ejection fraction of 35%, diabetes, hypertension, previous TIA, COPD, on ASA/Plavix/Eliquis, who presented with several days of worsening right periumbilical abdominal pain. Abdominal Pain- CT scan has been negative x 2. History atypical for Biliary colic or any mesenteric ischemia. EGD/Colonoscopy up to date. Liver lesions unlikely to be contributing. Rapp is focal and exacerbated by movement. Recommend symptomatic treatment.  Will try a Lidocaine patch. Ok to DC today if pain controlled. Liver masses:  CT shows delayed enhancement of the 3cm left lesion which is consistent with hemangioma. The smaller right lobe lesion was not enhancing. AFP pending. CEA negative. No history of cirrhosis. Recommend repeat US in 3 months to confirm stability. Thank you for allowing me to participate in this patient's care. If there are any questions or concerns regarding this patient, or the plan we have set in place, please feel free to contact me at [phone].      Nas Rodrigez MD

## 2021-06-21 NOTE — CARE COORDINATION
SW attempted to reach out to patient via telephone to begin the process of discharge planning, however, he stated that the nurse was at bedside and they were also discussing his feelings about the discharge. SW agreed to follow up at bedside momentarily. Respectfully submitted,    Crystal COTA, LUC-S  [facility]   [phone]    Electronically signed by LUCRECIA Zacarias on 6/21/2021 at 8:45 AM

## 2021-06-21 NOTE — DISCHARGE SUMMARY
Hospital Medicine Discharge Summary    Patient ID: Sharon Holloway      Patient's PCP: Opal Laguna MD    Admit Date: 6/17/2021     Discharge Date: 6/21/2021 6/21/2021    Admitting Physician: Kina Stevens MD     Discharge Physician: Myrna Brooks MD     Discharge Diagnoses: Active Hospital Problems    Diagnosis     Intractable abdominal pain [R10.9]        The patient was seen and examined on day of discharge and this discharge summary is in conjunction with any daily progress note from day of discharge. Hospital Course: 72yo man with extensive cardiac Hx s/p multiple PTCA and LHC and pacemaker in place and chronic systolic CHF, and chronic pain and opiate dependent, presented with R abdominal pain. Abd pain. Unlikely related to liver nodules. LA - normal - making mesenteric ischemia less likely. Cont PRN pain control.               - return to chronic pain regimen on discharge - no new opiate scripts on discharge.         Liver nodules - ?if metastatic process. Primary unclear at this time. Biopsy will be difficult and high risk due to both location of the lesions and pt's extensive cardiac Hx of DAPT and eliquis. Discussed with Dr Radha Montano. ASA and plavix were put hold as of 6/18. GI involved. - liver protocol CT actually fairly reassuring.    - noted GI recs for short term (3 months) US follow up (ordered in Epic). Biopsy plans are now on hold. - I will resume his ASA and plavix and ok to discharge home today.          CAD   Chronic Systolic CHF EF 46-54%. Pacemaker in place. Last PTCA stent 4 months ago. Recent LHC - occluded vessels too small to be stented. Cont PTA regimen. Pt will be too high risk to just stop his [facility] for biopsy. - so we are holding ASA and plavix. - plan to bridge with heparin gtt in the kamila biopsy period.             Pt insisted his pain is uncontrolled and discharge held yesterday.      I had a long discussion with pt and his POA Daughter-in-law in [place] with pt's permission today. I again discussed the plan of repeat US in 3 months to reassess his liver nodules - he may still ultimately need Biopsy and repeat endoscopic evaluation if repeat imaging at that time shows worrisome trend. I also advised them that I doubt his pain is directly related to liver findings and also suggested that he may indeed be suffering from referred pain from his back, as we essentially ruled out acute intraabdominal or pelvic pathology with 2 CT scans done this admission. I encouraged him to continue with supportive care and consider reevaluation by his back specialist in the coming weeks if his symptoms fail to improve. Physical Exam Performed:     /67   Pulse 73   Temp 97.6 °F (36.4 °C) (Oral)   Resp 18   Ht 6' (1.829 m)   Wt 182 lb 15.7 oz (83 kg)   SpO2 94%   BMI 24.82 kg/m²     General appearance: No apparent distress, appears stated age and cooperative. HEENT: Pupils equal, round, and reactive to light. Conjunctivae/corneas clear. Neck: Supple, with full range of motion. No jugular venous distention. Trachea midline. Respiratory:  Normal respiratory effort. Clear to auscultation, bilaterally without Rales/Wheezes/Rhonchi. Cardiovascular: Regular rate and rhythm with normal S1/S2 without murmurs, rubs or gallops. Abdomen: Soft, non-tender, non-distended with normal bowel sounds. Musculoskeletal: No clubbing, cyanosis or edema bilaterally. Full range of motion without deformity. Skin: Skin color, texture, turgor normal.  No rashes or lesions. Neurologic:  Neurovascularly intact without any focal sensory/motor deficits. Cranial nerves: II-XII intact, grossly non-focal.  Psychiatric: Alert and oriented, thought content appropriate, normal insight  Capillary Refill: Brisk,3 seconds, normal   Peripheral Pulses: +2 palpable, equal bilaterally          Labs:  For convenience and continuity at follow-up the following most recent labs are provided:      CBC:    Lab Results   Component Value Date    WBC 5.4 06/21/2021    HGB 13.2 06/21/2021    HCT 39.5 06/21/2021     06/21/2021       Renal:    Lab Results   Component Value Date     06/21/2021    K 4.7 06/21/2021    K 4.4 06/18/2021     06/21/2021    CO2 21 06/21/2021    BUN 26 06/21/2021    CREATININE 1.3 06/21/2021    CALCIUM 8.7 06/21/2021    PHOS 5.1 06/21/2021         Significant Diagnostic Studies    Radiology:   CT ABDOMEN PELVIS W WO CONTRAST Additional Contrast? None   Final Result   1. A 3.5 cm x 2.3 cm lesion in the left hemiliver and a 0.8 cm x 0.5 cm   lesion in hepatic segment 7 correspond with the recent CT findings. There is   centripetal enhancement of the larger lesion that could be seen with a   hemangioma, but the finding does not appear to be present on older studies. The smaller lesion may also demonstrate delayed enhancement. Cholangiocarcinoma and metastatic disease should be considered. Consider   sonography and or short-term follow-up in 3-6 months. 2. Additional incidental findings as above. CT ABDOMEN PELVIS W IV CONTRAST Additional Contrast? None   Final Result   1. No acute process demonstrated   2. New indeterminate low-attenuation liver lesions. Finding is concerning   for hepatic metastatic disease. A primary malignancy or other findings of   abdominopelvic metastatic disease are not evident on this exam.  MRI would   better characterize the finding   3. Stable known left lower lobe nodule         US LIVER    (Results Pending)          Consults:     IP CONSULT TO HOSPITALIST  IP CONSULT TO GI  IP CONSULT TO HEM/ONC  IP CONSULT TO GI    Disposition:  home     Condition at Discharge: Stable    Discharge Instructions/Follow-up:  GI with Liver US 3 months - ordered.      Code Status:  Full Code     Activity: activity as tolerated    Diet: regular diet cardiac      Discharge Medications:     Discharge Medication List as of 6/21/2021 11:00 AM           Details   cyclobenzaprine (FLEXERIL) 5 MG tablet Take 5 mg by mouth 3 times daily as neededHistorical Med      vitamin B-12 (CYANOCOBALAMIN) 1000 MCG tablet Take 1,000 mcg by mouth dailyHistorical Med      traZODone (DESYREL) 50 MG tablet Take 50 mg by mouth nightlyHistorical Med      zolpidem (AMBIEN) 5 MG tablet TAKE 1 TABLET BY MOUTH NIGHTLY AS NEEDED FOR SLEEP FOR UP TO 30 DAYS., Disp-30 tablet, R-0Normal      oxyCODONE-acetaminophen (PERCOCET)  MG per tablet Take 1 tablet by mouth every 8 hours as needed for Pain for up to 30 days. , Disp-90 tablet, R-0Normal      gabapentin (NEURONTIN) 600 MG tablet TAKE ONE TABLET BY MOUTH FIVE TIMES A DAY, Disp-150 tablet, R-2Normal      aspirin EC 81 MG EC tablet Take 1 tablet by mouth daily, Disp-90 tablet, R-1Normal      fluticasone-salmeterol (ADVAIR DISKUS) 100-50 MCG/DOSE diskus inhaler Inhale 2 puffs into the lungs 2 times daily wixela inhub inhaler ( generic advair diskus), Disp-60 each, R-3Normal      insulin glargine (LANTUS SOLOSTAR) 100 UNIT/ML injection pen Inject 10 Units into the skin nightly, Disp-5 pen, R-3Normal      Insulin Pen Needle 32G X 4 MM MISC DAILY Starting Thu 3/18/2021, Disp-100 each, R-3, Normal      glucose monitoring kit (FREESTYLE) monitoring kit DAILY Starting Thu 3/18/2021, Disp-1 kit, R-0, Normal      spironolactone (ALDACTONE) 25 MG tablet Take 25 mg by mouth daily Historical Med      nitroGLYCERIN (NITROSTAT) 0.4 MG SL tablet Place 0.4 mg under the tongue every 5 minutes as neededHistorical Med      carvedilol (COREG) 12.5 MG tablet Take 0.5 tablets by mouth 2 times daily (with meals), Disp-60 tablet, R-3Historical Med      sacubitril-valsartan (ENTRESTO) 49-51 MG per tablet Take 0.5 tablets by mouth 2 times daily, Disp-60 tablet, R-0Historical Med      clopidogrel (PLAVIX) 75 MG tablet Take 75 mg by mouth dailyHistorical Med      rosuvastatin (CRESTOR) 40 MG tablet Take 40 mg by mouth every eveningHistorical Med      Respiratory Therapy Supplies (NEBULIZER) AALIYAH Disp-1 Device,R-0, PrintUsed to give yourself breathing treatment      apixaban (ELIQUIS) 5 MG TABS tablet Take 1 tablet by mouth 2 times daily, Disp-60 tablet,R-1Normal      empagliflozin (JARDIANCE) 10 MG tablet Take 1 tablet by mouth daily, Disp-30 tablet,R-0Normal      acetaminophen (TYLENOL) 325 MG tablet Take 650 mg by mouth every 6 hours as needed for PainHistorical Med             Time Spent on discharge is more than 30 minutes in the examination, evaluation, counseling and review of medications and discharge plan. Signed:    Celena Cartagena MD   6/21/2021      Thank you Wei Hernandez MD for the opportunity to be involved in this patient's care. If you have any questions or concerns please feel free to contact me at [phone].

## 2021-06-22 ENCOUNTER — CARE COORDINATION (OUTPATIENT)
Dept: CASE MANAGEMENT | Age: 66
End: 2021-06-22

## 2021-06-22 LAB — AFP: 1.7 UG/L

## 2021-06-22 NOTE — CARE COORDINATION
[facility] Transitions Initial Follow Up Call    Call within 2 business days of discharge: Yes    Patient: Digna Howe Patient : 1955   MRN: [id number]  Reason for Admission: Abdominal pain, lower right quadrant  Discharge Date: 21 RARS: Readmission Risk Score: 39      Last Discharge [facility]       Complaint Diagnosis Description Type Department Provider    21 Abdominal Pain Abdominal pain, right lower quadrant . .. ED to Hosp-Admission (Discharged) (ADMITTED) Diamante Mirza MD; Vineet Webber... Spoke with: no one    Facility: [facility]    Initial attempt at [employer] discharge phone call. Unable to reach patient. Left message. Contact info provided. Requested return call to CTN.       Follow Up  Future Appointments   Date Time Provider Fred Gregory   2021  3:40 PM Erik Krishnan  Chloe Romero RN

## 2021-06-23 ENCOUNTER — CARE COORDINATION (OUTPATIENT)
Dept: CASE MANAGEMENT | Age: 66
End: 2021-06-23

## 2021-06-23 LAB — CA 19-9: 19 U/ML (ref 0–35)

## 2021-06-23 NOTE — CARE COORDINATION
[facility] Transitions Initial Follow Up Call    Call within 2 business days of discharge: Yes    Patient: Juan Miguel Lafleur Patient : 1955   MRN: [id number]  Reason for Admission: Abdominal pain - right lower quadrant  Discharge Date: 21 RARS: Readmission Risk Score: 39      Last Discharge [facility]       Complaint Diagnosis Description Type Department Provider    21 Abdominal Pain Abdominal pain, right lower quadrant . .. ED to Hosp-Admission (Discharged) (ADMITTED) Sohail Ariza MD; Yessica Grant... Spoke with: no one    Facility: [facility]    2nd and final attempt at [facility] discharge phone call. Unable to reach patient. Left message. Informed Ruy this would be the final CTN outreach. Follow Up  No future appointments.     Vazquez Lechuga RN

## 2021-06-24 NOTE — PROGRESS NOTES
Physician Progress Note      PATIENT:               Brian Duenas  CSN #:                  [id number]  :                       1955  ADMIT DATE:       2021 2:57 PM  [address] DATE:        2021 11:26 AM  RESPONDING  PROVIDER #:        Krunal Kinney MD          QUERY TEXT:    Pt admitted with abdominal pain and has compensated CHF documented. ECHO from   2020 shows EF 25-30%. If possible, please document in progress notes and   discharge summary further specificity regarding the type of CHF:    The medical record reflects the following:  Risk Factors: cardiomyopathy, HTN, CAD, COPD  Clinical Indicators: ECHO from 2020 shows EF 25-30%  Treatment: Coreg, Aldactone    Thank you,  Yolanda Lopez RN, BSN, CCDS  [email or web address]  Options provided:  -- Chronic Systolic CHF/HFrEF  -- Chronic Systolic and Diastolic CHF  -- Other - I will add my own diagnosis  -- Disagree - Not applicable / Not valid  -- Disagree - Clinically unable to determine / Unknown  -- Refer to Clinical Documentation Reviewer    PROVIDER RESPONSE TEXT:    This patient has chronic systolic CHF/HFrEF.     Query created by: Herminia Galarza on 2021 9:46 AM      Electronically signed by:  Krunal Kinney MD 2021 5:43 PM

## 2021-08-08 ENCOUNTER — HOSPITAL ENCOUNTER (INPATIENT)
Age: 66
LOS: 1 days | Discharge: HOME | DRG: 313 | End: 2021-08-09
Attending: STUDENT IN AN ORGANIZED HEALTH CARE EDUCATION/TRAINING PROGRAM | Admitting: HOSPITALIST
Payer: MEDICARE

## 2021-08-08 ENCOUNTER — APPOINTMENT (OUTPATIENT)
Dept: GENERAL RADIOLOGY | Age: 66
DRG: 313 | End: 2021-08-08
Payer: MEDICARE

## 2021-08-08 DIAGNOSIS — R07.9 ACUTE CHEST PAIN: Primary | ICD-10-CM

## 2021-08-08 DIAGNOSIS — R77.8 ELEVATED TROPONIN: ICD-10-CM

## 2021-08-08 LAB
A/G RATIO: 1.4 (ref 1.1–2.2)
ALBUMIN SERPL-MCNC: 4.2 G/DL (ref 3.4–5)
ALP BLD-CCNC: 64 U/L (ref 40–129)
ALT SERPL-CCNC: 6 U/L (ref 10–40)
ANION GAP SERPL CALCULATED.3IONS-SCNC: 16 MMOL/L (ref 3–16)
AST SERPL-CCNC: 12 U/L (ref 15–37)
BASOPHILS ABSOLUTE: 0.1 K/UL (ref 0–0.2)
BASOPHILS RELATIVE PERCENT: 0.7 %
BILIRUB SERPL-MCNC: 0.3 MG/DL (ref 0–1)
BUN BLDV-MCNC: 13 MG/DL (ref 7–20)
CALCIUM SERPL-MCNC: 9 MG/DL (ref 8.3–10.6)
CHLORIDE BLD-SCNC: 102 MMOL/L (ref 99–110)
CO2: 19 MMOL/L (ref 21–32)
CREAT SERPL-MCNC: 0.8 MG/DL (ref 0.8–1.3)
EOSINOPHILS ABSOLUTE: 0.1 K/UL (ref 0–0.6)
EOSINOPHILS RELATIVE PERCENT: 1.5 %
GFR AFRICAN AMERICAN: >60
GFR NON-AFRICAN AMERICAN: >60
GLOBULIN: 3.1 G/DL
GLUCOSE BLD-MCNC: 202 MG/DL (ref 70–99)
HCT VFR BLD CALC: 41 % (ref 40.5–52.5)
HEMOGLOBIN: 14 G/DL (ref 13.5–17.5)
LYMPHOCYTES ABSOLUTE: 1.8 K/UL (ref 1–5.1)
LYMPHOCYTES RELATIVE PERCENT: 25.8 %
MCH RBC QN AUTO: 34.1 PG (ref 26–34)
MCHC RBC AUTO-ENTMCNC: 34.2 G/DL (ref 31–36)
MCV RBC AUTO: 99.6 FL (ref 80–100)
MONOCYTES ABSOLUTE: 0.4 K/UL (ref 0–1.3)
MONOCYTES RELATIVE PERCENT: 5.8 %
NEUTROPHILS ABSOLUTE: 4.6 K/UL (ref 1.7–7.7)
NEUTROPHILS RELATIVE PERCENT: 66.2 %
PDW BLD-RTO: 15.4 % (ref 12.4–15.4)
PLATELET # BLD: 174 K/UL (ref 135–450)
PMV BLD AUTO: 9.8 FL (ref 5–10.5)
POTASSIUM REFLEX MAGNESIUM: 3.8 MMOL/L (ref 3.5–5.1)
PRO-BNP: 6266 PG/ML (ref 0–124)
RBC # BLD: 4.11 M/UL (ref 4.2–5.9)
SODIUM BLD-SCNC: 137 MMOL/L (ref 136–145)
TOTAL PROTEIN: 7.3 G/DL (ref 6.4–8.2)
TROPONIN: 0.02 NG/ML
TROPONIN: 0.02 NG/ML
TROPONIN: 0.03 NG/ML
WBC # BLD: 6.9 K/UL (ref 4–11)

## 2021-08-08 PROCEDURE — 83880 ASSAY OF NATRIURETIC PEPTIDE: CPT

## 2021-08-08 PROCEDURE — 2060000000 HC ICU INTERMEDIATE R&B

## 2021-08-08 PROCEDURE — 84484 ASSAY OF TROPONIN QUANT: CPT

## 2021-08-08 PROCEDURE — 6360000002 HC RX W HCPCS: Performed by: STUDENT IN AN ORGANIZED HEALTH CARE EDUCATION/TRAINING PROGRAM

## 2021-08-08 PROCEDURE — 99283 EMERGENCY DEPT VISIT LOW MDM: CPT

## 2021-08-08 PROCEDURE — 6370000000 HC RX 637 (ALT 250 FOR IP): Performed by: HOSPITALIST

## 2021-08-08 PROCEDURE — 2580000003 HC RX 258: Performed by: HOSPITALIST

## 2021-08-08 PROCEDURE — 36415 COLL VENOUS BLD VENIPUNCTURE: CPT

## 2021-08-08 PROCEDURE — 6370000000 HC RX 637 (ALT 250 FOR IP): Performed by: STUDENT IN AN ORGANIZED HEALTH CARE EDUCATION/TRAINING PROGRAM

## 2021-08-08 PROCEDURE — 80053 COMPREHEN METABOLIC PANEL: CPT

## 2021-08-08 PROCEDURE — 6360000002 HC RX W HCPCS: Performed by: HOSPITALIST

## 2021-08-08 PROCEDURE — 93005 ELECTROCARDIOGRAM TRACING: CPT | Performed by: EMERGENCY MEDICINE

## 2021-08-08 PROCEDURE — 85025 COMPLETE CBC W/AUTO DIFF WBC: CPT

## 2021-08-08 PROCEDURE — 71046 X-RAY EXAM CHEST 2 VIEWS: CPT

## 2021-08-08 RX ORDER — NITROGLYCERIN 0.4 MG/1
0.4 TABLET SUBLINGUAL EVERY 5 MIN PRN
Status: DISCONTINUED | OUTPATIENT
Start: 2021-08-08 | End: 2021-08-09 | Stop reason: HOSPADM

## 2021-08-08 RX ORDER — CLOPIDOGREL BISULFATE 75 MG/1
75 TABLET ORAL DAILY
Status: DISCONTINUED | OUTPATIENT
Start: 2021-08-09 | End: 2021-08-09 | Stop reason: HOSPADM

## 2021-08-08 RX ORDER — ASPIRIN/CALCIUM/MAG/ALUMINUM 325 MG
1 TABLET ORAL
COMMUNITY
End: 2022-01-28

## 2021-08-08 RX ORDER — ONDANSETRON 4 MG/1
4 TABLET, ORALLY DISINTEGRATING ORAL EVERY 8 HOURS PRN
Status: DISCONTINUED | OUTPATIENT
Start: 2021-08-08 | End: 2021-08-09 | Stop reason: HOSPADM

## 2021-08-08 RX ORDER — ACETAMINOPHEN 325 MG/1
650 TABLET ORAL EVERY 6 HOURS PRN
Status: DISCONTINUED | OUTPATIENT
Start: 2021-08-08 | End: 2021-08-09 | Stop reason: HOSPADM

## 2021-08-08 RX ORDER — CARVEDILOL 6.25 MG/1
6.25 TABLET ORAL 2 TIMES DAILY WITH MEALS
Status: DISCONTINUED | OUTPATIENT
Start: 2021-08-08 | End: 2021-08-09 | Stop reason: HOSPADM

## 2021-08-08 RX ORDER — ASPIRIN 81 MG/1
324 TABLET, CHEWABLE ORAL ONCE
Status: COMPLETED | OUTPATIENT
Start: 2021-08-08 | End: 2021-08-08

## 2021-08-08 RX ORDER — NITROGLYCERIN 0.4 MG/1
0.4 TABLET SUBLINGUAL EVERY 5 MIN PRN
Status: CANCELLED | OUTPATIENT
Start: 2021-08-08

## 2021-08-08 RX ORDER — OXYCODONE AND ACETAMINOPHEN 10; 325 MG/1; MG/1
1 TABLET ORAL ONCE
Status: COMPLETED | OUTPATIENT
Start: 2021-08-08 | End: 2021-08-08

## 2021-08-08 RX ORDER — SODIUM CHLORIDE 9 MG/ML
25 INJECTION, SOLUTION INTRAVENOUS PRN
Status: DISCONTINUED | OUTPATIENT
Start: 2021-08-08 | End: 2021-08-09 | Stop reason: HOSPADM

## 2021-08-08 RX ORDER — ACETAMINOPHEN 650 MG/1
650 SUPPOSITORY RECTAL EVERY 6 HOURS PRN
Status: DISCONTINUED | OUTPATIENT
Start: 2021-08-08 | End: 2021-08-09 | Stop reason: HOSPADM

## 2021-08-08 RX ORDER — LANOLIN ALCOHOL/MO/W.PET/CERES
1000 CREAM (GRAM) TOPICAL DAILY
Status: DISCONTINUED | OUTPATIENT
Start: 2021-08-09 | End: 2021-08-09 | Stop reason: HOSPADM

## 2021-08-08 RX ORDER — ONDANSETRON 2 MG/ML
8 INJECTION INTRAMUSCULAR; INTRAVENOUS ONCE
Status: COMPLETED | OUTPATIENT
Start: 2021-08-08 | End: 2021-08-08

## 2021-08-08 RX ORDER — ONDANSETRON 2 MG/ML
4 INJECTION INTRAMUSCULAR; INTRAVENOUS EVERY 6 HOURS PRN
Status: DISCONTINUED | OUTPATIENT
Start: 2021-08-08 | End: 2021-08-09 | Stop reason: HOSPADM

## 2021-08-08 RX ORDER — MORPHINE SULFATE 4 MG/ML
4 INJECTION, SOLUTION INTRAMUSCULAR; INTRAVENOUS
Status: DISCONTINUED | OUTPATIENT
Start: 2021-08-08 | End: 2021-08-09

## 2021-08-08 RX ORDER — ATORVASTATIN CALCIUM 20 MG/1
20 TABLET, FILM COATED ORAL NIGHTLY
Status: CANCELLED | OUTPATIENT
Start: 2021-08-08

## 2021-08-08 RX ORDER — ASPIRIN 81 MG/1
81 TABLET ORAL DAILY
Status: DISCONTINUED | OUTPATIENT
Start: 2021-08-09 | End: 2021-08-09 | Stop reason: HOSPADM

## 2021-08-08 RX ORDER — OXYCODONE AND ACETAMINOPHEN 10; 325 MG/1; MG/1
1 TABLET ORAL EVERY 8 HOURS PRN
Status: DISCONTINUED | OUTPATIENT
Start: 2021-08-08 | End: 2021-08-09 | Stop reason: HOSPADM

## 2021-08-08 RX ORDER — TRAZODONE HYDROCHLORIDE 50 MG/1
50 TABLET ORAL NIGHTLY
Status: DISCONTINUED | OUTPATIENT
Start: 2021-08-08 | End: 2021-08-09 | Stop reason: HOSPADM

## 2021-08-08 RX ORDER — ASPIRIN/CALCIUM/MAG/ALUMINUM 325 MG
1 TABLET ORAL
COMMUNITY
End: 2021-08-08

## 2021-08-08 RX ORDER — HYDROCODONE BITARTRATE AND ACETAMINOPHEN 5; 325 MG/1; MG/1
1 TABLET ORAL ONCE
Status: COMPLETED | OUTPATIENT
Start: 2021-08-08 | End: 2021-08-08

## 2021-08-08 RX ORDER — SODIUM CHLORIDE 0.9 % (FLUSH) 0.9 %
10 SYRINGE (ML) INJECTION PRN
Status: DISCONTINUED | OUTPATIENT
Start: 2021-08-08 | End: 2021-08-09 | Stop reason: HOSPADM

## 2021-08-08 RX ORDER — GABAPENTIN 300 MG/1
600 CAPSULE ORAL
Status: DISCONTINUED | OUTPATIENT
Start: 2021-08-08 | End: 2021-08-09 | Stop reason: HOSPADM

## 2021-08-08 RX ORDER — ROSUVASTATIN CALCIUM 40 MG/1
40 TABLET, COATED ORAL NIGHTLY
Status: DISCONTINUED | OUTPATIENT
Start: 2021-08-08 | End: 2021-08-09 | Stop reason: HOSPADM

## 2021-08-08 RX ORDER — POTASSIUM CHLORIDE 7.45 MG/ML
10 INJECTION INTRAVENOUS PRN
Status: DISCONTINUED | OUTPATIENT
Start: 2021-08-08 | End: 2021-08-09 | Stop reason: HOSPADM

## 2021-08-08 RX ORDER — BUDESONIDE AND FORMOTEROL FUMARATE DIHYDRATE 80; 4.5 UG/1; UG/1
2 AEROSOL RESPIRATORY (INHALATION) 2 TIMES DAILY
Status: DISCONTINUED | OUTPATIENT
Start: 2021-08-08 | End: 2021-08-09 | Stop reason: HOSPADM

## 2021-08-08 RX ORDER — SODIUM CHLORIDE 9 MG/ML
INJECTION, SOLUTION INTRAVENOUS CONTINUOUS
Status: DISCONTINUED | OUTPATIENT
Start: 2021-08-08 | End: 2021-08-09

## 2021-08-08 RX ORDER — POTASSIUM CHLORIDE 20 MEQ/1
40 TABLET, EXTENDED RELEASE ORAL PRN
Status: DISCONTINUED | OUTPATIENT
Start: 2021-08-08 | End: 2021-08-09 | Stop reason: HOSPADM

## 2021-08-08 RX ORDER — ASPIRIN 81 MG/1
324 TABLET, CHEWABLE ORAL ONCE
Status: DISCONTINUED | OUTPATIENT
Start: 2021-08-08 | End: 2021-08-08

## 2021-08-08 RX ORDER — SODIUM CHLORIDE 0.9 % (FLUSH) 0.9 %
10 SYRINGE (ML) INJECTION EVERY 12 HOURS SCHEDULED
Status: DISCONTINUED | OUTPATIENT
Start: 2021-08-08 | End: 2021-08-09 | Stop reason: HOSPADM

## 2021-08-08 RX ORDER — SPIRONOLACTONE 25 MG/1
25 TABLET ORAL DAILY
Status: DISCONTINUED | OUTPATIENT
Start: 2021-08-09 | End: 2021-08-09 | Stop reason: HOSPADM

## 2021-08-08 RX ORDER — MORPHINE SULFATE 2 MG/ML
2 INJECTION, SOLUTION INTRAMUSCULAR; INTRAVENOUS
Status: DISCONTINUED | OUTPATIENT
Start: 2021-08-08 | End: 2021-08-09

## 2021-08-08 RX ADMIN — ASPIRIN 324 MG: 81 TABLET, CHEWABLE ORAL at 17:46

## 2021-08-08 RX ADMIN — ONDANSETRON 8 MG: 2 INJECTION INTRAMUSCULAR; INTRAVENOUS at 18:59

## 2021-08-08 RX ADMIN — APIXABAN 5 MG: 5 TABLET, FILM COATED ORAL at 22:43

## 2021-08-08 RX ADMIN — TRAZODONE HYDROCHLORIDE 50 MG: 50 TABLET ORAL at 22:43

## 2021-08-08 RX ADMIN — SODIUM CHLORIDE: 9 INJECTION, SOLUTION INTRAVENOUS at 23:50

## 2021-08-08 RX ADMIN — SACUBITRIL AND VALSARTAN 0.5 TABLET: 49; 51 TABLET, FILM COATED ORAL at 22:42

## 2021-08-08 RX ADMIN — ROSUVASTATIN CALCIUM 40 MG: 40 TABLET, FILM COATED ORAL at 22:43

## 2021-08-08 RX ADMIN — NITROGLYCERIN 1 INCH: 20 OINTMENT TOPICAL at 17:49

## 2021-08-08 RX ADMIN — GABAPENTIN 600 MG: 300 CAPSULE ORAL at 22:42

## 2021-08-08 RX ADMIN — OXYCODONE AND ACETAMINOPHEN 1 TABLET: 325; 10 TABLET ORAL at 18:59

## 2021-08-08 RX ADMIN — SODIUM CHLORIDE, PRESERVATIVE FREE 10 ML: 5 INJECTION INTRAVENOUS at 22:43

## 2021-08-08 RX ADMIN — CARVEDILOL 6.25 MG: 6.25 TABLET, FILM COATED ORAL at 22:43

## 2021-08-08 RX ADMIN — MORPHINE SULFATE 4 MG: 4 INJECTION, SOLUTION INTRAMUSCULAR; INTRAVENOUS at 21:26

## 2021-08-08 RX ADMIN — HYDROCODONE BITARTRATE AND ACETAMINOPHEN 1 TABLET: 5; 325 TABLET ORAL at 17:49

## 2021-08-08 ASSESSMENT — PAIN SCALES - GENERAL
PAINLEVEL_OUTOF10: 9
PAINLEVEL_OUTOF10: 9
PAINLEVEL_OUTOF10: 6
PAINLEVEL_OUTOF10: 3
PAINLEVEL_OUTOF10: 0
PAINLEVEL_OUTOF10: 8

## 2021-08-08 ASSESSMENT — PAIN DESCRIPTION - FREQUENCY
FREQUENCY: CONTINUOUS
FREQUENCY: CONTINUOUS

## 2021-08-08 ASSESSMENT — PAIN DESCRIPTION - LOCATION
LOCATION: CHEST

## 2021-08-08 ASSESSMENT — PAIN DESCRIPTION - PAIN TYPE
TYPE: ACUTE PAIN

## 2021-08-08 ASSESSMENT — PAIN DESCRIPTION - ORIENTATION
ORIENTATION: MID
ORIENTATION: MID

## 2021-08-08 ASSESSMENT — PAIN DESCRIPTION - PROGRESSION
CLINICAL_PROGRESSION: NOT CHANGED
CLINICAL_PROGRESSION: NOT CHANGED

## 2021-08-08 ASSESSMENT — PAIN DESCRIPTION - ONSET
ONSET: ON-GOING
ONSET: ON-GOING

## 2021-08-08 ASSESSMENT — PAIN DESCRIPTION - DESCRIPTORS
DESCRIPTORS: ACHING
DESCRIPTORS: ACHING

## 2021-08-08 NOTE — PROGRESS NOTES
Pharmacy Medication Reconciliation Note     List of medications patient is currently taking is complete. Source of information:   1. Per conversation with patient at bedside     Notes regarding home medications:   1. Reports having all AM meds PTA in the ED  2. Patient requests all PM meds  3. Patient on Eliquis 5 BID for \"past heart issues,\" but did not elaborate   4. Insulin dc'd \"months ago\"  5. Patient takes a full strength ASA per his Cardiologist   6.  Dispense reports show a few meds may not be taken as prescribed--patient may not be adherent     Denies taking any other OTC or herbal medications    Millie Vinson, Pharmacy Intern  8/8/2021  7:22 PM

## 2021-08-08 NOTE — ED NOTES
ED SBAR report provider to [facility]. Patient to be transported to Room 5115 via stretcher by transport tech. Patient transported with bedside cardiac monitor and with IV medications infusing. IV site clean, dry, and intact. MEWS score and pain assessed and documented. Updated patient on plan of care.      Óscar Abbott RN  08/08/21 0044

## 2021-08-08 NOTE — H&P
Hospitalist  History and Physical    Patient:  Yue Molina  MRN: [id number]  PCP: Lin Bhagat MD    CHIEF COMPLAINT: Chest pain and shortness of breath      HISTORY OF PRESENT ILLNESS:   The patient Yue Molina is a 72 y. o.male with medical history significant for asthma coronary artery disease cardiomyopathy patient is status post coronary artery bypass grafting, and ischemic cardiomyopathy. Patient presented to the emergency room with substernal chest pain that radiates to the left arm. Patient also complains of associated shortness of breath. Patient reports his pain as severe 8/10. Patient believes that his pain is similar to his previous heart attack. No recent history of fever chills no cough or production of sputum.     Past Medical History:        Diagnosis Date    Anxiety     Arthritis     Asthma     CAD (coronary artery disease)     Calcium kidney stone     Cardiomyopathy ([address]     Cerebral artery occlusion with cerebral infarction ([address]     CHF (congestive heart failure) ([address]     Clostridium difficile diarrhea 02/12/2020    COPD [facility]chronic obstructive pulmonary disease) [facility])     mild    Depression     DM2 (diabetes mellitus, type 2) ([facility])     Fibromyalgia     GERD (gastroesophageal reflux disease)     Hyperlipidemia     Hypertension     Liver disease     Pacemaker 2012    Medtronic model # R286DTO    Pneumonia     Seizures ([address]     TIA (transient ischemic attack) 2007    Ulcerative colitis ([address]        Past Surgical History:        Procedure Laterality Date    APPENDECTOMY      BACK SURGERY      CARDIAC SURGERY      CHOLECYSTECTOMY      COLONOSCOPY  01/10/2017    COLONOSCOPY  01/10/2017    CORONARY ANGIOPLASTY WITH STENT PLACEMENT  2012    CORONARY ARTERY BYPASS GRAFT  2010, 11/2015    ENDOSCOPY, COLON, DIAGNOSTIC      PACEMAKER PLACEMENT      UPPER GASTROINTESTINAL ENDOSCOPY  02/07/2017    VASCULAR SURGERY         Medications Prior to Admission:    Prior to Admission medications    Medication Sig Start Date End Date Taking? Authorizing Provider   oxyCODONE-acetaminophen (PERCOCET)  MG per tablet Take 1 tablet by mouth every 8 hours as needed for Pain for up to 30 days. 7/27/21 8/26/21  Annamarie Bhakta MD   cyclobenzaprine (FLEXERIL) 5 MG tablet Take 5 mg by mouth 3 times daily as needed 4/19/21   Historical Provider, MD   vitamin B-12 (CYANOCOBALAMIN) 1000 MCG tablet Take 1,000 mcg by mouth daily    Historical Provider, MD   traZODone (DESYREL) 50 MG tablet Take 50 mg by mouth nightly    Historical Provider, MD   gabapentin (NEURONTIN) 600 MG tablet TAKE ONE TABLET BY MOUTH FIVE TIMES A DAY  Patient taking differently: Take 600 mg by mouth 5 times daily.  TAKE ONE TABLET BY MOUTH FIVE TIMES A DAY 5/19/21 8/19/21  Annamarie Bhakta MD   aspirin EC 81 MG EC tablet Take 1 tablet by mouth daily 4/2/21   Juan Hendricks MD   fluticasone-salmeterol (ADVAIR DISKUS) 100-50 MCG/DOSE diskus inhaler Inhale 2 puffs into the lungs 2 times daily wixela inhub inhaler ( generic advair diskus) 3/26/21   Annamarie Bhakta MD   insulin glargine (LANTUS SOLOSTAR) 100 UNIT/ML injection pen Inject 10 Units into the skin nightly 3/18/21   Annamarie Bhakta MD   Insulin Pen Needle 32G X 4 MM MISC 1 each by Does not apply route daily 3/18/21   Annamarie Bhakta MD   glucose monitoring kit (FREESTYLE) monitoring kit 1 kit by Does not apply route daily 3/18/21   Annamarie Bhakta MD   spironolactone (ALDACTONE) 25 MG tablet Take 25 mg by mouth daily  3/5/21   Historical Provider, MD   nitroGLYCERIN (NITROSTAT) 0.4 MG SL tablet Place 0.4 mg under the tongue every 5 minutes as needed 11/13/20   Historical Provider, MD   carvedilol (COREG) 12.5 MG tablet Take 0.5 tablets by mouth 2 times daily (with meals) 3/6/21   Juan Hendricks MD   sacubitril-valsartan [facility]) 49-51 MG per tablet Take 0.5 tablets by mouth 2 times daily 3/6/21   Jere Romo Stephanie Pierce MD   clopidogrel (PLAVIX) 75 MG tablet Take 75 mg by mouth daily    Historical Provider, MD   rosuvastatin (CRESTOR) 40 MG tablet Take 40 mg by mouth every evening    Historical Provider, MD   Respiratory Therapy Supplies (NEBULIZER) AALIYAH Used to give yourself breathing treatment 8/9/20   Geno Boykin MD   apixaban (ELIQUIS) 5 MG TABS tablet Take 1 tablet by mouth 2 times daily 7/16/20   TAYLA Miner CNP   empagliflozin (JARDIANCE) 10 MG tablet Take 1 tablet by mouth daily 7/16/20   TAYLA Miner CNP   acetaminophen (TYLENOL) 325 MG tablet Take 650 mg by mouth every 6 hours as needed for Pain    Historical Provider, MD       Allergies:  Dopamine hcl, Tramadol, Morphine and related, and Melatonin      Social History:   TOBACCO:   reports that he has been smoking cigarettes. He has a 22.00 pack-year smoking history. He has never used smokeless tobacco.  ETOH:   reports no history of alcohol use. Family History:      Problem Relation Age of Onset    Coronary Art Dis Father     Cancer Mother         Lung with mets    Diabetes Mother            REVIEW OF SYSTEMS:     patients reported symptoms are in BOLD all other symptoms are negative. CONSTITUTIONAL:      fatigue, fever, chills or night sweats, recent weight gain, recent wt loss, insomnia,  General weakness, poor appetite, muscle aches and pains    HEAD: headache, dizziness    EYES:      blurriness,  double vision, dryness,  discharge, irritation,diplopia    EARS:      hearing loss, vertigo, ear discharge,  Earache. Ringing in the ears. NOSE:      Rhinorrhea, sneezing, epistaxis.  Discharge, sinusitis,     MOUTH/THROAT:         sore throat, mouth ulcers, Hoarseness    RESPIRATORY:        Shortness of breath, wheezing,  cough, sputum, hemoptysis, obstructive sleep apnea,    CARDIOVASCULAR :      chest pain, palpitations, dyspnea on exercise, Lower extrimity edema (swelling),     GASTROINTESTINAL:       Dysphagia, Poor appetite,  Nausea, Vomiting, diarrhea, heartburn, abdominal pain. Blood in the stools, hematemesis. Pain with swallowing, constipation    GENITOURINARY:       Urinary frequency, hesitancy,  urgency, Dysuria, hematuria,  Urinary Incontinence. Urinary Retention. GYNECOLOGICAL: vaginal bleeding , vaginal discharge, menopause    MUSCULOSKELETAL:       joint swelling or stiffness, joint pain, muscle pain, balance problems, low back pain. NEUROLOGICAL:      Gait problems. Tremor. Dizziness. Pain and paresthesias, weakness in extremities. Seizures, memory loss    PSYCHLOGICAL:        Anxiety, depression    SKIN :      Rashes ulcers, skin color changes, easy bruisability, lymphadenopathy      Physical Exam:      Vitals: BP (!) 168/102   Pulse 112   Temp 98.2 °F (36.8 °C)   Resp 21   Ht 6' (1.829 m)   Wt 178 lb 12.7 oz (81.1 kg)   SpO2 99%   BMI 24.25 kg/m²     Gen:          Alert and oriented x 3  Eyes: PERRL. No sclera icterus. No conjunctival injection. ENT: No discharge. Pharynx clear. External appearance of ears and nose normal.  Neck: Trachea midline. No obvious mass. Resp: No accessory muscle use. No crackles. No wheezes. No rhonchi. CV: Regular rate. Regular rhythm. No murmur or rub. No edema. GI: Non-tender. Non-distended. No hernia. Skin: Warm, dry, normal texture and turgor. Lymph: No cervical LAD. No supraclavicular LAD. M/S: / Ext. No cyanosis. No clubbing. No joint deformity. Neuro: Moves all four extremities. CN 2-12 tested, no deficits noted. Peripheral pulses and capillary refill is intact.       CBC:   Recent Labs     08/08/21  1353   WBC 6.9   HGB 14.0        BMP:    Recent Labs     08/08/21  1353      K 3.8      CO2 19*   BUN 13   CREATININE 0.8   GLUCOSE 202*     Hepatic:   Recent Labs     08/08/21  1353   AST 12*   ALT 6*   BILITOT 0.3   ALKPHOS 64     Troponin:   Recent Labs     08/08/21  1353   TROPONINI 0.02*     BNP: No results for input(s): BNP in the last 72 hours. INR: No results for input(s): INR in the last 72 hours. Lab Results   Component Value Date    LABA1C 5.6 06/17/2021           No results for input(s): CKTOTAL in the last 72 hours. -----------------------------------------------------------------    CXR  No acute cardiopulmonary abnormality          Assessment / Plan     Chest pain, unspecified R07.9  Atypical chest pain  Admit patient to telemetry  Trend cardiac enzymes  Consult to cardiology  Start patient on PPI  Continue patient on aspirin, beta blocker and statin    Pain management  R52  Continue with the IV and oral pain medication      Ischemic cardiomyopathy  Continue Entresto  Continue spironolactone  Discontinue IV fluids              DVT and GI prophylaxis      Prior      Rukhsana Funes MD M.D    This note was transcribed using [id number] [employer]. Please disregard any translational errors.

## 2021-08-08 NOTE — ED NOTES
Attempt x3 to obtain IV access without success. ED MD and charge RN made aware.       Guzman Milian RN  08/08/21 5442

## 2021-08-08 NOTE — ED NOTES
Pt to ED with c/o sob and mid sternal chest pain that started 2 hrs prior to ED arrival.      Dimas Suarez RN  08/08/21 [id number]

## 2021-08-09 VITALS
HEIGHT: 72 IN | TEMPERATURE: 98.3 F | OXYGEN SATURATION: 92 % | HEART RATE: 76 BPM | DIASTOLIC BLOOD PRESSURE: 66 MMHG | RESPIRATION RATE: 18 BRPM | SYSTOLIC BLOOD PRESSURE: 112 MMHG | WEIGHT: 173.94 LBS | BODY MASS INDEX: 23.56 KG/M2

## 2021-08-09 LAB
ANION GAP SERPL CALCULATED.3IONS-SCNC: 11 MMOL/L (ref 3–16)
BUN BLDV-MCNC: 19 MG/DL (ref 7–20)
CALCIUM SERPL-MCNC: 8.4 MG/DL (ref 8.3–10.6)
CHLORIDE BLD-SCNC: 104 MMOL/L (ref 99–110)
CHOLESTEROL, TOTAL: 168 MG/DL (ref 0–199)
CO2: 22 MMOL/L (ref 21–32)
CREAT SERPL-MCNC: 1.3 MG/DL (ref 0.8–1.3)
D DIMER: <200 NG/ML DDU (ref 0–229)
EKG ATRIAL RATE: 132 BPM
EKG DIAGNOSIS: NORMAL
EKG P AXIS: 81 DEGREES
EKG P-R INTERVAL: 118 MS
EKG Q-T INTERVAL: 392 MS
EKG QRS DURATION: 134 MS
EKG QTC CALCULATION (BAZETT): 580 MS
EKG R AXIS: 108 DEGREES
EKG T AXIS: 15 DEGREES
EKG VENTRICULAR RATE: 132 BPM
ESTIMATED AVERAGE GLUCOSE: 111.2 MG/DL
GFR AFRICAN AMERICAN: >60
GFR NON-AFRICAN AMERICAN: 55
GLUCOSE BLD-MCNC: 104 MG/DL (ref 70–99)
GLUCOSE BLD-MCNC: 107 MG/DL (ref 70–99)
GLUCOSE BLD-MCNC: 134 MG/DL (ref 70–99)
GLUCOSE BLD-MCNC: 137 MG/DL (ref 70–99)
HBA1C MFR BLD: 5.5 %
HCT VFR BLD CALC: 35.3 % (ref 40.5–52.5)
HDLC SERPL-MCNC: 44 MG/DL (ref 40–60)
HEMOGLOBIN: 11.9 G/DL (ref 13.5–17.5)
LDL CHOLESTEROL CALCULATED: 97 MG/DL
MCH RBC QN AUTO: 33.6 PG (ref 26–34)
MCHC RBC AUTO-ENTMCNC: 33.8 G/DL (ref 31–36)
MCV RBC AUTO: 99.5 FL (ref 80–100)
PDW BLD-RTO: 15.4 % (ref 12.4–15.4)
PERFORMED ON: ABNORMAL
PLATELET # BLD: 150 K/UL (ref 135–450)
PMV BLD AUTO: 8.9 FL (ref 5–10.5)
POTASSIUM REFLEX MAGNESIUM: 4 MMOL/L (ref 3.5–5.1)
RBC # BLD: 3.55 M/UL (ref 4.2–5.9)
SODIUM BLD-SCNC: 137 MMOL/L (ref 136–145)
TRIGL SERPL-MCNC: 136 MG/DL (ref 0–150)
TROPONIN: 0.05 NG/ML
VLDLC SERPL CALC-MCNC: 27 MG/DL
WBC # BLD: 4.9 K/UL (ref 4–11)

## 2021-08-09 PROCEDURE — 99223 1ST HOSP IP/OBS HIGH 75: CPT | Performed by: INTERNAL MEDICINE

## 2021-08-09 PROCEDURE — 80048 BASIC METABOLIC PNL TOTAL CA: CPT

## 2021-08-09 PROCEDURE — 85027 COMPLETE CBC AUTOMATED: CPT

## 2021-08-09 PROCEDURE — 85379 FIBRIN DEGRADATION QUANT: CPT

## 2021-08-09 PROCEDURE — 83036 HEMOGLOBIN GLYCOSYLATED A1C: CPT

## 2021-08-09 PROCEDURE — 6370000000 HC RX 637 (ALT 250 FOR IP): Performed by: HOSPITALIST

## 2021-08-09 PROCEDURE — 2580000003 HC RX 258: Performed by: HOSPITALIST

## 2021-08-09 PROCEDURE — 6360000002 HC RX W HCPCS: Performed by: HOSPITALIST

## 2021-08-09 PROCEDURE — 84484 ASSAY OF TROPONIN QUANT: CPT

## 2021-08-09 PROCEDURE — 94760 N-INVAS EAR/PLS OXIMETRY 1: CPT

## 2021-08-09 PROCEDURE — 93010 ELECTROCARDIOGRAM REPORT: CPT | Performed by: INTERNAL MEDICINE

## 2021-08-09 PROCEDURE — 36415 COLL VENOUS BLD VENIPUNCTURE: CPT

## 2021-08-09 PROCEDURE — 80061 LIPID PANEL: CPT

## 2021-08-09 RX ORDER — DEXTROSE MONOHYDRATE 50 MG/ML
100 INJECTION, SOLUTION INTRAVENOUS PRN
Status: DISCONTINUED | OUTPATIENT
Start: 2021-08-09 | End: 2021-08-09 | Stop reason: HOSPADM

## 2021-08-09 RX ORDER — DEXTROSE MONOHYDRATE 25 G/50ML
12.5 INJECTION, SOLUTION INTRAVENOUS PRN
Status: DISCONTINUED | OUTPATIENT
Start: 2021-08-09 | End: 2021-08-09 | Stop reason: HOSPADM

## 2021-08-09 RX ORDER — NICOTINE POLACRILEX 4 MG
15 LOZENGE BUCCAL PRN
Status: DISCONTINUED | OUTPATIENT
Start: 2021-08-09 | End: 2021-08-09 | Stop reason: HOSPADM

## 2021-08-09 RX ADMIN — APIXABAN 5 MG: 5 TABLET, FILM COATED ORAL at 10:51

## 2021-08-09 RX ADMIN — MORPHINE SULFATE 4 MG: 4 INJECTION, SOLUTION INTRAMUSCULAR; INTRAVENOUS at 07:26

## 2021-08-09 RX ADMIN — MORPHINE SULFATE 4 MG: 4 INJECTION, SOLUTION INTRAMUSCULAR; INTRAVENOUS at 04:18

## 2021-08-09 RX ADMIN — SODIUM CHLORIDE, PRESERVATIVE FREE 10 ML: 5 INJECTION INTRAVENOUS at 09:38

## 2021-08-09 RX ADMIN — ASPIRIN 81 MG: 81 TABLET ORAL at 10:52

## 2021-08-09 RX ADMIN — CLOPIDOGREL BISULFATE 75 MG: 75 TABLET ORAL at 10:51

## 2021-08-09 RX ADMIN — CYANOCOBALAMIN TAB 1000 MCG 1000 MCG: 1000 TAB at 10:51

## 2021-08-09 RX ADMIN — GABAPENTIN 600 MG: 300 CAPSULE ORAL at 10:51

## 2021-08-09 RX ADMIN — MORPHINE SULFATE 2 MG: 2 INJECTION, SOLUTION INTRAMUSCULAR; INTRAVENOUS at 12:38

## 2021-08-09 RX ADMIN — OXYCODONE AND ACETAMINOPHEN 1 TABLET: 10; 325 TABLET ORAL at 15:30

## 2021-08-09 RX ADMIN — GABAPENTIN 600 MG: 300 CAPSULE ORAL at 15:30

## 2021-08-09 RX ADMIN — OXYCODONE AND ACETAMINOPHEN 1 TABLET: 10; 325 TABLET ORAL at 06:23

## 2021-08-09 RX ADMIN — MORPHINE SULFATE 4 MG: 4 INJECTION, SOLUTION INTRAMUSCULAR; INTRAVENOUS at 01:00

## 2021-08-09 RX ADMIN — GABAPENTIN 600 MG: 300 CAPSULE ORAL at 05:07

## 2021-08-09 ASSESSMENT — PAIN - FUNCTIONAL ASSESSMENT
PAIN_FUNCTIONAL_ASSESSMENT: PREVENTS OR INTERFERES SOME ACTIVE ACTIVITIES AND ADLS

## 2021-08-09 ASSESSMENT — PAIN DESCRIPTION - DESCRIPTORS
DESCRIPTORS: ACHING

## 2021-08-09 ASSESSMENT — PAIN DESCRIPTION - PAIN TYPE
TYPE: CHRONIC PAIN
TYPE: CHRONIC PAIN
TYPE: ACUTE PAIN

## 2021-08-09 ASSESSMENT — PAIN SCALES - GENERAL
PAINLEVEL_OUTOF10: 0
PAINLEVEL_OUTOF10: 0
PAINLEVEL_OUTOF10: 7
PAINLEVEL_OUTOF10: 6
PAINLEVEL_OUTOF10: 0
PAINLEVEL_OUTOF10: 8
PAINLEVEL_OUTOF10: 7
PAINLEVEL_OUTOF10: 7
PAINLEVEL_OUTOF10: 6

## 2021-08-09 ASSESSMENT — PAIN DESCRIPTION - ONSET
ONSET: ON-GOING

## 2021-08-09 ASSESSMENT — PAIN DESCRIPTION - PROGRESSION
CLINICAL_PROGRESSION: NOT CHANGED

## 2021-08-09 ASSESSMENT — PAIN DESCRIPTION - ORIENTATION
ORIENTATION: LOWER
ORIENTATION: MID
ORIENTATION: LOWER

## 2021-08-09 ASSESSMENT — PAIN DESCRIPTION - LOCATION
LOCATION: CHEST
LOCATION: BACK
LOCATION: CHEST

## 2021-08-09 ASSESSMENT — PAIN DESCRIPTION - FREQUENCY
FREQUENCY: CONTINUOUS

## 2021-08-09 NOTE — PROGRESS NOTES
Discharge orders acknowledged by RN . Discharge teaching completed with pt and family. AVS reviewed and all questions answered. New prescriptions and current medication regimen reviewed and pt understands schedule. Follow up appointments also reviewed with pt and resources given for discharge. Pt was given written prescriptions to be filled and understands schedule. IV removed. Telemonitor removed and returned to [facility]. All education complete. Required core measures completed. Pt vitals WDL. Pt discharged with all belongings to home. Pt transported off of unit via wheelchair. No complications.          Electronically signed by Petr Kelsey RN on 8/9/2021 at 4:13 PM

## 2021-08-09 NOTE — PROGRESS NOTES
Patient admitted to room 5115 @ 2058. A&O x4, oriented to surroundings. Skin warm and dry. Resp easy and even with mild crackles. In bed, call light in reach.

## 2021-08-09 NOTE — PROGRESS NOTES
Physician Progress Note      PATIENT:               Rebecca Coughlin  CSN #:                  [id number]  :                       1955  ADMIT DATE:       2021 5:09 PM  [address] DATE:        2021 5:18 PM  RESPONDING  PROVIDER #:        Jojo oYon MD          QUERY TEXT:    Pt with ischemic cardiomyopathy, CAD, and hx of MI admitted with chest pain   and SOB. Pt noted to have hx of CHF. ECHO from 2020 shows EF 25-30%. If   possible, please document in progress notes and discharge summary if you are   evaluating and/or treating any of the following: The medical record reflects the following:  Risk Factors: ischemic cardiomyopathy, HTN  Clinical Indicators: chest pain with SOB, hx CHF; prior BNP done 3/2021 was   1970 and now 6266  Treatment: home meds Coreg, Aldactone, cardiology consult    Thank you,  Juan Dean, RN, BSN, CCDS  [email or web address]  Options provided:  -- Acute on Chronic Systolic CHF/HFrEF  -- Acute on Chronic Systolic and Diastolic CHF  -- Chronic Systolic CHF/HFrEF  -- Chronic Systolic and Diastolic CHF  -- Other - I will add my own diagnosis  -- Disagree - Not applicable / Not valid  -- Disagree - Clinically unable to determine / Unknown  -- Refer to Clinical Documentation Reviewer    PROVIDER RESPONSE TEXT:    This patient has chronic systolic CHF/HFrEF.     Query created by: Yanely Vásquez on 2021 11:19 AM      Electronically signed by:  Jojo Yoon MD 2021 5:23 PM

## 2021-08-09 NOTE — PROGRESS NOTES
4 Eyes Skin Assessment     NAME:  Scott Wilson  YOB: 1955  MEDICAL RECORD NUMBER:  [id number]    The patient is being assess for  Admission    I agree that 2 RN's have performed a thorough Head to Toe Skin Assessment on the patient. ALL assessment sites listed below have been assessed. Areas assessed by both nurses:    Head, Face, Ears, Shoulders, Back, Chest, Arms, Elbows, Hands, Sacrum. Buttock, Coccyx, Ischium and Legs. Feet and Heels        Does the Patient have a Wound?  No noted wound(s)       Jhonathan Prevention initiated:  Yes   Wound Care Orders initiated:  No    Pressure Injury (Stage 3,4, Unstageable, DTI, NWPT, and Complex wounds) if present place consult order under [de-identified] No    New and Established Ostomies if present place consult order under : No      Nurse 1 eSignature: Electronically signed by Radha Amezcua RN on 8/9/21 at 12:42 AM EDT    **SHARE this note so that the co-signing nurse is able to place an eSignature**    Nurse 2 eSignature: Electronically signed by Jose Grullon RN on 8/9/21 at 12:46 AM EDT

## 2021-08-09 NOTE — CONSULTS
diagnostic; pacemaker placement; Colonoscopy (01/10/2017); Colonoscopy (01/10/2017); Upper gastrointestinal endoscopy (02/07/2017); back surgery; Appendectomy; and vascular surgery. Social History:   reports that he has been smoking cigarettes. He has a 22.00 pack-year smoking history. He has never used smokeless tobacco. He reports that he does not drink alcohol and does not use drugs. Family History:  family history includes Cancer in his mother; Coronary Art Dis in his father; Diabetes in his mother. Home Medications:  Were reviewed and are listed in nursing record and/or below  Prior to Admission medications    Medication Sig Start Date End Date Taking? Authorizing Provider   Aspirin Buf,WwKbx-[id number]-NcPdu, (BUFFERED ASPIRIN) 325 MG TABS Take 1 tablet by mouth every morning (before breakfast)   Yes Historical Provider, MD   oxyCODONE-acetaminophen (PERCOCET)  MG per tablet Take 1 tablet by mouth every 8 hours as needed for Pain for up to 30 days.  7/27/21 8/26/21 Yes Claudene Lux, MD   cyclobenzaprine (FLEXERIL) 5 MG tablet Take 5 mg by mouth 3 times daily as needed 4/19/21  Yes Historical Provider, MD   vitamin B-12 (CYANOCOBALAMIN) 1000 MCG tablet Take 1,000 mcg by mouth daily   Yes Historical Provider, MD   traZODone (DESYREL) 50 MG tablet Take 50 mg by mouth nightly   Yes Historical Provider, MD   gabapentin (NEURONTIN) 600 MG tablet TAKE ONE TABLET BY MOUTH FIVE TIMES A DAY 5/19/21 8/19/21 Yes Claudene Lux, MD   fluticasone-salmeterol (ADVAIR DISKUS) 100-50 MCG/DOSE diskus inhaler Inhale 2 puffs into the lungs 2 times daily wixela inhub inhaler ( generic advair diskus) 3/26/21  Yes Claudene Lux, MD   spironolactone (ALDACTONE) 25 MG tablet Take 25 mg by mouth daily  3/5/21  Yes Historical Provider, MD   nitroGLYCERIN (NITROSTAT) 0.4 MG SL tablet Place 0.4 mg under the tongue every 5 minutes as needed 11/13/20  Yes Historical Provider, MD   carvedilol (COREG) 12.5 MG tablet Take 6.25 mg by mouth 2 times daily (with meals)  3/6/21  Yes Aram Gant MD   sacubitril-valsartan [facility]) 49-51 MG per tablet Take 0.5 tablets by mouth 2 times daily 3/6/21  Yes Aram Gant MD   clopidogrel (PLAVIX) 75 MG tablet Take 75 mg by mouth daily   Yes Historical Provider, MD   rosuvastatin (CRESTOR) 40 MG tablet Take 40 mg by mouth every evening   Yes Historical Provider, MD   apixaban (ELIQUIS) 5 MG TABS tablet Take 1 tablet by mouth 2 times daily 7/16/20  Yes TAYLA Miner CNP   empagliflozin (JARDIANCE) 10 MG tablet Take 1 tablet by mouth daily 7/16/20  Yes TAYLA Miner CNP   acetaminophen (TYLENOL) 325 MG tablet Take 650 mg by mouth every 6 hours as needed for Pain   Yes Historical Provider, MD   Insulin Pen Needle 32G X 4 MM MISC 1 each by Does not apply route daily 3/18/21   Shine Matute MD   glucose monitoring kit (FREESTYLE) monitoring kit 1 kit by Does not apply route daily 3/18/21   Shine Matute MD   Respiratory Therapy Supplies (NEBULIZER) AALIYAH Used to give yourself breathing treatment 8/9/20   Radha Bowling MD        CURRENT Medications:  insulin lispro (HUMALOG) injection vial 0-12 Units, TID WC  insulin lispro (HUMALOG) injection vial 0-6 Units, Nightly  glucose (GLUTOSE) 40 % oral gel 15 g, PRN  dextrose 50 % IV solution, PRN  glucagon (rDNA) injection 1 mg, PRN  dextrose 5 % solution, PRN  acetaminophen (TYLENOL) tablet 650 mg, Q6H PRN  apixaban (ELIQUIS) tablet 5 mg, BID  aspirin EC tablet 81 mg, Daily  carvedilol (COREG) tablet 6.25 mg, BID WC  clopidogrel (PLAVIX) tablet 75 mg, Daily  budesonide-formoterol (SYMBICORT) 80-4.5 MCG/ACT inhaler 2 puff, BID  gabapentin (NEURONTIN) capsule 600 mg, 5x Daily  nitroGLYCERIN (NITROSTAT) SL tablet 0.4 mg, Q5 Min PRN  oxyCODONE-acetaminophen (PERCOCET)  MG per tablet 1 tablet, Q8H PRN  rosuvastatin (CRESTOR) tablet 40 mg, Nightly  sacubitril-valsartan (ENTRESTO) 49-51 MG per tablet 0.5 tablet, BID  spironolactone (ALDACTONE) tablet 25 mg, Daily  traZODone (DESYREL) tablet 50 mg, Nightly  vitamin B-12 (CYANOCOBALAMIN) tablet 1,000 mcg, Daily  sodium chloride flush 0.9 % injection 10 mL, 2 times per day  sodium chloride flush 0.9 % injection 10 mL, PRN  0.9 % sodium chloride infusion, PRN  ondansetron (ZOFRAN-ODT) disintegrating tablet 4 mg, Q8H PRN   Or  ondansetron (ZOFRAN) injection 4 mg, Q6H PRN  acetaminophen (TYLENOL) tablet 650 mg, Q6H PRN   Or  acetaminophen (TYLENOL) suppository 650 mg, Q6H PRN  potassium chloride (KLOR-CON M) extended release tablet 40 mEq, PRN   Or  potassium bicarb-citric acid (EFFER-K) effervescent tablet 40 mEq, PRN   Or  potassium chloride 10 mEq/100 mL IVPB (Peripheral Line), PRN  bisacodyl (DULCOLAX) EC tablet 5 mg, Daily PRN  morphine (PF) injection 2 mg, Q3H PRN  morphine (PF) injection 4 mg, Q3H PRN    Allergies:  Dopamine hcl, Tramadol, Morphine and related, and Melatonin     Review of Systems:   · Constitutional: no unanticipated weight loss. There's been no change in energy level, sleep pattern, or activity level. No fevers, chills. · Eyes: No visual changes or diplopia. No scleral icterus. · ENT: No Headaches, hearing loss or vertigo. No mouth sores or sore throat. · Cardiovascular: as reviewed in HPI  · Respiratory: No cough or wheezing, no sputum production. No hemoptysis. · Gastrointestinal: No abdominal pain, appetite loss, blood in stools. No change in bowel or bladder habits. · Genitourinary: No dysuria, trouble voiding, or hematuria. · Musculoskeletal:  No gait disturbance, no joint complaints. · Integumentary: No rash or pruritis. · Neurological: No headache, diplopia, change in muscle strength, numbness or tingling. · Psychiatric: No anxiety or depression. · Endocrine: No temperature intolerance. No excessive thirst, fluid intake, or urination. No tremor.   · Hematologic/Lymphatic: No abnormal bruising or bleeding, blood clots or swollen lymph nodes. · Allergic/Immunologic: No nasal congestion or hives. Objective:   PHYSICAL EXAM:    Vitals:    08/09/21 0818   BP: 109/71   Pulse: 67   Resp: 16   Temp: 97.6   SpO2: 91%    Weight: 173 lb 15.1 oz (78.9 kg)       General Appearance:  Alert, cooperative, no distress, appears stated age. Head:  Normocephalic, without obvious abnormality, atraumatic. Eyes:  Pupils equal and round. No scleral icterus. Mouth: Moist mucosa, no pharyngeal erythema. Nose: Nares normal. No drainage or sinus tenderness. Neck: Supple, symmetrical, trachea midline. No adenopathy. No tenderness/mass/nodules. No carotid bruit or elevated JVD. Lungs:   Clear to auscultation bilaterally, respirations unlabored. No wheeze, rales, or rhonchi. Chest Wall:  No tenderness or deformity. Heart:  Regular rate. S1/S2 normal. No murmur, rub, or gallop. Abdomen:   Soft, non-tender, bowel sounds active. Musculoskeletal: No muscle wasting or digital clubbing. Extremities: Extremities normal, atraumatic. No cyanosis or edema. Pulses: 2+ radial and carotid pulses, symmetric. Skin: No rashes or lesions. Pysch: Normal mood and affect. Alert and oriented x 4.    Neurologic: Normal gross motor and sensory exam.       Labs     CBC:   Lab Results   Component Value Date    WBC 4.9 08/09/2021    RBC 3.55 08/09/2021    HGB 11.9 08/09/2021    HCT 35.3 08/09/2021    MCV 99.5 08/09/2021    RDW 15.4 08/09/2021     08/09/2021     CMP:  Lab Results   Component Value Date     08/09/2021    K 4.0 08/09/2021     08/09/2021    CO2 22 08/09/2021    BUN 19 08/09/2021    CREATININE 1.3 08/09/2021    GFRAA >60 08/09/2021    GFRAA >60 05/30/2013    AGRATIO 1.4 08/08/2021    LABGLOM 55 08/09/2021    LABGLOM 87.6 07/11/2011    GLUCOSE 104 08/09/2021    GLUCOSE 208 07/11/2011    PROT 7.3 08/08/2021    PROT 7.8 03/13/2013    CALCIUM 8.4 08/09/2021    BILITOT 0.3 08/08/2021    ALKPHOS 64 08/08/2021    AST 12 08/08/2021 ALT 6 08/08/2021     PT/INR:  No results found for: PTINR  HgBA1c:  Lab Results   Component Value Date    LABA1C 5.6 06/17/2021     Lab Results   Component Value Date    CKTOTAL 38 (L) 04/20/2016    CKMB 1.3 04/20/2016    TROPONINI 0.05 (H) 08/09/2021       Cardiac Data     EKG: Personally interpreted. 8/8/2021. Sinus tachycardia with occasional premature ventricular and fusion complexes. Right bundle branch block. T wave abnormality. Echo: 7/12/21 ([facility])  Left ventricle is mildly (5.9-6.3 cm) dilated. The left atrium is moderately dilated. The Aortic Valve leaflets appear sclerotic. No hemodynamically significant valvular aortic stenosis. There is a pacemaker lead in the right ventricle. The left ventricular function is moderately reduced. Overall left ventricular ejection fraction is estimated to be 30-35%. There is moderate global hypokinesis of the left ventricle. The mitral valve leaflets are mildly calcified in appearance. There is trace mitral regurgitation. There is a catheter/pacemaker lead seen in the right atrial cavity. There is no pericardial effusion. Cath: 6/2021 ([facility])  1) 3 vessel CAD with 4/4 patent grafts and occlusion of small RPDA, likely culprit, plan medical management   2) Normal LVEDP   3) Moderately reduced LVEF     Left Main Trunk (LMT): patent prior stent   Left Anterior Descending (LAD): proc 60% stenosis, mid 90% stenosis with competitive flow   Diagonal 1: 40% stenosis   Left Circumflex (LCX): diffuse disease up to 90% proximal stenosis, 100% mid occluded   Right Coronary (RCA): dominant vessel, calcified, diffuse disease, 100% mid occluded     SVG to OM2/Diagonal: widely patent, multiple prior stents   JENIFER to LAD: off of roof of SVG to OM2/Diagonal: Widely patent   SVG to PDA: widely patent, at tocuhdown, multiple prior stents retrograde   filling of SAE, small, <2mm PDA occluded     Telemetry: Personally interpreted.  Sinus    Assessment and Plan   1) Atypical chest pain. Patient's history and flat troponin trend are not consistent with acute coronary syndrome. With recent angiography, no plans for inpatient stress testing or repeat angiography. Will obtain a D-dimer (and if positive a CTPA) to help rule out a PE as he reports intermittently missing doses of Eliquis. 2) CAD s/p CABG and SVG PCIs. Continue medical management and risk factor modification including the use of aspirin, statin, and beta-blocker. 3) Chronic systolic heart failure/ischemic cardiomyopathy s/p ICD. EF 30-35%. NYHA II at baseline. Patient does not appear grossly volume overloaded. Continue Entresto, carvedilol, and Aldactone. We will perform a [facility] interrogation to see if OptiVol stable. 4) Paroxysmal atrial fibrillation. Appears to be in sinus on telemetry. Continue beta-blocker and anticoagulation with Eliquis. 5) Demand ischemia. Not consistent with acute coronary syndrome. Elevated troponin likely related to CHF in a patient with known multivessel CAD. Overall, the problems requiring hospitalization are high in severity. Addendum: Optivol on interrogation is acceptable and D-dimer is normal. Will sign off. Call with questions. Will arrange outpatient follow-up. Thank you for allowing us to participate in the care of [address] [place] [address].  [address]  8/9/2021 9:22 AM

## 2021-08-09 NOTE — CARE COORDINATION
INITIAL CASE MANAGEMENT ASSESSMENT    Reviewed chart, met with patient to assess possible discharge needs. Explained Case Management role/services. Living Situation:  Lives in a two story home with his wife with 11 steps. Current address confirmed. ADLs:  Independent in all activities and denies issues with using the steps. DME:  Obie Christensen     PT/OT Recs:  Not ordered      Active Services:  N/A      Transportation: Active , wife will transport home. Medications:  No issues getting, taking or affording medication. PCP:  Dr. Robbin Gould       HD/PD:  N/A     PLAN/COMMENTS: Goal:  Return home with no anticipated needs. SW/CM provided contact information for patient or family to call with any questions. SW/CM will follow and assist as needed.     Electronically signed by Katy Blair on 8/9/2021 at 3:58 PM

## 2021-08-09 NOTE — PROGRESS NOTES
Morning medications on hold until cardiology sees pt to see about stress test ir any interventions. Cardiology said ok to give pills no test today. Interrogation of pacemaker done at bedside. Report placed in pt chart. RN rechecked pt Vitals before medication. Current BP 82/54. Call out to [facility] regarding mediations and blood pressure. Will continue to monitor. Pt then asking for morphine for his pain. RN educated pt on his unstable BP and RN could not give any IV pain medication. RN offered the pt tylenol to help with pain. Pt declined stating \"that's not going to give me anything\". BP 1 hour later- 97/66. Cardiology said ok to give all BP meds. Pt refused medications stating \"I don't want anything to lower my blood pressure because I want my pain injection meds. I should just go home if I cant have any IV pain meds\". RN made attending hospitalist [facility] aware also.      Electronically signed by Adam May RN on 8/9/2021 at 11:06 AM

## 2021-08-09 NOTE — PLAN OF CARE
Problem: Pain:  Goal: Control of chronic pain  Description: Control of chronic pain  Outcome: Ongoing     Problem: Pain:  Goal: Control of acute pain  Description: Control of acute pain  Outcome: Ongoing     Problem: Pain:  Goal: Pain level will decrease  Description: Pain level will decrease  Outcome: Ongoing     Problem: Pain:  Goal: Patient's pain/discomfort is manageable  Description: Patient's pain/discomfort is manageable  Outcome: Ongoing     Problem: Falls - Risk of:  Goal: Will remain free from falls  Description: Will remain free from falls  Outcome: Ongoing     Problem: Discharge Planning:  Goal: Patients continuum of care needs are met  Description: Patients continuum of care needs are met  Outcome: Ongoing

## 2021-08-09 NOTE — ACP (ADVANCE CARE PLANNING)
Advance Care Planning     Advance Care Planning Activator (Inpatient)  Conversation Note      Date of ACP Conversation: 8/9/2021     [employer] with:  Patient with decision making capacity. ACP Activator: Norma ARAYA Fiona Jones Vega Decision Maker:     Current Designated Health Care Decision Maker:     Primary Decision Maker: Talia Wilkerson - [phone] - POA     Secondary Decision Maker:  Brandee Loco [facility] - [phone]    Care Preferences    Ventilation: \"If you were in your present state of health and suddenly became very ill and were unable to breathe on your own, what would your preference be about the use of a ventilator (breathing machine) if it were available to you? \"      Would the patient desire the use of ventilator (breathing machine)?: yes    \"If your health worsens and it becomes clear that your chance of recovery is unlikely, what would your preference be about the use of a ventilator (breathing machine) if it were available to you? \"     Would the patient desire the use of ventilator (breathing machine)?: That would be up to primary decision maker. Resuscitation  \"CPR works best to restart the heart when there is a sudden event, like a heart attack, in someone who is otherwise healthy. Unfortunately, CPR does not typically restart the heart for people who have serious health conditions or who are very sick. \"    \"In the event your heart stopped as a result of an underlying serious health condition, would you want attempts to be made to restart your heart (answer \"yes\" for attempt to resuscitate) or would you prefer a natural death (answer \"no\" for do not attempt to resuscitate)? \" yes       [] Yes   [x] No   Educated Patient / Kaye Daniels regarding differences between Advance Directives and portable DNR orders.     Length of ACP Conversation in minutes:  5  Conversation Outcomes:  [x] ACP discussion completed  [] Existing advance directive reviewed with patient; no changes to patient's previously recorded wishes  [] New Advance Directive completed  [] Portable Do Not Rescitate prepared for Provider review and signature  [] POLST/POST/MOLST/MOST prepared for Provider review and signature      Follow-up plan:    [] Schedule follow-up conversation to continue planning  [] Referred individual to Provider for additional questions/concerns   [] Advised patient/agent/surrogate to review completed ACP document and update if needed with changes in condition, patient preferences or care setting    [x] This note routed to one or more involved healthcare providers  Electronically signed by Fei Nair on 8/9/2021 at 4:05 PM

## 2021-08-09 NOTE — ED PROVIDER NOTES
Clostridium difficile diarrhea 02/12/2020    COPD (chronic obstructive pulmonary disease) (HCC)     mild    Depression     DM2 (diabetes mellitus, type 2) (HCC)     Fibromyalgia     GERD (gastroesophageal reflux disease)     Hyperlipidemia     Hypertension     Liver disease     Pacemaker 2012    Medtronic model # [id number]    Pneumonia     Se[facility]ures ([facility]     TIA (transient ischemic attack) 2007    Ulcerative colitis ([facility]          SURGICALHISTORY       Past Surgical History:   Procedure Laterality Date    APPENDECTOMY      BACK SURGERY      CARDIAC SURGERY      CHOLECYSTECTOMY      COLONOSCOPY  01/10/2017    COLONOSCOPY  01/10/2017    CORONARY ANGIOPLASTY WITH STENT PLACEMENT  2012    CORONARY ARTERY BYPASS GRAFT  2010, 11/2015    ENDOSCOPY, COLON, DIAGNOSTIC      PACEMAKER PLACEMENT      UPPER GASTROINTESTINAL ENDOSCOPY  02/07/2017    VASCULAR SURGERY           CURRENT MEDICATIONS       Previous Medications    ACETAMINOPHEN (TYLENOL) 325 MG TABLET    Take 650 mg by mouth every 6 hours as needed for Pain    APIXABAN (ELIQUIS) 5 MG TABS TABLET    Take 1 tablet by mouth 2 times daily    ASPIRIN BUF,OZOLE-CUSUO-YDIEU, (BUFFERED ASPIRIN) 325 MG TABS    Take 1 tablet by mouth every morning (before breakfast)    CARVEDILOL (COREG) 12.5 MG TABLET    Take 6.25 mg by mouth 2 times daily (with meals)     CLOPIDOGREL (PLAVIX) 75 MG TABLET    Take 75 mg by mouth daily    CYCLOBENZAPRINE (FLEXERIL) 5 MG TABLET    Take 5 mg by mouth 3 times daily as needed    EMPAGLIFLOZIN (JARDIANCE) 10 MG TABLET    Take 1 tablet by mouth daily    FLUTICASONE-SALMETEROL (ADVAIR DISKUS) 100-50 MCG/DOSE DISKUS INHALER    Inhale 2 puffs into the lungs 2 times daily wixela inhub inhaler ( generic advair diskus)    GABAPENTIN (NEURONTIN) 600 MG TABLET    TAKE ONE TABLET BY MOUTH FIVE TIMES A DAY    GLUCOSE MONITORING KIT (FREESTYLE) MONITORING KIT    1 kit by Does not apply route daily    INSULIN PEN NEEDLE 32G X 4 MM MISC    1 each by Does not apply route daily    NITROGLYCERIN (NITROSTAT) 0.4 MG SL TABLET    Place 0.4 mg under the tongue every 5 minutes as needed    OXYCODONE-ACETAMINOPHEN (PERCOCET)  MG PER TABLET    Take 1 tablet by mouth every 8 hours as needed for Pain for up to 30 days.     RESPIRATORY THERAPY SUPPLIES (NEBULIZER) AALIYAH    Used to give yourself breathing treatment    ROSUVASTATIN (CRESTOR) 40 MG TABLET    Take 40 mg by mouth every evening    SACUBITRIL-VALSARTAN (ENTRESTO) 49-51 MG PER TABLET    Take 0.5 tablets by mouth 2 times daily    SPIRONOLACTONE (ALDACTONE) 25 MG TABLET    Take 25 mg by mouth daily     TRAZODONE (DESYREL) 50 MG TABLET    Take 50 mg by mouth nightly    VITAMIN B-12 (CYANOCOBALAMIN) 1000 MCG TABLET    Take 1,000 mcg by mouth daily       ALLERGIES     Dopamine hcl, Tramadol, Morphine and related, and Melatonin    FAMILY HISTORY       Family History   Problem Relation Age of Onset    Coronary Art Dis Father     Cancer Mother         Lung with mets    Diabetes Mother           SOCIAL HISTORY       Social History     Socioeconomic History    Marital status:      Spouse name: None    Number of children: 1    Years of education: None    Highest education level: None   Occupational History    Occupation: disabled   Tobacco Use    Smoking status: Current Every Day Smoker     Packs/day: 0.50     Years: 44.00     Pack years: 22.00     Types: Cigarettes    Smokeless tobacco: Never Used    Tobacco comment: cutting down   Vaping Use    Vaping Use: Never used   Substance and Sexual Activity    Alcohol use: No     Alcohol/week: 0.0 standard drinks    Drug use: No    Sexual activity: Not Currently     Partners: Female   Other Topics Concern    None   Social History Narrative    None     Social Determinants of Health     Financial Resource Strain:     Difficulty of Paying Living Expenses:    Food Insecurity:     Worried About Running Out of Food in the Last Year:     Ran Out of Food in the Last Year:    Transportation Needs:     Lack of Transportation (Medical):  Lack of Transportation (Non-Medical):    Physical Activity:     Days of Exercise per Week:     Minutes of Exercise per Session:    Stress:     Feeling of Stress :    Social Connections:     Frequency of Communication with Friends and Family:     Frequency of Social Gatherings with Friends and Family:     Attends Muslim Services:     Active Member of Clubs or Organizations:     Attends Club or Organization Meetings:     Marital Status:    Intimate Partner Violence:     Fear of Current or Ex-Partner:     Emotionally Abused:     Physically Abused:     Sexually Abused:        SCREENINGS             PHYSICAL EXAM    (up to 7 for level 4, 8 or more for level 5)     ED Triage Vitals   BP Temp Temp Source Pulse Resp SpO2 Height Weight   08/08/21 1341 08/08/21 1341 08/08/21 1846 08/08/21 1341 08/08/21 1341 08/08/21 1341 08/08/21 1728 08/08/21 1728   (!) 161/101 98.3 °F (36.8 °C) Oral 115 15 94 % 6' (1.829 m) 178 lb 12.7 oz (81.1 kg)       General: Alert and oriented appropriately for age, No acute distress. Eye: Normal conjunctiva. Pupils equal and reactive. HENT: Oral mucosa is moist.  Respiratory: Respirations even and non-labored. Clear to auscultation bilaterally. Sternotomy scar well-healed. Cardiovascular: Normal rate, Regular rhythm. Intact peripheral pulses throughout. No edema, no JVD. Gastrointestinal: Soft, Non-tender, Non-distended. Musculoskeletal: No swelling. Integumentary: Warm, Dry. Neurologic: Alert and appropriate for age. No focal deficits. Psychiatric: Cooperative.     DIAGNOSTIC RESULTS     EKG: All EKG's are interpreted by the Emergency Department Physician who either signs or Co-signsthis chart in the absence of a cardiologist.    The Ekg interpreted by me shows  sinus tachycardia, pomw=146 bpm   Axis is   Right axis deviation  QTc is  580 ms  Intervals and Durations are unremarkable. ST Segments: diffuse ST segment depressions and slight aVR elevation largely stable from prior EKG dated 6/17/21 concerning for subendocardial ischemia given his history of significant CAD not amenable to further intervention in the past.  No significant change from prior EKG dated 6/17/21 aside from rate.       RADIOLOGY:   Non-plain filmimages such as CT, Ultrasound and MRI are read by the radiologist. Plain radiographic images are visualized and preliminarily interpreted by the emergency physician with the below findings:      Interpretation per the Radiologist below, if available at the time ofthis note:    XR CHEST (2 VW)   Final Result   No active cardiopulmonary disease               ED BEDSIDE ULTRASOUND:   Performed by ED Physician - none    LABS:  Labs Reviewed   CBC WITH AUTO DIFFERENTIAL - Abnormal; Notable for the following components:       Result Value    RBC 4.11 (*)     MCH 34.1 (*)     All other components within normal limits    Narrative:     Performed at:  [facility]  [address] [place] [address] [address]   Phone [phone]   COMPREHENSIVE METABOLIC PANEL W/ REFLEX TO MG FOR LOW K - Abnormal; Notable for the following components:    CO2 19 (*)     Glucose 202 (*)     ALT 6 (*)     AST 12 (*)     All other components within normal limits    Narrative:     Performed at:  [facility]  [address] [place] [address] [address]   Phone [phone]   TROPONIN - Abnormal; Notable for the following components:    Troponin 0.02 (*)     All other components within normal limits    Narrative:     Performed at:  [facility]  [address] [place] [address] [address]   Phone [phone]   TROPONIN - Abnormal; Notable for the following components:    Troponin 0.02 (*)     All other components within normal limits    Narrative:     Performed at:  [facility]  [address]  [facility] [address]   Phone [phone]       All other labs were within normal range or not returned as of this dictation. EMERGENCY DEPARTMENT COURSE and DIFFERENTIAL DIAGNOSIS/MDM:   Vitals:    Vitals:    08/08/21 1341 08/08/21 1728 08/08/21 1731 08/08/21 1846   BP: (!) 161/101 (!) 168/102 (!) 168/103 (!) 162/97   Pulse: 115 112 111 96   Resp: 15 21 22 18   Temp: 98.3 °F (36.8 °C) 98.2 °F (36.8 °C)  98 °F (36.7 °C)   TempSrc:    Oral   SpO2: 94% 99% 97% 98%   Weight:  178 lb 12.7 oz (81.1 kg)     Height:  6' (1.829 m)           Medical decision making:  Scott Mcarthur is a 73 yo M with PMHx of NSTEMI, multivessel CAD s/p CABG, ischemic CM, HLD, COPD, who p/w chest pain x 6 hours at this point partially alleviated by nitro. Troponin 0.02, usually has mild baseline slight troponin elevation, desc as anginal, getting repeat troponin, pt high risk, giving ASA, nitro paste. Admitted to the hospitalist for r/o ACS, cardiology evaluation. Repeat evaluation showed patient still with chest pain though slightly improved with nitroglycerin, requested home pain medication Percocet, given. Admitted, went up in stable condition. Medications   nitroglycerin (NITRO-BID) 2 % ointment 1 inch (1 inch Topical Given 8/8/21 1749)   ondansetron (ZOFRAN) injection 8 mg (8 mg Intravenous Given 8/8/21 1859)   aspirin chewable tablet 324 mg (324 mg Oral Given 8/8/21 1746)   HYDROcodone-acetaminophen (NORCO) 5-325 MG per tablet 1 tablet (1 tablet Oral Given 8/8/21 1749)   oxyCODONE-acetaminophen (PERCOCET)  MG per tablet 1 tablet (1 tablet Oral Given 8/8/21 1859)     CRITICAL CARE TIME   Total Critical Care time was 40 minutes, excluding separately reportable procedures. There was a high probability of clinically significant/life threatening deterioration in the patient's condition which required my urgent intervention.       Administration of aspirin for treatment of possible ACS, administration of nitroglycerin for treatment of high risk chest pain, frequent reassessments of patient's hemodynamic status, pain status, respiratory status. CONSULTS:  IP CONSULT TO CARDIOLOGY        FINAL IMPRESSION      1. Acute chest pain    2.  Elevated troponin          DISPOSITION/PLAN   DISPOSITION Admitted 08/08/2021 05:55:15 PM        (Please note that portions of this note were completed with a voice recognition program.Efforts were made to edit the dictations but occasionally words are mis-transcribed.)    Freddie Dixon MD (electronically signed)  Attending Emergency Physician          Freddie Dixon MD  08/09/21 1498

## 2021-08-09 NOTE — PROGRESS NOTES
Charo George MD  8/9/2021,   Indy Mays MD    1955  Chief Complaint   Patient presents with    Chest Pain     MIDSTERNAL CHEST PAIN X 2 HRS WITH SOB        Active Problems:    Atypical chest pain    Chronic systolic heart failure (HCC)    Chest pain    Demand ischemia ([address]  Resolved Problems:    * No resolved hospital problems. *     has a past medical history of Anxiety, Arthritis, Asthma, CAD (coronary artery disease), Calcium kidney stone, Cardiomyopathy ([address] Cerebral artery occlusion with cerebral infarction ([address] CHF (congestive heart failure) ([address] Clostridium difficile diarrhea, COPD (chronic obstructive pulmonary disease) ([address] Depression, DM2 (diabetes mellitus, type 2) ([address] Fibromyalgia, GERD (gastroesophageal reflux disease), Hyperlipidemia, Hypertension, Liver disease, Pacemaker, Pneumonia, Seizures ([address] TIA (transient ischemic attack), and Ulcerative colitis ([address] Past Surgical History:     has a past surgical history that includes Cholecystectomy; Coronary artery bypass graft (2010, 11/2015); Coronary angioplasty with stent (2012); Cardiac surgery; Endoscopy, colon, diagnostic; pacemaker placement; Colonoscopy (01/10/2017); Colonoscopy (01/10/2017); Upper gastrointestinal endoscopy (02/07/2017); back surgery; Appendectomy; and vascular surgery. ED REVIEW:  Pt to ED with c/o sob and mid sternal chest pain that started 2 hrs prior to ED arrival.      Keisha Lucas RN  08/08/21 1926      CURRENT STATUS:troponin trend are not consistent with acute coronary syndrome. With recent angiography, no plans for inpatient stress testing or repeat angiography. Will obtain a D-dimer (and if positive a CTPA) to help rule out a PE as he reports intermittently missing doses of Eliquis.    2) CAD s/p CABG and SVG PCIs. Continue medical management and risk factor modification including the use of aspirin, statin, and beta-blocker.      3) Chronic systolic heart failure/ischemic cardiomyopathy s/p ICD. EF 30-35%. NYHA II at baseline. Patient does not appear grossly volume overloaded. Continue Entresto, carvedilol, and Aldactone. We will perform a [facility] interrogation to see if OptiVol stable.     4) Paroxysmal atrial fibrillation. Appears to be in sinus on telemetry. Continue beta-blocker and anticoagulation with Eliquis.    5) Demand ischemia. Not consistent with acute coronary syndrome. Elevated troponin likely related to CHF in a patient with known multivessel CAD.     Overall, the problems requiring hospitalization are high in severity.      Addendum: Optivol on interrogation is acceptable and D-dimer is normal. Will sign off. Call with questions. Will arrange outpatient follow-up.         Thank you for allowing us to participate in the care of [address] [facility] [address]  8/9/2021 9:22 AM    IMAGING-Relevant:  Status post coronary bypass.  Left subclavian AICD and coronary artery stent   noted.  Heart size and pulmonary vasculature within normal limits.  Lungs   clear.  Costophrenic angles sharp       Impression:       No active cardiopulmonary disease          MY REVIEW:  72  Male  Short of breath  Rising Troponin  ISCHEMIA SUSPECTED  KNOWN CAD AND CABG   REDUCED LVF 30=35%   AICD and stents  RBBB PVCs  Tachycardia   132  Severe hypertension 388/614 diastolic  Hyperglycemia  Tobacco use    CATHED IN June at [facility] sent to [address], [address] and I spoke to the team.    The Utilization Review Committee members, including its physician members, have reviewed this case and agree that this patient does meet evidence based criteria for inpatient services,  to ADMISSION =\"admit as inpatient\"  Medical care will continue to be provided by Norma Mercer MD, and cardiology Dr Tatyana Santiago who concurs with this decision and has documented his/her concurrence in the patient's medical record. Diaz Curiel MD, FILEMON, FACS, [facility]    Cell [phone]  Office [phone]  8/9/2021  4:17 PM

## 2021-08-10 ENCOUNTER — CARE COORDINATION (OUTPATIENT)
Dept: CASE MANAGEMENT | Age: 66
End: 2021-08-10

## 2021-08-10 NOTE — CARE COORDINATION
[facility] Transitions Initial Follow Up Call    Call within 2 business days of discharge: Yes    Patient: Lashell Aragon Patient : 1955   MRN: <[id number]>  Reason for Admission: CP  Discharge Date: 21 RARS: Readmission Risk Score: 26      Last Discharge [facility]       Complaint Diagnosis Description Type Department Provider    21 Chest Pain Acute chest pain . .. ED to Hosp-Admission (Discharged) (ADMITTED) Andrez Reyes MD; Sorin Rodriguez . .. Spoke with: unknown female    Facility: [facility]     Unknown female answered the phone and states patient is unavailable. LPN CC left contact information and explained purpose of call. No PHI given as no HIPAA form available.    Sherita Yousif LPN [address] [place] [phone]      Care Transitions 24 Hour Call    Do you have all of your prescriptions and are they filled?: Yes  Were you discharged with any Home Care or [address]: No  Post Acute Services: [facility] (Comment: agency name unknown)  Do you have support at home?: Partner/Spouse/SO  Are you an active caregiver in your home?: No  Care Transitions Interventions         Follow Up  Future Appointments   Date Time Provider Fred Gregory   2021 10:40 AM Shine Matute MD [facility] - D[facility]   2021 10:00 AM Josette Mckay MD [facility]       Victorina Lehman LPN

## 2021-08-11 ENCOUNTER — CARE COORDINATION (OUTPATIENT)
Dept: CASE MANAGEMENT | Age: 66
End: 2021-08-11

## 2021-08-11 NOTE — CARE COORDINATION
[address] Transitions Initial Follow Up Call    Call within 2 business days of discharge: Yes    Patient: Madhav Postal Patient : 1955   MRN: <[id number]>  Reason for Admission: CP  Discharge Date: 21 RARS: Readmission Risk Score: 26      Last Discharge [facility]       Complaint Diagnosis Description Type Department Provider    21 Chest Pain Acute chest pain . .. ED to Hosp-Admission (Discharged) (ADMITTED) Mirela Gibson MD; Damaris Jones . .. Spoke with: BRAVO    Facility: [facility]    Second and final attempt to reach patient via phone for initial post hospital transition call. VM left stating purpose of call along with my contact information requesting a return call. Episode ended d/t unsuccessful contact.    Sherita Yousif LPN [address] [place] [phone]    Care Transitions 24 Hour Call    Do you have all of your prescriptions and are they filled?: Yes  Post Acute Services: [address] [facility] (Comment: agency name unknown)  Do you have support at home?: Partner/Spouse/SO  Are you an active caregiver in your home?: No  Care Transitions Interventions         Follow Up  Future Appointments   Date Time Provider Fred Gregory   2021 10:40 AM Sharon Owusu MD [place] [facility] [place] - DYBJ   2021 10:00 AM Kim Boyd MD [facility]       Elinor Cordero LPN

## 2021-08-16 NOTE — DISCHARGE SUMMARY
Hospital Medicine Discharge Summary      Patient ID: Marisela Petit [id number]     Patient's PCP: Melany Bailey MD    Admit Date: 8/8/2021     Discharge Date: 8/9/2021      Admitting Physician: Jeffrey Humphries MD    Discharge Physician: Josue Washington MD     Discharge Diagnoses: Active Hospital Problems    Diagnosis Date Noted    Demand ischemia [facility]) [I24.8]     Chest pain [R07.9] 08/08/2021    Chronic systolic heart failure ([address] [I50.22] 04/27/2019    Atypical chest pain [R07.89]          The patient was seen and examined on the day of discharge and this discharge summary is in conjunction with any daily progress note from day of discharge.     HOSPITAL COURSE       Patient demographics:  The patient  Hawa Garay is a 72 y.o. male      Significant past medical history:       Patient Active Problem List   Diagnosis    Hx of CABG    NSTEMI (non-ST elevated myocardial infarction)    Essential hypertension    Ischemic cardiomyopathy    Hyperlipidemia LDL goal <70    Type 2 diabetes mellitus without complication, with long-term current use of insulin ([address]    Anemia,normocytic normochromic ,mixed folic aci deficiency and iron deficiency    Morbid obesity due to excess calories ([address]    Anxiety    Coronary artery disease involving coronary bypass graft of native heart with angina pectoris ([address]    Tobacco abuse    Restrictive lung disease    Acute on chronic diastolic congestive heart failure ([address]    Ischemic stroke, left frontal lobe (4/30/18) ([address]    PFO (patent foramen ovale)    COPD (chronic obstructive pulmonary disease) ([address]    CAD (coronary artery disease)    Low back pain    Stroke determined by clinical assessment ([address]    ICD (implantable cardioverter-defibrillator) discharge    Diabetic peripheral neuropathy ([address]    Ventricular tachycardia ([address]    Acute pulmonary embolism without acute cor pulmonale (HCC)    Acute chest pain    Paresthesia of upper and lower extremities of both sides    Intractable abdominal pain    Chest pain            Presenting symptoms:  Chest pain     Diagnostic workup:        CONSULTS DURING ADMISSION :   IP CONSULT TO CARDIOLOGY        Patient was diagnosed with:  Chest pain  Likely noncardiac  Chronic systolic CHF. Ischemic cardiomyopathy  Paroxysmal atrial fibrillation        Treatment while inpatient:  72years old male with medical history significant for coronary artery disease. Patient is status post coronary artery bypass grafting. Patient again presented with chest pain he was ruled out for acute coronary syndrome. Cardiology was consulted and they do not believe patient's chest pain is cardiac. Patient does have multiple medical problems including chronic systolic CHF ischemic cardiomyopathy and paroxysmal atrial fibrillation. Patient should continue to follow with his primary cardiologist.      For patient's chronic pain patient should follow with a pain clinic.                          Discharge Condition:  stable      Discharged to:  Home      Activity:   as tolerated:     Follow Up: Follow-up with PCP in 1-2 weeks                  Labs: For convenience and continuity at follow-up the following most recent labs are provided:      CBC:   Lab Results   Component Value Date    WBC 4.9 08/09/2021    HGB 11.9 08/09/2021    HCT 35.3 08/09/2021     08/09/2021       RENAL:   Lab Results   Component Value Date     08/09/2021    K 4.0 08/09/2021     08/09/2021    CO2 22 08/09/2021    BUN 19 08/09/2021    CREATININE 1.3 08/09/2021           Discharge Medications:     Elissa Vail   Home Medication Instructions UEJ:[id number]    Printed on:08/15/21 [id number]   Medication Information                      acetaminophen (TYLENOL) 325 MG tablet  Take 650 mg by mouth every 6 hours as needed for Pain             apixaban (ELIQUIS) 5 MG TABS tablet  Take 1 tablet by mouth 2 times daily             Aspirin Buf,AkGua-ZeVrx-WmYwq, (BUFFERED ASPIRIN) 325 MG TABS  Take 1 tablet by mouth every morning (before breakfast)             carvedilol (COREG) 12.5 MG tablet  Take 6.25 mg by mouth 2 times daily (with meals)              clopidogrel (PLAVIX) 75 MG tablet  Take 75 mg by mouth daily             cyclobenzaprine (FLEXERIL) 5 MG tablet  Take 5 mg by mouth 3 times daily as needed             empagliflozin (JARDIANCE) 10 MG tablet  Take 1 tablet by mouth daily             fluticasone-salmeterol (ADVAIR DISKUS) 100-50 MCG/DOSE diskus inhaler  Inhale 2 puffs into the lungs 2 times daily wixela inhub inhaler ( generic advair diskus)             glucose monitoring kit (FREESTYLE) monitoring kit  1 kit by Does not apply route daily             Insulin Pen Needle 32G X 4 MM MISC  1 each by Does not apply route daily             nitroGLYCERIN (NITROSTAT) 0.4 MG SL tablet  Place 0.4 mg under the tongue every 5 minutes as needed             oxyCODONE-acetaminophen (PERCOCET)  MG per tablet  Take 1 tablet by mouth every 8 hours as needed for Pain for up to 30 days. Respiratory Therapy Supplies (NEBULIZER) AALIYAH  Used to give yourself breathing treatment             rosuvastatin (CRESTOR) 40 MG tablet  Take 40 mg by mouth every evening             sacubitril-valsartan (ENTRESTO) 49-51 MG per tablet  Take 0.5 tablets by mouth 2 times daily             spironolactone (ALDACTONE) 25 MG tablet  Take 25 mg by mouth daily              traZODone (DESYREL) 50 MG tablet  Take 50 mg by mouth nightly             vitamin B-12 (CYANOCOBALAMIN) 1000 MCG tablet  Take 1,000 mcg by mouth daily                    Time Spent on discharge is more than 30 min in the examination, evaluation, counseling and review of medications and discharge plan. Signed:  Fredrick Ortega MD   8/15/2021      Thank you Jered Mazariegos MD for the opportunity to be involved in this patient's care.  If you have any questions or concerns please feel free to contact me at [phone]. This note was transcribed using [id number]. Please disregard any translational errors.

## 2021-11-17 ENCOUNTER — APPOINTMENT (OUTPATIENT)
Dept: GENERAL RADIOLOGY | Age: 66
End: 2021-11-17
Payer: MEDICARE

## 2021-11-17 ENCOUNTER — HOSPITAL ENCOUNTER (EMERGENCY)
Age: 66
Discharge: HOME | End: 2021-11-17
Attending: EMERGENCY MEDICINE
Payer: MEDICARE

## 2021-11-17 VITALS
WEIGHT: 186.29 LBS | RESPIRATION RATE: 16 BRPM | BODY MASS INDEX: 25.23 KG/M2 | HEIGHT: 72 IN | SYSTOLIC BLOOD PRESSURE: 135 MMHG | OXYGEN SATURATION: 94 % | TEMPERATURE: 97.8 F | HEART RATE: 62 BPM | DIASTOLIC BLOOD PRESSURE: 78 MMHG

## 2021-11-17 DIAGNOSIS — R07.9 CHEST PAIN, UNSPECIFIED TYPE: Primary | ICD-10-CM

## 2021-11-17 LAB
ANION GAP SERPL CALCULATED.3IONS-SCNC: 16 MMOL/L (ref 3–16)
BASOPHILS ABSOLUTE: 0 K/UL (ref 0–0.2)
BASOPHILS RELATIVE PERCENT: 0.7 %
BUN BLDV-MCNC: 15 MG/DL (ref 7–20)
CALCIUM SERPL-MCNC: 9.2 MG/DL (ref 8.3–10.6)
CHLORIDE BLD-SCNC: 94 MMOL/L (ref 99–110)
CO2: 18 MMOL/L (ref 21–32)
CREAT SERPL-MCNC: 1 MG/DL (ref 0.8–1.3)
EKG ATRIAL RATE: 82 BPM
EKG DIAGNOSIS: NORMAL
EKG P AXIS: 75 DEGREES
EKG P-R INTERVAL: 134 MS
EKG Q-T INTERVAL: 440 MS
EKG QRS DURATION: 132 MS
EKG QTC CALCULATION (BAZETT): 514 MS
EKG R AXIS: 105 DEGREES
EKG T AXIS: 95 DEGREES
EKG VENTRICULAR RATE: 82 BPM
EOSINOPHILS ABSOLUTE: 0.1 K/UL (ref 0–0.6)
EOSINOPHILS RELATIVE PERCENT: 1.8 %
GFR AFRICAN AMERICAN: >60
GFR NON-AFRICAN AMERICAN: >60
GLUCOSE BLD-MCNC: 146 MG/DL (ref 70–99)
HCT VFR BLD CALC: 40.9 % (ref 40.5–52.5)
HEMOGLOBIN: 13.8 G/DL (ref 13.5–17.5)
LYMPHOCYTES ABSOLUTE: 2 K/UL (ref 1–5.1)
LYMPHOCYTES RELATIVE PERCENT: 37.1 %
MCH RBC QN AUTO: 33 PG (ref 26–34)
MCHC RBC AUTO-ENTMCNC: 33.7 G/DL (ref 31–36)
MCV RBC AUTO: 98.1 FL (ref 80–100)
MONOCYTES ABSOLUTE: 0.4 K/UL (ref 0–1.3)
MONOCYTES RELATIVE PERCENT: 7.1 %
NEUTROPHILS ABSOLUTE: 2.8 K/UL (ref 1.7–7.7)
NEUTROPHILS RELATIVE PERCENT: 53.3 %
PDW BLD-RTO: 14.9 % (ref 12.4–15.4)
PLATELET # BLD: 283 K/UL (ref 135–450)
PMV BLD AUTO: 9.4 FL (ref 5–10.5)
POTASSIUM REFLEX MAGNESIUM: 4.2 MMOL/L (ref 3.5–5.1)
PRO-BNP: 5735 PG/ML (ref 0–124)
RBC # BLD: 4.17 M/UL (ref 4.2–5.9)
SODIUM BLD-SCNC: 128 MMOL/L (ref 136–145)
TROPONIN: 0.02 NG/ML
TROPONIN: 0.02 NG/ML
WBC # BLD: 5.3 K/UL (ref 4–11)

## 2021-11-17 PROCEDURE — 84484 ASSAY OF TROPONIN QUANT: CPT

## 2021-11-17 PROCEDURE — 99284 EMERGENCY DEPT VISIT MOD MDM: CPT

## 2021-11-17 PROCEDURE — 80048 BASIC METABOLIC PNL TOTAL CA: CPT

## 2021-11-17 PROCEDURE — 96375 TX/PRO/DX INJ NEW DRUG ADDON: CPT

## 2021-11-17 PROCEDURE — 6370000000 HC RX 637 (ALT 250 FOR IP): Performed by: PHYSICIAN ASSISTANT

## 2021-11-17 PROCEDURE — 83880 ASSAY OF NATRIURETIC PEPTIDE: CPT

## 2021-11-17 PROCEDURE — 96374 THER/PROPH/DIAG INJ IV PUSH: CPT

## 2021-11-17 PROCEDURE — 85025 COMPLETE CBC W/AUTO DIFF WBC: CPT

## 2021-11-17 PROCEDURE — 71046 X-RAY EXAM CHEST 2 VIEWS: CPT

## 2021-11-17 PROCEDURE — 93010 ELECTROCARDIOGRAM REPORT: CPT | Performed by: INTERNAL MEDICINE

## 2021-11-17 PROCEDURE — 6360000002 HC RX W HCPCS: Performed by: PHYSICIAN ASSISTANT

## 2021-11-17 PROCEDURE — 93005 ELECTROCARDIOGRAM TRACING: CPT | Performed by: EMERGENCY MEDICINE

## 2021-11-17 PROCEDURE — 36415 COLL VENOUS BLD VENIPUNCTURE: CPT

## 2021-11-17 RX ORDER — OXYCODONE HYDROCHLORIDE AND ACETAMINOPHEN 5; 325 MG/1; MG/1
1 TABLET ORAL ONCE
Status: COMPLETED | OUTPATIENT
Start: 2021-11-17 | End: 2021-11-17

## 2021-11-17 RX ORDER — MORPHINE SULFATE 2 MG/ML
4 INJECTION, SOLUTION INTRAMUSCULAR; INTRAVENOUS ONCE
Status: COMPLETED | OUTPATIENT
Start: 2021-11-17 | End: 2021-11-17

## 2021-11-17 RX ORDER — ONDANSETRON 2 MG/ML
4 INJECTION INTRAMUSCULAR; INTRAVENOUS ONCE
Status: COMPLETED | OUTPATIENT
Start: 2021-11-17 | End: 2021-11-17

## 2021-11-17 RX ORDER — OXYCODONE HYDROCHLORIDE AND ACETAMINOPHEN 5; 325 MG/1; MG/1
1 TABLET ORAL ONCE
Status: DISCONTINUED | OUTPATIENT
Start: 2021-11-17 | End: 2021-11-17

## 2021-11-17 RX ADMIN — ONDANSETRON 4 MG: 2 INJECTION INTRAMUSCULAR; INTRAVENOUS at 15:56

## 2021-11-17 RX ADMIN — OXYCODONE AND ACETAMINOPHEN 1 TABLET: 5; 325 TABLET ORAL at 14:41

## 2021-11-17 RX ADMIN — MORPHINE SULFATE 4 MG: 2 INJECTION, SOLUTION INTRAMUSCULAR; INTRAVENOUS at 15:56

## 2021-11-17 ASSESSMENT — PAIN DESCRIPTION - DESCRIPTORS: DESCRIPTORS: DISCOMFORT;SHARP

## 2021-11-17 ASSESSMENT — ENCOUNTER SYMPTOMS
VOMITING: 0
NAUSEA: 0
ABDOMINAL PAIN: 0
SHORTNESS OF BREATH: 1

## 2021-11-17 ASSESSMENT — PAIN SCALES - GENERAL
PAINLEVEL_OUTOF10: 8
PAINLEVEL_OUTOF10: 8
PAINLEVEL_OUTOF10: 4
PAINLEVEL_OUTOF10: 9

## 2021-11-17 ASSESSMENT — PAIN DESCRIPTION - LOCATION: LOCATION: CHEST

## 2021-11-17 ASSESSMENT — PAIN DESCRIPTION - PAIN TYPE: TYPE: ACUTE PAIN

## 2021-11-17 ASSESSMENT — HEART SCORE: ECG: 1

## 2021-11-17 NOTE — ED PROVIDER NOTES
I have personally performed a face to face diagnostic evaluation on this patient. I have fully participated in the care of this patient. I have reviewed and agree with all pertinent clinical information including history, physical exam, diagnostic tests, and the plan. HPI: Kimberly Granger presented with chest pain approximately 3 hours ago. Patient took aspirin and nitro. Associated shortness of breath. History of CAD. Prior MI and prior stents. Patient is high risk chest pain at this time. Pain is still present. Patient is high risk for chest pain however patient is also had significant recent cardiac work-up with negative cath a negative CT PE. See [facility] note for further details. Chief Complaint   Patient presents with    Chest Pain     pt to ED via private car c/o severe chest pain x 3 hours. +cardiac hx. pain center chest radiating down left arm, associated shortness of breath. denies diaphoresis. pt took 325 ASA today. took 4 nitro at home with mild temp relief. Review of Systems: See [facility] note  Vital Signs: /79   Pulse 72   Temp 97.6 °F (36.4 °C) (Oral)   Resp 17   Ht 6' (1.829 m)   Wt 186 lb 4.6 oz (84.5 kg)   SpO2 98%   BMI 25.27 kg/m²     Alert 72 y.o. male who does not appear toxic or acutely ill  HENT: Atraumatic, oral mucosa moist  Neck: Grossly normal ROM  Chest/Lungs: respiratory effort normal   Abdomen: Soft nontender  Extremities: 2+ radial bilaterally  Musculoskeletal: Grossly normal ROM  Skin: No palor or diaphoresis    Medical Decision Making and Plan:  Pertinent Labs & Imaging studies reviewed. (See [facility] chart for details)  I agree with assessment and plan. Patient has high risk chest pain however has had significant recent cardiac work-up including negative cath recently. Given the patient's symptoms started 3 hours ago will hold patient for delta troponin and ensure that troponin is negative as well as not trending.   Given his recent work-up believe that if patient has otherwise negative troponin and no changes in his EKG that he can be discharged with follow-up with cardiology    Repeat troponins unchanging. CXR unremarkable. Will discharge with strict return precautions and cardiology follow up .     EKG Interpretation    Interpreted by emergency department physician    Rhythm: normal sinus   Rate: normal  Axis: normal  Ectopy: none  Conduction: right bundle branch block (complete)  ST Segments: nonspecific changes  T Waves: non specific changes  Q Waves: none    Clinical Impression: right bundle branch block unchanged from EKG dated August 8, 2021    MD Jennifer Rivera MD  11/17/21 1812

## 2021-11-17 NOTE — ED PROVIDER NOTES
[address] [facility]      Pt Name: Drea Meza  MRN: [id number]  [place] 1955  Date of evaluation: 11/17/2021  Provider: ABIGAIL Doty    This patient was seen and evaluated by the attending physician Dr. Nayla Larsen. CHIEF COMPLAINT     Chest pain      HISTORY OF PRESENT ILLNESS  (Location/Symptom, Timing/Onset, Context/Setting, Quality, Duration, Modifying Factors, Severity.)   Drea Meza is a 72 y.o. male who presents to the emergency department for chest pain. Chest pain is substernal and started at 10 AM while he was at rest.  Radiates to his left arm. He took aspirin 325 at home along with 4 nitroglycerin. Pain did improve with nitroglycerin but has returned. He does have associated shortness of breath. Reports this feels similar to prior episode of MI. He denies nausea, vomiting, abdominal pain, fever, cough. He does have a significant past medical history including hyperlipidemia, hypertension, ischemic cardiomyopathy, CABG, NSTEMI, coronary stenting, coronary artery disease, CHF, diabetes, V. tach, family history of MI, current half of a pack smoker for the past 40 years. Nursing Notes were reviewed and I agree. REVIEW OF SYSTEMS    (2-9 systems for level 4, 10 or more for level 5)     Review of Systems   Constitutional: Negative for chills and fever. Respiratory: Positive for shortness of breath. Cardiovascular: Positive for chest pain. Gastrointestinal: Negative for abdominal pain, nausea and vomiting. Except as noted above the remainder of the review of systems was reviewed and negative.        PAST MEDICAL HISTORY         Diagnosis Date    Anxiety     Arthritis     Asthma     CAD (coronary artery disease)     Calcium kidney stone     Cardiomyopathy ([address]     Cerebral artery occlusion with cerebral infarction ([address]     CHF (congestive heart failure) ([address]     Clostridium difficile diarrhea 02/12/2020    COPD [facility]chronic obstructive pulmonary disease) [facility]     mild    Depression     DM2 (diabetes mellitus, type 2) ([facility])     Fibromyalgia     GERD (gastroesophageal reflux disease)     Hyperlipidemia     Hypertension     Liver disease     Pacemaker 2012    Medtronic model # [id number]    Pneumonia     Seizures [facility]     TIA (transient ischemic attack) 2007    Ulcerative colitis ([facility]        SURGICAL HISTORY           Procedure Laterality Date    APPENDECTOMY      BACK SURGERY      CARDIAC SURGERY      CHOLECYSTECTOMY      COLONOSCOPY  01/10/2017    COLONOSCOPY  01/10/2017    CORONARY ANGIOPLASTY WITH STENT PLACEMENT  2012    CORONARY ARTERY BYPASS GRAFT  2010, 11/2015    ENDOSCOPY, COLON, DIAGNOSTIC      PACEMAKER PLACEMENT      UPPER GASTROINTESTINAL ENDOSCOPY  02/07/2017    VASCULAR SURGERY         CURRENT MEDICATIONS       Discharge Medication List as of 11/17/2021  6:13 PM      CONTINUE these medications which have NOT CHANGED    Details   gabapentin (NEURONTIN) 600 MG tablet TAKE ONE TABLET BY MOUTH FIVE TIMES A DAY, Disp-150 tablet, R-2Normal      oxyCODONE-acetaminophen (PERCOCET)  MG per tablet Take 1 tablet by mouth every 8 hours as needed for Pain for up to 30 days. , Disp-90 tablet, R-0Normal      Aspirin Buf,BwEcz-CgKnw-AaVhj, (BUFFERED ASPIRIN) 325 MG TABS Take 1 tablet by mouth every morning (before breakfast)Historical Med      cyclobenzaprine (FLEXERIL) 5 MG tablet Take 5 mg by mouth 3 times daily as neededHistorical Med      vitamin B-12 (CYANOCOBALAMIN) 1000 MCG tablet Take 1,000 mcg by mouth dailyHistorical Med      traZODone (DESYREL) 50 MG tablet Take 50 mg by mouth nightlyHistorical Med      fluticasone-salmeterol (ADVAIR DISKUS) 100-50 MCG/DOSE diskus inhaler Inhale 2 puffs into the lungs 2 times daily wixela inhub inhaler ( generic advair diskus), Disp-60 each, R-3Normal      Insulin Pen Needle 32G X 4 MM MISC DAILY Starting Thu 3/18/2021, Disp-100 each, R-3, Normal      glucose monitoring kit (FREESTYLE) monitoring kit DAILY Starting Thu 3/18/2021, Disp-1 kit, R-0, Normal      spironolactone (ALDACTONE) 25 MG tablet Take 25 mg by mouth daily Historical Med      nitroGLYCERIN (NITROSTAT) 0.4 MG SL tablet Place 0.4 mg under the tongue every 5 minutes as neededHistorical Med      carvedilol (COREG) 12.5 MG tablet Take 6.25 mg by mouth 2 times daily (with meals) , Disp-60 tablet, R-3Historical Med      sacubitril-valsartan (ENTRESTO) 49-51 MG per tablet Take 0.5 tablets by mouth 2 times daily, Disp-60 tablet, R-0Historical Med      clopidogrel (PLAVIX) 75 MG tablet Take 75 mg by mouth dailyHistorical Med      rosuvastatin (CRESTOR) 40 MG tablet Take 40 mg by mouth every eveningHistorical Med      Respiratory Therapy Supplies (NEBULIZER) AALIYAH Disp-1 Device,R-0, PrintUsed to give yourself breathing treatment      apixaban (ELIQUIS) 5 MG TABS tablet Take 1 tablet by mouth 2 times daily, Disp-60 tablet,R-1Normal      empagliflozin (JARDIANCE) 10 MG tablet Take 1 tablet by mouth daily, Disp-30 tablet,R-0Normal      acetaminophen (TYLENOL) 325 MG tablet Take 650 mg by mouth every 6 hours as needed for PainHistorical Med             ALLERGIES     Dopamine hcl, Tramadol, Morphine and related, and Melatonin    FAMILY HISTORY           Problem Relation Age of Onset    Coronary Art Dis Father     Cancer Mother         Lung with mets    Diabetes Mother      Family Status   Relation Name Status    Father          CAD    Mother   at age 62        Lung cancer        SOCIAL HISTORY      reports that he quit smoking about 4 weeks ago. His smoking use included cigarettes. He has a 22.00 pack-year smoking history. He has never used smokeless tobacco. He reports that he does not drink alcohol and does not use drugs.     PHYSICAL EXAM    (up to 7 for level 4, 8 or more for level 5)     ED Triage Vitals [21 1304]   BP Temp Temp Source Pulse Resp SpO2 Height Weight   (!) 171/103 97.2 °F (36.2 °C) Oral 79 18 99 % 6' (1.829 m) 186 lb 4.6 oz (84.5 kg)       Physical Exam  Constitutional:       General: He is not in acute distress. Appearance: Normal appearance. He is well-developed. He is not ill-appearing, toxic-appearing or diaphoretic. HENT:      Head: Normocephalic and atraumatic. Cardiovascular:      Rate and Rhythm: Normal rate and regular rhythm. Heart sounds: Normal heart sounds. Pulmonary:      Effort: Pulmonary effort is normal. No respiratory distress. Breath sounds: Normal breath sounds. Abdominal:      General: There is no distension. Palpations: Abdomen is soft. There is no mass. Tenderness: There is no abdominal tenderness. There is no guarding or rebound. Hernia: No hernia is present. Musculoskeletal:         General: Normal range of motion. Cervical back: Normal range of motion and neck supple. Right lower leg: No edema. Left lower leg: No edema. Comments: No calf tenderness bilaterally   Skin:     General: Skin is warm. Neurological:      Mental Status: He is alert. Psychiatric:         Mood and Affect: Mood normal.         Behavior: Behavior normal.         Thought Content: Thought content normal.         Judgment: Judgment normal.         DIFFERENTIAL DIAGNOSIS   Acute Myocardial Infarction, Acute Coronary Syndrome, Pneumothorax, Aortic Dissection, Pulmonary Embolism, Pneumonia      DIAGNOSTICRESULTS     EKG: All EKG's are interpreted by ABIGAIL Bergman in the absence of a cardiologist.    EKg obtained. See Dr. Mejia Oliveros note for interpretation.     RADIOLOGY:   Non-plain film images such as CT, Ultrasound and MRI are read by the radiologist. Plain radiographic images are visualized and preliminarily interpreted by ABGIAIL Bergman with the below findings:      Interpretation per the Radiologist below, if available at the time of this note:    XR CHEST (2 VW)   Final Result   No acute process. LABS:      All other labs were withinnormal range or not returned as of this dictation. EMERGENCY DEPARTMENT COURSE and DIFFERENTIAL DIAGNOSIS/MDM:   Vitals:    Vitals:    11/17/21 1536 11/17/21 1623 11/17/21 1657 11/17/21 1752   BP: (!) 146/79 136/81 132/80 135/78   Pulse: 67 64 66 62   Resp: 19 16 16 16   Temp:    97.8 °F (36.6 °C)   TempSrc:    Oral   SpO2: 97% 95% 95% 94%   Weight:       Height:           Patient was nontoxic, well appearing, afebrile with normal vital signs with exception of hypertension 171/103. Saturating well on room air. I reviewed his chart. Was admitted 7 days ago at [address] [place] [address] for chest pain and discharged 4 days ago. Presented with chest pain and there was concern for ST depression in anterolateral leads so was admitted. He was evaluated by cardiology who had a low suspicion for new ACS event given his recent stable angiograms. Also has multiple ED presentations and admission for CP. Reviewed discharge summary from admission 11/4/21 at [facility] 11/4/21. Was  Noted to have a coronary angiogram that showed 3 vessel YARED but PCI not indicated. Risk factor modification recommended and was put on Imdur. From Care Everywhere:  Cardiac cath report 11/5/21:   Summary:   3 vessel CAD     Intervention:   no     Plan: Risk factor modification, follow up in 2 weeks     Findings:     Dominance: right dominant     LM:  Ostial:  40%, in-stent          LAD:  Distal: 80%     D1: patent stent, 80% upper pole brance          LCX:  Proximal: 100%         CT angiogram chest for PE 11/5/21:  No pulmonary embolus. Left lower lobe pulmonary nodule unchanged from comparison study. Patient is a high risk chest pain due to his PMH, but has had recent cardiac workup. Initial troponin was 0.02, consistent with prior. 3 hour trop was unchanged. CBC unremarkable. BMP with sodium 128, bicarb 18, normal renal function.   CXR negative. Patient does have high heart score but has had a recent cardiac cath and troponins are at baseline. Has had multiple presentations for chest pain. I have a low suspicion for ACS. Has also had recent CT angiogram chest for PE that was negative so I have a low suspicion for PE. History is not suggestive of dissection. Upon, reevaluation, he is lying in stretcher in no distress. Appears well. Discussed plan for discharge and he is comfortable with plan. Has a cardiologist and was instructed to FU with his cardiologist in next few days for reeval and to return for worsening. He agreed and understood. PROCEDURES:  None    FINAL IMPRESSION      1.  Chest pain, unspecified type          DISPOSITION/PLAN   DISPOSITION Decision To Discharge 11/17/2021 06:12:18 PM      PATIENT REFERRED TO:  Your cardiologist    Schedule an appointment as soon as possible for a visit in 2 days  for reevaluation    [facility] Emergency Department  [address]  [phone]    As needed, If symptoms worsen      DISCHARGE MEDICATIONS:  Discharge Medication List as of 11/17/2021  6:13 PM          (Please note that portions of this note werecompleted with a voice recognition program.  Efforts were made to edit the dictations but occasionally words are mis-transcribed.)    [facility], [address] [place] [address]  11/17/21 2039

## 2021-11-18 ENCOUNTER — CARE COORDINATION (OUTPATIENT)
Dept: CARE COORDINATION | Age: 66
End: 2021-11-18

## 2021-11-18 NOTE — CARE COORDINATION
AARON contacted Ruy to follow up on his [facility] ED visit on 11.17.2021 dx:  1 Schedule an appointment with Your cardiologist in 2 days (11/19/2021); for reevaluation  2 Follow up with [facility] Emergency Department (Emergency Medicine); As needed, If symptoms worsen      Pt declined to follow up on recommended plan of care.     No further outreach

## 2021-11-27 ENCOUNTER — HOSPITAL ENCOUNTER (OUTPATIENT)
Age: 66
Setting detail: OBSERVATION
Discharge: LEFT BEFORE DISCHARGE | End: 2021-11-28
Attending: EMERGENCY MEDICINE | Admitting: INTERNAL MEDICINE
Payer: MEDICARE

## 2021-11-27 ENCOUNTER — APPOINTMENT (OUTPATIENT)
Dept: GENERAL RADIOLOGY | Age: 66
End: 2021-11-27
Payer: MEDICARE

## 2021-11-27 DIAGNOSIS — R07.9 CHEST PAIN, UNSPECIFIED TYPE: Primary | ICD-10-CM

## 2021-11-27 LAB
A/G RATIO: 1.6 (ref 1.1–2.2)
ALBUMIN SERPL-MCNC: 4.4 G/DL (ref 3.4–5)
ALP BLD-CCNC: 66 U/L (ref 40–129)
ALT SERPL-CCNC: 6 U/L (ref 10–40)
ANION GAP SERPL CALCULATED.3IONS-SCNC: 15 MMOL/L (ref 3–16)
AST SERPL-CCNC: 12 U/L (ref 15–37)
BASOPHILS ABSOLUTE: 0.1 K/UL (ref 0–0.2)
BASOPHILS RELATIVE PERCENT: 1.1 %
BILIRUB SERPL-MCNC: 0.6 MG/DL (ref 0–1)
BUN BLDV-MCNC: 11 MG/DL (ref 7–20)
CALCIUM SERPL-MCNC: 9 MG/DL (ref 8.3–10.6)
CHLORIDE BLD-SCNC: 96 MMOL/L (ref 99–110)
CO2: 22 MMOL/L (ref 21–32)
CREAT SERPL-MCNC: 0.8 MG/DL (ref 0.8–1.3)
EOSINOPHILS ABSOLUTE: 0.1 K/UL (ref 0–0.6)
EOSINOPHILS RELATIVE PERCENT: 2.1 %
GFR AFRICAN AMERICAN: >60
GFR NON-AFRICAN AMERICAN: >60
GLUCOSE BLD-MCNC: 125 MG/DL (ref 70–99)
HCT VFR BLD CALC: 35.9 % (ref 40.5–52.5)
HEMOGLOBIN: 12 G/DL (ref 13.5–17.5)
LYMPHOCYTES ABSOLUTE: 1.3 K/UL (ref 1–5.1)
LYMPHOCYTES RELATIVE PERCENT: 27.5 %
MCH RBC QN AUTO: 33.3 PG (ref 26–34)
MCHC RBC AUTO-ENTMCNC: 33.3 G/DL (ref 31–36)
MCV RBC AUTO: 99.8 FL (ref 80–100)
MONOCYTES ABSOLUTE: 0.4 K/UL (ref 0–1.3)
MONOCYTES RELATIVE PERCENT: 7.6 %
NEUTROPHILS ABSOLUTE: 3 K/UL (ref 1.7–7.7)
NEUTROPHILS RELATIVE PERCENT: 61.7 %
PDW BLD-RTO: 15.4 % (ref 12.4–15.4)
PLATELET # BLD: 184 K/UL (ref 135–450)
PMV BLD AUTO: 8.8 FL (ref 5–10.5)
POTASSIUM REFLEX MAGNESIUM: 4.2 MMOL/L (ref 3.5–5.1)
PRO-BNP: 7006 PG/ML (ref 0–124)
RBC # BLD: 3.6 M/UL (ref 4.2–5.9)
SODIUM BLD-SCNC: 133 MMOL/L (ref 136–145)
TOTAL PROTEIN: 7.2 G/DL (ref 6.4–8.2)
TROPONIN: 0.03 NG/ML
TROPONIN: 0.03 NG/ML
WBC # BLD: 4.9 K/UL (ref 4–11)

## 2021-11-27 PROCEDURE — 85025 COMPLETE CBC W/AUTO DIFF WBC: CPT

## 2021-11-27 PROCEDURE — 71046 X-RAY EXAM CHEST 2 VIEWS: CPT

## 2021-11-27 PROCEDURE — 93005 ELECTROCARDIOGRAM TRACING: CPT | Performed by: EMERGENCY MEDICINE

## 2021-11-27 PROCEDURE — 84484 ASSAY OF TROPONIN QUANT: CPT

## 2021-11-27 PROCEDURE — 99284 EMERGENCY DEPT VISIT MOD MDM: CPT

## 2021-11-27 PROCEDURE — 6370000000 HC RX 637 (ALT 250 FOR IP): Performed by: NURSE PRACTITIONER

## 2021-11-27 PROCEDURE — G0378 HOSPITAL OBSERVATION PER HR: HCPCS

## 2021-11-27 PROCEDURE — 36415 COLL VENOUS BLD VENIPUNCTURE: CPT

## 2021-11-27 PROCEDURE — 6360000002 HC RX W HCPCS: Performed by: EMERGENCY MEDICINE

## 2021-11-27 PROCEDURE — 96374 THER/PROPH/DIAG INJ IV PUSH: CPT

## 2021-11-27 PROCEDURE — 80053 COMPREHEN METABOLIC PANEL: CPT

## 2021-11-27 PROCEDURE — 6370000000 HC RX 637 (ALT 250 FOR IP): Performed by: INTERNAL MEDICINE

## 2021-11-27 PROCEDURE — 83880 ASSAY OF NATRIURETIC PEPTIDE: CPT

## 2021-11-27 RX ORDER — ISOSORBIDE MONONITRATE 60 MG/1
60 TABLET, EXTENDED RELEASE ORAL DAILY
COMMUNITY
Start: 2021-11-05

## 2021-11-27 RX ORDER — ROSUVASTATIN CALCIUM 40 MG/1
40 TABLET, COATED ORAL EVERY EVENING
Status: DISCONTINUED | OUTPATIENT
Start: 2021-11-27 | End: 2021-11-28 | Stop reason: HOSPADM

## 2021-11-27 RX ORDER — CARVEDILOL 25 MG/1
25 TABLET ORAL 2 TIMES DAILY WITH MEALS
Status: ON HOLD | COMMUNITY
Start: 2021-10-06 | End: 2022-04-26 | Stop reason: HOSPADM

## 2021-11-27 RX ORDER — CARVEDILOL 6.25 MG/1
6.25 TABLET ORAL 2 TIMES DAILY WITH MEALS
Status: DISCONTINUED | OUTPATIENT
Start: 2021-11-28 | End: 2021-11-28 | Stop reason: HOSPADM

## 2021-11-27 RX ORDER — GABAPENTIN 300 MG/1
600 CAPSULE ORAL
Status: DISCONTINUED | OUTPATIENT
Start: 2021-11-27 | End: 2021-11-28 | Stop reason: HOSPADM

## 2021-11-27 RX ORDER — AMLODIPINE BESYLATE 5 MG/1
5 TABLET ORAL DAILY
Status: ON HOLD | COMMUNITY
Start: 2021-10-19 | End: 2022-04-26 | Stop reason: HOSPADM

## 2021-11-27 RX ORDER — ASPIRIN 81 MG/1
81 TABLET ORAL DAILY
Status: ON HOLD | COMMUNITY
End: 2022-04-26 | Stop reason: HOSPADM

## 2021-11-27 RX ORDER — CLOPIDOGREL BISULFATE 75 MG/1
75 TABLET ORAL DAILY
Status: DISCONTINUED | OUTPATIENT
Start: 2021-11-28 | End: 2021-11-28 | Stop reason: HOSPADM

## 2021-11-27 RX ORDER — TRAZODONE HYDROCHLORIDE 50 MG/1
50 TABLET ORAL NIGHTLY
Status: DISCONTINUED | OUTPATIENT
Start: 2021-11-27 | End: 2021-11-28 | Stop reason: HOSPADM

## 2021-11-27 RX ORDER — OXYCODONE AND ACETAMINOPHEN 10; 325 MG/1; MG/1
1 TABLET ORAL EVERY 8 HOURS PRN
Status: DISCONTINUED | OUTPATIENT
Start: 2021-11-27 | End: 2021-11-28 | Stop reason: HOSPADM

## 2021-11-27 RX ORDER — BUDESONIDE AND FORMOTEROL FUMARATE DIHYDRATE 160; 4.5 UG/1; UG/1
2 AEROSOL RESPIRATORY (INHALATION) 2 TIMES DAILY
Status: DISCONTINUED | OUTPATIENT
Start: 2021-11-27 | End: 2021-11-28 | Stop reason: HOSPADM

## 2021-11-27 RX ORDER — MORPHINE SULFATE 2 MG/ML
2 INJECTION, SOLUTION INTRAMUSCULAR; INTRAVENOUS ONCE
Status: COMPLETED | OUTPATIENT
Start: 2021-11-27 | End: 2021-11-27

## 2021-11-27 RX ORDER — SPIRONOLACTONE 25 MG/1
25 TABLET ORAL DAILY
Status: DISCONTINUED | OUTPATIENT
Start: 2021-11-28 | End: 2021-11-27

## 2021-11-27 RX ADMIN — SACUBITRIL AND VALSARTAN 0.5 TABLET: 49; 51 TABLET, FILM COATED ORAL at 22:36

## 2021-11-27 RX ADMIN — ROSUVASTATIN CALCIUM 40 MG: 40 TABLET, FILM COATED ORAL at 22:37

## 2021-11-27 RX ADMIN — MORPHINE SULFATE 2 MG: 2 INJECTION, SOLUTION INTRAMUSCULAR; INTRAVENOUS at 19:11

## 2021-11-27 RX ADMIN — OXYCODONE AND ACETAMINOPHEN 1 TABLET: 10; 325 TABLET ORAL at 22:37

## 2021-11-27 RX ADMIN — APIXABAN 5 MG: 5 TABLET, FILM COATED ORAL at 22:37

## 2021-11-27 RX ADMIN — TRAZODONE HYDROCHLORIDE 50 MG: 50 TABLET ORAL at 22:37

## 2021-11-27 RX ADMIN — GABAPENTIN 600 MG: 300 CAPSULE ORAL at 22:37

## 2021-11-27 ASSESSMENT — PAIN DESCRIPTION - LOCATION
LOCATION: CHEST

## 2021-11-27 ASSESSMENT — ENCOUNTER SYMPTOMS
VOMITING: 0
ABDOMINAL PAIN: 0
SHORTNESS OF BREATH: 0
NAUSEA: 0

## 2021-11-27 ASSESSMENT — PAIN DESCRIPTION - PAIN TYPE
TYPE: ACUTE PAIN

## 2021-11-27 ASSESSMENT — PAIN SCALES - GENERAL
PAINLEVEL_OUTOF10: 6
PAINLEVEL_OUTOF10: 8
PAINLEVEL_OUTOF10: 8
PAINLEVEL_OUTOF10: 9
PAINLEVEL_OUTOF10: 4

## 2021-11-27 ASSESSMENT — PAIN DESCRIPTION - FREQUENCY
FREQUENCY: CONTINUOUS
FREQUENCY: CONTINUOUS

## 2021-11-27 ASSESSMENT — PAIN DESCRIPTION - ONSET
ONSET: ON-GOING
ONSET: ON-GOING

## 2021-11-27 ASSESSMENT — PAIN DESCRIPTION - DESCRIPTORS
DESCRIPTORS: SHARP;ACHING
DESCRIPTORS: ACHING;SHARP
DESCRIPTORS: SHARP

## 2021-11-27 ASSESSMENT — PAIN DESCRIPTION - ORIENTATION
ORIENTATION: RIGHT

## 2021-11-27 ASSESSMENT — HEART SCORE: ECG: 1

## 2021-11-27 ASSESSMENT — PAIN DESCRIPTION - PROGRESSION: CLINICAL_PROGRESSION: NOT CHANGED

## 2021-11-27 ASSESSMENT — PAIN - FUNCTIONAL ASSESSMENT: PAIN_FUNCTIONAL_ASSESSMENT: PREVENTS OR INTERFERES SOME ACTIVE ACTIVITIES AND ADLS

## 2021-11-28 VITALS
BODY MASS INDEX: 24.04 KG/M2 | TEMPERATURE: 97.2 F | SYSTOLIC BLOOD PRESSURE: 122 MMHG | RESPIRATION RATE: 16 BRPM | OXYGEN SATURATION: 96 % | WEIGHT: 177.47 LBS | HEART RATE: 72 BPM | HEIGHT: 72 IN | DIASTOLIC BLOOD PRESSURE: 73 MMHG

## 2021-11-28 LAB
EKG ATRIAL RATE: 82 BPM
EKG DIAGNOSIS: NORMAL
EKG P AXIS: 65 DEGREES
EKG P-R INTERVAL: 124 MS
EKG Q-T INTERVAL: 426 MS
EKG QRS DURATION: 130 MS
EKG QTC CALCULATION (BAZETT): 497 MS
EKG R AXIS: 86 DEGREES
EKG T AXIS: 59 DEGREES
EKG VENTRICULAR RATE: 82 BPM

## 2021-11-28 PROCEDURE — G0378 HOSPITAL OBSERVATION PER HR: HCPCS

## 2021-11-28 PROCEDURE — 93010 ELECTROCARDIOGRAM REPORT: CPT | Performed by: INTERNAL MEDICINE

## 2021-11-28 PROCEDURE — 6370000000 HC RX 637 (ALT 250 FOR IP): Performed by: INTERNAL MEDICINE

## 2021-11-28 PROCEDURE — 6370000000 HC RX 637 (ALT 250 FOR IP): Performed by: NURSE PRACTITIONER

## 2021-11-28 RX ADMIN — CLOPIDOGREL BISULFATE 75 MG: 75 TABLET ORAL at 08:20

## 2021-11-28 RX ADMIN — SACUBITRIL AND VALSARTAN 0.5 TABLET: 49; 51 TABLET, FILM COATED ORAL at 08:20

## 2021-11-28 RX ADMIN — APIXABAN 5 MG: 5 TABLET, FILM COATED ORAL at 08:20

## 2021-11-28 RX ADMIN — CARVEDILOL 6.25 MG: 6.25 TABLET, FILM COATED ORAL at 08:20

## 2021-11-28 RX ADMIN — GABAPENTIN 600 MG: 300 CAPSULE ORAL at 06:49

## 2021-11-28 RX ADMIN — OXYCODONE AND ACETAMINOPHEN 1 TABLET: 10; 325 TABLET ORAL at 06:49

## 2021-11-28 ASSESSMENT — PAIN DESCRIPTION - ONSET
ONSET: ON-GOING
ONSET: ON-GOING

## 2021-11-28 ASSESSMENT — PAIN DESCRIPTION - PROGRESSION
CLINICAL_PROGRESSION: NOT CHANGED
CLINICAL_PROGRESSION: NOT CHANGED

## 2021-11-28 ASSESSMENT — PAIN DESCRIPTION - PAIN TYPE
TYPE: ACUTE PAIN
TYPE: ACUTE PAIN

## 2021-11-28 ASSESSMENT — PAIN DESCRIPTION - DESCRIPTORS
DESCRIPTORS: ACHING
DESCRIPTORS: ACHING

## 2021-11-28 ASSESSMENT — PAIN SCALES - GENERAL
PAINLEVEL_OUTOF10: 9
PAINLEVEL_OUTOF10: 8
PAINLEVEL_OUTOF10: 7

## 2021-11-28 ASSESSMENT — PAIN DESCRIPTION - FREQUENCY
FREQUENCY: CONTINUOUS
FREQUENCY: CONTINUOUS

## 2021-11-28 ASSESSMENT — PAIN DESCRIPTION - ORIENTATION
ORIENTATION: RIGHT
ORIENTATION: RIGHT

## 2021-11-28 ASSESSMENT — PAIN DESCRIPTION - LOCATION
LOCATION: CHEST
LOCATION: CHEST

## 2021-11-28 NOTE — PROGRESS NOTES
4 Eyes Skin Assessment     NAME:  Hubert Doe  YOB: 1955  MEDICAL RECORD NUMBER:  [id number]    The patient is being assess for  Admission    I agree that 2 RN's have performed a thorough Head to Toe Skin Assessment on the patient. ALL assessment sites listed below have been assessed. Areas assessed by both nurses:    Head, Face, Ears, Shoulders, Back, Chest, Arms, Elbows, Hands, Sacrum. Buttock, Coccyx, Ischium and Legs. Feet and Heels        Does the Patient have a Wound?  No noted wound(s)       Jhonathan Prevention initiated:  No   Wound Care Orders initiated:  No    Pressure Injury (Stage 3,4, Unstageable, DTI, NWPT, and Complex wounds) if present place consult order under [de-identified] No    New and Established Ostomies if present place consult order under : No      Nurse 1 eSignature: Electronically signed by Isac Washington RN on 11/27/21 at 11:17 PM EST    **SHARE this note so that the co-signing nurse is able to place an eSignature**    Nurse 2 eSignature: Electronically signed by Adia Zaragoza RN on 11/27/21 at 11:17 PM EST

## 2021-11-28 NOTE — ED NOTES
ED SBAR report provider to [facility]. Patient to be transported to Room 4123 via stretcher by transport tech. Patient transported with bedside cardiac monitor and with IV medications infusing. IV site clean, dry, and intact. MEWS score and pain assessed as 8/10 and documented. Updated patient on plan of care.      Ji Padron RN  11/27/21 1223

## 2021-11-28 NOTE — ED PROVIDER NOTES
[address] [place]      Pt Name: Mimi Cartwright  MRN: [id number]  [place] 1955  Date of evaluation: 11/27/2021  Provider: Raina Veronica MD    [address] [place] [address]       Chief Complaint   Patient presents with    Chest Pain     sharp right sided chest pain at pacemaker site, states constant over 3-4 hours. Denies nausea/radiation of pain/shortness of breath, or other symptoms         HISTORY OF PRESENT ILLNESS   (Location/Symptom, Timing/Onset, Context/Setting, Quality, Duration, Modifying Factors, Severity)  Note limiting factors. Mimi Cartwright is a 72 y.o. male who presents to the emergency department with chest pain for 5 hours. HPI     This is a 42-year-old  gentleman with multiple comorbidities who presents with substernal chest pain for about 5 hours. It is nonexertional not associate with shortness of breath nausea or diaphoresis. He states that he occasionally has chest pain but this is somewhat different. He states is at his pacemaker site. Rates it about 7 or 8 out of 10 and is not relieved by nitroglycerin. Nursing Notes were reviewed. REVIEW OF SYSTEMS    (2-9 systems for level 4, 10 or more for level 5)     Review of Systems   Constitutional: Negative for chills and fever. Respiratory: Negative for shortness of breath. Cardiovascular: Positive for chest pain. Gastrointestinal: Negative for abdominal pain, nausea and vomiting. All other systems reviewed and are negative. Except as noted above the remainder of the review of systems was reviewed and negative.        PAST MEDICAL HISTORY     Past Medical History:   Diagnosis Date    Anxiety     Arthritis     Asthma     CAD (coronary artery disease)     Calcium kidney stone     Cardiomyopathy ([address]     [facility] occlusion with [facility]     CHF (congestive heart failure) ([facility]     Clostridium difficile diarrhea 02/12/2020    COPD (chronic obstructive pulmonary disease) ([facility]     mild    Depression     DM2 (diabetes mellitus, type 2) ([facility])     Fibromyalgia     GERD (gastroesophageal reflux disease)     Hyperlipidemia     Hypertension     Liver disease     Pacemaker 2012    Medtronic model # [id number]    Pneumonia     Seizures ([facility]     TIA (transient ischemic attack) 2007    Ulcerative colitis ([facility]          SURGICAL HISTORY       Past Surgical History:   Procedure Laterality Date    APPENDECTOMY      BACK SURGERY      CARDIAC SURGERY      CHOLECYSTECTOMY      COLONOSCOPY  01/10/2017    COLONOSCOPY  01/10/2017    CORONARY ANGIOPLASTY WITH STENT PLACEMENT  2012    CORONARY ARTERY BYPASS GRAFT  2010, 11/2015    ENDOSCOPY, COLON, DIAGNOSTIC      PACEMAKER PLACEMENT      UPPER GASTROINTESTINAL ENDOSCOPY  02/07/2017    VASCULAR SURGERY           CURRENT MEDICATIONS       Previous Medications    ACETAMINOPHEN (TYLENOL) 325 MG TABLET    Take 650 mg by mouth every 6 hours as needed for Pain    APIXABAN (ELIQUIS) 5 MG TABS TABLET    Take 1 tablet by mouth 2 times daily    ASPIRIN BUF,IHTTO-MHPJH-SWSUT, (BUFFERED ASPIRIN) 325 MG TABS    Take 1 tablet by mouth every morning (before breakfast)    CARVEDILOL (COREG) 12.5 MG TABLET    Take 6.25 mg by mouth 2 times daily (with meals)     CLOPIDOGREL (PLAVIX) 75 MG TABLET    Take 75 mg by mouth daily    CYCLOBENZAPRINE (FLEXERIL) 5 MG TABLET    Take 5 mg by mouth 3 times daily as needed    EMPAGLIFLOZIN (JARDIANCE) 10 MG TABLET    Take 1 tablet by mouth daily    FLUTICASONE-SALMETEROL (ADVAIR DISKUS) 100-50 MCG/DOSE DISKUS INHALER    Inhale 2 puffs into the lungs 2 times daily wixela inhub inhaler ( generic advair diskus)    GABAPENTIN (NEURONTIN) 600 MG TABLET    TAKE ONE TABLET BY MOUTH FIVE TIMES A DAY    GLUCOSE MONITORING KIT (FREESTYLE) MONITORING KIT    1 kit by Does not apply route daily    INSULIN PEN NEEDLE 32G X 4 MM MISC    1 each by Does not apply route daily    NITROGLYCERIN (NITROSTAT) 0.4 MG SL TABLET    Place 0.4 mg under the tongue every 5 minutes as needed    OXYCODONE-ACETAMINOPHEN (PERCOCET)  MG PER TABLET    Take 1 tablet by mouth every 8 hours as needed for Pain for up to 30 days.     RESPIRATORY THERAPY SUPPLIES (NEBULIZER) AALIYAH    Used to give yourself breathing treatment    ROSUVASTATIN (CRESTOR) 40 MG TABLET    Take 40 mg by mouth every evening    SACUBITRIL-VALSARTAN (ENTRESTO) 49-51 MG PER TABLET    Take 0.5 tablets by mouth 2 times daily    SPIRONOLACTONE (ALDACTONE) 25 MG TABLET    Take 25 mg by mouth daily     TRAZODONE (DESYREL) 50 MG TABLET    Take 50 mg by mouth nightly    VITAMIN B-12 (CYANOCOBALAMIN) 1000 MCG TABLET    Take 1,000 mcg by mouth daily       ALLERGIES     Dopamine hcl, Tramadol, Morphine and related, and Melatonin    FAMILY HISTORY       Family History   Problem Relation Age of Onset    Coronary Art Dis Father     Cancer Mother         Lung with mets    Diabetes Mother           SOCIAL HISTORY       Social History     Socioeconomic History    Marital status:      Spouse name: None    Number of children: 1    Years of education: None    Highest education level: None   Occupational History    Occupation: disabled   Tobacco Use    Smoking status: Former Smoker     Packs/day: 0.50     Years: 44.00     Pack years: 22.00     Types: Cigarettes     Quit date: 10/20/2021     Years since quittin.1    Smokeless tobacco: Never Used    Tobacco comment: cutting down   Vaping Use    Vaping Use: Never used   Substance and Sexual Activity    Alcohol use: No     Alcohol/week: 0.0 standard drinks    Drug use: No    Sexual activity: Not Currently     Partners: Female   Other Topics Concern    None   Social History Narrative    None     Social Determinants of Health     Financial Resource Strain: Unknown    Difficulty of Paying Living Expenses: Patient refused   Food Insecurity: Unknown    Worried About Running Out of Food in the Last Year: Patient refused   [address] in the Last Year: Patient refused   Transportation Needs:     Lack of Transportation (Medical): Not on file    Lack of Transportation (Non-Medical):  Not on file   Physical Activity:     Days of Exercise per Week: Not on file    Minutes of Exercise per Session: Not on file   Stress:     Feeling of Stress : Not on file   Social Connections:     Frequency of Communication with Friends and Family: Not on file    Frequency of Social Gatherings with Friends and Family: Not on file    Attends [employer] Services: Not on file    Active Member of [address] [place] [address] [employer] or Organizations: Not on file    Attends Club or Organization Meetings: Not on file    Marital Status: Not on file   Intimate Partner Violence:     Fear of Current or Ex-Partner: Not on file    Emotionally Abused: Not on file    Physically Abused: Not on file    Sexually Abused: Not on file   Housing Stability:     Unable to Pay for Housing in the Last Year: Not on file    Number of [place] in the Last Year: Not on file    Unstable Housing in the Last Year: Not on file       SCREENINGS          Heart Score for chest pain patients  History: Slightly Suspicious  ECG: Non-Specifc repolarization disturbance/LBTB/PM  Patient Age: > 65 years  *Risk factors for Atherosclerotic disease: Diabetes Mellitus, Hypercholesterolemia, Hypertension, Coronary Artery Disease  Risk Factors: > 3 Risk factors or history of atherosclerotic disease*  Troponin: > 1 and < 3X normal limit  Heart Score Total: 6             PHYSICAL EXAM    (up to 7 for level 4, 8 or more for level 5)     ED Triage Vitals   BP Temp Temp Source Pulse Resp SpO2 Height Weight   11/27/21 1655 11/27/21 1655 11/27/21 1655 11/27/21 1655 11/27/21 1655 11/27/21 1655 11/27/21 1913 11/27/21 1913   (!) 155/85 98 °F (36.7 °C) Oral 80 16 96 % 6' (1.829 m) 186 lb 4.6 oz (84.5 kg)       Physical Exam  Constitutional:       General: He is not in acute distress. Appearance: Normal appearance. He is not ill-appearing. HENT:      Head: Normocephalic and atraumatic. Nose: Nose normal. No congestion. Mouth/Throat:      Mouth: Mucous membranes are moist.      Pharynx: No oropharyngeal exudate. Eyes:      Extraocular Movements: Extraocular movements intact. Pupils: Pupils are equal, round, and reactive to light. Cardiovascular:      Rate and Rhythm: Normal rate. Pulses: Normal pulses. Heart sounds: Murmur heard. No friction rub. No gallop. Pulmonary:      Effort: Pulmonary effort is normal.      Breath sounds: Normal breath sounds. Abdominal:      General: Bowel sounds are normal.      Palpations: Abdomen is soft. Tenderness: There is no abdominal tenderness. Musculoskeletal:         General: Normal range of motion. Cervical back: Normal range of motion and neck supple. Skin:     General: Skin is warm and dry. Capillary Refill: Capillary refill takes less than 2 seconds. Neurological:      General: No focal deficit present. Mental Status: He is alert and oriented to person, place, and time.    Psychiatric:         Mood and Affect: Mood normal.         Behavior: Behavior normal.         DIAGNOSTIC RESULTS     EKG: All EKG's are interpreted by the Emergency Department Physician who either signs or Co-signs this chart in the absence of a cardiologist.    EKG Interpretation    Interpreted by emergency department physician    Rhythm: normal sinus   Rate: normal  Axis: normal  Ectopy: premature ventricular contractions (unifocal)  Conduction: right bundle branch block (complete)  ST Segments: no acute change  T Waves: no acute change  Q Waves: aVr    Clinical Impression: no acute changes as compared with EKG from 11/17/2021    Kaylynn Medel MD    RADIOLOGY:   Non-plain film images such as CT, Ultrasound and MRI are read by the radiologist. Plain radiographic images are visualized and preliminarily interpreted by the emergency physician with the below findings:        Interpretation per the Radiologist below, if available at the time of this note:    XR CHEST (2 VW)   Final Result   No acute cardiopulmonary disease. ED BEDSIDE ULTRASOUND:   Performed by ED Physician - none    LABS:  Labs Reviewed   CBC WITH AUTO DIFFERENTIAL - Abnormal; Notable for the following components:       Result Value    RBC 3.60 (*)     Hemoglobin 12.0 (*)     Hematocrit 35.9 (*)     All other components within normal limits    Narrative:     Performed at:  [address] [place] [address]   Phone [phone]   COMPREHENSIVE METABOLIC PANEL W/ REFLEX TO MG FOR LOW K - Abnormal; Notable for the following components:    Sodium 133 (*)     Chloride 96 (*)     Glucose 125 (*)     ALT 6 (*)     AST 12 (*)     All other components within normal limits    Narrative:     Performed at:  [address] [place] [address]   Phone [phone]   TROPONIN - Abnormal; Notable for the following components:    Troponin 0.03 (*)     All other components within normal limits    Narrative:     Performed at:  [address] [place] [address]   Phone [phone]   BRAIN NATRIURETIC PEPTIDE - Abnormal; Notable for the following components:    Pro-BNP 7,006 (*)     All other components within normal limits    Narrative:     Performed at:  [address] [place] [address]   Phone [phone]   TROPONIN       All other labs were within normal range or not returned as of this dictation.     EMERGENCY DEPARTMENT COURSE and DIFFERENTIAL DIAGNOSIS/MDM:   Vitals:    Vitals:    11/27/21 1655 11/27/21 1913   BP: (!) 155/85    Pulse: 80    Resp: 16    Temp: 98 °F (36.7 °C)    TempSrc: Oral    SpO2: 96%    Weight:  186 lb 4.6 oz (84.5 kg)   Height:  6' (1.829 m)       The above history and physical exam were performed. I reviewed his past medical past surgical social family history. Review of his chart reveals that he had an angiogram at an outlying facility about 3 weeks ago which revealed diffuse but nonobstructive coronary artery disease. I discussed the case in detail with with Dr. iRcki Barnes who is a cardiologist at [facility] who took care of him there. He recommended additional trop and then likely admission. MDM    I considered the diagnosis of pneumonia versus PE versus ACS versus chest wall pain. His sats are normal he is not tachypneic tachycardic and he has no real significant risk factors for PE. Given his EKG findings that are unchanged, recent cath showing nonobstructive CAD, if his second trop is (-) home. REASSESSMENT          CRITICAL CARE TIME   Total Critical Care time was 36 minutes, excluding separately reportable procedures. There was a high probability of clinically significant/life threatening deterioration in the patient's condition which required my urgent intervention. For chest pain and possible ACS. Patient will be turned over to the oncoming ED physician to follow-up on troponin. CONSULTS:  IP CONSULT TO CARDIOLOGY  IP CONSULT TO HOSPITALIST    PROCEDURES:  Unless otherwise noted below, none     Procedures        FINAL IMPRESSION      1. Chest pain, unspecified type          DISPOSITION/PLAN   DISPOSITION    Admit    PATIENT REFERRED TO:  No follow-up provider specified. DISCHARGE MEDICATIONS:  New Prescriptions    No medications on file     Controlled Substances Monitoring:     RX Monitoring 5/30/2019   Attestation The Prescription Monitoring Report for this patient was reviewed today.    Acute Pain Prescriptions -   Periodic Controlled Substance Monitoring No signs of potential drug abuse or diversion identified: otherwise, see note documentation   Chronic Pain > 80 MEDD -       (Please note that portions of this note were completed with a voice recognition program.  Efforts were made to edit the dictations but occasionally words are mis-transcribed.)    Marco Mendoza MD (electronically signed)  Attending Emergency Physician         Marco Mendoza MD  11/27/21 2001       Marco Mendoza MD  11/27/21 2024

## 2021-11-28 NOTE — PLAN OF CARE
Problem: Falls - Risk of:  Goal: Will remain free from falls  Description: Will remain free from falls  11/28/2021 0747 by Arvie Duverney, RN  Outcome: Ongoing  11/27/2021 2307 by Stephanie Hidalgo RN  Outcome: Ongoing  Goal: Absence of physical injury  Description: Absence of physical injury  11/28/2021 0747 by Arvie Duverney, RN  Outcome: Ongoing  11/27/2021 2307 by Stephanie Hidalgo RN  Outcome: Ongoing     Problem: Safety:  Goal: Free from accidental physical injury  Description: Free from accidental physical injury  11/28/2021 0747 by Arvie Duverney, RN  Outcome: Ongoing  11/27/2021 2307 by Stephanie Hidalgo RN  Outcome: Ongoing  Goal: Free from intentional harm  Description: Free from intentional harm  11/28/2021 0747 by Arvie Duverney, RN  Outcome: Ongoing  11/27/2021 2307 by Stephanie Hidalgo RN  Outcome: Ongoing     Problem: Daily Care:  Goal: Daily care needs are met  Description: Daily care needs are met  11/28/2021 0747 by Arvie Duverney, RN  Outcome: Ongoing  11/27/2021 2307 by Stephanie Hidalgo RN  Outcome: Ongoing     Problem: Pain:  Goal: Patient's pain/discomfort is manageable  Description: Patient's pain/discomfort is manageable  11/28/2021 0747 by Arvie Duverney, RN  Outcome: Ongoing  11/27/2021 2307 by Stephanie Hidalgo RN  Outcome: Ongoing  Goal: Pain level will decrease  Description: Pain level will decrease  Outcome: Ongoing  Goal: Control of acute pain  Description: Control of acute pain  Outcome: Ongoing  Goal: Control of chronic pain  Description: Control of chronic pain  Outcome: Ongoing

## 2021-11-28 NOTE — PROGRESS NOTES
Patient requesting to see provider before he leaves AMA. Secure message sent to Viet Kowalski MD. Awaiting response.  Electronically signed by Rome Wise RN on 11/28/2021 at 3:17 AM

## 2021-11-28 NOTE — PROGRESS NOTES
Pt called stating he is leaving because we are starving him and he hasn't eaten in 2 days. I educated Pt that he is here for chest pain and we need to wait for cardio to see if they want to do any testing beforehand. Pt states he's leaving AMA and he asked me to take his IV out. IV removed without complications. Dry dressing applied. Tele monitor removed. Charge RN, Supervisor, and Dr Juan Diego Stewart called and made aware of AMA.

## 2021-11-28 NOTE — PROGRESS NOTES
Secure message sent to Enrrique Escalona NP about patient requesting pain medication. Awaiting message. Electronically signed by Najma Alejandra RN on 11/27/2021 at 10:12 PM

## 2021-11-28 NOTE — PROGRESS NOTES
Medication Reconciliation     List of medications patient is currently taking is in progress. Source of information:   1. Conversation with patient at bedside  2. EPIC records        Notes regarding home medications:  1. Patient unsure of all his medications. Confirmed he takes coreg, gabapentin, percocet, trazodone, isosorbide, aspirin and amlodipine. These are all the medication that appear to be current however an office visit from October has other medications listed as well. States he is unable to recall anything else.   Jaclyn Crouch, Pharmacy Intern 11/27/2021 9:12 PM VSS afebrile. Patient tolerating blood without complication. Denies nausea/emesis, rash/itching.

## 2021-11-28 NOTE — PROGRESS NOTES
H/p dictation id [id number]. Date of service 11/28/21. Chest pain. Cad. Htn. Copd  Tobacco dependence. Ischemic CM.

## 2021-11-28 NOTE — PROGRESS NOTES
Patient request more pain medication after receiving pain medication at 22:30. See MAR. Secure message sent about patients request. No new orders placed.

## 2021-11-28 NOTE — PROGRESS NOTES
Patient states he needs more pain medicine. Educated patient when Pain medicine is due. Patient asking to leave AMA to go home due to not receiving more pain medication. Secure message sent to Chris Cárdenas MD about patient requesting to leave.

## 2021-11-28 NOTE — H&P
[address] [address]                              HISTORY AND PHYSICAL    PATIENT NAME: Aliza Christensen                    :        1955  MED REC NO:   [id number]                          ROOM:       2315  ACCOUNT NO:   [de-identified]                           ADMIT DATE: 2021  PROVIDER:     Abhijit Asencio MD    I examined the patient on 2021 on the Medical floor. CHIEF COMPLAINT:  Chest pain. HISTORY OF PRESENT ILLNESS:  A 71-year-old  male who presented  to the hospital with chief complaints of what he describes as a one-day  history of chest pain that started in the evening while he was watching  TV, is continuously present in the retrosternal area, has not gone away  anywhere. However at the time of my exam, the patient was comfortable  in the room watching TV and did not seem to be in any acute distress. PAST MEDICAL/PAST SURGICAL HISTORY:  1. Coronary artery disease. 2.  Type 2 diabetes mellitus. 3.  Hypertension. 4.  Dyslipidemia. 5.  COPD. PAST SURGICAL HISTORY:  Coronary artery bypass grafting and ICD  placement. ALLERGIC HISTORY:  No known drug allergies. FAMILY HISTORY:  Reviewed by me and is currently noncontributory. SOCIAL HISTORY:  Lives at home. No illicit substance use. Active  tobacco dependence. MEDICATIONS:  Home medication list reviewed and documented in the EMR. REVIEW OF SYSTEMS:  Significant for the chest pain and per the history  of present illness. All other systems have been reviewed and are  negative except for the history of present illness. PHYSICAL EXAMINATION:  VITAL SIGNS:  Temperature is 98, respiratory rate is 16, pulse 80, blood  pressure 155/85, saturating 96%. CNS:  Alert, awake and oriented. PSYCH:  The patient is cooperative. HEENT:  Eyes:  Pupils are reactive to light.   ENT:  Extraocular muscle  movements are intact. RESPIRATORY:  No obvious rubs or rhonchi. CARDIOVASCULAR:  S1, S2 are heard. No murmurs or rubs. MUSCULOSKELETAL:  The patient does have very clearly reproducible chest  wall pain over the precordium, which is identical to the pain he had at  home. ABDOMEN:  Shows no guarding, rigidity, or rebound. SKIN:  No cutaneous rashes or lesions. DIAGNOSTIC DATA:  BNP 7006. Troponin 0.03, which is unchanged from  before. BUN 11, creatinine 0.8. Sodium 133, potassium 4.2. Hemoglobin  12.8, hematocrit 35.9. CONSULTATIONS REQUESTED:  Currently none. REVIEW OF PREVIOUS MEDICAL RECORDS:  Shows a coronary angiography done  at [address] [place] [address] on 11/05/2021 that showed the patient does have  three-vessel coronary artery disease, which is known and the patient  ended up having PCI of the diagonal with a drug-eluding stent and was to  have dual-agent antiplatelet therapy for a year. ASSESSMENT:  1. Atypical chest pain. 2.  Native vessel coronary artery disease. 3.  Hypertension. 4.  COPD. 5.  Chronic ischemic cardiomyopathy. PLAN OF CARE:  The patient is admitted to Internal Medicine service to  Observation. Telemetry monitoring continued. Cardiology consult  requested. Serial troponins ordered. The patient's anti-failure  therapy including Miryam Cassette will be continued. CODE STATUS:  Full. EXPECTED LENGTH OF STAY:  Less than two midnights based on the plan of  care above. RISK: High due to the patient's presentation with the chest pain. DISPOSITION:  Observation telemetry.         Lewis Isidro MD    D: 11/28/2021 4:32:10       T: 11/28/2021 5:15:27     [email or web address]  Job#: [id number]     Doc#: [id number]    CC:

## 2021-11-28 NOTE — PROGRESS NOTES
Patient states he will wait to see provider before leaving. Patient understanding and laying in bed watching TV.  Electronically signed by Noble Powell RN on 11/28/2021 at 3:31 AM

## 2021-11-28 NOTE — PROGRESS NOTES
Admitted patient to room 4123 from the Emergency Room with chest pain. VS recorded (see flowsheet). Patient's breathing regular and unlabored. Patient states 9 out of 10 chest pain. Patient requesting pain medicine he gets at home. Patient states, \"If I do not get my pain medicine, I want to be released, so I can take my pain medicien at home. \" Will send secure message to hospitalitis about patients request. Oriented to room, call light, TV, phone, patient rights and responsibilities. Bed in lowest position and locked. Non-slip socks on. ID bracelet on and correct per pt verbally reporting name and date of birth. Call light within reach. Needed items within reach.  Electronically signed by Lorena Solomon RN on 11/27/2021 at 10:04 PM

## 2021-11-28 NOTE — PROGRESS NOTES
Secure message received by Zahraa Gordon MD. Will come see patient as soon as he can. Will notify patient of message.  Electronically signed by Ary Etienne RN on 11/28/2021 at 3:19 AM

## 2021-11-28 NOTE — PROGRESS NOTES
Pt called saying he's starving and is unable to eat. No diet order noted. Cardio paged regarding diet order.

## 2021-11-28 NOTE — DISCHARGE SUMMARY
Hospital Medicine Discharge Summary    Patient: Drea Meza     Gender: male  : 1955   Age: 72 y.o. MRN: [id number]    Admitting Physician: Candelaria Boswell MD  Discharge Physician: Ismael Asif MD     Code Status: Prior     Admit Date: 2021   Discharge Date: 2021 left AGAINST MEDICAL ADVICE    Disposition:  Home    Discharge Diagnoses:    Chest pain    Follow-up appointments: None arranged    Outpatient to do list: None arranged    Condition at Discharge:  Stable    Hospital Course:     A 51-year-old  male who presented  to the hospital with chief complaints of what he describes as a one-day  history of chest pain that started in the evening while he was watching  TV, is continuously present in the retrosternal area, has not gone away  anywhere. However at the time of my exam, the patient was comfortable  in the room watching TV and did not seem to be in any acute distress. Discharge Medications:   Discharge Medication List as of 2021 11:05 AM        Discharge Medication List as of 2021 11:05 AM        Discharge Medication List as of 2021 11:05 AM      CONTINUE these medications which have NOT CHANGED    Details   amLODIPine (NORVASC) 5 MG tablet TAKE 1 TABLET BY MOUTH DAILY. Historical Med      isosorbide mononitrate (IMDUR) 60 MG extended release tablet TAKE 1 TABLET BY MOUTH DAILY. Historical Med      carvedilol (COREG) 25 MG tablet Take 25 mg by mouth 2 times daily (with meals)Historical Med      aspirin 325 MG tablet Take 325 mg by mouth dailyHistorical Med      oxyCODONE-acetaminophen (PERCOCET)  MG per tablet Take 1 tablet by mouth every 8 hours as needed for Pain for up to 30 days. , Disp-90 tablet, R-0Normal      gabapentin (NEURONTIN) 600 MG tablet TAKE ONE TABLET BY MOUTH FIVE TIMES A DAY, Disp-150 tablet, R-2Normal      Aspirin Buf,CnNsl-IjTuh-AjTpe, (BUFFERED ASPIRIN) 325 MG TABS Take 1 tablet by mouth every morning (before breakfast)Historical Med      vitamin B-12 (CYANOCOBALAMIN) 1000 MCG tablet Take 1,000 mcg by mouth dailyHistorical Med      traZODone (DESYREL) 50 MG tablet Take 50 mg by mouth nightlyHistorical Med      fluticasone-salmeterol (ADVAIR DISKUS) 100-50 MCG/DOSE diskus inhaler Inhale 2 puffs into the lungs 2 times daily wixela inhub inhaler ( generic advair diskus), Disp-60 each, R-3Normal      Insulin Pen Needle 32G X 4 MM MISC DAILY Starting u 3/18/2021, Disp-100 each, R-3, Normal      glucose monitoring kit (FREESTYLE) monitoring kit DAILY Starting Thu 3/18/2021, Disp-1 kit, R-0, Normal      spironolactone (ALDACTONE) 25 MG tablet Take 25 mg by mouth daily Historical Med      nitroGLYCERIN (NITROSTAT) 0.4 MG SL tablet Place 0.4 mg under the tongue every 5 minutes as neededHistorical Med      sacubitril-valsartan (ENTRESTO) 49-51 MG per tablet Take 0.5 tablets by mouth 2 times daily, Disp-60 tablet, R-0Historical Med      clopidogrel (PLAVIX) 75 MG tablet Take 75 mg by mouth dailyHistorical Med      rosuvastatin (CRESTOR) 40 MG tablet Take 40 mg by mouth every eveningHistorical Med      Respiratory Therapy Supplies (NEBULIZER) AALIYAH Disp-1 Device,R-0, PrintUsed to give yourself breathing treatment      apixaban (ELIQUIS) 5 MG TABS tablet Take 1 tablet by mouth 2 times daily, Disp-60 tablet,R-1Normal      empagliflozin (JARDIANCE) 10 MG tablet Take 1 tablet by mouth daily, Disp-30 tablet,R-0Normal      acetaminophen (TYLENOL) 325 MG tablet Take 650 mg by mouth every 6 hours as needed for PainHistorical Med           Discharge Medication List as of 11/28/2021 11:05 AM      STOP taking these medications       cyclobenzaprine (FLEXERIL) 5 MG tablet Comments:   Reason for Stopping:             Patient not examined    Lab Results   Component Value Date    WBC 4.9 11/27/2021    HGB 12.0 11/27/2021    HCT 35.9 11/27/2021    MCV 99.8 11/27/2021     11/27/2021     11/27/2021    K 4.2 11/27/2021    CL 96 11/27/2021    CO2 22 11/27/2021    BUN 11 11/27/2021    CREATININE 0.8 11/27/2021    CALCIUM 9.0 11/27/2021    PHOS 5.1 06/21/2021    BNP <5.0 04/27/2013    ALKPHOS 66 11/27/2021    ALT 6 11/27/2021    AST 12 11/27/2021    BILITOT 0.6 11/27/2021    BILIDIR <0.2 06/21/2021    LABALBU 4.4 11/27/2021    LDLCALC 97 08/09/2021    TRIG 136 08/09/2021     Lab Results   Component Value Date    INR 0.91 09/04/2020    INR 0.94 07/13/2020    INR 0.97 02/16/2020       Radiology:  XR CHEST (2 VW)    Result Date: 11/27/2021  EXAMINATION: TWO XRAY VIEWS OF THE CHEST 11/27/2021 5:18 pm COMPARISON: 11/17/2021. HISTORY: ORDERING SYSTEM PROVIDED HISTORY: chest pain TECHNOLOGIST PROVIDED HISTORY: Reason for exam:->chest pain FINDINGS: The cardiomediastinal silhouette is unremarkable status post median sternotomy. Aortic vascular calcification. The lungs are clear. No infiltrate, pleural fluid or evidence of overt failure. Left-sided AICD device. Postoperative clips in the right upper quadrant of the abdomen. No acute cardiopulmonary disease. XR CHEST (2 VW)    Result Date: 11/17/2021  EXAMINATION: TWO XRAY VIEWS OF THE CHEST 11/17/2021 1:53 pm COMPARISON: 08/08/2021 HISTORY: ORDERING SYSTEM PROVIDED HISTORY: Chest Pain TECHNOLOGIST PROVIDED HISTORY: Reason for exam:->Chest Pain Reason for Exam: Chest Pain Acuity: Acute Type of Exam: Initial FINDINGS: Status post median sternotomy. Transvenous pacer remains in place. The lungs are without acute focal process. There is no effusion or pneumothorax. The cardiomediastinal silhouette is stable. The osseous structures are stable. No acute process.      CTA Pulmonary W and WO Contrast    Result Date: 11/5/2021  Site: Sung Cabezas #: [id number] #: [id number]: [id number] #: [de-identified] #: [id number] #: [id number]: CT ANGIOGRAM CHEST FOR PEExam Date/Time: 11/05/2021 09:20 AMAdmitting Diagnosis: Chest pain/anginal equiv, ECGs or troponins abnormalReason for Exam: Chest pain/anginal equiv, ECGs or troponins abnormal Dictated by: Liatbenann Olga BETITO: 11/05/2021 10:02 AMT: This document is confidential medical information. Unauthorized disclosure or use of this information is prohibited by law. If you are not the intended recipient of this document, please advise us by  calling immediately [phone]. Impression/Conclusion below 75 HISTORY:   Chest pain/anginal equiv, ECGs or troponins abnormal .  Rule out pulmonary embolus COMPARISON: 8/15/2021 TECHNIQUE: Postcontrast multiplanar CT images of the chest, including 3D MIP reconstructions, with special attention to the pulmonary arteries NOTE:  If there are questions about the content of this report, please contact [address] [place] [address] radiology by calling [phone]. FINDINGS: PULMONARY ARTERIES:  Enhance normally without filling defect or other evidence of pulmonary embolism LUNGS/AIRWAYS:  There is a nodule seen within the left lower lobe (axial image 43) which measures roughly 1.7 x 1.1 cm, this is very similar to the comparison study. No new pulmonary nodules are identified. Atelectasis seen in the lingula minimal soft tissue seen within the right mainstem bronchus in the distal aspect of the trachea likely represents mucus. PLEURA: Unremarkable. No pleural effusion or pneumothorax MEDIASTINUM/CARLEEN:  Unremarkable HEART/PERICARDIUM:  Heart size is enlarged. Patient status post sternotomy with prior CABG. There is a cardiac pacing device present. VESSELS:  There are vascular calcifications, this does include severe coronary artery calcifications. CHEST WALL/LOWER NECK:  Cardiac pacing device seen within the left upper chest. UPPER ABDOMEN:  Patient status post cholecystectomy. There is a large left renal cysts. There is bilateral adrenal gland nodules most consistent with adenomas. BONES:  Unremarkable OTHER:  None  IMPRESSION: No pulmonary embolus.  Left lower lobe pulmonary nodule unchanged from comparison study. SIGNED BY: Sharon Simon MD on 11/5/2021  9:58 AM   [address] [facility] [phone] [facility] Call Center: [phone] RENAL LIMITED    Result Date: 11/11/2021  Site: Cal Jeradbessy #: [id number] #: [id number]: Cipriano Law #: [de-identified] #: [id number] #: [id number]: US RENAL KIDNEYExam Date/Time: 11/11/2021 01:35 PMAdmitting Diagnosis: arf with difficulty urinatingReason for Exam: arf with difficulty urinating Dictated by: Ami Carr BETITO: 11/11/2021 04:37 PMT: This document is confidential medical information. Unauthorized disclosure or use of this information is prohibited by law. If you are not the intended recipient of this document, please advise us  by calling immediately [phone]. Impression/Conclusion below HISTORY:  arf with difficulty urinating COMPARISON: CT 6/28/2018 NOTE:  If there are questions about the content of this report, please contact [address] [place] [address] radiology by calling [phone] FINDINGS: RIGHT KIDNEY:  Unremarkable. No suspicious renal mass, calculus, decreased cortical thickness,  or hydronephrosis. Normal echogenicity LEFT KIDNEY: Unremarkable. No suspicious renal mass, calculus, decreased cortical thickness, or hydronephrosis. Normal echogenicity. 10.8 cm exophytic cyst as seen on CT. RIGHT RENAL LENGTH: 11.0 cm  (normal is 9-14cm) LEFT RENAL LENGTH:  11.0 cm    (normal is 9-14cm) BLADDER: Unremarkable OTHER:  None IMPRESSION: No significant abnormality SIGNED BY: Carlos Zayas MD on 11/11/2021  4:33 PM     [address] [facility] [phone] - 2011 [facility]: [phone]       XR CHEST PORTABLE    Result Date: 11/27/2021  Site: Cal Lopez #: [id number] #: [id number]: GSEDAccount #: [de-identified] #: [id number] #: [id number]: XR CHEST AP PORTABLEExam Date/Time: 11/27/2021 02:00 PMAdmitting Diagnosis: Chest painReason for Exam: Chest pain Dictated by: Corie Mukherjee BETITO: 11/27/2021 02:13 PMT: This document is confidential medical information. Unauthorized disclosure or use of this information is prohibited by law. If you are not the intended recipient of this document, please advise us by  calling immediately [phone]. Impression/Conclusion below HISTORY:   Chest pain COMPARISON: CT chest 10/25/2015, chest x-ray 11/25/2021 NOTE:  If there are questions about the content of this report, please contact [address] [place] [address] radiology by calling [phone] FINDINGS: LINES/DEVICES: Left subclavian AICD/pacemaker. LUNGS/PLEURA:  Unremarkable HEART:  Prior median sternotomy and CABG. Cardiac silhouette is stable in size. MEDIASTINUM/CARLEEN:  Atherosclerotic calcifications thoracic aorta BONES:  Unremarkable OTHER:  None  IMPRESSION: No acute abnormality SIGNED BY: Rory Quintero MD on 11/27/2021  2:10 PM   [address] [facility] [phone] -  2011 [facility]: [phone]       XR CHEST PORTABLE    Result Date: 11/25/2021  Site: Paul Bowen #: [id number] #: [id number]: GSEDAccount #: [de-identified] #: [id number] #: [id number]: XR CHEST AP PORTABLEExam Date/Time: 11/25/2021 05:50 PMAdmitting Diagnosis: sobReason for Exam: sob Dictated by: Raulito Cartwright BETITO: 11/25/2021 06:10 PMT: This document is confidential medical information. Unauthorized disclosure or use of this information is prohibited by law. If you are not the intended recipient of this document, please advise us by calling immediately [phone].  Impression/Conclusion below HISTORY:   sob sob COMPARISON: November 2021 NOTE:  If there are questions about the content of this report, please contact [address] [place] [address] radiology by calling [phone] FINDINGS: LINES/DEVICES: Left subclavian pacemaker/ICD LUNGS/PLEURA:  There is a left lower lobe pulmonary nodule, as recently described on CT HEART:  Unremarkable MEDIASTINUM/CARLEEN:  Unremarkable BONES:  Unremarkable OTHER:  None  IMPRESSION: No acute abnormality SIGNED BY: Hilary Castro MD on 11/25/2021  6:07 PM   [facility] [phone] - 2011 [facility]: [phone]       XR CHEST PORTABLE    Result Date: 11/10/2021  Site: Murphy Pack #: [id number] #: [id number]: GSEDAccount #: [de-identified] #: [id number] #: [id number]: XR CHEST AP PORTABLEExam Date/Time: 11/10/2021 04:00 PMAdmitting Diagnosis: Chest PainReason for Exam: Chest Pain Dictated by: Major Massing BETITO: 11/10/2021 04:21 PMT: This document is confidential medical information. Unauthorized disclosure or use of this information is prohibited by law. If you are not the intended recipient of this document, please advise us  by calling immediately [phone]. Impression/Conclusion below HISTORY:   Chest Pain Chest Pain COMPARISON: 11/4/2021 NOTE:  If there are questions about the content of this report, please contact [address] [place] [address] radiology by calling [phone] FINDINGS: LINES/DEVICES: Left subclavian pacemaker/ICD. Sternotomy wires are present. LUNGS/PLEURA:  Unremarkable HEART:  Unremarkable MEDIASTINUM/CARLEEN:  Unremarkable BONES:  Unremarkable OTHER:  None  IMPRESSION: No significant abnormality SIGNED BY: Jenelle Zayas MD on 11/10/2021  4:18 PM   [facility] Imaging Report - [facility] Located within [facility],5Th Floor [phone] -  [address] [facility] [address]: [phone]       XR CHEST PORTABLE    Result Date: 11/4/2021  Site: Murphy Pack #: [id number] #: [id number]: Lynne Tinoco #: [de-identified] #: [id number] #: [id number]: XR CHEST AP PORTABLEExam Date/Time: 11/04/2021 07:40 PMAdmitting Diagnosis: Chest PainReason for Exam: Chest Pain Dictated by: Bess Araujo BETITO: 11/04/2021 07:53 PMT: This document is confidential medical information.   Unauthorized disclosure or use of this information is prohibited by law.If you are not the intended recipient of this document, please advise us  by calling immediately [phone]. Impression/Conclusion below HISTORY:   Chest Pain COMPARISON: 10/16/2021 NOTE:  If there are questions about the content of this report, please contact [employer] radiology by calling [phone] FINDINGS: LINES/DEVICES: Left subclavian pacemaker/ICD LUNGS/PLEURA:  Unremarkable HEART:  Unremarkable MEDIASTINUM/CARLEEN:  Unremarkable BONES:  Unremarkable OTHER:  None  IMPRESSION: No radiographic evidence of active disease SIGNED BY: Manuel Webber MD on 11/4/2021  7:50 PM   [facility] [phone] -  2011 [facility]: [phone]         EKG     Rhythm: normal sinus   Rate: normal  Clinical Impression: no acute changes        The patient was seen and examined on day of discharge and this discharge summary is in conjunction with any daily progress note from day of discharge. Time Spent on discharge is 30 minutes  in the examination, evaluation, counseling and review of medications and discharge plan. Note that more than 30 minutes was spent in preparing discharge papers, discussing discharge with patient, medication review, etc.       Signed:    Rosette Hopper MD   11/28/2021      Thank you Janay Aragon MD for the opportunity to be involved in this patient's care.  If you have any questions or concerns please feel free to contact me at [facility]

## 2021-11-29 ENCOUNTER — CARE COORDINATION (OUTPATIENT)
Dept: CASE MANAGEMENT | Age: 66
End: 2021-11-29

## 2021-11-29 NOTE — CARE COORDINATION
Care Transitions Outreach Attempt    Call within 2 business days of discharge: Yes   Attempted to reach patient for 1st attempt at 24hr transitions of care follow up. Unable to reach patient. Left HIPPA Compliant VM. Hillary Mueller LPN, [facility]: [phone]    Patient: Kimberly Granger Patient : 1955 MRN: [id number]    Last Discharge [address] [address]       Complaint Diagnosis Description Type Department Provider    21 Chest Pain Chest pain, unspecified type ED to Hosp-Admission (Discharged) (ADMITTED) TZ 4W Xin Martinez MD; Natalia Miller... Was this an external facility discharge? No Discharge Facility:     Noted following upcoming appointments from discharge chart review:   [facility] follow up appointment(s): No future appointments.   [facility] follow up appointment(s):

## 2021-11-30 ENCOUNTER — CARE COORDINATION (OUTPATIENT)
Dept: CASE MANAGEMENT | Age: 66
End: 2021-11-30

## 2021-11-30 ENCOUNTER — APPOINTMENT (OUTPATIENT)
Dept: GENERAL RADIOLOGY | Age: 66
End: 2021-11-30
Payer: MEDICARE

## 2021-11-30 ENCOUNTER — HOSPITAL ENCOUNTER (EMERGENCY)
Age: 66
Discharge: HOME | End: 2021-11-30
Attending: EMERGENCY MEDICINE
Payer: MEDICARE

## 2021-11-30 VITALS
BODY MASS INDEX: 25.32 KG/M2 | RESPIRATION RATE: 14 BRPM | SYSTOLIC BLOOD PRESSURE: 164 MMHG | WEIGHT: 186.95 LBS | OXYGEN SATURATION: 98 % | HEIGHT: 72 IN | HEART RATE: 66 BPM | TEMPERATURE: 98.2 F | DIASTOLIC BLOOD PRESSURE: 69 MMHG

## 2021-11-30 DIAGNOSIS — R07.9 CHEST PAIN, UNSPECIFIED TYPE: Primary | ICD-10-CM

## 2021-11-30 LAB
ANION GAP SERPL CALCULATED.3IONS-SCNC: 15 MMOL/L (ref 3–16)
BASOPHILS ABSOLUTE: 0 K/UL (ref 0–0.2)
BASOPHILS RELATIVE PERCENT: 1.3 %
BUN BLDV-MCNC: 21 MG/DL (ref 7–20)
CALCIUM SERPL-MCNC: 8.3 MG/DL (ref 8.3–10.6)
CHLORIDE BLD-SCNC: 106 MMOL/L (ref 99–110)
CO2: 16 MMOL/L (ref 21–32)
CREAT SERPL-MCNC: 0.9 MG/DL (ref 0.8–1.3)
EKG ATRIAL RATE: 73 BPM
EKG DIAGNOSIS: NORMAL
EKG P AXIS: 77 DEGREES
EKG P-R INTERVAL: 120 MS
EKG Q-T INTERVAL: 452 MS
EKG QRS DURATION: 134 MS
EKG QTC CALCULATION (BAZETT): 497 MS
EKG R AXIS: 78 DEGREES
EKG T AXIS: 80 DEGREES
EKG VENTRICULAR RATE: 73 BPM
EOSINOPHILS ABSOLUTE: 0.1 K/UL (ref 0–0.6)
EOSINOPHILS RELATIVE PERCENT: 3.6 %
GFR AFRICAN AMERICAN: >60
GFR NON-AFRICAN AMERICAN: >60
GLUCOSE BLD-MCNC: 155 MG/DL (ref 70–99)
HCT VFR BLD CALC: 31.7 % (ref 40.5–52.5)
HEMOGLOBIN: 10.4 G/DL (ref 13.5–17.5)
LYMPHOCYTES ABSOLUTE: 1.2 K/UL (ref 1–5.1)
LYMPHOCYTES RELATIVE PERCENT: 30.8 %
MCH RBC QN AUTO: 33 PG (ref 26–34)
MCHC RBC AUTO-ENTMCNC: 32.7 G/DL (ref 31–36)
MCV RBC AUTO: 101 FL (ref 80–100)
MONOCYTES ABSOLUTE: 0.2 K/UL (ref 0–1.3)
MONOCYTES RELATIVE PERCENT: 6.6 %
NEUTROPHILS ABSOLUTE: 2.2 K/UL (ref 1.7–7.7)
NEUTROPHILS RELATIVE PERCENT: 57.7 %
PDW BLD-RTO: 15.5 % (ref 12.4–15.4)
PLATELET # BLD: 168 K/UL (ref 135–450)
PMV BLD AUTO: 9.2 FL (ref 5–10.5)
POTASSIUM REFLEX MAGNESIUM: 4.2 MMOL/L (ref 3.5–5.1)
RBC # BLD: 3.14 M/UL (ref 4.2–5.9)
SODIUM BLD-SCNC: 137 MMOL/L (ref 136–145)
TROPONIN: <0.01 NG/ML
TROPONIN: <0.01 NG/ML
WBC # BLD: 3.8 K/UL (ref 4–11)

## 2021-11-30 PROCEDURE — 36415 COLL VENOUS BLD VENIPUNCTURE: CPT

## 2021-11-30 PROCEDURE — 84484 ASSAY OF TROPONIN QUANT: CPT

## 2021-11-30 PROCEDURE — 80048 BASIC METABOLIC PNL TOTAL CA: CPT

## 2021-11-30 PROCEDURE — 71046 X-RAY EXAM CHEST 2 VIEWS: CPT

## 2021-11-30 PROCEDURE — 6370000000 HC RX 637 (ALT 250 FOR IP): Performed by: PHYSICIAN ASSISTANT

## 2021-11-30 PROCEDURE — 6360000002 HC RX W HCPCS: Performed by: PHYSICIAN ASSISTANT

## 2021-11-30 PROCEDURE — 93010 ELECTROCARDIOGRAM REPORT: CPT | Performed by: INTERNAL MEDICINE

## 2021-11-30 PROCEDURE — 85025 COMPLETE CBC W/AUTO DIFF WBC: CPT

## 2021-11-30 PROCEDURE — 99284 EMERGENCY DEPT VISIT MOD MDM: CPT

## 2021-11-30 PROCEDURE — 96374 THER/PROPH/DIAG INJ IV PUSH: CPT

## 2021-11-30 PROCEDURE — 93005 ELECTROCARDIOGRAM TRACING: CPT | Performed by: EMERGENCY MEDICINE

## 2021-11-30 RX ORDER — KETOROLAC TROMETHAMINE 30 MG/ML
15 INJECTION, SOLUTION INTRAMUSCULAR; INTRAVENOUS ONCE
Status: COMPLETED | OUTPATIENT
Start: 2021-11-30 | End: 2021-11-30

## 2021-11-30 RX ORDER — OXYCODONE AND ACETAMINOPHEN 10; 325 MG/1; MG/1
1 TABLET ORAL ONCE
Status: COMPLETED | OUTPATIENT
Start: 2021-11-30 | End: 2021-11-30

## 2021-11-30 RX ADMIN — KETOROLAC TROMETHAMINE 15 MG: 30 INJECTION, SOLUTION INTRAMUSCULAR; INTRAVENOUS at 15:45

## 2021-11-30 RX ADMIN — OXYCODONE AND ACETAMINOPHEN 1 TABLET: 10; 325 TABLET ORAL at 15:45

## 2021-11-30 ASSESSMENT — PAIN DESCRIPTION - PAIN TYPE
TYPE: ACUTE PAIN

## 2021-11-30 ASSESSMENT — PAIN DESCRIPTION - LOCATION
LOCATION: CHEST

## 2021-11-30 ASSESSMENT — PAIN SCALES - GENERAL
PAINLEVEL_OUTOF10: 4
PAINLEVEL_OUTOF10: 4
PAINLEVEL_OUTOF10: 9
PAINLEVEL_OUTOF10: 9

## 2021-11-30 NOTE — ED NOTES
Discharge and education instructions reviewed. Patient verbalized understanding, teach-back successful. Patient denied questions at this time. No acute distress noted. Patient instructed to follow-up as noted - return to emergency department if symptoms worsen. Patient verbalized understanding. Discharged per EDMD with discharged instructions.        Eneida Borjas RN  11/30/21 Nathen Dominguez

## 2021-11-30 NOTE — ED PROVIDER NOTES
I independently evaluated and obtained a history and physical on [address] All diagnostic, treatment, and disposition assistants were made to myself in conjunction the advanced practice provider. For further details of this patient's emergency department encounter, please see the advanced practice provider's documentation. History: 71-year-old male presents to the emergency room complaining of chest pain that substernal nonradiating. Denies any associated nausea or vomiting. No fevers or chills. No cough or sputum production. Patient well-known to the local emergency rooms for multiple frequent visits for chest pain. Patient denies any recent travel. No recent surgery. No prolonged immobilization. No unilateral leg pain or swelling. Physician Exam: Alert and oriented x4, normocephalic and atraumatic with moist mucous membranes, regular rate and rhythm, lungs are auscultation bilaterally, abdomen soft nontender nondistended, 5/5 strength throughout, light touch sensation grossly intact, no edema of the lower extremities, no calf tenderness bilaterally, skin warm and dry        The Ekg interpreted by me shows  Normal sinus rhythm with a rate of 73, normal axis, , , QTc 497, no ST elevations, right bundle branch block present, artifact although EKG is readable, no acute change compared EKG from 11/27/2021      Patient seen and evaluated. History and physical as above. Nontoxic and afebrile. Patient complaining of chest pain. Patient was just admitted on 11/27/2021 for the complaint of chest pain and left AMA. Based on nursing documentation, patient left because he states that they were starving him. When asked why he left AMA during his recent admission, patient states that he was released and did not leave AMA. Discussed that nursing documentation states that he left AMA and requested to have his IV removed, patient now remembers that he did leave AMA.   Also patient had a cardiac catheterization performed on 11/5/2021 at [facility] which showed stable three-vessel CAD. CT chest rule out PE was negative at that time. Patient's cardiac work-up negative here today with negative troponin x2. Negative chest x-ray. Unremarkable lab work. Plan for discharge with outpatient follow-up to patient's cardiologist.  Return instruction provided. All questions answered prior to discharge.        Kp Land MD  11/30/21 6420

## 2021-11-30 NOTE — CARE COORDINATION
[facility] Transitions Initial Follow Up Call    Call within 2 business days of discharge: Yes    Patient: Drea Meza Patient : 1955   MRN: [id number]  Reason for Admission: Chest Pain  Discharge Date: 21 RARS: Readmission Risk Score: 26      Last Discharge [facility]       Complaint Diagnosis Description Type Department Provider    21 Chest Pain Chest pain, unspecified type ED to Hosp-Admission (Discharged) (ADMITTED) ANATOLY 4W Candelaria Boswell MD; Marichuy Burgess... Spoke with: no one    Facility: [facility] Transitions 24 Hour Call    Do you have all of your prescriptions and are they filled?: Yes  Post Acute Services: Home Health (Comment: agency name unknown)  Do you have support at home?: Partner/Spouse/SO  Are you an active caregiver in your home?: No  Care Transitions Interventions         Follow Up  No future appointments. Second and final outreach call attempt, no answer. CTN left  with contact information and request for return call. CTN will resolve episode and remain available. [employer] message sent r/t outreach attempt. Will route message to PCP to facilitate Hospital follow up appointment.     MEÑO Marrero, RN   Care Transition Nurse  Mobile: [phone]

## 2021-11-30 NOTE — ED NOTES
Attempted to place an IV; both attempts unsuccessful. Asked Opal Baptiste RN to attempt IV placement.       Zia Alvarez RN  11/30/21 4709

## 2021-11-30 NOTE — ED PROVIDER NOTES
[address] [facility]        Pt Name: Dulce Boland  MRN: [id number]  [place] 1955  Date of evaluation: 11/30/2021  Provider: ABIGAIL Lara  PCP: Leti Huang MD  Note Started: 4:49 PM EST     This patient was also seen and evaluated by the attending physician Darryle Fuller, MD.    [address] [place] [address]       Chief Complaint   Patient presents with    Chest Pain     x2 hours        HISTORY OF PRESENT ILLNESS   (Location, Timing/Onset, Context/Setting, Quality, Duration, Modifying Factors, Severity, Associated Signs and Symptoms)  Note limiting factors. Chief Complaint: Chest pain    Dulce Boland is a 72 y.o. male who presents with complaint of left chest pain. Patient has history of coronary disease with stenting in bypass surgery. Was just discharged from this hospital 2 days ago for chest pain. Says he was not having pain when he left the hospital, and had gone altogether, but the pain returned this morning, even worse. He says his left side, radiating out the left arm. He says he does not get chest pain all that often, when he does a sharp, however this pain is more of an aching nature, constant, with no exacerbating or alleviating factors found. Says he is also feeling short of breath. No cough or cold symptoms. Denies any related trauma. Nursing Notes were all reviewed and agreed with or any disagreements were addressed in the HPI. REVIEW OF SYSTEMS    (2-9 systems for level 4, 10 or more for level 5)     Review of Systems    Positives and pertinent negatives as per HPI.      PAST MEDICAL HISTORY     Past Medical History:   Diagnosis Date    Anxiety     Arthritis     Asthma     CAD (coronary artery disease)     Calcium kidney stone     Cardiomyopathy ([address]     Cerebral artery occlusion with cerebral infarction ([address]     CHF (congestive heart failure) ([address]     Clostridium difficile diarrhea 02/12/2020    COPD (chronic obstructive pulmonary disease) ([facility]     mild    Depression     DM2 (diabetes mellitus, type 2) ([facility])     Fibromyalgia     GERD (gastroesophageal reflux disease)     Hyperlipidemia     Hypertension     Liver disease     Pacemaker 2012    Medtronic model # [id number]    Pneumonia     Seizures [facility]     TIA (transient ischemic attack) 2007    Ulcerative colitis ([facility]        SURGICAL HISTORY     Past Surgical History:   Procedure Laterality Date    APPENDECTOMY      BACK SURGERY      CARDIAC SURGERY      CHOLECYSTECTOMY      COLONOSCOPY  01/10/2017    COLONOSCOPY  01/10/2017    CORONARY ANGIOPLASTY WITH STENT PLACEMENT  2012    CORONARY ARTERY BYPASS GRAFT  2010, 11/2015    ENDOSCOPY, COLON, DIAGNOSTIC      PACEMAKER PLACEMENT      UPPER GASTROINTESTINAL ENDOSCOPY  02/07/2017    VASCULAR SURGERY         CURRENTMEDICATIONS       Previous Medications    ACETAMINOPHEN (TYLENOL) 325 MG TABLET    Take 650 mg by mouth every 6 hours as needed for Pain    AMLODIPINE (NORVASC) 5 MG TABLET    TAKE 1 TABLET BY MOUTH DAILY.     APIXABAN (ELIQUIS) 5 MG TABS TABLET    Take 1 tablet by mouth 2 times daily    ASPIRIN 325 MG TABLET    Take 325 mg by mouth daily    ASPIRIN BUF,[id number]-[id number], (BUFFERED ASPIRIN) 325 MG TABS    Take 1 tablet by mouth every morning (before breakfast)    CARVEDILOL (COREG) 25 MG TABLET    Take 25 mg by mouth 2 times daily (with meals)    CLOPIDOGREL (PLAVIX) 75 MG TABLET    Take 75 mg by mouth daily    EMPAGLIFLOZIN (JARDIANCE) 10 MG TABLET    Take 1 tablet by mouth daily    FLUTICASONE-SALMETEROL (ADVAIR DISKUS) 100-50 MCG/DOSE DISKUS INHALER    Inhale 2 puffs into the lungs 2 times daily wixela inhub inhaler ( generic advair diskus)    GABAPENTIN (NEURONTIN) 600 MG TABLET    TAKE ONE TABLET BY MOUTH FIVE TIMES A DAY    GLUCOSE MONITORING KIT (FREESTYLE) MONITORING KIT    1 kit by Does not apply route daily    INSULIN PEN NEEDLE 32G X 4 MM MISC 1 each by Does not apply route daily    ISOSORBIDE MONONITRATE (IMDUR) 60 MG EXTENDED RELEASE TABLET    TAKE 1 TABLET BY MOUTH DAILY. NITROGLYCERIN (NITROSTAT) 0.4 MG SL TABLET    Place 0.4 mg under the tongue every 5 minutes as needed    OXYCODONE-ACETAMINOPHEN (PERCOCET)  MG PER TABLET    Take 1 tablet by mouth every 8 hours as needed for Pain for up to 30 days.     RESPIRATORY THERAPY SUPPLIES (NEBULIZER) AALIYAH    Used to give yourself breathing treatment    ROSUVASTATIN (CRESTOR) 40 MG TABLET    Take 40 mg by mouth every evening    SACUBITRIL-VALSARTAN (ENTRESTO) 49-51 MG PER TABLET    Take 0.5 tablets by mouth 2 times daily    SPIRONOLACTONE (ALDACTONE) 25 MG TABLET    Take 25 mg by mouth daily     TRAZODONE (DESYREL) 50 MG TABLET    Take 50 mg by mouth nightly    VITAMIN B-12 (CYANOCOBALAMIN) 1000 MCG TABLET    Take 1,000 mcg by mouth daily       ALLERGIES     Melatonin    FAMILYHISTORY       Family History   Problem Relation Age of Onset    Coronary Art Dis Father     Cancer Mother         Lung with mets    Diabetes Mother         SOCIAL HISTORY       Social History     Tobacco Use    Smoking status: Former Smoker     Packs/day: 0.50     Years: 44.00     Pack years: 22.00     Types: Cigarettes     Quit date: 10/20/2021     Years since quittin.1    Smokeless tobacco: Never Used    Tobacco comment: cutting down   Vaping Use    Vaping Use: Never used   Substance Use Topics    Alcohol use: No     Alcohol/week: 0.0 standard drinks    Drug use: No       SCREENINGS    Traci Coma Scale  Eye Opening: Spontaneous  Best Verbal Response: Oriented  Best Motor Response: Obeys commands  Traci Coma Scale Score: 15      PHYSICAL EXAM    (up to 7 for level 4, 8 or more for level 5)     ED Triage Vitals [21 1334]   BP Temp Temp Source Pulse Resp SpO2 Height Weight   (!) 197/80 98.2 °F (36.8 °C) Temporal 85 16 97 % 6' (1.829 m) 186 lb 15.2 oz (84.8 kg)       Physical Exam  Vitals and nursing note reviewed. Constitutional:       General: He is not in acute distress. Appearance: Normal appearance. He is not ill-appearing. HENT:      Head: Normocephalic and atraumatic. Nose: Nose normal.   Eyes:      General:         Right eye: No discharge. Left eye: No discharge. Cardiovascular:      Rate and Rhythm: Normal rate and regular rhythm. Heart sounds: Normal heart sounds. No murmur heard. No gallop. Pulmonary:      Effort: Pulmonary effort is normal. No respiratory distress. Breath sounds: Normal breath sounds. No stridor. No wheezing, rhonchi or rales. Musculoskeletal:         General: Normal range of motion. Cervical back: Normal range of motion. Comments: Mild tenderness reported to palpation of the left chest diffusely. Skin:     General: Skin is warm and dry. Neurological:      General: No focal deficit present. Mental Status: He is alert and oriented to person, place, and time.    Psychiatric:         Mood and Affect: Mood normal.         Behavior: Behavior normal.         DIAGNOSTIC RESULTS   LABS:    Labs Reviewed   CBC WITH AUTO DIFFERENTIAL - Abnormal; Notable for the following components:       Result Value    WBC 3.8 (*)     RBC 3.14 (*)     Hemoglobin 10.4 (*)     Hematocrit 31.7 (*)     .0 (*)     RDW 15.5 (*)     All other components within normal limits    Narrative:     Performed at:  [place] [address]   Phone [phone]   BASIC METABOLIC PANEL W/ REFLEX TO MG FOR LOW K - Abnormal; Notable for the following components:    CO2 16 (*)     Glucose 155 (*)     BUN 21 (*)     All other components within normal limits    Narrative:     Performed at:  [place] [address]   Phone [phone]   TROPONIN    Narrative:     Performed at:  [facility]  [address] [place] [address]., [place] [address]   Phone [phone]   TROPONIN    Narrative:     Performed at:  [facility]  [address] [facility] [place] [address]   Phone [phone]       When ordered only abnormal lab results are displayed. All other labs were within normal range or not returned as of this dictation. EKG: When ordered, EKG's are interpreted by the Emergency Department Physician in the absence of a cardiologist.  Please see their note for interpretation of EKG. RADIOLOGY:   Non-plain film images such as CT, Ultrasound and MRI are read by the radiologist. Plain radiographic images are visualized and preliminarily interpreted by the ED Provider with the below findings:    Interpretation per the Radiologist below, if available at the time of this note:    XR CHEST (2 VW)   Final Result   No acute cardiopulmonary disease. CONSULTS:  None    PROCEDURES   Unless otherwise noted below, none. Procedures    EMERGENCY DEPARTMENT COURSE and DIFFERENTIAL DIAGNOSIS/MDM:   Vitals:    Vitals:    11/30/21 1334 11/30/21 1420 11/30/21 1500   BP: (!) 197/80  (!) 120/93   Pulse: 85 66 79   Resp: 16  14   Temp: 98.2 °F (36.8 °C)     TempSrc: Temporal     SpO2: 97%  98%   Weight: 186 lb 15.2 oz (84.8 kg)     Height: 6' (1.829 m)         Patient was given the following medications:  Medications   oxyCODONE-acetaminophen (PERCOCET)  MG per tablet 1 tablet (1 tablet Oral Given 11/30/21 1545)   ketorolac (TORADOL) injection 15 mg (15 mg IntraVENous Given 11/30/21 1545)           Patient had normal vital signs in the ED, and a normal physical exam.  His EKG showed no concerning abnormalities, as interpreted by ED attending Dr. Hema Gonzalez. Chest x-ray showed no acute findings. Both initial troponin and delta troponin were negative.   Patient has had multiple visits recently for his chest pain, he has had numerous angiograms recently, and the last one was less than 3 weeks ago, showing stable CAD. Patient was requesting pain medication in the emergency department, was given anti-inflammatory medication and noted dose of his prescribed Percocet, which he takes 3 times daily. Given the similarity of his presentation to his recent presentations, there is no need for another cardiology consultation emergently, and suspicion for acute cardiac event was very low. Patient's chest pain improved while in the ED, and he will be discharged with instructions to follow-up with his cardiologist if any pain persists. The patient verbalized understanding and agreement with this plan of care. The patient was advised to return to the emergency department if symptoms should significantly worsen or if new and concerning symptoms should appear. I estimate there is LOW risk for ACUTE CORONARY SYNDROME, PULMONARY EMBOLISM, STROKE, TRANSIENT ISCHEMIC ATTACK, THORACIC AORTIC DISSECTION, HEMORRHAGE, OR CRITICAL CARDIAC ARRHYTHMIA, thus I consider the discharge disposition reasonable. CRITICAL CARE TIME   CRITICAL CARE: There was a high probability of clinically significant/life threatening deterioration in this patient's condition which required my urgent intervention. Total critical care time was 15 minutes. This excludes any time for separately reportable procedures. FINAL IMPRESSION      1.  Chest pain, unspecified type          DISPOSITION/PLAN   DISPOSITION Decision To Discharge 11/30/2021 05:52:32 PM      PATIENT REFERRED TO:  your cardiologist or primary care doctor    Call   as needed, for follow-up care      DISCHARGE MEDICATIONS:  New Prescriptions    No medications on file       DISCONTINUED MEDICATIONS:  Discontinued Medications    No medications on file            (Please note that portions of this note were completed with a voice recognition program.  Efforts were made to edit the dictations but occasionally words are mis-transcribed.)    ABIGAIL Yap (electronically signed)       Terrance Boswell

## 2021-11-30 NOTE — ED NOTES
Introduce myself to the patient, identification band inplace, stretcher in the lowest position for safety, and the call light is in reach. Updated patient on Emergency Department plan of care, no additional needs at this time, will continue to monitor patient.         Lisa Bryant RN  11/30/21 9396

## 2021-11-30 NOTE — ED NOTES
Bed: C-28  Expected date:   Expected time:   Means of arrival:   Comments:  YANIV Christopher, RN  11/30/21 7119

## 2021-12-01 ENCOUNTER — CARE COORDINATION (OUTPATIENT)
Dept: CARE COORDINATION | Age: 66
End: 2021-12-01

## 2021-12-01 ENCOUNTER — HOSPITAL ENCOUNTER (EMERGENCY)
Age: 66
Discharge: HOME | End: 2021-12-01
Attending: EMERGENCY MEDICINE
Payer: MEDICARE

## 2021-12-01 ENCOUNTER — APPOINTMENT (OUTPATIENT)
Dept: GENERAL RADIOLOGY | Age: 66
End: 2021-12-01
Payer: MEDICARE

## 2021-12-01 VITALS
RESPIRATION RATE: 18 BRPM | TEMPERATURE: 98 F | HEIGHT: 72 IN | DIASTOLIC BLOOD PRESSURE: 80 MMHG | SYSTOLIC BLOOD PRESSURE: 171 MMHG | OXYGEN SATURATION: 98 % | HEART RATE: 67 BPM | WEIGHT: 186.95 LBS | BODY MASS INDEX: 25.32 KG/M2

## 2021-12-01 DIAGNOSIS — R07.89 CHEST WALL PAIN: Primary | ICD-10-CM

## 2021-12-01 LAB
ANION GAP SERPL CALCULATED.3IONS-SCNC: 12 MMOL/L (ref 3–16)
BASOPHILS ABSOLUTE: 0 K/UL (ref 0–0.2)
BASOPHILS RELATIVE PERCENT: 0.6 %
BUN BLDV-MCNC: 22 MG/DL (ref 7–20)
CALCIUM SERPL-MCNC: 8.4 MG/DL (ref 8.3–10.6)
CHLORIDE BLD-SCNC: 99 MMOL/L (ref 99–110)
CO2: 23 MMOL/L (ref 21–32)
CREAT SERPL-MCNC: 1.3 MG/DL (ref 0.8–1.3)
EOSINOPHILS ABSOLUTE: 0.2 K/UL (ref 0–0.6)
EOSINOPHILS RELATIVE PERCENT: 3.7 %
GFR AFRICAN AMERICAN: >60
GFR NON-AFRICAN AMERICAN: 55
GLUCOSE BLD-MCNC: 116 MG/DL (ref 70–99)
HCT VFR BLD CALC: 32.5 % (ref 40.5–52.5)
HEMOGLOBIN: 10.6 G/DL (ref 13.5–17.5)
LYMPHOCYTES ABSOLUTE: 1.8 K/UL (ref 1–5.1)
LYMPHOCYTES RELATIVE PERCENT: 34.9 %
MCH RBC QN AUTO: 32.1 PG (ref 26–34)
MCHC RBC AUTO-ENTMCNC: 32.5 G/DL (ref 31–36)
MCV RBC AUTO: 98.8 FL (ref 80–100)
MONOCYTES ABSOLUTE: 0.4 K/UL (ref 0–1.3)
MONOCYTES RELATIVE PERCENT: 6.7 %
NEUTROPHILS ABSOLUTE: 2.9 K/UL (ref 1.7–7.7)
NEUTROPHILS RELATIVE PERCENT: 54.1 %
PDW BLD-RTO: 15.3 % (ref 12.4–15.4)
PLATELET # BLD: 189 K/UL (ref 135–450)
PMV BLD AUTO: 9.2 FL (ref 5–10.5)
POTASSIUM REFLEX MAGNESIUM: 3.9 MMOL/L (ref 3.5–5.1)
RBC # BLD: 3.29 M/UL (ref 4.2–5.9)
SODIUM BLD-SCNC: 134 MMOL/L (ref 136–145)
TROPONIN: <0.01 NG/ML
WBC # BLD: 5.3 K/UL (ref 4–11)

## 2021-12-01 PROCEDURE — 84484 ASSAY OF TROPONIN QUANT: CPT

## 2021-12-01 PROCEDURE — 93005 ELECTROCARDIOGRAM TRACING: CPT | Performed by: EMERGENCY MEDICINE

## 2021-12-01 PROCEDURE — 6370000000 HC RX 637 (ALT 250 FOR IP): Performed by: EMERGENCY MEDICINE

## 2021-12-01 PROCEDURE — 71046 X-RAY EXAM CHEST 2 VIEWS: CPT

## 2021-12-01 PROCEDURE — 99284 EMERGENCY DEPT VISIT MOD MDM: CPT

## 2021-12-01 PROCEDURE — 80048 BASIC METABOLIC PNL TOTAL CA: CPT

## 2021-12-01 PROCEDURE — 85025 COMPLETE CBC W/AUTO DIFF WBC: CPT

## 2021-12-01 RX ORDER — OXYCODONE AND ACETAMINOPHEN 10; 325 MG/1; MG/1
1 TABLET ORAL ONCE
Status: COMPLETED | OUTPATIENT
Start: 2021-12-01 | End: 2021-12-01

## 2021-12-01 RX ADMIN — OXYCODONE AND ACETAMINOPHEN 1 TABLET: 325; 10 TABLET ORAL at 22:13

## 2021-12-01 ASSESSMENT — PAIN SCALES - GENERAL
PAINLEVEL_OUTOF10: 9
PAINLEVEL_OUTOF10: 8

## 2021-12-01 ASSESSMENT — PAIN DESCRIPTION - DESCRIPTORS: DESCRIPTORS: ACHING

## 2021-12-01 ASSESSMENT — PAIN DESCRIPTION - LOCATION: LOCATION: CHEST

## 2021-12-01 ASSESSMENT — PAIN DESCRIPTION - ONSET: ONSET: ON-GOING

## 2021-12-01 ASSESSMENT — PAIN DESCRIPTION - ORIENTATION: ORIENTATION: MID

## 2021-12-01 ASSESSMENT — PAIN DESCRIPTION - PAIN TYPE: TYPE: ACUTE PAIN

## 2021-12-01 ASSESSMENT — PAIN DESCRIPTION - FREQUENCY: FREQUENCY: CONTINUOUS

## 2021-12-01 ASSESSMENT — PAIN DESCRIPTION - PROGRESSION: CLINICAL_PROGRESSION: NOT CHANGED

## 2021-12-01 NOTE — CARE COORDINATION
AARON contacted Ruy to follow up on his [facility] ED visit on 11.30.2021 dx:Chest pain, unspecified type      AVS:Follow-Ups: Call your cardiologist or primary care doctor; as needed, for follow-up care      AARON spoke with Ruy today. He reports that his chest pain is continuous. He denies any new symptoms since leaving ED yesterday. He denies increased shortness of breath or worsening of his chest pain. He has scheduled follow up with cardiologist for next Tuesday. Offered to schedule a sooner appt with Dr. Julissa Arias . Pt declined and feels that he can call for an appt if needed. He declined follow up from [address] [place] [address].    Encouraged to  return immediately to the Emergency Department for any difficulty breathing, confusion, dizziness or passing out, vomiting, chest pain, high fevers, weakness, or any other new or concerning symptoms.

## 2021-12-02 ENCOUNTER — CARE COORDINATION (OUTPATIENT)
Dept: CARE COORDINATION | Age: 66
End: 2021-12-02

## 2021-12-02 ASSESSMENT — ENCOUNTER SYMPTOMS
BACK PAIN: 0
PHOTOPHOBIA: 0
BLOOD IN STOOL: 0
ABDOMINAL PAIN: 0
RECTAL PAIN: 0
COUGH: 0
EYE ITCHING: 0
ANAL BLEEDING: 0
CHEST TIGHTNESS: 0
STRIDOR: 0
COLOR CHANGE: 0
DIARRHEA: 0
APNEA: 0
NAUSEA: 0
ABDOMINAL DISTENTION: 0
VOMITING: 0
EYE PAIN: 0
EYE DISCHARGE: 0
WHEEZING: 0
CONSTIPATION: 0
EYE REDNESS: 0
SHORTNESS OF BREATH: 0
CHOKING: 0

## 2021-12-02 NOTE — ED PROVIDER NOTES
Med      isosorbide mononitrate (IMDUR) 60 MG extended release tablet TAKE 1 TABLET BY MOUTH DAILY. Historical Med      carvedilol (COREG) 25 MG tablet Take 25 mg by mouth 2 times daily (with meals)Historical Med      aspirin 325 MG tablet Take 325 mg by mouth dailyHistorical Med      oxyCODONE-acetaminophen (PERCOCET)  MG per tablet Take 1 tablet by mouth every 8 hours as needed for Pain for up to 30 days. , Disp-90 tablet, R-0Normal      gabapentin (NEURONTIN) 600 MG tablet TAKE ONE TABLET BY MOUTH FIVE TIMES A DAY, Disp-150 tablet, R-2Normal      Aspirin Buf,BtWrg-AlGsv-HoKdi, (BUFFERED ASPIRIN) 325 MG TABS Take 1 tablet by mouth every morning (before breakfast)Historical Med      vitamin B-12 (CYANOCOBALAMIN) 1000 MCG tablet Take 1,000 mcg by mouth dailyHistorical Med      traZODone (DESYREL) 50 MG tablet Take 50 mg by mouth nightlyHistorical Med      fluticasone-salmeterol (ADVAIR DISKUS) 100-50 MCG/DOSE diskus inhaler Inhale 2 puffs into the lungs 2 times daily wixela inhub inhaler ( generic advair diskus), Disp-60 each, R-3Normal      Insulin Pen Needle 32G X 4 MM MISC DAILY Starting Thu 3/18/2021, Disp-100 each, R-3, Normal      glucose monitoring kit (FREESTYLE) monitoring kit DAILY Starting Thu 3/18/2021, Disp-1 kit, R-0, Normal      spironolactone (ALDACTONE) 25 MG tablet Take 25 mg by mouth daily Historical Med      nitroGLYCERIN (NITROSTAT) 0.4 MG SL tablet Place 0.4 mg under the tongue every 5 minutes as neededHistorical Med      sacubitril-valsartan (ENTRESTO) 49-51 MG per tablet Take 0.5 tablets by mouth 2 times daily, Disp-60 tablet, R-0Historical Med      clopidogrel (PLAVIX) 75 MG tablet Take 75 mg by mouth dailyHistorical Med      rosuvastatin (CRESTOR) 40 MG tablet Take 40 mg by mouth every eveningHistorical Med      Respiratory Therapy Supplies (NEBULIZER) AALIYAH Disp-1 Device,R-0, PrintUsed to give yourself breathing treatment      apixaban (ELIQUIS) 5 MG TABS tablet Take 1 tablet by mouth 2 times daily, Disp-60 tablet,R-1Normal      empagliflozin (JARDIANCE) 10 MG tablet Take 1 tablet by mouth daily, Disp-30 tablet,R-0Normal      acetaminophen (TYLENOL) 325 MG tablet Take 650 mg by mouth every 6 hours as needed for PainHistorical Med             ALLERGIES     Melatonin    FAMILY HISTORY        Family History   Problem Relation Age of Onset    Coronary Art Dis Father     Cancer Mother         Lung with mets    Diabetes Mother        SOCIAL HISTORY       Social History     Socioeconomic History    Marital status:      Spouse name: None    Number of children: 1    Years of education: None    Highest education level: None   Occupational History    Occupation: disabled   Tobacco Use    Smoking status: Former Smoker     Packs/day: 0.50     Years: 44.00     Pack years: 22.00     Types: Cigarettes     Quit date: 10/20/2021     Years since quittin.1    Smokeless tobacco: Never Used    Tobacco comment: cutting down   Vaping Use    Vaping Use: Never used   Substance and Sexual Activity    Alcohol use: No     Alcohol/week: 0.0 standard drinks    Drug use: No    Sexual activity: Not Currently     Partners: Female   Other Topics Concern    None   Social History Narrative    None     Social Determinants of Health     Financial Resource Strain: Unknown    Difficulty of Paying Living Expenses: Patient refused   Food Insecurity: Unknown    Worried About Running Out of Food in the Last Year: Patient refused    Ran Out of Food in the Last Year: Patient refused   Transportation Needs:     Lack of Transportation (Medical): Not on file    Lack of Transportation (Non-Medical):  Not on file   Physical Activity:     Days of Exercise per Week: Not on file    Minutes of Exercise per Session: Not on file   Stress:     Feeling of Stress : Not on file   Social Connections:     Frequency of Communication with Friends and Family: Not on file    Frequency of Social Gatherings with Friends and Family: Not on file    Attends Taoism Services: Not on file    Active Member of Clubs or Organizations: Not on file    Attends Club or Organization Meetings: Not on file    Marital Status: Not on file   Intimate Partner Violence:     Fear of Current or Ex-Partner: Not on file    Emotionally Abused: Not on file    Physically Abused: Not on file    Sexually Abused: Not on file   Housing Stability:     Unable to Pay for Housing in the Last Year: Not on file    Number of [place] in the Last Year: Not on file    Unstable Housing in the Last Year: Not on file       PHYSICAL EXAM       ED Triage Vitals [12/01/21 1929]   BP Temp Temp Source Pulse Resp SpO2 Height Weight   136/85 98 °F (36.7 °C) Tympanic 91 18 98 % 6' (1.829 m) 186 lb 15.2 oz (84.8 kg)       Physical Exam  Vitals and nursing note reviewed. Constitutional:       General: He is not in acute distress. Appearance: He is well-developed. He is not diaphoretic. HENT:      Head: Normocephalic and atraumatic. Eyes:      General:         Right eye: No discharge. Left eye: No discharge. Pupils: Pupils are equal, round, and reactive to light. Neck:      Thyroid: No thyromegaly. Trachea: No tracheal deviation. Cardiovascular:      Rate and Rhythm: Normal rate and regular rhythm. Heart sounds: No murmur heard. Pulmonary:      Breath sounds: No wheezing or rales. Chest:      Chest wall: Tenderness present. Abdominal:      General: There is no distension. Palpations: Abdomen is soft. There is no mass. Tenderness: There is no abdominal tenderness. There is no guarding or rebound. Musculoskeletal:         General: No tenderness or deformity. Normal range of motion. Cervical back: Normal range of motion. Skin:     General: Skin is warm. Coloration: Skin is not pale. Findings: No erythema or rash. Neurological:      Mental Status: He is alert.       Cranial Nerves: No cranial nerve deficit. Motor: No abnormal muscle tone. Coordination: Coordination normal.         DIAGNOSTIC RESULTS     EKG: All EKG's are interpreted by the Emergency Department Physician who either signs or Co-signs this chart in the absence of acardiologist.    EKG normal sinus rhythm nonspecific EKG changes right bundle branch block nonspecific EKG changes no ectopy    RADIOLOGY:   Non-plain film images such as CT, Ultrasoundand MRI are read by the radiologist. Plain radiographic images are visualized and preliminarily interpreted by the emergency physician with the below findings:    Reassuring    ED BEDSIDE ULTRASOUND:   Performed by ED Physician - none    LABS:  Labs Reviewed   CBC WITH AUTO DIFFERENTIAL - Abnormal; Notable for the following components:       Result Value    RBC 3.29 (*)     Hemoglobin 10.6 (*)     Hematocrit 32.5 (*)     All other components within normal limits    Narrative:     Performed at:  [address] [place]   Phone [phone]   BASIC METABOLIC PANEL W/ REFLEX TO MG FOR LOW K - Abnormal; Notable for the following components:    Sodium 134 (*)     Glucose 116 (*)     BUN 22 (*)     GFR Non- 55 (*)     All other components within normal limits    Narrative:     Performed at:  [address] [facility] [place] [address]   Phone [phone]   TROPONIN    Narrative:     Performed at:  [address] [place]   Phone [phone]       All other labs were withinnormal range or not returned as of this dictation. EMERGENCY DEPARTMENT COURSE and DIFFERENTIAL DIAGNOSIS/MDM:     PMH, Surgical Hx, FH, Social Hx reviewed by myself (ETOH usage, Tobacco usage, Drug usage reviewed by myself, no pertinent Hx)- No Pertinent Hx     Old records were reviewed by me    70-year-old male with chest pain. Appears musculoskeletal in nature. Worse with movement. Tenderness palpation. Has had multiple ER visits. His troponin was negative. His EKG shows no acute changes. Just had a cardiac catheterization recently. Has been compliant with his medications. This does not appear to be cardiac related. He is appears nontoxic and well-appearing. He needs to follow-up outpatient with cardiologist.    I estimate there is LOW risk for Sepsis, MI, Stroke, Tamponade, PTX, Toxicity or other life threatening etiology thus I consider the discharge disposition reasonable. The patient is at low risk for mortality based on demographic, history and clinical factors. Given the best available information and clinical assessment, I estimate the risk of hospitalization to be greater than risk of treatment at home. I have explained to the patient that the risk could rapidly change, given precautions for return and instructions. Explained to patient that the risk for mortality is low based on demographic, history and clinical factors. I discussed with patient the results of evaluation in the ED, diagnosis, care, and prognosis. The plan is to discharge to home. Patient is in agreement with plan and questions have been answered. I also discussed with patient the reasons which may require a return visit and the importance of follow-up care. The patient is well-appearing, nontoxic, and improved at the time of discharge. Patient agrees to call to arrange follow-up care as directed. Patient understands to return immediately for worsening/change in symptoms. CRITICAL CARE TIME   Total Critical Caretime was 0 minutes, excluding separately reportable procedures. There was a high probability of clinically significant/life threatening deterioration in the patient's condition which required my urgent intervention. PROCEDURES:  Unlessotherwise noted below, none    FINAL IMPRESSION      1.  Chest wall pain DISPOSITION/PLAN   DISPOSITION Decision To Discharge 12/01/2021 09:54:58 PM    PATIENT REFERRED TO:  No follow-up provider specified.     DISCHARGE MEDICATIONS:  Discharge Medication List as of 12/1/2021 10:25 PM             (Please note that portions ofthis note were completed with a voice recognition program.  Efforts were made to edit the dictations but occasionally words are mis-transcribed.)    Esequiel Gant MD(electronically signed)  Attending Emergency Physician        Esequiel Gant MD  12/02/21 0020

## 2021-12-02 NOTE — ED NOTES
Discharge and education instructions reviewed. Patient verbalized understanding, teach-back successful. Patient denied questions at this time. No acute distress noted. Patient instructed to follow-up as noted - return to emergency department if symptoms worsen. Patient verbalized understanding. Discharged per EDMD with discharged instructions.        Vijaya Lassiter RN  12/01/21 0342

## 2021-12-02 NOTE — ED NOTES
Pt A&O to the ED, c/o chest pain; x 2-3 day; pt was seen yesterday for chest pain. Pt stated that his pain has continued since; per pt no worsening symptoms. Pain has been constant since he was d/c'd. Pt rating pain 9/10. Vital signs noted and stable. Patient alert and orient x4. Respirations easy and unlabored. Skin warm and dry and appropriate for ethnicity. No acute distress noted at this time.         Avani Sorensen RN  12/01/21 0755

## 2021-12-02 NOTE — CARE COORDINATION
AARON followed up with Ruy to see if cardiology group called back today. Advised spoke with Tae Plunkett and she was routing a message to the providers. Requested sooner appt for follow up.

## 2021-12-02 NOTE — CARE COORDINATION
AARON contacted Ruy to follow up on his [facility] ED visit on 12.01.2021 dx:Chest Wall pain    AARON spoke with Ruy. He reports today that he is still having chest pain and shortness of breath. He denies worsening of symptoms since leaving ED. He reports today that he is does not have pain with positional changes. He is c/o pain in his Left arm. He denies nausea, sweats, vomiting, numbness, dizziness and light headedness. Lopez Marcus is speaking in full sentences without any audible shortness of breath noted. Advised to  return immediately to the Emergency Department for any difficulty breathing, confusion, dizziness or passing out, vomiting, chest pain, high fevers, weakness, or any other new or concerning symptoms. He reports that he was in the ED for 15 minutes and was unhappy with his care. Offered grievance number to call and pt declined. He denies dysuria. He denies changes in his BM. Denies black tarry stools. He has chronic low back pain. 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 A 3.5 cm x 2.3 cm lesion in the left hemiliver and a 0.8 cm x 0.5 cm   lesion in hepatic segment 7 correspond with the recent CT findings.  There is   centripetal enhancement of the larger lesion that could be seen with a   hemangioma, but the finding does not appear to be present on older studies. The smaller lesion may also demonstrate delayed enhancement. Cholangiocarcinoma and metastatic disease should be considered.  Consider   sonography and or short-term follow-up in 3-6 months.    2. Additional incidental findings as above.           CTabd: 6. 28.2021      No acute abnormality       Contacted [facility] and spoke with Connie Ruggiero to request sooner appt. Advised on 11.27.2021 he was to schedule ED f/u visit with Dr. Angle Castelan with in 2 days. Connie Ruggiero will message her providers to request a sooner appt.       PLAN:  Schedule a sooner appt with cardiology group  F/u with Dr. Mejia Ruvalcaba

## 2021-12-03 LAB
EKG ATRIAL RATE: 73 BPM
EKG DIAGNOSIS: NORMAL
EKG P AXIS: 49 DEGREES
EKG P-R INTERVAL: 122 MS
EKG Q-T INTERVAL: 434 MS
EKG QRS DURATION: 132 MS
EKG QTC CALCULATION (BAZETT): 478 MS
EKG R AXIS: 97 DEGREES
EKG T AXIS: 96 DEGREES
EKG VENTRICULAR RATE: 73 BPM

## 2021-12-03 PROCEDURE — 93010 ELECTROCARDIOGRAM REPORT: CPT | Performed by: INTERNAL MEDICINE

## 2021-12-06 ENCOUNTER — CARE COORDINATION (OUTPATIENT)
Dept: CARE COORDINATION | Age: 66
End: 2021-12-06

## 2022-01-04 DIAGNOSIS — M79.604 PAIN IN BOTH LOWER EXTREMITIES: ICD-10-CM

## 2022-01-04 DIAGNOSIS — E11.9 TYPE 2 DIABETES MELLITUS WITHOUT COMPLICATION, WITH LONG-TERM CURRENT USE OF INSULIN (HCC): ICD-10-CM

## 2022-01-04 DIAGNOSIS — M79.605 PAIN IN BOTH LOWER EXTREMITIES: ICD-10-CM

## 2022-01-04 DIAGNOSIS — Z79.4 TYPE 2 DIABETES MELLITUS WITHOUT COMPLICATION, WITH LONG-TERM CURRENT USE OF INSULIN (HCC): ICD-10-CM

## 2022-01-04 LAB
A/G RATIO: 1.5 (ref 1.1–2.2)
ALBUMIN SERPL-MCNC: 4.3 G/DL (ref 3.4–5)
ALP BLD-CCNC: 63 U/L (ref 40–129)
ALT SERPL-CCNC: 7 U/L (ref 10–40)
ANION GAP SERPL CALCULATED.3IONS-SCNC: 13 MMOL/L (ref 3–16)
AST SERPL-CCNC: 14 U/L (ref 15–37)
BASOPHILS ABSOLUTE: 0 K/UL (ref 0–0.2)
BASOPHILS RELATIVE PERCENT: 1.1 %
BILIRUB SERPL-MCNC: <0.2 MG/DL (ref 0–1)
BUN BLDV-MCNC: 17 MG/DL (ref 7–20)
CALCIUM SERPL-MCNC: 9 MG/DL (ref 8.3–10.6)
CHLORIDE BLD-SCNC: 104 MMOL/L (ref 99–110)
CO2: 19 MMOL/L (ref 21–32)
CREAT SERPL-MCNC: 1 MG/DL (ref 0.8–1.3)
EOSINOPHILS ABSOLUTE: 0.2 K/UL (ref 0–0.6)
EOSINOPHILS RELATIVE PERCENT: 4.5 %
FERRITIN: 19.4 NG/ML (ref 30–400)
GFR AFRICAN AMERICAN: >60
GFR NON-AFRICAN AMERICAN: >60
GLUCOSE BLD-MCNC: 126 MG/DL (ref 70–99)
HCT VFR BLD CALC: 36.4 % (ref 40.5–52.5)
HEMOGLOBIN: 11.7 G/DL (ref 13.5–17.5)
LYMPHOCYTES ABSOLUTE: 1.3 K/UL (ref 1–5.1)
LYMPHOCYTES RELATIVE PERCENT: 27.9 %
MCH RBC QN AUTO: 32.6 PG (ref 26–34)
MCHC RBC AUTO-ENTMCNC: 32 G/DL (ref 31–36)
MCV RBC AUTO: 101.9 FL (ref 80–100)
MONOCYTES ABSOLUTE: 0.3 K/UL (ref 0–1.3)
MONOCYTES RELATIVE PERCENT: 6.7 %
NEUTROPHILS ABSOLUTE: 2.7 K/UL (ref 1.7–7.7)
NEUTROPHILS RELATIVE PERCENT: 59.8 %
PDW BLD-RTO: 16.1 % (ref 12.4–15.4)
PLATELET # BLD: 268 K/UL (ref 135–450)
PMV BLD AUTO: 8.7 FL (ref 5–10.5)
POTASSIUM SERPL-SCNC: 4.7 MMOL/L (ref 3.5–5.1)
RBC # BLD: 3.58 M/UL (ref 4.2–5.9)
SODIUM BLD-SCNC: 136 MMOL/L (ref 136–145)
TOTAL PROTEIN: 7.1 G/DL (ref 6.4–8.2)
TSH REFLEX: 1.93 UIU/ML (ref 0.27–4.2)
WBC # BLD: 4.5 K/UL (ref 4–11)

## 2022-01-05 LAB
ESTIMATED AVERAGE GLUCOSE: 116.9 MG/DL
HBA1C MFR BLD: 5.7 %

## 2022-01-28 ENCOUNTER — APPOINTMENT (OUTPATIENT)
Dept: GENERAL RADIOLOGY | Age: 67
DRG: 281 | End: 2022-01-28
Payer: MEDICARE

## 2022-01-28 ENCOUNTER — HOSPITAL ENCOUNTER (INPATIENT)
Age: 67
LOS: 5 days | Discharge: HOME HEALTH | DRG: 281 | End: 2022-02-02
Attending: EMERGENCY MEDICINE | Admitting: INTERNAL MEDICINE
Payer: MEDICARE

## 2022-01-28 ENCOUNTER — APPOINTMENT (OUTPATIENT)
Dept: CT IMAGING | Age: 67
DRG: 281 | End: 2022-01-28
Payer: MEDICARE

## 2022-01-28 DIAGNOSIS — R53.1 ACUTE RIGHT-SIDED WEAKNESS: ICD-10-CM

## 2022-01-28 DIAGNOSIS — R77.8 ELEVATED TROPONIN: ICD-10-CM

## 2022-01-28 DIAGNOSIS — R09.89 SUSPECTED CEREBROVASCULAR ACCIDENT (CVA): ICD-10-CM

## 2022-01-28 DIAGNOSIS — R07.9 CHEST PAIN, UNSPECIFIED TYPE: ICD-10-CM

## 2022-01-28 DIAGNOSIS — R47.1 DYSARTHRIA: ICD-10-CM

## 2022-01-28 DIAGNOSIS — R56.9 SEIZURE (HCC): Primary | ICD-10-CM

## 2022-01-28 PROBLEM — I63.9 ACUTE CEREBROVASCULAR ACCIDENT (CVA) (HCC): Status: ACTIVE | Noted: 2022-01-28

## 2022-01-28 LAB
A/G RATIO: 1.3 (ref 1.1–2.2)
ALBUMIN SERPL-MCNC: 3.7 G/DL (ref 3.4–5)
ALP BLD-CCNC: 72 U/L (ref 40–129)
ALT SERPL-CCNC: 7 U/L (ref 10–40)
AMPHETAMINE SCREEN, URINE: ABNORMAL
ANION GAP SERPL CALCULATED.3IONS-SCNC: 11 MMOL/L (ref 3–16)
APTT: 29.5 SEC (ref 26.2–38.6)
AST SERPL-CCNC: 14 U/L (ref 15–37)
BARBITURATE SCREEN URINE: ABNORMAL
BASOPHILS ABSOLUTE: 0 K/UL (ref 0–0.2)
BASOPHILS RELATIVE PERCENT: 0.6 %
BENZODIAZEPINE SCREEN, URINE: ABNORMAL
BILIRUB SERPL-MCNC: <0.2 MG/DL (ref 0–1)
BILIRUBIN URINE: NEGATIVE
BLOOD, URINE: NEGATIVE
BUN BLDV-MCNC: 14 MG/DL (ref 7–20)
CALCIUM SERPL-MCNC: 8.3 MG/DL (ref 8.3–10.6)
CANNABINOID SCREEN URINE: ABNORMAL
CHLORIDE BLD-SCNC: 103 MMOL/L (ref 99–110)
CLARITY: CLEAR
CO2: 20 MMOL/L (ref 21–32)
COCAINE METABOLITE SCREEN URINE: ABNORMAL
COLOR: YELLOW
CREAT SERPL-MCNC: 1.1 MG/DL (ref 0.8–1.3)
EKG ATRIAL RATE: 108 BPM
EKG ATRIAL RATE: 94 BPM
EKG DIAGNOSIS: NORMAL
EKG DIAGNOSIS: NORMAL
EKG P AXIS: 61 DEGREES
EKG P AXIS: 63 DEGREES
EKG P-R INTERVAL: 130 MS
EKG P-R INTERVAL: 140 MS
EKG Q-T INTERVAL: 374 MS
EKG Q-T INTERVAL: 426 MS
EKG QRS DURATION: 138 MS
EKG QRS DURATION: 144 MS
EKG QTC CALCULATION (BAZETT): 501 MS
EKG QTC CALCULATION (BAZETT): 532 MS
EKG R AXIS: 85 DEGREES
EKG R AXIS: 93 DEGREES
EKG T AXIS: 21 DEGREES
EKG T AXIS: 61 DEGREES
EKG VENTRICULAR RATE: 108 BPM
EKG VENTRICULAR RATE: 94 BPM
EOSINOPHILS ABSOLUTE: 0 K/UL (ref 0–0.6)
EOSINOPHILS RELATIVE PERCENT: 0.8 %
EPITHELIAL CELLS, UA: 1 /HPF (ref 0–5)
GFR AFRICAN AMERICAN: >60
GFR NON-AFRICAN AMERICAN: >60
GLUCOSE BLD-MCNC: 162 MG/DL (ref 70–99)
GLUCOSE URINE: NEGATIVE MG/DL
HCT VFR BLD CALC: 32.3 % (ref 40.5–52.5)
HEMOGLOBIN: 10.5 G/DL (ref 13.5–17.5)
HYALINE CASTS: 1 /LPF (ref 0–8)
INR BLD: 0.86 (ref 0.88–1.12)
KETONES, URINE: NEGATIVE MG/DL
LEUKOCYTE ESTERASE, URINE: NEGATIVE
LYMPHOCYTES ABSOLUTE: 0.4 K/UL (ref 1–5.1)
LYMPHOCYTES RELATIVE PERCENT: 7 %
Lab: ABNORMAL
MCH RBC QN AUTO: 32.5 PG (ref 26–34)
MCHC RBC AUTO-ENTMCNC: 32.5 G/DL (ref 31–36)
MCV RBC AUTO: 100.1 FL (ref 80–100)
METHADONE SCREEN, URINE: ABNORMAL
MICROSCOPIC EXAMINATION: NORMAL
MONOCYTES ABSOLUTE: 0.2 K/UL (ref 0–1.3)
MONOCYTES RELATIVE PERCENT: 4.2 %
NEUTROPHILS ABSOLUTE: 4.4 K/UL (ref 1.7–7.7)
NEUTROPHILS RELATIVE PERCENT: 87.4 %
NITRITE, URINE: NEGATIVE
OPIATE SCREEN URINE: POSITIVE
OXYCODONE URINE: ABNORMAL
PDW BLD-RTO: 17.3 % (ref 12.4–15.4)
PH UA: 6
PH UA: 6 (ref 5–8)
PHENCYCLIDINE SCREEN URINE: ABNORMAL
PLATELET # BLD: 168 K/UL (ref 135–450)
PMV BLD AUTO: 9 FL (ref 5–10.5)
POTASSIUM REFLEX MAGNESIUM: 4.6 MMOL/L (ref 3.5–5.1)
PRO-BNP: 1382 PG/ML (ref 0–124)
PROPOXYPHENE SCREEN: ABNORMAL
PROTEIN UA: NEGATIVE MG/DL
PROTHROMBIN TIME: 9.6 SEC (ref 9.9–12.7)
RBC # BLD: 3.23 M/UL (ref 4.2–5.9)
RBC UA: 0 /HPF (ref 0–4)
SODIUM BLD-SCNC: 134 MMOL/L (ref 136–145)
SPECIFIC GRAVITY UA: 1.03 (ref 1–1.03)
TOTAL PROTEIN: 6.5 G/DL (ref 6.4–8.2)
TROPONIN: 0.12 NG/ML
TROPONIN: 0.12 NG/ML
TROPONIN: 0.13 NG/ML
URINE TYPE: NORMAL
UROBILINOGEN, URINE: 0.2 E.U./DL
WBC # BLD: 5 K/UL (ref 4–11)
WBC UA: 1 /HPF (ref 0–5)

## 2022-01-28 PROCEDURE — 93308 TTE F-UP OR LMTD: CPT

## 2022-01-28 PROCEDURE — 84484 ASSAY OF TROPONIN QUANT: CPT

## 2022-01-28 PROCEDURE — 93005 ELECTROCARDIOGRAM TRACING: CPT | Performed by: INTERNAL MEDICINE

## 2022-01-28 PROCEDURE — 93010 ELECTROCARDIOGRAM REPORT: CPT | Performed by: INTERNAL MEDICINE

## 2022-01-28 PROCEDURE — 92523 SPEECH SOUND LANG COMPREHEN: CPT

## 2022-01-28 PROCEDURE — 85025 COMPLETE CBC W/AUTO DIFF WBC: CPT

## 2022-01-28 PROCEDURE — 96365 THER/PROPH/DIAG IV INF INIT: CPT

## 2022-01-28 PROCEDURE — 92610 EVALUATE SWALLOWING FUNCTION: CPT

## 2022-01-28 PROCEDURE — 36415 COLL VENOUS BLD VENIPUNCTURE: CPT

## 2022-01-28 PROCEDURE — 6360000002 HC RX W HCPCS: Performed by: INTERNAL MEDICINE

## 2022-01-28 PROCEDURE — 6370000000 HC RX 637 (ALT 250 FOR IP): Performed by: INTERNAL MEDICINE

## 2022-01-28 PROCEDURE — 70450 CT HEAD/BRAIN W/O DYE: CPT

## 2022-01-28 PROCEDURE — 71045 X-RAY EXAM CHEST 1 VIEW: CPT

## 2022-01-28 PROCEDURE — 99285 EMERGENCY DEPT VISIT HI MDM: CPT

## 2022-01-28 PROCEDURE — 6370000000 HC RX 637 (ALT 250 FOR IP): Performed by: NURSE PRACTITIONER

## 2022-01-28 PROCEDURE — 85610 PROTHROMBIN TIME: CPT

## 2022-01-28 PROCEDURE — 9990000010 HC NO CHARGE VISIT: Performed by: PHYSICAL THERAPIST

## 2022-01-28 PROCEDURE — 80053 COMPREHEN METABOLIC PANEL: CPT

## 2022-01-28 PROCEDURE — 96375 TX/PRO/DX INJ NEW DRUG ADDON: CPT

## 2022-01-28 PROCEDURE — 85730 THROMBOPLASTIN TIME PARTIAL: CPT

## 2022-01-28 PROCEDURE — 81001 URINALYSIS AUTO W/SCOPE: CPT

## 2022-01-28 PROCEDURE — 6360000004 HC RX CONTRAST MEDICATION: Performed by: EMERGENCY MEDICINE

## 2022-01-28 PROCEDURE — 6360000002 HC RX W HCPCS: Performed by: NURSE PRACTITIONER

## 2022-01-28 PROCEDURE — 80307 DRUG TEST PRSMV CHEM ANLYZR: CPT

## 2022-01-28 PROCEDURE — 99223 1ST HOSP IP/OBS HIGH 75: CPT | Performed by: INTERNAL MEDICINE

## 2022-01-28 PROCEDURE — 93005 ELECTROCARDIOGRAM TRACING: CPT | Performed by: EMERGENCY MEDICINE

## 2022-01-28 PROCEDURE — 2500000003 HC RX 250 WO HCPCS: Performed by: NURSE PRACTITIONER

## 2022-01-28 PROCEDURE — 70496 CT ANGIOGRAPHY HEAD: CPT

## 2022-01-28 PROCEDURE — 83880 ASSAY OF NATRIURETIC PEPTIDE: CPT

## 2022-01-28 PROCEDURE — 2060000000 HC ICU INTERMEDIATE R&B

## 2022-01-28 RX ORDER — NITROGLYCERIN 0.4 MG/1
0.4 TABLET SUBLINGUAL EVERY 5 MIN PRN
Status: DISCONTINUED | OUTPATIENT
Start: 2022-01-28 | End: 2022-02-02 | Stop reason: HOSPADM

## 2022-01-28 RX ORDER — NITROGLYCERIN 20 MG/100ML
5-200 INJECTION INTRAVENOUS CONTINUOUS
Status: DISCONTINUED | OUTPATIENT
Start: 2022-01-28 | End: 2022-01-28

## 2022-01-28 RX ORDER — ONDANSETRON 2 MG/ML
4 INJECTION INTRAMUSCULAR; INTRAVENOUS EVERY 6 HOURS PRN
Status: DISCONTINUED | OUTPATIENT
Start: 2022-01-28 | End: 2022-02-02 | Stop reason: HOSPADM

## 2022-01-28 RX ORDER — ACETAMINOPHEN 500 MG
1000 TABLET ORAL ONCE
Status: COMPLETED | OUTPATIENT
Start: 2022-01-28 | End: 2022-01-28

## 2022-01-28 RX ORDER — SPIRONOLACTONE 25 MG/1
25 TABLET ORAL DAILY
Status: DISCONTINUED | OUTPATIENT
Start: 2022-01-28 | End: 2022-01-29

## 2022-01-28 RX ORDER — ATORVASTATIN CALCIUM 40 MG/1
80 TABLET, FILM COATED ORAL NIGHTLY
Status: DISCONTINUED | OUTPATIENT
Start: 2022-01-28 | End: 2022-02-02 | Stop reason: HOSPADM

## 2022-01-28 RX ORDER — POLYETHYLENE GLYCOL 3350 17 G/17G
17 POWDER, FOR SOLUTION ORAL DAILY PRN
Status: DISCONTINUED | OUTPATIENT
Start: 2022-01-28 | End: 2022-02-02 | Stop reason: HOSPADM

## 2022-01-28 RX ORDER — ISOSORBIDE MONONITRATE 60 MG/1
60 TABLET, EXTENDED RELEASE ORAL DAILY
Status: DISCONTINUED | OUTPATIENT
Start: 2022-01-28 | End: 2022-01-29

## 2022-01-28 RX ORDER — CLOPIDOGREL BISULFATE 75 MG/1
75 TABLET ORAL DAILY
Status: DISCONTINUED | OUTPATIENT
Start: 2022-01-29 | End: 2022-02-02 | Stop reason: HOSPADM

## 2022-01-28 RX ORDER — MORPHINE SULFATE 4 MG/ML
4 INJECTION, SOLUTION INTRAMUSCULAR; INTRAVENOUS
Status: DISCONTINUED | OUTPATIENT
Start: 2022-01-28 | End: 2022-01-28

## 2022-01-28 RX ORDER — ASPIRIN 81 MG/1
81 TABLET ORAL DAILY
Status: DISCONTINUED | OUTPATIENT
Start: 2022-01-29 | End: 2022-01-31

## 2022-01-28 RX ORDER — LEVETIRACETAM 10 MG/ML
1000 INJECTION INTRAVASCULAR ONCE
Status: COMPLETED | OUTPATIENT
Start: 2022-01-28 | End: 2022-01-28

## 2022-01-28 RX ORDER — ONDANSETRON 4 MG/1
4 TABLET, ORALLY DISINTEGRATING ORAL EVERY 8 HOURS PRN
Status: DISCONTINUED | OUTPATIENT
Start: 2022-01-28 | End: 2022-02-02 | Stop reason: HOSPADM

## 2022-01-28 RX ORDER — CLOPIDOGREL BISULFATE 75 MG/1
150 TABLET ORAL ONCE
Status: COMPLETED | OUTPATIENT
Start: 2022-01-28 | End: 2022-01-28

## 2022-01-28 RX ORDER — METHOCARBAMOL 750 MG/1
750 TABLET, FILM COATED ORAL 3 TIMES DAILY PRN
COMMUNITY
Start: 2022-01-05 | End: 2022-02-16 | Stop reason: ALTCHOICE

## 2022-01-28 RX ORDER — ONDANSETRON 2 MG/ML
4 INJECTION INTRAMUSCULAR; INTRAVENOUS EVERY 30 MIN PRN
Status: DISCONTINUED | OUTPATIENT
Start: 2022-01-28 | End: 2022-01-28 | Stop reason: SDUPTHER

## 2022-01-28 RX ORDER — LABETALOL HYDROCHLORIDE 5 MG/ML
10 INJECTION, SOLUTION INTRAVENOUS EVERY 10 MIN PRN
Status: DISCONTINUED | OUTPATIENT
Start: 2022-01-28 | End: 2022-02-02 | Stop reason: HOSPADM

## 2022-01-28 RX ORDER — LORAZEPAM 2 MG/ML
1 INJECTION INTRAMUSCULAR
Status: DISCONTINUED | OUTPATIENT
Start: 2022-01-28 | End: 2022-01-28

## 2022-01-28 RX ORDER — OXYCODONE HYDROCHLORIDE AND ACETAMINOPHEN 5; 325 MG/1; MG/1
1 TABLET ORAL EVERY 8 HOURS PRN
Status: DISCONTINUED | OUTPATIENT
Start: 2022-01-28 | End: 2022-01-29

## 2022-01-28 RX ORDER — CARVEDILOL 25 MG/1
25 TABLET ORAL 2 TIMES DAILY WITH MEALS
Status: DISCONTINUED | OUTPATIENT
Start: 2022-01-28 | End: 2022-01-29

## 2022-01-28 RX ADMIN — ENOXAPARIN SODIUM 90 MG: 100 INJECTION SUBCUTANEOUS at 18:21

## 2022-01-28 RX ADMIN — ONDANSETRON 4 MG: 2 INJECTION INTRAMUSCULAR; INTRAVENOUS at 11:22

## 2022-01-28 RX ADMIN — CLOPIDOGREL BISULFATE 150 MG: 75 TABLET ORAL at 12:06

## 2022-01-28 RX ADMIN — ATORVASTATIN CALCIUM 80 MG: 40 TABLET, FILM COATED ORAL at 22:15

## 2022-01-28 RX ADMIN — SPIRONOLACTONE 25 MG: 25 TABLET ORAL at 18:22

## 2022-01-28 RX ADMIN — OXYCODONE AND ACETAMINOPHEN 1 TABLET: 5; 325 TABLET ORAL at 17:09

## 2022-01-28 RX ADMIN — LEVETIRACETAM 1000 MG: 10 INJECTION, SOLUTION INTRAVENOUS at 10:33

## 2022-01-28 RX ADMIN — IOPAMIDOL 75 ML: 755 INJECTION, SOLUTION INTRAVENOUS at 10:09

## 2022-01-28 RX ADMIN — MORPHINE SULFATE 4 MG: 4 INJECTION, SOLUTION INTRAMUSCULAR; INTRAVENOUS at 11:22

## 2022-01-28 RX ADMIN — ASPIRIN 325 MG: 325 TABLET, COATED ORAL at 12:06

## 2022-01-28 RX ADMIN — ISOSORBIDE MONONITRATE 60 MG: 60 TABLET, EXTENDED RELEASE ORAL at 17:09

## 2022-01-28 RX ADMIN — NITROGLYCERIN 5 MCG/MIN: 20 INJECTION INTRAVENOUS at 10:37

## 2022-01-28 RX ADMIN — ACETAMINOPHEN 1000 MG: 500 TABLET ORAL at 11:54

## 2022-01-28 RX ADMIN — CARVEDILOL 25 MG: 25 TABLET, FILM COATED ORAL at 18:22

## 2022-01-28 RX ADMIN — SACUBITRIL AND VALSARTAN 1 TABLET: 24; 26 TABLET, FILM COATED ORAL at 22:15

## 2022-01-28 ASSESSMENT — ENCOUNTER SYMPTOMS
CONSTIPATION: 0
WHEEZING: 0
BACK PAIN: 0
EYE PAIN: 0
CHEST TIGHTNESS: 1
PHOTOPHOBIA: 0
VOMITING: 0
COUGH: 0
SORE THROAT: 0
DIARRHEA: 0
ABDOMINAL PAIN: 0
SHORTNESS OF BREATH: 0
NAUSEA: 0

## 2022-01-28 ASSESSMENT — PAIN SCALES - GENERAL
PAINLEVEL_OUTOF10: 8
PAINLEVEL_OUTOF10: 9
PAINLEVEL_OUTOF10: 9
PAINLEVEL_OUTOF10: 8

## 2022-01-28 ASSESSMENT — PAIN DESCRIPTION - ORIENTATION: ORIENTATION: INNER

## 2022-01-28 ASSESSMENT — PAIN DESCRIPTION - LOCATION: LOCATION: CHEST

## 2022-01-28 NOTE — H&P
Hospital Medicine History & Physical      PCP: Emperatriz Bruce MD    Date of Admission: 1/28/2022    Date of Service: Pt seen/examined on 1/28/22 and Admitted to Inpatient    Chief Complaint: Not feeling right    History Of Present Illness: The patient is a 77 y.o. male who presents to [facility] with seizure. Pt apparently called squad today morning since he was not feeling well today. Has h/o CAD, not been taking any of his home meds except percocet for back pain. When squad arrived, pt was noticed to have a seizure spell and another one in Er. Squad noticed facial droop and dysarthria on arrival but was improved. Stroke team was consulted and rec no TPA.        Past Medical History:        Diagnosis Date    Anxiety     Arthritis     Asthma     CAD (coronary artery disease)     Calcium kidney stone     Cardiomyopathy ([address]     Cerebral artery occlusion with cerebral infarction ([address]     CHF (congestive heart failure) ([address]     Clostridium difficile diarrhea 02/12/2020    COPD [facility]ch[facility] obstructive pulmonary disease) ([facility])     mild    Depression     DM2 (diabetes mellitus, type 2) ([facility])     Fibromyalgia     GERD (gastroesophageal reflux disease)     Hyperlipidemia     Hypertension     Liver disease     Pacemaker 2012    Medtronic model # [id number]    Pneumonia     Seizures ([address]     TIA (transient ischemic attack) 2007    Ulcerative colitis ([address]        Past Surgical History:        Procedure Laterality Date    APPENDECTOMY      BACK SURGERY      CARDIAC SURGERY      CHOLECYSTECTOMY      COLONOSCOPY  01/10/2017    COLONOSCOPY  01/10/2017    CORONARY ANGIOPLASTY WITH STENT PLACEMENT  2012    CORONARY ARTERY BYPASS GRAFT  2010, 11/2015    ENDOSCOPY, COLON, DIAGNOSTIC      PACEMAKER PLACEMENT      UPPER GASTROINTESTINAL ENDOSCOPY  02/07/2017    VASCULAR SURGERY         Medications Prior to Admission:    Prior to Admission medications    Medication Sig Start Date End Date Taking? Authorizing Provider   methocarbamol (ROBAXIN) 750 MG tablet Take 750 mg by mouth 3 times daily as needed for Muscle spasms 1/5/22  Yes Historical Provider, MD   gabapentin (NEURONTIN) 600 MG tablet TAKE ONE TABLET BY MOUTH FIVE TIMES A DAY 1/26/22 4/28/22 Yes Suzan Chavarria MD   oxyCODONE-acetaminophen (PERCOCET)  MG per tablet Take 1 tablet by mouth every 8 hours as needed for Pain for up to 30 days. 1/13/22 2/12/22 Yes Suzan Chavarria MD   zolpidem (AMBIEN) 5 MG tablet Take 1 tablet by mouth nightly as needed for Sleep for up to 30 days.  1/4/22 2/3/22 Yes Suzan Chavarria MD   traZODone (DESYREL) 50 MG tablet Take 1 tablet by mouth nightly 1/3/22  Yes Suzan Chavarria MD   amLODIPine (NORVASC) 5 MG tablet Take 5 mg by mouth daily  10/19/21   Historical Provider, MD   isosorbide mononitrate (IMDUR) 60 MG extended release tablet Take 60 mg by mouth daily  11/5/21   Historical Provider, MD   carvedilol (COREG) 25 MG tablet Take 25 mg by mouth 2 times daily (with meals) 10/6/21   Historical Provider, MD   aspirin 81 MG EC tablet Take 81 mg by mouth daily     Historical Provider, MD   fluticasone-salmeterol (ADVAIR DISKUS) 100-50 MCG/DOSE diskus inhaler Inhale 2 puffs into the lungs 2 times daily wixela inhub inhaler ( generic advair diskus) 3/26/21   Suzan Chavarria MD   Insulin Pen Needle 32G X 4 MM MISC 1 each by Does not apply route daily 3/18/21   Suzan Chavarria MD   glucose monitoring kit (FREESTYLE) monitoring kit 1 kit by Does not apply route daily 3/18/21   Suzan Chavarria MD   nitroGLYCERIN (NITROSTAT) 0.4 MG SL tablet Place 0.4 mg under the tongue every 5 minutes as needed 11/13/20   Historical Provider, MD   Respiratory Therapy Supplies (NEBULIZER) AALIYAH Used to give yourself breathing treatment 8/9/20 Mayela Mancera MD   acetaminophen (TYLENOL) 325 MG tablet Take 650 mg by mouth every 6 hours as needed for Pain    Historical Provider, MD       Allergies:  Melatonin    Social History:  The patient currently lives at home    TOBACCO:   reports that he quit smoking about 3 months ago. His smoking use included cigarettes. He has a 22.00 pack-year smoking history. He has never used smokeless tobacco.  ETOH:   reports no history of alcohol use. Family History:  Reviewed in detail and negative for DM, Early CAD, Cancer, CVA. Positive as follows:        Problem Relation Age of Onset    Coronary Art Dis Father     Cancer Mother         Lung with mets    Diabetes Mother        REVIEW OF SYSTEMS:   Positive for difficulty speaking and as noted in the HPI. All other systems reviewed and negative. PHYSICAL EXAM:    BP (!) 143/77   Pulse 92   Temp 97.4 °F (36.3 °C) (Oral)   Resp 20   Ht 6' (1.829 m)   Wt 191 lb 12.8 oz (87 kg)   SpO2 100%   BMI 26.01 kg/m²     General appearance: No apparent distress appears stated age and cooperative. HEENT Normal cephalic, atraumatic without obvious deformity. Pupils equal, round, and reactive to light. Extra ocular muscles intact. Conjunctivae/corneas clear. Neck: Supple, No jugular venous distention/bruits. Trachea midline without thyromegaly or adenopathy with full range of motion. Lungs: Clear to auscultation, bilaterally without Rales/Wheezes/Rhonchi with good respiratory effort. Heart: Regular rhythm, tachy with Normal S1/S2   Abdomen: Soft, non-tender or non-distended without rigidity or guarding and positive bowel sounds   Extremities: No clubbing, cyanosis, or edema bilaterally. Skin: Skin color, texture, turgor normal.  No rashes or lesions. Neurologic: Alert and oriented, dysarthria, slow to answer, neurovascularly intact with sensory/motor intact upper extremities/lower extremities, bilaterally.   Cranial nerves: II-XII intact, grossly non-focal.  Mental status: Alert  Capillary Refill: Acceptable  < 3 seconds  Peripheral Pulses: +3 Easily felt, not easily obliterated with pressure      CBC   Recent Labs     01/28/22  1026   WBC 5.0   HGB 10.5*   HCT 32.3*         RENAL  Recent Labs     01/28/22  1026   *   K 4.6      CO2 20*   BUN 14   CREATININE 1.1     LFT'S  Recent Labs     01/28/22  1026   AST 14*   ALT 7*   BILITOT <0.2   ALKPHOS 72     COAG  Recent Labs     01/28/22  1026   INR 0.86*     CARDIAC ENZYMES  Recent Labs     01/28/22  1026 01/28/22  1334   TROPONINI 0.12* 0.13*       U/A:    Lab Results   Component Value Date    NITRITE neg 05/23/2014    COLORU YELLOW 08/29/2020    WBCUA 1 08/29/2020    RBCUA 1 08/29/2020    CLARITYU Clear 08/29/2020    SPECGRAV >1.030 08/29/2020    LEUKOCYTESUR Negative 08/29/2020    BLOODU Negative 08/29/2020    GLUCOSEU 100 08/29/2020       ABG    Lab Results   Component Value Date    VUG7CAA 25.5 05/21/2018    BEART -0.2 05/21/2018    W5JHXLTE 98.4 05/21/2018    PHART 7.345 05/21/2018    SUR4FHG 48.0 05/21/2018    PO2ART 114.0 05/21/2018    MHM7RNO 27.0 05/21/2018           Active Hospital Problems    Diagnosis Date Noted    Acute cerebrovascular accident (CVA) ([facility] [I63.9] 01/28/2022         PHYSICIANS CERTIFICATION:    I certify that Edmund Vega is expected to be hospitalized for > than 2 midnights based on the following assessment and plan:      ASSESSMENT/PLAN:    Suspected CVA/TIA - CT head and CTA head/neck reviewed. Started on ASA/statin/plavix. Check MRI brain, neurology consulted, SLP brandy, PT/OT ordered. Check urine drug screen    Seizure - has had h/o seizure in past. Check EEG, was given keppra in ER, neurology consulted    Dysarthria - NPO until SLP brandy    Elevated trop - tend trops, check echo.  Prn nitro for CP    Back pain - chronic, cont home percocet    H/o CAD - await echo, resume home meds        DVT Prophylaxis: lovenox  Diet: ADULT DIET; Easy to [employer] Status: Full Code  PT/OT Eval Status: ordered    Swati Tanner MD    Thank you Prudencio Stacy MD for the opportunity to be involved in this patient's care. If you have any questions or concerns please feel free to contact me at [phone].

## 2022-01-28 NOTE — CONSULTS
[facility]  Cardiology Consult Note        CC:     Troponin elevation  HPI:   This is a 77 y.o. male who was brought by squad for questionable seizure activity and possible stroke. We are asked to see the patient because of elevated troponins and chest pain    I spoke to the patient's wife and she says that he heard him fall. When he went to see him he was on the floor having a seizure. He was unresponsive. She called the squad who arrived by the time they came there he became more responsive. Apparently had another seizure-like activity in the emergency room. He has no known history of seizures in the past.  I asked her whether she was complaining about chest pain earlier on and she denied that. The patient is fairly active and can walk about a block he still drives. The patient is quite vague in his history and says yes to everything I ask him. Apparently he has had chest pain since this morning. His troponin is elevated at 0.12    He has known history of coronary disease and status post coronary artery bypass surgery.   His last coronary angiogram was in June of this year at [address]      Past Medical History:   Diagnosis Date    Anxiety     Arthritis     Asthma     CAD (coronary artery disease)     Calcium kidney stone     Cardiomyopathy ([address]     Cerebral artery occlusion with cerebral infarction ([address]     CHF (congestive heart failure) ([address]     Clostridium difficile diarrhea 02/12/2020    [facility]     mild    Depression     DM2 (diabetes mellitus, type 2) ([facility]     Fibromyalgia     GERD (gastroesophageal reflux disease)     Hyperlipidemia     Hypertension     Liver disease     Pacemaker 2012    Medtronic model # [id number]    Pneumonia     Seizures ([address]     TIA (transient ischemic attack) 2007    Ulcerative colitis ([address]       Past Surgical History:   Procedure Laterality Date    APPENDECTOMY      BACK SURGERY  CARDIAC SURGERY      CHOLECYSTECTOMY      COLONOSCOPY  01/10/2017    COLONOSCOPY  01/10/2017    CORONARY ANGIOPLASTY WITH STENT PLACEMENT      CORONARY ARTERY BYPASS GRAFT  , 2015    ENDOSCOPY, COLON, DIAGNOSTIC      PACEMAKER PLACEMENT      UPPER GASTROINTESTINAL ENDOSCOPY  2017    VASCULAR SURGERY        Family History   Problem Relation Age of Onset    Coronary Art Dis Father     Cancer Mother         Lung with mets    Diabetes Mother       Social History     Tobacco Use    Smoking status: Former Smoker     Packs/day: 0.50     Years: 44.00     Pack years: 22.00     Types: Cigarettes     Quit date: 10/20/2021     Years since quittin.2    Smokeless tobacco: Never Used    Tobacco comment: cutting down   Vaping Use    Vaping Use: Never used   Substance Use Topics    Alcohol use: No     Alcohol/week: 0.0 standard drinks    Drug use: No      Allergies   Allergen Reactions    Melatonin Other (See Comments)     Restless leg syndrome        atorvastatin  80 mg Oral Nightly    [START ON 2022] clopidogrel  75 mg Oral Daily    enoxaparin  1 mg/kg SubCUTAneous Daily    isosorbide mononitrate  60 mg Oral Daily    [START ON 2022] aspirin  81 mg Oral Daily         No intake or output data in the 24 hours ending [id number]    Physical Examination: Patient laying very comfortably in bed in no distress  BP (!) 143/77   Pulse 92   Temp 97.4 °F (36.3 °C) (Oral)   Resp 20   Ht 6' (1.829 m)   Wt 191 lb 12.8 oz (87 kg)   SpO2 100%   BMI 26.01 kg/m²    HEENT:  Face: Atraumatic, Conjunctiva: Pink; non icteric,  Mucous Memb:  Moist, No thyromegaly or Lymphadenopathy  Respiratory:  Resp Assessment: normal, Resp Auscultation: clear   Cardiovascular: Auscultation: nl S1 & S2, Palpation:  Nl PMI;  No heaves or thrills, JVP:  normal  Abdomen: Soft, non-tender, Normal bowel sounds,  No organomegaly  Extremities: No Cyanosis or Clubbing; Edema none  Neurological: Oriented to time, place, and person, Non-anxious  Psychiatric: Normal mood and affect  Skin: Warm and dry,  No rash seen      Current Facility-Administered Medications: ondansetron (ZOFRAN-ODT) disintegrating tablet 4 mg, 4 mg, Oral, Q8H PRN **OR** ondansetron (ZOFRAN) injection 4 mg, 4 mg, IntraVENous, Q6H PRN  polyethylene glycol (GLYCOLAX) packet 17 g, 17 g, Oral, Daily PRN  perflutren lipid microspheres (DEFINITY) injection 1.65 mg, 1.5 mL, IntraVENous, ONCE PRN  atorvastatin (LIPITOR) tablet 80 mg, 80 mg, Oral, Nightly  [START ON 1/29/2022] clopidogrel (PLAVIX) tablet 75 mg, 75 mg, Oral, Daily  labetalol (NORMODYNE;TRANDATE) injection 10 mg, 10 mg, IntraVENous, Q10 Min PRN  perflutren lipid microspheres (DEFINITY) injection 1.65 mg, 1.5 mL, IntraVENous, ONCE PRN  nitroGLYCERIN (NITROSTAT) SL tablet 0.4 mg, 0.4 mg, SubLINGual, Q5 Min PRN  oxyCODONE-acetaminophen (PERCOCET) 5-325 MG per tablet 1 tablet, 1 tablet, Oral, Q8H PRN  enoxaparin (LOVENOX) injection 90 mg, 1 mg/kg, SubCUTAneous, Daily  isosorbide mononitrate (IMDUR) extended release tablet 60 mg, 60 mg, Oral, Daily  [START ON 1/29/2022] aspirin EC tablet 81 mg, 81 mg, Oral, Daily      Labs:   Recent Labs     01/28/22  1026   WBC 5.0   HGB 10.5*   HCT 32.3*        Recent Labs     01/28/22  1026   *   K 4.6   CO2 20*   BUN 14   CREATININE 1.1   GLUCOSE 162*     No results for input(s): BNP in the last 72 hours.   Recent Labs     01/28/22  1026   PROTIME 9.6*   INR 0.86*     Recent Labs     01/28/22  1026   APTT 29.5     Recent Labs     01/28/22  1026 01/28/22  1334   TROPONINI 0.12* 0.13*     Lab Results   Component Value Date    HDL 44 08/09/2021    LDLDIRECT 105 09/17/2015    LDLCALC 97 08/09/2021    TRIG 136 08/09/2021     Recent Labs     01/28/22  1026   AST 14*   ALT 7*   LABALBU 3.7         EKG:   Normal sinus rhythm with right bundle branch block and no ischemic changes    Chest X-Ray:  No acute pulmonary process    ECHO: 1/28/2022   Overall left ventricular systolic function appears moderately reduced. Ejection fraction is visually estimated to be 35-40% with diffuse   hypokinesis. There is mildly increased left ventricular wall thickness. Left   ventricular cavity size is normal. Diastolic filling parameters suggest   grade I diastolic dysfunction. Moderate mitral regurgitation. Corornary angiogram  & Intervention: 6/2021  [facility]: 6/[facility] ([facility])  1) 3 vessel CAD with 4/4 patent grafts and occlusion of small RPDA, likely culprit, plan medical management   2) Normal LVEDP   3) Moderately reduced LVEF      Left Main Trunk (LMT): patent prior stent   Left Anterior Descending (LAD): proc 60% stenosis, mid 90% stenosis with competitive flow   Diagonal 1: 40% stenosis   Left Circumflex (LCX): diffuse disease up to 90% proximal stenosis, 100% mid occluded   Right Coronary (RCA): dominant vessel, calcified, diffuse disease, 100% mid occluded     SVG to OM2/Diagonal: widely patent, multiple prior stents   JENIFER to LAD: off of roof of SVG to OM2/Diagonal: Widely patent   SVG to PDA: widely patent, at tocuhdown, multiple prior stents retrograde   filling of SAE, small, <2mm PDA occluded       ASSESSMENT AND PLAN:      Non-STEMI  Patient with known history of coronary disease status post coronary artery bypass surgery and multiple stents admitted to the hospital with seizure-like activity questionable CVA. We were asked to see the patient for troponin elevation  The patient states that he has chest pain since this morning.   He says he still having pain but does not appear to be uncomfortable;  a he was sleeping comfortably when I saw him  EKG shows no ischemic changes; however troponin elevated to 0.12    I think the patient has a non-STEMI based on his troponins and chest pain  His echocardiogram done today shows EF of 35 to 40% which is his baseline  In the absence of any major ischemic ST changes, I prefer to treat him medically  He had an angiogram 6 months ago which showed patent grafts; it is possible that he has a graft occlusion  We will place him on aspirin Plavix and add Lovenox    Cardiomyopathy  EF is 35 to 40%  Patient laying flat and appears to be quite comfortable  Past notes suggest that the patient has been on Entresto; for unclear reasons he is not on it  I will resume it again  Also will resume carvedilol and Aldactone  Follow renal functions and potassium closely    History of atrial fibrillation  In sinus rhythm  Not on anticoagulation  We will have to research as to reasons behind this    New onset seizures  Neurology on board      Sonny Schmidt M.D  1/28/2022

## 2022-01-28 NOTE — PROGRESS NOTES
Pt arrived to PCU room 5125 from ED. Bedside NIHSS handoff performed with ED RN. NIH is 4. Pt complained to this RN about severe 9/10 chest pain that was \"all of a sudden worse\" when he arrived to unit. Stat EKG performed which showed NSR with right bundle branch block. MD notified. Pt also states he has an ICD that is incompatible with the MRI. MD notified of this as well. Pt connected to tele box and confirmed with [facility]. Pt A&Ox4. Pt states he has had a fall within the last 30 days, fall precautions in place.      Electronically signed by Heidi Larkin RN on 1/28/2022 at 1:24 PM

## 2022-01-28 NOTE — PROGRESS NOTES
Physical Therapy  Forrest Armenta  [id number]  [id number]     PT orders received and chart reviewed; attempted to see for PT eval  however pt currently being taken to Echo by transport; will attempt again tomorrow  Electronically signed by EKATERINA MOLINA, PT on 1/28/2022 at 2:18 PM

## 2022-01-28 NOTE — PROGRESS NOTES
[facility]  Personalized Stroke Treatment Plan  My Stroke Type:   [] Ischemic Stroke (Blockage of blood flow to the brain)     [] TIA - Transient Ischemic Attack (mini-stroke)    Personal risk factors you can control include:    [] Alcohol Abuse: check with your physician before any alcohol consumption. [] Atrial fibrillation: may cause blood clots. [] Drug Abuse: Seek help, talk with your doctor  [] Clotting Disorder  [x] Diabetes  [] Family history of stroke or heart disease  [x] High Blood Pressure/Hypertension: work with your physician.  [] High cholesterol: monitor cholesterol levels with your physician. [x] Overweight/Obesity: work with your physician for your ideal body weight. [x] Physical Inactivity: get regular exercise as directed by your physician. [] Personal history of previous TIA or stroke  [x] Poor Diet; decrease salt (sodium) in your diet, follow diet directed by physician. [] Smoking: Cigarette/Cigar: stop smoking. Follow up with your physician is important after discharge. TAKE all medications as prescribed. Do not stop taking any medications   without talking to your physician. BE FAST is a simple way to remember the main symptoms of stroke. Recognizing these symptoms helps you know when to call for medical help. BE FAST stands for:                                                 B Balance problems                                                 E Eyes, vision lost        F  Face Drooping      A  Arm Weakness        S  Speech Difficulty      T  Time to Call 9-1-1  DO NOT DELAY THIS! Educated patient and/or family on personal risk factors for stroke/TIA. Included ways to reduce the risk for recurrent stroke. [facility] [employer]'s Stroke treatment and prevention, Managing your recovery  notebook  provided to patient. The notebook includes, but not limited to, sections addressing warning signs & symptoms of a stroke.  The need to call EMS ([phone]) immediately if signs &

## 2022-01-28 NOTE — ED TRIAGE NOTES
Pt arrives via ems for eval of seizure activity without hx and right side weakness and trouble speaking. Per ems pt has agonal respirations on their arrival placed on non rebreather. Pt had witnessed seizure during triage in ER. Dr. Hannah Flores at bedside. Pt became more responsive and was able to move right extremities. Pt taken to CT. Pt was incontinent of bowel and bladder.

## 2022-01-28 NOTE — ED PROVIDER NOTES
I have personally performed a face to face diagnostic evaluation on this patient. I have fully participated in the care of this patient. I have reviewed and agree with all pertinent clinical information including history, physical exam, diagnostic tests, and the plan. HPI: Josephine Edouard presented with seizures and right-sided facial droop and right arm weakness. Extensive cardiac history. History of heart failure C OPD CAD ICD placement. Patient has a history of diabetes. In route patient had one seizure. Last known well was approximately 15 minutes prior to arrival of EMS was a probably 45 minutes prior to arrival to the ER. Patient was significantly hypertensive per /110. Upon arrival patient had another seizure. After the seizure he was briefly postictal but then was fully alert and oriented. Financial stroke scale was 7.  3 points for face 1 point for disorientation of month, 1 point right arm drift, 1 point for left leg drift, 1 point 4 dysarthria. Stroke was consulted immediately. Chief Complaint   Patient presents with    Seizures    Cerebrovascular Accident     hypertensive, ems found him unresponsive      Review of Systems: See [facility] note  Vital Signs: BP (!) 144/95   Pulse 116   Resp 20   Wt 192 lb 7.4 oz (87.3 kg)   SpO2 100%   BMI 26.10 kg/m²     Alert 77 y.o. male who does not appear toxic or acutely ill  HENT: Atraumatic, oral mucosa moist right facial droop  Neck: Grossly normal ROM  Chest/Lungs: respiratory effort normal   Abdomen: Nontender  Extremities: 2+ radial bilaterally  Musculoskeletal: Grossly normal ROM  Skin: No palor or diaphoresis   Neuro: Right facial droop including right eye with forehead sparing, right arm 3-5 weakness right leg 3 out of 5 weakness, mild dysarthria pupils equal and reactive    Medical Decision Making and Plan:  Pertinent Labs & Imaging studies reviewed. (See [facility] chart for details)  I agree with assessment and plan.   Patient with initial concern for stroke. Stroke protocol was initiated. Patient had CT head which was unremarkable for any new changes patient has old signs of subacute stroke. Patient then began to complain of chest pain. EKG shows right bundle branch block unchanged from previous. No signs of acute ischemia however troponin came back at 0.12. Audiology was consulted and patient likely has NSTEMI as well as possible stroke. Unclear etiology of his neurologic symptoms but patient will require MRI and admission. Patient not TPA candidate per stroke team.  No LVO seen on CT head. Patient back to his neurologic baseline at this time and as we repeated neurologic exam his NIH stroke scale decreased from 7 down to 3. To be admitted. See [facility] note for further details. Blood pressure improved spontaneously.     EKG Interpretation    Interpreted by emergency department physician    Rhythm: sinus tachycardia  Rate: tachycardia  Axis: right  Ectopy: none  Conduction: right bundle branch block (complete)  ST Segments: nonspecific changes  T Waves: normal  Q Waves: none    Clinical Impression: right bundle branch block    MD Leatha Penn MD  01/28/22 1174

## 2022-01-28 NOTE — PROGRESS NOTES
4 Eyes Skin Assessment     NAME:  Donovan Santiago  YOB: 1955  MEDICAL RECORD NUMBER:  [id number]    The patient is being assess for  Admission    I agree that 2 RN's have performed a thorough Head to Toe Skin Assessment on the patient. ALL assessment sites listed below have been assessed. Areas assessed by both nurses:    Head, Face, Ears, Shoulders, Back, Chest, Arms, Elbows, Hands, Sacrum. Buttock, Coccyx, Ischium and Legs. Feet and Heels        Does the Patient have a Wound?  No noted wound(s)       Jhonathan Prevention initiated:  NA   Wound Care Orders initiated:  No    Pressure Injury (Stage 3,4, Unstageable, DTI, NWPT, and Complex wounds) if present place consult order under [de-identified] No    New and Established Ostomies if present place consult order under : No      Nurse 1 eSignature: Electronically signed by Ciara Gu RN on 1/28/22 at 1:25 PM EST    **SHARE this note so that the co-signing nurse is able to place an eSignature**    Nurse 2 eSignature: {Esignature:[id number]}
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
## 2022-01-28 NOTE — ED PROVIDER NOTES
[address] [facility]        Pt Name: Vianney Daniels  MRN: [id number]  [place] 1955  Date of evaluation: 1/28/2022  Provider: TAYLA Avery - LYNN  PCP: Eve Stockton MD  Note Started: 10:24 AM EST        I have seen and evaluated this patient with my supervising physician Aguilar Bearden MD.    CHIEF COMPLAINT       Chief Complaint   Patient presents with    Seizures    Cerebrovascular Accident     hypertensive, ems found him unresponsive       HISTORY OF PRESENT ILLNESS   (Location, Timing/Onset, Context/Setting, Quality, Duration, Modifying Factors, Severity, Associated Signs and Symptoms)  Note limiting factors. Chief Complaint: \"I just do not feel right\"    Vianney Daniels is a 77 y.o. male who presents to the ED today via EMS. I saw the patient after arrived on shift while he was in the CT scanner, and I was asked to see the patient by my attending. I saw the patient promptly after he was returning from the CT scan for an emergent code stroke. He states that he does not feel well, he is complaining mainly of pain in his chest.  He does feel like he is mildly having some issues trying to get his words out. I calculated NIH stroke scale at 1015 of 4. See my attendings note for initial presentation but reportedly got an NIH stroke scale of 7 at that time. Patient is on anticoagulation for a longstanding cardiac history. Took both his anticoagulant as well as a full size aspirin this morning as per his routine. Per EMS report as they were still at bedside he had called them 15 minutes prior to their arrival stating that \"I just do not feel well\" he reportedly had 2 seizures, one in route and 1 here in the ED. He is still somewhat confused. But is awake, alert and following commands and answering questions appropriately. Symptoms all started this morning. No nausea or vomiting. No abdominal pain.   No recent 2007    Ulcerative colitis ([facility] [address]          SURGICAL HISTORY     Past Surgical History:   Procedure Laterality Date    APPENDECTOMY      BACK SURGERY      CARDIAC SURGERY      CHOLECYSTECTOMY      COLONOSCOPY  01/10/2017    COLONOSCOPY  01/10/2017    CORONARY ANGIOPLASTY WITH STENT PLACEMENT  2012    CORONARY ARTERY BYPASS GRAFT  2010, 11/2015    ENDOSCOPY, COLON, DIAGNOSTIC      PACEMAKER PLACEMENT      UPPER GASTROINTESTINAL ENDOSCOPY  02/07/2017    VASCULAR SURGERY           CURRENTMEDICATIONS       Previous Medications    ACETAMINOPHEN (TYLENOL) 325 MG TABLET    Take 650 mg by mouth every 6 hours as needed for Pain    AMLODIPINE (NORVASC) 5 MG TABLET    Take 5 mg by mouth daily     ASPIRIN 81 MG EC TABLET    Take 81 mg by mouth daily     CARVEDILOL (COREG) 25 MG TABLET    Take 25 mg by mouth 2 times daily (with meals)    FLUTICASONE-SALMETEROL (ADVAIR DISKUS) 100-50 MCG/DOSE DISKUS INHALER    Inhale 2 puffs into the lungs 2 times daily wixela inhub inhaler ( generic advair diskus)    GABAPENTIN (NEURONTIN) 600 MG TABLET    TAKE ONE TABLET BY MOUTH FIVE TIMES A DAY    GLUCOSE MONITORING KIT (FREESTYLE) MONITORING KIT    1 kit by Does not apply route daily    INSULIN PEN NEEDLE 32G X 4 MM MISC    1 each by Does not apply route daily    ISOSORBIDE MONONITRATE (IMDUR) 60 MG EXTENDED RELEASE TABLET    Take 60 mg by mouth daily     METHOCARBAMOL (ROBAXIN) 750 MG TABLET    Take 750 mg by mouth 3 times daily as needed for Muscle spasms    NITROGLYCERIN (NITROSTAT) 0.4 MG SL TABLET    Place 0.4 mg under the tongue every 5 minutes as needed    OXYCODONE-ACETAMINOPHEN (PERCOCET)  MG PER TABLET    Take 1 tablet by mouth every 8 hours as needed for Pain for up to 30 days.     RESPIRATORY THERAPY SUPPLIES (NEBULIZER) AALIYAH    Used to give yourself breathing treatment    TRAZODONE (DESYREL) 50 MG TABLET    Take 1 tablet by mouth nightly    ZOLPIDEM (AMBIEN) 5 MG TABLET    Take 1 tablet by mouth nightly as needed for Sleep for up to 30 days. ALLERGIES     Melatonin    FAMILYHISTORY       Family History   Problem Relation Age of Onset    Coronary Art Dis Father     Cancer Mother         Lung with mets    Diabetes Mother           SOCIAL HISTORY       Social History     Tobacco Use    Smoking status: Former Smoker     Packs/day: 0.50     Years: 44.00     Pack years: 22.00     Types: Cigarettes     Quit date: 10/20/2021     Years since quittin.2    Smokeless tobacco: Never Used    Tobacco comment: cutting down   Vaping Use    Vaping Use: Never used   Substance Use Topics    Alcohol use: No     Alcohol/week: 0.0 standard drinks    Drug use: No       SCREENINGS   NIH Stroke Scale  Interval: Reassessment  Level of Consciousness (1a. ): Alert  LOC Questions (1b. ): Answers both correctly  LOC Commands (1c. ): Performs both tasks correctly  Best Gaze (2. ): Normal  Visual (3. ): No visual loss  Facial Palsy (4. ): Normal symmetrical movement  Motor Arm, Left (5a. ): No drift  Motor Arm, Right (5b. ): No drift  Motor Leg, Left (6a. ): No drift  Motor Leg, Right (6b. ): Drift, but does not hit bed  Limb Ataxia (7. ): Absent  Sensory (8. ): Normal  Best Language (9. ): No aphasia  Dysarthria (10. ): Normal  Extinction and Inattention (11): No abnormality  Total: 1Glasgow Coma Scale  Eye Opening: Spontaneous  Best Verbal Response: Oriented  Best Motor Response: Obeys commands  [place] Coma Scale Score: 15        PHYSICAL EXAM    (up to 7 for level 4, 8 or more for level 5)     ED Triage Vitals [22 0958]   BP Temp Temp src Pulse Resp SpO2 Height Weight   (!) 152/130 -- -- 127 17 100 % -- 192 lb 7.4 oz (87.3 kg)       Physical Exam  Vitals and nursing note reviewed. Constitutional:       General: He is in acute distress. Appearance: He is obese. He is ill-appearing. He is not toxic-appearing or diaphoretic. HENT:      Head: Normocephalic and atraumatic.  No raccoon eyes, George's sign, abrasion, contusion, masses, right periorbital erythema, left periorbital erythema or laceration. Hair is normal.      Right Ear: Hearing and external ear normal. No decreased hearing noted. No hemotympanum. Left Ear: Hearing and external ear normal. No decreased hearing noted. No hemotympanum. Nose: Nose normal. No nasal deformity, signs of injury, laceration, nasal tenderness, mucosal edema, congestion or rhinorrhea. Right Nostril: No foreign body or epistaxis. Left Nostril: No foreign body or epistaxis. Mouth/Throat:      Lips: Pink. No lesions. Mouth: Mucous membranes are moist. No injury, lacerations, oral lesions or angioedema. Tongue: No lesions. Tongue does not deviate from midline. Palate: No mass. Pharynx: Oropharynx is clear. Uvula midline. No pharyngeal swelling, oropharyngeal exudate, posterior oropharyngeal erythema or uvula swelling. Tonsils: No tonsillar exudate or tonsillar abscesses. Eyes:      General: Lids are normal. No visual field deficit. Right eye: No discharge. Left eye: No discharge. Extraocular Movements: Extraocular movements intact. Conjunctiva/sclera: Conjunctivae normal.      Pupils: Pupils are equal, round, and reactive to light. Pupils are equal.   Neck:      Trachea: Phonation normal. No abnormal tracheal secretions or tracheal deviation. Comments: No meningismus   Cardiovascular:      Rate and Rhythm: Regular rhythm. Tachycardia present. Pulses: Normal pulses. Dorsalis pedis pulses are 2+ on the right side and 2+ on the left side. Posterior tibial pulses are 2+ on the right side and 2+ on the left side. Heart sounds: Normal heart sounds. No murmur heard. No friction rub. No gallop. Pulmonary:      Effort: Pulmonary effort is normal. No respiratory distress. Breath sounds: Normal breath sounds. No stridor. No decreased breath sounds, wheezing, rhonchi or rales.    Abdominal: General: Bowel sounds are normal. There is no distension. Palpations: Abdomen is soft. Tenderness: There is no abdominal tenderness. There is no right CVA tenderness, left CVA tenderness, guarding or rebound. Musculoskeletal:         General: Normal range of motion. Cervical back: Full passive range of motion without pain, normal range of motion and neck supple. No rigidity. No spinous process tenderness or muscular tenderness. Right lower leg: No edema. Left lower leg: No edema. Feet:      Right foot:      Protective Sensation: 2 sites tested. 2 sites sensed. Left foot:      Protective Sensation: 2 sites tested. 2 sites sensed. Lymphadenopathy:      Cervical: No cervical adenopathy. Skin:     General: Skin is warm and dry. Capillary Refill: Capillary refill takes less than 2 seconds. Neurological:      General: No focal deficit present. Mental Status: He is alert. He is disoriented. GCS: GCS eye subscore is 4. GCS verbal subscore is 5. GCS motor subscore is 6. Cranial Nerves: Dysarthria present. No facial asymmetry. Sensory: Sensation is intact. No sensory deficit. Motor: Weakness present. Coordination: Romberg sign negative. Comments: Gait not assessed as per how critical the patient's status is at this point time. I do not believe that it would be beneficial to stand the patient up. Head of bed however is to be kept greater than 30 degrees. I do note some mild right-sided upper and lower extremity weakness. No facial droop. Some dysarthria that is mild. NIH stroke scale 4   Psychiatric:         Mood and Affect: Mood is anxious. Speech: Speech is delayed.          Behavior: Behavior normal.         DIAGNOSTIC RESULTS   LABS:    Labs Reviewed   CBC WITH AUTO DIFFERENTIAL - Abnormal; Notable for the following components:       Result Value    RBC 3.23 (*)     Hemoglobin 10.5 (*)     Hematocrit 32.3 (*)     .1 (*)     RDW 17.3 (*)     Lymphocytes Absolute 0.4 (*)     All other components within normal limits    Narrative:     Performed at:  [address] [place] [address]   Phone [phone]   COMPREHENSIVE METABOLIC PANEL W/ REFLEX TO MG FOR LOW K - Abnormal; Notable for the following components:    Sodium 134 (*)     CO2 20 (*)     Glucose 162 (*)     ALT 7 (*)     AST 14 (*)     All other components within normal limits    Narrative:     Performed at:  [address] [place] [address]   Phone [phone]   TROPONIN - Abnormal; Notable for the following components:    Troponin 0.12 (*)     All other components within normal limits    Narrative:     Performed at:  [address] [place] [address]   Phone [phone]   PROTIME-INR - Abnormal; Notable for the following components:    Protime 9.6 (*)     INR 0.86 (*)     All other components within normal limits    Narrative:     Performed at:  [address] [address]   Phone [phone]   BRAIN NATRIURETIC PEPTIDE - Abnormal; Notable for the following components:    Pro-BNP 1,382 (*)     All other components within normal limits    Narrative:     Performed at:  [address] [place] [address]   Phone [phone]   APTT    Narrative:     Performed at:  [address] [place] [address]   Phone [phone]   POCT GLUCOSE       When ordered only abnormal lab results are displayed. All other labs were within normal range or not returned as of this dictation. EKG:  When ordered, EKG's are interpreted by the Emergency Department Physician in the absence of a cardiologist.  Please see their note for interpretation of EKG.    RADIOLOGY:   Non-plain film images such as CT, Ultrasound and MRI are read by the radiologist. Plain radiographic images are visualized and preliminarily interpreted by the ED Provider with the below findings:        Interpretation per the Radiologist below, if available at the time of this note:    XR CHEST PORTABLE   Final Result   No acute pulmonary process. CTA HEAD NECK W CONTRAST   Final Result   50% stenosis in the proximal left internal carotid artery. 80% stenosis at the origin of the right vertebral artery. 50% stenosis in the cavernous/supraclinoid segments of the bilateral internal   carotid arteries. 50% stenosis in the intracranial right vertebral artery. CT HEAD WO CONTRAST   Final Result   1. No acute intracranial abnormality. 2. Small chronic infarctions, as described. RECOMMENDATIONS:   Unavailable           CT HEAD WO CONTRAST    Result Date: 1/28/2022  EXAMINATION: CT OF THE HEAD WITHOUT CONTRAST  1/28/2022 9:58 am TECHNIQUE: CT of the head was performed without the administration of intravenous contrast. Dose modulation, iterative reconstruction, and/or weight based adjustment of the mA/kV was utilized to reduce the radiation dose to as low as reasonably achievable. COMPARISON: CT head without contrast, 03/31/2021. HISTORY: ORDERING SYSTEM PROVIDED HISTORY: Stroke Symptoms TECHNOLOGIST PROVIDED HISTORY: Has a \"code stroke\" or \"stroke alert\" been called? ->Yes Reason for exam:->Stroke Symptoms Decision Support Exception - unselect if not a suspected or confirmed emergency medical condition->Emergency Medical Condition (MA) FINDINGS: BRAIN/VENTRICLES: No mass effect, edema or hemorrhage is seen. Small areas of chronic infarctions are seen in the left frontal and right parietal lobes. Small chronic infarction is also seen in the left occipital lobe. Chronic lacunar infarction is seen in the anterior limb of the right internal capsule.   No significant volume loss is seen in the brain. No hydrocephalus or extra-axial fluid is seen. ORBITS: The visualized portion of the orbits demonstrate no acute abnormality. SINUSES: Mild scattered mucosal thickening in the paranasal sinuses. The mastoids are clear. SOFT TISSUES/SKULL:  No acute abnormality of the visualized skull or soft tissues. 1.  No acute intracranial abnormality. 2. Small chronic infarctions, as described. RECOMMENDATIONS: Unavailable           PROCEDURES   Unless otherwise noted below, none     Procedures    CRITICAL CARE TIME   CRITICAL CARE NOTE:  There was a high probability of clinically significant life-threatening deterioration of the patient's condition requiring my urgent intervention. Total critical care time was at least 60 minutes. This includes vital sign monitoring, pulse oximetry monitoring, telemetry monitoring, clinical response to the IV medications, reviewing the nursing notes, consultation time, dictation/documentation time, and interpretation of the labwork. This excludes any separately billable procedures performed.       CONSULTS:  IP CONSULT TO PHARMACY  PHARMACY TO CHANGE BASE FLUIDS  IP CONSULT TO PHARMACY  PHARMACY TO CHANGE BASE FLUIDS  IP CONSULT TO CARDIOLOGY  IP CONSULT TO STROKE TEAM      EMERGENCY DEPARTMENT COURSE and DIFFERENTIAL DIAGNOSIS/MDM:   Vitals:    Vitals:    01/28/22 0958 01/28/22 1030 01/28/22 1100 01/28/22 1130   BP: (!) 152/130 (!) 158/78 (!) 144/95 (!) 119/55   Pulse: 127 113 116 102   Resp: 17 16 20 15   SpO2: 100% 100% 100% 100%   Weight: 192 lb 7.4 oz (87.3 kg)          Patient was given the following medications:  Medications   nitroGLYCERIN 50 mg in dextrose 5% 250 mL infusion (0 mcg/min IntraVENous Paused 1/28/22 1126)   LORazepam (ATIVAN) injection 1 mg (has no administration in time range)   ondansetron (ZOFRAN) injection 4 mg (4 mg IntraVENous Given 1/28/22 1122)   morphine (PF) injection 4 mg (4 mg IntraVENous Given 1/28/22 1122) aspirin EC tablet 325 mg (has no administration in time range)   clopidogrel (PLAVIX) tablet 150 mg (has no administration in time range)   iopamidol (ISOVUE-370) 76 % injection 75 mL (75 mLs IntraVENous Given 1/28/22 1009)   levETIRAcetam (KEPPRA) 1000 mg/100 mL IVPB (0 mg IntraVENous Stopped 1/28/22 1125)   acetaminophen (TYLENOL) tablet 1,000 mg (1,000 mg Oral Given 1/28/22 1154)           MDM: See HPI and above for full presentation physical exam.  Differential diagnoses include TIA, CVA, intracranial abnormality including hemorrhage or mass, seizure disorder, electrolyte derangement, anemia, ACS, fatal arrhythmia, PE/DVT, other    @1000: My shift started and I was asked see the patient per my attending Dr. Dede Brooks who is in a critical code currently. Patient is currently at the [place] [id number] accompanied by EMS and the RN. Initial NIH stroke scale reported by my attending to me at this time is a 7.    @1015: Patient returns from CT scanner, NIH stroke scale 4 at this time, appears to be improving, is corroborated by nursing staff at bedside. Patient complaining of 10 out of 10 chest pain. Nitro drip ordered, patient's blood pressure is in the systolics of 956K, would like to be around systolic into the 987X, nitro drip ordered and lieu of any other antihypertensive drips secondary to the fact that it is going to be therapeutic for his chest pain. Therefore nicardipine drip that I had originally ordered was discontinued. @9898: Call from radiology, Dr. Eduar Colin, no acute abnormalities on Noncon head CT. Therefore no emergent reversal of his anticoagulation will be ordered at this point in time. Patient has no leukocytosis. Stable anemia with hemoglobin 10.5. No severe electrolyte derangements or LALITA. LFTs unremarkable.     No coagulopathy, I have a high suspicion of index that he is not compliant on his medications, he has multiple notes in the chart from previous encounters stating that he is only very marginally compliant with his medications. EKG showed no acute changes. Troponin is elevated 0.12, could be secondary to his seizure or CVA but I did consult cardiology. Dr. Brigid Cleaning states that they could follow on an inpatient basis if needed by hospitalist. BNP 1382. CTs and plain films as read by the radiologist as above    Given the patient's chest pain with elevated troponin, seizure, and resolving CVA he needs to be admitted for further evaluation and treatment. I therefore consulted the hospitalist who agreed to admit patient and write orders for admission    FINAL IMPRESSION      1. Seizure ([address]    2. Suspected cerebrovascular accident (CVA)    3. Dysarthria    4. Acute right-sided weakness    5. Chest pain, unspecified type    6. Elevated troponin          DISPOSITION/PLAN   DISPOSITION Decision To Admit 01/28/2022 11:41:26 AM      PATIENT REFERRED TO:  No follow-up provider specified.     DISCHARGE MEDICATIONS:  New Prescriptions    No medications on file       DISCONTINUED MEDICATIONS:  Discontinued Medications    APIXABAN (ELIQUIS) 5 MG TABS TABLET    Take 1 tablet by mouth 2 times daily    ASPIRIN BUF,[id number], (BUFFERED ASPIRIN) 325 MG TABS    Take 1 tablet by mouth every morning (before breakfast)    CLOPIDOGREL (PLAVIX) 75 MG TABLET    Take 75 mg by mouth daily    EMPAGLIFLOZIN (JARDIANCE) 10 MG TABLET    Take 1 tablet by mouth daily    ROSUVASTATIN (CRESTOR) 40 MG TABLET    Take 40 mg by mouth every evening    SACUBITRIL-VALSARTAN (ENTRESTO) 49-51 MG PER TABLET    Take 0.5 tablets by mouth 2 times daily    SPIRONOLACTONE (ALDACTONE) 25 MG TABLET    Take 25 mg by mouth daily     VITAMIN B-12 (CYANOCOBALAMIN) 1000 MCG TABLET    Take 1,000 mcg by mouth daily              (Please note that portions of this note were completed with a voice recognition program.  Efforts were made to edit the dictations but occasionally words are mis-transcribed.)    Beatris Mayorga, APRN - CNP (electronically signed)            TAYLA Spencer - CNP  01/28/22 4135

## 2022-01-28 NOTE — PROGRESS NOTES
Occupational Therapy    OT order received. Pt leaving floor for testing. Will follow up as schedule and pt condition permit.     Electronically signed by Mortimer Showers, OT on 1/28/2022 at 2:17 PM

## 2022-01-28 NOTE — CONSULTS
Neurology Consult Note  Reason for Consult: suspected CVA, seizures    Chief complaint: feels lousy    Dr Raquel Combs MD asked me to see Randall Holcomb in consultation for evaluation of suspected CVA, seizures    History of Present Illness:  Randall Holcomb is a 77 y.o. male who presents with possible seizure. I obtained my information via interview w/ the patient, supplemented by chart review. The patient tells me that he was essentially up all night w/ fluctuating chest pain. He started to notice some confusion shortly after midnight, what he was seeing on the TV was not making any sense, he was having some dizziness/spinning, and felt like he was going to pass out. He says that his wife heard him fall at one point and found him on the ground convulsing. He says he can recall the event, his entire body shaking w/out any tongue biting or incontinence. Other than the above, he really can't elaborate much more. EMS was called at one point. Apparently he was hypertensive when they arrived, 220/110. He didn't seem well and per chart he had \"2 seizure, one in route and 1 here in the ED\". There really is no description of these events. He was felt to be post ictal.  I asked if he recalled these spells and he just said \"I don't know\". It appears ED was concerned for a stroke as well so he was sent for a CT head which was w/out any definitive acute findings. CTA head/neck no LVO. -150. Troponin was 0.12. I believe stroke team was involved and did not recommend any acute intervention. He was given 1000 mg of Keppra. Currently, he is awake and resting in bed still complaining of chest pain and feels lousy in general.  He still feels confused. He does have a pacemaker that has not been MRI compatible. He does have a hx of stroke. I am not aware of any seizure hx though listed in chart. He reports loss of financial stress.      Medical History:  Past Medical History:   Diagnosis Date    Anxiety     Arthritis     Asthma     CAD (coronary artery disease)     Calcium kidney stone     [facility]     Cerebral artery occlusion with cerebral infarction [facility]     CHF [facility]congestive heart failure) [facility]     Clostridium difficile diarrhea 02/12/2020    COPD [facility]ch[facility] obstructive [facility]     mild    Depression     DM2 (diabetes mellitus, type 2) [facility]     Fibromyalgia     GERD (gastroesophageal reflux disease)     Hyperlipidemia     Hypertension     Liver disease     Pacemaker 2012    Medtronic model # [id number]    Pneumonia     Seizures ([address]     TIA (transient ischemic attack) 2007    Ulcerative colitis ([facility]      Past Surgical History:   Procedure Laterality Date    APPENDECTOMY      BACK SURGERY      CARDIAC SURGERY      CHOLECYSTECTOMY      COLONOSCOPY  01/10/2017    COLONOSCOPY  01/10/2017    CORONARY ANGIOPLASTY WITH STENT PLACEMENT  2012    CORONARY ARTERY BYPASS GRAFT  2010, 11/2015    ENDOSCOPY, COLON, DIAGNOSTIC      PACEMAKER PLACEMENT      UPPER GASTROINTESTINAL ENDOSCOPY  02/07/2017    VASCULAR SURGERY       Scheduled Meds:   enoxaparin  40 mg SubCUTAneous Daily    atorvastatin  80 mg Oral Nightly    [START ON 1/29/2022] clopidogrel  75 mg Oral Daily     Continuous Infusions:   nitroGLYCERIN Stopped (01/28/22 1126)     Medications Prior to Admission:   methocarbamol (ROBAXIN) 750 MG tablet, Take 750 mg by mouth 3 times daily as needed for Muscle spasms  gabapentin (NEURONTIN) 600 MG tablet, TAKE ONE TABLET BY MOUTH FIVE TIMES A DAY  oxyCODONE-acetaminophen (PERCOCET)  MG per tablet, Take 1 tablet by mouth every 8 hours as needed for Pain for up to 30 days. zolpidem (AMBIEN) 5 MG tablet, Take 1 tablet by mouth nightly as needed for Sleep for up to 30 days.   traZODone (DESYREL) 50 MG tablet, Take 1 tablet by mouth nightly  amLODIPine (NORVASC) 5 MG tablet, Take 5 mg by mouth daily isosorbide mononitrate (IMDUR) 60 MG extended release tablet, Take 60 mg by mouth daily   carvedilol (COREG) 25 MG tablet, Take 25 mg by mouth 2 times daily (with meals)  aspirin 81 MG EC tablet, Take 81 mg by mouth daily   fluticasone-salmeterol (ADVAIR DISKUS) 100-50 MCG/DOSE diskus inhaler, Inhale 2 puffs into the lungs 2 times daily wixela inhub inhaler ( generic advair diskus)  Insulin Pen Needle 32G X 4 MM MISC, 1 each by Does not apply route daily  glucose monitoring kit (FREESTYLE) monitoring kit, 1 kit by Does not apply route daily  nitroGLYCERIN (NITROSTAT) 0.4 MG SL tablet, Place 0.4 mg under the tongue every 5 minutes as needed  Respiratory Therapy Supplies (NEBULIZER) AALIYAH, Used to give yourself breathing treatment  acetaminophen (TYLENOL) 325 MG tablet, Take 650 mg by mouth every 6 hours as needed for Pain    Allergies   Allergen Reactions    Melatonin Other (See Comments)     Restless leg syndrome       Family History   Problem Relation Age of Onset    Coronary Art Dis Father     Cancer Mother         Lung with mets    Diabetes Mother      Social History     Tobacco Use   Smoking Status Former Smoker    Packs/day: 0.50    Years: 44.00    Pack years: 22.00    Types: Cigarettes    Quit date: 10/20/2021    Years since quittin.2   Smokeless Tobacco Never Used   Tobacco Comment    cutting down     Social History     Substance and Sexual Activity   Drug Use No     Social History     Substance and Sexual Activity   Alcohol Use No    Alcohol/week: 0.0 standard drinks     ROS  Constitutional- No weight loss or fevers  Eyes- No diplopia. No photophobia. Ears/nose/throat- No dysphagia. No Dysarthria  Cardiovascular- No palpitations. + chest pain  Respiratory- No dyspnea. No Cough  Gastrointestinal- No Abdominal pain. No Vomiting. Genitourinary- No incontinence. No urinary retention  Musculoskeletal- No myalgia. No arthralgia  Skin- No rash. No easy bruising. Psychiatric- No depression.  No anxiety  Endocrine- No diabetes. No thyroid issues. Hematologic- No bleeding difficulty. No fatigue  Neurologic- No weakness. + Headache. Exam:  Blood pressure (!) 143/77, pulse 92, temperature 97.4 °F (36.3 °C), temperature source Oral, resp. rate 20, weight 192 lb 7.4 oz (87.3 kg), SpO2 100 %. Constitutional    Vital signs: BP, HR, and RR reviewed   General alert, no distress  Eyes: unable to visualize the fundi  Cardiovascular: no peripheral edema. Psychiatric: cooperative with examination, no psychotic behavior noted. Neurologic  Mental status:   orientation to person, place, month. Unsure of year. General fund of knowledge grossly intact   Memory grossly intact   Attention intact as able to attend well to the exam     Language no aphasia. Comprehension intact; follows simple commands  Cranial nerves:   CN2: visual fields full   CN 3,4,6: extraocular muscles intact  CN5: facial sensation symmetric   CN7: face symmetric without dysarthria  CN8: hearing grossly intact  CN9: palate elevated symmetrically  CN11: trap full strength on shoulder shrug  CN12: tongue midline with protrusion  Strength: good strength in all 4 extremities. Some jerky movement of his UE. Motor/strength exam is a bit inconsistent at times. Deep tendon reflexes: diminished in BLE, 1+ BUE. Sensory: says he is having numbness in his legs. Cerebellar/coordination: finger nose finger normal without ataxia  Tone: normal in all 4 extremities  Gait: deferred at this time for safety. Labs  HgA1c 5.7  TSH 1.93    Glucose 162  Na 134  K 4.6  BUN 14  Cr 1.1    Troponin 0.12    ALT 7  AST 14    WBC 5.0K  Hg 10.5  Platelets 586    Studies  CT head w/o 1/28/22, independently reviewed  No acute intracranial abnormality. CTA head/neck 1/28/22, independently reviewed  50% stenosis in the proximal left internal carotid artery. 80% stenosis at the origin of the right vertebral artery.    50% stenosis in the cavernous/supraclinoid segments of the bilateral internal   carotid arteries. 50% stenosis in the intracranial right vertebral artery. EEG 2016  Mild background slowing. Impression  1. The patient reportedly had 2 events that were witnessed and interpreted as seizure. The details of those events are not clear. It looks like he does have a small tongue bite on the R side. He remains somewhat encephalopathic and could possibly be post ictal.  ED felt like he had R sided weakness which may have been a Francois's phenomenon. He had no focal deficit on my exam though did seem to have some giveaway weakness. It also should be noted that he suggests he had a convulsive episode at home that he can fully recall which would not be consistent w/ an epileptic event. His BP was significantly elevated per EMS so would have to wonder about a hypertensive/PRES type scenario. It seems less likely that the patient had a stroke in the midst of all this. Wonder about the role of polypharmacy and/or medication compliance. Pharmacy note reviewed. 2.  Chest pain/elevated troponin. Per Cardiology. 3.  Cerebrovascular disease. 4.  Hx stroke. Recommendations  1. It is my understanding that his pacemaker is not MRI compatible. We can repeat a CT head w/o tomorrow. 2.  Check EEG, UA, drug screen. 3.  We may consider starting a maintenance dose of Keppra for the time being given 2 witnessed events felt to be seizure. 4.  Continue home antiplatelet/statin. His home medication list includes DAPT/Eliquis?     Lorena Diggs NP  [address] [place] [address] [address] Neurology    A copy of this note was provided for Dr Saida Cole MD

## 2022-01-28 NOTE — ED NOTES
Bed: B-09  Expected date:   Expected time:   Means of arrival:   Comments:  [address] stroke     [facility]  01/28/22 6943

## 2022-01-28 NOTE — ED NOTES
Report called pt to go to floor. Writer originally given verbal order for 5mg Versed on pts arrival. Writer and Karin DENIS pulled 3 2mg/ml vials from [facility]. Pts condition changed and Versed not given. Writer wasted 6 mg Versed with Ivonne DENIS.          Nisha Stewart RN  01/28/22 ADEEL Lou  01/28/22 3232

## 2022-01-28 NOTE — PROGRESS NOTES
Medication Reconciliation    List of medications for Joan Rodas is currently taking is in progress. Source of Information:   Epic records  Bryan hui with patient and pharmacy ([address] and [employer], [phone] and [phone]) at bedside and by phone     Allergies  Allergy list not thoroughly reviewed with patient at this time  Allergies listed in Epic as follows: Melatonin     Current Medications:    Current Facility-Administered Medications:     nitroGLYCERIN 50 mg in dextrose 5% 250 mL infusion, 5-200 mcg/min, IntraVENous, Continuous, Grover Mealing, APRN - CNP, Paused at 01/28/22 1126    LORazepam (ATIVAN) injection 1 mg, 1 mg, IntraVENous, Once PRN, Grover Mealing, APRN - CNP    ondansetron (ZOFRAN) injection 4 mg, 4 mg, IntraVENous, Q30 Min PRN, Grover Mealing, APRN - CNP, 4 mg at 01/28/22 1122    morphine (PF) injection 4 mg, 4 mg, IntraVENous, Q15 Min PRN, Grover Mealing, APRN - CNP, 4 mg at 01/28/22 1122    Current Outpatient Medications:     methocarbamol (ROBAXIN) 750 MG tablet, Take 750 mg by mouth 3 times daily as needed for Muscle spasms, Disp: , Rfl:     gabapentin (NEURONTIN) 600 MG tablet, TAKE ONE TABLET BY MOUTH FIVE TIMES A DAY, Disp: 150 tablet, Rfl: 1    oxyCODONE-acetaminophen (PERCOCET)  MG per tablet, Take 1 tablet by mouth every 8 hours as needed for Pain for up to 30 days. , Disp: 90 tablet, Rfl: 0    zolpidem (AMBIEN) 5 MG tablet, Take 1 tablet by mouth nightly as needed for Sleep for up to 30 days. , Disp: 30 tablet, Rfl: 0    traZODone (DESYREL) 50 MG tablet, Take 1 tablet by mouth nightly, Disp: 90 tablet, Rfl: 1    amLODIPine (NORVASC) 5 MG tablet, Take 5 mg by mouth daily , Disp: , Rfl:     isosorbide mononitrate (IMDUR) 60 MG extended release tablet, Take 60 mg by mouth daily , Disp: , Rfl:     carvedilol (COREG) 25 MG tablet, Take 25 mg by mouth 2 times daily (with meals), Disp: , Rfl:     aspirin 81 MG EC tablet, Take 81 mg by mouth daily , Disp: , Rfl: fluticasone-salmeterol (ADVAIR DISKUS) 100-50 MCG/DOSE diskus inhaler, Inhale 2 puffs into the lungs 2 times daily wixela inhub inhaler ( generic advair diskus), Disp: 60 each, Rfl: 3    Insulin Pen Needle 32G X 4 MM MISC, 1 each by Does not apply route daily, Disp: 100 each, Rfl: 3    glucose monitoring kit (FREESTYLE) monitoring kit, 1 kit by Does not apply route daily, Disp: 1 kit, Rfl: 0    nitroGLYCERIN (NITROSTAT) 0.4 MG SL tablet, Place 0.4 mg under the tongue every 5 minutes as needed, Disp: , Rfl:     Respiratory Therapy Supplies (NEBULIZER) AALIYAH, Used to give yourself breathing treatment, Disp: 1 Device, Rfl: 0    acetaminophen (TYLENOL) 325 MG tablet, Take 650 mg by mouth every 6 hours as needed for Pain, Disp: , Rfl:     Notes Regarding Home Medications:   Patient told myself he has not been taking all of his medications, does not know which ones he has been taking  Called patient's two pharmacies to confirm which medications have been recently filled  Only prescriptions that have been filled recently are gabapentin, trazodone, Percocet, methocarbamol, and zolpidem  Maintenance medications aspirin, amlodipine, carvedilol, and isosorbide mononitrate have not been filled since Oct/Nov 2021 and were only 30 day supplies  No recent fills of apixaban, clopidogrel, empagliflozin, rosuvastatin, sacubitril-valsartan, and spironolactone      Rachelle Narvaez [facility], PharmD   1/28/2022 11:34 AM

## 2022-01-29 LAB
ANION GAP SERPL CALCULATED.3IONS-SCNC: 11 MMOL/L (ref 3–16)
BUN BLDV-MCNC: 15 MG/DL (ref 7–20)
CALCIUM SERPL-MCNC: 8.2 MG/DL (ref 8.3–10.6)
CHLORIDE BLD-SCNC: 106 MMOL/L (ref 99–110)
CHOLESTEROL, TOTAL: 144 MG/DL (ref 0–199)
CO2: 21 MMOL/L (ref 21–32)
CREAT SERPL-MCNC: 1.2 MG/DL (ref 0.8–1.3)
ESTIMATED AVERAGE GLUCOSE: 116.9 MG/DL
GFR AFRICAN AMERICAN: >60
GFR NON-AFRICAN AMERICAN: >60
GLUCOSE BLD-MCNC: 137 MG/DL (ref 70–99)
HBA1C MFR BLD: 5.7 %
HCT VFR BLD CALC: 28.2 % (ref 40.5–52.5)
HDLC SERPL-MCNC: 50 MG/DL (ref 40–60)
HEMOGLOBIN: 9.4 G/DL (ref 13.5–17.5)
LDL CHOLESTEROL CALCULATED: 75 MG/DL
MCH RBC QN AUTO: 33.1 PG (ref 26–34)
MCHC RBC AUTO-ENTMCNC: 33.2 G/DL (ref 31–36)
MCV RBC AUTO: 99.9 FL (ref 80–100)
PDW BLD-RTO: 17.1 % (ref 12.4–15.4)
PLATELET # BLD: 147 K/UL (ref 135–450)
PMV BLD AUTO: 9.3 FL (ref 5–10.5)
POTASSIUM REFLEX MAGNESIUM: 4.2 MMOL/L (ref 3.5–5.1)
RBC # BLD: 2.82 M/UL (ref 4.2–5.9)
SODIUM BLD-SCNC: 138 MMOL/L (ref 136–145)
TRIGL SERPL-MCNC: 94 MG/DL (ref 0–150)
VLDLC SERPL CALC-MCNC: 19 MG/DL
WBC # BLD: 4.1 K/UL (ref 4–11)

## 2022-01-29 PROCEDURE — 6360000002 HC RX W HCPCS: Performed by: INTERNAL MEDICINE

## 2022-01-29 PROCEDURE — 2060000000 HC ICU INTERMEDIATE R&B

## 2022-01-29 PROCEDURE — 83036 HEMOGLOBIN GLYCOSYLATED A1C: CPT

## 2022-01-29 PROCEDURE — 97116 GAIT TRAINING THERAPY: CPT

## 2022-01-29 PROCEDURE — 85027 COMPLETE CBC AUTOMATED: CPT

## 2022-01-29 PROCEDURE — 97166 OT EVAL MOD COMPLEX 45 MIN: CPT

## 2022-01-29 PROCEDURE — 6370000000 HC RX 637 (ALT 250 FOR IP): Performed by: NURSE PRACTITIONER

## 2022-01-29 PROCEDURE — 6370000000 HC RX 637 (ALT 250 FOR IP): Performed by: INTERNAL MEDICINE

## 2022-01-29 PROCEDURE — 97530 THERAPEUTIC ACTIVITIES: CPT

## 2022-01-29 PROCEDURE — 80061 LIPID PANEL: CPT

## 2022-01-29 PROCEDURE — 97162 PT EVAL MOD COMPLEX 30 MIN: CPT

## 2022-01-29 PROCEDURE — 99233 SBSQ HOSP IP/OBS HIGH 50: CPT | Performed by: NURSE PRACTITIONER

## 2022-01-29 PROCEDURE — 80048 BASIC METABOLIC PNL TOTAL CA: CPT

## 2022-01-29 PROCEDURE — 36415 COLL VENOUS BLD VENIPUNCTURE: CPT

## 2022-01-29 RX ORDER — LISINOPRIL 10 MG/1
10 TABLET ORAL DAILY
Status: DISCONTINUED | OUTPATIENT
Start: 2022-01-29 | End: 2022-01-29

## 2022-01-29 RX ORDER — ISOSORBIDE MONONITRATE 30 MG/1
30 TABLET, EXTENDED RELEASE ORAL DAILY
Status: DISCONTINUED | OUTPATIENT
Start: 2022-01-30 | End: 2022-02-02 | Stop reason: HOSPADM

## 2022-01-29 RX ORDER — GABAPENTIN 300 MG/1
300 CAPSULE ORAL
Status: DISCONTINUED | OUTPATIENT
Start: 2022-01-29 | End: 2022-01-29

## 2022-01-29 RX ORDER — OXYMETAZOLINE HYDROCHLORIDE 0.05 G/100ML
2 SPRAY NASAL 2 TIMES DAILY
Status: DISPENSED | OUTPATIENT
Start: 2022-01-29 | End: 2022-02-01

## 2022-01-29 RX ORDER — LISINOPRIL 10 MG/1
10 TABLET ORAL DAILY
Status: DISCONTINUED | OUTPATIENT
Start: 2022-01-31 | End: 2022-02-01

## 2022-01-29 RX ORDER — CARVEDILOL 6.25 MG/1
6.25 TABLET ORAL 2 TIMES DAILY WITH MEALS
Status: DISCONTINUED | OUTPATIENT
Start: 2022-01-29 | End: 2022-01-30

## 2022-01-29 RX ORDER — GABAPENTIN 300 MG/1
600 CAPSULE ORAL
Status: DISCONTINUED | OUTPATIENT
Start: 2022-01-29 | End: 2022-02-02 | Stop reason: HOSPADM

## 2022-01-29 RX ORDER — OXYCODONE AND ACETAMINOPHEN 10; 325 MG/1; MG/1
1 TABLET ORAL EVERY 8 HOURS PRN
Status: DISCONTINUED | OUTPATIENT
Start: 2022-01-29 | End: 2022-02-02 | Stop reason: HOSPADM

## 2022-01-29 RX ADMIN — CLOPIDOGREL BISULFATE 75 MG: 75 TABLET ORAL at 08:58

## 2022-01-29 RX ADMIN — ASPIRIN 81 MG: 81 TABLET, COATED ORAL at 08:58

## 2022-01-29 RX ADMIN — OXYCODONE AND ACETAMINOPHEN 1 TABLET: 10; 325 TABLET ORAL at 19:47

## 2022-01-29 RX ADMIN — SACUBITRIL AND VALSARTAN 1 TABLET: 24; 26 TABLET, FILM COATED ORAL at 08:58

## 2022-01-29 RX ADMIN — ENOXAPARIN SODIUM 90 MG: 100 INJECTION SUBCUTANEOUS at 06:35

## 2022-01-29 RX ADMIN — GABAPENTIN 600 MG: 300 CAPSULE ORAL at 22:24

## 2022-01-29 RX ADMIN — GABAPENTIN 300 MG: 300 CAPSULE ORAL at 14:03

## 2022-01-29 RX ADMIN — OXYMETAZOLINE HCL 2 SPRAY: 0.05 SPRAY NASAL at 22:25

## 2022-01-29 RX ADMIN — CARVEDILOL 6.25 MG: 6.25 TABLET, FILM COATED ORAL at 16:54

## 2022-01-29 RX ADMIN — ATORVASTATIN CALCIUM 80 MG: 40 TABLET, FILM COATED ORAL at 19:47

## 2022-01-29 RX ADMIN — CARVEDILOL 25 MG: 25 TABLET, FILM COATED ORAL at 08:58

## 2022-01-29 RX ADMIN — ISOSORBIDE MONONITRATE 60 MG: 60 TABLET, EXTENDED RELEASE ORAL at 08:58

## 2022-01-29 RX ADMIN — SPIRONOLACTONE 25 MG: 25 TABLET ORAL at 08:58

## 2022-01-29 RX ADMIN — GABAPENTIN 600 MG: 300 CAPSULE ORAL at 18:09

## 2022-01-29 RX ADMIN — OXYCODONE AND ACETAMINOPHEN 1 TABLET: 10; 325 TABLET ORAL at 11:42

## 2022-01-29 RX ADMIN — OXYCODONE AND ACETAMINOPHEN 1 TABLET: 5; 325 TABLET ORAL at 03:12

## 2022-01-29 ASSESSMENT — PAIN SCALES - GENERAL
PAINLEVEL_OUTOF10: 2
PAINLEVEL_OUTOF10: 0
PAINLEVEL_OUTOF10: 0
PAINLEVEL_OUTOF10: 8
PAINLEVEL_OUTOF10: 5
PAINLEVEL_OUTOF10: 8

## 2022-01-29 ASSESSMENT — PAIN DESCRIPTION - PROGRESSION
CLINICAL_PROGRESSION: NOT CHANGED

## 2022-01-29 ASSESSMENT — PAIN - FUNCTIONAL ASSESSMENT
PAIN_FUNCTIONAL_ASSESSMENT: PREVENTS OR INTERFERES SOME ACTIVE ACTIVITIES AND ADLS

## 2022-01-29 ASSESSMENT — PAIN DESCRIPTION - ONSET
ONSET: ON-GOING

## 2022-01-29 ASSESSMENT — PAIN DESCRIPTION - LOCATION
LOCATION: CHEST
LOCATION: BACK
LOCATION: CHEST;BACK

## 2022-01-29 ASSESSMENT — PAIN DESCRIPTION - DESCRIPTORS
DESCRIPTORS: ACHING;CONSTANT
DESCRIPTORS: ACHING;CONSTANT
DESCRIPTORS: ACHING

## 2022-01-29 ASSESSMENT — PAIN DESCRIPTION - PAIN TYPE
TYPE: ACUTE PAIN;CHRONIC PAIN
TYPE: ACUTE PAIN
TYPE: CHRONIC PAIN

## 2022-01-29 ASSESSMENT — PAIN DESCRIPTION - FREQUENCY
FREQUENCY: CONTINUOUS

## 2022-01-29 ASSESSMENT — PAIN DESCRIPTION - ORIENTATION
ORIENTATION: MID
ORIENTATION: MID

## 2022-01-29 NOTE — PROGRESS NOTES
Speech Language Pathology    Follow up attempted. Nsg reports pt tolerating diet well. Pt sleeping upon SLP entry. Pt requests to sleep. Does answer several questions. Pt reports no difficulty with diet. States he is \"still struggling\" with speech but that it is improving. Pt declines to sit up to try some PO for SLP to confirm findings of CSE on 1/29/2022.     Plan:  SLP to f/u 1/31/2022    Oscar Pierson [address] [facility]/SLP 1415  Speech Language Pathologist  01/29/22  4:10 PM
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
## 2022-01-29 NOTE — PLAN OF CARE
Problem: ACTIVITY INTOLERANCE/IMPAIRED MOBILITY  Goal: Mobility/activity is maintained at optimum level for patient  1/29/2022 0943 by Chantale Vieira  Outcome: Ongoing     Problem: COMMUNICATION IMPAIRMENT  Goal: Ability to express needs and understand communication  1/29/2022 0943 by Chantale Vieira  Outcome: Ongoing     Problem: Pain:  Goal: Pain level will decrease  Description: Pain level will decrease  1/29/2022 0943 by Chantale Vieira  Outcome: Ongoing     Problem: Pain:  Goal: Control of acute pain  Description: Control of acute pain  1/29/2022 0943 by Chantale Vieira  Outcome: Ongoing     Problem: Pain:  Goal: Control of chronic pain  Description: Control of chronic pain  1/29/2022 0943 by Chantale Vieira  Outcome: Ongoing     Problem: Discharge Planning:  Goal: Participates in care planning  Description: Participates in care planning  1/29/2022 0943 by Chantale Vieira  Outcome: Ongoing
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
## 2022-01-29 NOTE — PLAN OF CARE
Problem: ACTIVITY INTOLERANCE/IMPAIRED MOBILITY  Goal: Mobility/activity is maintained at optimum level for patient  1/29/2022 0201 by Karlie Durham RN  Outcome: Ongoing     Problem: COMMUNICATION IMPAIRMENT  Goal: Ability to express needs and understand communication  1/29/2022 0201 by Karlie Durham RN  Outcome: Ongoing     Problem: Pain:  Goal: Pain level will decrease  Description: Pain level will decrease  1/29/2022 0201 by Karlie Durham RN  Outcome: Ongoing     Problem: Pain:  Goal: Control of acute pain  Description: Control of acute pain  1/29/2022 0201 by Karlie Durham RN  Outcome: Ongoing     Problem: Pain:  Goal: Control of chronic pain  Description: Control of chronic pain  1/29/2022 0201 by Kalrie Durham RN  Outcome: Ongoing     Problem: Discharge Planning:  Goal: Participates in care planning  Description: Participates in care planning  1/29/2022 0201 by Karlie Durham RN  Outcome: Ongoing     Problem: Discharge Planning:  Goal: Participates in care planning  Description: Participates in care planning  1/29/2022 0201 by Karlie Durham RN  Outcome: Ongoing     Problem: Anxiety/Stress:  Goal: Level of anxiety will decrease  Description: Level of anxiety will decrease  1/29/2022 0201 by Karlie Durham RN  Outcome: Ongoing     Problem: Cardiac Output - Decreased:  Goal: Hemodynamic stability will improve  Description: Hemodynamic stability will improve  1/29/2022 0201 by Karlie Durham RN  Outcome: Ongoing     Problem: Fluid Volume - Imbalance:  Goal: Absence of imbalanced fluid volume signs and symptoms  Description: Absence of imbalanced fluid volume signs and symptoms  1/29/2022 0201 by Karlie Durham RN  Outcome: Ongoing     Problem: Pain:  Goal: Pain level will decrease  Description: Pain level will decrease  1/29/2022 0201 by Karlie Durham RN  Outcome: Ongoing     Problem: Pain:  Goal: Recognizes and communicates pain  Description: Recognizes and communicates pain  1/29/2022 0201 by Moni Veloz RN  Outcome: Ongoing     Problem: Sleep Pattern Disturbance:  Goal: Appears well-rested  Description: Appears well-rested  1/29/2022 0201 by Moni Veloz RN  Outcome: Ongoing     Problem: Falls - Risk of:  Goal: Will remain free from falls  Description: Will remain free from falls  1/29/2022 0201 by Moni Veloz RN  Outcome: Ongoing     Problem: Falls - Risk of:  Goal: Absence of physical injury  Description: Absence of physical injury  1/29/2022 0201 by Moni Veloz RN  Outcome: Ongoing

## 2022-01-29 NOTE — PROGRESS NOTES
Hospitalist Progress Note      PCP: Liang Piña MD    Date of Admission: 1/28/2022    Subjective: more awake and alert, feeling better    Medications:  Reviewed    Infusion Medications   Scheduled Medications    carvedilol  6.25 mg Oral BID WC    [START ON 1/30/2022] isosorbide mononitrate  30 mg Oral Daily    [START ON 1/31/2022] lisinopril  10 mg Oral Daily    gabapentin  600 mg Oral 5x Daily    atorvastatin  80 mg Oral Nightly    clopidogrel  75 mg Oral Daily    aspirin  81 mg Oral Daily     PRN Meds: oxyCODONE-acetaminophen, ondansetron **OR** ondansetron, polyethylene glycol, perflutren lipid microspheres, labetalol, perflutren lipid microspheres, nitroGLYCERIN      Intake/Output Summary (Last 24 hours) at 1/29/2022 1629  Last data filed at 1/29/2022 1400  Gross per 24 hour   Intake 240 ml   Output 1125 ml   Net -885 ml       Physical Exam Performed:    BP 97/62   Pulse 69   Temp 97.5 °F (36.4 °C) (Oral)   Resp 19   Ht 6' (1.829 m)   Wt 191 lb 12.8 oz (87 kg)   SpO2 96%   BMI 26.01 kg/m²        General appearance: No apparent distress appears stated age and cooperative. HEENT Normal cephalic, atraumatic without obvious deformity. Pupils equal, round, and reactive to light. Extra ocular muscles intact. Conjunctivae/corneas clear. Neck: Supple, No jugular venous distention/bruits. Trachea midline without thyromegaly or adenopathy with full range of motion. Lungs: Clear to auscultation, bilaterally without Rales/Wheezes/Rhonchi with good respiratory effort. Heart: Regular rhythm, tachy with Normal S1/S2   Abdomen: Soft, non-tender or non-distended without rigidity or guarding and positive bowel sounds   Extremities: No clubbing, cyanosis, or edema bilaterally. Skin: Skin color, texture, turgor normal.  No rashes or lesions. Neurologic: Alert and oriented, slow to answer, neurovascularly intact with sensory/motor intact upper extremities/lower extremities, bilaterally. Cranial nerves: II-XII intact, grossly non-focal.  Mental status: Alert  Capillary Refill: Acceptable  < 3 seconds  Peripheral Pulses: +3 Easily felt, not easily obliterated with pressure       Labs:   Recent Labs     01/28/22  1026 01/29/22  0526   WBC 5.0 4.1   HGB 10.5* 9.4*   HCT 32.3* 28.2*    147     Recent Labs     01/28/22  1026 01/29/22  0526   * 138   K 4.6 4.2    106   CO2 20* 21   BUN 14 15   CREATININE 1.1 1.2   CALCIUM 8.3 8.2*     Recent Labs     01/28/22  1026   AST 14*   ALT 7*   BILITOT <0.2   ALKPHOS 72     Recent Labs     01/28/22  1026   INR 0.86*     Recent Labs     01/28/22  1026 01/28/22  1334 01/28/22  1655   TROPONINI 0.12* 0.13* 0.12*       Urinalysis:      Lab Results   Component Value Date    NITRU Negative 01/28/2022    WBCUA 1 01/28/2022    RBCUA 0 01/28/2022    BLOODU Negative 01/28/2022    SPECGRAV 1.027 01/28/2022    GLUCOSEU Negative 01/28/2022       Radiology:  CT HEAD WO CONTRAST   Final Result   1. No acute intracranial abnormality. 2. Specifically, no evidence an acute infarction. 3. Multifocal chronic infarctions, as described on the earlier CT. Small   chronic lacunar infarction in the left manuel is better visualized on the   current study and is also chronic. RECOMMENDATIONS:   Unavailable         XR CHEST PORTABLE   Final Result   No acute pulmonary process. CTA HEAD NECK W CONTRAST   Final Result   50% stenosis in the proximal left internal carotid artery. 80% stenosis at the origin of the right vertebral artery. 50% stenosis in the cavernous/supraclinoid segments of the bilateral internal   carotid arteries. 50% stenosis in the intracranial right vertebral artery. CT HEAD WO CONTRAST   Final Result   1. No acute intracranial abnormality. 2. Small chronic infarctions, as described.       RECOMMENDATIONS:   Unavailable                 Assessment/Plan:    Active Hospital Problems    Diagnosis     Acute cerebrovascular accident (CVA) ([facility] [address] [I63.9]            Suspected CVA/TIA - CT head and CTA head/neck reviewed. Started on ASA/statin/plavix. Unable to get MRI brain since his pacemaker isnt MRI compatible, neurology consulted, SLP eval, PT/OT ordered. Check urine drug screen     Seizure - has had h/o seizure in past. Check EEG, was given keppra in ER, neurology consulted     Dysarthria - improved     Elevated trop - tend trops, check echo.  Prn nitro for CP     Back pain - chronic, cont home percocet, restart home gabapentin     H/o CAD - await echo, resume home meds           DVT Prophylaxis: lovenox  Diet: ADULT DIET; Easy to Chew  Code Status: Full Code    PT/OT Eval Status: ordered    Yahaira Miller MD

## 2022-01-29 NOTE — PROGRESS NOTES
[address] 81   Daily Progress Note      Admit Date:  1/28/2022    Reason for follow up visit: Elevated troponin    CC: \"I have had this head and chest pounding for last few days and it hasn't gone away. This is different from my prior heart pain. \"    78 y/o male with PMH significant for anxiety, CAD with prior CABG and multiple PCI's/NSTEMI, cardiomyopathy, CHF, tobacco use,COPD, diabetes mellitus, HTN, HLP admitted with possible seizure. He was seen in ED and admitted to [facility] for further evaluation. His troponin was elevated at 0.12 and asked to evaluate. He was seen by Dr. Harsha Floyd and placed on Lovenox and plan was for continued medical management of his CAD. His primary cardiologist is at [facility] and had multiple procedures performed in late 2021. He is being maintained on medical management      Interval History:  Pt. seen and examined; records reviewed  BP low this AM after morning meds  He admits he has not taken home medications except percocet and gabapentin for several months d/t cost  He was started on Carvedilol, Entresto, Imdur and aldactone yesterday  + continuous atypical chest \"ache. \"  Denies SOB    Subjective:  Pt with no acute overnight events. Denies chest pain, palpitations, and dyspnea. Review of Systems:   · Constitutional: no unanticipated weight loss. There's been no change in energy level, sleep pattern, or activity level. No fevers, chills. · Eyes: No visual changes or diplopia. No scleral icterus. · ENT: No hearing loss or vertigo. No mouth sores or sore throat. + H/A  · Cardiovascular: as reviewed in HPI + chronic chest pain  · Respiratory: No cough or wheezing, no sputum production. No hematemesis. · Gastrointestinal: No abdominal pain, appetite loss, blood in stools. No change in bowel or bladder habits. · Genitourinary: No dysuria, trouble voiding, or hematuria. · Musculoskeletal:  No gait disturbance, no joint complaints.   · Integumentary: No rash or pruritis. · Neurological: No headache, diplopia, change in muscle strength, numbness or tingling. · Psychiatric: No anxiety or depression. · Endocrine: No temperature intolerance. No excessive thirst, fluid intake, or urination. No tremor. · Hematologic/Lymphatic: No abnormal bruising or bleeding, blood clots or swollen lymph nodes. · Allergic/Immunologic: No nasal congestion or hives. Objective:   BP 97/62   Pulse 69   Temp 97.5 °F (36.4 °C) (Oral)   Resp 19   Ht 6' (1.829 m)   Wt 191 lb 12.8 oz (87 kg)   SpO2 96%   BMI 26.01 kg/m²     Intake/Output Summary (Last 24 hours) at 1/29/2022 1222  Last data filed at 1/29/2022 1029  Gross per 24 hour   Intake --   Output 1125 ml   Net -1125 ml     Wt Readings from Last 3 Encounters:   01/29/22 191 lb 12.8 oz (87 kg)   01/04/22 196 lb 3.2 oz (89 kg)   12/01/21 186 lb 15.2 oz (84.8 kg)       Physical Exam:  General: In no acute distress. Awake, alert, and oriented X4. Up in chair. Skin:  Warm and dry. No new appearing rashes or lesions. Neck:  Supple. No JVD or carotid bruit appreciated. Chest: Lungs clear to auscultation. No wheezes/rhonchi/rales  Cardiovascular:  RRR. Normal S1 and S2. No murmur/gallop or rub  Abdomen:  soft, nontender, nondistended, +bowel sounds. No hepatomegaly  Extremities:  No LE edema. No clubbing or cyanosis. 2+ bilateral radial/DP/PT pulses.  Cap refill brisk    Medications:    atorvastatin  80 mg Oral Nightly    clopidogrel  75 mg Oral Daily    isosorbide mononitrate  60 mg Oral Daily    aspirin  81 mg Oral Daily    carvedilol  25 mg Oral BID WC    spironolactone  25 mg Oral Daily    sacubitril-valsartan  1 tablet Oral BID    enoxaparin  1 mg/kg SubCUTAneous Q12H       oxyCODONE-acetaminophen, ondansetron **OR** ondansetron, polyethylene glycol, perflutren lipid microspheres, labetalol, perflutren lipid microspheres, nitroGLYCERIN    Lab Data:  CBC:   Recent Labs     01/28/22  1026 01/29/22  0526   WBC 5.0 4.1   HGB 10.5* 9.4*    147     BMP:    Recent Labs     01/28/22  1026 01/29/22  0526   * 138   K 4.6 4.2   CO2 20* 21   BUN 14 15   CREATININE 1.1 1.2     LIVR:   Recent Labs     01/28/22  1026   AST 14*   ALT 7*     INR:    Recent Labs     01/28/22  1026   INR 0.86*     APTT:   Recent Labs     01/28/22  1026   APTT 29.5     Results for Layne Soliz (MRN [id number]) as of 1/29/2022 12:29   Ref. Range 1/29/2022 05:26   Cholesterol, Total Latest Ref Range: 0 - 199 mg/dL 144   HDL Cholesterol Latest Ref Range: 40 - 60 mg/dL 50   LDL Calculated Latest Ref Range: <100 mg/dL 75   Triglycerides Latest Ref Range: 0 - 150 mg/dL 94   VLDL Cholesterol Calculated Latest Ref Range: Not Established mg/dL 19     Results for Layne Soliz (MRN [id number]) as of 1/29/2022 12:29   Ref. Range 1/28/2022 10:26 1/28/2022 13:34 1/28/2022 16:55   Pro-BNP Latest Ref Range: 0 - 124 pg/mL 1,382 (H)     Troponin Latest Ref Range: <0.01 ng/mL 0.12 (H) 0.13 (H) 0.12 (H)       ECG 1/28/2022:  SR with RBBB  (nonspecific TW abnormality noted on initial ECG)    Echo 1/28/2022:   Limited study. Overall left ventricular systolic function appears moderately reduced. Ejection fraction is visually estimated to be 35-40% with diffuse   hypokinesis. There is mildly increased left ventricular wall thickness. Left   ventricular cavity size is normal. Diastolic filling parameters suggest   grade I diastolic dysfunction. Moderate mitral regurgitation. 11/5/2021L ([facility]):  Findings:   Dominance: right dominant   LM:  Ostial:  40%, in-stent   LAD:  Distal: 80%     D1: patent stent, 80% upper pole brance     LCX:  Proximal: 100%     10/18/2021 LHC/PCI:  1.  Known 3 v CAD; bypass graft in 1 to be patent and were not injected   during this angiogram.  There was a visible dissection versus thrombus   distal to the most recently placed stent from the LAD extending into the   diagonal.   2. Successful PCI of the diagonal using drug-eluting stent   3. Successful PTCA of the LMT malapposed stent   4. IVUS revealing severe Diagonal stenosis and malapposed LMT stent     10/11/2021 LHC/PCI ([facility]):  3 Vessel CAD   Successful shockwave PCI of the LMCA with 3.5 mm balloon and 3.5 mm NC   balloon with increase in LMCA MLCSA from 5.3 mm2 to 6.8 mm2   Patent proximal LAD/D1 stent   Unchanged bypass graft status   Mild LV dysfunction   Mildly elevated LVEDP   Normal systemic pressures   angioseal closure of the RCFA access site. 10/4/2021 LHC/PCI: ([facility])  3 Vessel CAD   Successful IVUS guided PCI distal LMCA into D2 with 3 x 16 mm and 3 x 12   mm Synergy ANGELINA's (overlapping)   FFR D2 = 0.78   Patent grafts as above   Mild LV dysfunction   Normal LVEDP   Normal systemic pressures     6/11/2021 [facility]: ([facility])  1) 3 vessel CAD with 4/4 patent grafts and occlusion of small RPDA, likely   culprit, plan medical management   2) Normal LVEDP   3) Moderately reduced LVEF     Echo 7/12/2021 ([facility]):  Left ventricle is mildly (5.9-6.3 cm) dilated. The left atrium is moderately dilated. The Aortic Valve leaflets appear sclerotic. No hemodynamically significant valvular aortic stenosis. There is a pacemaker lead in the right ventricle. The left ventricular function is moderately reduced. Overall left ventricular ejection fraction is estimated to be 30-35%. There is moderate global hypokinesis of the left ventricle. The mitral valve leaflets are mildly calcified in appearance. There is trace mitral regurgitation. There is a catheter/pacemaker lead seen in the right atrial cavity. There is no pericardial effusion.      4/19/2021 Lexiscan-Myoview ([facility]):  Transient dilatation of the left ventricle which can be due to left ventricular hypertrophy,    diabetes, or balanced three-vessel coronary artery disease with symmetric myocardial ischemia.       No focal perfusion defects       Dilated left ventricle       Reduced left ventricular ejection fraction measuring 31%       Telemetry: Sinus rhythm without ectopy    Assessment/Plan:    1. Elevated troponin  -type II NSTEMI and secondary to demand ischemia; troponins essentially flat  -has known CAD with prior CABG and multiple PCI's (had several PCI's in late 2021)   -current chest pain is not cardiac and very atypical  -would continue with medical management  -continue ASA, Plavix , statin and BB  -risk factor modification    2. Ischemic Cardiomyopathy with LVEF 35-40%   -chronic NYHA class II systolic HF  -currently appears to be euvolemic on exam  -he has not been compliant with medications  -will change and resume low dose BB and and ACE (he did not continue Entresto d/t cost)  -will hold on aldactone and SGLT2 inhibitor (can be addressed with his primary cardiologist as an outpatient). Apparently on it in the past and he stopped d/t financial constraints  -S/P ICD    3. Chronic kidney disease, stage III  -continue to monitor renal function    4.  Hyperlipidemia with goal LDL < 70  -continue high intensity statin    Electronically signed by TAYLA Alaniz CNP on 1/29/2022 at 12:22 PM

## 2022-01-29 NOTE — PROGRESS NOTES
NAME:  Hudson Galvan  YOB: 1955  MEDICAL RECORD NUMBER:  [id number]  TODAYS DATE:  1/29/2022    Discussed personal risk factors for Stroke /TIA with patient/family, and ways to reduce the risk for a recurrent stroke. Patient's personal risk factors which were identified are:     [] Alcohol Abuse: check with your physician before any alcohol consumption. [] Atrial fibrillation: may cause blood clots. [] Drug Abuse: Seek help, talk with your doctor  [] Clotting Disorder  [] Diabetes  [] Family history of stroke or heart disease  [x] High Blood Pressure/Hypertension: work with your physician.  [] High cholesterol: monitor cholesterol levels with your physician.   [] Overweight/Obesity: work with your physician for your ideal body weight. [x] Physical Inactivity: get regular exercise as directed by your physician. [x] Personal history of previous TIA or stroke  [x] Poor Diet; decrease salt (sodium) in your diet, follow diet directed by physician. [x] Smoking: Cigarette/Cigar: stop smoking. Advised pt. that you can reduce your risk for stroke/TIA by modifying/controlling the risk factors that you have. Pt.advised to take the medications as prescribed, which will be detailed in the discharge instructions, and to not stop taking them without consulting their physician. In addition, pt. advised to maintain a healthy diet, exercise regularly and to not smoke. [facility]'s Stroke treatment and prevention, Managing your recovery  notebook  provided and/or reviewed  with patient/family. The notebook includes, but not limited to, sections addressing warning signs & symptoms of a stroke, which are: sudden numbness or weakness especially on one side of the body, sudden confusion, difficulty speaking or understanding, sudden changes in vision, sudden dizziness or loss of balance/ coordination, sudden severe headache, syncope and seizure.   The need to call EMS ([phone]) immediately if signs & symptoms occur is emphasized . The notebook also provides education on Stroke community resources and stroke advocacy. The need for follow-up after discharge was highlighted with patient/family with them being able to repeat understanding of the importance of this.       Electronically signed by Senait Gonzalez RN on 1/29/2022 at 9:01 AM

## 2022-01-30 LAB
ANION GAP SERPL CALCULATED.3IONS-SCNC: 11 MMOL/L (ref 3–16)
BASOPHILS ABSOLUTE: 0 K/UL (ref 0–0.2)
BASOPHILS RELATIVE PERCENT: 1.2 %
BUN BLDV-MCNC: 13 MG/DL (ref 7–20)
CALCIUM SERPL-MCNC: 8.2 MG/DL (ref 8.3–10.6)
CHLORIDE BLD-SCNC: 107 MMOL/L (ref 99–110)
CO2: 22 MMOL/L (ref 21–32)
CREAT SERPL-MCNC: 1 MG/DL (ref 0.8–1.3)
EOSINOPHILS ABSOLUTE: 0.1 K/UL (ref 0–0.6)
EOSINOPHILS RELATIVE PERCENT: 2.5 %
GFR AFRICAN AMERICAN: >60
GFR NON-AFRICAN AMERICAN: >60
GLUCOSE BLD-MCNC: 117 MG/DL (ref 70–99)
HCT VFR BLD CALC: 31.9 % (ref 40.5–52.5)
HEMOGLOBIN: 10.5 G/DL (ref 13.5–17.5)
LYMPHOCYTES ABSOLUTE: 1.1 K/UL (ref 1–5.1)
LYMPHOCYTES RELATIVE PERCENT: 28.6 %
MCH RBC QN AUTO: 32.8 PG (ref 26–34)
MCHC RBC AUTO-ENTMCNC: 32.9 G/DL (ref 31–36)
MCV RBC AUTO: 99.5 FL (ref 80–100)
MONOCYTES ABSOLUTE: 0.4 K/UL (ref 0–1.3)
MONOCYTES RELATIVE PERCENT: 9.4 %
NEUTROPHILS ABSOLUTE: 2.3 K/UL (ref 1.7–7.7)
NEUTROPHILS RELATIVE PERCENT: 58.3 %
PDW BLD-RTO: 17.5 % (ref 12.4–15.4)
PLATELET # BLD: 174 K/UL (ref 135–450)
PMV BLD AUTO: 9 FL (ref 5–10.5)
POTASSIUM SERPL-SCNC: 4.2 MMOL/L (ref 3.5–5.1)
RBC # BLD: 3.2 M/UL (ref 4.2–5.9)
SODIUM BLD-SCNC: 140 MMOL/L (ref 136–145)
TROPONIN: 0.13 NG/ML
WBC # BLD: 3.9 K/UL (ref 4–11)

## 2022-01-30 PROCEDURE — 6370000000 HC RX 637 (ALT 250 FOR IP): Performed by: INTERNAL MEDICINE

## 2022-01-30 PROCEDURE — 6370000000 HC RX 637 (ALT 250 FOR IP): Performed by: NURSE PRACTITIONER

## 2022-01-30 PROCEDURE — 99233 SBSQ HOSP IP/OBS HIGH 50: CPT | Performed by: NURSE PRACTITIONER

## 2022-01-30 PROCEDURE — 84484 ASSAY OF TROPONIN QUANT: CPT

## 2022-01-30 PROCEDURE — 36415 COLL VENOUS BLD VENIPUNCTURE: CPT

## 2022-01-30 PROCEDURE — 94760 N-INVAS EAR/PLS OXIMETRY 1: CPT

## 2022-01-30 PROCEDURE — 2060000000 HC ICU INTERMEDIATE R&B

## 2022-01-30 PROCEDURE — 85025 COMPLETE CBC W/AUTO DIFF WBC: CPT

## 2022-01-30 PROCEDURE — 80048 BASIC METABOLIC PNL TOTAL CA: CPT

## 2022-01-30 PROCEDURE — 6360000002 HC RX W HCPCS: Performed by: NURSE PRACTITIONER

## 2022-01-30 RX ORDER — NICOTINE 21 MG/24HR
1 PATCH, TRANSDERMAL 24 HOURS TRANSDERMAL DAILY
Status: DISCONTINUED | OUTPATIENT
Start: 2022-01-30 | End: 2022-02-02 | Stop reason: HOSPADM

## 2022-01-30 RX ORDER — CARVEDILOL 12.5 MG/1
12.5 TABLET ORAL 2 TIMES DAILY WITH MEALS
Status: DISCONTINUED | OUTPATIENT
Start: 2022-01-30 | End: 2022-02-01

## 2022-01-30 RX ORDER — MORPHINE SULFATE 2 MG/ML
2 INJECTION, SOLUTION INTRAMUSCULAR; INTRAVENOUS ONCE
Status: COMPLETED | OUTPATIENT
Start: 2022-01-30 | End: 2022-01-30

## 2022-01-30 RX ADMIN — OXYCODONE AND ACETAMINOPHEN 1 TABLET: 10; 325 TABLET ORAL at 22:15

## 2022-01-30 RX ADMIN — OXYCODONE AND ACETAMINOPHEN 1 TABLET: 10; 325 TABLET ORAL at 03:05

## 2022-01-30 RX ADMIN — ASPIRIN 81 MG: 81 TABLET, COATED ORAL at 08:15

## 2022-01-30 RX ADMIN — GABAPENTIN 600 MG: 300 CAPSULE ORAL at 18:24

## 2022-01-30 RX ADMIN — GABAPENTIN 600 MG: 300 CAPSULE ORAL at 22:15

## 2022-01-30 RX ADMIN — GABAPENTIN 600 MG: 300 CAPSULE ORAL at 10:42

## 2022-01-30 RX ADMIN — NITROGLYCERIN 0.4 MG: 0.4 TABLET, ORALLY DISINTEGRATING SUBLINGUAL at 20:32

## 2022-01-30 RX ADMIN — CARVEDILOL 6.25 MG: 6.25 TABLET, FILM COATED ORAL at 08:15

## 2022-01-30 RX ADMIN — MORPHINE SULFATE 2 MG: 2 INJECTION, SOLUTION INTRAMUSCULAR; INTRAVENOUS at 21:13

## 2022-01-30 RX ADMIN — CARVEDILOL 12.5 MG: 12.5 TABLET, FILM COATED ORAL at 17:10

## 2022-01-30 RX ADMIN — OXYCODONE AND ACETAMINOPHEN 1 TABLET: 10; 325 TABLET ORAL at 14:14

## 2022-01-30 RX ADMIN — OXYMETAZOLINE HCL 2 SPRAY: 0.05 SPRAY NASAL at 20:29

## 2022-01-30 RX ADMIN — ATORVASTATIN CALCIUM 80 MG: 40 TABLET, FILM COATED ORAL at 20:28

## 2022-01-30 RX ADMIN — ISOSORBIDE MONONITRATE 30 MG: 30 TABLET, EXTENDED RELEASE ORAL at 08:15

## 2022-01-30 RX ADMIN — CLOPIDOGREL BISULFATE 75 MG: 75 TABLET ORAL at 08:16

## 2022-01-30 RX ADMIN — OXYMETAZOLINE HCL 2 SPRAY: 0.05 SPRAY NASAL at 08:16

## 2022-01-30 RX ADMIN — GABAPENTIN 600 MG: 300 CAPSULE ORAL at 06:44

## 2022-01-30 RX ADMIN — GABAPENTIN 600 MG: 300 CAPSULE ORAL at 14:14

## 2022-01-30 ASSESSMENT — PAIN SCALES - GENERAL
PAINLEVEL_OUTOF10: 0
PAINLEVEL_OUTOF10: 6
PAINLEVEL_OUTOF10: 0
PAINLEVEL_OUTOF10: 8
PAINLEVEL_OUTOF10: 5
PAINLEVEL_OUTOF10: 8
PAINLEVEL_OUTOF10: 2
PAINLEVEL_OUTOF10: 8
PAINLEVEL_OUTOF10: 7

## 2022-01-30 ASSESSMENT — PAIN DESCRIPTION - DESCRIPTORS
DESCRIPTORS: ACHING
DESCRIPTORS: PRESSURE
DESCRIPTORS: ACHING;CONSTANT

## 2022-01-30 ASSESSMENT — PAIN DESCRIPTION - FREQUENCY
FREQUENCY: CONTINUOUS

## 2022-01-30 ASSESSMENT — PAIN DESCRIPTION - ORIENTATION
ORIENTATION: MID

## 2022-01-30 ASSESSMENT — PAIN DESCRIPTION - ONSET
ONSET: ON-GOING

## 2022-01-30 ASSESSMENT — PAIN DESCRIPTION - PAIN TYPE
TYPE: ACUTE PAIN
TYPE: CHRONIC PAIN
TYPE: ACUTE PAIN
TYPE: CHRONIC PAIN
TYPE: ACUTE PAIN

## 2022-01-30 ASSESSMENT — PAIN DESCRIPTION - PROGRESSION
CLINICAL_PROGRESSION: GRADUALLY IMPROVING
CLINICAL_PROGRESSION: GRADUALLY WORSENING
CLINICAL_PROGRESSION: NOT CHANGED
CLINICAL_PROGRESSION: GRADUALLY WORSENING
CLINICAL_PROGRESSION: NOT CHANGED

## 2022-01-30 ASSESSMENT — PAIN DESCRIPTION - LOCATION
LOCATION: CHEST
LOCATION: GENERALIZED
LOCATION: CHEST
LOCATION: CHEST
LOCATION: BACK

## 2022-01-30 NOTE — PLAN OF CARE
Problem: ACTIVITY INTOLERANCE/IMPAIRED MOBILITY  Goal: Mobility/activity is maintained at optimum level for patient  1/29/2022 2045 by Hao Santillan RN  Outcome: Ongoing     Problem: COMMUNICATION IMPAIRMENT  Goal: Ability to express needs and understand communication  1/29/2022 2045 by Hao Santillan RN  Outcome: Ongoing     Problem: Pain:  Goal: Pain level will decrease  Description: Pain level will decrease  1/29/2022 2045 by Hao Santillan RN  Outcome: Ongoing     Problem: Pain:  Goal: Control of acute pain  Description: Control of acute pain  1/29/2022 2045 by Hao Santillan RN  Outcome: Ongoing     Problem: Pain:  Goal: Control of chronic pain  Description: Control of chronic pain  1/29/2022 2045 by Hao Santillan RN  Outcome: Ongoing     Problem: Anxiety/Stress:  Goal: Level of anxiety will decrease  Description: Level of anxiety will decrease  1/29/2022 2045 by Hao Santillan RN  Outcome: Ongoing     Problem: Cardiac Output - Decreased:  Goal: Hemodynamic stability will improve  Description: Hemodynamic stability will improve  1/29/2022 2045 by Hao Santillan RN  Outcome: Ongoing     Problem: Fluid Volume - Imbalance:  Goal: Absence of imbalanced fluid volume signs and symptoms  Description: Absence of imbalanced fluid volume signs and symptoms  1/29/2022 2045 by Hao Santillan RN  Outcome: Ongoing     Problem: Pain:  Goal: Pain level will decrease  Description: Pain level will decrease  1/29/2022 2045 by Hao Santillan RN  Outcome: Ongoing     Problem: Pain:  Goal: Recognizes and communicates pain  Description: Recognizes and communicates pain  1/29/2022 2045 by Hao Santillan RN  Outcome: Ongoing     Problem: Pain:  Goal: Control of acute pain  Description: Control of acute pain  1/29/2022 2045 by Hao Santillan RN  Outcome: Ongoing     Problem: Sleep Pattern Disturbance:  Goal: Appears well-rested  Description: Appears well-rested  1/29/2022 2045 by Patel Franklin RN  Outcome: Ongoing     Problem: Falls - Risk of:  Goal: Will remain free from falls  Description: Will remain free from falls  1/29/2022 2045 by Patel Franklin RN  Outcome: Ongoing     Problem: Falls - Risk of:  Goal: Absence of physical injury  Description: Absence of physical injury  1/29/2022 2045 by Patel Franklin RN  Outcome: Ongoing

## 2022-01-30 NOTE — PROGRESS NOTES
Neurology Progress Note    Updates  Says he still feels bad and is having chest pain. No further events concerning for seizure.       Medical History:  Past Medical History:   Diagnosis Date    Anxiety     Arthritis     Asthma     CAD (coronary artery disease)     Calcium kidney stone     Cardiomyopathy [facility]     Cerebral artery occlusion with cerebral infarction ([address]     CHF (congestive heart failure) ([facility] [address]     Clostridium difficile diarrhea 02/12/2020    COPD [facility]ch[facility] obstructive pulmonary disease) ([facility]     mild    Depression     DM2 (diabetes mellitus, type 2) ([facility])     Fibromyalgia     GERD (gastroesophageal reflux disease)     Hyperlipidemia     Hypertension     Liver disease     Pacemaker 2012    Medtronic model # [id number]    Pneumonia     Seizures ([facility] [address]     TIA (transient ischemic attack) 2007    Ulcerative colitis ([facility] [address]      Past Surgical History:   Procedure Laterality Date    APPENDECTOMY      BACK SURGERY      CARDIAC SURGERY      CHOLECYSTECTOMY      COLONOSCOPY  01/10/2017    COLONOSCOPY  01/10/2017    CORONARY ANGIOPLASTY WITH STENT PLACEMENT  2012    CORONARY ARTERY BYPASS GRAFT  2010, 11/2015    ENDOSCOPY, COLON, DIAGNOSTIC      PACEMAKER PLACEMENT      UPPER GASTROINTESTINAL ENDOSCOPY  02/07/2017    VASCULAR SURGERY       Current Facility-Administered Medications:   nicotine (NICODERM CQ) 21 MG/24HR 1 patch, 1 patch, TransDERmal, Daily  carvedilol (COREG) tablet 12.5 mg, 12.5 mg, Oral, BID WC  oxyCODONE-acetaminophen (PERCOCET)  MG per tablet 1 tablet, 1 tablet, Oral, Q8H PRN  isosorbide mononitrate (IMDUR) extended release tablet 30 mg, 30 mg, Oral, Daily  [START ON 1/31/2022] lisinopril (PRINIVIL;ZESTRIL) tablet 10 mg, 10 mg, Oral, Daily  gabapentin (NEURONTIN) capsule 600 mg, 600 mg, Oral, 5x Daily  oxymetazoline (AFRIN) 0.05 % nasal spray 2 spray, 2 spray, Each Nostril, BID  ondansetron (ZOFRAN-ODT) disintegrating tablet 4 mg, 4 mg, Oral, Q8H PRN **OR** ondansetron (ZOFRAN) injection 4 mg, 4 mg, IntraVENous, Q6H PRN  polyethylene glycol (GLYCOLAX) packet 17 g, 17 g, Oral, Daily PRN  perflutren lipid microspheres (DEFINITY) injection 1.65 mg, 1.5 mL, IntraVENous, ONCE PRN  atorvastatin (LIPITOR) tablet 80 mg, 80 mg, Oral, Nightly  clopidogrel (PLAVIX) tablet 75 mg, 75 mg, Oral, Daily  labetalol (NORMODYNE;TRANDATE) injection 10 mg, 10 mg, IntraVENous, Q10 Min PRN  perflutren lipid microspheres (DEFINITY) injection 1.65 mg, 1.5 mL, IntraVENous, ONCE PRN  nitroGLYCERIN (NITROSTAT) SL tablet 0.4 mg, 0.4 mg, SubLINGual, Q5 Min PRN  aspirin EC tablet 81 mg, 81 mg, Oral, Daily    Medications Prior to Admission:   methocarbamol (ROBAXIN) 750 MG tablet, Take 750 mg by mouth 3 times daily as needed for Muscle spasms  gabapentin (NEURONTIN) 600 MG tablet, TAKE ONE TABLET BY MOUTH FIVE TIMES A DAY  oxyCODONE-acetaminophen (PERCOCET)  MG per tablet, Take 1 tablet by mouth every 8 hours as needed for Pain for up to 30 days. zolpidem (AMBIEN) 5 MG tablet, Take 1 tablet by mouth nightly as needed for Sleep for up to 30 days.   traZODone (DESYREL) 50 MG tablet, Take 1 tablet by mouth nightly  amLODIPine (NORVASC) 5 MG tablet, Take 5 mg by mouth daily   isosorbide mononitrate (IMDUR) 60 MG extended release tablet, Take 60 mg by mouth daily   carvedilol (COREG) 25 MG tablet, Take 25 mg by mouth 2 times daily (with meals)  aspirin 81 MG EC tablet, Take 81 mg by mouth daily   fluticasone-salmeterol (ADVAIR DISKUS) 100-50 MCG/DOSE diskus inhaler, Inhale 2 puffs into the lungs 2 times daily wixela inhub inhaler ( generic advair diskus)  Insulin Pen Needle 32G X 4 MM MISC, 1 each by Does not apply route daily  glucose monitoring kit (FREESTYLE) monitoring kit, 1 kit by Does not apply route daily  nitroGLYCERIN (NITROSTAT) 0.4 MG SL tablet, Place 0.4 mg under the tongue every 5 minutes as needed  Respiratory Therapy Supplies (NEBULIZER) AALIYAH, Used to give yourself breathing treatment  acetaminophen (TYLENOL) 325 MG tablet, Take 650 mg by mouth every 6 hours as needed for Pain    Allergies   Allergen Reactions    Melatonin Other (See Comments)     Restless leg syndrome       ROS  Constitutional- No weight loss or fevers  Eyes- No diplopia. No photophobia. Ears/nose/throat- No dysphagia. No Dysarthria  Cardiovascular- No palpitations. + chest pain  Respiratory- No dyspnea. No Cough  Gastrointestinal- No Abdominal pain. No Vomiting. Genitourinary- No incontinence. No urinary retention  Musculoskeletal- No myalgia. No arthralgia  Skin- No rash. No easy bruising. Psychiatric- No depression. No anxiety  Endocrine- No diabetes. No thyroid issues. Hematologic- No bleeding difficulty. No fatigue  Neurologic- No weakness. + Headache. Exam:  Blood pressure (!) 161/89, pulse 86, temperature 98 °F (36.7 °C), temperature source Oral, resp. rate 22, height 6' (1.829 m), weight 191 lb 12.8 oz (87 kg), SpO2 96 %. Constitutional    Vital signs: BP, HR, and RR reviewed   General alert, no distress  Eyes: unable to visualize the fundi  Cardiovascular: no peripheral edema. Psychiatric: cooperative with examination, no psychotic behavior noted. Neurologic  Mental status:   orientation to person, place   Attention intact as able to attend well to the exam     Language no aphasia. Comprehension intact; follows simple commands  Cranial nerves:   CN2: visual fields full   CN 3,4,6: extraocular muscles intact  CN7: face symmetric without dysarthria  CN8: hearing grossly intact  CN12: tongue midline with protrusion  Strength: good strength in all 4 extremities. Sensory: no focal numbness. Cerebellar/coordination: finger nose finger normal without ataxia  Tone: normal in all 4 extremities  Gait: deferred at this time for safety.       Labs  HgA1c 5.7  LDL 75  TSH 1.93    Glucose 117  Na 140  K 4.2  BUN 13  Cr 1.0    Troponin 0.12    ALT 7  AST 14    WBC 3.9K  Hg 10.5  Platelets 174    Drug screen + opiates  UA negative    Studies  CT head w/o 1/28/22 (7 hours later), independently reviewed  1.  No acute intracranial abnormality. 2. Specifically, no evidence an acute infarction. 3. Multifocal chronic infarctions, as described on the earlier CT.  Small   chronic lacunar infarction in the left manuel is better visualized on the   current study and is also chronic. CT head w/o 1/28/22  No acute intracranial abnormality. CTA head/neck 1/28/22  50% stenosis in the proximal left internal carotid artery. 80% stenosis at the origin of the right vertebral artery. 50% stenosis in the cavernous/supraclinoid segments of the bilateral internal   carotid arteries. 50% stenosis in the intracranial right vertebral artery. TTE 1/28/22   Limited study. Overall left ventricular systolic function appears moderately reduced. Ejection fraction is visually estimated to be 35-40% with diffuse   hypokinesis. There is mildly increased left ventricular wall thickness. Left   ventricular cavity size is normal. Diastolic filling parameters suggest   grade I diastolic dysfunction. Moderate mitral regurgitation. Moderately dilated LA.     EEG 2016  Mild background slowing. Impression  1. Convulsive spells. The patient today is rather adamant that he recalls each event and describes them as head turning to the R w/ full body shaking and preserved consciousness. He had some R sided weakness and AMS in the ED which resolved. CT scans of his head have been negative. His neurologic exam is non focal today. The patient's description of the events would seem to be more consistent w/ non epileptic episodes. 2.  Chest pain/elevated troponin. Per Cardiology. 3.  Cerebrovascular disease. 4.  Hx stroke. Recommendations  1. EEG. 2.  Continue home antiplatelets/statin for stroke prophylaxis. Will follow results and comment.   Please call w/ any additional questions or concerns. Thank you.     Valerie Elise NP  [address] [place] [address] [id number] Neurology    A copy of this note was provided for Dr Robin Crump MD

## 2022-01-30 NOTE — PROGRESS NOTES
This RN responded to patients call light this AM. Upon this RN's arrival to room, patient screaming in demanding voice \" I want off of this diet I'm on I demand that I be off this diet right now! \"  This RN asked the patient what he would like to eat/what he does not like about his current (easy to chew) diet plan reccommended by the speech therapist that is seeing him. The patient is still visibly upset and stated \"I just want to eat what I want or I will leave and I mean it I will leave and I will not eat anything until it is changed! \" This RN explained to the patient that the speech therapist has recommended this diet due to the fact that the patient has poor dental hygiene and many of his teeth have been removed/fallen out. The patient then proceeds to attempt to get out of the bed and pushes the side table very forcefully at this RN stating \"I'm leaving right now then or change my diet I don't care what you have to do change my diet\".     This RN told the patient that I would discuss this with the speech therapist and attending MD. Pt then still upset demanding I talk to a doctor \"immediatley\" regarding his diet as it is \"urgent and I will not eat unless you change the diet\"     This RN sent a secure message to hospitalist on call Judith Marin MD.

## 2022-01-30 NOTE — PROGRESS NOTES
NAME:  Gale Gordon  YOB: 1955  MEDICAL RECORD NUMBER:  [id number]  TODAYS DATE:  1/30/2022    Discussed personal risk factors for Stroke /TIA with patient/family, and ways to reduce the risk for a recurrent stroke. Patient's personal risk factors which were identified are:     [] Alcohol Abuse: check with your physician before any alcohol consumption. [] Atrial fibrillation: may cause blood clots. [] Drug Abuse: Seek help, talk with your doctor  [] Clotting Disorder  [] Diabetes  [x] Family history of stroke or heart disease  [x] High Blood Pressure/Hypertension: work with your physician.  [] High cholesterol: monitor cholesterol levels with your physician. [x] Overweight/Obesity: work with your physician for your ideal body weight. [x] Physical Inactivity: get regular exercise as directed by your physician. [x] Personal history of previous TIA or stroke  [x] Poor Diet; decrease salt (sodium) in your diet, follow diet directed by physician. [x] Smoking: Cigarette/Cigar: stop smoking. Advised pt. that you can reduce your risk for stroke/TIA by modifying/controlling the risk factors that you have. Pt.advised to take the medications as prescribed, which will be detailed in the discharge instructions, and to not stop taking them without consulting their physician. In addition, pt. advised to maintain a healthy diet, exercise regularly and to not smoke. [facility]'s Stroke treatment and prevention, Managing your recovery  notebook  provided and/or reviewed  with patient/family. The notebook includes, but not limited to, sections addressing warning signs & symptoms of a stroke, which are: sudden numbness or weakness especially on one side of the body, sudden confusion, difficulty speaking or understanding, sudden changes in vision, sudden dizziness or loss of balance/ coordination, sudden severe headache, syncope and seizure.   The need to call EMS ([phone]) immediately if signs & symptoms occur is emphasized . The notebook also provides education on Stroke community resources and stroke advocacy. The need for follow-up after discharge was highlighted with patient/family with them being able to repeat understanding of the importance of this.       Electronically signed by Senait Gonzalez RN on 1/30/2022 at 7:58 AM

## 2022-01-30 NOTE — PROGRESS NOTES
[address]   Daily Progress Note      Admit Date:  1/28/2022    Reason for follow up visit: Elevated troponin    CC: \"I don't feel well. This chest pain has been here since I had my seizure. I wasn't having any before my seizure. It hurts when I move a certain way. \"    76 y/o male with PMH significant for anxiety, CAD with prior CABG and multiple PCI's/NSTEMI, cardiomyopathy, CHF, tobacco use,COPD, diabetes mellitus, HTN, HLP admitted with possible seizure. He was seen in ED and admitted to [facility] for further evaluation. His troponin was elevated at 0.12 and asked to evaluate. He was seen by Dr. Maria Dolores Taylor and placed on Lovenox and plan was for continued medical management of his CAD. His primary cardiologist is at [facility] and had multiple procedures performed in late 2021. He is being maintained on medical management      Interval History:  Pt. seen and examined; records reviewed  BP reviewed  Denies SOB  Has persistent positional pain since seizure  Denies anginal pain  Hgb 9.4     Subjective:  Pt with no acute overnight cardiac events. Denies anginal chest pain, SOB, cough, palpitations or dizziness    Review of Systems:   · Constitutional: no unanticipated weight loss. There's been no change in energy level, sleep pattern, or activity level. No fevers, chills. · Eyes: No visual changes or diplopia. No scleral icterus. · ENT: No Headaches, hearing loss or vertigo. No mouth sores or sore throat. · Cardiovascular: as reviewed in HPI + chronic chest pain  · Respiratory: No cough or wheezing, no sputum production. No hematemesis. · Gastrointestinal: No abdominal pain, appetite loss, blood in stools. No change in bowel or bladder habits. · Genitourinary: No dysuria, trouble voiding, or hematuria. · Musculoskeletal:  No gait disturbance, no joint complaints. · Integumentary: No rash or pruritis. · Neurological: No headache, diplopia, change in muscle strength, numbness or tingling.    · Psychiatric: No anxiety or depression. · Endocrine: No temperature intolerance. No excessive thirst, fluid intake, or urination. No tremor. · Hematologic/Lymphatic: No abnormal bruising or bleeding, blood clots or swollen lymph nodes. · Allergic/Immunologic: No nasal congestion or hives. Objective:   /67   Pulse 76   Temp 98.9 °F (37.2 °C) (Oral)   Resp 18   Ht 6' (1.829 m)   Wt 191 lb 12.8 oz (87 kg)   SpO2 93%   BMI 26.01 kg/m²     Intake/Output Summary (Last 24 hours) at 1/30/2022 1253  Last data filed at 1/30/2022 1045  Gross per 24 hour   Intake 240 ml   Output 2140 ml   Net -1900 ml     Wt Readings from Last 3 Encounters:   01/30/22 191 lb 12.8 oz (87 kg)   01/04/22 196 lb 3.2 oz (89 kg)   12/01/21 186 lb 15.2 oz (84.8 kg)       Physical Exam:  General: In no acute distress. Awake, alert, and oriented X4. Resting in bed  Skin:  Warm and dry. No new appearing rashes or lesions. Neck:  Supple. No JVD or carotid  bruit appreciated. Chest:Lungs clear to auscultation. No wheezes/rhonchi/rales  Cardiovascular:  RRR. Normal S1 and S2. No murmur/gallop or rub   Abdomen:  soft, nontender, nondistended, +bowel sounds. No hepatomegaly  Extremities:  No LE edema. No clubbing or cyanosis. 2+ bilateral radial/DP/PT pulses.  Cap refill brisk    Medications:    nicotine  1 patch TransDERmal Daily    carvedilol  6.25 mg Oral BID WC    isosorbide mononitrate  30 mg Oral Daily    [START ON 1/31/2022] lisinopril  10 mg Oral Daily    gabapentin  600 mg Oral 5x Daily    oxymetazoline  2 spray Each Nostril BID    atorvastatin  80 mg Oral Nightly    clopidogrel  75 mg Oral Daily    aspirin  81 mg Oral Daily       oxyCODONE-acetaminophen, ondansetron **OR** ondansetron, polyethylene glycol, perflutren lipid microspheres, labetalol, perflutren lipid microspheres, nitroGLYCERIN    Lab Data:  CBC:   Recent Labs     01/28/22  1026 01/29/22  0526   WBC 5.0 4.1   HGB 10.5* 9.4*    147     BMP:    Recent Labs 01/28/22  1026 01/29/22  0526   * 138   K 4.6 4.2   CO2 20* 21   BUN 14 15   CREATININE 1.1 1.2     LIVR:   Recent Labs     01/28/22  1026   AST 14*   ALT 7*     INR:    Recent Labs     01/28/22  1026   INR 0.86*     APTT:   Recent Labs     01/28/22  1026   APTT 29.5     Results for Oli Conner (MRN [id number]) as of 1/30/2022 13:02   Ref. Range 1/28/2022 10:26 1/28/2022 13:34 1/28/2022 16:55   Pro-BNP Latest Ref Range: 0 - 124 pg/mL 1,382 (H)     Troponin Latest Ref Range: <0.01 ng/mL 0.12 (H) 0.13 (H) 0.12 (H)     Results for Oli Conner (MRN [id number]) as of 1/30/2022 13:02   Ref. Range 1/29/2022 05:26   Cholesterol, Total Latest Ref Range: 0 - 199 mg/dL 144   HDL Cholesterol Latest Ref Range: 40 - 60 mg/dL 50   LDL Calculated Latest Ref Range: <100 mg/dL 75   Triglycerides Latest Ref Range: 0 - 150 mg/dL 94   VLDL Cholesterol Calculated Latest Ref Range: Not Established mg/dL 19     ECG 1/28/2022:  SR with RBBB  (nonspecific TW abnormality noted on initial ECG)     Echo 1/28/2022:   Limited study.   Overall left ventricular systolic function appears moderately reduced.   Ejection fraction is visually estimated to be 35-40% with diffuse   hypokinesis. There is mildly increased left ventricular wall thickness. Left   ventricular cavity size is normal. Diastolic filling parameters suggest   grade I diastolic dysfunction.   Moderate mitral regurgitation.     11/5/2021L ([facility]):  Findings:   Dominance: right dominant   LM:  Ostial:  40%, in-stent   LAD:  Distal: 80%     D1: patent stent, 80% upper pole brance     LCX:  Proximal: 100%      10/18/2021 LHC/PCI:  1. Known 3 v CAD; bypass graft in 1 to be patent and were not injected   during this angiogram.  There was a visible dissection versus thrombus   distal to the most recently placed stent from the LAD extending into the   diagonal.   2. Successful PCI of the diagonal using drug-eluting stent   3.  Successful PTCA of the LMT malapposed stent 4. IVUS revealing severe Diagonal stenosis and malapposed LMT stent      10/11/2021 LHC/PCI ([facility]):  3 Vessel CAD   Successful shockwave PCI of the LMCA with 3.5 mm balloon and 3.5 mm NC   balloon with increase in LMCA MLCSA from 5.3 mm2 to 6.8 mm2   Patent proximal LAD/D1 stent   Unchanged bypass graft status   Mild LV dysfunction   Mildly elevated LVEDP   Normal systemic pressures   angioseal closure of the RCFA access site.      10/4/2021 LHC/PCI: ([facility])  3 Vessel CAD   Successful IVUS guided PCI distal LMCA into D2 with 3 x 16 mm and 3 x 12   mm Synergy ANGELINA's (overlapping)   FFR D2 = 0.78   Patent grafts as above   Mild LV dysfunction   Normal LVEDP   Normal systemic pressures      6/11/2021 [facility]: ([facility])  1) 3 vessel CAD with 4/4 patent grafts and occlusion of small RPDA, likely   culprit, plan medical management   2) Normal LVEDP   3) Moderately reduced LVEF      Echo 7/12/2021 ([facility]):  Left ventricle is mildly (5.9-6.3 cm) dilated. The left atrium is moderately dilated. The Aortic Valve leaflets appear sclerotic. No hemodynamically significant valvular aortic stenosis. There is a pacemaker lead in the right ventricle. The left ventricular function is moderately reduced. Overall left ventricular ejection fraction is estimated to be 30-35%. There is moderate global hypokinesis of the left ventricle. The mitral valve leaflets are mildly calcified in appearance. There is trace mitral regurgitation. There is a catheter/pacemaker lead seen in the right atrial cavity.    There is no pericardial effusion.      4/19/2021 Lexiscan-Myoview ([facility]):  Transient dilatation of the left ventricle which can be due to left ventricular hypertrophy,    diabetes, or balanced three-vessel coronary artery disease with symmetric myocardial ischemia.       No focal perfusion defects       Dilated left ventricle       Reduced left ventricular ejection fraction measuring 31%       Telemetry: Sinus rhythm without ectopy    Assessment/Plan:    1. Elevated troponin  -secondary to demand ischemia; troponins essentially flat; type II NSTEMI  -has known CAD with prior CABG and multiple PCI's (had several PCI's in late 2021)   -no anginal pain  -he has been noncompliant in past with meds d/t financial constraints; will continue with medical management and try to improve affordability for compliance  -continue ASA, Plavix , statin and BB  -risk factor modification     2. Ischemic Cardiomyopathy with LVEF 35-40%   -chronic NYHA class II systolic HF; currently euvolemic  -he will not take Entresto d/t cost  -will continue carvedilol and ACE  -will hold on aldactone and SGLT2 inhibitor (can be addressed with his primary cardiologist as an outpatient). Apparently on it in the past and he stopped d/t financial constraints  -S/P ICD (has not been checked for few years per pt. Will have [facility] performed while here)     3. Chronic kidney disease, stage III  -Cr 1.2 today  -continue to monitor  -will continue with ACE-I     4. Hyperlipidemia with goal LDL < 70  -continue high intensity statin    5. H/O pulmonary embolus  -noted on CTPA in June 2020 and placed on Eliquis; he has not taken in > 1 year  -last CT in 11/2021 showed no pulmonary embolus  -will continue him off of it for now  -he quit d/t cost    6. Essential HTN  -goal BP < 130/80  -continue medical management    7. Chest pain  -atypical for angina    Discussed medication compliance with patient. He has not been taking many of his cardiac medications, but he did continue with ASA and plavix given his PCI's in October/November 2021. He is willing to take medications if they are affordable and so changed Entresto to ACE and placed on carvedilol. Will continue to adjust doses as needed. Overall the problems requiring hospitalization are high in severity.     Electronically signed by TAYLA Rubio CNP on 1/30/2022 at 12:53 PM

## 2022-01-30 NOTE — PLAN OF CARE
Problem: ACTIVITY INTOLERANCE/IMPAIRED MOBILITY  Goal: Mobility/activity is maintained at optimum level for patient  1/30/2022 0754 by Zara Nava RN  Outcome: Ongoing     Problem: COMMUNICATION IMPAIRMENT  Goal: Ability to express needs and understand communication  1/30/2022 0754 by Zara Nava RN  Outcome: Ongoing  Completing NIH per orders. Assessing NIH and reporting abnormal findings to MD. Collaborating with PT/OT/Speech. Maintaining airway and oxygenation. Assessing vital signs q 4 hours. Assisting pt with mobility as needed, promoting independence when indicated. Providing at least 15 minutes of stroke education per shift. Collaborating with case management/social work for safe and appropriate discharge. Problem: Pain:  Goal: Pain level will decrease  Description: Pain level will decrease  1/30/2022 0754 by Zara Nava RN  Outcome: Ongoing     Problem: Pain:  Goal: Control of acute pain  Description: Control of acute pain  1/30/2022 0754 by Zara Nava RN  Outcome: Ongoing     Problem: Pain:  Goal: Control of chronic pain  Description: Control of chronic pain  1/30/2022 0754 by Zara Nava RN  Outcome: Ongoing  Assessing pain using appropriate pain rating scale q shift and PRN. Medicating pt as prescribed and indicated. Offering pt non-pharmacologic pain interventions. Reassessing pain and pts response to pain intervention.     Problem: Discharge Planning:  Goal: Participates in care planning  Description: Participates in care planning  1/30/2022 0754 by Zara Nava RN  Outcome: Ongoing     Problem: Discharge Planning:  Goal: Discharged to appropriate level of care  Description: Discharged to appropriate level of care  1/30/2022 0754 by Zara Nava RN  Outcome: Ongoing     Problem: Anxiety/Stress:  Goal: Level of anxiety will decrease  Description: Level of anxiety will decrease  1/30/2022 0754 by Zraa Nava RN  Outcome: Ongoing     Problem: Cardiac Output - Decreased:  Goal: Hemodynamic stability will improve  Description: Hemodynamic stability will improve  1/30/2022 0754 by Simin Hinojosa RN  Outcome: Ongoing     Problem: Fluid Volume - Imbalance:  Goal: Absence of imbalanced fluid volume signs and symptoms  Description: Absence of imbalanced fluid volume signs and symptoms  1/30/2022 0754 by Simin Hinojosa RN  Outcome: Ongoing  Encouraged pt to drink prescribed amount of fluid per day. Monitoring and assessing lab values every shift, Notifying physician if abnormal. Assessing skin turgor and mucous membranes. Problem: Pain:  Goal: Pain level will decrease  Description: Pain level will decrease  1/30/2022 0754 by Simin Hinojosa RN  Outcome: Ongoing     Problem: Pain:  Goal: Recognizes and communicates pain  Description: Recognizes and communicates pain  1/30/2022 0754 by Simin Hinojosa RN  Outcome: Ongoing     Problem: Pain:  Goal: Control of acute pain  Description: Control of acute pain  1/30/2022 0754 by Simin Hinojosa RN  Outcome: Ongoing     Problem: Pain:  Goal: Control of chronic pain  Description: Control of chronic pain  1/30/2022 0754 by Simin Hinojosa RN  Outcome: Ongoing  Assessing pain using appropriate pain rating scale q shift and PRN. Medicating pt as prescribed and indicated. Offering pt non-pharmacologic pain interventions. Reassessing pain and pts response to pain intervention. Problem: Sleep Pattern Disturbance:  Goal: Appears well-rested  Description: Appears well-rested  1/30/2022 0754 by Simin Hinojosa RN  Outcome: Ongoing     Problem: Falls - Risk of:  Goal: Will remain free from falls  Description: Will remain free from falls  1/30/2022 0754 by Simin Hinojosa RN  Outcome: Ongoing     Problem: Falls - Risk of:  Goal: Absence of physical injury  Description: Absence of physical injury  1/30/2022 0754 by Simin Hinojosa RN  Outcome: Ongoing  Bed alarm on, bed in lowest position, wheels locked.  Fall risk assessment completed every shift. Nonskid socks on, fall sign posted. Pt educated on use of call light.

## 2022-01-31 LAB
ANION GAP SERPL CALCULATED.3IONS-SCNC: 10 MMOL/L (ref 3–16)
BASOPHILS ABSOLUTE: 0.1 K/UL (ref 0–0.2)
BASOPHILS RELATIVE PERCENT: 1.4 %
BUN BLDV-MCNC: 14 MG/DL (ref 7–20)
CALCIUM SERPL-MCNC: 8.3 MG/DL (ref 8.3–10.6)
CHLORIDE BLD-SCNC: 109 MMOL/L (ref 99–110)
CO2: 22 MMOL/L (ref 21–32)
CREAT SERPL-MCNC: 1 MG/DL (ref 0.8–1.3)
EKG ATRIAL RATE: 83 BPM
EKG DIAGNOSIS: NORMAL
EKG P AXIS: 65 DEGREES
EKG P-R INTERVAL: 132 MS
EKG Q-T INTERVAL: 430 MS
EKG QRS DURATION: 138 MS
EKG QTC CALCULATION (BAZETT): 505 MS
EKG R AXIS: 87 DEGREES
EKG T AXIS: 56 DEGREES
EKG VENTRICULAR RATE: 83 BPM
EOSINOPHILS ABSOLUTE: 0.1 K/UL (ref 0–0.6)
EOSINOPHILS RELATIVE PERCENT: 3.5 %
GFR AFRICAN AMERICAN: >60
GFR NON-AFRICAN AMERICAN: >60
GLUCOSE BLD-MCNC: 111 MG/DL (ref 70–99)
HCT VFR BLD CALC: 32.5 % (ref 40.5–52.5)
HEMOGLOBIN: 10.5 G/DL (ref 13.5–17.5)
LYMPHOCYTES ABSOLUTE: 1.5 K/UL (ref 1–5.1)
LYMPHOCYTES RELATIVE PERCENT: 38.9 %
MCH RBC QN AUTO: 32.4 PG (ref 26–34)
MCHC RBC AUTO-ENTMCNC: 32.3 G/DL (ref 31–36)
MCV RBC AUTO: 100.4 FL (ref 80–100)
MONOCYTES ABSOLUTE: 0.4 K/UL (ref 0–1.3)
MONOCYTES RELATIVE PERCENT: 10.5 %
NEUTROPHILS ABSOLUTE: 1.8 K/UL (ref 1.7–7.7)
NEUTROPHILS RELATIVE PERCENT: 45.7 %
PDW BLD-RTO: 17.6 % (ref 12.4–15.4)
PLATELET # BLD: 160 K/UL (ref 135–450)
PMV BLD AUTO: 9 FL (ref 5–10.5)
POTASSIUM SERPL-SCNC: 4.5 MMOL/L (ref 3.5–5.1)
RBC # BLD: 3.24 M/UL (ref 4.2–5.9)
SODIUM BLD-SCNC: 141 MMOL/L (ref 136–145)
TROPONIN: 0.13 NG/ML
WBC # BLD: 3.8 K/UL (ref 4–11)

## 2022-01-31 PROCEDURE — 6370000000 HC RX 637 (ALT 250 FOR IP): Performed by: NURSE PRACTITIONER

## 2022-01-31 PROCEDURE — 85025 COMPLETE CBC W/AUTO DIFF WBC: CPT

## 2022-01-31 PROCEDURE — 97535 SELF CARE MNGMENT TRAINING: CPT

## 2022-01-31 PROCEDURE — 84484 ASSAY OF TROPONIN QUANT: CPT

## 2022-01-31 PROCEDURE — 93005 ELECTROCARDIOGRAM TRACING: CPT | Performed by: NURSE PRACTITIONER

## 2022-01-31 PROCEDURE — 99233 SBSQ HOSP IP/OBS HIGH 50: CPT | Performed by: NURSE PRACTITIONER

## 2022-01-31 PROCEDURE — 6360000002 HC RX W HCPCS: Performed by: INTERNAL MEDICINE

## 2022-01-31 PROCEDURE — 92507 TX SP LANG VOICE COMM INDIV: CPT

## 2022-01-31 PROCEDURE — 80048 BASIC METABOLIC PNL TOTAL CA: CPT

## 2022-01-31 PROCEDURE — 93010 ELECTROCARDIOGRAM REPORT: CPT | Performed by: INTERNAL MEDICINE

## 2022-01-31 PROCEDURE — 6370000000 HC RX 637 (ALT 250 FOR IP): Performed by: INTERNAL MEDICINE

## 2022-01-31 PROCEDURE — 36415 COLL VENOUS BLD VENIPUNCTURE: CPT

## 2022-01-31 PROCEDURE — 99223 1ST HOSP IP/OBS HIGH 75: CPT | Performed by: INTERNAL MEDICINE

## 2022-01-31 PROCEDURE — 9990000010 HC NO CHARGE VISIT

## 2022-01-31 PROCEDURE — 6370000000 HC RX 637 (ALT 250 FOR IP): Performed by: STUDENT IN AN ORGANIZED HEALTH CARE EDUCATION/TRAINING PROGRAM

## 2022-01-31 PROCEDURE — 92526 ORAL FUNCTION THERAPY: CPT

## 2022-01-31 PROCEDURE — 2060000000 HC ICU INTERMEDIATE R&B

## 2022-01-31 PROCEDURE — 95819 EEG AWAKE AND ASLEEP: CPT

## 2022-01-31 PROCEDURE — 97530 THERAPEUTIC ACTIVITIES: CPT

## 2022-01-31 PROCEDURE — 94761 N-INVAS EAR/PLS OXIMETRY MLT: CPT

## 2022-01-31 RX ORDER — KETOROLAC TROMETHAMINE 15 MG/ML
15 INJECTION, SOLUTION INTRAMUSCULAR; INTRAVENOUS EVERY 8 HOURS PRN
Status: DISCONTINUED | OUTPATIENT
Start: 2022-01-31 | End: 2022-02-01

## 2022-01-31 RX ORDER — AMIODARONE HYDROCHLORIDE 200 MG/1
200 TABLET ORAL DAILY
Status: DISCONTINUED | OUTPATIENT
Start: 2022-01-31 | End: 2022-01-31

## 2022-01-31 RX ORDER — SPIRONOLACTONE 25 MG/1
25 TABLET ORAL DAILY
Status: DISCONTINUED | OUTPATIENT
Start: 2022-01-31 | End: 2022-02-01

## 2022-01-31 RX ORDER — LEVETIRACETAM 500 MG/1
500 TABLET ORAL 2 TIMES DAILY
Status: DISCONTINUED | OUTPATIENT
Start: 2022-01-31 | End: 2022-02-02 | Stop reason: HOSPADM

## 2022-01-31 RX ORDER — AMIODARONE HYDROCHLORIDE 200 MG/1
200 TABLET ORAL 2 TIMES DAILY
Status: DISCONTINUED | OUTPATIENT
Start: 2022-01-31 | End: 2022-02-01

## 2022-01-31 RX ORDER — AMIODARONE HYDROCHLORIDE 200 MG/1
200 TABLET ORAL DAILY
Status: DISCONTINUED | OUTPATIENT
Start: 2022-02-14 | End: 2022-02-01

## 2022-01-31 RX ADMIN — OXYCODONE AND ACETAMINOPHEN 1 TABLET: 10; 325 TABLET ORAL at 06:17

## 2022-01-31 RX ADMIN — GABAPENTIN 600 MG: 300 CAPSULE ORAL at 06:16

## 2022-01-31 RX ADMIN — GABAPENTIN 600 MG: 300 CAPSULE ORAL at 11:10

## 2022-01-31 RX ADMIN — CARVEDILOL 12.5 MG: 12.5 TABLET, FILM COATED ORAL at 17:47

## 2022-01-31 RX ADMIN — GABAPENTIN 600 MG: 300 CAPSULE ORAL at 19:37

## 2022-01-31 RX ADMIN — ISOSORBIDE MONONITRATE 30 MG: 30 TABLET, EXTENDED RELEASE ORAL at 09:08

## 2022-01-31 RX ADMIN — KETOROLAC TROMETHAMINE 15 MG: 15 INJECTION, SOLUTION INTRAMUSCULAR; INTRAVENOUS at 17:47

## 2022-01-31 RX ADMIN — AMIODARONE HYDROCHLORIDE 200 MG: 200 TABLET ORAL at 20:16

## 2022-01-31 RX ADMIN — AMIODARONE HYDROCHLORIDE 200 MG: 200 TABLET ORAL at 11:10

## 2022-01-31 RX ADMIN — SPIRONOLACTONE 25 MG: 25 TABLET ORAL at 11:18

## 2022-01-31 RX ADMIN — APIXABAN 5 MG: 5 TABLET, FILM COATED ORAL at 20:16

## 2022-01-31 RX ADMIN — CLOPIDOGREL BISULFATE 75 MG: 75 TABLET ORAL at 09:08

## 2022-01-31 RX ADMIN — LISINOPRIL 10 MG: 10 TABLET ORAL at 09:08

## 2022-01-31 RX ADMIN — OXYCODONE AND ACETAMINOPHEN 1 TABLET: 10; 325 TABLET ORAL at 15:06

## 2022-01-31 RX ADMIN — LEVETIRACETAM 500 MG: 500 TABLET, FILM COATED ORAL at 20:16

## 2022-01-31 RX ADMIN — GABAPENTIN 600 MG: 300 CAPSULE ORAL at 22:55

## 2022-01-31 RX ADMIN — CARVEDILOL 12.5 MG: 12.5 TABLET, FILM COATED ORAL at 09:08

## 2022-01-31 RX ADMIN — GABAPENTIN 600 MG: 300 CAPSULE ORAL at 15:06

## 2022-01-31 RX ADMIN — KETOROLAC TROMETHAMINE 15 MG: 15 INJECTION, SOLUTION INTRAMUSCULAR; INTRAVENOUS at 09:10

## 2022-01-31 RX ADMIN — ASPIRIN 81 MG: 81 TABLET, COATED ORAL at 11:10

## 2022-01-31 RX ADMIN — OXYCODONE AND ACETAMINOPHEN 1 TABLET: 10; 325 TABLET ORAL at 22:55

## 2022-01-31 RX ADMIN — ATORVASTATIN CALCIUM 80 MG: 40 TABLET, FILM COATED ORAL at 20:16

## 2022-01-31 ASSESSMENT — PAIN DESCRIPTION - LOCATION
LOCATION: BACK
LOCATION: BACK
LOCATION: CHEST;BACK
LOCATION: CHEST
LOCATION: CHEST;BACK
LOCATION: CHEST;BACK

## 2022-01-31 ASSESSMENT — PAIN - FUNCTIONAL ASSESSMENT
PAIN_FUNCTIONAL_ASSESSMENT: ACTIVITIES ARE NOT PREVENTED

## 2022-01-31 ASSESSMENT — PAIN DESCRIPTION - ONSET
ONSET: ON-GOING

## 2022-01-31 ASSESSMENT — PAIN DESCRIPTION - PAIN TYPE
TYPE: CHRONIC PAIN
TYPE: ACUTE PAIN
TYPE: CHRONIC PAIN
TYPE: ACUTE PAIN

## 2022-01-31 ASSESSMENT — PAIN DESCRIPTION - PROGRESSION
CLINICAL_PROGRESSION: GRADUALLY WORSENING
CLINICAL_PROGRESSION: GRADUALLY WORSENING
CLINICAL_PROGRESSION: GRADUALLY IMPROVING
CLINICAL_PROGRESSION: NOT CHANGED

## 2022-01-31 ASSESSMENT — PAIN DESCRIPTION - ORIENTATION
ORIENTATION: MID
ORIENTATION: MID
ORIENTATION: POSTERIOR
ORIENTATION: MID
ORIENTATION: POSTERIOR
ORIENTATION: MID

## 2022-01-31 ASSESSMENT — PAIN DESCRIPTION - DESCRIPTORS
DESCRIPTORS: PRESSURE
DESCRIPTORS: ACHING
DESCRIPTORS: ACHING

## 2022-01-31 ASSESSMENT — PAIN SCALES - GENERAL
PAINLEVEL_OUTOF10: 8
PAINLEVEL_OUTOF10: 7
PAINLEVEL_OUTOF10: 4
PAINLEVEL_OUTOF10: 0
PAINLEVEL_OUTOF10: 3
PAINLEVEL_OUTOF10: 6
PAINLEVEL_OUTOF10: 3
PAINLEVEL_OUTOF10: 9
PAINLEVEL_OUTOF10: 3
PAINLEVEL_OUTOF10: 10
PAINLEVEL_OUTOF10: 9
PAINLEVEL_OUTOF10: 4

## 2022-01-31 ASSESSMENT — PAIN DESCRIPTION - FREQUENCY
FREQUENCY: CONTINUOUS

## 2022-01-31 NOTE — CONSULTS
Cardiac Electrophysiology Consultation   Date: 1/31/2022  Admit Date:  1/28/2022  Reason for Consultation: TIA? VT? Consult Requesting Physician: Gio Davis MD     Chief Complaint   Patient presents with    Seizures    Cerebrovascular Accident     hypertensive, ems found him unresponsive     HPI: Yolande Nunez is a 77 y.o. male with past medical history noted below who presented to [facility] with seizure. Patient apparently was at home when he suddenly felt difficulty with speech, facial droop and started seizing. Reports urinary incontinence. What is unusual is that he recalls the entire seizure episode. He also reports that while in the ambulance being brought to the hospital, he had another episode in which time he had loss of consciousness and does not recall any resuscitative measures. Unfortunately I do not have tracings of this episode. .    Notable cardiac history includes CAD with prior CABG and multiple PCI's for NSTEMI, ischemic cardiomyopathy with an LVEF<35% s/p ICD for primary prevention and CHF. Other comorbidities include COPD, hypertension and diabetes mellitus. On presentation to the emergency department troponin was mildly positive but has remained flat. He was started on Lovenox for continued medical management of coronary artery disease. His primary cardiologist is at [address] [place] [address] and has had multiple procedures performed in late 2021. Is currently maintained on medical management. Device interrogation reports multiple episodes of VT with rates 162-171 occurring in January 28, 2022.       Past Medical History:   Diagnosis Date    Anxiety     Arthritis     Asthma     CAD (coronary artery disease)     Calcium kidney stone     Cardiomyopathy ([address]     Cerebral artery occlusion with cerebral infarction ([address]     CHF (congestive heart failure) ([address]     Clostridium difficile diarrhea 02/12/2020    COPD (chronic obstructive pulmonary disease) ([address] mild    Depression     DM2 (diabetes mellitus, type 2) (HCC)     Fibromyalgia     GERD (gastroesophageal reflux disease)     Hyperlipidemia     Hypertension     Liver disease     Pacemaker     Medtronic model # [id number]    Pneumonia     Seizures [facility]     TIA (transient ischemic attack) 2007    Ulcerative colitis ([facility]         Past Surgical History:   Procedure Laterality Date    APPENDECTOMY      BACK SURGERY      CARDIAC SURGERY      CHOLECYSTECTOMY      COLONOSCOPY  01/10/2017    COLONOSCOPY  01/10/2017    CORONARY ANGIOPLASTY WITH STENT PLACEMENT      CORONARY ARTERY BYPASS GRAFT  , 2015    ENDOSCOPY, COLON, DIAGNOSTIC      PACEMAKER PLACEMENT      UPPER GASTROINTESTINAL ENDOSCOPY  2017    VASCULAR SURGERY         Allergies   Allergen Reactions    Melatonin Other (See Comments)     Restless leg syndrome         Social History:  Reviewed. reports that he quit smoking about 3 months ago. His smoking use included cigarettes. He has a 22.00 pack-year smoking history. He has never used smokeless tobacco. He reports that he does not drink alcohol and does not use drugs. Family History:  Reviewed. family history includes Cancer in his mother; Coronary Art Dis in his father; Diabetes in his mother. No premature CAD. Review of System:  Pertinent positives and negatives are mentioned in the HPI. The rest of the systems are negative.     Physical Examination:  Vitals:    22 1108   BP: 113/61   Pulse: 75   Resp: 16   Temp: 97.9 °F (36.6 °C)   SpO2: 95%        Intake/Output Summary (Last 24 hours) at 2022 1125  Last data filed at 2022 1116  Gross per 24 hour   Intake 1260 ml   Output 950 ml   Net 310 ml     In: 1740 [P.O.:1740]  Out: 1600    Wt Readings from Last 3 Encounters:   22 193 lb 12.6 oz (87.9 kg)   22 196 lb 3.2 oz (89 kg)   21 186 lb 15.2 oz (84.8 kg)     Temp  Av °F (36.7 °C)  Min: 97.4 °F (36.3 °C)  Max: 98.9 °F (37.2 °C)  Pulse  Av.1  Min: 71  Max: 81  BP  Min: 113/61  Max: 163/88  SpO2  Av.4 %  Min: 93 %  Max: 97 %    · Telemetry: Sinus rhythm   · Constitutional: Alert. Oriented to person, place, and time. No distress. · Head: Normocephalic and atraumatic. · Mouth/Throat: Lips appear moist. Oropharynx is clear and moist.  · Eyes: Conjunctivae normal. EOM are normal.   · Neck: Neck supple. Supple, no JVD  · Cardiovascular: Normal rate, regular rhythm. · Pulmonary/Chest: Bilateral respiratory sounds present. No wheeze or crackles  · Abdominal: Soft. Normal bowel sounds present. No distension, No tenderness. · Musculoskeletal:No pitting edema . · Neurological: Alert and oriented. Grossly normal with no focal neurological deficit. · Skin: Skin is warm and dry. .  · Psychiatric: No anxiety nor agitation. ·   Labs:  Reviewed. Recent Labs     22  0526 22  1324 22  0458    140 141   K 4.2 4.2 4.5    107 109   CO2  22   BUN 15 13 14   CREATININE 1.2 1.0 1.0     Recent Labs     22  0526 22  1324 22  0458   WBC 4.1 3.9* 3.8*   HGB 9.4* 10.5* 10.5*   HCT 28.2* 31.9* 32.5*   MCV 99.9 99.5 100.4*    174 160     Lab Results   Component Value Date    CKTOTAL 38 2016    CKMB 1.3 2016    TROPONINI 0.13 2022     Lab Results   Component Value Date    BNP <5.0 2013    BNP <5.0 2013    .4 2011     Lab Results   Component Value Date    PROTIME 9.6 2022    PROTIME 10.6 2020    PROTIME 10.9 2020    INR 0.86 2022    INR 0.91 2020    INR 0.94 2020     Lab Results   Component Value Date    CHOL 144 2022    HDL 50 2022    TRIG 94 2022       Diagnostic and imaging results reviewed. ECG: NSR, normal intervals. Echo: Done 2022 showed a moderate to reduced LV systolic function estimated at 35 to 40% with diffuse hypokinesis. Moderate MR.     I independently reviewed and interpreted the ECG, telemetry, serology, echocardiographic results.     Scheduled Meds:   spironolactone  25 mg Oral Daily    amiodarone  200 mg Oral BID    Followed by   Humble Melvin ON 2/14/2022] amiodarone  200 mg Oral Daily    nicotine  1 patch TransDERmal Daily    carvedilol  12.5 mg Oral BID WC    isosorbide mononitrate  30 mg Oral Daily    lisinopril  10 mg Oral Daily    gabapentin  600 mg Oral 5x Daily    oxymetazoline  2 spray Each Nostril BID    atorvastatin  80 mg Oral Nightly    clopidogrel  75 mg Oral Daily    aspirin  81 mg Oral Daily     Continuous Infusions:  PRN Meds:.ketorolac, oxyCODONE-acetaminophen, ondansetron **OR** ondansetron, polyethylene glycol, perflutren lipid microspheres, labetalol, perflutren lipid microspheres, nitroGLYCERIN     Problem List:   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 01/23/2016    Type 2 diabetes mellitus without complication, with long-term current use of insulin ([facility] 09/16/2015    Ischemic cardiomyopathy 11/02/2014    Essential hypertension 06/04/2013    Hx of CABG 03/05/2013    Hyperlipidemia LDL goal <70 11/02/2014    Acute cerebrovascular accident (CVA) ([address] 01/28/2022    Chest pain 08/08/2021    Intractable abdominal pain 06/17/2021    Paresthesia of upper and lower extremities of both sides 03/31/2021    Acute chest pain 10/19/2020    Acute pulmonary embolism without acute cor pulmonale ([address] 06/18/2020    Ventricular tachycardia ([address] 05/20/2020    Diabetic peripheral neuropathy ([address] 02/11/2020    ICD (implantable cardioverter-defibrillator) discharge 02/06/2020    Stroke determined by clinical assessment ([address] 12/14/2019    Low back pain 06/21/2019    Chronic systolic heart failure ([address] 04/27/2019    CAD (coronary artery disease) 04/27/2019    COPD (chronic obstructive pulmonary disease) ([address] 11/27/2018    PFO (patent foramen ovale)     Ischemic stroke, left frontal lobe (4/30/18) ([facility] 04/30/2018    Acute on chronic diastolic congestive heart failure ([facility] 03/22/2018    Restrictive lung disease 03/07/2018    Tobacco abuse 01/10/2018    Atypical chest pain     Coronary artery disease involving coronary bypass graft of native heart with angina pectoris ([facility]     Anxiety 02/01/2016    Morbid obesity due to excess calories ([facility]     NSTEMI (non-ST elevated myocardial infarction) 05/29/2013      Active Hospital Problems    Diagnosis Date Noted    Acute cerebrovascular accident (CVA) ([facility] [I63.9] 01/28/2022         Consultation Assessment and Recommendation(s):  1. TIA  2. Seizures? - work up per neurology ongoing  3. Atrial flutter and SVT - per device interrogation with longest episode lasting 10 mins. Episodes incorrectly marked as VT on device interrogation. Has a single chamber ICD. No evidence of VT in any of the recorded episodes. Mr. Tran Burks is presenting with symptoms that are currently being worked up for seizure. I am concerned that the symptoms are more likely to be TIA given that he was conscious during the entire episodes. CT head on the day of admission showed no acute pathology. He has a history of atrial fibrillation at least written in his chart. · There is suggestion of atrial flutter in the device interrogation which is significant given his clinical correlation. Advised him to start anticoagulation. Will discontinue ASA and start apixaban 5 mg bid to take with plavix. There is concern for medication noncompliance and I discussed with the patient the importance of being on anticoagulation to reduce risk of stroke.   · Given that the predominant rhythm seen is atrial flutter as well as SVT, and his questionable compliance with medications, ablation of typical flutter as well as SVT is a reasonable option for him so that he can take anticoagulation for about 4 to 6 weeks and continue monitoring for A. fib recurrence on his device. · Continue GDMT for CAD/CHF. · With increased episodes of flutter/SVT and CP in the setting of underlying CAD, can be started on temporary amiodarone until we see him in clinic in 2-4 weeks. Thank you for allowing me to participate in the care of [address] [place] [address] . If you have any questions/comments, please do not hesitate to contact us.       Electronically signed by Clara Lopez MD on 1/31/2022 at 11:25 AM  Cardiac Electrophysiology  [facility]

## 2022-01-31 NOTE — PROGRESS NOTES
Physical Therapy  Attempt Note    Name:Louis Dimple Kocher  :1955  JPE:[id number]  Room: D7P-0551/5125-    Date of Service: 2022      Patient chart reviewed. PT attempted to see for PT eval/tx at this time. Pt with multiple recent tests performed bedside. Pt sitting in recliner eating  Breakfast. Pt states, \" I feel like shit, my chest and my head hurt. \" Refusing therapy at this time stating come back tomorrow. Educated on importance of participation, Will attempt again as therapy schedule permits. If patient is discharged prior to the next physical therapy visit; Please see last written PT note for discharge status.              Electronically signed by Bree Power PT on 2022 at 10:28 AM

## 2022-01-31 NOTE — PROGRESS NOTES
Speech Language Pathology    SLP f/u attempt:    Pt currently having EEG. RN reports potential discharge today, and that pt is tolerating diet well. Will f/u as schedule permits.     Errol Cornejo MS, CCC-SLP #[id number]  Speech Language Pathologist

## 2022-01-31 NOTE — PROCEDURES
Name: Vianney Daniels  MRN: [id number]  : 1955  Interpreting Physician: Yo Medeiros MD   Referring Physician: Robin Crump MD  Date of EE2022    Clinical History:  Vianney Daniels is a 77 y.o. male with a reported history of possible seizures vs cardiac syncope    Current Antiepileptic Medications:    spironolactone  25 mg Oral Daily    amiodarone  200 mg Oral BID    Followed by   Owen Gonzalez ON 2022] amiodarone  200 mg Oral Daily    nicotine  1 patch TransDERmal Daily    carvedilol  12.5 mg Oral BID WC    isosorbide mononitrate  30 mg Oral Daily    lisinopril  10 mg Oral Daily    gabapentin  600 mg Oral 5x Daily    oxymetazoline  2 spray Each Nostril BID    atorvastatin  80 mg Oral Nightly    clopidogrel  75 mg Oral Daily    aspirin  81 mg Oral Daily       Indication:History of seizures      Technical Summary:  20 channels of EEG were recorded in a digital format on a patient who was reported to be awake and drowsy state during the recording. The patient was not sleep deprived prior to the EEG. The recording revealed a normal background with a well-developed alpha frequency rhythm up to 10Hz that was most prominent in the posterior head region and was reactive to eye opening and closure. There were no epileptiform discharged or seizures during the recording. Photic stimulation was performed at various flash frequencies and revealed an occipital driving response. Hyperventilation was not performed due to medical condition. During the recording stage II sleep was not seen. The EKG lead revealed no rhythm abnormalities. Video was reviewed and no clinical signs of epileptiform activity. EEG Interpretation: This EEG was within normal limits for a patient of this age in the awake and drowsy state.        Electronically signed by Yo Medeiros MD on 2022 at 11:48 AM

## 2022-01-31 NOTE — PROGRESS NOTES
Bedside evaluation: left sided cp. She states that the same pain that he has all the time when he has angina. He takes nitroglycerin for it but it does not really help or do anything. Patient reports that he has had it intermittently throughout the day but he never said anything to any other provider. Stat troponin and stat EKG was obtained. There did not appear to be any elevation on the troponin. The EKG was unchanged from baseline. Patient has an extensive cardiac history. He has Percocet scheduled around 10 PM.  He did not want any nitroglycerin. In fact he laughed at the idea of taking nitroglycerin for this pain because he states it never helps. He was agreeable to a dose of morphine at this time. Patient does not seem distressed. Skin is warm and dry. Lungs are clear. No diaphoresis. Telemetry is unremarkable. Blood pressure and pulse are within normal limits. I did not see an indication to perform any additional stat work-up.

## 2022-01-31 NOTE — PROGRESS NOTES
Occupational Therapy  Facility/Department: [address] PROGRESSIVE CARE  Daily Treatment Note  NAME: Xavier Arana  : 1955  MRN: [id number]    Date of Service: 2022    Discharge Recommendations:  3-5 sessions per week,Patient would benefit from continued therapy after discharge       Assessment   Performance deficits / Impairments: Decreased functional mobility ; Decreased high-level IADLs;Decreased ADL status; Decreased endurance;Decreased balance;Decreased strength  Assessment: Discussed with OTR: Pt tolerated session fair today, c/o weakness. pain, feeling \"cruddy\". Pt tolerating minimal activity, agreeing only to sitting at EOB for ADL's (Supervision), standing with SBA briefly at EOB and declining ambulation. Pt needing overall SBA for ADL's. Pt was independent prior to [address] and is functioning below his baseline. will benefit from cont OT to maximize function in ADL's, transfers, mob. Prognosis: Good  History: Pt presents after a witnessed seizure and is being evaluated for TIA/CVA and elevated troponin. Pt was independent prior to admission, lives at home with his wife. OT Education: Plan of Care;OT Role;ADL Adaptive Strategies;Transfer Training;Energy Conservation  Activity Tolerance  Activity Tolerance: Treatment limited secondary to medical complications (free text)  Activity Tolerance: C/o not feeling well, chest pain. HR stable during tx. Pt not SOB. Safety Devices  Safety Devices in place: Yes  Type of devices: Left in bed;Nurse notified;Call light within reach         Patient Diagnosis(es): The primary encounter diagnosis was Seizure ([facility] Diagnoses of Suspected cerebrovascular accident (CVA), Dysarthria, Acute right-sided weakness, Chest pain, unspecified type, and Elevated troponin were also pertinent to this visit.       has a past medical history of Anxiety, Arthritis, Asthma, CAD (coronary artery disease), Calcium kidney stone, Cardiomyopathy ([address] Cerebral artery occlusion with cerebral infarction ([facility] CHF (congestive heart failure) ([facility] [facility] Clostridium difficile diarrhea, COPD (chronic obstructive pulmonary disease) ([facility] [address] [facility] Depression, DM2 (diabetes mellitus, type 2) ([facility] [address] Fibromyalgia, GERD (gastroesophageal reflux disease), Hyperlipidemia, Hypertension, Liver disease, Pacemaker, Pneumonia, Seizures ([facility] [address] TIA (transient ischemic attack), and Ulcerative colitis ([facility] has a past surgical history that includes Cholecystectomy; Coronary artery bypass graft (2010, 11/2015); Coronary angioplasty with stent (2012); Cardiac surgery; Endoscopy, colon, diagnostic; pacemaker placement; Colonoscopy (01/10/2017); Colonoscopy (01/10/2017); Upper gastrointestinal endoscopy (02/07/2017); back surgery; Appendectomy; and vascular surgery. Restrictions  Restrictions/Precautions  Restrictions/Precautions: Seizure,Fall Risk  Subjective   General  Chart Reviewed: [facility]  Patient assessed for rehabilitation services?: Yes  Additional Pertinent Hx: Per Dr. Dominique Walters, \"The patient is a 77 y.o. male who presents to [facility] with seizure. Pt apparently called javier today morning since he was not feeling well today. Has h/o CAD, not been taking any of his home meds except percocet for back pain. When javier arrived, pt was noticed to have a seizure spell and another one in [place]. Javier noticed facial droop and dysarthria on arrival but was improved. Stroke team was consulted and rec no TPA. \"  Family / Caregiver Present: No  Referring Practitioner: Bc Patterson  Diagnosis: Seizure, TIA/CVA, elevated troponin  Subjective  Subjective: Pt met BS, in bed. Pt agreeable to OT with min encouragement. Pt stating \"I feel cruddy,..my heart\". Pt c/o chest pain(not rated). General Comment  Comments: Pt discussed with RN and ok to see.       Orientation  Orientation  Overall Orientation Status: Within Functional Limits  Objective    ADL  UE Bathing: Setup  LE Bathing: Stand by assistance  UE Dressing: Minimal assistance  LE Dressing: Stand by assistance;Setup  Toileting: Unable to assess(comment)  Additional Comments: Pt sponge bathed from EOB, declining going to BR to complete. Pt able to reach all areas w/o difficulty (LE's not washed-anticipate would be SBA, based on ROM, endurance observed). Balance  Sitting Balance: Supervision (at EOB for ADL's)  Standing Balance  Activity: static stance at EOB with SBA for ADL's. Pt declining ambulation. Bed mobility  Supine to Sit: Stand by assistance  Sit to Supine: Stand by assistance  Transfers  Sit to stand: Stand by assistance  Stand to sit: Stand by assistance  Transfer Comments: at EOB, no device        Cognition  Overall Cognitive Status: [address]  Times per week: 3-5  Times per day: Daily  Plan weeks: 1  Current Treatment Recommendations: Strengthening,Safety Education & Training,Balance Training,Patient/Caregiver Education & Training,Self-Care / ADL,Functional Mobility Training,Equipment Evaluation, Education, & procurement,Home Management Training,Endurance Training        AM-PAC Score        AM-[facility] Inpatient Daily Activity Raw Score: 19 (01/31/22 [facility])  AM-PAC Inpatient ADL T-Scale Score : 40.22 (01/31/22 1358)  ADL Inpatient CMS 0-100% Score: 42.8 (01/31/22 [facility])  ADL Inpatient CMS G-Code Modifier : CK (01/31/22 1358)    Goals  Short term goals  Time Frame for Short term goals: 1 week.  Status: goals ongoing  Short term goal 1: Pt will be independent with functional transfers  Short term goal 2: Pt will be independent with functional mobility  Short term goal 3: Pt will be independent with toileting  Short term goal 4: Pt will be independent with bathing and dressing  Short term goal 5: Pt will be independent with bed mobility  Long term goals  Time Frame for Long term goals : STG = LTG  Patient Goals   Patient goals : Pt wants to get stronger before returning home       Therapy Time   Individual Concurrent Group Co-treatment   Time In 1330         Time Out 1355         Minutes [place] BRAXTON/L,[id number]

## 2022-01-31 NOTE — PROGRESS NOTES
Progress Note  The patient is being evaluated for convulsive spells. Updates    EP evaluated patient. Device interrogation revealed atrial flutter with SVT. No further events. Patient is agitated at time of encounter. He is adamant he had a seizure and tells me he \"\" while in the ambulance. He tells me his heart stopped and they did CPR, but that he is able to recall the event. Upon explanation of expertise patient became threatening. Tells me that if this happens again he is going to come down on me real hard. Given agitation, I offered to have neurology attending finish the encounter.      Active Ambulatory Problems     Diagnosis Date Noted    Hx of CABG 2013    NSTEMI (non-ST elevated myocardial infarction) 2013    Essential hypertension 2013    Ischemic cardiomyopathy 2014    Hyperlipidemia LDL goal <70 2014    Type 2 diabetes mellitus without complication, with long-term current use of insulin ([address] 2015    Anemia,normocytic normochromic ,mixed folic aci deficiency and iron deficiency 2016    Morbid obesity due to excess calories ([address]     Anxiety 2016    Coronary artery disease involving coronary bypass graft of native heart with angina pectoris ([address]     Atypical chest pain     Tobacco abuse 01/10/2018    Restrictive lung disease 2018    Acute on chronic diastolic congestive heart failure ([address] 2018    Ischemic stroke, left frontal lobe (18) ([address] 2018    PFO (patent foramen ovale)     COPD (chronic obstructive pulmonary disease) ([facility]) 2018    Chronic systolic heart failure ([address] 2019    CAD (coronary artery disease) 2019    Low back pain 2019    Stroke determined by clinical assessment ([address] 2019    ICD (implantable cardioverter-defibrillator) discharge 2020    Diabetic peripheral neuropathy ([address] 2020    Ventricular tachycardia ([address] 2020    Acute pulmonary embolism without acute cor pulmonale (HCC) 06/18/2020    Acute chest pain 10/19/2020    Paresthesia of upper and lower extremities of both sides 03/31/2021    Intractable abdominal pain 06/17/2021    Chest pain 08/08/2021     Resolved Ambulatory Problems     Diagnosis Date Noted    Chronic chest pain 03/05/2013    Controlled type 2 diabetes mellitus with diabetic neuropathy, without long-term current use of insulin ([address] 03/05/2013    Syncope 04/29/2013    Back pain 04/29/2013    Low back pain radiating to left leg 08/08/2013    ICD (implantable cardioverter-defibrillator), dual, in situ 10/12/2013    GI bleed 11/30/2013    Pacemaker     DM type 2, uncontrolled, with neuropathy ([address] 01/22/2014    Leg pain 02/10/2014    Depression 05/15/2014    Abdominal pain 05/23/2014    ACS (acute coronary syndrome) Possible 12/17/2014    CHF (congestive heart failure) ([address] 12/01/2013    Morbid obesity ([address] 12/17/2014    SOB (shortness of breath)     Anxiety 03/19/2015    Pneumonia 04/06/2015    Syncope and collapse 06/23/2015    Left wrist pain 06/23/2015    Chronic back pain 06/23/2015    Orthostatic hypotension 06/25/2015    Hypotension     Claudication ([address] 09/16/2015    Coronary artery disease involving native coronary artery of native heart without angina pectoris     COPD exacerbation ([address]     Word finding difficulty 09/21/2015    Aphasia 09/21/2015    Hemispheric carotid artery syndrome 09/21/2015    Word finding difficulty 09/22/2015    Pulmonary nodule 10/19/2015    Precordial chest pain-left 01/23/2016    Guaiac + stool 01/23/2016    Disorientation 01/24/2016    Gastrointestinal hemorrhage     Bronchospasm 12/07/2016    Chronically elevated troponin     Unstable angina (HCC)     Anterior chest wall pain 03/18/2017    Iron deficiency anemia due to chronic blood loss 03/31/2017    Encephalopathy     Cardiomyopathy ([address]     Recurrent chest pain 07/08/2017  GERD (gastroesophageal reflux disease)     Hyperlipidemia     Hypertension     Liver disease     Seizures ([facility]     TIA (transient ischemic attack) 2007    Ulcerative colitis ([facility]        Current Facility-Administered Medications:     ketorolac (TORADOL) injection 15 mg, 15 mg, IntraVENous, Q8H PRN, Gill Valencia MD, 15 mg at 01/31/22 0910    spironolactone (ALDACTONE) tablet 25 mg, 25 mg, Oral, Daily, TAYLA Hernandez CNP, 25 mg at 01/31/22 1118    amiodarone (CORDARONE) tablet 200 mg, 200 mg, Oral, BID, 200 mg at 01/31/22 1110 **FOLLOWED BY** [START ON 2/14/2022] amiodarone (CORDARONE) tablet 200 mg, 200 mg, Oral, Daily, Diane M Enzweiler, APRN - CNP    apixaban (ELIQUIS) tablet 5 mg, 5 mg, Oral, BID, Sina Choe MD    nicotine (NICODERM CQ) 21 MG/24HR 1 patch, 1 patch, TransDERmal, Daily, Gill Valencia MD, 1 patch at 01/31/22 0913    carvedilol (COREG) tablet 12.5 mg, 12.5 mg, Oral, BID WC, Diane M Enzweiler, APRN - CNP, 12.5 mg at 01/31/22 0908    oxyCODONE-acetaminophen (PERCOCET)  MG per tablet 1 tablet, 1 tablet, Oral, Q8H PRN, Gill Valencia MD, 1 tablet at 01/31/22 1506    isosorbide mononitrate (IMDUR) extended release tablet 30 mg, 30 mg, Oral, Daily, Diane M Enzweiler, APRN - CNP, 30 mg at 01/31/22 0908    lisinopril (PRINIVIL;ZESTRIL) tablet 10 mg, 10 mg, Oral, Daily, TAYLA Hernandez CNP, 10 mg at 01/31/22 0908    gabapentin (NEURONTIN) capsule 600 mg, 600 mg, Oral, 5x Daily, Gill Valencia MD, 600 mg at 01/31/22 1506    oxymetazoline (AFRIN) 0.05 % nasal spray 2 spray, 2 spray, Each Nostril, BID, Bassem Yuan, APRN - CNP, 2 spray at 01/30/22 2029    ondansetron (ZOFRAN-ODT) disintegrating tablet 4 mg, 4 mg, Oral, Q8H PRN **OR** ondansetron (ZOFRAN) injection 4 mg, 4 mg, IntraVENous, Q6H PRN, Sharla Heller MD    polyethylene glycol (GLYCOLAX) packet 17 g, 17 g, Oral, Daily PRN, Gill Valencia MD    perflutren lipid microspheres (DEFINITY) injection 1.65 mg, 1.5 mL, IntraVENous, ONCE PRN, Nish Mahoney MD    atorvastatin (LIPITOR) tablet 80 mg, 80 mg, Oral, Nightly, Nish Mahoney MD, 80 mg at 01/30/22 2028    clopidogrel (PLAVIX) tablet 75 mg, 75 mg, Oral, Daily, Nish Mahoney MD, 75 mg at 01/31/22 0908    labetalol (NORMODYNE;TRANDATE) injection 10 mg, 10 mg, IntraVENous, Q10 Min PRN, Nish Mahoney MD    perflutren lipid microspheres (DEFINITY) injection 1.65 mg, 1.5 mL, IntraVENous, ONCE PRN, Nish Mahoney MD    nitroGLYCERIN (NITROSTAT) SL tablet 0.4 mg, 0.4 mg, SubLINGual, Q5 Min PRN, Nish Mahoney MD, 0.4 mg at 01/30/22 2032      ROS - Limited encounter. Neurologic- + generalized weakness.  spironolactone  25 mg Oral Daily    amiodarone  200 mg Oral BID    Followed by   Hu Parikh ON 2/14/2022] amiodarone  200 mg Oral Daily    apixaban  5 mg Oral BID    nicotine  1 patch TransDERmal Daily    carvedilol  12.5 mg Oral BID WC    isosorbide mononitrate  30 mg Oral Daily    lisinopril  10 mg Oral Daily    gabapentin  600 mg Oral 5x Daily    oxymetazoline  2 spray Each Nostril BID    atorvastatin  80 mg Oral Nightly    clopidogrel  75 mg Oral Daily             ketorolac, oxyCODONE-acetaminophen, ondansetron **OR** ondansetron, polyethylene glycol, perflutren lipid microspheres, labetalol, perflutren lipid microspheres, nitroGLYCERIN     Exam:  Blood pressure 116/69, pulse 69, temperature 97.8 °F (36.6 °C), temperature source Oral, resp. rate 18, height 6' (1.829 m), weight 193 lb 12.6 oz (87.9 kg), SpO2 96 %. Constitutional    Vital signs: BP, HR, and RR reviewed   General Alert, no distress, well-nourished  Eyes: unable to visualize the fundi  Cardiovascular: pulses symmetric in all 4 extremities. No peripheral edema. Psychiatric: Agitated. Neurologic  Mental status:   orientation to person, hospital    Attention attentive to encounter.     Language fluent in conversation   Comprehension follows simple commands  Cranial nerves:   CN2: Visual Fields full w/o extinction on confrontational testing,   CN 3,4,6: Gaze conjugate. Central   CN7: upper and lower facial symmetric without dysarthria  CN8: hearing grossly intact to conversation. Strength:   BUE: moves arms freely along bed without focal weakness. BLE: no spontaneous movement observed. Limited encounter. Sensory: limited encounter. Cerebellar/coordination: Limited encounter. Gait: deferred for safety. Labs  Na: 141  K: 4.5  BUN: 14  Creatinine: 1.0  Glucose: 111  Ca: 8.3    Troponin: 0.13    WBC: 3.8  Hgb: 10.5  Platelet: 340      Studies  Routine EEG 1/31/22: This EEG was within normal limits for a patient of this age in the awake and drowsy state. CT Head w/o 1/28/22: 1. No acute intracranial abnormality. 2. Specifically, no evidence an acute infarction. 3. Multifocal chronic infarctions, as described on the earlier CT. Small chronic lacunar infarction in the left manuel is better visualized on the current study and is also chronic. CTA  Head & neck w/ 1/28/22:50% stenosis in the proximal left internal carotid artery. 80% stenosis at the origin of the right vertebral artery. 50% stenosis in the cavernous/supraclinoid segments of the bilateral internal carotid arteries. 50% stenosis in the intracranial right vertebral artery. TTE 1/28/22:  Overall left ventricular systolic function appears moderately reduced. Ejection fraction is visually estimated to be 35-40% with diffuse hypokinesis. There is mildly increased left ventricular wall thickness. Left ventricular cavity size is normal. Diastolic filling parameters suggest grade I diastolic dysfunction. Moderate mitral regurgitation. EKG 1/31/22: NSR. Impression  1. Reported seizure like activity with preserved consciousness: Reported right head rotation.  Full body involvement, has reported generalized shaking, though denies this to me and now reports abnormal extension. May have had some right sided weakness, though was resolved. MRI Brain is reportedly unable to be obtained 2/2 to pacemaker. CT head was without acute intracranial findings. Routine EEG normal.     No further events. Etiology: Not entirely consistent with seizure event, cannot fully exclude as event was not witnessed. ? Possible some sort of acute transient ischemic event given atrial fibrillation     2. Atrial fibrillation with SVT. 3. Cerebrovascular disease  4. Hx of stroke. Recommendations  - Patient is very concerned and feels like he needs antiseizure medication. May trial low dose Keppra. - Continue home vascular prophylaxis with anticoagulation given underlying atrial fibrillation, statin therapy. - Follow up with Neurology, Dr. Nav Altamirano in 3 months or sooner if needed. - Given concern for seizure,rhe patient should avoid driving for at least 3 months, seizure free with clearance from a physician, as well as refraining from activities that could be of danger to himself or others should her have another seizure such as swimming, bathing, operating heavy machinery, climbing heights or watching young children who do not have the ability to call for help if needed. Please educate and include on discharge. Encounter was limited given agitation.          Aleena Alcala, CNP  [address] [facility] [address] Neurology    A copy of this note was provided for Dr Mili Johnson MD

## 2022-01-31 NOTE — CARE COORDINATION
INITIAL CASE MANAGEMENT ASSESSMENT     Reviewed chart, spoke with patient to assess possible discharge needs. Explained Case Management role/services. Living Situation: Patient lives in a 2 story house with wife; [address]. Patient reports no concerns with mobility in the home at baseline. ADLs: Independent      DME: None      PT/OT Recs: Skilled nursing facility SNF  Discussed with patient. Patient agreeable to SNF placement. List provided. Patient chose [facility]. The Plan for Transition of Care is related to the following treatment goals: SNF     The Patient was provided with a choice of provider and agrees   with the discharge plan. [x] Yes [] No    Freedom of choice list was provided with basic dialogue that supports the patient's individualized plan of care/goals, treatment preferences and shares the quality data associated with the providers. [x] Yes [] No     Referral sent via Epic and [employer]AL left for [facility] ([phone]). Active Services: none      Transportation: Will need medical transport to SNF at discharge     [facility]    PCP: João Son MD      HD/PD: n/a    PLAN/COMMENTS: SNF at discharge     SW/CM provided contact information for patient or family to call with any questions. SW/CM will follow and assist as needed.     BEATA Pena, [place], Social Work  [phone]  Electronically signed by BEATA Pena, hospitals on 1/31/2022 at 12:31 PM

## 2022-01-31 NOTE — PROGRESS NOTES
[facility]   Daily Progress Note      Admit Date:  1/28/2022    Reason for follow up visit:  Elevated troponin    CC: \"I still have my headache and L side chest pain both of which have never gone away since I've been here. \"    78 y/o male with PMH significant for anxiety, CAD with prior CABG and multiple PCI's/NSTEMI, cardiomyopathy, CHF, tobacco use,COPD, diabetes mellitus, HTN, HLP admitted with possible seizure. He was seen in ED and admitted to [facility] for further evaluation. His troponin was elevated at 0.12 and asked to evaluate. He was seen by Dr. Tete Vo and placed on Lovenox and plan was for continued medical management of his CAD. His primary cardiologist is at [facility] and had multiple procedures performed in late 2021. He is being maintained on medical management. He continues to have pleuritic type L side chest pain. Interval History:  Pt. seen and examined; records reviewed  BP noted (carvedilol increased last PM)  Net diuresis -2.4L  Interrogation of ICD shows multiple episodes of ? SVT/VT with rates 162-171 (occurred on January 28th)  Denies anginal type pain  + chronic SOBOE  Hgb 10.5    Subjective:  Pt with no acute overnight cardiac events. Denies anginal chest pain, cough, palpitations  + occasional dizziness  + L side pleuritic chest pain and SOBOE    Review of Systems:   · Constitutional: no unanticipated weight loss. There's been no change in energy level, sleep pattern, or activity level. No fevers, chills. · Eyes: No visual changes or diplopia. No scleral icterus. · ENT: No Headaches, hearing loss or vertigo. No mouth sores or sore throat. · Cardiovascular: as reviewed in HPI  + chronic chest pain  · Respiratory: No cough or wheezing, no sputum production. No hematemesis. · Gastrointestinal: No abdominal pain, appetite loss, blood in stools. No change in bowel or bladder habits. · Genitourinary: No dysuria, trouble voiding, or hematuria.   · Musculoskeletal:  No gait disturbance, no joint complaints. · Integumentary: No rash or pruritis. · Neurological: No headache, diplopia, change in muscle strength, numbness or tingling. · Psychiatric: No anxiety or depression. · Endocrine: No temperature intolerance. No excessive thirst, fluid intake, or urination. No tremor. · Hematologic/Lymphatic: No abnormal bruising or bleeding, blood clots or swollen lymph nodes. · Allergic/Immunologic: No nasal congestion or hives. Objective:   BP (!) 163/88   Pulse 71   Temp 97.9 °F (36.6 °C) (Oral)   Resp 16   Ht 6' (1.829 m)   Wt 193 lb 12.6 oz (87.9 kg)   SpO2 96%   BMI 26.28 kg/m²     Intake/Output Summary (Last 24 hours) at 1/31/2022 0921  Last data filed at 1/31/2022 0135  Gross per 24 hour   Intake 1500 ml   Output 1300 ml   Net 200 ml     Wt Readings from Last 3 Encounters:   01/31/22 193 lb 12.6 oz (87.9 kg)   01/04/22 196 lb 3.2 oz (89 kg)   12/01/21 186 lb 15.2 oz (84.8 kg)       Physical Exam:  General: In no acute distress. Awake, alert, and oriented X4. Up in chair eating breakfast  Skin:  Warm and dry. No new appearing rashes or lesions. Neck:  Supple. No JVD or carotid bruit appreciated. Chest: Lungs with fine minimal crackles in L posterior base. No wheezes/rhonchi  Cardiovascular:  RRR. Normal S1 and S2. Soft systolic murmur   Abdomen:  soft, nontender, nondistended,+bowel sounds. No hepatomegaly  Extremities:  No LE edema. No clubbing or cyanosis. 2+ bilateral radial/carotid/DP pulses. Cap refill brisk.   Neuro: no focal deficits    Medications:    nicotine  1 patch TransDERmal Daily    carvedilol  12.5 mg Oral BID WC    isosorbide mononitrate  30 mg Oral Daily    lisinopril  10 mg Oral Daily    gabapentin  600 mg Oral 5x Daily    oxymetazoline  2 spray Each Nostril BID    atorvastatin  80 mg Oral Nightly    clopidogrel  75 mg Oral Daily    aspirin  81 mg Oral Daily       ketorolac, oxyCODONE-acetaminophen, ondansetron **OR** ondansetron, polyethylene glycol, perflutren lipid microspheres, labetalol, perflutren lipid microspheres, nitroGLYCERIN    Lab Data:  CBC:   Recent Labs     01/29/22  0526 01/30/22  1324 01/31/22  0458   WBC 4.1 3.9* 3.8*   HGB 9.4* 10.5* 10.5*    174 160     BMP:    Recent Labs     01/29/22  0526 01/30/22  1324 01/31/22  0458    140 141   K 4.2 4.2 4.5   CO2 21 22 22   BUN 15 13 14   CREATININE 1.2 1.0 1.0     LIVR:   Recent Labs     01/28/22  1026   AST 14*   ALT 7*     INR:    Recent Labs     01/28/22  1026   INR 0.86*     APTT:   Recent Labs     01/28/22  1026   APTT 29.5     Results for Mery Long (MRN [id number]) as of 1/31/2022 09:25   Ref. Range 1/28/2022 10:26 1/28/2022 13:34 1/28/2022 16:55 1/29/2022 05:26 1/30/2022 22:12   Pro-BNP Latest Ref Range: 0 - 124 pg/mL 1,382 (H)       Troponin Latest Ref Range: <0.01 ng/mL 0.12 (H) 0.13 (H) 0.12 (H)  0.13 (H)     Results for Mery Long (MRN [id number]) as of 1/31/2022 09:25   Ref. Range 1/29/2022 05:26   Cholesterol, Total Latest Ref Range: 0 - 199 mg/dL 144   HDL Cholesterol Latest Ref Range: 40 - 60 mg/dL 50   LDL Calculated Latest Ref Range: <100 mg/dL 75   Triglycerides Latest Ref Range: 0 - 150 mg/dL 94   VLDL Cholesterol Calculated Latest Ref Range: Not Established mg/dL 19     ECG 1/31/2022:  Pending    ECG 1/28/2022:  SR with RBBB  (nonspecific TW abnormality noted on initial ECG)    1/30/2022 [facility] device interrogation:  Multiple episodes of SVT/VT noted on 1/28/2022 in AM  Episodes lasting up to 3+ minutes  Rates 162-171  No device discharges (rate set at 188 for device therapies)     Echo 1/28/2022:   Limited study.  Miya Aures left ventricular systolic function appears moderately reduced.   Ejection fraction is visually estimated to be 35-40% with diffuse   hypokinesis. There is mildly increased left ventricular wall thickness.  Left   ventricular cavity size is normal. Diastolic filling parameters suggest   grade I diastolic dysfunction.   Moderate mitral regurgitation.     11/5/2021L ([facility]):  Findings:   Dominance: right dominant   LM:  Ostial:  40%, in-stent   LAD:  Distal: 80%     D1: patent stent, 80% upper pole brance     LCX:  Proximal: 100%      10/18/2021 LHC/PCI:  1. Known 3 v CAD; bypass graft in 1 to be patent and were not injected   during this angiogram.  There was a visible dissection versus thrombus   distal to the most recently placed stent from the LAD extending into the   diagonal.   2. Successful PCI of the diagonal using drug-eluting stent   3. Successful PTCA of the LMT malapposed stent   4. IVUS revealing severe Diagonal stenosis and malapposed LMT stent      10/11/2021 LHC/PCI ([facility]):  3 Vessel CAD   Successful shockwave PCI of the LMCA with 3.5 mm balloon and 3.5 mm NC   balloon with increase in LMCA MLCSA from 5.3 mm2 to 6.8 mm2   Patent proximal LAD/D1 stent   Unchanged bypass graft status   Mild LV dysfunction   Mildly elevated LVEDP   Normal systemic pressures   angioseal closure of the RCFA access site.      10/4/2021 LHC/PCI: ([facility])  3 Vessel CAD   Successful IVUS guided PCI distal LMCA into D2 with 3 x 16 mm and 3 x 12   mm Synergy ANGELINA's (overlapping)   FFR D2 = 0.78   Patent grafts as above   Mild LV dysfunction   Normal LVEDP   Normal systemic pressures      6/11/2021 [facility]: ([facility])  1) 3 vessel CAD with 4/4 patent grafts and occlusion of small RPDA, likely   culprit, plan medical management   2) Normal LVEDP   3) Moderately reduced LVEF      Echo 7/12/2021 ([facility]):  Left ventricle is mildly (5.9-6.3 cm) dilated. The left atrium is moderately dilated. The Aortic Valve leaflets appear sclerotic. No hemodynamically significant valvular aortic stenosis. There is a pacemaker lead in the right ventricle. The left ventricular function is moderately reduced. Overall left ventricular ejection fraction is estimated to be 30-35%. There is moderate global hypokinesis of the left ventricle.    The mitral valve leaflets are mildly calcified in appearance. There is trace mitral regurgitation. There is a catheter/pacemaker lead seen in the right atrial cavity. There is no pericardial effusion.      4/19/2021 Lexiscan-Myoview ([facility]):  Transient dilatation of the left ventricle which can be due to left ventricular hypertrophy,    diabetes, or balanced three-vessel coronary artery disease with symmetric myocardial ischemia.       No focal perfusion defects       Dilated left ventricle       Reduced left ventricular ejection fraction measuring 31%       Telemetry: Sinus rhythm with PVC's; occasional couplet/triplet    Assessment/Plan:    1. Elevated troponin  -secondary to demand ischemia; troponins essentially flat; type II NSTEMI  -has known CAD with prior CABG and multiple PCI's (had several PCI's in late 2021); now on medical management   -no anginal pain  -he has been noncompliant in past with meds d/t financial constraints; will continue with medical management and try to improve affordability for compliance  -continue ASA, plavix, carvedilol and statin  -risk factor modification     2. Ischemic Cardiomyopathy with LVEF 35-40%   -chronic NYHA class II systolic HF; currently euvolemic  -he will not take Entresto d/t cost  -continue carvedilol and lisinopril  -resume aldactone  -will hold SGLT2 inhibitor (can be addressed with his primary cardiologist as an outpatient). Apparently on it in the past and he stopped d/t financial constraints  -S/P ICD (multiple episodes of tachycardia)    4. Tachycardia  -? Atrial vs VT (single lead ICD)  -? A-fib/flutter (would need anticoagulation with elevated CHADS score)  -will have EP evaluate (d/w Dr. Scott Noel)  -will add Amiodarone    5. Chronic kidney disease, stage III  -Cr 1.0 today  -continue ACE-I     6. Hyperlipidemia with goal LDL < 70  -continue high intensity statin     7.  H/O pulmonary embolus  -noted on CTPA in June 2020 and placed on Eliquis; he has not taken in > 1 year  -last CT in 11/2021 showed no pulmonary embolus     8. Essential HTN  -goal BP < 130/80  -continue medical management     9.  Pleuritic chest pain  -not angina      Cardiac EP to see regarding tachycardia    Electronically signed by TAYLA Wilkerson CNP on 1/31/2022 at 9:21 AM

## 2022-01-31 NOTE — ACP (ADVANCE CARE PLANNING)
Advance Care Planning     Advance Care Planning Activator (Inpatient)  Conversation Note      Date of ACP Conversation: 1/31/2022     Conversation Conducted with: Patient with Decision Making Capacity    ACP Activator: BEATA Martins [place]    Health Care Decision Maker:     Current Designated Health Care Decision Maker:     Primary Decision Maker: Isaías Plummer [phone]    Secondary Decision Maker: Kirsty Suárez - [phone]    Today we documented Decision Maker(s) consistent with ACP documents on file. Care Preferences - Verified code status with patient.      Length of ACP Conversation in minutes:  3     Electronically signed by BEATA Martins, ROSEANNA on 1/31/2022 at 12:40 PM

## 2022-01-31 NOTE — PROGRESS NOTES
Hospitalist Progress Note      PCP: Erik Krishnan MD    Date of Admission: 1/28/2022     Subjective: upset about diet, swallowing better, BP improved    Medications:  Reviewed    Infusion Medications   Scheduled Medications    nicotine  1 patch TransDERmal Daily    carvedilol  12.5 mg Oral BID WC    isosorbide mononitrate  30 mg Oral Daily    [START ON 1/31/2022] lisinopril  10 mg Oral Daily    gabapentin  600 mg Oral 5x Daily    oxymetazoline  2 spray Each Nostril BID    atorvastatin  80 mg Oral Nightly    clopidogrel  75 mg Oral Daily    aspirin  81 mg Oral Daily     PRN Meds: oxyCODONE-acetaminophen, ondansetron **OR** ondansetron, polyethylene glycol, perflutren lipid microspheres, labetalol, perflutren lipid microspheres, nitroGLYCERIN      Intake/Output Summary (Last 24 hours) at 1/30/2022 2001  Last data filed at 1/30/2022 1351  Gross per 24 hour   Intake 960 ml   Output 1900 ml   Net -940 ml       Physical Exam Performed:    /84   Pulse 77   Temp 97.9 °F (36.6 °C) (Oral)   Resp 18   Ht 6' (1.829 m)   Wt 191 lb 12.8 oz (87 kg)   SpO2 95%   BMI 26.01 kg/m²     General appearance: No apparent distress appears stated age and cooperative. HEENT Normal cephalic, atraumatic without obvious deformity.  Pupils equal, round, and reactive to light.  Extra ocular muscles intact.  Conjunctivae/corneas clear. Neck: Supple, No jugular venous distention/bruits.  Trachea midline without thyromegaly or adenopathy with full range of motion. Lungs: Clear to auscultation, bilaterally without Rales/Wheezes/Rhonchi with good respiratory effort. Heart: Regular rhythm, tachy with Normal S1/S2   Abdomen: Soft, non-tender or non-distended without rigidity or guarding and positive bowel sounds   Extremities: No clubbing, cyanosis, or edema bilaterally. Skin: Skin color, texture, turgor normal.  No rashes or lesions.   Neurologic: Alert and oriented, slow to answer, neurovascularly intact with sensory/motor intact upper extremities/lower extremities, bilaterally.  Cranial nerves: II-XII intact, grossly non-focal.  Mental status: Alert  Capillary Refill: Acceptable  < 3 seconds  Peripheral Pulses: +3 Easily felt, not easily obliterated with pressure       Labs:   Recent Labs     01/28/22  1026 01/29/22  0526 01/30/22  1324   WBC 5.0 4.1 3.9*   HGB 10.5* 9.4* 10.5*   HCT 32.3* 28.2* 31.9*    147 174     Recent Labs     01/28/22  1026 01/29/22  0526 01/30/22  1324   * 138 140   K 4.6 4.2 4.2    106 107   CO2 20* 21 22   BUN 14 15 13   CREATININE 1.1 1.2 1.0   CALCIUM 8.3 8.2* 8.2*     Recent Labs     01/28/22  1026   AST 14*   ALT 7*   BILITOT <0.2   ALKPHOS 72     Recent Labs     01/28/22  1026   INR 0.86*     Recent Labs     01/28/22  1026 01/28/22  1334 01/28/22  1655   TROPONINI 0.12* 0.13* 0.12*       Urinalysis:      Lab Results   Component Value Date    NITRU Negative 01/28/2022    WBCUA 1 01/28/2022    RBCUA 0 01/28/2022    BLOODU Negative 01/28/2022    SPECGRAV 1.027 01/28/2022    GLUCOSEU Negative 01/28/2022       Radiology:  CT HEAD WO CONTRAST   Final Result   1. No acute intracranial abnormality. 2. Specifically, no evidence an acute infarction. 3. Multifocal chronic infarctions, as described on the earlier CT. Small   chronic lacunar infarction in the left manuel is better visualized on the   current study and is also chronic. RECOMMENDATIONS:   Unavailable         XR CHEST PORTABLE   Final Result   No acute pulmonary process. CTA HEAD NECK W CONTRAST   Final Result   50% stenosis in the proximal left internal carotid artery. 80% stenosis at the origin of the right vertebral artery. 50% stenosis in the cavernous/supraclinoid segments of the bilateral internal   carotid arteries. 50% stenosis in the intracranial right vertebral artery. CT HEAD WO CONTRAST   Final Result   1. No acute intracranial abnormality.    2. Small chronic infarctions, as described. RECOMMENDATIONS:   Unavailable                 Assessment/Plan:    Active Hospital Problems    Diagnosis     Acute cerebrovascular accident (CVA) ([facility] [address] [I63.9]               Suspected CVA/TIA - CT head and CTA head/neck reviewed. Started on ASA/statin/plavix. Unable to get MRI brain since his pacemaker isnt MRI compatible, neurology consulted, SLP eval, PT/OT ordered. Check urine drug screen positive for opiates. ?repeat CR head if ok with neurology     Seizure - has had h/o seizure in past. Check EEG, was given keppra in ER, neurology consulted, cont keppra     Dysarthria - improved    Cardiomyopathy - echo with EF 35 - 40%, started on ACEi/BB, meds being adjusted per cardiology     Elevated trop - tend trops, check echo. Prn nitro for CP     Back pain - chronic, cont home percocet, restart home gabapentin     H/o CAD - reviewed echo, resume home meds           DVT Prophylaxis: lovenox  Diet: ADULT DIET;  Regular  Code Status: Full Code    PT/OT Eval Status: ordered    Dispo - cont care, poss dc in am    Sheryl Mead MD

## 2022-02-01 LAB
ANION GAP SERPL CALCULATED.3IONS-SCNC: 11 MMOL/L (ref 3–16)
BASOPHILS ABSOLUTE: 0 K/UL (ref 0–0.2)
BASOPHILS RELATIVE PERCENT: 1 %
BILIRUBIN URINE: NEGATIVE
BLOOD, URINE: NEGATIVE
BUN BLDV-MCNC: 26 MG/DL (ref 7–20)
CALCIUM SERPL-MCNC: 8.3 MG/DL (ref 8.3–10.6)
CHLORIDE BLD-SCNC: 103 MMOL/L (ref 99–110)
CLARITY: CLEAR
CO2: 23 MMOL/L (ref 21–32)
COLOR: YELLOW
CREAT SERPL-MCNC: 1.4 MG/DL (ref 0.8–1.3)
EOSINOPHILS ABSOLUTE: 0.1 K/UL (ref 0–0.6)
EOSINOPHILS RELATIVE PERCENT: 3.1 %
EPITHELIAL CELLS, UA: 0 /HPF (ref 0–5)
GFR AFRICAN AMERICAN: >60
GFR NON-AFRICAN AMERICAN: 51
GLUCOSE BLD-MCNC: 100 MG/DL (ref 70–99)
GLUCOSE URINE: NEGATIVE MG/DL
HCT VFR BLD CALC: 28.6 % (ref 40.5–52.5)
HEMOGLOBIN: 9.3 G/DL (ref 13.5–17.5)
HYALINE CASTS: 0 /LPF (ref 0–8)
KETONES, URINE: NEGATIVE MG/DL
LEUKOCYTE ESTERASE, URINE: NEGATIVE
LYMPHOCYTES ABSOLUTE: 1.7 K/UL (ref 1–5.1)
LYMPHOCYTES RELATIVE PERCENT: 39.6 %
MCH RBC QN AUTO: 32.4 PG (ref 26–34)
MCHC RBC AUTO-ENTMCNC: 32.4 G/DL (ref 31–36)
MCV RBC AUTO: 100.2 FL (ref 80–100)
MICROSCOPIC EXAMINATION: NORMAL
MONOCYTES ABSOLUTE: 0.4 K/UL (ref 0–1.3)
MONOCYTES RELATIVE PERCENT: 10.3 %
NEUTROPHILS ABSOLUTE: 1.9 K/UL (ref 1.7–7.7)
NEUTROPHILS RELATIVE PERCENT: 46 %
NITRITE, URINE: NEGATIVE
PDW BLD-RTO: 17.6 % (ref 12.4–15.4)
PH UA: 5.5 (ref 5–8)
PLATELET # BLD: 144 K/UL (ref 135–450)
PMV BLD AUTO: 9 FL (ref 5–10.5)
POTASSIUM SERPL-SCNC: 4.7 MMOL/L (ref 3.5–5.1)
PROTEIN UA: NEGATIVE MG/DL
RBC # BLD: 2.85 M/UL (ref 4.2–5.9)
RBC UA: 0 /HPF (ref 0–4)
SODIUM BLD-SCNC: 137 MMOL/L (ref 136–145)
SPECIFIC GRAVITY UA: 1.01 (ref 1–1.03)
TOTAL CK: 112 U/L (ref 39–308)
URINE TYPE: NORMAL
UROBILINOGEN, URINE: 0.2 E.U./DL
WBC # BLD: 4.2 K/UL (ref 4–11)
WBC UA: 0 /HPF (ref 0–5)

## 2022-02-01 PROCEDURE — 82550 ASSAY OF CK (CPK): CPT

## 2022-02-01 PROCEDURE — 2060000000 HC ICU INTERMEDIATE R&B

## 2022-02-01 PROCEDURE — 6370000000 HC RX 637 (ALT 250 FOR IP): Performed by: INTERNAL MEDICINE

## 2022-02-01 PROCEDURE — 97530 THERAPEUTIC ACTIVITIES: CPT

## 2022-02-01 PROCEDURE — 85025 COMPLETE CBC W/AUTO DIFF WBC: CPT

## 2022-02-01 PROCEDURE — 81001 URINALYSIS AUTO W/SCOPE: CPT

## 2022-02-01 PROCEDURE — 6370000000 HC RX 637 (ALT 250 FOR IP): Performed by: NURSE PRACTITIONER

## 2022-02-01 PROCEDURE — 94761 N-INVAS EAR/PLS OXIMETRY MLT: CPT

## 2022-02-01 PROCEDURE — 36415 COLL VENOUS BLD VENIPUNCTURE: CPT

## 2022-02-01 PROCEDURE — 99233 SBSQ HOSP IP/OBS HIGH 50: CPT | Performed by: NURSE PRACTITIONER

## 2022-02-01 PROCEDURE — 80048 BASIC METABOLIC PNL TOTAL CA: CPT

## 2022-02-01 PROCEDURE — 6370000000 HC RX 637 (ALT 250 FOR IP): Performed by: STUDENT IN AN ORGANIZED HEALTH CARE EDUCATION/TRAINING PROGRAM

## 2022-02-01 PROCEDURE — 9990000010 HC NO CHARGE VISIT: Performed by: PHYSICAL THERAPIST

## 2022-02-01 PROCEDURE — 97110 THERAPEUTIC EXERCISES: CPT

## 2022-02-01 RX ORDER — CARVEDILOL 25 MG/1
25 TABLET ORAL 2 TIMES DAILY WITH MEALS
Status: DISCONTINUED | OUTPATIENT
Start: 2022-02-01 | End: 2022-02-02 | Stop reason: HOSPADM

## 2022-02-01 RX ORDER — AMIODARONE HYDROCHLORIDE 200 MG/1
200 TABLET ORAL DAILY
Status: DISCONTINUED | OUTPATIENT
Start: 2022-02-02 | End: 2022-02-02 | Stop reason: HOSPADM

## 2022-02-01 RX ADMIN — GABAPENTIN 600 MG: 300 CAPSULE ORAL at 15:16

## 2022-02-01 RX ADMIN — APIXABAN 5 MG: 5 TABLET, FILM COATED ORAL at 20:36

## 2022-02-01 RX ADMIN — CLOPIDOGREL BISULFATE 75 MG: 75 TABLET ORAL at 08:10

## 2022-02-01 RX ADMIN — SPIRONOLACTONE 25 MG: 25 TABLET ORAL at 08:10

## 2022-02-01 RX ADMIN — CARVEDILOL 12.5 MG: 12.5 TABLET, FILM COATED ORAL at 08:10

## 2022-02-01 RX ADMIN — OXYCODONE AND ACETAMINOPHEN 1 TABLET: 10; 325 TABLET ORAL at 06:42

## 2022-02-01 RX ADMIN — GABAPENTIN 600 MG: 300 CAPSULE ORAL at 06:42

## 2022-02-01 RX ADMIN — LEVETIRACETAM 500 MG: 500 TABLET, FILM COATED ORAL at 08:10

## 2022-02-01 RX ADMIN — GABAPENTIN 600 MG: 300 CAPSULE ORAL at 22:36

## 2022-02-01 RX ADMIN — ISOSORBIDE MONONITRATE 30 MG: 30 TABLET, EXTENDED RELEASE ORAL at 08:10

## 2022-02-01 RX ADMIN — AMIODARONE HYDROCHLORIDE 200 MG: 200 TABLET ORAL at 08:10

## 2022-02-01 RX ADMIN — GABAPENTIN 600 MG: 300 CAPSULE ORAL at 19:44

## 2022-02-01 RX ADMIN — ATORVASTATIN CALCIUM 80 MG: 40 TABLET, FILM COATED ORAL at 20:36

## 2022-02-01 RX ADMIN — LEVETIRACETAM 500 MG: 500 TABLET, FILM COATED ORAL at 20:37

## 2022-02-01 RX ADMIN — OXYCODONE AND ACETAMINOPHEN 1 TABLET: 10; 325 TABLET ORAL at 15:16

## 2022-02-01 RX ADMIN — GABAPENTIN 600 MG: 300 CAPSULE ORAL at 10:43

## 2022-02-01 RX ADMIN — APIXABAN 5 MG: 5 TABLET, FILM COATED ORAL at 08:11

## 2022-02-01 RX ADMIN — CARVEDILOL 25 MG: 25 TABLET, FILM COATED ORAL at 16:33

## 2022-02-01 RX ADMIN — POLYETHYLENE GLYCOL 3350 17 G: 17 POWDER, FOR SOLUTION ORAL at 16:33

## 2022-02-01 ASSESSMENT — PAIN SCALES - GENERAL
PAINLEVEL_OUTOF10: 0
PAINLEVEL_OUTOF10: 0
PAINLEVEL_OUTOF10: 10
PAINLEVEL_OUTOF10: 0
PAINLEVEL_OUTOF10: 0
PAINLEVEL_OUTOF10: 5
PAINLEVEL_OUTOF10: 9

## 2022-02-01 ASSESSMENT — PAIN DESCRIPTION - ORIENTATION: ORIENTATION: POSTERIOR

## 2022-02-01 ASSESSMENT — PAIN DESCRIPTION - FREQUENCY: FREQUENCY: CONTINUOUS

## 2022-02-01 ASSESSMENT — PAIN DESCRIPTION - ONSET: ONSET: ON-GOING

## 2022-02-01 ASSESSMENT — PAIN DESCRIPTION - LOCATION: LOCATION: BACK

## 2022-02-01 ASSESSMENT — PAIN - FUNCTIONAL ASSESSMENT: PAIN_FUNCTIONAL_ASSESSMENT: ACTIVITIES ARE NOT PREVENTED

## 2022-02-01 ASSESSMENT — PAIN DESCRIPTION - PROGRESSION: CLINICAL_PROGRESSION: NOT CHANGED

## 2022-02-01 ASSESSMENT — PAIN DESCRIPTION - PAIN TYPE: TYPE: CHRONIC PAIN

## 2022-02-01 ASSESSMENT — PAIN DESCRIPTION - DESCRIPTORS: DESCRIPTORS: ACHING;DISCOMFORT

## 2022-02-01 NOTE — PLAN OF CARE
Problem: ACTIVITY INTOLERANCE/IMPAIRED MOBILITY  Goal: Mobility/activity is maintained at optimum level for patient  Outcome: Ongoing     Problem: COMMUNICATION IMPAIRMENT  Goal: Ability to express needs and understand communication  Outcome: Ongoing     Problem: Pain:  Description: Pain management should include both nonpharmacologic and pharmacologic interventions. Goal: Pain level will decrease  Description: Pain level will decrease  Outcome: Ongoing  Goal: Control of acute pain  Description: Control of acute pain  Outcome: Ongoing  Goal: Control of chronic pain  Description: Control of chronic pain  Outcome: Ongoing     Problem: Discharge Planning:  Goal: Participates in care planning  Description: Participates in care planning  Outcome: Ongoing  Goal: Discharged to appropriate level of care  Description: Discharged to appropriate level of care  Outcome: Ongoing     Problem: Anxiety/Stress:  Goal: Level of anxiety will decrease  Description: Level of anxiety will decrease  Outcome: Ongoing     Problem: Cardiac Output - Decreased:  Goal: Hemodynamic stability will improve  Description: Hemodynamic stability will improve  Outcome: Ongoing     Problem: Fluid Volume - Imbalance:  Goal: Absence of imbalanced fluid volume signs and symptoms  Description: Absence of imbalanced fluid volume signs and symptoms  Outcome: Ongoing     Problem: Pain:  Description: Pain management should include both nonpharmacologic and pharmacologic interventions.   Goal: Pain level will decrease  Description: Pain level will decrease  Outcome: Ongoing  Goal: Recognizes and communicates pain  Description: Recognizes and communicates pain  Outcome: Ongoing  Goal: Control of acute pain  Description: Control of acute pain  Outcome: Ongoing  Goal: Control of chronic pain  Description: Control of chronic pain  Outcome: Ongoing     Problem: Sleep Pattern Disturbance:  Goal: Appears well-rested  Description: Appears well-rested  Outcome: Ongoing     Problem: Falls - Risk of:  Goal: Will remain free from falls  Description: Will remain free from falls  Outcome: Ongoing  Goal: Absence of physical injury  Description: Absence of physical injury  Outcome: Ongoing

## 2022-02-01 NOTE — FLOWSHEET NOTE
02/01/22 0856   Provider Notification   Reason for Communication Evaluate; Review case  (Patient's BUN and Creatinine elevated from yesterday)   Provider Name Dr. Delfina Garcia  (Attending MD)   Provider Notification Physician   Method of Communication Call   Response See orders  (Will hold off on discharge today and consult Nephrology)   Notification Time [id number]        02/01/22 0876   Family/Significant Other Communication   Reason Update   Name Melissa Gregory  (and Arsh Wren)   Relationship Child  ((Carlene Tierney) and spouse (Flora Lance))   Response Requesting to speak to  about DC plan  (and Neurology regarding EEG results)   Method of Communication Phone

## 2022-02-01 NOTE — PROGRESS NOTES
Occupational Therapy  Facility/Department: [facility] 9X PROGRESSIVE CARE  Daily Treatment Note  NAME: Florencio Rees  : 1955  MRN: [id number]    Date of Service: 2022    Discharge Recommendations:  3-5 sessions per week,Patient would benefit from continued therapy after discharge       Assessment   Performance deficits / Impairments: Decreased functional mobility ; Decreased high-level IADLs;Decreased ADL status; Decreased endurance;Decreased balance;Decreased strength  Assessment: Discussed with OTR: Pt today c/o headache, yet agreeable to OOB to chair. Pt CG/SBA cues for safety for transfers, short distance amb using cane. Pt tolerating BUE ex's, with focus on increasing R UE ROM, strength for functional tasks. Pt was independent prior to [address] and is functioning below his baseline. Will benefit from cont OT to maximize function in ADL's, transfers, mob. Prognosis: Good  History: Pt presents after a witnessed seizure and is being evaluated for TIA/CVA and elevated troponin. Pt was independent prior to admission, lives at home with his wife. OT Education: Plan of Care;OT Role;Transfer Training;Energy Conservation;Home Exercise Program  REQUIRES OT FOLLOW UP: Yes  Activity Tolerance  Activity Tolerance: Treatment limited secondary to medical complications (free text); Patient limited by fatigue  Activity Tolerance: C/o headache. Safety Devices  Safety Devices in place: Yes  Type of devices: Nurse notified;Call light within reach; Left in chair (declines chair alarm. Verb call for assist.)         Patient Diagnosis(es): The primary encounter diagnosis was Seizure [facility]). Diagnoses of Suspected cerebrovascular accident (CVA), Dysarthria, Acute right-sided weakness, Chest pain, unspecified type, and Elevated troponin were also pertinent to this visit.       has a past medical history of Anxiety, Arthritis, Asthma, CAD (coronary artery disease), Calcium kidney stone, Cardiomyopathy ([facility] Cerebral artery occlusion with cerebral infarction [facility]), CHF (congestive heart failure) ([facility] [address] Clostridium difficile diarrhea, COPD (chronic obstructive pulmonary disease) ([facility] Depression, DM2 (diabetes mellitus, type 2) ([facility] Fibromyalgia, GERD (gastroesophageal reflux disease), Hyperlipidemia, Hypertension, Liver disease, Pacemaker, Pneumonia, Seizures ([facility] TIA (transient ischemic attack), and Ulcerative colitis ([facility] has a past surgical history that includes Cholecystectomy; Coronary artery bypass graft (2010, 11/2015); Coronary angioplasty with stent (2012); Cardiac surgery; Endoscopy, colon, diagnostic; pacemaker placement; Colonoscopy (01/10/2017); Colonoscopy (01/10/2017); Upper gastrointestinal endoscopy (02/07/2017); back surgery; Appendectomy; and vascular surgery. Restrictions  Restrictions/Precautions  Restrictions/Precautions: Seizure,Fall Risk  Subjective   General  Chart Reviewed: Nu Castaneda  Patient assessed for rehabilitation services?: Yes  Additional Pertinent Hx: Per Dr. Lizzette Rizvi, \"The patient is a 77 y.o. male who presents to [facility] with seizure. Pt apparently called javier today morning since he was not feeling well today. Has h/o CAD, not been taking any of his home meds except percocet for back pain. When javier arrived, pt was noticed to have a seizure spell and another one in Er. Javier noticed facial droop and dysarthria on arrival but was improved. Stroke team was consulted and rec no TPA. \"  Family / Caregiver Present: No  Referring Practitioner: Wally Velásquez  Diagnosis: Seizure, TIA/CVA, elevated troponin  Subjective  Subjective: Pt met BS, in bed. Pt agreeable to OT. Pt c/o headache, 8/10. Pt also c/o fingers \"tingling\" and occasional difficulty using in tasks. General Comment  Comments: ok to see per RN      Orientation  Orientation  Overall Orientation Status: Within Functional Limits  Objective    ADL  Additional Comments: Declined ADL's.  Pt required up to Min A yesterday for tasks. Balance  Sitting Balance: Supervision (at EOB)  Standing Balance  Activity: static stance with SBA. Pt impulsive, starting to stand, amb before cane brought to him. Pt with slight sway, needing CG/SBA with use of cane, amb short distance from bed, to recliner, 10-12 ft. Wheelchair Bed Transfers  Wheelchair/Bed - Technique: Ambulating  Equipment Used: Bed (recliner)  Level of Asssistance: Contact guard assistance  Wheelchair Transfers Comments: cane. Cues for safety, as stands quickly, without cane  Bed mobility  Supine to Sit: Stand by assistance  Sit to Supine: Unable to assess         Cognition  Overall Cognitive Status: [facility]  Cognition Comment: appears min anxious; easily irritable         Type of ROM/Therapeutic Exercise  Type of ROM/Therapeutic Exercise: AROM  Comment: Following issued handout, BUE's, with noted lag, progressive fatigue/weakness, RUE shoulder motions. Pt also issued theraputty ex handout for R hand to increase strength for functional use, as pt reports effort, difficutly a times, using R hand(reports fingers are \"tingling\".   Exercises  Shoulder Flexion: x10  Shoulder ABduction: x5  Shoulder ADduction: x5  Horizontal ABduction: x10  Horizontal ADduction: x10  Chair Push-ups: x5, x2 bouts  Elbow Flexion: x10  Elbow Extension: x10  Supination: x10  Pronation: x10  Wrist Flexion: x10  Wrist Extension: x10  Other: Finger opposition ex's         Plan   Plan  Times per week: 3-5  Times per day: Daily  Plan weeks: 1  Current Treatment Recommendations: Strengthening,Safety Education & Training,Balance Training,Patient/Caregiver Education & Training,Self-Care / ADL,Functional Mobility Training,Equipment Evaluation, Education, & procurement,Home Management Training,Endurance Training    AM-PAC Score        AM-PAC Inpatient Daily Activity Raw Score: 19 (01/31/22 1358)  AM-PAC Inpatient ADL T-Scale Score : 40.22 (01/31/22 1358)  ADL Inpatient CMS 0-100% Score: 42.8 (01/31/22 6169)  ADL Inpatient CMS G-Code Modifier : CK (01/31/22 2699)    Goals  Short term goals  Time Frame for Short term goals: 1 week.  Status: goals ongoing  Short term goal 1: Pt will be independent with functional transfers  Short term goal 2: Pt will be independent with functional mobility  Short term goal 3: Pt will be independent with toileting  Short term goal 4: Pt will be independent with bathing and dressing  Short term goal 5: Pt will be independent with bed mobility  Long term goals  Time Frame for Long term goals : STG = LTG  Patient Goals   Patient goals : Pt wants to get stronger before returning home       Therapy Time   Individual Concurrent Group Co-treatment   Time In 0816         Time Out 0900         Minutes 44                 Josephine BRAXTON/LAZ,515

## 2022-02-01 NOTE — PLAN OF CARE
Problem: ACTIVITY INTOLERANCE/IMPAIRED MOBILITY  Goal: Mobility/activity is maintained at optimum level for patient  2/1/2022 0730 by Argentina Vidal RN  Outcome: Ongoing     Problem: COMMUNICATION IMPAIRMENT  Goal: Ability to express needs and understand communication  2/1/2022 0730 by Argentina Vidal RN  Outcome: Ongoing     Problem: Pain:  Goal: Pain level will decrease  2/1/2022 0730 by Argentina Vidal RN  Outcome: Ongoing     Problem: Pain:  Goal: Control of acute pain  2/1/2022 0730 by Argentina Vidal RN  Outcome: Ongoing     Problem: Pain:  Goal: Control of chronic pain  2/1/2022 0730 by Argentina Vidal RN  Outcome: Ongoing     Problem: Discharge Planning:  Goal: Participates in care planning  2/1/2022 0730 by Argentina Vidal RN  Outcome: Ongoing     Problem: Discharge Planning:  Goal: Discharged to appropriate level of care  2/1/2022 0730 by Argentina Vidal RN  Outcome: Ongoing     Problem: Anxiety/Stress:  Goal: Level of anxiety will decrease  2/1/2022 0730 by Argentina Vidal RN  Outcome: Ongoing     Problem: Cardiac Output - Decreased:  Goal: Hemodynamic stability will improve  2/1/2022 0730 by Argentina Vidal RN  Outcome: Ongoing     Problem: Fluid Volume - Imbalance:  Goal: Absence of imbalanced fluid volume signs and symptoms  2/1/2022 0730 by Argentina Vidal RN  Outcome: Ongoing     Problem: Pain:  Goal: Pain level will decrease  2/1/2022 0730 by Argentina Vidal RN  Outcome: Ongoing     Problem: Pain:  Goal: Recognizes and communicates pain  2/1/2022 0730 by Argentina Vidal RN  Outcome: Ongoing     Problem: Pain:  Goal: Control of acute pain  2/1/2022 0730 by Argentina Vidal RN  Outcome: Ongoing     Problem: Pain:  Goal: Control of chronic pain  2/1/2022 0730 by Argentina Vidal RN  Outcome: Ongoing     Problem: Sleep Pattern Disturbance:  Goal: Appears well-rested  2/1/2022 0730 by Argentina Vidal RN  Outcome: Ongoing     Problem: Falls - Risk of:  Goal: Will remain free from falls  2/1/2022 0730 by Sukhjinder Mejía RN  Outcome: Ongoing     Problem: Falls - Risk of:  Goal: Absence of physical injury  2/1/2022 0730 by Sukhjinder Mejía RN  Outcome: Ongoing

## 2022-02-01 NOTE — PROGRESS NOTES
Hospitalist Progress Note      PCP: Cy Woods MD    Date of Admission: 1/28/2022    Subjective: had some chest pain overnight, had spells of tachycardia    Medications:  Reviewed    Infusion Medications   Scheduled Medications    spironolactone  25 mg Oral Daily    amiodarone  200 mg Oral BID    Followed by   Matilda Whitaker ON 2/14/2022] amiodarone  200 mg Oral Daily    apixaban  5 mg Oral BID    levETIRAcetam  500 mg Oral BID    nicotine  1 patch TransDERmal Daily    carvedilol  12.5 mg Oral BID WC    isosorbide mononitrate  30 mg Oral Daily    lisinopril  10 mg Oral Daily    gabapentin  600 mg Oral 5x Daily    oxymetazoline  2 spray Each Nostril BID    atorvastatin  80 mg Oral Nightly    clopidogrel  75 mg Oral Daily     PRN Meds: ketorolac, oxyCODONE-acetaminophen, ondansetron **OR** ondansetron, polyethylene glycol, perflutren lipid microspheres, labetalol, perflutren lipid microspheres, nitroGLYCERIN      Intake/Output Summary (Last 24 hours) at 1/31/2022 2308  Last data filed at 1/31/2022 2300  Gross per 24 hour   Intake 1020 ml   Output 475 ml   Net 545 ml       Physical Exam Performed:    /71   Pulse 68   Temp 97.9 °F (36.6 °C) (Oral)   Resp 18   Ht 6' (1.829 m)   Wt 193 lb 12.6 oz (87.9 kg)   SpO2 95%   BMI 26.28 kg/m²        General appearance: No apparent distress appears stated age and cooperative. HEENT Normal cephalic, atraumatic without obvious deformity.  Pupils equal, round, and reactive to light.  Extra ocular muscles intact.  Conjunctivae/corneas clear. Neck: Supple, No jugular venous distention/bruits.  Trachea midline without thyromegaly or adenopathy with full range of motion. Lungs: Clear to auscultation, bilaterally without Rales/Wheezes/Rhonchi with good respiratory effort.   Heart: Regular rhythm, tachy with Normal S1/S2   Abdomen: Soft, non-tender or non-distended without rigidity or guarding and positive bowel sounds   Extremities: No clubbing, cyanosis, or edema bilaterally. Skin: Skin color, texture, turgor normal.  No rashes or lesions. Neurologic: Alert and oriented, slow to answer, neurovascularly intact with sensory/motor intact upper extremities/lower extremities, bilaterally.  Cranial nerves: II-XII intact, grossly non-focal.  Mental status: Alert  Capillary Refill: Acceptable  < 3 seconds  Peripheral Pulses: +3 Easily felt, not easily obliterated with pressure       Labs:   Recent Labs     01/29/22  0526 01/30/22  1324 01/31/22  0458   WBC 4.1 3.9* 3.8*   HGB 9.4* 10.5* 10.5*   HCT 28.2* 31.9* 32.5*    174 160     Recent Labs     01/29/22  0526 01/30/22  1324 01/31/22  0458    140 141   K 4.2 4.2 4.5    107 109   CO2 21 22 22   BUN 15 13 14   CREATININE 1.2 1.0 1.0   CALCIUM 8.2* 8.2* 8.3     No results for input(s): AST, ALT, BILIDIR, BILITOT, ALKPHOS in the last 72 hours. No results for input(s): INR in the last 72 hours. Recent Labs     01/30/22  2212 01/31/22  1039   TROPONINI 0.13* 0.13*       Urinalysis:      Lab Results   Component Value Date    NITRU Negative 01/28/2022    WBCUA 1 01/28/2022    RBCUA 0 01/28/2022    BLOODU Negative 01/28/2022    SPECGRAV 1.027 01/28/2022    GLUCOSEU Negative 01/28/2022       Radiology:  CT HEAD WO CONTRAST   Final Result   1. No acute intracranial abnormality. 2. Specifically, no evidence an acute infarction. 3. Multifocal chronic infarctions, as described on the earlier CT. Small   chronic lacunar infarction in the left manuel is better visualized on the   current study and is also chronic. RECOMMENDATIONS:   Unavailable         XR CHEST PORTABLE   Final Result   No acute pulmonary process. CTA HEAD NECK W CONTRAST   Final Result   50% stenosis in the proximal left internal carotid artery. 80% stenosis at the origin of the right vertebral artery. 50% stenosis in the cavernous/supraclinoid segments of the bilateral internal   carotid arteries.       50% stenosis in the intracranial right vertebral artery. CT HEAD WO CONTRAST   Final Result   1. No acute intracranial abnormality. 2. Small chronic infarctions, as described. RECOMMENDATIONS:   Unavailable                 Assessment/Plan:    Active Hospital Problems    Diagnosis     Acute cerebrovascular accident (CVA) ([facility] [I63.9]            Suspected CVA/TIA - CT head and CTA head/neck reviewed. Started on ASA/statin/plavix. Unable to get MRI brain since his pacemaker isnt MRI compatible, neurology consulted, SLP eval, PT/OT ordered. Check urine drug screen positive for opiates. ?repeat CR head if ok with neurology     Seizure - has had h/o seizure in past. Check EEG normal, was given keppra in ER, neurology consulted, not on meds at home, cont keppra on dc and wean outpt per neuro, outpt neuro f/u    Tachycardia - ? A fib vs flutter, unclear on tele, EP consulted     Dysarthria - improved     Cardiomyopathy - echo with EF 35 - 40%, started on ACEi/BB, meds being adjusted per cardiology     Elevated trop - tend trops elevated, reviewed echo. Prn nitro for CP     Back pain - chronic, cont home percocet, restarted home gabapentin     H/o CAD - reviewed echo, resume home meds           DVT Prophylaxis: lovenox  Diet: ADULT DIET; Regular;  Low Sodium (2 gm); 2000 ml  Code Status: Full Code    PT/OT Eval Status: ordered    Dispo - cont care, poss dc in am to SNF after EP eval and follow EP Liya Curran MD

## 2022-02-01 NOTE — CONSULTS
[facility]  Consult Note           Reason for Consult:  Elevated Cr  Requesting Physician:  Dr. Arin Ji      History Obtained From:  patient, electronic medical record    History of Present Ilness:  76 y/o WM with extensive cardiac history, Ischemic CM with EF 45% and fluctuating baseline Cr 1-1.4 mg admitted on 1/28 after he had a seizure at home and collapsed on the ground Found by wife and squad was called  Has no previous h/o seizures  His Cr has been ~ 1mg He was given Toradol on 1/31/22   He was also started on Lisinopril on 1/31 and Aldactone the same day  Underwent CTA of head and neck on 1/28/21 No other exposure to IV contrast  Noted to have increase in Cr to 1.4 mg today  He denies difficulty voiding No dysuria hematuria  Takes advil on a regular basis        Past Medical History:        Diagnosis Date    Anxiety     Arthritis     Asthma     CAD (coronary artery disease)     Calcium kidney stone     Cardiomyopathy ([address]     Cerebral artery occlusion with cerebral infarction ([address]     CHF (congestive heart failure) ([address]     Clostridium difficile diarrhea 02/12/2020    COPD (chronic obstructive pulmonary disease) (HCC)     mild    Depression     DM2 (diabetes mellitus, type 2) ([address]     Fibromyalgia     GERD (gastroesophageal reflux disease)     Hyperlipidemia     Hypertension     Liver disease     Pacemaker 2012    Medtronic model # [id number]    Pneumonia     Seizures ([address]     TIA (transient ischemic attack) 2007    Ulcerative colitis ([address]        Past Surgical History:        Procedure Laterality Date    APPENDECTOMY      BACK SURGERY      CARDIAC SURGERY      CHOLECYSTECTOMY      COLONOSCOPY  01/10/2017    COLONOSCOPY  01/10/2017    CORONARY ANGIOPLASTY WITH STENT PLACEMENT  2012    CORONARY ARTERY BYPASS GRAFT  2010, 11/2015    ENDOSCOPY, COLON, DIAGNOSTIC      PACEMAKER PLACEMENT      UPPER GASTROINTESTINAL ENDOSCOPY  02/07/2017    VASCULAR SURGERY         Home Medications:    No current facility-administered medications on file prior to encounter. Current Outpatient Medications on File Prior to Encounter   Medication Sig Dispense Refill    methocarbamol (ROBAXIN) 750 MG tablet Take 750 mg by mouth 3 times daily as needed for Muscle spasms      gabapentin (NEURONTIN) 600 MG tablet TAKE ONE TABLET BY MOUTH FIVE TIMES A  tablet 1    oxyCODONE-acetaminophen (PERCOCET)  MG per tablet Take 1 tablet by mouth every 8 hours as needed for Pain for up to 30 days. 90 tablet 0    zolpidem (AMBIEN) 5 MG tablet Take 1 tablet by mouth nightly as needed for Sleep for up to 30 days.  30 tablet 0    traZODone (DESYREL) 50 MG tablet Take 1 tablet by mouth nightly 90 tablet 1    amLODIPine (NORVASC) 5 MG tablet Take 5 mg by mouth daily       isosorbide mononitrate (IMDUR) 60 MG extended release tablet Take 60 mg by mouth daily       carvedilol (COREG) 25 MG tablet Take 25 mg by mouth 2 times daily (with meals)      aspirin 81 MG EC tablet Take 81 mg by mouth daily       fluticasone-salmeterol (ADVAIR DISKUS) 100-50 MCG/DOSE diskus inhaler Inhale 2 puffs into the lungs 2 times daily wixela inhub inhaler ( generic advair diskus) 60 each 3    Insulin Pen Needle 32G X 4 MM MISC 1 each by Does not apply route daily 100 each 3    glucose monitoring kit (FREESTYLE) monitoring kit 1 kit by Does not apply route daily 1 kit 0    nitroGLYCERIN (NITROSTAT) 0.4 MG SL tablet Place 0.4 mg under the tongue every 5 minutes as needed      Respiratory Therapy Supplies (NEBULIZER) AALIYAH Used to give yourself breathing treatment 1 Device 0    acetaminophen (TYLENOL) 325 MG tablet Take 650 mg by mouth every 6 hours as needed for Pain         Allergies:  Melatonin    Social History:    Social History     Socioeconomic History    Marital status:      Spouse name: Not on file    Number of children: 1    Years of education: Not on file    Highest education level: Not on file   Occupational History    Occupation: disabled   Tobacco Use    Smoking status: Former Smoker     Packs/day: 0.50     Years: 44.00     Pack years: 22.00     Types: Cigarettes     Quit date: 10/20/2021     Years since quittin.2    Smokeless tobacco: Never Used    Tobacco comment: cutting down   Vaping Use    Vaping Use: Never used   Substance and Sexual Activity    Alcohol use: No     Alcohol/week: 0.0 standard drinks    Drug use: No    Sexual activity: Not Currently     Partners: Female   Other Topics Concern    Not on file   Social History Narrative    Not on file     Social Determinants of Health     Financial Resource Strain: Unknown    Difficulty of Paying Living Expenses: Patient refused   Food Insecurity: Unknown    Worried About Running Out of Food in the Last Year: Patient refused    [address] in the Last Year: Patient refused   Transportation Needs:     Lack of Transportation (Medical): Not on file    Lack of Transportation (Non-Medical):  Not on file   Physical Activity:     Days of Exercise per Week: Not on file    Minutes of Exercise per Session: Not on file   Stress:     Feeling of Stress : Not on file   Social Connections:     Frequency of Communication with Friends and Family: Not on file    Frequency of Social Gatherings with Friends and Family: Not on file    Attends Sab[employer] Services: Not on file    Active Member of [address] [place] [address] or Organizations: Not on file    Attends Club or Organization Meetings: Not on file    Marital Status: Not on file   Intimate Partner Violence:     Fear of Current or Ex-Partner: Not on file    Emotionally Abused: Not on file    Physically Abused: Not on file    Sexually Abused: Not on file   Housing Stability:     Unable to Pay for Housing in the Last Year: Not on file    Number of [place] in the Last Year: Not on file    Unstable Housing in the Last Year: Not on file       Family History:   Family History Problem Relation Age of Onset    Coronary Art Dis Father     Cancer Mother         Lung with mets    Diabetes Mother        Review of Systems:   Pertinent positives stated above in HPI. All other systems were reviewed and were negative.     Physical exam:   Constitutional:  VITALS:  BP (!) 106/55   Pulse 58   Temp 97.3 °F (36.3 °C) (Axillary)   Resp 16   Ht 6' (1.829 m)   Wt 198 lb 10.2 oz (90.1 kg)   SpO2 97%   BMI 26.94 kg/m²   Gen: alert, awake, nad  Skin: no rash, turgor wnl  Heent:  eomi, mmm  Neck: no bruits or jvd noted, thyroid normal  Cardiovascular:  S1, S2 without m/r/g  Respiratory: CTA B without w/r/r; respiratory effort normal  Abdomen:  +bs, soft, nt, nd, no hepatosplenomegaly  Ext: no lower extremity edema  Psychiatric: mood and affect appropriate; judgement and insight intact  Musculoskeletal:  Rom, muscular strength intact; digits, nails normal    Data/  CBC:   Lab Results   Component Value Date    WBC 4.2 02/01/2022    RBC 2.85 02/01/2022    HGB 9.3 02/01/2022    HCT 28.6 02/01/2022    .2 02/01/2022    MCH 32.4 02/01/2022    MCHC 32.4 02/01/2022    RDW 17.6 02/01/2022     02/01/2022    MPV 9.0 02/01/2022     BMP:    Lab Results   Component Value Date     02/01/2022    K 4.7 02/01/2022    K 4.2 01/29/2022     02/01/2022    CO2 23 02/01/2022    BUN 26 02/01/2022    LABALBU 3.7 01/28/2022    CREATININE 1.4 02/01/2022    CALCIUM 8.3 02/01/2022    GFRAA >60 02/01/2022    GFRAA >60 05/30/2013    LABGLOM 51 02/01/2022    LABGLOM 87.6 07/11/2011    GLUCOSE 100 02/01/2022    GLUCOSE 208 07/11/2011         Assessment/  1-LALITA suspect due to NSAID (TORADOL)  hemodynamic in nature due to ACE-I and Aldactone effect Baseline Cr 1-1.3 mg  U/A benign on admission   2-NSTEMI   3-CM clinically euvoluemic  4 Seizure suspected to be due to cardiac issues  5 HTN controlled    Plan/  1-Agree with stopping  Aldactone Will stop Lisinopril at this time Avoid Entresto Avoid NSAID's 2-Check U/A  3-Check CPK level  4 Monitor renal function closely    Thank you for the consultation. Please do not hesitate to call with questions.     Daniel Dempsey MD, [address]

## 2022-02-01 NOTE — PROGRESS NOTES
Clinical Pharmacy Note  Medication Counseling    Reviewed new medications started during hospital admission: apixaban, , . Indications and side effects were emphasized during counseling. All medication-related questions addressed. Patient verbalized understanding of education. Should the patient express any additional questions or concerns regarding their medications, please do not hesitate to contact the pharmacy department. Patient/caregiver aware they may refuse medications during hospital stay. 10 minutes spent educating patient regarding medications.

## 2022-02-01 NOTE — PROGRESS NOTES
Hospitalist  Progress Note       Edmund Vega is a 77 y.o. male   1955     SUBJECTIVE:   Patient seen and examined still complains of chest pain which is mostly constant cardiology evaluation noted creatinine noted to be up to 1.4 from 1.0  OBJECTIVE:    Review of Systems:  General appearance: alert, appears stated age and cooperative  Skin: Skin color, texture, normal. No rashes or lesions  HEENT: No nose bleed, headache, vision problems  CV: C/O chest pain, tightness, pressure,   Respiratory: C/o no SOB, KC, Orthopnea, PND  GI: No abdominal pain, black stool, bloating  Limbs: No c/o edema, pain, swelling, intermittent claudication, joint pains  Neuro: No dizziness, lightheadedness, syncope, gait problems, memory problems  Psych: grossly normal. No SI/depression. Vitals:   Blood pressure (!) 153/84, pulse 73, temperature 97.7 °F (36.5 °C), temperature source Oral, resp. rate 18, height 6' (1.829 m), weight 198 lb 10.2 oz (90.1 kg), SpO2 96 %.     HEENT: AT, NC, PERRLA  Neck: No JVD  Heart: S1 S2 audible, no murmur   Lungs: CTA   Abdomen: Nontender   Limbs: No edema   CNS: no focal deficit      Past Medical History:   Diagnosis Date    Anxiety     Arthritis     Asthma     CAD (coronary artery disease)     Calcium kidney stone     Cardiomyopathy ([address]     Cerebral artery occlusion with cerebral infarction ([address]     CHF (congestive heart failure) ([address]     Clostridium difficile diarrhea 02/12/2020    COPD (chronic obstructive pulmonary disease) ([facility])     mild    Depression     DM2 (diabetes mellitus, type 2) ([facility])     Fibromyalgia     GERD (gastroesophageal reflux disease)     Hyperlipidemia     Hypertension     Liver disease     Pacemaker 2012    Medtronic model # [id number]    Pneumonia     Seizures ([address]     TIA (transient ischemic attack) 2007    Ulcerative colitis ([address]         Patient Active Problem List   Diagnosis    Hx of CABG    NSTEMI (non-ST elevated myocardial infarction)    Essential hypertension    Ischemic cardiomyopathy    Hyperlipidemia LDL goal <70    Type 2 diabetes mellitus without complication, with long-term current use of insulin (HCC)    Anemia,normocytic normochromic ,mixed folic aci deficiency and iron deficiency    Morbid obesity due to excess calories [facility]    Anxiety    Coronary artery disease involving coronary bypass graft of native heart with angina pectoris (HCC)    Atypical chest pain    Tobacco abuse    Restrictive lung disease    Acute on chronic diastolic congestive heart failure (HCC)    Ischemic stroke, left frontal lobe (4/30/18) (HCC)    PFO (patent foramen ovale)    COPD (chronic obstructive pulmonary disease) (HCC)    Chronic systolic heart failure (HCC)    CAD (coronary artery disease)    Low back pain    Stroke determined by clinical assessment ([facility]    ICD (implantable cardioverter-defibrillator) discharge    Diabetic peripheral neuropathy ([facility])    Ventricular tachycardia (HCC)    Acute pulmonary embolism without acute cor pulmonale (HCC)    Acute chest pain    Paresthesia of upper and lower extremities of both sides    Intractable abdominal pain    Chest pain    Acute cerebrovascular accident (CVA) ([facility])        Allergies   Allergen Reactions    Melatonin Other (See Comments)     Restless leg syndrome          Current Inpatient Medications:    Current Facility-Administered Medications   Medication Dose Route Frequency Provider Last Rate Last Admin    carvedilol (COREG) tablet 25 mg  25 mg Oral BID  Diane M Enzweiler, APRN - CNP        ketorolac (TORADOL) injection 15 mg  15 mg IntraVENous Q8H PRN Baltazar Gorman MD   15 mg at 01/31/22 1747    spironolactone (ALDACTONE) tablet 25 mg  25 mg Oral Daily TAYLA Smith CNP   25 mg at 02/01/22 0810    amiodarone (CORDARONE) tablet 200 mg  200 mg Oral BID TAYLA Oswald CNP   200 mg at 02/01/22 0810    Followed by   Ashley Carballo ON 2/14/2022] amiodarone (CORDARONE) tablet 200 mg  200 mg Oral Daily TAYLA Boothe - CNP        apixaban (ELIQUIS) tablet 5 mg  5 mg Oral BID Ketyt Schwartz MD   5 mg at 02/01/22 7541    levETIRAcetam (KEPPRA) tablet 500 mg  500 mg Oral BID Mendy Solomon MD   500 mg at 02/01/22 0810    nicotine (NICODERM CQ) 21 MG/24HR 1 patch  1 patch TransDERmal Daily Sharon Crandall MD   1 patch at 02/01/22 0813    oxyCODONE-acetaminophen (PERCOCET)  MG per tablet 1 tablet  1 tablet Oral Q8H PRN Sharon Crandall MD   1 tablet at 02/01/22 2063    isosorbide mononitrate (IMDUR) extended release tablet 30 mg  30 mg Oral Daily TAYLA Patel CNP   30 mg at 02/01/22 0810    lisinopril (PRINIVIL;ZESTRIL) tablet 10 mg  10 mg Oral Daily TAYLA Patel CNP   10 mg at 01/31/22 0908    gabapentin (NEURONTIN) capsule 600 mg  600 mg Oral 5x Daily Sharon Crandall MD   600 mg at 02/01/22 1167    oxymetazoline (AFRIN) 0.05 % nasal spray 2 spray  2 spray Each Nostril BID TAYLA Hassan CNP   2 spray at 01/30/22 2029    ondansetron (ZOFRAN-ODT) disintegrating tablet 4 mg  4 mg Oral Q8H PRN Sharon Crandall MD        Or    ondansetron (ZOFRAN) injection 4 mg  4 mg IntraVENous Q6H PRN Sharla Roberson MD        polyethylene glycol (GLYCOLAX) packet 17 g  17 g Oral Daily PRN Sharla Roberson MD        perflutren lipid microspheres (DEFINITY) injection 1.65 mg  1.5 mL IntraVENous ONCE PRN Sharon Crandall MD        atorvastatin (LIPITOR) tablet 80 mg  80 mg Oral Nightly Sharon Crandall MD   80 mg at 01/31/22 2016    clopidogrel (PLAVIX) tablet 75 mg  75 mg Oral Daily Sharla Heller MD   75 mg at 02/01/22 0810    labetalol (NORMODYNE;TRANDATE) injection 10 mg  10 mg IntraVENous Q10 Min PRN Sharla Roberson MD        perflutren lipid microspheres (DEFINITY) injection 1.65 mg  1.5 mL IntraVENous ONCE PRN Sharla Roberson MD        nitroGLYCERIN (NITROSTAT) SL tablet 0.4 mg  0.4 mg SubLINGual Q5 Min PRN Mitchell Linder MD   0.4 mg at 01/30/22 2032           Labs:  CBC with Differential:    Lab Results   Component Value Date    WBC 4.2 02/01/2022    RBC 2.85 02/01/2022    HGB 9.3 02/01/2022    HCT 28.6 02/01/2022     02/01/2022    .2 02/01/2022    MCH 32.4 02/01/2022    MCHC 32.4 02/01/2022    RDW 17.6 02/01/2022    SEGSPCT 48.6 05/28/2013    LYMPHOPCT 39.6 02/01/2022    MONOPCT 10.3 02/01/2022    EOSPCT 2.2 01/17/2011    BASOPCT 1.0 02/01/2022    MONOSABS 0.4 02/01/2022    LYMPHSABS 1.7 02/01/2022    EOSABS 0.1 02/01/2022    BASOSABS 0.0 02/01/2022    DIFFTYPE Auto 05/28/2013     CMP:    Lab Results   Component Value Date     02/01/2022    K 4.7 02/01/2022    K 4.2 01/29/2022     02/01/2022    CO2 23 02/01/2022    BUN 26 02/01/2022    CREATININE 1.4 02/01/2022    GFRAA >60 02/01/2022    GFRAA >60 05/30/2013    AGRATIO 1.3 01/28/2022    LABGLOM 51 02/01/2022    LABGLOM 87.6 07/11/2011    GLUCOSE 100 02/01/2022    GLUCOSE 208 07/11/2011    PROT 6.5 01/28/2022    PROT 7.8 03/13/2013    LABALBU 3.7 01/28/2022    CALCIUM 8.3 02/01/2022    BILITOT <0.2 01/28/2022    ALKPHOS 72 01/28/2022    AST 14 01/28/2022    ALT 7 01/28/2022     Hepatic Function Panel:    Lab Results   Component Value Date    ALKPHOS 72 01/28/2022    ALT 7 01/28/2022    AST 14 01/28/2022    PROT 6.5 01/28/2022    PROT 7.8 03/13/2013    BILITOT <0.2 01/28/2022    BILIDIR <0.2 06/21/2021    IBILI see below 06/21/2021    LABALBU 3.7 01/28/2022     Magnesium:    Lab Results   Component Value Date    MG 2.30 04/01/2021     PT/INR:    Lab Results   Component Value Date    PROTIME 9.6 01/28/2022    INR 0.86 01/28/2022     Last 3 Troponin:    Lab Results   Component Value Date    TROPONINI 0.13 01/31/2022    TROPONINI 0.13 01/30/2022    TROPONINI 0.12 01/28/2022     U/A:    Lab Results   Component Value Date    NITRITE neg 05/23/2014    COLORU YELLOW 01/28/2022    PHUR 6.0 01/28/2022 PHUR 6.0 01/28/2022    WBCUA 1 01/28/2022    RBCUA 0 01/28/2022    CLARITYU Clear 01/28/2022    SPECGRAV 1.027 01/28/2022    LEUKOCYTESUR Negative 01/28/2022    UROBILINOGEN 0.2 01/28/2022    BILIRUBINUR Negative 01/28/2022    BILIRUBINUR neg 05/23/2014    BLOODU Negative 01/28/2022    GLUCOSEU Negative 01/28/2022     ABG:    Lab Results   Component Value Date    PHART 7.345 05/21/2018    PHS3ANI 48.0 05/21/2018    PO2ART 114.0 05/21/2018    YQS1XWS 25.5 05/21/2018    BEART -0.2 05/21/2018    OUK7LXS 27.0 05/21/2018    H3DZQQMO 98.4 05/21/2018     FLP:    Lab Results   Component Value Date    TRIG 94 01/29/2022    HDL 50 01/29/2022    LDLCALC 75 01/29/2022    LDLDIRECT 105 09/17/2015    LABVLDL 19 01/29/2022     TSH:    Lab Results   Component Value Date    TSH 5.30 04/25/2019      DATA:   ECG: Sinus Rhythm       ASSESSMENT:   1 seizure neurology evaluation noted  Patient on Keppra no more seizure episode  2 non-STEMI cardiology evaluation noted medical treatment  Still has chest pain but mostly constant likely noncardiac chest pain  3 hypertension relatively well controlled  4 CAD with history of a CABG  Medical treatment  5 acute kidney injury creatinine 1.4  Consult nephrology  6 history of CVA TIA  7 diabetes continue sliding scale  8 cardiomyopathy EF 35 to 40% appears to be at his baseline  9 tachycardia EP evaluation noted with V. tach atrial fib flutter  PLAN   Nephrology consult  Management cardiology and neurology  Discharge planning when okay from consultant's standpoint

## 2022-02-02 VITALS
OXYGEN SATURATION: 92 % | TEMPERATURE: 98.2 F | RESPIRATION RATE: 18 BRPM | HEIGHT: 72 IN | BODY MASS INDEX: 26.93 KG/M2 | WEIGHT: 198.85 LBS | DIASTOLIC BLOOD PRESSURE: 61 MMHG | HEART RATE: 74 BPM | SYSTOLIC BLOOD PRESSURE: 113 MMHG

## 2022-02-02 LAB
ANION GAP SERPL CALCULATED.3IONS-SCNC: 10 MMOL/L (ref 3–16)
BASOPHILS ABSOLUTE: 0 K/UL (ref 0–0.2)
BASOPHILS RELATIVE PERCENT: 0.8 %
BUN BLDV-MCNC: 26 MG/DL (ref 7–20)
CALCIUM SERPL-MCNC: 8.1 MG/DL (ref 8.3–10.6)
CHLORIDE BLD-SCNC: 104 MMOL/L (ref 99–110)
CO2: 22 MMOL/L (ref 21–32)
CREAT SERPL-MCNC: 1.2 MG/DL (ref 0.8–1.3)
EOSINOPHILS ABSOLUTE: 0.1 K/UL (ref 0–0.6)
EOSINOPHILS RELATIVE PERCENT: 2.6 %
GFR AFRICAN AMERICAN: >60
GFR NON-AFRICAN AMERICAN: >60
GLUCOSE BLD-MCNC: 123 MG/DL (ref 70–99)
HCT VFR BLD CALC: 29.1 % (ref 40.5–52.5)
HEMOGLOBIN: 9.7 G/DL (ref 13.5–17.5)
LYMPHOCYTES ABSOLUTE: 1.2 K/UL (ref 1–5.1)
LYMPHOCYTES RELATIVE PERCENT: 29 %
MCH RBC QN AUTO: 33 PG (ref 26–34)
MCHC RBC AUTO-ENTMCNC: 33.4 G/DL (ref 31–36)
MCV RBC AUTO: 98.9 FL (ref 80–100)
MONOCYTES ABSOLUTE: 0.4 K/UL (ref 0–1.3)
MONOCYTES RELATIVE PERCENT: 10.3 %
NEUTROPHILS ABSOLUTE: 2.4 K/UL (ref 1.7–7.7)
NEUTROPHILS RELATIVE PERCENT: 57.3 %
PDW BLD-RTO: 17.1 % (ref 12.4–15.4)
PLATELET # BLD: 161 K/UL (ref 135–450)
PMV BLD AUTO: 9.5 FL (ref 5–10.5)
POTASSIUM SERPL-SCNC: 4.9 MMOL/L (ref 3.5–5.1)
RBC # BLD: 2.94 M/UL (ref 4.2–5.9)
SODIUM BLD-SCNC: 136 MMOL/L (ref 136–145)
WBC # BLD: 4.2 K/UL (ref 4–11)

## 2022-02-02 PROCEDURE — 97530 THERAPEUTIC ACTIVITIES: CPT

## 2022-02-02 PROCEDURE — 80048 BASIC METABOLIC PNL TOTAL CA: CPT

## 2022-02-02 PROCEDURE — 36415 COLL VENOUS BLD VENIPUNCTURE: CPT

## 2022-02-02 PROCEDURE — 6370000000 HC RX 637 (ALT 250 FOR IP): Performed by: INTERNAL MEDICINE

## 2022-02-02 PROCEDURE — 6370000000 HC RX 637 (ALT 250 FOR IP): Performed by: NURSE PRACTITIONER

## 2022-02-02 PROCEDURE — 85025 COMPLETE CBC W/AUTO DIFF WBC: CPT

## 2022-02-02 PROCEDURE — 94760 N-INVAS EAR/PLS OXIMETRY 1: CPT

## 2022-02-02 PROCEDURE — 6370000000 HC RX 637 (ALT 250 FOR IP): Performed by: STUDENT IN AN ORGANIZED HEALTH CARE EDUCATION/TRAINING PROGRAM

## 2022-02-02 RX ORDER — ATORVASTATIN CALCIUM 80 MG/1
80 TABLET, FILM COATED ORAL NIGHTLY
Qty: 30 TABLET | Refills: 3 | Status: ON HOLD | OUTPATIENT
Start: 2022-02-02 | End: 2022-04-26 | Stop reason: HOSPADM

## 2022-02-02 RX ORDER — GABAPENTIN 300 MG/1
600 CAPSULE ORAL
Qty: 90 CAPSULE | Refills: 3 | Status: SHIPPED | OUTPATIENT
Start: 2022-02-02 | End: 2022-02-16

## 2022-02-02 RX ORDER — AMIODARONE HYDROCHLORIDE 200 MG/1
200 TABLET ORAL DAILY
Qty: 60 TABLET | Refills: 1 | Status: SHIPPED | OUTPATIENT
Start: 2022-02-03 | End: 2022-03-05

## 2022-02-02 RX ORDER — NICOTINE 21 MG/24HR
1 PATCH, TRANSDERMAL 24 HOURS TRANSDERMAL DAILY
Qty: 30 PATCH | Refills: 3 | Status: SHIPPED | OUTPATIENT
Start: 2022-02-03

## 2022-02-02 RX ORDER — LEVETIRACETAM 500 MG/1
500 TABLET ORAL 2 TIMES DAILY
Qty: 60 TABLET | Refills: 3 | Status: SHIPPED | OUTPATIENT
Start: 2022-02-02

## 2022-02-02 RX ADMIN — OXYCODONE AND ACETAMINOPHEN 1 TABLET: 10; 325 TABLET ORAL at 06:48

## 2022-02-02 RX ADMIN — APIXABAN 5 MG: 5 TABLET, FILM COATED ORAL at 09:18

## 2022-02-02 RX ADMIN — CLOPIDOGREL BISULFATE 75 MG: 75 TABLET ORAL at 09:18

## 2022-02-02 RX ADMIN — LEVETIRACETAM 500 MG: 500 TABLET, FILM COATED ORAL at 09:18

## 2022-02-02 RX ADMIN — CARVEDILOL 25 MG: 25 TABLET, FILM COATED ORAL at 09:22

## 2022-02-02 RX ADMIN — GABAPENTIN 600 MG: 300 CAPSULE ORAL at 06:48

## 2022-02-02 RX ADMIN — AMIODARONE HYDROCHLORIDE 200 MG: 200 TABLET ORAL at 09:18

## 2022-02-02 RX ADMIN — ISOSORBIDE MONONITRATE 30 MG: 30 TABLET, EXTENDED RELEASE ORAL at 09:18

## 2022-02-02 RX ADMIN — GABAPENTIN 600 MG: 300 CAPSULE ORAL at 09:18

## 2022-02-02 ASSESSMENT — PAIN SCALES - GENERAL
PAINLEVEL_OUTOF10: 7
PAINLEVEL_OUTOF10: 0
PAINLEVEL_OUTOF10: 0
PAINLEVEL_OUTOF10: 3

## 2022-02-02 NOTE — DISCHARGE SUMMARY
Hospital Medicine Discharge Summary    Danielle Plascencia  :  1955  MRN:  [id number]    Admit date:  2022  Discharge date:  2022    Admitting Physician:  Allison Pond MD  Primary Care Physician:  Kaye Dalton MD      Discharge Diagnoses: Active Problems:    Acute cerebrovascular accident (CVA) ([address]  Resolved Problems:    * No resolved hospital problems. *      Hospital Course:   Danielle Plascencia is a 77 y.o. male that was admitted and treated at [facility] for the following medical issues:   Patient with history of CAD had a CABG in the past, diabetes, hypertension, high cholesterol, pacemaker, CKD  Patient admitted following a seizure episode was seen and followed by neurology started on Keppra no more seizure episode also had non-STEMI seen by cardiology medical treatment chest pain is constant appears to be noncardiac  Also had acute kidney injury seen by nephrology creatinine is back to baseline  And is now stable to be discharged home with home health care and follow-up with cardiology and primary physician    Active Problems:    Acute cerebrovascular accident (CVA) [facility])  Resolved Problems:    * No resolved hospital problems.  *      Patient was seen by the following consultants while admitted to [facility]:   Consults:  Fred Granados  IP CONSULT TO STROKE TEAM  IP CONSULT TO NEUROLOGY  IP CONSULT TO SOCIAL WORK  IP CONSULT TO NEPHROLOGY    Significant Diagnostic Studies:    Echo Limited    Result Date: 2022  Transthoracic Echocardiography Report (TTE)  Demographics   Patient Name       [facility]   Date of Study      2022         Gender              Male   Patient Number     [id number]         Date of Birth       1955   Visit Number       [id number]          Age                 77 year(s)   Accession Number   [id number]         Room Number         7486   Corporate ID       [id number] Angela Goldman   Ordering Physician Opal Márquez, Interpreting        Taras Lares.                     MD                 Physician           Kina Gilbert MD  Procedure Type of Study   TTE procedure:ECHOCARDIOGRAM LIMITED. Procedure Date Date: 01/28/2022 Start: 03:08 PM Study Location: [facility] Echo Lab Technical Quality: Adequate visualization Additional Indications:Assess LVF. Patient Status: Routine Height: 72 inches Weight: 192 pounds BSA: 2.09 m2 BMI: 26.04 kg/m2 HR: 87 bpm BP: 143/77 mmHg  Conclusions   Summary  Limited study. Overall left ventricular systolic function appears moderately reduced. Ejection fraction is visually estimated to be 35-40% with diffuse  hypokinesis. There is mildly increased left ventricular wall thickness. Left  ventricular cavity size is normal. Diastolic filling parameters suggest  grade I diastolic dysfunction. Moderate mitral regurgitation. Signature   ------------------------------------------------------------------  Electronically signed by Jamie Yanez MD  (Interpreting physician) on 01/28/2022 at 04:19 PM  ------------------------------------------------------------------   Findings   Left Ventricle  Limited study. Overall left ventricular systolic function appears moderately reduced. Ejection fraction is visually estimated to be 35-40% with diffuse  hypokinesis. There is mildly increased left ventricular wall thickness. Left ventricular cavity size is normal. Diastolic filling parameters suggest  grade I diastolic dysfunction. Mitral Valve  Mitral valve is structurally normal.  Moderate mitral regurgitation. Left Atrium  The left atrium is moderately dilated. Right Ventricle  Pacer / ICD wire is visualized in the right ventricle. Pericardial Effusion  No pericardial effusion noted. Pleural Effusion  No pleural effusion.   M-Mode/2D Measurements (cm)   LV Diastolic Dimension: 3.13 cm LV Systolic Dimension: 6.00 cm  LV Septum Diastolic: 2.55 cm  LV PW Diastolic: 1.6 cm                                  LA Area: 29.7 cm2                                  LA volume/Index: 101 ml /48 ml/m2  Doppler Measurements                                  MV Peak E-Wave: 74.5 cm/s                                 MV Peak A-Wave: 107 cm/s                                 MV E/A Ratio: 0.7   E' Septal Velocity: 6.23 cm/s  E' Lateral Velocity: 7.02 cm/s                                 MV Deceleration Time: 148 msec      CT HEAD WO CONTRAST    Result Date: 1/28/2022  EXAMINATION: CT OF THE HEAD WITHOUT CONTRAST  1/28/2022 3:18 pm TECHNIQUE: CT of the head was performed without the administration of intravenous contrast. Dose modulation, iterative reconstruction, and/or weight based adjustment of the mA/kV was utilized to reduce the radiation dose to as low as reasonably achievable. COMPARISON: CT head without contrast, 01/28/2022.  10:05 a.m. HISTORY: ORDERING SYSTEM PROVIDED HISTORY: fu ?stroke.  has pacemaker not mri compatible. TECHNOLOGIST PROVIDED HISTORY: Reason for exam:->fu ?stroke.  has pacemaker not mri compatible. Has a \"code stroke\" or \"stroke alert\" been called? ->No Reason for Exam: fu ?stroke. has pacemaker not mri compatible. FINDINGS: BRAIN/VENTRICLES: Compared to the CT of the head from earlier today, no significant interval change is seen. Specifically, no evolving acute infarction is evident. No hemorrhage, mass effect or midline shift. Multifocal small chronic infarctions are seen in the brain, as described earlier. Better visualized on the current study is a small chronic lacunar infarction in the left manuel. No hydrocephalus or extra-axial fluid is seen. ORBITS: The visualized portion of the orbits demonstrate no acute abnormality. SINUSES: The visualized paranasal sinuses and mastoid air cells demonstrate no acute abnormality.  SOFT TISSUES/SKULL:  No acute abnormality of the visualized skull or soft tissues. 1.  No acute intracranial abnormality. 2. Specifically, no evidence an acute infarction. 3. Multifocal chronic infarctions, as described on the earlier CT. Small chronic lacunar infarction in the left manuel is better visualized on the current study and is also chronic. RECOMMENDATIONS: Unavailable     CT HEAD WO CONTRAST    Result Date: 1/28/2022  EXAMINATION: CT OF THE HEAD WITHOUT CONTRAST  1/28/2022 9:58 am TECHNIQUE: CT of the head was performed without the administration of intravenous contrast. Dose modulation, iterative reconstruction, and/or weight based adjustment of the mA/kV was utilized to reduce the radiation dose to as low as reasonably achievable. COMPARISON: CT head without contrast, 03/31/2021. HISTORY: ORDERING SYSTEM PROVIDED HISTORY: Stroke Symptoms TECHNOLOGIST PROVIDED HISTORY: Has a \"code stroke\" or \"stroke alert\" been called? ->Yes Reason for exam:->Stroke Symptoms Decision Support Exception - unselect if not a suspected or confirmed emergency medical condition->Emergency Medical Condition (MA) FINDINGS: BRAIN/VENTRICLES: No mass effect, edema or hemorrhage is seen. Small areas of chronic infarctions are seen in the left frontal and right parietal lobes. Small chronic infarction is also seen in the left occipital lobe. Chronic lacunar infarction is seen in the anterior limb of the right internal capsule. No significant volume loss is seen in the brain. No hydrocephalus or extra-axial fluid is seen. ORBITS: The visualized portion of the orbits demonstrate no acute abnormality. SINUSES: Mild scattered mucosal thickening in the paranasal sinuses. The mastoids are clear. SOFT TISSUES/SKULL:  No acute abnormality of the visualized skull or soft tissues. 1.  No acute intracranial abnormality. 2. Small chronic infarctions, as described.  RECOMMENDATIONS: Unavailable     XR CHEST PORTABLE    Result Date: 1/28/2022  EXAMINATION: ONE XRAY VIEW OF THE CHEST 1/28/2022 10:41 am COMPARISON: 12/01/2021 HISTORY: ORDERING SYSTEM PROVIDED HISTORY: stroke symptoms TECHNOLOGIST PROVIDED HISTORY: Reason for exam:->stroke symptoms Reason for Exam: stroke symptoms FINDINGS: Left-sided bipolar cardiac pacer/defibrillator is unchanged in configuration. The lungs are clear. Heart size and vascularity are stable. There is no pneumothorax or effusion. Postop changes of CABG are present. No acute pulmonary process. CTA HEAD NECK W CONTRAST    Result Date: 1/28/2022  EXAMINATION: CTA OF THE HEAD AND NECK WITH CONTRAST 1/28/2022 9:58 am: TECHNIQUE: CTA of the head and neck was performed with the administration of intravenous contrast. Multiplanar reformatted images are provided for review. MIP images are provided for review. Stenosis of the internal carotid arteries measured using NASCET criteria. Dose modulation, iterative reconstruction, and/or weight based adjustment of the mA/kV was utilized to reduce the radiation dose to as low as reasonably achievable. COMPARISON: Noncontrast CT head from earlier today, CTA head and neck March 31, 2021 HISTORY: ORDERING SYSTEM PROVIDED HISTORY: Stroke Symptoms TECHNOLOGIST PROVIDED HISTORY: Has a \"code stroke\" or \"stroke alert\" been called? ->Yes Reason for exam:->Stroke Symptoms Decision Support Exception - unselect if not a suspected or confirmed emergency medical condition->Emergency Medical Condition (MA) FINDINGS: CTA NECK: AORTIC ARCH/ARCH VESSELS: No dissection or arterial injury. No significant stenosis of the brachiocephalic or subclavian arteries. CAROTID ARTERIES: There is 50% stenosis in the proximal left internal carotid artery. No dissection, arterial injury, or hemodynamically significant stenosis in the remainder of the bilateral internal or common carotid arteries by NASCET criteria. VERTEBRAL ARTERIES: There is 80% stenosis at the origin of the right vertebral artery. No dissection, arterial injury, or significant stenosis in the remainder of the bilateral vertebral arteries. SOFT TISSUES: There is bronchial wall thickening, likely related to small airway disease or bronchiolitis. No cervical or superior mediastinal lymphadenopathy. The larynx and pharynx are unremarkable. No acute abnormality of the salivary and thyroid glands. BONES: No acute osseous abnormality. CTA HEAD: ANTERIOR CIRCULATION: There is 50% stenosis in the cavernous/supraclinoid segments of the bilateral internal carotid arteries. No significant stenosis of the anterior cerebral, or middle cerebral arteries. No aneurysm. POSTERIOR CIRCULATION: There is 50% stenosis in the intracranial right vertebral artery. No significant stenosis of the left vertebral, basilar, or bilateral posterior cerebral arteries. No aneurysm. OTHER: No dural venous sinus thrombosis on this non-dedicated study. BRAIN: There are old infarctions in the posterior left frontal lobe left occipital lobe and posterior right temporal lobe. There is old lacunar infarct in the left central manuel. No acute territorial infarction. No mass effect or midline shift. No extra-axial fluid collection. The gray-white differentiation is maintained. 50% stenosis in the proximal left internal carotid artery. 80% stenosis at the origin of the right vertebral artery. 50% stenosis in the cavernous/supraclinoid segments of the bilateral internal carotid arteries. 50% stenosis in the intracranial right vertebral artery.        Discharge Medications:         Medication List      START taking these medications    amiodarone 200 MG tablet  Commonly known as: CORDARONE  Take 1 tablet by mouth daily  Start taking on: February 3, 2022     atorvastatin 80 MG tablet  Commonly known as: LIPITOR  Take 1 tablet by mouth nightly     levETIRAcetam 500 MG tablet  Commonly known as: KEPPRA  Take 1 tablet by mouth 2 times daily     nicotine 21 MG/24HR  Commonly known as: [id number] [facility] 1 patch onto the skin daily  Start taking on: February 3, 2022        CHANGE how you take these medications    * apixaban 5 MG Tabs tablet  Commonly known as: ELIQUIS  Take 1 tablet by mouth 2 times daily  What changed: You were already taking a medication with the same name, and this prescription was added. Make sure you understand how and when to take each. * apixaban 5 MG Tabs tablet  Commonly known as: Eliquis  Take 1 tablet by mouth 2 times daily  What changed: Another medication with the same name was added. Make sure you understand how and when to take each. * gabapentin 600 MG tablet  Commonly known as: NEURONTIN  TAKE ONE TABLET BY MOUTH FIVE TIMES A DAY  What changed: Another medication with the same name was added. Make sure you understand how and when to take each. * gabapentin 300 MG capsule  Commonly known as: NEURONTIN  Take 2 capsules by mouth 5 times daily for 30 days. What changed: You were already taking a medication with the same name, and this prescription was added. Make sure you understand how and when to take each. * This list has 4 medication(s) that are the same as other medications prescribed for you. Read the directions carefully, and ask your doctor or other care provider to review them with you.             CONTINUE taking these medications    acetaminophen 325 MG tablet  Commonly known as: TYLENOL     amLODIPine 5 MG tablet  Commonly known as: NORVASC     aspirin 81 MG EC tablet     carvedilol 25 MG tablet  Commonly known as: COREG     fluticasone-salmeterol 100-50 MCG/DOSE diskus inhaler  Commonly known as: Advair Diskus  Inhale 2 puffs into the lungs 2 times daily wixela inhub inhaler ( generic advair diskus)     glucose monitoring kit  1 kit by Does not apply route daily     Insulin Pen Needle 32G X 4 MM Misc  1 each by Does not apply route daily     isosorbide mononitrate 60 MG extended release tablet  Commonly known as: IMDUR methocarbamol 750 MG tablet  Commonly known as: ROBAXIN     Nebulizer Tanna  Used to give yourself breathing treatment     nitroGLYCERIN 0.4 MG SL tablet  Commonly known as: NITROSTAT     oxyCODONE-acetaminophen  MG per tablet  Commonly known as: PERCOCET  Take 1 tablet by mouth every 8 hours as needed for Pain for up to 30 days. traZODone 50 MG tablet  Commonly known as: DESYREL  Take 1 tablet by mouth nightly     zolpidem 5 MG tablet  Commonly known as: Ambien  Take 1 tablet by mouth nightly as needed for Sleep for up to 30 days. Where to Get Your Medications      These medications were sent to [facility], [address] [place] [address]    Phone: [phone]   · apixaban 5 MG Tabs tablet  · atorvastatin 80 MG tablet  · levETIRAcetam 500 MG tablet  · nicotine 21 MG/24HR     You can get these medications from any pharmacy    Bring a paper prescription for each of these medications  · amiodarone 200 MG tablet  · apixaban 5 MG Tabs tablet  · gabapentin 300 MG capsule         Disposition:   Discharged to Home. Any [facility] needs that were indicated and/or required as been addressed and set up by Social Work. Condition at discharge: Pt was medically stable at the time of discharge. Activity: activity as tolerated    Total time taken for discharging this patient: 40 minutes. Greater than 70% of time was spent focused exclusively on this patient. Time was taken to review chart, discuss plans with consultants, reconciling medications, discussing plan answering questions with patient.      Signed:  Anabela Hunt MD  2/2/2022, 11:43 AM  ----------------------------------------------------------------------------------------------------------------------    Tad Noe,     Please return to ER or call 911 if you develop any significant signs or symptoms.     I may not have addressed all of your medical illnesses or the abnormal blood work or imaging therefore please ask your PCP, Guilherme Mcqueen MD ,  to obtain [facility] record to follow up on all of the abnormal labs, imaging and findings that I have and have not addressed during your hospitalization.      Discharging you from the hospital does not mean that your medical care ends here and now. You may still need additional work up, investigation, monitoring, and treatment to be handled from this point on by outside providers including your PCP, Guilherme Mcqueen MD , Specialists and other healthcare providers.      Please review your list of discharge medications prior to resuming medications you might still have at home, as the medications you need to be taking, dosages or how often you must take them may have changed. For medication questions, contact your retail pharmacy and your PCP, Guilherme Mcqueen MD .     ** I STRONGLY RECOMMEND that you follow up with Guilherme Mcqueen MD within 3 to 5 days for a post hospitalization evaluation. This specific office visit is covered by your insurance, and is not the same as your annual doctor visit/ check up. This office visit is important, as it may prevent need for repeat and/or future hospitalizations. **    Your medical team at [facility] ([facility]) appreciates the opportunity to work with you to get well!     Sincerely,  John Clark MD

## 2022-02-02 NOTE — CARE COORDINATION
[facility]    DC order noted, all docs needed have been faxed to [facility] for home care services.     Home care to see patient within 24-48 hrs    Dejuan Moe RN, BSN CTN  [facility] [phone]

## 2022-02-02 NOTE — DISCHARGE INSTR - COC
Continuity of Care Form    Patient Name: Joan Rodas   :  1955  MRN:  [id number]    Admit date:  2022  Discharge date:  ***    Code Status Order: Full Code   Advance Directives:      Admitting Physician:  ePdro Thakur MD  PCP: Gena Garcia MD    Discharging Nurse: [facility] Unit/Room#: F9F-1542/6258-97  Discharging Unit Phone Number: ***    Emergency Contact:   Extended Emergency Contact Information  Primary Emergency Contact: Dat Perez  Address: [address] [place] [address], [facility] [address] Phone: [phone]  Relation: Spouse  Secondary Emergency Contact: [facility] Phone: [phone]  Work Phone: [phone]  Relation: Child    Past Surgical History:  Past Surgical History:   Procedure Laterality Date    APPENDECTOMY      BACK SURGERY      CARDIAC SURGERY      CHOLECYSTECTOMY      COLONOSCOPY  01/10/2017    COLONOSCOPY  01/10/2017    CORONARY ANGIOPLASTY WITH STENT PLACEMENT      CORONARY ARTERY BYPASS GRAFT  , 2015    ENDOSCOPY, COLON, DIAGNOSTIC      PACEMAKER PLACEMENT      UPPER GASTROINTESTINAL ENDOSCOPY  2017    VASCULAR SURGERY         Immunization History:   Immunization History   Administered Date(s) Administered    COVID-19, Pfizer Purple top, DILUTE for use, 12+ yrs, 30mcg/0.3mL dose 2021, 2021    Influenza 2013    Influenza Vaccine, unspecified formulation 10/24/2011, 2013, 2015, 2017    Influenza Virus Vaccine 2014, 2015, 10/22/2018, 2019    Influenza, Ramses Patrice, IM, (6 mo and older Fluzone, Flulaval, Fluarix and 3 yrs and older Afluria) 10/17/2018    Influenza, Quadv, IM, PF (6 mo and older Fluzone, Flulaval, Fluarix, and 3 yrs and older Afluria) 10/17/2016, 2019, 2020    Influenza, Quadv, adjuvanted, 65 yrs +, IM, PF (Fluad) 10/25/2021, 2022    Pneumococcal Polysaccharide ([id number]) 10/24/2011, 2015, 10/25/2021    Tdap (Boostrix, Adacel) 05/04/2015       Active Problems:  Patient Active Problem List   Diagnosis Code    Hx of CABG Z95.1    NSTEMI (non-ST elevated myocardial infarction) I21.4    Essential hypertension I10    Ischemic cardiomyopathy I25.5    Hyperlipidemia LDL goal <70 E78.5    Type 2 diabetes mellitus without complication, with long-term current use of insulin ([facility]) E11.9, Z79.4    Anemia,normocytic normochromic ,mixed folic aci deficiency and iron deficiency D64.9    Morbid obesity due to excess calories ([facility]) E66.01    Anxiety F41.9    Coronary artery disease involving coronary bypass graft of native heart with angina pectoris ([facility]) I25.709    Atypical chest pain R07.89    Tobacco abuse Z72.0    Restrictive lung disease J98.4    Acute on chronic diastolic congestive heart failure ([facility]) I50.33    Ischemic stroke, left frontal lobe (4/30/18) ([facility]) I63.9    PFO (patent foramen ovale) Q21.1    COPD (chronic obstructive pulmonary disease) ([facility]) J44.9    Chronic systolic heart failure ([facility]) I50.22    CAD (coronary artery disease) I25.10    Low back pain M54.50    Stroke determined by clinical assessment ([facility] I63.9    ICD (implantable cardioverter-defibrillator) discharge Z45.02    Diabetic peripheral neuropathy ([facility]) E11.42    Ventricular tachycardia ([facility]) I47.2    Acute pulmonary embolism without acute cor pulmonale ([facility]) I26.99    Acute chest pain R07.9    Paresthesia of upper and lower extremities of both sides R20.2    Intractable abdominal pain R10.9    Chest pain R07.9    Acute cerebrovascular accident (CVA) ([facility] I63.9       Isolation/Infection:   Isolation            No Isolation          Patient Infection Status       Infection Onset Added Last Indicated Last Indicated By Review Planned Expiration Resolved Resolved By    None active    Resolved    C-diff Rule Out 07/30/20 07/30/20 08/17/20 Clostridium Difficile Toxin/Antigen (Ordered)   09/05/20 Citlalli Del Cid RN    COVID-19 (Rule Out) 07/06/20 07/06/20 07/06/20 COVID-19 (Ordered)   07/07/20 Rule-Out Test Resulted    COVID-19 (Rule Out) 05/23/20 05/23/20 05/23/20 COVID-19 (Ordered)   05/23/20 Rule-Out Test Resulted    C-diff Rule Out 03/07/20 03/07/20 03/10/20 Clostridium Difficile Toxin/Antigen (Ordered)   03/10/20 Rule-Out Test Resulted            Nurse Assessment:  Last Vital Signs: BP (!) 152/80   Pulse 68   Temp 98.6 °F (37 °C) (Oral)   Resp 20   Ht 6' (1.829 m)   Wt 198 lb 13.7 oz (90.2 kg)   SpO2 97%   BMI 26.97 kg/m²     Last documented pain score (0-10 scale): Pain Level: 3  Last Weight:   Wt Readings from Last 1 Encounters:   02/02/22 198 lb 13.7 oz (90.2 kg)     Mental Status:  oriented and alert    IV Access:  - None    Nursing Mobility/ADLs:  Walking   Independent  Transfer  Independent  Bathing  Independent  Dressing  Independent  Toileting  Independent  Feeding  [address]  Independent  Med Delivery   whole    Wound Care Documentation and Therapy:        Elimination:  Continence: Bowel: No  Bladder: Yes  Urinary Catheter: None   Colostomy/Ileostomy/Ileal Conduit: {YES / [id number]}       Date of Last BM: ***    Intake/Output Summary (Last 24 hours) at 2/2/2022 1123  Last data filed at 2/2/2022 0536  Gross per 24 hour   Intake 1020 ml   Output 1875 ml   Net -855 ml     I/O last 3 completed shifts: In: 1560 [P.O.:1560]  Out: [address] [facility] [Urine:1875]    Safety Concerns: At Risk for Falls and History of Seizures    Impairments/Disabilities:      None    Nutrition Therapy:  Current Nutrition Therapy:   - Oral Diet:  General    Routes of Feeding: None  Liquids: Thin Liquids  Daily Fluid Restriction: no  Last Modified Barium Swallow with Video (Video Swallowing Test): not done    Treatments at the Time of Hospital Discharge:   Respiratory Treatments: Duo Nebs  Oxygen Therapy:  is not on home oxygen therapy.   Ventilator:    - No ventilator support    Rehab Therapies: Physical Therapy, Occupational Therapy  Weight Bearing Status/Restrictions: No weight bearing restirctions  Other Medical Equipment (for information only, NOT a DME order):  walker  [facility]: [address]  to establish plan of care for patient over 60 day period   Nursing  Initial home SN evaluation visit to occur within 24-48 hours for:  1)  medication management  2)  VS and clinical assessment  3)  S&S chronic disease exacerbation education + when to contact MD/NP  4)  care coordination  Medication Reconciliation during 1st SN visit  PT/OT/Speech   Evaluations in home within 24-48 hours of discharge to include DME and home safety   Frontload therapy 5 days, then 3x a week   OT to evaluate if patient has [address] needs for personal care    evaluation within 24-48 hours to evaluate resources & insurance for potential AL, [place], LTC, and Medicaid options   Palliative Care referral within 5 days of hospital discharge   PCP Visit scheduled within 3 - 7 days of hospital discharge    [address] (If patient is agreeable and meets guidelines)    Other Treatments:Patient's personal belongings (please select all that are sent with patient):  None    RN SIGNATURE:  Electronically signed by Mian Costa RN on 2/2/22 at 12:20 PM EST    CASE MANAGEMENT/SOCIAL WORK SECTION    Inpatient Status Date: 1/28/2022    Readmission Risk Assessment Score:  Readmission Risk              Risk of Unplanned Readmission:  31         Discharging to Facility/ Agency   Name:     [facility]  Address:  [address] [place] [address],  [address], [address] [id number]  Phone:     [phone]  Fax:         [phone]    / signature: Electronically signed by BEATA King, LSW on 2/2/22 at 11:24 AM EST    PHYSICIAN SECTION    Prognosis: Good    Condition at Discharge: Stable    Rehab Potential (if transferring to Rehab): Good    Recommended Labs or Other Treatments After Discharge:     Physician Certification: I certify the above information and transfer of [facility]  is necessary for the continuing treatment of the diagnosis listed and that he requires [address] for greater 30 days.      Update Admission H&P: No change in H&P    PHYSICIAN SIGNATURE:  Electronically signed by Raymond Connelly MD on 2/2/22 at 11:46 AM EST

## 2022-02-02 NOTE — CARE COORDINATION
CASE MANAGEMENT DISCHARGE SUMMARY:    DISCHARGE DATE: 2/2/2022    DISCHARGED TO: Home with wife and home care     HOME CARE AGENCY: [facility]              PHONE NUMBER: [phone]             FAX NUMBER: [phone]    TRANSPORTATION: [facility]              TIME:     PHARMACY: [address] [place] [address]   Patient qualifies for sherita medication as they cannot afford copay at this time.      BEATA Caraballo, ROSEANNA, Social Work/Case Management   [phone]  Electronically signed by BEATA Caraballo LSW on 2/2/2022 at 3:41 PM
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
## 2022-02-02 NOTE — PROGRESS NOTES
Physician Progress Note      PATIENT:               Bro Berrios  CSN #:                  [id number]  :                       1955  ADMIT DATE:       2022 9:53 AM  DISCH DATE:  RESPONDING  PROVIDER #:        Austin GARRIDO MD          QUERY TEXT:    Pt admitted with reported seizure and possible CVA. CT head showed nothing   acute. Per neuro consult, doubts CVA and seizure. BP significantly elevated   on EMS arrival and higher on arrival to ED. Patient admits to non-compliance   with medications for several months. If possible, please document in progress   notes and discharge summary if you are evaluating and/or treating any of the   following: The medical record reflects the following:  Risk Factors: non-compliance with medications for several months  Clinical Indicators:  /110 on EMS arrival and 152/130 on arrival in ED;   CT showed nothing acute x 2; transient  right sided weakness;  Type II NSTEMI   secondary to demand ischemia per cardiology; neurology documents that episodes   are not support of seizures \"The patient is rather adamant that he recalls   each event and describes them as head turning to the R w/ full body shaking   and preserved consciousness. He had some R sided weakness and AMS in the ED   which resolved. \" and \"more consistent w/ non epileptic episodes. \"  Treatment: neurology consult,  Labetalol, Coreg, Linisopril    Hypertensive Urgency: Defined as SBP of >180 OR DBP >120 w/o associated organ   damage. S/s may or may not be present, but can include severe headache, SOB,   epistaxis, severe anxiety. Tx: adjustment of oral BP medication; IV meds not   usually required. Hypertensive Emergency: SBP of >180 OR DBP >120 w/ associated organ damage   (CVA, MI, acute CHF, LALITA, encephalopathy, pulmonary edema, and unstable   angina). Documentation should include specific organ affected. Requires   immediate treatment, usually with IV meds.   Hypertensive Crisis,

## 2022-02-02 NOTE — PROGRESS NOTES
RN took patient downstairs to lobby via wheelchair. Patient has discharge education and medications in hand. Patient instructed that a grey [employer] will be picking him up through the [employer] service. Patient verbalizes understanding. Patient stable and is not in any distress at this time.

## 2022-02-02 NOTE — PROGRESS NOTES
Patient had 25 beats of Vtach. Patient asymptomatic and VSS upon assessment. Remains on continuous telemetry.

## 2022-02-02 NOTE — CARE COORDINATION
Discharge Planning:  Anticipated discharge today      [address] to [facility] in admissions. Notified of anticipated discharge for today. Confirmed they can accept. Transport: 4PM with BEATA Rapp LSW, Social Work/Case Management   [phone]  Electronically signed by BEATA Donaldson LSW on 2/2/2022 at 10:13 AM    Met with patient at bedside to discuss discharge plan. CHANGE in discharge plan. Patient plans to return home (14 steps to enter, patient reports no concerns) with spouse and home care. Home care list provided. Patient chose [address] IM provided to patient at bedside. The Plan for Transition of Care is related to the following treatment goals: home care     The Patient was provided with a choice of provider and agrees   with the discharge plan. [x] Yes [] No    Freedom of choice list was provided with basic dialogue that supports the patient's individualized plan of care/goals, treatment preferences and shares the quality data associated with the providers. [x] Yes [] No    [facility] Referral placed to [facility] in admissions. Await confirmation. Messaged MD requesting HC order and SHIRLEY via "[employer] if patient is medically stable for discharge. Needs: Home care orders, completed SHIRLEY and [facility] transport home.    Electronically signed by BEATA Donaldson LSW on 2/2/2022 at 11:25 AM

## 2022-02-02 NOTE — PLAN OF CARE
Problem: ACTIVITY INTOLERANCE/IMPAIRED MOBILITY  Goal: Mobility/activity is maintained at optimum level for patient  Outcome: Ongoing     Problem: COMMUNICATION IMPAIRMENT  Goal: Ability to express needs and understand communication  Outcome: Ongoing     Problem: Pain:  Goal: Pain level will decrease  Description: Pain level will decrease  Outcome: Ongoing  Goal: Control of acute pain  Description: Control of acute pain  Outcome: Ongoing  Goal: Control of chronic pain  Description: Control of chronic pain  Outcome: Ongoing     Problem: Discharge Planning:  Goal: Participates in care planning  Description: Participates in care planning  Outcome: Ongoing  Goal: Discharged to appropriate level of care  Description: Discharged to appropriate level of care  Outcome: Ongoing     Problem: Anxiety/Stress:  Goal: Level of anxiety will decrease  Description: Level of anxiety will decrease  Outcome: Ongoing     Problem: Cardiac Output - Decreased:  Goal: Hemodynamic stability will improve  Description: Hemodynamic stability will improve  Outcome: Ongoing     Problem: Fluid Volume - Imbalance:  Goal: Absence of imbalanced fluid volume signs and symptoms  Description: Absence of imbalanced fluid volume signs and symptoms  Outcome: Ongoing     Problem: Pain:  Goal: Pain level will decrease  Description: Pain level will decrease  Outcome: Ongoing  Goal: Recognizes and communicates pain  Description: Recognizes and communicates pain  Outcome: Ongoing  Goal: Control of acute pain  Description: Control of acute pain  Outcome: Ongoing  Goal: Control of chronic pain  Description: Control of chronic pain  Outcome: Ongoing     Problem: Sleep Pattern Disturbance:  Goal: Appears well-rested  Description: Appears well-rested  Outcome: Ongoing     Problem: Falls - Risk of:  Goal: Will remain free from falls  Description: Will remain free from falls  Outcome: Ongoing  Goal: Absence of physical injury  Description: Absence of physical injury  Outcome: Ongoing

## 2022-02-02 NOTE — PROGRESS NOTES
[facility]  Nephrology   Progress Note        We are following the patient for LALITA  78 y/o WM with extensive cardiac history, Ischemic CM with EF 45% and fluctuating baseline Cr 1-1.4 mg admitted on  after he had a seizure at home and collapsed on the ground Found by wife and squad was called  Has no previous h/o seizures  His Cr has been ~ 1mg He was given Toradol on 22   He was also started on Lisinopril on  and Aldactone the same day  Underwent CTA of head and neck on 21 No other exposure to IV contrast  Noted to have increase in Cr to 1.4 mg  SUBJECTIVE:  Feels OK Has some CP today  Voiding OK    OBJECTIVE:     PHYSICAL:    TEMPERATURE:  Current - Temp: 98.2 °F (36.8 °C);  Max - Temp  Av.2 °F (36.8 °C)  Min: 97.6 °F (36.4 °C)  Max: 98.6 °F (37 °C)  RESPIRATIONS RANGE: Resp  Av.4  Min: 16  Max: 20  PULSE RANGE: Pulse  Av.3  Min: 66  Max: 74  BLOOD PRESSURE RANGE:  Systolic (96KSF), TQZ:828 , Min:106 , FKY:442   ; Diastolic (88IQB), XZZ:92, Min:61, Max:80    PULSE OXIMETRY RANGE: SpO2  Av.4 %  Min: 92 %  Max: 97 %  24HR INTAKE/OUTPUT:    Intake/Output Summary (Last 24 hours) at 2022 1322  Last data filed at 2022 0536  Gross per 24 hour   Intake 1020 ml   Output 1875 ml   Net -855 ml       CONSTITUTIONAL:  awake, alert, cooperative, no apparent distress, and appears stated age  HEENT:  Lids and lashes normal, pupils equal, round and reactive to light, extra ocular muscles intact, sclera clear, conjunctiva normal  NECK:  Supple, symmetrical, trachea midline, no adenopathy, thyroid symmetric, not enlarged and no tenderness, skin normal  LUNGS:  No increased work of breathing, good air exchange, clear to auscultation bilaterally, no crackles or wheezing  CARDIOVASCULAR:  Normal apical impulse, regular rate and rhythm, normal S1 and S2, no S3 or S4, and no murmur noted  ABDOMEN:  No scars, normal bowel sounds, soft, non-distended, non-tender, no masses palpated, no hepatosplenomegally  NEUROLOGIC:  alert, oriented, normal speech, no focal findings or movement disorder noted  SKIN: no bruising or bleeding  EXTREMITIES: no edema    Medications         Data      CBC:   Recent Labs     01/31/22 0458 02/01/22 0437 02/02/22  0504   WBC 3.8* 4.2 4.2   RBC 3.24* 2.85* 2.94*   HGB 10.5* 9.3* 9.7*   HCT 32.5* 28.6* 29.1*    144 161     BMP:    Recent Labs     01/31/22 0458 02/01/22  0437 02/02/22  0504    137 136   K 4.5 4.7 4.9    103 104   CO2 22 23 22   BUN 14 26* 26*   CREATININE 1.0 1.4* 1.2   CALCIUM 8.3 8.3 8.1*   GLUCOSE 111* 100* 123*     Phosphorus:  No results for input(s): PHOS in the last 72 hours. Magnesium:  No results for input(s): MG in the last 72 hours.       ASSESSMENT     Patient Active Problem List   Diagnosis    Hx of CABG    NSTEMI (non-ST elevated myocardial infarction)    Essential hypertension    Ischemic cardiomyopathy    Hyperlipidemia LDL goal <70    Type 2 diabetes mellitus without complication, with long-term current use of insulin ([facility])    Anemia,normocytic normochromic ,mixed folic aci deficiency and iron deficiency    Morbid obesity due to excess calories ([address]    Anxiety    Coronary artery disease involving coronary bypass graft of native heart with angina pectoris ([address]    Atypical chest pain    Tobacco abuse    Restrictive lung disease    Acute on chronic diastolic congestive heart failure ([facility])    Ischemic stroke, left frontal lobe (4/30/18) ([facility])    PFO (patent foramen ovale)    COPD (chronic obstructive pulmonary disease) ([facility])    Chronic systolic heart failure ([address]    CAD (coronary artery disease)    Low back pain    Stroke determined by clinical assessment ([address]    ICD (implantable cardioverter-defibrillator) discharge    Diabetic peripheral neuropathy ([address]    Ventricular tachycardia ([address]    Acute pulmonary embolism without acute cor pulmonale ([address]    Acute chest pain    Paresthesia of upper and lower extremities of both sides    Intractable abdominal pain    Chest pain    Acute cerebrovascular accident (CVA) ([address]       PLAN    1-LALITA suspect due to NSAID (TORADOL)  hemodynamic in nature due to ACE-I and Aldactone effect Baseline Cr 1-1.3 mg  U/A benign Cr improved to 1.2 mg today Continue to hold Lisinopril at this time Can resume if renal function remains stable   2-NSTEMI  cardiology following  3-CM clinically euvoluemic  4 Seizure suspected to be due to cardiac issues  5 HTN controlled    OK for discharge from renal Deonna Ramos MD, Jose Hawk

## 2022-02-02 NOTE — PROGRESS NOTES
Hospitalist  Progress Note       Shelly Valerio is a 77 y.o. male   1955     SUBJECTIVE:   Patient seen and examined still complains of chest pain which is mostly constant cardiology evaluation noted creatinine noted to be up to 1.4 from 1.0  2/2  Patient seen and examined still complains of chest pain cardiology evaluation noted, says prefer to go home instead of SNF  OBJECTIVE:    Review of Systems:  General appearance: alert, appears stated age and cooperative  Skin: Skin color, texture, normal. No rashes or lesions  HEENT: No nose bleed, headache, vision problems  CV: C/O chest pain, tightness, pressure,   Respiratory: C/o no SOB, KC, Orthopnea, PND  GI: No abdominal pain, black stool, bloating  Limbs: No c/o edema, pain, swelling, intermittent claudication, joint pains  Neuro: No dizziness, lightheadedness, syncope, gait problems, memory problems  Psych: grossly normal. No SI/depression. Vitals:   Blood pressure (!) 152/80, pulse 68, temperature 98.6 °F (37 °C), temperature source Oral, resp. rate 20, height 6' (1.829 m), weight 198 lb 13.7 oz (90.2 kg), SpO2 97 %.     HEENT: AT, NC, PERRLA  Neck: No JVD  Heart: S1 S2 audible, no murmur   Lungs: CTA   Abdomen: Nontender   Limbs: No edema   CNS: no focal deficit      Past Medical History:   Diagnosis Date    Anxiety     Arthritis     Asthma     CAD (coronary artery disease)     Calcium kidney stone     Cardiomyopathy ([address]     Cerebral artery occlusion with cerebral infarction ([address]     CHF (congestive heart failure) ([address]     Clostridium difficile diarrhea 02/12/2020    COPD (chronic obstructive pulmonary disease) (HCC)     mild    Depression     DM2 (diabetes mellitus, type 2) (HCC)     Fibromyalgia     GERD (gastroesophageal reflux disease)     Hyperlipidemia     Hypertension     Liver disease     Pacemaker 2012    Medtronic model # [id number]    Pneumonia     Seizures ([address]     TIA (transient ischemic attack) 2007    500 mg Oral BID Harrison Wills MD   500 mg at 02/01/22 2037    nicotine (NICODERM CQ) 21 MG/24HR 1 patch  1 patch TransDERmal Daily Gill Valencia MD   1 patch at 02/01/22 0813    oxyCODONE-acetaminophen (PERCOCET)  MG per tablet 1 tablet  1 tablet Oral Q8H PRN Gill Valencia MD   1 tablet at 02/02/22 0648    isosorbide mononitrate (IMDUR) extended release tablet 30 mg  30 mg Oral Daily Onur Holly, APRN - CNP   30 mg at 02/01/22 0810    gabapentin (NEURONTIN) capsule 600 mg  600 mg Oral 5x Daily Gill Valencia MD   600 mg at 02/02/22 0648    ondansetron (ZOFRAN-ODT) disintegrating tablet 4 mg  4 mg Oral Q8H PRN Gill Valencia MD        Or    ondansetron (ZOFRAN) injection 4 mg  4 mg IntraVENous Q6H PRN Gill Valencia MD        polyethylene glycol (GLYCOLAX) packet 17 g  17 g Oral Daily PRN Gill Valencia MD   17 g at 02/01/22 1633    perflutren lipid microspheres (DEFINITY) injection 1.65 mg  1.5 mL IntraVENous ONCE PRN Gill Valencia MD        atorvastatin (LIPITOR) tablet 80 mg  80 mg Oral Nightly Gill Valencia MD   80 mg at 02/01/22 2036    clopidogrel (PLAVIX) tablet 75 mg  75 mg Oral Daily Gill Valencia MD   75 mg at 02/01/22 0810    labetalol (NORMODYNE;TRANDATE) injection 10 mg  10 mg IntraVENous Q10 Min PRN Gill Valencia MD        perflutren lipid microspheres (DEFINITY) injection 1.65 mg  1.5 mL IntraVENous ONCE PRN Gill Valencia MD        nitroGLYCERIN (NITROSTAT) SL tablet 0.4 mg  0.4 mg SubLINGual Q5 Min PRN Gill Valencia MD   0.4 mg at 01/30/22 2032           Labs:  CBC with Differential:    Lab Results   Component Value Date    WBC 4.2 02/02/2022    RBC 2.94 02/02/2022    HGB 9.7 02/02/2022    HCT 29.1 02/02/2022     02/02/2022    MCV 98.9 02/02/2022    MCH 33.0 02/02/2022    MCHC 33.4 02/02/2022    RDW 17.1 02/02/2022    SEGSPCT 48.6 05/28/2013    LYMPHOPCT 29.0 02/02/2022    MONOPCT 10.3 02/02/2022 EOSPCT 2.2 01/17/2011    BASOPCT 0.8 02/02/2022    MONOSABS 0.4 02/02/2022    LYMPHSABS 1.2 02/02/2022    EOSABS 0.1 02/02/2022    BASOSABS 0.0 02/02/2022    DIFFTYPE Auto 05/28/2013     CMP:    Lab Results   Component Value Date     02/02/2022    K 4.9 02/02/2022    K 4.2 01/29/2022     02/02/2022    CO2 22 02/02/2022    BUN 26 02/02/2022    CREATININE 1.2 02/02/2022    GFRAA >60 02/02/2022    GFRAA >60 05/30/2013    AGRATIO 1.3 01/28/2022    LABGLOM >60 02/02/2022    LABGLOM 87.6 07/11/2011    GLUCOSE 123 02/02/2022    GLUCOSE 208 07/11/2011    PROT 6.5 01/28/2022    PROT 7.8 03/13/2013    LABALBU 3.7 01/28/2022    CALCIUM 8.1 02/02/2022    BILITOT <0.2 01/28/2022    ALKPHOS 72 01/28/2022    AST 14 01/28/2022    ALT 7 01/28/2022     Hepatic Function Panel:    Lab Results   Component Value Date    ALKPHOS 72 01/28/2022    ALT 7 01/28/2022    AST 14 01/28/2022    PROT 6.5 01/28/2022    PROT 7.8 03/13/2013    BILITOT <0.2 01/28/2022    BILIDIR <0.2 06/21/2021    IBILI see below 06/21/2021    LABALBU 3.7 01/28/2022     Magnesium:    Lab Results   Component Value Date    MG 2.30 04/01/2021     PT/INR:    Lab Results   Component Value Date    PROTIME 9.6 01/28/2022    INR 0.86 01/28/2022     Last 3 Troponin:    Lab Results   Component Value Date    TROPONINI 0.13 01/31/2022    TROPONINI 0.13 01/30/2022    TROPONINI 0.12 01/28/2022     U/A:    Lab Results   Component Value Date    NITRITE neg 05/23/2014    COLORU Yellow 02/01/2022    PHUR 5.5 02/01/2022    WBCUA 0 02/01/2022    RBCUA 0 02/01/2022    CLARITYU Clear 02/01/2022    SPECGRAV 1.010 02/01/2022    LEUKOCYTESUR Negative 02/01/2022    UROBILINOGEN 0.2 02/01/2022    BILIRUBINUR Negative 02/01/2022    BILIRUBINUR neg 05/23/2014    BLOODU Negative 02/01/2022    GLUCOSEU Negative 02/01/2022     ABG:    Lab Results   Component Value Date    PHART 7.345 05/21/2018    HAK0PVT 48.0 05/21/2018    PO2ART 114.0 05/21/2018    OWN1GRA 25.5 05/21/2018    BEART -0.2 05/21/2018    YXP6ORH 27.0 05/21/2018    R4XRBYET 98.4 05/21/2018     FLP:    Lab Results   Component Value Date    TRIG 94 01/29/2022    HDL 50 01/29/2022    LDLCALC 75 01/29/2022    LDLDIRECT 105 09/17/2015    LABVLDL 19 01/29/2022     TSH:    Lab Results   Component Value Date    TSH 5.30 04/25/2019      DATA:   ECG: Sinus Rhythm       ASSESSMENT:   1 seizure neurology evaluation noted  Patient on Keppra no more seizure episode    2 non-STEMI cardiology evaluation noted medical treatment  Still has chest pain but mostly constant likely noncardiac chest pain  Cardiology evaluation noted and okay to discharge from cardiology standpoint    3 hypertension relatively well controlled    4 CAD with history of a CABG  Medical treatment    5 acute kidney injury creatinine 1.4  Consult nephrology evaluation noted creatinine today 1.2    6 history of CVA TIA    7 diabetes continue sliding scale    8 cardiomyopathy EF 35 to 40% appears to be at his baseline  9 tachycardia EP evaluation noted with V. tach atrial fib flutter  PLAN   Discharge planning when okay from consultant's standpoint  Nephrology standpoint very likely today

## 2022-02-03 ENCOUNTER — CARE COORDINATION (OUTPATIENT)
Dept: CASE MANAGEMENT | Age: 67
End: 2022-02-03

## 2022-02-03 NOTE — CARE COORDINATION
Transitions of Care Call  Call within 2 business days of discharge: Yes    Patient: Vianney Daniels Patient : 1955   MRN: [id number]  Reason for Admission: Seizure, TIA  Discharge Date: 22 RARS: Readmission Risk Score: 22.4 ( )      Last Discharge [facility]       Complaint Diagnosis Description Type Department Provider    22 Seizures; Cerebrovascular Accident Seizure [facility]) . .. ED to Hosp-Admission (Discharged) (ADMITTED) ANATOL[facility] 5W Dalia Goncalves MD; Melissa Grant . .. Was this an external facility discharge? No Discharge Facility: n/a    Challenges to be reviewed by the provider   Additional needs identified to be addressed with provider: No  none                 Encounter was not routed to provider for escalation. Method of communication with provider: none. Follow up appointment scheduled within 7 days of discharge: no. If no appointment scheduled, scheduling offered: no  Future Appointments   Date Time Provider Fred Gregory   3/9/2022 11:00 AM Hattie Hanna MD [facility]     A woman answered stating pt was unavailable at this time. CTN explained reason for call and left contact info for pt to call back at his convenience. Reached out to [facility]. No answer to the main line so a vm was left. No pt info was left because the vm did not specify that it was a secure line. Asked for a return call.      MEÑO Jimenez, RN   Care Transition Nurse  Mobile: [phone]

## 2022-02-04 ENCOUNTER — CARE COORDINATION (OUTPATIENT)
Dept: CASE MANAGEMENT | Age: 67
End: 2022-02-04

## 2022-02-04 NOTE — CARE COORDINATION
Transitions of Care Call  Call within 2 business days of discharge: Yes    Patient: Shelly Valerio Patient : 1955   MRN: [id number]  Reason for Admission: Seizure vs TIA  Discharge Date: 22 RARS: Readmission Risk Score: 22.4 ( )      Last Discharge [facility]       Complaint Diagnosis Description Type Department Provider    22 Seizures; Cerebrovascular Accident Seizure [facility]) . .. ED to Hosp-Admission (Discharged) (ADMITTED) ANATOLY 5W Raymond Connelly MD; Carol Humphrey . .. Was this an external facility discharge? No Discharge Facility: n/a    Challenges to be reviewed by the provider   Additional needs identified to be addressed with provider: No  none                 Encounter was routed to provider for escalation. Method of communication with provider: chart routing. Follow up appointment scheduled within 7 days of discharge: no. If no appointment scheduled, scheduling offered: no  Future Appointments   Date Time Provider Fred Porteri   3/9/2022 11:00 AM Laure Wilcox MD [facility]       Second and final outreach call attempt, no answer. CTN left  with contact information and request for return call. CTN will resolve episode and remain available. [email or web address] message sent r/t outreach attempt. Will route message to PCP to facilitate Hospital follow up appointment.     MEÑO Monsalve, RN   Care Transition Nurse  Mobile: [phone]

## 2022-02-10 ENCOUNTER — CARE COORDINATION (OUTPATIENT)
Dept: CARE COORDINATION | Age: 67
End: 2022-02-10

## 2022-02-14 ENCOUNTER — CARE COORDINATION (OUTPATIENT)
Dept: CARE COORDINATION | Age: 67
End: 2022-02-14

## 2022-02-16 ENCOUNTER — CARE COORDINATION (OUTPATIENT)
Dept: CARE COORDINATION | Age: 67
End: 2022-02-16

## 2022-02-16 ENCOUNTER — TELEPHONE (OUTPATIENT)
Dept: PHARMACY | Facility: CLINIC | Age: 67
End: 2022-02-16

## 2022-02-16 RX ORDER — ZOLPIDEM TARTRATE 5 MG/1
5 TABLET ORAL NIGHTLY PRN
Status: ON HOLD | COMMUNITY
End: 2022-04-26 | Stop reason: HOSPADM

## 2022-02-16 ASSESSMENT — ENCOUNTER SYMPTOMS: DYSPNEA ASSOCIATED WITH: MINIMAL EXERTION

## 2022-02-16 NOTE — TELEPHONE ENCOUNTER
Received a referral:  from Care Coordinator to review patients medications. Called patient to schedule a time to speak with a pharmacist over the telephone. Spoke to patient briefly, introduced myself and patient said, \"I don't want to speak with you all\" and ended the call. I called back to explain my purpose and advised them of the above message. He said the same thing then ended the call again. Jenny Jimenez CP.    [address] [facility] free: [phone]

## 2022-02-16 NOTE — CARE COORDINATION
Ambulatory Care Coordination Note  2/16/2022  CM Risk Score: 15  Charlson 10 Year Mortality Risk Score: 100%     ACC: Cherie Salazar, RN    Summary Note: ACM spoke with Ruy to establish care. He has a history of CHF (OMYH26-13%) due to ICM, CAD sp CABG, NSTEMI, hypertension, hyperlipidemia, COPD tobacco abuse and diabetes (diet controlled). He was recently admitted on 1/28/2022 and discharged on 2/2/2022 with home health care. He allowed one visit, the physical therapist  had medication compliance concerns and then he refused services. He reports not liking anyone in his home. Tg Bey has not scheduled with a HFU with Dr. Tim Cerda or Dr Sarah Mondragon as recommended. Tg Bey has had several admissions and ED visits related to chest pain. Today, he reports that he is doing better. He denies dizziness, light headedness, increased swelling and shortness of breath. He describes his chest pain as slight. He does notice increased shortness of breath when laying down. He has a pulse ox but has not taken it recently. He is having difficult with sleep and feels that his Ambien should be increased. He monitors his BP and reports 140/90 yesterday but that he had a stressful day. He feels that his strength is pretty much back to his normal.    ACTION: established care. Reviewed med list, purpose of meds and compliance. Suggested OP physical therapy : declined. Offered to assist with scheduling follow ups; declined. Advised on support available due to stress and anxiety.    Suggested possible referral to       Ambulatory Care Coordination Assessment    Care Coordination Protocol  Program Enrollment: Complex Care  Referral from Primary Care Provider: No  Week 1 - Initial Assessment     Do you have all of your prescriptions and are they filled?: Yes (Comment: 2/16/2022)  Barriers to medication adherence: None  Are you able to afford your medications?: No  How often do you have trouble taking your medications the way you have been told to take them?: Sometimes I take them as prescribed. Do you have Home O2 Therapy?: No      Ability to seek help/take action for Emergent Urgent situations i.e. fire, crime, inclement weather or health crisis. : Independent  Ability to ambulate to restroom: Independent  Ability handle personal hygeine needs (bathing/dressing/grooming): Independent  Ability to manage Medications: Independent  Ability to prepare Food Preparation: Independent  Ability to maintain home (clean home, laundry): Independent  Ability to drive and/or has transportation: Independent  Ability to do shopping: Independent  Ability to manage finances: Independent  Is patient able to live independently?: Yes     Current Housing: Apartment        Per the Fall Risk Screening, did the patient have 2 or more falls or 1 fall with injury in the past year?: Yes  How often do you think you are about to fall and you do NOT fall? For example, you grab something to stabilize yourself or hold onto a wall/furniture?: Occasionally  Use of a Mobility Aid: No  Difficulty walking/impaired gait: No  Issues with feet or shoes like numbness, edema, shoes not fitting: No  Changes in vision, poor vision or poor lighting in environment: No  Dizziness: Yes        Are you experiencing loss of meaning?: No  Are you experiencing loss of hope and peace?: No     Suggested Interventions and Community Resources  Diabetes Education: Declined   Fall Risk Prevention: In Process Other Services or Interventions: CHF,COPD, [place]   Zone Management Tools: In Process                  Prior to Admission medications    Medication Sig Start Date End Date Taking? Authorizing Provider   oxyCODONE-acetaminophen (PERCOCET)  MG per tablet Take 1 tablet by mouth every 8 hours as needed for Pain for up to 30 days.  2/10/22 3/12/22  Jen Reynolds MD   apixaban (ELIQUIS) 5 MG TABS tablet Take 1 tablet by mouth 2 times daily 2/2/22   David Mason MD   apixaban (ELIQUIS) 5 MG TABS tablet Take 1 tablet by mouth 2 times daily 2/2/22   Isaac Cornejo MD   amiodarone (CORDARONE) 200 MG tablet Take 1 tablet by mouth daily 2/3/22 3/5/22  Isaac Cornejo MD   gabapentin (NEURONTIN) 300 MG capsule Take 2 capsules by mouth 5 times daily for 30 days.  2/2/22 3/4/22  Isaac Cornejo MD   levETIRAcetam (KEPPRA) 500 MG tablet Take 1 tablet by mouth 2 times daily 2/2/22   Isaac Cornejo MD   atorvastatin (LIPITOR) 80 MG tablet Take 1 tablet by mouth nightly 2/2/22   Isaac Cornejo MD   nicotine (NICODERM CQ) 21 MG/24HR Place 1 patch onto the skin daily 2/3/22   Isaac Cornejo MD   methocarbamol (ROBAXIN) 750 MG tablet Take 750 mg by mouth 3 times daily as needed for Muscle spasms 1/5/22   Historical Provider, MD   gabapentin (NEURONTIN) 600 MG tablet TAKE ONE TABLET BY MOUTH FIVE TIMES A DAY 1/26/22 4/28/22  Irene Solomon MD   traZODone (DESYREL) 50 MG tablet Take 1 tablet by mouth nightly 1/3/22   Irene Solomon MD   amLODIPine (NORVASC) 5 MG tablet Take 5 mg by mouth daily  10/19/21   Historical Provider, MD   isosorbide mononitrate (IMDUR) 60 MG extended release tablet Take 60 mg by mouth daily  11/5/21   Historical Provider, MD   carvedilol (COREG) 25 MG tablet Take 25 mg by mouth 2 times daily (with meals) 10/6/21   Historical Provider, MD   aspirin 81 MG EC tablet Take 81 mg by mouth daily     Historical Provider, MD   fluticasone-salmeterol (ADVAIR DISKUS) 100-50 MCG/DOSE diskus inhaler Inhale 2 puffs into the lungs 2 times daily wixela inhub inhaler ( generic advair diskus) 3/26/21   Irene Solomon MD   Insulin Pen Needle 32G X 4 MM MISC 1 each by Does not apply route daily 3/18/21   Irene Solomon MD   glucose monitoring kit (FREESTYLE) monitoring kit 1 kit by Does not apply route daily 3/18/21   Irene Solomon MD   nitroGLYCERIN (NITROSTAT) 0.4 MG SL tablet Place 0.4 mg under the tongue every 5 minutes as needed 11/13/20   Historical Provider, MD   Respiratory Therapy Supplies (NEBULIZER) AALIYAH Used to give yourself breathing treatment 8/9/20   Mildred Khan MD   acetaminophen (TYLENOL) 325 MG tablet Take 650 mg by mouth every 6 hours as needed for Pain    Historical Provider, MD       Future Appointments   Date Time Provider Fred Gregory   3/9/2022 11:00 AM Quinn Francis MD [facility]     ,   Diabetes Assessment    Medic Alert ID: No  Meal Planning: None   How often do you test your blood sugar?: Meals   Do you have barriers with adherence to non-pharmacologic self-management interventions?  (Nutrition/Exercise/Self-Monitoring): Yes   Have you ever had to go to the ED for symptoms of low blood sugar?: No          ,   Congestive Heart Failure Assessment    Are you currently restricting fluids?: No Restriction  Do you understand a low sodium diet?: Yes  Do you understand how to read food labels?: Yes  How many restaurant meals do you eat per week?: 1-2  Do you salt your food before tasting it?: No     No patient-reported symptoms      Symptoms:  CHF associated angina: Pos, CHF associated leg swelling: Neg, CHF associated shortness of breath: Neg (Comment: reports chronic chest discomfort)      Symptom course: stable  Weight trend: stable      and   COPD Assessment    Does the patient understand envrionmental exposure?: Yes  Is the patient able to verbalize Rescue vs. Long Acting medications?: Yes  Does the patient have a nebulizer?: No  Does the patient use a space with inhaled medications?: No     No patient-reported symptoms         Symptoms:     Symptom course: stable  Breathlessness: minimal exertion  Change in chronic cough?: No/At Baseline  Change in sputum?: No/At Baseline  Self Monitoring - SaO2: Yes (Comment: occasionally )  Baseline SaO2 Reading:  (Comment: \"mid nineties\")

## 2022-02-16 NOTE — TELEPHONE ENCOUNTER
----- Message from Subhash Perales RN sent at 2/16/2022  1:48 PM EST -----  Hello,  Could you please review this pt's meds? He is taking Percocet, Trazodone and Ambien. I can not find a pain med contract. Also, medication compliance is a concern for other medications. This pt has had several ED visit and admissions. Approximately 16 hospital visits since November. Appreciate your support.    Thank you,  David Dejesus

## 2022-02-17 ENCOUNTER — TELEPHONE (OUTPATIENT)
Dept: PHARMACY | Facility: CLINIC | Age: 67
End: 2022-02-17

## 2022-02-17 ENCOUNTER — CARE COORDINATION (OUTPATIENT)
Dept: CARE COORDINATION | Age: 67
End: 2022-02-17

## 2022-02-17 NOTE — TELEPHONE ENCOUNTER
Med review will be completed by PharmD in separate encounter. No further outreach to patient.     For Chong Brumfield in place:  No   Recommendation Provided To: Patient/Caregiver: 1 via Telephone   Gap Closed?: No    Intervention Accepted By: Patient/Caregiver: 0   Time Spent (min): 10

## 2022-02-17 NOTE — TELEPHONE ENCOUNTER
CLINICAL PHARMACY NOTE - Medication Review  Patient outreach to review medications - Patient adamantly refused med review and hung up on us. Spoke with Care coordinator Lossie Oppenheim to discuss general medication concerns. She is going to continue to attempt followup going forward. SUBJECTIVE/OBJECTIVE:   Gale Gordon is a 77 y.o. male referred to a clinical pharmacy specialist by care coordination due to concerns with medication. Patient also a very high utilizer of ED and also has multiple hospitalizations. Frequently refuses care, leaves AMA, refuses followup appts with providers. Patient also sees a [facility] PCP and a [facility] cardiologist.    Medications:  Medication Sig Comments    zolpidem (AMBIEN) 5 MG tablet Take 5 mg by mouth nightly as needed for Sleep. - last filled  1/4/22. High risk medication. Patient does not look to be taking regularly    oxyCODONE-acetaminophen (PERCOCET)  MG per tablet Take 1 tablet by mouth every 8 hours as needed for Pain for up to 30 days. - Taking medication regularly. Filling every 28 days for a 30ds. One of the only medications being filled on a regular basis    apixaban (ELIQUIS) 5 MG TABS tablet Take 1 tablet by mouth 2 times daily - New med- filled 2/2 by mail order    amiodarone (CORDARONE) 200 MG tablet Take 1 tablet by mouth daily - New med- filled 2/2    levETIRAcetam (KEPPRA) 500 MG tablet Take 1 tablet by mouth 2 times daily - New med- filled 2/2 by mail order  - Started at recent hospital stay. Possible stroke so there was concern of seizure activity.   Could possibly be stopped if pt follows up with neurology    atorvastatin (LIPITOR) 80 MG tablet Take 1 tablet by mouth nightly - New med- filled 2/2 by mail order    nicotine (NICODERM CQ) 21 MG/24HR Place 1 patch onto the skin daily - unsure if taking, but patient reports having stopped smoking in recent OV's    gabapentin (NEURONTIN) 600 MG tablet TAKE ONE TABLET BY MOUTH FIVE TIMES A DAY - Taking medication regularly. Filling every 28 days for a 30ds. One of the only medications being filled on a regular basis    traZODone (DESYREL) 50 MG tablet Take 1 tablet by mouth nightly - Last filled 1/3/22 for 30ds. Unsure if taking regularly    amLODIPine (NORVASC) 5 MG tablet Take 5 mg by mouth daily  (Patient not taking: Reported on 2/16/2022) - Last filled 10/19/21. Pt does not look to be taking at all    isosorbide mononitrate (IMDUR) 60 MG extended release tablet Take 60 mg by mouth daily  (Patient not taking: Reported on 2/16/2022) - Last filled 11/5/21. Pt does not look to be taking at all    carvedilol (COREG) 25 MG tablet Take 25 mg by mouth 2 times daily (with meals) (Patient not taking: Reported on 2/16/2022) - Last filled 10/6/21.  Pt does not look to be taking at all    aspirin 81 MG EC tablet Take 81 mg by mouth daily  - unsure if taking    fluticasone-salmeterol (ADVAIR DISKUS) 100-50 MCG/DOSE diskus inhaler Inhale 2 puffs into the lungs 2 times daily wixela inhub inhaler ( generic advair diskus) (Patient not taking: Reported on 2/16/2022) - Not filling in 1 year, unsure if taking    nitroGLYCERIN (NITROSTAT) 0.4 MG SL tablet Place 0.4 mg under the tongue every 5 minutes as needed (Patient not taking: Reported on 2/16/2022)     Respiratory Therapy Supplies (NEBULIZER) AALIYAH Used to give yourself breathing treatment     acetaminophen (TYLENOL) 325 MG tablet Take 650 mg by mouth every 6 hours as needed for Pain      Other medications of concern:  · Jardiance- on active med list with [facility], not filled since 2020  · Ranexa- on active med list with [facility], not filled since 3/6/21  · Insulin- Patient reported that he had stopped at a previous OV  · Clopidogrel- on active med list with [facility], not filled since 2020  · Ezetimibe- on active med list with [facility], could not find any fill history  · Lisinopril- on active med list with [facility], not filled since 2019      Allergies: Allergies   Allergen Reactions    Melatonin Other (See Comments)     Restless leg syndrome         Pertinent Labs/Vitals:  BP Readings from Last 3 Encounters:   22 113/61   22 136/84   21 (!) 171/80     Lab Results   Component Value Date    LABMICR Not Indicated 2022     Lab Results   Component Value Date    LABA1C 5.7 2022    LABA1C 5.7 2022    LABA1C 5.5 2021     Lab Results   Component Value Date    CHOL 144 2022    TRIG 94 2022    HDL 50 2022    LDLCALC 75 2022    LDLDIRECT 105 (H) 2015     ALT   Date Value Ref Range Status   2022 7 (L) 10 - 40 U/L Final     AST   Date Value Ref Range Status   2022 14 (L) 15 - 37 U/L Final     The ASCVD Risk score (Karen French, et al., 2013) failed to calculate for the following reasons: The patient has a prior MI or stroke diagnosis     Lab Results   Component Value Date    CREATININE 1.2 2022       Estimated Creatinine Clearance: 66 mL/min (based on SCr of 1.2 mg/dL).     Social History:   Social History     Tobacco Use    Smoking status: Former Smoker     Packs/day: 0.50     Years: 44.00     Pack years: 22.00     Types: Cigarettes     Quit date: 10/20/2021     Years since quittin.3    Smokeless tobacco: Never Used    Tobacco comment: cutting down   Substance Use Topics    Alcohol use: No     Alcohol/week: 0.0 standard drinks       Immunizations:   Most Recent Immunizations   Administered Date(s) Administered    COVID-19, Pfizer Purple top, DILUTE for use, 12+ yrs, 30mcg/0.3mL dose 2021    Influenza 2013    Influenza Vaccine, unspecified formulation 2017    Influenza Virus Vaccine 2019    Influenza, Claudio Carls, IM, (6 mo and older Fluzone, Flulaval, Fluarix and 3 yrs and older Afluria) 10/17/2018    Influenza, Quadv, IM, PF (6 mo and older Fluzone, Flulaval, Fluarix, and 3 yrs and older Afluria) 2020    Influenza, Quadv, adjuvanted, 65 yrs +, IM, PF (Fluad) 01/04/2022    Pneumococcal Polysaccharide ([id number]) 10/25/2021    Tdap (Boostrix, Adacel) 05/04/2015       ASSESSMENT/PLAN:   - General Assessment:   · Patient is taking several high risk medications. (Ambien, Percocet, Gabapentin)  · Based on refill history, the only meds he seems to be taking consistently are Gabapentin and Oxycodone/APAP  · Based on refill history, he does not seem to be compliant with any other medication therapy. · Per recent OV with [address] [place] [address] Cardiology, there were some differences in active medications. I attempted to reconcile with the resources I have available, but med list will never be accurate unless patient agrees to complete a full med review with a provider. - Upcoming appointments:   Future Appointments   Date Time Provider Fred Gregory   3/9/2022 11:00 AM Eleni Whipple MD [facility].  Juventino GranadosD, [address], toll free: [phone]      For Pharmacy Admin Tracking Only   Gap Closed?: Yes    Time Spent (min): 45

## 2022-02-19 NOTE — CARE COORDINATION
ACM attempted to contact [facility] x 2   No return call from pt. Needs HFU appt. Dr Elsie Mcgraw over-due (office has tried to contact him to schedule)  [facility] around April 22 to discuss seizure medications  Dr. Lorraine Garnica : scheduled    Med compliance concerns. STOPPED:  Aldactone, Lisinopril( stopped by Dr Margaret Contreras)    Should be taking:  Plavix (non-compliant in past)   Eliquis  (non-compliant in past with these meds)  Keppra (follow up with neurology in 3 months)  Advair (not taking)  Imdur (not taking)    Norvasc : not sure if med has been stopped in past     Dismissed home health care after one visit. Notes From last admission:    Noted by Dr. Margaret Contreras on 2/1/2022:  ALdactone and Lisinopril were stopped       Noted by cardiology:n1/31/2022:    1. Elevated troponin  -secondary to demand ischemia; troponins essentially flat; type II NSTEMI  -has known CAD with prior CABG and multiple PCI's (had several PCI's in late 2021); now on medical management   -no anginal pain  -he has been noncompliant in past with meds d/t financial constraints; will continue with medical management and try to improve affordability for compliance  -continue ASA, plavix, carvedilol and statin  -risk factor modification     2. Ischemic Cardiomyopathy with LVEF 35-40%   -chronic NYHA class II systolic HF; currently euvolemic  -he will not take Entresto d/t cost  -continue carvedilol and lisinopril  -resume aldactone  -will hold SGLT2 inhibitor (can be addressed with his primary cardiologist as an outpatient). Apparently on it in the past and he stopped d/t financial constraints  -S/P ICD (multiple episodes of tachycardia)     4. Tachycardia  -? Atrial vs VT (single lead ICD)  -? A-fib/flutter (would need anticoagulation with elevated CHADS score)  -will have EP evaluate (d/w Dr. Lorraine Garnica)  -will add Amiodarone     5. Chronic kidney disease, stage III  -Cr 1.0 today  -continue ACE-I     6.  Hyperlipidemia with goal LDL < 70  -continue high intensity statin     7. H/O pulmonary embolus  -noted on CTPA in June 2020 and placed on Eliquis; he has not taken in > 1 year  -last CT in 11/2021 showed no pulmonary embolus     8. Essential HTN  -goal BP < 130/80  -continue medical management     9. Pleuritic chest pain  -not angina        Cardiac EP to see regarding tachycardia     Electronically signed by TAYLA Berger CNP on 1/31/2022 at 9:21 AM    Noted by Dr. Garrett Duncan noted on 1/31/2022:  ? There is suggestion of atrial flutter in the device interrogation which is significant given his clinical correlation. Advised him to start anticoagulation. Will discontinue ASA and start apixaban 5 mg bid to take with plavix. There is concern for medication noncompliance and I discussed with the patient the importance of being on anticoagulation to reduce risk of stroke. ? Given that the predominant rhythm seen is atrial flutter as well as SVT, and his questionable compliance with medications, ablation of typical flutter as well as SVT is a reasonable option for him so that he can take anticoagulation for about 4 to 6 weeks and continue monitoring for A. fib recurrence on his device. ? Continue GDMT for CAD/CHF. ? With increased episodes of flutter/SVT and CP in the setting of underlying CAD, can be started on temporary amiodarone until we see him in clinic in 2-4 weeks. ? Neurology note on 1/31/2022:  Plan:  - My suspicion he had primary seizures is low -- however we will do keppra 500mg BID for now with a plan to wean off as an outpatient given his concern for seizures events  - I fully support continued cardiac / EP evaluations, as I think a cardiac origin for his symptoms seems far more likely.  He had a normal EEG and he has no history of a seizure disorder.  - Follow-up with me in 3 months  - We will sign off but remain available for questions

## 2022-02-22 ENCOUNTER — CARE COORDINATION (OUTPATIENT)
Dept: CARE COORDINATION | Age: 67
End: 2022-02-22

## 2022-02-22 NOTE — CARE COORDINATION
Ambulatory Care Coordination Note  2/22/2022  CM Risk Score: 15  Charlson 10 Year Mortality Risk Score: 100%     ACC: Salma Ward RN    Summary Note:     This ACM contacted patient   This ACM briefly introduced myself and inquired as to how he was doing. patient stated \" I'm doing fine, thanks for calling\" and hung up. Attempted to call back and this ACM apologized for losing the call, patient stated that \" you didn't lose the call, I hung up on you\"      Future Appointments   Date Time Provider Fred Gregory   3/9/2022 11:00 AM MD LIZZIE Smith [facility]       PLan   will send along to [facility] Coordination Interventions    Program Enrollment: Complex Care  Referral from Primary Care Provider: No  Suggested Interventions and Community Resources  Diabetes Education: Declined  Fall Risk Prevention: In Process  Zone Management Tools: In Process  Other Services or Interventions: CHF,COPD, [place]         Goals Addressed    None         Prior to Admission medications    Medication Sig Start Date End Date Taking? Authorizing Provider   zolpidem (AMBIEN) 5 MG tablet Take 5 mg by mouth nightly as needed for Sleep. Historical Provider, MD   oxyCODONE-acetaminophen (PERCOCET)  MG per tablet Take 1 tablet by mouth every 8 hours as needed for Pain for up to 30 days.  2/10/22 3/12/22  Tomasz Humphries MD   apixaban (ELIQUIS) 5 MG TABS tablet Take 1 tablet by mouth 2 times daily 2/2/22   Jesus Jeronimo MD   amiodarone (CORDARONE) 200 MG tablet Take 1 tablet by mouth daily 2/3/22 3/5/22  Jesus Jeronimo MD   levETIRAcetam (KEPPRA) 500 MG tablet Take 1 tablet by mouth 2 times daily 2/2/22   Jesus Jeronimo MD   atorvastatin (LIPITOR) 80 MG tablet Take 1 tablet by mouth nightly 2/2/22   Jesus Jeronimo MD   nicotine (NICODERM CQ) 21 MG/24HR Place 1 patch onto the skin daily 2/3/22   Jesus Jeronimo MD   gabapentin (NEURONTIN) 600 MG tablet TAKE ONE TABLET BY MOUTH FIVE TIMES A DAY 1/26/22 4/28/22  Irene Solomon MD   traZODone (DESYREL) 50 MG tablet Take 1 tablet by mouth nightly 1/3/22   Irene Solomon MD   amLODIPine (NORVASC) 5 MG tablet Take 5 mg by mouth daily   Patient not taking: Reported on 2/16/2022 10/19/21   Historical Provider, MD   isosorbide mononitrate (IMDUR) 60 MG extended release tablet Take 60 mg by mouth daily   Patient not taking: Reported on 2/16/2022 11/5/21   Historical Provider, MD   carvedilol (COREG) 25 MG tablet Take 25 mg by mouth 2 times daily (with meals)  Patient not taking: Reported on 2/16/2022 10/6/21   Historical Provider, MD   aspirin 81 MG EC tablet Take 81 mg by mouth daily     Historical Provider, MD   fluticasone-salmeterol (ADVAIR DISKUS) 100-50 MCG/DOSE diskus inhaler Inhale 2 puffs into the lungs 2 times daily wixela inhub inhaler ( generic advair diskus)  Patient not taking: Reported on 2/16/2022 3/26/21   Irene Solomon MD   nitroGLYCERIN (NITROSTAT) 0.4 MG SL tablet Place 0.4 mg under the tongue every 5 minutes as needed  Patient not taking: Reported on 2/16/2022 11/13/20   Historical Provider, MD   Respiratory Therapy Supplies (NEBULIZER) AALIYAH Used to give yourself breathing treatment 8/9/20   Nannette Hartmann MD   acetaminophen (TYLENOL) 325 MG tablet Take 650 mg by mouth every 6 hours as needed for Pain    Historical Provider, MD       Future Appointments   Date Time Provider Fred Gregory   3/9/2022 11:00 AM Dina Moya MD [facility]

## 2022-03-08 ENCOUNTER — CARE COORDINATION (OUTPATIENT)
Dept: CARE COORDINATION | Age: 67
End: 2022-03-08

## 2022-04-11 ENCOUNTER — CARE COORDINATION (OUTPATIENT)
Dept: CARE COORDINATION | Age: 67
End: 2022-04-11

## 2022-04-11 NOTE — CARE COORDINATION
Ambulatory Care Coordination Note  4/11/2022  CM Risk Score: 15  Charlson 10 Year Mortality Risk Score: 100%     ACC: Kimberley Hernandez RN    Summary Note: AC spoke with Gloria Thibodeaux. He reports that he is doing fine. He states that he was at [facility] about 2 weeks ago due to seizure. He states that he has been to [facility] since his January admission. He believes he has had some episodes when a seizure is coming on. He is aware of the symptoms and will rest and try breathing exercises to   stop the onset of a seizure. ACTION:  Advised he needs to schedule follow up appt. Encouraged to have wife call to discuss transportation concerns. Tried to verify insurance coverage with [facility] [employer]. Contacted Madonna Lira with [employer] to follow up on plan of care ([phone])    PLAN:  F/u on scheduling an appt. Care Coordination Interventions    Program Enrollment: Complex Care  Referral from Primary Care Provider: No  Suggested Interventions and Community Resources  Diabetes Education: Declined  Fall Risk Prevention: In Process  Zone Management Tools: In Process  Other Services or Interventions: CHF,COPD, [place]         Goals Addressed    None         Prior to Admission medications    Medication Sig Start Date End Date Taking? Authorizing Provider   oxyCODONE-acetaminophen (PERCOCET)  MG per tablet Take 1 tablet by mouth every 8 hours as needed for Pain for up to 30 days. 4/7/22 5/7/22  Melissa Ruelas MD   nitroGLYCERIN (NITROSTAT) 0.4 MG SL tablet Place 1 tablet under the tongue every 5 minutes as needed for Chest pain 4/4/22   Melissa Ruelas MD   gabapentin (NEURONTIN) 600 MG tablet TAKE ONE TABLET BY MOUTH FIVE TIMES A DAY 3/21/22 6/21/22  Chavez Padilla MD   zolpidem (AMBIEN) 5 MG tablet Take 5 mg by mouth nightly as needed for Sleep.     Historical Provider, MD   apixaban (ELIQUIS) 5 MG TABS tablet Take 1 tablet by mouth 2 times daily 2/2/22   Manisha Shea MD   amiodarone (CORDARONE) 200 MG tablet Take 1 tablet by mouth daily 2/3/22 3/5/22  Darnelle Runner, MD   levETIRAcetam (KEPPRA) 500 MG tablet Take 1 tablet by mouth 2 times daily 2/2/22   Darnelle Runner, MD   atorvastatin (LIPITOR) 80 MG tablet Take 1 tablet by mouth nightly 2/2/22   Darnelle Runner, MD   nicotine (NICODERM CQ) 21 MG/24HR Place 1 patch onto the skin daily 2/3/22   Darnelle Runner, MD   traZODone (DESYREL) 50 MG tablet Take 1 tablet by mouth nightly 1/3/22   Harsh Holloway MD   amLODIPine (NORVASC) 5 MG tablet Take 5 mg by mouth daily   Patient not taking: Reported on 2/16/2022 10/19/21   Historical Provider, MD   isosorbide mononitrate (IMDUR) 60 MG extended release tablet Take 60 mg by mouth daily   Patient not taking: Reported on 2/16/2022 11/5/21   Historical Provider, MD   carvedilol (COREG) 25 MG tablet Take 25 mg by mouth 2 times daily (with meals)  Patient not taking: Reported on 2/16/2022 10/6/21   Historical Provider, MD   aspirin 81 MG EC tablet Take 81 mg by mouth daily     Historical Provider, MD   fluticasone-salmeterol (ADVAIR DISKUS) 100-50 MCG/DOSE diskus inhaler Inhale 2 puffs into the lungs 2 times daily wixela inhub inhaler ( generic advair diskus)  Patient not taking: Reported on 2/16/2022 3/26/21   Harsh Holloway MD   Respiratory Therapy Supplies (NEBULIZER) AALIYAH Used to give yourself breathing treatment 8/9/20   Radha Houston MD   acetaminophen (TYLENOL) 325 MG tablet Take 650 mg by mouth every 6 hours as needed for Pain    Historical Provider, MD       No future appointments.   ,   Congestive Heart Failure Assessment    Are you currently restricting fluids?: No Restriction  Do you understand a low sodium diet?: Yes  Do you understand how to read food labels?: Yes  How many restaurant meals do you eat per week?: 1-2  Do you salt your food before tasting it?: No     No patient-reported symptoms      Symptoms:         and   COPD Assessment    Does the patient understand envrionmental exposure?: Yes  Is the patient able to verbalize Rescue vs. Long Acting medications?: Yes  Does the patient have a nebulizer?: No  Does the patient use a space with inhaled medications?: No     No patient-reported symptoms         Symptoms:

## 2022-04-12 NOTE — CARE COORDINATION
To Make a Reservation Call  [phone]  Monday - Friday, 8 a.m. to 5 p.m. Use this number for reservations to and from a  facility. Routine medical transportation is limited to  [facility] plans with a  transportation benefit. Transportation Help Line  [phone]  Use this number for assistance if your transportation  is late in arriving. Must call 72 hrs prior and no sooner than 2 weeks in advance to schedule. Notified pt to call and then call office to schedule an appt.

## 2022-04-23 ENCOUNTER — APPOINTMENT (OUTPATIENT)
Dept: GENERAL RADIOLOGY | Age: 67
DRG: 215 | End: 2022-04-23
Payer: MEDICARE

## 2022-04-23 ENCOUNTER — APPOINTMENT (OUTPATIENT)
Dept: CT IMAGING | Age: 67
DRG: 215 | End: 2022-04-23
Payer: MEDICARE

## 2022-04-23 ENCOUNTER — HOSPITAL ENCOUNTER (INPATIENT)
Age: 67
LOS: 3 days | Discharge: HOSPICE | DRG: 215 | End: 2022-04-26
Attending: EMERGENCY MEDICINE | Admitting: INTERNAL MEDICINE
Payer: MEDICARE

## 2022-04-23 DIAGNOSIS — E87.20 LACTIC ACIDOSIS: ICD-10-CM

## 2022-04-23 DIAGNOSIS — N17.9 AKI (ACUTE KIDNEY INJURY) (HCC): ICD-10-CM

## 2022-04-23 DIAGNOSIS — G62.9 NEUROPATHY: ICD-10-CM

## 2022-04-23 DIAGNOSIS — I21.4 NSTEMI (NON-ST ELEVATED MYOCARDIAL INFARCTION) (HCC): Primary | ICD-10-CM

## 2022-04-23 DIAGNOSIS — R07.9 CHEST PAIN, UNSPECIFIED TYPE: ICD-10-CM

## 2022-04-23 DIAGNOSIS — R74.01 TRANSAMINITIS: ICD-10-CM

## 2022-04-23 DIAGNOSIS — R10.9 INTRACTABLE ABDOMINAL PAIN: ICD-10-CM

## 2022-04-23 LAB
A/G RATIO: 1.1 (ref 1.1–2.2)
ALBUMIN SERPL-MCNC: 3.8 G/DL (ref 3.4–5)
ALP BLD-CCNC: 198 U/L (ref 40–129)
ALT SERPL-CCNC: 317 U/L (ref 10–40)
ANION GAP SERPL CALCULATED.3IONS-SCNC: 19 MMOL/L (ref 3–16)
AST SERPL-CCNC: 356 U/L (ref 15–37)
BASE EXCESS MIXED: -3
BASE EXCESS MIXED: -4
BASE EXCESS VENOUS: -4.8 MMOL/L
BASOPHILS ABSOLUTE: 0 K/UL (ref 0–0.2)
BASOPHILS RELATIVE PERCENT: 0.2 %
BILIRUB SERPL-MCNC: 0.9 MG/DL (ref 0–1)
BILIRUBIN URINE: ABNORMAL
BLOOD, URINE: ABNORMAL
BUN BLDV-MCNC: 69 MG/DL (ref 7–20)
CALCIUM SERPL-MCNC: 8.9 MG/DL (ref 8.3–10.6)
CARBOXYHEMOGLOBIN: 1.5 %
CHLORIDE BLD-SCNC: 107 MMOL/L (ref 99–110)
CLARITY: ABNORMAL
CO2: 16 MMOL/L (ref 21–32)
COLOR: YELLOW
CREAT SERPL-MCNC: 2.3 MG/DL (ref 0.8–1.3)
EOSINOPHILS ABSOLUTE: 0 K/UL (ref 0–0.6)
EOSINOPHILS RELATIVE PERCENT: 0 %
EPITHELIAL CELLS, UA: 8 /HPF (ref 0–5)
GFR AFRICAN AMERICAN: 35
GFR NON-AFRICAN AMERICAN: 29
GLUCOSE BLD-MCNC: 171 MG/DL (ref 70–99)
GLUCOSE BLD-MCNC: 257 MG/DL (ref 70–99)
GLUCOSE URINE: NEGATIVE MG/DL
HCO3 VENOUS: 22 MMOL/L (ref 23–29)
HCO3, MIXED: 21.5 MMOL/L
HCO3, MIXED: 21.9 MMOL/L
HCT VFR BLD CALC: 36.6 % (ref 40.5–52.5)
HEMOGLOBIN: 11.4 G/DL (ref 13.5–17.5)
HYALINE CASTS: >40 /LPF (ref 0–2)
KETONES, URINE: ABNORMAL MG/DL
LACTIC ACID, SEPSIS: 3.8 MMOL/L (ref 0.4–1.9)
LACTIC ACID, SEPSIS: 4 MMOL/L (ref 0.4–1.9)
LEUKOCYTE ESTERASE, URINE: NEGATIVE
LYMPHOCYTES ABSOLUTE: 0.6 K/UL (ref 1–5.1)
LYMPHOCYTES RELATIVE PERCENT: 4.4 %
MCH RBC QN AUTO: 30.9 PG (ref 26–34)
MCHC RBC AUTO-ENTMCNC: 31.3 G/DL (ref 31–36)
MCV RBC AUTO: 98.7 FL (ref 80–100)
METHEMOGLOBIN VENOUS: 0.7 %
MICROSCOPIC EXAMINATION: YES
MONOCYTES ABSOLUTE: 0.7 K/UL (ref 0–1.3)
MONOCYTES RELATIVE PERCENT: 5.6 %
NEUTROPHILS ABSOLUTE: 11.3 K/UL (ref 1.7–7.7)
NEUTROPHILS RELATIVE PERCENT: 89.8 %
NITRITE, URINE: NEGATIVE
O2 SAT, MIXED: 67 %
O2 SAT, MIXED: 69 %
O2 SAT, VEN: 36 %
O2 THERAPY: ABNORMAL
PCO2 MIXED: 35.8 MM HG
PCO2 MIXED: 39.3 MM HG
PCO2, VEN: 44 MMHG (ref 40–50)
PDW BLD-RTO: 18.4 % (ref 12.4–15.4)
PERFORMED ON: ABNORMAL
PERFORMED ON: ABNORMAL
PERFORMED ON: NORMAL
PH UA: 5.5 (ref 5–8)
PH VENOUS: 7.3 (ref 7.35–7.45)
PH, MIXED: 7.34 (ref 7.35–7.45)
PH, MIXED: 7.39 (ref 7.35–7.45)
PLATELET # BLD: 210 K/UL (ref 135–450)
PMV BLD AUTO: 10.2 FL (ref 5–10.5)
PO2 MIXED: 35 MM HG
PO2 MIXED: 38 MM HG
PO2, VEN: <30 MMHG
POC ACT LR: 202 SEC
POC ACT LR: 245 SEC
POC SAMPLE TYPE: ABNORMAL
POC SAMPLE TYPE: NORMAL
POTASSIUM SERPL-SCNC: 5.4 MMOL/L (ref 3.5–5.1)
PRO-BNP: ABNORMAL PG/ML (ref 0–124)
PROCALCITONIN: 0.27 NG/ML (ref 0–0.15)
PROTEIN UA: 30 MG/DL
RAPID INFLUENZA  B AGN: NEGATIVE
RAPID INFLUENZA A AGN: NEGATIVE
RBC # BLD: 3.71 M/UL (ref 4.2–5.9)
RBC UA: 16 /HPF (ref 0–4)
SARS-COV-2, NAAT: NOT DETECTED
SODIUM BLD-SCNC: 142 MMOL/L (ref 136–145)
SPECIFIC GRAVITY UA: >=1.03 (ref 1–1.03)
TCO2 CALC MIXED: 23 MMOL/L
TCO2 CALC MIXED: 23 MMOL/L
TCO2 CALC VENOUS: 23 MMOL/L
TOTAL PROTEIN: 7.3 G/DL (ref 6.4–8.2)
TROPONIN: 2.14 NG/ML
URINE REFLEX TO CULTURE: ABNORMAL
URINE TYPE: ABNORMAL
UROBILINOGEN, URINE: 2 E.U./DL
WBC # BLD: 12.6 K/UL (ref 4–11)
WBC UA: 6 /HPF (ref 0–5)

## 2022-04-23 PROCEDURE — 36415 COLL VENOUS BLD VENIPUNCTURE: CPT

## 2022-04-23 PROCEDURE — 2720000010 HC SURG SUPPLY STERILE

## 2022-04-23 PROCEDURE — 94640 AIRWAY INHALATION TREATMENT: CPT

## 2022-04-23 PROCEDURE — 83036 HEMOGLOBIN GLYCOSYLATED A1C: CPT

## 2022-04-23 PROCEDURE — 94760 N-INVAS EAR/PLS OXIMETRY 1: CPT

## 2022-04-23 PROCEDURE — 87040 BLOOD CULTURE FOR BACTERIA: CPT

## 2022-04-23 PROCEDURE — 33990 INSJ PERQ VAD L HRT ARTERIAL: CPT

## 2022-04-23 PROCEDURE — 99285 EMERGENCY DEPT VISIT HI MDM: CPT

## 2022-04-23 PROCEDURE — 84145 PROCALCITONIN (PCT): CPT

## 2022-04-23 PROCEDURE — 6360000002 HC RX W HCPCS: Performed by: INTERNAL MEDICINE

## 2022-04-23 PROCEDURE — 6360000002 HC RX W HCPCS

## 2022-04-23 PROCEDURE — 96367 TX/PROPH/DG ADDL SEQ IV INF: CPT

## 2022-04-23 PROCEDURE — 2700000000 HC OXYGEN THERAPY PER DAY

## 2022-04-23 PROCEDURE — 94761 N-INVAS EAR/PLS OXIMETRY MLT: CPT

## 2022-04-23 PROCEDURE — 84484 ASSAY OF TROPONIN QUANT: CPT

## 2022-04-23 PROCEDURE — C1769 GUIDE WIRE: HCPCS

## 2022-04-23 PROCEDURE — 2100000000 HC CCU R&B

## 2022-04-23 PROCEDURE — 87635 SARS-COV-2 COVID-19 AMP PRB: CPT

## 2022-04-23 PROCEDURE — 2580000003 HC RX 258: Performed by: EMERGENCY MEDICINE

## 2022-04-23 PROCEDURE — 2580000003 HC RX 258

## 2022-04-23 PROCEDURE — 80053 COMPREHEN METABOLIC PANEL: CPT

## 2022-04-23 PROCEDURE — 96375 TX/PRO/DX INJ NEW DRUG ADDON: CPT

## 2022-04-23 PROCEDURE — 6370000000 HC RX 637 (ALT 250 FOR IP): Performed by: EMERGENCY MEDICINE

## 2022-04-23 PROCEDURE — 5A0221D ASSISTANCE WITH CARDIAC OUTPUT USING IMPELLER PUMP, CONTINUOUS: ICD-10-PCS | Performed by: INTERNAL MEDICINE

## 2022-04-23 PROCEDURE — 2500000003 HC RX 250 WO HCPCS

## 2022-04-23 PROCEDURE — 87804 INFLUENZA ASSAY W/OPTIC: CPT

## 2022-04-23 PROCEDURE — 2500000003 HC RX 250 WO HCPCS: Performed by: INTERNAL MEDICINE

## 2022-04-23 PROCEDURE — 99152 MOD SED SAME PHYS/QHP 5/>YRS: CPT | Performed by: INTERNAL MEDICINE

## 2022-04-23 PROCEDURE — C1760 CLOSURE DEV, VASC: HCPCS

## 2022-04-23 PROCEDURE — 83605 ASSAY OF LACTIC ACID: CPT

## 2022-04-23 PROCEDURE — 6360000004 HC RX CONTRAST MEDICATION: Performed by: INTERNAL MEDICINE

## 2022-04-23 PROCEDURE — 93453 R&L HRT CATH W/VENTRICLGRPHY: CPT

## 2022-04-23 PROCEDURE — 2709999900 HC NON-CHARGEABLE SUPPLY

## 2022-04-23 PROCEDURE — 71045 X-RAY EXAM CHEST 1 VIEW: CPT

## 2022-04-23 PROCEDURE — 02HA3RZ INSERTION OF SHORT-TERM EXTERNAL HEART ASSIST SYSTEM INTO HEART, PERCUTANEOUS APPROACH: ICD-10-PCS | Performed by: INTERNAL MEDICINE

## 2022-04-23 PROCEDURE — 74176 CT ABD & PELVIS W/O CONTRAST: CPT

## 2022-04-23 PROCEDURE — 2580000003 HC RX 258: Performed by: INTERNAL MEDICINE

## 2022-04-23 PROCEDURE — 81001 URINALYSIS AUTO W/SCOPE: CPT

## 2022-04-23 PROCEDURE — 70450 CT HEAD/BRAIN W/O DYE: CPT

## 2022-04-23 PROCEDURE — 85025 COMPLETE CBC W/AUTO DIFF WBC: CPT

## 2022-04-23 PROCEDURE — 87086 URINE CULTURE/COLONY COUNT: CPT

## 2022-04-23 PROCEDURE — 99291 CRITICAL CARE FIRST HOUR: CPT | Performed by: INTERNAL MEDICINE

## 2022-04-23 PROCEDURE — 85347 COAGULATION TIME ACTIVATED: CPT

## 2022-04-23 PROCEDURE — C1894 INTRO/SHEATH, NON-LASER: HCPCS

## 2022-04-23 PROCEDURE — B2151ZZ FLUOROSCOPY OF LEFT HEART USING LOW OSMOLAR CONTRAST: ICD-10-PCS | Performed by: INTERNAL MEDICINE

## 2022-04-23 PROCEDURE — 96365 THER/PROPH/DIAG IV INF INIT: CPT

## 2022-04-23 PROCEDURE — 6370000000 HC RX 637 (ALT 250 FOR IP): Performed by: INTERNAL MEDICINE

## 2022-04-23 PROCEDURE — 82803 BLOOD GASES ANY COMBINATION: CPT

## 2022-04-23 PROCEDURE — C1751 CATH, INF, PER/CENT/MIDLINE: HCPCS

## 2022-04-23 PROCEDURE — 96361 HYDRATE IV INFUSION ADD-ON: CPT

## 2022-04-23 PROCEDURE — 93451 RIGHT HEART CATH: CPT | Performed by: INTERNAL MEDICINE

## 2022-04-23 PROCEDURE — 4A023N6 MEASUREMENT OF CARDIAC SAMPLING AND PRESSURE, RIGHT HEART, PERCUTANEOUS APPROACH: ICD-10-PCS | Performed by: INTERNAL MEDICINE

## 2022-04-23 PROCEDURE — 83880 ASSAY OF NATRIURETIC PEPTIDE: CPT

## 2022-04-23 PROCEDURE — 6360000002 HC RX W HCPCS: Performed by: EMERGENCY MEDICINE

## 2022-04-23 PROCEDURE — 93005 ELECTROCARDIOGRAM TRACING: CPT | Performed by: EMERGENCY MEDICINE

## 2022-04-23 PROCEDURE — 99152 MOD SED SAME PHYS/QHP 5/>YRS: CPT

## 2022-04-23 PROCEDURE — 33990 INSJ PERQ VAD L HRT ARTERIAL: CPT | Performed by: INTERNAL MEDICINE

## 2022-04-23 RX ORDER — ASPIRIN 81 MG/1
81 TABLET ORAL DAILY
Status: DISCONTINUED | OUTPATIENT
Start: 2022-04-24 | End: 2022-04-26 | Stop reason: HOSPADM

## 2022-04-23 RX ORDER — 0.9 % SODIUM CHLORIDE 0.9 %
30 INTRAVENOUS SOLUTION INTRAVENOUS ONCE
Status: COMPLETED | OUTPATIENT
Start: 2022-04-23 | End: 2022-04-23

## 2022-04-23 RX ORDER — LEVETIRACETAM 500 MG/1
500 TABLET ORAL 2 TIMES DAILY
Status: DISCONTINUED | OUTPATIENT
Start: 2022-04-23 | End: 2022-04-24

## 2022-04-23 RX ORDER — SODIUM CHLORIDE 9 MG/ML
INJECTION, SOLUTION INTRAVENOUS PRN
Status: DISCONTINUED | OUTPATIENT
Start: 2022-04-23 | End: 2022-04-26 | Stop reason: HOSPADM

## 2022-04-23 RX ORDER — DOBUTAMINE HYDROCHLORIDE 200 MG/100ML
2.5 INJECTION INTRAVENOUS CONTINUOUS
Status: DISCONTINUED | OUTPATIENT
Start: 2022-04-23 | End: 2022-04-24

## 2022-04-23 RX ORDER — INSULIN LISPRO 100 [IU]/ML
0-12 INJECTION, SOLUTION INTRAVENOUS; SUBCUTANEOUS
Status: DISCONTINUED | OUTPATIENT
Start: 2022-04-23 | End: 2022-04-26 | Stop reason: HOSPADM

## 2022-04-23 RX ORDER — HEPARIN SODIUM 5000 [USP'U]/ML
5000 INJECTION, SOLUTION INTRAVENOUS; SUBCUTANEOUS EVERY 8 HOURS SCHEDULED
Status: DISCONTINUED | OUTPATIENT
Start: 2022-04-23 | End: 2022-04-23

## 2022-04-23 RX ORDER — SODIUM CHLORIDE 9 MG/ML
INJECTION, SOLUTION INTRAVENOUS PRN
Status: DISCONTINUED | OUTPATIENT
Start: 2022-04-23 | End: 2022-04-23 | Stop reason: SDUPTHER

## 2022-04-23 RX ORDER — FUROSEMIDE 10 MG/ML
80 INJECTION INTRAMUSCULAR; INTRAVENOUS ONCE
Status: COMPLETED | OUTPATIENT
Start: 2022-04-23 | End: 2022-04-23

## 2022-04-23 RX ORDER — TRAZODONE HYDROCHLORIDE 50 MG/1
50 TABLET ORAL NIGHTLY
Status: DISCONTINUED | OUTPATIENT
Start: 2022-04-23 | End: 2022-04-26 | Stop reason: HOSPADM

## 2022-04-23 RX ORDER — NITROGLYCERIN 20 MG/100ML
5-200 INJECTION INTRAVENOUS CONTINUOUS
Status: DISCONTINUED | OUTPATIENT
Start: 2022-04-23 | End: 2022-04-24

## 2022-04-23 RX ORDER — INSULIN LISPRO 100 [IU]/ML
0-6 INJECTION, SOLUTION INTRAVENOUS; SUBCUTANEOUS NIGHTLY
Status: DISCONTINUED | OUTPATIENT
Start: 2022-04-23 | End: 2022-04-26 | Stop reason: HOSPADM

## 2022-04-23 RX ORDER — FUROSEMIDE 10 MG/ML
INJECTION INTRAMUSCULAR; INTRAVENOUS
Status: COMPLETED
Start: 2022-04-23 | End: 2022-04-23

## 2022-04-23 RX ORDER — SODIUM CHLORIDE 0.9 % (FLUSH) 0.9 %
5-40 SYRINGE (ML) INJECTION EVERY 12 HOURS SCHEDULED
Status: DISCONTINUED | OUTPATIENT
Start: 2022-04-23 | End: 2022-04-23 | Stop reason: SDUPTHER

## 2022-04-23 RX ORDER — SODIUM CHLORIDE 0.9 % (FLUSH) 0.9 %
5-40 SYRINGE (ML) INJECTION EVERY 12 HOURS SCHEDULED
Status: DISCONTINUED | OUTPATIENT
Start: 2022-04-23 | End: 2022-04-26 | Stop reason: HOSPADM

## 2022-04-23 RX ORDER — METHYLPREDNISOLONE SODIUM SUCCINATE 125 MG/2ML
125 INJECTION, POWDER, LYOPHILIZED, FOR SOLUTION INTRAMUSCULAR; INTRAVENOUS ONCE
Status: COMPLETED | OUTPATIENT
Start: 2022-04-23 | End: 2022-04-23

## 2022-04-23 RX ORDER — SODIUM CHLORIDE 0.9 % (FLUSH) 0.9 %
5-40 SYRINGE (ML) INJECTION PRN
Status: DISCONTINUED | OUTPATIENT
Start: 2022-04-23 | End: 2022-04-26 | Stop reason: HOSPADM

## 2022-04-23 RX ORDER — NITROGLYCERIN 0.4 MG/1
0.4 TABLET SUBLINGUAL EVERY 5 MIN PRN
Status: DISCONTINUED | OUTPATIENT
Start: 2022-04-23 | End: 2022-04-26 | Stop reason: HOSPADM

## 2022-04-23 RX ORDER — ACETAMINOPHEN 325 MG/1
650 TABLET ORAL EVERY 4 HOURS PRN
Status: DISCONTINUED | OUTPATIENT
Start: 2022-04-23 | End: 2022-04-23 | Stop reason: SDUPTHER

## 2022-04-23 RX ORDER — DEXTROSE MONOHYDRATE 25 G/50ML
12.5 INJECTION, SOLUTION INTRAVENOUS PRN
Status: DISCONTINUED | OUTPATIENT
Start: 2022-04-23 | End: 2022-04-26 | Stop reason: HOSPADM

## 2022-04-23 RX ORDER — DEXTROSE MONOHYDRATE 50 MG/ML
100 INJECTION, SOLUTION INTRAVENOUS PRN
Status: DISCONTINUED | OUTPATIENT
Start: 2022-04-23 | End: 2022-04-26 | Stop reason: HOSPADM

## 2022-04-23 RX ORDER — OXYCODONE HYDROCHLORIDE AND ACETAMINOPHEN 5; 325 MG/1; MG/1
2 TABLET ORAL EVERY 6 HOURS PRN
Status: DISCONTINUED | OUTPATIENT
Start: 2022-04-23 | End: 2022-04-26 | Stop reason: HOSPADM

## 2022-04-23 RX ORDER — NICOTINE POLACRILEX 4 MG
15 LOZENGE BUCCAL PRN
Status: DISCONTINUED | OUTPATIENT
Start: 2022-04-23 | End: 2022-04-26 | Stop reason: HOSPADM

## 2022-04-23 RX ORDER — ACETAMINOPHEN 325 MG/1
650 TABLET ORAL EVERY 4 HOURS PRN
Status: DISCONTINUED | OUTPATIENT
Start: 2022-04-23 | End: 2022-04-26 | Stop reason: HOSPADM

## 2022-04-23 RX ORDER — SODIUM CHLORIDE 0.9 % (FLUSH) 0.9 %
5-40 SYRINGE (ML) INJECTION PRN
Status: DISCONTINUED | OUTPATIENT
Start: 2022-04-23 | End: 2022-04-23 | Stop reason: SDUPTHER

## 2022-04-23 RX ORDER — ALBUTEROL SULFATE 90 UG/1
4 AEROSOL, METERED RESPIRATORY (INHALATION) ONCE
Status: COMPLETED | OUTPATIENT
Start: 2022-04-23 | End: 2022-04-23

## 2022-04-23 RX ORDER — LORAZEPAM 2 MG/ML
1 INJECTION INTRAMUSCULAR
Status: DISCONTINUED | OUTPATIENT
Start: 2022-04-23 | End: 2022-04-25

## 2022-04-23 RX ORDER — NICOTINE 21 MG/24HR
1 PATCH, TRANSDERMAL 24 HOURS TRANSDERMAL NIGHTLY
Status: DISCONTINUED | OUTPATIENT
Start: 2022-04-23 | End: 2022-04-26 | Stop reason: HOSPADM

## 2022-04-23 RX ORDER — ATORVASTATIN CALCIUM 40 MG/1
80 TABLET, FILM COATED ORAL NIGHTLY
Status: DISCONTINUED | OUTPATIENT
Start: 2022-04-23 | End: 2022-04-26 | Stop reason: HOSPADM

## 2022-04-23 RX ORDER — HEPARIN SODIUM 10000 [USP'U]/100ML
0-3000 INJECTION, SOLUTION INTRAVENOUS CONTINUOUS
Status: DISCONTINUED | OUTPATIENT
Start: 2022-04-23 | End: 2022-04-26 | Stop reason: HOSPADM

## 2022-04-23 RX ADMIN — NITROGLYCERIN 0.4 MG: 0.4 TABLET, ORALLY DISINTEGRATING SUBLINGUAL at 14:40

## 2022-04-23 RX ADMIN — NITROGLYCERIN 0.4 MG: 0.4 TABLET, ORALLY DISINTEGRATING SUBLINGUAL at 14:15

## 2022-04-23 RX ADMIN — TRAZODONE HYDROCHLORIDE 50 MG: 50 TABLET ORAL at 22:12

## 2022-04-23 RX ADMIN — LEVETIRACETAM 500 MG: 500 TABLET, FILM COATED ORAL at 22:11

## 2022-04-23 RX ADMIN — VANCOMYCIN HYDROCHLORIDE 1500 MG: 10 INJECTION, POWDER, LYOPHILIZED, FOR SOLUTION INTRAVENOUS at 15:34

## 2022-04-23 RX ADMIN — LORAZEPAM 1 MG: 2 INJECTION INTRAMUSCULAR; INTRAVENOUS at 22:11

## 2022-04-23 RX ADMIN — FUROSEMIDE 80 MG: 10 INJECTION, SOLUTION INTRAMUSCULAR; INTRAVENOUS at 16:23

## 2022-04-23 RX ADMIN — METHYLPREDNISOLONE SODIUM SUCCINATE 125 MG: 125 INJECTION, POWDER, FOR SOLUTION INTRAMUSCULAR; INTRAVENOUS at 11:27

## 2022-04-23 RX ADMIN — NITROGLYCERIN 0.4 MG: 0.4 TABLET, ORALLY DISINTEGRATING SUBLINGUAL at 14:25

## 2022-04-23 RX ADMIN — ATORVASTATIN CALCIUM 80 MG: 40 TABLET, FILM COATED ORAL at 22:11

## 2022-04-23 RX ADMIN — FUROSEMIDE 80 MG: 10 INJECTION INTRAMUSCULAR; INTRAVENOUS at 16:23

## 2022-04-23 RX ADMIN — SODIUM NITROPRUSSIDE 0.1 MCG/KG/MIN: 25 INJECTION, SOLUTION, CONCENTRATE INTRAVENOUS at 19:19

## 2022-04-23 RX ADMIN — HEPARIN SODIUM 150 UNITS/HR: 10000 INJECTION, SOLUTION INTRAVENOUS at 22:25

## 2022-04-23 RX ADMIN — HEPARIN SODIUM: 5000 INJECTION INTRAVENOUS; SUBCUTANEOUS at 20:30

## 2022-04-23 RX ADMIN — IOPAMIDOL 20 ML: 755 INJECTION, SOLUTION INTRAVENOUS at 17:37

## 2022-04-23 RX ADMIN — FUROSEMIDE 5 MG/HR: 100 INJECTION, SOLUTION INTRAMUSCULAR; INTRAVENOUS at 18:28

## 2022-04-23 RX ADMIN — INSULIN LISPRO 3 UNITS: 100 INJECTION, SOLUTION INTRAVENOUS; SUBCUTANEOUS at 22:24

## 2022-04-23 RX ADMIN — SODIUM CHLORIDE 2694 ML: 9 INJECTION, SOLUTION INTRAVENOUS at 12:33

## 2022-04-23 RX ADMIN — ALBUTEROL SULFATE 4 PUFF: 90 AEROSOL, METERED RESPIRATORY (INHALATION) at 11:45

## 2022-04-23 RX ADMIN — CEFEPIME HYDROCHLORIDE 2000 MG: 2 INJECTION, POWDER, FOR SOLUTION INTRAVENOUS at 13:06

## 2022-04-23 ASSESSMENT — PAIN - FUNCTIONAL ASSESSMENT: PAIN_FUNCTIONAL_ASSESSMENT: ACTIVITIES ARE NOT PREVENTED

## 2022-04-23 ASSESSMENT — PAIN SCALES - GENERAL
PAINLEVEL_OUTOF10: 6
PAINLEVEL_OUTOF10: 3
PAINLEVEL_OUTOF10: 0
PAINLEVEL_OUTOF10: 6
PAINLEVEL_OUTOF10: 3
PAINLEVEL_OUTOF10: 5

## 2022-04-23 ASSESSMENT — PAIN DESCRIPTION - DESCRIPTORS: DESCRIPTORS: ACHING

## 2022-04-23 ASSESSMENT — PAIN DESCRIPTION - FREQUENCY: FREQUENCY: CONTINUOUS

## 2022-04-23 ASSESSMENT — PAIN DESCRIPTION - ONSET: ONSET: ON-GOING

## 2022-04-23 ASSESSMENT — PAIN DESCRIPTION - LOCATION: LOCATION: CHEST

## 2022-04-23 ASSESSMENT — PAIN DESCRIPTION - ORIENTATION: ORIENTATION: MID

## 2022-04-23 ASSESSMENT — PAIN DESCRIPTION - PAIN TYPE: TYPE: ACUTE PAIN

## 2022-04-23 NOTE — CONSULTS
Cardiology Consultation     [place] Castleman  1955    PCP: Diaz Whatley MD  Referring Physician: Dr. Toby Cruz   Reason for Referral: elevated troponin   Chief Complaint:   Chief Complaint   Patient presents with    Altered Mental Status     Pt to ED via [facility] with AMS that started 2 days ago. Per EMS report, pt lives with his wife who states pt has had increased confusion x2 days. Pt c/o mid sternal chest pain that started 2 hrs pta. Subjective:     History of Present Illness: The patient is 77 y.o. male with a past medical history significant for CAD with prior CABG and multiple PCI's/NSTEMI, cardiomyopathy, CHF, tobacco use,COPD, diabetes mellitus, HTN, HLP admitted with possible seizure. He presented with confusion and altered mental status. He is breathless and unable to provide any history. I was called emergently by the ER due to elevated troponin. All his extremities are cold to touch and he is in multiorgan failure ( cardiac/renal/liver/pulmonary).          Past Medical History:   Diagnosis Date    Anxiety     Arthritis     Asthma     CAD (coronary artery disease)     Calcium kidney stone     Cardiomyopathy ([address]     Cerebral artery occlusion with cerebral infarction ([address]     CHF (congestive heart failure) ([address]     Clostridium difficile diarrhea 02/12/2020    COPD (chronic obstructive pulmonary disease) (HCC)     mild    Depression     DM2 (diabetes mellitus, type 2) (HCC)     Fibromyalgia     GERD (gastroesophageal reflux disease)     Hyperlipidemia     Hypertension     Liver disease     Pacemaker 2012    Medtronic model # [id number]    Pneumonia     Seizures ([address]     TIA (transient ischemic attack) 2007    Ulcerative colitis ([address]      Past Surgical History:   Procedure Laterality Date    APPENDECTOMY      BACK SURGERY      CARDIAC SURGERY      CHOLECYSTECTOMY      COLONOSCOPY  01/10/2017    COLONOSCOPY  01/10/2017    CORONARY ANGIOPLASTY WITH STENT PLACEMENT      CORONARY ARTERY BYPASS GRAFT  , 2015    ENDOSCOPY, COLON, DIAGNOSTIC      PACEMAKER PLACEMENT      UPPER GASTROINTESTINAL ENDOSCOPY  2017    VASCULAR SURGERY       Family History   Problem Relation Age of Onset    Coronary Art Dis Father     Cancer Mother         Lung with mets    Diabetes Mother      Social History     Tobacco Use    Smoking status: Current Every Day Smoker     Packs/day: 0.50     Years: 44.00     Pack years: 22.00     Types: Cigarettes     Last attempt to quit: 10/20/2021     Years since quittin.5    Smokeless tobacco: Never Used    Tobacco comment: cutting down   Vaping Use    Vaping Use: Never used   Substance Use Topics    Alcohol use: No     Alcohol/week: 0.0 standard drinks    Drug use: No       Allergies   Allergen Reactions    Melatonin Other (See Comments)     Restless leg syndrome       Current Facility-Administered Medications   Medication Dose Route Frequency Provider Last Rate Last Admin    promethazine (PHENERGAN) tablet 12.5 mg  12.5 mg Oral Q6H PRN Camille Rodriguez MD        Or    ondansetron (ZOFRAN) injection 4 mg  4 mg IntraVENous Q6H PRN Camille Rodriguez MD        magnesium hydroxide (MILK OF MAGNESIA) 400 MG/5ML suspension 30 mL  30 mL Oral Daily PRN Camille Rodriguez MD        acetaminophen (TYLENOL) tablet 650 mg  650 mg Oral Q6H PRN Camille Rodriguez MD        Or    acetaminophen (TYLENOL) suppository 650 mg  650 mg Rectal Q6H PRN Camille Rodriguez MD        nitroPRUSSide (NIPRIDE) 50 mg in dextrose 5 % 250 mL infusion  0.1-2 mcg/kg/min IntraVENous Continuous Aren Wong MD 21.6 mL/hr at 22 1212 0.8 mcg/kg/min at 22 1212    [START ON 2022] clopidogrel (PLAVIX) tablet 75 mg  75 mg Oral Daily Aren Wong MD        Juan J Cotton ON 2022] hydrALAZINE (APRESOLINE) tablet 25 mg  25 mg Oral 2 times per day Aren Wong MD        nitroGLYCERIN (NITROSTAT) SL tablet 0.4 mg  0.4 mg SubLINGual Q5 Min PRN Andrew Childs MD   0.4 mg at 04/23/22 1440    sodium chloride flush 0.9 % injection 5-40 mL  5-40 mL IntraVENous 2 times per day Zoltan Jean MD   10 mL at 04/24/22 0904    sodium chloride flush 0.9 % injection 5-40 mL  5-40 mL IntraVENous PRN Zoltan Jean MD        0.9 % sodium chloride infusion   IntraVENous PRN Zoltan Jean MD        acetaminophen (TYLENOL) tablet 650 mg  650 mg Oral Q4H PRN Zoltan Jean MD        furosemide (LASIX) 100 mg in dextrose 5 % 100 mL infusion  2.5 mg/hr IntraVENous Continuous Zoltan Jean MD 2.5 mL/hr at 04/24/22 1212 2.5 mg/hr at 04/24/22 1212    oxyCODONE-acetaminophen (PERCOCET) 5-325 MG per tablet 2 tablet  2 tablet Oral Q6H PRN Zoltan Jean MD        heparin (porcine) 25,000 Units in dextrose 5 % 500 mL infusion (FOR IMPELLA PURGE)   IntraCATHeter Continuous Zoltan Jean MD   New Bag at 04/23/22 2030    perflutren lipid microspheres (DEFINITY) injection 1.65 mg  1.5 mL IntraVENous ONCE PRN Zoltan Jean MD        aspirin EC tablet 81 mg  81 mg Oral Daily Zoltan Jean MD   81 mg at 04/24/22 1030    atorvastatin (LIPITOR) tablet 80 mg  80 mg Oral Nightly Zoltan Jean MD   80 mg at 04/23/22 2211    levETIRAcetam (KEPPRA) tablet 500 mg  500 mg Oral BID Zoltan Jean MD   500 mg at 04/24/22 1030    nicotine (NICODERM CQ) 21 MG/24HR 1 patch  1 patch TransDERmal Nightly Zoltan Jean MD   1 patch at 04/23/22 2210    traZODone (DESYREL) tablet 50 mg  50 mg Oral Nightly oZltan Jean MD   50 mg at 04/23/22 2212    glucose (GLUTOSE) 40 % oral gel 15 g  15 g Oral PRN Anabel Crowe MD        dextrose 50 % IV solution  12.5 g IntraVENous PRN Anabel Crowe MD        glucagon (rDNA) injection 1 mg  1 mg IntraMUSCular PRN Anabel Crowe MD        dextrose 5 % solution  100 mL/hr IntraVENous PRN Anabel Crowe MD        insulin lispro (HUMALOG) injection vial 0-12 Units  0-12 Units SubCUTAneous TID  Anabel Crowe MD   2 Units at 04/24/22 1213    insulin lispro (HUMALOG) injection vial 0-6 Units  0-6 Units SubCUTAneous Nightly Edwardo Evans MD   3 Units at 04/23/22 2224    cefTRIAXone (ROCEPHIN) 1000 mg IVPB in 50 mL D5W minibag  1,000 mg IntraVENous Q24H Edwardo Evans MD   Stopped at 04/24/22 0052    heparin 25,000 unit in sodium chloride 0.45% 250 mL (premix) infusion  0-3,000 Units/hr IntraVENous Continuous Kavitha Maria MD 4.5 mL/hr at 04/24/22 1212 450 Units/hr at 04/24/22 1212    LORazepam (ATIVAN) injection 1 mg  1 mg IntraVENous Q2H PRN Kavitha Maria MD   1 mg at 04/24/22 0522       Review of Systems:  · Unable to obtain    Physical Exam:   /77   Pulse 101   Temp 97.8 °F (36.6 °C) (Axillary)   Resp (!) 37   Ht 6' (1.829 m)   Wt 179 lb 14.3 oz (81.6 kg)   SpO2 96%   BMI 24.40 kg/m²   Wt Readings from Last 3 Encounters:   04/24/22 179 lb 14.3 oz (81.6 kg)   04/15/22 206 lb 3.2 oz (93.5 kg)   02/02/22 198 lb 13.7 oz (90.2 kg)     Constitutional: He is disoriented, appears older than stated age, is in respiratory distress   Head: Normocephalic and atraumatic. Pupils equal and round. Neck: Neck supple. +JVD,  No carotid bruit appreciated. No mass and no thyromegaly present. No lymphadenopathy present. Cardiovascular: tachycardic . Normal heart sounds. Exam reveals no gallop and no friction rub. No murmur heard. Pulmonary/Chest: diffuse rales   Abdominal: Soft, non-tender. Bowel sounds are normal. He exhibits no organomegaly, mass or bruit. Extremities: No edema. Cold ,  Pulses are 2+ radial/carotid bilaterally. Neurological: No gross cranial nerve deficit. Coordination normal.   Skin: Skin is warm and dry. There is no rash or diaphoresis. Psychiatric: He has a normal mood and affect.  His speech is normal and behavior is normal.     Lab Review:   FLP:    Lab Results   Component Value Date    TRIG 94 01/29/2022    HDL 50 01/29/2022    LDLCALC 75 01/29/2022    LDLDIRECT 105 09/17/2015    LABVLDL 19 01/29/2022 BUN/Creatinine:    Lab Results   Component Value Date    BUN 78 04/24/2022    CREATININE 2.0 04/24/2022     PT/INR, TNI, HGB A1C:   Lab Results   Component Value Date/Time    TROPONINI 2.14 (HH) 04/23/2022 11:04 AM    LABA1C 6.3 04/23/2022 07:43 PM      No results found for: CBCAUTODIF    ECG 1/31/2022:  Sinus rhythm with RBBB; prolonged Qtc     ECG 1/28/2022:  SR with RBBB  (nonspecific TW abnormality noted on initial ECG)     1/30/2022 [facility] device interrogation:  Multiple episodes of SVT/VT noted on 1/28/2022 in AM  Episodes lasting up to 3+ minutes  Rates 162-171  No device discharges (rate set at 188 for device therapies)     Echo 1/28/2022:   Limited study.  Bala Im left ventricular systolic function appears moderately reduced.   Ejection fraction is visually estimated to be 35-40% with diffuse   hypokinesis. There is mildly increased left ventricular wall thickness. Left   ventricular cavity size is normal. Diastolic filling parameters suggest   grade I diastolic dysfunction.   Moderate mitral regurgitation.     11/5/20[facility] ([facility]):  Findings:   Dominance: right dominant   LM:  Ostial:  40%, in-stent   LAD:  Distal: 80%     D1: patent stent, 80% upper pole brance     LCX:  Proximal: 100%      10/18/2021 LHC/PCI:  1. Known 3 v CAD; bypass graft in 1 to be patent and were not injected   during this angiogram.  There was a visible dissection versus thrombus   distal to the most recently placed stent from the LAD extending into the   diagonal.   2. Successful PCI of the diagonal using drug-eluting stent   3. Successful PTCA of the LMT malapposed stent   4.  IVUS revealing severe Diagonal stenosis and malapposed LMT stent      10/11/2021 LHC/PCI ([facility]):  3 Vessel CAD   Successful shockwave PCI of the LMCA with 3.5 mm balloon and 3.5 mm NC   balloon with increase in LMCA MLCSA from 5.3 mm2 to 6.8 mm2   Patent proximal LAD/D1 stent   Unchanged bypass graft status   Mild LV dysfunction   Mildly elevated LVEDP   Normal systemic pressures   angioseal closure of the RCFA access site.      10/4/2021 LHC/PCI: ([facility])  3 Vessel CAD   Successful IVUS guided PCI distal LMCA into D2 with 3 x 16 mm and 3 x 12   mm Synergy ANGELINA's (overlapping)   FFR D2 = 0.78   Patent grafts as above   Mild LV dysfunction   Normal LVEDP   Normal systemic pressures      6/11/2021 LHC: ([facility])  1) 3 vessel CAD with 4/4 patent grafts and occlusion of small RPDA, likely   culprit, plan medical management   2) Normal LVEDP   3) Moderately reduced LVEF      Echo 7/12/2021 ([facility]):  Left ventricle is mildly (5.9-6.3 cm) dilated. The left atrium is moderately dilated. The Aortic Valve leaflets appear sclerotic. No hemodynamically significant valvular aortic stenosis. There is a pacemaker lead in the right ventricle. The left ventricular function is moderately reduced. Overall left ventricular ejection fraction is estimated to be 30-35%. There is moderate global hypokinesis of the left ventricle. The mitral valve leaflets are mildly calcified in appearance. There is trace mitral regurgitation. There is a catheter/pacemaker lead seen in the right atrial cavity.    There is no pericardial effusion.      4/19/2021 Lexiscan-Myoview ([facility]):  Transient dilatation of the left ventricle which can be due to left ventricular hypertrophy,    diabetes, or balanced three-vessel coronary artery disease with symmetric myocardial ischemia.       No focal perfusion defects       Dilated left ventricle       Reduced left ventricular ejection fraction measuring 31%         All above diagnostic testing and laboratory data was independently visualized and reviewed by me (not simply review of report)       Assessment and Plan   1) cardiogenic shock  - multiorgan failure   - recommend cath lab immediately for RHC/Mechanical support device, based on invasive hemodynamics     2) acute on chronic systolic heart failure   - lasix 80mg IV X 1 now  - dobutamine @ 5mcg/kg/min   - stage D NYHA class IV     3) CAD   - unstable  - medical therapy for now  - no plan for coronary intervention   - asa/plavix/statin     4) LALITA  - as per nephrology     Overall, the problems requiring hospitalization are high in severity     Thank you very much for allowing me to participate in the care of your patient. Please do not hesitate to contact me if you have any questions.       Moe Zazueta MD [address] [facility], Interventional Cardiology, and Peripheral Vascular Disease   [place] [address]   Ph: [phone]  Fax: [phone]

## 2022-04-23 NOTE — PROGRESS NOTES
Seen and examined  Patient in cardiogenic shock   Lasix 80mg IV now   To the cath lab emergently for PA catheter and mechanical support   To cardiac ICU postop    Full consult to follow    Conrad Valencia MD [address] [facility], Interventional Cardiology, and Peripheral Vascular [address] [facility]   (O): [phone]  (F): [phone]

## 2022-04-23 NOTE — CONSULTS
Clinical Pharmacy Note  Vancomycin Consult    Pharmacy consult received for one-time dose of vancomycin in the Emergency Department per Dr. Daron Lorenz. Ht Readings from Last 1 Encounters:   04/23/22 6' (1.829 m)        Wt Readings from Last 1 Encounters:   04/23/22 197 lb 15.6 oz (89.8 kg)         Assessment/Plan:   Vancomycin 1500 mg x 1 in ED.  If Vancomycin is to continue on admission and pharmacy is to manage dosing, please re-consult with admission orders.

## 2022-04-23 NOTE — PRE SEDATION
Sedation Pre-Procedure Note    Patient Name: Yeison Cruz   YOB: 1955  Room/Bed: TU/NONE  Medical Record Number: [id number]  Date: 4/23/2022   Time: 5:32 PM       Indication:  Cardiogenic shock     Consent: I have discussed with the patient and/or the patient representative the indication, alternatives, and the possible risks and/or complications of the planned procedure and the anesthesia methods. The patient and/or patient representative appear to understand and agree to proceed. Vital Signs:   Vitals:    04/23/22 1625   BP: 120/81   Pulse: 116   Resp: (!) 38   Temp:    SpO2: 97%       Past Medical History:   has a past medical history of Anxiety, Arthritis, Asthma, CAD (coronary artery disease), Calcium kidney stone, Cardiomyopathy ([address] Cerebral artery occlusion with cerebral infarction ([address] CHF (congestive heart failure) ([address] Clostridium difficile diarrhea, COPD (chronic obstructive pulmonary disease) ([address] Depression, DM2 (diabetes mellitus, type 2) ([address] Fibromyalgia, GERD (gastroesophageal reflux disease), Hyperlipidemia, Hypertension, Liver disease, Pacemaker, Pneumonia, Seizures ([address] TIA (transient ischemic attack), and Ulcerative colitis ([address] Past Surgical History:   has a past surgical history that includes Cholecystectomy; Coronary artery bypass graft (2010, 11/2015); Coronary angioplasty with stent (2012); Cardiac surgery; Endoscopy, colon, diagnostic; pacemaker placement; Colonoscopy (01/10/2017); Colonoscopy (01/10/2017); Upper gastrointestinal endoscopy (02/07/2017); back surgery; Appendectomy; and vascular surgery. Medications:   Scheduled Meds:    sodium chloride flush  5-40 mL IntraVENous 2 times per day     Continuous Infusions:    sodium chloride       PRN Meds: sodium chloride flush, sodium chloride, nitroGLYCERIN  Home Meds:   Prior to Admission medications    Medication Sig Start Date End Date Taking?  Authorizing Provider   oxyCODONE-acetaminophen (PERCOCET)  MG per tablet Take 1 tablet by mouth every 8 hours as needed for Pain for up to 30 days. 4/7/22 5/7/22  Omari Wallace MD   nitroGLYCERIN (NITROSTAT) 0.4 MG SL tablet Place 1 tablet under the tongue every 5 minutes as needed for Chest pain 4/4/22   Omari Wallace MD   gabapentin (NEURONTIN) 600 MG tablet TAKE ONE TABLET BY MOUTH FIVE TIMES A DAY 3/21/22 6/21/22  Antonio Steward MD   zolpidem (AMBIEN) 5 MG tablet Take 5 mg by mouth nightly as needed for Sleep.     Historical Provider, MD   apixaban (ELIQUIS) 5 MG TABS tablet Take 1 tablet by mouth 2 times daily 2/2/22   Jenifer Pinon MD   amiodarone (CORDARONE) 200 MG tablet Take 1 tablet by mouth daily 2/3/22 3/5/22  Jenifer Pinon MD   levETIRAcetam (KEPPRA) 500 MG tablet Take 1 tablet by mouth 2 times daily 2/2/22   Jenifer Pinon MD   atorvastatin (LIPITOR) 80 MG tablet Take 1 tablet by mouth nightly 2/2/22   Jenifer Pinon MD   nicotine (NICODERM CQ) 21 MG/24HR Place 1 patch onto the skin daily 2/3/22   Jenifer Pinon MD   traZODone (DESYREL) 50 MG tablet Take 1 tablet by mouth nightly 1/3/22   Omari Wallace MD   amLODIPine (NORVASC) 5 MG tablet Take 5 mg by mouth daily  10/19/21   Historical Provider, MD   isosorbide mononitrate (IMDUR) 60 MG extended release tablet Take 60 mg by mouth daily  11/5/21   Historical Provider, MD   carvedilol (COREG) 25 MG tablet Take 25 mg by mouth 2 times daily (with meals)  10/6/21   Historical Provider, MD   aspirin 81 MG EC tablet Take 81 mg by mouth daily     Historical Provider, MD   fluticasone-salmeterol (ADVAIR DISKUS) 100-50 MCG/DOSE diskus inhaler Inhale 2 puffs into the lungs 2 times daily wixela inhub inhaler ( generic advair diskus) 3/26/21   Omari Wallace MD   Respiratory Therapy Supplies (NEBULIZER) AALIYAH Used to give yourself breathing treatment 8/9/20   Sukhdev Mukherjee MD   acetaminophen (TYLENOL) 325 MG tablet Take 650 mg by mouth every 6 hours as needed for Pain    Historical Provider, MD     Coumadin Use Last 7 Days:  no  Antiplatelet drug therapy use last 7 days: yes   Other anticoagulant use last 7 days: no  Additional Medication Information:  n/a      Pre-Sedation Documentation and Exam:   I have personally completed a history, physical exam & review of systems for this patient (see notes).     Mallampati Airway Assessment:  Mallampati Class I - (soft palate, fauces, uvula & anterior/posterior tonsillar pillars are visible)    Prior History of Anesthesia Complications:   none    ASA Classification:  Class 3 - A patient with severe systemic disease that limits activity but is not incapacitating    Sedation/ Anesthesia Plan:   intravenous sedation    Medications Planned:   midazolam (Versed) intravenously    Patient is an appropriate candidate for plan of sedation: yes    Electronically signed by Editha Peabody, MD on 4/23/2022 at 5:32 PM

## 2022-04-23 NOTE — H&P
Hospital Medicine History & Physical      PCP: Jacki Perez MD    Date of Admission: 4/23/2022    Chief Complaint:  AMS    History Of Present Illness:  Patient is a 44-year-old male with past medical history of CAD, COPD, diabetes mellitus hypertension hyperlipidemia who presents to the hospital due to change in mental status per family. According to the family patient has been confused for past 2 days, he has been having increased confusion is not holding appropriate conversations. On arrival to the emergency department he complained about chest pain, epigastric abdominal pain. Patient appears confused and is very poor historian, he is saying yes to every question.       Past Medical History:          Diagnosis Date    Anxiety     Arthritis     Asthma     CAD (coronary artery disease)     Calcium kidney stone     Cardiomyopathy ([address]     Cerebral artery occlusion with cerebral infarction ([address]     CHF (congestive heart failure) ([address]     Clostridium difficile diarrhea 02/12/2020    COPD (chronic obstructive pulmonary disease) (HCC)     mild    Depression     DM2 (diabetes mellitus, type 2) (HCC)     Fibromyalgia     GERD (gastroesophageal reflux disease)     Hyperlipidemia     Hypertension     Liver disease     Pacemaker 2012    Medtronic model # [id number]    Pneumonia     Seizures ([address]     TIA (transient ischemic attack) 2007    Ulcerative colitis ([address]        Past Surgical History:          Procedure Laterality Date    APPENDECTOMY      BACK SURGERY      CARDIAC SURGERY      CHOLECYSTECTOMY      COLONOSCOPY  01/10/2017    COLONOSCOPY  01/10/2017    CORONARY ANGIOPLASTY WITH STENT PLACEMENT  2012    CORONARY ARTERY BYPASS GRAFT  2010, 11/2015    ENDOSCOPY, COLON, DIAGNOSTIC      PACEMAKER PLACEMENT      UPPER GASTROINTESTINAL ENDOSCOPY  02/07/2017    VASCULAR SURGERY         Medications Prior to Admission:      Prior to Admission medications Medication Sig Start Date End Date Taking? Authorizing Provider   oxyCODONE-acetaminophen (PERCOCET)  MG per tablet Take 1 tablet by mouth every 8 hours as needed for Pain for up to 30 days. 4/7/22 5/7/22  Jered Mazariegos MD   nitroGLYCERIN (NITROSTAT) 0.4 MG SL tablet Place 1 tablet under the tongue every 5 minutes as needed for Chest pain 4/4/22   Jered Mazariegos MD   gabapentin (NEURONTIN) 600 MG tablet TAKE ONE TABLET BY MOUTH FIVE TIMES A DAY 3/21/22 6/21/22  Cristian Magallon MD   zolpidem (AMBIEN) 5 MG tablet Take 5 mg by mouth nightly as needed for Sleep.     Historical Provider, MD   apixaban (ELIQUIS) 5 MG TABS tablet Take 1 tablet by mouth 2 times daily 2/2/22   Ankita Cesar, MD   amiodarone (CORDARONE) 200 MG tablet Take 1 tablet by mouth daily 2/3/22 3/5/22  Ankita Cesar, MD   levETIRAcetam (KEPPRA) 500 MG tablet Take 1 tablet by mouth 2 times daily 2/2/22   Ankita Cesar MD   atorvastatin (LIPITOR) 80 MG tablet Take 1 tablet by mouth nightly 2/2/22   Ankita Cesar, MD   nicotine (NICODERM CQ) 21 MG/24HR Place 1 patch onto the skin daily 2/3/22   Ankita Cesar MD   traZODone (DESYREL) 50 MG tablet Take 1 tablet by mouth nightly 1/3/22   Jered Mazariegos MD   amLODIPine (NORVASC) 5 MG tablet Take 5 mg by mouth daily  10/19/21   Historical Provider, MD   isosorbide mononitrate (IMDUR) 60 MG extended release tablet Take 60 mg by mouth daily  11/5/21   Historical Provider, MD   carvedilol (COREG) 25 MG tablet Take 25 mg by mouth 2 times daily (with meals)  10/6/21   Historical Provider, MD   aspirin 81 MG EC tablet Take 81 mg by mouth daily     Historical Provider, MD   fluticasone-salmeterol (ADVAIR DISKUS) 100-50 MCG/DOSE diskus inhaler Inhale 2 puffs into the lungs 2 times daily wixela inhub inhaler ( generic advair diskus) 3/26/21   Jered Mazariegos MD   Respiratory Therapy Supplies (NEBULIZER) AALIYAH Used to give yourself breathing treatment 8/9/20 Bard Ad MD   acetaminophen (TYLENOL) 325 MG tablet Take 650 mg by mouth every 6 hours as needed for Pain    Historical Provider, MD       Allergies:  Melatonin    Social History:      TOBACCO:   reports that he has been smoking cigarettes. He has a 22.00 pack-year smoking history. He has never used smokeless tobacco.  ETOH:   reports no history of alcohol use. Family History:       Reviewed in detail and non contributory          Problem Relation Age of Onset    Coronary Art Dis Father     Cancer Mother         Lung with mets    Diabetes Mother        REVIEW OF SYSTEMS:   Pertinent positives as noted in the HPI. All other systems reviewed and negative. PHYSICAL EXAM PERFORMED:    BP (!) 139/101   Pulse 115   Temp 97.6 °F (36.4 °C) (Axillary)   Resp (!) 38   Ht 6' (1.829 m)   Wt 197 lb 15.6 oz (89.8 kg)   SpO2 100%   BMI 26.85 kg/m²     General appearance:  No apparent distress, cooperative. HEENT:  Normal cephalic, atraumatic without obvious deformity. Conjunctivae/corneas clear. Neck: Supple, with full range of motion. No cervical lymphadenopathy  Respiratory:  Normal respiratory effort. Clear to auscultation, bilaterally without Rales/Wheezes/Rhonchi. Cardiovascular:  Regular rate and rhythm with normal S1/S2 without murmurs, rubs or gallops. Abdomen: Soft, non-tender, non-distended, normal bowel sounds. Musculoskeletal:  No edema noted bilaterally. No tenderness on palpation   Skin: no rash visible  Neurologic:  Neurologically intact without any focal sensory/motor deficits.   grossly non-focal.  Psychiatric:  Alert and oriented, normal mood  Peripheral Pulses: +2 palpable, equal bilaterally       Labs:     Recent Labs     04/23/22  1104   WBC 12.6*   HGB 11.4*   HCT 36.6*        Recent Labs     04/23/22  1104      K 5.4*      CO2 16*   BUN 69*   CREATININE 2.3*   CALCIUM 8.9     Recent Labs     04/23/22  1104   *   *   BILITOT 0.9   ALKPHOS 198* No results for input(s): INR in the last 72 hours. Recent Labs     04/23/22  1104   TROPONINI 2.14*       Urinalysis:      Lab Results   Component Value Date    NITRU Negative 04/23/2022    WBCUA 6 04/23/2022    RBCUA 16 04/23/2022    BLOODU TRACE-INTACT 04/23/2022    SPECGRAV >=1.030 04/23/2022    GLUCOSEU Negative 04/23/2022       Radiology:       CT CHEST ABDOMEN PELVIS WO CONTRAST   Final Result   7 mm nodule noted in the right upper lobe, series 4, image 60. This was not   present on prior examination of 10/30/2020. I recommend follow-up in 6-12   months. Previously noted liver lesions not appreciated on this noncontrast study. There are small bilateral pleural effusions, right greater than left. 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 The patient is status post cholecystectomy and hysterectomy peer         CT HEAD WO CONTRAST   Final Result   No acute intracranial abnormality. 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 Mild   degree of generalized volume loss. XR CHEST PORTABLE   Final Result   No acute process. Stable cardiomegaly                 Active Hospital Problems    Diagnosis Date Noted    NSTEMI (non-ST elevated myocardial infarction) [I21.4] 05/29/2013       Patient is a 55-year-old male with past medical history of CAD, COPD, diabetes mellitus hypertension hyperlipidemia who presents to the hospital due to change in mental status per family. According to the family patient has been confused for past 2 days, he has been having increased confusion is not holding appropriate conversations.   On arrival to the emergency department he complained about chest pain, epigastric abdominal pain. Patient appears confused and is very poor historian, he is saying yes to every question. Assessment  Acute metabolic encephalopathy, unclear etiology, suspect uremia, UTI  Significantly elevated troponin, cannot rule out an NSTEMI  Acute kidney injury  Hyperkalemia  Leukocytosis  Urinary tract infection  Diabetes mellitus    Plan  Start IV Rocephin, follow-up on blood culture, urine culture  Cardiology consulted from emergency department, plan for cardiac cath, patient is status post percutaneous LVAD Impella mechanical support, cardiology following, admit to CVICU  Nephrology consulted, closely monitor BMP, monitor and replace electrolytes  Start IV Rocephin  Monitor on cardiac telemetry  Insulin sliding scale  DVT prophylaxis-started on IV heparin per cardiology follow-up  Diet: ADULT DIET; Regular; Low Sodium (2 gm)  Code Status: Full Code    PT/OT Eval Status: ordered    Dispo - pending clinical improvement       Michel White MD    The note was completed using EMR and Dragon dictation system. Every effort was made to ensure accuracy; however, inadvertent computerized transcription errors may be present. Thank you Leila Montejo MD for the opportunity to be involved in this patient's care. If you have any questions or concerns please feel free to contact me at [phone].     Michel White MD

## 2022-04-23 NOTE — ED NOTES
Pt to ED via [facility] with AMS that started 2 days ago. Per EMS report, pt lives with his wife who states pt has had increased confusion x2 days. Pt c/o mid sternal chest pain that started 2 hrs pta. Per report, pt is alert and oriented x4 at baseline. Upon ED arrival, pt is alert and oriented to person and place. Pt noted with congested cough, tachycardic in the 110's and tachypniec with exertion. Pt placed on cardiac monitor, EKG obtained, IV placed and blood obtained for labs.   Dr. Lashell Wong at bedside upon pt arrival.      Shaniqua Benton, RN  04/23/22 0539

## 2022-04-23 NOTE — PROGRESS NOTES
@ 1800 Pt arrived to CVU room 1308 via bed from cath lab. Right femoral Impella @ 88cm P-8, site soft without hematoma. Right and Left femoral venous sheaths in place. Right IJ venous sheath in place with [place] PA catheter in place @ 65cm. Nitroglycerin, Dobutamine gtt infusing on arrival. Bedside handoff including assessment of bilateral groins and medicated gtts. Pt with NRB mask in place. Pt drowsy, awakens and verbalized name and . Pt connected to CVU monitoring equipment. Pt instructed on RLE and bedrest restrictions. @ 4854 [place] catheter leaking, bed linens and gown wet. [place] catheter changed to Urometer bag using sterile technique. Pericare completed. Preventative Mepilex border place on sacrum. @ 1830 Impella alarm in aorta. Urgent call placed to Dr Nelly Newsome. He arrived bedside and repositioned Impella to 92cm felice. Order received to switch NTG to Nipride and Decrease Dobutamine to 2.5mcg. Lasix gtt started see eMAR. @ 1835 POCT Mixed Venous drawn  Results for Lin Reynolds (MRN [id number]) as of 2022 19:45   Ref. Range 2022 18:35   PCO2, Mixed Latest Ref Range: Not Established mm Hg 39.3   PO2, Mixed Latest Ref Range: Not Established mm Hg 38   HCO3, Mixed Latest Ref Range: Not Established mmol/L 21.5   Base Exc, Mixed Latest Ref Range: Not Established  -4   O2 Sat, Mixed Latest Ref Range: Not Established % 69   tCO2, Mixed Latest Ref Range: Not Established mmol/L 23   PH MIXED Latest Ref Range: 7.350 - 7.450  7.345 (L)   Sample Type Unknown MIXED     @ 1840 Impella rep arrived to bedside. Wife brought to bedside. Updates provided. Confirmed contact information and provided CVU nurses station phone number. @ [address] and called to pharmacist.  @ Violeta. Nasima Bedside shift handoff with Teo Dc RN. Nipride gtt started, see eMAR.

## 2022-04-23 NOTE — PROGRESS NOTES
Medication Reconciliation     List of medications patient is currently taking is in progress. Source of information:   1. Conversation with patient at bedside  2. [facility] records        Notes regarding home medications:  1. Patient unsure of medications he receives. States he fills at [facility]. Pharmacy closed.  Open Sunday 10-2. 45[address] Curt Schumacher, Pharmacy Intern 4/23/2022 3:34 PM

## 2022-04-23 NOTE — FLOWSHEET NOTE
04/23/22 1837   Sage-Rafiq   PAP (s/d) 52/27   PAP (Mean) 37 mmHg   CVP (Mean) 21 mmHg   SVO2 (%) 69 %   CO (l/min) 5.6 l/min  (Ficks)   CCO 5.6 L/Min   CI (l/min/m2) 2.6 l/min/m2  (Ficks)   SVR (Using NBP Mean) 1414.29 dyne*sec/cm5     Patient currently on 80 mg/min nitroglycerin, 2.5 mcg/kg/min dobutamine, 5 mg/hr lasix, waiting for Nipride to arrive to unit. BP , goal <80 per Dr. Brea Bell.

## 2022-04-23 NOTE — CONSULTS
Consult received   Pt followed By Dr Minnie Doll in the past     Cath today     Reports reviewed     Raphael Bowen MD

## 2022-04-23 NOTE — ED PROVIDER NOTES
[facility]  eMERGENCY dEPARTMENT eNCOUnter      Pt Name: Miguel Abbott  MRN: [id number]  [place] 1955  Date of evaluation: 4/23/2022  Provider: Mel Sewell MD    [address] [place] [address]       Chief Complaint   Patient presents with    Altered Mental Status     Pt to ED via [facility] with AMS that started 2 days ago. Per EMS report, pt lives with his wife who states pt has had increased confusion x2 days. Pt c/o mid sternal chest pain that started 2 hrs pta. CRITICAL CARE TIME   Total Critical Care time was a minimum of 40 minutes, excluding separately reportable procedures. There was a high probability of clinically significant/life threatening deterioration in the patient's condition which required my urgent intervention. Critical care time includes my initial evaluation, ongoing reassessment, review of laboratory, EKG, chest x-ray, CT scan, review of old records, consultation with nephrology, cardiology, and the hospitalist for admission. No procedure time was included. HISTORY OF PRESENT ILLNESS  (Location/Symptom, Timing/Onset, Context/Setting, Quality, Duration, Modifying Factors, Severity.)   Miguel Abbott is a 77 y.o. male who presents to the emergency department via EMS from home with a report that the wife states patient has been confused for the last 2 days. EMS also reports that the patient is complaining of chest pain that started 2 hours prior to arrival.  On arrival here the patient is oriented to person and place but not to time. He is complaining of chest pain. He answers yes to essentially every question asked including a cough and shortness of breath. Patient has a history of coronary artery disease, ischemic cardiomyopathy, COPD, asthma, coronary artery bypass graft. Nursing Notes were reviewed and I agree.     REVIEW OF SYSTEMS    (2-9 systems for level 4, 10 or more for level 5)     Unreliable due to altered mental FLUTICASONE-SALMETEROL (ADVAIR DISKUS) 100-50 MCG/DOSE DISKUS INHALER    Inhale 2 puffs into the lungs 2 times daily wixela inhub inhaler ( generic advair diskus)    GABAPENTIN (NEURONTIN) 600 MG TABLET    TAKE ONE TABLET BY MOUTH FIVE TIMES A DAY    ISOSORBIDE MONONITRATE (IMDUR) 60 MG EXTENDED RELEASE TABLET    Take 60 mg by mouth daily     LEVETIRACETAM (KEPPRA) 500 MG TABLET    Take 1 tablet by mouth 2 times daily    NICOTINE (NICODERM CQ) 21 MG/24HR    Place 1 patch onto the skin daily    NITROGLYCERIN (NITROSTAT) 0.4 MG SL TABLET    Place 1 tablet under the tongue every 5 minutes as needed for Chest pain    OXYCODONE-ACETAMINOPHEN (PERCOCET)  MG PER TABLET    Take 1 tablet by mouth every 8 hours as needed for Pain for up to 30 days. RESPIRATORY THERAPY SUPPLIES (NEBULIZER) AALIYAH    Used to give yourself breathing treatment    TRAZODONE (DESYREL) 50 MG TABLET    Take 1 tablet by mouth nightly    ZOLPIDEM (AMBIEN) 5 MG TABLET    Take 5 mg by mouth nightly as needed for Sleep.        ALLERGIES     Melatonin    FAMILY HISTORY       Family History   Problem Relation Age of Onset    Coronary Art Dis Father     Cancer Mother         Lung with mets    Diabetes Mother           SOCIAL HISTORY       Social History     Socioeconomic History    Marital status:      Spouse name: None    Number of children: 3    Years of education: None    Highest education level: None   Occupational History    Occupation: disabled   Tobacco Use    Smoking status: Current Every Day Smoker     Packs/day: 0.50     Years: 44.00     Pack years: 22.00     Types: Cigarettes     Last attempt to quit: 10/20/2021     Years since quittin.5    Smokeless tobacco: Never Used    Tobacco comment: cutting down   Vaping Use    Vaping Use: Never used   Substance and Sexual Activity    Alcohol use: No     Alcohol/week: 0.0 standard drinks    Drug use: No    Sexual activity: Not Currently     Partners: Female   Other Topics Concern    None   Social History Narrative    None     Social Determinants of Health     Financial Resource Strain: Unknown    Difficulty of Paying Living Expenses: Patient refused   Food Insecurity: Unknown    Worried About Running Out of Food in the Last Year: Patient refused    Ran Out of Food in the Last Year: Patient refused   Transportation Needs:     Lack of Transportation (Medical): Not on file    Lack of Transportation (Non-Medical): Not on file   Physical Activity:     Days of Exercise per Week: Not on file    Minutes of Exercise per Session: Not on file   Stress:     Feeling of Stress : Not on file   Social Connections:     Frequency of Communication with Friends and Family: Not on file    Frequency of Social Gatherings with Friends and Family: Not on file    Attends Islam Services: Not on file    Active Member of [address] [place] [address] [employer] or Organizations: Not on file    Attends Club or Organization Meetings: Not on file    Marital Status: Not on file   Intimate Partner Violence:     Fear of Current or Ex-Partner: Not on file    Emotionally Abused: Not on file    Physically Abused: Not on file    Sexually Abused: Not on file   Housing Stability:     Unable to Pay for Housing in the Last Year: Not on file    Number of [place] in the Last Year: Not on file    Unstable Housing in the Last Year: Not on file         PHYSICAL EXAM    (up to 7 for level 4, 8 or more for level 5)     ED Triage Vitals   BP Temp Temp src Pulse Resp SpO2 Height Weight   -- -- -- -- -- -- -- --       General: Alert white male who appears mildly dyspneic. Head: Atraumatic and normocephalic. Eyes: No conjunctival injection. No pallor. Pupils equal round reactive. ENT: [place] Nina is clear. Oropharynx moist without erythema. Neck: Supple, no adenopathy, no JVD. Nontender. Heart: Tachycardic, regular, no murmurs or gallops. Lungs: Breath sounds decreased in the bases with scattered rhonchi bilaterally.   Mild expiratory wheezing. Abdomen: Soft, nondistended, nontender. No masses organomegaly. Bowel sounds are normal.  Musculoskeletal: No lower extremity edema. No calf tenderness. Intact symmetrical distal pulses. Skin: Warm and dry, pale, no diaphoresis. No cyanosis. Neuro: Awake, alert, oriented to person and place, not to time. Symmetrical reactive pupils. Intact extraocular movements. No facial asymmetry. Symmetrical motor function upper and lower extremities. DIFFERENTIAL DIAGNOSIS   Differential includes but is not limited to pneumonia, COPD exacerbation, asthma exacerbation, respiratory failure, COVID-19, sepsis, other. DIAGNOSTIC RESULTS     EKG: All EKG's are interpreted by Stuart Lyons MD in the absence of a cardiologist.    Sinus tachycardia, rate of 112, left atrial enlargement, right bundle branch block. Age-indeterminate septal infarct. Nonspecific ST-T wave changes. Rhythm strip shows sinus tachycardia with a rate of 112, OK interval 148 ms,  ms with no other ectopy as interpreted by me. Compared to 1/31/2022, the sinus tachycardia is new, the nonspecific ST-T wave changes in the precordial leads are new. EKG #2: Sinus tachycardia, rate of 116, left atrial enlargement, right bundle branch block. Age-indeterminate septal infarct. Nonspecific ST-T wave changes. Rhythm strip shows a sinus tachycardia with a rate of 116, OK interval 144 ms,  ms with no other ectopy as interpreted by me. This is compared to an EKG done earlier today, no significant changes noted.     RADIOLOGY:   Non-plain film images such as CT, Ultrasound and MRI are read by the radiologist. Plain radiographic images are visualized and preliminarily interpreted Stuart Lyons MD with the below findings:      Interpretation per the Radiologist below, if available at the time of this note:    CT CHEST ABDOMEN PELVIS WO CONTRAST   Final Result   7 mm nodule noted in the right upper lobe, series 4, image 60. This was not   present on prior examination of 10/30/2020. I recommend follow-up in 6-12   months. Previously noted liver lesions not appreciated on this noncontrast study. There are small bilateral pleural effusions, right greater than left. 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 The patient is status post cholecystectomy and hysterectomy peer         CT HEAD WO CONTRAST   Final Result   No acute intracranial abnormality. 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 Mild   degree of generalized volume loss. XR CHEST PORTABLE   Final Result   No acute process.       Stable cardiomegaly               ED BEDSIDE ULTRASOUND:   Performed by ED Physician - none    LABS:  Labs Reviewed   CBC WITH AUTO DIFFERENTIAL - Abnormal; Notable for the following components:       Result Value    WBC 12.6 (*)     RBC 3.71 (*)     Hemoglobin 11.4 (*)     Hematocrit 36.6 (*)     RDW 18.4 (*)     Neutrophils Absolute 11.3 (*)     Lymphocytes Absolute 0.6 (*)     All other components within normal limits   COMPREHENSIVE METABOLIC PANEL - Abnormal; Notable for the following components:    Potassium 5.4 (*)     CO2 16 (*)     Anion Gap 19 (*)     Glucose 171 (*)     BUN 69 (*)     CREATININE 2.3 (*)     GFR Non- 29 (*)     GFR African American 35 (*)     Alkaline Phosphatase 198 (*)      (*)      (*)     All other components within normal limits   BLOOD GAS, VENOUS - Abnormal; Notable for the following components:    pH, Angelo 7.298 (*)     HCO3, Venous 22 (*)     All other components within normal limits TROPONIN - Abnormal; Notable for the following components:    Troponin 2.14 (*)     All other components within normal limits    Narrative:     Kendal Snow tel. [phone],  Chemistry results called to and read back by Fei Juares RN, 04/23/2022 12:42,  by [address] - Abnormal; Notable for the following components:    Pro-BNP 65,547 (*)     All other components within normal limits   LACTATE, SEPSIS - Abnormal; Notable for the following components:    Lactic Acid, Sepsis 4.0 (*)     All other components within normal limits    Narrative:     Kendal Snow tel. [phone],  Chemistry results called to and read back by Leydi Fontana RN, 04/23/2022  12:16, [facility]   LACTATE, SEPSIS - Abnormal; Notable for the following components:    Lactic Acid, Sepsis 3.8 (*)     All other components within normal limits   URINALYSIS WITH REFLEX TO CULTURE - Abnormal; Notable for the following components:    Clarity, UA SL CLOUDY (*)     Bilirubin Urine SMALL (*)     Ketones, Urine TRACE (*)     Blood, Urine TRACE-INTACT (*)     Protein, UA 30 (*)     Urobilinogen, Urine 2.0 (*)     All other components within normal limits   MICROSCOPIC URINALYSIS - Abnormal; Notable for the following components:    Hyaline Casts, UA >40 (*)     WBC, UA 6 (*)     RBC, UA 16 (*)     Epithelial Cells, UA 8 (*)     All other components within normal limits   COVID-19, RAPID   RAPID INFLUENZA A/B ANTIGENS   CULTURE, BLOOD 1   CULTURE, BLOOD 2       All other labs were within normal range or not returned as of this dictation. EMERGENCY DEPARTMENT COURSE and DIFFERENTIAL DIAGNOSIS/MDM:   Vitals:    Vitals:    04/23/22 1450 04/23/22 1505 04/23/22 1520 04/23/22 1625   BP: (!) 137/98 (!) 141/95 (!) 136/100 120/81   Pulse: 118 123 121 116   Resp: (!) 42 (!) 34 30 (!) 38   Temp:   97.9 °F (36.6 °C)    TempSrc:   Oral    SpO2: 96% 100% 96% 97%   Weight:       Height:           1520:  Discussed with Dr. Carlene Rivera.   He will be in to see patient. 1525:  Discussed with Dr. Mana Sanders. He will be down to see patient. 1527:  Dr. Jarrod May called. He states his group has seen the patient in the past.  They will follow. I will cancel consult to Dr. Mana Sanders. 1615:  Dr. David Santos here in ED. He will be taking patient to cath lab. This patient presents with reported confusion since yesterday. The patient also complained of chest pain when he arrived here. He says it been bothering him for about 2 hours. He has known history of coronary artery disease and has had coronary artery bypass graft. Review of his old records show his most recent cardiac catheterization was in November 2021 at [facility].  His initial EKG showed no acute changes. He was tachycardic. He was normotensive. His laboratory work-up shows acute kidney injury with a BUN of 69 and a creatinine of 2.3 compared to normal renal function earlier this month. His potassium is mildly elevated at 5.4 with hemolysis. His bicarb is 16 with an anion gap of 19. His venous pH is 7.30. His H&H is stable. His white blood cell count is mildly elevated. He does have elevated ALT and AST. He has no acute findings on chest x-ray. He has no acute findings on noncontrast CT of his chest, abdomen, pelvis. He was given nitroglycerin for his chest pain which did not appear to help. A repeat EKG was done which showed a sinus tachycardia but no acute changes, unchanged from his previous EKG. His troponin resulted at 2.14. His BNP is 65,547. Cardiology was consulted. Dr. David Santos came to the emergency department. He is going to take the patient to the Cath Lab. Nephrology was consulted. The hospitalist was consulted. Test results, diagnosis, and treatment plan were discussed with the patient. He understands the treatment plan and need for admission and is agreeable.     CONSULTS:  IP CONSULT TO CARDIOLOGY  IP CONSULT TO NEPHROLOGY  IP CONSULT TO HOSPITALIST    PROCEDURES:  None    FINAL IMPRESSION      1. NSTEMI (non-ST elevated myocardial infarction) ([facility]    2. LALITA (acute kidney injury) ([facility]    3. Lactic acidosis    4. Transaminitis    5. Chest pain, unspecified type          DISPOSITION/PLAN   DISPOSITION Admitted 04/23/2022 04:18:03 PM      PATIENT REFERRED TO:  No follow-up provider specified.     DISCHARGE MEDICATIONS:  New Prescriptions    No medications on file       (Please note that portions of this note were completed with a voice recognition program.  Efforts were made to edit the dictations but occasionally words are mis-transcribed.)    Paris Cohen MD  Attending Emergency Physician        Nina Castro MD  04/23/22 [address] [place] [address] MD Toney  04/24/22 [id number]

## 2022-04-23 NOTE — OP NOTE
[address]   Procedure Note    CLINICAL HISTORY:       Opal Mares is a 77 y.o. male with a history of CHF. He was admitted with complaints of dyspnea. Cardiac markers were positive. EKG showed nonspecific ST-T wave abnormality. Patient Active Problem List   Diagnosis    Hx of CABG    NSTEMI (non-ST elevated myocardial infarction)    Essential hypertension    Ischemic cardiomyopathy    Hyperlipidemia LDL goal <70    Type 2 diabetes mellitus without complication, with long-term current use of insulin ([facility])    Anemia,normocytic normochromic ,mixed folic aci deficiency and iron deficiency    Morbid obesity due to excess calories ([address]    Anxiety    Coronary artery disease involving coronary bypass graft of native heart with angina pectoris ([address]    Atypical chest pain    Tobacco abuse    Restrictive lung disease    Acute on chronic diastolic congestive heart failure ([facility])    Ischemic stroke, left frontal lobe (4/30/18) ([facility])    PFO (patent foramen ovale)    COPD (chronic obstructive pulmonary disease) ([facility])    Chronic systolic heart failure ([address]    CAD (coronary artery disease)    Low back pain    Stroke determined by clinical assessment ([address]    ICD (implantable cardioverter-defibrillator) discharge    Diabetic peripheral neuropathy ([address]    Ventricular tachycardia ([address]    Acute pulmonary embolism without acute cor pulmonale ([facility])    Acute chest pain    Paresthesia of upper and lower extremities of both sides    Intractable abdominal pain    Chest pain    Acute cerebrovascular accident (CVA) ([address]       Prior to Admission medications    Medication Sig Start Date End Date Taking? Authorizing Provider   oxyCODONE-acetaminophen (PERCOCET)  MG per tablet Take 1 tablet by mouth every 8 hours as needed for Pain for up to 30 days.  4/7/22 5/7/22  Mary Padilla MD   nitroGLYCERIN (NITROSTAT) 0.4 MG SL tablet Place 1 tablet under the tongue every 5 minutes as needed for Chest pain 4/4/22   Melissa Ruelas MD   gabapentin (NEURONTIN) 600 MG tablet TAKE ONE TABLET BY MOUTH FIVE TIMES A DAY 3/21/22 6/21/22  Chavez Padilla MD   zolpidem (AMBIEN) 5 MG tablet Take 5 mg by mouth nightly as needed for Sleep. Historical Provider, MD   apixaban (ELIQUIS) 5 MG TABS tablet Take 1 tablet by mouth 2 times daily 2/2/22   Manisha Shea MD   amiodarone (CORDARONE) 200 MG tablet Take 1 tablet by mouth daily 2/3/22 3/5/22  Manisha Shea MD   levETIRAcetam (KEPPRA) 500 MG tablet Take 1 tablet by mouth 2 times daily 2/2/22   Manisha Shea MD   atorvastatin (LIPITOR) 80 MG tablet Take 1 tablet by mouth nightly 2/2/22   Manisha Shea MD   nicotine (NICODERM CQ) 21 MG/24HR Place 1 patch onto the skin daily 2/3/22   Manisha Shea MD   traZODone (DESYREL) 50 MG tablet Take 1 tablet by mouth nightly 1/3/22   Melissa Ruelas MD   amLODIPine (NORVASC) 5 MG tablet Take 5 mg by mouth daily  10/19/21   Historical Provider, MD   isosorbide mononitrate (IMDUR) 60 MG extended release tablet Take 60 mg by mouth daily  11/5/21   Historical Provider, MD   carvedilol (COREG) 25 MG tablet Take 25 mg by mouth 2 times daily (with meals)  10/6/21   Historical Provider, MD   aspirin 81 MG EC tablet Take 81 mg by mouth daily     Historical Provider, MD   fluticasone-salmeterol (ADVAIR DISKUS) 100-50 MCG/DOSE diskus inhaler Inhale 2 puffs into the lungs 2 times daily wixela inhub inhaler ( generic advair diskus) 3/26/21   Melissa Ruelas MD   Respiratory Therapy Supplies (NEBULIZER) AALIYAH Used to give yourself breathing treatment 8/9/20   Alex Lombardi MD   acetaminophen (TYLENOL) 325 MG tablet Take 650 mg by mouth every 6 hours as needed for Pain    Historical Provider, MD        The risks, benefits, and details of the procedure were explained to the patient. The patient verbalized understanding and wanted to proceed.   Informed written consent was obtained. INDICATION:  Cardiogenic shock     PROCEDURES PERFORMED:   [facility]  Percutaneous LVAD Impella CP mechanical support     PROCEDURE TECHNIQUE:  The patient was approached from the right internal jugular vein using a 7F sheath and right femoral artery using US/micro access with a 6F sheath    CONTRAST:  Total of 20 cc. COMPLICATIONS:  None. EBL: 10 cc    Moderate Sedation:  Start time: 0702  Stop time: 1730  0.5 mg versed   25 mcg fentanyl   An independent trained observer pushed medications at my direction. We monitored the patient's level of consciousness and vital signs/physiologic status throughout the procedure duration (see start and start times above). HEMODYNAMICS:  Aortic pressure was 140/98/115    RA 14  RV 51/10  PA 57/20/37  PCWP 36   PA % 38  AO % 97  CO/CI 3.0 L/min 1.45 L/min/m2  SVR 1974 dynes . Sec/cm-5  PVR 26 dynes . Sec/cm-5  TPG 1   0.7  Shahram 2.6      LEFT VENTRICULOGRAM:  Left ventricular angiogram was done in the 30° COFFEY projection and revealed severe LV dysfunction with an EF estimated at 30%. INTERVENTION  1. The right femoral artery was used to place an impella CP 14F sheath  2.  Percutaneous LVAD Impella CP mechanical support device was placed in the LV apex without complication providing 3.6 L of augmented CO     SUMMARY:   Cardiogenic shock   Severe elevation of left sided filling pressures     RECOMMENDATION:    - echo for impella positioning  - lasix gtt  - nitro gtt to keep map 60-80  - hemodynamics q6 hrs   - admit to cardiac ICU in critical condition     Baron Lady MD [address] [facility], Interventional Cardiology, and Peripheral Vascular [address] [facility]   (C): [phone]  (O): [phone]

## 2022-04-24 LAB
A/G RATIO: 1.7 (ref 1.1–2.2)
ALBUMIN SERPL-MCNC: 3.6 G/DL (ref 3.4–5)
ALP BLD-CCNC: 162 U/L (ref 40–129)
ALT SERPL-CCNC: 336 U/L (ref 10–40)
ANION GAP SERPL CALCULATED.3IONS-SCNC: 14 MMOL/L (ref 3–16)
ANION GAP SERPL CALCULATED.3IONS-SCNC: 17 MMOL/L (ref 3–16)
AST SERPL-CCNC: 533 U/L (ref 15–37)
BASE EXCESS ARTERIAL: 1 (ref -3–3)
BASE EXCESS MIXED: 0
BASE EXCESS MIXED: 0
BASE EXCESS MIXED: 2
BILIRUB SERPL-MCNC: 0.8 MG/DL (ref 0–1)
BUN BLDV-MCNC: 74 MG/DL (ref 7–20)
BUN BLDV-MCNC: 78 MG/DL (ref 7–20)
CALCIUM SERPL-MCNC: 7.6 MG/DL (ref 8.3–10.6)
CALCIUM SERPL-MCNC: 7.6 MG/DL (ref 8.3–10.6)
CHLORIDE BLD-SCNC: 105 MMOL/L (ref 99–110)
CHLORIDE BLD-SCNC: 107 MMOL/L (ref 99–110)
CO2: 22 MMOL/L (ref 21–32)
CO2: 22 MMOL/L (ref 21–32)
CREAT SERPL-MCNC: 1.7 MG/DL (ref 0.8–1.3)
CREAT SERPL-MCNC: 2 MG/DL (ref 0.8–1.3)
EKG ATRIAL RATE: 112 BPM
EKG ATRIAL RATE: 116 BPM
EKG DIAGNOSIS: NORMAL
EKG DIAGNOSIS: NORMAL
EKG P AXIS: 64 DEGREES
EKG P AXIS: 69 DEGREES
EKG P-R INTERVAL: 144 MS
EKG P-R INTERVAL: 148 MS
EKG Q-T INTERVAL: 366 MS
EKG Q-T INTERVAL: 370 MS
EKG QRS DURATION: 130 MS
EKG QRS DURATION: 140 MS
EKG QTC CALCULATION (BAZETT): 505 MS
EKG QTC CALCULATION (BAZETT): 508 MS
EKG R AXIS: 120 DEGREES
EKG R AXIS: 123 DEGREES
EKG T AXIS: 52 DEGREES
EKG T AXIS: 59 DEGREES
EKG VENTRICULAR RATE: 112 BPM
EKG VENTRICULAR RATE: 116 BPM
ESTIMATED AVERAGE GLUCOSE: 134.1 MG/DL
GFR AFRICAN AMERICAN: 41
GFR AFRICAN AMERICAN: 49
GFR NON-AFRICAN AMERICAN: 34
GFR NON-AFRICAN AMERICAN: 40
GLUCOSE BLD-MCNC: 147 MG/DL (ref 70–99)
GLUCOSE BLD-MCNC: 158 MG/DL (ref 70–99)
GLUCOSE BLD-MCNC: 168 MG/DL (ref 70–99)
GLUCOSE BLD-MCNC: 188 MG/DL (ref 70–99)
GLUCOSE BLD-MCNC: 191 MG/DL (ref 70–99)
GLUCOSE BLD-MCNC: 211 MG/DL (ref 70–99)
HBA1C MFR BLD: 6.3 %
HCO3 ARTERIAL: 25.2 MMOL/L (ref 21–29)
HCO3, MIXED: 23.9 MMOL/L
HCO3, MIXED: 24.6 MMOL/L
HCO3, MIXED: 26.7 MMOL/L
HCT VFR BLD CALC: 29.1 % (ref 40.5–52.5)
HEMOGLOBIN: 9.7 G/DL (ref 13.5–17.5)
MAGNESIUM: 2.3 MG/DL (ref 1.8–2.4)
MCH RBC QN AUTO: 32 PG (ref 26–34)
MCHC RBC AUTO-ENTMCNC: 33.4 G/DL (ref 31–36)
MCV RBC AUTO: 95.7 FL (ref 80–100)
O2 SAT, ARTERIAL: 96 % (ref 93–100)
O2 SAT, MIXED: 61 %
O2 SAT, MIXED: 62 %
O2 SAT, MIXED: 68 %
PCO2 ARTERIAL: 35.2 MM HG (ref 35–45)
PCO2 MIXED: 35.7 MM HG
PCO2 MIXED: 36.5 MM HG
PCO2 MIXED: 43.2 MM HG
PDW BLD-RTO: 18.2 % (ref 12.4–15.4)
PERFORMED ON: ABNORMAL
PERFORMED ON: NORMAL
PH ARTERIAL: 7.46 (ref 7.35–7.45)
PH, MIXED: 7.4 (ref 7.35–7.45)
PH, MIXED: 7.43 (ref 7.35–7.45)
PH, MIXED: 7.44 (ref 7.35–7.45)
PLATELET # BLD: 155 K/UL (ref 135–450)
PMV BLD AUTO: 9.8 FL (ref 5–10.5)
PO2 ARTERIAL: 74.9 MM HG (ref 75–108)
PO2 MIXED: 30 MM HG
PO2 MIXED: 32 MM HG
PO2 MIXED: 34 MM HG
POC ACT LR: 118 SEC
POC ACT LR: 134 SEC
POC ACT LR: 134 SEC
POC ACT LR: 146 SEC
POC ACT LR: 150 SEC
POC ACT LR: 151 SEC
POC ACT LR: 159 SEC
POC ACT LR: 159 SEC
POC ACT LR: 160 SEC
POC ACT LR: 162 SEC
POC ACT LR: 163 SEC
POC ACT LR: 164 SEC
POC ACT LR: 167 SEC
POC ACT LR: 167 SEC
POC ACT LR: 170 SEC
POC ACT LR: 170 SEC
POC ACT LR: 172 SEC
POC ACT LR: 173 SEC
POC ACT LR: 176 SEC
POC ACT LR: 185 SEC
POC ACT LR: 189 SEC
POC FIO2: 60
POC SAMPLE TYPE: ABNORMAL
POC SAMPLE TYPE: NORMAL
POTASSIUM REFLEX MAGNESIUM: 3.6 MMOL/L (ref 3.5–5.1)
POTASSIUM SERPL-SCNC: 3.5 MMOL/L (ref 3.5–5.1)
RBC # BLD: 3.04 M/UL (ref 4.2–5.9)
SODIUM BLD-SCNC: 143 MMOL/L (ref 136–145)
SODIUM BLD-SCNC: 144 MMOL/L (ref 136–145)
TCO2 ARTERIAL: 26 MMOL/L
TCO2 CALC MIXED: 25 MMOL/L
TCO2 CALC MIXED: 26 MMOL/L
TCO2 CALC MIXED: 28 MMOL/L
TOTAL PROTEIN: 5.7 G/DL (ref 6.4–8.2)
WBC # BLD: 11.8 K/UL (ref 4–11)

## 2022-04-24 PROCEDURE — 85347 COAGULATION TIME ACTIVATED: CPT

## 2022-04-24 PROCEDURE — 2500000003 HC RX 250 WO HCPCS: Performed by: INTERNAL MEDICINE

## 2022-04-24 PROCEDURE — 2700000000 HC OXYGEN THERAPY PER DAY

## 2022-04-24 PROCEDURE — 93010 ELECTROCARDIOGRAM REPORT: CPT | Performed by: INTERNAL MEDICINE

## 2022-04-24 PROCEDURE — 6370000000 HC RX 637 (ALT 250 FOR IP): Performed by: INTERNAL MEDICINE

## 2022-04-24 PROCEDURE — 82947 ASSAY GLUCOSE BLOOD QUANT: CPT

## 2022-04-24 PROCEDURE — 82803 BLOOD GASES ANY COMBINATION: CPT

## 2022-04-24 PROCEDURE — 80053 COMPREHEN METABOLIC PANEL: CPT

## 2022-04-24 PROCEDURE — 2100000000 HC CCU R&B

## 2022-04-24 PROCEDURE — 6360000002 HC RX W HCPCS: Performed by: INTERNAL MEDICINE

## 2022-04-24 PROCEDURE — 94660 CPAP INITIATION&MGMT: CPT

## 2022-04-24 PROCEDURE — 94761 N-INVAS EAR/PLS OXIMETRY MLT: CPT

## 2022-04-24 PROCEDURE — 6360000002 HC RX W HCPCS: Performed by: STUDENT IN AN ORGANIZED HEALTH CARE EDUCATION/TRAINING PROGRAM

## 2022-04-24 PROCEDURE — 2580000003 HC RX 258: Performed by: INTERNAL MEDICINE

## 2022-04-24 PROCEDURE — 83735 ASSAY OF MAGNESIUM: CPT

## 2022-04-24 PROCEDURE — 99233 SBSQ HOSP IP/OBS HIGH 50: CPT | Performed by: INTERNAL MEDICINE

## 2022-04-24 PROCEDURE — 85027 COMPLETE CBC AUTOMATED: CPT

## 2022-04-24 RX ORDER — ONDANSETRON 2 MG/ML
4 INJECTION INTRAMUSCULAR; INTRAVENOUS EVERY 6 HOURS PRN
Status: DISCONTINUED | OUTPATIENT
Start: 2022-04-24 | End: 2022-04-26 | Stop reason: HOSPADM

## 2022-04-24 RX ORDER — SODIUM CHLORIDE 9 MG/ML
INJECTION, SOLUTION INTRAVENOUS PRN
Status: DISCONTINUED | OUTPATIENT
Start: 2022-04-24 | End: 2022-04-24 | Stop reason: SDUPTHER

## 2022-04-24 RX ORDER — SODIUM CHLORIDE 0.9 % (FLUSH) 0.9 %
10 SYRINGE (ML) INJECTION EVERY 12 HOURS SCHEDULED
Status: DISCONTINUED | OUTPATIENT
Start: 2022-04-24 | End: 2022-04-24 | Stop reason: SDUPTHER

## 2022-04-24 RX ORDER — HEPARIN SODIUM 1000 [USP'U]/ML
1000 INJECTION, SOLUTION INTRAVENOUS; SUBCUTANEOUS ONCE
Status: COMPLETED | OUTPATIENT
Start: 2022-04-24 | End: 2022-04-24

## 2022-04-24 RX ORDER — PROMETHAZINE HYDROCHLORIDE 25 MG/1
12.5 TABLET ORAL EVERY 6 HOURS PRN
Status: DISCONTINUED | OUTPATIENT
Start: 2022-04-24 | End: 2022-04-26 | Stop reason: HOSPADM

## 2022-04-24 RX ORDER — ACETAMINOPHEN 325 MG/1
650 TABLET ORAL EVERY 6 HOURS PRN
Status: DISCONTINUED | OUTPATIENT
Start: 2022-04-24 | End: 2022-04-26 | Stop reason: HOSPADM

## 2022-04-24 RX ORDER — NITROGLYCERIN 20 MG/100ML
5-200 INJECTION INTRAVENOUS CONTINUOUS
Status: DISCONTINUED | OUTPATIENT
Start: 2022-04-24 | End: 2022-04-24

## 2022-04-24 RX ORDER — FUROSEMIDE 10 MG/ML
40 INJECTION INTRAMUSCULAR; INTRAVENOUS ONCE
Status: COMPLETED | OUTPATIENT
Start: 2022-04-24 | End: 2022-04-24

## 2022-04-24 RX ORDER — SODIUM CHLORIDE 0.9 % (FLUSH) 0.9 %
10 SYRINGE (ML) INJECTION PRN
Status: DISCONTINUED | OUTPATIENT
Start: 2022-04-24 | End: 2022-04-24 | Stop reason: SDUPTHER

## 2022-04-24 RX ORDER — ACETAMINOPHEN 650 MG/1
650 SUPPOSITORY RECTAL EVERY 6 HOURS PRN
Status: DISCONTINUED | OUTPATIENT
Start: 2022-04-24 | End: 2022-04-26 | Stop reason: HOSPADM

## 2022-04-24 RX ORDER — LEVETIRACETAM 5 MG/ML
500 INJECTION INTRAVASCULAR 2 TIMES DAILY
Status: DISCONTINUED | OUTPATIENT
Start: 2022-04-24 | End: 2022-04-26 | Stop reason: HOSPADM

## 2022-04-24 RX ORDER — CLOPIDOGREL BISULFATE 75 MG/1
75 TABLET ORAL DAILY
Status: DISCONTINUED | OUTPATIENT
Start: 2022-04-25 | End: 2022-04-26 | Stop reason: HOSPADM

## 2022-04-24 RX ORDER — HYDRALAZINE HYDROCHLORIDE 25 MG/1
25 TABLET, FILM COATED ORAL EVERY 12 HOURS SCHEDULED
Status: DISCONTINUED | OUTPATIENT
Start: 2022-04-25 | End: 2022-04-26 | Stop reason: HOSPADM

## 2022-04-24 RX ADMIN — SODIUM NITROPRUSSIDE 1.25 MCG/KG/MIN: 25 INJECTION, SOLUTION, CONCENTRATE INTRAVENOUS at 18:07

## 2022-04-24 RX ADMIN — HEPARIN SODIUM 1000 UNITS: 1000 INJECTION INTRAVENOUS; SUBCUTANEOUS at 05:20

## 2022-04-24 RX ADMIN — NITROGLYCERIN 20 MCG/MIN: 20 INJECTION INTRAVENOUS at 01:25

## 2022-04-24 RX ADMIN — ASPIRIN 81 MG: 81 TABLET, COATED ORAL at 10:30

## 2022-04-24 RX ADMIN — INSULIN LISPRO 1 UNITS: 100 INJECTION, SOLUTION INTRAVENOUS; SUBCUTANEOUS at 21:31

## 2022-04-24 RX ADMIN — INSULIN LISPRO 2 UNITS: 100 INJECTION, SOLUTION INTRAVENOUS; SUBCUTANEOUS at 17:45

## 2022-04-24 RX ADMIN — SODIUM NITROPRUSSIDE 1.5 MCG/KG/MIN: 25 INJECTION, SOLUTION, CONCENTRATE INTRAVENOUS at 01:24

## 2022-04-24 RX ADMIN — FUROSEMIDE 2.5 MG/HR: 100 INJECTION, SOLUTION INTRAMUSCULAR; INTRAVENOUS at 11:16

## 2022-04-24 RX ADMIN — LEVETIRACETAM 500 MG: 500 TABLET, FILM COATED ORAL at 10:30

## 2022-04-24 RX ADMIN — FUROSEMIDE 40 MG: 10 INJECTION, SOLUTION INTRAMUSCULAR; INTRAVENOUS at 13:28

## 2022-04-24 RX ADMIN — LORAZEPAM 1 MG: 2 INJECTION INTRAMUSCULAR; INTRAVENOUS at 01:26

## 2022-04-24 RX ADMIN — LEVETIRACETAM 500 MG: 5 INJECTION INTRAVENOUS at 22:04

## 2022-04-24 RX ADMIN — NITROGLYCERIN 60 MCG/MIN: 20 INJECTION INTRAVENOUS at 00:20

## 2022-04-24 RX ADMIN — INSULIN LISPRO 2 UNITS: 100 INJECTION, SOLUTION INTRAVENOUS; SUBCUTANEOUS at 12:13

## 2022-04-24 RX ADMIN — CEFTRIAXONE 1000 MG: 1 INJECTION, POWDER, FOR SOLUTION INTRAMUSCULAR; INTRAVENOUS at 00:22

## 2022-04-24 RX ADMIN — INSULIN LISPRO 4 UNITS: 100 INJECTION, SOLUTION INTRAVENOUS; SUBCUTANEOUS at 09:03

## 2022-04-24 RX ADMIN — SODIUM NITROPRUSSIDE 2.5 MCG/KG/MIN: 25 INJECTION, SOLUTION, CONCENTRATE INTRAVENOUS at 00:16

## 2022-04-24 RX ADMIN — Medication 10 ML: at 09:04

## 2022-04-24 RX ADMIN — LORAZEPAM 1 MG: 2 INJECTION INTRAMUSCULAR; INTRAVENOUS at 05:22

## 2022-04-24 RX ADMIN — SODIUM NITROPRUSSIDE 1 MCG/KG/MIN: 25 INJECTION, SOLUTION, CONCENTRATE INTRAVENOUS at 07:17

## 2022-04-24 RX ADMIN — HEPARIN SODIUM: 5000 INJECTION INTRAVENOUS; SUBCUTANEOUS at 20:49

## 2022-04-24 ASSESSMENT — PAIN SCALES - GENERAL
PAINLEVEL_OUTOF10: 0

## 2022-04-24 NOTE — PLAN OF CARE
Problem: Pain  Goal: Verbalizes/displays adequate comfort level or baseline comfort level  4/24/2022 0841 by Anderson Finney RN  Outcome: Progressing     Problem: Respiratory - Adult  Goal: Achieves optimal ventilation and oxygenation  4/24/2022 0841 by Anderson Finney RN  Outcome: Progressing     Problem: Cardiovascular - Adult  Goal: Maintains optimal cardiac output and hemodynamic stability  4/24/2022 0841 by Anderson Finney RN  Outcome: Progressing     Problem: Skin/Tissue Integrity - Adult  Goal: Skin integrity remains intact  4/24/2022 0841 by Anderson Finney RN  Outcome: Progressing     Problem: Skin/Tissue Integrity - Adult  Goal: Incisions, wounds, or drain sites healing without S/S of infection  4/24/2022 0841 by Anderson Finney RN  Outcome: Progressing     Problem: Musculoskeletal - Adult  Goal: Return mobility to safest level of function  4/24/2022 0841 by Anderson Finney RN  Outcome: Progressing     Problem: Genitourinary - Adult  Goal: Absence of urinary retention  Outcome: Progressing     Problem: Genitourinary - Adult  Goal: Urinary catheter remains patent  4/24/2022 0841 by Anderson Finney RN  Outcome: Progressing     Problem: Infection - Adult  Goal: Absence of infection at discharge  Outcome: Progressing     Problem: Infection - Adult  Goal: Absence of infection during hospitalization  4/24/2022 0841 by Anderson Finney RN  Outcome: Progressing     Problem: Metabolic/Fluid and Electrolytes - Adult  Goal: Electrolytes maintained within normal limits  4/24/2022 0841 by Anderson Finney RN  Outcome: Progressing     Problem: Metabolic/Fluid and Electrolytes - Adult  Goal: Hemodynamic stability and optimal renal function maintained  4/24/2022 0841 by Anderson Finney RN  Outcome: Progressing     Problem: Metabolic/Fluid and Electrolytes - Adult  Goal: Glucose maintained within prescribed range  4/24/2022 0841 by Anderson Finney RN  Outcome: Progressing     Problem: Hematologic - Adult  Goal: Maintains hematologic stability  4/24/2022 0841 by Tracy Flores RN  Outcome: Progressing     Problem: Skin/Tissue Integrity  Goal: Absence of new skin breakdown  Description: 1. Monitor for areas of redness and/or skin breakdown  2. Assess vascular access sites hourly  3. Every 4-6 hours minimum:  Change oxygen saturation probe site  4. Every 4-6 hours:  If on nasal continuous positive airway pressure, respiratory therapy assess nares and determine need for appliance change or resting period. Outcome: Progressing     Problem: Safety - Adult  Goal: Free from fall injury  Outcome: Progressing     Problem: ABCDS Injury Assessment  Goal: Absence of physical injury  Outcome: Progressing     Problem: Anxiety  Goal: Will report anxiety at manageable levels  Description: INTERVENTIONS:  1. Administer medication as ordered  2. Teach and rehearse alternative coping skills  3. Provide emotional support with 1:1 interaction with staff  Outcome: Progressing     Problem: Coping  Goal: Pt/Family able to verbalize concerns and demonstrate effective coping strategies  Description: INTERVENTIONS:  1. Assist patient/family to identify coping skills, available support systems and cultural and spiritual values  2. Provide emotional support, including active listening and acknowledgement of concerns of patient and caregivers  3. Reduce environmental stimuli, as able  4. Instruct patient/family in relaxation techniques, as appropriate  5. Assess for spiritual pain/suffering and initiate Spiritual Care, Psychosocial Clinical Specialist consults as needed  Outcome: Progressing     Problem: Decision Making  Goal: Pt/Family able to effectively weigh alternatives and participate in decision making related to treatment and care  Description: INTERVENTIONS:  1. Determine when there are differences between patient's view, family's view, and healthcare provider's view of condition  2. Facilitate patient and family articulation of goals for care  3.  Help patient and family identify pros/cons of alternative solutions  4. Provide information as requested by patient/family  5. Respect patient/family right to receive or not to receive information  6. Serve as a liaison between patient and family and health care team  7. Initiate Consults from Ethics, Palliative Care or initiate [address] [place] [address] as is appropriate  Outcome: Progressing     Problem: Confusion  Goal: Confusion, delirium, dementia, or psychosis is improved or at baseline  Description: INTERVENTIONS:  1. Assess for possible contributors to thought disturbance, including medications, impaired vision or hearing, underlying metabolic abnormalities, dehydration, psychiatric diagnoses, and notify attending LIP  2. [place] high risk fall precautions, as indicated  3. Provide frequent short contacts to provide reality reorientation, refocusing and direction  4. Decrease environmental stimuli, including noise as appropriate  5. Monitor and intervene to maintain adequate nutrition, hydration, elimination, sleep and activity  6. If unable to ensure safety without constant attention obtain sitter and review sitter guidelines with assigned personnel  7.  Initiate Psychosocial CNS and Spiritual Care consult, as indicated  Outcome: Progressing     Problem: Neurosensory - Adult  Goal: Achieves stable or improved neurological status  Outcome: Progressing     Problem: Neurosensory - Adult  Goal: Absence of seizures  Outcome: Progressing     Problem: Neurosensory - Adult  Goal: Achieves maximal functionality and self care  Outcome: Progressing       Electronically signed by Dwayne Mac RN on 4/24/2022 at 8:42 AM

## 2022-04-24 NOTE — PROGRESS NOTES
Cardiology Consultation     Liyah Aguayo  1955    PCP: Cassy Watkins MD  Referring Physician: Dr. Patrick Riojas   Reason for Referral: elevated troponin   Chief Complaint:   Chief Complaint   Patient presents with    Altered Mental Status     Pt to ED via [facility] with AMS that started 2 days ago. Per EMS report, pt lives with his wife who states pt has had increased confusion x2 days. Pt c/o mid sternal chest pain that started 2 hrs pta. Interval history:  S/p RHC/Percutaneous LVAD placement  Diuresing well, hemodynamics have stabilized   No chest pain, dyspnea improving     Subjective:     History of Present Illness: The patient is 77 y.o. male with a past medical history significant for CAD with prior CABG and multiple PCI's/NSTEMI, cardiomyopathy, CHF, tobacco use,COPD, diabetes mellitus, HTN, HLP admitted with possible seizure. He presented with confusion and altered mental status. He is breathless and unable to provide any history. I was called emergently by the ER due to elevated troponin. All his extremities are cold to touch and he is in multiorgan failure ( cardiac/renal/liver/pulmonary).          Past Medical History:   Diagnosis Date    Anxiety     Arthritis     Asthma     CAD (coronary artery disease)     Calcium kidney stone     Cardiomyopathy ([address]     Cerebral artery occlusion with cerebral infarction ([address]     CHF (congestive heart failure) ([address]     Clostridium difficile diarrhea 02/12/2020    COPD (chronic obstructive pulmonary disease) (HCC)     mild    Depression     DM2 (diabetes mellitus, type 2) (HCC)     Fibromyalgia     GERD (gastroesophageal reflux disease)     Hyperlipidemia     Hypertension     Liver disease     Pacemaker 2012    Medtronic model # [id number]    Pneumonia     Seizures ([address]     TIA (transient ischemic attack) 2007    Ulcerative colitis ([address]      Past Surgical History:   Procedure Laterality Date  APPENDECTOMY      BACK SURGERY      CARDIAC SURGERY      CHOLECYSTECTOMY      COLONOSCOPY  01/10/2017    COLONOSCOPY  01/10/2017    CORONARY ANGIOPLASTY WITH STENT PLACEMENT      CORONARY ARTERY BYPASS GRAFT  , 2015    ENDOSCOPY, COLON, DIAGNOSTIC      PACEMAKER PLACEMENT      UPPER GASTROINTESTINAL ENDOSCOPY  2017    VASCULAR SURGERY       Family History   Problem Relation Age of Onset    Coronary Art Dis Father     Cancer Mother         Lung with mets    Diabetes Mother      Social History     Tobacco Use    Smoking status: Current Every Day Smoker     Packs/day: 0.50     Years: 44.00     Pack years: 22.00     Types: Cigarettes     Last attempt to quit: 10/20/2021     Years since quittin.5    Smokeless tobacco: Never Used    Tobacco comment: cutting down   Vaping Use    Vaping Use: Never used   Substance Use Topics    Alcohol use: No     Alcohol/week: 0.0 standard drinks    Drug use: No       Allergies   Allergen Reactions    Melatonin Other (See Comments)     Restless leg syndrome       Current Facility-Administered Medications   Medication Dose Route Frequency Provider Last Rate Last Admin    promethazine (PHENERGAN) tablet 12.5 mg  12.5 mg Oral Q6H PRN Paulie Buck MD        Or    ondansetron (ZOFRAN) injection 4 mg  4 mg IntraVENous Q6H PRN Paulie Buck MD        magnesium hydroxide (MILK OF MAGNESIA) 400 MG/5ML suspension 30 mL  30 mL Oral Daily PRN Paulie Buck MD        acetaminophen (TYLENOL) tablet 650 mg  650 mg Oral Q6H PRN Paulie Buck MD        Or    acetaminophen (TYLENOL) suppository 650 mg  650 mg Rectal Q6H PRN Paulie Buck MD        nitroPRUSSide (NIPRIDE) 50 mg in dextrose 5 % 250 mL infusion  0.1-2 mcg/kg/min IntraVENous Continuous Morteza Schafer MD 21.6 mL/hr at 22 1212 0.8 mcg/kg/min at 22 1212    [START ON 2022] clopidogrel (PLAVIX) tablet 75 mg  75 mg Oral Daily Morteza Schafer MD        [START ON 2022] hydrALAZINE (APRESOLINE) tablet 25 mg  25 mg Oral 2 times per day Leonidmadelyn Torres MD        nitroGLYCERIN (NITROSTAT) SL tablet 0.4 mg  0.4 mg SubLINGual Q5 Min PRN Emiliano Martell MD   0.4 mg at 04/23/22 1440    sodium chloride flush 0.9 % injection 5-40 mL  5-40 mL IntraVENous 2 times per day Leonidmadelyn Torres MD   10 mL at 04/24/22 1123    sodium chloride flush 0.9 % injection 5-40 mL  5-40 mL IntraVENous PRN Leonid Torres MD        0.9 % sodium chloride infusion   IntraVENous PRN Leonid Torres MD        acetaminophen (TYLENOL) tablet 650 mg  650 mg Oral Q4H PRN Leonidsakina Torres MD        furosemide (LASIX) 100 mg in dextrose 5 % 100 mL infusion  2.5 mg/hr IntraVENous Continuous Leonidsakina Torres MD 2.5 mL/hr at 04/24/22 1212 2.5 mg/hr at 04/24/22 1212    oxyCODONE-acetaminophen (PERCOCET) 5-325 MG per tablet 2 tablet  2 tablet Oral Q6H PRN Leonideduardo Torres MD        heparin (porcine) 25,000 Units in dextrose 5 % 500 mL infusion (FOR IMPELLA PURGE)   IntraCATHeter Continuous Leonideduardo Torres MD   New Bag at 04/23/22 2030    perflutren lipid microspheres (DEFINITY) injection 1.65 mg  1.5 mL IntraVENous ONCE PRN Leonidmadelyn Torres MD        aspirin EC tablet 81 mg  81 mg Oral Daily Leonideduardo Torres MD   81 mg at 04/24/22 1030    atorvastatin (LIPITOR) tablet 80 mg  80 mg Oral Nightly Leonidsakina Torres MD   80 mg at 04/23/22 2211    levETIRAcetam (KEPPRA) tablet 500 mg  500 mg Oral BID Leonidsakina Torres MD   500 mg at 04/24/22 1030    nicotine (NICODERM CQ) 21 MG/24HR 1 patch  1 patch TransDERmal Nightly Leonidsakina Torres MD   1 patch at 04/23/22 2210    traZODone (DESYREL) tablet 50 mg  50 mg Oral Nightly Leonideduardo Torres MD   50 mg at 04/23/22 2212    glucose (GLUTOSE) 40 % oral gel 15 g  15 g Oral PRN Renae Wyatt MD        dextrose 50 % IV solution  12.5 g IntraVENous PRN Renae Wyatt MD        glucagon (rDNA) injection 1 mg  1 mg IntraMUSCular PRN Renae Wyatt MD        dextrose 5 % solution  100 mL/hr Results   Component Value Date    TRIG 94 01/29/2022    HDL 50 01/29/2022    LDLCALC 75 01/29/2022    LDLDIRECT 105 09/17/2015    LABVLDL 19 01/29/2022     BUN/Creatinine:    Lab Results   Component Value Date    BUN 78 04/24/2022    CREATININE 2.0 04/24/2022     PT/INR, TNI, HGB A1C:   Lab Results   Component Value Date/Time    TROPONINI 2.14 (HH) 04/23/2022 11:04 AM    LABA1C 6.3 04/23/2022 07:43 PM      No results found for: CBCAUTODIF    ECG 1/31/2022:  Sinus rhythm with RBBB; prolonged Qtc     ECG 1/28/2022:  SR with RBBB  (nonspecific TW abnormality noted on initial ECG)     1/30/2022 [facility] device interrogation:  Multiple episodes of SVT/VT noted on 1/28/2022 in AM  Episodes lasting up to 3+ minutes  Rates 162-171  No device discharges (rate set at 188 for device therapies)     Echo 1/28/2022:   Limited study.  Kayode Niño left ventricular systolic function appears moderately reduced.   Ejection fraction is visually estimated to be 35-40% with diffuse   hypokinesis. There is mildly increased left ventricular wall thickness. Left   ventricular cavity size is normal. Diastolic filling parameters suggest   grade I diastolic dysfunction.   Moderate mitral regurgitation.     11/5/2021LHC ([facility]):  Findings:   Dominance: right dominant   LM:  Ostial:  40%, in-stent   LAD:  Distal: 80%     D1: patent stent, 80% upper pole brance     LCX:  Proximal: 100%      10/18/2021 LHC/PCI:  1. Known 3 v CAD; bypass graft in 1 to be patent and were not injected   during this angiogram.  There was a visible dissection versus thrombus   distal to the most recently placed stent from the LAD extending into the   diagonal.   2. Successful PCI of the diagonal using drug-eluting stent   3. Successful PTCA of the LMT malapposed stent   4.  IVUS revealing severe Diagonal stenosis and malapposed LMT stent      10/11/2021 LHC/PCI ([facility]):  3 Vessel CAD   Successful shockwave PCI of the LMCA with 3.5 mm balloon and 3.5 mm NC balloon with increase in LMCA MLCSA from 5.3 mm2 to 6.8 mm2   Patent proximal LAD/D1 stent   Unchanged bypass graft status   Mild LV dysfunction   Mildly elevated LVEDP   Normal systemic pressures   angioseal closure of the RCFA access site.      10/4/2021 LHC/PCI: ([facility])  3 Vessel CAD   Successful IVUS guided PCI distal LMCA into D2 with 3 x 16 mm and 3 x 12   mm Synergy ANGELINA's (overlapping)   FFR D2 = 0.78   Patent grafts as above   Mild LV dysfunction   Normal LVEDP   Normal systemic pressures      6/11/2021 LHC: ([facility])  1) 3 vessel CAD with 4/4 patent grafts and occlusion of small RPDA, likely   culprit, plan medical management   2) Normal LVEDP   3) Moderately reduced LVEF      Echo 7/12/2021 ([facility]):  Left ventricle is mildly (5.9-6.3 cm) dilated. The left atrium is moderately dilated. The Aortic Valve leaflets appear sclerotic. No hemodynamically significant valvular aortic stenosis. There is a pacemaker lead in the right ventricle. The left ventricular function is moderately reduced. Overall left ventricular ejection fraction is estimated to be 30-35%. There is moderate global hypokinesis of the left ventricle. The mitral valve leaflets are mildly calcified in appearance. There is trace mitral regurgitation. There is a catheter/pacemaker lead seen in the right atrial cavity.    There is no pericardial effusion.      4/19/2021 Lexiscan-Myoview ([facility]):  Transient dilatation of the left ventricle which can be due to left ventricular hypertrophy,    diabetes, or balanced three-vessel coronary artery disease with symmetric myocardial ischemia.       No focal perfusion defects       Dilated left ventricle       Reduced left ventricular ejection fraction measuring 31%         All above diagnostic testing and laboratory data was independently visualized and reviewed by me (not simply review of report)       Assessment and Plan   1) cardiogenic shock  - multiorgan failure   - s/p pVAD ,MV02 66%   - decreased to p6, will decrease to p4 tomorrow AM, anticipate explant Tuesday     2) acute on chronic systolic heart failure   - decrease lasix gtt to 2.5  - titration off nipride  - add hydralazine in AM   - creatinine stable     3) CAD   - unstable  - medical therapy for now  - no plan for coronary intervention   - asa/plavix/statin     4) LALITA  - as per nephrology     Overall, the problems requiring hospitalization are high in severity     Thank you very much for allowing me to participate in the care of your patient. Please do not hesitate to contact me if you have any questions.       Sandi Steinberg MD [address] [facility], Interventional Cardiology, and Peripheral Vascular Disease   [facility]   Ph: [phone]  Fax: [phone]

## 2022-04-24 NOTE — PROGRESS NOTES
Patient able to open eyes and look at RN after saying his name. Patient stays awake briefly and is able to answer some questions and then dozes back off. Patient able to move all extremities but does not follow commands in regards to moving extremities. Knee Immoblizers in place. Impella remains in place. Will continue to monitor.     Electronically signed by Rosalino Monroe RN on 4/24/2022 at 9:13 AM

## 2022-04-24 NOTE — PLAN OF CARE
Problem: Pain  Goal: Verbalizes/displays adequate comfort level or baseline comfort level  Outcome: Progressing  Flowsheets (Taken 4/23/2022 2025)  Verbalizes/displays adequate comfort level or baseline comfort level:   Encourage patient to monitor pain and request assistance   Assess pain using appropriate pain scale   Administer analgesics based on type and severity of pain and evaluate response   Implement non-pharmacological measures as appropriate and evaluate response     Problem: Respiratory - Adult  Goal: Achieves optimal ventilation and oxygenation  Outcome: Progressing  Flowsheets (Taken 4/23/2022 1815)  Achieves optimal ventilation and oxygenation:   Assess for changes in respiratory status   Assess for changes in mentation and behavior   Position to facilitate oxygenation and minimize respiratory effort   Oxygen supplementation based on oxygen saturation or arterial blood gases   Assess and instruct to report shortness of breath or any respiratory difficulty   Respiratory therapy support as indicated     Problem: Cardiovascular - Adult  Goal: Maintains optimal cardiac output and hemodynamic stability  Outcome: Progressing  Flowsheets (Taken 4/23/2022 1815)  Maintains optimal cardiac output and hemodynamic stability: (Impella in place)   Monitor blood pressure and heart rate   Monitor urine output and notify Licensed Independent Practitioner for values outside of normal range   Assess for signs of decreased cardiac output  Goal: Absence of cardiac dysrhythmias or at baseline  Outcome: Progressing  Flowsheets (Taken 4/23/2022 2025)  Absence of cardiac dysrhythmias or at baseline:   Monitor cardiac rate and rhythm   Assess for signs of decreased cardiac output     Problem: Skin/Tissue Integrity - Adult  Goal: Skin integrity remains intact  Outcome: Progressing  Flowsheets (Taken 4/23/2022 1815)  Skin Integrity Remains Intact: Monitor for areas of redness and/or skin breakdown  Goal: Incisions, wounds, or drain sites healing without S/S of infection  Outcome: Progressing  Flowsheets (Taken 4/23/2022 1815)  Incisions, Wounds, or Drain Sites Healing Without Sign and Symptoms of Infection:   TWICE DAILY: Assess and document skin integrity   Implement wound care per orders     Problem: Musculoskeletal - Adult  Goal: Return mobility to safest level of function  Outcome: Progressing  Flowsheets (Taken 4/23/2022 1815)  Return Mobility to Safest Level of Function:   Ensure adequate protection for wounds/incisions during mobilization   Instruct patient/family in ordered activity level     Problem: Genitourinary - Adult  Goal: Urinary catheter remains patent  Outcome: Progressing  Flowsheets (Taken 4/23/2022 1815)  Urinary catheter remains patent: Assess patency of urinary catheter     Problem: Infection - Adult  Goal: Absence of infection during hospitalization  Outcome: Progressing  Flowsheets (Taken 4/23/2022 1815)  Absence of infection during hospitalization:   Assess and monitor for signs and symptoms of infection   Monitor all insertion sites i.e., indwelling lines, tubes and drains   Administer medications as ordered   Monitor lab/diagnostic results     Problem: Metabolic/Fluid and Electrolytes - Adult  Goal: Electrolytes maintained within normal limits  Outcome: Progressing  Flowsheets (Taken 4/23/2022 1815)  Electrolytes maintained within normal limits: Monitor labs and assess patient for signs and symptoms of electrolyte imbalances  Goal: Hemodynamic stability and optimal renal function maintained  Outcome: Progressing  Flowsheets (Taken 4/23/2022 1815)  Hemodynamic stability and optimal renal function maintained:   Monitor labs and assess for signs and symptoms of volume excess or deficit   Monitor intake, output and patient weight   Monitor response to interventions for patient's volume status, including labs, urine output, blood pressure (other measures as available)  Goal: Glucose maintained within prescribed range  Outcome: Progressing  Flowsheets (Taken 4/23/2022 1815)  Glucose maintained within prescribed range:   Monitor blood glucose as ordered   Assess for signs and symptoms of hyperglycemia and hypoglycemia   Administer ordered medications to maintain glucose within target range   Assess barriers to adequate nutritional intake and initiate nutrition consult as needed   Instruct patient on self management of diabetes and initiate consult as needed     Problem: Hematologic - Adult  Goal: Maintains hematologic stability  Outcome: Progressing  Flowsheets (Taken 4/23/2022 1815)  Maintains hematologic stability:   Assess for signs and symptoms of bleeding or hemorrhage   Monitor labs for bleeding or clotting disorders   Administer blood products/factors as ordered

## 2022-04-24 NOTE — FLOWSHEET NOTE
04/24/22 1700   Wideman-Rafiq   PAP (s/d) 35/21   PAP (Mean) 28 mmHg   CVP (Mean) 17 mmHg   Core (Body) Temperature 99.2 °F (37.3 °C)   SVO2 (%) 62 %   CO (l/min) 5.8 l/min   CCO 5.8 L/Min  (ficks)   CI (l/min/m2) 2.8 l/min/m2  (ficks)   SVR (Using NBP Mean) 1075.86 dyne*sec/cm5       Electronically signed by Dwayne Mac RN on 4/24/2022 at 5:06 PM

## 2022-04-24 NOTE — CARE COORDINATION
Reviewed patients chart  BONNY Student attempted to speak with patient  Patients Nurse Danyelle informed me that the patient is sleepy at this time  Danyelle RN informed me that the patients POA is his Daughter in-law Sis Mcnally [phone]  Essie DENIS informed me that the daughter in law and wife are concerned for the next steps for the patient when he is medically discharged from the hospital  Also what resources can be provided for transport for the patient  The patient needs transportation for his appointments  Will follow up with BONNY Student Supervisor Dot Wing regarding these concerns from the family      Justina DRAPER, Student

## 2022-04-24 NOTE — PROGRESS NOTES
1900- Report received from [facility]. Patient currently on dobu 2.5mcg/kg/min, nitro 140 mcg/min, & lasix 5mg/min. Awaiting nipride from pharmacy to ultimately wean off the nitro. This RN speaks to wife Manuel Aldana before she leaves for the night, Manuel Aldana is aware of situation and has no questions at this time. Dr. Rebekah Rollins enters room at time of bedside report. Orders to stop dobutamine and shoot numbers @ 2200 and to call with update. 2000- . Heparin purge started and systemic heparin gtt ordered from pharmacy. 2130- CO & CI shot manually and [place] calculated. See below. Manual   CO 4.7  CI 2.2  [place]   CO 5.8  CI 2.7    SvO2 67%  SVR 1062    These numbers forwarded to Dr. Rebekah Rollins. Call placed to Dr. Rebekah Rollins by this RN. This RN discusses difficulty of reaching MAP goal due to high diastolic blood pressures, Dr. Rebekah Rollins orders to change parameters to SBP< 120 or MAP< 80 for vaso dilators. Nipride running at this time, to wean nitro as tolerated. Dr. Rebekah Rollins orders ativan 1mg Q2 for patient agitation and anxiety. Patient diuresing well at this time. Approx 300ml an hour. [place]amorado catheter was slightly leaking earlier in shift, seems to have resolved. PAP slowly improving. Patient RR remain in high 20's-low 30's. 2135- Wife Manuel Aldana and daughter Jin Hines call this RN for update. This RN thoroughly explains lines, impella, and plan of care for the time being. Family very understanding and agrees with plan of care. 2145- Patient passes swallow screening. Will plan to administer PO meds. 2230- Patient frequently bending knees and attempting to sit up. Knee immobilizers placed on each leg for patient safety due to impella and venous/arterial sheaths in the groins. Ativan given along with PO meds.

## 2022-04-24 NOTE — FLOWSHEET NOTE
04/24/22 1340   Family/Significant Other Communication   Reason change in status   Name Yun Mcdowell and Nora Llamas   Relationship Child  Glendy Garcia daughter and Cassandra  Wife)   Response appreciative   Method of Communication Phone     Call placed to [address] [place] [address] [place] daughter in law Yun Mcdowell, who then conference called pt's wife on call. Updated on pt's change in condition being placed on BiPAP. Discussed code status. Wife states she thinks pt had mentioned wanting DNR. Palliative care services described to Yun Mcdowell and Cassandra Izaguirre. Plan to keep current Full code status, family will discuss farther together rosemary.

## 2022-04-24 NOTE — FLOWSHEET NOTE
04/24/22 1042   Treatment Team Notification   Reason for Communication Review case  (bedside rounds)   Team Member Name Dr Jenae Guerin Provider  (cardiology)   Method of Communication Face to face   Response At bedside;See orders     Lasix drip rate decreased.   Plan to begin Impella wean tomorrow

## 2022-04-24 NOTE — ACP (ADVANCE CARE PLANNING)
Advance Care Planning     Advance Care Planning Inpatient Note  Spiritual Care Department    Today's Date: 4/24/2022  Unit: [facility] CVICU    Received request from IDT Member. Upon review of chart and communication with care team, Spiritual Care will defer advance care planning with patient at this time. . Patient was/were present in the room during visit. Goals of ACP Conversation:  Discuss advance care planning documents    Health Care Decision Makers:       Primary Decision Maker: Janell Gomes - [phone]    Secondary Decision Maker: Blainehaylee Dylan - Child - [phone]    Summary:  Verified [facility]    Advance Care Planning Documents (Patient Wishes):  Healthcare Power of /Advance Directive Appointment of Health Care Agent     Assessment:  Patient is sleeping and not responsive at this time. Patient's nurse had questions about healthcare decision makers and healthcare power of  found in electronic medical record. According to nurse the daughter-in-law Miriam Light continues as primary decision maker and patient's son is alternate; they are communicating regularly with patient's wife Maura Kelsey. Interventions:  clarified scanned documents with nurse    Care Preferences Communicated:   No    Outcomes/Plan:  ACP Discussion: Completed  Existing advance directive reviewed with patient; no changes to patient's previously recorded wishes.     Electronically signed by Mariano Salazar, [address] on 4/24/2022 at 11:19 AM

## 2022-04-24 NOTE — CONSULTS
[address] [address]                                  CONSULTATION    PATIENT NAME: Gris Ramirez                    :        1955  MED REC NO:   [id number]                          ROOM:       4509  ACCOUNT NO:   [de-identified]                           ADMIT DATE: 2022  PROVIDER:     Diamond Cevallos MD    CONSULT DATE:  2022    REASON FOR CONSULTATION:  Acute kidney injury. HISTORY OF PRESENT ILLNESS:  He is a 51-year-old male with past medical  history significant for coronary artery disease, status post CABG, CVA,  COPD, diabetes mellitus type 2, hypercholesterolemia, chronic liver  disease(?), chronic kidney disease, baseline creatinine 1.2 to 1.4, who  was brought to the ER complaining of chest pain, off and on, for the  last couple of days. Routine labs showed positive troponin with acute  kidney injury. The patient is being admitted for further workup and  management. Renal consultation has been called for acute kidney injury. At the time of consultation, the patient was feeling better. He admits  improvement in chest pain, but he still had some mild shortness of  breath and dyspnea on exertion. Feeling weak, tired, decreased level of  energy. Denies any hematuria, dysuria, hematemesis, or melena. He  admits good diabetes and blood pressure control. PAST MEDICAL HISTORY:  1.  Diabetes mellitus type 2.  2.  Chronic kidney disease, seen by Dr. Lyndon Stoddard few months ago. 3.  COPD/asthma. 4.  Chronic seizure disorder. 5.  Ulcerative colitis. 6.  CVA. 7.  Depression. 8.  Hypercholesterolemia. 9.  Anxiety disorder. 10. Chronic AFib(?). PAST SURGICAL HISTORY:  1.  Status post appendectomy. 2.  Status post back surgery. 3.  Status post coronary stent placement. 4.  Status post CABG. 5.  Status post pacemaker placement.     OUTPATIENT MEDICATIONS:  Include Tylenol, Nitrostat, Neurontin, Ambien,  Eliquis, Cardura, Keppra, Lipitor, Norvasc, Imdur, Coreg, aspirin. FAMILY HISTORY:  Not significant for any chronic kidney disease. SOCIAL HISTORY:  He does not smoke or drink. ALLERGIES:  MELATONIN. REVIEW OF SYSTEMS:  Denies any headache. No hearing problem. No vision  problem. Has some confusion and disorientation, improving. Off and on  chest pain for the last couple of days with shortness of breath. No  dyspnea on exertion. Feeling weak, tired, decreased level of energy. No nausea, vomiting, or diarrhea. Has some chest pain but no shortness  of breath, dyspnea on exertion. Feeling weak, tired, decreased level of  energy. Feeling a bit anxious. Denies any hematuria, dysuria,  hematemesis, or melena. PHYSICAL EXAMINATION:  GENERAL:  Lying in bed, looks comfortable. VITAL SIGNS:  Blood pressure 123/87, pulse 116, temperature 97.8. HEENT:  Pupils are reactive to light. CHEST:  Decreased breath sounds, both bases. Few scattered rhonchi. CARDIOVASCULAR:  S1, S2 audible. Regular rate and rhythm. ABDOMEN:  Soft, nontender. Positive bowel sounds. EXTREMITIES:  Plus edema. CNS:  Nonfocal.    LABORATORY DATA:  Sodium 142, potassium 5.4, chloride 107, bicarb 16,  BUN 69, creatinine 2.3. BNP 6500. Alkaline phosphatase 198, ,  . WBC 12.6, hemoglobin 11.4. CT scan of the head showed no  acute intracranial abnormality. CT scan of the abdomen and pelvis  without contrast showed a 7-mm nodule in the right upper lobe of the  liver (new finding). Extensive coronary calcification. IMPRESSION:  1. Acute kidney injury, most likely secondary to hemodynamic. 2.  Rule out sepsis. 3.  Rule out UTI. 4.  History of chronic kidney disease stage IIB, secondary to (i)  diabetes, (ii) hypertension. 5.  Coronary artery disease. 6.  Hypertension. 7.  History of coronary artery disease, status CABG. 8.  Sepsis versus cardiogenic shock. PLAN:  1.   Daily weight. 2.  Strict I's and O's.  3.  Follow up on the liver function test.  4.  Add oral bicarb. 5.  Give antibiotics. We will follow the patient from a renal standpoint. Thank you very much for the consultation.         Giovani Mcarthur MD    D: 04/23/2022 15:46:53       T: 04/24/2022 0:37:35     [email or web address]  Job#: [id number]     Doc#: [id number]    CC:

## 2022-04-24 NOTE — PROGRESS NOTES
ACT = 146  Increased by 100 u/h  Current rate now: 350 units/hr  Titrated per pharmacy protocol to target -180

## 2022-04-24 NOTE — FLOWSHEET NOTE
04/24/22 1200   [place]-Rafiq   SVO2 (%) 61 %   CCO 5.6 L/Min  ([place])   CI (l/min/m2) 2.7 l/min/m2  (Ficks)   SVR (Using NBP Mean) 1100 dyne*sec/cm5     Results for Ian Dunaway (MRN [id number]) as of 4/24/2022 12:17   Ref.  Range 4/24/2022 11:57   PCO2, Mixed Latest Ref Range: Not Established mm Hg 36.5   PO2, Mixed Latest Ref Range: Not Established mm Hg 30   HCO3, Mixed Latest Ref Range: Not Established mmol/L 24.6   Base Exc, Mixed Latest Ref Range: Not Established  0   O2 Sat, Mixed Latest Ref Range: Not Established % 61   tCO2, Mixed Latest Ref Range: Not Established mmol/L 26   PH MIXED Latest Ref Range: 7.350 - 7.450  7.437   Sample Type Unknown MIXED

## 2022-04-24 NOTE — FLOWSHEET NOTE
04/24/22 1403   Treatment Team Notification   Reason for Communication Review case  (bedside rounds)   Team Member Name Dr Daylin Uriarte Team Role Attending Provider   Method of Communication Face to face   Response At bedside     Updated Dr Kain Snow and discussion with family.

## 2022-04-24 NOTE — FLOWSHEET NOTE
SpO2 drop from 96% to 88% on 3L/min nasal cannual. CVP increase from 11 to 16. RR 33-42 with increase effort noted. Call placed to Dr Amanda Leavitt. New orders for IVP Lasix x1 and Bipap.  Call placed to RT, bedside at this time to place on BiPAP     04/24/22 1326   Treatment Team Notification   Reason for Communication Change in status   Team Member Name Dr Manoj Street Provider  (cardiology)   Method of Communication Call   Response No new orders   Notification Time [phone]

## 2022-04-24 NOTE — FLOWSHEET NOTE
04/24/22 1714   Treatment Team Notification   Reason for Communication Review case  (update on status)   Team Member Name Dr Bhanu Wills Provider  (cardiology)   Method of Communication Call   Response No new orders   Notification Time 1707     Dr Abby Person updated on patient status  Tolerating bipap  FiO2 60%, SpO2 96%  Mixed Venous % 62. [place] CO 5.8, CI 2.8  RR 30-40   CVP 14-19, Urine output 75/hr  BP increase Nipride titrated up to 1mcg/kg/min  Remains drowsy, arousable.

## 2022-04-24 NOTE — FLOWSHEET NOTE
22 1010   Family/Significant Other Communication   Reason Family call RN for updates   Name Erik Momin and Griselda Ahr   Relationship Child  (daughter Erik Momin, wife Cayetano Kwong)   Response appreciative, will look for AD to fax   Method of Communication Phone     Pt's daughter Erik Momin and wife Raejean Hutchinson called RN for updates. Updates provided. Discussed Advance Directives, daughter Erik Momin state she has medical POA. Scanned documented in Epic 2019 state . Daughter and wife confirm no new advance directives have been filled out since. Daughter will look for copy to fax to [facility]. Will consult pastoral care for follow up. Daughter and wife asking about discharge planning, will consult  and provide contact information.

## 2022-04-25 ENCOUNTER — APPOINTMENT (OUTPATIENT)
Dept: GENERAL RADIOLOGY | Age: 67
DRG: 215 | End: 2022-04-25
Payer: MEDICARE

## 2022-04-25 LAB
ANION GAP SERPL CALCULATED.3IONS-SCNC: 16 MMOL/L (ref 3–16)
BASE EXCESS ARTERIAL: 2 (ref -3–3)
BASE EXCESS MIXED: 2
BASE EXCESS MIXED: 3
BASE EXCESS MIXED: 4
BASOPHILS ABSOLUTE: 0.1 K/UL (ref 0–0.2)
BASOPHILS RELATIVE PERCENT: 0.4 %
BUN BLDV-MCNC: 60 MG/DL (ref 7–20)
CALCIUM SERPL-MCNC: 7.6 MG/DL (ref 8.3–10.6)
CHLORIDE BLD-SCNC: 109 MMOL/L (ref 99–110)
CO2: 23 MMOL/L (ref 21–32)
CREAT SERPL-MCNC: 1.5 MG/DL (ref 0.8–1.3)
EOSINOPHILS ABSOLUTE: 0 K/UL (ref 0–0.6)
EOSINOPHILS RELATIVE PERCENT: 0.1 %
GFR AFRICAN AMERICAN: 57
GFR NON-AFRICAN AMERICAN: 47
GLUCOSE BLD-MCNC: 155 MG/DL (ref 70–99)
GLUCOSE BLD-MCNC: 166 MG/DL (ref 70–99)
GLUCOSE BLD-MCNC: 174 MG/DL (ref 70–99)
GLUCOSE BLD-MCNC: 188 MG/DL (ref 70–99)
GLUCOSE BLD-MCNC: 190 MG/DL (ref 70–99)
HCO3 ARTERIAL: 26.1 MMOL/L (ref 21–29)
HCO3, MIXED: 26 MMOL/L
HCO3, MIXED: 27.7 MMOL/L
HCO3, MIXED: 28.3 MMOL/L
HCT VFR BLD CALC: 32.5 % (ref 40.5–52.5)
HEMOGLOBIN: 10.5 G/DL (ref 13.5–17.5)
HEMOGLOBIN: 11.6 GM/DL (ref 13.5–17.5)
LYMPHOCYTES ABSOLUTE: 0.6 K/UL (ref 1–5.1)
LYMPHOCYTES RELATIVE PERCENT: 5 %
MAGNESIUM: 2.3 MG/DL (ref 1.8–2.4)
MCH RBC QN AUTO: 31.2 PG (ref 26–34)
MCHC RBC AUTO-ENTMCNC: 32.4 G/DL (ref 31–36)
MCV RBC AUTO: 96.4 FL (ref 80–100)
MONOCYTES ABSOLUTE: 1 K/UL (ref 0–1.3)
MONOCYTES RELATIVE PERCENT: 7.8 %
NEUTROPHILS ABSOLUTE: 11 K/UL (ref 1.7–7.7)
NEUTROPHILS RELATIVE PERCENT: 86.7 %
O2 SAT, ARTERIAL: 97 % (ref 93–100)
O2 SAT, MIXED: 54 %
O2 SAT, MIXED: 59 %
O2 SAT, MIXED: 61 %
PCO2 ARTERIAL: 37.2 MM HG (ref 35–45)
PCO2 MIXED: 39.2 MM HG
PCO2 MIXED: 41.4 MM HG
PCO2 MIXED: 42.1 MM HG
PDW BLD-RTO: 17.9 % (ref 12.4–15.4)
PERFORMED ON: ABNORMAL
PERFORMED ON: NORMAL
PH ARTERIAL: 7.46 (ref 7.35–7.45)
PH, MIXED: 7.43 (ref 7.35–7.45)
PLATELET # BLD: 134 K/UL (ref 135–450)
PMV BLD AUTO: 10 FL (ref 5–10.5)
PO2 ARTERIAL: 86.1 MM HG (ref 75–108)
PO2 MIXED: 28 MM HG
PO2 MIXED: 30 MM HG
PO2 MIXED: 31 MM HG
POC ACT LR: 151 SEC
POC ACT LR: 156 SEC
POC ACT LR: 157 SEC
POC ACT LR: 159 SEC
POC ACT LR: 161 SEC
POC ACT LR: 161 SEC
POC ACT LR: 162 SEC
POC ACT LR: 163 SEC
POC ACT LR: 165 SEC
POC ACT LR: 166 SEC
POC ACT LR: 169 SEC
POC ACT LR: 169 SEC
POC ACT LR: 171 SEC
POC ACT LR: 172 SEC
POC ACT LR: 172 SEC
POC ACT LR: 175 SEC
POC ACT LR: 176 SEC
POC ACT LR: 177 SEC
POC ACT LR: 179 SEC
POC ACT LR: 180 SEC
POC ACT LR: 180 SEC
POC ACT LR: 182 SEC
POC ACT LR: 183 SEC
POC ACT LR: 183 SEC
POC HEMATOCRIT: 34 % (ref 40.5–52.5)
POC SAMPLE TYPE: ABNORMAL
POC SAMPLE TYPE: NORMAL
POTASSIUM REFLEX MAGNESIUM: 3.2 MMOL/L (ref 3.5–5.1)
RBC # BLD: 3.37 M/UL (ref 4.2–5.9)
SODIUM BLD-SCNC: 148 MMOL/L (ref 136–145)
TCO2 ARTERIAL: 27 MMOL/L
TCO2 CALC MIXED: 27 MMOL/L
TCO2 CALC MIXED: 29 MMOL/L
TCO2 CALC MIXED: 30 MMOL/L
URINE CULTURE, ROUTINE: NORMAL
WBC # BLD: 12.7 K/UL (ref 4–11)

## 2022-04-25 PROCEDURE — 2580000003 HC RX 258: Performed by: INTERNAL MEDICINE

## 2022-04-25 PROCEDURE — 85347 COAGULATION TIME ACTIVATED: CPT

## 2022-04-25 PROCEDURE — 6360000002 HC RX W HCPCS: Performed by: INTERNAL MEDICINE

## 2022-04-25 PROCEDURE — 6370000000 HC RX 637 (ALT 250 FOR IP): Performed by: INTERNAL MEDICINE

## 2022-04-25 PROCEDURE — 2100000000 HC CCU R&B

## 2022-04-25 PROCEDURE — 2500000003 HC RX 250 WO HCPCS: Performed by: INTERNAL MEDICINE

## 2022-04-25 PROCEDURE — 82803 BLOOD GASES ANY COMBINATION: CPT

## 2022-04-25 PROCEDURE — 6360000002 HC RX W HCPCS: Performed by: STUDENT IN AN ORGANIZED HEALTH CARE EDUCATION/TRAINING PROGRAM

## 2022-04-25 PROCEDURE — 93308 TTE F-UP OR LMTD: CPT

## 2022-04-25 PROCEDURE — 71045 X-RAY EXAM CHEST 1 VIEW: CPT

## 2022-04-25 PROCEDURE — 2700000000 HC OXYGEN THERAPY PER DAY

## 2022-04-25 PROCEDURE — 80048 BASIC METABOLIC PNL TOTAL CA: CPT

## 2022-04-25 PROCEDURE — 99233 SBSQ HOSP IP/OBS HIGH 50: CPT | Performed by: INTERNAL MEDICINE

## 2022-04-25 PROCEDURE — 83735 ASSAY OF MAGNESIUM: CPT

## 2022-04-25 PROCEDURE — 82947 ASSAY GLUCOSE BLOOD QUANT: CPT

## 2022-04-25 PROCEDURE — 85014 HEMATOCRIT: CPT

## 2022-04-25 PROCEDURE — 94761 N-INVAS EAR/PLS OXIMETRY MLT: CPT

## 2022-04-25 PROCEDURE — 94660 CPAP INITIATION&MGMT: CPT

## 2022-04-25 PROCEDURE — 85025 COMPLETE CBC W/AUTO DIFF WBC: CPT

## 2022-04-25 RX ORDER — POTASSIUM CHLORIDE 29.8 MG/ML
20 INJECTION INTRAVENOUS
Status: COMPLETED | OUTPATIENT
Start: 2022-04-25 | End: 2022-04-25

## 2022-04-25 RX ORDER — FUROSEMIDE 10 MG/ML
40 INJECTION INTRAMUSCULAR; INTRAVENOUS 2 TIMES DAILY
Status: DISCONTINUED | OUTPATIENT
Start: 2022-04-26 | End: 2022-04-26 | Stop reason: HOSPADM

## 2022-04-25 RX ORDER — HALOPERIDOL 5 MG/ML
2 INJECTION INTRAMUSCULAR EVERY 6 HOURS PRN
Status: DISCONTINUED | OUTPATIENT
Start: 2022-04-25 | End: 2022-04-26 | Stop reason: HOSPADM

## 2022-04-25 RX ADMIN — POTASSIUM CHLORIDE 20 MEQ: 29.8 INJECTION, SOLUTION INTRAVENOUS at 15:08

## 2022-04-25 RX ADMIN — INSULIN LISPRO 2 UNITS: 100 INJECTION, SOLUTION INTRAVENOUS; SUBCUTANEOUS at 08:50

## 2022-04-25 RX ADMIN — LEVETIRACETAM 500 MG: 5 INJECTION INTRAVENOUS at 22:34

## 2022-04-25 RX ADMIN — HEPARIN SODIUM: 5000 INJECTION INTRAVENOUS; SUBCUTANEOUS at 22:17

## 2022-04-25 RX ADMIN — Medication 10 ML: at 09:48

## 2022-04-25 RX ADMIN — INSULIN LISPRO 2 UNITS: 100 INJECTION, SOLUTION INTRAVENOUS; SUBCUTANEOUS at 12:26

## 2022-04-25 RX ADMIN — INSULIN LISPRO 1 UNITS: 100 INJECTION, SOLUTION INTRAVENOUS; SUBCUTANEOUS at 22:25

## 2022-04-25 RX ADMIN — SODIUM NITROPRUSSIDE 1 MCG/KG/MIN: 25 INJECTION, SOLUTION, CONCENTRATE INTRAVENOUS at 02:55

## 2022-04-25 RX ADMIN — HEPARIN SODIUM 600 UNITS/HR: 10000 INJECTION, SOLUTION INTRAVENOUS at 17:36

## 2022-04-25 RX ADMIN — LEVETIRACETAM 500 MG: 5 INJECTION INTRAVENOUS at 09:46

## 2022-04-25 RX ADMIN — POTASSIUM CHLORIDE 20 MEQ: 29.8 INJECTION, SOLUTION INTRAVENOUS at 14:06

## 2022-04-25 RX ADMIN — CEFTRIAXONE 1000 MG: 1 INJECTION, POWDER, FOR SOLUTION INTRAMUSCULAR; INTRAVENOUS at 01:11

## 2022-04-25 RX ADMIN — LORAZEPAM 1 MG: 2 INJECTION INTRAMUSCULAR; INTRAVENOUS at 10:20

## 2022-04-25 RX ADMIN — INSULIN LISPRO 2 UNITS: 100 INJECTION, SOLUTION INTRAVENOUS; SUBCUTANEOUS at 17:32

## 2022-04-25 RX ADMIN — SODIUM NITROPRUSSIDE 0.75 MCG/KG/MIN: 25 INJECTION, SOLUTION, CONCENTRATE INTRAVENOUS at 13:07

## 2022-04-25 ASSESSMENT — PAIN SCALES - GENERAL
PAINLEVEL_OUTOF10: 0

## 2022-04-25 NOTE — PROGRESS NOTES
Hospitalist Progress Note      PCP: Jered Mazariegos MD    Date of Admission: 4/23/2022    Subjective: off  BIPAP, needing 8L O2, received ativan and appeared lethargic    Medications:  Reviewed    Infusion Medications    nitroprusside (NIPRIDE) 50 mg in D5W infusion 0.25 mcg/kg/min (04/25/22 1900)    sodium chloride      furosemide (LASIX) 1mg/ml infusion 2.5 mg/hr (04/25/22 1900)    heparin 25,000 units in dextrose 5 %      dextrose      heparin (Porcine) 650 Units/hr (04/25/22 1900)     Scheduled Medications    clopidogrel  75 mg Oral Daily    hydrALAZINE  25 mg Oral 2 times per day    levetiracetam  500 mg IntraVENous BID    sodium chloride flush  5-40 mL IntraVENous 2 times per day    aspirin  81 mg Oral Daily    atorvastatin  80 mg Oral Nightly    nicotine  1 patch TransDERmal Nightly    traZODone  50 mg Oral Nightly    insulin lispro  0-12 Units SubCUTAneous TID WC    insulin lispro  0-6 Units SubCUTAneous Nightly    cefTRIAXone (ROCEPHIN) IV  1,000 mg IntraVENous Q24H     PRN Meds: haloperidol lactate, promethazine **OR** ondansetron, magnesium hydroxide, acetaminophen **OR** acetaminophen, nitroGLYCERIN, sodium chloride flush, sodium chloride, acetaminophen, oxyCODONE-acetaminophen, perflutren lipid microspheres, glucose, dextrose, glucagon (rDNA), dextrose      Intake/Output Summary (Last 24 hours) at 4/25/2022 1905  Last data filed at 4/25/2022 1900  Gross per 24 hour   Intake 1124.61 ml   Output 2500 ml   Net -1375.39 ml       Physical Exam Performed:    /67   Pulse 111   Temp 98.2 °F (36.8 °C) (Axillary)   Resp 30   Ht 6' (1.829 m)   Wt 173 lb 1 oz (78.5 kg)   SpO2 95%   BMI 23.47 kg/m²     General appearance: lethargic  HEENT:  Normal cephalic, atraumatic without obvious deformity. Conjunctivae/corneas clear. Neck: Supple, with full range of motion. No cervical lymphadenopathy  Respiratory:  Normal respiratory effort.  Clear to auscultation, bilaterally without Rales/Wheezes/Rhonchi. Cardiovascular:  Regular rate and rhythm with normal S1/S2 without murmurs, rubs or gallops. Abdomen: Soft, non-tender, non-distended, normal bowel sounds. Musculoskeletal:  No edema noted bilaterally. No tenderness on palpation   Skin: no rash visible  Neurologic:  Neurologically intact without any focal sensory/motor deficits.  grossly non-focal.  Psychiatric:  lethargic  Peripheral Pulses: +2 palpable, equal bilaterally        Labs:   Recent Labs     04/23/22  1104 04/23/22  1104 04/24/22  0445 04/25/22  0500 04/25/22  1517   WBC 12.6*  --  11.8* 12.7*  --    HGB 11.4*   < > 9.7* 10.5* 11.6*   HCT 36.6*  --  29.1* 32.5*  --      --  155 134*  --     < > = values in this interval not displayed. Recent Labs     04/24/22  0445 04/24/22  1336 04/25/22  0500    144 148*   K 3.6 3.5 3.2*    105 109   CO2 22 22 23   BUN 78* 74* 60*   CREATININE 2.0* 1.7* 1.5*   CALCIUM 7.6* 7.6* 7.6*     Recent Labs     04/23/22  1104 04/24/22  0445   * 533*   * 336*   BILITOT 0.9 0.8   ALKPHOS 198* 162*     No results for input(s): INR in the last 72 hours. Recent Labs     04/23/22  1104   TROPONINI 2.14*       Urinalysis:      Lab Results   Component Value Date    NITRU Negative 04/23/2022    WBCUA 6 04/23/2022    RBCUA 16 04/23/2022    BLOODU TRACE-INTACT 04/23/2022    SPECGRAV >=1.030 04/23/2022    GLUCOSEU Negative 04/23/2022       Radiology:  XR CHEST PORTABLE   Final Result   Left lung atelectasis with shift of the mediastinal structures to the left   side      [place]-Rafiq catheter distal in right mid pulmonary artery      The findings were sent to the Radiology Results [address] at 2:44   pm on 4/25/2022 to be communicated to a licensed caregiver. CT CHEST ABDOMEN PELVIS WO CONTRAST   Final Result   7 mm nodule noted in the right upper lobe, series 4, image 60. This was not   present on prior examination of 10/30/2020.   I recommend follow-up in 6-12   months. Previously noted liver lesions not appreciated on this noncontrast study. There are small bilateral pleural effusions, right greater than left. 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 The patient is status post cholecystectomy and hysterectomy peer         CT HEAD WO CONTRAST   Final Result   No acute intracranial abnormality. 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 Mild   degree of generalized volume loss. XR CHEST PORTABLE   Final Result   No acute process.       Stable cardiomegaly                 Assessment/Plan:    Active Hospital Problems    Diagnosis     NSTEMI (non-ST elevated myocardial infarction) [I21.4]            Patient is a 43-year-old male with past medical history of CAD, COPD, diabetes mellitus hypertension hyperlipidemia who presents to the hospital due to change in mental status per family.  According to the family patient has been confused for past 2 days, he has been having increased confusion is not holding appropriate conversations. [facility] arrival to the emergency department he complained about chest pain, epigastric abdominal pain.  Patient appears confused and is very poor historian, he is saying yes to every question.     Assessment  Acute metabolic encephalopathy  NSTEMI  Acute hypoxic resp failure  Acute kidney injury  Hyperkalemia-->now hypoklaemia  Diabetes mellitus       Plan  Started IV Rocephin, blood culture neg to date, urine culture neg  Placed on BIPAP-->switched to 8L O2, recheck CXR  Cardiology consulted from emergency department, plan for cardiac cath, patient is status post percutaneous LVAD Impella mechanical support placed, cardiology following, admit to CVICU  On ASA/statin/plavix  Creat improved from 2.3-->1.5, monitor  On lasix gtt/heparin gtt  Monitor on cardiac telemetry  Insulin sliding scale         DVT prophylaxis-started on IV heparin   Diet: ADULT DIET; Regular; Low Sodium (2 gm); 1500 ml  Code Status: Limited    PT/OT Eval Status: not indicated    Dispo - discussed with Jed Moreau on phone with spouse on the phone. Agreed with limited code. Discussed with Dr Itzel Shook, plan to remove impella tomorrow.  Palliative care consulted to follow up about hospice tomorrow after impella removal    Siddharth Moreau MD

## 2022-04-25 NOTE — FLOWSHEET NOTE
04/25/22 1515   [place]-Rafiq   PAP (s/d) 30/21   PAP (Mean) 27 mmHg   CVP (Mean) 16 mmHg   SVO2 (%) 53 %   CO (l/min) 4.1 l/min   CCO 4.1 L/Min   CI (l/min/m2) 2.1 l/min/m2   SVR (Using NBP Mean) 1463.41 dyne*sec/cm5     Impella remains on P-3. Dr Myrtice Skiff notified of above results. Will continue to monitor.     Electronically signed by Max Alonso RN on 4/25/2022 at 3:24 PM

## 2022-04-25 NOTE — FLOWSHEET NOTE
04/25/22 3698   Family/Significant Other Communication   Reason family called RN   Name Alejo Escobar   Relationship Child  (DIL)   Response see notes   Method of Communication Phone       DIL Reji Valle called RN back after speaking with pt's wife Laura Beckman. She isn't ready to visit and likely won't come to hospital tomorrow for removal of Impella as originally discussed with Dr Paul Wyatt. Reji Valle states Oksana needs time to process. She requests Dr Srini Hugo call with update tomorrow after Impella removed.

## 2022-04-25 NOTE — FLOWSHEET NOTE
04/25/22 0930 04/25/22 0948   Treatment Team Notification   Reason for Communication  --  Patient/Family request  (updating after phone discussion with family)   Team Member Name  --  Dr Jemma Kirkland  --  Attending Provider   Method of Communication  --  Secure Message   Response  --  Waiting for response   Notification Time  --  0945   Family/Significant Other Communication   Reason family called RN for updates  --    Name Scot Hero and Rosette Gonzalez  --    Relationship Child  (Paulo Sánchez wife)  --    Response appreciative; available for hospitalist to call after 1400  --    Method of Communication Phone  --        Received 3 way call from Pt's DENNYS Monahan ([employer]) and wife Rohan Connor. Updates provided. Informed of Impella wean with tentative plans to remove tomorrow. Questions answered. Discussed pt's wishes indicated this AM when asked about code status. They are agreeable with pt's wishes. Perfect serve message sent to Dr Bina Rios. DENNYS Monahan and wife Rohan Connor will be available after 1400 today for 3 way call with hospitalist for provider updates.

## 2022-04-25 NOTE — PROGRESS NOTES
Patient complaining of not feeling well, when asked what was not feeling well he stated his breathing. O2 sat WNL. Patient asked to go back on bipap. Bipap placed on patient with prior settings. RT notified. Will Continue to monitor.     Electronically signed by Candida Champion RN on 4/25/2022 at 8:50 AM

## 2022-04-25 NOTE — PROGRESS NOTES
1900- Report [facility]. Patient on bi pap at time of report. 2000- This RN removes bi pap and prepares NT suction kit. Patient had stated night before that he preferred to have suction sent down orally. This RN takes NT suction kit and orally suctions the patient. NT kit allows to reach into back of throat where this RN suctions copious amounts of creamy/white secretions. Patient tolerates well. 2042- Patient is tachypneic in 30's-low 40's. This RN contacts Dr. Yunier Hines in hopes to do ABG to ensure CO2 is appropriate. This RN does ABG. See results. Will plan to manage patient with high flow nasal cannula while tolerated. 2100- This RN orally suctions patient again using NT suction kit. Still productive. Sats still improving with high flow nasal cannula on. Will continue to suction patient hourly until secretions decline. 200- This RN speaks with daughter in law Shannan Gaspar and wife Emilie Estrella regarding patient's status. Family asks this RN about code status and differences in [facility], this RN educates family. All questions answered at this time. Family plans to make decision tomorrow. 0000- This RN decreases oral suctioning to Q2 hr. Patient is still producing secretions, but most of the thick secretions have been cleared and patient satting mid 90's on HFNC. RR still remains in 30's. 0600- This RN shoots morning numbers. CO 5.4  CI 2.6  SvO2 59%  SVR 1106  ABG drawn. See results. Results sent to Dr. Yunier Hines.

## 2022-04-25 NOTE — PLAN OF CARE
Problem: Pain  Goal: Verbalizes/displays adequate comfort level or baseline comfort level  Outcome: Progressing  Flowsheets (Taken 4/25/2022 0800)  Verbalizes/displays adequate comfort level or baseline comfort level:   Encourage patient to monitor pain and request assistance   Assess pain using appropriate pain scale   Implement non-pharmacological measures as appropriate and evaluate response     Problem: Respiratory - Adult  Goal: Achieves optimal ventilation and oxygenation  Outcome: Progressing  Flowsheets (Taken 4/25/2022 0800)  Achieves optimal ventilation and oxygenation:   Assess for changes in respiratory status   Assess for changes in mentation and behavior   Position to facilitate oxygenation and minimize respiratory effort   Oxygen supplementation based on oxygen saturation or arterial blood gases     Problem: Cardiovascular - Adult  Goal: Maintains optimal cardiac output and hemodynamic stability  Outcome: Progressing  Flowsheets (Taken 4/25/2022 0800)  Maintains optimal cardiac output and hemodynamic stability:   Monitor blood pressure and heart rate   Monitor urine output and notify Licensed Independent Practitioner for values outside of normal range   Assess for signs of decreased cardiac output   Administer vasoactive medications as ordered  Goal: Absence of cardiac dysrhythmias or at baseline  Outcome: Progressing  Flowsheets (Taken 4/25/2022 0800)  Absence of cardiac dysrhythmias or at baseline:   Assess for signs of decreased cardiac output   Monitor cardiac rate and rhythm   Administer antiarrhythmia medication and electrolyte replacement as ordered     Problem: Skin/Tissue Integrity - Adult  Goal: Skin integrity remains intact  Outcome: Progressing  Flowsheets (Taken 4/25/2022 0800)  Skin Integrity Remains Intact: Monitor for areas of redness and/or skin breakdown  Goal: Incisions, wounds, or drain sites healing without S/S of infection  Outcome: Progressing  Flowsheets (Taken 4/25/2022 0800)  Incisions, Wounds, or Drain Sites Healing Without Sign and Symptoms of Infection: TWICE DAILY: Assess and document skin integrity     Problem: Genitourinary - Adult  Goal: Absence of urinary retention  Outcome: Progressing  Goal: Urinary catheter remains patent  Outcome: Progressing  Flowsheets (Taken 4/25/2022 0800)  Urinary catheter remains patent: Assess patency of urinary catheter     Problem: Infection - Adult  Goal: Absence of infection at discharge  Outcome: Progressing  Flowsheets (Taken 4/25/2022 0800)  Absence of infection at discharge:   Monitor lab/diagnostic results   Assess and monitor for signs and symptoms of infection   Administer medications as ordered   Instruct and encourage patient and family to use good hand hygiene technique  Goal: Absence of infection during hospitalization  Outcome: Progressing  Flowsheets (Taken 4/25/2022 0800)  Absence of infection during hospitalization:   Assess and monitor for signs and symptoms of infection   Monitor lab/diagnostic results   Monitor all insertion sites i.e., indwelling lines, tubes and drains     Problem: Metabolic/Fluid and Electrolytes - Adult  Goal: Electrolytes maintained within normal limits  Outcome: Progressing  Flowsheets (Taken 4/25/2022 0800)  Electrolytes maintained within normal limits:   Monitor labs and assess patient for signs and symptoms of electrolyte imbalances   Administer electrolyte replacement as ordered  Goal: Hemodynamic stability and optimal renal function maintained  Outcome: Progressing  Flowsheets (Taken 4/25/2022 0800)  Hemodynamic stability and optimal renal function maintained:   Monitor labs and assess for signs and symptoms of volume excess or deficit   Monitor intake, output and patient weight   Monitor response to interventions for patient's volume status, including labs, urine output, blood pressure (other measures as available)  Goal: Glucose maintained within prescribed range  Outcome: Progressing  Flowsheets (Taken 4/25/2022 0800)  Glucose maintained within prescribed range:   Monitor blood glucose as ordered   Assess for signs and symptoms of hyperglycemia and hypoglycemia   Administer ordered medications to maintain glucose within target range     Problem: Hematologic - Adult  Goal: Maintains hematologic stability  Outcome: Progressing  Flowsheets (Taken 4/25/2022 0800)  Maintains hematologic stability:   Assess for signs and symptoms of bleeding or hemorrhage   Monitor labs for bleeding or clotting disorders     Problem: Skin/Tissue Integrity  Goal: Absence of new skin breakdown  Description: 1. Monitor for areas of redness and/or skin breakdown  2. Assess vascular access sites hourly  3. Every 4-6 hours minimum:  Change oxygen saturation probe site  4. Every 4-6 hours:  If on nasal continuous positive airway pressure, respiratory therapy assess nares and determine need for appliance change or resting period. Outcome: Progressing     Problem: Safety - Adult  Goal: Free from fall injury  Outcome: Progressing  Flowsheets (Taken 4/24/2022 1448)  Free From Fall Injury:   Instruct family/caregiver on patient safety   Based on caregiver fall risk screen, instruct family/caregiver to ask for assistance with transferring infant if caregiver noted to have fall risk factors     Problem: ABCDS Injury Assessment  Goal: Absence of physical injury  Outcome: Progressing     Problem: Anxiety  Goal: Will report anxiety at manageable levels  Description: INTERVENTIONS:  1. Administer medication as ordered  2. Teach and rehearse alternative coping skills  3.  Provide emotional support with 1:1 interaction with staff  Outcome: Progressing  Flowsheets (Taken 4/25/2022 0800)  Will report anxiety at manageable levels:   Administer medication as ordered   Teach and rehearse alternative coping skills   Provide emotional support with 1:1 interaction with staff     Problem: Coping  Goal: Pt/Family able to verbalize concerns and demonstrate effective coping strategies  Description: INTERVENTIONS:  1. Assist patient/family to identify coping skills, available support systems and cultural and spiritual values  2. Provide emotional support, including active listening and acknowledgement of concerns of patient and caregivers  3. Reduce environmental stimuli, as able  4. Instruct patient/family in relaxation techniques, as appropriate  5. Assess for spiritual pain/suffering and initiate Spiritual Care, Psychosocial Clinical Specialist consults as needed  Outcome: Progressing  Flowsheets (Taken 4/25/2022 0800)  Patient/family able to verbalize anxieties, fears, and concerns, and demonstrate effective coping:   Assist patient/family to identify coping skills, available support systems and cultural and spiritual values   Provide emotional support, including active listening and acknowledgement of concerns of patient and caregivers   Instruct patient/family in relaxation techniques, as appropriate     Problem: Decision Making  Goal: Pt/Family able to effectively weigh alternatives and participate in decision making related to treatment and care  Description: INTERVENTIONS:  1. Determine when there are differences between patient's view, family's view, and healthcare provider's view of condition  2. Facilitate patient and family articulation of goals for care  3. Help patient and family identify pros/cons of alternative solutions  4. Provide information as requested by patient/family  5. Respect patient/family right to receive or not to receive information  6. Serve as a liaison between patient and family and health care team  7.  Initiate Consults from Ethics, Palliative Care or initiate [address] [place] [address] as is appropriate  Outcome: Progressing  Flowsheets (Taken 4/25/2022 0800)  Patient/family able to effectively weigh alternatives and participate in decision making related to treatment and care:   Determine when there are differences between patient's view, family's view, and healthcare provider's view of condition   Facilitate patient and family articulation of goals for care   Respect patient/family right to receive or not to receive information   Serve as a liaison between patient and family and health care team   Help patient and family identify pros/cons of alternative solutions     Problem: Neurosensory - Adult  Goal: Achieves stable or improved neurological status  Outcome: Progressing  Flowsheets (Taken 4/25/2022 0800)  Achieves stable or improved neurological status:   Assess for and report changes in neurological status   Maintain blood pressure and fluid volume within ordered parameters to optimize cerebral perfusion and minimize risk of hemorrhage   Monitor temperature, glucose, and sodium. Initiate appropriate interventions as ordered  Goal: Absence of seizures  Outcome: Progressing  Flowsheets (Taken 4/25/2022 0800)  Absence of seizures: Monitor for seizure activity.   If seizure occurs, document type and location of movements and any associated apnea  Goal: Achieves maximal functionality and self care  Outcome: Progressing  Flowsheets (Taken 4/25/2022 0800)  Achieves maximal functionality and self care:   Monitor swallowing and airway patency with patient fatigue and changes in neurological status   Encourage and assist patient to increase activity and self care with guidance from physical therapy/occupational therapy     Problem: Chronic Conditions and Co-morbidities  Goal: Patient's chronic conditions and co-morbidity symptoms are monitored and maintained or improved  Outcome: Progressing  Flowsheets (Taken 4/25/2022 0800)  Care Plan - Patient's Chronic Conditions and Co-Morbidity Symptoms are Monitored and Maintained or Improved:   Monitor and assess patient's chronic conditions and comorbid symptoms for stability, deterioration, or improvement   Collaborate with multidisciplinary team to address chronic and comorbid conditions and prevent exacerbation or deterioration   Update acute care plan with appropriate goals if chronic or comorbid symptoms are exacerbated and prevent overall improvement and discharge

## 2022-04-25 NOTE — PROGRESS NOTES
Hospitalist Progress Note      PCP: Ge Wheeler MD    Date of Admission: 4/23/2022    Subjective: needed to place pt on BIPAP, on impella    Medications:  Reviewed    Infusion Medications    nitroprusside (NIPRIDE) 50 mg in D5W infusion 1.25 mcg/kg/min (04/24/22 1807)    sodium chloride      furosemide (LASIX) 1mg/ml infusion 2.5 mg/hr (04/24/22 1757)    heparin 25,000 units in dextrose 5 %      dextrose      heparin (Porcine) 450 Units/hr (04/24/22 1757)     Scheduled Medications    clopidogrel  75 mg Oral Daily    hydrALAZINE  25 mg Oral 2 times per day    levetiracetam  500 mg IntraVENous BID    sodium chloride flush  5-40 mL IntraVENous 2 times per day    aspirin  81 mg Oral Daily    atorvastatin  80 mg Oral Nightly    nicotine  1 patch TransDERmal Nightly    traZODone  50 mg Oral Nightly    insulin lispro  0-12 Units SubCUTAneous TID WC    insulin lispro  0-6 Units SubCUTAneous Nightly    cefTRIAXone (ROCEPHIN) IV  1,000 mg IntraVENous Q24H     PRN Meds: promethazine **OR** ondansetron, magnesium hydroxide, acetaminophen **OR** acetaminophen, nitroGLYCERIN, sodium chloride flush, sodium chloride, acetaminophen, oxyCODONE-acetaminophen, perflutren lipid microspheres, glucose, dextrose, glucagon (rDNA), dextrose, LORazepam      Intake/Output Summary (Last 24 hours) at 4/25/2022 0025  Last data filed at 4/24/2022 1845  Gross per 24 hour   Intake 1125.16 ml   Output 3100 ml   Net -1974.84 ml       Physical Exam Performed:    /76   Pulse 111   Temp 99.5 °F (37.5 °C) (Core)   Resp (!) 38   Ht 6' (1.829 m)   Wt 179 lb 14.3 oz (81.6 kg)   SpO2 97%   BMI 24.40 kg/m²       General appearance: on  bipap  HEENT:  Normal cephalic, atraumatic without obvious deformity. Conjunctivae/corneas clear. Neck: Supple, with full range of motion. No cervical lymphadenopathy  Respiratory:  Normal respiratory effort.  Clear to auscultation, bilaterally without Rales/Wheezes/Rhonchi. Cardiovascular:  Regular rate and rhythm with normal S1/S2 without murmurs, rubs or gallops. Abdomen: Soft, non-tender, non-distended, normal bowel sounds. Musculoskeletal:  No edema noted bilaterally. No tenderness on palpation   Skin: no rash visible  Neurologic:  Neurologically intact without any focal sensory/motor deficits. grossly non-focal.  Psychiatric:  Awake  Peripheral Pulses: +2 palpable, equal bilaterally        Labs:   Recent Labs     04/23/22  1104 04/24/22  0445   WBC 12.6* 11.8*   HGB 11.4* 9.7*   HCT 36.6* 29.1*    155     Recent Labs     04/23/22  1104 04/24/22  0445 04/24/22  1336    143 144   K 5.4* 3.6 3.5    107 105   CO2 16* 22 22   BUN 69* 78* 74*   CREATININE 2.3* 2.0* 1.7*   CALCIUM 8.9 7.6* 7.6*     Recent Labs     04/23/22  1104 04/24/22  0445   * 533*   * 336*   BILITOT 0.9 0.8   ALKPHOS 198* 162*     No results for input(s): INR in the last 72 hours. Recent Labs     04/23/22  1104   TROPONINI 2.14*       Urinalysis:      Lab Results   Component Value Date    NITRU Negative 04/23/2022    WBCUA 6 04/23/2022    RBCUA 16 04/23/2022    BLOODU TRACE-INTACT 04/23/2022    SPECGRAV >=1.030 04/23/2022    GLUCOSEU Negative 04/23/2022       Radiology:  CT CHEST ABDOMEN PELVIS WO CONTRAST   Final Result   7 mm nodule noted in the right upper lobe, series 4, image 60. This was not   present on prior examination of 10/30/2020. I recommend follow-up in 6-12   months. Previously noted liver lesions not appreciated on this noncontrast study. There are small bilateral pleural effusions, right greater than left.       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 The patient is status post cholecystectomy and hysterectomy peer         CT HEAD WO CONTRAST   Final Result   No acute intracranial abnormality. 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 Mild   degree of generalized volume loss. XR CHEST PORTABLE   Final Result   No acute process. Stable cardiomegaly                 Assessment/Plan:    Active Hospital Problems    Diagnosis     NSTEMI (non-ST elevated myocardial infarction) [I21.4]             Patient is a 59-year-old male with past medical history of CAD, COPD, diabetes mellitus hypertension hyperlipidemia who presents to the hospital due to change in mental status per family. According to the family patient has been confused for past 2 days, he has been having increased confusion is not holding appropriate conversations. On arrival to the emergency department he complained about chest pain, epigastric abdominal pain. Patient appears confused and is very poor historian, he is saying yes to every question.     Assessment  Acute metabolic encephalopathy, unclear etiology, suspect uremia, UTI  Significantly elevated troponin, cannot rule out an NSTEMI  Acute hypoxic resp failure  Acute kidney injury  Hyperkalemia  Leukocytosis  Urinary tract infection  Diabetes mellitus     Plan  Start IV Rocephin, follow-up on blood culture, urine culture  Placed on BIPAP  Cardiology consulted from emergency department, plan for cardiac cath, patient is status post percutaneous LVAD Impella mechanical support, cardiology following, admit to CVICU  Nephrology consulted, closely monitor BMP, monitor and replace electrolytes  Start IV Rocephin  Monitor on cardiac telemetry  Insulin sliding scale    DVT prophylaxis-started on IV heparin   Diet: ADULT DIET;  Regular  Code Status: Full Code    PT/OT Eval Status: ordered    Kim Gillespie MD

## 2022-04-25 NOTE — FLOWSHEET NOTE
04/25/22 1453   Treatment Team Notification   Reason for Communication Critical results  (Chest Xray)   Type of Critical Result Radiology   Critical Radiology Result Type X-ray   Team Member Name Dr Leisa Ba Team Role Attending Provider   Method of Communication Secure Message   Response Waiting for response   Notification Time 1450     MD acknowledged results. No new orders at this time.      Electronically signed by Vivek Suarez RN on 4/25/2022 at 3:49 PM

## 2022-04-25 NOTE — FLOWSHEET NOTE
04/25/22 1100   Estero-Rafiq   PAP (s/d) 24/7   PAP (Mean) 16 mmHg   CVP (Mean) 12 mmHg   SVO2 (%) 61 %   CO (l/min) 4.9 l/min   CCO 4.9 L/Min   CI (l/min/m2) 2.5 l/min/m2   SVR (Using NBP Mean) 1110.2 dyne*sec/cm5     Dr Yunier Hines and impella rep rounding at bedside. Decrease P level to p-3 on impella.      Electronically signed by Vivek Suarez RN on 4/25/2022 at 11:17 AM

## 2022-04-25 NOTE — PROGRESS NOTES
Patient on 13HFNC sats 90-93% changed patient to a venti mask at 50% 6lpm due to mouth breathing. Patient desated to 88% patient placed back onto 13HFNC and NT suctioned. Patient suctioned for a small amount of thick white secretions. Strong cough once you stimulate the cough. RN at beside during whole encounter.     Electronically signed by Marko Jones on 4/25/2022 at 8:21 AM

## 2022-04-25 NOTE — CARE COORDINATION
Order rec'd to speak with dgtr about dc planning. Call placed to Krystyna Kowalski, Daughter who is in [place] to introduce  role and to initiate discussion regarding DC planning. INITIAL CASE MANAGEMENT ASSESSMENT    Living Situation:   Pt and wife live in a second floor apartment with 3 + 12 steps to enter. The laundry is in the basement. Wife has health concerns (seizures, depression). The family car is no longer working and they depend on Cabs or Lyft for rides to MD appts. Pt is a smoker. Informed Daughter that Therapy has been ordered and will need an evaluation to determine dc planning recommendations. She reports he may need a SNF and had been to [facility] in the past.   She reports it was a positive experience. Emailed a list of SNF agencies, [facility] and [employer] brochure for assistance with transportation and homemaking needs. Emailed to rKystyna Kowalski:    [email or web address] for her review. ADLs:   independnet     DME:  none    PT/OT Recs:   TBD     Active Services:    Had home care in the past but quickly cancels It per daughter. Transportation:   Relies on [employer] or cabs. Medications:  [facility]. PCP:   Estefani Richter      HD/PD:   neither    PLAN/COMMENTS:    Following for DC needs, will need pt/ot evals to assist in determining DC needs.      [place], [place]     Case Management   [phone]    4/25/2022  11:42 AM

## 2022-04-25 NOTE — PROGRESS NOTES
Patient more alert. Opening eyes to voice. Moving all extremities. Answering simple questions. Voice soft and raspy. Confused but easily reoriented. Patient requested [employer] on the tv. Will continue to monitor.     Electronically signed by Candida Champion RN on 4/25/2022 at 4:26 PM

## 2022-04-25 NOTE — PROGRESS NOTES
Pt increasingly anxious, pulling at oxygen, and trying to sit up. PRN ativan given, see emar. Will continue to monitor.     Electronically signed by Leticia Jarvis RN on 4/25/2022 at 10:26 AM

## 2022-04-25 NOTE — FLOWSHEET NOTE
04/25/22 1706   Family/Significant Other Communication   Reason update family re: time for impella expalnt   Name Abdirashid Downs   Relationship Child  (DIL)   Response checking with pt's wife re: desire to visit   Method of Communication Phone     Dr Kain Snow mentioned wife may want to be present when Impella removed. Checked with Dr Amanda Leavitt re: possible time. Call placed to Alice Rodriguez with projected time. Updates provided, pt more alert and interactive at this time. She is calling pt's wife to see if she wants to come up tonight to visit.

## 2022-04-25 NOTE — PROGRESS NOTES
Current rate: 450 units/hr    Increase to 500 units/hour    Titrated per pharmacy protocol to target

## 2022-04-26 VITALS
OXYGEN SATURATION: 91 % | SYSTOLIC BLOOD PRESSURE: 115 MMHG | WEIGHT: 181 LBS | TEMPERATURE: 97.5 F | DIASTOLIC BLOOD PRESSURE: 80 MMHG | BODY MASS INDEX: 24.52 KG/M2 | RESPIRATION RATE: 35 BRPM | HEIGHT: 72 IN | HEART RATE: 120 BPM

## 2022-04-26 LAB
ANION GAP SERPL CALCULATED.3IONS-SCNC: 12 MMOL/L (ref 3–16)
BASE EXCESS MIXED: 5
BUN BLDV-MCNC: 44 MG/DL (ref 7–20)
CALCIUM SERPL-MCNC: 7.2 MG/DL (ref 8.3–10.6)
CHLORIDE BLD-SCNC: 112 MMOL/L (ref 99–110)
CO2: 27 MMOL/L (ref 21–32)
CREAT SERPL-MCNC: 1.3 MG/DL (ref 0.8–1.3)
GFR AFRICAN AMERICAN: >60
GFR NON-AFRICAN AMERICAN: 55
GLUCOSE BLD-MCNC: 153 MG/DL (ref 70–99)
GLUCOSE BLD-MCNC: 178 MG/DL (ref 70–99)
GLUCOSE BLD-MCNC: 185 MG/DL (ref 70–99)
HCO3, MIXED: 29.7 MMOL/L
MAGNESIUM: 2.4 MG/DL (ref 1.8–2.4)
O2 SAT, MIXED: 62 %
PCO2 MIXED: 45.5 MM HG
PERFORMED ON: ABNORMAL
PERFORMED ON: ABNORMAL
PERFORMED ON: NORMAL
PH, MIXED: 7.42 (ref 7.35–7.45)
PO2 MIXED: 32 MM HG
POC ACT LR: 150 SEC
POC ACT LR: 157 SEC
POC ACT LR: 159 SEC
POC ACT LR: 161 SEC
POC ACT LR: 167 SEC
POC ACT LR: 169 SEC
POC ACT LR: 171 SEC
POC ACT LR: 171 SEC
POC ACT LR: 172 SEC
POC ACT LR: 179 SEC
POC ACT LR: 182 SEC
POC ACT LR: 184 SEC
POC SAMPLE TYPE: NORMAL
POTASSIUM REFLEX MAGNESIUM: 3.4 MMOL/L (ref 3.5–5.1)
PROCALCITONIN: 0.2 NG/ML (ref 0–0.15)
SODIUM BLD-SCNC: 151 MMOL/L (ref 136–145)
TCO2 CALC MIXED: 31 MMOL/L

## 2022-04-26 PROCEDURE — 2700000000 HC OXYGEN THERAPY PER DAY

## 2022-04-26 PROCEDURE — 85347 COAGULATION TIME ACTIVATED: CPT

## 2022-04-26 PROCEDURE — 2580000003 HC RX 258: Performed by: INTERNAL MEDICINE

## 2022-04-26 PROCEDURE — 83735 ASSAY OF MAGNESIUM: CPT

## 2022-04-26 PROCEDURE — 6370000000 HC RX 637 (ALT 250 FOR IP): Performed by: INTERNAL MEDICINE

## 2022-04-26 PROCEDURE — 36415 COLL VENOUS BLD VENIPUNCTURE: CPT

## 2022-04-26 PROCEDURE — 84145 PROCALCITONIN (PCT): CPT

## 2022-04-26 PROCEDURE — 82803 BLOOD GASES ANY COMBINATION: CPT

## 2022-04-26 PROCEDURE — 94761 N-INVAS EAR/PLS OXIMETRY MLT: CPT

## 2022-04-26 PROCEDURE — 33992 RMVL PERQ LEFT HEART VAD: CPT | Performed by: INTERNAL MEDICINE

## 2022-04-26 PROCEDURE — 02PA3RZ REMOVAL OF SHORT-TERM EXTERNAL HEART ASSIST SYSTEM FROM HEART, PERCUTANEOUS APPROACH: ICD-10-PCS | Performed by: INTERNAL MEDICINE

## 2022-04-26 PROCEDURE — 80048 BASIC METABOLIC PNL TOTAL CA: CPT

## 2022-04-26 PROCEDURE — 6360000002 HC RX W HCPCS: Performed by: STUDENT IN AN ORGANIZED HEALTH CARE EDUCATION/TRAINING PROGRAM

## 2022-04-26 PROCEDURE — 6360000002 HC RX W HCPCS: Performed by: INTERNAL MEDICINE

## 2022-04-26 RX ORDER — MORPHINE SULFATE 10 MG/.5ML
5 SOLUTION ORAL EVERY 4 HOURS PRN
Status: DISCONTINUED | OUTPATIENT
Start: 2022-04-26 | End: 2022-04-26 | Stop reason: HOSPADM

## 2022-04-26 RX ORDER — CLOPIDOGREL BISULFATE 75 MG/1
75 TABLET ORAL DAILY
Qty: 30 TABLET | Refills: 3 | Status: SHIPPED | OUTPATIENT
Start: 2022-04-27

## 2022-04-26 RX ORDER — MORPHINE SULFATE 100 MG/5ML
10 SOLUTION ORAL EVERY 6 HOURS
Qty: 30 ML | Refills: 0 | Status: SHIPPED | OUTPATIENT
Start: 2022-04-26 | End: 2022-04-26 | Stop reason: HOSPADM

## 2022-04-26 RX ORDER — HYDRALAZINE HYDROCHLORIDE 25 MG/1
25 TABLET, FILM COATED ORAL EVERY 12 HOURS SCHEDULED
Qty: 90 TABLET | Refills: 3 | Status: SHIPPED | OUTPATIENT
Start: 2022-04-26

## 2022-04-26 RX ADMIN — INSULIN LISPRO 2 UNITS: 100 INJECTION, SOLUTION INTRAVENOUS; SUBCUTANEOUS at 08:20

## 2022-04-26 RX ADMIN — FUROSEMIDE 40 MG: 10 INJECTION, SOLUTION INTRAMUSCULAR; INTRAVENOUS at 08:17

## 2022-04-26 RX ADMIN — HYDRALAZINE HYDROCHLORIDE 25 MG: 25 TABLET, FILM COATED ORAL at 10:13

## 2022-04-26 RX ADMIN — LEVETIRACETAM 500 MG: 5 INJECTION INTRAVENOUS at 09:00

## 2022-04-26 RX ADMIN — CEFTRIAXONE 1000 MG: 1 INJECTION, POWDER, FOR SOLUTION INTRAMUSCULAR; INTRAVENOUS at 02:44

## 2022-04-26 RX ADMIN — CLOPIDOGREL BISULFATE 75 MG: 75 TABLET ORAL at 10:13

## 2022-04-26 RX ADMIN — Medication 10 ML: at 08:18

## 2022-04-26 RX ADMIN — OXYCODONE AND ACETAMINOPHEN 2 TABLET: 5; 325 TABLET ORAL at 10:14

## 2022-04-26 RX ADMIN — ASPIRIN 81 MG: 81 TABLET, COATED ORAL at 10:15

## 2022-04-26 ASSESSMENT — PAIN SCALES - GENERAL
PAINLEVEL_OUTOF10: 7
PAINLEVEL_OUTOF10: 0
PAINLEVEL_OUTOF10: 0

## 2022-04-26 ASSESSMENT — PAIN DESCRIPTION - LOCATION: LOCATION: CHEST

## 2022-04-26 ASSESSMENT — PAIN DESCRIPTION - DESCRIPTORS: DESCRIPTORS: DISCOMFORT

## 2022-04-26 NOTE — PROGRESS NOTES
Late entry due to patient care:    Dr. Pete Gamez at bedside to remove Impella device per sterile technique, perclose deployed. R femoral venous sheath also removed. Gauze and Tegaderm applied to right groin site. Dressing is clean dry and intact. Michael Haven removed. Dressing clean dry and intact. No swelling, drainage or hematoma presents. Left Femoral Venous sheath removed, manual pressure held by this RN for 5 minutes. Guaze and Tegaderm in place, dressing clean dry and intact. Pt tolerated well and no complaints of pain at this time. Resting quietly with eyes closed and call light within reach. Recovery period starts 0815 and ends at 1215p.     Electronically signed by Joshua Hodgkins, RN on 4/26/2022 at 8:51 AM

## 2022-04-26 NOTE — PROGRESS NOTES
Cardiology Consultation     Jagruti Gallagher  1955    PCP: Stephanie Samaniego MD  Referring Physician: Dr. Ankita Esparza   Reason for Referral: elevated troponin   Chief Complaint:   Chief Complaint   Patient presents with    Altered Mental Status     Pt to ED via [facility] with AMS that started 2 days ago. Per EMS report, pt lives with his wife who states pt has had increased confusion x2 days. Pt c/o mid sternal chest pain that started 2 hrs pta. Interval history:  S/p RHC/Percutaneous LVAD placement  Diuresing well, hemodynamics have stabilized   No chest pain, dyspnea improving   Diuresing well     Subjective:     History of Present Illness: The patient is 77 y.o. male with a past medical history significant for CAD with prior CABG and multiple PCI's/NSTEMI, cardiomyopathy, CHF, tobacco use,COPD, diabetes mellitus, HTN, HLP admitted with possible seizure. He presented with confusion and altered mental status. He is breathless and unable to provide any history. I was called emergently by the ER due to elevated troponin. All his extremities are cold to touch and he is in multiorgan failure ( cardiac/renal/liver/pulmonary).          Past Medical History:   Diagnosis Date    Anxiety     Arthritis     Asthma     CAD (coronary artery disease)     Calcium kidney stone     Cardiomyopathy ([address]     Cerebral artery occlusion with cerebral infarction ([address]     CHF (congestive heart failure) ([address]     Clostridium difficile diarrhea 02/12/2020    COPD (chronic obstructive pulmonary disease) (HCC)     mild    Depression     DM2 (diabetes mellitus, type 2) (HCC)     Fibromyalgia     GERD (gastroesophageal reflux disease)     Hyperlipidemia     Hypertension     Liver disease     Pacemaker 2012    Medtronic model # [id number]    Pneumonia     Seizures ([address]     TIA (transient ischemic attack) 2007    Ulcerative colitis ([address]      Past Surgical History:   Procedure Laterality Date    APPENDECTOMY      BACK SURGERY      CARDIAC SURGERY      CHOLECYSTECTOMY      COLONOSCOPY  01/10/2017    COLONOSCOPY  01/10/2017    CORONARY ANGIOPLASTY WITH STENT PLACEMENT      CORONARY ARTERY BYPASS GRAFT  , 2015    ENDOSCOPY, COLON, DIAGNOSTIC      PACEMAKER PLACEMENT      UPPER GASTROINTESTINAL ENDOSCOPY  2017    VASCULAR SURGERY       Family History   Problem Relation Age of Onset    Coronary Art Dis Father     Cancer Mother         Lung with mets    Diabetes Mother      Social History     Tobacco Use    Smoking status: Current Every Day Smoker     Packs/day: 0.50     Years: 44.00     Pack years: 22.00     Types: Cigarettes     Last attempt to quit: 10/20/2021     Years since quittin.5    Smokeless tobacco: Never Used    Tobacco comment: cutting down   Vaping Use    Vaping Use: Never used   Substance Use Topics    Alcohol use: No     Alcohol/week: 0.0 standard drinks    Drug use: No       Allergies   Allergen Reactions    Melatonin Other (See Comments)     Restless leg syndrome       Current Facility-Administered Medications   Medication Dose Route Frequency Provider Last Rate Last Admin    haloperidol lactate (HALDOL) injection 2 mg  2 mg IntraMUSCular Q6H PRN Sharla Oliveira MD        promethazine (PHENERGAN) tablet 12.5 mg  12.5 mg Oral Q6H PRN Mary Kay Meyers MD        Or    ondansetron (ZOFRAN) injection 4 mg  4 mg IntraVENous Q6H PRN Mary Kay Meyers MD        magnesium hydroxide (MILK OF MAGNESIA) 400 MG/5ML suspension 30 mL  30 mL Oral Daily PRN Mary Kay Meyers MD        acetaminophen (TYLENOL) tablet 650 mg  650 mg Oral Q6H PRN Mary Kay Meyers MD        Or    acetaminophen (TYLENOL) suppository 650 mg  650 mg Rectal Q6H PRN Mary Kay Meyers MD        nitroPRUSSide (NIPRIDE) 50 mg in dextrose 5 % 250 mL infusion  0.1-2 mcg/kg/min IntraVENous Continuous Dominguez Paul MD 6.7 mL/hr at 22 1900 0.25 mcg/kg/min at 22 1900    clopidogrel (PLAVIX) tablet 75 mg  75 mg Oral Daily Amaury Roman MD        hydrALAZINE (APRESOLINE) tablet 25 mg  25 mg Oral 2 times per day Amaury Roman MD        levETIRAcetam (KEPPRA) 500 mg/100 mL IVPB  500 mg IntraVENous BID Reg Schulte DO   Stopped at 04/25/22 1001    nitroGLYCERIN (NITROSTAT) SL tablet 0.4 mg  0.4 mg SubLINGual Q5 Min PRN Jorge Blackwell MD   0.4 mg at 04/23/22 1440    sodium chloride flush 0.9 % injection 5-40 mL  5-40 mL IntraVENous 2 times per day Amaury Roman MD   10 mL at 04/25/22 0948    sodium chloride flush 0.9 % injection 5-40 mL  5-40 mL IntraVENous PRN Amaury Roman MD        0.9 % sodium chloride infusion   IntraVENous PRN Amaury Roman MD        acetaminophen (TYLENOL) tablet 650 mg  650 mg Oral Q4H PRN Amaury Roman MD        furosemide (LASIX) 100 mg in dextrose 5 % 100 mL infusion  2.5 mg/hr IntraVENous Continuous Amaury Roman MD 2.5 mL/hr at 04/25/22 1900 2.5 mg/hr at 04/25/22 1900    oxyCODONE-acetaminophen (PERCOCET) 5-325 MG per tablet 2 tablet  2 tablet Oral Q6H PRN Amaury Roman MD        heparin (porcine) 25,000 Units in dextrose 5 % 500 mL infusion (FOR IMPELLA PURGE)   IntraCATHeter Continuous Amaury Roman MD   New Bag at 04/24/22 2049    perflutren lipid microspheres (DEFINITY) injection 1.65 mg  1.5 mL IntraVENous ONCE PRN Amaury Roman MD        aspirin EC tablet 81 mg  81 mg Oral Daily Amaury Roman MD   81 mg at 04/24/22 1030    atorvastatin (LIPITOR) tablet 80 mg  80 mg Oral Nightly Amaury Roman MD   80 mg at 04/23/22 2211    nicotine (NICODERM CQ) 21 MG/24HR 1 patch  1 patch TransDERmal Nightly Amaury Roman MD   1 patch at 04/24/22 2131    traZODone (DESYREL) tablet 50 mg  50 mg Oral Nightly Amaury Roman MD   50 mg at 04/23/22 2212    glucose (GLUTOSE) 40 % oral gel 15 g  15 g Oral PRN Zachary Hutchinson MD        dextrose 50 % IV solution  12.5 g IntraVENous PRN Zachary Hutchinson MD        glucagon (rDNA) injection 1 TRIG 94 01/29/2022    HDL 50 01/29/2022    LDLCALC 75 01/29/2022    LDLDIRECT 105 09/17/2015    LABVLDL 19 01/29/2022     BUN/Creatinine:    Lab Results   Component Value Date    BUN 60 04/25/2022    CREATININE 1.5 04/25/2022     PT/INR, TNI, HGB A1C:   Lab Results   Component Value Date/Time    TROPONINI 2.14 (HH) 04/23/2022 11:04 AM    LABA1C 6.3 04/23/2022 07:43 PM      No results found for: CBCAUTODIF    ECG 1/31/2022:  Sinus rhythm with RBBB; prolonged Qtc     ECG 1/28/2022:  SR with RBBB  (nonspecific TW abnormality noted on initial ECG)     1/30/2022 [facility] device interrogation:  Multiple episodes of SVT/VT noted on 1/28/2022 in AM  Episodes lasting up to 3+ minutes  Rates 162-171  No device discharges (rate set at 188 for device therapies)     Echo 1/28/2022:   Limited study.  Liang Cordon left ventricular systolic function appears moderately reduced.   Ejection fraction is visually estimated to be 35-40% with diffuse   hypokinesis. There is mildly increased left ventricular wall thickness. Left   ventricular cavity size is normal. Diastolic filling parameters suggest   grade I diastolic dysfunction.   Moderate mitral regurgitation.     11/5/2021LHC ([facility]):  Findings:   Dominance: right dominant   LM:  Ostial:  40%, in-stent   LAD:  Distal: 80%     D1: patent stent, 80% upper pole brance     LCX:  Proximal: 100%      10/18/2021 LHC/PCI:  1. Known 3 v CAD; bypass graft in 1 to be patent and were not injected   during this angiogram.  There was a visible dissection versus thrombus   distal to the most recently placed stent from the LAD extending into the   diagonal.   2. Successful PCI of the diagonal using drug-eluting stent   3. Successful PTCA of the LMT malapposed stent   4.  IVUS revealing severe Diagonal stenosis and malapposed LMT stent      10/11/2021 LHC/PCI ([facility]):  3 Vessel CAD   Successful shockwave PCI of the LMCA with 3.5 mm balloon and 3.5 mm NC   balloon with increase in LMCA MLCSA from 5.3 mm2 to 6.8 mm2   Patent proximal LAD/D1 stent   Unchanged bypass graft status   Mild LV dysfunction   Mildly elevated LVEDP   Normal systemic pressures   angioseal closure of the RCFA access site.      10/4/2021 LHC/PCI: ([facility])  3 Vessel CAD   Successful IVUS guided PCI distal LMCA into D2 with 3 x 16 mm and 3 x 12   mm Synergy ANGELINA's (overlapping)   FFR D2 = 0.78   Patent grafts as above   Mild LV dysfunction   Normal LVEDP   Normal systemic pressures      6/11/2021 LHC: ([facility])  1) 3 vessel CAD with 4/4 patent grafts and occlusion of small RPDA, likely   culprit, plan medical management   2) Normal LVEDP   3) Moderately reduced LVEF      Echo 7/12/2021 ([facility]):  Left ventricle is mildly (5.9-6.3 cm) dilated. The left atrium is moderately dilated. The Aortic Valve leaflets appear sclerotic. No hemodynamically significant valvular aortic stenosis. There is a pacemaker lead in the right ventricle. The left ventricular function is moderately reduced. Overall left ventricular ejection fraction is estimated to be 30-35%. There is moderate global hypokinesis of the left ventricle. The mitral valve leaflets are mildly calcified in appearance. There is trace mitral regurgitation. There is a catheter/pacemaker lead seen in the right atrial cavity.    There is no pericardial effusion.      4/19/2021 Lexiscan-Myoview ([facility]):  Transient dilatation of the left ventricle which can be due to left ventricular hypertrophy,    diabetes, or balanced three-vessel coronary artery disease with symmetric myocardial ischemia.       No focal perfusion defects       Dilated left ventricle       Reduced left ventricular ejection fraction measuring 31%         All above diagnostic testing and laboratory data was independently visualized and reviewed by me (not simply review of report)       Assessment and Plan   1) cardiogenic shock  - multiorgan failure   - s/p pVAD ,MV02 66%   - decreased to p3, plan to explant impella tomorrow     2) acute on chronic systolic heart failure   - change lasix to IVP   - titration off nipride  - c/w hydralazine   - creatinine stable     3) CAD   - unstable  - medical therapy for now  - no plan for coronary intervention   - asa/plavix/statin     4) LALITA  - as per nephrology     Overall, the problems requiring hospitalization are high in severity     Thank you very much for allowing me to participate in the care of your patient. Please do not hesitate to contact me if you have any questions.       Justine Sheth MD [address] [place] [address], Interventional Cardiology, and Peripheral Vascular Disease   [facility]   Ph: [phone]  Fax: [phone]

## 2022-04-26 NOTE — PROGRESS NOTES
Late entry dur to patient care:    Patient laid flat and right IJ venous sheath removed. Pt tolerated well. Elevated HOB to 30 degrees. This RN held manual pressure for 5 minutes. Site covered with dry dressing and Tegaderm. No bleeding swelling or drainage. Will continue to monitor and reassess per protocol.

## 2022-04-26 NOTE — CARE COORDINATION
Chart Reviewed. Plan is to explant impella today. Code Status changed to Limited. Palliative Care Order for possible hospice. Following for DC needs.   [place], [place]     Case Management   [phone]    4/26/2022  8:47 AM

## 2022-04-26 NOTE — CONSULTS
[facility]  Palliative Care   Consult Note    NAME:  Fred Ryan RECORD NUMBER:  [id number]  AGE: 77 y.o.    GENDER: male  : 1955  TODAY'S DATE:  2022    Subjective     Reason for Consult:  goals of care, hospice discussion and code status  Visit Type: Initial Consult      Hali Hardwick is a 77 y.o. male referred by:  [x] Physician    PAST MEDICAL HISTORY      Diagnosis Date    Anxiety     Arthritis     Asthma     CAD (coronary artery disease)     Calcium kidney stone     Cardiomyopathy ([address]     Cerebral artery occlusion with cerebral infarction ([address]     CHF (congestive heart failure) ([address]     Clostridium difficile diarrhea 2020    COPD (chronic obstructive pulmonary disease) (HCC)     mild    Depression     DM2 (diabetes mellitus, type 2) ([address]     Fibromyalgia     GERD (gastroesophageal reflux disease)     Hyperlipidemia     Hypertension     Liver disease     Pacemaker     Medtronic model # [id number]    Pneumonia     Seizures ([address]     TIA (transient ischemic attack)     [facility]ative [facility]        PAST SURGICAL HISTORY  Past Surgical History:   Procedure Laterality Date    APPENDECTOMY      BACK SURGERY      CARDIAC SURGERY      CHOLECYSTECTOMY      COLONOSCOPY  01/10/2017    COLONOSCOPY  01/10/2017    CORONARY ANGIOPLASTY WITH STENT PLACEMENT  2012    CORONARY ARTERY BYPASS GRAFT  , 2015    ENDOSCOPY, COLON, DIAGNOSTIC      PACEMAKER PLACEMENT      UPPER GASTROINTESTINAL ENDOSCOPY  2017    VASCULAR SURGERY         FAMILY HISTORY  Family History   Problem Relation Age of Onset    Coronary Art Dis Father     Cancer Mother         Lung with mets    Diabetes Mother        SOCIAL HISTORY  Social History     Tobacco Use    Smoking status: Current Every Day Smoker     Packs/day: 0.50     Years: 44.00     Pack years: 22.00     Types: Cigarettes     Last attempt to quit: 10/20/2021     Years since quittin.5    Smokeless tobacco: Never Used    Tobacco comment: cutting down   Vaping Use    Vaping Use: Never used   Substance Use Topics    Alcohol use: No     Alcohol/week: 0.0 standard drinks    Drug use: No       ALLERGIES  Allergies   Allergen Reactions    Melatonin Other (See Comments)     Restless leg syndrome         MEDICATIONS  No current facility-administered medications on file prior to encounter. Current Outpatient Medications on File Prior to Encounter   Medication Sig Dispense Refill    oxyCODONE-acetaminophen (PERCOCET)  MG per tablet Take 1 tablet by mouth every 8 hours as needed for Pain for up to 30 days. 90 tablet 0    nitroGLYCERIN (NITROSTAT) 0.4 MG SL tablet Place 1 tablet under the tongue every 5 minutes as needed for Chest pain 25 tablet 2    gabapentin (NEURONTIN) 600 MG tablet TAKE ONE TABLET BY MOUTH FIVE TIMES A  tablet 1    zolpidem (AMBIEN) 5 MG tablet Take 5 mg by mouth nightly as needed for Sleep.       apixaban (ELIQUIS) 5 MG TABS tablet Take 1 tablet by mouth 2 times daily 60 tablet 1    amiodarone (CORDARONE) 200 MG tablet Take 1 tablet by mouth daily 60 tablet 1    levETIRAcetam (KEPPRA) 500 MG tablet Take 1 tablet by mouth 2 times daily 60 tablet 3    atorvastatin (LIPITOR) 80 MG tablet Take 1 tablet by mouth nightly 30 tablet 3    nicotine (NICODERM CQ) 21 MG/24HR Place 1 patch onto the skin daily 30 patch 3    traZODone (DESYREL) 50 MG tablet Take 1 tablet by mouth nightly 90 tablet 1    amLODIPine (NORVASC) 5 MG tablet Take 5 mg by mouth daily       isosorbide mononitrate (IMDUR) 60 MG extended release tablet Take 60 mg by mouth daily       carvedilol (COREG) 25 MG tablet Take 25 mg by mouth 2 times daily (with meals)       aspirin 81 MG EC tablet Take 81 mg by mouth daily       fluticasone-salmeterol (ADVAIR DISKUS) 100-50 MCG/DOSE diskus inhaler Inhale 2 puffs into the lungs 2 times daily wixela inhub inhaler ( generic advair diskus) 60 each 3    Respiratory Therapy Supplies (NEBULIZER) AALIYAH Used to give yourself breathing treatment 1 Device 0    acetaminophen (TYLENOL) 325 MG tablet Take 650 mg by mouth every 6 hours as needed for Pain         Objective         /67   Pulse 104   Temp 98.7 °F (37.1 °C) (Axillary)   Resp 29   Ht 6' (1.829 m)   Wt 181 lb (82.1 kg)   SpO2 96%   BMI 24.55 kg/m²     Code Status: Limited    Advanced Directives: completed daughter Ema Juares is HPOA     Assessment        Management and Education    Persons available for education:       [x] Self     [] Caregiver       [] Spouse       [x] Other Family Member   []  Other    Spiritual History:  notified: Yes,     Does the patient have a Primary Care Physician? Yes    Palliative Performance Scale:  60% [] Ambulation reduced; Significant disease; Can't do hobbies/housework; intake normal or reduced; occasional assist; LOC full/confusion  50% [] Mainly sit/lie; Extensive disease; Can't do any work; Considerable assist; intake normal or reduced; LOC full/confusion  40% [] Mainly in bed; Extensive disease; Mainly assist; intake normal or reduced; occasional assist; LOC full/confusion  30% [x] Bed Bound; Extensive disease; Total care; intake reduced; LOC full/confusion  20% [] Bed Bound; Extensive disease; Total care; intake minimal; Drowsy/coma  10% [] Bed Bound; Extensive disease;  Total care; Mouth care only; Drowsy/coma  0 [] Death    Level of patient/caregiver understanding able to:       [x] Verbalize Understanding   [] Demonstrate Understanding       [] Teach Back       [] Needs Reinforcement     []  Other:      Teaching Time:  0hours  40 minutes     Plan        Social Service Consult Made:  Yes  Assistance filling out Living Will/HPOA:  No      Discharge Plan:  Education/support to family  Education/support to patient  Providing support for coping/adaptation/distress of family  Providing support for coping/adaptation/distress of patient  Clarification of medical condition to patient and family  Code status clarified: [facility]  Palliative care orders introduced  Provided information about hospice    Discharge Environment:  [x] Hospice Consult Agency:  [x] Inpatient Hospice vs   [x] Home with Hospice Care     Discussion: Patient admitted from home for hypoxemia, AMS. He has had cardiac intervention, impella removed continuing to decline. I spoke with patient he is oriented to self and place not situation. I have called his daughter and wife and spoken with them over phone they give a history of his general decline prior to admission. We have discussed his current status and they agree they just want to keep him comfortable, they would like defibrillator turned off, change code status to DNR CC and pursue inpatient hospice. Discussed hospice list and they are in agreement with consult being placed to [facility] [place] [id number] with possible [place] [facility] [place]. Dr Kimmie Mccarthy informed of above orders noted. I will continue to follow Mr. Poonam Pond care as needed. Thank you for allowing me to participate in the care of Mr. Abida Lopez .      Electronically signed by Jaiden Tejeda RN, BSN, [facility] on 4/26/2022 at 11:21 AM  [address] [place] [address]  Office: [phone]

## 2022-04-26 NOTE — DISCHARGE SUMMARY
(Axillary)   Resp (!) 32   Ht 6' (1.829 m)   Wt 181 lb (82.1 kg)   SpO2 92%   BMI 24.55 kg/m²     General appearance:  No apparent distress, appears more comfortable now  HEENT:  Normal cephalic, atraumatic without obvious deformity. Pupils equal, round, and reactive to light. Extra ocular muscles intact. Conjunctivae/corneas clear. Neck: Supple, with full range of motion. No jugular venous distention. Trachea midline. Respiratory: + tachypnea, without Rales/Wheezes/Rhonchi. Cardiovascular:  Regular rate and rhythm with normal S1/S2 without murmurs, rubs or gallops. Abdomen: Soft, non-tender, non-distended with normal bowel sounds. Musculoskeletal:  No clubbing, cyanosis or edema bilaterally. Skin: Skin color, texture, turgor normal.  No rashes or lesions. Neurologic:  Neurovascularly intact without any focal sensory/motor deficits. Cranial nerves: II-XII intact, grossly non-focal.  Psychiatric:  Alert and oriented, pleasant    Consults:     IP CONSULT TO CARDIOLOGY  IP CONSULT TO HOSPITALIST  IP CONSULT TO SPIRITUAL SERVICES  IP CONSULT TO SOCIAL WORK  IP CONSULT TO PALLIATIVE CARE  IP CONSULT TO HOSPICE    Labs:  For convenience and continuity at follow-up the following most recent labs are provided:    Lab Results   Component Value Date    WBC 12.7 04/25/2022    HGB 11.6 04/25/2022    HCT 32.5 04/25/2022    MCV 96.4 04/25/2022     04/25/2022     04/26/2022    K 3.4 04/26/2022     04/26/2022    CO2 27 04/26/2022    BUN 44 04/26/2022    CREATININE 1.3 04/26/2022    CALCIUM 7.2 04/26/2022    PHOS 5.1 06/21/2021    BNP <5.0 04/27/2013    ALKPHOS 162 04/24/2022     04/24/2022     04/24/2022    BILITOT 0.8 04/24/2022    BILIDIR <0.2 06/21/2021    LABALBU 3.6 04/24/2022    LDLCALC 75 01/29/2022    TRIG 94 01/29/2022     Lab Results   Component Value Date    INR 0.86 (L) 01/28/2022    INR 0.91 09/04/2020    INR 0.94 07/13/2020       Radiology:  XR CHEST PORTABLE   Final Result   Left lung atelectasis with shift of the mediastinal structures to the left   side      [place] catheter distal in right mid pulmonary artery      The findings were sent to the Radiology Results [address] at 2:44   pm on 4/25/2022 to be communicated to a licensed caregiver. CT CHEST ABDOMEN PELVIS WO CONTRAST   Final Result   7 mm nodule noted in the right upper lobe, series 4, image 60. This was not   present on prior examination of 10/30/2020. I recommend follow-up in 6-12   months. Previously noted liver lesions not appreciated on this noncontrast study. There are small bilateral pleural effusions, right greater than left. 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 The patient is status post cholecystectomy and hysterectomy peer         CT HEAD WO CONTRAST   Final Result   No acute intracranial abnormality. 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 Mild   degree of generalized volume loss. XR CHEST PORTABLE   Final Result   No acute process. Stable cardiomegaly             Discharge Medications:   Current Discharge Medication List      START taking these medications    Details   hydrALAZINE (APRESOLINE) 25 MG tablet Take 1 tablet by mouth every 12 hours  Qty: 90 tablet, Refills: 3      clopidogrel (PLAVIX) 75 MG tablet Take 1 tablet by mouth daily  Qty: 30 tablet, Refills: 3      morphine sulfate 20 MG/ML concentrated oral solution Take 0.5 mLs by mouth every 6 hours for 10 days.   Qty: 30 mL, Refills: 0    Comments: Reduce doses taken as pain becomes manageable  Associated Diagnoses: Intractable abdominal pain           Current Discharge Medication List        Current Discharge Medication List      CONTINUE these medications which have NOT CHANGED    Details   gabapentin (NEURONTIN) 600 MG tablet TAKE ONE TABLET BY MOUTH FIVE TIMES A DAY  Qty: 150 tablet, Refills: 1    Associated Diagnoses: Neuropathy      apixaban (ELIQUIS) 5 MG TABS tablet Take 1 tablet by mouth 2 times daily  Qty: 60 tablet, Refills: 1      amiodarone (CORDARONE) 200 MG tablet Take 1 tablet by mouth daily  Qty: 60 tablet, Refills: 1      levETIRAcetam (KEPPRA) 500 MG tablet Take 1 tablet by mouth 2 times daily  Qty: 60 tablet, Refills: 3      nicotine (NICODERM CQ) 21 MG/24HR Place 1 patch onto the skin daily  Qty: 30 patch, Refills: 3      traZODone (DESYREL) 50 MG tablet Take 1 tablet by mouth nightly  Qty: 90 tablet, Refills: 1      isosorbide mononitrate (IMDUR) 60 MG extended release tablet Take 60 mg by mouth daily       fluticasone-salmeterol (ADVAIR DISKUS) 100-50 MCG/DOSE diskus inhaler Inhale 2 puffs into the lungs 2 times daily wixela inhub inhaler ( generic advair diskus)  Qty: 60 each, Refills: 3      Respiratory Therapy Supplies (NEBULIZER) AALIYAH Used to give yourself breathing treatment  Qty: 1 Device, Refills: 0           Current Discharge Medication List      STOP taking these medications       oxyCODONE-acetaminophen (PERCOCET)  MG per tablet Comments:   Reason for Stopping:         nitroGLYCERIN (NITROSTAT) 0.4 MG SL tablet Comments:   Reason for Stopping:         zolpidem (AMBIEN) 5 MG tablet Comments:   Reason for Stopping:         atorvastatin (LIPITOR) 80 MG tablet Comments:   Reason for Stopping:         amLODIPine (NORVASC) 5 MG tablet Comments:   Reason for Stopping:         carvedilol (COREG) 25 MG tablet Comments:   Reason for Stopping:         aspirin 81 MG EC tablet Comments:   Reason for Stopping:         acetaminophen (TYLENOL) 325 MG tablet Comments:   Reason for Stopping: Follow-up appointments:  one week    Provider Follow-up:    pcp    Condition at Discharge:  Stable    The patient was seen and examined on day of discharge and this discharge summary is in conjunction with any daily progress note from day of discharge. Time Spent on discharge is 45 minutes  in the examination, evaluation, counseling and review of medications and discharge plan. Signed:    Tracy Holcomb DO   4/26/2022      Thank you Melany Bailey MD for the opportunity to be involved in this patient's care. If you have any questions or concerns please feel free to contact me at [phone].

## 2022-04-26 NOTE — PLAN OF CARE
Problem: Pain  Goal: Verbalizes/displays adequate comfort level or baseline comfort level  4/26/2022 1604 by Kelleen Runner, RN  Outcome: Adequate for Discharge  4/26/2022 1029 by Makenzie Saul RN  Outcome: Progressing     Problem: Respiratory - Adult  Goal: Achieves optimal ventilation and oxygenation  4/26/2022 1604 by Kelleen Runner, RN  Outcome: Adequate for Discharge  4/26/2022 1029 by Makenzie Saul RN  Outcome: Progressing     Problem: Cardiovascular - Adult  Goal: Maintains optimal cardiac output and hemodynamic stability  4/26/2022 1604 by Kelleen Runner, RN  Outcome: Adequate for Discharge  4/26/2022 1029 by Makenzie Saul RN  Outcome: Progressing  Goal: Absence of cardiac dysrhythmias or at baseline  4/26/2022 1604 by Kelleen Runner, RN  Outcome: Adequate for Discharge  4/26/2022 1029 by Makenzie Saul RN  Outcome: Progressing     Problem: Skin/Tissue Integrity - Adult  Goal: Skin integrity remains intact  4/26/2022 1604 by Kelleen Runner, RN  Outcome: Adequate for Discharge  4/26/2022 1029 by Makenzie Saul RN  Outcome: Progressing  Goal: Incisions, wounds, or drain sites healing without S/S of infection  4/26/2022 1604 by Kelleen Runner, RN  Outcome: Adequate for Discharge  4/26/2022 1029 by Makenzie Saul RN  Outcome: Progressing     Problem: Musculoskeletal - Adult  Goal: Return mobility to safest level of function  4/26/2022 1604 by Kelleen Runner, RN  Outcome: Adequate for Discharge  4/26/2022 1029 by Makenzie Saul RN  Outcome: Progressing     Problem: Genitourinary - Adult  Goal: Absence of urinary retention  4/26/2022 1604 by Kelleen Runner, RN  Outcome: Adequate for Discharge  4/26/2022 1029 by Makenzie Saul RN  Outcome: Progressing  Goal: Urinary catheter remains patent  4/26/2022 1604 by Kelleen Runner, RN  Outcome: Adequate for Discharge  4/26/2022 1029 by Makenzie Saul RN  Outcome: Progressing Discharge  4/26/2022 1029 by Christophe Shipley RN  Outcome: Progressing     Problem: ABCDS Injury Assessment  Goal: Absence of physical injury  4/26/2022 1604 by Ti Tobar RN  Outcome: Adequate for Discharge  4/26/2022 1029 by Christophe Shipley RN  Outcome: Progressing     Problem: Anxiety  Goal: Will report anxiety at manageable levels  Description: INTERVENTIONS:  1. Administer medication as ordered  2. Teach and rehearse alternative coping skills  3. Provide emotional support with 1:1 interaction with staff  4/26/2022 1604 by Ti Tobar RN  Outcome: Adequate for Discharge  4/26/2022 1029 by Christophe Shipley RN  Outcome: Progressing     Problem: Coping  Goal: Pt/Family able to verbalize concerns and demonstrate effective coping strategies  Description: INTERVENTIONS:  1. Assist patient/family to identify coping skills, available support systems and cultural and spiritual values  2. Provide emotional support, including active listening and acknowledgement of concerns of patient and caregivers  3. Reduce environmental stimuli, as able  4. Instruct patient/family in relaxation techniques, as appropriate  5. Assess for spiritual pain/suffering and initiate Spiritual Care, Psychosocial Clinical Specialist consults as needed  4/26/2022 1604 by Ti Tobar RN  Outcome: Adequate for Discharge  4/26/2022 1029 by Christophe Shipley RN  Outcome: Progressing     Problem: Decision Making  Goal: Pt/Family able to effectively weigh alternatives and participate in decision making related to treatment and care  Description: INTERVENTIONS:  1. Determine when there are differences between patient's view, family's view, and healthcare provider's view of condition  2. Facilitate patient and family articulation of goals for care  3. Help patient and family identify pros/cons of alternative solutions  4. Provide information as requested by patient/family  5.  Respect patient/family right to receive or not to receive information  6. Serve as a liaison between patient and family and health care team  7.  Initiate Consults from Ethics, Palliative Care or initiate [facility] as is appropriate  4/26/2022 1604 by Elizabeth Lucio RN  Outcome: Adequate for Discharge  4/26/2022 1029 by Dmitriy Cuenca RN  Outcome: Progressing     Problem: Neurosensory - Adult  Goal: Achieves stable or improved neurological status  4/26/2022 1604 by Elizabeth Lucio RN  Outcome: Adequate for Discharge  4/26/2022 1029 by Dmitriy Cuenca RN  Outcome: Progressing  Goal: Absence of seizures  4/26/2022 1604 by Elizabeth Lucio RN  Outcome: Adequate for Discharge  4/26/2022 1029 by Dmitriy Cuenca RN  Outcome: Progressing  Goal: Achieves maximal functionality and self care  4/26/2022 1604 by Elizabeth Lucio RN  Outcome: Adequate for Discharge  4/26/2022 1029 by Dmitriy Cuenca RN  Outcome: Progressing     Problem: Chronic Conditions and Co-morbidities  Goal: Patient's chronic conditions and co-morbidity symptoms are monitored and maintained or improved  4/26/2022 1604 by Elizabeth Lucio RN  Outcome: Adequate for Discharge  4/26/2022 1029 by Dmitriy Cuenca RN  Outcome: Progressing     Problem: Gastrointestinal - Adult  Goal: Minimal or absence of nausea and vomiting  4/26/2022 1604 by Elizabeth Lucio RN  Outcome: Adequate for Discharge  4/26/2022 1029 by Dmitriy Cuenca RN  Outcome: Progressing  Goal: Maintains or returns to baseline bowel function  4/26/2022 1604 by Elizabeth Lucio RN  Outcome: Adequate for Discharge  4/26/2022 1029 by Dmitriy Cuenca RN  Outcome: Progressing  Goal: Maintains adequate nutritional intake  4/26/2022 1604 by Elizabeth Lucio RN  Outcome: Adequate for Discharge  4/26/2022 1029 by Dimtriy Cuenca RN  Outcome: Progressing

## 2022-04-26 NOTE — DISCHARGE INSTR - COC
Continuity of Care Form    Patient Name: Jeramie Beasley   :  1955  MRN:  [id number]    Admit date:  2022  Discharge date:  22      Code Status Order: DNR-CC   Advance Directives:      Admitting Physician:  Mila Romero MD  PCP: Adriana Justice MD    Discharging Nurse: [facility] Unit/Room#: [id number]/2416-17  Discharging Unit Phone Number: [phone]    Emergency Contact:   Extended Emergency Contact Information  Primary Emergency Contact: Agustina Braxton  Address: [address] [place] [address] Jason Espinal [address] Phone: [phone]  Relation: Spouse  Secondary Emergency Contact: Jaja Chance [facility] [address] [facility] Phone: [phone]  Work Phone: [phone]  Mobile Phone: [phone]  Relation: Child    Past Surgical History:  Past Surgical History:   Procedure Laterality Date    APPENDECTOMY      BACK SURGERY      CARDIAC SURGERY      CHOLECYSTECTOMY      COLONOSCOPY  01/10/2017    COLONOSCOPY  01/10/2017    CORONARY ANGIOPLASTY WITH STENT PLACEMENT      CORONARY ARTERY BYPASS GRAFT  , 2015    ENDOSCOPY, COLON, DIAGNOSTIC      PACEMAKER PLACEMENT      UPPER GASTROINTESTINAL ENDOSCOPY  2017    VASCULAR SURGERY         Immunization History:   Immunization History   Administered Date(s) Administered    COVID-19, Pfizer Purple top, DILUTE for use, 12+ yrs, 30mcg/0.3mL dose 2021, 2021    Influenza 2013    Influenza Vaccine, unspecified formulation 10/24/2011, 2013, 2015, 2017    Influenza Virus Vaccine 2014, 2015, 10/22/2018, 2019    Influenza, Molly Hamburger, IM, (6 mo and older Fluzone, Flulaval, Fluarix and 3 yrs and older Afluria) 10/17/2018    Influenza, Quadv, IM, PF (6 mo and older Fluzone, Flulaval, Fluarix, and 3 yrs and older Afluria) 10/17/2016, 2019, 2020    Influenza, Quadv, adjuvanted, 65 yrs +, IM, PF (Fluad) 10/25/2021, 2022 Pneumococcal Polysaccharide (Egenaaysw87) 10/24/2011, 09/18/2015, 10/25/2021    Tdap (Boostrix, Adacel) 05/04/2015       Active Problems:  Patient Active Problem List   Diagnosis Code    Hx of CABG Z95.1    NSTEMI (non-ST elevated myocardial infarction) I21.4    Essential hypertension I10    Ischemic cardiomyopathy I25.5    Hyperlipidemia LDL goal <70 E78.5    Type 2 diabetes mellitus without complication, with long-term current use of insulin ([facility]) E11.9, Z79.4    Anemia,normocytic normochromic ,mixed folic aci deficiency and iron deficiency D64.9    Morbid obesity due to excess calories ([facility]) E66.01    Anxiety F41.9    Coronary artery disease involving coronary bypass graft of native heart with angina pectoris ([facility]) I25.709    Atypical chest pain R07.89    Tobacco abuse Z72.0    Restrictive lung disease J98.4    Acute on chronic diastolic congestive heart failure ([facility]) I50.33    Ischemic stroke, left frontal lobe (4/30/18) ([facility]) I63.9    PFO (patent foramen ovale) Q21.1    COPD (chronic obstructive pulmonary disease) ([facility]) J44.9    Chronic systolic heart failure ([facility]) I50.22    CAD (coronary artery disease) I25.10    Low back pain M54.50    Stroke determined by clinical assessment ([facility] I63.9    ICD (implantable cardioverter-defibrillator) discharge Z45.02    Diabetic peripheral neuropathy ([facility]) E11.42    Ventricular tachycardia ([facility]) I47.2    Acute pulmonary embolism without acute cor pulmonale ([facility]) I26.99    Acute chest pain R07.9    Paresthesia of upper and lower extremities of both sides R20.2    Intractable abdominal pain R10.9    Chest pain R07.9    Acute cerebrovascular accident (CVA) ([facility] I63.9       Isolation/Infection:   Isolation            No Isolation          Patient Infection Status       Infection Onset Added Last Indicated Last Indicated By Review Planned Expiration Resolved Resolved By    None active    Resolved    COVID-19 (Rule Out) 04/23/22 04/23/22 04/23/22 COVID-19, Rapid (Ordered) 04/23/22 Rule-Out Test Resulted    C-diff Rule Out 07/30/20 07/30/20 08/17/20 Clostridium Difficile Toxin/Antigen (Ordered)   09/05/20 Bre Reece RN    COVID-19 (Rule Out) 07/06/20 07/06/20 07/06/20 COVID-19 (Ordered)   07/07/20 Rule-Out Test Resulted    COVID-19 (Rule Out) 05/23/20 05/23/20 05/23/20 COVID-19 (Ordered)   05/23/20 Rule-Out Test Resulted    C-diff Rule Out 03/07/20 03/07/20 03/10/20 Clostridium Difficile Toxin/Antigen (Ordered)   03/10/20 Rule-Out Test Resulted            Nurse Assessment:  Last Vital Signs: /70   Pulse 101   Temp 98 °F (36.7 °C) (Axillary)   Resp (!) 32   Ht 6' (1.829 m)   Wt 181 lb (82.1 kg)   SpO2 92%   BMI 24.55 kg/m²     Last documented pain score (0-10 scale): Pain Level: 0  Last Weight:   Wt Readings from Last 1 Encounters:   04/26/22 181 lb (82.1 kg)     Mental Status:  disoriented    IV Access:  - Peripheral IV - site  right hand 22 G, insertion date: ***  4/23/2022    Nursing Mobility/ADLs:  Walking   Dependent  Transfer  Dependent  Bathing  Dependent  Dressing  Dependent  Toileting  Dependent  Feeding  Dependent  Med Admin  Dependent  Med Delivery   whole    Wound Care Documentation and Therapy:        Elimination:  Continence: Bowel: No  Bladder: [place] Catheter  Urinary Catheter: Insertion Date: *** 4/23/2022   Colostomy/Ileostomy/Ileal Conduit: No       Date of Last BM: ***    Intake/Output Summary (Last 24 hours) at 4/26/2022 1423  Last data filed at 4/26/2022 1402  Gross per 24 hour   Intake 894.48 ml   Output 1870 ml   Net -975.52 ml     I/O last 3 completed shifts: In: 1412.4 [I.V.:915.3; IV Piggyback:497.1]  Out: 9815 [Urine:3155]    Safety Concerns: At Risk for Falls    Impairments/Disabilities:      Hearing    Nutrition Therapy:  Current Nutrition Therapy:   - Oral Diet:  General    Routes of Feeding: Oral  Liquids:  Thin Liquids  Daily Fluid Restriction: yes - amount 1500ml   Last Modified Barium Swallow with Video (Video Swallowing Test): not done    Treatments at the Time of Hospital Discharge:   Respiratory Treatments: ***  Oxygen Therapy:  is on oxygen at 6 L/min per nasal cannula. Ventilator:    - No ventilator support    Rehab Therapies: ***  Weight Bearing Status/Restrictions: No weight bearing restrictions  Other Medical Equipment (for information only, NOT a DME order):  hospital bed  Other Treatments: ***    Patient's personal belongings (please select all that are sent with patient):  None    RN SIGNATURE:  Electronically signed by Harsha Erickson RN on 4/26/22 at 2:58 PM EDT    CASE MANAGEMENT/SOCIAL WORK SECTION    Inpatient Status Date: ***    Readmission Risk Assessment Score:  Readmission Risk              Risk of Unplanned Readmission:  37           Discharging to Facility/ Agency   Name:   Address:  Phone:  Fax:    Dialysis Facility (if applicable)   Name:  Address:  Dialysis Schedule:  Phone:  Fax:    / signature: {Esignature:[id number]}    PHYSICIAN SECTION    Prognosis: Poor    Condition at Discharge: Stable    Rehab Potential (if transferring to Rehab): Poor    Recommended Labs or Other Treatments After Discharge: hospice    Physician Certification: I certify the above information and transfer of Yeison Cruz  is necessary for the continuing treatment of the diagnosis listed and that he requires Hospice for less 30 days.      Update Admission H&P: No change in H&P    PHYSICIAN SIGNATURE:  Electronically signed by Jared Batista DO on 4/26/22 at 2:23 PM EDT

## 2022-04-26 NOTE — PLAN OF CARE
Problem: Pain  Goal: Verbalizes/displays adequate comfort level or baseline comfort level  Outcome: Progressing     Problem: Respiratory - Adult  Goal: Achieves optimal ventilation and oxygenation  Outcome: Progressing     Problem: Cardiovascular - Adult  Goal: Maintains optimal cardiac output and hemodynamic stability  Outcome: Progressing  Goal: Absence of cardiac dysrhythmias or at baseline  Outcome: Progressing     Problem: Skin/Tissue Integrity - Adult  Goal: Skin integrity remains intact  Outcome: Progressing  Goal: Incisions, wounds, or drain sites healing without S/S of infection  Outcome: Progressing     Problem: Musculoskeletal - Adult  Goal: Return mobility to safest level of function  Outcome: Progressing     Problem: Genitourinary - Adult  Goal: Absence of urinary retention  Outcome: Progressing  Goal: Urinary catheter remains patent  Outcome: Progressing     Problem: Infection - Adult  Goal: Absence of infection at discharge  Outcome: Progressing  Goal: Absence of infection during hospitalization  Outcome: Progressing     Problem: Metabolic/Fluid and Electrolytes - Adult  Goal: Electrolytes maintained within normal limits  Outcome: Progressing  Goal: Hemodynamic stability and optimal renal function maintained  Outcome: Progressing  Goal: Glucose maintained within prescribed range  Outcome: Progressing     Problem: Hematologic - Adult  Goal: Maintains hematologic stability  Outcome: Progressing     Problem: Neurosensory - Adult  Goal: Achieves stable or improved neurological status  Outcome: Progressing  Goal: Absence of seizures  Outcome: Progressing  Goal: Achieves maximal functionality and self care  Outcome: Progressing     Problem: Gastrointestinal - Adult  Goal: Minimal or absence of nausea and vomiting  Outcome: Progressing  Goal: Maintains or returns to baseline bowel function  Outcome: Progressing  Goal: Maintains adequate nutritional intake  Outcome: Progressing     Problem: Skin/Tissue Integrity  Goal: Absence of new skin breakdown  Description: 1. Monitor for areas of redness and/or skin breakdown  2. Assess vascular access sites hourly  3. Every 4-6 hours minimum:  Change oxygen saturation probe site  4. Every 4-6 hours:  If on nasal continuous positive airway pressure, respiratory therapy assess nares and determine need for appliance change or resting period. Outcome: Progressing     Problem: Safety - Adult  Goal: Free from fall injury  Outcome: Progressing     Problem: ABCDS Injury Assessment  Goal: Absence of physical injury  Outcome: Progressing     Problem: Anxiety  Goal: Will report anxiety at manageable levels  Description: INTERVENTIONS:  1. Administer medication as ordered  2. Teach and rehearse alternative coping skills  3. Provide emotional support with 1:1 interaction with staff  Outcome: Progressing     Problem: Coping  Goal: Pt/Family able to verbalize concerns and demonstrate effective coping strategies  Description: INTERVENTIONS:  1. Assist patient/family to identify coping skills, available support systems and cultural and spiritual values  2. Provide emotional support, including active listening and acknowledgement of concerns of patient and caregivers  3. Reduce environmental stimuli, as able  4. Instruct patient/family in relaxation techniques, as appropriate  5. Assess for spiritual pain/suffering and initiate Spiritual Care, Psychosocial Clinical Specialist consults as needed  Outcome: Progressing     Problem: Decision Making  Goal: Pt/Family able to effectively weigh alternatives and participate in decision making related to treatment and care  Description: INTERVENTIONS:  1. Determine when there are differences between patient's view, family's view, and healthcare provider's view of condition  2. Facilitate patient and family articulation of goals for care  3. Help patient and family identify pros/cons of alternative solutions  4.  Provide information as requested by patient/family  5. Respect patient/family right to receive or not to receive information  6. Serve as a liaison between patient and family and health care team  7.  Initiate Consults from Ethics, Palliative Care or initiate [facility] as is appropriate  Outcome: Progressing

## 2022-04-26 NOTE — PROGRESS NOTES
Hospitalist Progress Note      PCP: Rohit Mendez MD    Date of Admission: 4/23/2022    Subjective: Pt S/E. Pt had the impella removed yesterday. He has been hemodynamically stable. He still c/o chest pain with breathing. No family in the room. Medications:  Reviewed    Infusion Medications    nitroprusside (NIPRIDE) 50 mg in D5W infusion 0.5 mcg/kg/min (04/26/22 0702)    sodium chloride      heparin 25,000 units in dextrose 5 %      dextrose      heparin (Porcine) Stopped (04/26/22 7516)     Scheduled Medications    furosemide  40 mg IntraVENous BID    clopidogrel  75 mg Oral Daily    hydrALAZINE  25 mg Oral 2 times per day    levetiracetam  500 mg IntraVENous BID    sodium chloride flush  5-40 mL IntraVENous 2 times per day    aspirin  81 mg Oral Daily    atorvastatin  80 mg Oral Nightly    nicotine  1 patch TransDERmal Nightly    traZODone  50 mg Oral Nightly    insulin lispro  0-12 Units SubCUTAneous TID WC    insulin lispro  0-6 Units SubCUTAneous Nightly    cefTRIAXone (ROCEPHIN) IV  1,000 mg IntraVENous Q24H     PRN Meds: haloperidol lactate, promethazine **OR** ondansetron, magnesium hydroxide, acetaminophen **OR** acetaminophen, nitroGLYCERIN, sodium chloride flush, sodium chloride, acetaminophen, oxyCODONE-acetaminophen, perflutren lipid microspheres, glucose, dextrose, glucagon (rDNA), dextrose      Intake/Output Summary (Last 24 hours) at 4/26/2022 0746  Last data filed at 4/26/2022 4945  Gross per 24 hour   Intake 829.09 ml   Output 1580 ml   Net -750.91 ml       Physical Exam Performed:    /70   Pulse 111   Temp 100.3 °F (37.9 °C) (Core)   Resp (!) 41   Ht 6' (1.829 m)   Wt 181 lb (82.1 kg)   SpO2 91%   BMI 24.55 kg/m²     General appearance: lethargic, nad  HEENT:  Normal cephalic, atraumatic without obvious deformity. Conjunctivae/corneas clear. Neck: Supple, with full range of motion. Respiratory:  Normal respiratory effort.  Clear to auscultation, bilaterally without Rales/Wheezes/Rhonchi. Cardiovascular:  Regular rate and rhythm with normal S1/S2 without murmurs, rubs or gallops. Abdomen: Soft, non-tender, non-distended, normal bowel sounds. Musculoskeletal:  No edema noted bilaterally. No tenderness on palpation   Skin: no rash visible  Neurologic:  Neurologically intact without any focal sensory/motor deficits.  grossly non-focal.  Psychiatric: alert and oriented, answers questions appropriately   Peripheral Pulses: +2 palpable, equal bilaterally        Labs:   Recent Labs     04/23/22  1104 04/23/22  1104 04/24/22  0445 04/25/22  0500 04/25/22  1517   WBC 12.6*  --  11.8* 12.7*  --    HGB 11.4*   < > 9.7* 10.5* 11.6*   HCT 36.6*  --  29.1* 32.5*  --      --  155 134*  --     < > = values in this interval not displayed. Recent Labs     04/24/22  1336 04/25/22  0500 04/26/22  0415    148* 151*   K 3.5 3.2* 3.4*    109 112*   CO2 22 23 27   BUN 74* 60* 44*   CREATININE 1.7* 1.5* 1.3   CALCIUM 7.6* 7.6* 7.2*     Recent Labs     04/23/22  1104 04/24/22  0445   * 533*   * 336*   BILITOT 0.9 0.8   ALKPHOS 198* 162*     No results for input(s): INR in the last 72 hours. Recent Labs     04/23/22  1104   TROPONINI 2.14*       Urinalysis:      Lab Results   Component Value Date    NITRU Negative 04/23/2022    WBCUA 6 04/23/2022    RBCUA 16 04/23/2022    BLOODU TRACE-INTACT 04/23/2022    SPECGRAV >=1.030 04/23/2022    GLUCOSEU Negative 04/23/2022       Radiology:  XR CHEST PORTABLE   Final Result   Left lung atelectasis with shift of the mediastinal structures to the left   side      Kenedy-Rafiq catheter distal in right mid pulmonary artery      The findings were sent to the Radiology Results [address] at 2:44   pm on 4/25/2022 to be communicated to a licensed caregiver. CT CHEST ABDOMEN PELVIS WO CONTRAST   Final Result   7 mm nodule noted in the right upper lobe, series 4, image 60.   This was not   present on prior examination of 10/30/2020. I recommend follow-up in 6-12   months. Previously noted liver lesions not appreciated on this noncontrast study. There are small bilateral pleural effusions, right greater than left. 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 The patient is status post cholecystectomy and hysterectomy peer         CT HEAD WO CONTRAST   Final Result   No acute intracranial abnormality. 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 Mild   degree of generalized volume loss. XR CHEST PORTABLE   Final Result   No acute process. Stable cardiomegaly             Assessment/Plan:    Active Hospital Problems    Diagnosis     NSTEMI (non-ST elevated myocardial infarction) [I21.4]        Patient is a 59-year-old male with past medical history of CAD, COPD, diabetes mellitus hypertension hyperlipidemia who presented to the hospital due to change in mental status per family.  According to the family patient was been confused for 2 days prior to admission. On arrival to the emergency department he complained about chest pain, epigastric abdominal pain.  He was found to have chino, nstemi, and hypoxemia.     Assessment  Acute metabolic encephalopathy - improving  - due to the current medical problems    Acute kidney injury - improving, bun/crt down to 44/1.3   Hyperkalemia-->now hypokalemia  - Avoid nephrotoxins     NSTEMI  Cardiogenic shock - improving  Cardiology consulted s/p cardiac cath, Impella placement and subsequently removed 4/25  On ASA/statin/plavix  On lasix gtt/heparin gtt per cardio  Monitor on cardiac telemetry    Acute hypoxic respiratory failure, POA - slowly improving  - with criteria: < 90% on RA, RR> 18  - supplemental oxygen as necessary to keep SaO2 > 90%; not on o2 at baseline  - IV Rocephin, blood culture neg to date, urine culture neg having low grade fevers   - was on bipap, now weaning, on 6L now  - will order po morphine for pain and dyspnea    Chronic medical conditions - cont home meds unless otherwise stated  Diabetes mellitus    DVT prophylaxis-started on IV heparin   Diet: ADULT DIET; Regular; Low Sodium (2 gm); 1500 ml  Code Status: Limited    PT/OT Eval Status: not indicated    Dispo - discussed with Eric Mckeon on phone with spouse on the phone. Agreed with limited code, dnr/dni.  Palliative care consulted to follow up about hospice now that after impella removed    Madina Lopez DO

## 2022-04-26 NOTE — PROGRESS NOTES
Medical transport arrived with stretcher to  patient. Report given to medics. Patient transported on 6 liters of oxygen. Belongings gathered and sent with patient. Paperwork, AVS, Transport and DNR signed by Dr. Naomi Angel and sent with patient and transport.      Electronically signed by Ozzie Hobbs RN on 4/26/2022 at 4:04 PM

## 2022-04-27 LAB
BLOOD CULTURE, ROUTINE: NORMAL
BLOOD CULTURE, ROUTINE: NORMAL

## 2022-04-27 NOTE — OP NOTE
Operative Note    Procedure: Impella Explant     Pre-Op Diagnosis: cardiogenic shock     Post-Op Diagnosis: Same         Assistant:   None     Anesthesia: none     Estimated Blood Loss (mL): Minimal    Complications: None    Detailed Description of Procedure:   Right groin was prepped and draped in typical sterile fashion. The impella device was rewired using a long 0.035 Jwire. The device was titrated to P1 and pulled into the aorta and flat motor current confirmed. The device was then explanted and over the wire a previously placed perclose was deployed. Due to continued bleeding a 6F angioseal was then placed over the wire and resulted in complete hemostasis. Patient tolerated the procedure well .       Radha Gallardo MD [id number] [facility]   [facility] [address]   (C): [phone]  Michael Pack): [phone]

## 2023-01-26 NOTE — CARE COORDINATION
Health  Wellness Visit Note    Name: Royer Driscoll  Clinic Number: [id number]  Physician: Zulma Aj NP  Diagnosis: No diagnosis found.  Past Medical History:   Diagnosis Date    Allergy     Arthritis     back and shoulder pain    Chronic thoracic back pain     Vitamin D deficiency 2016     Visit Number: [id number]  Precautions: R shoulder      1st PT visit: 2/2/2022  Year of care end date: 2/2/2023  6 Month test  Performed: 10/24/2022  12 Month test performed: February 2023  Mind body plan: Plan A - 8 months, ends 2/7/2022 (+1 free)  Patient level: B    Time In: 11:35 AM  Time Out:  12:15 PM  Total Treatment Time:40 Minutes    [employer] 2022  Handout on this week's wellness topic of Kindness was provided along with a discussion on what it means, the benefits, and suggestions for practice.  Reviewed last week's topic of Meditation.    Subjective:   Patient reports he has no back pain today. He continued to workout at home and get his steps in daily. Patient has a home gym including: treadmill, a recumbent bike, barbell bench press, dumbell-free weights, jump-rope and more. He stretches when need be, especially if he's ever stiff. Patient does not ice his back at home.     [facility] ends 2/7/2023, Patient would like to test on last day!     Objective:   Royer completed therapeutic stretches (EIL, LOR) and the following MedX exercise machines: core lumbar, torso rotation l/r, leg extension, leg curl, upright row, chest press, biceps curl, triceps extension, leg press    See exercise log in patient folder for rate of exertion and repetitions completed.       Fitness Machine Education Key:  E=education on equipment initiated and further follow up and education needed  I=independent with  and exercise.  The patient:  Adjusts machines to his/her settings  Uses equipment levers, pins, weights safely  Maintains safe and correct posture while exercising  Moves through exercise with correct pace and  Interventions  Other Interventions: Follow Up  No future appointments.     Krishan Foley RN control  Gets on and off equipment safely      Lumbar/Cervical Ext. I Torso Rotation I Leg Press E   Leg Extension I Seated Leg Curl I Chest Press I   Seated Row I Hip ADD I Hip ABD I   Triceps Extension I Bicep Curl I Other:        Assessment:   Patient tolerated Patient tolerated MedX Core Lumbar Strength and all other peripheral exercises without an increase in symptoms. Patient warmed up on treadmill, stretched prior to workout. Patient skipped ice today.     Plan:  Continue with established plan of care towards wellness goals.     Health  : Catina Haskins  1/26/2023

## 2023-06-09 NOTE — ED NOTES
Bed: A-15  Expected date:   Expected time:   Means of arrival:   Comments:  Chest pain Jose Ramon 51, RN  05/19/20 6654
Patient's Medtronic device was interrogated as ordered at approximately 0245.       Diane Gillis RN  05/19/20 4398
No